# Patient Record
Sex: FEMALE | Race: ASIAN | NOT HISPANIC OR LATINO | Employment: UNEMPLOYED | URBAN - NONMETROPOLITAN AREA
[De-identification: names, ages, dates, MRNs, and addresses within clinical notes are randomized per-mention and may not be internally consistent; named-entity substitution may affect disease eponyms.]

---

## 2019-09-24 ENCOUNTER — OUTSIDE FACILITY SERVICE (OUTPATIENT)
Dept: CARDIOLOGY | Facility: CLINIC | Age: 49
End: 2019-09-24

## 2019-09-24 PROCEDURE — 93306 TTE W/DOPPLER COMPLETE: CPT | Performed by: INTERNAL MEDICINE

## 2019-10-05 ENCOUNTER — APPOINTMENT (OUTPATIENT)
Dept: GENERAL RADIOLOGY | Facility: HOSPITAL | Age: 49
End: 2019-10-05

## 2019-10-05 ENCOUNTER — APPOINTMENT (OUTPATIENT)
Dept: ULTRASOUND IMAGING | Facility: HOSPITAL | Age: 49
End: 2019-10-05

## 2019-10-05 ENCOUNTER — HOSPITAL ENCOUNTER (INPATIENT)
Facility: HOSPITAL | Age: 49
LOS: 3 days | Discharge: HOME | End: 2019-10-08
Attending: INTERNAL MEDICINE | Admitting: FAMILY MEDICINE

## 2019-10-05 DIAGNOSIS — R11.0 NAUSEA: Primary | ICD-10-CM

## 2019-10-05 DIAGNOSIS — E43 SEVERE MALNUTRITION (HCC): ICD-10-CM

## 2019-10-05 DIAGNOSIS — E86.0 DEHYDRATION: ICD-10-CM

## 2019-10-05 PROBLEM — N17.9 ACUTE RENAL FAILURE (ARF) (HCC): Status: ACTIVE | Noted: 2019-10-05

## 2019-10-05 LAB
ALBUMIN SERPL-MCNC: 3.7 G/DL (ref 3.5–5.2)
ALBUMIN SERPL-MCNC: 3.7 G/DL (ref 3.5–5.2)
ALBUMIN/GLOB SERPL: 0.9 G/DL
ALBUMIN/GLOB SERPL: 1.1 G/DL
ALP SERPL-CCNC: 109 U/L (ref 39–117)
ALP SERPL-CCNC: 99 U/L (ref 39–117)
ALT SERPL W P-5'-P-CCNC: 10 U/L (ref 1–33)
ALT SERPL W P-5'-P-CCNC: 11 U/L (ref 1–33)
ANION GAP SERPL CALCULATED.3IONS-SCNC: 19 MMOL/L (ref 5–15)
ANION GAP SERPL CALCULATED.3IONS-SCNC: 19 MMOL/L (ref 5–15)
AST SERPL-CCNC: 11 U/L (ref 1–32)
AST SERPL-CCNC: 12 U/L (ref 1–32)
BACTERIA UR QL AUTO: ABNORMAL /HPF
BASOPHILS # BLD AUTO: 0.01 10*3/MM3 (ref 0–0.2)
BASOPHILS # BLD AUTO: 0.03 10*3/MM3 (ref 0–0.2)
BASOPHILS NFR BLD AUTO: 0.1 % (ref 0–1.5)
BASOPHILS NFR BLD AUTO: 0.1 % (ref 0–1.5)
BILIRUB SERPL-MCNC: 0.2 MG/DL (ref 0.2–1.2)
BILIRUB SERPL-MCNC: <0.2 MG/DL (ref 0.2–1.2)
BILIRUB UR QL STRIP: NEGATIVE
BUN BLD-MCNC: 37 MG/DL (ref 6–20)
BUN BLD-MCNC: 38 MG/DL (ref 6–20)
BUN/CREAT SERPL: 8.9 (ref 7–25)
BUN/CREAT SERPL: 9.3 (ref 7–25)
CALCIUM SPEC-SCNC: 8.7 MG/DL (ref 8.6–10.5)
CALCIUM SPEC-SCNC: 8.8 MG/DL (ref 8.6–10.5)
CHLORIDE SERPL-SCNC: 103 MMOL/L (ref 98–107)
CHLORIDE SERPL-SCNC: 105 MMOL/L (ref 98–107)
CLARITY UR: CLEAR
CO2 SERPL-SCNC: 18 MMOL/L (ref 22–29)
CO2 SERPL-SCNC: 19 MMOL/L (ref 22–29)
COLOR UR: YELLOW
CREAT BLD-MCNC: 3.99 MG/DL (ref 0.57–1)
CREAT BLD-MCNC: 4.29 MG/DL (ref 0.57–1)
DEPRECATED RDW RBC AUTO: 51.7 FL (ref 37–54)
DEPRECATED RDW RBC AUTO: 52.2 FL (ref 37–54)
EOSINOPHIL # BLD AUTO: 0.02 10*3/MM3 (ref 0–0.4)
EOSINOPHIL # BLD AUTO: 0.06 10*3/MM3 (ref 0–0.4)
EOSINOPHIL NFR BLD AUTO: 0.1 % (ref 0.3–6.2)
EOSINOPHIL NFR BLD AUTO: 0.3 % (ref 0.3–6.2)
ERYTHROCYTE [DISTWIDTH] IN BLOOD BY AUTOMATED COUNT: 14.6 % (ref 12.3–15.4)
ERYTHROCYTE [DISTWIDTH] IN BLOOD BY AUTOMATED COUNT: 14.7 % (ref 12.3–15.4)
GFR SERPL CREATININE-BSD FRML MDRD: 11 ML/MIN/1.73
GFR SERPL CREATININE-BSD FRML MDRD: 12 ML/MIN/1.73
GFR SERPL CREATININE-BSD FRML MDRD: 13 ML/MIN/1.73
GFR SERPL CREATININE-BSD FRML MDRD: 15 ML/MIN/1.73
GLOBULIN UR ELPH-MCNC: 3.5 GM/DL
GLOBULIN UR ELPH-MCNC: 4 GM/DL
GLUCOSE BLD-MCNC: 67 MG/DL (ref 65–99)
GLUCOSE BLD-MCNC: 70 MG/DL (ref 65–99)
GLUCOSE BLDC GLUCOMTR-MCNC: 100 MG/DL (ref 70–130)
GLUCOSE BLDC GLUCOMTR-MCNC: 155 MG/DL (ref 70–130)
GLUCOSE BLDC GLUCOMTR-MCNC: 193 MG/DL (ref 70–130)
GLUCOSE BLDC GLUCOMTR-MCNC: 249 MG/DL (ref 70–130)
GLUCOSE UR STRIP-MCNC: NEGATIVE MG/DL
HCT VFR BLD AUTO: 36.3 % (ref 34–46.6)
HCT VFR BLD AUTO: 37.4 % (ref 34–46.6)
HGB BLD-MCNC: 12.2 G/DL (ref 12–15.9)
HGB BLD-MCNC: 12.4 G/DL (ref 12–15.9)
HGB UR QL STRIP.AUTO: ABNORMAL
HYALINE CASTS UR QL AUTO: ABNORMAL /LPF
IMM GRANULOCYTES # BLD AUTO: 0.1 10*3/MM3 (ref 0–0.05)
IMM GRANULOCYTES # BLD AUTO: 0.14 10*3/MM3 (ref 0–0.05)
IMM GRANULOCYTES NFR BLD AUTO: 0.6 % (ref 0–0.5)
IMM GRANULOCYTES NFR BLD AUTO: 0.7 % (ref 0–0.5)
KETONES UR QL STRIP: NEGATIVE
LEUKOCYTE ESTERASE UR QL STRIP.AUTO: NEGATIVE
LYMPHOCYTES # BLD AUTO: 2.13 10*3/MM3 (ref 0.7–3.1)
LYMPHOCYTES # BLD AUTO: 2.43 10*3/MM3 (ref 0.7–3.1)
LYMPHOCYTES NFR BLD AUTO: 11.8 % (ref 19.6–45.3)
LYMPHOCYTES NFR BLD AUTO: 12.6 % (ref 19.6–45.3)
MCH RBC QN AUTO: 32.6 PG (ref 26.6–33)
MCH RBC QN AUTO: 32.8 PG (ref 26.6–33)
MCHC RBC AUTO-ENTMCNC: 33.2 G/DL (ref 31.5–35.7)
MCHC RBC AUTO-ENTMCNC: 33.6 G/DL (ref 31.5–35.7)
MCV RBC AUTO: 97.6 FL (ref 79–97)
MCV RBC AUTO: 98.4 FL (ref 79–97)
MONOCYTES # BLD AUTO: 0.5 10*3/MM3 (ref 0.1–0.9)
MONOCYTES # BLD AUTO: 0.6 10*3/MM3 (ref 0.1–0.9)
MONOCYTES NFR BLD AUTO: 2.9 % (ref 5–12)
MONOCYTES NFR BLD AUTO: 2.9 % (ref 5–12)
NEUTROPHILS # BLD AUTO: 14.2 10*3/MM3 (ref 1.7–7)
NEUTROPHILS # BLD AUTO: 17.33 10*3/MM3 (ref 1.7–7)
NEUTROPHILS NFR BLD AUTO: 83.7 % (ref 42.7–76)
NEUTROPHILS NFR BLD AUTO: 84.2 % (ref 42.7–76)
NITRITE UR QL STRIP: NEGATIVE
NRBC BLD AUTO-RTO: 0 /100 WBC (ref 0–0.2)
NRBC BLD AUTO-RTO: 0 /100 WBC (ref 0–0.2)
PH UR STRIP.AUTO: <=5 [PH] (ref 5–8)
PLATELET # BLD AUTO: 382 10*3/MM3 (ref 140–450)
PLATELET # BLD AUTO: 396 10*3/MM3 (ref 140–450)
PMV BLD AUTO: 10 FL (ref 6–12)
PMV BLD AUTO: 9.8 FL (ref 6–12)
POTASSIUM BLD-SCNC: 3 MMOL/L (ref 3.5–5.2)
POTASSIUM BLD-SCNC: 3.3 MMOL/L (ref 3.5–5.2)
PROT SERPL-MCNC: 7.2 G/DL (ref 6–8.5)
PROT SERPL-MCNC: 7.7 G/DL (ref 6–8.5)
PROT UR QL STRIP: NEGATIVE
RBC # BLD AUTO: 3.72 10*6/MM3 (ref 3.77–5.28)
RBC # BLD AUTO: 3.8 10*6/MM3 (ref 3.77–5.28)
RBC # UR: ABNORMAL /HPF
REF LAB TEST METHOD: ABNORMAL
SODIUM BLD-SCNC: 141 MMOL/L (ref 136–145)
SODIUM BLD-SCNC: 142 MMOL/L (ref 136–145)
SP GR UR STRIP: 1.01 (ref 1–1.03)
SQUAMOUS #/AREA URNS HPF: ABNORMAL /HPF
UROBILINOGEN UR QL STRIP: ABNORMAL
WBC CASTS #/AREA URNS LPF: ABNORMAL /LPF
WBC NRBC COR # BLD: 16.96 10*3/MM3 (ref 3.4–10.8)
WBC NRBC COR # BLD: 20.59 10*3/MM3 (ref 3.4–10.8)
WBC UR QL AUTO: ABNORMAL /HPF

## 2019-10-05 PROCEDURE — 80053 COMPREHEN METABOLIC PANEL: CPT | Performed by: FAMILY MEDICINE

## 2019-10-05 PROCEDURE — 36415 COLL VENOUS BLD VENIPUNCTURE: CPT | Performed by: FAMILY MEDICINE

## 2019-10-05 PROCEDURE — 76775 US EXAM ABDO BACK WALL LIM: CPT

## 2019-10-05 PROCEDURE — 99284 EMERGENCY DEPT VISIT MOD MDM: CPT

## 2019-10-05 PROCEDURE — 80053 COMPREHEN METABOLIC PANEL: CPT | Performed by: INTERNAL MEDICINE

## 2019-10-05 PROCEDURE — 82962 GLUCOSE BLOOD TEST: CPT

## 2019-10-05 PROCEDURE — 71046 X-RAY EXAM CHEST 2 VIEWS: CPT

## 2019-10-05 PROCEDURE — 85025 COMPLETE CBC W/AUTO DIFF WBC: CPT | Performed by: INTERNAL MEDICINE

## 2019-10-05 PROCEDURE — 81001 URINALYSIS AUTO W/SCOPE: CPT | Performed by: INTERNAL MEDICINE

## 2019-10-05 PROCEDURE — 63710000001 INSULIN LISPRO (HUMAN) PER 5 UNITS: Performed by: FAMILY MEDICINE

## 2019-10-05 PROCEDURE — 63710000001 PREDNISONE PER 5 MG: Performed by: FAMILY MEDICINE

## 2019-10-05 PROCEDURE — 85025 COMPLETE CBC W/AUTO DIFF WBC: CPT | Performed by: FAMILY MEDICINE

## 2019-10-05 RX ORDER — TIZANIDINE 4 MG/1
8 TABLET ORAL NIGHTLY
Status: ON HOLD | COMMUNITY
End: 2022-04-30

## 2019-10-05 RX ORDER — ERGOCALCIFEROL (VITAMIN D2) 50 MCG
2000 CAPSULE ORAL DAILY
COMMUNITY

## 2019-10-05 RX ORDER — NIFEDIPINE 60 MG/1
60 TABLET, EXTENDED RELEASE ORAL DAILY
Status: ON HOLD | COMMUNITY
End: 2022-04-30

## 2019-10-05 RX ORDER — SODIUM CHLORIDE 9 MG/ML
75 INJECTION, SOLUTION INTRAVENOUS CONTINUOUS
Status: DISCONTINUED | OUTPATIENT
Start: 2019-10-05 | End: 2019-10-08 | Stop reason: HOSPADM

## 2019-10-05 RX ORDER — MOMETASONE FUROATE 1 MG/G
OINTMENT TOPICAL DAILY
Status: ON HOLD | COMMUNITY
End: 2022-04-30

## 2019-10-05 RX ORDER — GABAPENTIN 600 MG/1
600 TABLET ORAL 3 TIMES DAILY
COMMUNITY
End: 2022-04-30 | Stop reason: HOSPADM

## 2019-10-05 RX ORDER — NORTRIPTYLINE HYDROCHLORIDE 50 MG/1
25 CAPSULE ORAL NIGHTLY
Status: ON HOLD | COMMUNITY
End: 2022-04-30

## 2019-10-05 RX ORDER — DULOXETIN HYDROCHLORIDE 60 MG/1
60 CAPSULE, DELAYED RELEASE ORAL DAILY
COMMUNITY
End: 2022-02-08

## 2019-10-05 RX ORDER — COLCHICINE 0.6 MG/1
0.6 TABLET ORAL 3 TIMES DAILY PRN
Status: ON HOLD | COMMUNITY
End: 2022-04-30

## 2019-10-05 RX ORDER — GLIPIZIDE 5 MG/1
5 TABLET ORAL
Status: ON HOLD | COMMUNITY
End: 2022-04-30

## 2019-10-05 RX ORDER — CETIRIZINE HYDROCHLORIDE 10 MG/1
10 TABLET ORAL DAILY
COMMUNITY

## 2019-10-05 RX ORDER — POTASSIUM CHLORIDE 750 MG/1
40 CAPSULE, EXTENDED RELEASE ORAL ONCE
Status: COMPLETED | OUTPATIENT
Start: 2019-10-05 | End: 2019-10-05

## 2019-10-05 RX ORDER — NICOTINE POLACRILEX 4 MG
15 LOZENGE BUCCAL
Status: DISCONTINUED | OUTPATIENT
Start: 2019-10-05 | End: 2019-10-08 | Stop reason: HOSPADM

## 2019-10-05 RX ORDER — IPRATROPIUM BROMIDE AND ALBUTEROL SULFATE 2.5; .5 MG/3ML; MG/3ML
3 SOLUTION RESPIRATORY (INHALATION)
Status: DISCONTINUED | OUTPATIENT
Start: 2019-10-06 | End: 2019-10-06

## 2019-10-05 RX ORDER — ATORVASTATIN CALCIUM 10 MG/1
20 TABLET, FILM COATED ORAL DAILY
Status: DISCONTINUED | OUTPATIENT
Start: 2019-10-05 | End: 2019-10-05

## 2019-10-05 RX ORDER — BISACODYL 5 MG/1
5 TABLET, DELAYED RELEASE ORAL NIGHTLY
Status: DISCONTINUED | OUTPATIENT
Start: 2019-10-05 | End: 2019-10-08 | Stop reason: HOSPADM

## 2019-10-05 RX ORDER — NORTRIPTYLINE HYDROCHLORIDE 25 MG/1
50 CAPSULE ORAL NIGHTLY
Status: DISCONTINUED | OUTPATIENT
Start: 2019-10-05 | End: 2019-10-08 | Stop reason: HOSPADM

## 2019-10-05 RX ORDER — ATORVASTATIN CALCIUM 20 MG/1
40 TABLET, FILM COATED ORAL DAILY
Status: ON HOLD | COMMUNITY
End: 2022-04-30

## 2019-10-05 RX ORDER — ATORVASTATIN CALCIUM 10 MG/1
20 TABLET, FILM COATED ORAL NIGHTLY
Status: DISCONTINUED | OUTPATIENT
Start: 2019-10-05 | End: 2019-10-08 | Stop reason: HOSPADM

## 2019-10-05 RX ORDER — BISOPROLOL FUMARATE 5 MG/1
5 TABLET, FILM COATED ORAL DAILY
Status: DISCONTINUED | OUTPATIENT
Start: 2019-10-05 | End: 2019-10-05

## 2019-10-05 RX ORDER — FUROSEMIDE 40 MG/1
40 TABLET ORAL 2 TIMES DAILY PRN
COMMUNITY
End: 2019-10-08 | Stop reason: HOSPADM

## 2019-10-05 RX ORDER — SODIUM CHLORIDE 9 MG/ML
100 INJECTION, SOLUTION INTRAVENOUS CONTINUOUS
Status: DISCONTINUED | OUTPATIENT
Start: 2019-10-06 | End: 2019-10-06

## 2019-10-05 RX ORDER — POTASSIUM CHLORIDE 20 MEQ/1
20 TABLET, EXTENDED RELEASE ORAL DAILY
COMMUNITY
End: 2022-04-30 | Stop reason: HOSPADM

## 2019-10-05 RX ORDER — OMEPRAZOLE 40 MG/1
40 CAPSULE, DELAYED RELEASE ORAL DAILY
Status: ON HOLD | COMMUNITY
End: 2022-04-30

## 2019-10-05 RX ORDER — PANTOPRAZOLE SODIUM 40 MG/1
40 TABLET, DELAYED RELEASE ORAL
Status: DISCONTINUED | OUTPATIENT
Start: 2019-10-05 | End: 2019-10-08 | Stop reason: HOSPADM

## 2019-10-05 RX ORDER — ALENDRONATE SODIUM 70 MG/1
70 TABLET ORAL
Status: ON HOLD | COMMUNITY
End: 2022-04-30

## 2019-10-05 RX ORDER — ONDANSETRON 2 MG/ML
4 INJECTION INTRAMUSCULAR; INTRAVENOUS EVERY 6 HOURS PRN
Status: DISCONTINUED | OUTPATIENT
Start: 2019-10-05 | End: 2019-10-08 | Stop reason: HOSPADM

## 2019-10-05 RX ORDER — FOLIC ACID 1 MG/1
2 TABLET ORAL DAILY
Status: ON HOLD | COMMUNITY
End: 2022-04-30

## 2019-10-05 RX ORDER — GUAIFENESIN 600 MG/1
1200 TABLET, EXTENDED RELEASE ORAL EVERY 12 HOURS SCHEDULED
Status: DISCONTINUED | OUTPATIENT
Start: 2019-10-06 | End: 2019-10-08 | Stop reason: HOSPADM

## 2019-10-05 RX ORDER — ASPIRIN 81 MG/1
81 TABLET, CHEWABLE ORAL DAILY
Status: DISCONTINUED | OUTPATIENT
Start: 2019-10-05 | End: 2019-10-08 | Stop reason: HOSPADM

## 2019-10-05 RX ORDER — DULOXETIN HYDROCHLORIDE 30 MG/1
60 CAPSULE, DELAYED RELEASE ORAL DAILY
Status: DISCONTINUED | OUTPATIENT
Start: 2019-10-05 | End: 2019-10-08 | Stop reason: HOSPADM

## 2019-10-05 RX ORDER — PREDNISONE 1 MG/1
5 TABLET ORAL DAILY
Status: DISCONTINUED | OUTPATIENT
Start: 2019-10-05 | End: 2019-10-08 | Stop reason: HOSPADM

## 2019-10-05 RX ORDER — BISOPROLOL FUMARATE 5 MG/1
5 TABLET, FILM COATED ORAL DAILY
Status: ON HOLD | COMMUNITY
End: 2019-10-05 | Stop reason: ALTCHOICE

## 2019-10-05 RX ORDER — BISACODYL 5 MG/1
75 TABLET, DELAYED RELEASE ORAL NIGHTLY
Status: ON HOLD | COMMUNITY
End: 2022-04-30

## 2019-10-05 RX ORDER — CETIRIZINE HYDROCHLORIDE 10 MG/1
10 TABLET ORAL DAILY
Status: DISCONTINUED | OUTPATIENT
Start: 2019-10-05 | End: 2019-10-08 | Stop reason: HOSPADM

## 2019-10-05 RX ORDER — DIPHENHYDRAMINE HCL 25 MG
50 TABLET ORAL NIGHTLY
COMMUNITY

## 2019-10-05 RX ORDER — DEXTROSE MONOHYDRATE 25 G/50ML
25 INJECTION, SOLUTION INTRAVENOUS
Status: DISCONTINUED | OUTPATIENT
Start: 2019-10-05 | End: 2019-10-08 | Stop reason: HOSPADM

## 2019-10-05 RX ORDER — PREDNISONE 1 MG/1
5 TABLET ORAL DAILY
Status: ON HOLD | COMMUNITY
End: 2022-04-30

## 2019-10-05 RX ORDER — GABAPENTIN 300 MG/1
600 CAPSULE ORAL EVERY 8 HOURS SCHEDULED
Status: DISCONTINUED | OUTPATIENT
Start: 2019-10-05 | End: 2019-10-08 | Stop reason: HOSPADM

## 2019-10-05 RX ORDER — ASPIRIN 81 MG/1
81 TABLET ORAL DAILY
Status: ON HOLD | COMMUNITY
End: 2022-04-30

## 2019-10-05 RX ORDER — NIFEDIPINE 60 MG/1
60 TABLET, EXTENDED RELEASE ORAL DAILY
Status: DISCONTINUED | OUTPATIENT
Start: 2019-10-05 | End: 2019-10-06

## 2019-10-05 RX ADMIN — PREDNISONE 5 MG: 5 TABLET ORAL at 09:58

## 2019-10-05 RX ADMIN — SODIUM CHLORIDE 500 ML: 9 INJECTION, SOLUTION INTRAVENOUS at 15:19

## 2019-10-05 RX ADMIN — DULOXETINE HYDROCHLORIDE 60 MG: 30 CAPSULE, DELAYED RELEASE ORAL at 17:33

## 2019-10-05 RX ADMIN — SODIUM CHLORIDE 75 ML/HR: 9 INJECTION, SOLUTION INTRAVENOUS at 18:33

## 2019-10-05 RX ADMIN — BISOPROLOL FUMARATE 5 MG: 5 TABLET, FILM COATED ORAL at 09:58

## 2019-10-05 RX ADMIN — INSULIN LISPRO 4 UNITS: 100 INJECTION, SOLUTION INTRAVENOUS; SUBCUTANEOUS at 17:33

## 2019-10-05 RX ADMIN — INSULIN LISPRO 2 UNITS: 100 INJECTION, SOLUTION INTRAVENOUS; SUBCUTANEOUS at 22:46

## 2019-10-05 RX ADMIN — PANTOPRAZOLE SODIUM 40 MG: 40 TABLET, DELAYED RELEASE ORAL at 06:49

## 2019-10-05 RX ADMIN — CETIRIZINE HYDROCHLORIDE 10 MG: 10 TABLET, FILM COATED ORAL at 09:58

## 2019-10-05 RX ADMIN — NORTRIPTYLINE HYDROCHLORIDE 50 MG: 25 CAPSULE ORAL at 22:46

## 2019-10-05 RX ADMIN — ATORVASTATIN CALCIUM 20 MG: 10 TABLET, FILM COATED ORAL at 22:46

## 2019-10-05 RX ADMIN — SODIUM CHLORIDE 500 ML: 9 INJECTION, SOLUTION INTRAVENOUS at 18:33

## 2019-10-05 RX ADMIN — NIFEDIPINE 60 MG: 60 TABLET, FILM COATED, EXTENDED RELEASE ORAL at 09:58

## 2019-10-05 RX ADMIN — GABAPENTIN 600 MG: 300 CAPSULE ORAL at 06:49

## 2019-10-05 RX ADMIN — BISACODYL 5 MG: 5 TABLET ORAL at 22:46

## 2019-10-05 RX ADMIN — SODIUM CHLORIDE 75 ML/HR: 9 INJECTION, SOLUTION INTRAVENOUS at 05:14

## 2019-10-05 RX ADMIN — ASPIRIN 81 MG: 81 TABLET, CHEWABLE ORAL at 09:58

## 2019-10-05 RX ADMIN — POTASSIUM CHLORIDE 40 MEQ: 750 CAPSULE, EXTENDED RELEASE ORAL at 10:07

## 2019-10-05 RX ADMIN — INSULIN LISPRO 2 UNITS: 100 INJECTION, SOLUTION INTRAVENOUS; SUBCUTANEOUS at 12:07

## 2019-10-06 ENCOUNTER — APPOINTMENT (OUTPATIENT)
Dept: CARDIOLOGY | Facility: HOSPITAL | Age: 49
End: 2019-10-06

## 2019-10-06 PROBLEM — R42 DIZZY: Status: ACTIVE | Noted: 2019-10-06

## 2019-10-06 PROBLEM — D72.829 LEUKOCYTOSIS: Status: ACTIVE | Noted: 2019-10-06

## 2019-10-06 PROBLEM — E86.0 DEHYDRATION: Status: ACTIVE | Noted: 2019-10-06

## 2019-10-06 PROBLEM — R03.1 NONSPECIFIC LOW BLOOD PRESSURE READING: Status: ACTIVE | Noted: 2019-10-06

## 2019-10-06 PROBLEM — E11.40 DIABETES MELLITUS WITH NEUROPATHY: Status: ACTIVE | Noted: 2019-10-06

## 2019-10-06 PROBLEM — E43 SEVERE MALNUTRITION: Status: ACTIVE | Noted: 2019-10-06
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
## 2019-10-06 PROBLEM — E87.6 HYPOKALEMIA: Status: ACTIVE | Noted: 2019-10-06

## 2019-10-06 LAB
ALBUMIN SERPL-MCNC: 3.2 G/DL (ref 3.5–5.2)
ALBUMIN/GLOB SERPL: 1.1 G/DL
ALP SERPL-CCNC: 81 U/L (ref 39–117)
ALT SERPL W P-5'-P-CCNC: 5 U/L (ref 1–33)
ANION GAP SERPL CALCULATED.3IONS-SCNC: 13 MMOL/L (ref 5–15)
AST SERPL-CCNC: 9 U/L (ref 1–32)
BASOPHILS # BLD AUTO: 0.02 10*3/MM3 (ref 0–0.2)
BASOPHILS NFR BLD AUTO: 0.2 % (ref 0–1.5)
BH CV ECHO MEAS - AO MAX PG (FULL): 1.9 MMHG
BH CV ECHO MEAS - AO MAX PG: 5.1 MMHG
BH CV ECHO MEAS - AO MEAN PG (FULL): 0 MMHG
BH CV ECHO MEAS - AO MEAN PG: 2 MMHG
BH CV ECHO MEAS - AO ROOT AREA (BSA CORRECTED): 1.8
BH CV ECHO MEAS - AO ROOT AREA: 4.7 CM^2
BH CV ECHO MEAS - AO ROOT DIAM: 2.5 CM
BH CV ECHO MEAS - AO V2 MAX: 113 CM/SEC
BH CV ECHO MEAS - AO V2 MEAN: 63.6 CM/SEC
BH CV ECHO MEAS - AO V2 VTI: 23.6 CM
BH CV ECHO MEAS - AVA(I,A): 3.4 CM^2
BH CV ECHO MEAS - AVA(I,D): 3.4 CM^2
BH CV ECHO MEAS - AVA(V,A): 3 CM^2
BH CV ECHO MEAS - AVA(V,D): 3 CM^2
BH CV ECHO MEAS - BSA(HAYCOCK): 1.4 M^2
BH CV ECHO MEAS - BSA: 1.4 M^2
BH CV ECHO MEAS - BZI_BMI: 19.7 KILOGRAMS/M^2
BH CV ECHO MEAS - BZI_METRIC_HEIGHT: 152.4 CM
BH CV ECHO MEAS - BZI_METRIC_WEIGHT: 45.8 KG
BH CV ECHO MEAS - EDV(CUBED): 86.4 ML
BH CV ECHO MEAS - EDV(MOD-SP4): 45.1 ML
BH CV ECHO MEAS - EDV(TEICH): 88.6 ML
BH CV ECHO MEAS - EF(CUBED): 78.4 %
BH CV ECHO MEAS - EF(MOD-SP4): 69.4 %
BH CV ECHO MEAS - EF(TEICH): 70.9 %
BH CV ECHO MEAS - ESV(CUBED): 18.6 ML
BH CV ECHO MEAS - ESV(MOD-SP4): 13.8 ML
BH CV ECHO MEAS - ESV(TEICH): 25.8 ML
BH CV ECHO MEAS - FS: 40 %
BH CV ECHO MEAS - IVS/LVPW: 1.1
BH CV ECHO MEAS - IVSD: 0.81 CM
BH CV ECHO MEAS - LA DIMENSION: 3.4 CM
BH CV ECHO MEAS - LA/AO: 1.4
BH CV ECHO MEAS - LAT PEAK E' VEL: 14.9 CM/SEC
BH CV ECHO MEAS - LV DIASTOLIC VOL/BSA (35-75): 32.3 ML/M^2
BH CV ECHO MEAS - LV MASS(C)D: 104.6 GRAMS
BH CV ECHO MEAS - LV MASS(C)DI: 74.9 GRAMS/M^2
BH CV ECHO MEAS - LV MAX PG: 3.3 MMHG
BH CV ECHO MEAS - LV MEAN PG: 2 MMHG
BH CV ECHO MEAS - LV SYSTOLIC VOL/BSA (12-30): 9.9 ML/M^2
BH CV ECHO MEAS - LV V1 MAX: 90.2 CM/SEC
BH CV ECHO MEAS - LV V1 MEAN: 54.9 CM/SEC
BH CV ECHO MEAS - LV V1 VTI: 21 CM
BH CV ECHO MEAS - LVIDD: 4.4 CM
BH CV ECHO MEAS - LVIDS: 2.7 CM
BH CV ECHO MEAS - LVLD AP4: 6.8 CM
BH CV ECHO MEAS - LVLS AP4: 5.1 CM
BH CV ECHO MEAS - LVOT AREA (M): 3.8 CM^2
BH CV ECHO MEAS - LVOT AREA: 3.8 CM^2
BH CV ECHO MEAS - LVOT DIAM: 2.2 CM
BH CV ECHO MEAS - LVPWD: 0.73 CM
BH CV ECHO MEAS - MED PEAK E' VEL: 9.9 CM/SEC
BH CV ECHO MEAS - MV A MAX VEL: 79.5 CM/SEC
BH CV ECHO MEAS - MV DEC TIME: 0.19 SEC
BH CV ECHO MEAS - MV E MAX VEL: 94.6 CM/SEC
BH CV ECHO MEAS - MV E/A: 1.2
BH CV ECHO MEAS - RAP SYSTOLE: 5 MMHG
BH CV ECHO MEAS - RVDD: 3.4 CM
BH CV ECHO MEAS - RVSP: 22.3 MMHG
BH CV ECHO MEAS - SI(AO): 79.7 ML/M^2
BH CV ECHO MEAS - SI(CUBED): 48.5 ML/M^2
BH CV ECHO MEAS - SI(LVOT): 57.2 ML/M^2
BH CV ECHO MEAS - SI(MOD-SP4): 22.4 ML/M^2
BH CV ECHO MEAS - SI(TEICH): 45 ML/M^2
BH CV ECHO MEAS - SV(AO): 111.3 ML
BH CV ECHO MEAS - SV(CUBED): 67.7 ML
BH CV ECHO MEAS - SV(LVOT): 79.8 ML
BH CV ECHO MEAS - SV(MOD-SP4): 31.3 ML
BH CV ECHO MEAS - SV(TEICH): 62.8 ML
BH CV ECHO MEAS - TR MAX VEL: 208 CM/SEC
BH CV ECHO MEASUREMENTS AVERAGE E/E' RATIO: 7.63
BILIRUB SERPL-MCNC: <0.2 MG/DL (ref 0.2–1.2)
BUN BLD-MCNC: 23 MG/DL (ref 6–20)
BUN/CREAT SERPL: 16.7 (ref 7–25)
CALCIUM SPEC-SCNC: 7.6 MG/DL (ref 8.6–10.5)
CHLORIDE SERPL-SCNC: 109 MMOL/L (ref 98–107)
CO2 SERPL-SCNC: 20 MMOL/L (ref 22–29)
CREAT BLD-MCNC: 1.38 MG/DL (ref 0.57–1)
D-LACTATE SERPL-SCNC: 1.5 MMOL/L (ref 0.5–2)
DEPRECATED RDW RBC AUTO: 52.3 FL (ref 37–54)
EOSINOPHIL # BLD AUTO: 0.03 10*3/MM3 (ref 0–0.4)
EOSINOPHIL NFR BLD AUTO: 0.3 % (ref 0.3–6.2)
ERYTHROCYTE [DISTWIDTH] IN BLOOD BY AUTOMATED COUNT: 14.8 % (ref 12.3–15.4)
GFR SERPL CREATININE-BSD FRML MDRD: 41 ML/MIN/1.73
GFR SERPL CREATININE-BSD FRML MDRD: 49 ML/MIN/1.73
GLOBULIN UR ELPH-MCNC: 2.8 GM/DL
GLUCOSE BLD-MCNC: 146 MG/DL (ref 65–99)
GLUCOSE BLDC GLUCOMTR-MCNC: 150 MG/DL (ref 70–130)
GLUCOSE BLDC GLUCOMTR-MCNC: 171 MG/DL (ref 70–130)
GLUCOSE BLDC GLUCOMTR-MCNC: 183 MG/DL (ref 70–130)
GLUCOSE BLDC GLUCOMTR-MCNC: 193 MG/DL (ref 70–130)
HCT VFR BLD AUTO: 30.5 % (ref 34–46.6)
HGB BLD-MCNC: 10.2 G/DL (ref 12–15.9)
IMM GRANULOCYTES # BLD AUTO: 0.04 10*3/MM3 (ref 0–0.05)
IMM GRANULOCYTES NFR BLD AUTO: 0.4 % (ref 0–0.5)
LEFT ATRIUM VOLUME INDEX: 15.7 ML/M2
LEFT ATRIUM VOLUME: 22 CM3
LYMPHOCYTES # BLD AUTO: 2.31 10*3/MM3 (ref 0.7–3.1)
LYMPHOCYTES NFR BLD AUTO: 21 % (ref 19.6–45.3)
MAXIMAL PREDICTED HEART RATE: 172 BPM
MCH RBC QN AUTO: 32.8 PG (ref 26.6–33)
MCHC RBC AUTO-ENTMCNC: 33.4 G/DL (ref 31.5–35.7)
MCV RBC AUTO: 98.1 FL (ref 79–97)
MONOCYTES # BLD AUTO: 0.21 10*3/MM3 (ref 0.1–0.9)
MONOCYTES NFR BLD AUTO: 1.9 % (ref 5–12)
NEUTROPHILS # BLD AUTO: 8.41 10*3/MM3 (ref 1.7–7)
NEUTROPHILS NFR BLD AUTO: 76.2 % (ref 42.7–76)
NRBC BLD AUTO-RTO: 0 /100 WBC (ref 0–0.2)
PLATELET # BLD AUTO: 333 10*3/MM3 (ref 140–450)
PMV BLD AUTO: 10.1 FL (ref 6–12)
POTASSIUM BLD-SCNC: 3.3 MMOL/L (ref 3.5–5.2)
PROT SERPL-MCNC: 6 G/DL (ref 6–8.5)
RBC # BLD AUTO: 3.11 10*6/MM3 (ref 3.77–5.28)
SODIUM BLD-SCNC: 142 MMOL/L (ref 136–145)
STRESS TARGET HR: 146 BPM
WBC NRBC COR # BLD: 11.02 10*3/MM3 (ref 3.4–10.8)

## 2019-10-06 PROCEDURE — 85025 COMPLETE CBC W/AUTO DIFF WBC: CPT | Performed by: FAMILY MEDICINE

## 2019-10-06 PROCEDURE — 93306 TTE W/DOPPLER COMPLETE: CPT | Performed by: INTERNAL MEDICINE

## 2019-10-06 PROCEDURE — 63710000001 PREDNISONE PER 5 MG: Performed by: FAMILY MEDICINE

## 2019-10-06 PROCEDURE — 93306 TTE W/DOPPLER COMPLETE: CPT

## 2019-10-06 PROCEDURE — 83605 ASSAY OF LACTIC ACID: CPT | Performed by: INTERNAL MEDICINE

## 2019-10-06 PROCEDURE — 80053 COMPREHEN METABOLIC PANEL: CPT | Performed by: FAMILY MEDICINE

## 2019-10-06 PROCEDURE — 94799 UNLISTED PULMONARY SVC/PX: CPT

## 2019-10-06 PROCEDURE — 82962 GLUCOSE BLOOD TEST: CPT

## 2019-10-06 PROCEDURE — 25010000002 ONDANSETRON PER 1 MG: Performed by: FAMILY MEDICINE

## 2019-10-06 PROCEDURE — 63710000001 INSULIN LISPRO (HUMAN) PER 5 UNITS: Performed by: FAMILY MEDICINE

## 2019-10-06 PROCEDURE — 25010000002 MEROPENEM PER 100 MG: Performed by: INTERNAL MEDICINE

## 2019-10-06 PROCEDURE — 87040 BLOOD CULTURE FOR BACTERIA: CPT | Performed by: INTERNAL MEDICINE

## 2019-10-06 RX ORDER — FOLIC ACID 1 MG/1
2 TABLET ORAL 2 TIMES DAILY
Status: DISCONTINUED | OUTPATIENT
Start: 2019-10-06 | End: 2019-10-06

## 2019-10-06 RX ORDER — FOLIC ACID 1 MG/1
2 TABLET ORAL DAILY
Status: DISCONTINUED | OUTPATIENT
Start: 2019-10-06 | End: 2019-10-08 | Stop reason: HOSPADM

## 2019-10-06 RX ADMIN — MEROPENEM 500 MG: 500 INJECTION, POWDER, FOR SOLUTION INTRAVENOUS at 06:38

## 2019-10-06 RX ADMIN — GUAIFENESIN 1200 MG: 600 TABLET, EXTENDED RELEASE ORAL at 00:58

## 2019-10-06 RX ADMIN — INSULIN LISPRO 2 UNITS: 100 INJECTION, SOLUTION INTRAVENOUS; SUBCUTANEOUS at 11:47

## 2019-10-06 RX ADMIN — MEROPENEM 500 MG: 500 INJECTION, POWDER, FOR SOLUTION INTRAVENOUS at 00:50

## 2019-10-06 RX ADMIN — DULOXETINE HYDROCHLORIDE 60 MG: 30 CAPSULE, DELAYED RELEASE ORAL at 08:21

## 2019-10-06 RX ADMIN — FOLIC ACID 2 MG: 1 TABLET ORAL at 11:47

## 2019-10-06 RX ADMIN — GUAIFENESIN 1200 MG: 600 TABLET, EXTENDED RELEASE ORAL at 08:21

## 2019-10-06 RX ADMIN — BISACODYL 5 MG: 5 TABLET ORAL at 22:28

## 2019-10-06 RX ADMIN — INSULIN LISPRO 2 UNITS: 100 INJECTION, SOLUTION INTRAVENOUS; SUBCUTANEOUS at 22:31

## 2019-10-06 RX ADMIN — PREDNISONE 5 MG: 5 TABLET ORAL at 08:21

## 2019-10-06 RX ADMIN — NORTRIPTYLINE HYDROCHLORIDE 50 MG: 25 CAPSULE ORAL at 22:28

## 2019-10-06 RX ADMIN — INSULIN LISPRO 2 UNITS: 100 INJECTION, SOLUTION INTRAVENOUS; SUBCUTANEOUS at 08:28

## 2019-10-06 RX ADMIN — SODIUM CHLORIDE 75 ML/HR: 9 INJECTION, SOLUTION INTRAVENOUS at 15:07

## 2019-10-06 RX ADMIN — ATORVASTATIN CALCIUM 20 MG: 10 TABLET, FILM COATED ORAL at 20:54

## 2019-10-06 RX ADMIN — SODIUM CHLORIDE 100 ML/HR: 9 INJECTION, SOLUTION INTRAVENOUS at 00:20

## 2019-10-06 RX ADMIN — GABAPENTIN 600 MG: 300 CAPSULE ORAL at 06:38

## 2019-10-06 RX ADMIN — GABAPENTIN 600 MG: 300 CAPSULE ORAL at 20:54

## 2019-10-06 RX ADMIN — CETIRIZINE HYDROCHLORIDE 10 MG: 10 TABLET, FILM COATED ORAL at 08:29

## 2019-10-06 RX ADMIN — INSULIN LISPRO 2 UNITS: 100 INJECTION, SOLUTION INTRAVENOUS; SUBCUTANEOUS at 18:00

## 2019-10-06 RX ADMIN — PANTOPRAZOLE SODIUM 40 MG: 40 TABLET, DELAYED RELEASE ORAL at 06:38

## 2019-10-06 RX ADMIN — ASPIRIN 81 MG: 81 TABLET, CHEWABLE ORAL at 08:21

## 2019-10-06 RX ADMIN — MEROPENEM 1 G: 1 INJECTION, POWDER, FOR SOLUTION INTRAVENOUS at 19:49

## 2019-10-06 RX ADMIN — GUAIFENESIN 1200 MG: 600 TABLET, EXTENDED RELEASE ORAL at 20:54

## 2019-10-06 RX ADMIN — GABAPENTIN 600 MG: 300 CAPSULE ORAL at 15:07

## 2019-10-06 RX ADMIN — ONDANSETRON 4 MG: 2 INJECTION INTRAMUSCULAR; INTRAVENOUS at 23:48

## 2019-10-07 LAB
AMYLASE SERPL-CCNC: 135 U/L (ref 28–100)
ANION GAP SERPL CALCULATED.3IONS-SCNC: 12 MMOL/L (ref 5–15)
BASOPHILS # BLD AUTO: 0.01 10*3/MM3 (ref 0–0.2)
BASOPHILS NFR BLD AUTO: 0.1 % (ref 0–1.5)
BUN BLD-MCNC: 17 MG/DL (ref 6–20)
BUN/CREAT SERPL: 21.5 (ref 7–25)
CALCIUM SPEC-SCNC: 6.8 MG/DL (ref 8.6–10.5)
CHLORIDE SERPL-SCNC: 112 MMOL/L (ref 98–107)
CO2 SERPL-SCNC: 24 MMOL/L (ref 22–29)
CREAT BLD-MCNC: 0.79 MG/DL (ref 0.57–1)
DEPRECATED RDW RBC AUTO: 50.9 FL (ref 37–54)
EOSINOPHIL # BLD AUTO: 0.07 10*3/MM3 (ref 0–0.4)
EOSINOPHIL NFR BLD AUTO: 0.9 % (ref 0.3–6.2)
ERYTHROCYTE [DISTWIDTH] IN BLOOD BY AUTOMATED COUNT: 14.6 % (ref 12.3–15.4)
GFR SERPL CREATININE-BSD FRML MDRD: 78 ML/MIN/1.73
GFR SERPL CREATININE-BSD FRML MDRD: 94 ML/MIN/1.73
GLUCOSE BLD-MCNC: 125 MG/DL (ref 65–99)
GLUCOSE BLDC GLUCOMTR-MCNC: 120 MG/DL (ref 70–130)
GLUCOSE BLDC GLUCOMTR-MCNC: 143 MG/DL (ref 70–130)
GLUCOSE BLDC GLUCOMTR-MCNC: 218 MG/DL (ref 70–130)
GLUCOSE BLDC GLUCOMTR-MCNC: 88 MG/DL (ref 70–130)
HCT VFR BLD AUTO: 27.9 % (ref 34–46.6)
HGB BLD-MCNC: 9.4 G/DL (ref 12–15.9)
IMM GRANULOCYTES # BLD AUTO: 0.02 10*3/MM3 (ref 0–0.05)
IMM GRANULOCYTES NFR BLD AUTO: 0.3 % (ref 0–0.5)
LIPASE SERPL-CCNC: 264 U/L (ref 13–60)
LYMPHOCYTES # BLD AUTO: 2.81 10*3/MM3 (ref 0.7–3.1)
LYMPHOCYTES NFR BLD AUTO: 35.9 % (ref 19.6–45.3)
MAGNESIUM SERPL-MCNC: 1.1 MG/DL (ref 1.6–2.6)
MCH RBC QN AUTO: 32.5 PG (ref 26.6–33)
MCHC RBC AUTO-ENTMCNC: 33.7 G/DL (ref 31.5–35.7)
MCV RBC AUTO: 96.5 FL (ref 79–97)
MONOCYTES # BLD AUTO: 0.09 10*3/MM3 (ref 0.1–0.9)
MONOCYTES NFR BLD AUTO: 1.1 % (ref 5–12)
NEUTROPHILS # BLD AUTO: 4.83 10*3/MM3 (ref 1.7–7)
NEUTROPHILS NFR BLD AUTO: 61.7 % (ref 42.7–76)
NRBC BLD AUTO-RTO: 0 /100 WBC (ref 0–0.2)
PLATELET # BLD AUTO: 329 10*3/MM3 (ref 140–450)
PMV BLD AUTO: 10.2 FL (ref 6–12)
POTASSIUM BLD-SCNC: 2.9 MMOL/L (ref 3.5–5.2)
RBC # BLD AUTO: 2.89 10*6/MM3 (ref 3.77–5.28)
SODIUM BLD-SCNC: 148 MMOL/L (ref 136–145)
WBC NRBC COR # BLD: 7.83 10*3/MM3 (ref 3.4–10.8)

## 2019-10-07 PROCEDURE — 63710000001 INSULIN LISPRO (HUMAN) PER 5 UNITS: Performed by: FAMILY MEDICINE

## 2019-10-07 PROCEDURE — 25010000002 MEROPENEM PER 100 MG: Performed by: INTERNAL MEDICINE

## 2019-10-07 PROCEDURE — 85025 COMPLETE CBC W/AUTO DIFF WBC: CPT | Performed by: FAMILY MEDICINE

## 2019-10-07 PROCEDURE — 63710000001 PREDNISONE PER 5 MG: Performed by: FAMILY MEDICINE

## 2019-10-07 PROCEDURE — 82962 GLUCOSE BLOOD TEST: CPT

## 2019-10-07 PROCEDURE — 83690 ASSAY OF LIPASE: CPT | Performed by: FAMILY MEDICINE

## 2019-10-07 PROCEDURE — 82150 ASSAY OF AMYLASE: CPT | Performed by: FAMILY MEDICINE

## 2019-10-07 PROCEDURE — 83735 ASSAY OF MAGNESIUM: CPT | Performed by: FAMILY MEDICINE

## 2019-10-07 PROCEDURE — 25010000002 MAGNESIUM SULFATE 2 GM/50ML SOLUTION: Performed by: FAMILY MEDICINE

## 2019-10-07 PROCEDURE — 80048 BASIC METABOLIC PNL TOTAL CA: CPT | Performed by: INTERNAL MEDICINE

## 2019-10-07 RX ORDER — POTASSIUM CHLORIDE 1.5 G/1.77G
40 POWDER, FOR SOLUTION ORAL AS NEEDED
Status: DISCONTINUED | OUTPATIENT
Start: 2019-10-07 | End: 2019-10-08 | Stop reason: HOSPADM

## 2019-10-07 RX ORDER — MAGNESIUM SULFATE HEPTAHYDRATE 40 MG/ML
4 INJECTION, SOLUTION INTRAVENOUS AS NEEDED
Status: DISCONTINUED | OUTPATIENT
Start: 2019-10-07 | End: 2019-10-08 | Stop reason: HOSPADM

## 2019-10-07 RX ORDER — MAGNESIUM SULFATE HEPTAHYDRATE 40 MG/ML
2 INJECTION, SOLUTION INTRAVENOUS AS NEEDED
Status: DISCONTINUED | OUTPATIENT
Start: 2019-10-07 | End: 2019-10-08 | Stop reason: HOSPADM

## 2019-10-07 RX ORDER — POTASSIUM CHLORIDE 750 MG/1
40 CAPSULE, EXTENDED RELEASE ORAL AS NEEDED
Status: DISCONTINUED | OUTPATIENT
Start: 2019-10-07 | End: 2019-10-08 | Stop reason: HOSPADM

## 2019-10-07 RX ADMIN — FOLIC ACID 2 MG: 1 TABLET ORAL at 09:02

## 2019-10-07 RX ADMIN — MEROPENEM 1 G: 1 INJECTION, POWDER, FOR SOLUTION INTRAVENOUS at 14:51

## 2019-10-07 RX ADMIN — MEROPENEM 1 G: 1 INJECTION, POWDER, FOR SOLUTION INTRAVENOUS at 23:00

## 2019-10-07 RX ADMIN — GABAPENTIN 600 MG: 300 CAPSULE ORAL at 20:13

## 2019-10-07 RX ADMIN — ASPIRIN 81 MG: 81 TABLET, CHEWABLE ORAL at 09:02

## 2019-10-07 RX ADMIN — GABAPENTIN 600 MG: 300 CAPSULE ORAL at 05:26

## 2019-10-07 RX ADMIN — NORTRIPTYLINE HYDROCHLORIDE 50 MG: 25 CAPSULE ORAL at 20:12

## 2019-10-07 RX ADMIN — MAGNESIUM SULFATE HEPTAHYDRATE 2 G: 40 INJECTION, SOLUTION INTRAVENOUS at 20:01

## 2019-10-07 RX ADMIN — GUAIFENESIN 1200 MG: 600 TABLET, EXTENDED RELEASE ORAL at 20:13

## 2019-10-07 RX ADMIN — SODIUM CHLORIDE 75 ML/HR: 9 INJECTION, SOLUTION INTRAVENOUS at 20:06

## 2019-10-07 RX ADMIN — INSULIN LISPRO 4 UNITS: 100 INJECTION, SOLUTION INTRAVENOUS; SUBCUTANEOUS at 17:36

## 2019-10-07 RX ADMIN — GUAIFENESIN 1200 MG: 600 TABLET, EXTENDED RELEASE ORAL at 09:02

## 2019-10-07 RX ADMIN — SODIUM CHLORIDE 75 ML/HR: 9 INJECTION, SOLUTION INTRAVENOUS at 05:26

## 2019-10-07 RX ADMIN — MEROPENEM 1 G: 1 INJECTION, POWDER, FOR SOLUTION INTRAVENOUS at 05:26

## 2019-10-07 RX ADMIN — POTASSIUM CHLORIDE 40 MEQ: 750 CAPSULE, EXTENDED RELEASE ORAL at 23:18

## 2019-10-07 RX ADMIN — PREDNISONE 5 MG: 5 TABLET ORAL at 09:02

## 2019-10-07 RX ADMIN — PANTOPRAZOLE SODIUM 40 MG: 40 TABLET, DELAYED RELEASE ORAL at 05:26

## 2019-10-07 RX ADMIN — BISACODYL 5 MG: 5 TABLET ORAL at 20:13

## 2019-10-07 RX ADMIN — GABAPENTIN 600 MG: 300 CAPSULE ORAL at 14:51

## 2019-10-07 RX ADMIN — DULOXETINE HYDROCHLORIDE 60 MG: 30 CAPSULE, DELAYED RELEASE ORAL at 09:02

## 2019-10-07 RX ADMIN — POTASSIUM CHLORIDE 40 MEQ: 750 CAPSULE, EXTENDED RELEASE ORAL at 16:31

## 2019-10-07 RX ADMIN — CETIRIZINE HYDROCHLORIDE 10 MG: 10 TABLET, FILM COATED ORAL at 09:02

## 2019-10-07 RX ADMIN — ATORVASTATIN CALCIUM 20 MG: 10 TABLET, FILM COATED ORAL at 20:13

## 2019-10-08 ENCOUNTER — ANESTHESIA (OUTPATIENT)
Dept: GASTROENTEROLOGY | Facility: HOSPITAL | Age: 49
End: 2019-10-08

## 2019-10-08 ENCOUNTER — ANESTHESIA EVENT (OUTPATIENT)
Dept: GASTROENTEROLOGY | Facility: HOSPITAL | Age: 49
End: 2019-10-08

## 2019-10-08 VITALS
DIASTOLIC BLOOD PRESSURE: 86 MMHG | HEART RATE: 88 BPM | HEIGHT: 60 IN | WEIGHT: 106.2 LBS | TEMPERATURE: 98.1 F | SYSTOLIC BLOOD PRESSURE: 116 MMHG | BODY MASS INDEX: 20.85 KG/M2 | RESPIRATION RATE: 16 BRPM | OXYGEN SATURATION: 98 %

## 2019-10-08 PROBLEM — R11.0 NAUSEA: Status: ACTIVE | Noted: 2019-10-05

## 2019-10-08 LAB
DEPRECATED RDW RBC AUTO: 51.2 FL (ref 37–54)
ERYTHROCYTE [DISTWIDTH] IN BLOOD BY AUTOMATED COUNT: 14.8 % (ref 12.3–15.4)
GIANT PLATELETS: NORMAL
GLUCOSE BLDC GLUCOMTR-MCNC: 133 MG/DL (ref 70–130)
GLUCOSE BLDC GLUCOMTR-MCNC: 152 MG/DL (ref 70–130)
GLUCOSE BLDC GLUCOMTR-MCNC: 309 MG/DL (ref 70–130)
HCT VFR BLD AUTO: 28 % (ref 34–46.6)
HGB BLD-MCNC: 9.3 G/DL (ref 12–15.9)
LYMPHOCYTES # BLD MANUAL: 2.62 10*3/MM3 (ref 0.7–3.1)
LYMPHOCYTES NFR BLD MANUAL: 32.3 % (ref 19.6–45.3)
MAGNESIUM SERPL-MCNC: 3.3 MG/DL (ref 1.6–2.6)
MCH RBC QN AUTO: 32.4 PG (ref 26.6–33)
MCHC RBC AUTO-ENTMCNC: 33.2 G/DL (ref 31.5–35.7)
MCV RBC AUTO: 97.6 FL (ref 79–97)
NEUTROPHILS # BLD AUTO: 5.5 10*3/MM3 (ref 1.7–7)
NEUTROPHILS NFR BLD MANUAL: 67.7 % (ref 42.7–76)
PLATELET # BLD AUTO: 309 10*3/MM3 (ref 140–450)
PMV BLD AUTO: 9.8 FL (ref 6–12)
POTASSIUM BLD-SCNC: 4 MMOL/L (ref 3.5–5.2)
RBC # BLD AUTO: 2.87 10*6/MM3 (ref 3.77–5.28)
RBC MORPH BLD: NORMAL
WBC MORPH BLD: NORMAL
WBC NRBC COR # BLD: 8.12 10*3/MM3 (ref 3.4–10.8)

## 2019-10-08 PROCEDURE — 63710000001 INSULIN LISPRO (HUMAN) PER 5 UNITS: Performed by: FAMILY MEDICINE

## 2019-10-08 PROCEDURE — 25010000002 MAGNESIUM SULFATE 2 GM/50ML SOLUTION: Performed by: FAMILY MEDICINE

## 2019-10-08 PROCEDURE — 85007 BL SMEAR W/DIFF WBC COUNT: CPT | Performed by: FAMILY MEDICINE

## 2019-10-08 PROCEDURE — 94799 UNLISTED PULMONARY SVC/PX: CPT

## 2019-10-08 PROCEDURE — 43239 EGD BIOPSY SINGLE/MULTIPLE: CPT | Performed by: INTERNAL MEDICINE

## 2019-10-08 PROCEDURE — 99222 1ST HOSP IP/OBS MODERATE 55: CPT | Performed by: NURSE PRACTITIONER

## 2019-10-08 PROCEDURE — 84132 ASSAY OF SERUM POTASSIUM: CPT | Performed by: FAMILY MEDICINE

## 2019-10-08 PROCEDURE — 63710000001 PREDNISONE PER 5 MG: Performed by: FAMILY MEDICINE

## 2019-10-08 PROCEDURE — 25010000002 PROPOFOL 10 MG/ML EMULSION: Performed by: NURSE ANESTHETIST, CERTIFIED REGISTERED

## 2019-10-08 PROCEDURE — 94760 N-INVAS EAR/PLS OXIMETRY 1: CPT

## 2019-10-08 PROCEDURE — 83735 ASSAY OF MAGNESIUM: CPT | Performed by: FAMILY MEDICINE

## 2019-10-08 PROCEDURE — 0DB68ZX EXCISION OF STOMACH, VIA NATURAL OR ARTIFICIAL OPENING ENDOSCOPIC, DIAGNOSTIC: ICD-10-PCS | Performed by: INTERNAL MEDICINE

## 2019-10-08 PROCEDURE — 85025 COMPLETE CBC W/AUTO DIFF WBC: CPT | Performed by: FAMILY MEDICINE

## 2019-10-08 PROCEDURE — 82962 GLUCOSE BLOOD TEST: CPT

## 2019-10-08 PROCEDURE — 25010000002 MEROPENEM PER 100 MG: Performed by: INTERNAL MEDICINE

## 2019-10-08 PROCEDURE — 87081 CULTURE SCREEN ONLY: CPT | Performed by: INTERNAL MEDICINE

## 2019-10-08 RX ORDER — SODIUM CHLORIDE 0.9 % (FLUSH) 0.9 %
3-10 SYRINGE (ML) INJECTION AS NEEDED
Status: DISCONTINUED | OUTPATIENT
Start: 2019-10-08 | End: 2019-10-08 | Stop reason: HOSPADM

## 2019-10-08 RX ORDER — SODIUM CHLORIDE 0.9 % (FLUSH) 0.9 %
3 SYRINGE (ML) INJECTION EVERY 12 HOURS SCHEDULED
Status: DISCONTINUED | OUTPATIENT
Start: 2019-10-08 | End: 2019-10-08 | Stop reason: HOSPADM

## 2019-10-08 RX ORDER — PROPOFOL 10 MG/ML
VIAL (ML) INTRAVENOUS AS NEEDED
Status: DISCONTINUED | OUTPATIENT
Start: 2019-10-08 | End: 2019-10-08 | Stop reason: SURG

## 2019-10-08 RX ORDER — SODIUM CHLORIDE 9 MG/ML
100 INJECTION, SOLUTION INTRAVENOUS CONTINUOUS
Status: DISCONTINUED | OUTPATIENT
Start: 2019-10-08 | End: 2019-10-08 | Stop reason: HOSPADM

## 2019-10-08 RX ADMIN — ASPIRIN 81 MG: 81 TABLET, CHEWABLE ORAL at 08:54

## 2019-10-08 RX ADMIN — GABAPENTIN 600 MG: 300 CAPSULE ORAL at 06:30

## 2019-10-08 RX ADMIN — GUAIFENESIN 1200 MG: 600 TABLET, EXTENDED RELEASE ORAL at 08:54

## 2019-10-08 RX ADMIN — DULOXETINE HYDROCHLORIDE 60 MG: 30 CAPSULE, DELAYED RELEASE ORAL at 08:54

## 2019-10-08 RX ADMIN — LIDOCAINE HYDROCHLORIDE 100 MG: 20 INJECTION, SOLUTION INTRAVENOUS at 12:30

## 2019-10-08 RX ADMIN — PREDNISONE 5 MG: 5 TABLET ORAL at 08:54

## 2019-10-08 RX ADMIN — PROPOFOL 50 MG: 10 INJECTION, EMULSION INTRAVENOUS at 12:32

## 2019-10-08 RX ADMIN — CETIRIZINE HYDROCHLORIDE 10 MG: 10 TABLET, FILM COATED ORAL at 08:54

## 2019-10-08 RX ADMIN — MEROPENEM 1 G: 1 INJECTION, POWDER, FOR SOLUTION INTRAVENOUS at 06:30

## 2019-10-08 RX ADMIN — SODIUM CHLORIDE 75 ML/HR: 9 INJECTION, SOLUTION INTRAVENOUS at 08:55

## 2019-10-08 RX ADMIN — PROPOFOL 150 MG: 10 INJECTION, EMULSION INTRAVENOUS at 12:30

## 2019-10-08 RX ADMIN — GABAPENTIN 600 MG: 300 CAPSULE ORAL at 15:37

## 2019-10-08 RX ADMIN — MAGNESIUM SULFATE HEPTAHYDRATE 2 G: 40 INJECTION, SOLUTION INTRAVENOUS at 03:46

## 2019-10-08 RX ADMIN — MEROPENEM 1 G: 1 INJECTION, POWDER, FOR SOLUTION INTRAVENOUS at 15:37

## 2019-10-08 RX ADMIN — FOLIC ACID 2 MG: 1 TABLET ORAL at 15:37

## 2019-10-08 RX ADMIN — PANTOPRAZOLE SODIUM 40 MG: 40 TABLET, DELAYED RELEASE ORAL at 06:30

## 2019-10-08 RX ADMIN — INSULIN LISPRO 7 UNITS: 100 INJECTION, SOLUTION INTRAVENOUS; SUBCUTANEOUS at 17:20

## 2019-10-08 RX ADMIN — POTASSIUM CHLORIDE 40 MEQ: 750 CAPSULE, EXTENDED RELEASE ORAL at 03:31

## 2019-10-08 RX ADMIN — SODIUM CHLORIDE 100 ML/HR: 9 INJECTION, SOLUTION INTRAVENOUS at 11:11

## 2019-10-08 RX ADMIN — MAGNESIUM SULFATE HEPTAHYDRATE 2 G: 40 INJECTION, SOLUTION INTRAVENOUS at 02:17

## 2019-10-09 ENCOUNTER — READMISSION MANAGEMENT (OUTPATIENT)
Dept: CALL CENTER | Facility: HOSPITAL | Age: 49
End: 2019-10-09

## 2019-10-09 LAB — UREASE TISS QL: NEGATIVE

## 2019-10-10 ENCOUNTER — READMISSION MANAGEMENT (OUTPATIENT)
Dept: CALL CENTER | Facility: HOSPITAL | Age: 49
End: 2019-10-10

## 2019-10-11 ENCOUNTER — READMISSION MANAGEMENT (OUTPATIENT)
Dept: CALL CENTER | Facility: HOSPITAL | Age: 49
End: 2019-10-11

## 2019-10-11 LAB
BACTERIA SPEC AEROBE CULT: NORMAL
BACTERIA SPEC AEROBE CULT: NORMAL

## 2019-10-22 ENCOUNTER — READMISSION MANAGEMENT (OUTPATIENT)
Dept: CALL CENTER | Facility: HOSPITAL | Age: 49
End: 2019-10-22

## 2019-10-30 ENCOUNTER — READMISSION MANAGEMENT (OUTPATIENT)
Dept: CALL CENTER | Facility: HOSPITAL | Age: 49
End: 2019-10-30

## 2019-11-06 ENCOUNTER — READMISSION MANAGEMENT (OUTPATIENT)
Dept: CALL CENTER | Facility: HOSPITAL | Age: 49
End: 2019-11-06

## 2021-08-23 ENCOUNTER — HOSPITAL ENCOUNTER (OUTPATIENT)
Age: 51
Setting detail: OBSERVATION
Discharge: HOME | End: 2021-08-24
Attending: PEDIATRICS | Admitting: FAMILY MEDICINE
Payer: MEDICAID

## 2021-08-23 ENCOUNTER — APPOINTMENT (OUTPATIENT)
Dept: CT IMAGING | Age: 51
End: 2021-08-23
Payer: MEDICAID

## 2021-08-23 ENCOUNTER — APPOINTMENT (OUTPATIENT)
Dept: GENERAL RADIOLOGY | Age: 51
End: 2021-08-23
Payer: MEDICAID

## 2021-08-23 DIAGNOSIS — N28.9 ACUTE RENAL INSUFFICIENCY: Primary | ICD-10-CM

## 2021-08-23 DIAGNOSIS — R42 VERTIGO: ICD-10-CM

## 2021-08-23 DIAGNOSIS — R73.9 HYPERGLYCEMIA: ICD-10-CM

## 2021-08-23 PROBLEM — I10 HTN (HYPERTENSION): Status: ACTIVE | Noted: 2021-08-23

## 2021-08-23 PROBLEM — Z79.4 TYPE 2 DIABETES MELLITUS, WITH LONG-TERM CURRENT USE OF INSULIN (HCC): Status: ACTIVE | Noted: 2021-08-23

## 2021-08-23 PROBLEM — K21.9 GERD (GASTROESOPHAGEAL REFLUX DISEASE): Status: ACTIVE | Noted: 2021-08-23

## 2021-08-23 PROBLEM — M79.7 FIBROMYALGIA: Status: ACTIVE | Noted: 2021-08-23

## 2021-08-23 PROBLEM — E78.5 HLD (HYPERLIPIDEMIA): Status: ACTIVE | Noted: 2021-08-23

## 2021-08-23 PROBLEM — E11.9 TYPE 2 DIABETES MELLITUS, WITH LONG-TERM CURRENT USE OF INSULIN (HCC): Status: ACTIVE | Noted: 2021-08-23

## 2021-08-23 PROBLEM — G43.909 MIGRAINE: Status: ACTIVE | Noted: 2021-08-23

## 2021-08-23 LAB
ALBUMIN SERPL-MCNC: 4.7 G/DL (ref 3.5–5.2)
ALP BLD-CCNC: 152 U/L (ref 35–104)
ALT SERPL-CCNC: 16 U/L (ref 5–33)
ANION GAP SERPL CALCULATED.3IONS-SCNC: 13 MMOL/L (ref 7–19)
AST SERPL-CCNC: 17 U/L (ref 5–32)
BACTERIA: NEGATIVE /HPF
BASOPHILS ABSOLUTE: 0 K/UL (ref 0–0.2)
BASOPHILS RELATIVE PERCENT: 0.4 % (ref 0–1)
BILIRUB SERPL-MCNC: 0.3 MG/DL (ref 0.2–1.2)
BILIRUBIN URINE: NEGATIVE
BLOOD, URINE: NEGATIVE
BUN BLDV-MCNC: 30 MG/DL (ref 6–20)
CALCIUM SERPL-MCNC: 10 MG/DL (ref 8.6–10)
CHLORIDE BLD-SCNC: 107 MMOL/L (ref 98–111)
CLARITY: CLEAR
CO2: 18 MMOL/L (ref 22–29)
COLOR: YELLOW
CREAT SERPL-MCNC: 2.2 MG/DL (ref 0.5–0.9)
CRYSTALS, UA: ABNORMAL /HPF
EOSINOPHILS ABSOLUTE: 0.1 K/UL (ref 0–0.6)
EOSINOPHILS RELATIVE PERCENT: 1.2 % (ref 0–5)
EPITHELIAL CELLS, UA: 1 /HPF (ref 0–5)
GFR AFRICAN AMERICAN: 29
GFR NON-AFRICAN AMERICAN: 24
GLUCOSE BLD-MCNC: 134 MG/DL (ref 70–99)
GLUCOSE BLD-MCNC: 194 MG/DL (ref 74–109)
GLUCOSE URINE: =>1000 MG/DL
HBA1C MFR BLD: 8.1 % (ref 4–6)
HCT VFR BLD CALC: 40.8 % (ref 37–47)
HEMOGLOBIN: 12.8 G/DL (ref 12–16)
HYALINE CASTS: 9 /HPF (ref 0–8)
IMMATURE GRANULOCYTES #: 0.1 K/UL
KETONES, URINE: NEGATIVE MG/DL
LACTIC ACID: 2.5 MMOL/L (ref 0.5–1.9)
LEUKOCYTE ESTERASE, URINE: NEGATIVE
LYMPHOCYTES ABSOLUTE: 3.6 K/UL (ref 1.1–4.5)
LYMPHOCYTES RELATIVE PERCENT: 31.8 % (ref 20–40)
MCH RBC QN AUTO: 33.8 PG (ref 27–31)
MCHC RBC AUTO-ENTMCNC: 31.4 G/DL (ref 33–37)
MCV RBC AUTO: 107.7 FL (ref 81–99)
MONOCYTES ABSOLUTE: 0.7 K/UL (ref 0–0.9)
MONOCYTES RELATIVE PERCENT: 6.3 % (ref 0–10)
NEUTROPHILS ABSOLUTE: 6.7 K/UL (ref 1.5–7.5)
NEUTROPHILS RELATIVE PERCENT: 59.8 % (ref 50–65)
NITRITE, URINE: NEGATIVE
PDW BLD-RTO: 15.6 % (ref 11.5–14.5)
PERFORMED ON: ABNORMAL
PH UA: 5.5 (ref 5–8)
PLATELET # BLD: 466 K/UL (ref 130–400)
PMV BLD AUTO: 9.6 FL (ref 9.4–12.3)
POTASSIUM REFLEX MAGNESIUM: 4.5 MMOL/L (ref 3.5–5)
PROTEIN UA: 30 MG/DL
RBC # BLD: 3.79 M/UL (ref 4.2–5.4)
RBC UA: 1 /HPF (ref 0–4)
SARS-COV-2, NAAT: NOT DETECTED
SODIUM BLD-SCNC: 138 MMOL/L (ref 136–145)
SPECIFIC GRAVITY UA: 1.02 (ref 1–1.03)
TOTAL PROTEIN: 8.5 G/DL (ref 6.6–8.7)
TROPONIN: <0.01 NG/ML (ref 0–0.03)
UROBILINOGEN, URINE: 0.2 E.U./DL
WBC # BLD: 11.3 K/UL (ref 4.8–10.8)
WBC UA: 1 /HPF (ref 0–5)

## 2021-08-23 PROCEDURE — 2580000003 HC RX 258: Performed by: NURSE PRACTITIONER

## 2021-08-23 PROCEDURE — 87635 SARS-COV-2 COVID-19 AMP PRB: CPT

## 2021-08-23 PROCEDURE — 2580000003 HC RX 258: Performed by: PEDIATRICS

## 2021-08-23 PROCEDURE — 93005 ELECTROCARDIOGRAM TRACING: CPT | Performed by: EMERGENCY MEDICINE

## 2021-08-23 PROCEDURE — 70450 CT HEAD/BRAIN W/O DYE: CPT

## 2021-08-23 PROCEDURE — 84484 ASSAY OF TROPONIN QUANT: CPT

## 2021-08-23 PROCEDURE — G0378 HOSPITAL OBSERVATION PER HR: HCPCS

## 2021-08-23 PROCEDURE — 80053 COMPREHEN METABOLIC PANEL: CPT

## 2021-08-23 PROCEDURE — 6360000002 HC RX W HCPCS: Performed by: PEDIATRICS

## 2021-08-23 PROCEDURE — 83036 HEMOGLOBIN GLYCOSYLATED A1C: CPT

## 2021-08-23 PROCEDURE — 85025 COMPLETE CBC W/AUTO DIFF WBC: CPT

## 2021-08-23 PROCEDURE — P9047 ALBUMIN (HUMAN), 25%, 50ML: HCPCS | Performed by: PEDIATRICS

## 2021-08-23 PROCEDURE — 36415 COLL VENOUS BLD VENIPUNCTURE: CPT

## 2021-08-23 PROCEDURE — 6370000000 HC RX 637 (ALT 250 FOR IP): Performed by: NURSE PRACTITIONER

## 2021-08-23 PROCEDURE — 71045 X-RAY EXAM CHEST 1 VIEW: CPT

## 2021-08-23 PROCEDURE — 96365 THER/PROPH/DIAG IV INF INIT: CPT

## 2021-08-23 PROCEDURE — 81001 URINALYSIS AUTO W/SCOPE: CPT

## 2021-08-23 PROCEDURE — 82947 ASSAY GLUCOSE BLOOD QUANT: CPT

## 2021-08-23 PROCEDURE — 99283 EMERGENCY DEPT VISIT LOW MDM: CPT

## 2021-08-23 PROCEDURE — 83605 ASSAY OF LACTIC ACID: CPT

## 2021-08-23 RX ORDER — ACETAMINOPHEN 650 MG/1
650 SUPPOSITORY RECTAL EVERY 6 HOURS PRN
Status: DISCONTINUED | OUTPATIENT
Start: 2021-08-23 | End: 2021-08-24 | Stop reason: HOSPADM

## 2021-08-23 RX ORDER — INSULIN GLARGINE 100 [IU]/ML
10 INJECTION, SOLUTION SUBCUTANEOUS DAILY
COMMUNITY

## 2021-08-23 RX ORDER — GABAPENTIN 600 MG/1
600 TABLET ORAL 3 TIMES DAILY
COMMUNITY

## 2021-08-23 RX ORDER — LISINOPRIL 5 MG/1
5 TABLET ORAL DAILY
Status: ON HOLD | COMMUNITY
End: 2021-09-08 | Stop reason: HOSPADM

## 2021-08-23 RX ORDER — SODIUM CHLORIDE 0.9 % (FLUSH) 0.9 %
5-40 SYRINGE (ML) INJECTION PRN
Status: DISCONTINUED | OUTPATIENT
Start: 2021-08-23 | End: 2021-08-24 | Stop reason: HOSPADM

## 2021-08-23 RX ORDER — FOLIC ACID 1 MG/1
1 TABLET ORAL EVERY 12 HOURS
Status: DISCONTINUED | OUTPATIENT
Start: 2021-08-23 | End: 2021-08-23

## 2021-08-23 RX ORDER — ONDANSETRON 2 MG/ML
4 INJECTION INTRAMUSCULAR; INTRAVENOUS EVERY 6 HOURS PRN
Status: DISCONTINUED | OUTPATIENT
Start: 2021-08-23 | End: 2021-08-24 | Stop reason: HOSPADM

## 2021-08-23 RX ORDER — ATORVASTATIN CALCIUM 20 MG/1
20 TABLET, FILM COATED ORAL NIGHTLY
Status: DISCONTINUED | OUTPATIENT
Start: 2021-08-23 | End: 2021-08-24 | Stop reason: HOSPADM

## 2021-08-23 RX ORDER — FAMOTIDINE 20 MG/1
10 TABLET, FILM COATED ORAL DAILY
Status: DISCONTINUED | OUTPATIENT
Start: 2021-08-24 | End: 2021-08-24

## 2021-08-23 RX ORDER — ALBUMIN (HUMAN) 12.5 G/50ML
25 SOLUTION INTRAVENOUS ONCE
Status: COMPLETED | OUTPATIENT
Start: 2021-08-23 | End: 2021-08-23

## 2021-08-23 RX ORDER — NORTRIPTYLINE HYDROCHLORIDE 50 MG/1
50 CAPSULE ORAL NIGHTLY
Status: DISCONTINUED | OUTPATIENT
Start: 2021-08-23 | End: 2021-08-24 | Stop reason: HOSPADM

## 2021-08-23 RX ORDER — TIZANIDINE 4 MG/1
4 TABLET ORAL NIGHTLY
COMMUNITY

## 2021-08-23 RX ORDER — DEXTROSE MONOHYDRATE 25 G/50ML
12.5 INJECTION, SOLUTION INTRAVENOUS PRN
Status: DISCONTINUED | OUTPATIENT
Start: 2021-08-23 | End: 2021-08-24 | Stop reason: HOSPADM

## 2021-08-23 RX ORDER — CANAGLIFLOZIN 300 MG/1
300 TABLET, FILM COATED ORAL DAILY
COMMUNITY

## 2021-08-23 RX ORDER — CALCIUM CARBONATE 500(1250)
500 TABLET ORAL DAILY
Status: DISCONTINUED | OUTPATIENT
Start: 2021-08-24 | End: 2021-08-24 | Stop reason: HOSPADM

## 2021-08-23 RX ORDER — DEXTROSE MONOHYDRATE 50 MG/ML
100 INJECTION, SOLUTION INTRAVENOUS PRN
Status: DISCONTINUED | OUTPATIENT
Start: 2021-08-23 | End: 2021-08-24 | Stop reason: HOSPADM

## 2021-08-23 RX ORDER — POTASSIUM CHLORIDE 20 MEQ/1
20 TABLET, EXTENDED RELEASE ORAL DAILY
COMMUNITY

## 2021-08-23 RX ORDER — SODIUM CHLORIDE 0.9 % (FLUSH) 0.9 %
5-40 SYRINGE (ML) INJECTION EVERY 12 HOURS SCHEDULED
Status: DISCONTINUED | OUTPATIENT
Start: 2021-08-23 | End: 2021-08-24 | Stop reason: HOSPADM

## 2021-08-23 RX ORDER — NIFEDIPINE 30 MG/1
60 TABLET, FILM COATED, EXTENDED RELEASE ORAL DAILY
Status: ON HOLD | COMMUNITY
End: 2021-08-23

## 2021-08-23 RX ORDER — DOCUSATE SODIUM 100 MG/1
100 CAPSULE, LIQUID FILLED ORAL 2 TIMES DAILY
COMMUNITY

## 2021-08-23 RX ORDER — NICOTINE POLACRILEX 4 MG
15 LOZENGE BUCCAL PRN
Status: DISCONTINUED | OUTPATIENT
Start: 2021-08-23 | End: 2021-08-24 | Stop reason: HOSPADM

## 2021-08-23 RX ORDER — ASPIRIN 81 MG/1
81 TABLET, CHEWABLE ORAL DAILY
Status: DISCONTINUED | OUTPATIENT
Start: 2021-08-24 | End: 2021-08-24 | Stop reason: HOSPADM

## 2021-08-23 RX ORDER — ACETAMINOPHEN 325 MG/1
650 TABLET ORAL EVERY 6 HOURS PRN
Status: DISCONTINUED | OUTPATIENT
Start: 2021-08-23 | End: 2021-08-24 | Stop reason: HOSPADM

## 2021-08-23 RX ORDER — LINACLOTIDE 290 UG/1
290 CAPSULE, GELATIN COATED ORAL DAILY
COMMUNITY

## 2021-08-23 RX ORDER — TIZANIDINE 4 MG/1
4 TABLET ORAL NIGHTLY
Status: DISCONTINUED | OUTPATIENT
Start: 2021-08-23 | End: 2021-08-24 | Stop reason: HOSPADM

## 2021-08-23 RX ORDER — 0.9 % SODIUM CHLORIDE 0.9 %
1000 INTRAVENOUS SOLUTION INTRAVENOUS ONCE
Status: COMPLETED | OUTPATIENT
Start: 2021-08-23 | End: 2021-08-23

## 2021-08-23 RX ORDER — INSULIN GLARGINE 100 [IU]/ML
10 INJECTION, SOLUTION SUBCUTANEOUS DAILY
Status: DISCONTINUED | OUTPATIENT
Start: 2021-08-24 | End: 2021-08-24 | Stop reason: HOSPADM

## 2021-08-23 RX ORDER — PHENOL 1.4 %
1 AEROSOL, SPRAY (ML) MUCOUS MEMBRANE DAILY
COMMUNITY

## 2021-08-23 RX ORDER — FOLIC ACID 1 MG/1
2 TABLET ORAL DAILY
Status: DISCONTINUED | OUTPATIENT
Start: 2021-08-24 | End: 2021-08-24 | Stop reason: HOSPADM

## 2021-08-23 RX ORDER — SODIUM CHLORIDE 9 MG/ML
25 INJECTION, SOLUTION INTRAVENOUS PRN
Status: DISCONTINUED | OUTPATIENT
Start: 2021-08-23 | End: 2021-08-24 | Stop reason: HOSPADM

## 2021-08-23 RX ORDER — ATORVASTATIN CALCIUM 20 MG/1
20 TABLET, FILM COATED ORAL NIGHTLY
COMMUNITY

## 2021-08-23 RX ORDER — MAGNESIUM OXIDE 400 MG/1
500 TABLET ORAL EVERY 12 HOURS
COMMUNITY

## 2021-08-23 RX ORDER — ONDANSETRON 4 MG/1
4 TABLET, ORALLY DISINTEGRATING ORAL EVERY 8 HOURS PRN
Status: DISCONTINUED | OUTPATIENT
Start: 2021-08-23 | End: 2021-08-24 | Stop reason: HOSPADM

## 2021-08-23 RX ORDER — FAMOTIDINE 40 MG/1
40 TABLET, FILM COATED ORAL DAILY
COMMUNITY

## 2021-08-23 RX ORDER — ASPIRIN 81 MG/1
81 TABLET, CHEWABLE ORAL DAILY
COMMUNITY

## 2021-08-23 RX ORDER — DIPHENHYDRAMINE HCL 50 MG
50 CAPSULE ORAL NIGHTLY
COMMUNITY

## 2021-08-23 RX ORDER — NORTRIPTYLINE HYDROCHLORIDE 25 MG/1
50 CAPSULE ORAL NIGHTLY
COMMUNITY

## 2021-08-23 RX ORDER — FOLIC ACID 1 MG/1
2 TABLET ORAL DAILY
COMMUNITY

## 2021-08-23 RX ORDER — GABAPENTIN 600 MG/1
600 TABLET ORAL 3 TIMES DAILY
Status: DISCONTINUED | OUTPATIENT
Start: 2021-08-23 | End: 2021-08-24 | Stop reason: HOSPADM

## 2021-08-23 RX ORDER — FAMOTIDINE 20 MG/1
40 TABLET, FILM COATED ORAL DAILY
Status: DISCONTINUED | OUTPATIENT
Start: 2021-08-24 | End: 2021-08-23 | Stop reason: DRUGHIGH

## 2021-08-23 RX ORDER — CETIRIZINE HYDROCHLORIDE 10 MG/1
10 TABLET ORAL DAILY PRN
COMMUNITY

## 2021-08-23 RX ORDER — SODIUM CHLORIDE 9 MG/ML
INJECTION, SOLUTION INTRAVENOUS CONTINUOUS
Status: DISCONTINUED | OUTPATIENT
Start: 2021-08-23 | End: 2021-08-24

## 2021-08-23 RX ADMIN — SODIUM CHLORIDE, PRESERVATIVE FREE 10 ML: 5 INJECTION INTRAVENOUS at 22:48

## 2021-08-23 RX ADMIN — TIZANIDINE 4 MG: 4 TABLET ORAL at 22:48

## 2021-08-23 RX ADMIN — NORTRIPTYLINE HYDROCHLORIDE 50 MG: 50 CAPSULE ORAL at 22:48

## 2021-08-23 RX ADMIN — SODIUM CHLORIDE: 9 INJECTION, SOLUTION INTRAVENOUS at 22:51

## 2021-08-23 RX ADMIN — SODIUM CHLORIDE 1000 ML: 9 INJECTION, SOLUTION INTRAVENOUS at 20:55

## 2021-08-23 RX ADMIN — GABAPENTIN 600 MG: 600 TABLET, FILM COATED ORAL at 22:48

## 2021-08-23 RX ADMIN — ALBUMIN (HUMAN) 25 G: 0.25 INJECTION, SOLUTION INTRAVENOUS at 20:47

## 2021-08-23 RX ADMIN — ATORVASTATIN CALCIUM 20 MG: 20 TABLET, FILM COATED ORAL at 22:48

## 2021-08-23 ASSESSMENT — ENCOUNTER SYMPTOMS
SHORTNESS OF BREATH: 1
CONSTIPATION: 0
SINUS PAIN: 0
WHEEZING: 0
VOMITING: 1
DIARRHEA: 0
ABDOMINAL PAIN: 0
TROUBLE SWALLOWING: 0
NAUSEA: 1
SORE THROAT: 0
ABDOMINAL DISTENTION: 0
BLOOD IN STOOL: 0
COUGH: 0

## 2021-08-23 ASSESSMENT — PAIN SCALES - GENERAL: PAINLEVEL_OUTOF10: 4

## 2021-08-23 NOTE — ED PROVIDER NOTES
Pt seen briefly in triage during triage process. D/o dizziness.  with BP 78/50 in triage.   Laboratory orders initiated and patient was brought back to room for further treatment     Yanira Willis., MD  08/23/21 1019

## 2021-08-24 ENCOUNTER — APPOINTMENT (OUTPATIENT)
Dept: ULTRASOUND IMAGING | Age: 51
End: 2021-08-24
Payer: MEDICAID

## 2021-08-24 VITALS
WEIGHT: 107.13 LBS | BODY MASS INDEX: 21.03 KG/M2 | DIASTOLIC BLOOD PRESSURE: 71 MMHG | OXYGEN SATURATION: 94 % | HEART RATE: 69 BPM | RESPIRATION RATE: 16 BRPM | HEIGHT: 60 IN | SYSTOLIC BLOOD PRESSURE: 100 MMHG | TEMPERATURE: 98 F

## 2021-08-24 LAB
ALBUMIN SERPL-MCNC: 3.7 G/DL (ref 3.5–5.2)
ALBUMIN SERPL-MCNC: 4.1 G/DL (ref 3.5–5.2)
ALP BLD-CCNC: 105 U/L (ref 35–104)
ALP BLD-CCNC: 99 U/L (ref 35–104)
ALT SERPL-CCNC: 11 U/L (ref 5–33)
ALT SERPL-CCNC: 9 U/L (ref 5–33)
ANION GAP SERPL CALCULATED.3IONS-SCNC: 11 MMOL/L (ref 7–19)
ANION GAP SERPL CALCULATED.3IONS-SCNC: 14 MMOL/L (ref 7–19)
AST SERPL-CCNC: 11 U/L (ref 5–32)
AST SERPL-CCNC: 9 U/L (ref 5–32)
BASOPHILS ABSOLUTE: 0 K/UL (ref 0–0.2)
BASOPHILS RELATIVE PERCENT: 0.4 % (ref 0–1)
BILIRUB SERPL-MCNC: <0.2 MG/DL (ref 0.2–1.2)
BILIRUB SERPL-MCNC: <0.2 MG/DL (ref 0.2–1.2)
BUN BLDV-MCNC: 24 MG/DL (ref 6–20)
BUN BLDV-MCNC: 27 MG/DL (ref 6–20)
CALCIUM SERPL-MCNC: 8.8 MG/DL (ref 8.6–10)
CALCIUM SERPL-MCNC: 9.5 MG/DL (ref 8.6–10)
CHLORIDE BLD-SCNC: 107 MMOL/L (ref 98–111)
CHLORIDE BLD-SCNC: 110 MMOL/L (ref 98–111)
CO2: 18 MMOL/L (ref 22–29)
CO2: 22 MMOL/L (ref 22–29)
CREAT SERPL-MCNC: 1.5 MG/DL (ref 0.5–0.9)
CREAT SERPL-MCNC: 1.8 MG/DL (ref 0.5–0.9)
EKG P AXIS: NORMAL DEGREES
EKG P-R INTERVAL: NORMAL MS
EKG Q-T INTERVAL: 304 MS
EKG QRS DURATION: 80 MS
EKG QTC CALCULATION (BAZETT): 414 MS
EKG T AXIS: 67 DEGREES
EOSINOPHILS ABSOLUTE: 0.2 K/UL (ref 0–0.6)
EOSINOPHILS RELATIVE PERCENT: 2.7 % (ref 0–5)
GFR AFRICAN AMERICAN: 36
GFR AFRICAN AMERICAN: 44
GFR NON-AFRICAN AMERICAN: 30
GFR NON-AFRICAN AMERICAN: 37
GLUCOSE BLD-MCNC: 137 MG/DL (ref 70–99)
GLUCOSE BLD-MCNC: 145 MG/DL (ref 70–99)
GLUCOSE BLD-MCNC: 160 MG/DL (ref 70–99)
GLUCOSE BLD-MCNC: 160 MG/DL (ref 74–109)
GLUCOSE BLD-MCNC: 199 MG/DL (ref 74–109)
HCT VFR BLD CALC: 34.4 % (ref 37–47)
HEMOGLOBIN: 10.6 G/DL (ref 12–16)
IMMATURE GRANULOCYTES #: 0 K/UL
LACTIC ACID: 0.5 MMOL/L (ref 0.5–1.9)
LACTIC ACID: 0.8 MMOL/L (ref 0.5–1.9)
LACTIC ACID: 0.9 MMOL/L (ref 0.5–1.9)
LYMPHOCYTES ABSOLUTE: 3.8 K/UL (ref 1.1–4.5)
LYMPHOCYTES RELATIVE PERCENT: 45.2 % (ref 20–40)
MCH RBC QN AUTO: 32 PG (ref 27–31)
MCHC RBC AUTO-ENTMCNC: 30.8 G/DL (ref 33–37)
MCV RBC AUTO: 103.9 FL (ref 81–99)
MONOCYTES ABSOLUTE: 0.7 K/UL (ref 0–0.9)
MONOCYTES RELATIVE PERCENT: 8.2 % (ref 0–10)
NEUTROPHILS ABSOLUTE: 3.6 K/UL (ref 1.5–7.5)
NEUTROPHILS RELATIVE PERCENT: 43.3 % (ref 50–65)
PARATHYROID HORMONE INTACT: 30.7 PG/ML (ref 15–65)
PDW BLD-RTO: 15.3 % (ref 11.5–14.5)
PERFORMED ON: ABNORMAL
PLATELET # BLD: 350 K/UL (ref 130–400)
PMV BLD AUTO: 9.6 FL (ref 9.4–12.3)
POTASSIUM REFLEX MAGNESIUM: 3.7 MMOL/L (ref 3.5–5)
POTASSIUM REFLEX MAGNESIUM: 3.8 MMOL/L (ref 3.5–5)
RBC # BLD: 3.31 M/UL (ref 4.2–5.4)
SODIUM BLD-SCNC: 140 MMOL/L (ref 136–145)
SODIUM BLD-SCNC: 142 MMOL/L (ref 136–145)
TOTAL PROTEIN: 6.1 G/DL (ref 6.6–8.7)
TOTAL PROTEIN: 6.4 G/DL (ref 6.6–8.7)
VITAMIN D 25-HYDROXY: 16.8 NG/ML
WBC # BLD: 8.3 K/UL (ref 4.8–10.8)

## 2021-08-24 PROCEDURE — 36415 COLL VENOUS BLD VENIPUNCTURE: CPT

## 2021-08-24 PROCEDURE — G0378 HOSPITAL OBSERVATION PER HR: HCPCS

## 2021-08-24 PROCEDURE — 97166 OT EVAL MOD COMPLEX 45 MIN: CPT

## 2021-08-24 PROCEDURE — 83605 ASSAY OF LACTIC ACID: CPT

## 2021-08-24 PROCEDURE — 6370000000 HC RX 637 (ALT 250 FOR IP): Performed by: FAMILY MEDICINE

## 2021-08-24 PROCEDURE — 2500000003 HC RX 250 WO HCPCS: Performed by: INTERNAL MEDICINE

## 2021-08-24 PROCEDURE — 80053 COMPREHEN METABOLIC PANEL: CPT

## 2021-08-24 PROCEDURE — 76770 US EXAM ABDO BACK WALL COMP: CPT

## 2021-08-24 PROCEDURE — 96372 THER/PROPH/DIAG INJ SC/IM: CPT

## 2021-08-24 PROCEDURE — 82306 VITAMIN D 25 HYDROXY: CPT

## 2021-08-24 PROCEDURE — 6360000002 HC RX W HCPCS: Performed by: NURSE PRACTITIONER

## 2021-08-24 PROCEDURE — 2580000003 HC RX 258: Performed by: INTERNAL MEDICINE

## 2021-08-24 PROCEDURE — 83970 ASSAY OF PARATHORMONE: CPT

## 2021-08-24 PROCEDURE — 82947 ASSAY GLUCOSE BLOOD QUANT: CPT

## 2021-08-24 PROCEDURE — 96375 TX/PRO/DX INJ NEW DRUG ADDON: CPT

## 2021-08-24 PROCEDURE — 85025 COMPLETE CBC W/AUTO DIFF WBC: CPT

## 2021-08-24 PROCEDURE — 6370000000 HC RX 637 (ALT 250 FOR IP): Performed by: NURSE PRACTITIONER

## 2021-08-24 PROCEDURE — 93010 ELECTROCARDIOGRAM REPORT: CPT | Performed by: INTERNAL MEDICINE

## 2021-08-24 RX ORDER — FAMOTIDINE 20 MG/1
20 TABLET, FILM COATED ORAL DAILY
Status: DISCONTINUED | OUTPATIENT
Start: 2021-08-24 | End: 2021-08-24 | Stop reason: HOSPADM

## 2021-08-24 RX ORDER — CHOLECALCIFEROL (VITAMIN D3) 50 MCG
2000 TABLET ORAL DAILY
Qty: 60 TABLET | Refills: 0 | Status: SHIPPED | OUTPATIENT
Start: 2021-08-25

## 2021-08-24 RX ORDER — VITAMIN B COMPLEX
2000 TABLET ORAL DAILY
Status: DISCONTINUED | OUTPATIENT
Start: 2021-08-24 | End: 2021-08-24 | Stop reason: HOSPADM

## 2021-08-24 RX ADMIN — FOLIC ACID 2 MG: 1 TABLET ORAL at 08:50

## 2021-08-24 RX ADMIN — ENOXAPARIN SODIUM 30 MG: 30 INJECTION SUBCUTANEOUS at 08:49

## 2021-08-24 RX ADMIN — LINACLOTIDE 290 MCG: 145 CAPSULE, GELATIN COATED ORAL at 08:51

## 2021-08-24 RX ADMIN — INSULIN GLARGINE 10 UNITS: 100 INJECTION, SOLUTION SUBCUTANEOUS at 09:03

## 2021-08-24 RX ADMIN — SODIUM BICARBONATE: 84 INJECTION, SOLUTION INTRAVENOUS at 11:23

## 2021-08-24 RX ADMIN — FAMOTIDINE 20 MG: 20 TABLET, FILM COATED ORAL at 08:50

## 2021-08-24 RX ADMIN — GABAPENTIN 600 MG: 600 TABLET, FILM COATED ORAL at 08:50

## 2021-08-24 RX ADMIN — ASPIRIN 81 MG: 81 TABLET, CHEWABLE ORAL at 08:51

## 2021-08-24 RX ADMIN — METHOTREXATE 15 MG: 2.5 TABLET ORAL at 08:51

## 2021-08-24 RX ADMIN — CALCIUM 500 MG: 500 TABLET ORAL at 08:50

## 2021-08-24 RX ADMIN — Medication 2000 UNITS: at 14:47

## 2021-08-24 RX ADMIN — GABAPENTIN 600 MG: 600 TABLET, FILM COATED ORAL at 13:55

## 2021-08-24 ASSESSMENT — ENCOUNTER SYMPTOMS
COLOR CHANGE: 0
RHINORRHEA: 0
ABDOMINAL PAIN: 0
VOMITING: 0
SHORTNESS OF BREATH: 1
EYE DISCHARGE: 0
NAUSEA: 1
COUGH: 0
BACK PAIN: 0

## 2021-08-24 NOTE — CARE COORDINATION
Spoke with pt re: dc plans. Pt lives at home with spouse and son and plan same upon dc. Has dc orders and denies any needs at this time.   Electronically signed by Dorene Orellana RN on 8/24/2021 at 3:04 PM

## 2021-08-24 NOTE — PROGRESS NOTES
tolerance. Glucose 150 or less       Nutrition Monitoring and Evaluation:   Behavioral-Environmental Outcomes:  None Identified   Food/Nutrient Intake Outcomes:  Diet Advancement/Tolerance  Physical Signs/Symptoms Outcomes:  Biochemical Data, Weight, Skin, Nutrition Focused Physical Findings     Discharge Planning:     Too soon to determine     Electronically signed by Jonathan Crabtree, MS, RD, LD on 8/24/21 at 8:11 AM CDT    Contact: [phone]

## 2021-08-24 NOTE — ED PROVIDER NOTES
[facility] 3 ANG/VAS/MED  eMERGENCY dEPARTMENT eNCOUnter      Pt Name: Shelby Dakin  MRN: [id number]  [place] 1970  Date of evaluation: 8/23/2021  Provider: Lazarus Neat, MD    [address] [place] [address]       Chief Complaint   Patient presents with    Dizziness     presents with bc/o dizziness          HISTORY OF PRESENT ILLNESS   (Location/Symptom, Timing/Onset,Context/Setting, Quality, Duration, Modifying Factors, Severity)  Note limiting factors. Shelby Dakin is a 53 y.o. female who presents to the emergency department with chief complaint of dizziness. Patient has a history of chronic kidney disease with creatinine in the past of 3.2 which improved with rehydration to 0.9 per patient. Patient states that dizziness has been intermittent for the past \"4 to 5 years. \"  Patient believes that dizziness at this time is secondary to worsening of her renal function. When questioned about type of dizziness patient states \"I feel like I am moving. \"  Patient states that \"when I stare at something it is shaky like. \"  Patient states that dizziness is better when she sitting down or being still. Patient states when she stands \"my vision goes black for 1 to 2 minutes. \"  Patient denies chest pain, palpitations, decreased hearing, ringing in the ears, or weakness. Patient states that she must hold onto something to keep her balance. \"I walk like a drunk. \"  Patient states she is intermittently nauseated. HPI    NursingNotes were reviewed. REVIEW OF SYSTEMS    (2-9 systems for level 4, 10 or more for level 5)     Review of Systems   Constitutional: Negative for chills and fever. HENT: Negative for congestion, hearing loss and rhinorrhea. Eyes: Positive for visual disturbance. Negative for discharge. Respiratory: Positive for shortness of breath (With walking.). Negative for cough. Cardiovascular: Negative for chest pain, palpitations and leg swelling. Gastrointestinal: Positive for nausea.  Negative for abdominal pain and vomiting. Genitourinary: Negative for difficulty urinating and dysuria. Musculoskeletal: Negative for back pain and neck pain. Skin: Negative for color change and pallor. Neurological: Positive for dizziness and light-headedness. Negative for syncope. Psychiatric/Behavioral: Negative for agitation and confusion. All other systems reviewed and are negative. PAST MEDICALHISTORY     Past Medical History:   Diagnosis Date    Allergic rhinitis     Arthritis     Asthma     Chronic kidney disease     Constipation     Diabetes mellitus ([facility]     Diabetic neuropathy ([facility]     Fibromyalgia     Gastroparesis     GERD (gastroesophageal reflux disease)     GERD (gastroesophageal reflux disease)     Hyperlipidemia     Hypertension     Hypokalemia     Migraines     Neuromuscular disorder (HCC)     Osteoarthritis     Pancreatitis     Plantar wart of left foot     Raynaud disease     Rheumatoid arthritis ([facility] [address] [facility]     Tobacco abuse          SURGICAL HISTORY       Past Surgical History:   Procedure Laterality Date    APPENDECTOMY      CHOLECYSTECTOMY      HYSTERECTOMY           CURRENT MEDICATIONS     Current Discharge Medication List      CONTINUE these medications which have NOT CHANGED    Details   aspirin 81 MG chewable tablet Take 81 mg by mouth daily      atorvastatin (LIPITOR) 20 MG tablet Take 20 mg by mouth nightly      insulin glargine (LANTUS) 100 UNIT/ML injection vial Inject 10 Units into the skin daily      calcium carbonate 600 MG TABS tablet Take 1 tablet by mouth daily      diphenhydrAMINE (BENADRYL) 50 MG capsule Take 50 mg by mouth nightly      docusate sodium (COLACE) 100 MG capsule Take 100 mg by mouth 2 times daily      folic acid (FOLVITE) 1 MG tablet Take 2 mg by mouth daily       gabapentin (NEURONTIN) 600 MG tablet Take 600 mg by mouth 3 times daily.       canagliflozin (INVOKANA) 300 MG TABS tablet Take 300 mg by mouth daily      magnesium oxide (MAG-OX) 400 MG tablet Take 500 mg by mouth every 12 hours      metFORMIN (GLUCOPHAGE) 1000 MG tablet Take 1,000 mg by mouth 2 times daily (with meals)      methotrexate (RHEUMATREX) 2.5 MG chemo tablet Take 15 mg by mouth once a week      famotidine (PEPCID) 40 MG tablet Take 40 mg by mouth daily      tiZANidine (ZANAFLEX) 4 MG tablet Take 4 mg by mouth nightly      potassium chloride (KLOR-CON M) 20 MEQ extended release tablet Take 20 mEq by mouth daily      nortriptyline (PAMELOR) 25 MG capsule Take 50 mg by mouth nightly      lisinopril (PRINIVIL;ZESTRIL) 5 MG tablet Take 5 mg by mouth daily      linaclotide (LINZESS) 290 MCG CAPS capsule Take 290 mcg by mouth daily      cetirizine (ZYRTEC) 10 MG tablet Take 10 mg by mouth daily as needed for Allergies      diclofenac sodium (VOLTAREN) 1 % GEL Apply topically 2 times daily as needed for Pain      bisacodyl (DULCOLAX) 5 MG EC tablet Take 5 mg by mouth daily as needed for Constipation             ALLERGIES     Motrin [ibuprofen]    FAMILY HISTORY       Family History   Adopted: Yes          SOCIAL HISTORY       Social History     Socioeconomic History    Marital status:      Spouse name: None    Number of children: None    Years of education: None    Highest education level: None   Occupational History    None   Tobacco Use    Smoking status: Current Every Day Smoker    Smokeless tobacco: Never Used   Substance and Sexual Activity    Alcohol use: Never    Drug use: Never    Sexual activity: None   Other Topics Concern    None   Social History Narrative    None     Social Determinants of Health     Financial Resource Strain:     Difficulty of Paying Living Expenses:    Food Insecurity:     Worried About Running Out of Food in the Last Year:     Ran Out of Food in the Last Year:    Transportation Needs:     Lack of Transportation (Medical):      Lack of Transportation (Non-Medical):    Physical Activity:     Days of Exercise per Week:     Minutes of Exercise per Session:    Stress:     Feeling of Stress :    Social Connections:     Frequency of Communication with Friends and Family:     Frequency of Social Gatherings with Friends and Family:     Attends Nondenominational Services:     Active Member of Clubs or Organizations:     Attends Club or Organization Meetings:     Marital Status:    Intimate Partner Violence:     Fear of Current or Ex-Partner:     Emotionally Abused:     Physically Abused:     Sexually Abused:        SCREENINGS             PHYSICAL EXAM    (up to 7 for level 4, 8 or more for level 5)     ED Triage Vitals   BP Temp Temp Source Pulse Resp SpO2 Height Weight   08/23/21 1800 08/23/21 1800 08/23/21 2056 08/23/21 1800 08/23/21 1800 08/23/21 1800 08/23/21 1800 08/23/21 1800   (!) 88/58 97.8 °F (36.6 °C) Temporal 129 18 99 % 5' (1.524 m) 105 lb (47.6 kg)       Physical Exam  Vitals and nursing note reviewed. Constitutional:       General: She is not in acute distress. HENT:      Head: Normocephalic and atraumatic. Right Ear: External ear normal.      Left Ear: External ear normal.      Nose: Nose normal.      Mouth/Throat:      Mouth: Mucous membranes are moist.      Pharynx: Oropharynx is clear. No oropharyngeal exudate. Eyes:      General: No scleral icterus. Conjunctiva/sclera: Conjunctivae normal.      Pupils: Pupils are equal, round, and reactive to light. Cardiovascular:      Rate and Rhythm: Normal rate and regular rhythm. Pulses: Normal pulses. Heart sounds: Normal heart sounds. Pulmonary:      Effort: Pulmonary effort is normal.      Breath sounds: Normal breath sounds. Abdominal:      General: Bowel sounds are normal.      Palpations: Abdomen is soft. Tenderness: There is no abdominal tenderness. There is no guarding. Musculoskeletal:         General: No tenderness or deformity. Cervical back: Neck supple. No rigidity. Right lower leg: No edema. Left lower leg: No edema.    Skin: General: Skin is warm and dry. Capillary Refill: Capillary refill takes less than 2 seconds. Coloration: Skin is not jaundiced. Neurological:      General: No focal deficit present. Mental Status: She is alert and oriented to person, place, and time. Mental status is at baseline. Psychiatric:         Mood and Affect: Mood normal.         Behavior: Behavior normal.         DIAGNOSTIC RESULTS     EKG: All EKG's areinterpreted by the Emergency Department Physician who either signs or Co-signs this chart in the absence of a cardiologist.    EKG dated 8/23/2021 at 18 11 PM: Sinus tachycardia, rate 123. Possible left atrial enlargement. T wave flattening in 1, aVL, and V1 and V2. Artifact present. RADIOLOGY:  Non-plain film images such as CT, Ultrasound and MRI are read by the radiologist. Plain radiographic images are visualized and preliminarily interpreted bythe emergency physician with the below findings:      CT Head WO Contrast   Final Result   No acute intracranial findings. Signed by Dr Yamel Brown   Final Result   No acute findings.    Signed by Dr Zuleyka Magaña    (Results Pending)           LABS:  Labs Reviewed   CBC WITH AUTO DIFFERENTIAL - Abnormal; Notable for the following components:       Result Value    WBC 11.3 (*)     RBC 3.79 (*)     .7 (*)     MCH 33.8 (*)     MCHC 31.4 (*)     RDW 15.6 (*)     Platelets 830 (*)     All other components within normal limits   COMPREHENSIVE METABOLIC PANEL W/ REFLEX TO MG FOR LOW K - Abnormal; Notable for the following components:    CO2 18 (*)     Glucose 194 (*)     BUN 30 (*)     CREATININE 2.2 (*)     GFR Non- 24 (*)     GFR African American 29 (*)     Alkaline Phosphatase 152 (*)     All other components within normal limits   URINE RT REFLEX TO CULTURE - Abnormal; Notable for the following components:    Protein, UA 30 (*)     All other components within normal limits   LACTIC ACID, PLASMA - Abnormal; Notable for the following components:    Lactic Acid 2.5 (*)     All other components within normal limits    Narrative:     Marcie Ana tel. ,  Chemistry results called to and read back by Ksenia/RN/ER, 08/23/2021 20:40, by  Durga Wilde A1C - Abnormal; Notable for the following components:    Hemoglobin A1C 8.1 (*)     All other components within normal limits   MICROSCOPIC URINALYSIS - Abnormal; Notable for the following components:    Bacteria, UA NEGATIVE (*)     Crystals, UA NEG (*)     Hyaline Casts, UA 9 (*)     All other components within normal limits   POCT GLUCOSE - Abnormal; Notable for the following components:    POC Glucose 134 (*)     All other components within normal limits   COVID-19, RAPID   TROPONIN   COMPREHENSIVE METABOLIC PANEL W/ REFLEX TO MG FOR LOW K   LACTIC ACID, PLASMA   CBC WITH AUTO DIFFERENTIAL   OSMOLALITY, URINE   CREATININE, RANDOM URINE   SODIUM, URINE, RANDOM   EOSINOPHIL SMEAR URINE   URINALYSIS   LACTIC ACID, PLASMA   POCT GLUCOSE   POCT GLUCOSE       All other labs were within normal range or not returned as of this dictation. EMERGENCY DEPARTMENT COURSE and DIFFERENTIAL DIAGNOSIS/MDM:   Vitals:    Vitals:    08/23/21 2056 08/23/21 2126 08/23/21 2139 08/23/21 2317   BP: 107/62   96/64   Pulse: 102   94   Resp: 20 16  14   Temp: 97.6 °F (36.4 °C) 97.2 °F (36.2 °C)  96.1 °F (35.6 °C)   TempSrc: Temporal Temporal  Temporal   SpO2: 95% 98%  98%   Weight:   107 lb 2 oz (48.6 kg)    Height:   5' (1.524 m)        MDM  67-year-old female with history of CKD presents with dizziness and vertigo. Lab and radiology results reviewed. Discussed with Lavern Wing for nephrology, who recommends admission for ABHISHEK due to creatinine of 2.2. Discussed with Dr. Tish Mederos, hospitalist, who will admit patient for further evaluation and treatment.       CONSULTS:  IP CONSULT TO NEPHROLOGY  IP CONSULT TO SOCIAL WORK    PROCEDURES:  Unless otherwise noted below, none     Procedures    FINAL IMPRESSION      1. Acute renal insufficiency    2. Hyperglycemia    3.  Vertigo          DISPOSITION/PLAN   DISPOSITION Admitted 08/23/2021 08:16:47 PM           (Please note that portions of this note were completed with a voice recognition program.  Efforts were made to edit thedictations but occasionally words are mis-transcribed.)    Margaret Toney MD (electronically signed)  Attending Emergency Physician          Margaret Toney MD  08/24/21 7880

## 2021-08-24 NOTE — PROGRESS NOTES
4 Eyes Skin Assessment    Leila Tipton is being assessed upon: Admission    I agree that I, Tony Vincent RN, along with Yevgeniy Casas RN (either 2 RN's or 1 LPN and 1 RN) have performed a thorough Head to Toe Skin Assessment on the patient. ALL assessment sites listed below have been assessed. Areas assessed by both nurses:     [x]   Head, Face, and Ears   [x]   Shoulders, Back, and Chest  [x]   Arms, Elbows, and Hands   [x]   Coccyx, Sacrum, and Ischium  [x]   Legs, Feet, and Heels    Does the Patient have Skin Breakdown?  No    Ambrosio Prevention initiated: NA  Wound Care Orders initiated: NA    [facility] nurse consulted for Pressure Injury (Stage 3,4, Unstageable, DTI, NWPT, and Complex wounds) and New or Established Ostomies: NA        Primary Nurse eSignature: Tony Vincent RN on 8/24/2021 at 3:27 AM      Co-Signer eSignature: {Esignature:[id number]}

## 2021-08-24 NOTE — PROGRESS NOTES
Physical Therapy  Pt declines PT eval, stating she has been up to BR without difficulty and no dizziness. Has no concerns about returning home and caring for self once medically stable.   Electronically signed by Ana Barrett PT on 8/24/2021 at 9:53 AM

## 2021-08-24 NOTE — PROGRESS NOTES
Tomasz Nash arrived to room # 26  Presented with: ARF  Mental Status: Patient is oriented, alert, coherent, logical, thought processes intact and able to concentrate and follow conversation. Vitals:    08/23/21 2317   BP: 96/64   Pulse: 94   Resp: 14   Temp: 96.1 °F (35.6 °C)   SpO2: 98%     Patient safety contract and falls prevention contract reviewed with patient Yes. Oriented Patient to room. Call light within reach. Yes.   Needs, issues or concerns expressed at this time: no.      Electronically signed by Tony Vincent RN on 8/24/2021 at 3:26 AM

## 2021-08-24 NOTE — CONSULTS
Nephrology ([facility]) Consult Note      Patient:  Betzy Titus  YOB: 1970  Date of Service: 8/24/2021  MRN: [id number]   Acct: [de-identified]   Primary Care Physician: Hitesh Armstrong  Advance Directive: Full Code  Admit Date: 8/23/2021       Hospital Day: 0  Referring Provider: Valeria Espinoza MD    Patient independently seen and examined, Chart, Consults, Notes, Operative notes, Labs, Cardiology, and Radiology studies reviewed as available. Chief complaint: Abnormal labs. Subjective:  Betzy Titus is a 53 y.o. female  whom we were consulted for acute kidney injury. Patient has history of stage IIIa chronic kidney disease baseline and follows Dr. Rajwinder Quinn in the office. She has diabetic nephropathy. Presenting to the emergency room as she was complaining of dizziness, weakness, lack of energy and fatigue. Patient also reported some nausea and vomiting. Her serum creatinine was elevated from her baseline now admitted for the treatment of acute kidney injury caused by intravascular volume depletion. She is currently getting IV fluid and has good urine output. She denies any history of use of nonsteroidal agents. Patient also denies any diarrhea.     Allergies:  Pineapple, Hydrocodone-acetaminophen, Penicillins, Sitagliptin, Bee venom, Hydrocodone, and Motrin [ibuprofen]    Medicines:  Current Facility-Administered Medications   Medication Dose Route Frequency Provider Last Rate Last Admin    famotidine (PEPCID) tablet 20 mg  20 mg Oral Daily DESTINY Ferreira - CNP   20 mg at 08/24/21 0850    sodium chloride flush 0.9 % injection 5-40 mL  5-40 mL Intravenous 2 times per day DESTINY Ferreira CNP   10 mL at 08/23/21 2248    sodium chloride flush 0.9 % injection 5-40 mL  5-40 mL Intravenous PRN DESTINY Ferreira - CNP        0.9 % sodium chloride infusion  25 mL Intravenous PRN DESTINY Ferreira CNP        enoxaparin (LOVENOX) injection 30 mg  30 mg Subcutaneous Daily Giovanny Bigg, APRN - CNP   30 mg at 08/24/21 0849    ondansetron (ZOFRAN-ODT) disintegrating tablet 4 mg  4 mg Oral Q8H PRN Giovanny Bigg, APRN - CNP        Or    ondansetron (ZOFRAN) injection 4 mg  4 mg Intravenous Q6H PRN Giovanny Bigg, APRN - CNP        acetaminophen (TYLENOL) tablet 650 mg  650 mg Oral Q6H PRN Giovanny Bigg, APRN - CNP        Or    acetaminophen (TYLENOL) suppository 650 mg  650 mg Rectal Q6H PRN Giovanny Bigg, APRN - CNP        0.9 % sodium chloride infusion   Intravenous Continuous Giovanny Bigg, APRN - CNP 75 mL/hr at 08/23/21 2251 New Bag at 08/23/21 2251    aspirin chewable tablet 81 mg  81 mg Oral Daily Giovanny Bigg, APRN - CNP   81 mg at 08/24/21 0851    atorvastatin (LIPITOR) tablet 20 mg  20 mg Oral Nightly Giovanny Bigg, APRN - CNP   20 mg at 08/23/21 2248    calcium elemental (OSCAL) tablet 500 mg  500 mg Oral Daily Giovanny Bigg, APRN - CNP   500 mg at 08/24/21 0850    gabapentin (NEURONTIN) tablet 600 mg  600 mg Oral TID Giovanny Bigg, APRN - CNP   600 mg at 08/24/21 0850    insulin glargine (LANTUS) injection vial 10 Units  10 Units Subcutaneous Daily Giovanny Bigg, APRN - CNP   10 Units at 08/24/21 3040    linaclotide (LINZESS) capsule 290 mcg  290 mcg Oral Daily Giovanny Bigg, APRN - CNP   290 mcg at 08/24/21 0851    nortriptyline (PAMELOR) capsule 50 mg  50 mg Oral Nightly Giovanny Bigg, APRN - CNP   50 mg at 08/23/21 2248    tiZANidine (ZANAFLEX) tablet 4 mg  4 mg Oral Nightly Giovanny Bigg, APRN - CNP   4 mg at 08/23/21 2248    glucose (GLUTOSE) 40 % oral gel 15 g  15 g Oral PRN Giovanny Bigg, APRN - CNP        dextrose 50 % IV solution  12.5 g Intravenous PRN Giovanny Bigg, APRN - CNP        glucagon (rDNA) injection 1 mg  1 mg Intramuscular PRN DESTINY Marcial CNP        dextrose 5 % solution  100 mL/hr Intravenous PRN DESTINY Marcial CNP        insulin Running Out of Food in the Last Year:    [address] in the Last Year:    Transportation Needs:     Lack of Transportation (Medical):  Lack of Transportation (Non-Medical):    Physical Activity:     Days of Exercise per Week:     Minutes of Exercise per Session:    Stress:     Feeling of Stress :    Social Connections:     Frequency of Communication with Friends and Family:     Frequency of Social Gatherings with Friends and Family:     Attends Spiritism Services:     Active Member of Clubs or Organizations:     Attends Club or Organization Meetings:     Marital Status:    Intimate Partner Violence:     Fear of Current or Ex-Partner:     Emotionally Abused:     Physically Abused:     Sexually Abused:          Review of Systems:  History obtained from chart review and the patient  General ROS: No fever or chills  Respiratory ROS: No cough, shortness of breath, wheezing  Cardiovascular ROS: No chest pain or palpitations  Gastrointestinal ROS: Complaining of nausea and vomiting  Genito-Urinary ROS: No dysuria or hematuria  Musculoskeletal ROS: No joint pain or swelling   14 point ROS reviewed with the patient and negative except as noted above and in the HPI unless unable to obtain.     Objective:  Patient Vitals for the past 24 hrs:   BP Temp Temp src Pulse Resp SpO2 Height Weight   08/24/21 0724 -- -- -- -- -- -- 5' (1.524 m) --   08/24/21 0652 98/67 96.2 °F (35.7 °C) Temporal 93 16 97 % -- --   08/23/21 2317 96/64 96.1 °F (35.6 °C) Temporal 94 14 98 % -- --   08/23/21 2139 -- -- -- -- -- -- 5' (1.524 m) 107 lb 2 oz (48.6 kg)   08/23/21 2126 -- 97.2 °F (36.2 °C) Temporal -- 16 98 % -- --   08/23/21 2056 107/62 97.6 °F (36.4 °C) Temporal 102 20 95 % -- --   08/23/21 1928 98/64 -- -- 107 20 94 % -- --   08/23/21 1800 (!) 88/58 97.8 °F (36.6 °C) -- 129 18 99 % 5' (1.524 m) 105 lb (47.6 kg)       Intake/Output Summary (Last 24 hours) at 8/24/2021 0932  Last data filed at 8/24/2021 0411  Gross per 24 hour   Intake 1500 ml   Output 200 ml   Net 1300 ml     General: awake/alert   HEENT: Normocephalic atraumatic head  Neck: Supple with no JVD or carotid bruits. Chest:  clear to auscultation bilaterally  CVS: regular rate and rhythm  Abdominal: soft, nontender, normal bowel sounds  Extremities: no cyanosis or edema  Skin: warm and dry without rash      Labs:  BMP:   Recent Labs     08/23/21 1816 08/24/21  0402    142   K 4.5 3.8    110   CO2 18* 18*   BUN 30* 27*   CREATININE 2.2* 1.5*   CALCIUM 10.0 9.5     CBC:   Recent Labs     08/23/21 1820 08/24/21  0402   WBC 11.3* 8.3   HGB 12.8 10.6*   HCT 40.8 34.4*   .7* 103.9*   * 350     LIVER PROFILE:   Recent Labs     08/23/21 1816 08/24/21  0402   AST 17 9   ALT 16 9   BILITOT 0.3 <0.2   ALKPHOS 152* 105*     PT/INR: No results for input(s): PROTIME, INR in the last 72 hours. APTT: No results for input(s): APTT in the last 72 hours. BNP:  No results for input(s): BNP in the last 72 hours. Ionized Calcium:No results for input(s): IONCA in the last 72 hours. Magnesium:No results for input(s): MG in the last 72 hours. Phosphorus:No results for input(s): PHOS in the last 72 hours. HgbA1C:   Recent Labs     08/23/21 1816   LABA1C 8.1*     Hepatic:   Recent Labs     08/23/21  1816 08/24/21  0402   ALKPHOS 152* 105*   ALT 16 9   AST 17 9   PROT 8.5 6.4*   BILITOT 0.3 <0.2   LABALBU 4.7 4.1     Lactic Acid:   Recent Labs     08/24/21  0402   LACTA 0.5     Troponin: No results for input(s): CKTOTAL, CKMB, TROPONINT in the last 72 hours. ABGs: No results for input(s): PH, PCO2, PO2, HCO3, O2SAT in the last 72 hours. CRP:  No results for input(s): CRP in the last 72 hours. Sed Rate:  No results for input(s): SEDRATE in the last 72 hours. Cultures:   No results for input(s): CULTURE in the last 72 hours. No results for input(s): BCPippa in the last 72 hours. No results for input(s): CXSURG in the last 72 hours.     Radiology reports as per the Radiologist  Radiology: CT Head WO Contrast    Result Date: 8/23/2021  EXAM: CT HEAD WO CONTRAST - 8/23/2021 7:56 PM INDICATION: Dizziness COMPARISON: None available. DLP: 718 mGy cm. In order to have a CT radiation dose as low as reasonably achievable, Automated Exposure Control was utilized for adjustment of the mA and/or KV according to patient size. FINDINGS: No acute intracranial hemorrhage. No loss of gray-white differentiation. No midline shift or mass effect. Lateral ventricles are normal caliber. Basilar cisterns are patent. No acute orbital finding. Mastoid air cells are clear. Visualized paranasal sinuses are clear. No acute osseous finding. No acute intracranial findings. Signed by Dr John Bermudez RENAL COMPLETE    Result Date: 8/24/2021  History: Acute renal insufficiency Renal ultrasound: Sonographic imaging of the kidneys and bladder. Kidneys appear normal in echotexture and contour. The right kidney measures 10.1 x 4.0 x 4.7 cm. The left kidney measures 9.1 x 4.4 x 4.0 cm. No solid or cystic renal mass. No hydronephrosis. No obstructing intrarenal stones. No abnormal perinephric fluid collection. Images of the mildly distended bladder limited due to underdistention. 1. No sonographic pathology identified of the kidneys. Signed by Dr Lynsey Harley    XR CHEST PORTABLE    Result Date: 8/23/2021  EXAM: XR CHEST PORTABLE - 8/23/2021 6:12 PM INDICATION: Dizziness, lightheaded COMPARISON: None available. FINDINGS: Cardiomediastinal silhouette is within normal limits. No pleural effusion, pneumothorax, or focal consolidation. No acute osseous findings. No acute findings. Signed by Dr Amanuel Eller   1. Acute kidney injury/stage I.  2.  Intravascular volume depletion. 3.  Metabolic acidemia. 4.  Stage IIIa chronic kidney disease baseline. 5.  Type II diabetic nephropathy. Plan:  1. IV fluid change. 2.  Urinary electrolyte.   3.  Renal ultrasound reviewed. 4.  Continue to monitor renal recovery      Thank you for the consult, we appreciate the opportunity to provide care to your patients. Feel free to contact me if I can be of any further assistance.       Marcus Brooks MD  08/24/21  9:32 AM

## 2021-08-24 NOTE — PLAN OF CARE
Problem: Falls - Risk of:  Goal: Will remain free from falls  Description: Will remain free from falls  Outcome: Ongoing  Goal: Absence of physical injury  Description: Absence of physical injury  Outcome: Ongoing     Problem: Discharge Planning:  Goal: Discharged to appropriate level of care  Description: Discharged to appropriate level of care  Outcome: Ongoing     Problem: Serum Glucose Level - Abnormal:  Goal: Ability to maintain appropriate glucose levels will improve  Description: Ability to maintain appropriate glucose levels will improve  Outcome: Ongoing     Problem: Sensory Perception - Impaired:  Goal: Ability to maintain a stable neurologic state will improve  Description: Ability to maintain a stable neurologic state will improve  Outcome: Ongoing     Problem:  Activity:  Goal: Risk for activity intolerance will decrease  Description: Risk for activity intolerance will decrease  Outcome: Ongoing  Goal: Ability to tolerate increased activity will improve  Description: Ability to tolerate increased activity will improve  Outcome: Ongoing     Problem: Coping:  Goal: Ability to adjust to condition or change in health will improve  Description: Ability to adjust to condition or change in health will improve  Outcome: Ongoing  Goal: Ability to identify and develop effective coping behavior will improve  Description: Ability to identify and develop effective coping behavior will improve  Outcome: Ongoing     Problem: Fluid Volume:  Goal: Ability to maintain a balanced intake and output will improve  Description: Ability to maintain a balanced intake and output will improve  Outcome: Ongoing  Goal: Ability to achieve a balanced intake and output will improve  Description: Ability to achieve a balanced intake and output will improve  Outcome: Ongoing     Problem: Health Behavior:  Goal: Ability to identify and utilize available resources and services will improve  Description: Ability to identify and utilize available resources and services will improve  Outcome: Ongoing  Goal: Ability to manage health-related needs will improve  Description: Ability to manage health-related needs will improve  Outcome: Ongoing  Goal: Identification of resources available to assist in meeting health care needs will improve  Description: Identification of resources available to assist in meeting health care needs will improve  Outcome: Ongoing     Problem: Metabolic:  Goal: Ability to maintain appropriate glucose levels will improve  Description: Ability to maintain appropriate glucose levels will improve  Outcome: Ongoing     Problem: Nutritional:  Goal: Maintenance of adequate nutrition will improve  Description: Maintenance of adequate nutrition will improve  Outcome: Ongoing  Goal: Progress toward achieving an optimal weight will improve  Description: Progress toward achieving an optimal weight will improve  Outcome: Ongoing  Goal: Ability to identify appropriate dietary choices will improve  Description: Ability to identify appropriate dietary choices will improve  Outcome: Ongoing     Problem: Physical Regulation:  Goal: Complications related to the disease process, condition or treatment will be avoided or minimized  Description: Complications related to the disease process, condition or treatment will be avoided or minimized  Outcome: Ongoing  Goal: Diagnostic test results will improve  Description: Diagnostic test results will improve  Outcome: Ongoing  Goal: Ability to maintain clinical measurements within normal limits will improve  Description: Ability to maintain clinical measurements within normal limits will improve  Outcome: Ongoing  Goal: Will show no signs and symptoms of electrolyte imbalance  Description: Will show no signs and symptoms of electrolyte imbalance  Outcome: Ongoing     Problem: Skin Integrity:  Goal: Risk for impaired skin integrity will decrease  Description: Risk for impaired skin integrity will decrease  Outcome: Ongoing     Problem: Tissue Perfusion:  Goal: Adequacy of tissue perfusion will improve  Description: Adequacy of tissue perfusion will improve  Outcome: Ongoing     Problem: ABCDS Injury Assessment  Goal: Absence of physical injury  Outcome: Ongoing     Problem: Cardiac:  Goal: Complications related to the disease process, condition or treatment will be avoided or minimized  Description: Complications related to the disease process, condition or treatment will be avoided or minimized  Outcome: Ongoing  Goal: Hemodynamic stability will improve  Description: Hemodynamic stability will improve  Outcome: Ongoing  Goal: Cerebral tissue perfusion will improve  Description: Cerebral tissue perfusion will improve  Outcome: Ongoing     Problem: Infection:  Goal: Will remain free from infection  Description: Will remain free from infection  Outcome: Ongoing     Problem: Safety:  Goal: Free from accidental physical injury  Description: Free from accidental physical injury  Outcome: Ongoing  Goal: Free from intentional harm  Description: Free from intentional harm  Outcome: Ongoing     Problem: Daily Care:  Goal: Daily care needs are met  Description: Daily care needs are met  Outcome: Ongoing     Problem: Pain:  Goal: Patient's pain/discomfort is manageable  Description: Patient's pain/discomfort is manageable  Outcome: Ongoing     Problem: Skin Integrity:  Goal: Skin integrity will stabilize  Description: Skin integrity will stabilize  Outcome: Ongoing     Problem: Discharge Planning:  Goal: Patients continuum of care needs are met  Description: Patients continuum of care needs are met  Outcome: Ongoing     Problem: Tissue Perfusion - Renal, Altered:  Goal: Ability to achieve a balanced intake and output will improve  Description: Ability to achieve a balanced intake and output will improve  Outcome: Ongoing  Goal: Electrolytes within specified parameters  Description: Electrolytes within specified parameters  Outcome: Ongoing  Goal: Urine creatinine clearance will be within specified parameters  Description: Urine creatinine clearance will be within specified parameters  Outcome: Ongoing  Goal: Serum creatinine will be within specified parameters  Description: Serum creatinine will be within specified parameters  Outcome: Ongoing     Problem: Bleeding:  Goal: Will show no signs and symptoms of excessive bleeding  Description: Will show no signs and symptoms of excessive bleeding  Outcome: Ongoing

## 2021-08-24 NOTE — H&P
[address] [facility] - History & Physical      PCP: Saroj Paniagua    Date of Admission: 8/23/2021    Date of Service: 8/23/2021    Chief Complaint:  Dizziness     History Of Present Illness: The patient is a 53 y.o. female with past medical history of CKD, DM, Fibromyalgia, HLD, GERD, Raynaud's disease, migraine, osteoporosis, and rheumatoid arthritis who presented to [address] [place] [address] ED complaining of dizziness. Ms. Teodoro Sol is a poor historian. Reports having intermittent dizziness and weakness over the past 4-5 years. States weakness and dizziness got severe this week. Reports intermittent shortness of breath over the past 4 to 5 years. She endorses nausea and vomiting. Denies chest pain or diarrhea at this time. States she was recently seen at [facility] ER for similar symptoms and was reported to have slight dehydration and given IV fluids and discharged. Reports following up with her nephrology NP on Thursday and was referred to PCP for lab work. Work-up in ED revealed hypotension, tachycardia, CO2 18, BUN 30, creatinine 2.2, GFR 29 (previous creatinine of 1.4, GFR 42.5 on 7/16/2021 on Care Everywhere), lactic 2.5, alk phos 152, HgbA1c 8.1, WBC 11.3, chest x-ray unremarkable, CT head unremarkable.        Past Medical History:        Diagnosis Date    Allergic rhinitis     Arthritis     Asthma     Chronic kidney disease     Constipation     Diabetes mellitus ([address]     Diabetic neuropathy (HCC)     Fibromyalgia     Gastroparesis     GERD (gastroesophageal reflux disease)     GERD (gastroesophageal reflux disease)     Hyperlipidemia     Hypertension     Hypokalemia     Migraines     Neuromuscular disorder (HCC)     Osteoarthritis     Pancreatitis     Plantar wart of left foot     Raynaud disease     Rheumatoid arthritis ([address]     Tobacco abuse        Past Surgical History:        Procedure Laterality Date    APPENDECTOMY     [address] Medications:  Prior to Admission medications    Medication Sig Start Date End Date Taking? Authorizing Provider   aspirin 81 MG chewable tablet Take 81 mg by mouth daily   Yes Historical Provider, MD   atorvastatin (LIPITOR) 20 MG tablet Take 20 mg by mouth nightly   Yes Historical Provider, MD   insulin glargine (LANTUS) 100 UNIT/ML injection vial Inject 10 Units into the skin daily   Yes Historical Provider, MD   calcium carbonate 600 MG TABS tablet Take 1 tablet by mouth daily   Yes Historical Provider, MD   cetirizine (ZYRTEC) 10 MG tablet Take 10 mg by mouth daily as needed for Allergies   Yes Historical Provider, MD   diclofenac sodium (VOLTAREN) 1 % GEL Apply topically 2 times daily as needed for Pain   Yes Historical Provider, MD   diphenhydrAMINE (BENADRYL) 50 MG capsule Take 50 mg by mouth nightly   Yes Historical Provider, MD   docusate sodium (COLACE) 100 MG capsule Take 100 mg by mouth 2 times daily   Yes Historical Provider, MD   folic acid (FOLVITE) 1 MG tablet Take 1 mg by mouth every 12 hours   Yes Historical Provider, MD   gabapentin (NEURONTIN) 600 MG tablet Take 600 mg by mouth 3 times daily.    Yes Historical Provider, MD   canagliflozin (INVOKANA) 300 MG TABS tablet Take 300 mg by mouth daily   Yes Historical Provider, MD   magnesium oxide (MAG-OX) 400 MG tablet Take 500 mg by mouth every 12 hours   Yes Historical Provider, MD   NIFEdipine (ADALAT CC) 30 MG extended release tablet Take 60 mg by mouth daily   Yes Historical Provider, MD   metFORMIN (GLUCOPHAGE) 1000 MG tablet Take 1,000 mg by mouth 2 times daily (with meals)   Yes Historical Provider, MD   methotrexate (RHEUMATREX) 2.5 MG chemo tablet Take 15 mg by mouth once a week   Yes Historical Provider, MD   famotidine (PEPCID) 40 MG tablet Take 40 mg by mouth daily   Yes Historical Provider, MD   tiZANidine (ZANAFLEX) 4 MG tablet Take 4 mg by mouth nightly   Yes Historical Provider, MD   potassium chloride (KLOR-CON M) 20 MEQ extended release tablet Take 20 mEq by mouth daily   Yes Historical Provider, MD   nortriptyline (PAMELOR) 25 MG capsule Take 50 mg by mouth nightly   Yes Historical Provider, MD   lisinopril (PRINIVIL;ZESTRIL) 5 MG tablet Take 5 mg by mouth daily   Yes Historical Provider, MD   linaclotide (LINZESS) 290 MCG CAPS capsule Take 290 mcg by mouth daily   Yes Historical Provider, MD       Allergies:    Motrin [ibuprofen]    Social History:    The patient currently lives at home. Tobacco:   reports that she has been smoking. She has never used smokeless tobacco.  Alcohol:   reports no history of alcohol use. Illicit Drugs: denies    Family History:      Adopted: Yes         Review of Systems   Constitutional: Positive for fatigue. Negative for chills, diaphoresis and fever. HENT: Negative for congestion, ear pain, sinus pain, sore throat and trouble swallowing. Eyes: Negative for visual disturbance. Respiratory: Positive for shortness of breath. Negative for cough and wheezing. Cardiovascular: Negative for chest pain, palpitations and leg swelling. Gastrointestinal: Positive for nausea and vomiting. Negative for abdominal distention, abdominal pain, blood in stool, constipation and diarrhea. Endocrine: Negative for cold intolerance and heat intolerance. Genitourinary: Negative for difficulty urinating, flank pain, frequency and urgency. Musculoskeletal: Negative for arthralgias and myalgias. Neurological: Positive for dizziness, weakness and headaches. Negative for syncope, light-headedness and numbness. Hematological: Does not bruise/bleed easily. Psychiatric/Behavioral: Negative for agitation, confusion and dysphoric mood. Physical Examination:  /62   Pulse 102   Temp 97.6 °F (36.4 °C) (Temporal)   Resp 20   Ht 5' (1.524 m)   Wt 105 lb (47.6 kg)   SpO2 95%   BMI 20.51 kg/m²     Physical Exam  Constitutional:       General: She is not in acute distress.      Appearance: Normal appearance. She is not toxic-appearing or diaphoretic. HENT:      Head: Normocephalic and atraumatic. Right Ear: External ear normal.      Left Ear: External ear normal.      Nose: Nose normal. No congestion or rhinorrhea. Mouth/Throat:      Mouth: Mucous membranes are moist.      Pharynx: Oropharynx is clear. Eyes:      General: No scleral icterus. Extraocular Movements: Extraocular movements intact. Conjunctiva/sclera: Conjunctivae normal.   Cardiovascular:      Rate and Rhythm: Regular rhythm. Tachycardia present. Pulses: Normal pulses. Heart sounds: Normal heart sounds. No murmur heard. No friction rub. No gallop. Pulmonary:      Effort: Pulmonary effort is normal. No respiratory distress. Breath sounds: Normal breath sounds. No wheezing, rhonchi or rales. Abdominal:      General: Abdomen is flat. Bowel sounds are normal. There is no distension. Palpations: Abdomen is soft. Tenderness: There is no abdominal tenderness. Musculoskeletal:         General: No swelling. Normal range of motion. Cervical back: Normal range of motion and neck supple. Right lower leg: No edema. Left lower leg: No edema. Skin:     General: Skin is warm and dry. Coloration: Skin is not jaundiced. Findings: No erythema, lesion or rash. Neurological:      General: No focal deficit present. Mental Status: She is alert and oriented to person, place, and time. Mental status is at baseline. Cranial Nerves: No cranial nerve deficit. Sensory: No sensory deficit. Motor: No weakness. Psychiatric:         Mood and Affect: Mood normal.         Behavior: Behavior normal.         Thought Content:  Thought content normal.         Judgment: Judgment normal.          Diagnostic Data:  CBC:  Recent Labs     08/23/21  1820   WBC 11.3*   HGB 12.8   HCT 40.8   *     BMP:  Recent Labs     08/23/21  1816      K 4.5      CO2 18*   BUN 30* transcribed; although attempts have made to review the note for such errors, some may still exist.

## 2021-08-24 NOTE — DISCHARGE SUMMARY
[id number] [facility]    Discharge Summary      Taj Kerr  :  1970  MRN:  [id number]    Admit date:  2021  Discharge date:    2021    Discharging Physician:  Dr. Sosa  Directive: Full Code    Consults: nephrology    Primary Care Physician:  Therese Bermudez    Discharge Diagnoses:  Principal Problem:    Acute renal insufficiency  Active Problems:    HLD (hyperlipidemia)    Fibromyalgia    Migraine    GERD (gastroesophageal reflux disease)    Type 2 diabetes mellitus, with long-term current use of insulin ([address]  Resolved Problems:    * No resolved hospital problems. *      Portions of this note have been copied forward, however, changed to reflect the most current clinical status of this patient. Hospital Course: The patient is a 53 y.o. female with past medical history of CKD, DM, Fibromyalgia, HLD, GERD, Raynaud's disease, migraine, osteoporosis, and rheumatoid arthritis who presented to [address] [place] [address] ED complaining of dizziness. Ms. Gordillo Age is a poor historian. Reported having intermittent dizziness and weakness over the past 4-5 years. Stated weakness and dizziness got severe this week. Reported intermittent shortness of breath over the past 4 to 5 years. She endorsed nausea and vomiting. Denied chest pain or diarrhea at this time. Stated she was recently seen at [facility] ER for similar symptoms and was reported to have slight dehydration and given IV fluids and discharged. Reported following up with her nephrology NP on Thursday and was referred to PCP for lab work. Work-up in ED revealed hypotension, tachycardia, CO2 18, BUN 30, creatinine 2.2, GFR 29 (previous creatinine of 1.4, GFR 42.5 on 2021 on Care Everywhere), lactic 2.5, alk phos 152, HgbA1c 8.1, WBC 11.3, chest x-ray unremarkable, CT head unremarkable. Patient was admitted to hospital medicine with acute renal insufficiency with nephrology consultation. IVFs were initiated. Renal ultrasound unremarkable. Nephrology recommended IVFs with sodium bicarb. Renal function improved overnight. Patient has been advised to follow-up with PCP and nephrology closely. Patient is being discharged on vitamin D. Metformin decreased to 500 mg twice daily. Follow-up closely with PCP regarding diabetic regimen. Patient was offered home health on discharge, which she refused. Patient is currently in stable condition to be discharged home. Significant Diagnostic Studies:     CT Head WO Contrast  Result Date: 8/23/2021    No acute intracranial findings. Signed by Dr Safia Otto RENAL COMPLETE  Result Date: 8/24/2021    1. No sonographic pathology identified of the kidneys. Signed by Dr Kolby Cole      XR CHEST PORTABLE  Result Date: 8/23/2021    No acute findings. Signed by Dr Rivas Santos:   CBC:   Recent Labs     08/23/21  1820 08/24/21  0402   WBC 11.3* 8.3   HGB 12.8 10.6*   * 350     BMP:    Recent Labs     08/23/21  1816 08/24/21  0402 08/24/21  1548    142 140   K 4.5 3.8 3.7    110 107   CO2 18* 18* 22   BUN 30* 27* 24*   CREATININE 2.2* 1.5* 1.8*   GLUCOSE 194* 160* 199*       Physical Exam:   Vital Signs: /71   Pulse 69   Temp 98 °F (36.7 °C) (Temporal)   Resp 16   Ht 5' (1.524 m)   Wt 107 lb 2 oz (48.6 kg)   SpO2 94%   BMI 20.92 kg/m²   General appearance:. Alert and Cooperative   HEENT: Normocephalic. Chest: Lung sounds clear bilaterally without wheezes or rhonchi. Cardiac: RRR, S1, S2 normal. No murmurs, gallops, or rubs auscultated. Abdomen: soft, non-tender; non-distended normal bowel sounds no masses, no organomegaly. Extremities: No clubbing or cyanosis. No peripheral edema. Peripheral pulses palpable. Neurologic: Grossly intact.         Discharge Medications:          Medication List      START taking these medications    vitamin D 50 MCG (2000 UT) Tabs tablet  Commonly known as: CHOLECALCIFEROL  Take 1 tablet by mouth daily  Start taking on: August 25, 2021        CHANGE how you take these medications    metFORMIN 500 MG tablet  Commonly known as: GLUCOPHAGE  Take 1 tablet by mouth 2 times daily (with meals)  What changed:   · medication strength  · how much to take        CONTINUE taking these medications    aspirin 81 MG chewable tablet     atorvastatin 20 MG tablet  Commonly known as: LIPITOR     bisacodyl 5 MG EC tablet  Commonly known as: DULCOLAX     calcium carbonate 600 MG Tabs tablet     cetirizine 10 MG tablet  Commonly known as: ZYRTEC     diclofenac sodium 1 % Gel  Commonly known as: VOLTAREN     diphenhydrAMINE 50 MG capsule  Commonly known as: BENADRYL     docusate sodium 100 MG capsule  Commonly known as: COLACE     famotidine 40 MG tablet  Commonly known as: PEPCID     folic acid 1 MG tablet  Commonly known as: FOLVITE     gabapentin 600 MG tablet  Commonly known as: NEURONTIN     insulin glargine 100 UNIT/ML injection vial  Commonly known as: LANTUS     Invokana 300 MG Tabs tablet  Generic drug: canagliflozin     Linzess 290 MCG Caps capsule  Generic drug: linaclotide     lisinopril 5 MG tablet  Commonly known as: PRINIVIL;ZESTRIL     magnesium oxide 400 MG tablet  Commonly known as: MAG-OX     methotrexate 2.5 MG chemo tablet  Commonly known as: RHEUMATREX     nortriptyline 25 MG capsule  Commonly known as: PAMELOR     potassium chloride 20 MEQ extended release tablet  Commonly known as: KLOR-CON M     tiZANidine 4 MG tablet  Commonly known as: Lyndall Raw           Where to Get Your Medications      These medications were sent to [facility] [address] [place] [address] [phone]  [address] [address]    Phone: [phone]   · metFORMIN 500 MG tablet  · vitamin D 50 MCG (2000 UT) Tabs tablet            Discharge Instructions: Follow up with Brandon Pulido in 3-5 days. Follow-up with nephrology as recommended. Take medications as directed.   Resume activity as tolerated. Diet: ADULT DIET; Regular; 4 carb choices (60 gm/meal)     Disposition: Patient is medically stable and will be discharged home. Time spent on discharge 35 minutes spent in assessing patient, reviewing medications, discussion with nursing, confirming safe discharge plan and preparation of discharge summary. Signed:  Electronically signed by DESTINY Echeverria CNP on 8/24/21 at 5:14 PM CDT         EMR Dragon/Transcription disclaimer:   Much of this encounter note is an electronic transcription/translation of spoken language to printed text.  The electronic translation of spoken language may permit erroneous, or at times, nonsensical words or phrases to be inadvertently transcribed; although attempts have made to review the note for such errors, some may still exist.

## 2021-08-24 NOTE — PROGRESS NOTES
Pharmacy Renal Adjustment    Duane Leal is a 53 y.o. female. Pharmacy has renally adjusted medications per protocol. Recent Labs     08/23/21  1816 08/24/21  0402   BUN 30* 27*       Recent Labs     08/23/21  1816 08/24/21  0402   CREATININE 2.2* 1.5*       Estimated Creatinine Clearance: 33 mL/min (A) (based on SCr of 1.5 mg/dL (H)).     Height:   Ht Readings from Last 1 Encounters:   08/23/21 5' (1.524 m)     Weight:  Wt Readings from Last 1 Encounters:   08/23/21 107 lb 2 oz (48.6 kg)       Plan: Adjust the following medications based on renal function:           Famotidine 10 mg po daily to 20 mg po daily    Electronically signed by Drew Chanec, [facility] on 8/24/2021 at 5:25 AM

## 2021-08-24 NOTE — PLAN OF CARE
Problem: Falls - Risk of:  Goal: Will remain free from falls  Description: Will remain free from falls  8/24/2021 0947 by Loy Atwood RN  Outcome: Ongoing  8/24/2021 0329 by Leydi Bundy RN  Outcome: Ongoing  Goal: Absence of physical injury  Description: Absence of physical injury  8/24/2021 0947 by Loy Atwood RN  Outcome: Ongoing  8/24/2021 0329 by Leydi Bundy RN  Outcome: Ongoing

## 2021-08-24 NOTE — PROGRESS NOTES
Occupational Therapy   Occupational Therapy Initial Assessment  Date: 2021   Patient Name: Celestina Esteves  MRN: [id number]     : 1970    Date of Service: 2021    Assessment   Assessment: OT evaluation completed. Pt does not want therapy due to being independent. Prognosis: Good  Decision Making: Low Complexity  REQUIRES OT FOLLOW UP: No  Safety Devices  Safety Devices in place: Yes  Type of devices: Nurse notified;Call light within reach; Left in bed           Patient Diagnosis(es): The primary encounter diagnosis was Acute renal insufficiency. Diagnoses of Hyperglycemia and Vertigo were also pertinent to this visit. has a past medical history of Allergic rhinitis, Arthritis, Asthma, Chronic kidney disease, Constipation, Diabetes mellitus ([address] Diabetic neuropathy ([address] Fibromyalgia, Gastroparesis, GERD (gastroesophageal reflux disease), GERD (gastroesophageal reflux disease), Hyperlipidemia, Hypertension, Hypokalemia, Migraines, Neuromuscular disorder ([address] Osteoarthritis, Pancreatitis, Plantar wart of left foot, Raynaud disease, Rheumatoid arthritis ([address] and Tobacco abuse.   has a past surgical history that includes Appendectomy; Cholecystectomy; and Hysterectomy. Subjective   General  Chart Reviewed: Yes  Patient assessed for rehabilitation services?: Yes  Additional Pertinent Hx: RA, Osteopenia, Osteoporosis, Neuropathy, Fibromyalgia, GERD, DMII, HTN, Asthma  Family / Caregiver Present: No  Referring Practitioner: Elsy Edwards. Raisa Ledesma MD  Diagnosis: Acute Renal Insufficiency  Subjective  Subjective: \"I am not able because I have arthritis, osteopenia, osteoporosis, and Fibromyalgia. \"  General Comment  Comments: Pt is vry fatigued and limited with activity. OT educating patient on ways of energy conservation.   Patient Currently in Pain: No  Vital Signs  Patient Currently in Pain: No  Social/Functional History  Social/Functional History  Lives With: Spouse, Family  Type of Home: House  Home Layout: One level  Home Access: Stairs to enter with rails  Entrance Stairs - Number of Steps: 3  Entrance Stairs - Rails: Left  Bathroom Shower/Tub: Walk-in shower  Bathroom Toilet: Standard  Bathroom Accessibility: Accessible  Receives Help From: Family  ADL Assistance: Independent  Homemaking Assistance: Independent  Homemaking Responsibilities: Yes  Ambulation Assistance: Independent  Transfer Assistance: Independent  Active : Yes  Mode of Transportation: Car  Occupation: On disability     Objective        Orientation  Overall Orientation Status: Within Normal Limits  ADL  Feeding: Independent  Grooming: Independent  UE Bathing: Independent  LE Bathing: Independent  UE Dressing: Independent  LE Dressing: Independent  Toileting: Independent  Additional Comments: Pt has help with shopping. Pt is independent with all other ADLS. Bed mobility  Rolling to Left: Independent  Rolling to Right: Independent  Supine to Sit: Independent  Sit to Supine: Independent  Transfers  Sit to stand: Independent     LUE AROM (degrees)  LUE AROM : WNL  RUE AROM (degrees)  RUE AROM : WNL  LUE Strength  LUE Strength Comment: 3/5  RUE Strength  RUE Strength Comment: 3/5     Plan   Plan  Times per week: 0  Plan weeks: 0  Plan Comment: Pt to go home without assistance other than her family. Goals  Short term goals  Short term goal 1: No goals at this time. Patient Goals   Patient goals : Pt is independent with ADLS and does not want therapy.        Therapy Time   Individual Concurrent Group Co-treatment   Time In           Time Out           Minutes                   Carol Walsh OT Electronically signed by BALJIT Talbert MOT OTR/L on 8/24/2021 at 10:47 AM

## 2021-08-24 NOTE — PROGRESS NOTES
Pharmacy Renal Adjustment    Carson Nettles is a 53 y.o. female. Pharmacy has renally adjusted medications per protocol. Recent Labs     08/23/21 1816   BUN 30*       Recent Labs     08/23/21 1816   CREATININE 2.2*       Estimated Creatinine Clearance: 22 mL/min (A) (based on SCr of 2.2 mg/dL (H)).     Height:   Ht Readings from Last 1 Encounters:   08/23/21 5' (1.524 m)     Weight:  Wt Readings from Last 1 Encounters:   08/23/21 107 lb 2 oz (48.6 kg)       Plan: Adjust the following medications based on renal function:           Famotidine 40 mg po daily to 10 mg po daily    Electronically signed by Clinton Bailon, [address] [place] [address] on 8/23/2021 at 10:20 PM

## 2021-09-06 ENCOUNTER — HOSPITAL ENCOUNTER (OUTPATIENT)
Age: 51
Setting detail: OBSERVATION
Discharge: HOME | End: 2021-09-08
Attending: EMERGENCY MEDICINE | Admitting: INTERNAL MEDICINE
Payer: MEDICAID

## 2021-09-06 ENCOUNTER — APPOINTMENT (OUTPATIENT)
Dept: GENERAL RADIOLOGY | Age: 51
End: 2021-09-06
Payer: MEDICAID

## 2021-09-06 DIAGNOSIS — I95.9 HYPOTENSION, UNSPECIFIED HYPOTENSION TYPE: ICD-10-CM

## 2021-09-06 DIAGNOSIS — N17.9 ACUTE KIDNEY INJURY (NONTRAUMATIC) (HCC): Primary | ICD-10-CM

## 2021-09-06 PROBLEM — D72.829 LEUKOCYTOSIS: Status: ACTIVE | Noted: 2021-09-06

## 2021-09-06 PROBLEM — M06.9: Status: ACTIVE | Noted: 2018-03-01

## 2021-09-06 LAB
ALBUMIN SERPL-MCNC: 4.7 G/DL (ref 3.5–5.2)
ALP BLD-CCNC: 144 U/L (ref 35–104)
ALT SERPL-CCNC: 13 U/L (ref 5–33)
ANION GAP SERPL CALCULATED.3IONS-SCNC: 18 MMOL/L (ref 7–19)
AST SERPL-CCNC: 13 U/L (ref 5–32)
BACTERIA: ABNORMAL /HPF
BASOPHILS ABSOLUTE: 0.1 K/UL (ref 0–0.2)
BASOPHILS RELATIVE PERCENT: 0.3 % (ref 0–1)
BILIRUB SERPL-MCNC: <0.2 MG/DL (ref 0.2–1.2)
BILIRUBIN URINE: NEGATIVE
BLOOD, URINE: NEGATIVE
BUN BLDV-MCNC: 53 MG/DL (ref 6–20)
CALCIUM SERPL-MCNC: 10 MG/DL (ref 8.6–10)
CHLORIDE BLD-SCNC: 98 MMOL/L (ref 98–111)
CLARITY: ABNORMAL
CO2: 15 MMOL/L (ref 22–29)
COLOR: YELLOW
CREAT SERPL-MCNC: 3.1 MG/DL (ref 0.5–0.9)
EOSINOPHILS ABSOLUTE: 0.2 K/UL (ref 0–0.6)
EOSINOPHILS RELATIVE PERCENT: 0.8 % (ref 0–5)
EPITHELIAL CELLS, UA: ABNORMAL /HPF
GFR AFRICAN AMERICAN: 19
GFR NON-AFRICAN AMERICAN: 16
GLUCOSE BLD-MCNC: 215 MG/DL (ref 74–109)
GLUCOSE URINE: =>1000 MG/DL
HCT VFR BLD CALC: 43.5 % (ref 37–47)
HEMOGLOBIN: 13.9 G/DL (ref 12–16)
IMMATURE GRANULOCYTES #: 0.1 K/UL
KETONES, URINE: ABNORMAL MG/DL
LACTIC ACID, SEPSIS: 1.4 MG/DL (ref 0.5–1.9)
LEUKOCYTE ESTERASE, URINE: NEGATIVE
LYMPHOCYTES ABSOLUTE: 4.6 K/UL (ref 1.1–4.5)
LYMPHOCYTES RELATIVE PERCENT: 24.7 % (ref 20–40)
MCH RBC QN AUTO: 33.4 PG (ref 27–31)
MCHC RBC AUTO-ENTMCNC: 32 G/DL (ref 33–37)
MCV RBC AUTO: 104.6 FL (ref 81–99)
MONOCYTES ABSOLUTE: 0.7 K/UL (ref 0–0.9)
MONOCYTES RELATIVE PERCENT: 3.8 % (ref 0–10)
NEUTROPHILS ABSOLUTE: 12.9 K/UL (ref 1.5–7.5)
NEUTROPHILS RELATIVE PERCENT: 70 % (ref 50–65)
NITRITE, URINE: NEGATIVE
PDW BLD-RTO: 15.6 % (ref 11.5–14.5)
PH UA: 5 (ref 5–8)
PLATELET # BLD: 413 K/UL (ref 130–400)
PMV BLD AUTO: 10 FL (ref 9.4–12.3)
POTASSIUM REFLEX MAGNESIUM: 4.3 MMOL/L (ref 3.5–5)
PROTEIN UA: 30 MG/DL
RBC # BLD: 4.16 M/UL (ref 4.2–5.4)
RBC UA: ABNORMAL /HPF (ref 0–2)
SARS-COV-2, NAAT: NOT DETECTED
SODIUM BLD-SCNC: 131 MMOL/L (ref 136–145)
SPECIFIC GRAVITY UA: 1.02 (ref 1–1.03)
TOTAL PROTEIN: 8.6 G/DL (ref 6.6–8.7)
UROBILINOGEN, URINE: 1 E.U./DL
WBC # BLD: 18.4 K/UL (ref 4.8–10.8)
WBC UA: ABNORMAL /HPF (ref 0–5)

## 2021-09-06 PROCEDURE — 87040 BLOOD CULTURE FOR BACTERIA: CPT

## 2021-09-06 PROCEDURE — 83605 ASSAY OF LACTIC ACID: CPT

## 2021-09-06 PROCEDURE — 87635 SARS-COV-2 COVID-19 AMP PRB: CPT

## 2021-09-06 PROCEDURE — 84300 ASSAY OF URINE SODIUM: CPT

## 2021-09-06 PROCEDURE — 2580000003 HC RX 258: Performed by: PHYSICIAN ASSISTANT

## 2021-09-06 PROCEDURE — 51701 INSERT BLADDER CATHETER: CPT

## 2021-09-06 PROCEDURE — G0378 HOSPITAL OBSERVATION PER HR: HCPCS

## 2021-09-06 PROCEDURE — 93005 ELECTROCARDIOGRAM TRACING: CPT | Performed by: PHYSICIAN ASSISTANT

## 2021-09-06 PROCEDURE — 87086 URINE CULTURE/COLONY COUNT: CPT

## 2021-09-06 PROCEDURE — 80053 COMPREHEN METABOLIC PANEL: CPT

## 2021-09-06 PROCEDURE — 36415 COLL VENOUS BLD VENIPUNCTURE: CPT

## 2021-09-06 PROCEDURE — 82570 ASSAY OF URINE CREATININE: CPT

## 2021-09-06 PROCEDURE — 99283 EMERGENCY DEPT VISIT LOW MDM: CPT

## 2021-09-06 PROCEDURE — 96374 THER/PROPH/DIAG INJ IV PUSH: CPT

## 2021-09-06 PROCEDURE — 6360000002 HC RX W HCPCS: Performed by: PHYSICIAN ASSISTANT

## 2021-09-06 PROCEDURE — 71045 X-RAY EXAM CHEST 1 VIEW: CPT

## 2021-09-06 PROCEDURE — 85025 COMPLETE CBC W/AUTO DIFF WBC: CPT

## 2021-09-06 PROCEDURE — 81001 URINALYSIS AUTO W/SCOPE: CPT

## 2021-09-06 PROCEDURE — 96361 HYDRATE IV INFUSION ADD-ON: CPT

## 2021-09-06 PROCEDURE — 83935 ASSAY OF URINE OSMOLALITY: CPT

## 2021-09-06 RX ORDER — LEUCOVORIN CALCIUM 5 MG/1
5 TABLET ORAL DAILY
COMMUNITY

## 2021-09-06 RX ORDER — NICOTINE POLACRILEX 4 MG
15 LOZENGE BUCCAL PRN
Status: DISCONTINUED | OUTPATIENT
Start: 2021-09-06 | End: 2021-09-08 | Stop reason: HOSPADM

## 2021-09-06 RX ORDER — HEPARIN SODIUM 5000 [USP'U]/ML
5000 INJECTION, SOLUTION INTRAVENOUS; SUBCUTANEOUS EVERY 8 HOURS SCHEDULED
Status: DISCONTINUED | OUTPATIENT
Start: 2021-09-06 | End: 2021-09-08 | Stop reason: HOSPADM

## 2021-09-06 RX ORDER — SODIUM CHLORIDE 0.9 % (FLUSH) 0.9 %
5-40 SYRINGE (ML) INJECTION EVERY 12 HOURS SCHEDULED
Status: DISCONTINUED | OUTPATIENT
Start: 2021-09-06 | End: 2021-09-08 | Stop reason: HOSPADM

## 2021-09-06 RX ORDER — DEXTROSE MONOHYDRATE 50 MG/ML
100 INJECTION, SOLUTION INTRAVENOUS PRN
Status: DISCONTINUED | OUTPATIENT
Start: 2021-09-06 | End: 2021-09-08 | Stop reason: HOSPADM

## 2021-09-06 RX ORDER — OMEPRAZOLE 20 MG/1
40 CAPSULE, DELAYED RELEASE ORAL DAILY
COMMUNITY

## 2021-09-06 RX ORDER — ONDANSETRON 4 MG/1
4 TABLET, ORALLY DISINTEGRATING ORAL EVERY 8 HOURS PRN
Status: DISCONTINUED | OUTPATIENT
Start: 2021-09-06 | End: 2021-09-08 | Stop reason: HOSPADM

## 2021-09-06 RX ORDER — ACETAMINOPHEN 650 MG/1
650 SUPPOSITORY RECTAL EVERY 6 HOURS PRN
Status: DISCONTINUED | OUTPATIENT
Start: 2021-09-06 | End: 2021-09-08 | Stop reason: HOSPADM

## 2021-09-06 RX ORDER — SODIUM CHLORIDE 0.9 % (FLUSH) 0.9 %
5-40 SYRINGE (ML) INJECTION PRN
Status: DISCONTINUED | OUTPATIENT
Start: 2021-09-06 | End: 2021-09-08 | Stop reason: HOSPADM

## 2021-09-06 RX ORDER — ONDANSETRON 2 MG/ML
4 INJECTION INTRAMUSCULAR; INTRAVENOUS EVERY 6 HOURS PRN
Status: DISCONTINUED | OUTPATIENT
Start: 2021-09-06 | End: 2021-09-08 | Stop reason: HOSPADM

## 2021-09-06 RX ORDER — 0.9 % SODIUM CHLORIDE 0.9 %
1000 INTRAVENOUS SOLUTION INTRAVENOUS ONCE
Status: COMPLETED | OUTPATIENT
Start: 2021-09-06 | End: 2021-09-06

## 2021-09-06 RX ORDER — DEXTROSE MONOHYDRATE 25 G/50ML
12.5 INJECTION, SOLUTION INTRAVENOUS PRN
Status: DISCONTINUED | OUTPATIENT
Start: 2021-09-06 | End: 2021-09-08 | Stop reason: HOSPADM

## 2021-09-06 RX ORDER — ACETAMINOPHEN 325 MG/1
650 TABLET ORAL EVERY 6 HOURS PRN
Status: DISCONTINUED | OUTPATIENT
Start: 2021-09-06 | End: 2021-09-08 | Stop reason: HOSPADM

## 2021-09-06 RX ORDER — SODIUM CHLORIDE 9 MG/ML
INJECTION, SOLUTION INTRAVENOUS CONTINUOUS
Status: DISCONTINUED | OUTPATIENT
Start: 2021-09-06 | End: 2021-09-08

## 2021-09-06 RX ORDER — SODIUM CHLORIDE 9 MG/ML
25 INJECTION, SOLUTION INTRAVENOUS PRN
Status: DISCONTINUED | OUTPATIENT
Start: 2021-09-06 | End: 2021-09-08 | Stop reason: HOSPADM

## 2021-09-06 RX ADMIN — CEFTRIAXONE 1000 MG: 1 INJECTION, POWDER, FOR SOLUTION INTRAMUSCULAR; INTRAVENOUS at 22:21

## 2021-09-06 RX ADMIN — SODIUM CHLORIDE 1000 ML: 9 INJECTION, SOLUTION INTRAVENOUS at 19:47

## 2021-09-06 ASSESSMENT — ENCOUNTER SYMPTOMS
BLOOD IN STOOL: 0
ABDOMINAL PAIN: 0
DIARRHEA: 0
SHORTNESS OF BREATH: 0
TROUBLE SWALLOWING: 0
ABDOMINAL DISTENTION: 0
NAUSEA: 0
COUGH: 0
WHEEZING: 0
BACK PAIN: 1
SINUS PAIN: 0
SORE THROAT: 0
CONSTIPATION: 0
VOMITING: 0

## 2021-09-06 ASSESSMENT — PAIN SCALES - GENERAL: PAINLEVEL_OUTOF10: 4

## 2021-09-06 ASSESSMENT — PAIN DESCRIPTION - LOCATION: LOCATION: GENERALIZED

## 2021-09-06 ASSESSMENT — PAIN DESCRIPTION - DESCRIPTORS: DESCRIPTORS: ACHING

## 2021-09-06 ASSESSMENT — PAIN DESCRIPTION - PAIN TYPE: TYPE: ACUTE PAIN;CHRONIC PAIN

## 2021-09-07 ENCOUNTER — APPOINTMENT (OUTPATIENT)
Dept: ULTRASOUND IMAGING | Age: 51
End: 2021-09-07
Payer: MEDICAID

## 2021-09-07 LAB
ALBUMIN SERPL-MCNC: 4 G/DL (ref 3.5–5.2)
ALP BLD-CCNC: 127 U/L (ref 35–104)
ALT SERPL-CCNC: 10 U/L (ref 5–33)
ANION GAP SERPL CALCULATED.3IONS-SCNC: 11 MMOL/L (ref 7–19)
ANISOCYTOSIS: ABNORMAL
AST SERPL-CCNC: 11 U/L (ref 5–32)
ATYPICAL LYMPHOCYTE RELATIVE PERCENT: 9 % (ref 0–8)
BANDED NEUTROPHILS RELATIVE PERCENT: 3 % (ref 0–5)
BASOPHILS ABSOLUTE: 0 K/UL (ref 0–0.2)
BASOPHILS RELATIVE PERCENT: 0 % (ref 0–1)
BILIRUB SERPL-MCNC: <0.2 MG/DL (ref 0.2–1.2)
BUN BLDV-MCNC: 47 MG/DL (ref 6–20)
CALCIUM SERPL-MCNC: 8.9 MG/DL (ref 8.6–10)
CHLORIDE BLD-SCNC: 108 MMOL/L (ref 98–111)
CO2: 18 MMOL/L (ref 22–29)
CREAT SERPL-MCNC: 2.2 MG/DL (ref 0.5–0.9)
CREATININE URINE: 227 MG/DL (ref 4.2–622)
EKG P AXIS: 69 DEGREES
EKG P-R INTERVAL: 136 MS
EKG Q-T INTERVAL: 340 MS
EKG QRS DURATION: 92 MS
EKG QTC CALCULATION (BAZETT): 425 MS
EKG T AXIS: 66 DEGREES
EOSINOPHIL,URINE: NORMAL
EOSINOPHILS ABSOLUTE: 0.14 K/UL (ref 0–0.6)
EOSINOPHILS RELATIVE PERCENT: 1 % (ref 0–5)
GFR AFRICAN AMERICAN: 29
GFR NON-AFRICAN AMERICAN: 24
GLUCOSE BLD-MCNC: 152 MG/DL (ref 70–99)
GLUCOSE BLD-MCNC: 152 MG/DL (ref 70–99)
GLUCOSE BLD-MCNC: 170 MG/DL (ref 70–99)
GLUCOSE BLD-MCNC: 178 MG/DL (ref 70–99)
GLUCOSE BLD-MCNC: 182 MG/DL (ref 74–109)
GLUCOSE BLD-MCNC: 189 MG/DL (ref 70–99)
HCT VFR BLD CALC: 38.4 % (ref 37–47)
HEMOGLOBIN: 12.3 G/DL (ref 12–16)
HYPOCHROMIA: ABNORMAL
IMMATURE GRANULOCYTES #: 0.1 K/UL
LACTIC ACID, SEPSIS: 0.9 MG/DL (ref 0.5–1.9)
LYMPHOCYTES ABSOLUTE: 4.2 K/UL (ref 1.1–4.5)
LYMPHOCYTES RELATIVE PERCENT: 21 % (ref 20–40)
MACROCYTES: ABNORMAL
MAGNESIUM: 2.4 MG/DL (ref 1.6–2.6)
MCH RBC QN AUTO: 33.9 PG (ref 27–31)
MCHC RBC AUTO-ENTMCNC: 32 G/DL (ref 33–37)
MCV RBC AUTO: 105.8 FL (ref 81–99)
MONOCYTES ABSOLUTE: 0.8 K/UL (ref 0–0.9)
MONOCYTES RELATIVE PERCENT: 6 % (ref 0–10)
NEUTROPHILS ABSOLUTE: 8.8 K/UL (ref 1.5–7.5)
NEUTROPHILS RELATIVE PERCENT: 60 % (ref 50–65)
OSMOLALITY URINE: 432 MOSM/KG (ref 250–1200)
OVALOCYTES: ABNORMAL
PDW BLD-RTO: 15.5 % (ref 11.5–14.5)
PERFORMED ON: ABNORMAL
PHOSPHORUS: 3.6 MG/DL (ref 2.5–4.5)
PLATELET # BLD: 342 K/UL (ref 130–400)
PLATELET SLIDE REVIEW: ADEQUATE
PMV BLD AUTO: 9.8 FL (ref 9.4–12.3)
POLYCHROMASIA: ABNORMAL
POTASSIUM SERPL-SCNC: 4.6 MMOL/L (ref 3.5–5)
PROTEIN PROTEIN: 5 MG/DL (ref 15–45)
RBC # BLD: 3.63 M/UL (ref 4.2–5.4)
SODIUM BLD-SCNC: 137 MMOL/L (ref 136–145)
SODIUM URINE: 35 MMOL/L
TOTAL PROTEIN: 6.8 G/DL (ref 6.6–8.7)
VITAMIN D 25-HYDROXY: 22 NG/ML
WBC # BLD: 13.9 K/UL (ref 4.8–10.8)

## 2021-09-07 PROCEDURE — 2580000003 HC RX 258: Performed by: NURSE PRACTITIONER

## 2021-09-07 PROCEDURE — 85025 COMPLETE CBC W/AUTO DIFF WBC: CPT

## 2021-09-07 PROCEDURE — 6360000002 HC RX W HCPCS: Performed by: NURSE PRACTITIONER

## 2021-09-07 PROCEDURE — 2500000003 HC RX 250 WO HCPCS: Performed by: INTERNAL MEDICINE

## 2021-09-07 PROCEDURE — 83735 ASSAY OF MAGNESIUM: CPT

## 2021-09-07 PROCEDURE — 76770 US EXAM ABDO BACK WALL COMP: CPT

## 2021-09-07 PROCEDURE — 93010 ELECTROCARDIOGRAM REPORT: CPT | Performed by: INTERNAL MEDICINE

## 2021-09-07 PROCEDURE — 84100 ASSAY OF PHOSPHORUS: CPT

## 2021-09-07 PROCEDURE — 96376 TX/PRO/DX INJ SAME DRUG ADON: CPT

## 2021-09-07 PROCEDURE — 6370000000 HC RX 637 (ALT 250 FOR IP): Performed by: NURSE PRACTITIONER

## 2021-09-07 PROCEDURE — 96375 TX/PRO/DX INJ NEW DRUG ADDON: CPT

## 2021-09-07 PROCEDURE — 87205 SMEAR GRAM STAIN: CPT

## 2021-09-07 PROCEDURE — 82947 ASSAY GLUCOSE BLOOD QUANT: CPT

## 2021-09-07 PROCEDURE — G0378 HOSPITAL OBSERVATION PER HR: HCPCS

## 2021-09-07 PROCEDURE — 83605 ASSAY OF LACTIC ACID: CPT

## 2021-09-07 PROCEDURE — 80053 COMPREHEN METABOLIC PANEL: CPT

## 2021-09-07 PROCEDURE — 82306 VITAMIN D 25 HYDROXY: CPT

## 2021-09-07 PROCEDURE — 96372 THER/PROPH/DIAG INJ SC/IM: CPT

## 2021-09-07 PROCEDURE — 6370000000 HC RX 637 (ALT 250 FOR IP): Performed by: INTERNAL MEDICINE

## 2021-09-07 PROCEDURE — 36415 COLL VENOUS BLD VENIPUNCTURE: CPT

## 2021-09-07 PROCEDURE — 2580000003 HC RX 258: Performed by: INTERNAL MEDICINE

## 2021-09-07 PROCEDURE — 84156 ASSAY OF PROTEIN URINE: CPT

## 2021-09-07 RX ORDER — ERGOCALCIFEROL 1.25 MG/1
50000 CAPSULE ORAL WEEKLY
Status: DISCONTINUED | OUTPATIENT
Start: 2021-09-07 | End: 2021-09-08 | Stop reason: HOSPADM

## 2021-09-07 RX ORDER — GABAPENTIN 600 MG/1
600 TABLET ORAL 3 TIMES DAILY
Status: DISCONTINUED | OUTPATIENT
Start: 2021-09-07 | End: 2021-09-08 | Stop reason: HOSPADM

## 2021-09-07 RX ORDER — FOLIC ACID 1 MG/1
2 TABLET ORAL DAILY
Status: DISCONTINUED | OUTPATIENT
Start: 2021-09-07 | End: 2021-09-08 | Stop reason: HOSPADM

## 2021-09-07 RX ORDER — NORTRIPTYLINE HYDROCHLORIDE 25 MG/1
50 CAPSULE ORAL NIGHTLY
Status: DISCONTINUED | OUTPATIENT
Start: 2021-09-07 | End: 2021-09-08 | Stop reason: HOSPADM

## 2021-09-07 RX ORDER — CALCIUM CARBONATE 500(1250)
500 TABLET ORAL DAILY
Status: DISCONTINUED | OUTPATIENT
Start: 2021-09-07 | End: 2021-09-08 | Stop reason: HOSPADM

## 2021-09-07 RX ORDER — ATORVASTATIN CALCIUM 20 MG/1
20 TABLET, FILM COATED ORAL NIGHTLY
Status: DISCONTINUED | OUTPATIENT
Start: 2021-09-07 | End: 2021-09-08 | Stop reason: HOSPADM

## 2021-09-07 RX ORDER — TIZANIDINE 4 MG/1
4 TABLET ORAL NIGHTLY
Status: DISCONTINUED | OUTPATIENT
Start: 2021-09-07 | End: 2021-09-08 | Stop reason: HOSPADM

## 2021-09-07 RX ORDER — VITAMIN B COMPLEX
2000 TABLET ORAL DAILY
Status: DISCONTINUED | OUTPATIENT
Start: 2021-09-07 | End: 2021-09-07

## 2021-09-07 RX ORDER — LANOLIN ALCOHOL/MO/W.PET/CERES
400 CREAM (GRAM) TOPICAL EVERY 12 HOURS
Status: DISCONTINUED | OUTPATIENT
Start: 2021-09-07 | End: 2021-09-08 | Stop reason: HOSPADM

## 2021-09-07 RX ORDER — CETIRIZINE HYDROCHLORIDE 10 MG/1
10 TABLET ORAL DAILY PRN
Status: DISCONTINUED | OUTPATIENT
Start: 2021-09-07 | End: 2021-09-08 | Stop reason: HOSPADM

## 2021-09-07 RX ORDER — LISINOPRIL 5 MG/1
5 TABLET ORAL DAILY
Status: DISCONTINUED | OUTPATIENT
Start: 2021-09-07 | End: 2021-09-08 | Stop reason: HOSPADM

## 2021-09-07 RX ORDER — SODIUM BICARBONATE 650 MG/1
325 TABLET ORAL 2 TIMES DAILY
Status: DISCONTINUED | OUTPATIENT
Start: 2021-09-07 | End: 2021-09-08 | Stop reason: HOSPADM

## 2021-09-07 RX ADMIN — Medication 400 MG: at 12:29

## 2021-09-07 RX ADMIN — GABAPENTIN 600 MG: 600 TABLET, FILM COATED ORAL at 21:07

## 2021-09-07 RX ADMIN — Medication 400 MG: at 21:26

## 2021-09-07 RX ADMIN — HEPARIN SODIUM 5000 UNITS: 5000 INJECTION INTRAVENOUS; SUBCUTANEOUS at 01:16

## 2021-09-07 RX ADMIN — SODIUM CHLORIDE, PRESERVATIVE FREE 10 ML: 5 INJECTION INTRAVENOUS at 21:30

## 2021-09-07 RX ADMIN — NORTRIPTYLINE HYDROCHLORIDE 50 MG: 25 CAPSULE ORAL at 00:30

## 2021-09-07 RX ADMIN — SODIUM BICARBONATE 325 MG: 650 TABLET ORAL at 21:07

## 2021-09-07 RX ADMIN — TIZANIDINE 4 MG: 4 TABLET ORAL at 00:30

## 2021-09-07 RX ADMIN — NORTRIPTYLINE HYDROCHLORIDE 50 MG: 25 CAPSULE ORAL at 21:07

## 2021-09-07 RX ADMIN — METHOTREXATE 15 MG: 2.5 TABLET ORAL at 09:08

## 2021-09-07 RX ADMIN — HEPARIN SODIUM 5000 UNITS: 5000 INJECTION INTRAVENOUS; SUBCUTANEOUS at 15:31

## 2021-09-07 RX ADMIN — Medication 400 MG: at 00:30

## 2021-09-07 RX ADMIN — SODIUM BICARBONATE 325 MG: 650 TABLET ORAL at 14:57

## 2021-09-07 RX ADMIN — FOLIC ACID 2 MG: 1 TABLET ORAL at 09:08

## 2021-09-07 RX ADMIN — SODIUM BICARBONATE: 84 INJECTION, SOLUTION INTRAVENOUS at 12:29

## 2021-09-07 RX ADMIN — SODIUM CHLORIDE, PRESERVATIVE FREE 10 ML: 5 INJECTION INTRAVENOUS at 00:40

## 2021-09-07 RX ADMIN — SODIUM CHLORIDE, PRESERVATIVE FREE 1000 MG: 5 INJECTION INTRAVENOUS at 21:06

## 2021-09-07 RX ADMIN — GABAPENTIN 600 MG: 600 TABLET, FILM COATED ORAL at 09:08

## 2021-09-07 RX ADMIN — CALCIUM 500 MG: 500 TABLET ORAL at 09:08

## 2021-09-07 RX ADMIN — HEPARIN SODIUM 5000 UNITS: 5000 INJECTION INTRAVENOUS; SUBCUTANEOUS at 21:07

## 2021-09-07 RX ADMIN — GABAPENTIN 600 MG: 600 TABLET, FILM COATED ORAL at 12:29

## 2021-09-07 RX ADMIN — TIZANIDINE 4 MG: 4 TABLET ORAL at 21:07

## 2021-09-07 RX ADMIN — SODIUM BICARBONATE: 84 INJECTION, SOLUTION INTRAVENOUS at 21:06

## 2021-09-07 RX ADMIN — Medication 2000 UNITS: at 09:08

## 2021-09-07 RX ADMIN — ATORVASTATIN CALCIUM 20 MG: 20 TABLET, FILM COATED ORAL at 00:31

## 2021-09-07 RX ADMIN — SODIUM CHLORIDE: 9 INJECTION, SOLUTION INTRAVENOUS at 00:31

## 2021-09-07 RX ADMIN — HEPARIN SODIUM 5000 UNITS: 5000 INJECTION INTRAVENOUS; SUBCUTANEOUS at 09:08

## 2021-09-07 RX ADMIN — SODIUM CHLORIDE, PRESERVATIVE FREE 10 ML: 5 INJECTION INTRAVENOUS at 09:08

## 2021-09-07 RX ADMIN — INSULIN LISPRO 1 UNITS: 100 INJECTION, SOLUTION INTRAVENOUS; SUBCUTANEOUS at 21:40

## 2021-09-07 ASSESSMENT — ENCOUNTER SYMPTOMS
COLOR CHANGE: 0
EYE PAIN: 0
VOMITING: 1
NAUSEA: 1
ABDOMINAL PAIN: 0
ABDOMINAL DISTENTION: 0
BACK PAIN: 0
EYE DISCHARGE: 0
APNEA: 0
RHINORRHEA: 0
SHORTNESS OF BREATH: 0
COUGH: 0
PHOTOPHOBIA: 0
SORE THROAT: 0

## 2021-09-07 ASSESSMENT — PAIN - FUNCTIONAL ASSESSMENT: PAIN_FUNCTIONAL_ASSESSMENT: ACTIVITIES ARE NOT PREVENTED

## 2021-09-07 ASSESSMENT — PAIN DESCRIPTION - DESCRIPTORS: DESCRIPTORS: SORE;CONSTANT

## 2021-09-07 ASSESSMENT — PAIN DESCRIPTION - PROGRESSION: CLINICAL_PROGRESSION: NOT CHANGED

## 2021-09-07 ASSESSMENT — PAIN DESCRIPTION - LOCATION: LOCATION: GENERALIZED

## 2021-09-07 ASSESSMENT — PAIN SCALES - GENERAL: PAINLEVEL_OUTOF10: 2

## 2021-09-07 ASSESSMENT — PAIN DESCRIPTION - FREQUENCY: FREQUENCY: CONTINUOUS

## 2021-09-07 ASSESSMENT — PAIN DESCRIPTION - PAIN TYPE: TYPE: CHRONIC PAIN

## 2021-09-07 ASSESSMENT — PAIN DESCRIPTION - ONSET: ONSET: ON-GOING

## 2021-09-07 NOTE — PROGRESS NOTES
Leila Tipton arrived to room # 510. Presented with: ABHISHEK,Hypotension  Mental Status: Patient is oriented, alert, coherent, logical, thought processes intact and able to concentrate and follow conversation. Vitals:    09/07/21 0027   BP: 94/69   Pulse: 93   Resp: 18   Temp: 96.1 °F (35.6 °C)   SpO2: 93%     Patient safety contract and falls prevention contract reviewed with patient No.  Oriented Patient to room. Call light within reach. Yes.   Needs, issues or concerns expressed at this time: no.      Electronically signed by Elizabeth Trujillo RN on 9/7/2021 at 1:02 AM

## 2021-09-07 NOTE — ED PROVIDER NOTES
Attending Supervisory Note/Shared Visit   I have personally performed a face to face diagnostic evaluation on this patient. I have reviewed the mid-levels findings and agree. Briefly, patient's 78-year-old female presents due to generalized fatigue and lightheadedness over the past day. Has had some dysuria as well. Feels that she might be dehydrated. Vomited once. No abdominal pain. On exam she is resting comfortably no distress. Appears fatigued but she is nontoxic on exam.  Lungs are clear. No murmurs rubs gallops cardiac auscultation. Abdomen soft nontender. No neuro deficits. Initially hypotensive. Evidence of ABHISHEK on labs. Blood pressure improving with fluids. Possible UTI so Rocephin ordered. Case has been discussed with the hospitalist will admit. Patient resting comfortably and updated about the plan. FINAL IMPRESSION      1.  Acute kidney injury (nontraumatic) ([facility]          Estephania Asher MD  Attending Emergency Physician       Estephania Asher MD  09/07/21 6928

## 2021-09-07 NOTE — ED PROVIDER NOTES
Psychiatric/Behavioral: Negative for agitation, behavioral problems and confusion. Except as noted above the remainder of the review of systems was reviewed and negative. PAST MEDICAL HISTORY     Past Medical History:   Diagnosis Date    Allergic rhinitis     Arthritis     Asthma     Chronic kidney disease     Constipation     Diabetes mellitus ([facility]     Diabetic neuropathy (HCC)     Fibromyalgia     Gastroparesis     GERD (gastroesophageal reflux disease)     GERD (gastroesophageal reflux disease)     Hyperlipidemia     Hypertension     Hypokalemia     Migraines     Neuromuscular disorder (HCC)     Osteoarthritis     Pancreatitis     Plantar wart of left foot     Raynaud disease     [facility]     Tobacco abuse          SURGICAL HISTORY       Past Surgical History:   Procedure Laterality Date    APPENDECTOMY      CHOLECYSTECTOMY      HYSTERECTOMY           CURRENT MEDICATIONS       Current Discharge Medication List      CONTINUE these medications which have NOT CHANGED    Details   omeprazole (PRILOSEC) 20 MG delayed release capsule Take 40 mg by mouth daily      leucovorin calcium (WELLCOVORIN) 5 MG tablet Take 5 mg by mouth daily      Vitamin D (CHOLECALCIFEROL) 50 MCG (2000 UT) TABS tablet Take 1 tablet by mouth daily  Qty: 60 tablet, Refills: 0    Comments: Labeling may look different. 25 mcg=1000 Units. Please double check dosages.       metFORMIN (GLUCOPHAGE) 500 MG tablet Take 1 tablet by mouth 2 times daily (with meals)  Qty: 60 tablet, Refills: 0      aspirin 81 MG chewable tablet Take 81 mg by mouth daily      atorvastatin (LIPITOR) 20 MG tablet Take 20 mg by mouth nightly      insulin glargine (LANTUS) 100 UNIT/ML injection vial Inject 10 Units into the skin daily      calcium carbonate 600 MG TABS tablet Take 1 tablet by mouth daily      cetirizine (ZYRTEC) 10 MG tablet Take 10 mg by mouth daily as needed for Allergies      diclofenac sodium (VOLTAREN) 1 % GEL Apply topically 2 times daily as needed for Pain      diphenhydrAMINE (BENADRYL) 50 MG capsule Take 50 mg by mouth nightly      docusate sodium (COLACE) 100 MG capsule Take 100 mg by mouth 2 times daily      folic acid (FOLVITE) 1 MG tablet Take 2 mg by mouth daily       gabapentin (NEURONTIN) 600 MG tablet Take 600 mg by mouth 3 times daily.       canagliflozin (INVOKANA) 300 MG TABS tablet Take 300 mg by mouth daily      magnesium oxide (MAG-OX) 400 MG tablet Take 500 mg by mouth every 12 hours      methotrexate (RHEUMATREX) 2.5 MG chemo tablet Take 15 mg by mouth once a week Takes on Tuesdays      tiZANidine (ZANAFLEX) 4 MG tablet Take 4 mg by mouth nightly      potassium chloride (KLOR-CON M) 20 MEQ extended release tablet Take 20 mEq by mouth daily      nortriptyline (PAMELOR) 25 MG capsule Take 50 mg by mouth nightly      lisinopril (PRINIVIL;ZESTRIL) 5 MG tablet Take 5 mg by mouth daily      linaclotide (LINZESS) 290 MCG CAPS capsule Take 290 mcg by mouth daily      bisacodyl (DULCOLAX) 5 MG EC tablet Take 5 mg by mouth daily as needed for Constipation      famotidine (PEPCID) 40 MG tablet Take 40 mg by mouth daily             ALLERGIES     Pineapple, Hydrocodone-acetaminophen, Penicillins, Sitagliptin, Bee venom, Hydrocodone, and Motrin [ibuprofen]    FAMILY HISTORY       Family History   Adopted: Yes          SOCIAL HISTORY       Social History     Socioeconomic History    Marital status:      Spouse name: None    Number of children: None    Years of education: None    Highest education level: None   Occupational History    None   Tobacco Use    Smoking status: Current Every Day Smoker    Smokeless tobacco: Never Used   Substance and Sexual Activity    Alcohol use: Never    Drug use: Never    Sexual activity: None   Other Topics Concern    None   Social History Narrative    None     Social Determinants of Health     Financial Resource Strain:     Difficulty of Paying Living Expenses:    Food Insecurity:     Worried About [address] in the Last Year:     [address] in the Last Year:    Transportation Needs:     Lack of Transportation (Medical):  Lack of Transportation (Non-Medical):    Physical Activity:     Days of Exercise per Week:     Minutes of Exercise per Session:    Stress:     Feeling of Stress :    Social Connections:     Frequency of Communication with Friends and Family:     Frequency of Social Gatherings with Friends and Family:     Attends [employer] Services:     Active Member of Clubs or Organizations:     Attends Club or Organization Meetings:     Marital Status:    Intimate Partner Violence:     Fear of Current or Ex-Partner:     Emotionally Abused:     Physically Abused:     Sexually Abused:        SCREENINGS    Lynette Coma Scale  Eye Opening: Spontaneous  Best Verbal Response: Oriented  Best Motor Response: Obeys commands  Lynette Coma Scale Score: 15      PHYSICAL EXAM    (up to 7 forlevel 4, 8 or more for level 5)     ED Triage Vitals   BP Temp Temp Source Pulse Resp SpO2 Height Weight   -- 09/06/21 1900 09/06/21 1900 09/06/21 1902 09/06/21 1900 09/06/21 1902 09/06/21 1900 09/06/21 1900    98.1 °F (36.7 °C) Oral 121 17 99 % 5' (1.524 m) 106 lb (48.1 kg)       Physical Exam  Vitals and nursing note reviewed. Constitutional:       General: She is not in acute distress. Appearance: Normal appearance. She is well-developed. She is ill-appearing. She is not diaphoretic. HENT:      Head: Normocephalic and atraumatic. Right Ear: External ear normal.      Left Ear: External ear normal.      Mouth/Throat:      Pharynx: No oropharyngeal exudate. Eyes:      General:         Right eye: No discharge. Left eye: No discharge. Pupils: Pupils are equal, round, and reactive to light. Neck:      Thyroid: No thyromegaly. Cardiovascular:      Rate and Rhythm: Normal rate and regular rhythm.       Pulses: Normal pulses. Heart sounds: Normal heart sounds. No murmur heard. No friction rub. Pulmonary:      Effort: Pulmonary effort is normal. No respiratory distress. Breath sounds: Normal breath sounds. No stridor. No wheezing. Abdominal:      General: Bowel sounds are normal. There is no distension. Palpations: Abdomen is soft. Tenderness: There is no abdominal tenderness. Musculoskeletal:         General: Normal range of motion. Cervical back: Normal range of motion and neck supple. Skin:     General: Skin is warm and dry. Capillary Refill: Capillary refill takes less than 2 seconds. Findings: No rash. Neurological:      General: No focal deficit present. Mental Status: She is alert and oriented to person, place, and time. Mental status is at baseline. Cranial Nerves: No cranial nerve deficit. Sensory: No sensory deficit. Coordination: Coordination normal.   Psychiatric:         Mood and Affect: Mood normal.         Behavior: Behavior normal.         Thought Content: Thought content normal.         Judgment: Judgment normal.           DIAGNOSTIC RESULTS     RADIOLOGY:   Non-plain film images such as CT, Ultrasound and MRI are read by the radiologist. Plain radiographic images are visualized and preliminarilyinterpreted by Raj Gonzalez MD with the below findings:      Interpretation per the Radiologist below, if available at the time of this note:    US RENAL COMPLETE   Final Result   Normal renal ultrasound. Signed by Dr Jonathan Calabrese   Final Result   No active disease.    Signed by Dr Martin Valderrama:  Labs Reviewed   URINE RT REFLEX TO CULTURE - Abnormal; Notable for the following components:       Result Value    Clarity, UA CLOUDY (*)     Ketones, Urine TRACE (*)     Protein, UA 30 (*)     All other components within normal limits   CBC WITH AUTO DIFFERENTIAL - Abnormal; Notable for the following components:    WBC 18.4 (*)     RBC 4.16 (*)     .6 (*)     MCH 33.4 (*)     MCHC 32.0 (*)     RDW 15.6 (*)     Platelets 819 (*)     Neutrophils % 70.0 (*)     Neutrophils Absolute 12.9 (*)     Lymphocytes Absolute 4.6 (*)     All other components within normal limits   COMPREHENSIVE METABOLIC PANEL W/ REFLEX TO MG FOR LOW K - Abnormal; Notable for the following components:    Sodium 131 (*)     CO2 15 (*)     Glucose 215 (*)     BUN 53 (*)     CREATININE 3.1 (*)     GFR Non- 16 (*)     GFR African American 19 (*)     Alkaline Phosphatase 144 (*)     All other components within normal limits   MICROSCOPIC URINALYSIS - Abnormal; Notable for the following components:    RBC, UA 3-5 (*)     Bacteria, UA Rare (*)     All other components within normal limits   COMPREHENSIVE METABOLIC PANEL - Abnormal; Notable for the following components:    CO2 18 (*)     Glucose 182 (*)     BUN 47 (*)     CREATININE 2.2 (*)     GFR Non- 24 (*)     GFR African American 29 (*)     Alkaline Phosphatase 127 (*)     All other components within normal limits   CBC WITH AUTO DIFFERENTIAL - Abnormal; Notable for the following components:    WBC 13.9 (*)     RBC 3.63 (*)     .8 (*)     MCH 33.9 (*)     MCHC 32.0 (*)     RDW 15.5 (*)     Neutrophils Absolute 8.8 (*)     Atypical Lymphocytes Relative 9 (*)     Anisocytosis 1+ (*)     Macrocytes 1+ (*)     Polychromasia 1+ (*)     Hypochromia 1+ (*)     Ovalocytes Occasional (*)     All other components within normal limits   VITAMIN D 25 HYDROXY - Abnormal; Notable for the following components:    Vit D, 25-Hydroxy 22.0 (*)     All other components within normal limits   POCT GLUCOSE - Abnormal; Notable for the following components:    POC Glucose 170 (*)     All other components within normal limits   POCT GLUCOSE - Abnormal; Notable for the following components:    POC Glucose 152 (*)     All other components within normal limits   POCT GLUCOSE - Abnormal; Notable for the following components:    POC Glucose 152 (*)     All other components within normal limits   POCT GLUCOSE - Abnormal; Notable for the following components:    POC Glucose 178 (*)     All other components within normal limits   COVID-19, RAPID   CULTURE, BLOOD 1   CULTURE, BLOOD 2   CULTURE, URINE   LACTATE, SEPSIS    Narrative:     . LACTATE, SEPSIS   MAGNESIUM   OSMOLALITY, URINE   CREATININE, RANDOM URINE   SODIUM, URINE, RANDOM   PHOSPHORUS   EOSINOPHIL SMEAR URINE   PROTEIN, URINE, RANDOM   POCT GLUCOSE   POCT GLUCOSE   POCT GLUCOSE   POCT GLUCOSE       All other labs were within normal range or notreturned as of this dictation. RE-ASSESSMENT        EMERGENCY DEPARTMENT COURSE and DIFFERENTIAL DIAGNOSIS/MDM:   Vitals:    Vitals:    09/07/21 0326 09/07/21 0729 09/07/21 1037 09/07/21 1433   BP: (!) 87/49 (!) 86/59 91/60 115/76   Pulse: 95 92 92 89   Resp: 18 16 18 16   Temp: 97.5 °F (36.4 °C) 95.5 °F (35.3 °C) 96.3 °F (35.7 °C) 95.5 °F (35.3 °C)   TempSrc: Temporal Temporal Temporal Temporal   SpO2: 98% 99% 99%    Weight:       Height:           MDM  Patient's blood pressure trending up at this time acute kidney injury appreciated from creatinine levels. She has voided here no concern at this time for infection. Plan for nephrology consult admission to Dr. Angel Arreola with hospitalist group my attending has evaluated this patient separate from myself and is agreeable to plan of care. PROCEDURES:    Procedures      FINAL IMPRESSION      1. Acute kidney injury (nontraumatic) ([facility]    2. Hypotension, unspecified hypotension type          DISPOSITION/PLAN   DISPOSITION Admitted 09/06/2021 10:39:23 PM      PATIENT REFERRED TO:  No follow-up provider specified.     DISCHARGE MEDICATIONS:  Current Discharge Medication List          (Please note that portions of this note were completed with a voice recognition program.  Efforts were made to edit the dictations but occasionallywords are mis-transcribed.)    Ranjan Hooper [address], [place]  09/07/21 3089

## 2021-09-07 NOTE — PROGRESS NOTES
4 Eyes Skin Assessment    Leila Tipton is being assessed upon: Admission    I agree that Mihai Wood, RN, along with Kandi Mejia,RN (either 2 RN's or 1 LPN and 1 RN) have performed a thorough Head to Toe Skin Assessment on the patient. ALL assessment sites listed below have been assessed. Areas assessed by both nurses:     [x]   Head, Face, and Ears   [x]   Shoulders, Back, and Chest  [x]   Arms, Elbows, and Hands   [x]   Coccyx, Sacrum, and Ischium  [x]   Legs, Feet, and Heels    Does the Patient have Skin Breakdown?  No    Ambrosio Prevention initiated: No  Wound Care Orders initiated: NA    [facility] nurse consulted for Pressure Injury (Stage 3,4, Unstageable, DTI, NWPT, and Complex wounds) and New or Established Ostomies: NA        Primary Nurse eSignature: Janeth Paulson RN on 9/7/2021 at 1:03 AM      Co-Signer eSignature: Electronically signed by Benjamín Torres RN on 9/7/2021 at 1:05 AM

## 2021-09-07 NOTE — H&P
[facility] - History & Physical      PCP: Amaya Franco    Date of Admission: 9/6/2021    Date of Service: 9/6/2021    Chief Complaint:  Weakness, fatigue, dysuria    History Of Present Illness: The patient is a 53 y.o. female with a hx of DM type II, CKD, RA, GERD, and HLD who presented to [facility] ED complaining of weakness, fatigue, and dysuria. Further work-up revealed ABHISHEK with a BUN of 53 creatinine-3.1, Na+131, BG-215. She was also found to have leukocytosis with a WBC-18.4. UA showed trace ketones, large amount of glucose, and rare bacteria. She was also found to be hypotensive upon arrival. She is being admitted for observations under hospitalist services for ABHISHEK, hypotension, and leukocytosis. It should be of note that she was recently discharged on 8/24/2021 with similar complaints. She states that she did recently see her PCP and was to have an appointment with Nephrology on 9/8/21. She states that she has been feeling \"not good\" for 2-3 days. She reports 1 episode of vomiting last night and an episode of diarrhea this am. She states that she has not urinated since 2200 on 9/5/2021. She c/o burning currently. She has been drinking well, however, she has a poor appetite. She denies abdominal pain. She denies fever, or chills. She denies chest pain or cough.  She states that she feels like her vision is \"static\"    Past Medical History:        Diagnosis Date    Allergic rhinitis     Arthritis     Asthma     Chronic kidney disease     Constipation     Diabetes mellitus ([address]     Diabetic neuropathy ([address]     Fibromyalgia     Gastroparesis     GERD (gastroesophageal reflux disease)     GERD (gastroesophageal reflux disease)     Hyperlipidemia     Hypertension     Hypokalemia     Migraines     Neuromuscular disorder (HCC)     Osteoarthritis     Pancreatitis     Plantar wart of left foot     Raynaud disease     Rheumatoid arthritis ([address]     Tobacco abuse        Past Surgical History:        Procedure Laterality Date    APPENDECTOMY      CHOLECYSTECTOMY      HYSTERECTOMY         Home Medications:  Prior to Admission medications    Medication Sig Start Date End Date Taking? Authorizing Provider   omeprazole (PRILOSEC) 20 MG delayed release capsule Take 40 mg by mouth daily   Yes Historical Provider, MD   leucovorin calcium (WELLCOVORIN) 5 MG tablet Take 5 mg by mouth daily   Yes Historical Provider, MD   Vitamin D (CHOLECALCIFEROL) 50 MCG (2000 UT) TABS tablet Take 1 tablet by mouth daily 8/25/21  Yes DESTINY Marley CNP   metFORMIN (GLUCOPHAGE) 500 MG tablet Take 1 tablet by mouth 2 times daily (with meals) 8/24/21  Yes DESTINY Marley CNP   aspirin 81 MG chewable tablet Take 81 mg by mouth daily   Yes Historical Provider, MD   atorvastatin (LIPITOR) 20 MG tablet Take 20 mg by mouth nightly   Yes Historical Provider, MD   insulin glargine (LANTUS) 100 UNIT/ML injection vial Inject 10 Units into the skin daily   Yes Historical Provider, MD   calcium carbonate 600 MG TABS tablet Take 1 tablet by mouth daily   Yes Historical Provider, MD   cetirizine (ZYRTEC) 10 MG tablet Take 10 mg by mouth daily as needed for Allergies   Yes Historical Provider, MD   diclofenac sodium (VOLTAREN) 1 % GEL Apply topically 2 times daily as needed for Pain   Yes Historical Provider, MD   diphenhydrAMINE (BENADRYL) 50 MG capsule Take 50 mg by mouth nightly   Yes Historical Provider, MD   docusate sodium (COLACE) 100 MG capsule Take 100 mg by mouth 2 times daily   Yes Historical Provider, MD   folic acid (FOLVITE) 1 MG tablet Take 2 mg by mouth daily    Yes Historical Provider, MD   gabapentin (NEURONTIN) 600 MG tablet Take 600 mg by mouth 3 times daily.    Yes Historical Provider, MD   canagliflozin (INVOKANA) 300 MG TABS tablet Take 300 mg by mouth daily   Yes Historical Provider, MD   magnesium oxide (MAG-OX) 400 MG tablet Take 500 mg by mouth every 12 hours Yes Historical Provider, MD   methotrexate (RHEUMATREX) 2.5 MG chemo tablet Take 15 mg by mouth once a week Takes on Tuesdays   Yes Historical Provider, MD   tiZANidine (ZANAFLEX) 4 MG tablet Take 4 mg by mouth nightly   Yes Historical Provider, MD   potassium chloride (KLOR-CON M) 20 MEQ extended release tablet Take 20 mEq by mouth daily   Yes Historical Provider, MD   nortriptyline (PAMELOR) 25 MG capsule Take 50 mg by mouth nightly   Yes Historical Provider, MD   lisinopril (PRINIVIL;ZESTRIL) 5 MG tablet Take 5 mg by mouth daily   Yes Historical Provider, MD   linaclotide (LINZESS) 290 MCG CAPS capsule Take 290 mcg by mouth daily   Yes Historical Provider, MD   bisacodyl (DULCOLAX) 5 MG EC tablet Take 5 mg by mouth daily as needed for Constipation   Yes Historical Provider, MD   famotidine (PEPCID) 40 MG tablet Take 40 mg by mouth daily    Historical Provider, MD       Allergies:    Pineapple, Hydrocodone-acetaminophen, Penicillins, Sitagliptin, Bee venom, Hydrocodone, and Motrin [ibuprofen]    Social History:    The patient currently lives at home  Tobacco:   reports that she has been smoking. She has never used smokeless tobacco.  Alcohol:   reports no history of alcohol use. Illicit Drugs: Denies current use    Family History:      Adopted: Yes       Review of Systems:   Review of Systems   Constitutional: Positive for appetite change and fatigue. Negative for chills, diaphoresis and fever. HENT: Negative for congestion, ear pain, sinus pain, sore throat and trouble swallowing. Eyes: Negative for visual disturbance. Respiratory: Negative for cough, shortness of breath and wheezing. Cardiovascular: Negative for chest pain, palpitations and leg swelling. Gastrointestinal: Negative for abdominal distention, abdominal pain, blood in stool, constipation, diarrhea, nausea and vomiting. Endocrine: Negative for cold intolerance and heat intolerance. Genitourinary: Positive for dysuria.  Negative for difficulty urinating, flank pain, frequency and urgency. Musculoskeletal: Positive for arthralgias (chronic) and back pain (chronic). Negative for myalgias. Neurological: Negative for dizziness, syncope, weakness, light-headedness, numbness and headaches. Hematological: Does not bruise/bleed easily. Psychiatric/Behavioral: Negative for agitation, confusion and dysphoric mood. 14 point review of systems is negative except as specifically addressed above. Physical Examination:  BP 93/60   Pulse 99   Temp 98.1 °F (36.7 °C) (Oral)   Resp 15   Ht 5' (1.524 m)   Wt 106 lb (48.1 kg)   SpO2 94%   BMI 20.70 kg/m²   Physical Exam  Constitutional:       General: She is not in acute distress. Appearance: Normal appearance. She is not toxic-appearing or diaphoretic. HENT:      Head: Normocephalic and atraumatic. Right Ear: External ear normal.      Left Ear: External ear normal.      Nose: Nose normal. No congestion or rhinorrhea. Mouth/Throat:      Mouth: Mucous membranes are moist.      Pharynx: Oropharynx is clear. Eyes:      General: No scleral icterus. Extraocular Movements: Extraocular movements intact. Conjunctiva/sclera: Conjunctivae normal.   Cardiovascular:      Rate and Rhythm: Normal rate and regular rhythm. Pulses: Normal pulses. Heart sounds: Normal heart sounds. No murmur heard. No friction rub. No gallop. Pulmonary:      Effort: Pulmonary effort is normal. No respiratory distress. Breath sounds: Normal breath sounds. No wheezing, rhonchi or rales. Abdominal:      General: Abdomen is flat. Bowel sounds are normal. There is no distension. Palpations: Abdomen is soft. Tenderness: There is no abdominal tenderness. Musculoskeletal:         General: No swelling. Normal range of motion. Cervical back: Normal range of motion and neck supple. Right lower leg: No edema. Left lower leg: No edema.    Skin:     General: Skin is warm and dry. Coloration: Skin is not jaundiced. Findings: No erythema, lesion or rash. Neurological:      General: No focal deficit present. Mental Status: She is alert and oriented to person, place, and time. Mental status is at baseline. Cranial Nerves: No cranial nerve deficit. Sensory: No sensory deficit. Motor: No weakness. Psychiatric:         Mood and Affect: Mood normal.         Behavior: Behavior normal.         Thought Content: Thought content normal.         Judgment: Judgment normal.          Diagnostic Data:  CBC:  Recent Labs     09/06/21 1944   WBC 18.4*   HGB 13.9   HCT 43.5   *     BMP:  Recent Labs     09/06/21 1944   *   K 4.3   CL 98   CO2 15*   BUN 53*   CREATININE 3.1*   CALCIUM 10.0     Recent Labs     09/06/21 1944   AST 13   ALT 13   BILITOT <0.2   ALKPHOS 144*     Urinalysis:  Lab Results   Component Value Date    NITRU Negative 09/06/2021    WBCUA 6-9 09/06/2021    BACTERIA Rare 09/06/2021    RBCUA 3-5 09/06/2021    BLOODU Negative 09/06/2021    SPECGRAV 1.023 09/06/2021    GLUCOSEU =>1000 09/06/2021     XR CHEST PORTABLE    Result Date: 9/6/2021  EXAMINATION:  XR CHEST PORTABLE  9/6/2021 9:20 PM HISTORY: Hypotension. Fatigue. COMPARISON: 8/23/2021. FINDINGS:  The lungs are expanded and clear. The heart size is normal. No acute bony abnormality is seen. No active disease.  Signed by Dr Yousif Son      Assessment/Plan:  Principal Problem:    Acute kidney injury ([facility] [address]   -Admit for observation    -Fluid resuscitation   -Up with assist   -AM labs   -Vitals Qshift   -I&O's [address] [facility]    -UA for NA,  Osmolarity, and creatinine   -Hold Lisinopril, H2 blocker, PPI, laxatives, and stool softeners.    -Labs in the AM   -Daily weight   -Clear liquid diet   -Continue to monitor for any status changes    Active Problems:    Leukocytosis   -Noted   -Fluid resuscitation   -Rocephin 1 gram Q24      HLD (hyperlipidemia)   -Noted   -Continue home statin therapy      Fibromyalgia   -Noted   -Continue home medications-flexeril/Gabapentin      GERD (gastroesophageal reflux disease)   -Noted   -H2 blocker and PPI's being held at this time due to ABHISHEK   -will monitor    Type 2 diabetes mellitus, with long-term current use of insulin ([facility]   -Noted   -Home meds held   -Medium correction insulin protocol with hypoglycemia protocol      Rheumatoid arthritis of ankle ([facility]   -Noted   -continue Methotrexate and folic acid    DVT Prophylaxis: Heparin SQ       Signed:  Viktoria Rinne, APRN, 9/6/2021 10:48 PM

## 2021-09-07 NOTE — CONSULTS
Nephrology ([facility]) Consult Note      Patient:  Kristy Richardson  YOB: 1970  Date of Service: 9/7/2021  MRN: [id number]   Acct: [de-identified]   Primary Care Physician: Debra Carty  Advance Directive: Full Code  Admit Date: 9/6/2021       Hospital Day: 0  Referring Provider: Adra Eisenmenger, MD    Patient independently seen and examined, Chart, Consults, Notes, Operative notes, Labs, Cardiology, and Radiology studies reviewed as available. Subjective:  Kristy Richardson is a 53 y.o. female  whom we were consulted for acute kidney injury with history of chronic kidney disease stage III. She follows with DESTINY Carlson Dr. in the office in [facility]. She has had several recent admissions both locally and at [facility] for acute kidney injury/volume depletion. She states she takes adequate intake at home but cannot quantify for me. She recalls no NSAID usage. She denies any nausea, vomiting, chest pain, shortness of air, dysuria or hematuria. She recalls no other herbs vitamin or supplement. She states compliance with her prescribed medications.     Allergies:  Pineapple, Hydrocodone-acetaminophen, Penicillins, Sitagliptin, Bee venom, Hydrocodone, and Motrin [ibuprofen]    Medicines:  Current Facility-Administered Medications   Medication Dose Route Frequency Provider Last Rate Last Admin    [Held by provider] atorvastatin (LIPITOR) tablet 20 mg  20 mg Oral Nightly Maria E Bullitt, APRN   20 mg at 09/07/21 0031    calcium elemental (OSCAL) tablet 500 mg  500 mg Oral Daily Maria E Bullitt, APRN   500 mg at 09/07/21 0908    cetirizine (ZYRTEC) tablet 10 mg  10 mg Oral Daily PRN Maria E Bullitt, APRN        folic acid (FOLVITE) tablet 2 mg  2 mg Oral Daily Maria E Bullitt, APRN   2 mg at 09/07/21 0908    gabapentin (NEURONTIN) tablet 600 mg  600 mg Oral TID Maria E Bullitt, APRN   600 mg at 09/07/21 0908    [Held by provider] lisinopril vial 0-12 Units  0-12 Units SubCUTAneous TID WC Bary Draft, APRN        insulin lispro (HUMALOG) injection vial 0-6 Units  0-6 Units SubCUTAneous Nightly Bary Draft, APRN        glucose (GLUTOSE) 40 % oral gel 15 g  15 g Oral PRN Bary Draft, APRN        dextrose 50 % IV solution  12.5 g IntraVENous PRN Bary Draft, APRN        glucagon (rDNA) injection 1 mg  1 mg IntraMUSCular PRN Bary Draft, APRN        dextrose 5 % solution  100 mL/hr IntraVENous PRN Bary Draft, APRN           Past Medical History:  Past Medical History:   Diagnosis Date    Allergic rhinitis     Arthritis     Asthma     Chronic kidney disease     Constipation     [facility]     Diabetic neuropathy ([facility]     Fibromyalgia     Gastroparesis     GERD (gastroesophageal reflux disease)     GERD (gastroesophageal reflux disease)     Hyperlipidemia     Hypertension     Hypokalemia     Migraines     Neuromuscular disorder (HCC)     Osteoarthritis     Pancreatitis     Plantar wart of left foot     Raynaud disease     Rheumatoid arthritis ([facility]     Tobacco abuse        Past Surgical History:  Past Surgical History:   Procedure Laterality Date    APPENDECTOMY      CHOLECYSTECTOMY      HYSTERECTOMY         Family History  Family History   Adopted: Yes       Social History  Social History     Socioeconomic History    Marital status:      Spouse name: Not on file    Number of children: Not on file    Years of education: Not on file    Highest education level: Not on file   Occupational History    Not on file   Tobacco Use    Smoking status: Current Every Day Smoker    Smokeless tobacco: Never Used   Substance and Sexual Activity    Alcohol use: Never    Drug use: Never    Sexual activity: Not on file   Other Topics Concern    Not on file   Social History Narrative    Not on file     Social Determinants of Health     Financial Resource Strain:     Difficulty of 90/66 -- -- 100 11 96 % -- --   09/06/21 2101 91/60 -- -- 101 20 -- -- --   09/06/21 2045 89/62 -- -- 100 15 96 % -- --   09/06/21 2030 91/66 -- -- 102 25 99 % -- --   09/06/21 2000 (!) 78/60 -- -- 104 19 94 % -- --   09/06/21 1942 -- -- -- 121 -- -- -- --   09/06/21 1934 (!) 68/52 -- -- -- -- -- -- --   09/06/21 1902 -- -- -- 121 -- 99 % -- --   09/06/21 1900 -- 98.1 °F (36.7 °C) Oral -- 17 -- 5' (1.524 m) 106 lb (48.1 kg)       Intake/Output Summary (Last 24 hours) at 9/7/2021 1223  Last data filed at 9/7/2021 1056  Gross per 24 hour   Intake 1057 ml   Output 100 ml   Net 957 ml     General: awake/alert   Chest:  clear to auscultation bilaterally  CVS: regular rate and rhythm  Abdominal: soft, nontender, normal bowel sounds  Extremities: no cyanosis or edema  Skin: warm and dry without rash      Labs:  BMP:   Recent Labs     09/06/21 1944 09/07/21 0335   * 137   K 4.3 4.6   CL 98 108   CO2 15* 18*   PHOS  --  3.6   BUN 53* 47*   CREATININE 3.1* 2.2*   CALCIUM 10.0 8.9     CBC:   Recent Labs     09/06/21 1944 09/07/21 0335   WBC 18.4* 13.9*   HGB 13.9 12.3   HCT 43.5 38.4   .6* 105.8*   * 342     LIVER PROFILE:   Recent Labs     09/06/21 1944 09/07/21 0335   AST 13 11   ALT 13 10   BILITOT <0.2 <0.2   ALKPHOS 144* 127*     PT/INR: No results for input(s): PROTIME, INR in the last 72 hours. APTT: No results for input(s): APTT in the last 72 hours. BNP:  No results for input(s): BNP in the last 72 hours. Ionized Calcium:No results for input(s): IONCA in the last 72 hours. Magnesium:  Recent Labs     09/07/21  0335   MG 2.4     Phosphorus:  Recent Labs     09/07/21  0335   PHOS 3.6     HgbA1C: No results for input(s): LABA1C in the last 72 hours. Hepatic:   Recent Labs     09/06/21  1944 09/07/21  0335   ALKPHOS 144* 127*   ALT 13 10   AST 13 11   PROT 8.6 6.8   BILITOT <0.2 <0.2   LABALBU 4.7 4.0     Lactic Acid: No results for input(s): LACTA in the last 72 hours.   Troponin: No results for input(s): CKTOTAL, CKMB, TROPONINT in the last 72 hours. ABGs: No results for input(s): PH, PCO2, PO2, HCO3, O2SAT in the last 72 hours. CRP:  No results for input(s): CRP in the last 72 hours. Sed Rate:  No results for input(s): SEDRATE in the last 72 hours. Cultures:   No results for input(s): CULTURE in the last 72 hours. No results for input(s): BC, Pippa Destiny in the last 72 hours. No results for input(s): CXSURG in the last 72 hours. Radiology reports as per the Radiologist  Radiology: XR CHEST PORTABLE    Result Date: 9/6/2021  EXAMINATION:  XR CHEST PORTABLE  9/6/2021 9:20 PM HISTORY: Hypotension. Fatigue. COMPARISON: 8/23/2021. FINDINGS:  The lungs are expanded and clear. The heart size is normal. No acute bony abnormality is seen. No active disease. Signed by Dr Liliam Morin   Acute kidney injury/prerenal  Chronic kidney disease stage III  Diabetes type 2 with diabetic nephropathy  Recurrent volume depletion  Vitamin D deficiency  Metabolic acidosis  Hyponatremia    Plan:  Reviewed importance of fluid management and diabetic control with patient  Cherelle Lai may have some benefit of disease progression and diabetic nephropathy however patient may not be able to tolerate the effects of the medication if she is not able to maintain adequate oral intake. Work-up ordered ongoing  Replace vitamin D  Setting bicarbonate  Reassess in the morning      Thank you for the consult, we appreciate the opportunity to provide care to your patients. Feel free to contact me if I can be of any further assistance.       Carine Bowens MD  09/07/21  12:23 PM

## 2021-09-07 NOTE — PROGRESS NOTES
Progress Note  Date:2021       Room:10/510-01  Patient Name:Leila Tipton     Date of Birth:     Age:50 y.o. Subjective    Subjective   Patient seen and examined resting comfortably in bed. States since her last hospitalization she was doing well however approximately 2 days ago acute onset of difficulty with urination as well as some burning. Denies any recent over-the-counter supplementations or new medications. Denies any headache, change in vision chest pain, fevers. States she is just fatigued. Cumulative Hospital course: Patient admitted 57, 50-year-old female with a past medical history of type 2 diabetes, CKD stage III, rheumatoid arthritis, GERD, hyperlipidemia presented to the emergency room due to weakness, fatigue, dysuria. Work-up with evidence of ABHISHEK on CKD, hyponatremia elevation of blood sugar as well as WBC count. UA consistent with acute cystitis patient placed on antibiotic therapy admitted to medical unit for intravenous fluids. Improvement seen in renal function, recent renal ultrasound reviewed. Nephrology consulted. Blood culture/urine cultures in process. Review of Systems   ROS: 14 point review of systems is negative except as specifically addressed above. Objective         Vitals Last 24 Hours:  TEMPERATURE:  Temp  Av.7 °F (35.9 °C)  Min: 95.5 °F (35.3 °C)  Max: 98.1 °F (36.7 °C)  RESPIRATIONS RANGE: Resp  Av.4  Min: 11  Max: 25  PULSE OXIMETRY RANGE: SpO2  Av.9 %  Min: 93 %  Max: 99 %  PULSE RANGE: Pulse  Av.1  Min: 92  Max: 121  BLOOD PRESSURE RANGE: Systolic (22JOM), VQA:87 , Min:68 , JHX:80   ; Diastolic (01UZZ), QPW:30, Min:49, Max:70    I/O (24Hr): Intake/Output Summary (Last 24 hours) at 2021 1108  Last data filed at 2021 1056  Gross per 24 hour   Intake 1057 ml   Output 100 ml   Net 957 ml     Physical Exam  Vitals and nursing note reviewed. Constitutional:       General: She is not in acute distress. Comments: Patient resting in bed, fatigued   HENT:      Head: Normocephalic and atraumatic. Nose: Nose normal.   Eyes:      Conjunctiva/sclera: Conjunctivae normal.   Cardiovascular:      Rate and Rhythm: Normal rate and regular rhythm. Pulses: Normal pulses. Pulmonary:      Effort: No respiratory distress. Breath sounds: No wheezing. Abdominal:      Tenderness: There is no abdominal tenderness. There is no guarding or rebound. Musculoskeletal:      Cervical back: Normal range of motion. Right lower leg: No edema. Left lower leg: No edema. Skin:     General: Skin is warm. Coloration: Skin is not pale. Neurological:      Mental Status: She is alert and oriented to person, place, and time. Mental status is at baseline. Motor: Weakness present. Psychiatric:         Mood and Affect: Mood normal.         Labs/Imaging/Diagnostics    Labs:  CBC:  Recent Labs     09/06/21 1944 09/07/21 0335   WBC 18.4* 13.9*   RBC 4.16* 3.63*   HGB 13.9 12.3   HCT 43.5 38.4   .6* 105.8*   RDW 15.6* 15.5*   * 342     CHEMISTRIES:  Recent Labs     09/06/21 1944 09/07/21 0335   * 137   K 4.3 4.6   CL 98 108   CO2 15* 18*   BUN 53* 47*   CREATININE 3.1* 2.2*   GLUCOSE 215* 182*   PHOS  --  3.6   MG  --  2.4     PT/INR:No results for input(s): PROTIME, INR in the last 72 hours. APTT:No results for input(s): APTT in the last 72 hours. LIVER PROFILE:  Recent Labs     09/06/21 1944 09/07/21 0335   AST 13 11   ALT 13 10   BILITOT <0.2 <0.2   ALKPHOS 144* 127*       Imaging Last 24 Hours:  XR CHEST PORTABLE    Result Date: 9/6/2021  EXAMINATION:  XR CHEST PORTABLE  9/6/2021 9:20 PM HISTORY: Hypotension. Fatigue. COMPARISON: 8/23/2021. FINDINGS:  The lungs are expanded and clear. The heart size is normal. No acute bony abnormality is seen. No active disease.  Signed by Dr Carine Swann    Assessment//Plan           Hospital Problems         Last Modified POA    * (Principal) Acute kidney injury ([facility] [address] 9/6/2021 Yes    HLD (hyperlipidemia) 9/6/2021 Yes    Fibromyalgia 9/6/2021 Yes    GERD (gastroesophageal reflux disease) 9/6/2021 Yes    Type 2 diabetes mellitus, with long-term current use of insulin ([facility] [address] 9/6/2021 Yes    Rheumatoid arthritis of ankle ([facility] [address] 9/6/2021 Yes    Leukocytosis 9/6/2021 Yes              Assessment & Plan    ABHISHEK superimposed on CKD stage III 30-59 mL/min  -Continue intravenous fluids, renally dose medication, avoid nephrotoxic agent  -Renal ultrasound from 8/24 has been reviewed and unremarkable  -Neurology consulted appreciate recommendation  -Daily metabolic profile      Acute cystitis without hematuria  -Urine culture/blood cultures in process  -Continues on ceftriaxone  -Tylenol as needed for pain or temperature elevation greater than 100.4      Hyponatremia  -Currently resolved    Vitamin D deficiency  -Continue supplementation    Type 2 diabetes  -Uncontrolled, recent hemoglobin A1c 8.1  -Continue insulin modalities, Accu-Cheks q. ACH S, hypoglycemic protocol      Gastroesophageal reflux disease  -Chronic condition, continue PPI    Rheumatoid arthritis/fibromyalgia  -Chronic condition, continue with pain control modalities    Hyperlipidemia  -Chronic condition, currently holding statin therapy            EMR Dragon/Transcription disclaimer:   Much of this encounter note is an electronic transcription/translation of spoken language to printed text.  The electronic translation of spoken language may permit erroneous, or at times, nonsensical words or phrases to be inadvertently transcribed; although attempts have made to review the note for such errors, some may still exist.    Electronically signed by   Marcie Schroeder MD   Internal Medicine Hospitalist  On 9/7/2021  At 11:08 AM

## 2021-09-08 VITALS
RESPIRATION RATE: 20 BRPM | TEMPERATURE: 97.5 F | BODY MASS INDEX: 20.81 KG/M2 | HEART RATE: 97 BPM | SYSTOLIC BLOOD PRESSURE: 113 MMHG | WEIGHT: 106 LBS | HEIGHT: 60 IN | DIASTOLIC BLOOD PRESSURE: 72 MMHG | OXYGEN SATURATION: 97 %

## 2021-09-08 LAB
ALBUMIN SERPL-MCNC: 3 G/DL (ref 3.5–5.2)
ALP BLD-CCNC: 99 U/L (ref 35–104)
ALT SERPL-CCNC: 8 U/L (ref 5–33)
ANION GAP SERPL CALCULATED.3IONS-SCNC: 9 MMOL/L (ref 7–19)
AST SERPL-CCNC: 9 U/L (ref 5–32)
BASOPHILS ABSOLUTE: 0 K/UL (ref 0–0.2)
BASOPHILS RELATIVE PERCENT: 0.3 % (ref 0–1)
BILIRUB SERPL-MCNC: <0.2 MG/DL (ref 0.2–1.2)
BUN BLDV-MCNC: 27 MG/DL (ref 6–20)
CALCIUM SERPL-MCNC: 8.5 MG/DL (ref 8.6–10)
CHLORIDE BLD-SCNC: 106 MMOL/L (ref 98–111)
CO2: 28 MMOL/L (ref 22–29)
CREAT SERPL-MCNC: 1.1 MG/DL (ref 0.5–0.9)
EOSINOPHILS ABSOLUTE: 0.2 K/UL (ref 0–0.6)
EOSINOPHILS RELATIVE PERCENT: 2.1 % (ref 0–5)
GFR AFRICAN AMERICAN: >59
GFR NON-AFRICAN AMERICAN: 53
GLUCOSE BLD-MCNC: 145 MG/DL (ref 70–99)
GLUCOSE BLD-MCNC: 157 MG/DL (ref 70–99)
GLUCOSE BLD-MCNC: 213 MG/DL (ref 74–109)
HCT VFR BLD CALC: 31.6 % (ref 37–47)
HEMOGLOBIN: 10.1 G/DL (ref 12–16)
IMMATURE GRANULOCYTES #: 0 K/UL
LYMPHOCYTES ABSOLUTE: 3.9 K/UL (ref 1.1–4.5)
LYMPHOCYTES RELATIVE PERCENT: 44.8 % (ref 20–40)
MCH RBC QN AUTO: 33.2 PG (ref 27–31)
MCHC RBC AUTO-ENTMCNC: 32 G/DL (ref 33–37)
MCV RBC AUTO: 103.9 FL (ref 81–99)
MONOCYTES ABSOLUTE: 0.4 K/UL (ref 0–0.9)
MONOCYTES RELATIVE PERCENT: 4.9 % (ref 0–10)
NEUTROPHILS ABSOLUTE: 4.2 K/UL (ref 1.5–7.5)
NEUTROPHILS RELATIVE PERCENT: 47.7 % (ref 50–65)
PDW BLD-RTO: 15.1 % (ref 11.5–14.5)
PERFORMED ON: ABNORMAL
PERFORMED ON: ABNORMAL
PLATELET # BLD: 271 K/UL (ref 130–400)
PMV BLD AUTO: 10.7 FL (ref 9.4–12.3)
POTASSIUM SERPL-SCNC: 4.1 MMOL/L (ref 3.5–5)
RBC # BLD: 3.04 M/UL (ref 4.2–5.4)
SODIUM BLD-SCNC: 143 MMOL/L (ref 136–145)
TOTAL PROTEIN: 5.2 G/DL (ref 6.6–8.7)
URINE CULTURE, ROUTINE: NORMAL
WBC # BLD: 8.7 K/UL (ref 4.8–10.8)

## 2021-09-08 PROCEDURE — G0378 HOSPITAL OBSERVATION PER HR: HCPCS

## 2021-09-08 PROCEDURE — 96376 TX/PRO/DX INJ SAME DRUG ADON: CPT

## 2021-09-08 PROCEDURE — 2580000003 HC RX 258: Performed by: INTERNAL MEDICINE

## 2021-09-08 PROCEDURE — 85025 COMPLETE CBC W/AUTO DIFF WBC: CPT

## 2021-09-08 PROCEDURE — 6360000002 HC RX W HCPCS: Performed by: NURSE PRACTITIONER

## 2021-09-08 PROCEDURE — 6370000000 HC RX 637 (ALT 250 FOR IP): Performed by: INTERNAL MEDICINE

## 2021-09-08 PROCEDURE — 2500000003 HC RX 250 WO HCPCS: Performed by: INTERNAL MEDICINE

## 2021-09-08 PROCEDURE — 96372 THER/PROPH/DIAG INJ SC/IM: CPT

## 2021-09-08 PROCEDURE — 80053 COMPREHEN METABOLIC PANEL: CPT

## 2021-09-08 PROCEDURE — 36415 COLL VENOUS BLD VENIPUNCTURE: CPT

## 2021-09-08 PROCEDURE — 82947 ASSAY GLUCOSE BLOOD QUANT: CPT

## 2021-09-08 PROCEDURE — 6370000000 HC RX 637 (ALT 250 FOR IP): Performed by: NURSE PRACTITIONER

## 2021-09-08 RX ORDER — SODIUM BICARBONATE 325 MG/1
325 TABLET ORAL 2 TIMES DAILY
Qty: 60 TABLET | Refills: 0 | Status: SHIPPED | OUTPATIENT
Start: 2021-09-08

## 2021-09-08 RX ADMIN — HEPARIN SODIUM 5000 UNITS: 5000 INJECTION INTRAVENOUS; SUBCUTANEOUS at 06:18

## 2021-09-08 RX ADMIN — GABAPENTIN 600 MG: 600 TABLET, FILM COATED ORAL at 09:10

## 2021-09-08 RX ADMIN — CALCIUM 500 MG: 500 TABLET ORAL at 09:10

## 2021-09-08 RX ADMIN — FOLIC ACID 2 MG: 1 TABLET ORAL at 09:11

## 2021-09-08 RX ADMIN — ERGOCALCIFEROL 50000 UNITS: 1.25 CAPSULE ORAL at 09:10

## 2021-09-08 RX ADMIN — SODIUM BICARBONATE 325 MG: 650 TABLET ORAL at 09:10

## 2021-09-08 RX ADMIN — SODIUM BICARBONATE: 84 INJECTION, SOLUTION INTRAVENOUS at 03:58

## 2021-09-08 NOTE — DISCHARGE INSTR - DIET

## 2021-09-08 NOTE — DISCHARGE SUMMARY
Discharge Summary    Date:9/8/2021        Patient Name:Leila Tipton     Date of Birth:[birth year]     Age:50 y.o. Admit Date:9/6/2021   Admission Condition:fair   Discharged Condition:stable  Discharge Date: 09/08/21       Discharge Diagnoses   Principal Problem:    Acute kidney injury ([facility] [address]  Active Problems:    HLD (hyperlipidemia)    Fibromyalgia    GERD (gastroesophageal reflux disease)    Type 2 diabetes mellitus, with long-term current use of insulin (HCC)    Rheumatoid arthritis of ankle (HCC)    Leukocytosis  Resolved Problems:    * No resolved hospital problems. [facility] Stay   Narrative of Hospital Course:     45-year-old female with a past medical history of type 2 diabetes, CKD stage III, rheumatoid arthritis, GERD, hyperlipidemia presented to the emergency room due to weakness, fatigue, dysuria. Work-up with evidence of ABHISHEK on CKD, hyponatremia elevation of blood sugar as well as WBC count. UA consistent with asymptomatic bacteriuria, was treated with IV antibiotics in-house for 2 days, urine culture negative for bacteria. Antibiotics were discontinued. Patient was treated with IV fluids in-house renal function significantly improved patient was encouraged on increasing oral hydration, was seen in house by nephrology, recent renal ultrasound reviewed. Physical Examination:  General: Well-developed, no acute distress lying comfortably in bed. HEENT: Atraumatic normocephalic, range of motion normal   Cardiac: Normal S1-S2 no murmurs rub or gallop. Respiratory: clear To auscultation bilaterally, no rhonchi or rales, no wheezing  Abdomen: Soft, positive bowel sounds in all quadrants, no distention, nontender to palpation.   Extremities: no tenderness, no edema, moves all extremities  Psych: Affect normal and good eye contact, behavioral normal.         Consultants:   IP CONSULT TO NEPHROLOGY    Time Spent on Discharge:  40 minutes were spent in patient examination, evaluation, counseling as well as medication reconciliation, prescriptions for required medications, discharge plan and follow up. Surgeries/Procedures Performed:  NONE      Significant Diagnostic Studies:   Recent Labs:  CBC:   Lab Results   Component Value Date    WBC 8.7 09/08/2021    RBC 3.04 09/08/2021    HGB 10.1 09/08/2021    HCT 31.6 09/08/2021    .9 09/08/2021    MCH 33.2 09/08/2021    MCHC 32.0 09/08/2021    RDW 15.1 09/08/2021     09/08/2021     BMP:    Lab Results   Component Value Date    GLUCOSE 213 09/08/2021     09/08/2021    K 4.1 09/08/2021    K 4.3 09/06/2021     09/08/2021    CO2 28 09/08/2021    ANIONGAP 9 09/08/2021    BUN 27 09/08/2021    CREATININE 1.1 09/08/2021    CALCIUM 8.5 09/08/2021    LABGLOM 53 09/08/2021    GFRAA >59 09/08/2021       Radiology Last 7 Days:  US RENAL COMPLETE    Result Date: 9/7/2021  Normal renal ultrasound. Signed by Dr Niels Miguel    Result Date: 9/6/2021  No active disease.  Signed by Dr Slater Kehr      Discharge Plan   Disposition: Home    Provider Follow-Up:   Sadie [address]  [phone]    On 9/15/2021  post hospital visit @ 9 am   with  Ana Aguilar MD  [address] [facility],[place] [address]  [phone]    On 9/16/2021  post hospital visit @ 2pm     with Christofer Moura         Patient Instructions   Diet: diabetic diet and renal diet    Activity: activity as tolerated      Discharge Medications         Medication List      START taking these medications    sodium bicarbonate 325 MG tablet  Take 1 tablet by mouth 2 times daily        CONTINUE taking these medications    aspirin 81 MG chewable tablet     atorvastatin 20 MG tablet  Commonly known as: LIPITOR     bisacodyl 5 MG EC tablet  Commonly known as: DULCOLAX     calcium carbonate 600 MG Tabs tablet     cetirizine 10 MG tablet  Commonly known as: ZYRTEC     diclofenac sodium 1 % Gel  Commonly known as:  VOLTAREN     diphenhydrAMINE 50 MG capsule  Commonly known as: BENADRYL     docusate sodium 100 MG capsule  Commonly known as: COLACE     famotidine 40 MG tablet  Commonly known as: PEPCID     folic acid 1 MG tablet  Commonly known as: FOLVITE     gabapentin 600 MG tablet  Commonly known as: NEURONTIN     insulin glargine 100 UNIT/ML injection vial  Commonly known as: LANTUS     Invokana 300 MG Tabs tablet  Generic drug: canagliflozin     leucovorin calcium 5 MG tablet  Commonly known as: WELLCOVORIN     Linzess 290 MCG Caps capsule  Generic drug: linaclotide     magnesium oxide 400 MG tablet  Commonly known as: MAG-OX     metFORMIN 500 MG tablet  Commonly known as: GLUCOPHAGE  Take 1 tablet by mouth 2 times daily (with meals)     methotrexate 2.5 MG chemo tablet  Commonly known as: RHEUMATREX     nortriptyline 25 MG capsule  Commonly known as: PAMELOR     omeprazole 20 MG delayed release capsule  Commonly known as: PRILOSEC     potassium chloride 20 MEQ extended release tablet  Commonly known as: KLOR-CON M     tiZANidine 4 MG tablet  Commonly known as: ZANAFLEX     vitamin D 50 MCG (2000 UT) Tabs tablet  Commonly known as: CHOLECALCIFEROL  Take 1 tablet by mouth daily        STOP taking these medications    lisinopril 5 MG tablet  Commonly known as: PRINIVIL;ZESTRIL           Where to Get Your Medications      These medications were sent to [facility] [phone] [address] [address]    Phone: [phone]   · sodium bicarbonate 325 MG tablet         Electronically signed by Fabián Vega MD on 9/8/21 at 5:31 PM CDT

## 2021-09-08 NOTE — PROGRESS NOTES
Nephrology ([facility]) Consult Note      Patient:  Priya Milan  YOB: 1970  Date of Service: 9/8/2021  MRN: [id number]   Acct: [de-identified]   Primary Care Physician: Nikia Kincaid  Advance Directive: Full Code  Admit Date: 9/6/2021       Hospital Day: 0  Referring Provider: Cande Spencer MD    Patient independently seen and examined, Chart, Consults, Notes, Operative notes, Labs, Cardiology, and Radiology studies reviewed as available. Subjective:  Priya Milan is a 53 y.o. female  whom we were consulted for acute kidney injury with history of chronic kidney disease stage III. She follows with DESTINY Fitzpatrick Dr. in the office in [place]. She has had several recent admissions both locally and at [facility] for acute kidney injury/volume depletion. She states she takes adequate intake at home but cannot quantify for me. She recalls no NSAID usage. She denies any nausea, vomiting, chest pain, shortness of air, dysuria or hematuria. She recalls no other herbs vitamin or supplement. She states compliance with her prescribed medications. Today, no events. Feeling better. Denies current chest pain, shortness of breath, nausea vomiting. Again notes multiple recent admissions for volume depletion.     Allergies:  Pineapple, Hydrocodone-acetaminophen, Penicillins, Sitagliptin, Bee venom, Hydrocodone, and Motrin [ibuprofen]    Medicines:  Current Facility-Administered Medications   Medication Dose Route Frequency Provider Last Rate Last Admin    [Held by provider] atorvastatin (LIPITOR) tablet 20 mg  20 mg Oral Nightly Raven Reges, APRN   20 mg at 09/07/21 0031    calcium elemental (OSCAL) tablet 500 mg  500 mg Oral Daily Raven Reges, APRN   500 mg at 09/08/21 0910    cetirizine (ZYRTEC) tablet 10 mg  10 mg Oral Daily PRN Raven Regoumou, APRN        folic acid (FOLVITE) tablet 2 mg  2 mg Oral Daily Raven Reges, APRN 2 mg at 09/08/21 0911    gabapentin (NEURONTIN) tablet 600 mg  600 mg Oral TID Wing Hunger, APRN   600 mg at 09/08/21 0910    [Held by provider] lisinopril (PRINIVIL;ZESTRIL) tablet 5 mg  5 mg Oral Daily Wing Hunger, APRN        magnesium oxide (MAG-OX) tablet 400 mg  400 mg Oral Q12H Wing Hunger, APRN   400 mg at 09/07/21 2126    methotrexate (RHEUMATREX) chemo tablet 15 mg  15 mg Oral Weekly Wing Hunger, APRN   15 mg at 09/07/21 0908    nortriptyline (PAMELOR) capsule 50 mg  50 mg Oral Nightly Wing Hunger, APRN   50 mg at 09/07/21 2107    tiZANidine (ZANAFLEX) tablet 4 mg  4 mg Oral Nightly Wing Hunger, APRN   4 mg at 09/07/21 2107    vitamin D (ERGOCALCIFEROL) capsule 50,000 Units  50,000 Units Oral Weekly Delfina Bhatt MD   50,000 Units at 09/08/21 0910    sodium bicarbonate tablet 325 mg  325 mg Oral BID Delfina Bhatt MD   325 mg at 09/08/21 0910    sodium chloride flush 0.9 % injection 5-40 mL  5-40 mL IntraVENous 2 times per day Wing Hunger, APRN   10 mL at 09/07/21 2130    sodium chloride flush 0.9 % injection 5-40 mL  5-40 mL IntraVENous PRN Wing Hunger, APRN        0.9 % sodium chloride infusion  25 mL IntraVENous PRN Wing Hunger, APRN        ondansetron (ZOFRAN-ODT) disintegrating tablet 4 mg  4 mg Oral Q8H PRN Wing Hunger, APRN        Or    [facility]) injection 4 mg  4 mg IntraVENous Q6H PRN Wing Hunger, APRN        acetaminophen (TYLENOL) tablet 650 mg  650 mg Oral Q6H PRN Wing Hunger, APRN        Or    acetaminophen (TYLENOL) suppository 650 mg  650 mg Rectal Q6H PRN Wing Hunger, APRN        heparin (porcine) injection 5,000 Units  5,000 Units SubCUTAneous 3 times per day Wing Hunger, APRN   5,000 Units at 09/08/21 0618    cefTRIAXone (ROCEPHIN) 1,000 mg in sodium chloride (PF) 10 mL IV syringe  1,000 mg IntraVENous Q24H Wing Hunger, APRN   1,000 mg at 09/07/21 9083    insulin lispro (HUMALOG) injection vial 0-12 Units  0-12 Units SubCUTAneous TID WC Wolm Lasheller, APRN   2 Units at 09/08/21 0900    insulin lispro (HUMALOG) injection vial 0-6 Units  0-6 Units SubCUTAneous Nightly Wolalexander Speedisoner, APRN   1 Units at 09/07/21 2140    glucose (GLUTOSE) 40 % oral gel 15 g  15 g Oral PRN Sanjeev Loboer, APRN        dextrose 50 % IV solution  12.5 g IntraVENous PRN Sanjeev Loboer, APRN        glucagon (rDNA) injection 1 mg  1 mg IntraMUSCular PRN Sanjeev Speedisoner, APRN        dextrose 5 % solution  100 mL/hr IntraVENous PRN Sanjeev Cotto, APRN           Past Medical History:  Past Medical History:   Diagnosis Date    Allergic rhinitis     Arthritis     Asthma     Chronic kidney disease     Constipation     [facility]     Diabetic neuropathy [facility]     Fibromyalgia     Gastroparesis     GERD (gastroesophageal reflux disease)     GERD (gastroesophageal reflux disease)     Hyperlipidemia     Hypertension     Hypokalemia     Migraines     Neuromuscular disorder (HCC)     Osteoarthritis     Pancreatitis     Plantar wart of left foot     Raynaud disease     Rheumatoid arthritis ([facility]     Tobacco abuse        Past Surgical History:  Past Surgical History:   Procedure Laterality Date    APPENDECTOMY      CHOLECYSTECTOMY      HYSTERECTOMY         Family History  Family History   Adopted: Yes       Social History  Social History     Socioeconomic History    Marital status:      Spouse name: Not on file    Number of children: Not on file    Years of education: Not on file    Highest education level: Not on file   Occupational History    Not on file   Tobacco Use    Smoking status: Current Every Day Smoker    Smokeless tobacco: Never Used   Substance and Sexual Activity    Alcohol use: Never    Drug use: Never    Sexual activity: Not on file   Other Topics Concern    Not on file   Social History Narrative    Not on file Social Determinants of Health     Financial Resource Strain:     Difficulty of Paying Living Expenses:    Food Insecurity:     Worried About Running Out of Food in the Last Year:     [address] in the Last Year:    Transportation Needs:     Lack of Transportation (Medical):  Lack of Transportation (Non-Medical):    Physical Activity:     Days of Exercise per Week:     Minutes of Exercise per Session:    Stress:     Feeling of Stress :    Social Connections:     Frequency of Communication with Friends and Family:     Frequency of Social Gatherings with Friends and Family:     Attends [employer] Services:     Active Member of Clubs or Organizations:     Attends Club or Organization Meetings:     Marital Status:    Intimate Partner Violence:     Fear of Current or Ex-Partner:     Emotionally Abused:     Physically Abused:     Sexually Abused:          Review of Systems:  History obtained from chart review and the patient  General ROS: No fever or chills  Respiratory ROS: No cough, shortness of breath, wheezing  Cardiovascular ROS: No chest pain or palpitations  Gastrointestinal ROS: No abdominal pain or melena  Genito-Urinary ROS: No dysuria or hematuria  Musculoskeletal ROS: No joint pain or swelling   14 point ROS reviewed with the patient and negative except as noted above and in the HPI unless unable to obtain.     Objective:  Patient Vitals for the past 24 hrs:   BP Temp Temp src Pulse Resp SpO2   09/08/21 1019 113/72 97.5 °F (36.4 °C) Temporal 97 20 97 %   09/08/21 0401 97/60 97.1 °F (36.2 °C) Temporal 91 16 95 %   09/07/21 2345 112/71 98.1 °F (36.7 °C) Temporal 96 16 98 %   09/07/21 1941 121/87 97.1 °F (36.2 °C) Temporal 104 16 100 %   09/07/21 1433 115/76 95.5 °F (35.3 °C) Temporal 89 16 --       Intake/Output Summary (Last 24 hours) at 9/8/2021 1236  Last data filed at 9/8/2021 0900  Gross per 24 hour   Intake 3658 ml   Output 1000 ml   Net 2658 ml     General: awake/alert   Chest: clear to auscultation bilaterally  CVS: regular rate and rhythm  Abdominal: soft, nontender, normal bowel sounds  Extremities: no cyanosis or edema  Skin: warm and dry without rash      Labs:  BMP:   Recent Labs     09/06/21 1944 09/07/21 0335 09/08/21 0341   * 137 143   K 4.3 4.6 4.1   CL 98 108 106   CO2 15* 18* 28   PHOS  --  3.6  --    BUN 53* 47* 27*   CREATININE 3.1* 2.2* 1.1*   CALCIUM 10.0 8.9 8.5*     CBC:   Recent Labs     09/06/21 1944 09/07/21 0335 09/08/21 0341   WBC 18.4* 13.9* 8.7   HGB 13.9 12.3 10.1*   HCT 43.5 38.4 31.6*   .6* 105.8* 103.9*   * 342 271     LIVER PROFILE:   Recent Labs     09/06/21 1944 09/07/21 0335 09/08/21 0341   AST 13 11 9   ALT 13 10 8   BILITOT <0.2 <0.2 <0.2   ALKPHOS 144* 127* 99     PT/INR: No results for input(s): PROTIME, INR in the last 72 hours. APTT: No results for input(s): APTT in the last 72 hours. BNP:  No results for input(s): BNP in the last 72 hours. Ionized Calcium:No results for input(s): IONCA in the last 72 hours. Magnesium:  Recent Labs     09/07/21  0335   MG 2.4     Phosphorus:  Recent Labs     09/07/21  0335   PHOS 3.6     HgbA1C: No results for input(s): LABA1C in the last 72 hours. Hepatic:   Recent Labs     09/06/21 1944 09/07/21 0335 09/08/21 0341   ALKPHOS 144* 127* 99   ALT 13 10 8   AST 13 11 9   PROT 8.6 6.8 5.2*   BILITOT <0.2 <0.2 <0.2   LABALBU 4.7 4.0 3.0*     Lactic Acid: No results for input(s): LACTA in the last 72 hours. Troponin: No results for input(s): CKTOTAL, CKMB, TROPONINT in the last 72 hours. ABGs: No results for input(s): PH, PCO2, PO2, HCO3, O2SAT in the last 72 hours. CRP:  No results for input(s): CRP in the last 72 hours. Sed Rate:  No results for input(s): SEDRATE in the last 72 hours. Cultures:   No results for input(s): CULTURE in the last 72 hours. Recent Labs     09/06/21 2020 09/06/21 2040   BC No Growth to date. Any change in status will be called.   --    Sha Dhillon --  No Growth to date. Any change in status will be called. No results for input(s): CXSURG in the last 72 hours. Radiology reports as per the Radiologist  Radiology: XR CHEST PORTABLE    Result Date: 9/6/2021  EXAMINATION:  XR CHEST PORTABLE  9/6/2021 9:20 PM HISTORY: Hypotension. Fatigue. COMPARISON: 8/23/2021. FINDINGS:  The lungs are expanded and clear. The heart size is normal. No acute bony abnormality is seen. No active disease. Signed by Dr Valeria Poon   Acute kidney injury/prerenal  Chronic kidney disease stage III  Diabetes type 2 with diabetic nephropathy  Recurrent volume depletion  Vitamin D deficiency  Metabolic acidosis  Hyponatremia    Plan:  Reviewed importance of fluid management and diabetic control with patient  Phoebe Draft may have some benefit of disease progression and diabetic nephropathy however patient may not be able to tolerate the effects of the medication if she is not able to maintain adequate oral intake  Replace vitamin D  Continue bicarbonate  Follow-up appointment 1 week either at the main office or [facility] with DESTINY Naqvi      Thank you for the consult, we appreciate the opportunity to provide care to your patients. Feel free to contact me if I can be of any further assistance.       Stu Bey MD  09/08/21  12:36 PM

## 2021-09-11 LAB
BLOOD CULTURE, ROUTINE: NORMAL
CULTURE, BLOOD 2: NORMAL

## 2022-02-08 ENCOUNTER — OFFICE VISIT (OUTPATIENT)
Dept: ENDOCRINOLOGY | Facility: CLINIC | Age: 52
End: 2022-02-08

## 2022-02-08 VITALS
DIASTOLIC BLOOD PRESSURE: 68 MMHG | HEART RATE: 127 BPM | BODY MASS INDEX: 16 KG/M2 | SYSTOLIC BLOOD PRESSURE: 118 MMHG | HEIGHT: 60 IN | WEIGHT: 81.5 LBS | OXYGEN SATURATION: 100 %

## 2022-02-08 DIAGNOSIS — Z79.4 TYPE 2 DIABETES MELLITUS WITH HYPOGLYCEMIA WITHOUT COMA, WITH LONG-TERM CURRENT USE OF INSULIN: Primary | ICD-10-CM

## 2022-02-08 DIAGNOSIS — E11.649 TYPE 2 DIABETES MELLITUS WITH HYPOGLYCEMIA WITHOUT COMA, WITH LONG-TERM CURRENT USE OF INSULIN: Primary | ICD-10-CM

## 2022-02-08 DIAGNOSIS — F17.200 SMOKER: ICD-10-CM

## 2022-02-08 PROCEDURE — 99204 OFFICE O/P NEW MOD 45 MIN: CPT | Performed by: NURSE PRACTITIONER

## 2022-02-08 RX ORDER — PROCHLORPERAZINE 25 MG/1
1 SUPPOSITORY RECTAL ONCE
Qty: 1 EACH | Refills: 1 | Status: SHIPPED | OUTPATIENT
Start: 2022-02-08 | End: 2022-02-08

## 2022-02-08 RX ORDER — INSULIN GLARGINE 100 [IU]/ML
10 INJECTION, SOLUTION SUBCUTANEOUS DAILY
Status: ON HOLD | COMMUNITY
End: 2022-04-30

## 2022-02-08 RX ORDER — CLINDAMYCIN HYDROCHLORIDE 150 MG/1
150 CAPSULE ORAL 3 TIMES DAILY
Status: ON HOLD | COMMUNITY
End: 2022-04-30

## 2022-02-08 RX ORDER — PEN NEEDLE, DIABETIC 30 GX3/16"
1 NEEDLE, DISPOSABLE MISCELLANEOUS 4 TIMES DAILY
Qty: 120 EACH | Refills: 11 | Status: ON HOLD | OUTPATIENT
Start: 2022-02-08 | End: 2022-04-30

## 2022-02-08 RX ORDER — PROCHLORPERAZINE 25 MG/1
1 SUPPOSITORY RECTAL
Qty: 1 EACH | Refills: 3 | Status: ON HOLD | OUTPATIENT
Start: 2022-02-08 | End: 2022-06-11

## 2022-02-08 RX ORDER — INSULIN DEGLUDEC INJECTION 100 U/ML
INJECTION, SOLUTION SUBCUTANEOUS
Qty: 1 PEN | Refills: 11 | Status: SHIPPED | OUTPATIENT
Start: 2022-02-08 | End: 2023-02-13 | Stop reason: SDUPTHER

## 2022-02-08 RX ORDER — INSULIN ASPART 100 [IU]/ML
INJECTION, SOLUTION INTRAVENOUS; SUBCUTANEOUS
Qty: 2 PEN | Refills: 11 | Status: SHIPPED | OUTPATIENT
Start: 2022-02-08 | End: 2023-01-17 | Stop reason: SDUPTHER

## 2022-02-08 RX ORDER — PROCHLORPERAZINE 25 MG/1
1 SUPPOSITORY RECTAL AS NEEDED
Qty: 9 EACH | Refills: 3 | Status: ON HOLD | OUTPATIENT
Start: 2022-02-08 | End: 2022-06-11

## 2022-02-08 RX ORDER — DOCUSATE SODIUM 100 MG/1
100 CAPSULE, LIQUID FILLED ORAL 2 TIMES DAILY
COMMUNITY

## 2022-04-12 ENCOUNTER — HOSPITAL ENCOUNTER (EMERGENCY)
Age: 52
Discharge: HOME | End: 2022-04-13
Attending: EMERGENCY MEDICINE
Payer: MEDICAID

## 2022-04-12 DIAGNOSIS — N18.9 CHRONIC KIDNEY DISEASE, UNSPECIFIED CKD STAGE: Primary | ICD-10-CM

## 2022-04-12 DIAGNOSIS — E87.6 HYPOKALEMIA: ICD-10-CM

## 2022-04-12 LAB
ALBUMIN SERPL-MCNC: 4.2 G/DL (ref 3.5–5.2)
ALP BLD-CCNC: 120 U/L (ref 35–104)
ALT SERPL-CCNC: 11 U/L (ref 5–33)
ANION GAP SERPL CALCULATED.3IONS-SCNC: 16 MMOL/L (ref 7–19)
AST SERPL-CCNC: 19 U/L (ref 5–32)
BASOPHILS ABSOLUTE: 0 K/UL (ref 0–0.2)
BASOPHILS RELATIVE PERCENT: 0.3 % (ref 0–1)
BILIRUB SERPL-MCNC: <0.2 MG/DL (ref 0.2–1.2)
BUN BLDV-MCNC: 34 MG/DL (ref 6–20)
CALCIUM SERPL-MCNC: 8.7 MG/DL (ref 8.6–10)
CHLORIDE BLD-SCNC: 105 MMOL/L (ref 98–111)
CO2: 15 MMOL/L (ref 22–29)
CREAT SERPL-MCNC: 2 MG/DL (ref 0.5–0.9)
EOSINOPHILS ABSOLUTE: 0.5 K/UL (ref 0–0.6)
EOSINOPHILS RELATIVE PERCENT: 4.7 % (ref 0–5)
GFR AFRICAN AMERICAN: 32
GFR NON-AFRICAN AMERICAN: 26
GLUCOSE BLD-MCNC: 134 MG/DL (ref 74–109)
HCT VFR BLD CALC: 36.5 % (ref 37–47)
HEMOGLOBIN: 11.2 G/DL (ref 12–16)
IMMATURE GRANULOCYTES #: 0 K/UL
LACTIC ACID, SEPSIS: 0.7 MG/DL (ref 0.5–1.9)
LYMPHOCYTES ABSOLUTE: 3.6 K/UL (ref 1.1–4.5)
LYMPHOCYTES RELATIVE PERCENT: 35.8 % (ref 20–40)
MCH RBC QN AUTO: 30.2 PG (ref 27–31)
MCHC RBC AUTO-ENTMCNC: 30.7 G/DL (ref 33–37)
MCV RBC AUTO: 98.4 FL (ref 81–99)
MONOCYTES ABSOLUTE: 0.5 K/UL (ref 0–0.9)
MONOCYTES RELATIVE PERCENT: 4.4 % (ref 0–10)
NEUTROPHILS ABSOLUTE: 5.6 K/UL (ref 1.5–7.5)
NEUTROPHILS RELATIVE PERCENT: 54.7 % (ref 50–65)
PDW BLD-RTO: 15.7 % (ref 11.5–14.5)
PLATELET # BLD: 398 K/UL (ref 130–400)
PMV BLD AUTO: 10.4 FL (ref 9.4–12.3)
POTASSIUM REFLEX MAGNESIUM: 3.6 MMOL/L (ref 3.5–5)
RBC # BLD: 3.71 M/UL (ref 4.2–5.4)
REASON FOR REJECTION: NORMAL
REJECTED TEST: NORMAL
SODIUM BLD-SCNC: 136 MMOL/L (ref 136–145)
TOTAL PROTEIN: 6.9 G/DL (ref 6.6–8.7)
TROPONIN: <0.01 NG/ML (ref 0–0.03)
WBC # BLD: 10.2 K/UL (ref 4.8–10.8)

## 2022-04-12 PROCEDURE — 99285 EMERGENCY DEPT VISIT HI MDM: CPT

## 2022-04-12 PROCEDURE — 85025 COMPLETE CBC W/AUTO DIFF WBC: CPT

## 2022-04-12 PROCEDURE — 36415 COLL VENOUS BLD VENIPUNCTURE: CPT

## 2022-04-12 PROCEDURE — 84484 ASSAY OF TROPONIN QUANT: CPT

## 2022-04-12 PROCEDURE — 83605 ASSAY OF LACTIC ACID: CPT

## 2022-04-12 PROCEDURE — 80053 COMPREHEN METABOLIC PANEL: CPT

## 2022-04-12 PROCEDURE — 2580000003 HC RX 258: Performed by: EMERGENCY MEDICINE

## 2022-04-12 RX ORDER — 0.9 % SODIUM CHLORIDE 0.9 %
30 INTRAVENOUS SOLUTION INTRAVENOUS ONCE
Status: COMPLETED | OUTPATIENT
Start: 2022-04-12 | End: 2022-04-13

## 2022-04-12 RX ADMIN — SODIUM CHLORIDE 1000 ML: 9 INJECTION, SOLUTION INTRAVENOUS at 21:53

## 2022-04-12 ASSESSMENT — ENCOUNTER SYMPTOMS
NAUSEA: 0
RHINORRHEA: 0
SORE THROAT: 0
SINUS PRESSURE: 0
ABDOMINAL PAIN: 0
SHORTNESS OF BREATH: 0
VOMITING: 0
DIARRHEA: 0

## 2022-04-13 VITALS
WEIGHT: 78 LBS | OXYGEN SATURATION: 100 % | TEMPERATURE: 97.5 F | HEART RATE: 80 BPM | RESPIRATION RATE: 16 BRPM | DIASTOLIC BLOOD PRESSURE: 73 MMHG | SYSTOLIC BLOOD PRESSURE: 119 MMHG | BODY MASS INDEX: 15.23 KG/M2

## 2022-04-13 LAB
ANION GAP SERPL CALCULATED.3IONS-SCNC: 14 MMOL/L (ref 7–19)
BACTERIA: ABNORMAL /HPF
BILIRUBIN URINE: NEGATIVE
BLOOD, URINE: NEGATIVE
BUN BLDV-MCNC: 29 MG/DL (ref 6–20)
CALCIUM SERPL-MCNC: 7.9 MG/DL (ref 8.6–10)
CHLORIDE BLD-SCNC: 112 MMOL/L (ref 98–111)
CLARITY: ABNORMAL
CO2: 14 MMOL/L (ref 22–29)
COLOR: YELLOW
CREAT SERPL-MCNC: 1.7 MG/DL (ref 0.5–0.9)
CRYSTALS, UA: ABNORMAL /HPF
EPITHELIAL CELLS, UA: ABNORMAL /HPF
GFR AFRICAN AMERICAN: 38
GFR NON-AFRICAN AMERICAN: 32
GLUCOSE BLD-MCNC: 130 MG/DL (ref 74–109)
GLUCOSE URINE: NEGATIVE MG/DL
HYALINE CASTS: ABNORMAL /LPF (ref 0–5)
KETONES, URINE: NEGATIVE MG/DL
LACTIC ACID, SEPSIS: 0.6 MG/DL (ref 0.5–1.9)
LEUKOCYTE ESTERASE, URINE: ABNORMAL
MAGNESIUM: 1.5 MG/DL (ref 1.6–2.6)
NITRITE, URINE: NEGATIVE
PH UA: 5.5 (ref 5–8)
POTASSIUM REFLEX MAGNESIUM: 3 MMOL/L (ref 3.5–5)
PROTEIN UA: 30 MG/DL
RBC UA: ABNORMAL /HPF (ref 0–2)
SODIUM BLD-SCNC: 140 MMOL/L (ref 136–145)
SPECIFIC GRAVITY UA: 1.02 (ref 1–1.03)
UROBILINOGEN, URINE: 0.2 E.U./DL

## 2022-04-13 PROCEDURE — 83605 ASSAY OF LACTIC ACID: CPT

## 2022-04-13 PROCEDURE — 80048 BASIC METABOLIC PNL TOTAL CA: CPT

## 2022-04-13 PROCEDURE — 81001 URINALYSIS AUTO W/SCOPE: CPT

## 2022-04-13 PROCEDURE — 36415 COLL VENOUS BLD VENIPUNCTURE: CPT

## 2022-04-13 PROCEDURE — 83735 ASSAY OF MAGNESIUM: CPT

## 2022-04-13 NOTE — ED PROVIDER NOTES
[facility] [place] [facility] EM[place]GENCY [place]  eMERGENCY dEPARTMENT eNCOUnter      Pt Name: Elsie Barcenas  MRN: [id number]  [place] 1970  Date of evaluation: 4/12/2022  Provider: Vikas Harrington MD    CHIEF COMPLAINT       Chief Complaint   Patient presents with    Abnormal Lab     pt states that she had blood drawn today and was called by her Dr who stated that her kidneys are not functioning- pt hasn't urinated since this AM         HISTORY OF PRESENT ILLNESS   (Location/Symptom, Timing/Onset,Context/Setting, Quality, Duration, Modifying Factors, Severity)  Note limiting factors. Elsie Barcenas is a 49 y.o. female who presents to the emergency department generalized weakness. HPI     Patient is a 60-year-old [facility] female with history of DKA; type 2 diabetes; hypokalemia; CKD; anemia; chronic constipation; rheumatoid arthritis; recently admitted an outside hospital for DKA and other electrolyte abnormalities who presents with lightheadedness and near syncope and feeling nauseated although no vomiting over the last few days. She says sometimes she feels like she is going to pass out. She went to see her primary care doctor who ordered some lab work and told that she needs to get the hospital because her kidney function is not what it should be normally. She has no abdominal pain. She has no fever. She has no chest pain. She has no dysuria. NursingNotes were reviewed. REVIEW OF SYSTEMS    (2-9 systems for level 4, 10 or more for level 5)     Review of Systems   Constitutional: Negative for chills, diaphoresis, fatigue and fever. HENT: Negative for rhinorrhea, sinus pressure and sore throat. Eyes: Negative for visual disturbance. Respiratory: Negative for shortness of breath. Cardiovascular: Negative for chest pain. Gastrointestinal: Negative for abdominal pain, diarrhea, nausea and vomiting. Genitourinary: Negative for difficulty urinating and dysuria. Musculoskeletal: Negative for arthralgias and myalgias. Skin: Negative for rash. Neurological: Positive for weakness. Psychiatric/Behavioral: Negative for confusion. All other systems reviewed and are negative. PAST MEDICALHISTORY     Past Medical History:   Diagnosis Date    Allergic rhinitis     Arthritis     Asthma     Chronic kidney disease     Constipation     [facility]     Diabetic neuropathy (HCC)     Fibromyalgia     Gastroparesis     GERD (gastroesophageal reflux disease)     GERD (gastroesophageal reflux disease)     Hyperlipidemia     Hypertension     Hypokalemia     Migraines     Neuromuscular disorder (HCC)     Osteoarthritis     Pancreatitis     Plantar wart of left foot     Raynaud disease     Rheumatoid arthritis ([facility] [facility]     Tobacco abuse          SURGICAL HISTORY       Past Surgical History:   Procedure Laterality Date    APPENDECTOMY      CHOLECYSTECTOMY      HYSTERECTOMY           CURRENT MEDICATIONS     Discharge Medication List as of 4/13/2022  3:00 AM      CONTINUE these medications which have NOT CHANGED    Details   sodium bicarbonate 325 MG tablet Take 1 tablet by mouth 2 times daily, Disp-60 tablet, R-0Normal      omeprazole (PRILOSEC) 20 MG delayed release capsule Take 40 mg by mouth dailyHistorical Med      leucovorin calcium (WELLCOVORIN) 5 MG tablet Take 5 mg by mouth dailyHistorical Med      Vitamin D (CHOLECALCIFEROL) 50 MCG (2000 UT) TABS tablet Take 1 tablet by mouth daily, Disp-60 tablet, R-0Labeling may look different. 25 mcg=1000 Units. Please double check dosages. Normal      metFORMIN (GLUCOPHAGE) 500 MG tablet Take 1 tablet by mouth 2 times daily (with meals), Disp-60 tablet, R-0Normal      aspirin 81 MG chewable tablet Take 81 mg by mouth dailyHistorical Med      atorvastatin (LIPITOR) 20 MG tablet Take 20 mg by mouth nightlyHistorical Med      insulin glargine (LANTUS) 100 UNIT/ML injection vial Inject 10 Units into the skin dailyHistorical Med      calcium carbonate 600 MG TABS tablet Take 1 tablet by mouth dailyHistorical Med      cetirizine (ZYRTEC) 10 MG tablet Take 10 mg by mouth daily as needed for AllergiesHistorical Med      diclofenac sodium (VOLTAREN) 1 % GEL Apply topically 2 times daily as needed for Pain, Topical, 2 TIMES DAILY PRN, Historical Med      diphenhydrAMINE (BENADRYL) 50 MG capsule Take 50 mg by mouth nightlyHistorical Med      docusate sodium (COLACE) 100 MG capsule Take 100 mg by mouth 2 times dailyHistorical Med      folic acid (FOLVITE) 1 MG tablet Take 2 mg by mouth daily Historical Med      gabapentin (NEURONTIN) 600 MG tablet Take 600 mg by mouth 3 times daily. Historical Med      canagliflozin (INVOKANA) 300 MG TABS tablet Take 300 mg by mouth dailyHistorical Med      magnesium oxide (MAG-OX) 400 MG tablet Take 500 mg by mouth every 12 hoursHistorical Med      methotrexate (RHEUMATREX) 2.5 MG chemo tablet Take 15 mg by mouth once a week Takes on TuesdaysHistorical Med      famotidine (PEPCID) 40 MG tablet Take 40 mg by mouth dailyHistorical Med      tiZANidine (ZANAFLEX) 4 MG tablet Take 4 mg by mouth nightlyHistorical Med      potassium chloride (KLOR-CON M) 20 MEQ extended release tablet Take 20 mEq by mouth dailyHistorical Med      nortriptyline (PAMELOR) 25 MG capsule Take 50 mg by mouth nightlyHistorical Med      linaclotide (LINZESS) 290 MCG CAPS capsule Take 290 mcg by mouth dailyHistorical Med      bisacodyl (DULCOLAX) 5 MG EC tablet Take 5 mg by mouth daily as needed for ConstipationHistorical Med             ALLERGIES     Pineapple, Hydrocodone-acetaminophen, Penicillins, Sitagliptin, Bee venom, Hydrocodone, and Motrin [ibuprofen]    FAMILY HISTORY       Family History   Adopted: Yes          SOCIAL HISTORY       Social History     Socioeconomic History    Marital status:      Spouse name: None    Number of children: None    Years of education: None    Highest education level: None   Occupational History    None   Tobacco Use    Smoking status: Current Every Day Smoker    Smokeless tobacco: Never Used   Substance and Sexual Activity    Alcohol use: Never    Drug use: Never    Sexual activity: None   Other Topics Concern    None   Social History Narrative    None     Social Determinants of Health     Financial Resource Strain:     Difficulty of Paying Living Expenses: Not on file   Food Insecurity:     Worried About Running Out of Food in the Last Year: Not on file    Sabrina of Food in the Last Year: Not on file   Transportation Needs:     Lack of Transportation (Medical): Not on file    Lack of Transportation (Non-Medical):  Not on file   Physical Activity:     Days of Exercise per Week: Not on file    Minutes of Exercise per Session: Not on file   Stress:     Feeling of Stress : Not on file   Social Connections:     Frequency of Communication with Friends and Family: Not on file    Frequency of Social Gatherings with Friends and Family: Not on file    Attends Congregational Services: Not on file    Active Member of [address] [place] [address] or Organizations: Not on file    Attends Club or Organization Meetings: Not on file    Marital Status: Not on file   Intimate Partner Violence:     Fear of Current or Ex-Partner: Not on file    Emotionally Abused: Not on file    Physically Abused: Not on file    Sexually Abused: Not on file   Housing Stability:     Unable to Pay for Housing in the Last Year: Not on file    Number of [place] in the Last Year: Not on file    Unstable Housing in the Last Year: Not on file       SCREENINGS    [place] Coma Scale  Eye Opening: Spontaneous  Best Verbal Response: Oriented  Best Motor Response: Obeys commands  [place] Coma Scale Score: 15        PHYSICAL EXAM    (up to 7 for level 4, 8 or more for level 5)     ED Triage Vitals [04/12/22 2001]   BP Temp Temp src Pulse Resp SpO2 Height Weight   (!) 134/94 97.5 °F (36.4 °C) -- 107 17 96 % -- 78 lb (35.4 kg)       Physical Exam  Vitals and nursing note reviewed. Constitutional:       Appearance: She is well-developed. HENT:      Head: Normocephalic and atraumatic. Eyes:      General:         Right eye: No discharge. Left eye: No discharge. Conjunctiva/sclera: Conjunctivae normal.      Pupils: Pupils are equal, round, and reactive to light. Cardiovascular:      Rate and Rhythm: Normal rate and regular rhythm. Heart sounds: Normal heart sounds. Pulmonary:      Effort: Pulmonary effort is normal. No respiratory distress. Breath sounds: Normal breath sounds. No wheezing or rales. Abdominal:      General: Bowel sounds are normal.      Palpations: Abdomen is soft. There is no mass. Tenderness: There is no abdominal tenderness. There is no guarding or rebound. Musculoskeletal:         General: No tenderness. Normal range of motion. Cervical back: Normal range of motion and neck supple. Skin:     General: Skin is warm and dry. Capillary Refill: Capillary refill takes less than 2 seconds. Neurological:      Mental Status: She is alert and oriented to person, place, and time. Psychiatric:         Behavior: Behavior normal.         Thought Content:  Thought content normal.         Judgment: Judgment normal.         DIAGNOSTIC RESULTS     EKG: All EKG's areinterpreted by the Emergency Department Physician who either signs or Co-signs this chart in the absence of a cardiologist.    Sinus rhythm with a rate of 96 prolonged QT interval    RADIOLOGY:  Non-plain film images such as CT, Ultrasound and MRI are read by the radiologist. Plain radiographic images are visualized and preliminarily interpreted bythe emergency physician with the below findings:    No orders to display       LABS:  Labs Reviewed   CBC WITH AUTO DIFFERENTIAL - Abnormal; Notable for the following components:       Result Value    RBC 3.71 (*)     Hemoglobin 11.2 (*)     Hematocrit 36.5 (*)     MCHC 30.7 (*)     RDW 15.7 (*)     All other components within normal limits   COMPREHENSIVE METABOLIC PANEL W/ REFLEX TO MG FOR LOW K - Abnormal; Notable for the following components:    CO2 15 (*)     Glucose 134 (*)     BUN 34 (*)     CREATININE 2.0 (*)     GFR Non- 26 (*)     GFR African American 32 (*)     Alkaline Phosphatase 120 (*)     All other components within normal limits   URINALYSIS WITH REFLEX TO CULTURE - Abnormal; Notable for the following components:    Clarity, UA CLOUDY (*)     Protein, UA 30 (*)     Leukocyte Esterase, Urine TRACE (*)     All other components within normal limits   MICROSCOPIC URINALYSIS - Abnormal; Notable for the following components:    RBC, UA 3-5 (*)     Bacteria, UA 3+ (*)     Crystals, UA POS (*)     All other components within normal limits   BASIC METABOLIC PANEL W/ REFLEX TO MG FOR LOW K - Abnormal; Notable for the following components:    Potassium reflex Magnesium 3.0 (*)     Chloride 112 (*)     CO2 14 (*)     Glucose 130 (*)     BUN 29 (*)     CREATININE 1.7 (*)     GFR Non- 32 (*)     GFR  38 (*)     Calcium 7.9 (*)     All other components within normal limits   MAGNESIUM - Abnormal; Notable for the following components:    Magnesium 1.5 (*)     All other components within normal limits   SPECIMEN REJECTION   LACTATE, SEPSIS   LACTATE, SEPSIS   TROPONIN       All other labs were within normal range or not returned as of this dictation. EMERGENCY DEPARTMENT COURSE and DIFFERENTIAL DIAGNOSIS/MDM:   Vitals:    Vitals:    04/12/22 2230 04/13/22 0130 04/13/22 0245 04/13/22 0315   BP: 113/69 93/66 95/66 119/73   Pulse: 94 88 86 80   Resp: 16 14 12 16   Temp:       SpO2: 97% 97% 98% 100%   Weight:           MDM     Patient's symptoms and labs improved somewhat in the ED; she is on potassium supplementation; I offered to supplement her potassium and admit her for further evaluation but she refused and wants to be discharge.   I emphasized that she will require close follow up and that she should increase her potassium supplementation and have labs re-drawn in 3-4 days. Patient voiced understanding. Reassessment    CONSULTS:  None    PROCEDURES:  Unless otherwise noted below, none     Procedures    FINAL IMPRESSION      1. Chronic kidney disease, unspecified CKD stage    2.  Hypokalemia          DISPOSITION/PLAN   DISPOSITION        PATIENT REFERRED TO:  Ju Billshannon [address]  [phone]    In 1 day        DISCHARGE MEDICATIONS:  Discharge Medication List as of 4/13/2022  3:00 AM             (Please note that portions of this note were completed with a voice recognition program.  Efforts were made to edit thedictations but occasionally words are mis-transcribed.)    Paulo Betts MD (electronically signed)  Attending Emergency Physician          Paulo Betts MD  04/13/22 8793

## 2022-04-26 ENCOUNTER — TELEMEDICINE (OUTPATIENT)
Dept: ENDOCRINOLOGY | Facility: CLINIC | Age: 52
End: 2022-04-26

## 2022-04-26 DIAGNOSIS — E55.9 VITAMIN D DEFICIENCY: ICD-10-CM

## 2022-04-26 DIAGNOSIS — F17.200 SMOKER: ICD-10-CM

## 2022-04-26 DIAGNOSIS — Z79.4 TYPE 2 DIABETES MELLITUS WITH HYPOGLYCEMIA WITHOUT COMA, WITH LONG-TERM CURRENT USE OF INSULIN: Primary | ICD-10-CM

## 2022-04-26 DIAGNOSIS — E11.649 TYPE 2 DIABETES MELLITUS WITH HYPOGLYCEMIA WITHOUT COMA, WITH LONG-TERM CURRENT USE OF INSULIN: Primary | ICD-10-CM

## 2022-04-26 DIAGNOSIS — E11.42 DIABETIC POLYNEUROPATHY ASSOCIATED WITH TYPE 2 DIABETES MELLITUS: ICD-10-CM

## 2022-04-26 PROCEDURE — 99214 OFFICE O/P EST MOD 30 MIN: CPT | Performed by: NURSE PRACTITIONER

## 2022-04-29 ENCOUNTER — HOSPITAL ENCOUNTER (INPATIENT)
Facility: HOSPITAL | Age: 52
LOS: 1 days | Discharge: HOME | End: 2022-04-30
Attending: INTERNAL MEDICINE | Admitting: FAMILY MEDICINE

## 2022-04-29 DIAGNOSIS — R13.10 DYSPHAGIA, UNSPECIFIED TYPE: Primary | ICD-10-CM

## 2022-04-29 DIAGNOSIS — E11.42 DIABETIC POLYNEUROPATHY ASSOCIATED WITH TYPE 2 DIABETES MELLITUS: ICD-10-CM

## 2022-04-29 PROBLEM — E87.20 METABOLIC ACIDOSIS: Status: ACTIVE | Noted: 2022-04-29

## 2022-04-29 PROBLEM — N18.9 ACUTE ON CHRONIC RENAL FAILURE (HCC): Status: ACTIVE | Noted: 2022-04-29

## 2022-04-29 PROBLEM — N17.9 ACUTE ON CHRONIC RENAL FAILURE: Status: ACTIVE | Noted: 2022-04-29

## 2022-04-29 PROBLEM — A41.9 SEPSIS: Status: ACTIVE | Noted: 2022-04-29

## 2022-04-29 LAB
ALBUMIN SERPL-MCNC: 3.7 G/DL (ref 3.5–5.2)
ALBUMIN/GLOB SERPL: 1.1 G/DL
ALP SERPL-CCNC: 138 U/L (ref 39–117)
ALT SERPL W P-5'-P-CCNC: 10 U/L (ref 1–33)
ANION GAP SERPL CALCULATED.3IONS-SCNC: 17 MMOL/L (ref 5–15)
AST SERPL-CCNC: 21 U/L (ref 1–32)
BASOPHILS # BLD AUTO: 0.04 10*3/MM3 (ref 0–0.2)
BASOPHILS NFR BLD AUTO: 0.3 % (ref 0–1.5)
BILIRUB SERPL-MCNC: 0.2 MG/DL (ref 0–1.2)
BUN SERPL-MCNC: 39 MG/DL (ref 6–20)
BUN/CREAT SERPL: 11.3 (ref 7–25)
CALCIUM SPEC-SCNC: 8.2 MG/DL (ref 8.6–10.5)
CHLORIDE SERPL-SCNC: 113 MMOL/L (ref 98–107)
CO2 SERPL-SCNC: 18 MMOL/L (ref 22–29)
CREAT SERPL-MCNC: 3.44 MG/DL (ref 0.57–1)
DEPRECATED RDW RBC AUTO: 53.3 FL (ref 37–54)
EGFRCR SERPLBLD CKD-EPI 2021: 15.6 ML/MIN/1.73
EOSINOPHIL # BLD AUTO: 0.51 10*3/MM3 (ref 0–0.4)
EOSINOPHIL NFR BLD AUTO: 3.9 % (ref 0.3–6.2)
ERYTHROCYTE [DISTWIDTH] IN BLOOD BY AUTOMATED COUNT: 15.6 % (ref 12.3–15.4)
GLOBULIN UR ELPH-MCNC: 3.4 GM/DL
GLUCOSE SERPL-MCNC: 91 MG/DL (ref 65–99)
HBA1C MFR BLD: 5.9 % (ref 4.8–5.6)
HCT VFR BLD AUTO: 31.5 % (ref 34–46.6)
HGB BLD-MCNC: 10.4 G/DL (ref 12–15.9)
IMM GRANULOCYTES # BLD AUTO: 0.03 10*3/MM3 (ref 0–0.05)
IMM GRANULOCYTES NFR BLD AUTO: 0.2 % (ref 0–0.5)
INR PPP: 1.09 (ref 0.91–1.09)
LYMPHOCYTES # BLD AUTO: 3.11 10*3/MM3 (ref 0.7–3.1)
LYMPHOCYTES NFR BLD AUTO: 23.5 % (ref 19.6–45.3)
MAGNESIUM SERPL-MCNC: 2.1 MG/DL (ref 1.6–2.6)
MCH RBC QN AUTO: 30.9 PG (ref 26.6–33)
MCHC RBC AUTO-ENTMCNC: 33 G/DL (ref 31.5–35.7)
MCV RBC AUTO: 93.5 FL (ref 79–97)
MONOCYTES # BLD AUTO: 0.56 10*3/MM3 (ref 0.1–0.9)
MONOCYTES NFR BLD AUTO: 4.2 % (ref 5–12)
NEUTROPHILS NFR BLD AUTO: 67.9 % (ref 42.7–76)
NEUTROPHILS NFR BLD AUTO: 8.96 10*3/MM3 (ref 1.7–7)
NRBC BLD AUTO-RTO: 0 /100 WBC (ref 0–0.2)
PHOSPHATE SERPL-MCNC: 4.7 MG/DL (ref 2.5–4.5)
PLATELET # BLD AUTO: 371 10*3/MM3 (ref 140–450)
PMV BLD AUTO: 10.2 FL (ref 6–12)
POTASSIUM SERPL-SCNC: 3.1 MMOL/L (ref 3.5–5.2)
PROT SERPL-MCNC: 7.1 G/DL (ref 6–8.5)
PROTHROMBIN TIME: 13.7 SECONDS (ref 11.9–14.6)
RBC # BLD AUTO: 3.37 10*6/MM3 (ref 3.77–5.28)
SODIUM SERPL-SCNC: 148 MMOL/L (ref 136–145)
TSH SERPL DL<=0.05 MIU/L-ACNC: 0.49 UIU/ML (ref 0.27–4.2)
WBC NRBC COR # BLD: 13.21 10*3/MM3 (ref 3.4–10.8)

## 2022-04-29 PROCEDURE — 83036 HEMOGLOBIN GLYCOSYLATED A1C: CPT | Performed by: FAMILY MEDICINE

## 2022-04-29 PROCEDURE — 85025 COMPLETE CBC W/AUTO DIFF WBC: CPT | Performed by: FAMILY MEDICINE

## 2022-04-29 PROCEDURE — 84100 ASSAY OF PHOSPHORUS: CPT | Performed by: FAMILY MEDICINE

## 2022-04-29 PROCEDURE — 84550 ASSAY OF BLOOD/URIC ACID: CPT | Performed by: FAMILY MEDICINE

## 2022-04-29 PROCEDURE — 83735 ASSAY OF MAGNESIUM: CPT | Performed by: FAMILY MEDICINE

## 2022-04-29 PROCEDURE — 84443 ASSAY THYROID STIM HORMONE: CPT | Performed by: FAMILY MEDICINE

## 2022-04-29 PROCEDURE — 85610 PROTHROMBIN TIME: CPT | Performed by: FAMILY MEDICINE

## 2022-04-29 PROCEDURE — 82533 TOTAL CORTISOL: CPT | Performed by: FAMILY MEDICINE

## 2022-04-29 PROCEDURE — 80053 COMPREHEN METABOLIC PANEL: CPT | Performed by: FAMILY MEDICINE

## 2022-04-29 RX ORDER — INSULIN LISPRO 100 [IU]/ML
2-7 INJECTION, SOLUTION INTRAVENOUS; SUBCUTANEOUS
Status: DISCONTINUED | OUTPATIENT
Start: 2022-04-30 | End: 2022-04-30 | Stop reason: HOSPADM

## 2022-04-29 RX ORDER — SODIUM CHLORIDE 9 MG/ML
75 INJECTION, SOLUTION INTRAVENOUS CONTINUOUS
Status: DISCONTINUED | OUTPATIENT
Start: 2022-04-29 | End: 2022-04-30

## 2022-04-29 RX ORDER — ACETAMINOPHEN 325 MG/1
650 TABLET ORAL EVERY 4 HOURS PRN
Status: DISCONTINUED | OUTPATIENT
Start: 2022-04-29 | End: 2022-04-30 | Stop reason: HOSPADM

## 2022-04-29 RX ORDER — DEXTROSE MONOHYDRATE 25 G/50ML
25 INJECTION, SOLUTION INTRAVENOUS
Status: DISCONTINUED | OUTPATIENT
Start: 2022-04-29 | End: 2022-04-30 | Stop reason: HOSPADM

## 2022-04-29 RX ORDER — NICOTINE POLACRILEX 4 MG
15 LOZENGE BUCCAL
Status: DISCONTINUED | OUTPATIENT
Start: 2022-04-29 | End: 2022-04-30 | Stop reason: HOSPADM

## 2022-04-29 RX ORDER — SODIUM CHLORIDE 0.9 % (FLUSH) 0.9 %
10 SYRINGE (ML) INJECTION EVERY 12 HOURS SCHEDULED
Status: DISCONTINUED | OUTPATIENT
Start: 2022-04-29 | End: 2022-04-30 | Stop reason: HOSPADM

## 2022-04-29 RX ORDER — ACETAMINOPHEN 650 MG/1
650 SUPPOSITORY RECTAL EVERY 4 HOURS PRN
Status: DISCONTINUED | OUTPATIENT
Start: 2022-04-29 | End: 2022-04-30 | Stop reason: HOSPADM

## 2022-04-29 RX ORDER — POTASSIUM CHLORIDE 750 MG/1
40 CAPSULE, EXTENDED RELEASE ORAL
Status: COMPLETED | OUTPATIENT
Start: 2022-04-30 | End: 2022-04-30

## 2022-04-29 RX ORDER — SODIUM CHLORIDE 0.9 % (FLUSH) 0.9 %
10 SYRINGE (ML) INJECTION AS NEEDED
Status: DISCONTINUED | OUTPATIENT
Start: 2022-04-29 | End: 2022-04-30 | Stop reason: HOSPADM

## 2022-04-30 ENCOUNTER — READMISSION MANAGEMENT (OUTPATIENT)
Dept: CALL CENTER | Facility: HOSPITAL | Age: 52
End: 2022-04-30

## 2022-04-30 VITALS
TEMPERATURE: 97.8 F | HEIGHT: 60 IN | DIASTOLIC BLOOD PRESSURE: 89 MMHG | OXYGEN SATURATION: 100 % | SYSTOLIC BLOOD PRESSURE: 117 MMHG | WEIGHT: 76.94 LBS | HEART RATE: 90 BPM | BODY MASS INDEX: 15.11 KG/M2 | RESPIRATION RATE: 18 BRPM

## 2022-04-30 PROBLEM — N18.32 STAGE 3B CHRONIC KIDNEY DISEASE: Status: ACTIVE | Noted: 2022-04-30

## 2022-04-30 PROBLEM — E87.6 HYPOKALEMIA: Status: ACTIVE | Noted: 2022-04-30

## 2022-04-30 PROBLEM — N17.9 ACUTE RENAL FAILURE (ARF): Status: RESOLVED | Noted: 2019-10-05 | Resolved: 2022-04-30

## 2022-04-30 PROBLEM — N39.0 ACUTE UTI (URINARY TRACT INFECTION): Status: ACTIVE | Noted: 2022-04-30

## 2022-04-30 LAB
ALBUMIN SERPL-MCNC: 2.9 G/DL (ref 3.5–5.2)
ALBUMIN/GLOB SERPL: 1.1 G/DL
ALP SERPL-CCNC: 111 U/L (ref 39–117)
ALT SERPL W P-5'-P-CCNC: 7 U/L (ref 1–33)
ANION GAP SERPL CALCULATED.3IONS-SCNC: 12 MMOL/L (ref 5–15)
AST SERPL-CCNC: 12 U/L (ref 1–32)
BACTERIA UR QL AUTO: ABNORMAL /HPF
BASOPHILS # BLD AUTO: 0.03 10*3/MM3 (ref 0–0.2)
BASOPHILS NFR BLD AUTO: 0.3 % (ref 0–1.5)
BILIRUB SERPL-MCNC: <0.2 MG/DL (ref 0–1.2)
BILIRUB UR QL STRIP: NEGATIVE
BUN SERPL-MCNC: 33 MG/DL (ref 6–20)
BUN/CREAT SERPL: 12.5 (ref 7–25)
CALCIUM SPEC-SCNC: 7.3 MG/DL (ref 8.6–10.5)
CHLORIDE SERPL-SCNC: 115 MMOL/L (ref 98–107)
CLARITY UR: ABNORMAL
CO2 SERPL-SCNC: 15 MMOL/L (ref 22–29)
COLOR UR: YELLOW
CORTIS SERPL-MCNC: 17.4 MCG/DL
CREAT SERPL-MCNC: 2.63 MG/DL (ref 0.57–1)
CREAT UR-MCNC: 158.1 MG/DL
D-LACTATE SERPL-SCNC: 1 MMOL/L (ref 0.5–2)
DEPRECATED RDW RBC AUTO: 54.5 FL (ref 37–54)
EGFRCR SERPLBLD CKD-EPI 2021: 21.6 ML/MIN/1.73
EOSINOPHIL # BLD AUTO: 0.48 10*3/MM3 (ref 0–0.4)
EOSINOPHIL NFR BLD AUTO: 5.2 % (ref 0.3–6.2)
ERYTHROCYTE [DISTWIDTH] IN BLOOD BY AUTOMATED COUNT: 16 % (ref 12.3–15.4)
GLOBULIN UR ELPH-MCNC: 2.6 GM/DL
GLUCOSE BLDC GLUCOMTR-MCNC: 243 MG/DL (ref 70–130)
GLUCOSE BLDC GLUCOMTR-MCNC: 77 MG/DL (ref 70–130)
GLUCOSE SERPL-MCNC: 69 MG/DL (ref 65–99)
GLUCOSE UR STRIP-MCNC: NEGATIVE MG/DL
GRAN CASTS URNS QL MICRO: ABNORMAL /LPF
HCT VFR BLD AUTO: 29.2 % (ref 34–46.6)
HGB BLD-MCNC: 9.6 G/DL (ref 12–15.9)
HGB UR QL STRIP.AUTO: ABNORMAL
HYALINE CASTS UR QL AUTO: ABNORMAL /LPF
IMM GRANULOCYTES # BLD AUTO: 0.03 10*3/MM3 (ref 0–0.05)
IMM GRANULOCYTES NFR BLD AUTO: 0.3 % (ref 0–0.5)
INR PPP: 1.16 (ref 0.91–1.09)
KETONES UR QL STRIP: NEGATIVE
LEUKOCYTE ESTERASE UR QL STRIP.AUTO: ABNORMAL
LYMPHOCYTES # BLD AUTO: 2.88 10*3/MM3 (ref 0.7–3.1)
LYMPHOCYTES NFR BLD AUTO: 31.2 % (ref 19.6–45.3)
MAGNESIUM SERPL-MCNC: 1.7 MG/DL (ref 1.6–2.6)
MCH RBC QN AUTO: 31.1 PG (ref 26.6–33)
MCHC RBC AUTO-ENTMCNC: 32.9 G/DL (ref 31.5–35.7)
MCV RBC AUTO: 94.5 FL (ref 79–97)
MONOCYTES # BLD AUTO: 0.57 10*3/MM3 (ref 0.1–0.9)
MONOCYTES NFR BLD AUTO: 6.2 % (ref 5–12)
NEUTROPHILS NFR BLD AUTO: 5.24 10*3/MM3 (ref 1.7–7)
NEUTROPHILS NFR BLD AUTO: 56.8 % (ref 42.7–76)
NITRITE UR QL STRIP: NEGATIVE
NRBC BLD AUTO-RTO: 0 /100 WBC (ref 0–0.2)
PH UR STRIP.AUTO: 6 [PH] (ref 5–8)
PHOSPHATE SERPL-MCNC: 3.2 MG/DL (ref 2.5–4.5)
PLATELET # BLD AUTO: 321 10*3/MM3 (ref 140–450)
PMV BLD AUTO: 10 FL (ref 6–12)
POTASSIUM SERPL-SCNC: 4.1 MMOL/L (ref 3.5–5.2)
PROT SERPL-MCNC: 5.5 G/DL (ref 6–8.5)
PROT UR QL STRIP: ABNORMAL
PROTHROMBIN TIME: 14.4 SECONDS (ref 11.9–14.6)
RBC # BLD AUTO: 3.09 10*6/MM3 (ref 3.77–5.28)
RBC # UR STRIP: ABNORMAL /HPF
REF LAB TEST METHOD: ABNORMAL
SODIUM SERPL-SCNC: 142 MMOL/L (ref 136–145)
SODIUM UR-SCNC: <20 MMOL/L
SP GR UR STRIP: 1.02 (ref 1–1.03)
SQUAMOUS #/AREA URNS HPF: ABNORMAL /HPF
URATE SERPL-MCNC: 9 MG/DL (ref 2.4–5.7)
UROBILINOGEN UR QL STRIP: ABNORMAL
WBC # UR STRIP: ABNORMAL /HPF
WBC NRBC COR # BLD: 9.23 10*3/MM3 (ref 3.4–10.8)

## 2022-04-30 PROCEDURE — 63710000001 INSULIN LISPRO (HUMAN) PER 5 UNITS: Performed by: FAMILY MEDICINE

## 2022-04-30 PROCEDURE — 84100 ASSAY OF PHOSPHORUS: CPT | Performed by: FAMILY MEDICINE

## 2022-04-30 PROCEDURE — 85610 PROTHROMBIN TIME: CPT | Performed by: FAMILY MEDICINE

## 2022-04-30 PROCEDURE — 84300 ASSAY OF URINE SODIUM: CPT | Performed by: FAMILY MEDICINE

## 2022-04-30 PROCEDURE — 81001 URINALYSIS AUTO W/SCOPE: CPT | Performed by: FAMILY MEDICINE

## 2022-04-30 PROCEDURE — 92610 EVALUATE SWALLOWING FUNCTION: CPT | Performed by: SPEECH-LANGUAGE PATHOLOGIST

## 2022-04-30 PROCEDURE — 82570 ASSAY OF URINE CREATININE: CPT | Performed by: FAMILY MEDICINE

## 2022-04-30 PROCEDURE — 82962 GLUCOSE BLOOD TEST: CPT

## 2022-04-30 PROCEDURE — 25010000002 CEFTRIAXONE PER 250 MG: Performed by: FAMILY MEDICINE

## 2022-04-30 PROCEDURE — 80053 COMPREHEN METABOLIC PANEL: CPT | Performed by: FAMILY MEDICINE

## 2022-04-30 PROCEDURE — 83605 ASSAY OF LACTIC ACID: CPT | Performed by: FAMILY MEDICINE

## 2022-04-30 PROCEDURE — 87086 URINE CULTURE/COLONY COUNT: CPT | Performed by: FAMILY MEDICINE

## 2022-04-30 PROCEDURE — 85025 COMPLETE CBC W/AUTO DIFF WBC: CPT | Performed by: FAMILY MEDICINE

## 2022-04-30 PROCEDURE — 83735 ASSAY OF MAGNESIUM: CPT | Performed by: FAMILY MEDICINE

## 2022-04-30 RX ORDER — FAMOTIDINE 20 MG/1
40 TABLET, FILM COATED ORAL NIGHTLY
Status: DISCONTINUED | OUTPATIENT
Start: 2022-04-30 | End: 2022-04-30 | Stop reason: HOSPADM

## 2022-04-30 RX ORDER — NORTRIPTYLINE HYDROCHLORIDE 25 MG/1
25 CAPSULE ORAL NIGHTLY
Status: DISCONTINUED | OUTPATIENT
Start: 2022-04-30 | End: 2022-04-30 | Stop reason: HOSPADM

## 2022-04-30 RX ORDER — PHENOL 1.4 %
600 AEROSOL, SPRAY (ML) MUCOUS MEMBRANE DAILY
COMMUNITY

## 2022-04-30 RX ORDER — CEFDINIR 300 MG/1
300 CAPSULE ORAL
Status: DISCONTINUED | OUTPATIENT
Start: 2022-04-30 | End: 2022-04-30 | Stop reason: HOSPADM

## 2022-04-30 RX ORDER — FAMOTIDINE 40 MG/1
40 TABLET, FILM COATED ORAL
Status: ON HOLD | COMMUNITY
End: 2022-06-11

## 2022-04-30 RX ORDER — DULOXETIN HYDROCHLORIDE 30 MG/1
30 CAPSULE, DELAYED RELEASE ORAL DAILY
COMMUNITY

## 2022-04-30 RX ORDER — MULTIPLE VITAMINS W/ MINERALS TAB 9MG-400MCG
1 TAB ORAL DAILY
Status: ON HOLD | COMMUNITY
End: 2022-06-11

## 2022-04-30 RX ORDER — SODIUM BICARBONATE 650 MG/1
325 TABLET ORAL 2 TIMES DAILY
Status: DISCONTINUED | OUTPATIENT
Start: 2022-04-30 | End: 2022-04-30 | Stop reason: HOSPADM

## 2022-04-30 RX ORDER — NORTRIPTYLINE HYDROCHLORIDE 50 MG/1
50 CAPSULE ORAL
COMMUNITY

## 2022-04-30 RX ORDER — LANOLIN ALCOHOL/MO/W.PET/CERES
500 CREAM (GRAM) TOPICAL 2 TIMES DAILY
COMMUNITY
End: 2022-04-30 | Stop reason: HOSPADM

## 2022-04-30 RX ORDER — GABAPENTIN 100 MG/1
100 CAPSULE ORAL EVERY 8 HOURS SCHEDULED
Status: DISCONTINUED | OUTPATIENT
Start: 2022-04-30 | End: 2022-04-30 | Stop reason: HOSPADM

## 2022-04-30 RX ORDER — LEFLUNOMIDE 20 MG/1
20 TABLET ORAL DAILY
COMMUNITY

## 2022-04-30 RX ORDER — ROPINIROLE 1 MG/1
1 TABLET, FILM COATED ORAL NIGHTLY
COMMUNITY

## 2022-04-30 RX ORDER — GABAPENTIN 100 MG/1
100 CAPSULE ORAL EVERY 8 HOURS SCHEDULED
Qty: 30 CAPSULE | Refills: 0 | Status: ON HOLD | OUTPATIENT
Start: 2022-04-30 | End: 2022-06-11

## 2022-04-30 RX ORDER — SODIUM CHLORIDE 9 MG/ML
125 INJECTION, SOLUTION INTRAVENOUS CONTINUOUS
Status: DISCONTINUED | OUTPATIENT
Start: 2022-04-30 | End: 2022-04-30 | Stop reason: HOSPADM

## 2022-04-30 RX ORDER — CEFDINIR 300 MG/1
300 CAPSULE ORAL
Qty: 5 CAPSULE | Refills: 0 | Status: SHIPPED | OUTPATIENT
Start: 2022-05-01 | End: 2022-05-06

## 2022-04-30 RX ORDER — SODIUM BICARBONATE 650 MG/1
1300 TABLET ORAL 2 TIMES DAILY
COMMUNITY
End: 2022-10-31

## 2022-04-30 RX ORDER — ONDANSETRON 4 MG/1
4 TABLET, FILM COATED ORAL EVERY 8 HOURS PRN
COMMUNITY

## 2022-04-30 RX ADMIN — CEFTRIAXONE SODIUM 1 G: 10 INJECTION, POWDER, FOR SOLUTION INTRAVENOUS at 00:20

## 2022-04-30 RX ADMIN — CEFDINIR 300 MG: 300 CAPSULE ORAL at 12:12

## 2022-04-30 RX ADMIN — SODIUM BICARBONATE 325 MG: 650 TABLET ORAL at 12:12

## 2022-04-30 RX ADMIN — POTASSIUM CHLORIDE 40 MEQ: 10 CAPSULE, COATED, EXTENDED RELEASE ORAL at 00:20

## 2022-04-30 RX ADMIN — INSULIN LISPRO 3 UNITS: 100 INJECTION, SOLUTION INTRAVENOUS; SUBCUTANEOUS at 12:12

## 2022-04-30 RX ADMIN — SODIUM BICARBONATE 75 MEQ: 84 INJECTION, SOLUTION INTRAVENOUS at 09:57

## 2022-04-30 RX ADMIN — POTASSIUM CHLORIDE 40 MEQ: 10 CAPSULE, COATED, EXTENDED RELEASE ORAL at 03:14

## 2022-04-30 RX ADMIN — Medication 1 TABLET: at 12:12

## 2022-04-30 RX ADMIN — SODIUM CHLORIDE 125 ML/HR: 9 INJECTION, SOLUTION INTRAVENOUS at 11:35

## 2022-04-30 RX ADMIN — GABAPENTIN 100 MG: 100 CAPSULE ORAL at 14:00

## 2022-04-30 RX ADMIN — SODIUM CHLORIDE 75 ML/HR: 9 INJECTION, SOLUTION INTRAVENOUS at 01:10

## 2022-04-30 RX ADMIN — SODIUM CHLORIDE 1000 ML: 9 INJECTION, SOLUTION INTRAVENOUS at 00:21

## 2022-05-01 LAB — BACTERIA SPEC AEROBE CULT: NO GROWTH

## 2022-05-04 ENCOUNTER — READMISSION MANAGEMENT (OUTPATIENT)
Dept: CALL CENTER | Facility: HOSPITAL | Age: 52
End: 2022-05-04

## 2022-05-06 ENCOUNTER — READMISSION MANAGEMENT (OUTPATIENT)
Dept: CALL CENTER | Facility: HOSPITAL | Age: 52
End: 2022-05-06

## 2022-05-19 ENCOUNTER — READMISSION MANAGEMENT (OUTPATIENT)
Dept: CALL CENTER | Facility: HOSPITAL | Age: 52
End: 2022-05-19

## 2022-05-25 ENCOUNTER — TELEMEDICINE (OUTPATIENT)
Dept: ENDOCRINOLOGY | Facility: CLINIC | Age: 52
End: 2022-05-25

## 2022-05-25 DIAGNOSIS — E11.65 TYPE 2 DIABETES MELLITUS WITH HYPERGLYCEMIA, WITH LONG-TERM CURRENT USE OF INSULIN: Primary | ICD-10-CM

## 2022-05-25 DIAGNOSIS — Z79.4 TYPE 2 DIABETES MELLITUS WITH HYPERGLYCEMIA, WITH LONG-TERM CURRENT USE OF INSULIN: Primary | ICD-10-CM

## 2022-05-25 DIAGNOSIS — E55.9 VITAMIN D DEFICIENCY: ICD-10-CM

## 2022-05-25 DIAGNOSIS — F17.200 SMOKER: ICD-10-CM

## 2022-05-25 PROCEDURE — 99214 OFFICE O/P EST MOD 30 MIN: CPT | Performed by: NURSE PRACTITIONER

## 2022-06-06 ENCOUNTER — TELEPHONE (OUTPATIENT)
Dept: ENDOCRINOLOGY | Facility: CLINIC | Age: 52
End: 2022-06-06

## 2022-06-11 ENCOUNTER — HOSPITAL ENCOUNTER (OUTPATIENT)
Facility: HOSPITAL | Age: 52
Discharge: HOME | End: 2022-06-17
Attending: FAMILY MEDICINE | Admitting: INTERNAL MEDICINE

## 2022-06-11 DIAGNOSIS — E43 SEVERE MALNUTRITION: Primary | ICD-10-CM

## 2022-06-11 PROBLEM — N18.9 ACUTE ON CHRONIC RENAL FAILURE: Status: ACTIVE | Noted: 2022-06-11

## 2022-06-11 PROBLEM — N17.9 ACUTE ON CHRONIC RENAL FAILURE: Status: ACTIVE | Noted: 2022-06-11

## 2022-06-11 PROBLEM — N17.9 AKI (ACUTE KIDNEY INJURY): Status: ACTIVE | Noted: 2022-06-11

## 2022-06-11 LAB
25(OH)D3 SERPL-MCNC: 52.4 NG/ML (ref 30–100)
ANION GAP SERPL CALCULATED.3IONS-SCNC: 14 MMOL/L (ref 5–15)
BACTERIA UR QL AUTO: ABNORMAL /HPF
BASOPHILS # BLD AUTO: 0.04 10*3/MM3 (ref 0–0.2)
BASOPHILS NFR BLD AUTO: 0.4 % (ref 0–1.5)
BILIRUB UR QL STRIP: NEGATIVE
BUN SERPL-MCNC: 59 MG/DL (ref 6–20)
BUN/CREAT SERPL: 20.8 (ref 7–25)
CALCIUM SPEC-SCNC: 8.2 MG/DL (ref 8.6–10.5)
CHLORIDE SERPL-SCNC: 107 MMOL/L (ref 98–107)
CLARITY UR: CLEAR
CO2 SERPL-SCNC: 12 MMOL/L (ref 22–29)
COLOR UR: YELLOW
CREAT SERPL-MCNC: 2.83 MG/DL (ref 0.57–1)
CREAT UR-MCNC: 80.8 MG/DL
DEPRECATED RDW RBC AUTO: 51.4 FL (ref 37–54)
EGFRCR SERPLBLD CKD-EPI 2021: 19.7 ML/MIN/1.73
EOSINOPHIL # BLD AUTO: 0.37 10*3/MM3 (ref 0–0.4)
EOSINOPHIL NFR BLD AUTO: 3.9 % (ref 0.3–6.2)
EOSINOPHIL SPEC QL MICRO: 0 % EOS/100 CELLS (ref 0–0)
ERYTHROCYTE [DISTWIDTH] IN BLOOD BY AUTOMATED COUNT: 14.6 % (ref 12.3–15.4)
GLUCOSE SERPL-MCNC: 154 MG/DL (ref 65–99)
GLUCOSE UR STRIP-MCNC: NEGATIVE MG/DL
HBA1C MFR BLD: 5.9 % (ref 4.8–5.6)
HCT VFR BLD AUTO: 34.1 % (ref 34–46.6)
HGB BLD-MCNC: 10.9 G/DL (ref 12–15.9)
HGB UR QL STRIP.AUTO: NEGATIVE
HYALINE CASTS UR QL AUTO: ABNORMAL /LPF
IMM GRANULOCYTES # BLD AUTO: 0.03 10*3/MM3 (ref 0–0.05)
IMM GRANULOCYTES NFR BLD AUTO: 0.3 % (ref 0–0.5)
KETONES UR QL STRIP: NEGATIVE
LEUKOCYTE ESTERASE UR QL STRIP.AUTO: NEGATIVE
LYMPHOCYTES # BLD AUTO: 2.25 10*3/MM3 (ref 0.7–3.1)
LYMPHOCYTES NFR BLD AUTO: 23.8 % (ref 19.6–45.3)
MAGNESIUM SERPL-MCNC: 1.6 MG/DL (ref 1.6–2.6)
MCH RBC QN AUTO: 30.7 PG (ref 26.6–33)
MCHC RBC AUTO-ENTMCNC: 32 G/DL (ref 31.5–35.7)
MCV RBC AUTO: 96.1 FL (ref 79–97)
MONOCYTES # BLD AUTO: 0.65 10*3/MM3 (ref 0.1–0.9)
MONOCYTES NFR BLD AUTO: 6.9 % (ref 5–12)
NEUTROPHILS NFR BLD AUTO: 6.12 10*3/MM3 (ref 1.7–7)
NEUTROPHILS NFR BLD AUTO: 64.7 % (ref 42.7–76)
NITRITE UR QL STRIP: NEGATIVE
NRBC BLD AUTO-RTO: 0 /100 WBC (ref 0–0.2)
OSMOLALITY UR: 434 MOSM/KG (ref 50–1400)
PH UR STRIP.AUTO: 6 [PH] (ref 5–8)
PHOSPHATE SERPL-MCNC: 4.2 MG/DL (ref 2.5–4.5)
PLATELET # BLD AUTO: 294 10*3/MM3 (ref 140–450)
PMV BLD AUTO: 11 FL (ref 6–12)
POTASSIUM SERPL-SCNC: 3.3 MMOL/L (ref 3.5–5.2)
PROT ?TM UR-MCNC: 57.2 MG/DL
PROT UR QL STRIP: ABNORMAL
RBC # BLD AUTO: 3.55 10*6/MM3 (ref 3.77–5.28)
RBC # UR STRIP: ABNORMAL /HPF
REF LAB TEST METHOD: ABNORMAL
SODIUM SERPL-SCNC: 133 MMOL/L (ref 136–145)
SODIUM UR-SCNC: <20 MMOL/L
SP GR UR STRIP: 1.01 (ref 1–1.03)
SQUAMOUS #/AREA URNS HPF: ABNORMAL /HPF
UROBILINOGEN UR QL STRIP: ABNORMAL
WBC # UR STRIP: ABNORMAL /HPF
WBC NRBC COR # BLD: 9.46 10*3/MM3 (ref 3.4–10.8)

## 2022-06-11 PROCEDURE — 83935 ASSAY OF URINE OSMOLALITY: CPT | Performed by: CLINICAL NURSE SPECIALIST

## 2022-06-11 PROCEDURE — 25010000002 ONDANSETRON PER 1 MG: Performed by: FAMILY MEDICINE

## 2022-06-11 PROCEDURE — 84156 ASSAY OF PROTEIN URINE: CPT | Performed by: CLINICAL NURSE SPECIALIST

## 2022-06-11 PROCEDURE — 80048 BASIC METABOLIC PNL TOTAL CA: CPT | Performed by: FAMILY MEDICINE

## 2022-06-11 PROCEDURE — G0378 HOSPITAL OBSERVATION PER HR: HCPCS

## 2022-06-11 PROCEDURE — 87205 SMEAR GRAM STAIN: CPT | Performed by: CLINICAL NURSE SPECIALIST

## 2022-06-11 PROCEDURE — 82570 ASSAY OF URINE CREATININE: CPT | Performed by: CLINICAL NURSE SPECIALIST

## 2022-06-11 PROCEDURE — 83036 HEMOGLOBIN GLYCOSYLATED A1C: CPT | Performed by: FAMILY MEDICINE

## 2022-06-11 PROCEDURE — 85025 COMPLETE CBC W/AUTO DIFF WBC: CPT | Performed by: FAMILY MEDICINE

## 2022-06-11 PROCEDURE — 84100 ASSAY OF PHOSPHORUS: CPT | Performed by: CLINICAL NURSE SPECIALIST

## 2022-06-11 PROCEDURE — 83735 ASSAY OF MAGNESIUM: CPT | Performed by: CLINICAL NURSE SPECIALIST

## 2022-06-11 PROCEDURE — 84300 ASSAY OF URINE SODIUM: CPT | Performed by: CLINICAL NURSE SPECIALIST

## 2022-06-11 PROCEDURE — 82306 VITAMIN D 25 HYDROXY: CPT | Performed by: CLINICAL NURSE SPECIALIST

## 2022-06-11 PROCEDURE — 81001 URINALYSIS AUTO W/SCOPE: CPT | Performed by: INTERNAL MEDICINE

## 2022-06-11 PROCEDURE — 96375 TX/PRO/DX INJ NEW DRUG ADDON: CPT

## 2022-06-11 RX ORDER — DOCUSATE SODIUM 100 MG/1
100 CAPSULE, LIQUID FILLED ORAL 2 TIMES DAILY
Status: DISCONTINUED | OUTPATIENT
Start: 2022-06-11 | End: 2022-06-17 | Stop reason: HOSPADM

## 2022-06-11 RX ORDER — ASPIRIN 81 MG/1
81 TABLET, CHEWABLE ORAL DAILY
Status: ON HOLD | COMMUNITY
End: 2022-06-17

## 2022-06-11 RX ORDER — BISACODYL 5 MG/1
5 TABLET, DELAYED RELEASE ORAL DAILY PRN
COMMUNITY

## 2022-06-11 RX ORDER — SODIUM BICARBONATE 650 MG/1
325 TABLET ORAL 4 TIMES DAILY
Status: DISCONTINUED | OUTPATIENT
Start: 2022-06-11 | End: 2022-06-15

## 2022-06-11 RX ORDER — DULOXETIN HYDROCHLORIDE 30 MG/1
30 CAPSULE, DELAYED RELEASE ORAL DAILY
Status: DISCONTINUED | OUTPATIENT
Start: 2022-06-11 | End: 2022-06-17 | Stop reason: HOSPADM

## 2022-06-11 RX ORDER — ACETAMINOPHEN 325 MG/1
650 TABLET ORAL EVERY 4 HOURS PRN
Status: DISCONTINUED | OUTPATIENT
Start: 2022-06-11 | End: 2022-06-17 | Stop reason: HOSPADM

## 2022-06-11 RX ORDER — ATORVASTATIN CALCIUM 40 MG/1
40 TABLET, FILM COATED ORAL DAILY
COMMUNITY

## 2022-06-11 RX ORDER — NIFEDIPINE 60 MG/1
60 TABLET, EXTENDED RELEASE ORAL DAILY
COMMUNITY

## 2022-06-11 RX ORDER — POTASSIUM CHLORIDE 1500 MG/1
20 TABLET, FILM COATED, EXTENDED RELEASE ORAL DAILY
COMMUNITY

## 2022-06-11 RX ORDER — SODIUM CHLORIDE 450 MG/100ML
100 INJECTION, SOLUTION INTRAVENOUS CONTINUOUS
Status: DISCONTINUED | OUTPATIENT
Start: 2022-06-11 | End: 2022-06-11

## 2022-06-11 RX ORDER — FAMOTIDINE 20 MG/1
40 TABLET, FILM COATED ORAL
Status: DISCONTINUED | OUTPATIENT
Start: 2022-06-11 | End: 2022-06-11

## 2022-06-11 RX ORDER — NICOTINE POLACRILEX 4 MG
15 LOZENGE BUCCAL
Status: DISCONTINUED | OUTPATIENT
Start: 2022-06-11 | End: 2022-06-17 | Stop reason: HOSPADM

## 2022-06-11 RX ORDER — GABAPENTIN 400 MG/1
400 CAPSULE ORAL 3 TIMES DAILY
COMMUNITY

## 2022-06-11 RX ORDER — PANTOPRAZOLE SODIUM 40 MG/1
40 TABLET, DELAYED RELEASE ORAL
Status: DISCONTINUED | OUTPATIENT
Start: 2022-06-11 | End: 2022-06-17 | Stop reason: HOSPADM

## 2022-06-11 RX ORDER — SODIUM CHLORIDE 9 MG/ML
100 INJECTION, SOLUTION INTRAVENOUS CONTINUOUS
Status: DISCONTINUED | OUTPATIENT
Start: 2022-06-11 | End: 2022-06-15

## 2022-06-11 RX ORDER — SODIUM CHLORIDE 0.9 % (FLUSH) 0.9 %
10 SYRINGE (ML) INJECTION AS NEEDED
Status: DISCONTINUED | OUTPATIENT
Start: 2022-06-11 | End: 2022-06-17 | Stop reason: HOSPADM

## 2022-06-11 RX ORDER — ROPINIROLE 1 MG/1
1 TABLET, FILM COATED ORAL NIGHTLY
Status: DISCONTINUED | OUTPATIENT
Start: 2022-06-11 | End: 2022-06-17 | Stop reason: HOSPADM

## 2022-06-11 RX ORDER — CALCIUM CARBONATE 500(1250)
500 TABLET ORAL DAILY
Status: DISCONTINUED | OUTPATIENT
Start: 2022-06-11 | End: 2022-06-17 | Stop reason: HOSPADM

## 2022-06-11 RX ORDER — SODIUM BICARBONATE 650 MG/1
325 TABLET ORAL 2 TIMES DAILY
Status: DISCONTINUED | OUTPATIENT
Start: 2022-06-11 | End: 2022-06-11

## 2022-06-11 RX ORDER — OMEPRAZOLE 40 MG/1
40 CAPSULE, DELAYED RELEASE ORAL DAILY
COMMUNITY

## 2022-06-11 RX ORDER — DEXTROSE MONOHYDRATE 25 G/50ML
25 INJECTION, SOLUTION INTRAVENOUS
Status: DISCONTINUED | OUTPATIENT
Start: 2022-06-11 | End: 2022-06-17 | Stop reason: HOSPADM

## 2022-06-11 RX ORDER — ONDANSETRON 2 MG/ML
4 INJECTION INTRAMUSCULAR; INTRAVENOUS EVERY 6 HOURS PRN
Status: DISCONTINUED | OUTPATIENT
Start: 2022-06-11 | End: 2022-06-17 | Stop reason: HOSPADM

## 2022-06-11 RX ORDER — MAGNESIUM OXIDE 400 MG/1
400 TABLET ORAL DAILY
Status: ON HOLD | COMMUNITY
End: 2022-07-15

## 2022-06-11 RX ORDER — INSULIN LISPRO 100 [IU]/ML
0-9 INJECTION, SOLUTION INTRAVENOUS; SUBCUTANEOUS
Status: DISCONTINUED | OUTPATIENT
Start: 2022-06-11 | End: 2022-06-12

## 2022-06-11 RX ORDER — SODIUM CHLORIDE 0.9 % (FLUSH) 0.9 %
10 SYRINGE (ML) INJECTION EVERY 12 HOURS SCHEDULED
Status: DISCONTINUED | OUTPATIENT
Start: 2022-06-11 | End: 2022-06-17 | Stop reason: HOSPADM

## 2022-06-11 RX ORDER — POTASSIUM CHLORIDE 750 MG/1
10 CAPSULE, EXTENDED RELEASE ORAL DAILY
Status: DISCONTINUED | OUTPATIENT
Start: 2022-06-11 | End: 2022-06-17 | Stop reason: HOSPADM

## 2022-06-11 RX ORDER — GABAPENTIN 100 MG/1
100 CAPSULE ORAL EVERY 8 HOURS SCHEDULED
Status: DISCONTINUED | OUTPATIENT
Start: 2022-06-11 | End: 2022-06-17 | Stop reason: HOSPADM

## 2022-06-11 RX ADMIN — SODIUM BICARBONATE 325 MG: 650 TABLET ORAL at 09:39

## 2022-06-11 RX ADMIN — Medication 500 MG: at 17:36

## 2022-06-11 RX ADMIN — SODIUM BICARBONATE 325 MG: 650 TABLET ORAL at 17:37

## 2022-06-11 RX ADMIN — DOCUSATE SODIUM 100 MG: 100 CAPSULE, LIQUID FILLED ORAL at 21:35

## 2022-06-11 RX ADMIN — DULOXETINE HYDROCHLORIDE 30 MG: 30 CAPSULE, DELAYED RELEASE ORAL at 09:40

## 2022-06-11 RX ADMIN — GABAPENTIN 100 MG: 100 CAPSULE ORAL at 21:34

## 2022-06-11 RX ADMIN — SODIUM BICARBONATE 75 MEQ: 84 INJECTION, SOLUTION INTRAVENOUS at 13:23

## 2022-06-11 RX ADMIN — ROPINIROLE HYDROCHLORIDE 1 MG: 1 TABLET, FILM COATED ORAL at 21:35

## 2022-06-11 RX ADMIN — SODIUM BICARBONATE 325 MG: 650 TABLET ORAL at 21:35

## 2022-06-11 RX ADMIN — FAMOTIDINE 40 MG: 20 TABLET, FILM COATED ORAL at 17:36

## 2022-06-11 RX ADMIN — DOCUSATE SODIUM 100 MG: 100 CAPSULE, LIQUID FILLED ORAL at 09:40

## 2022-06-11 RX ADMIN — PANTOPRAZOLE SODIUM 40 MG: 40 TABLET, DELAYED RELEASE ORAL at 05:56

## 2022-06-11 RX ADMIN — GABAPENTIN 100 MG: 100 CAPSULE ORAL at 13:23

## 2022-06-11 RX ADMIN — ONDANSETRON 4 MG: 2 INJECTION INTRAMUSCULAR; INTRAVENOUS at 13:23

## 2022-06-11 RX ADMIN — Medication 10 ML: at 21:36

## 2022-06-11 RX ADMIN — GABAPENTIN 100 MG: 100 CAPSULE ORAL at 05:56

## 2022-06-11 RX ADMIN — SODIUM CHLORIDE 100 ML/HR: 9 INJECTION, SOLUTION INTRAVENOUS at 01:26

## 2022-06-11 RX ADMIN — POTASSIUM CHLORIDE 10 MEQ: 10 CAPSULE, COATED, EXTENDED RELEASE ORAL at 17:36

## 2022-06-12 ENCOUNTER — APPOINTMENT (OUTPATIENT)
Dept: ULTRASOUND IMAGING | Facility: HOSPITAL | Age: 52
End: 2022-06-12

## 2022-06-12 LAB
ALBUMIN SERPL-MCNC: 3 G/DL (ref 3.5–5.2)
ALBUMIN/GLOB SERPL: 1.3 G/DL
ALP SERPL-CCNC: 89 U/L (ref 39–117)
ALT SERPL W P-5'-P-CCNC: 6 U/L (ref 1–33)
ANION GAP SERPL CALCULATED.3IONS-SCNC: 10 MMOL/L (ref 5–15)
ANION GAP SERPL CALCULATED.3IONS-SCNC: 8 MMOL/L (ref 5–15)
AST SERPL-CCNC: 12 U/L (ref 1–32)
BASOPHILS # BLD AUTO: 0.06 10*3/MM3 (ref 0–0.2)
BASOPHILS NFR BLD AUTO: 0.8 % (ref 0–1.5)
BILIRUB SERPL-MCNC: 0.2 MG/DL (ref 0–1.2)
BUN SERPL-MCNC: 36 MG/DL (ref 6–20)
BUN SERPL-MCNC: 41 MG/DL (ref 6–20)
BUN/CREAT SERPL: 13.1 (ref 7–25)
BUN/CREAT SERPL: 16.7 (ref 7–25)
CALCIUM SPEC-SCNC: 7.8 MG/DL (ref 8.6–10.5)
CALCIUM SPEC-SCNC: 7.9 MG/DL (ref 8.6–10.5)
CHLORIDE SERPL-SCNC: 105 MMOL/L (ref 98–107)
CHLORIDE SERPL-SCNC: 110 MMOL/L (ref 98–107)
CO2 SERPL-SCNC: 24 MMOL/L (ref 22–29)
CO2 SERPL-SCNC: 25 MMOL/L (ref 22–29)
CREAT SERPL-MCNC: 2.46 MG/DL (ref 0.57–1)
CREAT SERPL-MCNC: 2.74 MG/DL (ref 0.57–1)
DEPRECATED RDW RBC AUTO: 51.2 FL (ref 37–54)
EGFRCR SERPLBLD CKD-EPI 2021: 20.5 ML/MIN/1.73
EGFRCR SERPLBLD CKD-EPI 2021: 23.4 ML/MIN/1.73
EOSINOPHIL # BLD AUTO: 0.39 10*3/MM3 (ref 0–0.4)
EOSINOPHIL NFR BLD AUTO: 5 % (ref 0.3–6.2)
ERYTHROCYTE [DISTWIDTH] IN BLOOD BY AUTOMATED COUNT: 14.6 % (ref 12.3–15.4)
GLOBULIN UR ELPH-MCNC: 2.3 GM/DL
GLUCOSE SERPL-MCNC: 118 MG/DL (ref 65–99)
GLUCOSE SERPL-MCNC: 82 MG/DL (ref 65–99)
HCT VFR BLD AUTO: 29.3 % (ref 34–46.6)
HGB BLD-MCNC: 9.4 G/DL (ref 12–15.9)
IMM GRANULOCYTES # BLD AUTO: 0.03 10*3/MM3 (ref 0–0.05)
IMM GRANULOCYTES NFR BLD AUTO: 0.4 % (ref 0–0.5)
LYMPHOCYTES # BLD AUTO: 2.83 10*3/MM3 (ref 0.7–3.1)
LYMPHOCYTES NFR BLD AUTO: 36.5 % (ref 19.6–45.3)
MCH RBC QN AUTO: 30.8 PG (ref 26.6–33)
MCHC RBC AUTO-ENTMCNC: 32.1 G/DL (ref 31.5–35.7)
MCV RBC AUTO: 96.1 FL (ref 79–97)
MONOCYTES # BLD AUTO: 0.57 10*3/MM3 (ref 0.1–0.9)
MONOCYTES NFR BLD AUTO: 7.4 % (ref 5–12)
NEUTROPHILS NFR BLD AUTO: 3.87 10*3/MM3 (ref 1.7–7)
NEUTROPHILS NFR BLD AUTO: 49.9 % (ref 42.7–76)
NRBC BLD AUTO-RTO: 0 /100 WBC (ref 0–0.2)
PLATELET # BLD AUTO: 262 10*3/MM3 (ref 140–450)
PMV BLD AUTO: 10.5 FL (ref 6–12)
POTASSIUM SERPL-SCNC: 2.7 MMOL/L (ref 3.5–5.2)
POTASSIUM SERPL-SCNC: 2.8 MMOL/L (ref 3.5–5.2)
PROT SERPL-MCNC: 5.3 G/DL (ref 6–8.5)
RBC # BLD AUTO: 3.05 10*6/MM3 (ref 3.77–5.28)
SODIUM SERPL-SCNC: 140 MMOL/L (ref 136–145)
SODIUM SERPL-SCNC: 142 MMOL/L (ref 136–145)
URATE SERPL-MCNC: 6.6 MG/DL (ref 2.4–5.7)
WBC NRBC COR # BLD: 7.75 10*3/MM3 (ref 3.4–10.8)

## 2022-06-12 PROCEDURE — 84550 ASSAY OF BLOOD/URIC ACID: CPT | Performed by: INTERNAL MEDICINE

## 2022-06-12 PROCEDURE — 80053 COMPREHEN METABOLIC PANEL: CPT | Performed by: FAMILY MEDICINE

## 2022-06-12 PROCEDURE — 76775 US EXAM ABDO BACK WALL LIM: CPT

## 2022-06-12 PROCEDURE — G0378 HOSPITAL OBSERVATION PER HR: HCPCS

## 2022-06-12 PROCEDURE — 85025 COMPLETE CBC W/AUTO DIFF WBC: CPT | Performed by: FAMILY MEDICINE

## 2022-06-12 PROCEDURE — 82397 CHEMILUMINESCENT ASSAY: CPT | Performed by: INTERNAL MEDICINE

## 2022-06-12 PROCEDURE — 25010000002 MAGNESIUM SULFATE 2 GM/50ML SOLUTION

## 2022-06-12 PROCEDURE — 96365 THER/PROPH/DIAG IV INF INIT: CPT

## 2022-06-12 PROCEDURE — 96366 THER/PROPH/DIAG IV INF ADDON: CPT

## 2022-06-12 RX ORDER — MAGNESIUM SULFATE HEPTAHYDRATE 40 MG/ML
2 INJECTION, SOLUTION INTRAVENOUS ONCE
Status: COMPLETED | OUTPATIENT
Start: 2022-06-12 | End: 2022-06-12

## 2022-06-12 RX ORDER — POTASSIUM CHLORIDE 750 MG/1
30 CAPSULE, EXTENDED RELEASE ORAL
Status: COMPLETED | OUTPATIENT
Start: 2022-06-12 | End: 2022-06-12

## 2022-06-12 RX ORDER — POTASSIUM CHLORIDE 750 MG/1
40 CAPSULE, EXTENDED RELEASE ORAL ONCE
Status: COMPLETED | OUTPATIENT
Start: 2022-06-12 | End: 2022-06-12

## 2022-06-12 RX ADMIN — GABAPENTIN 100 MG: 100 CAPSULE ORAL at 06:01

## 2022-06-12 RX ADMIN — MAGNESIUM SULFATE HEPTAHYDRATE 2 G: 40 INJECTION, SOLUTION INTRAVENOUS at 16:28

## 2022-06-12 RX ADMIN — SODIUM BICARBONATE 75 MEQ: 84 INJECTION, SOLUTION INTRAVENOUS at 11:12

## 2022-06-12 RX ADMIN — POTASSIUM CHLORIDE 10 MEQ: 10 CAPSULE, COATED, EXTENDED RELEASE ORAL at 10:35

## 2022-06-12 RX ADMIN — SODIUM BICARBONATE 325 MG: 650 TABLET ORAL at 18:34

## 2022-06-12 RX ADMIN — SODIUM BICARBONATE 325 MG: 650 TABLET ORAL at 10:35

## 2022-06-12 RX ADMIN — DULOXETINE HYDROCHLORIDE 30 MG: 30 CAPSULE, DELAYED RELEASE ORAL at 10:34

## 2022-06-12 RX ADMIN — POTASSIUM CHLORIDE 40 MEQ: 10 CAPSULE, COATED, EXTENDED RELEASE ORAL at 10:34

## 2022-06-12 RX ADMIN — DOCUSATE SODIUM 100 MG: 100 CAPSULE, LIQUID FILLED ORAL at 21:27

## 2022-06-12 RX ADMIN — SODIUM BICARBONATE 75 MEQ: 84 INJECTION, SOLUTION INTRAVENOUS at 01:15

## 2022-06-12 RX ADMIN — GABAPENTIN 100 MG: 100 CAPSULE ORAL at 13:19

## 2022-06-12 RX ADMIN — POTASSIUM CHLORIDE 30 MEQ: 10 CAPSULE, COATED, EXTENDED RELEASE ORAL at 16:27

## 2022-06-12 RX ADMIN — ROPINIROLE HYDROCHLORIDE 1 MG: 1 TABLET, FILM COATED ORAL at 21:27

## 2022-06-12 RX ADMIN — PANTOPRAZOLE SODIUM 40 MG: 40 TABLET, DELAYED RELEASE ORAL at 06:01

## 2022-06-12 RX ADMIN — SODIUM BICARBONATE 325 MG: 650 TABLET ORAL at 13:19

## 2022-06-12 RX ADMIN — SODIUM BICARBONATE 325 MG: 650 TABLET ORAL at 21:27

## 2022-06-12 RX ADMIN — DOCUSATE SODIUM 100 MG: 100 CAPSULE, LIQUID FILLED ORAL at 10:34

## 2022-06-12 RX ADMIN — GABAPENTIN 100 MG: 100 CAPSULE ORAL at 21:27

## 2022-06-12 RX ADMIN — Medication 500 MG: at 10:34

## 2022-06-12 RX ADMIN — POTASSIUM CHLORIDE 30 MEQ: 10 CAPSULE, COATED, EXTENDED RELEASE ORAL at 18:34

## 2022-06-13 ENCOUNTER — APPOINTMENT (OUTPATIENT)
Dept: GENERAL RADIOLOGY | Facility: HOSPITAL | Age: 52
End: 2022-06-13

## 2022-06-13 LAB
ANION GAP SERPL CALCULATED.3IONS-SCNC: 8 MMOL/L (ref 5–15)
BASOPHILS # BLD AUTO: 0.03 10*3/MM3 (ref 0–0.2)
BASOPHILS NFR BLD AUTO: 0.5 % (ref 0–1.5)
BUN SERPL-MCNC: 28 MG/DL (ref 6–20)
BUN/CREAT SERPL: 14.3 (ref 7–25)
CALCIUM SPEC-SCNC: 7.4 MG/DL (ref 8.6–10.5)
CHLORIDE SERPL-SCNC: 110 MMOL/L (ref 98–107)
CO2 SERPL-SCNC: 26 MMOL/L (ref 22–29)
CREAT SERPL-MCNC: 1.96 MG/DL (ref 0.57–1)
DEPRECATED RDW RBC AUTO: 49.7 FL (ref 37–54)
EGFRCR SERPLBLD CKD-EPI 2021: 30.7 ML/MIN/1.73
EOSINOPHIL # BLD AUTO: 0.3 10*3/MM3 (ref 0–0.4)
EOSINOPHIL NFR BLD AUTO: 4.6 % (ref 0.3–6.2)
ERYTHROCYTE [DISTWIDTH] IN BLOOD BY AUTOMATED COUNT: 14.6 % (ref 12.3–15.4)
GLUCOSE BLDC GLUCOMTR-MCNC: 118 MG/DL (ref 70–130)
GLUCOSE BLDC GLUCOMTR-MCNC: 94 MG/DL (ref 70–130)
GLUCOSE SERPL-MCNC: 107 MG/DL (ref 65–99)
HCT VFR BLD AUTO: 26.8 % (ref 34–46.6)
HGB BLD-MCNC: 8.8 G/DL (ref 12–15.9)
IMM GRANULOCYTES # BLD AUTO: 0.01 10*3/MM3 (ref 0–0.05)
IMM GRANULOCYTES NFR BLD AUTO: 0.2 % (ref 0–0.5)
LYMPHOCYTES # BLD AUTO: 2.57 10*3/MM3 (ref 0.7–3.1)
LYMPHOCYTES NFR BLD AUTO: 39.5 % (ref 19.6–45.3)
MAGNESIUM SERPL-MCNC: 1.5 MG/DL (ref 1.6–2.6)
MCH RBC QN AUTO: 30.8 PG (ref 26.6–33)
MCHC RBC AUTO-ENTMCNC: 32.8 G/DL (ref 31.5–35.7)
MCV RBC AUTO: 93.7 FL (ref 79–97)
MONOCYTES # BLD AUTO: 0.55 10*3/MM3 (ref 0.1–0.9)
MONOCYTES NFR BLD AUTO: 8.4 % (ref 5–12)
NEUTROPHILS NFR BLD AUTO: 3.05 10*3/MM3 (ref 1.7–7)
NEUTROPHILS NFR BLD AUTO: 46.8 % (ref 42.7–76)
NRBC BLD AUTO-RTO: 0 /100 WBC (ref 0–0.2)
PHOSPHATE SERPL-MCNC: 1.1 MG/DL (ref 2.5–4.5)
PHOSPHATE SERPL-MCNC: 3.2 MG/DL (ref 2.5–4.5)
PLATELET # BLD AUTO: 268 10*3/MM3 (ref 140–450)
PMV BLD AUTO: 10.6 FL (ref 6–12)
POTASSIUM SERPL-SCNC: 3.7 MMOL/L (ref 3.5–5.2)
RBC # BLD AUTO: 2.86 10*6/MM3 (ref 3.77–5.28)
SODIUM SERPL-SCNC: 144 MMOL/L (ref 136–145)
WBC NRBC COR # BLD: 6.51 10*3/MM3 (ref 3.4–10.8)

## 2022-06-13 PROCEDURE — 99204 OFFICE O/P NEW MOD 45 MIN: CPT | Performed by: SPECIALIST

## 2022-06-13 PROCEDURE — 85025 COMPLETE CBC W/AUTO DIFF WBC: CPT | Performed by: FAMILY MEDICINE

## 2022-06-13 PROCEDURE — 99214 OFFICE O/P EST MOD 30 MIN: CPT | Performed by: NURSE PRACTITIONER

## 2022-06-13 PROCEDURE — 84100 ASSAY OF PHOSPHORUS: CPT | Performed by: FAMILY MEDICINE

## 2022-06-13 PROCEDURE — 74220 X-RAY XM ESOPHAGUS 1CNTRST: CPT

## 2022-06-13 PROCEDURE — G0378 HOSPITAL OBSERVATION PER HR: HCPCS

## 2022-06-13 PROCEDURE — 82962 GLUCOSE BLOOD TEST: CPT

## 2022-06-13 PROCEDURE — 83735 ASSAY OF MAGNESIUM: CPT

## 2022-06-13 PROCEDURE — 84100 ASSAY OF PHOSPHORUS: CPT

## 2022-06-13 PROCEDURE — 25010000002 MAGNESIUM SULFATE IN D5W 1G/100ML (PREMIX) 1-5 GM/100ML-% SOLUTION

## 2022-06-13 PROCEDURE — 80048 BASIC METABOLIC PNL TOTAL CA: CPT | Performed by: FAMILY MEDICINE

## 2022-06-13 PROCEDURE — 96375 TX/PRO/DX INJ NEW DRUG ADDON: CPT

## 2022-06-13 RX ORDER — MAGNESIUM SULFATE 1 G/100ML
1 INJECTION INTRAVENOUS ONCE
Status: COMPLETED | OUTPATIENT
Start: 2022-06-13 | End: 2022-06-13

## 2022-06-13 RX ADMIN — POTASSIUM PHOSPHATE, MONOBASIC AND POTASSIUM PHOSPHATE, DIBASIC 21 MMOL: 224; 236 INJECTION, SOLUTION, CONCENTRATE INTRAVENOUS at 08:27

## 2022-06-13 RX ADMIN — Medication 10 ML: at 08:28

## 2022-06-13 RX ADMIN — ROPINIROLE HYDROCHLORIDE 1 MG: 1 TABLET, FILM COATED ORAL at 21:34

## 2022-06-13 RX ADMIN — SODIUM BICARBONATE 325 MG: 650 TABLET ORAL at 21:34

## 2022-06-13 RX ADMIN — DOCUSATE SODIUM 100 MG: 100 CAPSULE, LIQUID FILLED ORAL at 21:34

## 2022-06-13 RX ADMIN — GABAPENTIN 100 MG: 100 CAPSULE ORAL at 06:05

## 2022-06-13 RX ADMIN — GABAPENTIN 100 MG: 100 CAPSULE ORAL at 21:34

## 2022-06-13 RX ADMIN — DEXTROSE MONOHYDRATE 25 G: 500 INJECTION PARENTERAL at 08:28

## 2022-06-13 RX ADMIN — SODIUM BICARBONATE 75 MEQ: 84 INJECTION, SOLUTION INTRAVENOUS at 01:52

## 2022-06-13 RX ADMIN — PANTOPRAZOLE SODIUM 40 MG: 40 TABLET, DELAYED RELEASE ORAL at 06:05

## 2022-06-13 RX ADMIN — MAGNESIUM SULFATE IN DEXTROSE 1 G: 10 INJECTION, SOLUTION INTRAVENOUS at 13:52

## 2022-06-13 RX ADMIN — Medication 10 ML: at 21:34

## 2022-06-13 RX ADMIN — SODIUM BICARBONATE 75 MEQ: 84 INJECTION, SOLUTION INTRAVENOUS at 23:42

## 2022-06-14 LAB
ANION GAP SERPL CALCULATED.3IONS-SCNC: 8 MMOL/L (ref 5–15)
BUN SERPL-MCNC: 24 MG/DL (ref 6–20)
BUN/CREAT SERPL: 12.6 (ref 7–25)
CALCIUM SPEC-SCNC: 7.3 MG/DL (ref 8.6–10.5)
CHLORIDE SERPL-SCNC: 101 MMOL/L (ref 98–107)
CO2 SERPL-SCNC: 32 MMOL/L (ref 22–29)
CREAT SERPL-MCNC: 1.91 MG/DL (ref 0.57–1)
DEPRECATED RDW RBC AUTO: 51.2 FL (ref 37–54)
EGFRCR SERPLBLD CKD-EPI 2021: 31.6 ML/MIN/1.73
ERYTHROCYTE [DISTWIDTH] IN BLOOD BY AUTOMATED COUNT: 14.8 % (ref 12.3–15.4)
GLUCOSE BLDC GLUCOMTR-MCNC: 400 MG/DL (ref 70–130)
GLUCOSE BLDC GLUCOMTR-MCNC: 431 MG/DL (ref 70–130)
GLUCOSE SERPL-MCNC: 139 MG/DL (ref 65–99)
HCT VFR BLD AUTO: 31 % (ref 34–46.6)
HGB BLD-MCNC: 9.8 G/DL (ref 12–15.9)
INR PPP: 1.05 (ref 0.91–1.09)
MAGNESIUM SERPL-MCNC: 1.4 MG/DL (ref 1.6–2.6)
MCH RBC QN AUTO: 30.3 PG (ref 26.6–33)
MCHC RBC AUTO-ENTMCNC: 31.6 G/DL (ref 31.5–35.7)
MCV RBC AUTO: 96 FL (ref 79–97)
PHOSPHATE SERPL-MCNC: 2.7 MG/DL (ref 2.5–4.5)
PLATELET # BLD AUTO: 281 10*3/MM3 (ref 140–450)
PMV BLD AUTO: 10.7 FL (ref 6–12)
POTASSIUM SERPL-SCNC: 4 MMOL/L (ref 3.5–5.2)
PROTHROMBIN TIME: 13.3 SECONDS (ref 11.9–14.6)
RBC # BLD AUTO: 3.23 10*6/MM3 (ref 3.77–5.28)
SODIUM SERPL-SCNC: 141 MMOL/L (ref 136–145)
WBC NRBC COR # BLD: 7.42 10*3/MM3 (ref 3.4–10.8)

## 2022-06-14 PROCEDURE — 82962 GLUCOSE BLOOD TEST: CPT

## 2022-06-14 PROCEDURE — 85610 PROTHROMBIN TIME: CPT | Performed by: NURSE PRACTITIONER

## 2022-06-14 PROCEDURE — G0378 HOSPITAL OBSERVATION PER HR: HCPCS

## 2022-06-14 PROCEDURE — 96361 HYDRATE IV INFUSION ADD-ON: CPT

## 2022-06-14 PROCEDURE — 80048 BASIC METABOLIC PNL TOTAL CA: CPT

## 2022-06-14 PROCEDURE — 83735 ASSAY OF MAGNESIUM: CPT

## 2022-06-14 PROCEDURE — 96366 THER/PROPH/DIAG IV INF ADDON: CPT

## 2022-06-14 PROCEDURE — 25010000002 MAGNESIUM SULFATE 2 GM/50ML SOLUTION

## 2022-06-14 PROCEDURE — 84100 ASSAY OF PHOSPHORUS: CPT

## 2022-06-14 PROCEDURE — 85027 COMPLETE CBC AUTOMATED: CPT

## 2022-06-14 PROCEDURE — 99214 OFFICE O/P EST MOD 30 MIN: CPT | Performed by: SPECIALIST

## 2022-06-14 RX ORDER — MAGNESIUM SULFATE HEPTAHYDRATE 40 MG/ML
2 INJECTION, SOLUTION INTRAVENOUS ONCE
Status: COMPLETED | OUTPATIENT
Start: 2022-06-14 | End: 2022-06-14

## 2022-06-14 RX ADMIN — SODIUM CHLORIDE 100 ML/HR: 9 INJECTION, SOLUTION INTRAVENOUS at 17:54

## 2022-06-14 RX ADMIN — MAGNESIUM SULFATE HEPTAHYDRATE 2 G: 2 INJECTION, SOLUTION INTRAVENOUS at 12:11

## 2022-06-14 RX ADMIN — SODIUM BICARBONATE 75 MEQ: 84 INJECTION, SOLUTION INTRAVENOUS at 13:54

## 2022-06-14 RX ADMIN — DOCUSATE SODIUM 100 MG: 100 CAPSULE, LIQUID FILLED ORAL at 21:24

## 2022-06-14 RX ADMIN — SODIUM BICARBONATE 325 MG: 650 TABLET ORAL at 21:24

## 2022-06-14 RX ADMIN — GABAPENTIN 100 MG: 100 CAPSULE ORAL at 21:24

## 2022-06-14 RX ADMIN — ROPINIROLE HYDROCHLORIDE 1 MG: 1 TABLET, FILM COATED ORAL at 21:24

## 2022-06-15 ENCOUNTER — ANESTHESIA (OUTPATIENT)
Dept: GASTROENTEROLOGY | Facility: HOSPITAL | Age: 52
End: 2022-06-15

## 2022-06-15 ENCOUNTER — ANESTHESIA EVENT (OUTPATIENT)
Dept: GASTROENTEROLOGY | Facility: HOSPITAL | Age: 52
End: 2022-06-15

## 2022-06-15 LAB
ANION GAP SERPL CALCULATED.3IONS-SCNC: 6 MMOL/L (ref 5–15)
BUN SERPL-MCNC: 17 MG/DL (ref 6–20)
BUN/CREAT SERPL: 14.7 (ref 7–25)
CALCIUM SPEC-SCNC: 7.4 MG/DL (ref 8.6–10.5)
CHLORIDE SERPL-SCNC: 103 MMOL/L (ref 98–107)
CO2 SERPL-SCNC: 33 MMOL/L (ref 22–29)
CREAT SERPL-MCNC: 1.16 MG/DL (ref 0.57–1)
DEPRECATED RDW RBC AUTO: 51.1 FL (ref 37–54)
EGFRCR SERPLBLD CKD-EPI 2021: 57.6 ML/MIN/1.73
ERYTHROCYTE [DISTWIDTH] IN BLOOD BY AUTOMATED COUNT: 14.4 % (ref 12.3–15.4)
GLUCOSE BLDC GLUCOMTR-MCNC: 115 MG/DL (ref 70–130)
GLUCOSE BLDC GLUCOMTR-MCNC: 91 MG/DL (ref 70–130)
GLUCOSE SERPL-MCNC: 149 MG/DL (ref 65–99)
HCT VFR BLD AUTO: 29.8 % (ref 34–46.6)
HGB BLD-MCNC: 9.5 G/DL (ref 12–15.9)
MAGNESIUM SERPL-MCNC: 1.7 MG/DL (ref 1.6–2.6)
MCH RBC QN AUTO: 30.9 PG (ref 26.6–33)
MCHC RBC AUTO-ENTMCNC: 31.9 G/DL (ref 31.5–35.7)
MCV RBC AUTO: 97.1 FL (ref 79–97)
PLATELET # BLD AUTO: 257 10*3/MM3 (ref 140–450)
PMV BLD AUTO: 10.9 FL (ref 6–12)
POTASSIUM SERPL-SCNC: 3.4 MMOL/L (ref 3.5–5.2)
RBC # BLD AUTO: 3.07 10*6/MM3 (ref 3.77–5.28)
SARS-COV-2 RNA PNL SPEC NAA+PROBE: NOT DETECTED
SODIUM SERPL-SCNC: 142 MMOL/L (ref 136–145)
WBC NRBC COR # BLD: 6.96 10*3/MM3 (ref 3.4–10.8)

## 2022-06-15 PROCEDURE — 82962 GLUCOSE BLOOD TEST: CPT

## 2022-06-15 PROCEDURE — G0378 HOSPITAL OBSERVATION PER HR: HCPCS

## 2022-06-15 PROCEDURE — 96361 HYDRATE IV INFUSION ADD-ON: CPT

## 2022-06-15 PROCEDURE — C1769 GUIDE WIRE: HCPCS | Performed by: INTERNAL MEDICINE

## 2022-06-15 PROCEDURE — 43246 EGD PLACE GASTROSTOMY TUBE: CPT | Performed by: INTERNAL MEDICINE

## 2022-06-15 PROCEDURE — 25010000002 CEFAZOLIN PER 500 MG: Performed by: INTERNAL MEDICINE

## 2022-06-15 PROCEDURE — 83735 ASSAY OF MAGNESIUM: CPT

## 2022-06-15 PROCEDURE — 99213 OFFICE O/P EST LOW 20 MIN: CPT | Performed by: NURSE PRACTITIONER

## 2022-06-15 PROCEDURE — 25010000002 CEFAZOLIN PER 500 MG: Performed by: NURSE PRACTITIONER

## 2022-06-15 PROCEDURE — 25010000002 PROPOFOL 10 MG/ML EMULSION: Performed by: NURSE ANESTHETIST, CERTIFIED REGISTERED

## 2022-06-15 PROCEDURE — 25010000002 SODIUM CHLORIDE 0.9 % WITH KCL 20 MEQ 20-0.9 MEQ/L-% SOLUTION: Performed by: INTERNAL MEDICINE

## 2022-06-15 PROCEDURE — 25010000002 POTASSIUM CHLORIDE PER 2 MEQ

## 2022-06-15 PROCEDURE — 80048 BASIC METABOLIC PNL TOTAL CA: CPT

## 2022-06-15 PROCEDURE — 85027 COMPLETE CBC AUTOMATED: CPT

## 2022-06-15 PROCEDURE — 87635 SARS-COV-2 COVID-19 AMP PRB: CPT | Performed by: NURSE PRACTITIONER

## 2022-06-15 PROCEDURE — 25010000002 MORPHINE SULFATE (PF) 2 MG/ML SOLUTION: Performed by: INTERNAL MEDICINE

## 2022-06-15 RX ORDER — SODIUM CHLORIDE AND POTASSIUM CHLORIDE 150; 900 MG/100ML; MG/100ML
75 INJECTION, SOLUTION INTRAVENOUS CONTINUOUS
Status: DISCONTINUED | OUTPATIENT
Start: 2022-06-15 | End: 2022-06-17 | Stop reason: HOSPADM

## 2022-06-15 RX ORDER — CETIRIZINE HYDROCHLORIDE 10 MG/1
5 TABLET ORAL DAILY
Status: DISCONTINUED | OUTPATIENT
Start: 2022-06-15 | End: 2022-06-17 | Stop reason: HOSPADM

## 2022-06-15 RX ORDER — BISACODYL 5 MG/1
5 TABLET, DELAYED RELEASE ORAL DAILY PRN
Status: DISCONTINUED | OUTPATIENT
Start: 2022-06-15 | End: 2022-06-17 | Stop reason: HOSPADM

## 2022-06-15 RX ORDER — LIDOCAINE HYDROCHLORIDE 10 MG/ML
0.5 INJECTION, SOLUTION EPIDURAL; INFILTRATION; INTRACAUDAL; PERINEURAL ONCE AS NEEDED
Status: DISCONTINUED | OUTPATIENT
Start: 2022-06-15 | End: 2022-06-15 | Stop reason: HOSPADM

## 2022-06-15 RX ORDER — LIDOCAINE HYDROCHLORIDE 20 MG/ML
INJECTION, SOLUTION EPIDURAL; INFILTRATION; INTRACAUDAL; PERINEURAL AS NEEDED
Status: DISCONTINUED | OUTPATIENT
Start: 2022-06-15 | End: 2022-06-15 | Stop reason: SURG

## 2022-06-15 RX ORDER — PROPOFOL 10 MG/ML
VIAL (ML) INTRAVENOUS AS NEEDED
Status: DISCONTINUED | OUTPATIENT
Start: 2022-06-15 | End: 2022-06-15 | Stop reason: SURG

## 2022-06-15 RX ORDER — MORPHINE SULFATE 2 MG/ML
1 INJECTION, SOLUTION INTRAMUSCULAR; INTRAVENOUS ONCE
Status: COMPLETED | OUTPATIENT
Start: 2022-06-15 | End: 2022-06-15

## 2022-06-15 RX ORDER — ATORVASTATIN CALCIUM 40 MG/1
40 TABLET, FILM COATED ORAL DAILY
Status: DISCONTINUED | OUTPATIENT
Start: 2022-06-15 | End: 2022-06-17 | Stop reason: HOSPADM

## 2022-06-15 RX ORDER — POTASSIUM CHLORIDE 14.9 MG/ML
20 INJECTION INTRAVENOUS
Status: COMPLETED | OUTPATIENT
Start: 2022-06-15 | End: 2022-06-15

## 2022-06-15 RX ORDER — CEFAZOLIN SODIUM 1 G/50ML
1 INJECTION, SOLUTION INTRAVENOUS ONCE
Status: DISCONTINUED | OUTPATIENT
Start: 2022-06-15 | End: 2022-06-15

## 2022-06-15 RX ORDER — SODIUM CHLORIDE 9 MG/ML
500 INJECTION, SOLUTION INTRAVENOUS CONTINUOUS PRN
Status: DISCONTINUED | OUTPATIENT
Start: 2022-06-15 | End: 2022-06-17 | Stop reason: HOSPADM

## 2022-06-15 RX ORDER — SODIUM CHLORIDE 0.9 % (FLUSH) 0.9 %
10 SYRINGE (ML) INJECTION AS NEEDED
Status: DISCONTINUED | OUTPATIENT
Start: 2022-06-15 | End: 2022-06-15 | Stop reason: HOSPADM

## 2022-06-15 RX ADMIN — PROPOFOL 40 MG: 10 INJECTION, EMULSION INTRAVENOUS at 13:38

## 2022-06-15 RX ADMIN — MORPHINE SULFATE 1 MG: 2 INJECTION, SOLUTION INTRAMUSCULAR; INTRAVENOUS at 16:15

## 2022-06-15 RX ADMIN — PROPOFOL 80 MG: 10 INJECTION, EMULSION INTRAVENOUS at 13:10

## 2022-06-15 RX ADMIN — PROPOFOL 30 MG: 10 INJECTION, EMULSION INTRAVENOUS at 13:53

## 2022-06-15 RX ADMIN — POTASSIUM CHLORIDE AND SODIUM CHLORIDE 75 ML/HR: 900; 150 INJECTION, SOLUTION INTRAVENOUS at 19:59

## 2022-06-15 RX ADMIN — POTASSIUM CHLORIDE 20 MEQ: 14.9 INJECTION, SOLUTION INTRAVENOUS at 08:12

## 2022-06-15 RX ADMIN — PROPOFOL 60 MG: 10 INJECTION, EMULSION INTRAVENOUS at 13:15

## 2022-06-15 RX ADMIN — SODIUM CHLORIDE 500 ML: 0.9 INJECTION, SOLUTION INTRAVENOUS at 12:28

## 2022-06-15 RX ADMIN — PROPOFOL 30 MG: 10 INJECTION, EMULSION INTRAVENOUS at 13:32

## 2022-06-15 RX ADMIN — PROPOFOL 40 MG: 10 INJECTION, EMULSION INTRAVENOUS at 13:22

## 2022-06-15 RX ADMIN — SODIUM CHLORIDE 100 ML/HR: 9 INJECTION, SOLUTION INTRAVENOUS at 04:06

## 2022-06-15 RX ADMIN — PROPOFOL 40 MG: 10 INJECTION, EMULSION INTRAVENOUS at 13:49

## 2022-06-15 RX ADMIN — PROPOFOL 60 MG: 10 INJECTION, EMULSION INTRAVENOUS at 13:19

## 2022-06-15 RX ADMIN — PROPOFOL 30 MG: 10 INJECTION, EMULSION INTRAVENOUS at 13:25

## 2022-06-15 RX ADMIN — LIDOCAINE HYDROCHLORIDE 100 MG: 20 INJECTION, SOLUTION EPIDURAL; INFILTRATION; INTRACAUDAL; PERINEURAL at 13:10

## 2022-06-15 RX ADMIN — POTASSIUM CHLORIDE 20 MEQ: 14.9 INJECTION, SOLUTION INTRAVENOUS at 11:04

## 2022-06-15 RX ADMIN — PROPOFOL 20 MG: 10 INJECTION, EMULSION INTRAVENOUS at 13:43

## 2022-06-15 RX ADMIN — PROPOFOL 20 MG: 10 INJECTION, EMULSION INTRAVENOUS at 13:28

## 2022-06-15 RX ADMIN — SODIUM CHLORIDE 1 G: 900 INJECTION INTRAVENOUS at 19:59

## 2022-06-15 RX ADMIN — SODIUM CHLORIDE: 0.9 INJECTION, SOLUTION INTRAVENOUS at 13:34

## 2022-06-16 PROBLEM — E83.42 HYPOMAGNESEMIA: Status: ACTIVE | Noted: 2022-06-16

## 2022-06-16 LAB
ANION GAP SERPL CALCULATED.3IONS-SCNC: 9 MMOL/L (ref 5–15)
BUN SERPL-MCNC: 12 MG/DL (ref 6–20)
BUN/CREAT SERPL: 10.4 (ref 7–25)
CALCIUM SPEC-SCNC: 8 MG/DL (ref 8.6–10.5)
CHLORIDE SERPL-SCNC: 106 MMOL/L (ref 98–107)
CO2 SERPL-SCNC: 27 MMOL/L (ref 22–29)
CREAT SERPL-MCNC: 1.15 MG/DL (ref 0.57–1)
EGFRCR SERPLBLD CKD-EPI 2021: 58.2 ML/MIN/1.73
GLUCOSE BLDC GLUCOMTR-MCNC: 91 MG/DL (ref 70–130)
GLUCOSE SERPL-MCNC: 184 MG/DL (ref 65–99)
POTASSIUM SERPL-SCNC: 4.8 MMOL/L (ref 3.5–5.2)
SODIUM SERPL-SCNC: 142 MMOL/L (ref 136–145)

## 2022-06-16 PROCEDURE — 25010000002 CEFAZOLIN PER 500 MG: Performed by: INTERNAL MEDICINE

## 2022-06-16 PROCEDURE — G0378 HOSPITAL OBSERVATION PER HR: HCPCS

## 2022-06-16 PROCEDURE — 82962 GLUCOSE BLOOD TEST: CPT

## 2022-06-16 PROCEDURE — 80048 BASIC METABOLIC PNL TOTAL CA: CPT | Performed by: NURSE PRACTITIONER

## 2022-06-16 PROCEDURE — 25010000002 SODIUM CHLORIDE 0.9 % WITH KCL 20 MEQ 20-0.9 MEQ/L-% SOLUTION: Performed by: INTERNAL MEDICINE

## 2022-06-16 PROCEDURE — 25010000002 POTASSIUM CHLORIDE PER 2 MEQ: Performed by: NURSE PRACTITIONER

## 2022-06-16 RX ORDER — POTASSIUM CHLORIDE 14.9 MG/ML
20 INJECTION INTRAVENOUS
Status: COMPLETED | OUTPATIENT
Start: 2022-06-16 | End: 2022-06-16

## 2022-06-16 RX ORDER — POTASSIUM CHLORIDE 750 MG/1
40 CAPSULE, EXTENDED RELEASE ORAL ONCE
Status: DISCONTINUED | OUTPATIENT
Start: 2022-06-16 | End: 2022-06-16

## 2022-06-16 RX ADMIN — ROPINIROLE HYDROCHLORIDE 1 MG: 1 TABLET, FILM COATED ORAL at 20:04

## 2022-06-16 RX ADMIN — POTASSIUM CHLORIDE 20 MEQ: 14.9 INJECTION, SOLUTION INTRAVENOUS at 11:14

## 2022-06-16 RX ADMIN — GABAPENTIN 100 MG: 100 CAPSULE ORAL at 14:03

## 2022-06-16 RX ADMIN — POTASSIUM CHLORIDE 20 MEQ: 14.9 INJECTION, SOLUTION INTRAVENOUS at 14:04

## 2022-06-16 RX ADMIN — POTASSIUM CHLORIDE AND SODIUM CHLORIDE 75 ML/HR: 900; 150 INJECTION, SOLUTION INTRAVENOUS at 11:15

## 2022-06-16 RX ADMIN — GABAPENTIN 100 MG: 100 CAPSULE ORAL at 20:04

## 2022-06-16 RX ADMIN — DOCUSATE SODIUM 100 MG: 100 CAPSULE, LIQUID FILLED ORAL at 20:03

## 2022-06-16 RX ADMIN — ACETAMINOPHEN 650 MG: 325 TABLET ORAL at 15:44

## 2022-06-16 RX ADMIN — SODIUM CHLORIDE 1 G: 900 INJECTION INTRAVENOUS at 05:30

## 2022-06-17 ENCOUNTER — READMISSION MANAGEMENT (OUTPATIENT)
Dept: CALL CENTER | Facility: HOSPITAL | Age: 52
End: 2022-06-17

## 2022-06-17 VITALS
OXYGEN SATURATION: 96 % | BODY MASS INDEX: 14.24 KG/M2 | TEMPERATURE: 98.7 F | HEART RATE: 94 BPM | HEIGHT: 60 IN | RESPIRATION RATE: 16 BRPM | DIASTOLIC BLOOD PRESSURE: 64 MMHG | WEIGHT: 72.5 LBS | SYSTOLIC BLOOD PRESSURE: 125 MMHG

## 2022-06-17 LAB
ANION GAP SERPL CALCULATED.3IONS-SCNC: 9 MMOL/L (ref 5–15)
BUN SERPL-MCNC: 18 MG/DL (ref 6–20)
BUN/CREAT SERPL: 16.8 (ref 7–25)
CALCIUM SPEC-SCNC: 7.9 MG/DL (ref 8.6–10.5)
CHLORIDE SERPL-SCNC: 107 MMOL/L (ref 98–107)
CO2 SERPL-SCNC: 25 MMOL/L (ref 22–29)
CREAT SERPL-MCNC: 1.07 MG/DL (ref 0.57–1)
EGFRCR SERPLBLD CKD-EPI 2021: 63.4 ML/MIN/1.73
GLUCOSE SERPL-MCNC: 106 MG/DL (ref 65–99)
MAGNESIUM SERPL-MCNC: 1.4 MG/DL (ref 1.6–2.6)
POTASSIUM SERPL-SCNC: 4.5 MMOL/L (ref 3.5–5.2)
SODIUM SERPL-SCNC: 141 MMOL/L (ref 136–145)

## 2022-06-17 PROCEDURE — 25010000002 SODIUM CHLORIDE 0.9 % WITH KCL 20 MEQ 20-0.9 MEQ/L-% SOLUTION: Performed by: INTERNAL MEDICINE

## 2022-06-17 PROCEDURE — 83735 ASSAY OF MAGNESIUM: CPT | Performed by: INTERNAL MEDICINE

## 2022-06-17 PROCEDURE — 99213 OFFICE O/P EST LOW 20 MIN: CPT | Performed by: SPECIALIST

## 2022-06-17 PROCEDURE — 25010000002 MAGNESIUM SULFATE 2 GM/50ML SOLUTION: Performed by: NURSE PRACTITIONER

## 2022-06-17 PROCEDURE — G0378 HOSPITAL OBSERVATION PER HR: HCPCS

## 2022-06-17 PROCEDURE — 80048 BASIC METABOLIC PNL TOTAL CA: CPT | Performed by: INTERNAL MEDICINE

## 2022-06-17 RX ORDER — MAGNESIUM SULFATE HEPTAHYDRATE 40 MG/ML
2 INJECTION, SOLUTION INTRAVENOUS ONCE
Status: COMPLETED | OUTPATIENT
Start: 2022-06-17 | End: 2022-06-17

## 2022-06-17 RX ADMIN — Medication 500 MG: at 09:04

## 2022-06-17 RX ADMIN — MAGNESIUM GLUCONATE 500 MG ORAL TABLET 400 MG: 500 TABLET ORAL at 09:04

## 2022-06-17 RX ADMIN — POTASSIUM CHLORIDE 10 MEQ: 10 CAPSULE, COATED, EXTENDED RELEASE ORAL at 09:04

## 2022-06-17 RX ADMIN — ATORVASTATIN CALCIUM 40 MG: 40 TABLET, FILM COATED ORAL at 09:04

## 2022-06-17 RX ADMIN — DOCUSATE SODIUM 100 MG: 100 CAPSULE, LIQUID FILLED ORAL at 09:05

## 2022-06-17 RX ADMIN — ACETAMINOPHEN 650 MG: 325 TABLET ORAL at 09:04

## 2022-06-17 RX ADMIN — GABAPENTIN 100 MG: 100 CAPSULE ORAL at 05:21

## 2022-06-17 RX ADMIN — POTASSIUM CHLORIDE AND SODIUM CHLORIDE 75 ML/HR: 900; 150 INJECTION, SOLUTION INTRAVENOUS at 02:03

## 2022-06-17 RX ADMIN — CETIRIZINE HYDROCHLORIDE 5 MG: 10 TABLET ORAL at 09:04

## 2022-06-17 RX ADMIN — DULOXETINE HYDROCHLORIDE 30 MG: 30 CAPSULE, DELAYED RELEASE ORAL at 09:05

## 2022-06-17 RX ADMIN — PANTOPRAZOLE SODIUM 40 MG: 40 TABLET, DELAYED RELEASE ORAL at 05:21

## 2022-06-17 RX ADMIN — MAGNESIUM SULFATE HEPTAHYDRATE 2 G: 2 INJECTION, SOLUTION INTRAVENOUS at 05:45

## 2022-06-17 RX ADMIN — ACETAMINOPHEN 650 MG: 325 TABLET ORAL at 02:03

## 2022-06-18 LAB — PTH RELATED PROT SERPL-SCNC: <2 PMOL/L

## 2022-06-20 ENCOUNTER — READMISSION MANAGEMENT (OUTPATIENT)
Dept: CALL CENTER | Facility: HOSPITAL | Age: 52
End: 2022-06-20

## 2022-06-22 ENCOUNTER — READMISSION MANAGEMENT (OUTPATIENT)
Dept: CALL CENTER | Facility: HOSPITAL | Age: 52
End: 2022-06-22

## 2022-07-14 ENCOUNTER — HOSPITAL ENCOUNTER (INPATIENT)
Facility: HOSPITAL | Age: 52
LOS: 2 days | Discharge: HOME | End: 2022-07-16
Attending: FAMILY MEDICINE | Admitting: INTERNAL MEDICINE

## 2022-07-14 ENCOUNTER — APPOINTMENT (OUTPATIENT)
Dept: CT IMAGING | Facility: HOSPITAL | Age: 52
End: 2022-07-14

## 2022-07-14 DIAGNOSIS — N17.9 AKI (ACUTE KIDNEY INJURY): ICD-10-CM

## 2022-07-14 DIAGNOSIS — K94.23 GASTROSTOMY TUBE DYSFUNCTION: ICD-10-CM

## 2022-07-14 DIAGNOSIS — E87.20 METABOLIC ACIDOSIS: Primary | ICD-10-CM

## 2022-07-14 PROBLEM — R10.9 ABDOMINAL PAIN: Status: ACTIVE | Noted: 2022-07-14

## 2022-07-14 PROBLEM — N18.31 ACUTE RENAL FAILURE SUPERIMPOSED ON STAGE 3A CHRONIC KIDNEY DISEASE: Status: ACTIVE | Noted: 2022-06-11

## 2022-07-14 LAB
ACETONE BLD QL: NEGATIVE
ALBUMIN SERPL-MCNC: 4.1 G/DL (ref 3.5–5.2)
ALBUMIN/GLOB SERPL: 1.1 G/DL
ALP SERPL-CCNC: 143 U/L (ref 39–117)
ALT SERPL W P-5'-P-CCNC: 8 U/L (ref 1–33)
ANION GAP SERPL CALCULATED.3IONS-SCNC: 14 MMOL/L (ref 5–15)
ARTERIAL PATENCY WRIST A: ABNORMAL
AST SERPL-CCNC: 17 U/L (ref 1–32)
ATMOSPHERIC PRESS: 752 MMHG
BACTERIA UR QL AUTO: ABNORMAL /HPF
BASE EXCESS BLDA CALC-SCNC: -10.5 MMOL/L (ref 0–2)
BASOPHILS # BLD AUTO: 0.04 10*3/MM3 (ref 0–0.2)
BASOPHILS NFR BLD AUTO: 0.4 % (ref 0–1.5)
BDY SITE: ABNORMAL
BILIRUB SERPL-MCNC: <0.2 MG/DL (ref 0–1.2)
BILIRUB UR QL STRIP: NEGATIVE
BODY TEMPERATURE: 37 C
BUN SERPL-MCNC: 32 MG/DL (ref 6–20)
BUN/CREAT SERPL: 14.5 (ref 7–25)
CALCIUM SPEC-SCNC: 9.2 MG/DL (ref 8.6–10.5)
CHLORIDE SERPL-SCNC: 110 MMOL/L (ref 98–107)
CLARITY UR: ABNORMAL
CO2 SERPL-SCNC: 16 MMOL/L (ref 22–29)
COLOR UR: YELLOW
CREAT SERPL-MCNC: 2.21 MG/DL (ref 0.57–1)
D-LACTATE SERPL-SCNC: 0.7 MMOL/L (ref 0.5–2)
DEPRECATED RDW RBC AUTO: 58.3 FL (ref 37–54)
EGFRCR SERPLBLD CKD-EPI 2021: 26.6 ML/MIN/1.73
EOSINOPHIL # BLD AUTO: 0.69 10*3/MM3 (ref 0–0.4)
EOSINOPHIL NFR BLD AUTO: 6.8 % (ref 0.3–6.2)
ERYTHROCYTE [DISTWIDTH] IN BLOOD BY AUTOMATED COUNT: 15.9 % (ref 12.3–15.4)
GLOBULIN UR ELPH-MCNC: 3.6 GM/DL
GLUCOSE BLDC GLUCOMTR-MCNC: 154 MG/DL (ref 70–130)
GLUCOSE SERPL-MCNC: 128 MG/DL (ref 65–99)
GLUCOSE UR STRIP-MCNC: NEGATIVE MG/DL
GRAN CASTS URNS QL MICRO: ABNORMAL /LPF
HCO3 BLDA-SCNC: 16.1 MMOL/L (ref 20–26)
HCT VFR BLD AUTO: 37.4 % (ref 34–46.6)
HGB BLD-MCNC: 11.3 G/DL (ref 12–15.9)
HGB UR QL STRIP.AUTO: NEGATIVE
HOLD SPECIMEN: NORMAL
HOLD SPECIMEN: NORMAL
HYALINE CASTS UR QL AUTO: ABNORMAL /LPF
IMM GRANULOCYTES # BLD AUTO: 0.03 10*3/MM3 (ref 0–0.05)
IMM GRANULOCYTES NFR BLD AUTO: 0.3 % (ref 0–0.5)
KETONES UR QL STRIP: NEGATIVE
LEUKOCYTE ESTERASE UR QL STRIP.AUTO: NEGATIVE
LIPASE SERPL-CCNC: 191 U/L (ref 13–60)
LYMPHOCYTES # BLD AUTO: 3.3 10*3/MM3 (ref 0.7–3.1)
LYMPHOCYTES NFR BLD AUTO: 32.4 % (ref 19.6–45.3)
Lab: ABNORMAL
MCH RBC QN AUTO: 30.2 PG (ref 26.6–33)
MCHC RBC AUTO-ENTMCNC: 30.2 G/DL (ref 31.5–35.7)
MCV RBC AUTO: 100 FL (ref 79–97)
MODALITY: ABNORMAL
MONOCYTES # BLD AUTO: 0.53 10*3/MM3 (ref 0.1–0.9)
MONOCYTES NFR BLD AUTO: 5.2 % (ref 5–12)
NEUTROPHILS NFR BLD AUTO: 5.61 10*3/MM3 (ref 1.7–7)
NEUTROPHILS NFR BLD AUTO: 54.9 % (ref 42.7–76)
NITRITE UR QL STRIP: NEGATIVE
NRBC BLD AUTO-RTO: 0 /100 WBC (ref 0–0.2)
PCO2 BLDA: 37.6 MM HG (ref 35–45)
PCO2 TEMP ADJ BLD: 37.6 MM HG (ref 35–45)
PH BLDA: 7.24 PH UNITS (ref 7.35–7.45)
PH UR STRIP.AUTO: 6 [PH] (ref 5–8)
PH, TEMP CORRECTED: 7.24 PH UNITS (ref 7.35–7.45)
PLATELET # BLD AUTO: 369 10*3/MM3 (ref 140–450)
PMV BLD AUTO: 9.9 FL (ref 6–12)
PO2 BLDA: 91.7 MM HG (ref 83–108)
PO2 TEMP ADJ BLD: 91.7 MM HG (ref 83–108)
POTASSIUM SERPL-SCNC: 3.8 MMOL/L (ref 3.5–5.2)
PROT SERPL-MCNC: 7.7 G/DL (ref 6–8.5)
PROT UR QL STRIP: ABNORMAL
RBC # BLD AUTO: 3.74 10*6/MM3 (ref 3.77–5.28)
RBC # UR STRIP: ABNORMAL /HPF
REF LAB TEST METHOD: ABNORMAL
SAO2 % BLDCOA: 94.7 % (ref 94–99)
SARS-COV-2 RNA PNL SPEC NAA+PROBE: NOT DETECTED
SODIUM SERPL-SCNC: 140 MMOL/L (ref 136–145)
SP GR UR STRIP: 1.03 (ref 1–1.03)
SQUAMOUS #/AREA URNS HPF: ABNORMAL /HPF
TROPONIN T SERPL-MCNC: <0.01 NG/ML (ref 0–0.03)
UROBILINOGEN UR QL STRIP: ABNORMAL
VENTILATOR MODE: ABNORMAL
WBC # UR STRIP: ABNORMAL /HPF
WBC NRBC COR # BLD: 10.2 10*3/MM3 (ref 3.4–10.8)
WHOLE BLOOD HOLD COAG: NORMAL
WHOLE BLOOD HOLD SPECIMEN: NORMAL

## 2022-07-14 PROCEDURE — 84484 ASSAY OF TROPONIN QUANT: CPT | Performed by: NURSE PRACTITIONER

## 2022-07-14 PROCEDURE — 99285 EMERGENCY DEPT VISIT HI MDM: CPT

## 2022-07-14 PROCEDURE — 83690 ASSAY OF LIPASE: CPT | Performed by: NURSE PRACTITIONER

## 2022-07-14 PROCEDURE — 87040 BLOOD CULTURE FOR BACTERIA: CPT

## 2022-07-14 PROCEDURE — 74176 CT ABD & PELVIS W/O CONTRAST: CPT

## 2022-07-14 PROCEDURE — 83605 ASSAY OF LACTIC ACID: CPT

## 2022-07-14 PROCEDURE — 82962 GLUCOSE BLOOD TEST: CPT

## 2022-07-14 PROCEDURE — 80053 COMPREHEN METABOLIC PANEL: CPT

## 2022-07-14 PROCEDURE — 82009 KETONE BODYS QUAL: CPT | Performed by: NURSE PRACTITIONER

## 2022-07-14 PROCEDURE — 82803 BLOOD GASES ANY COMBINATION: CPT

## 2022-07-14 PROCEDURE — 93010 ELECTROCARDIOGRAM REPORT: CPT | Performed by: INTERNAL MEDICINE

## 2022-07-14 PROCEDURE — 87635 SARS-COV-2 COVID-19 AMP PRB: CPT | Performed by: NURSE PRACTITIONER

## 2022-07-14 PROCEDURE — 85025 COMPLETE CBC W/AUTO DIFF WBC: CPT

## 2022-07-14 PROCEDURE — 81001 URINALYSIS AUTO W/SCOPE: CPT | Performed by: NURSE PRACTITIONER

## 2022-07-14 PROCEDURE — 36600 WITHDRAWAL OF ARTERIAL BLOOD: CPT

## 2022-07-14 PROCEDURE — 63710000001 INSULIN LISPRO (HUMAN) PER 5 UNITS: Performed by: NURSE PRACTITIONER

## 2022-07-14 PROCEDURE — 93005 ELECTROCARDIOGRAM TRACING: CPT | Performed by: NURSE PRACTITIONER

## 2022-07-14 RX ORDER — ACETAMINOPHEN 650 MG/1
650 SUPPOSITORY RECTAL EVERY 4 HOURS PRN
Status: DISCONTINUED | OUTPATIENT
Start: 2022-07-14 | End: 2022-07-16 | Stop reason: HOSPADM

## 2022-07-14 RX ORDER — INSULIN LISPRO 100 [IU]/ML
2-7 INJECTION, SOLUTION INTRAVENOUS; SUBCUTANEOUS EVERY 6 HOURS SCHEDULED
Status: DISCONTINUED | OUTPATIENT
Start: 2022-07-14 | End: 2022-07-16 | Stop reason: HOSPADM

## 2022-07-14 RX ORDER — DEXTROSE MONOHYDRATE 25 G/50ML
25 INJECTION, SOLUTION INTRAVENOUS
Status: DISCONTINUED | OUTPATIENT
Start: 2022-07-14 | End: 2022-07-16 | Stop reason: HOSPADM

## 2022-07-14 RX ORDER — SODIUM CHLORIDE 0.9 % (FLUSH) 0.9 %
10 SYRINGE (ML) INJECTION AS NEEDED
Status: DISCONTINUED | OUTPATIENT
Start: 2022-07-14 | End: 2022-07-16 | Stop reason: HOSPADM

## 2022-07-14 RX ORDER — NALOXONE HCL 0.4 MG/ML
0.4 VIAL (ML) INJECTION
Status: DISCONTINUED | OUTPATIENT
Start: 2022-07-14 | End: 2022-07-16 | Stop reason: HOSPADM

## 2022-07-14 RX ORDER — ACETAMINOPHEN 325 MG/1
650 TABLET ORAL EVERY 4 HOURS PRN
Status: DISCONTINUED | OUTPATIENT
Start: 2022-07-14 | End: 2022-07-16 | Stop reason: HOSPADM

## 2022-07-14 RX ORDER — SODIUM CHLORIDE 0.9 % (FLUSH) 0.9 %
10 SYRINGE (ML) INJECTION EVERY 12 HOURS SCHEDULED
Status: DISCONTINUED | OUTPATIENT
Start: 2022-07-14 | End: 2022-07-16 | Stop reason: HOSPADM

## 2022-07-14 RX ORDER — ONDANSETRON 2 MG/ML
4 INJECTION INTRAMUSCULAR; INTRAVENOUS EVERY 6 HOURS PRN
Status: DISCONTINUED | OUTPATIENT
Start: 2022-07-14 | End: 2022-07-16 | Stop reason: HOSPADM

## 2022-07-14 RX ORDER — FAMOTIDINE 10 MG/ML
20 INJECTION, SOLUTION INTRAVENOUS EVERY 12 HOURS SCHEDULED
Status: DISCONTINUED | OUTPATIENT
Start: 2022-07-14 | End: 2022-07-15

## 2022-07-14 RX ORDER — ONDANSETRON 4 MG/1
4 TABLET, FILM COATED ORAL EVERY 6 HOURS PRN
Status: DISCONTINUED | OUTPATIENT
Start: 2022-07-14 | End: 2022-07-16 | Stop reason: HOSPADM

## 2022-07-14 RX ORDER — NICOTINE POLACRILEX 4 MG
15 LOZENGE BUCCAL
Status: DISCONTINUED | OUTPATIENT
Start: 2022-07-14 | End: 2022-07-16 | Stop reason: HOSPADM

## 2022-07-14 RX ORDER — SODIUM CHLORIDE 9 MG/ML
100 INJECTION, SOLUTION INTRAVENOUS CONTINUOUS
Status: DISCONTINUED | OUTPATIENT
Start: 2022-07-14 | End: 2022-07-15

## 2022-07-14 RX ORDER — ACETAMINOPHEN 160 MG/5ML
650 SOLUTION ORAL EVERY 4 HOURS PRN
Status: DISCONTINUED | OUTPATIENT
Start: 2022-07-14 | End: 2022-07-16 | Stop reason: HOSPADM

## 2022-07-14 RX ADMIN — Medication 10 ML: at 21:37

## 2022-07-14 RX ADMIN — SODIUM CHLORIDE 100 ML/HR: 9 INJECTION, SOLUTION INTRAVENOUS at 21:34

## 2022-07-14 RX ADMIN — SODIUM BICARBONATE 50 MEQ: 84 INJECTION INTRAVENOUS at 21:53

## 2022-07-14 RX ADMIN — INSULIN LISPRO 2 UNITS: 100 INJECTION, SOLUTION INTRAVENOUS; SUBCUTANEOUS at 21:40

## 2022-07-14 RX ADMIN — SODIUM BICARBONATE 50 MEQ: 84 INJECTION INTRAVENOUS at 21:36

## 2022-07-14 RX ADMIN — FAMOTIDINE 20 MG: 10 INJECTION, SOLUTION INTRAVENOUS at 22:39

## 2022-07-14 RX ADMIN — SODIUM CHLORIDE, POTASSIUM CHLORIDE, SODIUM LACTATE AND CALCIUM CHLORIDE 500 ML: 600; 310; 30; 20 INJECTION, SOLUTION INTRAVENOUS at 16:06

## 2022-07-15 PROBLEM — N18.32 ACUTE RENAL FAILURE SUPERIMPOSED ON STAGE 3B CHRONIC KIDNEY DISEASE (HCC): Status: ACTIVE | Noted: 2022-06-11

## 2022-07-15 PROBLEM — K94.23 PEG TUBE MALFUNCTION: Status: ACTIVE | Noted: 2022-07-15

## 2022-07-15 LAB
ANION GAP SERPL CALCULATED.3IONS-SCNC: 10 MMOL/L (ref 5–15)
BUN SERPL-MCNC: 27 MG/DL (ref 6–20)
BUN/CREAT SERPL: 16.1 (ref 7–25)
CALCIUM SPEC-SCNC: 8.6 MG/DL (ref 8.6–10.5)
CHLORIDE SERPL-SCNC: 117 MMOL/L (ref 98–107)
CO2 SERPL-SCNC: 19 MMOL/L (ref 22–29)
CREAT SERPL-MCNC: 1.68 MG/DL (ref 0.57–1)
DEPRECATED RDW RBC AUTO: 55.8 FL (ref 37–54)
EGFRCR SERPLBLD CKD-EPI 2021: 36.9 ML/MIN/1.73
ERYTHROCYTE [DISTWIDTH] IN BLOOD BY AUTOMATED COUNT: 15.9 % (ref 12.3–15.4)
GLUCOSE BLDC GLUCOMTR-MCNC: 102 MG/DL (ref 70–130)
GLUCOSE BLDC GLUCOMTR-MCNC: 127 MG/DL (ref 70–130)
GLUCOSE BLDC GLUCOMTR-MCNC: 191 MG/DL (ref 70–130)
GLUCOSE BLDC GLUCOMTR-MCNC: 72 MG/DL (ref 70–130)
GLUCOSE BLDC GLUCOMTR-MCNC: 80 MG/DL (ref 70–130)
GLUCOSE SERPL-MCNC: 69 MG/DL (ref 65–99)
HCT VFR BLD AUTO: 28.5 % (ref 34–46.6)
HGB BLD-MCNC: 8.9 G/DL (ref 12–15.9)
MCH RBC QN AUTO: 30.5 PG (ref 26.6–33)
MCHC RBC AUTO-ENTMCNC: 31.2 G/DL (ref 31.5–35.7)
MCV RBC AUTO: 97.6 FL (ref 79–97)
PLATELET # BLD AUTO: 322 10*3/MM3 (ref 140–450)
PMV BLD AUTO: 10 FL (ref 6–12)
POTASSIUM SERPL-SCNC: 3.1 MMOL/L (ref 3.5–5.2)
QT INTERVAL: 360 MS
QTC INTERVAL: 457 MS
RBC # BLD AUTO: 2.92 10*6/MM3 (ref 3.77–5.28)
SODIUM SERPL-SCNC: 146 MMOL/L (ref 136–145)
WBC NRBC COR # BLD: 9.04 10*3/MM3 (ref 3.4–10.8)

## 2022-07-15 PROCEDURE — 0 HYDROMORPHONE 1 MG/ML SOLUTION: Performed by: INTERNAL MEDICINE

## 2022-07-15 PROCEDURE — 99253 IP/OBS CNSLTJ NEW/EST LOW 45: CPT | Performed by: NURSE PRACTITIONER

## 2022-07-15 PROCEDURE — 99223 1ST HOSP IP/OBS HIGH 75: CPT | Performed by: INTERNAL MEDICINE

## 2022-07-15 PROCEDURE — 36415 COLL VENOUS BLD VENIPUNCTURE: CPT | Performed by: NURSE PRACTITIONER

## 2022-07-15 PROCEDURE — 82962 GLUCOSE BLOOD TEST: CPT

## 2022-07-15 PROCEDURE — 0 POTASSIUM CHLORIDE PER 2 MEQ: Performed by: INTERNAL MEDICINE

## 2022-07-15 PROCEDURE — 80048 BASIC METABOLIC PNL TOTAL CA: CPT | Performed by: NURSE PRACTITIONER

## 2022-07-15 PROCEDURE — 85027 COMPLETE CBC AUTOMATED: CPT | Performed by: NURSE PRACTITIONER

## 2022-07-15 PROCEDURE — 0DW6XUZ REVISION OF FEEDING DEVICE IN STOMACH, EXTERNAL APPROACH: ICD-10-PCS | Performed by: INTERNAL MEDICINE

## 2022-07-15 PROCEDURE — 25010000002 ONDANSETRON PER 1 MG: Performed by: INTERNAL MEDICINE

## 2022-07-15 RX ORDER — SODIUM CHLORIDE AND POTASSIUM CHLORIDE 150; 900 MG/100ML; MG/100ML
100 INJECTION, SOLUTION INTRAVENOUS CONTINUOUS
Status: DISCONTINUED | OUTPATIENT
Start: 2022-07-15 | End: 2022-07-15

## 2022-07-15 RX ORDER — THIAMINE HCL 100 MG
500 TABLET ORAL DAILY
COMMUNITY

## 2022-07-15 RX ORDER — DIPHENHYDRAMINE HCL 25 MG
50 CAPSULE ORAL NIGHTLY PRN
Status: DISCONTINUED | OUTPATIENT
Start: 2022-07-15 | End: 2022-07-16 | Stop reason: HOSPADM

## 2022-07-15 RX ORDER — PANTOPRAZOLE SODIUM 40 MG/1
40 TABLET, DELAYED RELEASE ORAL DAILY
Status: DISCONTINUED | OUTPATIENT
Start: 2022-07-15 | End: 2022-07-15 | Stop reason: ALTCHOICE

## 2022-07-15 RX ORDER — NIFEDIPINE 60 MG/1
60 TABLET, EXTENDED RELEASE ORAL DAILY
Status: DISCONTINUED | OUTPATIENT
Start: 2022-07-15 | End: 2022-07-16 | Stop reason: HOSPADM

## 2022-07-15 RX ORDER — POTASSIUM CHLORIDE 20MEQ/15ML
40 LIQUID (ML) ORAL ONCE
Status: COMPLETED | OUTPATIENT
Start: 2022-07-15 | End: 2022-07-15

## 2022-07-15 RX ORDER — SODIUM BICARBONATE 650 MG/1
650 TABLET ORAL 2 TIMES DAILY
Status: DISCONTINUED | OUTPATIENT
Start: 2022-07-15 | End: 2022-07-16

## 2022-07-15 RX ORDER — DOCUSATE SODIUM 100 MG/1
100 CAPSULE, LIQUID FILLED ORAL 2 TIMES DAILY
Status: DISCONTINUED | OUTPATIENT
Start: 2022-07-15 | End: 2022-07-16 | Stop reason: HOSPADM

## 2022-07-15 RX ORDER — NORTRIPTYLINE HYDROCHLORIDE 25 MG/1
50 CAPSULE ORAL NIGHTLY
Status: DISCONTINUED | OUTPATIENT
Start: 2022-07-15 | End: 2022-07-16 | Stop reason: HOSPADM

## 2022-07-15 RX ORDER — ASPIRIN 81 MG/1
81 TABLET ORAL DAILY
COMMUNITY

## 2022-07-15 RX ORDER — SODIUM CHLORIDE AND POTASSIUM CHLORIDE 150; 450 MG/100ML; MG/100ML
100 INJECTION, SOLUTION INTRAVENOUS CONTINUOUS
Status: DISCONTINUED | OUTPATIENT
Start: 2022-07-15 | End: 2022-07-16 | Stop reason: HOSPADM

## 2022-07-15 RX ORDER — DULOXETIN HYDROCHLORIDE 30 MG/1
30 CAPSULE, DELAYED RELEASE ORAL DAILY
Status: DISCONTINUED | OUTPATIENT
Start: 2022-07-15 | End: 2022-07-16 | Stop reason: HOSPADM

## 2022-07-15 RX ORDER — LEFLUNOMIDE 20 MG/1
20 TABLET ORAL DAILY
Status: DISCONTINUED | OUTPATIENT
Start: 2022-07-15 | End: 2022-07-16 | Stop reason: HOSPADM

## 2022-07-15 RX ORDER — FAMOTIDINE 10 MG/ML
20 INJECTION, SOLUTION INTRAVENOUS DAILY
Status: DISCONTINUED | OUTPATIENT
Start: 2022-07-16 | End: 2022-07-16 | Stop reason: HOSPADM

## 2022-07-15 RX ORDER — TIZANIDINE 4 MG/1
4 TABLET ORAL 2 TIMES DAILY PRN
COMMUNITY

## 2022-07-15 RX ORDER — GABAPENTIN 400 MG/1
400 CAPSULE ORAL 3 TIMES DAILY
Status: DISCONTINUED | OUTPATIENT
Start: 2022-07-15 | End: 2022-07-16 | Stop reason: HOSPADM

## 2022-07-15 RX ORDER — ROPINIROLE 1 MG/1
1 TABLET, FILM COATED ORAL NIGHTLY
Status: DISCONTINUED | OUTPATIENT
Start: 2022-07-15 | End: 2022-07-16 | Stop reason: HOSPADM

## 2022-07-15 RX ADMIN — ONDANSETRON 4 MG: 2 INJECTION INTRAMUSCULAR; INTRAVENOUS at 18:02

## 2022-07-15 RX ADMIN — SODIUM CHLORIDE 100 ML/HR: 9 INJECTION, SOLUTION INTRAVENOUS at 08:56

## 2022-07-15 RX ADMIN — HYDROMORPHONE HYDROCHLORIDE 0.5 MG: 1 INJECTION, SOLUTION INTRAMUSCULAR; INTRAVENOUS; SUBCUTANEOUS at 21:48

## 2022-07-15 RX ADMIN — LEFLUNOMIDE 20 MG: 20 TABLET ORAL at 08:56

## 2022-07-15 RX ADMIN — POTASSIUM CHLORIDE AND SODIUM CHLORIDE 100 ML/HR: 450; 150 INJECTION, SOLUTION INTRAVENOUS at 10:49

## 2022-07-15 RX ADMIN — GABAPENTIN 400 MG: 400 CAPSULE ORAL at 15:41

## 2022-07-15 RX ADMIN — POTASSIUM CHLORIDE AND SODIUM CHLORIDE 100 ML/HR: 450; 150 INJECTION, SOLUTION INTRAVENOUS at 21:41

## 2022-07-15 RX ADMIN — ROPINIROLE HYDROCHLORIDE 1 MG: 1 TABLET, FILM COATED ORAL at 21:39

## 2022-07-15 RX ADMIN — DULOXETINE HYDROCHLORIDE 30 MG: 30 CAPSULE, DELAYED RELEASE ORAL at 08:56

## 2022-07-15 RX ADMIN — NORTRIPTYLINE HYDROCHLORIDE 50 MG: 25 CAPSULE ORAL at 21:39

## 2022-07-15 RX ADMIN — NIFEDIPINE 60 MG: 60 TABLET, FILM COATED, EXTENDED RELEASE ORAL at 08:56

## 2022-07-15 RX ADMIN — DOCUSATE SODIUM 100 MG: 100 CAPSULE, LIQUID FILLED ORAL at 08:56

## 2022-07-15 RX ADMIN — GABAPENTIN 400 MG: 400 CAPSULE ORAL at 08:56

## 2022-07-15 RX ADMIN — PANTOPRAZOLE SODIUM 40 MG: 40 TABLET, DELAYED RELEASE ORAL at 08:56

## 2022-07-15 RX ADMIN — Medication 10 ML: at 21:48

## 2022-07-15 RX ADMIN — SODIUM BICARBONATE 650 MG: 650 TABLET ORAL at 21:39

## 2022-07-15 RX ADMIN — Medication 40 MEQ: at 10:50

## 2022-07-15 RX ADMIN — GABAPENTIN 400 MG: 400 CAPSULE ORAL at 21:39

## 2022-07-15 RX ADMIN — SODIUM BICARBONATE 650 MG: 650 TABLET ORAL at 10:49

## 2022-07-15 RX ADMIN — FAMOTIDINE 20 MG: 10 INJECTION, SOLUTION INTRAVENOUS at 08:56

## 2022-07-15 RX ADMIN — Medication 10 ML: at 08:56

## 2022-07-15 RX ADMIN — HYDROMORPHONE HYDROCHLORIDE 0.5 MG: 1 INJECTION, SOLUTION INTRAMUSCULAR; INTRAVENOUS; SUBCUTANEOUS at 17:55

## 2022-07-15 RX ADMIN — DOCUSATE SODIUM 100 MG: 100 CAPSULE, LIQUID FILLED ORAL at 21:39

## 2022-07-16 VITALS
HEART RATE: 93 BPM | RESPIRATION RATE: 18 BRPM | BODY MASS INDEX: 17.18 KG/M2 | HEIGHT: 60 IN | DIASTOLIC BLOOD PRESSURE: 75 MMHG | TEMPERATURE: 98.5 F | SYSTOLIC BLOOD PRESSURE: 113 MMHG | OXYGEN SATURATION: 96 % | WEIGHT: 87.5 LBS

## 2022-07-16 LAB
ANION GAP SERPL CALCULATED.3IONS-SCNC: 9 MMOL/L (ref 5–15)
BUN SERPL-MCNC: 24 MG/DL (ref 6–20)
BUN/CREAT SERPL: 16.1 (ref 7–25)
CALCIUM SPEC-SCNC: 8.1 MG/DL (ref 8.6–10.5)
CHLORIDE SERPL-SCNC: 110 MMOL/L (ref 98–107)
CO2 SERPL-SCNC: 20 MMOL/L (ref 22–29)
CREAT SERPL-MCNC: 1.49 MG/DL (ref 0.57–1)
DEPRECATED RDW RBC AUTO: 55.8 FL (ref 37–54)
EGFRCR SERPLBLD CKD-EPI 2021: 42.6 ML/MIN/1.73
ERYTHROCYTE [DISTWIDTH] IN BLOOD BY AUTOMATED COUNT: 15.9 % (ref 12.3–15.4)
GLUCOSE BLDC GLUCOMTR-MCNC: 130 MG/DL (ref 70–130)
GLUCOSE BLDC GLUCOMTR-MCNC: 68 MG/DL (ref 70–130)
GLUCOSE SERPL-MCNC: 91 MG/DL (ref 65–99)
HCT VFR BLD AUTO: 30 % (ref 34–46.6)
HGB BLD-MCNC: 9.5 G/DL (ref 12–15.9)
MAGNESIUM SERPL-MCNC: 1.3 MG/DL (ref 1.6–2.6)
MCH RBC QN AUTO: 30.8 PG (ref 26.6–33)
MCHC RBC AUTO-ENTMCNC: 31.7 G/DL (ref 31.5–35.7)
MCV RBC AUTO: 97.4 FL (ref 79–97)
PHOSPHATE SERPL-MCNC: 2.9 MG/DL (ref 2.5–4.5)
PLATELET # BLD AUTO: 305 10*3/MM3 (ref 140–450)
PMV BLD AUTO: 10.5 FL (ref 6–12)
POTASSIUM SERPL-SCNC: 4.1 MMOL/L (ref 3.5–5.2)
RBC # BLD AUTO: 3.08 10*6/MM3 (ref 3.77–5.28)
SODIUM SERPL-SCNC: 139 MMOL/L (ref 136–145)
WBC NRBC COR # BLD: 10.42 10*3/MM3 (ref 3.4–10.8)

## 2022-07-16 PROCEDURE — 83735 ASSAY OF MAGNESIUM: CPT | Performed by: INTERNAL MEDICINE

## 2022-07-16 PROCEDURE — 25010000002 ONDANSETRON PER 1 MG: Performed by: INTERNAL MEDICINE

## 2022-07-16 PROCEDURE — 80048 BASIC METABOLIC PNL TOTAL CA: CPT | Performed by: INTERNAL MEDICINE

## 2022-07-16 PROCEDURE — 25010000002 MAGNESIUM SULFATE 2 GM/50ML SOLUTION: Performed by: INTERNAL MEDICINE

## 2022-07-16 PROCEDURE — 84100 ASSAY OF PHOSPHORUS: CPT | Performed by: INTERNAL MEDICINE

## 2022-07-16 PROCEDURE — 82962 GLUCOSE BLOOD TEST: CPT

## 2022-07-16 PROCEDURE — 85027 COMPLETE CBC AUTOMATED: CPT | Performed by: INTERNAL MEDICINE

## 2022-07-16 PROCEDURE — 0 HYDROMORPHONE 1 MG/ML SOLUTION: Performed by: INTERNAL MEDICINE

## 2022-07-16 RX ORDER — SODIUM BICARBONATE 650 MG/1
650 TABLET ORAL 2 TIMES DAILY
Start: 2022-07-16

## 2022-07-16 RX ORDER — SODIUM BICARBONATE 650 MG/1
650 TABLET ORAL 2 TIMES DAILY
Status: DISCONTINUED | OUTPATIENT
Start: 2022-07-16 | End: 2022-07-16 | Stop reason: HOSPADM

## 2022-07-16 RX ORDER — MAGNESIUM SULFATE HEPTAHYDRATE 40 MG/ML
2 INJECTION, SOLUTION INTRAVENOUS ONCE
Status: COMPLETED | OUTPATIENT
Start: 2022-07-16 | End: 2022-07-16

## 2022-07-16 RX ADMIN — MAGNESIUM SULFATE HEPTAHYDRATE 2 G: 2 INJECTION, SOLUTION INTRAVENOUS at 13:55

## 2022-07-16 RX ADMIN — NIFEDIPINE 60 MG: 60 TABLET, FILM COATED, EXTENDED RELEASE ORAL at 08:32

## 2022-07-16 RX ADMIN — LEFLUNOMIDE 20 MG: 20 TABLET ORAL at 08:32

## 2022-07-16 RX ADMIN — GABAPENTIN 400 MG: 400 CAPSULE ORAL at 08:35

## 2022-07-16 RX ADMIN — ONDANSETRON 4 MG: 2 INJECTION INTRAMUSCULAR; INTRAVENOUS at 03:41

## 2022-07-16 RX ADMIN — DOCUSATE SODIUM 100 MG: 100 CAPSULE, LIQUID FILLED ORAL at 08:31

## 2022-07-16 RX ADMIN — Medication 10 ML: at 08:36

## 2022-07-16 RX ADMIN — FAMOTIDINE 20 MG: 10 INJECTION, SOLUTION INTRAVENOUS at 08:35

## 2022-07-16 RX ADMIN — HYDROMORPHONE HYDROCHLORIDE 0.5 MG: 1 INJECTION, SOLUTION INTRAMUSCULAR; INTRAVENOUS; SUBCUTANEOUS at 03:41

## 2022-07-16 RX ADMIN — SODIUM BICARBONATE 650 MG: 650 TABLET ORAL at 08:32

## 2022-07-16 RX ADMIN — DULOXETINE HYDROCHLORIDE 30 MG: 30 CAPSULE, DELAYED RELEASE ORAL at 08:32

## 2022-07-17 ENCOUNTER — READMISSION MANAGEMENT (OUTPATIENT)
Dept: CALL CENTER | Facility: HOSPITAL | Age: 52
End: 2022-07-17

## 2022-07-19 LAB
BACTERIA SPEC AEROBE CULT: NORMAL
BACTERIA SPEC AEROBE CULT: NORMAL

## 2022-09-26 ENCOUNTER — TELEMEDICINE (OUTPATIENT)
Dept: ENDOCRINOLOGY | Facility: CLINIC | Age: 52
End: 2022-09-26

## 2022-09-26 ENCOUNTER — TELEPHONE (OUTPATIENT)
Dept: ENDOCRINOLOGY | Facility: CLINIC | Age: 52
End: 2022-09-26

## 2022-09-26 DIAGNOSIS — E11.649 TYPE 2 DIABETES MELLITUS WITH HYPOGLYCEMIA WITHOUT COMA, WITH LONG-TERM CURRENT USE OF INSULIN: Primary | ICD-10-CM

## 2022-09-26 DIAGNOSIS — E11.42 DIABETIC POLYNEUROPATHY ASSOCIATED WITH TYPE 2 DIABETES MELLITUS: ICD-10-CM

## 2022-09-26 DIAGNOSIS — M81.0 OSTEOPOROSIS, UNSPECIFIED OSTEOPOROSIS TYPE, UNSPECIFIED PATHOLOGICAL FRACTURE PRESENCE: ICD-10-CM

## 2022-09-26 DIAGNOSIS — N18.4 CKD (CHRONIC KIDNEY DISEASE) STAGE 4, GFR 15-29 ML/MIN: ICD-10-CM

## 2022-09-26 DIAGNOSIS — Z79.4 TYPE 2 DIABETES MELLITUS WITH HYPOGLYCEMIA WITHOUT COMA, WITH LONG-TERM CURRENT USE OF INSULIN: Primary | ICD-10-CM

## 2022-09-26 PROCEDURE — 99214 OFFICE O/P EST MOD 30 MIN: CPT | Performed by: NURSE PRACTITIONER

## 2022-09-26 RX ORDER — PROCHLORPERAZINE 25 MG/1
1 SUPPOSITORY RECTAL AS NEEDED
Qty: 9 EACH | Refills: 3 | Status: SHIPPED | OUTPATIENT
Start: 2022-09-26 | End: 2023-01-17 | Stop reason: SDUPTHER

## 2022-09-26 RX ORDER — PROCHLORPERAZINE 25 MG/1
1 SUPPOSITORY RECTAL
Qty: 1 EACH | Refills: 3 | Status: SHIPPED | OUTPATIENT
Start: 2022-09-26 | End: 2023-01-17 | Stop reason: SDUPTHER

## 2022-10-31 ENCOUNTER — OFFICE VISIT (OUTPATIENT)
Dept: GASTROENTEROLOGY | Facility: CLINIC | Age: 52
End: 2022-10-31

## 2022-10-31 VITALS
HEIGHT: 60 IN | HEART RATE: 105 BPM | DIASTOLIC BLOOD PRESSURE: 72 MMHG | TEMPERATURE: 97.2 F | WEIGHT: 107 LBS | BODY MASS INDEX: 21.01 KG/M2 | SYSTOLIC BLOOD PRESSURE: 122 MMHG | OXYGEN SATURATION: 98 %

## 2022-10-31 DIAGNOSIS — R11.0 NAUSEA: Primary | ICD-10-CM

## 2022-10-31 PROCEDURE — 99214 OFFICE O/P EST MOD 30 MIN: CPT | Performed by: INTERNAL MEDICINE

## 2022-11-03 ENCOUNTER — ANESTHESIA EVENT (OUTPATIENT)
Dept: GASTROENTEROLOGY | Facility: HOSPITAL | Age: 52
End: 2022-11-03

## 2022-11-03 ENCOUNTER — HOSPITAL ENCOUNTER (OUTPATIENT)
Facility: HOSPITAL | Age: 52
Setting detail: HOSPITAL OUTPATIENT SURGERY
Discharge: HOME | End: 2022-11-03
Attending: INTERNAL MEDICINE | Admitting: INTERNAL MEDICINE

## 2022-11-03 ENCOUNTER — ANESTHESIA (OUTPATIENT)
Dept: GASTROENTEROLOGY | Facility: HOSPITAL | Age: 52
End: 2022-11-03

## 2022-11-03 VITALS
WEIGHT: 105 LBS | HEART RATE: 83 BPM | BODY MASS INDEX: 20.62 KG/M2 | OXYGEN SATURATION: 96 % | TEMPERATURE: 96.6 F | RESPIRATION RATE: 17 BRPM | SYSTOLIC BLOOD PRESSURE: 110 MMHG | DIASTOLIC BLOOD PRESSURE: 79 MMHG | HEIGHT: 60 IN

## 2022-11-03 DIAGNOSIS — R11.0 NAUSEA: ICD-10-CM

## 2022-11-03 LAB — GLUCOSE BLDC GLUCOMTR-MCNC: 120 MG/DL (ref 70–130)

## 2022-11-03 PROCEDURE — 25010000002 PROPOFOL 10 MG/ML EMULSION: Performed by: NURSE ANESTHETIST, CERTIFIED REGISTERED

## 2022-11-03 PROCEDURE — 43247 EGD REMOVE FOREIGN BODY: CPT | Performed by: INTERNAL MEDICINE

## 2022-11-03 PROCEDURE — 82962 GLUCOSE BLOOD TEST: CPT

## 2022-11-03 RX ORDER — PROPOFOL 10 MG/ML
VIAL (ML) INTRAVENOUS AS NEEDED
Status: DISCONTINUED | OUTPATIENT
Start: 2022-11-03 | End: 2022-11-03 | Stop reason: SURG

## 2022-11-03 RX ORDER — SODIUM CHLORIDE 9 MG/ML
500 INJECTION, SOLUTION INTRAVENOUS CONTINUOUS PRN
Status: DISCONTINUED | OUTPATIENT
Start: 2022-11-03 | End: 2022-11-15 | Stop reason: HOSPADM

## 2022-11-03 RX ORDER — LIDOCAINE HYDROCHLORIDE 20 MG/ML
INJECTION, SOLUTION EPIDURAL; INFILTRATION; INTRACAUDAL; PERINEURAL AS NEEDED
Status: DISCONTINUED | OUTPATIENT
Start: 2022-11-03 | End: 2022-11-03 | Stop reason: SURG

## 2022-11-03 RX ORDER — SODIUM CHLORIDE 0.9 % (FLUSH) 0.9 %
10 SYRINGE (ML) INJECTION AS NEEDED
Status: DISCONTINUED | OUTPATIENT
Start: 2022-11-03 | End: 2022-11-03 | Stop reason: HOSPADM

## 2022-11-03 RX ADMIN — PROPOFOL 50 MG: 10 INJECTION, EMULSION INTRAVENOUS at 11:37

## 2022-11-03 RX ADMIN — PROPOFOL 50 MG: 10 INJECTION, EMULSION INTRAVENOUS at 11:39

## 2022-11-03 RX ADMIN — LIDOCAINE HYDROCHLORIDE 100 MG: 20 INJECTION, SOLUTION EPIDURAL; INFILTRATION; INTRACAUDAL; PERINEURAL at 11:37

## 2022-11-03 RX ADMIN — SODIUM CHLORIDE 500 ML: 0.9 INJECTION, SOLUTION INTRAVENOUS at 11:33

## 2023-01-17 ENCOUNTER — TELEMEDICINE (OUTPATIENT)
Dept: ENDOCRINOLOGY | Facility: CLINIC | Age: 53
End: 2023-01-17
Payer: COMMERCIAL

## 2023-01-17 DIAGNOSIS — E11.65 TYPE 2 DIABETES MELLITUS WITH HYPERGLYCEMIA, WITH LONG-TERM CURRENT USE OF INSULIN: Primary | ICD-10-CM

## 2023-01-17 DIAGNOSIS — N18.4 STAGE 4 CHRONIC KIDNEY DISEASE: ICD-10-CM

## 2023-01-17 DIAGNOSIS — Z79.4 TYPE 2 DIABETES MELLITUS WITH HYPERGLYCEMIA, WITH LONG-TERM CURRENT USE OF INSULIN: Primary | ICD-10-CM

## 2023-01-17 DIAGNOSIS — F17.200 SMOKER: ICD-10-CM

## 2023-01-17 PROCEDURE — 99214 OFFICE O/P EST MOD 30 MIN: CPT | Performed by: NURSE PRACTITIONER

## 2023-01-17 RX ORDER — PROCHLORPERAZINE 25 MG/1
1 SUPPOSITORY RECTAL AS NEEDED
Qty: 9 EACH | Refills: 3 | Status: SHIPPED | OUTPATIENT
Start: 2023-01-17 | End: 2023-02-15 | Stop reason: SDUPTHER

## 2023-01-17 RX ORDER — PROCHLORPERAZINE 25 MG/1
1 SUPPOSITORY RECTAL
Qty: 1 EACH | Refills: 3 | Status: SHIPPED | OUTPATIENT
Start: 2023-01-17 | End: 2023-02-13 | Stop reason: SDUPTHER

## 2023-01-17 RX ORDER — INSULIN ASPART 100 [IU]/ML
INJECTION, SOLUTION INTRAVENOUS; SUBCUTANEOUS
Qty: 6 ML | Refills: 11 | Status: SHIPPED | OUTPATIENT
Start: 2023-01-17 | End: 2023-02-13 | Stop reason: SDUPTHER

## 2023-01-26 ENCOUNTER — DOCUMENTATION (OUTPATIENT)
Dept: ENDOCRINOLOGY | Facility: CLINIC | Age: 53
End: 2023-01-26
Payer: COMMERCIAL

## 2023-01-27 ENCOUNTER — DOCUMENTATION (OUTPATIENT)
Dept: ENDOCRINOLOGY | Facility: CLINIC | Age: 53
End: 2023-01-27
Payer: COMMERCIAL

## 2023-02-13 ENCOUNTER — TELEPHONE (OUTPATIENT)
Dept: ENDOCRINOLOGY | Facility: CLINIC | Age: 53
End: 2023-02-13
Payer: COMMERCIAL

## 2023-02-13 DIAGNOSIS — E11.65 TYPE 2 DIABETES MELLITUS WITH HYPERGLYCEMIA, WITH LONG-TERM CURRENT USE OF INSULIN: Primary | ICD-10-CM

## 2023-02-13 DIAGNOSIS — Z79.4 TYPE 2 DIABETES MELLITUS WITH HYPERGLYCEMIA, WITH LONG-TERM CURRENT USE OF INSULIN: Primary | ICD-10-CM

## 2023-02-13 RX ORDER — PROCHLORPERAZINE 25 MG/1
1 SUPPOSITORY RECTAL
Qty: 1 EACH | Refills: 3 | Status: SHIPPED | OUTPATIENT
Start: 2023-02-13 | End: 2023-02-15 | Stop reason: SDUPTHER

## 2023-02-13 RX ORDER — INSULIN ASPART 100 [IU]/ML
INJECTION, SOLUTION INTRAVENOUS; SUBCUTANEOUS
Qty: 6 ML | Refills: 11 | Status: SHIPPED | OUTPATIENT
Start: 2023-02-13

## 2023-02-13 RX ORDER — INSULIN DEGLUDEC INJECTION 100 U/ML
INJECTION, SOLUTION SUBCUTANEOUS
Qty: 15 ML | Refills: 3 | Status: SHIPPED | OUTPATIENT
Start: 2023-02-13

## 2023-02-15 ENCOUNTER — TELEPHONE (OUTPATIENT)
Dept: ENDOCRINOLOGY | Facility: CLINIC | Age: 53
End: 2023-02-15
Payer: COMMERCIAL

## 2023-02-15 DIAGNOSIS — Z79.4 TYPE 2 DIABETES MELLITUS WITH HYPERGLYCEMIA, WITH LONG-TERM CURRENT USE OF INSULIN: ICD-10-CM

## 2023-02-15 DIAGNOSIS — E11.65 TYPE 2 DIABETES MELLITUS WITH HYPERGLYCEMIA, WITH LONG-TERM CURRENT USE OF INSULIN: ICD-10-CM

## 2023-02-15 RX ORDER — PROCHLORPERAZINE 25 MG/1
1 SUPPOSITORY RECTAL AS NEEDED
Qty: 9 EACH | Refills: 3 | Status: SHIPPED | OUTPATIENT
Start: 2023-02-15

## 2023-02-15 RX ORDER — PROCHLORPERAZINE 25 MG/1
1 SUPPOSITORY RECTAL DAILY
Qty: 1 EACH | Refills: 0 | Status: SHIPPED | OUTPATIENT
Start: 2023-02-15

## 2023-02-15 RX ORDER — PROCHLORPERAZINE 25 MG/1
1 SUPPOSITORY RECTAL
Qty: 1 EACH | Refills: 3 | Status: SHIPPED | OUTPATIENT
Start: 2023-02-15

## 2023-02-16 ENCOUNTER — DOCUMENTATION (OUTPATIENT)
Dept: ENDOCRINOLOGY | Facility: CLINIC | Age: 53
End: 2023-02-16
Payer: COMMERCIAL

## 2023-02-20 ENCOUNTER — DOCUMENTATION (OUTPATIENT)
Dept: ENDOCRINOLOGY | Facility: CLINIC | Age: 53
End: 2023-02-20
Payer: COMMERCIAL

## 2023-05-01 ENCOUNTER — TELEMEDICINE (OUTPATIENT)
Dept: ENDOCRINOLOGY | Facility: CLINIC | Age: 53
End: 2023-05-01
Payer: COMMERCIAL

## 2023-05-01 DIAGNOSIS — M85.80 OSTEOPENIA, UNSPECIFIED LOCATION: ICD-10-CM

## 2023-05-01 DIAGNOSIS — E11.65 TYPE 2 DIABETES MELLITUS WITH HYPERGLYCEMIA, WITH LONG-TERM CURRENT USE OF INSULIN: ICD-10-CM

## 2023-05-01 DIAGNOSIS — Z79.4 TYPE 2 DIABETES MELLITUS WITH HYPERGLYCEMIA, WITH LONG-TERM CURRENT USE OF INSULIN: ICD-10-CM

## 2023-05-01 DIAGNOSIS — E55.9 VITAMIN D DEFICIENCY: ICD-10-CM

## 2023-05-01 DIAGNOSIS — E11.42 DIABETIC POLYNEUROPATHY ASSOCIATED WITH TYPE 2 DIABETES MELLITUS: Primary | ICD-10-CM

## 2023-05-01 DIAGNOSIS — N18.4 CKD (CHRONIC KIDNEY DISEASE) STAGE 4, GFR 15-29 ML/MIN: ICD-10-CM

## 2023-05-01 RX ORDER — INSULIN PMP CART,AUT,G6/7,CNTR
1 EACH SUBCUTANEOUS
Qty: 1 KIT | Refills: 0 | Status: SHIPPED | OUTPATIENT
Start: 2023-05-01

## 2023-05-01 RX ORDER — INSULIN PMP CART,AUT,G6/7,CNTR
1 EACH SUBCUTANEOUS
Qty: 10 EACH | Refills: 11 | Status: SHIPPED | OUTPATIENT
Start: 2023-05-01

## 2023-05-24 RX ORDER — PEN NEEDLE, DIABETIC 32GX 5/32"
1 NEEDLE, DISPOSABLE MISCELLANEOUS 4 TIMES DAILY
Qty: 100 EACH | Refills: 6 | Status: SHIPPED | OUTPATIENT
Start: 2023-05-24

## 2023-06-01 ENCOUNTER — TELEPHONE (OUTPATIENT)
Dept: ENDOCRINOLOGY | Facility: CLINIC | Age: 53
End: 2023-06-01
Payer: COMMERCIAL

## 2023-06-01 RX ORDER — INSULIN ASPART 100 [IU]/ML
INJECTION, SOLUTION INTRAVENOUS; SUBCUTANEOUS
Qty: 20 ML | Refills: 5 | Status: SHIPPED | OUTPATIENT
Start: 2023-06-01

## 2023-07-26 ENCOUNTER — TELEPHONE (OUTPATIENT)
Dept: ENDOCRINOLOGY | Facility: CLINIC | Age: 53
End: 2023-07-26
Payer: COMMERCIAL

## 2023-07-26 DIAGNOSIS — E11.42 DIABETIC POLYNEUROPATHY ASSOCIATED WITH TYPE 2 DIABETES MELLITUS: ICD-10-CM

## 2023-07-26 DIAGNOSIS — E11.65 TYPE 2 DIABETES MELLITUS WITH HYPERGLYCEMIA, WITH LONG-TERM CURRENT USE OF INSULIN: Primary | ICD-10-CM

## 2023-07-26 DIAGNOSIS — Z79.4 TYPE 2 DIABETES MELLITUS WITH HYPERGLYCEMIA, WITH LONG-TERM CURRENT USE OF INSULIN: ICD-10-CM

## 2023-07-26 DIAGNOSIS — Z79.4 TYPE 2 DIABETES MELLITUS WITH HYPERGLYCEMIA, WITH LONG-TERM CURRENT USE OF INSULIN: Primary | ICD-10-CM

## 2023-07-26 DIAGNOSIS — N18.4 CKD (CHRONIC KIDNEY DISEASE) STAGE 4, GFR 15-29 ML/MIN: ICD-10-CM

## 2023-07-26 DIAGNOSIS — E11.65 TYPE 2 DIABETES MELLITUS WITH HYPERGLYCEMIA, WITH LONG-TERM CURRENT USE OF INSULIN: ICD-10-CM

## 2023-07-26 RX ORDER — INSULIN PMP CART,AUT,G6/7,CNTR
1 EACH SUBCUTANEOUS
Qty: 10 EACH | Refills: 11 | Status: SHIPPED | OUTPATIENT
Start: 2023-07-26 | End: 2023-07-26 | Stop reason: SDUPTHER

## 2023-07-26 RX ORDER — INSULIN PMP CART,AUT,G6/7,CNTR
1 EACH SUBCUTANEOUS
Qty: 10 EACH | Refills: 11 | Status: SHIPPED | OUTPATIENT
Start: 2023-07-26

## 2023-08-10 ENCOUNTER — TELEMEDICINE (OUTPATIENT)
Dept: ENDOCRINOLOGY | Facility: CLINIC | Age: 53
End: 2023-08-10
Payer: COMMERCIAL

## 2023-08-10 DIAGNOSIS — Z79.4 TYPE 2 DIABETES MELLITUS WITH HYPERGLYCEMIA, WITH LONG-TERM CURRENT USE OF INSULIN: Primary | ICD-10-CM

## 2023-08-10 DIAGNOSIS — E55.9 VITAMIN D DEFICIENCY: ICD-10-CM

## 2023-08-10 DIAGNOSIS — E11.65 TYPE 2 DIABETES MELLITUS WITH HYPERGLYCEMIA, WITH LONG-TERM CURRENT USE OF INSULIN: Primary | ICD-10-CM

## 2023-08-10 DIAGNOSIS — N18.31 STAGE 3A CHRONIC KIDNEY DISEASE: ICD-10-CM

## 2023-08-10 DIAGNOSIS — E11.43 DIABETIC AUTONOMIC NEUROPATHY ASSOCIATED WITH TYPE 2 DIABETES MELLITUS: ICD-10-CM

## 2023-10-18 ENCOUNTER — HOSPITAL ENCOUNTER (INPATIENT)
Facility: HOSPITAL | Age: 53
LOS: 3 days | Discharge: HOME | End: 2023-10-21
Attending: FAMILY MEDICINE | Admitting: INTERNAL MEDICINE
Payer: COMMERCIAL

## 2023-10-18 PROBLEM — N39.0 SEPSIS SECONDARY TO UTI: Status: ACTIVE | Noted: 2023-10-18

## 2023-10-18 PROBLEM — A41.9 SEPSIS SECONDARY TO UTI: Status: ACTIVE | Noted: 2023-10-18

## 2023-10-18 LAB
ADV 40+41 DNA STL QL NAA+NON-PROBE: NOT DETECTED
ALBUMIN SERPL-MCNC: 3.6 G/DL (ref 3.5–5.2)
ALBUMIN/GLOB SERPL: 1.1 G/DL
ALP SERPL-CCNC: 141 U/L (ref 39–117)
ALT SERPL W P-5'-P-CCNC: 24 U/L (ref 1–33)
ANION GAP SERPL CALCULATED.3IONS-SCNC: 14 MMOL/L (ref 5–15)
ANION GAP SERPL CALCULATED.3IONS-SCNC: 16 MMOL/L (ref 5–15)
ARTERIAL PATENCY WRIST A: NORMAL
AST SERPL-CCNC: 38 U/L (ref 1–32)
ASTRO TYP 1-8 RNA STL QL NAA+NON-PROBE: NOT DETECTED
ATMOSPHERIC PRESS: NORMAL MM[HG]
B PARAPERT DNA SPEC QL NAA+PROBE: NOT DETECTED
B PERT DNA SPEC QL NAA+PROBE: NOT DETECTED
BACTERIA UR QL AUTO: ABNORMAL /HPF
BASE EXCESS BLDA CALC-SCNC: NORMAL MMOL/L
BDY SITE: NORMAL
BILIRUB SERPL-MCNC: 0.2 MG/DL (ref 0–1.2)
BILIRUB UR QL STRIP: NEGATIVE
BODY TEMPERATURE: NORMAL
BUN SERPL-MCNC: 53 MG/DL (ref 6–20)
BUN SERPL-MCNC: 65 MG/DL (ref 6–20)
BUN/CREAT SERPL: 18.1 (ref 7–25)
BUN/CREAT SERPL: 18.7 (ref 7–25)
BURR CELLS BLD QL SMEAR: ABNORMAL
C CAYETANENSIS DNA STL QL NAA+NON-PROBE: NOT DETECTED
C COLI+JEJ+UPSA DNA STL QL NAA+NON-PROBE: NOT DETECTED
C PNEUM DNA NPH QL NAA+NON-PROBE: NOT DETECTED
CALCIUM SPEC-SCNC: 7.5 MG/DL (ref 8.6–10.5)
CALCIUM SPEC-SCNC: 7.9 MG/DL (ref 8.6–10.5)
CHLORIDE SERPL-SCNC: 103 MMOL/L (ref 98–107)
CHLORIDE SERPL-SCNC: 105 MMOL/L (ref 98–107)
CLARITY UR: ABNORMAL
CO2 SERPL-SCNC: 18 MMOL/L (ref 22–29)
CO2 SERPL-SCNC: 20 MMOL/L (ref 22–29)
COLOR UR: ABNORMAL
CREAT SERPL-MCNC: 2.83 MG/DL (ref 0.57–1)
CREAT SERPL-MCNC: 3.59 MG/DL (ref 0.57–1)
CREAT UR-MCNC: 156.5 MG/DL
CRYPTOSP DNA STL QL NAA+NON-PROBE: NOT DETECTED
DACRYOCYTES BLD QL SMEAR: ABNORMAL
DEPRECATED RDW RBC AUTO: 47.5 FL (ref 37–54)
E HISTOLYT DNA STL QL NAA+NON-PROBE: NOT DETECTED
EAEC PAA PLAS AGGR+AATA ST NAA+NON-PRB: NOT DETECTED
EC STX1+STX2 GENES STL QL NAA+NON-PROBE: NOT DETECTED
EGFRCR SERPLBLD CKD-EPI 2021: 14.7 ML/MIN/1.73
EGFRCR SERPLBLD CKD-EPI 2021: 19.5 ML/MIN/1.73
EPAP: NORMAL
EPEC EAE GENE STL QL NAA+NON-PROBE: NOT DETECTED
ERYTHROCYTE [DISTWIDTH] IN BLOOD BY AUTOMATED COUNT: 14.5 % (ref 12.3–15.4)
ETEC LTA+ST1A+ST1B TOX ST NAA+NON-PROBE: NOT DETECTED
FLUAV SUBTYP SPEC NAA+PROBE: NOT DETECTED
FLUBV RNA ISLT QL NAA+PROBE: NOT DETECTED
G LAMBLIA DNA STL QL NAA+NON-PROBE: NOT DETECTED
GLOBULIN UR ELPH-MCNC: 3.3 GM/DL
GLUCOSE BLDC GLUCOMTR-MCNC: 107 MG/DL (ref 70–130)
GLUCOSE BLDC GLUCOMTR-MCNC: 112 MG/DL (ref 70–130)
GLUCOSE BLDC GLUCOMTR-MCNC: 86 MG/DL (ref 70–130)
GLUCOSE BLDC GLUCOMTR-MCNC: 86 MG/DL (ref 70–130)
GLUCOSE BLDC GLUCOMTR-MCNC: 87 MG/DL (ref 70–130)
GLUCOSE BLDC GLUCOMTR-MCNC: 94 MG/DL (ref 70–130)
GLUCOSE SERPL-MCNC: 108 MG/DL (ref 65–99)
GLUCOSE SERPL-MCNC: 86 MG/DL (ref 65–99)
GLUCOSE UR STRIP-MCNC: ABNORMAL MG/DL
HADV DNA SPEC NAA+PROBE: NOT DETECTED
HCO3 BLDA-SCNC: NORMAL MMOL/L
HCOV 229E RNA SPEC QL NAA+PROBE: NOT DETECTED
HCOV HKU1 RNA SPEC QL NAA+PROBE: NOT DETECTED
HCOV NL63 RNA SPEC QL NAA+PROBE: NOT DETECTED
HCOV OC43 RNA SPEC QL NAA+PROBE: NOT DETECTED
HCT VFR BLD AUTO: 42 % (ref 34–46.6)
HGB BLD-MCNC: 13.3 G/DL (ref 12–15.9)
HGB UR QL STRIP.AUTO: ABNORMAL
HMPV RNA NPH QL NAA+NON-PROBE: NOT DETECTED
HPIV1 RNA ISLT QL NAA+PROBE: NOT DETECTED
HPIV2 RNA SPEC QL NAA+PROBE: NOT DETECTED
HPIV3 RNA NPH QL NAA+PROBE: NOT DETECTED
HPIV4 P GENE NPH QL NAA+PROBE: NOT DETECTED
HYALINE CASTS UR QL AUTO: ABNORMAL /LPF
INHALED O2 CONCENTRATION: NORMAL %
IPAP: NORMAL
KETONES UR QL STRIP: ABNORMAL
LEUKOCYTE ESTERASE UR QL STRIP.AUTO: ABNORMAL
LYMPHOCYTES # BLD MANUAL: 3.32 10*3/MM3 (ref 0.7–3.1)
LYMPHOCYTES NFR BLD MANUAL: 2 % (ref 5–12)
Lab: NORMAL
M PNEUMO IGG SER IA-ACNC: NOT DETECTED
MAGNESIUM SERPL-MCNC: 1.6 MG/DL (ref 1.6–2.6)
MCH RBC QN AUTO: 28.6 PG (ref 26.6–33)
MCHC RBC AUTO-ENTMCNC: 31.7 G/DL (ref 31.5–35.7)
MCV RBC AUTO: 90.3 FL (ref 79–97)
MODALITY: NORMAL
MONOCYTES # BLD: 0.33 10*3/MM3 (ref 0.1–0.9)
NEUTROPHILS # BLD AUTO: 12.94 10*3/MM3 (ref 1.7–7)
NEUTROPHILS NFR BLD MANUAL: 78 % (ref 42.7–76)
NITRITE UR QL STRIP: NEGATIVE
NOROVIRUS GI+II RNA STL QL NAA+NON-PROBE: NOT DETECTED
NOTIFIED BY: NORMAL
NRBC BLD AUTO-RTO: 0 /100 WBC (ref 0–0.2)
OSMOLALITY UR: 622 MOSM/KG (ref 50–1400)
P SHIGELLOIDES DNA STL QL NAA+NON-PROBE: NOT DETECTED
PCO2 BLDA: NORMAL MM[HG]
PCO2 TEMP ADJ BLD: NORMAL MM[HG]
PH BLDA: NORMAL [PH]
PH UR STRIP.AUTO: 5.5 [PH] (ref 5–8)
PH, TEMP CORRECTED: NORMAL
PHOSPHATE SERPL-MCNC: 4.1 MG/DL (ref 2.5–4.5)
PLAT MORPH BLD: NORMAL
PLATELET # BLD AUTO: 297 10*3/MM3 (ref 140–450)
PMV BLD AUTO: 10.3 FL (ref 6–12)
PO2 BLDA: NORMAL MM[HG]
PO2 TEMP ADJ BLD: NORMAL MM[HG]
POIKILOCYTOSIS BLD QL SMEAR: ABNORMAL
POTASSIUM SERPL-SCNC: 3 MMOL/L (ref 3.5–5.2)
POTASSIUM SERPL-SCNC: 3 MMOL/L (ref 3.5–5.2)
PROT SERPL-MCNC: 6.9 G/DL (ref 6–8.5)
PROT UR QL STRIP: ABNORMAL
RBC # BLD AUTO: 4.65 10*6/MM3 (ref 3.77–5.28)
RBC # UR STRIP: ABNORMAL /HPF
REF LAB TEST METHOD: ABNORMAL
RHINOVIRUS RNA SPEC NAA+PROBE: NOT DETECTED
RSV RNA NPH QL NAA+NON-PROBE: NOT DETECTED
RVA RNA STL QL NAA+NON-PROBE: NOT DETECTED
S ENT+BONG DNA STL QL NAA+NON-PROBE: NOT DETECTED
SAO2 % BLDCOA: NORMAL %
SAPO I+II+IV+V RNA STL QL NAA+NON-PROBE: NOT DETECTED
SARS-COV-2 RNA NPH QL NAA+NON-PROBE: NOT DETECTED
SET MECH RESP RATE: NORMAL
SHIGELLA SP+EIEC IPAH ST NAA+NON-PROBE: NOT DETECTED
SODIUM SERPL-SCNC: 137 MMOL/L (ref 136–145)
SODIUM SERPL-SCNC: 139 MMOL/L (ref 136–145)
SP GR UR STRIP: 1.02 (ref 1–1.03)
SQUAMOUS #/AREA URNS HPF: ABNORMAL /HPF
UROBILINOGEN UR QL STRIP: ABNORMAL
V CHOL+PARA+VUL DNA STL QL NAA+NON-PROBE: NOT DETECTED
V CHOLERAE DNA STL QL NAA+NON-PROBE: NOT DETECTED
VARIANT LYMPHS NFR BLD MANUAL: 18 % (ref 19.6–45.3)
VARIANT LYMPHS NFR BLD MANUAL: 2 % (ref 0–5)
VENTILATOR MODE: NORMAL
WBC # UR STRIP: ABNORMAL /HPF
WBC MORPH BLD: NORMAL
WBC NRBC COR # BLD: 16.59 10*3/MM3 (ref 3.4–10.8)
Y ENTEROCOL DNA STL QL NAA+NON-PROBE: NOT DETECTED

## 2023-10-18 PROCEDURE — 82948 REAGENT STRIP/BLOOD GLUCOSE: CPT

## 2023-10-18 PROCEDURE — 0202U NFCT DS 22 TRGT SARS-COV-2: CPT | Performed by: INTERNAL MEDICINE

## 2023-10-18 PROCEDURE — 25010000002 ENOXAPARIN PER 10 MG: Performed by: FAMILY MEDICINE

## 2023-10-18 PROCEDURE — 80053 COMPREHEN METABOLIC PANEL: CPT | Performed by: FAMILY MEDICINE

## 2023-10-18 PROCEDURE — 25810000003 SODIUM CHLORIDE 0.9 % SOLUTION: Performed by: FAMILY MEDICINE

## 2023-10-18 PROCEDURE — 25010000002 CEFTRIAXONE PER 250 MG: Performed by: INTERNAL MEDICINE

## 2023-10-18 PROCEDURE — 83735 ASSAY OF MAGNESIUM: CPT | Performed by: FAMILY MEDICINE

## 2023-10-18 PROCEDURE — 84100 ASSAY OF PHOSPHORUS: CPT | Performed by: FAMILY MEDICINE

## 2023-10-18 PROCEDURE — 87086 URINE CULTURE/COLONY COUNT: CPT | Performed by: INTERNAL MEDICINE

## 2023-10-18 PROCEDURE — 82570 ASSAY OF URINE CREATININE: CPT | Performed by: FAMILY MEDICINE

## 2023-10-18 PROCEDURE — 85025 COMPLETE CBC W/AUTO DIFF WBC: CPT | Performed by: FAMILY MEDICINE

## 2023-10-18 PROCEDURE — 85007 BL SMEAR W/DIFF WBC COUNT: CPT | Performed by: FAMILY MEDICINE

## 2023-10-18 PROCEDURE — 87507 IADNA-DNA/RNA PROBE TQ 12-25: CPT | Performed by: FAMILY MEDICINE

## 2023-10-18 PROCEDURE — 25010000002 POTASSIUM CHLORIDE 10 MEQ/100ML SOLUTION: Performed by: FAMILY MEDICINE

## 2023-10-18 PROCEDURE — 83935 ASSAY OF URINE OSMOLALITY: CPT | Performed by: FAMILY MEDICINE

## 2023-10-18 PROCEDURE — 81001 URINALYSIS AUTO W/SCOPE: CPT | Performed by: FAMILY MEDICINE

## 2023-10-18 RX ORDER — DULOXETIN HYDROCHLORIDE 60 MG/1
60 CAPSULE, DELAYED RELEASE ORAL DAILY
COMMUNITY

## 2023-10-18 RX ORDER — PREGABALIN 150 MG/1
150 CAPSULE ORAL 2 TIMES DAILY
COMMUNITY

## 2023-10-18 RX ORDER — ACETAMINOPHEN 650 MG/1
650 SUPPOSITORY RECTAL EVERY 4 HOURS PRN
Status: DISCONTINUED | OUTPATIENT
Start: 2023-10-18 | End: 2023-10-21 | Stop reason: HOSPADM

## 2023-10-18 RX ORDER — CHLORHEXIDINE GLUCONATE 500 MG/1
1 CLOTH TOPICAL EVERY 24 HOURS
Status: DISCONTINUED | OUTPATIENT
Start: 2023-10-19 | End: 2023-10-19

## 2023-10-18 RX ORDER — ATORVASTATIN CALCIUM 40 MG/1
40 TABLET, FILM COATED ORAL DAILY
Status: DISCONTINUED | OUTPATIENT
Start: 2023-10-18 | End: 2023-10-21 | Stop reason: HOSPADM

## 2023-10-18 RX ORDER — POTASSIUM CHLORIDE 750 MG/1
40 CAPSULE, EXTENDED RELEASE ORAL EVERY 4 HOURS
Status: DISCONTINUED | OUTPATIENT
Start: 2023-10-18 | End: 2023-10-18

## 2023-10-18 RX ORDER — FAMOTIDINE 40 MG/1
40 TABLET, FILM COATED ORAL 2 TIMES DAILY
COMMUNITY
End: 2023-10-21 | Stop reason: HOSPADM

## 2023-10-18 RX ORDER — ROPINIROLE 1 MG/1
2 TABLET, FILM COATED ORAL NIGHTLY
Status: DISCONTINUED | OUTPATIENT
Start: 2023-10-18 | End: 2023-10-21 | Stop reason: HOSPADM

## 2023-10-18 RX ORDER — METOPROLOL SUCCINATE 25 MG/1
12.5 TABLET, EXTENDED RELEASE ORAL DAILY
COMMUNITY
End: 2023-10-21 | Stop reason: HOSPADM

## 2023-10-18 RX ORDER — FUROSEMIDE 20 MG/1
20 TABLET ORAL DAILY
COMMUNITY
End: 2023-10-21 | Stop reason: HOSPADM

## 2023-10-18 RX ORDER — POLYETHYLENE GLYCOL 3350 17 G/17G
17 POWDER, FOR SOLUTION ORAL DAILY PRN
Status: DISCONTINUED | OUTPATIENT
Start: 2023-10-18 | End: 2023-10-21 | Stop reason: HOSPADM

## 2023-10-18 RX ORDER — DIPHENHYDRAMINE HCL 50 MG
50 CAPSULE ORAL NIGHTLY PRN
Status: DISCONTINUED | OUTPATIENT
Start: 2023-10-18 | End: 2023-10-21 | Stop reason: HOSPADM

## 2023-10-18 RX ORDER — INSULIN LISPRO 100 [IU]/ML
2-7 INJECTION, SOLUTION INTRAVENOUS; SUBCUTANEOUS
Status: DISCONTINUED | OUTPATIENT
Start: 2023-10-18 | End: 2023-10-21 | Stop reason: HOSPADM

## 2023-10-18 RX ORDER — BUMETANIDE 1 MG/1
1 TABLET ORAL DAILY
Status: ON HOLD | COMMUNITY
End: 2023-10-21 | Stop reason: SDUPTHER

## 2023-10-18 RX ORDER — ROPINIROLE 1 MG/1
1 TABLET, FILM COATED ORAL NIGHTLY
Status: DISCONTINUED | OUTPATIENT
Start: 2023-10-18 | End: 2023-10-18

## 2023-10-18 RX ORDER — BUPROPION HYDROCHLORIDE 150 MG/1
150 TABLET, EXTENDED RELEASE ORAL 2 TIMES DAILY
Status: ON HOLD | COMMUNITY
End: 2023-10-18 | Stop reason: DRUGHIGH

## 2023-10-18 RX ORDER — ROPINIROLE 1 MG/1
2 TABLET, FILM COATED ORAL NIGHTLY
COMMUNITY

## 2023-10-18 RX ORDER — POTASSIUM CHLORIDE 7.45 MG/ML
10 INJECTION INTRAVENOUS
Status: DISCONTINUED | OUTPATIENT
Start: 2023-10-18 | End: 2023-10-18

## 2023-10-18 RX ORDER — SODIUM BICARBONATE 650 MG/1
1300 TABLET ORAL 2 TIMES DAILY
Status: ON HOLD | COMMUNITY
End: 2023-10-18

## 2023-10-18 RX ORDER — BISACODYL 5 MG/1
5 TABLET, DELAYED RELEASE ORAL DAILY PRN
Status: DISCONTINUED | OUTPATIENT
Start: 2023-10-18 | End: 2023-10-21 | Stop reason: HOSPADM

## 2023-10-18 RX ORDER — PREGABALIN 100 MG/1
150 CAPSULE ORAL 2 TIMES DAILY
Status: ON HOLD | COMMUNITY
End: 2023-10-18

## 2023-10-18 RX ORDER — ONDANSETRON 8 MG/1
8 TABLET, ORALLY DISINTEGRATING ORAL 4 TIMES DAILY PRN
Status: ON HOLD | COMMUNITY
End: 2023-10-18

## 2023-10-18 RX ORDER — SODIUM CHLORIDE 0.9 % (FLUSH) 0.9 %
10 SYRINGE (ML) INJECTION AS NEEDED
Status: DISCONTINUED | OUTPATIENT
Start: 2023-10-18 | End: 2023-10-21 | Stop reason: HOSPADM

## 2023-10-18 RX ORDER — GUAIFENESIN 600 MG/1
1200 TABLET, EXTENDED RELEASE ORAL 2 TIMES DAILY
Status: ON HOLD | COMMUNITY
End: 2023-10-18

## 2023-10-18 RX ORDER — AMOXICILLIN 250 MG
2 CAPSULE ORAL 2 TIMES DAILY
Status: DISCONTINUED | OUTPATIENT
Start: 2023-10-18 | End: 2023-10-21 | Stop reason: HOSPADM

## 2023-10-18 RX ORDER — SODIUM CHLORIDE 9 MG/ML
40 INJECTION, SOLUTION INTRAVENOUS AS NEEDED
Status: DISCONTINUED | OUTPATIENT
Start: 2023-10-18 | End: 2023-10-21 | Stop reason: HOSPADM

## 2023-10-18 RX ORDER — PANTOPRAZOLE SODIUM 40 MG/1
40 TABLET, DELAYED RELEASE ORAL
Status: DISCONTINUED | OUTPATIENT
Start: 2023-10-18 | End: 2023-10-21 | Stop reason: HOSPADM

## 2023-10-18 RX ORDER — SODIUM CHLORIDE 9 MG/ML
75 INJECTION, SOLUTION INTRAVENOUS CONTINUOUS
Status: DISCONTINUED | OUTPATIENT
Start: 2023-10-18 | End: 2023-10-20

## 2023-10-18 RX ORDER — INSULIN ASPART 100 [IU]/ML
60 INJECTION, SOLUTION INTRAVENOUS; SUBCUTANEOUS
COMMUNITY
End: 2023-10-21 | Stop reason: HOSPADM

## 2023-10-18 RX ORDER — ASPIRIN 81 MG/1
81 TABLET ORAL DAILY
Status: DISCONTINUED | OUTPATIENT
Start: 2023-10-18 | End: 2023-10-21 | Stop reason: HOSPADM

## 2023-10-18 RX ORDER — CHLORHEXIDINE GLUCONATE 500 MG/1
1 CLOTH TOPICAL ONCE
Status: COMPLETED | OUTPATIENT
Start: 2023-10-18 | End: 2023-10-18

## 2023-10-18 RX ORDER — FUROSEMIDE 20 MG/1
20 TABLET ORAL 2 TIMES DAILY
Status: ON HOLD | COMMUNITY
End: 2023-10-18 | Stop reason: DRUGHIGH

## 2023-10-18 RX ORDER — NICOTINE POLACRILEX 4 MG
15 LOZENGE BUCCAL
Status: DISCONTINUED | OUTPATIENT
Start: 2023-10-18 | End: 2023-10-21 | Stop reason: HOSPADM

## 2023-10-18 RX ORDER — ACETAMINOPHEN 325 MG/1
650 TABLET ORAL EVERY 4 HOURS PRN
Status: DISCONTINUED | OUTPATIENT
Start: 2023-10-18 | End: 2023-10-21 | Stop reason: HOSPADM

## 2023-10-18 RX ORDER — TIZANIDINE 4 MG/1
4 TABLET ORAL 2 TIMES DAILY PRN
Status: DISCONTINUED | OUTPATIENT
Start: 2023-10-18 | End: 2023-10-21 | Stop reason: HOSPADM

## 2023-10-18 RX ORDER — BUPROPION HYDROCHLORIDE 150 MG/1
150 TABLET ORAL DAILY
COMMUNITY
End: 2023-10-21 | Stop reason: HOSPADM

## 2023-10-18 RX ORDER — ATORVASTATIN CALCIUM 80 MG/1
80 TABLET, FILM COATED ORAL DAILY
COMMUNITY

## 2023-10-18 RX ORDER — BISACODYL 10 MG
10 SUPPOSITORY, RECTAL RECTAL DAILY PRN
Status: DISCONTINUED | OUTPATIENT
Start: 2023-10-18 | End: 2023-10-21 | Stop reason: HOSPADM

## 2023-10-18 RX ORDER — DULOXETIN HYDROCHLORIDE 30 MG/1
30 CAPSULE, DELAYED RELEASE ORAL DAILY
Status: DISCONTINUED | OUTPATIENT
Start: 2023-10-18 | End: 2023-10-21 | Stop reason: HOSPADM

## 2023-10-18 RX ORDER — NITROGLYCERIN 0.4 MG/1
0.4 TABLET SUBLINGUAL
Status: DISCONTINUED | OUTPATIENT
Start: 2023-10-18 | End: 2023-10-21 | Stop reason: HOSPADM

## 2023-10-18 RX ORDER — SODIUM BICARBONATE 650 MG/1
650 TABLET ORAL 2 TIMES DAILY
Status: DISCONTINUED | OUTPATIENT
Start: 2023-10-18 | End: 2023-10-21 | Stop reason: HOSPADM

## 2023-10-18 RX ORDER — ONDANSETRON 2 MG/ML
4 INJECTION INTRAMUSCULAR; INTRAVENOUS EVERY 6 HOURS PRN
Status: DISCONTINUED | OUTPATIENT
Start: 2023-10-18 | End: 2023-10-21 | Stop reason: HOSPADM

## 2023-10-18 RX ORDER — DAPAGLIFLOZIN 10 MG/1
10 TABLET, FILM COATED ORAL DAILY
COMMUNITY

## 2023-10-18 RX ORDER — INSULIN DEGLUDEC INJECTION 100 U/ML
42 INJECTION, SOLUTION SUBCUTANEOUS DAILY
COMMUNITY
End: 2023-10-21 | Stop reason: HOSPADM

## 2023-10-18 RX ORDER — DEXTROSE MONOHYDRATE 25 G/50ML
25 INJECTION, SOLUTION INTRAVENOUS
Status: DISCONTINUED | OUTPATIENT
Start: 2023-10-18 | End: 2023-10-21 | Stop reason: HOSPADM

## 2023-10-18 RX ORDER — HYDROXYCHLOROQUINE SULFATE 200 MG/1
400 TABLET, FILM COATED ORAL DAILY
COMMUNITY

## 2023-10-18 RX ORDER — POTASSIUM CHLORIDE 750 MG/1
10 TABLET, EXTENDED RELEASE ORAL 2 TIMES DAILY
COMMUNITY

## 2023-10-18 RX ORDER — ENOXAPARIN SODIUM 100 MG/ML
30 INJECTION SUBCUTANEOUS DAILY
Status: DISCONTINUED | OUTPATIENT
Start: 2023-10-18 | End: 2023-10-20

## 2023-10-18 RX ORDER — HYDROXYCHLOROQUINE SULFATE 200 MG/1
200 TABLET, FILM COATED ORAL DAILY
Status: ON HOLD | COMMUNITY
End: 2023-10-18 | Stop reason: DRUGHIGH

## 2023-10-18 RX ORDER — NIFEDIPINE 30 MG/1
30 TABLET, EXTENDED RELEASE ORAL DAILY
COMMUNITY
End: 2023-10-21 | Stop reason: HOSPADM

## 2023-10-18 RX ORDER — CALCIUM CARBONATE 500 MG/1
2 TABLET, CHEWABLE ORAL 2 TIMES DAILY PRN
Status: DISCONTINUED | OUTPATIENT
Start: 2023-10-18 | End: 2023-10-21 | Stop reason: HOSPADM

## 2023-10-18 RX ORDER — NORTRIPTYLINE HYDROCHLORIDE 25 MG/1
25 CAPSULE ORAL NIGHTLY
COMMUNITY

## 2023-10-18 RX ORDER — SODIUM CHLORIDE 0.9 % (FLUSH) 0.9 %
10 SYRINGE (ML) INJECTION EVERY 12 HOURS SCHEDULED
Status: DISCONTINUED | OUTPATIENT
Start: 2023-10-18 | End: 2023-10-21 | Stop reason: HOSPADM

## 2023-10-18 RX ORDER — CETIRIZINE HYDROCHLORIDE 10 MG/1
10 TABLET ORAL DAILY
Status: DISCONTINUED | OUTPATIENT
Start: 2023-10-18 | End: 2023-10-21 | Stop reason: HOSPADM

## 2023-10-18 RX ADMIN — CHLORHEXIDINE GLUCONATE 1 APPLICATION: 500 CLOTH TOPICAL at 02:13

## 2023-10-18 RX ADMIN — Medication 10 ML: at 21:43

## 2023-10-18 RX ADMIN — ASPIRIN 81 MG: 81 TABLET, COATED ORAL at 08:39

## 2023-10-18 RX ADMIN — ANTACID TABLETS 2 TABLET: 500 TABLET, CHEWABLE ORAL at 12:31

## 2023-10-18 RX ADMIN — SODIUM BICARBONATE 650 MG: 650 TABLET ORAL at 21:40

## 2023-10-18 RX ADMIN — ROPINIROLE HYDROCHLORIDE 2 MG: 1 TABLET, FILM COATED ORAL at 21:40

## 2023-10-18 RX ADMIN — Medication 1 APPLICATION: at 08:45

## 2023-10-18 RX ADMIN — CETIRIZINE HYDROCHLORIDE 10 MG: 10 TABLET ORAL at 08:39

## 2023-10-18 RX ADMIN — Medication 10 ML: at 02:11

## 2023-10-18 RX ADMIN — ENOXAPARIN SODIUM 30 MG: 100 INJECTION SUBCUTANEOUS at 02:11

## 2023-10-18 RX ADMIN — Medication 1 APPLICATION: at 21:40

## 2023-10-18 RX ADMIN — Medication 1 APPLICATION: at 02:11

## 2023-10-18 RX ADMIN — Medication 10 ML: at 08:46

## 2023-10-18 RX ADMIN — SODIUM CHLORIDE 125 ML/HR: 9 INJECTION, SOLUTION INTRAVENOUS at 16:10

## 2023-10-18 RX ADMIN — DULOXETINE HYDROCHLORIDE 30 MG: 30 CAPSULE, DELAYED RELEASE ORAL at 08:39

## 2023-10-18 RX ADMIN — ROPINIROLE HYDROCHLORIDE 1 MG: 1 TABLET, FILM COATED ORAL at 03:27

## 2023-10-18 RX ADMIN — SODIUM BICARBONATE 650 MG: 650 TABLET ORAL at 08:39

## 2023-10-18 RX ADMIN — CEFTRIAXONE SODIUM 2000 MG: 2 INJECTION, POWDER, FOR SOLUTION INTRAMUSCULAR; INTRAVENOUS at 11:56

## 2023-10-18 RX ADMIN — PANTOPRAZOLE SODIUM 40 MG: 40 TABLET, DELAYED RELEASE ORAL at 06:12

## 2023-10-18 RX ADMIN — ATORVASTATIN CALCIUM 40 MG: 40 TABLET ORAL at 08:39

## 2023-10-18 RX ADMIN — POTASSIUM CHLORIDE 40 MEQ: 750 CAPSULE, EXTENDED RELEASE ORAL at 13:42

## 2023-10-19 PROBLEM — E83.39 HYPOPHOSPHATEMIA: Status: ACTIVE | Noted: 2023-10-19

## 2023-10-19 LAB
ALBUMIN SERPL-MCNC: 2.7 G/DL (ref 3.5–5.2)
ALBUMIN/GLOB SERPL: 1.2 G/DL
ALP SERPL-CCNC: 98 U/L (ref 39–117)
ALT SERPL W P-5'-P-CCNC: 17 U/L (ref 1–33)
ANION GAP SERPL CALCULATED.3IONS-SCNC: 11 MMOL/L (ref 5–15)
AST SERPL-CCNC: 27 U/L (ref 1–32)
BACTERIA SPEC AEROBE CULT: NO GROWTH
BASOPHILS # BLD AUTO: 0.02 10*3/MM3 (ref 0–0.2)
BASOPHILS NFR BLD AUTO: 0.2 % (ref 0–1.5)
BILIRUB SERPL-MCNC: <0.2 MG/DL (ref 0–1.2)
BUN SERPL-MCNC: 34 MG/DL (ref 6–20)
BUN/CREAT SERPL: 16.9 (ref 7–25)
CALCIUM SPEC-SCNC: 7.1 MG/DL (ref 8.6–10.5)
CHLORIDE SERPL-SCNC: 113 MMOL/L (ref 98–107)
CO2 SERPL-SCNC: 20 MMOL/L (ref 22–29)
CREAT SERPL-MCNC: 2.01 MG/DL (ref 0.57–1)
DEPRECATED RDW RBC AUTO: 49.9 FL (ref 37–54)
EGFRCR SERPLBLD CKD-EPI 2021: 29.4 ML/MIN/1.73
EOSINOPHIL # BLD AUTO: 0 10*3/MM3 (ref 0–0.4)
EOSINOPHIL NFR BLD AUTO: 0 % (ref 0.3–6.2)
ERYTHROCYTE [DISTWIDTH] IN BLOOD BY AUTOMATED COUNT: 14.6 % (ref 12.3–15.4)
GLOBULIN UR ELPH-MCNC: 2.3 GM/DL
GLUCOSE BLDC GLUCOMTR-MCNC: 102 MG/DL (ref 70–130)
GLUCOSE BLDC GLUCOMTR-MCNC: 118 MG/DL (ref 70–130)
GLUCOSE BLDC GLUCOMTR-MCNC: 95 MG/DL (ref 70–130)
GLUCOSE BLDC GLUCOMTR-MCNC: 98 MG/DL (ref 70–130)
GLUCOSE SERPL-MCNC: 116 MG/DL (ref 65–99)
HCT VFR BLD AUTO: 32.5 % (ref 34–46.6)
HGB BLD-MCNC: 10.2 G/DL (ref 12–15.9)
IMM GRANULOCYTES # BLD AUTO: 0.07 10*3/MM3 (ref 0–0.05)
IMM GRANULOCYTES NFR BLD AUTO: 0.7 % (ref 0–0.5)
LYMPHOCYTES # BLD AUTO: 2.2 10*3/MM3 (ref 0.7–3.1)
LYMPHOCYTES NFR BLD AUTO: 22.7 % (ref 19.6–45.3)
MAGNESIUM SERPL-MCNC: 1.3 MG/DL (ref 1.6–2.6)
MAGNESIUM SERPL-MCNC: 1.7 MG/DL (ref 1.6–2.6)
MCH RBC QN AUTO: 29.1 PG (ref 26.6–33)
MCHC RBC AUTO-ENTMCNC: 31.4 G/DL (ref 31.5–35.7)
MCV RBC AUTO: 92.6 FL (ref 79–97)
MONOCYTES # BLD AUTO: 0.52 10*3/MM3 (ref 0.1–0.9)
MONOCYTES NFR BLD AUTO: 5.4 % (ref 5–12)
NEUTROPHILS NFR BLD AUTO: 6.9 10*3/MM3 (ref 1.7–7)
NEUTROPHILS NFR BLD AUTO: 71 % (ref 42.7–76)
NRBC BLD AUTO-RTO: 0 /100 WBC (ref 0–0.2)
PHOSPHATE SERPL-MCNC: 2 MG/DL (ref 2.5–4.5)
PLATELET # BLD AUTO: 218 10*3/MM3 (ref 140–450)
PMV BLD AUTO: 10.8 FL (ref 6–12)
POTASSIUM SERPL-SCNC: 2.9 MMOL/L (ref 3.5–5.2)
POTASSIUM SERPL-SCNC: 4.1 MMOL/L (ref 3.5–5.2)
PROT SERPL-MCNC: 5 G/DL (ref 6–8.5)
RBC # BLD AUTO: 3.51 10*6/MM3 (ref 3.77–5.28)
SODIUM SERPL-SCNC: 144 MMOL/L (ref 136–145)
WBC NRBC COR # BLD: 9.71 10*3/MM3 (ref 3.4–10.8)

## 2023-10-19 PROCEDURE — 85025 COMPLETE CBC W/AUTO DIFF WBC: CPT | Performed by: INTERNAL MEDICINE

## 2023-10-19 PROCEDURE — 84100 ASSAY OF PHOSPHORUS: CPT | Performed by: INTERNAL MEDICINE

## 2023-10-19 PROCEDURE — 80053 COMPREHEN METABOLIC PANEL: CPT | Performed by: INTERNAL MEDICINE

## 2023-10-19 PROCEDURE — 82948 REAGENT STRIP/BLOOD GLUCOSE: CPT

## 2023-10-19 PROCEDURE — 83735 ASSAY OF MAGNESIUM: CPT | Performed by: INTERNAL MEDICINE

## 2023-10-19 PROCEDURE — 25010000002 ENOXAPARIN PER 10 MG: Performed by: FAMILY MEDICINE

## 2023-10-19 PROCEDURE — 25010000002 CEFTRIAXONE PER 250 MG: Performed by: INTERNAL MEDICINE

## 2023-10-19 PROCEDURE — 25810000003 SODIUM CHLORIDE 0.9 % SOLUTION: Performed by: FAMILY MEDICINE

## 2023-10-19 PROCEDURE — 84132 ASSAY OF SERUM POTASSIUM: CPT | Performed by: INTERNAL MEDICINE

## 2023-10-19 PROCEDURE — 25810000003 SODIUM CHLORIDE 0.9 % SOLUTION: Performed by: INTERNAL MEDICINE

## 2023-10-19 PROCEDURE — 25010000002 MAGNESIUM SULFATE 2 GM/50ML SOLUTION: Performed by: INTERNAL MEDICINE

## 2023-10-19 RX ORDER — MAGNESIUM SULFATE HEPTAHYDRATE 40 MG/ML
2 INJECTION, SOLUTION INTRAVENOUS ONCE
Status: COMPLETED | OUTPATIENT
Start: 2023-10-19 | End: 2023-10-19

## 2023-10-19 RX ORDER — POTASSIUM CHLORIDE 750 MG/1
20 CAPSULE, EXTENDED RELEASE ORAL ONCE
Status: COMPLETED | OUTPATIENT
Start: 2023-10-19 | End: 2023-10-19

## 2023-10-19 RX ORDER — POTASSIUM CHLORIDE 750 MG/1
40 CAPSULE, EXTENDED RELEASE ORAL ONCE
Status: COMPLETED | OUTPATIENT
Start: 2023-10-19 | End: 2023-10-19

## 2023-10-19 RX ORDER — PREGABALIN 75 MG/1
150 CAPSULE ORAL DAILY
Status: DISCONTINUED | OUTPATIENT
Start: 2023-10-19 | End: 2023-10-21 | Stop reason: HOSPADM

## 2023-10-19 RX ADMIN — SODIUM CHLORIDE 75 ML/HR: 9 INJECTION, SOLUTION INTRAVENOUS at 22:24

## 2023-10-19 RX ADMIN — CEFTRIAXONE SODIUM 2000 MG: 2 INJECTION, POWDER, FOR SOLUTION INTRAMUSCULAR; INTRAVENOUS at 12:04

## 2023-10-19 RX ADMIN — ATORVASTATIN CALCIUM 40 MG: 40 TABLET ORAL at 08:41

## 2023-10-19 RX ADMIN — PANCRELIPASE 24000 UNITS OF LIPASE: 60000; 12000; 38000 CAPSULE, DELAYED RELEASE PELLETS ORAL at 20:48

## 2023-10-19 RX ADMIN — CHLORHEXIDINE GLUCONATE 1 APPLICATION: 500 CLOTH TOPICAL at 04:00

## 2023-10-19 RX ADMIN — MAGNESIUM SULFATE HEPTAHYDRATE 2 G: 2 INJECTION, SOLUTION INTRAVENOUS at 09:55

## 2023-10-19 RX ADMIN — PANTOPRAZOLE SODIUM 40 MG: 40 TABLET, DELAYED RELEASE ORAL at 06:27

## 2023-10-19 RX ADMIN — Medication 10 ML: at 08:44

## 2023-10-19 RX ADMIN — PREGABALIN 150 MG: 75 CAPSULE ORAL at 13:13

## 2023-10-19 RX ADMIN — PANCRELIPASE 24000 UNITS OF LIPASE: 60000; 12000; 38000 CAPSULE, DELAYED RELEASE PELLETS ORAL at 13:13

## 2023-10-19 RX ADMIN — POTASSIUM CHLORIDE 20 MEQ: 10 CAPSULE, COATED, EXTENDED RELEASE ORAL at 12:04

## 2023-10-19 RX ADMIN — POTASSIUM CHLORIDE 40 MEQ: 10 CAPSULE, COATED, EXTENDED RELEASE ORAL at 09:54

## 2023-10-19 RX ADMIN — DULOXETINE HYDROCHLORIDE 30 MG: 30 CAPSULE, DELAYED RELEASE ORAL at 08:41

## 2023-10-19 RX ADMIN — PANCRELIPASE 24000 UNITS OF LIPASE: 60000; 12000; 38000 CAPSULE, DELAYED RELEASE PELLETS ORAL at 17:50

## 2023-10-19 RX ADMIN — ASPIRIN 81 MG: 81 TABLET, COATED ORAL at 08:41

## 2023-10-19 RX ADMIN — ENOXAPARIN SODIUM 30 MG: 100 INJECTION SUBCUTANEOUS at 06:26

## 2023-10-19 RX ADMIN — Medication 1 APPLICATION: at 08:41

## 2023-10-19 RX ADMIN — CETIRIZINE HYDROCHLORIDE 10 MG: 10 TABLET ORAL at 08:41

## 2023-10-19 RX ADMIN — SODIUM BICARBONATE 650 MG: 650 TABLET ORAL at 20:48

## 2023-10-19 RX ADMIN — SODIUM BICARBONATE 650 MG: 650 TABLET ORAL at 08:41

## 2023-10-19 RX ADMIN — DIBASIC SODIUM PHOSPHATE, MONOBASIC POTASSIUM PHOSPHATE AND MONOBASIC SODIUM PHOSPHATE 2 TABLET: 852; 155; 130 TABLET ORAL at 09:55

## 2023-10-19 RX ADMIN — ROPINIROLE HYDROCHLORIDE 2 MG: 1 TABLET, FILM COATED ORAL at 20:48

## 2023-10-19 RX ADMIN — SODIUM CHLORIDE 125 ML/HR: 9 INJECTION, SOLUTION INTRAVENOUS at 08:44

## 2023-10-20 LAB
ALBUMIN SERPL-MCNC: 2.7 G/DL (ref 3.5–5.2)
ALBUMIN/GLOB SERPL: 1.1 G/DL
ALP SERPL-CCNC: 93 U/L (ref 39–117)
ALT SERPL W P-5'-P-CCNC: 15 U/L (ref 1–33)
ANION GAP SERPL CALCULATED.3IONS-SCNC: 8 MMOL/L (ref 5–15)
AST SERPL-CCNC: 24 U/L (ref 1–32)
BASOPHILS # BLD AUTO: 0.04 10*3/MM3 (ref 0–0.2)
BASOPHILS NFR BLD AUTO: 0.4 % (ref 0–1.5)
BILIRUB SERPL-MCNC: <0.2 MG/DL (ref 0–1.2)
BUN SERPL-MCNC: 16 MG/DL (ref 6–20)
BUN/CREAT SERPL: 12.2 (ref 7–25)
CALCIUM SPEC-SCNC: 6.8 MG/DL (ref 8.6–10.5)
CHLORIDE SERPL-SCNC: 112 MMOL/L (ref 98–107)
CO2 SERPL-SCNC: 23 MMOL/L (ref 22–29)
CREAT SERPL-MCNC: 1.31 MG/DL (ref 0.57–1)
DEPRECATED RDW RBC AUTO: 49.1 FL (ref 37–54)
EGFRCR SERPLBLD CKD-EPI 2021: 49.1 ML/MIN/1.73
EOSINOPHIL # BLD AUTO: 0.01 10*3/MM3 (ref 0–0.4)
EOSINOPHIL NFR BLD AUTO: 0.1 % (ref 0.3–6.2)
ERYTHROCYTE [DISTWIDTH] IN BLOOD BY AUTOMATED COUNT: 14.5 % (ref 12.3–15.4)
GLOBULIN UR ELPH-MCNC: 2.4 GM/DL
GLUCOSE BLDC GLUCOMTR-MCNC: 106 MG/DL (ref 70–130)
GLUCOSE BLDC GLUCOMTR-MCNC: 119 MG/DL (ref 70–130)
GLUCOSE BLDC GLUCOMTR-MCNC: 119 MG/DL (ref 70–130)
GLUCOSE BLDC GLUCOMTR-MCNC: 66 MG/DL (ref 70–130)
GLUCOSE BLDC GLUCOMTR-MCNC: 89 MG/DL (ref 70–130)
GLUCOSE SERPL-MCNC: 98 MG/DL (ref 65–99)
HCT VFR BLD AUTO: 34.3 % (ref 34–46.6)
HGB BLD-MCNC: 10.8 G/DL (ref 12–15.9)
IMM GRANULOCYTES # BLD AUTO: 0.06 10*3/MM3 (ref 0–0.05)
IMM GRANULOCYTES NFR BLD AUTO: 0.6 % (ref 0–0.5)
LYMPHOCYTES # BLD AUTO: 3.06 10*3/MM3 (ref 0.7–3.1)
LYMPHOCYTES NFR BLD AUTO: 29.3 % (ref 19.6–45.3)
MAGNESIUM SERPL-MCNC: 1.2 MG/DL (ref 1.6–2.6)
MCH RBC QN AUTO: 29 PG (ref 26.6–33)
MCHC RBC AUTO-ENTMCNC: 31.5 G/DL (ref 31.5–35.7)
MCV RBC AUTO: 92 FL (ref 79–97)
MONOCYTES # BLD AUTO: 0.56 10*3/MM3 (ref 0.1–0.9)
MONOCYTES NFR BLD AUTO: 5.4 % (ref 5–12)
NEUTROPHILS NFR BLD AUTO: 6.7 10*3/MM3 (ref 1.7–7)
NEUTROPHILS NFR BLD AUTO: 64.2 % (ref 42.7–76)
NRBC BLD AUTO-RTO: 0 /100 WBC (ref 0–0.2)
PHOSPHATE SERPL-MCNC: 1.5 MG/DL (ref 2.5–4.5)
PHOSPHATE SERPL-MCNC: 2.5 MG/DL (ref 2.5–4.5)
PLATELET # BLD AUTO: 234 10*3/MM3 (ref 140–450)
PMV BLD AUTO: 10.7 FL (ref 6–12)
POTASSIUM SERPL-SCNC: 3.6 MMOL/L (ref 3.5–5.2)
PROT SERPL-MCNC: 5.1 G/DL (ref 6–8.5)
RBC # BLD AUTO: 3.73 10*6/MM3 (ref 3.77–5.28)
SODIUM SERPL-SCNC: 143 MMOL/L (ref 136–145)
WBC NRBC COR # BLD: 10.43 10*3/MM3 (ref 3.4–10.8)

## 2023-10-20 PROCEDURE — 25010000002 MAGNESIUM SULFATE 2 GM/50ML SOLUTION: Performed by: FAMILY MEDICINE

## 2023-10-20 PROCEDURE — 85025 COMPLETE CBC W/AUTO DIFF WBC: CPT | Performed by: INTERNAL MEDICINE

## 2023-10-20 PROCEDURE — 82948 REAGENT STRIP/BLOOD GLUCOSE: CPT

## 2023-10-20 PROCEDURE — 25010000002 ENOXAPARIN PER 10 MG: Performed by: FAMILY MEDICINE

## 2023-10-20 PROCEDURE — 84100 ASSAY OF PHOSPHORUS: CPT | Performed by: FAMILY MEDICINE

## 2023-10-20 PROCEDURE — 25010000002 CEFTRIAXONE PER 250 MG: Performed by: INTERNAL MEDICINE

## 2023-10-20 PROCEDURE — 80053 COMPREHEN METABOLIC PANEL: CPT | Performed by: INTERNAL MEDICINE

## 2023-10-20 PROCEDURE — 84100 ASSAY OF PHOSPHORUS: CPT | Performed by: INTERNAL MEDICINE

## 2023-10-20 PROCEDURE — 83735 ASSAY OF MAGNESIUM: CPT | Performed by: INTERNAL MEDICINE

## 2023-10-20 RX ORDER — ENOXAPARIN SODIUM 100 MG/ML
40 INJECTION SUBCUTANEOUS DAILY
Status: DISCONTINUED | OUTPATIENT
Start: 2023-10-21 | End: 2023-10-21 | Stop reason: HOSPADM

## 2023-10-20 RX ORDER — MAGNESIUM SULFATE HEPTAHYDRATE 40 MG/ML
2 INJECTION, SOLUTION INTRAVENOUS
Status: COMPLETED | OUTPATIENT
Start: 2023-10-20 | End: 2023-10-20

## 2023-10-20 RX ADMIN — PANCRELIPASE 24000 UNITS OF LIPASE: 60000; 12000; 38000 CAPSULE, DELAYED RELEASE PELLETS ORAL at 06:35

## 2023-10-20 RX ADMIN — CEFTRIAXONE SODIUM 2000 MG: 2 INJECTION, POWDER, FOR SOLUTION INTRAMUSCULAR; INTRAVENOUS at 10:52

## 2023-10-20 RX ADMIN — PANCRELIPASE 24000 UNITS OF LIPASE: 60000; 12000; 38000 CAPSULE, DELAYED RELEASE PELLETS ORAL at 16:57

## 2023-10-20 RX ADMIN — MAGNESIUM SULFATE HEPTAHYDRATE 2 G: 2 INJECTION, SOLUTION INTRAVENOUS at 10:53

## 2023-10-20 RX ADMIN — PANTOPRAZOLE SODIUM 40 MG: 40 TABLET, DELAYED RELEASE ORAL at 06:35

## 2023-10-20 RX ADMIN — ENOXAPARIN SODIUM 30 MG: 100 INJECTION SUBCUTANEOUS at 06:35

## 2023-10-20 RX ADMIN — ATORVASTATIN CALCIUM 40 MG: 40 TABLET ORAL at 08:57

## 2023-10-20 RX ADMIN — Medication 10 ML: at 08:57

## 2023-10-20 RX ADMIN — MAGNESIUM SULFATE HEPTAHYDRATE 2 G: 2 INJECTION, SOLUTION INTRAVENOUS at 12:14

## 2023-10-20 RX ADMIN — DOCUSATE SODIUM 50 MG AND SENNOSIDES 8.6 MG 2 TABLET: 8.6; 5 TABLET, FILM COATED ORAL at 20:50

## 2023-10-20 RX ADMIN — PANCRELIPASE 24000 UNITS OF LIPASE: 60000; 12000; 38000 CAPSULE, DELAYED RELEASE PELLETS ORAL at 20:50

## 2023-10-20 RX ADMIN — PANCRELIPASE 24000 UNITS OF LIPASE: 60000; 12000; 38000 CAPSULE, DELAYED RELEASE PELLETS ORAL at 10:52

## 2023-10-20 RX ADMIN — ROPINIROLE HYDROCHLORIDE 2 MG: 1 TABLET, FILM COATED ORAL at 20:50

## 2023-10-20 RX ADMIN — CETIRIZINE HYDROCHLORIDE 10 MG: 10 TABLET ORAL at 08:57

## 2023-10-20 RX ADMIN — SODIUM BICARBONATE 650 MG: 650 TABLET ORAL at 20:50

## 2023-10-20 RX ADMIN — ASPIRIN 81 MG: 81 TABLET, COATED ORAL at 08:57

## 2023-10-20 RX ADMIN — Medication 10 ML: at 20:49

## 2023-10-20 RX ADMIN — POTASSIUM & SODIUM PHOSPHATES POWDER PACK 280-160-250 MG 2 PACKET: 280-160-250 PACK at 09:34

## 2023-10-20 RX ADMIN — SODIUM BICARBONATE 650 MG: 650 TABLET ORAL at 08:57

## 2023-10-20 RX ADMIN — PREGABALIN 150 MG: 75 CAPSULE ORAL at 08:57

## 2023-10-20 RX ADMIN — DULOXETINE HYDROCHLORIDE 30 MG: 30 CAPSULE, DELAYED RELEASE ORAL at 08:57

## 2023-10-20 RX ADMIN — MAGNESIUM SULFATE HEPTAHYDRATE 2 G: 2 INJECTION, SOLUTION INTRAVENOUS at 08:57

## 2023-10-21 VITALS
RESPIRATION RATE: 16 BRPM | HEART RATE: 82 BPM | TEMPERATURE: 98.5 F | OXYGEN SATURATION: 96 % | WEIGHT: 115.52 LBS | SYSTOLIC BLOOD PRESSURE: 123 MMHG | BODY MASS INDEX: 22.68 KG/M2 | HEIGHT: 60 IN | DIASTOLIC BLOOD PRESSURE: 83 MMHG

## 2023-10-21 LAB
ALBUMIN SERPL-MCNC: 2.7 G/DL (ref 3.5–5.2)
ALBUMIN/GLOB SERPL: 1 G/DL
ALP SERPL-CCNC: 95 U/L (ref 39–117)
ALT SERPL W P-5'-P-CCNC: 16 U/L (ref 1–33)
ANION GAP SERPL CALCULATED.3IONS-SCNC: 8 MMOL/L (ref 5–15)
AST SERPL-CCNC: 26 U/L (ref 1–32)
BASOPHILS # BLD AUTO: 0.03 10*3/MM3 (ref 0–0.2)
BASOPHILS NFR BLD AUTO: 0.2 % (ref 0–1.5)
BILIRUB SERPL-MCNC: <0.2 MG/DL (ref 0–1.2)
BUN SERPL-MCNC: 12 MG/DL (ref 6–20)
BUN/CREAT SERPL: 10.7 (ref 7–25)
CALCIUM SPEC-SCNC: 7.2 MG/DL (ref 8.6–10.5)
CHLORIDE SERPL-SCNC: 100 MMOL/L (ref 98–107)
CO2 SERPL-SCNC: 30 MMOL/L (ref 22–29)
CREAT SERPL-MCNC: 1.12 MG/DL (ref 0.57–1)
DEPRECATED RDW RBC AUTO: 47.4 FL (ref 37–54)
EGFRCR SERPLBLD CKD-EPI 2021: 59.3 ML/MIN/1.73
EOSINOPHIL # BLD AUTO: 0 10*3/MM3 (ref 0–0.4)
EOSINOPHIL NFR BLD AUTO: 0 % (ref 0.3–6.2)
ERYTHROCYTE [DISTWIDTH] IN BLOOD BY AUTOMATED COUNT: 14.1 % (ref 12.3–15.4)
GLOBULIN UR ELPH-MCNC: 2.7 GM/DL
GLUCOSE BLDC GLUCOMTR-MCNC: 102 MG/DL (ref 70–130)
GLUCOSE BLDC GLUCOMTR-MCNC: 104 MG/DL (ref 70–130)
GLUCOSE SERPL-MCNC: 166 MG/DL (ref 65–99)
HCT VFR BLD AUTO: 37.3 % (ref 34–46.6)
HGB BLD-MCNC: 11.8 G/DL (ref 12–15.9)
IMM GRANULOCYTES # BLD AUTO: 0.05 10*3/MM3 (ref 0–0.05)
IMM GRANULOCYTES NFR BLD AUTO: 0.4 % (ref 0–0.5)
LYMPHOCYTES # BLD AUTO: 2.57 10*3/MM3 (ref 0.7–3.1)
LYMPHOCYTES NFR BLD AUTO: 20 % (ref 19.6–45.3)
MAGNESIUM SERPL-MCNC: 1.6 MG/DL (ref 1.6–2.6)
MCH RBC QN AUTO: 29 PG (ref 26.6–33)
MCHC RBC AUTO-ENTMCNC: 31.6 G/DL (ref 31.5–35.7)
MCV RBC AUTO: 91.6 FL (ref 79–97)
MONOCYTES # BLD AUTO: 0.47 10*3/MM3 (ref 0.1–0.9)
MONOCYTES NFR BLD AUTO: 3.7 % (ref 5–12)
NEUTROPHILS NFR BLD AUTO: 75.7 % (ref 42.7–76)
NEUTROPHILS NFR BLD AUTO: 9.73 10*3/MM3 (ref 1.7–7)
NRBC BLD AUTO-RTO: 0 /100 WBC (ref 0–0.2)
PHOSPHATE SERPL-MCNC: 2 MG/DL (ref 2.5–4.5)
PLATELET # BLD AUTO: 220 10*3/MM3 (ref 140–450)
PMV BLD AUTO: 11.3 FL (ref 6–12)
POTASSIUM SERPL-SCNC: 3.8 MMOL/L (ref 3.5–5.2)
PROT SERPL-MCNC: 5.4 G/DL (ref 6–8.5)
RBC # BLD AUTO: 4.07 10*6/MM3 (ref 3.77–5.28)
SODIUM SERPL-SCNC: 138 MMOL/L (ref 136–145)
WBC NRBC COR # BLD: 12.85 10*3/MM3 (ref 3.4–10.8)

## 2023-10-21 PROCEDURE — 82948 REAGENT STRIP/BLOOD GLUCOSE: CPT

## 2023-10-21 PROCEDURE — 80053 COMPREHEN METABOLIC PANEL: CPT | Performed by: INTERNAL MEDICINE

## 2023-10-21 PROCEDURE — 84100 ASSAY OF PHOSPHORUS: CPT | Performed by: INTERNAL MEDICINE

## 2023-10-21 PROCEDURE — 85025 COMPLETE CBC W/AUTO DIFF WBC: CPT | Performed by: INTERNAL MEDICINE

## 2023-10-21 PROCEDURE — 25010000002 CEFTRIAXONE PER 250 MG: Performed by: INTERNAL MEDICINE

## 2023-10-21 PROCEDURE — 83735 ASSAY OF MAGNESIUM: CPT | Performed by: INTERNAL MEDICINE

## 2023-10-21 PROCEDURE — 25010000002 ENOXAPARIN PER 10 MG: Performed by: INTERNAL MEDICINE

## 2023-10-21 RX ORDER — SODIUM BICARBONATE 650 MG/1
650 TABLET ORAL 2 TIMES DAILY
Start: 2023-10-21

## 2023-10-21 RX ORDER — BUMETANIDE 1 MG/1
1 TABLET ORAL DAILY PRN
Start: 2023-10-21

## 2023-10-21 RX ORDER — CEFDINIR 300 MG/1
300 CAPSULE ORAL 2 TIMES DAILY
Qty: 4 CAPSULE | Refills: 0 | Status: SHIPPED | OUTPATIENT
Start: 2023-10-22 | End: 2023-10-24

## 2023-10-21 RX ADMIN — CEFTRIAXONE SODIUM 2000 MG: 2 INJECTION, POWDER, FOR SOLUTION INTRAMUSCULAR; INTRAVENOUS at 12:55

## 2023-10-21 RX ADMIN — PANCRELIPASE 24000 UNITS OF LIPASE: 60000; 12000; 38000 CAPSULE, DELAYED RELEASE PELLETS ORAL at 12:55

## 2023-10-21 RX ADMIN — DIBASIC SODIUM PHOSPHATE, MONOBASIC POTASSIUM PHOSPHATE AND MONOBASIC SODIUM PHOSPHATE 2 TABLET: 852; 155; 130 TABLET ORAL at 13:02

## 2023-10-21 RX ADMIN — DOCUSATE SODIUM 50 MG AND SENNOSIDES 8.6 MG 2 TABLET: 8.6; 5 TABLET, FILM COATED ORAL at 09:07

## 2023-10-21 RX ADMIN — ATORVASTATIN CALCIUM 40 MG: 40 TABLET ORAL at 09:07

## 2023-10-21 RX ADMIN — PANTOPRAZOLE SODIUM 40 MG: 40 TABLET, DELAYED RELEASE ORAL at 06:12

## 2023-10-21 RX ADMIN — PANCRELIPASE 24000 UNITS OF LIPASE: 60000; 12000; 38000 CAPSULE, DELAYED RELEASE PELLETS ORAL at 06:12

## 2023-10-21 RX ADMIN — SODIUM BICARBONATE 650 MG: 650 TABLET ORAL at 09:08

## 2023-10-21 RX ADMIN — Medication 10 ML: at 09:07

## 2023-10-21 RX ADMIN — ENOXAPARIN SODIUM 40 MG: 100 INJECTION SUBCUTANEOUS at 06:12

## 2023-10-21 RX ADMIN — PANCRELIPASE 24000 UNITS OF LIPASE: 60000; 12000; 38000 CAPSULE, DELAYED RELEASE PELLETS ORAL at 09:09

## 2023-10-21 RX ADMIN — DULOXETINE HYDROCHLORIDE 30 MG: 30 CAPSULE, DELAYED RELEASE ORAL at 09:07

## 2023-10-21 RX ADMIN — MAGNESIUM GLUCONATE 500 MG ORAL TABLET 800 MG: 500 TABLET ORAL at 12:55

## 2023-10-21 RX ADMIN — ASPIRIN 81 MG: 81 TABLET, COATED ORAL at 09:07

## 2023-10-21 RX ADMIN — CETIRIZINE HYDROCHLORIDE 10 MG: 10 TABLET ORAL at 09:07

## 2023-10-21 RX ADMIN — PREGABALIN 150 MG: 75 CAPSULE ORAL at 09:07

## 2023-10-22 ENCOUNTER — READMISSION MANAGEMENT (OUTPATIENT)
Dept: CALL CENTER | Facility: HOSPITAL | Age: 53
End: 2023-10-22
Payer: COMMERCIAL

## 2023-10-24 ENCOUNTER — READMISSION MANAGEMENT (OUTPATIENT)
Dept: CALL CENTER | Facility: HOSPITAL | Age: 53
End: 2023-10-24
Payer: COMMERCIAL

## 2023-10-26 ENCOUNTER — READMISSION MANAGEMENT (OUTPATIENT)
Dept: CALL CENTER | Facility: HOSPITAL | Age: 53
End: 2023-10-26
Payer: COMMERCIAL

## 2023-10-27 ENCOUNTER — READMISSION MANAGEMENT (OUTPATIENT)
Dept: CALL CENTER | Facility: HOSPITAL | Age: 53
End: 2023-10-27
Payer: COMMERCIAL

## 2023-10-27 ENCOUNTER — HOSPITAL ENCOUNTER (INPATIENT)
Facility: HOSPITAL | Age: 53
LOS: 2 days | Discharge: HOME | DRG: 684 | End: 2023-10-29
Attending: EMERGENCY MEDICINE | Admitting: INTERNAL MEDICINE
Payer: COMMERCIAL

## 2023-10-27 DIAGNOSIS — N17.9 ACUTE RENAL FAILURE SUPERIMPOSED ON CHRONIC KIDNEY DISEASE, UNSPECIFIED ACUTE RENAL FAILURE TYPE, UNSPECIFIED CKD STAGE: Primary | ICD-10-CM

## 2023-10-27 DIAGNOSIS — N18.9 ACUTE RENAL FAILURE SUPERIMPOSED ON CHRONIC KIDNEY DISEASE, UNSPECIFIED ACUTE RENAL FAILURE TYPE, UNSPECIFIED CKD STAGE: Primary | ICD-10-CM

## 2023-10-27 DIAGNOSIS — R11.0 NAUSEA: ICD-10-CM

## 2023-10-27 DIAGNOSIS — N19 UREMIA: ICD-10-CM

## 2023-10-27 LAB
ALBUMIN SERPL-MCNC: 4.1 G/DL (ref 3.5–5.2)
ALBUMIN/GLOB SERPL: 1.1 G/DL
ALP SERPL-CCNC: 118 U/L (ref 39–117)
ALT SERPL W P-5'-P-CCNC: 19 U/L (ref 1–33)
ANION GAP SERPL CALCULATED.3IONS-SCNC: 14 MMOL/L (ref 5–15)
AST SERPL-CCNC: 26 U/L (ref 1–32)
BACTERIA UR QL AUTO: NORMAL /HPF
BASOPHILS # BLD AUTO: 0.04 10*3/MM3 (ref 0–0.2)
BASOPHILS NFR BLD AUTO: 0.4 % (ref 0–1.5)
BILIRUB SERPL-MCNC: 0.3 MG/DL (ref 0–1.2)
BILIRUB UR QL STRIP: NEGATIVE
BUN SERPL-MCNC: 50 MG/DL (ref 6–20)
BUN/CREAT SERPL: 13.1 (ref 7–25)
CALCIUM SPEC-SCNC: 9.3 MG/DL (ref 8.6–10.5)
CHLORIDE SERPL-SCNC: 95 MMOL/L (ref 98–107)
CK SERPL-CCNC: 71 U/L (ref 20–180)
CLARITY UR: CLEAR
CO2 SERPL-SCNC: 29 MMOL/L (ref 22–29)
COLOR UR: YELLOW
CREAT SERPL-MCNC: 3.82 MG/DL (ref 0.57–1)
DEPRECATED RDW RBC AUTO: 49.6 FL (ref 37–54)
EGFRCR SERPLBLD CKD-EPI 2021: 13.6 ML/MIN/1.73
EOSINOPHIL # BLD AUTO: 0.2 10*3/MM3 (ref 0–0.4)
EOSINOPHIL NFR BLD AUTO: 1.8 % (ref 0.3–6.2)
ERYTHROCYTE [DISTWIDTH] IN BLOOD BY AUTOMATED COUNT: 14.6 % (ref 12.3–15.4)
GLOBULIN UR ELPH-MCNC: 3.7 GM/DL
GLUCOSE BLDC GLUCOMTR-MCNC: 101 MG/DL (ref 70–130)
GLUCOSE BLDC GLUCOMTR-MCNC: 120 MG/DL (ref 70–130)
GLUCOSE SERPL-MCNC: 112 MG/DL (ref 65–99)
GLUCOSE UR STRIP-MCNC: ABNORMAL MG/DL
HCT VFR BLD AUTO: 44.3 % (ref 34–46.6)
HGB BLD-MCNC: 13.8 G/DL (ref 12–15.9)
HGB UR QL STRIP.AUTO: NEGATIVE
HYALINE CASTS UR QL AUTO: NORMAL /LPF
IMM GRANULOCYTES # BLD AUTO: 0.05 10*3/MM3 (ref 0–0.05)
IMM GRANULOCYTES NFR BLD AUTO: 0.4 % (ref 0–0.5)
INR PPP: 0.96 (ref 0.91–1.09)
KETONES UR QL STRIP: ABNORMAL
LEUKOCYTE ESTERASE UR QL STRIP.AUTO: NEGATIVE
LYMPHOCYTES # BLD AUTO: 3.15 10*3/MM3 (ref 0.7–3.1)
LYMPHOCYTES NFR BLD AUTO: 28.1 % (ref 19.6–45.3)
MAGNESIUM SERPL-MCNC: 1.7 MG/DL (ref 1.6–2.6)
MCH RBC QN AUTO: 28.9 PG (ref 26.6–33)
MCHC RBC AUTO-ENTMCNC: 31.2 G/DL (ref 31.5–35.7)
MCV RBC AUTO: 92.7 FL (ref 79–97)
MONOCYTES # BLD AUTO: 0.61 10*3/MM3 (ref 0.1–0.9)
MONOCYTES NFR BLD AUTO: 5.4 % (ref 5–12)
NEUTROPHILS NFR BLD AUTO: 63.9 % (ref 42.7–76)
NEUTROPHILS NFR BLD AUTO: 7.17 10*3/MM3 (ref 1.7–7)
NITRITE UR QL STRIP: NEGATIVE
NRBC BLD AUTO-RTO: 0 /100 WBC (ref 0–0.2)
PH UR STRIP.AUTO: 5.5 [PH] (ref 5–8)
PHOSPHATE SERPL-MCNC: 4.8 MG/DL (ref 2.5–4.5)
PLATELET # BLD AUTO: 359 10*3/MM3 (ref 140–450)
PMV BLD AUTO: 10.1 FL (ref 6–12)
POTASSIUM SERPL-SCNC: 3.7 MMOL/L (ref 3.5–5.2)
PROT SERPL-MCNC: 7.8 G/DL (ref 6–8.5)
PROT UR QL STRIP: ABNORMAL
PROTHROMBIN TIME: 12.9 SECONDS (ref 11.8–14.8)
RBC # BLD AUTO: 4.78 10*6/MM3 (ref 3.77–5.28)
RBC # UR STRIP: NORMAL /HPF
REF LAB TEST METHOD: NORMAL
SODIUM SERPL-SCNC: 138 MMOL/L (ref 136–145)
SODIUM UR-SCNC: <20 MMOL/L
SP GR UR STRIP: 1.02 (ref 1–1.03)
SQUAMOUS #/AREA URNS HPF: NORMAL /HPF
UROBILINOGEN UR QL STRIP: ABNORMAL
WBC # UR STRIP: NORMAL /HPF
WBC NRBC COR # BLD: 11.22 10*3/MM3 (ref 3.4–10.8)

## 2023-10-27 PROCEDURE — 99285 EMERGENCY DEPT VISIT HI MDM: CPT

## 2023-10-27 PROCEDURE — 83735 ASSAY OF MAGNESIUM: CPT | Performed by: EMERGENCY MEDICINE

## 2023-10-27 PROCEDURE — 84100 ASSAY OF PHOSPHORUS: CPT | Performed by: EMERGENCY MEDICINE

## 2023-10-27 PROCEDURE — 80053 COMPREHEN METABOLIC PANEL: CPT | Performed by: EMERGENCY MEDICINE

## 2023-10-27 PROCEDURE — 25810000003 SODIUM CHLORIDE 0.9 % SOLUTION: Performed by: INTERNAL MEDICINE

## 2023-10-27 PROCEDURE — 93010 ELECTROCARDIOGRAM REPORT: CPT | Performed by: INTERNAL MEDICINE

## 2023-10-27 PROCEDURE — 82948 REAGENT STRIP/BLOOD GLUCOSE: CPT

## 2023-10-27 PROCEDURE — 81001 URINALYSIS AUTO W/SCOPE: CPT | Performed by: EMERGENCY MEDICINE

## 2023-10-27 PROCEDURE — 87205 SMEAR GRAM STAIN: CPT | Performed by: INTERNAL MEDICINE

## 2023-10-27 PROCEDURE — 85610 PROTHROMBIN TIME: CPT | Performed by: EMERGENCY MEDICINE

## 2023-10-27 PROCEDURE — 85025 COMPLETE CBC W/AUTO DIFF WBC: CPT | Performed by: EMERGENCY MEDICINE

## 2023-10-27 PROCEDURE — 82570 ASSAY OF URINE CREATININE: CPT | Performed by: INTERNAL MEDICINE

## 2023-10-27 PROCEDURE — 84300 ASSAY OF URINE SODIUM: CPT | Performed by: INTERNAL MEDICINE

## 2023-10-27 PROCEDURE — P9612 CATHETERIZE FOR URINE SPEC: HCPCS

## 2023-10-27 PROCEDURE — 82550 ASSAY OF CK (CPK): CPT | Performed by: EMERGENCY MEDICINE

## 2023-10-27 PROCEDURE — 25810000003 LACTATED RINGERS SOLUTION: Performed by: EMERGENCY MEDICINE

## 2023-10-27 PROCEDURE — 93005 ELECTROCARDIOGRAM TRACING: CPT | Performed by: EMERGENCY MEDICINE

## 2023-10-27 PROCEDURE — 25010000002 ONDANSETRON PER 1 MG: Performed by: EMERGENCY MEDICINE

## 2023-10-27 PROCEDURE — 96361 HYDRATE IV INFUSION ADD-ON: CPT

## 2023-10-27 RX ORDER — ALBUTEROL SULFATE 90 UG/1
2 AEROSOL, METERED RESPIRATORY (INHALATION) 3 TIMES DAILY PRN
COMMUNITY

## 2023-10-27 RX ORDER — CETIRIZINE HYDROCHLORIDE 10 MG/1
10 TABLET ORAL DAILY
Status: DISCONTINUED | OUTPATIENT
Start: 2023-10-28 | End: 2023-10-29 | Stop reason: HOSPADM

## 2023-10-27 RX ORDER — ACETAMINOPHEN 325 MG/1
650 TABLET ORAL EVERY 4 HOURS PRN
Status: DISCONTINUED | OUTPATIENT
Start: 2023-10-27 | End: 2023-10-29 | Stop reason: HOSPADM

## 2023-10-27 RX ORDER — SODIUM CHLORIDE 9 MG/ML
40 INJECTION, SOLUTION INTRAVENOUS AS NEEDED
Status: DISCONTINUED | OUTPATIENT
Start: 2023-10-27 | End: 2023-10-29 | Stop reason: HOSPADM

## 2023-10-27 RX ORDER — BISACODYL 5 MG/1
5 TABLET, DELAYED RELEASE ORAL DAILY PRN
Status: DISCONTINUED | OUTPATIENT
Start: 2023-10-27 | End: 2023-10-29 | Stop reason: HOSPADM

## 2023-10-27 RX ORDER — SODIUM BICARBONATE 650 MG/1
650 TABLET ORAL 2 TIMES DAILY
Status: DISCONTINUED | OUTPATIENT
Start: 2023-10-27 | End: 2023-10-28

## 2023-10-27 RX ORDER — SODIUM CHLORIDE 9 MG/ML
125 INJECTION, SOLUTION INTRAVENOUS CONTINUOUS
Status: DISCONTINUED | OUTPATIENT
Start: 2023-10-27 | End: 2023-10-29 | Stop reason: HOSPADM

## 2023-10-27 RX ORDER — DEXTROSE MONOHYDRATE 25 G/50ML
10-50 INJECTION, SOLUTION INTRAVENOUS
Status: DISCONTINUED | OUTPATIENT
Start: 2023-10-27 | End: 2023-10-29 | Stop reason: HOSPADM

## 2023-10-27 RX ORDER — POLYETHYLENE GLYCOL 3350 17 G/17G
17 POWDER, FOR SOLUTION ORAL DAILY PRN
Status: DISCONTINUED | OUTPATIENT
Start: 2023-10-27 | End: 2023-10-29 | Stop reason: HOSPADM

## 2023-10-27 RX ORDER — ONDANSETRON 8 MG/1
8 TABLET, ORALLY DISINTEGRATING ORAL 4 TIMES DAILY
COMMUNITY

## 2023-10-27 RX ORDER — ROPINIROLE 1 MG/1
2 TABLET, FILM COATED ORAL NIGHTLY
Status: DISCONTINUED | OUTPATIENT
Start: 2023-10-27 | End: 2023-10-29 | Stop reason: HOSPADM

## 2023-10-27 RX ORDER — SODIUM CHLORIDE 0.9 % (FLUSH) 0.9 %
10 SYRINGE (ML) INJECTION AS NEEDED
Status: DISCONTINUED | OUTPATIENT
Start: 2023-10-27 | End: 2023-10-29 | Stop reason: HOSPADM

## 2023-10-27 RX ORDER — PANTOPRAZOLE SODIUM 40 MG/1
40 TABLET, DELAYED RELEASE ORAL
Status: DISCONTINUED | OUTPATIENT
Start: 2023-10-28 | End: 2023-10-29 | Stop reason: HOSPADM

## 2023-10-27 RX ORDER — INSULIN LISPRO 100 [IU]/ML
1-200 INJECTION, SOLUTION INTRAVENOUS; SUBCUTANEOUS AS NEEDED
Status: DISCONTINUED | OUTPATIENT
Start: 2023-10-27 | End: 2023-10-29 | Stop reason: HOSPADM

## 2023-10-27 RX ORDER — NICOTINE POLACRILEX 4 MG
15 LOZENGE BUCCAL
Status: DISCONTINUED | OUTPATIENT
Start: 2023-10-27 | End: 2023-10-29 | Stop reason: HOSPADM

## 2023-10-27 RX ORDER — ATORVASTATIN CALCIUM 40 MG/1
80 TABLET, FILM COATED ORAL NIGHTLY
Status: DISCONTINUED | OUTPATIENT
Start: 2023-10-27 | End: 2023-10-29 | Stop reason: HOSPADM

## 2023-10-27 RX ORDER — AMOXICILLIN 250 MG
2 CAPSULE ORAL 2 TIMES DAILY
Status: DISCONTINUED | OUTPATIENT
Start: 2023-10-27 | End: 2023-10-29 | Stop reason: HOSPADM

## 2023-10-27 RX ORDER — SODIUM CHLORIDE 0.9 % (FLUSH) 0.9 %
10 SYRINGE (ML) INJECTION EVERY 12 HOURS SCHEDULED
Status: DISCONTINUED | OUTPATIENT
Start: 2023-10-27 | End: 2023-10-29 | Stop reason: HOSPADM

## 2023-10-27 RX ORDER — INSULIN LISPRO 100 [IU]/ML
1-200 INJECTION, SOLUTION INTRAVENOUS; SUBCUTANEOUS
Status: DISCONTINUED | OUTPATIENT
Start: 2023-10-27 | End: 2023-10-29 | Stop reason: HOSPADM

## 2023-10-27 RX ORDER — ONDANSETRON 2 MG/ML
4 INJECTION INTRAMUSCULAR; INTRAVENOUS ONCE
Status: COMPLETED | OUTPATIENT
Start: 2023-10-27 | End: 2023-10-27

## 2023-10-27 RX ORDER — INSULIN ASPART 100 [IU]/ML
60 INJECTION, SOLUTION INTRAVENOUS; SUBCUTANEOUS DAILY
COMMUNITY

## 2023-10-27 RX ORDER — PROMETHAZINE HYDROCHLORIDE 12.5 MG/1
12.5 SUPPOSITORY RECTAL 3 TIMES DAILY PRN
COMMUNITY

## 2023-10-27 RX ORDER — BISACODYL 10 MG
10 SUPPOSITORY, RECTAL RECTAL DAILY PRN
Status: DISCONTINUED | OUTPATIENT
Start: 2023-10-27 | End: 2023-10-29 | Stop reason: HOSPADM

## 2023-10-27 RX ORDER — ONDANSETRON 2 MG/ML
4 INJECTION INTRAMUSCULAR; INTRAVENOUS EVERY 6 HOURS PRN
Status: DISCONTINUED | OUTPATIENT
Start: 2023-10-27 | End: 2023-10-29 | Stop reason: HOSPADM

## 2023-10-27 RX ORDER — PREGABALIN 100 MG/1
100 CAPSULE ORAL 2 TIMES DAILY
Status: DISCONTINUED | OUTPATIENT
Start: 2023-10-27 | End: 2023-10-29 | Stop reason: HOSPADM

## 2023-10-27 RX ORDER — DULOXETIN HYDROCHLORIDE 30 MG/1
60 CAPSULE, DELAYED RELEASE ORAL DAILY
Status: DISCONTINUED | OUTPATIENT
Start: 2023-10-28 | End: 2023-10-29 | Stop reason: HOSPADM

## 2023-10-27 RX ORDER — ROPINIROLE 1 MG/1
1 TABLET, FILM COATED ORAL ONCE
Status: COMPLETED | OUTPATIENT
Start: 2023-10-27 | End: 2023-10-27

## 2023-10-27 RX ORDER — GINGER ROOT/GINGER ROOT EXT 262.5 MG
1 CAPSULE ORAL DAILY
Status: DISCONTINUED | OUTPATIENT
Start: 2023-10-28 | End: 2023-10-29 | Stop reason: HOSPADM

## 2023-10-27 RX ADMIN — ONDANSETRON 4 MG: 2 INJECTION INTRAMUSCULAR; INTRAVENOUS at 12:06

## 2023-10-27 RX ADMIN — PREGABALIN 100 MG: 100 CAPSULE ORAL at 20:40

## 2023-10-27 RX ADMIN — SODIUM BICARBONATE 650 MG: 650 TABLET ORAL at 20:40

## 2023-10-27 RX ADMIN — ATORVASTATIN CALCIUM 80 MG: 40 TABLET ORAL at 20:39

## 2023-10-27 RX ADMIN — SODIUM CHLORIDE 125 ML/HR: 900 INJECTION INTRAVENOUS at 14:28

## 2023-10-27 RX ADMIN — PANCRELIPASE 24000 UNITS OF LIPASE: 60000; 12000; 38000 CAPSULE, DELAYED RELEASE PELLETS ORAL at 17:59

## 2023-10-27 RX ADMIN — SODIUM CHLORIDE, POTASSIUM CHLORIDE, SODIUM LACTATE AND CALCIUM CHLORIDE 1000 ML: 600; 310; 30; 20 INJECTION, SOLUTION INTRAVENOUS at 12:06

## 2023-10-27 RX ADMIN — ROPINIROLE HYDROCHLORIDE 1 MG: 1 TABLET, FILM COATED ORAL at 13:29

## 2023-10-27 RX ADMIN — PANCRELIPASE 24000 UNITS OF LIPASE: 60000; 12000; 38000 CAPSULE, DELAYED RELEASE PELLETS ORAL at 20:40

## 2023-10-27 RX ADMIN — DOCUSATE SODIUM 50 MG AND SENNOSIDES 8.6 MG 2 TABLET: 8.6; 5 TABLET, FILM COATED ORAL at 20:40

## 2023-10-27 RX ADMIN — ROPINIROLE HYDROCHLORIDE 2 MG: 1 TABLET, FILM COATED ORAL at 20:40

## 2023-10-27 RX ADMIN — SODIUM CHLORIDE 125 ML/HR: 900 INJECTION INTRAVENOUS at 20:39

## 2023-10-28 PROBLEM — R11.2 NAUSEA AND VOMITING: Status: ACTIVE | Noted: 2022-10-31

## 2023-10-28 PROBLEM — R19.7 DIARRHEA: Status: ACTIVE | Noted: 2023-10-28

## 2023-10-28 LAB
ADV 40+41 DNA STL QL NAA+NON-PROBE: NOT DETECTED
ANION GAP SERPL CALCULATED.3IONS-SCNC: 8 MMOL/L (ref 5–15)
ASTRO TYP 1-8 RNA STL QL NAA+NON-PROBE: NOT DETECTED
BASOPHILS # BLD AUTO: 0.03 10*3/MM3 (ref 0–0.2)
BASOPHILS NFR BLD AUTO: 0.4 % (ref 0–1.5)
BUN SERPL-MCNC: 34 MG/DL (ref 6–20)
BUN/CREAT SERPL: 15 (ref 7–25)
C CAYETANENSIS DNA STL QL NAA+NON-PROBE: NOT DETECTED
C COLI+JEJ+UPSA DNA STL QL NAA+NON-PROBE: NOT DETECTED
C DIFF TOX GENS STL QL NAA+PROBE: NEGATIVE
CALCIUM SPEC-SCNC: 7.5 MG/DL (ref 8.6–10.5)
CHLORIDE SERPL-SCNC: 106 MMOL/L (ref 98–107)
CO2 SERPL-SCNC: 27 MMOL/L (ref 22–29)
CREAT SERPL-MCNC: 2.27 MG/DL (ref 0.57–1)
CREAT UR-MCNC: 224.4 MG/DL
CRYPTOSP DNA STL QL NAA+NON-PROBE: NOT DETECTED
DEPRECATED RDW RBC AUTO: 50.7 FL (ref 37–54)
E HISTOLYT DNA STL QL NAA+NON-PROBE: NOT DETECTED
EAEC PAA PLAS AGGR+AATA ST NAA+NON-PRB: NOT DETECTED
EC STX1+STX2 GENES STL QL NAA+NON-PROBE: NOT DETECTED
EGFRCR SERPLBLD CKD-EPI 2021: 25.4 ML/MIN/1.73
EOSINOPHIL # BLD AUTO: 0.18 10*3/MM3 (ref 0–0.4)
EOSINOPHIL NFR BLD AUTO: 2.4 % (ref 0.3–6.2)
EOSINOPHIL SPEC QL MICRO: 0 % EOS/100 CELLS (ref 0–0)
EPEC EAE GENE STL QL NAA+NON-PROBE: NOT DETECTED
ERYTHROCYTE [DISTWIDTH] IN BLOOD BY AUTOMATED COUNT: 14.6 % (ref 12.3–15.4)
ETEC LTA+ST1A+ST1B TOX ST NAA+NON-PROBE: NOT DETECTED
G LAMBLIA DNA STL QL NAA+NON-PROBE: NOT DETECTED
GLUCOSE SERPL-MCNC: 98 MG/DL (ref 65–99)
HCT VFR BLD AUTO: 33.5 % (ref 34–46.6)
HGB BLD-MCNC: 10.2 G/DL (ref 12–15.9)
IMM GRANULOCYTES # BLD AUTO: 0.02 10*3/MM3 (ref 0–0.05)
IMM GRANULOCYTES NFR BLD AUTO: 0.3 % (ref 0–0.5)
LYMPHOCYTES # BLD AUTO: 2.99 10*3/MM3 (ref 0.7–3.1)
LYMPHOCYTES NFR BLD AUTO: 40.6 % (ref 19.6–45.3)
MAGNESIUM SERPL-MCNC: 1.5 MG/DL (ref 1.6–2.6)
MCH RBC QN AUTO: 28.9 PG (ref 26.6–33)
MCHC RBC AUTO-ENTMCNC: 30.4 G/DL (ref 31.5–35.7)
MCV RBC AUTO: 94.9 FL (ref 79–97)
MONOCYTES # BLD AUTO: 0.43 10*3/MM3 (ref 0.1–0.9)
MONOCYTES NFR BLD AUTO: 5.8 % (ref 5–12)
NEUTROPHILS NFR BLD AUTO: 3.71 10*3/MM3 (ref 1.7–7)
NEUTROPHILS NFR BLD AUTO: 50.5 % (ref 42.7–76)
NOROVIRUS GI+II RNA STL QL NAA+NON-PROBE: NOT DETECTED
NRBC BLD AUTO-RTO: 0 /100 WBC (ref 0–0.2)
P SHIGELLOIDES DNA STL QL NAA+NON-PROBE: NOT DETECTED
PLATELET # BLD AUTO: 255 10*3/MM3 (ref 140–450)
PMV BLD AUTO: 10.7 FL (ref 6–12)
POTASSIUM SERPL-SCNC: 3.1 MMOL/L (ref 3.5–5.2)
RBC # BLD AUTO: 3.53 10*6/MM3 (ref 3.77–5.28)
RVA RNA STL QL NAA+NON-PROBE: NOT DETECTED
S ENT+BONG DNA STL QL NAA+NON-PROBE: NOT DETECTED
SAPO I+II+IV+V RNA STL QL NAA+NON-PROBE: NOT DETECTED
SHIGELLA SP+EIEC IPAH ST NAA+NON-PROBE: NOT DETECTED
SODIUM SERPL-SCNC: 141 MMOL/L (ref 136–145)
V CHOL+PARA+VUL DNA STL QL NAA+NON-PROBE: NOT DETECTED
V CHOLERAE DNA STL QL NAA+NON-PROBE: NOT DETECTED
WBC NRBC COR # BLD: 7.36 10*3/MM3 (ref 3.4–10.8)
Y ENTEROCOL DNA STL QL NAA+NON-PROBE: NOT DETECTED

## 2023-10-28 PROCEDURE — 83735 ASSAY OF MAGNESIUM: CPT | Performed by: INTERNAL MEDICINE

## 2023-10-28 PROCEDURE — 85025 COMPLETE CBC W/AUTO DIFF WBC: CPT | Performed by: INTERNAL MEDICINE

## 2023-10-28 PROCEDURE — 80048 BASIC METABOLIC PNL TOTAL CA: CPT | Performed by: INTERNAL MEDICINE

## 2023-10-28 PROCEDURE — 25010000002 MAGNESIUM SULFATE 2 GM/50ML SOLUTION

## 2023-10-28 PROCEDURE — 96361 HYDRATE IV INFUSION ADD-ON: CPT

## 2023-10-28 PROCEDURE — 87507 IADNA-DNA/RNA PROBE TQ 12-25: CPT

## 2023-10-28 PROCEDURE — 87493 C DIFF AMPLIFIED PROBE: CPT

## 2023-10-28 PROCEDURE — 25810000003 SODIUM CHLORIDE 0.9 % SOLUTION: Performed by: INTERNAL MEDICINE

## 2023-10-28 RX ORDER — POTASSIUM CHLORIDE 750 MG/1
40 CAPSULE, EXTENDED RELEASE ORAL 2 TIMES DAILY WITH MEALS
Status: COMPLETED | OUTPATIENT
Start: 2023-10-28 | End: 2023-10-28

## 2023-10-28 RX ORDER — CALCIUM CARBONATE 500 MG/1
3 TABLET, CHEWABLE ORAL 3 TIMES DAILY PRN
Status: DISCONTINUED | OUTPATIENT
Start: 2023-10-28 | End: 2023-10-29 | Stop reason: HOSPADM

## 2023-10-28 RX ORDER — MAGNESIUM SULFATE HEPTAHYDRATE 40 MG/ML
2 INJECTION, SOLUTION INTRAVENOUS ONCE
Status: COMPLETED | OUTPATIENT
Start: 2023-10-28 | End: 2023-10-28

## 2023-10-28 RX ORDER — NORTRIPTYLINE HYDROCHLORIDE 25 MG/1
25 CAPSULE ORAL NIGHTLY
Status: DISCONTINUED | OUTPATIENT
Start: 2023-10-28 | End: 2023-10-29 | Stop reason: HOSPADM

## 2023-10-28 RX ADMIN — ATORVASTATIN CALCIUM 80 MG: 40 TABLET ORAL at 20:41

## 2023-10-28 RX ADMIN — Medication 1 TABLET: at 08:46

## 2023-10-28 RX ADMIN — NORTRIPTYLINE HYDROCHLORIDE 25 MG: 25 CAPSULE ORAL at 22:20

## 2023-10-28 RX ADMIN — PANTOPRAZOLE SODIUM 40 MG: 40 TABLET, DELAYED RELEASE ORAL at 08:47

## 2023-10-28 RX ADMIN — DOCUSATE SODIUM 50 MG AND SENNOSIDES 8.6 MG 2 TABLET: 8.6; 5 TABLET, FILM COATED ORAL at 20:41

## 2023-10-28 RX ADMIN — SODIUM CHLORIDE 125 ML/HR: 900 INJECTION INTRAVENOUS at 18:57

## 2023-10-28 RX ADMIN — POTASSIUM CHLORIDE 40 MEQ: 750 CAPSULE, EXTENDED RELEASE ORAL at 08:47

## 2023-10-28 RX ADMIN — SODIUM BICARBONATE 650 MG: 650 TABLET ORAL at 08:47

## 2023-10-28 RX ADMIN — DULOXETINE HYDROCHLORIDE 60 MG: 30 CAPSULE, DELAYED RELEASE ORAL at 08:46

## 2023-10-28 RX ADMIN — POTASSIUM CHLORIDE 40 MEQ: 750 CAPSULE, EXTENDED RELEASE ORAL at 17:25

## 2023-10-28 RX ADMIN — PANCRELIPASE 24000 UNITS OF LIPASE: 60000; 12000; 38000 CAPSULE, DELAYED RELEASE PELLETS ORAL at 08:46

## 2023-10-28 RX ADMIN — MAGNESIUM SULFATE HEPTAHYDRATE 2 G: 2 INJECTION, SOLUTION INTRAVENOUS at 08:47

## 2023-10-28 RX ADMIN — PREGABALIN 100 MG: 100 CAPSULE ORAL at 20:41

## 2023-10-28 RX ADMIN — PANCRELIPASE 24000 UNITS OF LIPASE: 60000; 12000; 38000 CAPSULE, DELAYED RELEASE PELLETS ORAL at 20:41

## 2023-10-28 RX ADMIN — ROPINIROLE HYDROCHLORIDE 2 MG: 1 TABLET, FILM COATED ORAL at 20:40

## 2023-10-28 RX ADMIN — PANCRELIPASE 24000 UNITS OF LIPASE: 60000; 12000; 38000 CAPSULE, DELAYED RELEASE PELLETS ORAL at 17:25

## 2023-10-28 RX ADMIN — SODIUM CHLORIDE 125 ML/HR: 900 INJECTION INTRAVENOUS at 06:35

## 2023-10-28 RX ADMIN — ANTACID TABLETS 3 TABLET: 500 TABLET, CHEWABLE ORAL at 22:22

## 2023-10-28 RX ADMIN — CETIRIZINE HYDROCHLORIDE 10 MG: 10 TABLET ORAL at 08:46

## 2023-10-28 RX ADMIN — PANCRELIPASE 24000 UNITS OF LIPASE: 60000; 12000; 38000 CAPSULE, DELAYED RELEASE PELLETS ORAL at 12:33

## 2023-10-28 RX ADMIN — PREGABALIN 100 MG: 100 CAPSULE ORAL at 08:47

## 2023-10-28 RX ADMIN — DOCUSATE SODIUM 50 MG AND SENNOSIDES 8.6 MG 2 TABLET: 8.6; 5 TABLET, FILM COATED ORAL at 08:47

## 2023-10-29 ENCOUNTER — READMISSION MANAGEMENT (OUTPATIENT)
Dept: CALL CENTER | Facility: HOSPITAL | Age: 53
End: 2023-10-29
Payer: COMMERCIAL

## 2023-10-29 VITALS
WEIGHT: 102.07 LBS | RESPIRATION RATE: 16 BRPM | HEIGHT: 60 IN | OXYGEN SATURATION: 96 % | HEART RATE: 82 BPM | DIASTOLIC BLOOD PRESSURE: 63 MMHG | TEMPERATURE: 98.1 F | SYSTOLIC BLOOD PRESSURE: 104 MMHG | BODY MASS INDEX: 20.04 KG/M2

## 2023-10-29 LAB
ANION GAP SERPL CALCULATED.3IONS-SCNC: 5 MMOL/L (ref 5–15)
BASOPHILS # BLD AUTO: 0.03 10*3/MM3 (ref 0–0.2)
BASOPHILS NFR BLD AUTO: 0.3 % (ref 0–1.5)
BUN SERPL-MCNC: 27 MG/DL (ref 6–20)
BUN/CREAT SERPL: 17.3 (ref 7–25)
CALCIUM SPEC-SCNC: 7.8 MG/DL (ref 8.6–10.5)
CHLORIDE SERPL-SCNC: 112 MMOL/L (ref 98–107)
CO2 SERPL-SCNC: 25 MMOL/L (ref 22–29)
CREAT SERPL-MCNC: 1.56 MG/DL (ref 0.57–1)
DEPRECATED RDW RBC AUTO: 50.9 FL (ref 37–54)
EGFRCR SERPLBLD CKD-EPI 2021: 39.8 ML/MIN/1.73
EOSINOPHIL # BLD AUTO: 0.23 10*3/MM3 (ref 0–0.4)
EOSINOPHIL NFR BLD AUTO: 2.5 % (ref 0.3–6.2)
ERYTHROCYTE [DISTWIDTH] IN BLOOD BY AUTOMATED COUNT: 14.5 % (ref 12.3–15.4)
GLUCOSE SERPL-MCNC: 103 MG/DL (ref 65–99)
HCT VFR BLD AUTO: 35.3 % (ref 34–46.6)
HGB BLD-MCNC: 10.5 G/DL (ref 12–15.9)
IMM GRANULOCYTES # BLD AUTO: 0.02 10*3/MM3 (ref 0–0.05)
IMM GRANULOCYTES NFR BLD AUTO: 0.2 % (ref 0–0.5)
LYMPHOCYTES # BLD AUTO: 3.33 10*3/MM3 (ref 0.7–3.1)
LYMPHOCYTES NFR BLD AUTO: 36.8 % (ref 19.6–45.3)
MAGNESIUM SERPL-MCNC: 1.9 MG/DL (ref 1.6–2.6)
MCH RBC QN AUTO: 28.7 PG (ref 26.6–33)
MCHC RBC AUTO-ENTMCNC: 29.7 G/DL (ref 31.5–35.7)
MCV RBC AUTO: 96.4 FL (ref 79–97)
MONOCYTES # BLD AUTO: 0.5 10*3/MM3 (ref 0.1–0.9)
MONOCYTES NFR BLD AUTO: 5.5 % (ref 5–12)
NEUTROPHILS NFR BLD AUTO: 4.95 10*3/MM3 (ref 1.7–7)
NEUTROPHILS NFR BLD AUTO: 54.7 % (ref 42.7–76)
NRBC BLD AUTO-RTO: 0 /100 WBC (ref 0–0.2)
PLATELET # BLD AUTO: 284 10*3/MM3 (ref 140–450)
PMV BLD AUTO: 10.4 FL (ref 6–12)
POTASSIUM SERPL-SCNC: 4.9 MMOL/L (ref 3.5–5.2)
RBC # BLD AUTO: 3.66 10*6/MM3 (ref 3.77–5.28)
SODIUM SERPL-SCNC: 142 MMOL/L (ref 136–145)
WBC NRBC COR # BLD: 9.06 10*3/MM3 (ref 3.4–10.8)

## 2023-10-29 PROCEDURE — 85025 COMPLETE CBC W/AUTO DIFF WBC: CPT | Performed by: INTERNAL MEDICINE

## 2023-10-29 PROCEDURE — 80048 BASIC METABOLIC PNL TOTAL CA: CPT | Performed by: INTERNAL MEDICINE

## 2023-10-29 PROCEDURE — 83735 ASSAY OF MAGNESIUM: CPT | Performed by: INTERNAL MEDICINE

## 2023-10-29 RX ORDER — SACCHAROMYCES BOULARDII 250 MG
250 CAPSULE ORAL 2 TIMES DAILY
Qty: 60 CAPSULE | Refills: 0 | Status: SHIPPED | OUTPATIENT
Start: 2023-10-29 | End: 2023-11-28

## 2023-10-29 RX ADMIN — DULOXETINE HYDROCHLORIDE 60 MG: 30 CAPSULE, DELAYED RELEASE ORAL at 08:45

## 2023-10-29 RX ADMIN — PANTOPRAZOLE SODIUM 40 MG: 40 TABLET, DELAYED RELEASE ORAL at 06:29

## 2023-10-29 RX ADMIN — CETIRIZINE HYDROCHLORIDE 10 MG: 10 TABLET ORAL at 08:45

## 2023-10-29 RX ADMIN — Medication 1 TABLET: at 08:45

## 2023-10-29 RX ADMIN — DOCUSATE SODIUM 50 MG AND SENNOSIDES 8.6 MG 2 TABLET: 8.6; 5 TABLET, FILM COATED ORAL at 08:47

## 2023-10-29 RX ADMIN — PREGABALIN 100 MG: 100 CAPSULE ORAL at 08:47

## 2023-10-29 RX ADMIN — PANCRELIPASE 24000 UNITS OF LIPASE: 60000; 12000; 38000 CAPSULE, DELAYED RELEASE PELLETS ORAL at 08:45

## 2023-11-01 ENCOUNTER — READMISSION MANAGEMENT (OUTPATIENT)
Dept: CALL CENTER | Facility: HOSPITAL | Age: 53
End: 2023-11-01
Payer: COMMERCIAL

## 2023-11-03 LAB
QT INTERVAL: 416 MS
QTC INTERVAL: 525 MS

## 2023-11-13 ENCOUNTER — HOSPITAL ENCOUNTER (INPATIENT)
Age: 53
LOS: 4 days | Discharge: HOME | End: 2023-11-17
Attending: EMERGENCY MEDICINE
Payer: MEDICAID

## 2023-11-13 ENCOUNTER — APPOINTMENT (OUTPATIENT)
Dept: CT IMAGING | Age: 53
End: 2023-11-13
Payer: MEDICAID

## 2023-11-13 DIAGNOSIS — N18.9 ACUTE ON CHRONIC RENAL INSUFFICIENCY: ICD-10-CM

## 2023-11-13 DIAGNOSIS — N28.9 ACUTE ON CHRONIC RENAL INSUFFICIENCY: ICD-10-CM

## 2023-11-13 DIAGNOSIS — R94.31 PROLONGED Q-T INTERVAL ON ECG: ICD-10-CM

## 2023-11-13 DIAGNOSIS — E83.51 HYPOCALCEMIA: ICD-10-CM

## 2023-11-13 DIAGNOSIS — R11.2 NAUSEA AND VOMITING, UNSPECIFIED VOMITING TYPE: Primary | ICD-10-CM

## 2023-11-13 DIAGNOSIS — E87.0 HYPERNATREMIA: ICD-10-CM

## 2023-11-13 PROBLEM — N17.9 ACUTE KIDNEY INJURY SUPERIMPOSED ON CHRONIC KIDNEY DISEASE (HCC): Status: ACTIVE | Noted: 2023-11-13

## 2023-11-13 LAB
ALBUMIN SERPL-MCNC: 3.3 G/DL (ref 3.5–5.2)
ALP SERPL-CCNC: 116 U/L (ref 35–104)
ALT SERPL-CCNC: 15 U/L (ref 5–33)
AMORPH SED URNS QL MICRO: ABNORMAL /HPF
ANION GAP SERPL CALCULATED.3IONS-SCNC: 62 MMOL/L (ref 7–19)
AST SERPL-CCNC: 24 U/L (ref 5–32)
BACTERIA #/AREA URNS HPF: ABNORMAL /HPF
BASOPHILS # BLD: 0.1 K/UL (ref 0–0.2)
BASOPHILS NFR BLD: 0.5 % (ref 0–1)
BILIRUB SERPL-MCNC: <0.2 MG/DL (ref 0.2–1.2)
BILIRUB UR QL STRIP: NEGATIVE
BUN SERPL-MCNC: 36 MG/DL (ref 6–20)
CALCIUM SERPL-MCNC: 7.6 MG/DL (ref 8.6–10)
CASTS #/AREA URNS LPF: ABNORMAL /LPF
CHLORIDE SERPL-SCNC: 82 MMOL/L (ref 98–111)
CLARITY UR: ABNORMAL
CO2 SERPL-SCNC: 10 MMOL/L (ref 22–29)
COLOR UR: ABNORMAL
CREAT SERPL-MCNC: 3 MG/DL (ref 0.5–0.9)
CRYSTALS URNS MICRO: ABNORMAL /HPF
EOSINOPHIL # BLD: 0 K/UL (ref 0–0.6)
EOSINOPHIL NFR BLD: 0 % (ref 0–5)
ERYTHROCYTE [DISTWIDTH] IN BLOOD BY AUTOMATED COUNT: 15.2 % (ref 11.5–14.5)
GLUCOSE SERPL-MCNC: 85 MG/DL (ref 74–109)
GLUCOSE UR STRIP.AUTO-MCNC: 500 MG/DL
HCT VFR BLD AUTO: 43.6 % (ref 37–47)
HGB BLD-MCNC: 13.1 G/DL (ref 12–16)
HGB UR STRIP.AUTO-MCNC: ABNORMAL MG/L
HYALINE CASTS #/AREA URNS LPF: ABNORMAL /LPF (ref 0–5)
IMM GRANULOCYTES # BLD: 0.1 K/UL
KETONES UR STRIP.AUTO-MCNC: ABNORMAL MG/DL
LEUKOCYTE ESTERASE UR QL STRIP.AUTO: ABNORMAL
LIPASE SERPL-CCNC: 39 U/L (ref 13–60)
LYMPHOCYTES # BLD: 4.4 K/UL (ref 1.1–4.5)
LYMPHOCYTES NFR BLD: 35.7 % (ref 20–40)
MAGNESIUM SERPL-MCNC: 2.3 MG/DL (ref 1.6–2.6)
MCH RBC QN AUTO: 29.7 PG (ref 27–31)
MCHC RBC AUTO-ENTMCNC: 30 G/DL (ref 33–37)
MCV RBC AUTO: 98.9 FL (ref 81–99)
MONOCYTES # BLD: 0.8 K/UL (ref 0–0.9)
MONOCYTES NFR BLD: 6.7 % (ref 0–10)
MUCOUS THREADS URNS QL MICRO: ABNORMAL /LPF
NEUTROPHILS # BLD: 7 K/UL (ref 1.5–7.5)
NEUTS SEG NFR BLD: 56.7 % (ref 50–65)
NITRITE UR QL STRIP.AUTO: NEGATIVE
PH UR STRIP.AUTO: 5.5 [PH] (ref 5–8)
PLATELET # BLD AUTO: 251 K/UL (ref 130–400)
PMV BLD AUTO: 10.4 FL (ref 9.4–12.3)
POTASSIUM SERPL-SCNC: 3.3 MMOL/L (ref 3.5–5)
PROT SERPL-MCNC: 6.2 G/DL (ref 6.6–8.7)
PROT UR STRIP.AUTO-MCNC: 100 MG/DL
RBC # BLD AUTO: 4.41 M/UL (ref 4.2–5.4)
RBC #/AREA URNS HPF: ABNORMAL /HPF (ref 0–2)
SODIUM SERPL-SCNC: 154 MMOL/L (ref 136–145)
SP GR UR STRIP.AUTO: 1.03 (ref 1–1.03)
SQUAMOUS #/AREA URNS HPF: ABNORMAL /HPF
UROBILINOGEN UR STRIP.AUTO-MCNC: 1 E.U./DL
WBC # BLD AUTO: 12.3 K/UL (ref 4.8–10.8)
WBC #/AREA URNS HPF: ABNORMAL /HPF (ref 0–5)

## 2023-11-13 PROCEDURE — 87205 SMEAR GRAM STAIN: CPT

## 2023-11-13 PROCEDURE — 93005 ELECTROCARDIOGRAM TRACING: CPT | Performed by: EMERGENCY MEDICINE

## 2023-11-13 PROCEDURE — 96375 TX/PRO/DX INJ NEW DRUG ADDON: CPT

## 2023-11-13 PROCEDURE — 96374 THER/PROPH/DIAG INJ IV PUSH: CPT

## 2023-11-13 PROCEDURE — 99285 EMERGENCY DEPT VISIT HI MDM: CPT

## 2023-11-13 PROCEDURE — 6360000002 HC RX W HCPCS: Performed by: EMERGENCY MEDICINE

## 2023-11-13 PROCEDURE — 2580000003 HC RX 258: Performed by: EMERGENCY MEDICINE

## 2023-11-13 PROCEDURE — 83735 ASSAY OF MAGNESIUM: CPT

## 2023-11-13 PROCEDURE — 36415 COLL VENOUS BLD VENIPUNCTURE: CPT

## 2023-11-13 PROCEDURE — 74176 CT ABD & PELVIS W/O CONTRAST: CPT

## 2023-11-13 PROCEDURE — 6360000002 HC RX W HCPCS

## 2023-11-13 PROCEDURE — 81001 URINALYSIS AUTO W/SCOPE: CPT

## 2023-11-13 PROCEDURE — 80053 COMPREHEN METABOLIC PANEL: CPT

## 2023-11-13 PROCEDURE — 85025 COMPLETE CBC W/AUTO DIFF WBC: CPT

## 2023-11-13 PROCEDURE — 83690 ASSAY OF LIPASE: CPT

## 2023-11-13 PROCEDURE — 1210000000 HC MED SURG R&B

## 2023-11-13 PROCEDURE — 83935 ASSAY OF URINE OSMOLALITY: CPT

## 2023-11-13 PROCEDURE — 82570 ASSAY OF URINE CREATININE: CPT

## 2023-11-13 PROCEDURE — 84300 ASSAY OF URINE SODIUM: CPT

## 2023-11-13 RX ORDER — UREA 40 G/100G
LOTION TOPICAL
COMMUNITY

## 2023-11-13 RX ORDER — HYDROXYCHLOROQUINE SULFATE 200 MG/1
400 TABLET, FILM COATED ORAL DAILY
COMMUNITY

## 2023-11-13 RX ORDER — INSULIN PMP CART,AUT,G6/7,CNTR
EACH SUBCUTANEOUS
COMMUNITY

## 2023-11-13 RX ORDER — CALCIUM GLUCONATE 20 MG/ML
2000 INJECTION, SOLUTION INTRAVENOUS ONCE
Status: COMPLETED | OUTPATIENT
Start: 2023-11-13 | End: 2023-11-14

## 2023-11-13 RX ORDER — METOCLOPRAMIDE HYDROCHLORIDE 5 MG/ML
10 INJECTION INTRAMUSCULAR; INTRAVENOUS ONCE
Status: COMPLETED | OUTPATIENT
Start: 2023-11-13 | End: 2023-11-13

## 2023-11-13 RX ORDER — NICOTINE POLACRILEX 4 MG
15 LOZENGE BUCCAL SEE ADMIN INSTRUCTIONS
COMMUNITY

## 2023-11-13 RX ORDER — SODIUM CHLORIDE, SODIUM LACTATE, POTASSIUM CHLORIDE, AND CALCIUM CHLORIDE .6; .31; .03; .02 G/100ML; G/100ML; G/100ML; G/100ML
1000 INJECTION, SOLUTION INTRAVENOUS ONCE
Status: COMPLETED | OUTPATIENT
Start: 2023-11-13 | End: 2023-11-13

## 2023-11-13 RX ORDER — PREGABALIN 150 MG/1
150 CAPSULE ORAL 2 TIMES DAILY
COMMUNITY

## 2023-11-13 RX ORDER — ONDANSETRON 8 MG/1
8 TABLET, ORALLY DISINTEGRATING ORAL EVERY 8 HOURS PRN
COMMUNITY

## 2023-11-13 RX ORDER — LANOLIN ALCOHOL/MO/W.PET/CERES
CREAM (GRAM) TOPICAL 2 TIMES DAILY
COMMUNITY

## 2023-11-13 RX ORDER — BUMETANIDE 1 MG/1
1 TABLET ORAL PRN
COMMUNITY

## 2023-11-13 RX ORDER — METOCLOPRAMIDE HYDROCHLORIDE 5 MG/ML
INJECTION INTRAMUSCULAR; INTRAVENOUS
Status: COMPLETED
Start: 2023-11-13 | End: 2023-11-13

## 2023-11-13 RX ORDER — ALBUTEROL SULFATE 90 UG/1
2 AEROSOL, METERED RESPIRATORY (INHALATION) EVERY 6 HOURS PRN
COMMUNITY

## 2023-11-13 RX ORDER — DULOXETIN HYDROCHLORIDE 60 MG/1
60 CAPSULE, DELAYED RELEASE ORAL DAILY
COMMUNITY

## 2023-11-13 RX ORDER — TRIAMCINOLONE ACETONIDE 1 MG/G
CREAM TOPICAL 2 TIMES DAILY
Status: ON HOLD | COMMUNITY
End: 2023-11-17 | Stop reason: HOSPADM

## 2023-11-13 RX ORDER — ROPINIROLE 1 MG/1
1 TABLET, FILM COATED ORAL 3 TIMES DAILY
COMMUNITY

## 2023-11-13 RX ADMIN — SODIUM CHLORIDE, POTASSIUM CHLORIDE, SODIUM LACTATE AND CALCIUM CHLORIDE 1000 ML: 600; 310; 30; 20 INJECTION, SOLUTION INTRAVENOUS at 20:29

## 2023-11-13 RX ADMIN — METOCLOPRAMIDE HYDROCHLORIDE 10 MG: 5 INJECTION INTRAMUSCULAR; INTRAVENOUS at 21:31

## 2023-11-13 RX ADMIN — CALCIUM GLUCONATE 2000 MG: 20 INJECTION, SOLUTION INTRAVENOUS at 23:15

## 2023-11-13 RX ADMIN — METOCLOPRAMIDE 10 MG: 5 INJECTION, SOLUTION INTRAMUSCULAR; INTRAVENOUS at 21:31

## 2023-11-13 ASSESSMENT — ENCOUNTER SYMPTOMS
EYES NEGATIVE: 1
RESPIRATORY NEGATIVE: 1
NAUSEA: 1
VOMITING: 1

## 2023-11-14 ENCOUNTER — APPOINTMENT (OUTPATIENT)
Dept: ULTRASOUND IMAGING | Age: 53
End: 2023-11-14
Payer: MEDICAID

## 2023-11-14 PROBLEM — K31.84 GASTROPARESIS: Status: ACTIVE | Noted: 2023-11-14

## 2023-11-14 PROBLEM — N18.32 STAGE 3B CHRONIC KIDNEY DISEASE (CKD) (HCC): Status: ACTIVE | Noted: 2023-11-13

## 2023-11-14 PROBLEM — E87.6 HYPOKALEMIA: Status: ACTIVE | Noted: 2023-11-13

## 2023-11-14 PROBLEM — E11.40 DIABETIC NEUROPATHY (HCC): Status: ACTIVE | Noted: 2023-11-14

## 2023-11-14 LAB
ADV 40+41 DNA STL QL NAA+NON-PROBE: NOT DETECTED
ANION GAP SERPL CALCULATED.3IONS-SCNC: 21 MMOL/L (ref 7–19)
BUN SERPL-MCNC: 44 MG/DL (ref 6–20)
C CAYETANENSIS DNA STL QL NAA+NON-PROBE: NOT DETECTED
C COLI+JEJ+UPSA DNA STL QL NAA+NON-PROBE: NOT DETECTED
C DIF TOX TCDA+TCDB STL QL NAA+NON-PROBE: NOT DETECTED
CALCIUM SERPL-MCNC: 10 MG/DL (ref 8.6–10)
CHLORIDE SERPL-SCNC: 101 MMOL/L (ref 98–111)
CO2 SERPL-SCNC: 17 MMOL/L (ref 22–29)
CREAT SERPL-MCNC: 3.3 MG/DL (ref 0.5–0.9)
CREAT UR-MCNC: 385.9 MG/DL (ref 28–217)
CRYPTOSP DNA STL QL NAA+NON-PROBE: NOT DETECTED
E HISTOLYT DNA STL QL NAA+NON-PROBE: NOT DETECTED
EAEC PAA PLAS AGGR+AATA ST NAA+NON-PRB: NOT DETECTED
EC STX1+STX2 GENES STL QL NAA+NON-PROBE: NOT DETECTED
EKG P AXIS: 57 DEGREES
EKG P-R INTERVAL: 148 MS
EKG Q-T INTERVAL: 416 MS
EKG QRS DURATION: 110 MS
EKG QTC CALCULATION (BAZETT): 461 MS
EKG T AXIS: 38 DEGREES
EOSINOPHIL URNS QL MICRO: NORMAL
EPEC EAE GENE STL QL NAA+NON-PROBE: NOT DETECTED
ETEC LTA+ST1A+ST1B TOX ST NAA+NON-PROBE: NOT DETECTED
G LAMBLIA DNA STL QL NAA+NON-PROBE: NOT DETECTED
GI PATH DNA+RNA PNL STL NAA+NON-PROBE: NOT DETECTED
GLUCOSE BLD-MCNC: 131 MG/DL (ref 70–99)
GLUCOSE BLD-MCNC: 167 MG/DL (ref 70–99)
GLUCOSE BLD-MCNC: 77 MG/DL (ref 70–99)
GLUCOSE BLD-MCNC: 89 MG/DL (ref 70–99)
GLUCOSE SERPL-MCNC: 173 MG/DL (ref 74–109)
MAGNESIUM SERPL-MCNC: 1.8 MG/DL (ref 1.6–2.6)
NOROVIRUS GI+II RNA STL QL NAA+NON-PROBE: NOT DETECTED
OSMOLALITY URINE: 534 MOSM/KG (ref 250–1200)
P SHIGELLOIDES DNA STL QL NAA+NON-PROBE: NOT DETECTED
PERFORMED ON: ABNORMAL
PERFORMED ON: ABNORMAL
PERFORMED ON: NORMAL
PERFORMED ON: NORMAL
POTASSIUM SERPL-SCNC: 2.6 MMOL/L (ref 3.5–5)
POTASSIUM SERPL-SCNC: 2.9 MMOL/L (ref 3.5–5)
RVA RNA STL QL NAA+NON-PROBE: NOT DETECTED
S ENT+BONG DNA STL QL NAA+NON-PROBE: NOT DETECTED
SAPO I+II+IV+V RNA STL QL NAA+NON-PROBE: NOT DETECTED
SHIGELLA SP+EIEC IPAH ST NAA+NON-PROBE: NOT DETECTED
SODIUM SERPL-SCNC: 139 MMOL/L (ref 136–145)
SODIUM UR-SCNC: 25 MMOL/L
V CHOL+PARA+VUL DNA STL QL NAA+NON-PROBE: NOT DETECTED
V CHOLERAE DNA STL QL NAA+NON-PROBE: NOT DETECTED
Y ENTEROCOL DNA STL QL NAA+NON-PROBE: NOT DETECTED

## 2023-11-14 PROCEDURE — 76770 US EXAM ABDO BACK WALL COMP: CPT

## 2023-11-14 PROCEDURE — 6360000002 HC RX W HCPCS: Performed by: EMERGENCY MEDICINE

## 2023-11-14 PROCEDURE — 6360000002 HC RX W HCPCS: Performed by: INTERNAL MEDICINE

## 2023-11-14 PROCEDURE — 6360000002 HC RX W HCPCS: Performed by: HOSPITALIST

## 2023-11-14 PROCEDURE — 84132 ASSAY OF SERUM POTASSIUM: CPT

## 2023-11-14 PROCEDURE — 80048 BASIC METABOLIC PNL TOTAL CA: CPT

## 2023-11-14 PROCEDURE — 2580000003 HC RX 258: Performed by: HOSPITALIST

## 2023-11-14 PROCEDURE — 2500000003 HC RX 250 WO HCPCS: Performed by: INTERNAL MEDICINE

## 2023-11-14 PROCEDURE — 83735 ASSAY OF MAGNESIUM: CPT

## 2023-11-14 PROCEDURE — 6370000000 HC RX 637 (ALT 250 FOR IP): Performed by: INTERNAL MEDICINE

## 2023-11-14 PROCEDURE — 82962 GLUCOSE BLOOD TEST: CPT

## 2023-11-14 PROCEDURE — 2500000003 HC RX 250 WO HCPCS: Performed by: HOSPITALIST

## 2023-11-14 PROCEDURE — 87507 IADNA-DNA/RNA PROBE TQ 12-25: CPT

## 2023-11-14 PROCEDURE — 2580000003 HC RX 258: Performed by: INTERNAL MEDICINE

## 2023-11-14 PROCEDURE — 2580000003 HC RX 258: Performed by: EMERGENCY MEDICINE

## 2023-11-14 PROCEDURE — 6370000000 HC RX 637 (ALT 250 FOR IP): Performed by: HOSPITALIST

## 2023-11-14 PROCEDURE — 1210000000 HC MED SURG R&B

## 2023-11-14 PROCEDURE — 94760 N-INVAS EAR/PLS OXIMETRY 1: CPT

## 2023-11-14 PROCEDURE — 36415 COLL VENOUS BLD VENIPUNCTURE: CPT

## 2023-11-14 PROCEDURE — 93010 ELECTROCARDIOGRAM REPORT: CPT | Performed by: INTERNAL MEDICINE

## 2023-11-14 RX ORDER — ACETAMINOPHEN 650 MG/1
650 SUPPOSITORY RECTAL EVERY 4 HOURS PRN
Status: DISCONTINUED | OUTPATIENT
Start: 2023-11-14 | End: 2023-11-17 | Stop reason: HOSPADM

## 2023-11-14 RX ORDER — METOCLOPRAMIDE HYDROCHLORIDE 5 MG/ML
10 INJECTION INTRAMUSCULAR; INTRAVENOUS
Status: DISCONTINUED | OUTPATIENT
Start: 2023-11-14 | End: 2023-11-14

## 2023-11-14 RX ORDER — NICOTINE 21 MG/24HR
1 PATCH, TRANSDERMAL 24 HOURS TRANSDERMAL DAILY
Status: DISCONTINUED | OUTPATIENT
Start: 2023-11-14 | End: 2023-11-17

## 2023-11-14 RX ORDER — HYDROXYCHLOROQUINE SULFATE 200 MG/1
200 TABLET, FILM COATED ORAL DAILY
Status: DISCONTINUED | OUTPATIENT
Start: 2023-11-14 | End: 2023-11-17 | Stop reason: HOSPADM

## 2023-11-14 RX ORDER — NORTRIPTYLINE HYDROCHLORIDE 50 MG/1
50 CAPSULE ORAL NIGHTLY
Status: DISCONTINUED | OUTPATIENT
Start: 2023-11-14 | End: 2023-11-17 | Stop reason: HOSPADM

## 2023-11-14 RX ORDER — SODIUM BICARBONATE 325 MG/1
325 TABLET ORAL 2 TIMES DAILY
Status: DISCONTINUED | OUTPATIENT
Start: 2023-11-14 | End: 2023-11-16

## 2023-11-14 RX ORDER — ATORVASTATIN CALCIUM 80 MG/1
80 TABLET, FILM COATED ORAL DAILY
COMMUNITY

## 2023-11-14 RX ORDER — LANOLIN ALCOHOL/MO/W.PET/CERES
CREAM (GRAM) TOPICAL 2 TIMES DAILY
Status: DISCONTINUED | OUTPATIENT
Start: 2023-11-14 | End: 2023-11-17 | Stop reason: HOSPADM

## 2023-11-14 RX ORDER — POTASSIUM CHLORIDE 20 MEQ/1
40 TABLET, EXTENDED RELEASE ORAL ONCE
Status: COMPLETED | OUTPATIENT
Start: 2023-11-14 | End: 2023-11-14

## 2023-11-14 RX ORDER — POTASSIUM CHLORIDE 20 MEQ/1
20 TABLET, EXTENDED RELEASE ORAL
Status: DISCONTINUED | OUTPATIENT
Start: 2023-11-14 | End: 2023-11-14

## 2023-11-14 RX ORDER — SODIUM CHLORIDE, SODIUM LACTATE, POTASSIUM CHLORIDE, CALCIUM CHLORIDE 600; 310; 30; 20 MG/100ML; MG/100ML; MG/100ML; MG/100ML
INJECTION, SOLUTION INTRAVENOUS CONTINUOUS
Status: DISCONTINUED | OUTPATIENT
Start: 2023-11-14 | End: 2023-11-14 | Stop reason: ALTCHOICE

## 2023-11-14 RX ORDER — SODIUM CHLORIDE 9 MG/ML
INJECTION, SOLUTION INTRAVENOUS PRN
Status: DISCONTINUED | OUTPATIENT
Start: 2023-11-14 | End: 2023-11-17 | Stop reason: HOSPADM

## 2023-11-14 RX ORDER — DEXTROSE MONOHYDRATE 100 MG/ML
INJECTION, SOLUTION INTRAVENOUS CONTINUOUS PRN
Status: DISCONTINUED | OUTPATIENT
Start: 2023-11-14 | End: 2023-11-17 | Stop reason: HOSPADM

## 2023-11-14 RX ORDER — POTASSIUM CHLORIDE 20 MEQ/1
20 TABLET, EXTENDED RELEASE ORAL
Status: COMPLETED | OUTPATIENT
Start: 2023-11-14 | End: 2023-11-15

## 2023-11-14 RX ORDER — CALCIUM CARBONATE 500(1250)
500 TABLET ORAL DAILY
Status: DISCONTINUED | OUTPATIENT
Start: 2023-11-14 | End: 2023-11-17 | Stop reason: HOSPADM

## 2023-11-14 RX ORDER — ALBUTEROL SULFATE 2.5 MG/3ML
2.5 SOLUTION RESPIRATORY (INHALATION) EVERY 4 HOURS PRN
Status: DISCONTINUED | OUTPATIENT
Start: 2023-11-14 | End: 2023-11-17 | Stop reason: HOSPADM

## 2023-11-14 RX ORDER — CALCIUM CARBONATE 500 MG/1
500 TABLET, CHEWABLE ORAL 3 TIMES DAILY PRN
Status: DISCONTINUED | OUTPATIENT
Start: 2023-11-14 | End: 2023-11-17 | Stop reason: HOSPADM

## 2023-11-14 RX ORDER — SODIUM CHLORIDE 0.9 % (FLUSH) 0.9 %
5-40 SYRINGE (ML) INJECTION PRN
Status: DISCONTINUED | OUTPATIENT
Start: 2023-11-14 | End: 2023-11-17 | Stop reason: HOSPADM

## 2023-11-14 RX ORDER — SODIUM CHLORIDE 0.9 % (FLUSH) 0.9 %
5-40 SYRINGE (ML) INJECTION EVERY 12 HOURS SCHEDULED
Status: DISCONTINUED | OUTPATIENT
Start: 2023-11-14 | End: 2023-11-17 | Stop reason: HOSPADM

## 2023-11-14 RX ORDER — PANTOPRAZOLE SODIUM 20 MG/1
20 TABLET, DELAYED RELEASE ORAL
Status: DISCONTINUED | OUTPATIENT
Start: 2023-11-14 | End: 2023-11-17 | Stop reason: HOSPADM

## 2023-11-14 RX ORDER — POLYETHYLENE GLYCOL 3350 17 G/17G
17 POWDER, FOR SOLUTION ORAL DAILY PRN
Status: DISCONTINUED | OUTPATIENT
Start: 2023-11-14 | End: 2023-11-17 | Stop reason: HOSPADM

## 2023-11-14 RX ORDER — ROPINIROLE 1 MG/1
1 TABLET, FILM COATED ORAL 3 TIMES DAILY
Status: DISCONTINUED | OUTPATIENT
Start: 2023-11-14 | End: 2023-11-17 | Stop reason: HOSPADM

## 2023-11-14 RX ORDER — ATORVASTATIN CALCIUM 80 MG/1
80 TABLET, FILM COATED ORAL NIGHTLY
Status: DISCONTINUED | OUTPATIENT
Start: 2023-11-14 | End: 2023-11-17 | Stop reason: HOSPADM

## 2023-11-14 RX ORDER — ACETAMINOPHEN 325 MG/1
650 TABLET ORAL EVERY 4 HOURS PRN
Status: DISCONTINUED | OUTPATIENT
Start: 2023-11-14 | End: 2023-11-17 | Stop reason: HOSPADM

## 2023-11-14 RX ORDER — PREGABALIN 75 MG/1
75 CAPSULE ORAL NIGHTLY
Status: DISCONTINUED | OUTPATIENT
Start: 2023-11-14 | End: 2023-11-17 | Stop reason: HOSPADM

## 2023-11-14 RX ORDER — HEPARIN SODIUM 5000 [USP'U]/ML
5000 INJECTION, SOLUTION INTRAVENOUS; SUBCUTANEOUS 2 TIMES DAILY
Status: DISCONTINUED | OUTPATIENT
Start: 2023-11-14 | End: 2023-11-17 | Stop reason: HOSPADM

## 2023-11-14 RX ORDER — CETIRIZINE HYDROCHLORIDE 10 MG/1
10 TABLET ORAL DAILY PRN
Status: DISCONTINUED | OUTPATIENT
Start: 2023-11-14 | End: 2023-11-17 | Stop reason: HOSPADM

## 2023-11-14 RX ORDER — UREA 40 G/100G
1 LOTION TOPICAL DAILY
Status: DISCONTINUED | OUTPATIENT
Start: 2023-11-14 | End: 2023-11-17 | Stop reason: HOSPADM

## 2023-11-14 RX ORDER — MECOBALAMIN 5000 MCG
5 TABLET,DISINTEGRATING ORAL NIGHTLY PRN
Status: DISCONTINUED | OUTPATIENT
Start: 2023-11-14 | End: 2023-11-17 | Stop reason: HOSPADM

## 2023-11-14 RX ORDER — LANOLIN ALCOHOL/MO/W.PET/CERES
400 CREAM (GRAM) TOPICAL 2 TIMES DAILY
Status: DISCONTINUED | OUTPATIENT
Start: 2023-11-14 | End: 2023-11-17 | Stop reason: HOSPADM

## 2023-11-14 RX ORDER — ONDANSETRON 4 MG/1
4 TABLET, ORALLY DISINTEGRATING ORAL EVERY 8 HOURS PRN
Status: DISCONTINUED | OUTPATIENT
Start: 2023-11-14 | End: 2023-11-14

## 2023-11-14 RX ORDER — METOCLOPRAMIDE HYDROCHLORIDE 5 MG/ML
5 INJECTION INTRAMUSCULAR; INTRAVENOUS EVERY 6 HOURS PRN
Status: DISCONTINUED | OUTPATIENT
Start: 2023-11-14 | End: 2023-11-16

## 2023-11-14 RX ORDER — ROPINIROLE 1 MG/1
1 TABLET, FILM COATED ORAL 3 TIMES DAILY
Status: DISCONTINUED | OUTPATIENT
Start: 2023-11-14 | End: 2023-11-14

## 2023-11-14 RX ORDER — METOCLOPRAMIDE HYDROCHLORIDE 5 MG/ML
10 INJECTION INTRAMUSCULAR; INTRAVENOUS EVERY 6 HOURS PRN
Status: DISCONTINUED | OUTPATIENT
Start: 2023-11-14 | End: 2023-11-14 | Stop reason: DRUGHIGH

## 2023-11-14 RX ORDER — DULOXETIN HYDROCHLORIDE 30 MG/1
60 CAPSULE, DELAYED RELEASE ORAL DAILY
Status: DISCONTINUED | OUTPATIENT
Start: 2023-11-14 | End: 2023-11-17 | Stop reason: HOSPADM

## 2023-11-14 RX ORDER — ONDANSETRON 2 MG/ML
4 INJECTION INTRAMUSCULAR; INTRAVENOUS EVERY 6 HOURS PRN
Status: DISCONTINUED | OUTPATIENT
Start: 2023-11-14 | End: 2023-11-14

## 2023-11-14 RX ORDER — PREGABALIN 75 MG/1
150 CAPSULE ORAL 2 TIMES DAILY
Status: DISCONTINUED | OUTPATIENT
Start: 2023-11-14 | End: 2023-11-14 | Stop reason: DRUGHIGH

## 2023-11-14 RX ADMIN — PANCRELIPASE LIPASE, PANCRELIPASE PROTEASE, PANCRELIPASE AMYLASE 20000 UNITS: 20000; 63000; 84000 CAPSULE, DELAYED RELEASE ORAL at 09:53

## 2023-11-14 RX ADMIN — POTASSIUM CHLORIDE 20 MEQ: 1500 TABLET, EXTENDED RELEASE ORAL at 09:53

## 2023-11-14 RX ADMIN — Medication: at 13:03

## 2023-11-14 RX ADMIN — ROPINIROLE HYDROCHLORIDE 1 MG: 1 TABLET, FILM COATED ORAL at 14:58

## 2023-11-14 RX ADMIN — PREGABALIN 150 MG: 75 CAPSULE ORAL at 01:27

## 2023-11-14 RX ADMIN — ROPINIROLE HYDROCHLORIDE 1 MG: 1 TABLET, FILM COATED ORAL at 01:27

## 2023-11-14 RX ADMIN — POTASSIUM CHLORIDE 20 MEQ: 1500 TABLET, EXTENDED RELEASE ORAL at 18:08

## 2023-11-14 RX ADMIN — HEPARIN SODIUM 5000 UNITS: 5000 INJECTION INTRAVENOUS; SUBCUTANEOUS at 01:26

## 2023-11-14 RX ADMIN — ATORVASTATIN CALCIUM 80 MG: 80 TABLET, FILM COATED ORAL at 21:49

## 2023-11-14 RX ADMIN — METOCLOPRAMIDE 10 MG: 5 INJECTION, SOLUTION INTRAMUSCULAR; INTRAVENOUS at 09:56

## 2023-11-14 RX ADMIN — PANCRELIPASE LIPASE, PANCRELIPASE PROTEASE, PANCRELIPASE AMYLASE 20000 UNITS: 20000; 63000; 84000 CAPSULE, DELAYED RELEASE ORAL at 13:01

## 2023-11-14 RX ADMIN — SODIUM BICARBONATE 325 MG: 325 TABLET ORAL at 09:53

## 2023-11-14 RX ADMIN — Medication 400 MG: at 09:53

## 2023-11-14 RX ADMIN — ROPINIROLE HYDROCHLORIDE 1 MG: 1 TABLET, FILM COATED ORAL at 09:53

## 2023-11-14 RX ADMIN — ATORVASTATIN CALCIUM 80 MG: 80 TABLET, FILM COATED ORAL at 01:26

## 2023-11-14 RX ADMIN — Medication: at 01:27

## 2023-11-14 RX ADMIN — Medication 400 MG: at 01:27

## 2023-11-14 RX ADMIN — PANCRELIPASE LIPASE, PANCRELIPASE PROTEASE, PANCRELIPASE AMYLASE 5000 UNITS: 5000; 17000; 24000 CAPSULE, DELAYED RELEASE ORAL at 09:53

## 2023-11-14 RX ADMIN — EMPAGLIFLOZIN 10 MG: 10 TABLET, FILM COATED ORAL at 09:53

## 2023-11-14 RX ADMIN — NORTRIPTYLINE HYDROCHLORIDE 50 MG: 50 CAPSULE ORAL at 01:26

## 2023-11-14 RX ADMIN — PANCRELIPASE LIPASE, PANCRELIPASE PROTEASE, PANCRELIPASE AMYLASE 5000 UNITS: 5000; 17000; 24000 CAPSULE, DELAYED RELEASE ORAL at 18:08

## 2023-11-14 RX ADMIN — PANCRELIPASE LIPASE, PANCRELIPASE PROTEASE, PANCRELIPASE AMYLASE 20000 UNITS: 20000; 63000; 84000 CAPSULE, DELAYED RELEASE ORAL at 18:08

## 2023-11-14 RX ADMIN — ROPINIROLE HYDROCHLORIDE 1 MG: 1 TABLET, FILM COATED ORAL at 21:49

## 2023-11-14 RX ADMIN — HEPARIN SODIUM 5000 UNITS: 5000 INJECTION INTRAVENOUS; SUBCUTANEOUS at 09:54

## 2023-11-14 RX ADMIN — CALCIUM 500 MG: 500 TABLET ORAL at 09:53

## 2023-11-14 RX ADMIN — PANTOPRAZOLE SODIUM 20 MG: 20 TABLET, DELAYED RELEASE ORAL at 06:13

## 2023-11-14 RX ADMIN — SODIUM CHLORIDE, PRESERVATIVE FREE 10 ML: 5 INJECTION INTRAVENOUS at 21:49

## 2023-11-14 RX ADMIN — Medication 400 MG: at 21:48

## 2023-11-14 RX ADMIN — PREGABALIN 75 MG: 75 CAPSULE ORAL at 21:48

## 2023-11-14 RX ADMIN — Medication: at 23:22

## 2023-11-14 RX ADMIN — DULOXETINE HYDROCHLORIDE 60 MG: 30 CAPSULE, DELAYED RELEASE ORAL at 09:52

## 2023-11-14 RX ADMIN — POTASSIUM CHLORIDE 40 MEQ: 1500 TABLET, EXTENDED RELEASE ORAL at 06:12

## 2023-11-14 RX ADMIN — NORTRIPTYLINE HYDROCHLORIDE 50 MG: 50 CAPSULE ORAL at 21:49

## 2023-11-14 RX ADMIN — HEPARIN SODIUM 5000 UNITS: 5000 INJECTION INTRAVENOUS; SUBCUTANEOUS at 21:49

## 2023-11-14 RX ADMIN — PANCRELIPASE LIPASE, PANCRELIPASE PROTEASE, PANCRELIPASE AMYLASE 5000 UNITS: 5000; 17000; 24000 CAPSULE, DELAYED RELEASE ORAL at 13:01

## 2023-11-14 RX ADMIN — SODIUM CHLORIDE, POTASSIUM CHLORIDE, SODIUM LACTATE AND CALCIUM CHLORIDE: 600; 310; 30; 20 INJECTION, SOLUTION INTRAVENOUS at 01:56

## 2023-11-14 RX ADMIN — SODIUM BICARBONATE: 84 INJECTION, SOLUTION INTRAVENOUS at 13:01

## 2023-11-14 RX ADMIN — Medication 5 MG: at 23:22

## 2023-11-14 RX ADMIN — CEFTRIAXONE 1000 MG: 1 INJECTION, POWDER, FOR SOLUTION INTRAMUSCULAR; INTRAVENOUS at 01:26

## 2023-11-14 RX ADMIN — SODIUM BICARBONATE 325 MG: 325 TABLET ORAL at 01:27

## 2023-11-14 RX ADMIN — SODIUM BICARBONATE: 84 INJECTION, SOLUTION INTRAVENOUS at 06:17

## 2023-11-14 RX ADMIN — SODIUM BICARBONATE 325 MG: 325 TABLET ORAL at 21:49

## 2023-11-14 ASSESSMENT — ENCOUNTER SYMPTOMS
VOMITING: 1
DIARRHEA: 1
COUGH: 0
SHORTNESS OF BREATH: 1
ABDOMINAL PAIN: 1
NAUSEA: 1
CONSTIPATION: 1

## 2023-11-14 NOTE — PROGRESS NOTES
Patient blood glucose 77. Does not qualify for hypoglycemia protocol. Patient encouraged to drink orange juice. Verbalized understanding.

## 2023-11-14 NOTE — PROGRESS NOTES
Pharmacy Renal Adjustment    Diana Garrett is a 47 y.o. female. Pharmacy has renally adjusted medications per protocol. Recent Labs     11/13/23 2149 11/14/23 0415   BUN 36* 44*       Recent Labs     11/13/23 2149 11/14/23 0415   CREATININE 3.0* 3.3*       Estimated Creatinine Clearance: 14 mL/min (A) (based on SCr of 3.3 mg/dL (H)). Height:   Ht Readings from Last 1 Encounters:   11/14/23 1.524 m (5')     Weight:  Wt Readings from Last 1 Encounters:   11/14/23 46.4 kg (102 lb 3.2 oz)       CKD stage: Stage 3b chronic kidney disease          Baseline SCr: Unspecified    Plan: Adjust the following medications based on renal function:           Metoclopramide 10 mg IV every 6 hours prn adjusted to Metoclopramide 5 mg IV every 6 hours prn.     Electronically signed by Arnav Payton [facility] on 11/14/2023 at 12:02 PM

## 2023-11-14 NOTE — PLAN OF CARE
Problem: Discharge Planning  Goal: Discharge to home or other facility with appropriate resources  11/14/2023 1505 by Kodi Marcelo RN  Outcome: Progressing  11/14/2023 0234 by Laurent Freitas RN  Outcome: Progressing     Problem: Safety - Adult  Goal: Free from fall injury  11/14/2023 1505 by Kodi Marcelo RN  Outcome: Progressing  11/14/2023 0234 by Laurent Freitas RN  Outcome: Progressing     Problem: ABCDS Injury Assessment  Goal: Absence of physical injury  11/14/2023 1505 by Kodi Marcelo RN  Outcome: Progressing  11/14/2023 0234 by Laurent Freitas RN  Outcome: Progressing     Problem: Chronic Conditions and Co-morbidities  Goal: Patient's chronic conditions and co-morbidity symptoms are monitored and maintained or improved  Outcome: Progressing     Problem: Nutrition Deficit:  Goal: Optimize nutritional status  11/14/2023 1505 by Kodi Marcelo RN  Outcome: Progressing  11/14/2023 1043 by Dm Dong, MS, RD, LD  Outcome: Progressing

## 2023-11-14 NOTE — ED PROVIDER NOTES
[facility] 3 ANG/VAS/MED  EMERGENCY DEPARTMENT ENCOUNTER      Pt Name: Geovanny Bullard  MRN: [id number]  [address] [facility] 1970  Date of evaluation: 11/13/2023  Provider: Wes Trejo MD    [address] [facility]       Chief Complaint   Patient presents with    Emesis    Fatigue     Fatigue for 4 weeks, recent [facility] admission for kidney issues per patient, feeling worse         HISTORY OF PRESENT ILLNESS   (Location/Symptom, Timing/Onset,Context/Setting, Quality, Duration, Modifying Factors, Severity)  Note limiting factors. Geovanny Bullard is a 47 y.o. female who presents to the emergency department for evaluation after having nausea and vomiting persistently over the last several weeks. Has been fatigued. Denies any significant abdominal pain. Has not had any constipation or diarrhea. Says she has had significantly decreased oral intake because of the symptoms. Says she has been hospitalized twice at [facility] over the last several months for similar issues which resulted in worsened renal function. Patient also says that she had a feeding tube in the past for similar issues. Has a history of CKD, type 2 diabetes. Has continuous glucose monitor    HPI    NursingNotes were reviewed. REVIEW OF SYSTEMS    (2-9 systems for level 4, 10 or more for level 5)     Review of Systems   Constitutional:  Positive for fatigue. HENT: Negative. Eyes: Negative. Respiratory: Negative. Cardiovascular: Negative. Gastrointestinal:  Positive for nausea and vomiting. Genitourinary: Negative. Musculoskeletal: Negative. Skin: Negative. Neurological:  Positive for weakness. Hematological: Negative. Psychiatric/Behavioral: Negative. A complete review of systems was performed and is negative except as noted above in the HPI.        PAST MEDICAL HISTORY     Past Medical History:   Diagnosis Date    Allergic rhinitis     Arthritis     Asthma     Chronic kidney disease     Constipation     Diabetes mellitus ([address])

## 2023-11-14 NOTE — H&P
[facility] History and Physical    Patient Information:  Patient: Nancy Mata  MRN: [id number]   Acct: [de-identified]  YOB: 1970  Admit Date: 11/13/2023    following day by [employer])   Primary Care Physician: Danial Cosby  Advance Directive: Full Code  Health Care Proxy: her , Mr. Lisa Richardson, [phone]        SUBJECTIVE:    Chief Complaint   Patient presents with    Emesis    Fatigue     Fatigue for 4 weeks, recent [facility] admission for kidney issues per patient, feeling worse     EP Sign Out:  ABHISHEK on CKD 3B  Hyopkalemia    HPI:  Mrs. Nancy Mata is a pleasant 47year old lady who has been feeling ill for the last 4-5 weeks. She states that it began as nausea with eating, the entire time it has been associated with vomiting where she can not keep anything down. She states that she has been in the hospital, she states that she spent almost 4 days at [facility] after being transferred there by Andrea Siu, and that she was released and then within a week she went back and they kept her two days. She left them \"because they were not doing anything other than IV Fluid, and I had UTI the first time almost to septic\". She states that she has nausea with he smell of food, an that when she forced food or drink down it comes back up about thirty minutes later. She also wants us to know she has had a lot of heart burn lately. She state that there is nothing more she can tell us about this. She is a smoker and has abused tobacco for the last 31 years. Review of Systems:   Review of Systems   Constitutional:  Positive for fatigue. Negative for chills, diaphoresis and fever (has hot flashes which have not changed from baseline, otherwise none). HENT:  Negative for sneezing. Respiratory:  Positive for shortness of breath. Negative for cough. Cardiovascular:  Negative for chest pain.    Gastrointestinal:  Positive for abdominal pain (crampiong), constipation,

## 2023-11-14 NOTE — PROGRESS NOTES
Pharmacy Renal Adjustment    Woody Pena is a 47 y.o. female. Pharmacy has renally adjusted medications per protocol. Recent Labs     11/13/23 2149 11/14/23 0415   BUN 36* 44*       Recent Labs     11/13/23 2149 11/14/23 0415   CREATININE 3.0* 3.3*       Estimated Creatinine Clearance: 14 mL/min (A) (based on SCr of 3.3 mg/dL (H)). Height:   Ht Readings from Last 1 Encounters:   11/14/23 1.524 m (5')     Weight:  Wt Readings from Last 1 Encounters:   11/14/23 46.4 kg (102 lb 3.2 oz)       Plan: Adjust the following medications based on renal function:           Pregablin to 75mg po daily.     Electronically signed by Dennise Meng [facility] on 11/14/2023 at 11:42 AM

## 2023-11-14 NOTE — PLAN OF CARE
Nutrition Problem #1: Unintended weight loss  Intervention: Food and/or Nutrient Delivery: Continue Current Diet  Nutritional

## 2023-11-14 NOTE — PROGRESS NOTES
Comprehensive Nutrition Assessment    Type and Reason for Visit:  Initial, Positive Nutrition Screen    Nutrition Recommendations/Plan:   Follow for po intake starting ONS if needed. Monitor weight     Malnutrition Assessment:  Malnutrition Status: At risk for malnutrition (Comment) (11/14/23 1040)    Context:  Acute Illness     Findings of the 6 clinical characteristics of malnutrition:  Energy Intake:  Mild decrease in energy intake (Comment)  Weight Loss:  Greater than 7.5% over 3 months     Body Fat Loss:  No significant body fat loss     Muscle Mass Loss:  No significant muscle mass loss    Fluid Accumulation:  No significant fluid accumulation Extremities   Strength:  Not Performed    Nutrition Assessment:    Following patient for +NS- decreased weight and po intake. Pt has lost approx 16# or 13.6% of UBW since 8/28/2023. Aware po intake has been decreased for the past month d/t n/v/pain and some diarrhea. PO intake at this time has improved with intake this a.m. at 50-75% for breakfast.  No c/o nausea at this time. Nutrition Related Findings:    nausea, vomiting, pain PTA. K+ 2.9 Wound Type: None       Current Nutrition Intake & Therapies:    Average Meal Intake: 51-75%  Average Supplements Intake: None Ordered  ADULT DIET; Regular; 4 carb choices (60 gm/meal); Low Fat/Low Chol/High Fiber/2 gm Na; Low Sodium (2 gm); Low Potassium (Less than 3000 mg/day); Low Phosphorus (Less than 1000 mg)    Anthropometric Measures:  Height: 152.4 cm (5')  Ideal Body Weight (IBW): 100 lbs (45 kg)    Admission Body Weight: 45.4 kg (100 lb) (stated)  Current Body Weight: 46.4 kg (102 lb 4.7 oz), 102.3 % IBW. Weight Source: Bed Scale  Current BMI (kg/m2): 20  Usual Body Weight: 53.7 kg (118 lb 6.2 oz) (8/28/2023)  % Weight Change (Calculated): -13.6  Weight Adjustment For: No Adjustment  BMI Categories: Normal Weight (BMI 18.5-24. 9)    Estimated Daily Nutrient Needs:  Energy Requirements Based On: Kcal/kg  Weight

## 2023-11-15 ENCOUNTER — APPOINTMENT (OUTPATIENT)
Dept: NUCLEAR MEDICINE | Age: 53
End: 2023-11-15
Payer: MEDICAID

## 2023-11-15 LAB
ANION GAP SERPL CALCULATED.3IONS-SCNC: 12 MMOL/L (ref 7–19)
ANION GAP SERPL CALCULATED.3IONS-SCNC: 8 MMOL/L (ref 7–19)
BASOPHILS # BLD: 0 K/UL (ref 0–0.2)
BASOPHILS NFR BLD: 0.3 % (ref 0–1)
BUN SERPL-MCNC: 34 MG/DL (ref 6–20)
BUN SERPL-MCNC: 44 MG/DL (ref 6–20)
CALCIUM SERPL-MCNC: 8.4 MG/DL (ref 8.6–10)
CALCIUM SERPL-MCNC: 8.4 MG/DL (ref 8.6–10)
CHLORIDE SERPL-SCNC: 106 MMOL/L (ref 98–111)
CHLORIDE SERPL-SCNC: 108 MMOL/L (ref 98–111)
CO2 SERPL-SCNC: 27 MMOL/L (ref 22–29)
CO2 SERPL-SCNC: 34 MMOL/L (ref 22–29)
CREAT SERPL-MCNC: 1.8 MG/DL (ref 0.5–0.9)
CREAT SERPL-MCNC: 2.4 MG/DL (ref 0.5–0.9)
EOSINOPHIL # BLD: 0 K/UL (ref 0–0.6)
EOSINOPHIL NFR BLD: 0.1 % (ref 0–5)
ERYTHROCYTE [DISTWIDTH] IN BLOOD BY AUTOMATED COUNT: 14.8 % (ref 11.5–14.5)
GLUCOSE BLD-MCNC: 106 MG/DL (ref 70–99)
GLUCOSE BLD-MCNC: 107 MG/DL (ref 70–99)
GLUCOSE BLD-MCNC: 110 MG/DL (ref 70–99)
GLUCOSE SERPL-MCNC: 162 MG/DL (ref 74–109)
GLUCOSE SERPL-MCNC: 84 MG/DL (ref 74–109)
HCT VFR BLD AUTO: 38.7 % (ref 37–47)
HGB BLD-MCNC: 12 G/DL (ref 12–16)
IMM GRANULOCYTES # BLD: 0 K/UL
LYMPHOCYTES # BLD: 3.5 K/UL (ref 1.1–4.5)
LYMPHOCYTES NFR BLD: 34.2 % (ref 20–40)
MCH RBC QN AUTO: 29 PG (ref 27–31)
MCHC RBC AUTO-ENTMCNC: 31 G/DL (ref 33–37)
MCV RBC AUTO: 93.5 FL (ref 81–99)
MONOCYTES # BLD: 0.5 K/UL (ref 0–0.9)
MONOCYTES NFR BLD: 5.2 % (ref 0–10)
NEUTROPHILS # BLD: 6.1 K/UL (ref 1.5–7.5)
NEUTS SEG NFR BLD: 59.9 % (ref 50–65)
PERFORMED ON: ABNORMAL
PLATELET # BLD AUTO: 247 K/UL (ref 130–400)
PMV BLD AUTO: 11.1 FL (ref 9.4–12.3)
POTASSIUM SERPL-SCNC: 3.6 MMOL/L (ref 3.5–5)
POTASSIUM SERPL-SCNC: 4 MMOL/L (ref 3.5–5)
PTH-INTACT SERPL-MCNC: 153 PG/ML (ref 15–65)
RBC # BLD AUTO: 4.14 M/UL (ref 4.2–5.4)
SODIUM SERPL-SCNC: 147 MMOL/L (ref 136–145)
SODIUM SERPL-SCNC: 148 MMOL/L (ref 136–145)
URATE SERPL-MCNC: 7.2 MG/DL (ref 2.4–5.7)
WBC # BLD AUTO: 10.2 K/UL (ref 4.8–10.8)

## 2023-11-15 PROCEDURE — 6370000000 HC RX 637 (ALT 250 FOR IP): Performed by: STUDENT IN AN ORGANIZED HEALTH CARE EDUCATION/TRAINING PROGRAM

## 2023-11-15 PROCEDURE — 2580000003 HC RX 258: Performed by: HOSPITALIST

## 2023-11-15 PROCEDURE — 6370000000 HC RX 637 (ALT 250 FOR IP): Performed by: INTERNAL MEDICINE

## 2023-11-15 PROCEDURE — 82962 GLUCOSE BLOOD TEST: CPT

## 2023-11-15 PROCEDURE — 2500000003 HC RX 250 WO HCPCS: Performed by: INTERNAL MEDICINE

## 2023-11-15 PROCEDURE — 3430000000 HC RX DIAGNOSTIC RADIOPHARMACEUTICAL: Performed by: INTERNAL MEDICINE

## 2023-11-15 PROCEDURE — 78264 GASTRIC EMPTYING IMG STUDY: CPT

## 2023-11-15 PROCEDURE — 6360000002 HC RX W HCPCS: Performed by: HOSPITALIST

## 2023-11-15 PROCEDURE — A9541 TC99M SULFUR COLLOID: HCPCS | Performed by: INTERNAL MEDICINE

## 2023-11-15 PROCEDURE — 85025 COMPLETE CBC W/AUTO DIFF WBC: CPT

## 2023-11-15 PROCEDURE — 84550 ASSAY OF BLOOD/URIC ACID: CPT

## 2023-11-15 PROCEDURE — 6370000000 HC RX 637 (ALT 250 FOR IP): Performed by: HOSPITALIST

## 2023-11-15 PROCEDURE — 94760 N-INVAS EAR/PLS OXIMETRY 1: CPT

## 2023-11-15 PROCEDURE — 83970 ASSAY OF PARATHORMONE: CPT

## 2023-11-15 PROCEDURE — 80048 BASIC METABOLIC PNL TOTAL CA: CPT

## 2023-11-15 PROCEDURE — 36415 COLL VENOUS BLD VENIPUNCTURE: CPT

## 2023-11-15 PROCEDURE — 6360000002 HC RX W HCPCS: Performed by: INTERNAL MEDICINE

## 2023-11-15 PROCEDURE — 2580000003 HC RX 258: Performed by: INTERNAL MEDICINE

## 2023-11-15 PROCEDURE — 1210000000 HC MED SURG R&B

## 2023-11-15 RX ORDER — CHOLESTYRAMINE LIGHT 4 G/5.7G
4 POWDER, FOR SUSPENSION ORAL 2 TIMES DAILY
Status: DISCONTINUED | OUTPATIENT
Start: 2023-11-15 | End: 2023-11-17 | Stop reason: HOSPADM

## 2023-11-15 RX ORDER — TECHNETIUM TC 99M SULFUR COLLOID 2 MG
2 KIT MISCELLANEOUS
Status: COMPLETED | OUTPATIENT
Start: 2023-11-15 | End: 2023-11-15

## 2023-11-15 RX ORDER — SODIUM CHLORIDE 450 MG/100ML
INJECTION, SOLUTION INTRAVENOUS CONTINUOUS
Status: DISCONTINUED | OUTPATIENT
Start: 2023-11-15 | End: 2023-11-16

## 2023-11-15 RX ORDER — CALCITRIOL 0.25 UG/1
0.25 CAPSULE, LIQUID FILLED ORAL DAILY
Status: DISCONTINUED | OUTPATIENT
Start: 2023-11-15 | End: 2023-11-17 | Stop reason: HOSPADM

## 2023-11-15 RX ADMIN — HEPARIN SODIUM 5000 UNITS: 5000 INJECTION INTRAVENOUS; SUBCUTANEOUS at 21:24

## 2023-11-15 RX ADMIN — DULOXETINE HYDROCHLORIDE 60 MG: 30 CAPSULE, DELAYED RELEASE ORAL at 09:05

## 2023-11-15 RX ADMIN — POTASSIUM CHLORIDE 20 MEQ: 1500 TABLET, EXTENDED RELEASE ORAL at 09:05

## 2023-11-15 RX ADMIN — ROPINIROLE HYDROCHLORIDE 1 MG: 1 TABLET, FILM COATED ORAL at 09:06

## 2023-11-15 RX ADMIN — CALCITRIOL CAPSULES 0.25 MCG 0.25 MCG: 0.25 CAPSULE ORAL at 14:27

## 2023-11-15 RX ADMIN — PANCRELIPASE LIPASE, PANCRELIPASE PROTEASE, PANCRELIPASE AMYLASE 5000 UNITS: 5000; 17000; 24000 CAPSULE, DELAYED RELEASE ORAL at 12:24

## 2023-11-15 RX ADMIN — NORTRIPTYLINE HYDROCHLORIDE 50 MG: 50 CAPSULE ORAL at 21:24

## 2023-11-15 RX ADMIN — SODIUM BICARBONATE 325 MG: 325 TABLET ORAL at 21:24

## 2023-11-15 RX ADMIN — SODIUM CHLORIDE, PRESERVATIVE FREE 10 ML: 5 INJECTION INTRAVENOUS at 21:28

## 2023-11-15 RX ADMIN — CALCIUM 500 MG: 500 TABLET ORAL at 09:06

## 2023-11-15 RX ADMIN — PREGABALIN 75 MG: 75 CAPSULE ORAL at 21:24

## 2023-11-15 RX ADMIN — SODIUM CHLORIDE: 4.5 INJECTION, SOLUTION INTRAVENOUS at 14:30

## 2023-11-15 RX ADMIN — Medication: at 21:27

## 2023-11-15 RX ADMIN — PANCRELIPASE LIPASE, PANCRELIPASE PROTEASE, PANCRELIPASE AMYLASE 20000 UNITS: 20000; 63000; 84000 CAPSULE, DELAYED RELEASE ORAL at 17:37

## 2023-11-15 RX ADMIN — ATORVASTATIN CALCIUM 80 MG: 80 TABLET, FILM COATED ORAL at 21:24

## 2023-11-15 RX ADMIN — PANCRELIPASE LIPASE, PANCRELIPASE PROTEASE, PANCRELIPASE AMYLASE 5000 UNITS: 5000; 17000; 24000 CAPSULE, DELAYED RELEASE ORAL at 17:37

## 2023-11-15 RX ADMIN — Medication 400 MG: at 09:05

## 2023-11-15 RX ADMIN — ROPINIROLE HYDROCHLORIDE 1 MG: 1 TABLET, FILM COATED ORAL at 21:24

## 2023-11-15 RX ADMIN — POTASSIUM CHLORIDE 20 MEQ: 1500 TABLET, EXTENDED RELEASE ORAL at 12:24

## 2023-11-15 RX ADMIN — METOCLOPRAMIDE 5 MG: 5 INJECTION, SOLUTION INTRAMUSCULAR; INTRAVENOUS at 17:37

## 2023-11-15 RX ADMIN — SODIUM BICARBONATE 325 MG: 325 TABLET ORAL at 09:05

## 2023-11-15 RX ADMIN — Medication 400 MG: at 21:24

## 2023-11-15 RX ADMIN — SODIUM BICARBONATE: 84 INJECTION, SOLUTION INTRAVENOUS at 03:43

## 2023-11-15 RX ADMIN — HEPARIN SODIUM 5000 UNITS: 5000 INJECTION INTRAVENOUS; SUBCUTANEOUS at 09:06

## 2023-11-15 RX ADMIN — Medication 2 MILLICURIE: at 12:20

## 2023-11-15 RX ADMIN — ROPINIROLE HYDROCHLORIDE 1 MG: 1 TABLET, FILM COATED ORAL at 14:27

## 2023-11-15 RX ADMIN — CHOLESTYRAMINE 4 G: 4 POWDER, FOR SUSPENSION ORAL at 21:26

## 2023-11-15 RX ADMIN — PANCRELIPASE LIPASE, PANCRELIPASE PROTEASE, PANCRELIPASE AMYLASE 20000 UNITS: 20000; 63000; 84000 CAPSULE, DELAYED RELEASE ORAL at 12:24

## 2023-11-15 SDOH — ECONOMIC STABILITY: INCOME INSECURITY: HOW HARD IS IT FOR YOU TO PAY FOR THE VERY BASICS LIKE FOOD, HOUSING, MEDICAL CARE, AND HEATING?: HARD

## 2023-11-15 SDOH — ECONOMIC STABILITY: FOOD INSECURITY: WITHIN THE PAST 12 MONTHS, YOU WORRIED THAT YOUR FOOD WOULD RUN OUT BEFORE YOU GOT MONEY TO BUY MORE.: OFTEN TRUE

## 2023-11-15 SDOH — ECONOMIC STABILITY: INCOME INSECURITY: IN THE PAST 12 MONTHS, HAS THE ELECTRIC, GAS, OIL, OR WATER COMPANY THREATENED TO SHUT OFF SERVICE IN YOUR HOME?: NO

## 2023-11-15 ASSESSMENT — PATIENT HEALTH QUESTIONNAIRE - PHQ9
SUM OF ALL RESPONSES TO PHQ QUESTIONS 1-9: 0
SUM OF ALL RESPONSES TO PHQ9 QUESTIONS 1 & 2: 0
SUM OF ALL RESPONSES TO PHQ QUESTIONS 1-9: 0
SUM OF ALL RESPONSES TO PHQ QUESTIONS 1-9: 0
2. FEELING DOWN, DEPRESSED OR HOPELESS: 0
SUM OF ALL RESPONSES TO PHQ QUESTIONS 1-9: 0
1. LITTLE INTEREST OR PLEASURE IN DOING THINGS: 0

## 2023-11-15 NOTE — PLAN OF CARE
Problem: Discharge Planning  Goal: Discharge to home or other facility with appropriate resources  Outcome: Progressing     Problem: Safety - Adult  Goal: Free from fall injury  Outcome: Progressing     Problem: ABCDS Injury Assessment  Goal: Absence of physical injury  Outcome: Progressing     Problem: Chronic Conditions and Co-morbidities  Goal: Patient's chronic conditions and co-morbidity symptoms are monitored and maintained or improved  Outcome: Progressing     Problem: Nutrition Deficit:  Goal: Optimize nutritional status  Outcome: Progressing

## 2023-11-15 NOTE — PROGRESS NOTES
bicarbonate 150 mEq in sodium chloride 0.45 % 1,000 mL infusion, , IntraVENous, Continuous, Ludy Valenzuela, Corinne William MD, Last Rate: 100 mL/hr at 11/15/23 0343, New Bag at 11/15/23 0343    pregabalin (LYRICA) capsule 75 mg, 75 mg, Oral, Nightly, Tip Avila MD, 75 mg at 11/14/23 2148    metoclopramide (REGLAN) injection 5 mg, 5 mg, IntraVENous, Q6H PRN, Ana Murphy MD  Outpatient Medications Marked as Taking for the 11/13/23 encounter Psychiatric Encounter)   Medication Sig Dispense Refill    atorvastatin (LIPITOR) 80 MG tablet Take 1 tablet by mouth daily      bumetanide (BUMEX) 1 MG tablet Take 1 tablet by mouth as needed      lipase-protease-amylase (CREON) 16933-66471 units delayed release capsule Take by mouth 3 times daily (with meals)      DULoxetine (CYMBALTA) 60 MG extended release capsule Take 1 capsule by mouth daily      dapagliflozin (FARXIGA) 10 MG tablet Take 1 tablet by mouth every morning      glucose (GLUTOSE) 40 % GEL Take 37.5 mLs by mouth See Admin Instructions      hydrocortisone 2.5 % cream Apply topically 2 times daily Apply topically 2 times daily. hydroxychloroquine (PLAQUENIL) 200 MG tablet Take 2 tablets by mouth daily      Hydrocerin (EUCERIN) CREA cream Apply topically 2 times daily      Insulin Disposable Pump (OMNIPOD 5 G6 INTRO, GEN 5,) KIT by Does not apply route      ondansetron (ZOFRAN-ODT) 8 MG TBDP disintegrating tablet Place 1 tablet under the tongue every 8 hours as needed for Nausea or Vomiting      pregabalin (LYRICA) 150 MG capsule Take 1 capsule by mouth 2 times daily. Max Daily Amount: 300 mg      rOPINIRole (REQUIP) 1 MG tablet Take 1 tablet by mouth 3 times daily      triamcinolone (KENALOG) 0.1 % cream Apply topically 2 times daily Apply topically 2 times daily.       Urea 40 % LOTN Apply topically      albuterol sulfate HFA (VENTOLIN HFA) 108 (90 Base) MCG/ACT inhaler Inhale 2 puffs into the lungs every 6 hours as needed for Wheezing         Allergies   Bee venom, 11/15/23  0247   WBC 12.3* 10.2   HGB 13.1 12.0   HCT 43.6 38.7   MCV 98.9 93.5    247       Recent Labs     11/13/23  2149 11/14/23  0415 11/14/23  0739 11/15/23  0247   * 139  --  147*   K 3.3* 2.6* 2.9* 3.6   CL 82* 101  --  108   CO2 10* 17*  --  27   GLUCOSE 85 173*  --  162*   MG 2.3 1.8  --   --    BUN 36* 44*  --  44*   CREATININE 3.0* 3.3*  --  2.4*   LABGLOM 18* 16*  --  24*         Assessment:  Acute kidney injury-prerenal azotemia versus ATN  Chronic kidney disease stage IIIb  Type 2 diabetes with renal disease  Hypernatremia  Hypokalemia  Metabolic acidosis  Nausea and vomiting      Plan:  Continue IV fluids. Switch to 1/2 NS. Supplement potassium. Continue symptomatic treatment for relief of nausea. Urine studies and renal ultrasound reviewed. Avoid hypotension and nephrotoxins. Follow up labs.

## 2023-11-16 LAB
ANION GAP SERPL CALCULATED.3IONS-SCNC: 9 MMOL/L (ref 7–19)
BASOPHILS # BLD: 0 K/UL (ref 0–0.2)
BASOPHILS NFR BLD: 0.3 % (ref 0–1)
BUN SERPL-MCNC: 29 MG/DL (ref 6–20)
CALCIUM SERPL-MCNC: 7.9 MG/DL (ref 8.6–10)
CHLORIDE SERPL-SCNC: 105 MMOL/L (ref 98–111)
CO2 SERPL-SCNC: 28 MMOL/L (ref 22–29)
CREAT SERPL-MCNC: 1.6 MG/DL (ref 0.5–0.9)
EOSINOPHIL # BLD: 0.1 K/UL (ref 0–0.6)
EOSINOPHIL NFR BLD: 1.1 % (ref 0–5)
ERYTHROCYTE [DISTWIDTH] IN BLOOD BY AUTOMATED COUNT: 14.9 % (ref 11.5–14.5)
GLUCOSE BLD-MCNC: 110 MG/DL (ref 70–99)
GLUCOSE BLD-MCNC: 112 MG/DL (ref 70–99)
GLUCOSE BLD-MCNC: 142 MG/DL (ref 70–99)
GLUCOSE BLD-MCNC: 79 MG/DL (ref 70–99)
GLUCOSE SERPL-MCNC: 104 MG/DL (ref 74–109)
HCT VFR BLD AUTO: 36.9 % (ref 37–47)
HGB BLD-MCNC: 11.5 G/DL (ref 12–16)
IMM GRANULOCYTES # BLD: 0 K/UL
LYMPHOCYTES # BLD: 3.8 K/UL (ref 1.1–4.5)
LYMPHOCYTES NFR BLD: 42.7 % (ref 20–40)
MCH RBC QN AUTO: 29.3 PG (ref 27–31)
MCHC RBC AUTO-ENTMCNC: 31.2 G/DL (ref 33–37)
MCV RBC AUTO: 94.1 FL (ref 81–99)
MONOCYTES # BLD: 0.5 K/UL (ref 0–0.9)
MONOCYTES NFR BLD: 6.1 % (ref 0–10)
NEUTROPHILS # BLD: 4.4 K/UL (ref 1.5–7.5)
NEUTS SEG NFR BLD: 49.6 % (ref 50–65)
PERFORMED ON: ABNORMAL
PERFORMED ON: NORMAL
PLATELET # BLD AUTO: 210 K/UL (ref 130–400)
PMV BLD AUTO: 11.2 FL (ref 9.4–12.3)
POTASSIUM SERPL-SCNC: 4.5 MMOL/L (ref 3.5–5)
RBC # BLD AUTO: 3.92 M/UL (ref 4.2–5.4)
SODIUM SERPL-SCNC: 142 MMOL/L (ref 136–145)
WBC # BLD AUTO: 8.8 K/UL (ref 4.8–10.8)

## 2023-11-16 PROCEDURE — 6370000000 HC RX 637 (ALT 250 FOR IP): Performed by: STUDENT IN AN ORGANIZED HEALTH CARE EDUCATION/TRAINING PROGRAM

## 2023-11-16 PROCEDURE — 94760 N-INVAS EAR/PLS OXIMETRY 1: CPT

## 2023-11-16 PROCEDURE — 82962 GLUCOSE BLOOD TEST: CPT

## 2023-11-16 PROCEDURE — 85025 COMPLETE CBC W/AUTO DIFF WBC: CPT

## 2023-11-16 PROCEDURE — 1210000000 HC MED SURG R&B

## 2023-11-16 PROCEDURE — 6370000000 HC RX 637 (ALT 250 FOR IP): Performed by: INTERNAL MEDICINE

## 2023-11-16 PROCEDURE — 80048 BASIC METABOLIC PNL TOTAL CA: CPT

## 2023-11-16 PROCEDURE — 6360000002 HC RX W HCPCS: Performed by: HOSPITALIST

## 2023-11-16 PROCEDURE — 2580000003 HC RX 258: Performed by: HOSPITALIST

## 2023-11-16 PROCEDURE — 36415 COLL VENOUS BLD VENIPUNCTURE: CPT

## 2023-11-16 PROCEDURE — 6370000000 HC RX 637 (ALT 250 FOR IP): Performed by: HOSPITALIST

## 2023-11-16 PROCEDURE — 2580000003 HC RX 258: Performed by: INTERNAL MEDICINE

## 2023-11-16 RX ORDER — METOCLOPRAMIDE 5 MG/1
5 TABLET ORAL EVERY 6 HOURS PRN
Status: DISCONTINUED | OUTPATIENT
Start: 2023-11-16 | End: 2023-11-17 | Stop reason: HOSPADM

## 2023-11-16 RX ADMIN — PANCRELIPASE LIPASE, PANCRELIPASE PROTEASE, PANCRELIPASE AMYLASE 5000 UNITS: 5000; 17000; 24000 CAPSULE, DELAYED RELEASE ORAL at 12:15

## 2023-11-16 RX ADMIN — PREGABALIN 75 MG: 75 CAPSULE ORAL at 21:11

## 2023-11-16 RX ADMIN — SODIUM CHLORIDE: 4.5 INJECTION, SOLUTION INTRAVENOUS at 00:39

## 2023-11-16 RX ADMIN — DULOXETINE HYDROCHLORIDE 60 MG: 30 CAPSULE, DELAYED RELEASE ORAL at 08:08

## 2023-11-16 RX ADMIN — CALCITRIOL CAPSULES 0.25 MCG 0.25 MCG: 0.25 CAPSULE ORAL at 08:08

## 2023-11-16 RX ADMIN — Medication: at 08:14

## 2023-11-16 RX ADMIN — CHOLESTYRAMINE 4 G: 4 POWDER, FOR SUSPENSION ORAL at 08:07

## 2023-11-16 RX ADMIN — HEPARIN SODIUM 5000 UNITS: 5000 INJECTION INTRAVENOUS; SUBCUTANEOUS at 08:08

## 2023-11-16 RX ADMIN — PANCRELIPASE LIPASE, PANCRELIPASE PROTEASE, PANCRELIPASE AMYLASE 20000 UNITS: 20000; 63000; 84000 CAPSULE, DELAYED RELEASE ORAL at 16:45

## 2023-11-16 RX ADMIN — Medication 400 MG: at 21:11

## 2023-11-16 RX ADMIN — CHOLESTYRAMINE 4 G: 4 POWDER, FOR SUSPENSION ORAL at 21:10

## 2023-11-16 RX ADMIN — PANTOPRAZOLE SODIUM 20 MG: 20 TABLET, DELAYED RELEASE ORAL at 06:24

## 2023-11-16 RX ADMIN — EMPAGLIFLOZIN 10 MG: 10 TABLET, FILM COATED ORAL at 08:08

## 2023-11-16 RX ADMIN — Medication 400 MG: at 08:08

## 2023-11-16 RX ADMIN — HEPARIN SODIUM 5000 UNITS: 5000 INJECTION INTRAVENOUS; SUBCUTANEOUS at 21:12

## 2023-11-16 RX ADMIN — PANCRELIPASE LIPASE, PANCRELIPASE PROTEASE, PANCRELIPASE AMYLASE 20000 UNITS: 20000; 63000; 84000 CAPSULE, DELAYED RELEASE ORAL at 12:15

## 2023-11-16 RX ADMIN — PANCRELIPASE LIPASE, PANCRELIPASE PROTEASE, PANCRELIPASE AMYLASE 5000 UNITS: 5000; 17000; 24000 CAPSULE, DELAYED RELEASE ORAL at 16:45

## 2023-11-16 RX ADMIN — Medication 5 MG: at 21:11

## 2023-11-16 RX ADMIN — ATORVASTATIN CALCIUM 80 MG: 80 TABLET, FILM COATED ORAL at 21:10

## 2023-11-16 RX ADMIN — SODIUM CHLORIDE, PRESERVATIVE FREE 10 ML: 5 INJECTION INTRAVENOUS at 21:11

## 2023-11-16 RX ADMIN — HYDROXYCHLOROQUINE SULFATE 200 MG: 200 TABLET ORAL at 08:08

## 2023-11-16 RX ADMIN — ROPINIROLE HYDROCHLORIDE 1 MG: 1 TABLET, FILM COATED ORAL at 14:16

## 2023-11-16 RX ADMIN — ROPINIROLE HYDROCHLORIDE 1 MG: 1 TABLET, FILM COATED ORAL at 21:11

## 2023-11-16 RX ADMIN — NORTRIPTYLINE HYDROCHLORIDE 50 MG: 50 CAPSULE ORAL at 21:10

## 2023-11-16 RX ADMIN — ROPINIROLE HYDROCHLORIDE 1 MG: 1 TABLET, FILM COATED ORAL at 08:08

## 2023-11-16 RX ADMIN — CALCIUM 500 MG: 500 TABLET ORAL at 08:08

## 2023-11-16 RX ADMIN — PANCRELIPASE LIPASE, PANCRELIPASE PROTEASE, PANCRELIPASE AMYLASE 5000 UNITS: 5000; 17000; 24000 CAPSULE, DELAYED RELEASE ORAL at 08:08

## 2023-11-16 RX ADMIN — PANCRELIPASE LIPASE, PANCRELIPASE PROTEASE, PANCRELIPASE AMYLASE 20000 UNITS: 20000; 63000; 84000 CAPSULE, DELAYED RELEASE ORAL at 08:08

## 2023-11-16 RX ADMIN — Medication: at 21:14

## 2023-11-16 RX ADMIN — SODIUM BICARBONATE 325 MG: 325 TABLET ORAL at 08:08

## 2023-11-16 NOTE — PROGRESS NOTES
Pharmacy Intravenous to Oral Protocol    Medication changed per [facility] IV to PO protocol: metoclopramide    Patient meets the following inclusion criteria and none of the exclusion criteria:    Inclusion criteria:  - IV therapy > 24 hours (antibiotics only)  - Tolerating diet more advanced than clear liquids  - Tolerating PO medications  - No vasopressor blood pressure support (ie no signs of shock)  - Patient hasn't had a seizure for 72 hrs (antiepileptic medications only)    Exclusion criteria:  - Infections requiring IV therapy (ie meningitis, endocarditis, osteomyelitis, pancreatitis)   - Nausea and/or vomiting or severe diarrhea within past 24 hours   - Has gastrectomy, ileus, gastric outlet or bowel obstruction, or malabsorption syndromes   - Has significant painful oral ulceration   - TPN with an NPO order   - Active GI bleed   - Unable to swallow   - NPO   - Febrile in the last 24 hours (antibiotics only)   - Clinical deteriorating or unstable (antibiotics only)   - Pediatric patients and patients who are not euthyroid (not on oral levothyroxine/not stabilized on oral levothyroxine)- Levothyroxine only     LALITO SALDAÑA, PHARM D, 11/16/2023, 11:12 AM

## 2023-11-16 NOTE — PLAN OF CARE
Problem: Discharge Planning  Goal: Discharge to home or other facility with appropriate resources  11/16/2023 1022 by Nicole Munroe RN  Outcome: Progressing  11/16/2023 0139 by Johann Fonseca RN  Outcome: Progressing  Flowsheets (Taken 11/15/2023 2130)  Discharge to home or other facility with appropriate resources: Identify barriers to discharge with patient and caregiver     Problem: Safety - Adult  Goal: Free from fall injury  11/16/2023 1022 by Nicole Munroe RN  Outcome: Progressing  11/16/2023 0139 by Johann Fonseca RN  Outcome: Progressing     Problem: ABCDS Injury Assessment  Goal: Absence of physical injury  11/16/2023 1022 by Nicole Munroe RN  Outcome: Progressing  11/16/2023 0139 by Johann Fonseca RN  Outcome: Progressing     Problem: Chronic Conditions and Co-morbidities  Goal: Patient's chronic conditions and co-morbidity symptoms are monitored and maintained or improved  11/16/2023 1022 by Nicole Munroe RN  Outcome: Progressing  11/16/2023 0139 by Johann Fonseca RN  Outcome: Progressing  Flowsheets (Taken 11/15/2023 2130)  Care Plan - Patient's Chronic Conditions and Co-Morbidity Symptoms are Monitored and Maintained or Improved: Monitor and assess patient's chronic conditions and comorbid symptoms for stability, deterioration, or improvement     Problem: Nutrition Deficit:  Goal: Optimize nutritional status  11/16/2023 1022 by Nicole Munroe RN  Outcome: Progressing  11/16/2023 0139 by Johann Fosneca RN  Outcome: Progressing

## 2023-11-16 NOTE — PLAN OF CARE
Problem: Discharge Planning  Goal: Discharge to home or other facility with appropriate resources  11/16/2023 0139 by Yo Ferguson RN  Outcome: Progressing  Flowsheets (Taken 11/15/2023 2130)  Discharge to home or other facility with appropriate resources: Identify barriers to discharge with patient and caregiver  11/15/2023 1541 by Guanakito Blackwell RN  Outcome: Progressing     Problem: Safety - Adult  Goal: Free from fall injury  11/16/2023 0139 by Yo Ferguson RN  Outcome: Progressing  11/15/2023 1541 by Guanakito Blackwell RN  Outcome: Progressing     Problem: ABCDS Injury Assessment  Goal: Absence of physical injury  11/16/2023 0139 by Yo Ferguson RN  Outcome: Progressing  11/15/2023 1541 by Guanakito Blackwell RN  Outcome: Progressing     Problem: Chronic Conditions and Co-morbidities  Goal: Patient's chronic conditions and co-morbidity symptoms are monitored and maintained or improved  11/16/2023 0139 by Yo Ferguson RN  Outcome: Progressing  Flowsheets (Taken 11/15/2023 2130)  Care Plan - Patient's Chronic Conditions and Co-Morbidity Symptoms are Monitored and Maintained or Improved: Monitor and assess patient's chronic conditions and comorbid symptoms for stability, deterioration, or improvement  11/15/2023 1541 by Guanakito Blackwell RN  Outcome: Progressing     Problem: Nutrition Deficit:  Goal: Optimize nutritional status  11/16/2023 0139 by Yo Ferguson RN  Outcome: Progressing  11/15/2023 1541 by Guanakito Blackwell RN  Outcome: Progressing

## 2023-11-16 NOTE — PROGRESS NOTES
topically 2 times daily. Urea 40 % LOTN Apply topically      albuterol sulfate HFA (VENTOLIN HFA) 108 (90 Base) MCG/ACT inhaler Inhale 2 puffs into the lungs every 6 hours as needed for Wheezing         Allergies   Bee venom, Pineapple, Sitagliptin, Motrin [ibuprofen], and Hydrocodone    Family History       Family History   Adopted: Yes   Problem Relation Age of Onset    Asthma Daughter     Asthma Son      Family history negative for kidney disease.      Social History      Social History     Socioeconomic History    Marital status:      Spouse name: Mr. Yobany Pastor    Number of children: 2    Years of education: None    Highest education level: None   Occupational History    Occupation:    Tobacco Use    Smoking status: Every Day     Packs/day: 0.50     Years: 31.00     Additional pack years: 0.00     Total pack years: 15.50     Types: Cigarettes     Start date: 13    Smokeless tobacco: Never    Tobacco comments:     Started at age 25   Vaping Use    Vaping Use: Never used   Substance and Sexual Activity    Alcohol use: Never    Drug use: Never     Comment: tried MJ twice in college and it made her sick both times    Sexual activity: Defer     Comment: has a son and a daughter   Social History Narrative    CODE STATUS: Full Code    HEALTH CARE PROXY: her , Mr. Yobany Pastor, [phone]    AMBULATES: independently    DOMICILED: has 3 steps to enter home, has no stairs inside, lives with her  and their daughter and their grand babies (the daughter's twins), has 2 cats inside     Social Determinants of Health     Financial Resource Strain: High Risk (11/15/2023)    Overall Financial Resource Strain (CARDIA)     Difficulty of Paying Living Expenses: Hard   Food Insecurity: Food Insecurity Present (11/15/2023)    Hunger Vital Sign     Worried About Running Out of Food in the Last Year: Often true   Transportation Needs: No Transportation Needs (11/15/2023)    Transportation and vomiting      Plan:  May hold IV fluids. Supplement potassium. Continue symptomatic treatment for nausea. Urine studies and renal ultrasound reviewed. Avoid hypotension and nephrotoxins. Follow up labs. If discharged, follow-up with the renal clinic within 2 weeks.

## 2023-11-16 NOTE — PROGRESS NOTES
Pulses: Normal pulses. Heart sounds: Normal heart sounds. Pulmonary:      Effort: Pulmonary effort is normal. No respiratory distress. Breath sounds: Normal breath sounds. Abdominal:      General: Bowel sounds are normal. There is no distension. Palpations: Abdomen is soft. Tenderness: There is no abdominal tenderness.              Lab Review   Recent Results (from the past 24 hour(s))   Basic Metabolic Panel w/ Reflex to MG    Collection Time: 11/15/23  1:46 PM   Result Value Ref Range    Sodium 148 (H) 136 - 145 mmol/L    Potassium reflex Magnesium 4.0 3.5 - 5.0 mmol/L    Chloride 106 98 - 111 mmol/L    CO2 34 (H) 22 - 29 mmol/L    Anion Gap 8 7 - 19 mmol/L    Glucose 84 74 - 109 mg/dL    BUN 34 (H) 6 - 20 mg/dL    Creatinine 1.8 (H) 0.5 - 0.9 mg/dL    Est, Glom Filt Rate 33 (A) >60    Calcium 8.4 (L) 8.6 - 10.0 mg/dL   POCT Glucose    Collection Time: 11/15/23  4:13 PM   Result Value Ref Range    POC Glucose 107 (H) 70 - 99 mg/dl    Performed on AccuChek    POCT Glucose    Collection Time: 11/15/23  8:03 PM   Result Value Ref Range    POC Glucose 106 (H) 70 - 99 mg/dl    Performed on [employer]k    CBC with Auto Differential    Collection Time: 11/16/23  3:18 AM   Result Value Ref Range    WBC 8.8 4.8 - 10.8 K/uL    RBC 3.92 (L) 4.20 - 5.40 M/uL    Hemoglobin 11.5 (L) 12.0 - 16.0 g/dL    Hematocrit 36.9 (L) 37.0 - 47.0 %    MCV 94.1 81.0 - 99.0 fL    MCH 29.3 27.0 - 31.0 pg    MCHC 31.2 (L) 33.0 - 37.0 g/dL    RDW 14.9 (H) 11.5 - 14.5 %    Platelets 274 520 - 877 K/uL    MPV 11.2 9.4 - 12.3 fL    Neutrophils % 49.6 (L) 50.0 - 65.0 %    Lymphocytes % 42.7 (H) 20.0 - 40.0 %    Monocytes % 6.1 0.0 - 10.0 %    Eosinophils % 1.1 0.0 - 5.0 %    Basophils % 0.3 0.0 - 1.0 %    Neutrophils Absolute 4.4 1.5 - 7.5 K/uL    Immature Granulocytes # 0.0 K/uL    Lymphocytes Absolute 3.8 1.1 - 4.5 K/uL    Monocytes Absolute 0.50 0.00 - 0.90 K/uL    Eosinophils Absolute 0.10 0.00 - 0.60 K/uL    Basophils (LYRICA) capsule 75 mg, 75 mg, Oral, Nightly, Amairani Oneal MD, 75 mg at 11/15/23 2124    metoclopramide (REGLAN) injection 5 mg, 5 mg, IntraVENous, Q6H PRN, Anita Bermudez MD, 5 mg at 11/15/23 1737      Imaging:  US RENAL COMPLETE    Result Date: 11/14/2023  Reference measurements above. Unremarkable exam.   ______________________________________ Electronically signed by: Ariane Alexander M.D. Date:     11/14/2023 Time:    09:31     CT ABDOMEN PELVIS WO CONTRAST Additional Contrast? Oral    Result Date: 11/13/2023   Fluid consisting stool throughout the colon suggest diarrhea. No bowel obstruction. All CT scans are performed using dose optimization techniques as appropriate to the performed exam and include at least one of the following: Automated exposure control, adjustment of the mA and/or kV according to size, and the use of iterative reconstruction technique. ______________________________________ Electronically signed by: Valentine Baptiste M.D. Date:     11/13/2023 Time:    23:26          Assessment/Plan  Principal Problem:    Acute kidney injury superimposed on chronic kidney disease (HCC)  Active Problems:    Hypokalemia    Stage 3b chronic kidney disease (CKD) (HCC)    Diabetic neuropathy (HCC)    Gastroparesis  Resolved Problems:    * No resolved hospital problems.  *       ABHISHEK on CKD  -- Cr 3.3 > 2.6  -- Baseline unclear  -- Presumably prerenal from poor intake, n/v  -- Renal US unremarkable  -- Nephrology following  -- Continue IVF    Recurrent nausea, vomiting  -- CT abdomen without acute findings  -- Gastric emptying study negative  -- Continue PRN Reglan  -- Outpatient GI follow up    Chronic, recurrent diarrhea  -- May be 2/2 bile malabsorption  -- Continue cholestyramine    Dyslipidemia continue on statin as home meds    Diabetes mellitus with neuropathy - continue SSI and insulin, on empagliflozin    Fibromyalgia on nortriptyline    Diabetic neuropathy on pregabalin    GERD    Rheumatoid arthritis of

## 2023-11-17 VITALS
OXYGEN SATURATION: 96 % | BODY MASS INDEX: 20.07 KG/M2 | RESPIRATION RATE: 16 BRPM | TEMPERATURE: 98.2 F | HEART RATE: 95 BPM | DIASTOLIC BLOOD PRESSURE: 76 MMHG | SYSTOLIC BLOOD PRESSURE: 121 MMHG | WEIGHT: 102.2 LBS | HEIGHT: 60 IN

## 2023-11-17 LAB
ANION GAP SERPL CALCULATED.3IONS-SCNC: 10 MMOL/L (ref 7–19)
BASOPHILS # BLD: 0 K/UL (ref 0–0.2)
BASOPHILS NFR BLD: 0.2 % (ref 0–1)
BUN SERPL-MCNC: 27 MG/DL (ref 6–20)
CALCIUM SERPL-MCNC: 8.9 MG/DL (ref 8.6–10)
CHLORIDE SERPL-SCNC: 104 MMOL/L (ref 98–111)
CO2 SERPL-SCNC: 25 MMOL/L (ref 22–29)
CREAT SERPL-MCNC: 1.8 MG/DL (ref 0.5–0.9)
EOSINOPHIL # BLD: 0 K/UL (ref 0–0.6)
EOSINOPHIL NFR BLD: 0 % (ref 0–5)
ERYTHROCYTE [DISTWIDTH] IN BLOOD BY AUTOMATED COUNT: 14.6 % (ref 11.5–14.5)
GLUCOSE BLD-MCNC: 149 MG/DL (ref 70–99)
GLUCOSE SERPL-MCNC: 144 MG/DL (ref 74–109)
HCT VFR BLD AUTO: 37 % (ref 37–47)
HGB BLD-MCNC: 11.8 G/DL (ref 12–16)
IMM GRANULOCYTES # BLD: 0 K/UL
LYMPHOCYTES # BLD: 3.7 K/UL (ref 1.1–4.5)
LYMPHOCYTES NFR BLD: 37.1 % (ref 20–40)
MCH RBC QN AUTO: 29.7 PG (ref 27–31)
MCHC RBC AUTO-ENTMCNC: 31.9 G/DL (ref 33–37)
MCV RBC AUTO: 93.2 FL (ref 81–99)
MONOCYTES # BLD: 0.6 K/UL (ref 0–0.9)
MONOCYTES NFR BLD: 6.2 % (ref 0–10)
NEUTROPHILS # BLD: 5.6 K/UL (ref 1.5–7.5)
NEUTS SEG NFR BLD: 56.2 % (ref 50–65)
PERFORMED ON: ABNORMAL
PLATELET # BLD AUTO: 208 K/UL (ref 130–400)
PMV BLD AUTO: 11.5 FL (ref 9.4–12.3)
POTASSIUM SERPL-SCNC: 4.9 MMOL/L (ref 3.5–5)
RBC # BLD AUTO: 3.97 M/UL (ref 4.2–5.4)
SODIUM SERPL-SCNC: 139 MMOL/L (ref 136–145)
WBC # BLD AUTO: 9.9 K/UL (ref 4.8–10.8)

## 2023-11-17 PROCEDURE — 80048 BASIC METABOLIC PNL TOTAL CA: CPT

## 2023-11-17 PROCEDURE — 82962 GLUCOSE BLOOD TEST: CPT

## 2023-11-17 PROCEDURE — 6370000000 HC RX 637 (ALT 250 FOR IP): Performed by: STUDENT IN AN ORGANIZED HEALTH CARE EDUCATION/TRAINING PROGRAM

## 2023-11-17 PROCEDURE — 2580000003 HC RX 258: Performed by: HOSPITALIST

## 2023-11-17 PROCEDURE — 94760 N-INVAS EAR/PLS OXIMETRY 1: CPT

## 2023-11-17 PROCEDURE — 6370000000 HC RX 637 (ALT 250 FOR IP): Performed by: INTERNAL MEDICINE

## 2023-11-17 PROCEDURE — 36415 COLL VENOUS BLD VENIPUNCTURE: CPT

## 2023-11-17 PROCEDURE — 85025 COMPLETE CBC W/AUTO DIFF WBC: CPT

## 2023-11-17 PROCEDURE — 6370000000 HC RX 637 (ALT 250 FOR IP): Performed by: HOSPITALIST

## 2023-11-17 RX ORDER — CHOLESTYRAMINE LIGHT 4 G/5.7G
4 POWDER, FOR SUSPENSION ORAL 2 TIMES DAILY
Qty: 60 PACKET | Refills: 3 | Status: SHIPPED | OUTPATIENT
Start: 2023-11-17

## 2023-11-17 RX ORDER — METOCLOPRAMIDE 5 MG/1
5 TABLET ORAL EVERY 6 HOURS PRN
Qty: 120 TABLET | Refills: 1 | Status: SHIPPED | OUTPATIENT
Start: 2023-11-17

## 2023-11-17 RX ORDER — CALCITRIOL 0.25 UG/1
0.25 CAPSULE, LIQUID FILLED ORAL DAILY
Qty: 30 CAPSULE | Refills: 3 | Status: SHIPPED | OUTPATIENT
Start: 2023-11-18

## 2023-11-17 RX ADMIN — CALCITRIOL CAPSULES 0.25 MCG 0.25 MCG: 0.25 CAPSULE ORAL at 08:16

## 2023-11-17 RX ADMIN — DULOXETINE HYDROCHLORIDE 60 MG: 30 CAPSULE, DELAYED RELEASE ORAL at 08:16

## 2023-11-17 RX ADMIN — SODIUM CHLORIDE, PRESERVATIVE FREE 10 ML: 5 INJECTION INTRAVENOUS at 08:19

## 2023-11-17 RX ADMIN — EMPAGLIFLOZIN 10 MG: 10 TABLET, FILM COATED ORAL at 08:16

## 2023-11-17 RX ADMIN — CHOLESTYRAMINE 4 G: 4 POWDER, FOR SUSPENSION ORAL at 08:14

## 2023-11-17 RX ADMIN — ROPINIROLE HYDROCHLORIDE 1 MG: 1 TABLET, FILM COATED ORAL at 08:16

## 2023-11-17 RX ADMIN — CALCIUM 500 MG: 500 TABLET ORAL at 08:16

## 2023-11-17 RX ADMIN — Medication 400 MG: at 08:16

## 2023-11-17 RX ADMIN — HYDROXYCHLOROQUINE SULFATE 200 MG: 200 TABLET ORAL at 08:16

## 2023-11-17 RX ADMIN — PANCRELIPASE LIPASE, PANCRELIPASE PROTEASE, PANCRELIPASE AMYLASE 20000 UNITS: 20000; 63000; 84000 CAPSULE, DELAYED RELEASE ORAL at 08:16

## 2023-11-17 RX ADMIN — PANTOPRAZOLE SODIUM 20 MG: 20 TABLET, DELAYED RELEASE ORAL at 06:48

## 2023-11-17 RX ADMIN — Medication: at 08:18

## 2023-11-17 RX ADMIN — PANCRELIPASE LIPASE, PANCRELIPASE PROTEASE, PANCRELIPASE AMYLASE 5000 UNITS: 5000; 17000; 24000 CAPSULE, DELAYED RELEASE ORAL at 08:16

## 2023-11-17 NOTE — DISCHARGE SUMMARY
hours. Impressions of imaging performed in 48 hours before discharge:    NM GASTRIC EMPTYING    Result Date: 11/15/2023  EXAM:  GASTRIC EMPTYING STUDY  HISTORY: Suspected gastroparesis. COMPARISON: None of this type. PROCEDURE: The patient was administered 2.2 mCi of 99m technetium SC mixed with oatmeal   as a solid phase meal.  Imaging of the stomach was performed an approximately 9-minute intervals for 90 minutes in anterior and posterior projections simultaneously. Subsequently time activity curves were calculated using anterior, posterior and geometric mean data. The gastric emptying half-time was determined. FINDINGS: The examination demonstrates a normal appearance of activity within the stomach. Sequential images demonstrate transit of activity from the stomach into the small bowel. The examination demonstrates approximately 57% emptying at 1 hour and 71% emptying at 90 minutes. The gastric emptying half-time is 58.99 minutes (linear fit) and 47.62 minutes (raw data). 1.The gastric emptying half-time is between 47.62 minutes and 58.99 minutes (normal). 2.Images of the activity in the stomach and small bowel are within normal limits. ______________________________________ Electronically signed by: Margie Emmanuel M.D. Date:     11/15/2023 Time:    12:46     US RENAL COMPLETE    Result Date: 11/14/2023  EXAM: ULTRASOUND RENAL COMPLETE 11/14/2023  HISTORY: Acute kidney injury  COMPARISON: 09/07/2021, 11/13/2023  FINDINGS: The right kidney measures 8.7 x 3.3 x 3.0 cm. Cortex 1.4 cm. No hydronephrosis. The left kidney measures 8.4 x 4.5 x 3.7 cm. Cortex 1.3 cm. No hydronephrosis. Normal color Doppler flow within both kidneys. Unremarkable urinary bladder. Reference measurements above. Unremarkable exam.   ______________________________________ Electronically signed by: Tyree Roberts M.D.  Date:     11/14/2023 Time:    09:31     CT ABDOMEN PELVIS WO CONTRAST Additional Contrast? from medical standpoint to be discharged. Total time spent during patient evaluation and assessment, discussion with the nurse/family, addressing discharge medications/scripts and coordination of care for safe discharge was 35 minutes.       Signed Electronically:    Artie Mello MD  9:48 AM 11/17/2023

## 2023-11-17 NOTE — PLAN OF CARE
Problem: Discharge Planning  Goal: Discharge to home or other facility with appropriate resources  11/17/2023 1038 by Min Brasher RN  Outcome: Completed  Flowsheets (Taken 11/17/2023 0010 by Maria Eugenia Cruz RN)  Discharge to home or other facility with appropriate resources:   Identify barriers to discharge with patient and caregiver   Arrange for needed discharge resources and transportation as appropriate   Identify discharge learning needs (meds, wound care, etc)   Refer to discharge planning if patient needs post-hospital services based on physician order or complex needs related to functional status, cognitive ability or social support system   Arrange for interpreters to assist at discharge as needed  11/17/2023 0002 by Maria Eugenia Cruz RN  Outcome: Progressing     Problem: Safety - Adult  Goal: Free from fall injury  11/17/2023 1038 by Min Brasher RN  Outcome: Completed  11/17/2023 0002 by Maria Eugenia Cruz RN  Outcome: Progressing     Problem: ABCDS Injury Assessment  Goal: Absence of physical injury  11/17/2023 1038 by Min Brasher RN  Outcome: Completed  11/17/2023 0002 by Maria Eugenia Cruz RN  Outcome: Progressing     Problem: Chronic Conditions and Co-morbidities  Goal: Patient's chronic conditions and co-morbidity symptoms are monitored and maintained or improved  11/17/2023 1038 by Min Brasher RN  Outcome: Completed  Flowsheets (Taken 11/17/2023 0010 by Maria Eugenia Cruz RN)  Care Plan - Patient's Chronic Conditions and Co-Morbidity Symptoms are Monitored and Maintained or Improved:   Monitor and assess patient's chronic conditions and comorbid symptoms for stability, deterioration, or improvement   Collaborate with multidisciplinary team to address chronic and comorbid conditions and prevent exacerbation or deterioration   Update acute care plan with appropriate goals if chronic or comorbid symptoms are exacerbated and prevent overall improvement and discharge  11/17/2023 0002 by Johnathan Negron (Taken 11/17/2023 0010 by Maria Eugenia Cruz RN)  Remains free of injury related to seizure activity:   Maintain airway, patient safety  and administer oxygen as ordered   Monitor patient for seizure activity, document and report duration and description of seizure to Licensed Independent Practitioner   If seizure occurs, turn patient to side and suction secretions as needed   Reorient patient post seizure   Seizure pads on all 4 side rails   Instruct patient/family to notify RN of any seizure activity   Instruct patient/family to call for assistance with activity based on assessment  11/17/2023 0002 by Maria Eugenia Cruz RN  Outcome: Progressing  Goal: Achieves maximal functionality and self care  11/17/2023 1038 by Min Brasher RN  Outcome: Completed  Flowsheets (Taken 11/17/2023 0010 by Maria Eugenia Cruz RN)  Achieves maximal functionality and self care:   Monitor swallowing and airway patency with patient fatigue and changes in neurological status   Encourage and assist patient to increase activity and self care with guidance from physical therapy/occupational therapy   Encourage visually impaired, hearing impaired and aphasic patients to use assistive/communication devices  11/17/2023 0002 by Maria Eugenia Cruz RN  Outcome: Progressing     Problem: Respiratory - Adult  Goal: Achieves optimal ventilation and oxygenation  11/17/2023 1038 by Min Brasher RN  Outcome: Completed  Flowsheets (Taken 11/17/2023 0010 by Maria Eugenia Cruz RN)  Achieves optimal ventilation and oxygenation:   Assess for changes in mentation and behavior   Assess for changes in respiratory status   Position to facilitate oxygenation and minimize respiratory effort   Initiate smoking cessation protocol as indicated   Assess and instruct to report shortness of breath or any respiratory difficulty   Respiratory therapy support as indicated   Assess the need for suctioning and aspirate as needed   Encourage broncho-pulmonary hygiene including cough, deep

## 2023-11-17 NOTE — PROGRESS NOTES
Physician Progress Note      Yolanda Robison  CSN #:                  [id number]  :                       1970  ADMIT DATE:       2023 7:21 PM  [address] [facility] DATE:        2023 12:37 PM  RESPONDING  PROVIDER #:        Steve Palmer MD          QUERY TEXT:    Patient admitted with ABHISHEK. Documentation reflects gastroparesis in IM progress   notes last dated . If possible, please document in the progress notes   and discharge summary if gastroparesis was: The medical record reflects the following:  Risk Factors: DM 2  Clinical Indicators: Nausea and vomiting. Nuclear Med Gastric Emptying Study   , 1. The gastric emptying half-time is between 47.62 minutes and 58.99   minutes (normal). 2.Images of the activity in the stomach and small bowel are   within normal limits. Treatment: Nuclear Med Gastric Emptying Study. Options provided:  -- Gastroparesis confirmed after study  -- Gastroparesis ruled out after study  -- Other - I will add my own diagnosis  -- Disagree - Not applicable / Not valid  -- Disagree - Clinically unable to determine / Unknown  -- Refer to Clinical Documentation Reviewer    PROVIDER RESPONSE TEXT:    Gastroparesis ruled out after study.     Query created by: Cristela Severs on 2023 1:10 PM      Electronically signed by:  Steve Palmer MD 2023 5:35 PM

## 2023-11-17 NOTE — PLAN OF CARE
Problem: Discharge Planning  Goal: Discharge to home or other facility with appropriate resources  11/17/2023 0002 by Robyn Reynoso RN  Outcome: Progressing  11/16/2023 1022 by Aba Sebastian RN  Outcome: Progressing     Problem: Safety - Adult  Goal: Free from fall injury  11/17/2023 0002 by Robyn Reynoso RN  Outcome: Progressing  11/16/2023 1022 by Aba Sebastian RN  Outcome: Progressing     Problem: ABCDS Injury Assessment  Goal: Absence of physical injury  11/17/2023 0002 by Robyn Reynoso RN  Outcome: Progressing  11/16/2023 1022 by Aba Sebastian RN  Outcome: Progressing     Problem: Chronic Conditions and Co-morbidities  Goal: Patient's chronic conditions and co-morbidity symptoms are monitored and maintained or improved  11/17/2023 0002 by Robyn Reynoso RN  Outcome: Progressing  11/16/2023 1022 by Aba Sebastian RN  Outcome: Progressing     Problem: Nutrition Deficit:  Goal: Optimize nutritional status  11/17/2023 0002 by Robyn Reynoso RN  Outcome: Progressing  11/16/2023 1022 by Aba Sebastian RN  Outcome: Progressing     Problem: Neurosensory - Adult  Goal: Achieves stable or improved neurological status  Outcome: Progressing  Goal: Absence of seizures  Outcome: Progressing  Goal: Remains free of injury related to seizures activity  Outcome: Progressing  Goal: Achieves maximal functionality and self care  Outcome: Progressing     Problem: Respiratory - Adult  Goal: Achieves optimal ventilation and oxygenation  Outcome: Progressing     Problem: Cardiovascular - Adult  Goal: Maintains optimal cardiac output and hemodynamic stability  Outcome: Progressing  Goal: Absence of cardiac dysrhythmias or at baseline  Outcome: Progressing     Problem: Skin/Tissue Integrity - Adult  Goal: Skin integrity remains intact  Outcome: Progressing  Goal: Incisions, wounds, or drain sites healing without S/S of infection  Outcome: Progressing  Goal: Oral mucous membranes remain intact  Outcome: Progressing Problem: Musculoskeletal - Adult  Goal: Return mobility to safest level of function  Outcome: Progressing  Goal: Maintain proper alignment of affected body part  Outcome: Progressing  Goal: Return ADL status to a safe level of function  Outcome: Progressing     Problem: Gastrointestinal - Adult  Goal: Minimal or absence of nausea and vomiting  Outcome: Progressing  Goal: Maintains or returns to baseline bowel function  Outcome: Progressing  Goal: Maintains adequate nutritional intake  Outcome: Progressing  Goal: Establish and maintain optimal ostomy function  Outcome: Progressing     Problem: Genitourinary - Adult  Goal: Absence of urinary retention  Outcome: Progressing  Goal: Urinary catheter remains patent  Outcome: Progressing     Problem: Infection - Adult  Goal: Absence of infection at discharge  Outcome: Progressing  Goal: Absence of infection during hospitalization  Outcome: Progressing  Goal: Absence of fever/infection during anticipated neutropenic period  Outcome: Progressing     Problem: Metabolic/Fluid and Electrolytes - Adult  Goal: Electrolytes maintained within normal limits  Outcome: Progressing  Goal: Hemodynamic stability and optimal renal function maintained  Outcome: Progressing  Goal: Glucose maintained within prescribed range  Outcome: Progressing     Problem: Hematologic - Adult  Goal: Maintains hematologic stability  Outcome: Progressing

## 2023-11-17 NOTE — PROGRESS NOTES
Comprehensive Nutrition Assessment    Type and Reason for Visit:  Reassess    Nutrition Recommendations/Plan:   Continue current POC     Malnutrition Assessment:  Malnutrition Status: At risk for malnutrition (Comment) (11/17/23 0842)    Context:  Acute Illness     Findings of the 6 clinical characteristics of malnutrition:  Energy Intake:  Mild decrease in energy intake (Comment)  Weight Loss:  Greater than 7.5% over 3 months     Body Fat Loss:  No significant body fat loss     Muscle Mass Loss:  No significant muscle mass loss    Fluid Accumulation:  No significant fluid accumulation Extremities   Strength:  Not Performed    Nutrition Assessment:    Pt's diet modified slightly for more available food choices for meals. PO intake ranging %. No diarrhea noted at this time. Hoping to go home  New wt  not available. Nutrition Related Findings:    K+ 4.9 Wound Type: None       Current Nutrition Intake & Therapies:    Average Meal Intake: 51-75%, %  Average Supplements Intake: None Ordered  ADULT DIET; Regular; 4 carb choices (60 gm/meal); No Added Salt (3-4 gm); Low Potassium (Less than 3000 mg/day)    Anthropometric Measures:  Height: 152.4 cm (5')  Ideal Body Weight (IBW): 100 lbs (45 kg)    Admission Body Weight: 45.4 kg (100 lb) (stated)  Current Body Weight: 46.4 kg (102 lb 4.7 oz), 102.3 % IBW. Weight Source: Bed Scale  Current BMI (kg/m2): 20  Usual Body Weight: 53.7 kg (118 lb 6.2 oz) (8/28/2023)  % Weight Change (Calculated): -13.6  Weight Adjustment For: No Adjustment  BMI Categories: Normal Weight (BMI 18.5-24. 9)    Estimated Daily Nutrient Needs:  Energy Requirements Based On: Kcal/kg  Weight Used for Energy Requirements: Current  Energy (kcal/day): 4658-2860 kcals (25-30 kcals/kg)  Weight Used for Protein Requirements: Current  Protein (g/day): 46-93g  Method Used for Fluid Requirements: 1 ml/kcal  Fluid (ml/day): 6069-9375 ml    Nutrition Diagnosis:   Unintended weight loss related

## 2023-11-17 NOTE — PLAN OF CARE
Problem: Nutrition Deficit:  Goal: Optimize nutritional status  11/17/2023 0902 by Olga Gray MS, RD, LD  Outcome: Progressing  Flowsheets (Taken 11/17/2023 1894)  Nutrient intake appropriate for improving, restoring, or maintaining nutritional needs:   Assess nutritional status and recommend course of action   Monitor oral intake, labs, and treatment plans   Recommend appropriate diets, oral nutritional supplements, and vitamin/mineral supplements  11/17/2023 0002 by Raheel Auguste RN  Outcome: Progressing

## 2023-11-19 ENCOUNTER — HOSPITAL ENCOUNTER (OUTPATIENT)
Facility: HOSPITAL | Age: 53
Setting detail: OBSERVATION
Discharge: HOME | End: 2023-11-22
Attending: INTERNAL MEDICINE | Admitting: FAMILY MEDICINE
Payer: COMMERCIAL

## 2023-11-19 DIAGNOSIS — N17.9 ACUTE RENAL FAILURE, UNSPECIFIED ACUTE RENAL FAILURE TYPE: Primary | ICD-10-CM

## 2023-11-19 LAB
ALBUMIN SERPL-MCNC: 4.3 G/DL (ref 3.5–5.2)
ALBUMIN/GLOB SERPL: 1.2 G/DL
ALP SERPL-CCNC: 133 U/L (ref 39–117)
ALT SERPL W P-5'-P-CCNC: 22 U/L (ref 1–33)
ANION GAP SERPL CALCULATED.3IONS-SCNC: 19 MMOL/L (ref 5–15)
AST SERPL-CCNC: 27 U/L (ref 1–32)
BACTERIA UR QL AUTO: ABNORMAL /HPF
BASOPHILS # BLD AUTO: 0.04 10*3/MM3 (ref 0–0.2)
BASOPHILS NFR BLD AUTO: 0.3 % (ref 0–1.5)
BILIRUB SERPL-MCNC: 0.2 MG/DL (ref 0–1.2)
BILIRUB UR QL STRIP: ABNORMAL
BUN SERPL-MCNC: 49 MG/DL (ref 6–20)
BUN/CREAT SERPL: 13.6 (ref 7–25)
CALCIUM SPEC-SCNC: 9.8 MG/DL (ref 8.6–10.5)
CHLORIDE SERPL-SCNC: 101 MMOL/L (ref 98–107)
CLARITY UR: ABNORMAL
CO2 SERPL-SCNC: 18 MMOL/L (ref 22–29)
COD CRY URNS QL: ABNORMAL /HPF
COLOR UR: ABNORMAL
CREAT SERPL-MCNC: 3.61 MG/DL (ref 0.57–1)
DEPRECATED RDW RBC AUTO: 51.8 FL (ref 37–54)
EGFRCR SERPLBLD CKD-EPI 2021: 14.6 ML/MIN/1.73
EOSINOPHIL # BLD AUTO: 0 10*3/MM3 (ref 0–0.4)
EOSINOPHIL NFR BLD AUTO: 0 % (ref 0.3–6.2)
ERYTHROCYTE [DISTWIDTH] IN BLOOD BY AUTOMATED COUNT: 15.4 % (ref 12.3–15.4)
GLOBULIN UR ELPH-MCNC: 3.7 GM/DL
GLUCOSE SERPL-MCNC: 79 MG/DL (ref 65–99)
GLUCOSE UR STRIP-MCNC: ABNORMAL MG/DL
HCT VFR BLD AUTO: 46.6 % (ref 34–46.6)
HGB BLD-MCNC: 14.6 G/DL (ref 12–15.9)
HGB UR QL STRIP.AUTO: NEGATIVE
HYALINE CASTS UR QL AUTO: ABNORMAL /LPF
IMM GRANULOCYTES # BLD AUTO: 0.05 10*3/MM3 (ref 0–0.05)
IMM GRANULOCYTES NFR BLD AUTO: 0.4 % (ref 0–0.5)
KETONES UR QL STRIP: ABNORMAL
LEUKOCYTE ESTERASE UR QL STRIP.AUTO: NEGATIVE
LIPASE SERPL-CCNC: 51 U/L (ref 13–60)
LYMPHOCYTES # BLD AUTO: 3.55 10*3/MM3 (ref 0.7–3.1)
LYMPHOCYTES NFR BLD AUTO: 26.9 % (ref 19.6–45.3)
MCH RBC QN AUTO: 28.9 PG (ref 26.6–33)
MCHC RBC AUTO-ENTMCNC: 31.3 G/DL (ref 31.5–35.7)
MCV RBC AUTO: 92.1 FL (ref 79–97)
MONOCYTES # BLD AUTO: 0.66 10*3/MM3 (ref 0.1–0.9)
MONOCYTES NFR BLD AUTO: 5 % (ref 5–12)
NEUTROPHILS NFR BLD AUTO: 67.4 % (ref 42.7–76)
NEUTROPHILS NFR BLD AUTO: 8.88 10*3/MM3 (ref 1.7–7)
NITRITE UR QL STRIP: NEGATIVE
NRBC BLD AUTO-RTO: 0 /100 WBC (ref 0–0.2)
PH UR STRIP.AUTO: <=5 [PH] (ref 5–8)
PLATELET # BLD AUTO: 232 10*3/MM3 (ref 140–450)
PMV BLD AUTO: 11 FL (ref 6–12)
POTASSIUM SERPL-SCNC: 3.8 MMOL/L (ref 3.5–5.2)
PROT SERPL-MCNC: 8 G/DL (ref 6–8.5)
PROT UR QL STRIP: ABNORMAL
RBC # BLD AUTO: 5.06 10*6/MM3 (ref 3.77–5.28)
RBC # UR STRIP: ABNORMAL /HPF
REF LAB TEST METHOD: ABNORMAL
SODIUM SERPL-SCNC: 138 MMOL/L (ref 136–145)
SP GR UR STRIP: 1.03 (ref 1–1.03)
SQUAMOUS #/AREA URNS HPF: ABNORMAL /HPF
UROBILINOGEN UR QL STRIP: ABNORMAL
WBC # UR STRIP: ABNORMAL /HPF
WBC NRBC COR # BLD AUTO: 13.18 10*3/MM3 (ref 3.4–10.8)

## 2023-11-19 PROCEDURE — P9612 CATHETERIZE FOR URINE SPEC: HCPCS

## 2023-11-19 PROCEDURE — G0378 HOSPITAL OBSERVATION PER HR: HCPCS

## 2023-11-19 PROCEDURE — 81001 URINALYSIS AUTO W/SCOPE: CPT | Performed by: INTERNAL MEDICINE

## 2023-11-19 PROCEDURE — 25810000003 SODIUM CHLORIDE 0.9 % SOLUTION: Performed by: HOSPITALIST

## 2023-11-19 PROCEDURE — 83690 ASSAY OF LIPASE: CPT | Performed by: INTERNAL MEDICINE

## 2023-11-19 PROCEDURE — 36415 COLL VENOUS BLD VENIPUNCTURE: CPT

## 2023-11-19 PROCEDURE — 99284 EMERGENCY DEPT VISIT MOD MDM: CPT

## 2023-11-19 PROCEDURE — 51798 US URINE CAPACITY MEASURE: CPT

## 2023-11-19 PROCEDURE — 25810000003 LACTATED RINGERS SOLUTION: Performed by: INTERNAL MEDICINE

## 2023-11-19 PROCEDURE — 85025 COMPLETE CBC W/AUTO DIFF WBC: CPT | Performed by: INTERNAL MEDICINE

## 2023-11-19 PROCEDURE — 80053 COMPREHEN METABOLIC PANEL: CPT | Performed by: INTERNAL MEDICINE

## 2023-11-19 RX ORDER — NICOTINE POLACRILEX 4 MG
15 LOZENGE BUCCAL
Status: DISCONTINUED | OUTPATIENT
Start: 2023-11-19 | End: 2023-11-22 | Stop reason: HOSPADM

## 2023-11-19 RX ORDER — ONDANSETRON 2 MG/ML
4 INJECTION INTRAMUSCULAR; INTRAVENOUS EVERY 6 HOURS PRN
Status: DISCONTINUED | OUTPATIENT
Start: 2023-11-19 | End: 2023-11-22 | Stop reason: HOSPADM

## 2023-11-19 RX ORDER — SODIUM CHLORIDE 0.9 % (FLUSH) 0.9 %
10 SYRINGE (ML) INJECTION EVERY 12 HOURS SCHEDULED
Status: DISCONTINUED | OUTPATIENT
Start: 2023-11-19 | End: 2023-11-22 | Stop reason: HOSPADM

## 2023-11-19 RX ORDER — SODIUM CHLORIDE 9 MG/ML
50 INJECTION, SOLUTION INTRAVENOUS CONTINUOUS
Status: DISCONTINUED | OUTPATIENT
Start: 2023-11-19 | End: 2023-11-22 | Stop reason: HOSPADM

## 2023-11-19 RX ORDER — PREGABALIN 75 MG/1
150 CAPSULE ORAL ONCE
Status: COMPLETED | OUTPATIENT
Start: 2023-11-20 | End: 2023-11-19

## 2023-11-19 RX ORDER — IBUPROFEN 600 MG/1
1 TABLET ORAL
Status: DISCONTINUED | OUTPATIENT
Start: 2023-11-19 | End: 2023-11-22 | Stop reason: HOSPADM

## 2023-11-19 RX ORDER — NITROGLYCERIN 0.4 MG/1
0.4 TABLET SUBLINGUAL
Status: DISCONTINUED | OUTPATIENT
Start: 2023-11-19 | End: 2023-11-22 | Stop reason: HOSPADM

## 2023-11-19 RX ORDER — HEPARIN SODIUM 5000 [USP'U]/ML
5000 INJECTION, SOLUTION INTRAVENOUS; SUBCUTANEOUS EVERY 8 HOURS SCHEDULED
Status: DISCONTINUED | OUTPATIENT
Start: 2023-11-20 | End: 2023-11-22 | Stop reason: HOSPADM

## 2023-11-19 RX ORDER — BISACODYL 10 MG
10 SUPPOSITORY, RECTAL RECTAL DAILY PRN
Status: DISCONTINUED | OUTPATIENT
Start: 2023-11-19 | End: 2023-11-22 | Stop reason: HOSPADM

## 2023-11-19 RX ORDER — ACETAMINOPHEN 325 MG/1
650 TABLET ORAL EVERY 4 HOURS PRN
Status: DISCONTINUED | OUTPATIENT
Start: 2023-11-19 | End: 2023-11-22 | Stop reason: HOSPADM

## 2023-11-19 RX ORDER — SODIUM CHLORIDE 0.9 % (FLUSH) 0.9 %
10 SYRINGE (ML) INJECTION AS NEEDED
Status: DISCONTINUED | OUTPATIENT
Start: 2023-11-19 | End: 2023-11-22 | Stop reason: HOSPADM

## 2023-11-19 RX ORDER — INSULIN LISPRO 100 [IU]/ML
2-7 INJECTION, SOLUTION INTRAVENOUS; SUBCUTANEOUS
Status: DISCONTINUED | OUTPATIENT
Start: 2023-11-20 | End: 2023-11-22 | Stop reason: HOSPADM

## 2023-11-19 RX ORDER — POLYETHYLENE GLYCOL 3350 17 G/17G
17 POWDER, FOR SOLUTION ORAL DAILY PRN
Status: DISCONTINUED | OUTPATIENT
Start: 2023-11-19 | End: 2023-11-22 | Stop reason: HOSPADM

## 2023-11-19 RX ORDER — AMOXICILLIN 250 MG
2 CAPSULE ORAL 2 TIMES DAILY
Status: DISCONTINUED | OUTPATIENT
Start: 2023-11-20 | End: 2023-11-22 | Stop reason: HOSPADM

## 2023-11-19 RX ORDER — ROPINIROLE 1 MG/1
1 TABLET, FILM COATED ORAL ONCE
Status: DISCONTINUED | OUTPATIENT
Start: 2023-11-20 | End: 2023-11-19

## 2023-11-19 RX ORDER — ROPINIROLE 1 MG/1
2 TABLET, FILM COATED ORAL ONCE
Status: COMPLETED | OUTPATIENT
Start: 2023-11-20 | End: 2023-11-19

## 2023-11-19 RX ORDER — DEXTROSE MONOHYDRATE 25 G/50ML
25 INJECTION, SOLUTION INTRAVENOUS
Status: DISCONTINUED | OUTPATIENT
Start: 2023-11-19 | End: 2023-11-22 | Stop reason: HOSPADM

## 2023-11-19 RX ORDER — BISACODYL 5 MG/1
5 TABLET, DELAYED RELEASE ORAL DAILY PRN
Status: DISCONTINUED | OUTPATIENT
Start: 2023-11-19 | End: 2023-11-22 | Stop reason: HOSPADM

## 2023-11-19 RX ORDER — SODIUM CHLORIDE 9 MG/ML
40 INJECTION, SOLUTION INTRAVENOUS AS NEEDED
Status: DISCONTINUED | OUTPATIENT
Start: 2023-11-19 | End: 2023-11-22 | Stop reason: HOSPADM

## 2023-11-19 RX ORDER — NORTRIPTYLINE HYDROCHLORIDE 25 MG/1
25 CAPSULE ORAL ONCE
Status: COMPLETED | OUTPATIENT
Start: 2023-11-20 | End: 2023-11-19

## 2023-11-19 RX ADMIN — SODIUM CHLORIDE 100 ML/HR: 9 INJECTION, SOLUTION INTRAVENOUS at 22:54

## 2023-11-19 RX ADMIN — NORTRIPTYLINE HYDROCHLORIDE 25 MG: 25 CAPSULE ORAL at 23:48

## 2023-11-19 RX ADMIN — SODIUM CHLORIDE, POTASSIUM CHLORIDE, SODIUM LACTATE AND CALCIUM CHLORIDE 1000 ML: 600; 310; 30; 20 INJECTION, SOLUTION INTRAVENOUS at 21:22

## 2023-11-19 RX ADMIN — PREGABALIN 150 MG: 75 CAPSULE ORAL at 23:49

## 2023-11-19 RX ADMIN — ROPINIROLE HYDROCHLORIDE 2 MG: 1 TABLET, FILM COATED ORAL at 23:49

## 2023-11-20 ENCOUNTER — APPOINTMENT (OUTPATIENT)
Dept: ULTRASOUND IMAGING | Facility: HOSPITAL | Age: 53
End: 2023-11-20
Payer: COMMERCIAL

## 2023-11-20 LAB
AMPHET+METHAMPHET UR QL: NEGATIVE
AMPHETAMINES UR QL: NEGATIVE
ANION GAP SERPL CALCULATED.3IONS-SCNC: 14 MMOL/L (ref 5–15)
BARBITURATES UR QL SCN: NEGATIVE
BASOPHILS # BLD AUTO: 0.05 10*3/MM3 (ref 0–0.2)
BASOPHILS NFR BLD AUTO: 0.5 % (ref 0–1.5)
BENZODIAZ UR QL SCN: POSITIVE
BUN SERPL-MCNC: 45 MG/DL (ref 6–20)
BUN/CREAT SERPL: 16 (ref 7–25)
BUPRENORPHINE SERPL-MCNC: NEGATIVE NG/ML
CALCIUM SPEC-SCNC: 8.3 MG/DL (ref 8.6–10.5)
CANNABINOIDS SERPL QL: NEGATIVE
CHLORIDE SERPL-SCNC: 104 MMOL/L (ref 98–107)
CO2 SERPL-SCNC: 19 MMOL/L (ref 22–29)
COCAINE UR QL: NEGATIVE
CREAT SERPL-MCNC: 2.82 MG/DL (ref 0.57–1)
DEPRECATED RDW RBC AUTO: 56.8 FL (ref 37–54)
EGFRCR SERPLBLD CKD-EPI 2021: 19.6 ML/MIN/1.73
EOSINOPHIL # BLD AUTO: 0.04 10*3/MM3 (ref 0–0.4)
EOSINOPHIL NFR BLD AUTO: 0.4 % (ref 0.3–6.2)
ERYTHROCYTE [DISTWIDTH] IN BLOOD BY AUTOMATED COUNT: 15.6 % (ref 12.3–15.4)
FENTANYL UR-MCNC: NEGATIVE NG/ML
GLUCOSE BLDC GLUCOMTR-MCNC: 113 MG/DL (ref 70–130)
GLUCOSE BLDC GLUCOMTR-MCNC: 69 MG/DL (ref 70–130)
GLUCOSE SERPL-MCNC: 100 MG/DL (ref 65–99)
HCT VFR BLD AUTO: 46.1 % (ref 34–46.6)
HGB BLD-MCNC: 13.5 G/DL (ref 12–15.9)
IMM GRANULOCYTES # BLD AUTO: 0.05 10*3/MM3 (ref 0–0.05)
IMM GRANULOCYTES NFR BLD AUTO: 0.5 % (ref 0–0.5)
LYMPHOCYTES # BLD AUTO: 3.9 10*3/MM3 (ref 0.7–3.1)
LYMPHOCYTES NFR BLD AUTO: 38.8 % (ref 19.6–45.3)
MAGNESIUM SERPL-MCNC: 2 MG/DL (ref 1.6–2.6)
MCH RBC QN AUTO: 28.7 PG (ref 26.6–33)
MCHC RBC AUTO-ENTMCNC: 29.3 G/DL (ref 31.5–35.7)
MCV RBC AUTO: 98.1 FL (ref 79–97)
METHADONE UR QL SCN: NEGATIVE
MONOCYTES # BLD AUTO: 0.55 10*3/MM3 (ref 0.1–0.9)
MONOCYTES NFR BLD AUTO: 5.5 % (ref 5–12)
NEUTROPHILS NFR BLD AUTO: 5.46 10*3/MM3 (ref 1.7–7)
NEUTROPHILS NFR BLD AUTO: 54.3 % (ref 42.7–76)
NRBC BLD AUTO-RTO: 0 /100 WBC (ref 0–0.2)
OPIATES UR QL: NEGATIVE
OXYCODONE UR QL SCN: NEGATIVE
PCP UR QL SCN: NEGATIVE
PLATELET # BLD AUTO: 191 10*3/MM3 (ref 140–450)
PMV BLD AUTO: 11 FL (ref 6–12)
POTASSIUM SERPL-SCNC: 3.3 MMOL/L (ref 3.5–5.2)
PROT ?TM UR-MCNC: 37.1 MG/DL
RBC # BLD AUTO: 4.7 10*6/MM3 (ref 3.77–5.28)
SODIUM SERPL-SCNC: 137 MMOL/L (ref 136–145)
SODIUM UR-SCNC: 68 MMOL/L
TRICYCLICS UR QL SCN: POSITIVE
WBC NRBC COR # BLD AUTO: 10.05 10*3/MM3 (ref 3.4–10.8)

## 2023-11-20 PROCEDURE — 82948 REAGENT STRIP/BLOOD GLUCOSE: CPT

## 2023-11-20 PROCEDURE — 82570 ASSAY OF URINE CREATININE: CPT | Performed by: NURSE PRACTITIONER

## 2023-11-20 PROCEDURE — 25010000002 HEPARIN (PORCINE) PER 1000 UNITS: Performed by: HOSPITALIST

## 2023-11-20 PROCEDURE — 25810000003 SODIUM CHLORIDE 0.9 % SOLUTION: Performed by: HOSPITALIST

## 2023-11-20 PROCEDURE — 96361 HYDRATE IV INFUSION ADD-ON: CPT

## 2023-11-20 PROCEDURE — 80048 BASIC METABOLIC PNL TOTAL CA: CPT | Performed by: HOSPITALIST

## 2023-11-20 PROCEDURE — 85025 COMPLETE CBC W/AUTO DIFF WBC: CPT | Performed by: HOSPITALIST

## 2023-11-20 PROCEDURE — 63710000001 INSULIN LISPRO (HUMAN) PER 5 UNITS: Performed by: HOSPITALIST

## 2023-11-20 PROCEDURE — 96360 HYDRATION IV INFUSION INIT: CPT

## 2023-11-20 PROCEDURE — G0378 HOSPITAL OBSERVATION PER HR: HCPCS

## 2023-11-20 PROCEDURE — 84300 ASSAY OF URINE SODIUM: CPT | Performed by: NURSE PRACTITIONER

## 2023-11-20 PROCEDURE — 84156 ASSAY OF PROTEIN URINE: CPT | Performed by: NURSE PRACTITIONER

## 2023-11-20 PROCEDURE — 83735 ASSAY OF MAGNESIUM: CPT | Performed by: NURSE PRACTITIONER

## 2023-11-20 PROCEDURE — 80307 DRUG TEST PRSMV CHEM ANLYZR: CPT | Performed by: NURSE PRACTITIONER

## 2023-11-20 PROCEDURE — 96372 THER/PROPH/DIAG INJ SC/IM: CPT

## 2023-11-20 PROCEDURE — 76775 US EXAM ABDO BACK WALL LIM: CPT

## 2023-11-20 RX ORDER — GINGER ROOT/GINGER ROOT EXT 262.5 MG
1 CAPSULE ORAL DAILY
Status: DISCONTINUED | OUTPATIENT
Start: 2023-11-21 | End: 2023-11-22 | Stop reason: HOSPADM

## 2023-11-20 RX ORDER — DOCUSATE SODIUM 100 MG/1
100 CAPSULE, LIQUID FILLED ORAL 2 TIMES DAILY
COMMUNITY

## 2023-11-20 RX ORDER — ATORVASTATIN CALCIUM 40 MG/1
80 TABLET, FILM COATED ORAL DAILY
Status: DISCONTINUED | OUTPATIENT
Start: 2023-11-20 | End: 2023-11-20

## 2023-11-20 RX ORDER — METOPROLOL SUCCINATE 25 MG/1
25 TABLET, EXTENDED RELEASE ORAL DAILY
COMMUNITY

## 2023-11-20 RX ORDER — WATER / MINERAL OIL / WHITE PETROLATUM 16 OZ
1 CREAM TOPICAL 2 TIMES DAILY PRN
COMMUNITY

## 2023-11-20 RX ORDER — PREGABALIN 75 MG/1
150 CAPSULE ORAL 2 TIMES DAILY
Status: DISCONTINUED | OUTPATIENT
Start: 2023-11-20 | End: 2023-11-22 | Stop reason: HOSPADM

## 2023-11-20 RX ORDER — CHOLESTYRAMINE LIGHT 4 G/5.7G
4 POWDER, FOR SUSPENSION ORAL 2 TIMES DAILY
COMMUNITY

## 2023-11-20 RX ORDER — SODIUM BICARBONATE 650 MG/1
650 TABLET ORAL 2 TIMES DAILY
Status: DISCONTINUED | OUTPATIENT
Start: 2023-11-20 | End: 2023-11-22 | Stop reason: HOSPADM

## 2023-11-20 RX ORDER — POTASSIUM CHLORIDE 750 MG/1
20 CAPSULE, EXTENDED RELEASE ORAL ONCE
Status: COMPLETED | OUTPATIENT
Start: 2023-11-20 | End: 2023-11-20

## 2023-11-20 RX ORDER — CALCITRIOL 0.25 UG/1
0.25 CAPSULE, LIQUID FILLED ORAL DAILY
COMMUNITY

## 2023-11-20 RX ORDER — NORTRIPTYLINE HYDROCHLORIDE 25 MG/1
25 CAPSULE ORAL NIGHTLY
Status: DISCONTINUED | OUTPATIENT
Start: 2023-11-20 | End: 2023-11-22 | Stop reason: HOSPADM

## 2023-11-20 RX ORDER — ROPINIROLE 1 MG/1
2 TABLET, FILM COATED ORAL NIGHTLY
Status: DISCONTINUED | OUTPATIENT
Start: 2023-11-20 | End: 2023-11-22 | Stop reason: SDUPTHER

## 2023-11-20 RX ORDER — PANTOPRAZOLE SODIUM 40 MG/1
40 TABLET, DELAYED RELEASE ORAL
Status: DISCONTINUED | OUTPATIENT
Start: 2023-11-21 | End: 2023-11-22 | Stop reason: HOSPADM

## 2023-11-20 RX ORDER — ATORVASTATIN CALCIUM 40 MG/1
80 TABLET, FILM COATED ORAL NIGHTLY
Status: DISCONTINUED | OUTPATIENT
Start: 2023-11-20 | End: 2023-11-22 | Stop reason: HOSPADM

## 2023-11-20 RX ORDER — ASPIRIN 81 MG/1
81 TABLET ORAL DAILY
Status: DISCONTINUED | OUTPATIENT
Start: 2023-11-20 | End: 2023-11-22 | Stop reason: HOSPADM

## 2023-11-20 RX ORDER — NIFEDIPINE 30 MG/1
30 TABLET, EXTENDED RELEASE ORAL DAILY
COMMUNITY
End: 2023-11-30 | Stop reason: HOSPADM

## 2023-11-20 RX ADMIN — POTASSIUM CHLORIDE 20 MEQ: 750 CAPSULE, EXTENDED RELEASE ORAL at 17:21

## 2023-11-20 RX ADMIN — Medication 10 ML: at 22:28

## 2023-11-20 RX ADMIN — ATORVASTATIN CALCIUM 80 MG: 40 TABLET ORAL at 22:17

## 2023-11-20 RX ADMIN — NORTRIPTYLINE HYDROCHLORIDE 25 MG: 25 CAPSULE ORAL at 22:17

## 2023-11-20 RX ADMIN — DOCUSATE SODIUM 50 MG AND SENNOSIDES 8.6 MG 2 TABLET: 8.6; 5 TABLET, FILM COATED ORAL at 22:17

## 2023-11-20 RX ADMIN — ACETAMINOPHEN 650 MG: 325 TABLET, FILM COATED ORAL at 09:47

## 2023-11-20 RX ADMIN — ASPIRIN 81 MG: 81 TABLET, COATED ORAL at 17:21

## 2023-11-20 RX ADMIN — HEPARIN SODIUM 5000 UNITS: 5000 INJECTION INTRAVENOUS; SUBCUTANEOUS at 05:15

## 2023-11-20 RX ADMIN — HEPARIN SODIUM 5000 UNITS: 5000 INJECTION INTRAVENOUS; SUBCUTANEOUS at 17:22

## 2023-11-20 RX ADMIN — SODIUM CHLORIDE 100 ML/HR: 9 INJECTION, SOLUTION INTRAVENOUS at 17:25

## 2023-11-20 RX ADMIN — Medication 10 ML: at 08:28

## 2023-11-20 RX ADMIN — HEPARIN SODIUM 5000 UNITS: 5000 INJECTION INTRAVENOUS; SUBCUTANEOUS at 22:27

## 2023-11-20 RX ADMIN — SODIUM BICARBONATE 650 MG: 650 TABLET ORAL at 22:17

## 2023-11-20 RX ADMIN — PREGABALIN 150 MG: 75 CAPSULE ORAL at 22:17

## 2023-11-20 RX ADMIN — INSULIN LISPRO 2 UNITS: 100 INJECTION, SOLUTION INTRAVENOUS; SUBCUTANEOUS at 17:49

## 2023-11-20 RX ADMIN — ROPINIROLE HYDROCHLORIDE 2 MG: 1 TABLET, FILM COATED ORAL at 22:17

## 2023-11-21 LAB
ALBUMIN SERPL-MCNC: 3.3 G/DL (ref 3.5–5.2)
ALBUMIN/GLOB SERPL: 1.2 G/DL
ALP SERPL-CCNC: 97 U/L (ref 39–117)
ALT SERPL W P-5'-P-CCNC: 14 U/L (ref 1–33)
ANION GAP SERPL CALCULATED.3IONS-SCNC: 9 MMOL/L (ref 5–15)
AST SERPL-CCNC: 19 U/L (ref 1–32)
BASOPHILS # BLD AUTO: 0.03 10*3/MM3 (ref 0–0.2)
BASOPHILS NFR BLD AUTO: 0.3 % (ref 0–1.5)
BILIRUB SERPL-MCNC: 0.2 MG/DL (ref 0–1.2)
BUN SERPL-MCNC: 41 MG/DL (ref 6–20)
BUN/CREAT SERPL: 18.6 (ref 7–25)
CALCIUM SPEC-SCNC: 8.1 MG/DL (ref 8.6–10.5)
CHLORIDE SERPL-SCNC: 111 MMOL/L (ref 98–107)
CO2 SERPL-SCNC: 22 MMOL/L (ref 22–29)
CREAT SERPL-MCNC: 2.21 MG/DL (ref 0.57–1)
CREAT UR-MCNC: 180.4 MG/DL
DEPRECATED RDW RBC AUTO: 53 FL (ref 37–54)
EGFRCR SERPLBLD CKD-EPI 2021: 26.2 ML/MIN/1.73
EOSINOPHIL # BLD AUTO: 0 10*3/MM3 (ref 0–0.4)
EOSINOPHIL NFR BLD AUTO: 0 % (ref 0.3–6.2)
ERYTHROCYTE [DISTWIDTH] IN BLOOD BY AUTOMATED COUNT: 15.2 % (ref 12.3–15.4)
GLOBULIN UR ELPH-MCNC: 2.7 GM/DL
GLUCOSE BLDC GLUCOMTR-MCNC: 104 MG/DL (ref 70–130)
GLUCOSE BLDC GLUCOMTR-MCNC: 110 MG/DL (ref 70–130)
GLUCOSE BLDC GLUCOMTR-MCNC: 154 MG/DL (ref 70–130)
GLUCOSE BLDC GLUCOMTR-MCNC: 96 MG/DL (ref 70–130)
GLUCOSE SERPL-MCNC: 92 MG/DL (ref 65–99)
HCT VFR BLD AUTO: 41 % (ref 34–46.6)
HGB BLD-MCNC: 12.5 G/DL (ref 12–15.9)
IMM GRANULOCYTES # BLD AUTO: 0.04 10*3/MM3 (ref 0–0.05)
IMM GRANULOCYTES NFR BLD AUTO: 0.4 % (ref 0–0.5)
LYMPHOCYTES # BLD AUTO: 2.82 10*3/MM3 (ref 0.7–3.1)
LYMPHOCYTES NFR BLD AUTO: 31.4 % (ref 19.6–45.3)
MCH RBC QN AUTO: 28.6 PG (ref 26.6–33)
MCHC RBC AUTO-ENTMCNC: 30.5 G/DL (ref 31.5–35.7)
MCV RBC AUTO: 93.8 FL (ref 79–97)
MONOCYTES # BLD AUTO: 0.36 10*3/MM3 (ref 0.1–0.9)
MONOCYTES NFR BLD AUTO: 4 % (ref 5–12)
NEUTROPHILS NFR BLD AUTO: 5.72 10*3/MM3 (ref 1.7–7)
NEUTROPHILS NFR BLD AUTO: 63.9 % (ref 42.7–76)
NRBC BLD AUTO-RTO: 0 /100 WBC (ref 0–0.2)
PLATELET # BLD AUTO: 213 10*3/MM3 (ref 140–450)
PMV BLD AUTO: 10.7 FL (ref 6–12)
POTASSIUM SERPL-SCNC: 4.5 MMOL/L (ref 3.5–5.2)
PROT SERPL-MCNC: 6 G/DL (ref 6–8.5)
RBC # BLD AUTO: 4.37 10*6/MM3 (ref 3.77–5.28)
SODIUM SERPL-SCNC: 142 MMOL/L (ref 136–145)
WBC NRBC COR # BLD AUTO: 8.97 10*3/MM3 (ref 3.4–10.8)

## 2023-11-21 PROCEDURE — G0378 HOSPITAL OBSERVATION PER HR: HCPCS

## 2023-11-21 PROCEDURE — 85025 COMPLETE CBC W/AUTO DIFF WBC: CPT | Performed by: INTERNAL MEDICINE

## 2023-11-21 PROCEDURE — 63710000001 INSULIN LISPRO (HUMAN) PER 5 UNITS: Performed by: HOSPITALIST

## 2023-11-21 PROCEDURE — 80053 COMPREHEN METABOLIC PANEL: CPT | Performed by: NURSE PRACTITIONER

## 2023-11-21 PROCEDURE — 25010000002 HEPARIN (PORCINE) PER 1000 UNITS: Performed by: HOSPITALIST

## 2023-11-21 PROCEDURE — 96361 HYDRATE IV INFUSION ADD-ON: CPT

## 2023-11-21 PROCEDURE — 82948 REAGENT STRIP/BLOOD GLUCOSE: CPT

## 2023-11-21 PROCEDURE — 96372 THER/PROPH/DIAG INJ SC/IM: CPT

## 2023-11-21 PROCEDURE — 25810000003 SODIUM CHLORIDE 0.9 % SOLUTION: Performed by: HOSPITALIST

## 2023-11-21 RX ORDER — ALUMINA, MAGNESIA, AND SIMETHICONE 2400; 2400; 240 MG/30ML; MG/30ML; MG/30ML
15 SUSPENSION ORAL EVERY 6 HOURS PRN
Status: DISCONTINUED | OUTPATIENT
Start: 2023-11-21 | End: 2023-11-22 | Stop reason: HOSPADM

## 2023-11-21 RX ADMIN — HEPARIN SODIUM 5000 UNITS: 5000 INJECTION INTRAVENOUS; SUBCUTANEOUS at 20:27

## 2023-11-21 RX ADMIN — PREGABALIN 150 MG: 75 CAPSULE ORAL at 20:18

## 2023-11-21 RX ADMIN — PANCRELIPASE 24000 UNITS OF LIPASE: 60000; 12000; 38000 CAPSULE, DELAYED RELEASE PELLETS ORAL at 17:59

## 2023-11-21 RX ADMIN — PANTOPRAZOLE SODIUM 40 MG: 40 TABLET, DELAYED RELEASE ORAL at 06:25

## 2023-11-21 RX ADMIN — SODIUM CHLORIDE 100 ML/HR: 9 INJECTION, SOLUTION INTRAVENOUS at 02:39

## 2023-11-21 RX ADMIN — ATORVASTATIN CALCIUM 80 MG: 40 TABLET ORAL at 20:18

## 2023-11-21 RX ADMIN — PREGABALIN 150 MG: 75 CAPSULE ORAL at 08:52

## 2023-11-21 RX ADMIN — ASPIRIN 81 MG: 81 TABLET, COATED ORAL at 08:52

## 2023-11-21 RX ADMIN — NORTRIPTYLINE HYDROCHLORIDE 25 MG: 25 CAPSULE ORAL at 20:18

## 2023-11-21 RX ADMIN — Medication 1 TABLET: at 08:52

## 2023-11-21 RX ADMIN — DOCUSATE SODIUM 50 MG AND SENNOSIDES 8.6 MG 2 TABLET: 8.6; 5 TABLET, FILM COATED ORAL at 08:52

## 2023-11-21 RX ADMIN — DOCUSATE SODIUM 50 MG AND SENNOSIDES 8.6 MG 2 TABLET: 8.6; 5 TABLET, FILM COATED ORAL at 20:18

## 2023-11-21 RX ADMIN — SODIUM BICARBONATE 650 MG: 650 TABLET ORAL at 20:19

## 2023-11-21 RX ADMIN — HEPARIN SODIUM 5000 UNITS: 5000 INJECTION INTRAVENOUS; SUBCUTANEOUS at 06:25

## 2023-11-21 RX ADMIN — ALUMINUM HYDROXIDE, MAGNESIUM HYDROXIDE, AND DIMETHICONE 15 ML: 400; 400; 40 SUSPENSION ORAL at 12:47

## 2023-11-21 RX ADMIN — INSULIN LISPRO 2 UNITS: 100 INJECTION, SOLUTION INTRAVENOUS; SUBCUTANEOUS at 20:27

## 2023-11-21 RX ADMIN — PANCRELIPASE 24000 UNITS OF LIPASE: 60000; 12000; 38000 CAPSULE, DELAYED RELEASE PELLETS ORAL at 12:01

## 2023-11-21 RX ADMIN — ROPINIROLE HYDROCHLORIDE 2 MG: 1 TABLET, FILM COATED ORAL at 20:18

## 2023-11-21 RX ADMIN — PANCRELIPASE 24000 UNITS OF LIPASE: 60000; 12000; 38000 CAPSULE, DELAYED RELEASE PELLETS ORAL at 08:52

## 2023-11-21 RX ADMIN — SODIUM BICARBONATE 650 MG: 650 TABLET ORAL at 08:52

## 2023-11-22 ENCOUNTER — READMISSION MANAGEMENT (OUTPATIENT)
Dept: CALL CENTER | Facility: HOSPITAL | Age: 53
End: 2023-11-22
Payer: COMMERCIAL

## 2023-11-22 VITALS
OXYGEN SATURATION: 100 % | WEIGHT: 103.2 LBS | DIASTOLIC BLOOD PRESSURE: 83 MMHG | RESPIRATION RATE: 18 BRPM | HEIGHT: 60 IN | BODY MASS INDEX: 20.26 KG/M2 | HEART RATE: 86 BPM | TEMPERATURE: 97.5 F | SYSTOLIC BLOOD PRESSURE: 141 MMHG

## 2023-11-22 LAB
ALBUMIN SERPL-MCNC: 3 G/DL (ref 3.5–5.2)
ALBUMIN/GLOB SERPL: 1.3 G/DL
ALP SERPL-CCNC: 86 U/L (ref 39–117)
ALT SERPL W P-5'-P-CCNC: 12 U/L (ref 1–33)
ANION GAP SERPL CALCULATED.3IONS-SCNC: 5 MMOL/L (ref 5–15)
AST SERPL-CCNC: 16 U/L (ref 1–32)
BASOPHILS # BLD AUTO: 0.03 10*3/MM3 (ref 0–0.2)
BASOPHILS NFR BLD AUTO: 0.4 % (ref 0–1.5)
BILIRUB SERPL-MCNC: <0.2 MG/DL (ref 0–1.2)
BUN SERPL-MCNC: 29 MG/DL (ref 6–20)
BUN/CREAT SERPL: 16.4 (ref 7–25)
CALCIUM SPEC-SCNC: 7.7 MG/DL (ref 8.6–10.5)
CHLORIDE SERPL-SCNC: 111 MMOL/L (ref 98–107)
CO2 SERPL-SCNC: 22 MMOL/L (ref 22–29)
CREAT SERPL-MCNC: 1.77 MG/DL (ref 0.57–1)
DEPRECATED RDW RBC AUTO: 54 FL (ref 37–54)
EGFRCR SERPLBLD CKD-EPI 2021: 34.2 ML/MIN/1.73
EOSINOPHIL # BLD AUTO: 0.06 10*3/MM3 (ref 0–0.4)
EOSINOPHIL NFR BLD AUTO: 0.8 % (ref 0.3–6.2)
ERYTHROCYTE [DISTWIDTH] IN BLOOD BY AUTOMATED COUNT: 15.3 % (ref 12.3–15.4)
GLOBULIN UR ELPH-MCNC: 2.4 GM/DL
GLUCOSE BLDC GLUCOMTR-MCNC: 105 MG/DL (ref 70–130)
GLUCOSE BLDC GLUCOMTR-MCNC: 97 MG/DL (ref 70–130)
GLUCOSE SERPL-MCNC: 94 MG/DL (ref 65–99)
HCT VFR BLD AUTO: 36.4 % (ref 34–46.6)
HGB BLD-MCNC: 10.9 G/DL (ref 12–15.9)
IMM GRANULOCYTES # BLD AUTO: 0.01 10*3/MM3 (ref 0–0.05)
IMM GRANULOCYTES NFR BLD AUTO: 0.1 % (ref 0–0.5)
LYMPHOCYTES # BLD AUTO: 3.65 10*3/MM3 (ref 0.7–3.1)
LYMPHOCYTES NFR BLD AUTO: 48 % (ref 19.6–45.3)
MCH RBC QN AUTO: 28.6 PG (ref 26.6–33)
MCHC RBC AUTO-ENTMCNC: 29.9 G/DL (ref 31.5–35.7)
MCV RBC AUTO: 95.5 FL (ref 79–97)
MONOCYTES # BLD AUTO: 0.34 10*3/MM3 (ref 0.1–0.9)
MONOCYTES NFR BLD AUTO: 4.5 % (ref 5–12)
NEUTROPHILS NFR BLD AUTO: 3.52 10*3/MM3 (ref 1.7–7)
NEUTROPHILS NFR BLD AUTO: 46.2 % (ref 42.7–76)
NRBC BLD AUTO-RTO: 0 /100 WBC (ref 0–0.2)
PLATELET # BLD AUTO: 198 10*3/MM3 (ref 140–450)
PMV BLD AUTO: 11 FL (ref 6–12)
POTASSIUM SERPL-SCNC: 4.7 MMOL/L (ref 3.5–5.2)
PROT SERPL-MCNC: 5.4 G/DL (ref 6–8.5)
RBC # BLD AUTO: 3.81 10*6/MM3 (ref 3.77–5.28)
SODIUM SERPL-SCNC: 138 MMOL/L (ref 136–145)
WBC NRBC COR # BLD AUTO: 7.61 10*3/MM3 (ref 3.4–10.8)

## 2023-11-22 PROCEDURE — 80053 COMPREHEN METABOLIC PANEL: CPT | Performed by: INTERNAL MEDICINE

## 2023-11-22 PROCEDURE — 96372 THER/PROPH/DIAG INJ SC/IM: CPT

## 2023-11-22 PROCEDURE — G0378 HOSPITAL OBSERVATION PER HR: HCPCS

## 2023-11-22 PROCEDURE — 96361 HYDRATE IV INFUSION ADD-ON: CPT

## 2023-11-22 PROCEDURE — 25810000003 SODIUM CHLORIDE 0.9 % SOLUTION: Performed by: HOSPITALIST

## 2023-11-22 PROCEDURE — 82948 REAGENT STRIP/BLOOD GLUCOSE: CPT

## 2023-11-22 PROCEDURE — 25010000002 HEPARIN (PORCINE) PER 1000 UNITS: Performed by: HOSPITALIST

## 2023-11-22 PROCEDURE — 85025 COMPLETE CBC W/AUTO DIFF WBC: CPT | Performed by: INTERNAL MEDICINE

## 2023-11-22 RX ORDER — ROPINIROLE 1 MG/1
2 TABLET, FILM COATED ORAL 3 TIMES DAILY
Status: DISCONTINUED | OUTPATIENT
Start: 2023-11-22 | End: 2023-11-22 | Stop reason: HOSPADM

## 2023-11-22 RX ADMIN — PANTOPRAZOLE SODIUM 40 MG: 40 TABLET, DELAYED RELEASE ORAL at 05:37

## 2023-11-22 RX ADMIN — HEPARIN SODIUM 5000 UNITS: 5000 INJECTION INTRAVENOUS; SUBCUTANEOUS at 05:37

## 2023-11-22 RX ADMIN — Medication 1 TABLET: at 08:41

## 2023-11-22 RX ADMIN — DOCUSATE SODIUM 50 MG AND SENNOSIDES 8.6 MG 2 TABLET: 8.6; 5 TABLET, FILM COATED ORAL at 08:41

## 2023-11-22 RX ADMIN — PANCRELIPASE 24000 UNITS OF LIPASE: 60000; 12000; 38000 CAPSULE, DELAYED RELEASE PELLETS ORAL at 08:41

## 2023-11-22 RX ADMIN — SODIUM CHLORIDE 100 ML/HR: 9 INJECTION, SOLUTION INTRAVENOUS at 02:19

## 2023-11-22 RX ADMIN — SODIUM BICARBONATE 650 MG: 650 TABLET ORAL at 08:41

## 2023-11-22 RX ADMIN — PANCRELIPASE 24000 UNITS OF LIPASE: 60000; 12000; 38000 CAPSULE, DELAYED RELEASE PELLETS ORAL at 11:54

## 2023-11-22 RX ADMIN — ASPIRIN 81 MG: 81 TABLET, COATED ORAL at 08:41

## 2023-11-22 RX ADMIN — PREGABALIN 150 MG: 75 CAPSULE ORAL at 08:41

## 2023-11-28 ENCOUNTER — READMISSION MANAGEMENT (OUTPATIENT)
Dept: CALL CENTER | Facility: HOSPITAL | Age: 53
End: 2023-11-28
Payer: COMMERCIAL

## 2023-11-28 ENCOUNTER — HOSPITAL ENCOUNTER (INPATIENT)
Facility: HOSPITAL | Age: 53
LOS: 2 days | Discharge: HOME | End: 2023-11-30
Attending: EMERGENCY MEDICINE | Admitting: INTERNAL MEDICINE
Payer: COMMERCIAL

## 2023-11-28 DIAGNOSIS — N17.9 ACUTE RENAL FAILURE, UNSPECIFIED ACUTE RENAL FAILURE TYPE: Primary | ICD-10-CM

## 2023-11-28 PROBLEM — M79.7 FIBROMYALGIA: Chronic | Status: ACTIVE | Noted: 2023-11-28

## 2023-11-28 PROBLEM — M06.9 RHEUMATOID ARTHRITIS: Chronic | Status: ACTIVE | Noted: 2023-11-28

## 2023-11-28 LAB
ALBUMIN SERPL-MCNC: 4.4 G/DL (ref 3.5–5.2)
ALBUMIN/GLOB SERPL: 1.3 G/DL
ALP SERPL-CCNC: 123 U/L (ref 39–117)
ALT SERPL W P-5'-P-CCNC: 17 U/L (ref 1–33)
ANION GAP SERPL CALCULATED.3IONS-SCNC: 20 MMOL/L (ref 5–15)
AST SERPL-CCNC: 21 U/L (ref 1–32)
BACTERIA UR QL AUTO: ABNORMAL /HPF
BASOPHILS # BLD AUTO: 0.05 10*3/MM3 (ref 0–0.2)
BASOPHILS NFR BLD AUTO: 0.5 % (ref 0–1.5)
BILIRUB SERPL-MCNC: 0.4 MG/DL (ref 0–1.2)
BILIRUB UR QL STRIP: NEGATIVE
BUN SERPL-MCNC: 92 MG/DL (ref 6–20)
BUN/CREAT SERPL: 20.1 (ref 7–25)
CALCIUM SPEC-SCNC: 8.8 MG/DL (ref 8.6–10.5)
CHLORIDE SERPL-SCNC: 102 MMOL/L (ref 98–107)
CK SERPL-CCNC: 82 U/L (ref 20–180)
CLARITY UR: CLEAR
CO2 SERPL-SCNC: 16 MMOL/L (ref 22–29)
COLOR UR: YELLOW
CREAT SERPL-MCNC: 4.57 MG/DL (ref 0.57–1)
DEPRECATED RDW RBC AUTO: 51.9 FL (ref 37–54)
EGFRCR SERPLBLD CKD-EPI 2021: 11 ML/MIN/1.73
EOSINOPHIL # BLD AUTO: 0.06 10*3/MM3 (ref 0–0.4)
EOSINOPHIL NFR BLD AUTO: 0.6 % (ref 0.3–6.2)
ERYTHROCYTE [DISTWIDTH] IN BLOOD BY AUTOMATED COUNT: 15.7 % (ref 12.3–15.4)
GLOBULIN UR ELPH-MCNC: 3.3 GM/DL
GLUCOSE BLDC GLUCOMTR-MCNC: 113 MG/DL (ref 70–130)
GLUCOSE SERPL-MCNC: 76 MG/DL (ref 65–99)
GLUCOSE UR STRIP-MCNC: ABNORMAL MG/DL
HCT VFR BLD AUTO: 43.4 % (ref 34–46.6)
HGB BLD-MCNC: 13.9 G/DL (ref 12–15.9)
HGB UR QL STRIP.AUTO: NEGATIVE
HYALINE CASTS UR QL AUTO: ABNORMAL /LPF
IMM GRANULOCYTES # BLD AUTO: 0.05 10*3/MM3 (ref 0–0.05)
IMM GRANULOCYTES NFR BLD AUTO: 0.5 % (ref 0–0.5)
KETONES UR QL STRIP: ABNORMAL
LEUKOCYTE ESTERASE UR QL STRIP.AUTO: ABNORMAL
LYMPHOCYTES # BLD AUTO: 3.99 10*3/MM3 (ref 0.7–3.1)
LYMPHOCYTES NFR BLD AUTO: 36.9 % (ref 19.6–45.3)
MCH RBC QN AUTO: 28.7 PG (ref 26.6–33)
MCHC RBC AUTO-ENTMCNC: 32 G/DL (ref 31.5–35.7)
MCV RBC AUTO: 89.7 FL (ref 79–97)
MONOCYTES # BLD AUTO: 0.64 10*3/MM3 (ref 0.1–0.9)
MONOCYTES NFR BLD AUTO: 5.9 % (ref 5–12)
NEUTROPHILS NFR BLD AUTO: 55.6 % (ref 42.7–76)
NEUTROPHILS NFR BLD AUTO: 6.02 10*3/MM3 (ref 1.7–7)
NITRITE UR QL STRIP: NEGATIVE
NRBC BLD AUTO-RTO: 0 /100 WBC (ref 0–0.2)
PH UR STRIP.AUTO: 5.5 [PH] (ref 5–8)
PLATELET # BLD AUTO: 366 10*3/MM3 (ref 140–450)
PMV BLD AUTO: 10.5 FL (ref 6–12)
POTASSIUM SERPL-SCNC: 3.3 MMOL/L (ref 3.5–5.2)
PROT SERPL-MCNC: 7.7 G/DL (ref 6–8.5)
PROT UR QL STRIP: ABNORMAL
RBC # BLD AUTO: 4.84 10*6/MM3 (ref 3.77–5.28)
RBC # UR STRIP: ABNORMAL /HPF
REF LAB TEST METHOD: ABNORMAL
SODIUM SERPL-SCNC: 138 MMOL/L (ref 136–145)
SP GR UR STRIP: 1.02 (ref 1–1.03)
SQUAMOUS #/AREA URNS HPF: ABNORMAL /HPF
UROBILINOGEN UR QL STRIP: ABNORMAL
WBC # UR STRIP: ABNORMAL /HPF
WBC NRBC COR # BLD AUTO: 10.81 10*3/MM3 (ref 3.4–10.8)

## 2023-11-28 PROCEDURE — 81001 URINALYSIS AUTO W/SCOPE: CPT | Performed by: EMERGENCY MEDICINE

## 2023-11-28 PROCEDURE — 0 DEXTROSE 5 % SOLUTION 1,000 ML FLEX CONT: Performed by: INTERNAL MEDICINE

## 2023-11-28 PROCEDURE — 80053 COMPREHEN METABOLIC PANEL: CPT | Performed by: EMERGENCY MEDICINE

## 2023-11-28 PROCEDURE — 85025 COMPLETE CBC W/AUTO DIFF WBC: CPT | Performed by: EMERGENCY MEDICINE

## 2023-11-28 PROCEDURE — 82948 REAGENT STRIP/BLOOD GLUCOSE: CPT

## 2023-11-28 PROCEDURE — 99285 EMERGENCY DEPT VISIT HI MDM: CPT

## 2023-11-28 PROCEDURE — G0378 HOSPITAL OBSERVATION PER HR: HCPCS

## 2023-11-28 PROCEDURE — 25810000003 LACTATED RINGERS SOLUTION: Performed by: EMERGENCY MEDICINE

## 2023-11-28 PROCEDURE — 82550 ASSAY OF CK (CPK): CPT | Performed by: EMERGENCY MEDICINE

## 2023-11-28 PROCEDURE — 93010 ELECTROCARDIOGRAM REPORT: CPT | Performed by: INTERNAL MEDICINE

## 2023-11-28 PROCEDURE — 93005 ELECTROCARDIOGRAM TRACING: CPT | Performed by: EMERGENCY MEDICINE

## 2023-11-28 RX ORDER — OXYCODONE HYDROCHLORIDE AND ACETAMINOPHEN 5; 325 MG/1; MG/1
1 TABLET ORAL EVERY 4 HOURS PRN
Status: DISCONTINUED | OUTPATIENT
Start: 2023-11-28 | End: 2023-11-30 | Stop reason: HOSPADM

## 2023-11-28 RX ORDER — IBUPROFEN 600 MG/1
1 TABLET ORAL
Status: DISCONTINUED | OUTPATIENT
Start: 2023-11-28 | End: 2023-11-30 | Stop reason: HOSPADM

## 2023-11-28 RX ORDER — BISACODYL 5 MG/1
5 TABLET, DELAYED RELEASE ORAL DAILY PRN
Status: DISCONTINUED | OUTPATIENT
Start: 2023-11-28 | End: 2023-11-30 | Stop reason: HOSPADM

## 2023-11-28 RX ORDER — DEXTROSE MONOHYDRATE 25 G/50ML
25 INJECTION, SOLUTION INTRAVENOUS
Status: DISCONTINUED | OUTPATIENT
Start: 2023-11-28 | End: 2023-11-30 | Stop reason: HOSPADM

## 2023-11-28 RX ORDER — BISACODYL 10 MG
10 SUPPOSITORY, RECTAL RECTAL DAILY PRN
Status: DISCONTINUED | OUTPATIENT
Start: 2023-11-28 | End: 2023-11-30 | Stop reason: HOSPADM

## 2023-11-28 RX ORDER — SODIUM CHLORIDE 9 MG/ML
40 INJECTION, SOLUTION INTRAVENOUS AS NEEDED
Status: DISCONTINUED | OUTPATIENT
Start: 2023-11-28 | End: 2023-11-30 | Stop reason: HOSPADM

## 2023-11-28 RX ORDER — SODIUM CHLORIDE 0.9 % (FLUSH) 0.9 %
10 SYRINGE (ML) INJECTION EVERY 12 HOURS SCHEDULED
Status: DISCONTINUED | OUTPATIENT
Start: 2023-11-28 | End: 2023-11-30 | Stop reason: HOSPADM

## 2023-11-28 RX ORDER — NICOTINE POLACRILEX 4 MG
15 LOZENGE BUCCAL
Status: DISCONTINUED | OUTPATIENT
Start: 2023-11-28 | End: 2023-11-30 | Stop reason: HOSPADM

## 2023-11-28 RX ORDER — SODIUM CHLORIDE 0.9 % (FLUSH) 0.9 %
10 SYRINGE (ML) INJECTION AS NEEDED
Status: DISCONTINUED | OUTPATIENT
Start: 2023-11-28 | End: 2023-11-30 | Stop reason: HOSPADM

## 2023-11-28 RX ORDER — ACETAMINOPHEN 325 MG/1
650 TABLET ORAL EVERY 4 HOURS PRN
Status: DISCONTINUED | OUTPATIENT
Start: 2023-11-28 | End: 2023-11-30 | Stop reason: HOSPADM

## 2023-11-28 RX ORDER — INSULIN LISPRO 100 [IU]/ML
2-7 INJECTION, SOLUTION INTRAVENOUS; SUBCUTANEOUS
Status: DISCONTINUED | OUTPATIENT
Start: 2023-11-28 | End: 2023-11-30 | Stop reason: HOSPADM

## 2023-11-28 RX ORDER — AMOXICILLIN 250 MG
2 CAPSULE ORAL 2 TIMES DAILY
Status: DISCONTINUED | OUTPATIENT
Start: 2023-11-28 | End: 2023-11-30 | Stop reason: HOSPADM

## 2023-11-28 RX ORDER — CHOLECALCIFEROL (VITAMIN D3) 125 MCG
5 CAPSULE ORAL NIGHTLY PRN
Status: DISCONTINUED | OUTPATIENT
Start: 2023-11-28 | End: 2023-11-30 | Stop reason: HOSPADM

## 2023-11-28 RX ORDER — POLYETHYLENE GLYCOL 3350 17 G/17G
17 POWDER, FOR SOLUTION ORAL DAILY PRN
Status: DISCONTINUED | OUTPATIENT
Start: 2023-11-28 | End: 2023-11-30 | Stop reason: HOSPADM

## 2023-11-28 RX ORDER — CALCIUM CARBONATE 500 MG/1
1 TABLET, CHEWABLE ORAL 2 TIMES DAILY PRN
Status: DISCONTINUED | OUTPATIENT
Start: 2023-11-28 | End: 2023-11-30 | Stop reason: HOSPADM

## 2023-11-28 RX ORDER — ONDANSETRON 4 MG/1
4 TABLET, FILM COATED ORAL EVERY 6 HOURS PRN
Status: DISCONTINUED | OUTPATIENT
Start: 2023-11-28 | End: 2023-11-30 | Stop reason: HOSPADM

## 2023-11-28 RX ADMIN — SODIUM BICARBONATE 75 MEQ: 84 INJECTION, SOLUTION INTRAVENOUS at 22:02

## 2023-11-28 RX ADMIN — Medication 10 ML: at 23:55

## 2023-11-28 RX ADMIN — SODIUM CHLORIDE, POTASSIUM CHLORIDE, SODIUM LACTATE AND CALCIUM CHLORIDE 1000 ML: 600; 310; 30; 20 INJECTION, SOLUTION INTRAVENOUS at 17:37

## 2023-11-28 RX ADMIN — ACETAMINOPHEN 650 MG: 325 TABLET, FILM COATED ORAL at 23:55

## 2023-11-28 RX ADMIN — DOCUSATE SODIUM 50 MG AND SENNOSIDES 8.6 MG 2 TABLET: 8.6; 5 TABLET, FILM COATED ORAL at 23:55

## 2023-11-29 ENCOUNTER — APPOINTMENT (OUTPATIENT)
Dept: GENERAL RADIOLOGY | Facility: HOSPITAL | Age: 53
End: 2023-11-29
Payer: COMMERCIAL

## 2023-11-29 PROBLEM — N17.9 ACUTE KIDNEY FAILURE, UNSPECIFIED: Status: ACTIVE | Noted: 2023-11-29

## 2023-11-29 LAB
ANION GAP SERPL CALCULATED.3IONS-SCNC: 14 MMOL/L (ref 5–15)
BASOPHILS # BLD AUTO: 0.03 10*3/MM3 (ref 0–0.2)
BASOPHILS NFR BLD AUTO: 0.3 % (ref 0–1.5)
BUN SERPL-MCNC: 82 MG/DL (ref 6–20)
BUN/CREAT SERPL: 21.8 (ref 7–25)
CALCIUM SPEC-SCNC: 8.3 MG/DL (ref 8.6–10.5)
CHLORIDE SERPL-SCNC: 103 MMOL/L (ref 98–107)
CHOLEST SERPL-MCNC: 173 MG/DL (ref 0–200)
CK SERPL-CCNC: 79 U/L (ref 20–180)
CO2 SERPL-SCNC: 23 MMOL/L (ref 22–29)
CREAT SERPL-MCNC: 3.77 MG/DL (ref 0.57–1)
DEPRECATED RDW RBC AUTO: 51.1 FL (ref 37–54)
EGFRCR SERPLBLD CKD-EPI 2021: 13.8 ML/MIN/1.73
EOSINOPHIL # BLD AUTO: 0.11 10*3/MM3 (ref 0–0.4)
EOSINOPHIL NFR BLD AUTO: 1.3 % (ref 0.3–6.2)
ERYTHROCYTE [DISTWIDTH] IN BLOOD BY AUTOMATED COUNT: 15.8 % (ref 12.3–15.4)
FERRITIN SERPL-MCNC: 123.9 NG/ML (ref 13–150)
GLUCOSE BLDC GLUCOMTR-MCNC: 116 MG/DL (ref 70–130)
GLUCOSE BLDC GLUCOMTR-MCNC: 122 MG/DL (ref 70–130)
GLUCOSE BLDC GLUCOMTR-MCNC: 127 MG/DL (ref 70–130)
GLUCOSE BLDC GLUCOMTR-MCNC: 159 MG/DL (ref 70–130)
GLUCOSE SERPL-MCNC: 200 MG/DL (ref 65–99)
HBA1C MFR BLD: 7 % (ref 4.8–5.6)
HCT VFR BLD AUTO: 37.8 % (ref 34–46.6)
HDLC SERPL-MCNC: 56 MG/DL (ref 40–60)
HGB BLD-MCNC: 12 G/DL (ref 12–15.9)
IMM GRANULOCYTES # BLD AUTO: 0.03 10*3/MM3 (ref 0–0.05)
IMM GRANULOCYTES NFR BLD AUTO: 0.3 % (ref 0–0.5)
IRON 24H UR-MRATE: 69 MCG/DL (ref 37–145)
IRON SATN MFR SERPL: 19 % (ref 20–50)
LDLC SERPL CALC-MCNC: 77 MG/DL (ref 0–100)
LDLC/HDLC SERPL: 1.21 {RATIO}
LIPASE SERPL-CCNC: 47 U/L (ref 13–60)
LYMPHOCYTES # BLD AUTO: 3.87 10*3/MM3 (ref 0.7–3.1)
LYMPHOCYTES NFR BLD AUTO: 44.6 % (ref 19.6–45.3)
MAGNESIUM SERPL-MCNC: 1.7 MG/DL (ref 1.6–2.6)
MCH RBC QN AUTO: 28.4 PG (ref 26.6–33)
MCHC RBC AUTO-ENTMCNC: 31.7 G/DL (ref 31.5–35.7)
MCV RBC AUTO: 89.6 FL (ref 79–97)
MONOCYTES # BLD AUTO: 0.65 10*3/MM3 (ref 0.1–0.9)
MONOCYTES NFR BLD AUTO: 7.5 % (ref 5–12)
NEUTROPHILS NFR BLD AUTO: 3.99 10*3/MM3 (ref 1.7–7)
NEUTROPHILS NFR BLD AUTO: 46 % (ref 42.7–76)
NRBC BLD AUTO-RTO: 0 /100 WBC (ref 0–0.2)
PHOSPHATE SERPL-MCNC: 4.5 MG/DL (ref 2.5–4.5)
PLATELET # BLD AUTO: 321 10*3/MM3 (ref 140–450)
PMV BLD AUTO: 11.1 FL (ref 6–12)
POTASSIUM SERPL-SCNC: 2.9 MMOL/L (ref 3.5–5.2)
PROT ?TM UR-MCNC: 10 MG/DL
QT INTERVAL: 376 MS
QTC INTERVAL: 454 MS
RBC # BLD AUTO: 4.22 10*6/MM3 (ref 3.77–5.28)
SODIUM SERPL-SCNC: 140 MMOL/L (ref 136–145)
SODIUM UR-SCNC: 60 MMOL/L
TIBC SERPL-MCNC: 371 MCG/DL (ref 298–536)
TRANSFERRIN SERPL-MCNC: 249 MG/DL (ref 200–360)
TRIGL SERPL-MCNC: 246 MG/DL (ref 0–150)
TSH SERPL DL<=0.05 MIU/L-ACNC: 0.34 UIU/ML (ref 0.27–4.2)
VLDLC SERPL-MCNC: 40 MG/DL (ref 5–40)
WBC NRBC COR # BLD AUTO: 8.68 10*3/MM3 (ref 3.4–10.8)

## 2023-11-29 PROCEDURE — 82570 ASSAY OF URINE CREATININE: CPT | Performed by: NURSE PRACTITIONER

## 2023-11-29 PROCEDURE — 84466 ASSAY OF TRANSFERRIN: CPT | Performed by: FAMILY MEDICINE

## 2023-11-29 PROCEDURE — 80048 BASIC METABOLIC PNL TOTAL CA: CPT | Performed by: FAMILY MEDICINE

## 2023-11-29 PROCEDURE — 83540 ASSAY OF IRON: CPT | Performed by: FAMILY MEDICINE

## 2023-11-29 PROCEDURE — 82728 ASSAY OF FERRITIN: CPT | Performed by: FAMILY MEDICINE

## 2023-11-29 PROCEDURE — 82550 ASSAY OF CK (CPK): CPT | Performed by: FAMILY MEDICINE

## 2023-11-29 PROCEDURE — 83690 ASSAY OF LIPASE: CPT | Performed by: NURSE PRACTITIONER

## 2023-11-29 PROCEDURE — 85025 COMPLETE CBC W/AUTO DIFF WBC: CPT | Performed by: FAMILY MEDICINE

## 2023-11-29 PROCEDURE — 83036 HEMOGLOBIN GLYCOSYLATED A1C: CPT | Performed by: FAMILY MEDICINE

## 2023-11-29 PROCEDURE — 80061 LIPID PANEL: CPT | Performed by: FAMILY MEDICINE

## 2023-11-29 PROCEDURE — 82948 REAGENT STRIP/BLOOD GLUCOSE: CPT

## 2023-11-29 PROCEDURE — 36415 COLL VENOUS BLD VENIPUNCTURE: CPT | Performed by: FAMILY MEDICINE

## 2023-11-29 PROCEDURE — 25810000003 SODIUM CHLORIDE 0.9 % SOLUTION: Performed by: NURSE PRACTITIONER

## 2023-11-29 PROCEDURE — 84100 ASSAY OF PHOSPHORUS: CPT | Performed by: FAMILY MEDICINE

## 2023-11-29 PROCEDURE — 84443 ASSAY THYROID STIM HORMONE: CPT | Performed by: FAMILY MEDICINE

## 2023-11-29 PROCEDURE — 83735 ASSAY OF MAGNESIUM: CPT | Performed by: FAMILY MEDICINE

## 2023-11-29 PROCEDURE — 71045 X-RAY EXAM CHEST 1 VIEW: CPT

## 2023-11-29 PROCEDURE — 25810000003 SODIUM CHLORIDE 0.9 % SOLUTION: Performed by: FAMILY MEDICINE

## 2023-11-29 PROCEDURE — 84300 ASSAY OF URINE SODIUM: CPT | Performed by: NURSE PRACTITIONER

## 2023-11-29 PROCEDURE — 84156 ASSAY OF PROTEIN URINE: CPT | Performed by: NURSE PRACTITIONER

## 2023-11-29 RX ORDER — NIFEDIPINE 30 MG/1
30 TABLET, EXTENDED RELEASE ORAL DAILY
Status: DISCONTINUED | OUTPATIENT
Start: 2023-11-29 | End: 2023-11-29

## 2023-11-29 RX ORDER — METOPROLOL SUCCINATE 25 MG/1
25 TABLET, EXTENDED RELEASE ORAL DAILY
Status: DISCONTINUED | OUTPATIENT
Start: 2023-11-29 | End: 2023-11-29

## 2023-11-29 RX ORDER — ROPINIROLE 1 MG/1
1 TABLET, FILM COATED ORAL 3 TIMES DAILY
Status: DISCONTINUED | OUTPATIENT
Start: 2023-11-29 | End: 2023-11-30 | Stop reason: HOSPADM

## 2023-11-29 RX ORDER — HYDROXYCHLOROQUINE SULFATE 200 MG/1
400 TABLET, FILM COATED ORAL DAILY
Status: DISCONTINUED | OUTPATIENT
Start: 2023-11-29 | End: 2023-11-30 | Stop reason: HOSPADM

## 2023-11-29 RX ORDER — POTASSIUM CHLORIDE 1.5 G/1.58G
40 POWDER, FOR SOLUTION ORAL ONCE
Status: COMPLETED | OUTPATIENT
Start: 2023-11-29 | End: 2023-11-29

## 2023-11-29 RX ORDER — POTASSIUM CHLORIDE 750 MG/1
40 CAPSULE, EXTENDED RELEASE ORAL ONCE
Status: COMPLETED | OUTPATIENT
Start: 2023-11-29 | End: 2023-11-29

## 2023-11-29 RX ORDER — SODIUM CHLORIDE 9 MG/ML
100 INJECTION, SOLUTION INTRAVENOUS CONTINUOUS
Status: DISCONTINUED | OUTPATIENT
Start: 2023-11-29 | End: 2023-11-30 | Stop reason: HOSPADM

## 2023-11-29 RX ORDER — INSULIN ASPART 100 [IU]/ML
60 INJECTION, SOLUTION INTRAVENOUS; SUBCUTANEOUS DAILY
COMMUNITY

## 2023-11-29 RX ORDER — NORTRIPTYLINE HYDROCHLORIDE 25 MG/1
25 CAPSULE ORAL NIGHTLY
Status: DISCONTINUED | OUTPATIENT
Start: 2023-11-29 | End: 2023-11-30 | Stop reason: HOSPADM

## 2023-11-29 RX ORDER — CALCITRIOL 0.25 UG/1
0.25 CAPSULE, LIQUID FILLED ORAL DAILY
Status: DISCONTINUED | OUTPATIENT
Start: 2023-11-29 | End: 2023-11-30 | Stop reason: HOSPADM

## 2023-11-29 RX ORDER — ATORVASTATIN CALCIUM 40 MG/1
80 TABLET, FILM COATED ORAL NIGHTLY
Status: DISCONTINUED | OUTPATIENT
Start: 2023-11-29 | End: 2023-11-30 | Stop reason: HOSPADM

## 2023-11-29 RX ORDER — PANTOPRAZOLE SODIUM 40 MG/1
40 TABLET, DELAYED RELEASE ORAL
Status: DISCONTINUED | OUTPATIENT
Start: 2023-11-30 | End: 2023-11-30 | Stop reason: HOSPADM

## 2023-11-29 RX ORDER — METOCLOPRAMIDE 5 MG/1
5 TABLET ORAL EVERY 6 HOURS PRN
COMMUNITY

## 2023-11-29 RX ORDER — ALBUTEROL SULFATE 2.5 MG/3ML
2.5 SOLUTION RESPIRATORY (INHALATION) EVERY 6 HOURS PRN
Status: DISCONTINUED | OUTPATIENT
Start: 2023-11-29 | End: 2023-11-30 | Stop reason: HOSPADM

## 2023-11-29 RX ORDER — SODIUM BICARBONATE 650 MG/1
650 TABLET ORAL 2 TIMES DAILY
Status: DISCONTINUED | OUTPATIENT
Start: 2023-11-29 | End: 2023-11-30 | Stop reason: HOSPADM

## 2023-11-29 RX ORDER — CETIRIZINE HYDROCHLORIDE 10 MG/1
10 TABLET ORAL DAILY
Status: DISCONTINUED | OUTPATIENT
Start: 2023-11-29 | End: 2023-11-30 | Stop reason: HOSPADM

## 2023-11-29 RX ORDER — SODIUM CHLORIDE 9 MG/ML
100 INJECTION, SOLUTION INTRAVENOUS CONTINUOUS
Status: DISPENSED | OUTPATIENT
Start: 2023-11-29 | End: 2023-11-29

## 2023-11-29 RX ADMIN — NORTRIPTYLINE HYDROCHLORIDE 25 MG: 25 CAPSULE ORAL at 20:41

## 2023-11-29 RX ADMIN — Medication 10 ML: at 10:10

## 2023-11-29 RX ADMIN — POTASSIUM CHLORIDE 40 MEQ: 750 CAPSULE, EXTENDED RELEASE ORAL at 12:52

## 2023-11-29 RX ADMIN — ANTACID TABLETS 1 TABLET: 500 TABLET, CHEWABLE ORAL at 10:17

## 2023-11-29 RX ADMIN — DOCUSATE SODIUM 50 MG AND SENNOSIDES 8.6 MG 2 TABLET: 8.6; 5 TABLET, FILM COATED ORAL at 10:16

## 2023-11-29 RX ADMIN — POTASSIUM CHLORIDE 40 MEQ: 1.5 POWDER, FOR SOLUTION ORAL at 03:39

## 2023-11-29 RX ADMIN — SODIUM BICARBONATE 650 MG: 650 TABLET ORAL at 12:52

## 2023-11-29 RX ADMIN — CETIRIZINE HYDROCHLORIDE 10 MG: 10 TABLET ORAL at 12:52

## 2023-11-29 RX ADMIN — HYDROXYCHLOROQUINE SULFATE 400 MG: 200 TABLET ORAL at 12:52

## 2023-11-29 RX ADMIN — ATORVASTATIN CALCIUM 80 MG: 40 TABLET ORAL at 20:48

## 2023-11-29 RX ADMIN — ROPINIROLE HYDROCHLORIDE 1 MG: 1 TABLET, FILM COATED ORAL at 20:41

## 2023-11-29 RX ADMIN — CALCITRIOL 0.25 MCG: 0.25 CAPSULE ORAL at 12:52

## 2023-11-29 RX ADMIN — SODIUM CHLORIDE 100 ML/HR: 9 INJECTION, SOLUTION INTRAVENOUS at 16:48

## 2023-11-29 RX ADMIN — SODIUM BICARBONATE 650 MG: 650 TABLET ORAL at 20:41

## 2023-11-29 RX ADMIN — DOCUSATE SODIUM 50 MG AND SENNOSIDES 8.6 MG 2 TABLET: 8.6; 5 TABLET, FILM COATED ORAL at 20:48

## 2023-11-29 RX ADMIN — SODIUM CHLORIDE 100 ML/HR: 9 INJECTION, SOLUTION INTRAVENOUS at 03:39

## 2023-11-29 RX ADMIN — ROPINIROLE HYDROCHLORIDE 1 MG: 1 TABLET, FILM COATED ORAL at 12:51

## 2023-11-30 ENCOUNTER — READMISSION MANAGEMENT (OUTPATIENT)
Dept: CALL CENTER | Facility: HOSPITAL | Age: 53
End: 2023-11-30
Payer: COMMERCIAL

## 2023-11-30 VITALS
DIASTOLIC BLOOD PRESSURE: 76 MMHG | RESPIRATION RATE: 18 BRPM | BODY MASS INDEX: 19.82 KG/M2 | HEIGHT: 60 IN | WEIGHT: 100.97 LBS | SYSTOLIC BLOOD PRESSURE: 121 MMHG | OXYGEN SATURATION: 97 % | TEMPERATURE: 97.8 F | HEART RATE: 89 BPM

## 2023-11-30 LAB
25(OH)D3 SERPL-MCNC: 17.7 NG/ML (ref 30–100)
ALBUMIN SERPL-MCNC: 3.2 G/DL (ref 3.5–5.2)
ALBUMIN/GLOB SERPL: 1.4 G/DL
ALP SERPL-CCNC: 86 U/L (ref 39–117)
ALT SERPL W P-5'-P-CCNC: 12 U/L (ref 1–33)
ANION GAP SERPL CALCULATED.3IONS-SCNC: 8 MMOL/L (ref 5–15)
AST SERPL-CCNC: 18 U/L (ref 1–32)
BILIRUB SERPL-MCNC: 0.4 MG/DL (ref 0–1.2)
BUN SERPL-MCNC: 40 MG/DL (ref 6–20)
BUN/CREAT SERPL: 22.9 (ref 7–25)
CALCIUM SPEC-SCNC: 8.4 MG/DL (ref 8.6–10.5)
CHLORIDE SERPL-SCNC: 113 MMOL/L (ref 98–107)
CO2 SERPL-SCNC: 24 MMOL/L (ref 22–29)
CREAT SERPL-MCNC: 1.75 MG/DL (ref 0.57–1)
CREAT UR-MCNC: 73.4 MG/DL
EGFRCR SERPLBLD CKD-EPI 2021: 34.7 ML/MIN/1.73
GLOBULIN UR ELPH-MCNC: 2.3 GM/DL
GLUCOSE BLDC GLUCOMTR-MCNC: 112 MG/DL (ref 70–130)
GLUCOSE BLDC GLUCOMTR-MCNC: 119 MG/DL (ref 70–130)
GLUCOSE SERPL-MCNC: 106 MG/DL (ref 65–99)
MAGNESIUM SERPL-MCNC: 1.5 MG/DL (ref 1.6–2.6)
PHOSPHATE SERPL-MCNC: 1.5 MG/DL (ref 2.5–4.5)
POTASSIUM SERPL-SCNC: 3.9 MMOL/L (ref 3.5–5.2)
PROT SERPL-MCNC: 5.5 G/DL (ref 6–8.5)
PTH-INTACT SERPL-MCNC: 147.3 PG/ML (ref 15–65)
SODIUM SERPL-SCNC: 145 MMOL/L (ref 136–145)

## 2023-11-30 PROCEDURE — 83970 ASSAY OF PARATHORMONE: CPT | Performed by: INTERNAL MEDICINE

## 2023-11-30 PROCEDURE — 25810000003 SODIUM CHLORIDE 0.9 % SOLUTION 250 ML FLEX CONT: Performed by: NURSE PRACTITIONER

## 2023-11-30 PROCEDURE — 84100 ASSAY OF PHOSPHORUS: CPT | Performed by: INTERNAL MEDICINE

## 2023-11-30 PROCEDURE — 82306 VITAMIN D 25 HYDROXY: CPT | Performed by: INTERNAL MEDICINE

## 2023-11-30 PROCEDURE — 82948 REAGENT STRIP/BLOOD GLUCOSE: CPT

## 2023-11-30 PROCEDURE — 83735 ASSAY OF MAGNESIUM: CPT | Performed by: NURSE PRACTITIONER

## 2023-11-30 PROCEDURE — 80053 COMPREHEN METABOLIC PANEL: CPT | Performed by: NURSE PRACTITIONER

## 2023-11-30 PROCEDURE — 63710000001 ONDANSETRON PER 8 MG: Performed by: FAMILY MEDICINE

## 2023-11-30 PROCEDURE — 25010000002 MAGNESIUM SULFATE 2 GM/50ML SOLUTION: Performed by: NURSE PRACTITIONER

## 2023-11-30 PROCEDURE — 25810000003 SODIUM CHLORIDE 0.9 % SOLUTION: Performed by: NURSE PRACTITIONER

## 2023-11-30 RX ORDER — MAGNESIUM SULFATE HEPTAHYDRATE 40 MG/ML
2 INJECTION, SOLUTION INTRAVENOUS ONCE
Status: COMPLETED | OUTPATIENT
Start: 2023-11-30 | End: 2023-11-30

## 2023-11-30 RX ADMIN — HYDROXYCHLOROQUINE SULFATE 400 MG: 200 TABLET ORAL at 08:26

## 2023-11-30 RX ADMIN — Medication 10 ML: at 08:27

## 2023-11-30 RX ADMIN — SODIUM CHLORIDE 100 ML/HR: 9 INJECTION, SOLUTION INTRAVENOUS at 05:37

## 2023-11-30 RX ADMIN — MAGNESIUM SULFATE HEPTAHYDRATE 2 G: 2 INJECTION, SOLUTION INTRAVENOUS at 08:56

## 2023-11-30 RX ADMIN — PANTOPRAZOLE SODIUM 40 MG: 40 TABLET, DELAYED RELEASE ORAL at 05:36

## 2023-11-30 RX ADMIN — DOCUSATE SODIUM 50 MG AND SENNOSIDES 8.6 MG 2 TABLET: 8.6; 5 TABLET, FILM COATED ORAL at 08:26

## 2023-11-30 RX ADMIN — SODIUM BICARBONATE 650 MG: 650 TABLET ORAL at 08:26

## 2023-11-30 RX ADMIN — ROPINIROLE HYDROCHLORIDE 1 MG: 1 TABLET, FILM COATED ORAL at 15:05

## 2023-11-30 RX ADMIN — SODIUM PHOSPHATE, MONOBASIC, MONOHYDRATE AND SODIUM PHOSPHATE, DIBASIC, ANHYDROUS 15 MMOL: 142; 276 INJECTION, SOLUTION INTRAVENOUS at 13:00

## 2023-11-30 RX ADMIN — ONDANSETRON 4 MG: 4 TABLET, FILM COATED ORAL at 09:47

## 2023-11-30 RX ADMIN — SODIUM PHOSPHATE, MONOBASIC, MONOHYDRATE AND SODIUM PHOSPHATE, DIBASIC, ANHYDROUS 15 MMOL: 142; 276 INJECTION, SOLUTION INTRAVENOUS at 08:56

## 2023-11-30 RX ADMIN — CALCITRIOL 0.25 MCG: 0.25 CAPSULE ORAL at 08:26

## 2023-11-30 RX ADMIN — ROPINIROLE HYDROCHLORIDE 1 MG: 1 TABLET, FILM COATED ORAL at 08:26

## 2023-11-30 RX ADMIN — CETIRIZINE HYDROCHLORIDE 10 MG: 10 TABLET ORAL at 08:26

## 2023-12-05 ENCOUNTER — READMISSION MANAGEMENT (OUTPATIENT)
Dept: CALL CENTER | Facility: HOSPITAL | Age: 53
End: 2023-12-05
Payer: COMMERCIAL

## 2023-12-06 ENCOUNTER — HOSPITAL ENCOUNTER (INPATIENT)
Facility: HOSPITAL | Age: 53
LOS: 2 days | Discharge: HOME | DRG: 683 | End: 2023-12-09
Attending: INTERNAL MEDICINE | Admitting: INTERNAL MEDICINE
Payer: COMMERCIAL

## 2023-12-06 DIAGNOSIS — Z74.09 IMPAIRED MOBILITY: ICD-10-CM

## 2023-12-06 DIAGNOSIS — N18.9 ACUTE KIDNEY INJURY SUPERIMPOSED ON CHRONIC KIDNEY DISEASE: Primary | ICD-10-CM

## 2023-12-06 DIAGNOSIS — N17.9 ACUTE RENAL FAILURE, UNSPECIFIED ACUTE RENAL FAILURE TYPE: ICD-10-CM

## 2023-12-06 DIAGNOSIS — N17.9 ACUTE KIDNEY INJURY SUPERIMPOSED ON CHRONIC KIDNEY DISEASE: Primary | ICD-10-CM

## 2023-12-06 LAB
ALBUMIN SERPL-MCNC: 4.5 G/DL (ref 3.5–5.2)
ALBUMIN/GLOB SERPL: 1.2 G/DL
ALP SERPL-CCNC: 124 U/L (ref 39–117)
ALT SERPL W P-5'-P-CCNC: 25 U/L (ref 1–33)
ANION GAP SERPL CALCULATED.3IONS-SCNC: 19 MMOL/L (ref 5–15)
AST SERPL-CCNC: 29 U/L (ref 1–32)
BACTERIA UR QL AUTO: ABNORMAL /HPF
BASOPHILS # BLD AUTO: 0.03 10*3/MM3 (ref 0–0.2)
BASOPHILS NFR BLD AUTO: 0.3 % (ref 0–1.5)
BILIRUB SERPL-MCNC: 0.3 MG/DL (ref 0–1.2)
BILIRUB UR QL STRIP: ABNORMAL
BUN SERPL-MCNC: 52 MG/DL (ref 6–20)
BUN/CREAT SERPL: 12.4 (ref 7–25)
CALCIUM SPEC-SCNC: 9.2 MG/DL (ref 8.6–10.5)
CHLORIDE SERPL-SCNC: 102 MMOL/L (ref 98–107)
CLARITY UR: CLEAR
CO2 SERPL-SCNC: 17 MMOL/L (ref 22–29)
COLOR UR: ABNORMAL
CREAT SERPL-MCNC: 4.18 MG/DL (ref 0.57–1)
D-LACTATE SERPL-SCNC: 1.3 MMOL/L (ref 0.5–2)
DEPRECATED RDW RBC AUTO: 52.5 FL (ref 37–54)
EGFRCR SERPLBLD CKD-EPI 2021: 12.2 ML/MIN/1.73
EOSINOPHIL # BLD AUTO: 0.14 10*3/MM3 (ref 0–0.4)
EOSINOPHIL NFR BLD AUTO: 1.3 % (ref 0.3–6.2)
ERYTHROCYTE [DISTWIDTH] IN BLOOD BY AUTOMATED COUNT: 15.9 % (ref 12.3–15.4)
GLOBULIN UR ELPH-MCNC: 3.7 GM/DL
GLUCOSE BLDC GLUCOMTR-MCNC: 141 MG/DL (ref 70–130)
GLUCOSE SERPL-MCNC: 104 MG/DL (ref 65–99)
GLUCOSE UR STRIP-MCNC: ABNORMAL MG/DL
HCT VFR BLD AUTO: 48.7 % (ref 34–46.6)
HGB BLD-MCNC: 15.1 G/DL (ref 12–15.9)
HGB UR QL STRIP.AUTO: NEGATIVE
HYALINE CASTS UR QL AUTO: ABNORMAL /LPF
IMM GRANULOCYTES # BLD AUTO: 0.04 10*3/MM3 (ref 0–0.05)
IMM GRANULOCYTES NFR BLD AUTO: 0.4 % (ref 0–0.5)
KETONES UR QL STRIP: ABNORMAL
LEUKOCYTE ESTERASE UR QL STRIP.AUTO: ABNORMAL
LYMPHOCYTES # BLD AUTO: 3.75 10*3/MM3 (ref 0.7–3.1)
LYMPHOCYTES NFR BLD AUTO: 34.6 % (ref 19.6–45.3)
MAGNESIUM SERPL-MCNC: 1.8 MG/DL (ref 1.6–2.6)
MCH RBC QN AUTO: 28.3 PG (ref 26.6–33)
MCHC RBC AUTO-ENTMCNC: 31 G/DL (ref 31.5–35.7)
MCV RBC AUTO: 91.4 FL (ref 79–97)
MONOCYTES # BLD AUTO: 0.56 10*3/MM3 (ref 0.1–0.9)
MONOCYTES NFR BLD AUTO: 5.2 % (ref 5–12)
NEUTROPHILS NFR BLD AUTO: 58.2 % (ref 42.7–76)
NEUTROPHILS NFR BLD AUTO: 6.31 10*3/MM3 (ref 1.7–7)
NITRITE UR QL STRIP: NEGATIVE
NRBC BLD AUTO-RTO: 0 /100 WBC (ref 0–0.2)
PH UR STRIP.AUTO: <=5 [PH] (ref 5–8)
PHOSPHATE SERPL-MCNC: 7.2 MG/DL (ref 2.5–4.5)
PLATELET # BLD AUTO: 367 10*3/MM3 (ref 140–450)
PMV BLD AUTO: 11.2 FL (ref 6–12)
POTASSIUM SERPL-SCNC: 3.2 MMOL/L (ref 3.5–5.2)
PROT SERPL-MCNC: 8.2 G/DL (ref 6–8.5)
PROT UR QL STRIP: ABNORMAL
RBC # BLD AUTO: 5.33 10*6/MM3 (ref 3.77–5.28)
RBC # UR STRIP: ABNORMAL /HPF
REF LAB TEST METHOD: ABNORMAL
SODIUM SERPL-SCNC: 138 MMOL/L (ref 136–145)
SP GR UR STRIP: 1.02 (ref 1–1.03)
SQUAMOUS #/AREA URNS HPF: ABNORMAL /HPF
UROBILINOGEN UR QL STRIP: ABNORMAL
WBC # UR STRIP: ABNORMAL /HPF
WBC NRBC COR # BLD AUTO: 10.83 10*3/MM3 (ref 3.4–10.8)

## 2023-12-06 PROCEDURE — 99285 EMERGENCY DEPT VISIT HI MDM: CPT

## 2023-12-06 PROCEDURE — 83735 ASSAY OF MAGNESIUM: CPT | Performed by: NURSE PRACTITIONER

## 2023-12-06 PROCEDURE — G0378 HOSPITAL OBSERVATION PER HR: HCPCS

## 2023-12-06 PROCEDURE — 25010000002 SODIUM CHLORIDE 0.9 % WITH KCL 20 MEQ 20-0.9 MEQ/L-% SOLUTION: Performed by: NURSE PRACTITIONER

## 2023-12-06 PROCEDURE — 25810000003 SODIUM CHLORIDE 0.9 % SOLUTION

## 2023-12-06 PROCEDURE — 25010000002 ONDANSETRON PER 1 MG

## 2023-12-06 PROCEDURE — 82948 REAGENT STRIP/BLOOD GLUCOSE: CPT

## 2023-12-06 PROCEDURE — 83605 ASSAY OF LACTIC ACID: CPT

## 2023-12-06 PROCEDURE — 25010000002 SODIUM CHLORIDE 0.9 % WITH KCL 20 MEQ 20-0.9 MEQ/L-% SOLUTION: Performed by: INTERNAL MEDICINE

## 2023-12-06 PROCEDURE — 85025 COMPLETE CBC W/AUTO DIFF WBC: CPT

## 2023-12-06 PROCEDURE — P9612 CATHETERIZE FOR URINE SPEC: HCPCS

## 2023-12-06 PROCEDURE — 81001 URINALYSIS AUTO W/SCOPE: CPT

## 2023-12-06 PROCEDURE — 84100 ASSAY OF PHOSPHORUS: CPT | Performed by: NURSE PRACTITIONER

## 2023-12-06 PROCEDURE — 25010000002 ONDANSETRON PER 1 MG: Performed by: NURSE PRACTITIONER

## 2023-12-06 PROCEDURE — 80053 COMPREHEN METABOLIC PANEL: CPT

## 2023-12-06 PROCEDURE — 25010000002 CEFTRIAXONE PER 250 MG

## 2023-12-06 RX ORDER — SODIUM CHLORIDE 9 MG/ML
40 INJECTION, SOLUTION INTRAVENOUS AS NEEDED
Status: DISCONTINUED | OUTPATIENT
Start: 2023-12-06 | End: 2023-12-09 | Stop reason: HOSPADM

## 2023-12-06 RX ORDER — INSULIN LISPRO 100 [IU]/ML
2-7 INJECTION, SOLUTION INTRAVENOUS; SUBCUTANEOUS
Status: DISCONTINUED | OUTPATIENT
Start: 2023-12-06 | End: 2023-12-09 | Stop reason: HOSPADM

## 2023-12-06 RX ORDER — POLYETHYLENE GLYCOL 3350 17 G/17G
17 POWDER, FOR SOLUTION ORAL DAILY PRN
Status: DISCONTINUED | OUTPATIENT
Start: 2023-12-06 | End: 2023-12-09 | Stop reason: HOSPADM

## 2023-12-06 RX ORDER — TRAMADOL HYDROCHLORIDE 50 MG/1
50 TABLET ORAL
COMMUNITY

## 2023-12-06 RX ORDER — BISACODYL 10 MG
10 SUPPOSITORY, RECTAL RECTAL DAILY PRN
Status: DISCONTINUED | OUTPATIENT
Start: 2023-12-06 | End: 2023-12-09 | Stop reason: HOSPADM

## 2023-12-06 RX ORDER — ACETAMINOPHEN 160 MG/5ML
650 SOLUTION ORAL EVERY 4 HOURS PRN
Status: DISCONTINUED | OUTPATIENT
Start: 2023-12-06 | End: 2023-12-09 | Stop reason: HOSPADM

## 2023-12-06 RX ORDER — PREGABALIN 75 MG/1
150 CAPSULE ORAL 2 TIMES DAILY
Status: DISCONTINUED | OUTPATIENT
Start: 2023-12-06 | End: 2023-12-09 | Stop reason: HOSPADM

## 2023-12-06 RX ORDER — IBUPROFEN 600 MG/1
1 TABLET ORAL
Status: DISCONTINUED | OUTPATIENT
Start: 2023-12-06 | End: 2023-12-09 | Stop reason: HOSPADM

## 2023-12-06 RX ORDER — ONDANSETRON 2 MG/ML
4 INJECTION INTRAMUSCULAR; INTRAVENOUS EVERY 6 HOURS PRN
Status: DISCONTINUED | OUTPATIENT
Start: 2023-12-06 | End: 2023-12-09 | Stop reason: HOSPADM

## 2023-12-06 RX ORDER — PROMETHAZINE HYDROCHLORIDE 25 MG/1
25 TABLET ORAL EVERY 6 HOURS PRN
COMMUNITY
Start: 2023-11-27

## 2023-12-06 RX ORDER — NORTRIPTYLINE HYDROCHLORIDE 25 MG/1
50 CAPSULE ORAL NIGHTLY
Status: DISCONTINUED | OUTPATIENT
Start: 2023-12-06 | End: 2023-12-09 | Stop reason: HOSPADM

## 2023-12-06 RX ORDER — TIZANIDINE HYDROCHLORIDE 4 MG/1
4 CAPSULE, GELATIN COATED ORAL 3 TIMES DAILY
COMMUNITY
End: 2023-12-09 | Stop reason: HOSPADM

## 2023-12-06 RX ORDER — METOCLOPRAMIDE 5 MG/1
5 TABLET ORAL EVERY 6 HOURS PRN
Status: DISCONTINUED | OUTPATIENT
Start: 2023-12-06 | End: 2023-12-09 | Stop reason: HOSPADM

## 2023-12-06 RX ORDER — MAGNESIUM SULFATE 1 G/100ML
1 INJECTION INTRAVENOUS ONCE
Status: COMPLETED | OUTPATIENT
Start: 2023-12-06 | End: 2023-12-07

## 2023-12-06 RX ORDER — TRIAMCINOLONE ACETONIDE 1 MG/G
CREAM TOPICAL
Status: ON HOLD | COMMUNITY
End: 2023-12-07

## 2023-12-06 RX ORDER — PROMETHAZINE HYDROCHLORIDE 25 MG/1
25 TABLET ORAL EVERY 6 HOURS PRN
Status: DISCONTINUED | OUTPATIENT
Start: 2023-12-06 | End: 2023-12-09 | Stop reason: HOSPADM

## 2023-12-06 RX ORDER — DEXTROSE MONOHYDRATE 25 G/50ML
25 INJECTION, SOLUTION INTRAVENOUS
Status: DISCONTINUED | OUTPATIENT
Start: 2023-12-06 | End: 2023-12-09 | Stop reason: HOSPADM

## 2023-12-06 RX ORDER — ONDANSETRON 4 MG/1
4 TABLET, FILM COATED ORAL EVERY 6 HOURS PRN
Status: DISCONTINUED | OUTPATIENT
Start: 2023-12-06 | End: 2023-12-09 | Stop reason: HOSPADM

## 2023-12-06 RX ORDER — SODIUM BICARBONATE 650 MG/1
650 TABLET ORAL 2 TIMES DAILY
Status: DISCONTINUED | OUTPATIENT
Start: 2023-12-06 | End: 2023-12-09 | Stop reason: HOSPADM

## 2023-12-06 RX ORDER — ONDANSETRON 2 MG/ML
4 INJECTION INTRAMUSCULAR; INTRAVENOUS ONCE
Status: COMPLETED | OUTPATIENT
Start: 2023-12-06 | End: 2023-12-06

## 2023-12-06 RX ORDER — SODIUM CHLORIDE AND POTASSIUM CHLORIDE 150; 900 MG/100ML; MG/100ML
100 INJECTION, SOLUTION INTRAVENOUS CONTINUOUS
Status: DISCONTINUED | OUTPATIENT
Start: 2023-12-06 | End: 2023-12-09 | Stop reason: HOSPADM

## 2023-12-06 RX ORDER — HYDROXYCHLOROQUINE SULFATE 200 MG/1
400 TABLET, FILM COATED ORAL DAILY
Status: DISCONTINUED | OUTPATIENT
Start: 2023-12-07 | End: 2023-12-09 | Stop reason: HOSPADM

## 2023-12-06 RX ORDER — PANTOPRAZOLE SODIUM 40 MG/1
40 TABLET, DELAYED RELEASE ORAL
Status: DISCONTINUED | OUTPATIENT
Start: 2023-12-07 | End: 2023-12-09 | Stop reason: HOSPADM

## 2023-12-06 RX ORDER — ALUMINA, MAGNESIA, AND SIMETHICONE 2400; 2400; 240 MG/30ML; MG/30ML; MG/30ML
15 SUSPENSION ORAL EVERY 6 HOURS PRN
Status: DISCONTINUED | OUTPATIENT
Start: 2023-12-06 | End: 2023-12-09 | Stop reason: HOSPADM

## 2023-12-06 RX ORDER — NICOTINE POLACRILEX 4 MG
15 LOZENGE BUCCAL
Status: DISCONTINUED | OUTPATIENT
Start: 2023-12-06 | End: 2023-12-09 | Stop reason: HOSPADM

## 2023-12-06 RX ORDER — DULOXETIN HYDROCHLORIDE 30 MG/1
60 CAPSULE, DELAYED RELEASE ORAL DAILY
Status: DISCONTINUED | OUTPATIENT
Start: 2023-12-07 | End: 2023-12-09 | Stop reason: HOSPADM

## 2023-12-06 RX ORDER — ACETAMINOPHEN 650 MG/1
650 SUPPOSITORY RECTAL EVERY 4 HOURS PRN
Status: DISCONTINUED | OUTPATIENT
Start: 2023-12-06 | End: 2023-12-09 | Stop reason: HOSPADM

## 2023-12-06 RX ORDER — SODIUM CHLORIDE 9 MG/ML
125 INJECTION, SOLUTION INTRAVENOUS CONTINUOUS
Status: DISCONTINUED | OUTPATIENT
Start: 2023-12-06 | End: 2023-12-06

## 2023-12-06 RX ORDER — SODIUM CHLORIDE 0.9 % (FLUSH) 0.9 %
10 SYRINGE (ML) INJECTION EVERY 12 HOURS SCHEDULED
Status: DISCONTINUED | OUTPATIENT
Start: 2023-12-06 | End: 2023-12-09 | Stop reason: HOSPADM

## 2023-12-06 RX ORDER — SODIUM CHLORIDE 0.9 % (FLUSH) 0.9 %
10 SYRINGE (ML) INJECTION AS NEEDED
Status: DISCONTINUED | OUTPATIENT
Start: 2023-12-06 | End: 2023-12-09 | Stop reason: HOSPADM

## 2023-12-06 RX ORDER — AMOXICILLIN 250 MG
1 CAPSULE ORAL NIGHTLY PRN
Status: DISCONTINUED | OUTPATIENT
Start: 2023-12-06 | End: 2023-12-09 | Stop reason: HOSPADM

## 2023-12-06 RX ORDER — ACETAMINOPHEN 325 MG/1
650 TABLET ORAL EVERY 4 HOURS PRN
Status: DISCONTINUED | OUTPATIENT
Start: 2023-12-06 | End: 2023-12-09 | Stop reason: HOSPADM

## 2023-12-06 RX ORDER — ROPINIROLE 1 MG/1
1 TABLET, FILM COATED ORAL 3 TIMES DAILY
Status: DISCONTINUED | OUTPATIENT
Start: 2023-12-06 | End: 2023-12-09 | Stop reason: HOSPADM

## 2023-12-06 RX ORDER — UREA 40 G/100G
1 LOTION TOPICAL DAILY
COMMUNITY

## 2023-12-06 RX ORDER — BISACODYL 5 MG/1
5 TABLET, DELAYED RELEASE ORAL DAILY PRN
Status: DISCONTINUED | OUTPATIENT
Start: 2023-12-06 | End: 2023-12-09 | Stop reason: HOSPADM

## 2023-12-06 RX ADMIN — HYOSCYAMINE SULFATE 125 MCG: 0.12 TABLET SUBLINGUAL at 23:32

## 2023-12-06 RX ADMIN — NORTRIPTYLINE HYDROCHLORIDE 50 MG: 25 CAPSULE ORAL at 23:09

## 2023-12-06 RX ADMIN — SODIUM CHLORIDE 1000 ML: 9 INJECTION, SOLUTION INTRAVENOUS at 20:23

## 2023-12-06 RX ADMIN — POTASSIUM CHLORIDE AND SODIUM CHLORIDE 125 ML/HR: 900; 150 INJECTION, SOLUTION INTRAVENOUS at 23:08

## 2023-12-06 RX ADMIN — ONDANSETRON 4 MG: 2 INJECTION INTRAMUSCULAR; INTRAVENOUS at 23:19

## 2023-12-06 RX ADMIN — ONDANSETRON 4 MG: 2 INJECTION INTRAMUSCULAR; INTRAVENOUS at 20:18

## 2023-12-06 RX ADMIN — ALUMINUM HYDROXIDE, MAGNESIUM HYDROXIDE, AND DIMETHICONE 15 ML: 400; 400; 40 SUSPENSION ORAL at 23:19

## 2023-12-06 RX ADMIN — SODIUM BICARBONATE 650 MG: 650 TABLET ORAL at 23:09

## 2023-12-06 RX ADMIN — ROPINIROLE HYDROCHLORIDE 1 MG: 1 TABLET, FILM COATED ORAL at 23:09

## 2023-12-06 RX ADMIN — CEFTRIAXONE 1000 MG: 1 INJECTION, POWDER, FOR SOLUTION INTRAMUSCULAR; INTRAVENOUS at 23:07

## 2023-12-06 RX ADMIN — PREGABALIN 150 MG: 75 CAPSULE ORAL at 23:09

## 2023-12-07 ENCOUNTER — READMISSION MANAGEMENT (OUTPATIENT)
Dept: CALL CENTER | Facility: HOSPITAL | Age: 53
End: 2023-12-07
Payer: COMMERCIAL

## 2023-12-07 PROBLEM — N17.9 ACUTE KIDNEY FAILURE: Status: ACTIVE | Noted: 2023-12-07

## 2023-12-07 LAB
ADV 40+41 DNA STL QL NAA+NON-PROBE: NOT DETECTED
ANION GAP SERPL CALCULATED.3IONS-SCNC: 15 MMOL/L (ref 5–15)
ANION GAP SERPL CALCULATED.3IONS-SCNC: 20 MMOL/L (ref 5–15)
ASTRO TYP 1-8 RNA STL QL NAA+NON-PROBE: NOT DETECTED
BUN SERPL-MCNC: 48 MG/DL (ref 6–20)
BUN SERPL-MCNC: 57 MG/DL (ref 6–20)
BUN/CREAT SERPL: 13.1 (ref 7–25)
BUN/CREAT SERPL: 14.5 (ref 7–25)
C CAYETANENSIS DNA STL QL NAA+NON-PROBE: NOT DETECTED
C COLI+JEJ+UPSA DNA STL QL NAA+NON-PROBE: NOT DETECTED
C DIFF TOX GENS STL QL NAA+PROBE: NEGATIVE
CALCIUM SPEC-SCNC: 7 MG/DL (ref 8.6–10.5)
CALCIUM SPEC-SCNC: 8.1 MG/DL (ref 8.6–10.5)
CHLORIDE SERPL-SCNC: 102 MMOL/L (ref 98–107)
CHLORIDE SERPL-SCNC: 111 MMOL/L (ref 98–107)
CO2 SERPL-SCNC: 14 MMOL/L (ref 22–29)
CO2 SERPL-SCNC: 18 MMOL/L (ref 22–29)
CREAT SERPL-MCNC: 3.31 MG/DL (ref 0.57–1)
CREAT SERPL-MCNC: 4.35 MG/DL (ref 0.57–1)
CRYPTOSP DNA STL QL NAA+NON-PROBE: NOT DETECTED
DEPRECATED RDW RBC AUTO: 53.1 FL (ref 37–54)
E HISTOLYT DNA STL QL NAA+NON-PROBE: NOT DETECTED
EAEC PAA PLAS AGGR+AATA ST NAA+NON-PRB: NOT DETECTED
EC STX1+STX2 GENES STL QL NAA+NON-PROBE: NOT DETECTED
EGFRCR SERPLBLD CKD-EPI 2021: 11.6 ML/MIN/1.73
EGFRCR SERPLBLD CKD-EPI 2021: 16.2 ML/MIN/1.73
EPEC EAE GENE STL QL NAA+NON-PROBE: NOT DETECTED
ERYTHROCYTE [DISTWIDTH] IN BLOOD BY AUTOMATED COUNT: 15.8 % (ref 12.3–15.4)
ETEC LTA+ST1A+ST1B TOX ST NAA+NON-PROBE: NOT DETECTED
G LAMBLIA DNA STL QL NAA+NON-PROBE: NOT DETECTED
GLUCOSE BLDC GLUCOMTR-MCNC: 146 MG/DL (ref 70–130)
GLUCOSE BLDC GLUCOMTR-MCNC: 146 MG/DL (ref 70–130)
GLUCOSE BLDC GLUCOMTR-MCNC: 149 MG/DL (ref 70–130)
GLUCOSE BLDC GLUCOMTR-MCNC: 74 MG/DL (ref 70–130)
GLUCOSE SERPL-MCNC: 104 MG/DL (ref 65–99)
GLUCOSE SERPL-MCNC: 156 MG/DL (ref 65–99)
HCT VFR BLD AUTO: 44.4 % (ref 34–46.6)
HGB BLD-MCNC: 13.5 G/DL (ref 12–15.9)
MCH RBC QN AUTO: 28.1 PG (ref 26.6–33)
MCHC RBC AUTO-ENTMCNC: 30.4 G/DL (ref 31.5–35.7)
MCV RBC AUTO: 92.3 FL (ref 79–97)
NOROVIRUS GI+II RNA STL QL NAA+NON-PROBE: NOT DETECTED
P SHIGELLOIDES DNA STL QL NAA+NON-PROBE: NOT DETECTED
PLATELET # BLD AUTO: 338 10*3/MM3 (ref 140–450)
PMV BLD AUTO: 11.4 FL (ref 6–12)
POTASSIUM SERPL-SCNC: 3.2 MMOL/L (ref 3.5–5.2)
POTASSIUM SERPL-SCNC: 3.4 MMOL/L (ref 3.5–5.2)
RBC # BLD AUTO: 4.81 10*6/MM3 (ref 3.77–5.28)
RVA RNA STL QL NAA+NON-PROBE: NOT DETECTED
S ENT+BONG DNA STL QL NAA+NON-PROBE: NOT DETECTED
SAPO I+II+IV+V RNA STL QL NAA+NON-PROBE: NOT DETECTED
SHIGELLA SP+EIEC IPAH ST NAA+NON-PROBE: NOT DETECTED
SODIUM SERPL-SCNC: 140 MMOL/L (ref 136–145)
SODIUM SERPL-SCNC: 140 MMOL/L (ref 136–145)
V CHOL+PARA+VUL DNA STL QL NAA+NON-PROBE: NOT DETECTED
V CHOLERAE DNA STL QL NAA+NON-PROBE: NOT DETECTED
WBC NRBC COR # BLD AUTO: 11.65 10*3/MM3 (ref 3.4–10.8)
Y ENTEROCOL DNA STL QL NAA+NON-PROBE: NOT DETECTED

## 2023-12-07 PROCEDURE — 80048 BASIC METABOLIC PNL TOTAL CA: CPT | Performed by: NURSE PRACTITIONER

## 2023-12-07 PROCEDURE — 87507 IADNA-DNA/RNA PROBE TQ 12-25: CPT | Performed by: NURSE PRACTITIONER

## 2023-12-07 PROCEDURE — 25010000002 MAGNESIUM SULFATE IN D5W 1G/100ML (PREMIX) 1-5 GM/100ML-% SOLUTION: Performed by: NURSE PRACTITIONER

## 2023-12-07 PROCEDURE — 82948 REAGENT STRIP/BLOOD GLUCOSE: CPT

## 2023-12-07 PROCEDURE — 87493 C DIFF AMPLIFIED PROBE: CPT | Performed by: NURSE PRACTITIONER

## 2023-12-07 PROCEDURE — 25810000003 SODIUM CHLORIDE 0.9 % SOLUTION: Performed by: FAMILY MEDICINE

## 2023-12-07 PROCEDURE — 25010000002 SODIUM CHLORIDE 0.9 % WITH KCL 20 MEQ 20-0.9 MEQ/L-% SOLUTION: Performed by: NURSE PRACTITIONER

## 2023-12-07 PROCEDURE — 85027 COMPLETE CBC AUTOMATED: CPT | Performed by: NURSE PRACTITIONER

## 2023-12-07 PROCEDURE — 80048 BASIC METABOLIC PNL TOTAL CA: CPT | Performed by: FAMILY MEDICINE

## 2023-12-07 RX ORDER — NIFEDIPINE 60 MG/1
60 TABLET, EXTENDED RELEASE ORAL DAILY
COMMUNITY
End: 2023-12-09 | Stop reason: HOSPADM

## 2023-12-07 RX ORDER — POTASSIUM CHLORIDE 1.5 G/1.58G
20 POWDER, FOR SOLUTION ORAL ONCE
Status: COMPLETED | OUTPATIENT
Start: 2023-12-07 | End: 2023-12-07

## 2023-12-07 RX ORDER — FAMOTIDINE 40 MG/1
40 TABLET, FILM COATED ORAL 2 TIMES DAILY PRN
COMMUNITY

## 2023-12-07 RX ADMIN — PANCRELIPASE 24000 UNITS OF LIPASE: 60000; 12000; 38000 CAPSULE, DELAYED RELEASE PELLETS ORAL at 22:38

## 2023-12-07 RX ADMIN — ROPINIROLE HYDROCHLORIDE 1 MG: 1 TABLET, FILM COATED ORAL at 18:28

## 2023-12-07 RX ADMIN — PANCRELIPASE 24000 UNITS OF LIPASE: 60000; 12000; 38000 CAPSULE, DELAYED RELEASE PELLETS ORAL at 18:28

## 2023-12-07 RX ADMIN — SODIUM CHLORIDE 1000 ML: 9 INJECTION, SOLUTION INTRAVENOUS at 15:20

## 2023-12-07 RX ADMIN — SODIUM BICARBONATE 650 MG: 650 TABLET ORAL at 22:38

## 2023-12-07 RX ADMIN — POTASSIUM CHLORIDE AND SODIUM CHLORIDE 125 ML/HR: 900; 150 INJECTION, SOLUTION INTRAVENOUS at 15:18

## 2023-12-07 RX ADMIN — PANCRELIPASE 24000 UNITS OF LIPASE: 60000; 12000; 38000 CAPSULE, DELAYED RELEASE PELLETS ORAL at 08:37

## 2023-12-07 RX ADMIN — Medication 10 ML: at 22:38

## 2023-12-07 RX ADMIN — ROPINIROLE HYDROCHLORIDE 1 MG: 1 TABLET, FILM COATED ORAL at 08:37

## 2023-12-07 RX ADMIN — PANTOPRAZOLE SODIUM 40 MG: 40 TABLET, DELAYED RELEASE ORAL at 07:00

## 2023-12-07 RX ADMIN — PREGABALIN 150 MG: 75 CAPSULE ORAL at 08:41

## 2023-12-07 RX ADMIN — ACETAMINOPHEN 650 MG: 325 TABLET, FILM COATED ORAL at 18:49

## 2023-12-07 RX ADMIN — ROPINIROLE HYDROCHLORIDE 1 MG: 1 TABLET, FILM COATED ORAL at 22:38

## 2023-12-07 RX ADMIN — NORTRIPTYLINE HYDROCHLORIDE 50 MG: 25 CAPSULE ORAL at 22:38

## 2023-12-07 RX ADMIN — DULOXETINE HYDROCHLORIDE 60 MG: 30 CAPSULE, DELAYED RELEASE ORAL at 08:37

## 2023-12-07 RX ADMIN — PREGABALIN 150 MG: 75 CAPSULE ORAL at 22:38

## 2023-12-07 RX ADMIN — PANCRELIPASE 24000 UNITS OF LIPASE: 60000; 12000; 38000 CAPSULE, DELAYED RELEASE PELLETS ORAL at 12:30

## 2023-12-07 RX ADMIN — HYDROXYCHLOROQUINE SULFATE 400 MG: 200 TABLET ORAL at 08:37

## 2023-12-07 RX ADMIN — POTASSIUM CHLORIDE 20 MEQ: 1.5 POWDER, FOR SOLUTION ORAL at 12:09

## 2023-12-07 RX ADMIN — SODIUM BICARBONATE 650 MG: 650 TABLET ORAL at 08:37

## 2023-12-07 RX ADMIN — ACETAMINOPHEN 650 MG: 325 TABLET, FILM COATED ORAL at 08:41

## 2023-12-07 RX ADMIN — SODIUM CHLORIDE 1000 ML: 9 INJECTION, SOLUTION INTRAVENOUS at 12:55

## 2023-12-07 RX ADMIN — MAGNESIUM SULFATE IN DEXTROSE 1 G: 10 INJECTION, SOLUTION INTRAVENOUS at 00:07

## 2023-12-08 LAB
ANION GAP SERPL CALCULATED.3IONS-SCNC: 8 MMOL/L (ref 5–15)
BUN SERPL-MCNC: 36 MG/DL (ref 6–20)
BUN/CREAT SERPL: 16 (ref 7–25)
CALCIUM SPEC-SCNC: 7 MG/DL (ref 8.6–10.5)
CHLORIDE SERPL-SCNC: 119 MMOL/L (ref 98–107)
CO2 SERPL-SCNC: 17 MMOL/L (ref 22–29)
CREAT SERPL-MCNC: 2.25 MG/DL (ref 0.57–1)
EGFRCR SERPLBLD CKD-EPI 2021: 25.7 ML/MIN/1.73
GLUCOSE BLDC GLUCOMTR-MCNC: 104 MG/DL (ref 70–130)
GLUCOSE BLDC GLUCOMTR-MCNC: 186 MG/DL (ref 70–130)
GLUCOSE BLDC GLUCOMTR-MCNC: 52 MG/DL (ref 70–130)
GLUCOSE BLDC GLUCOMTR-MCNC: 61 MG/DL (ref 70–130)
GLUCOSE BLDC GLUCOMTR-MCNC: 62 MG/DL (ref 70–130)
GLUCOSE BLDC GLUCOMTR-MCNC: 67 MG/DL (ref 70–130)
GLUCOSE BLDC GLUCOMTR-MCNC: 69 MG/DL (ref 70–130)
GLUCOSE BLDC GLUCOMTR-MCNC: 84 MG/DL (ref 70–130)
GLUCOSE SERPL-MCNC: 56 MG/DL (ref 65–99)
MAGNESIUM SERPL-MCNC: 1.7 MG/DL (ref 1.6–2.6)
PHOSPHATE SERPL-MCNC: 2.6 MG/DL (ref 2.5–4.5)
POTASSIUM SERPL-SCNC: 3.2 MMOL/L (ref 3.5–5.2)
SODIUM SERPL-SCNC: 144 MMOL/L (ref 136–145)

## 2023-12-08 PROCEDURE — 82948 REAGENT STRIP/BLOOD GLUCOSE: CPT

## 2023-12-08 PROCEDURE — C1751 CATH, INF, PER/CENT/MIDLINE: HCPCS

## 2023-12-08 PROCEDURE — 80048 BASIC METABOLIC PNL TOTAL CA: CPT | Performed by: FAMILY MEDICINE

## 2023-12-08 PROCEDURE — 84100 ASSAY OF PHOSPHORUS: CPT | Performed by: FAMILY MEDICINE

## 2023-12-08 PROCEDURE — 25010000002 SODIUM CHLORIDE 0.9 % WITH KCL 20 MEQ 20-0.9 MEQ/L-% SOLUTION: Performed by: FAMILY MEDICINE

## 2023-12-08 PROCEDURE — 05HC33Z INSERTION OF INFUSION DEVICE INTO LEFT BASILIC VEIN, PERCUTANEOUS APPROACH: ICD-10-PCS | Performed by: INTERNAL MEDICINE

## 2023-12-08 PROCEDURE — 97161 PT EVAL LOW COMPLEX 20 MIN: CPT | Performed by: PHYSICAL THERAPIST

## 2023-12-08 PROCEDURE — 83735 ASSAY OF MAGNESIUM: CPT | Performed by: FAMILY MEDICINE

## 2023-12-08 RX ORDER — SODIUM CHLORIDE 0.9 % (FLUSH) 0.9 %
10 SYRINGE (ML) INJECTION AS NEEDED
Status: DISCONTINUED | OUTPATIENT
Start: 2023-12-08 | End: 2023-12-09 | Stop reason: HOSPADM

## 2023-12-08 RX ORDER — SODIUM CHLORIDE 0.9 % (FLUSH) 0.9 %
10 SYRINGE (ML) INJECTION EVERY 12 HOURS SCHEDULED
Status: DISCONTINUED | OUTPATIENT
Start: 2023-12-08 | End: 2023-12-09 | Stop reason: HOSPADM

## 2023-12-08 RX ORDER — SODIUM CHLORIDE 9 MG/ML
40 INJECTION, SOLUTION INTRAVENOUS AS NEEDED
Status: DISCONTINUED | OUTPATIENT
Start: 2023-12-08 | End: 2023-12-09 | Stop reason: HOSPADM

## 2023-12-08 RX ORDER — LIDOCAINE HYDROCHLORIDE 10 MG/ML
1 INJECTION, SOLUTION EPIDURAL; INFILTRATION; INTRACAUDAL; PERINEURAL ONCE
Status: COMPLETED | OUTPATIENT
Start: 2023-12-08 | End: 2023-12-08

## 2023-12-08 RX ORDER — POTASSIUM CHLORIDE 750 MG/1
20 CAPSULE, EXTENDED RELEASE ORAL 2 TIMES DAILY
Status: DISCONTINUED | OUTPATIENT
Start: 2023-12-08 | End: 2023-12-09 | Stop reason: HOSPADM

## 2023-12-08 RX ORDER — SODIUM CHLORIDE 0.9 % (FLUSH) 0.9 %
20 SYRINGE (ML) INJECTION AS NEEDED
Status: DISCONTINUED | OUTPATIENT
Start: 2023-12-08 | End: 2023-12-09 | Stop reason: HOSPADM

## 2023-12-08 RX ORDER — POTASSIUM CHLORIDE 1.5 G/1.58G
20 POWDER, FOR SOLUTION ORAL 2 TIMES DAILY
Status: DISCONTINUED | OUTPATIENT
Start: 2023-12-08 | End: 2023-12-08

## 2023-12-08 RX ADMIN — PREGABALIN 150 MG: 75 CAPSULE ORAL at 22:09

## 2023-12-08 RX ADMIN — NORTRIPTYLINE HYDROCHLORIDE 50 MG: 25 CAPSULE ORAL at 22:09

## 2023-12-08 RX ADMIN — SODIUM BICARBONATE 650 MG: 650 TABLET ORAL at 22:08

## 2023-12-08 RX ADMIN — PREGABALIN 150 MG: 75 CAPSULE ORAL at 08:12

## 2023-12-08 RX ADMIN — Medication 10 ML: at 21:57

## 2023-12-08 RX ADMIN — POTASSIUM CHLORIDE 20 MEQ: 750 CAPSULE, EXTENDED RELEASE ORAL at 22:09

## 2023-12-08 RX ADMIN — ROPINIROLE HYDROCHLORIDE 1 MG: 1 TABLET, FILM COATED ORAL at 08:13

## 2023-12-08 RX ADMIN — HYDROXYCHLOROQUINE SULFATE 400 MG: 200 TABLET ORAL at 08:13

## 2023-12-08 RX ADMIN — PANCRELIPASE 24000 UNITS OF LIPASE: 60000; 12000; 38000 CAPSULE, DELAYED RELEASE PELLETS ORAL at 11:30

## 2023-12-08 RX ADMIN — LIDOCAINE HYDROCHLORIDE ANHYDROUS 1 ML: 10 INJECTION, SOLUTION INFILTRATION at 14:18

## 2023-12-08 RX ADMIN — POTASSIUM CHLORIDE 20 MEQ: 750 CAPSULE, EXTENDED RELEASE ORAL at 11:30

## 2023-12-08 RX ADMIN — DULOXETINE HYDROCHLORIDE 60 MG: 30 CAPSULE, DELAYED RELEASE ORAL at 08:13

## 2023-12-08 RX ADMIN — SODIUM BICARBONATE 650 MG: 650 TABLET ORAL at 08:13

## 2023-12-08 RX ADMIN — PANCRELIPASE 24000 UNITS OF LIPASE: 60000; 12000; 38000 CAPSULE, DELAYED RELEASE PELLETS ORAL at 08:13

## 2023-12-08 RX ADMIN — ROPINIROLE HYDROCHLORIDE 1 MG: 1 TABLET, FILM COATED ORAL at 17:55

## 2023-12-08 RX ADMIN — PANTOPRAZOLE SODIUM 40 MG: 40 TABLET, DELAYED RELEASE ORAL at 05:57

## 2023-12-08 RX ADMIN — POTASSIUM CHLORIDE AND SODIUM CHLORIDE 150 ML/HR: 900; 150 INJECTION, SOLUTION INTRAVENOUS at 13:17

## 2023-12-08 RX ADMIN — POTASSIUM CHLORIDE AND SODIUM CHLORIDE 150 ML/HR: 900; 150 INJECTION, SOLUTION INTRAVENOUS at 05:59

## 2023-12-08 RX ADMIN — PANCRELIPASE 24000 UNITS OF LIPASE: 60000; 12000; 38000 CAPSULE, DELAYED RELEASE PELLETS ORAL at 22:08

## 2023-12-08 RX ADMIN — ROPINIROLE HYDROCHLORIDE 1 MG: 1 TABLET, FILM COATED ORAL at 22:09

## 2023-12-08 RX ADMIN — POTASSIUM CHLORIDE AND SODIUM CHLORIDE 150 ML/HR: 900; 150 INJECTION, SOLUTION INTRAVENOUS at 00:07

## 2023-12-08 RX ADMIN — DEXTROSE MONOHYDRATE 25 G: 25 INJECTION, SOLUTION INTRAVENOUS at 09:05

## 2023-12-08 RX ADMIN — PANCRELIPASE 24000 UNITS OF LIPASE: 60000; 12000; 38000 CAPSULE, DELAYED RELEASE PELLETS ORAL at 17:56

## 2023-12-09 ENCOUNTER — READMISSION MANAGEMENT (OUTPATIENT)
Dept: CALL CENTER | Facility: HOSPITAL | Age: 53
End: 2023-12-09
Payer: COMMERCIAL

## 2023-12-09 VITALS
TEMPERATURE: 98.6 F | WEIGHT: 98.4 LBS | HEIGHT: 60 IN | BODY MASS INDEX: 19.32 KG/M2 | OXYGEN SATURATION: 100 % | SYSTOLIC BLOOD PRESSURE: 131 MMHG | HEART RATE: 94 BPM | RESPIRATION RATE: 16 BRPM | DIASTOLIC BLOOD PRESSURE: 82 MMHG

## 2023-12-09 LAB
ANION GAP SERPL CALCULATED.3IONS-SCNC: 7 MMOL/L (ref 5–15)
BUN SERPL-MCNC: 25 MG/DL (ref 6–20)
BUN/CREAT SERPL: 15.2 (ref 7–25)
CALCIUM SPEC-SCNC: 7.7 MG/DL (ref 8.6–10.5)
CHLORIDE SERPL-SCNC: 120 MMOL/L (ref 98–107)
CO2 SERPL-SCNC: 17 MMOL/L (ref 22–29)
CREAT SERPL-MCNC: 1.65 MG/DL (ref 0.57–1)
EGFRCR SERPLBLD CKD-EPI 2021: 37.2 ML/MIN/1.73
GLUCOSE BLDC GLUCOMTR-MCNC: 245 MG/DL (ref 70–130)
GLUCOSE BLDC GLUCOMTR-MCNC: 53 MG/DL (ref 70–130)
GLUCOSE BLDC GLUCOMTR-MCNC: 62 MG/DL (ref 70–130)
GLUCOSE SERPL-MCNC: 50 MG/DL (ref 65–99)
POTASSIUM SERPL-SCNC: 4.2 MMOL/L (ref 3.5–5.2)
SODIUM SERPL-SCNC: 144 MMOL/L (ref 136–145)

## 2023-12-09 PROCEDURE — 82948 REAGENT STRIP/BLOOD GLUCOSE: CPT

## 2023-12-09 PROCEDURE — 25010000002 SODIUM CHLORIDE 0.9 % WITH KCL 20 MEQ 20-0.9 MEQ/L-% SOLUTION: Performed by: INTERNAL MEDICINE

## 2023-12-09 PROCEDURE — 80048 BASIC METABOLIC PNL TOTAL CA: CPT | Performed by: INTERNAL MEDICINE

## 2023-12-09 RX ADMIN — HYDROXYCHLOROQUINE SULFATE 400 MG: 200 TABLET ORAL at 08:17

## 2023-12-09 RX ADMIN — PANCRELIPASE 24000 UNITS OF LIPASE: 60000; 12000; 38000 CAPSULE, DELAYED RELEASE PELLETS ORAL at 08:17

## 2023-12-09 RX ADMIN — DEXTROSE MONOHYDRATE 25 G: 25 INJECTION, SOLUTION INTRAVENOUS at 04:10

## 2023-12-09 RX ADMIN — POTASSIUM CHLORIDE 20 MEQ: 750 CAPSULE, EXTENDED RELEASE ORAL at 08:17

## 2023-12-09 RX ADMIN — SODIUM BICARBONATE 650 MG: 650 TABLET ORAL at 08:17

## 2023-12-09 RX ADMIN — PANTOPRAZOLE SODIUM 40 MG: 40 TABLET, DELAYED RELEASE ORAL at 08:19

## 2023-12-09 RX ADMIN — DULOXETINE HYDROCHLORIDE 60 MG: 30 CAPSULE, DELAYED RELEASE ORAL at 08:17

## 2023-12-09 RX ADMIN — PREGABALIN 150 MG: 75 CAPSULE ORAL at 08:19

## 2023-12-09 RX ADMIN — PANCRELIPASE 24000 UNITS OF LIPASE: 60000; 12000; 38000 CAPSULE, DELAYED RELEASE PELLETS ORAL at 12:58

## 2023-12-09 RX ADMIN — POTASSIUM CHLORIDE AND SODIUM CHLORIDE 100 ML/HR: 900; 150 INJECTION, SOLUTION INTRAVENOUS at 04:01

## 2023-12-09 RX ADMIN — ROPINIROLE HYDROCHLORIDE 1 MG: 1 TABLET, FILM COATED ORAL at 08:17

## 2023-12-09 RX ADMIN — Medication 10 ML: at 08:19

## 2023-12-13 ENCOUNTER — READMISSION MANAGEMENT (OUTPATIENT)
Dept: CALL CENTER | Facility: HOSPITAL | Age: 53
End: 2023-12-13
Payer: COMMERCIAL

## 2023-12-30 ENCOUNTER — APPOINTMENT (OUTPATIENT)
Dept: CT IMAGING | Facility: HOSPITAL | Age: 53
DRG: 682 | End: 2023-12-30
Payer: COMMERCIAL

## 2023-12-30 ENCOUNTER — APPOINTMENT (OUTPATIENT)
Dept: GENERAL RADIOLOGY | Facility: HOSPITAL | Age: 53
DRG: 682 | End: 2023-12-30
Payer: COMMERCIAL

## 2023-12-30 ENCOUNTER — HOSPITAL ENCOUNTER (INPATIENT)
Facility: HOSPITAL | Age: 53
LOS: 2 days | Discharge: HOME HEALTH | DRG: 682 | End: 2024-01-01
Attending: FAMILY MEDICINE | Admitting: FAMILY MEDICINE
Payer: COMMERCIAL

## 2023-12-30 DIAGNOSIS — N17.9 ACUTE RENAL FAILURE, UNSPECIFIED ACUTE RENAL FAILURE TYPE: Primary | ICD-10-CM

## 2023-12-30 DIAGNOSIS — E87.6 HYPOKALEMIA: ICD-10-CM

## 2023-12-30 DIAGNOSIS — K52.9 GASTROENTERITIS: ICD-10-CM

## 2023-12-30 LAB
ALBUMIN SERPL-MCNC: 3.8 G/DL (ref 3.5–5.2)
ALBUMIN/GLOB SERPL: 1.2 G/DL
ALP SERPL-CCNC: 141 U/L (ref 39–117)
ALT SERPL W P-5'-P-CCNC: 24 U/L (ref 1–33)
ANION GAP SERPL CALCULATED.3IONS-SCNC: 17 MMOL/L (ref 5–15)
APTT PPP: 27.2 SECONDS (ref 24.5–36)
AST SERPL-CCNC: 28 U/L (ref 1–32)
BASOPHILS # BLD AUTO: 0.06 10*3/MM3 (ref 0–0.2)
BASOPHILS NFR BLD AUTO: 0.3 % (ref 0–1.5)
BILIRUB SERPL-MCNC: <0.2 MG/DL (ref 0–1.2)
BUN SERPL-MCNC: 48 MG/DL (ref 6–20)
BUN/CREAT SERPL: 14.8 (ref 7–25)
CALCIUM SPEC-SCNC: 9 MG/DL (ref 8.6–10.5)
CHLORIDE SERPL-SCNC: 104 MMOL/L (ref 98–107)
CO2 SERPL-SCNC: 15 MMOL/L (ref 22–29)
CREAT SERPL-MCNC: 3.25 MG/DL (ref 0.57–1)
D-LACTATE SERPL-SCNC: 1.3 MMOL/L (ref 0.5–2)
DEPRECATED RDW RBC AUTO: 51.8 FL (ref 37–54)
EGFRCR SERPLBLD CKD-EPI 2021: 16.5 ML/MIN/1.73
EOSINOPHIL # BLD AUTO: 0.07 10*3/MM3 (ref 0–0.4)
EOSINOPHIL NFR BLD AUTO: 0.3 % (ref 0.3–6.2)
ERYTHROCYTE [DISTWIDTH] IN BLOOD BY AUTOMATED COUNT: 16.1 % (ref 12.3–15.4)
GEN 5 2HR TROPONIN T REFLEX: 16 NG/L
GLOBULIN UR ELPH-MCNC: 3.3 GM/DL
GLUCOSE BLDC GLUCOMTR-MCNC: 128 MG/DL (ref 70–130)
GLUCOSE SERPL-MCNC: 184 MG/DL (ref 65–99)
HCT VFR BLD AUTO: 38.5 % (ref 34–46.6)
HGB BLD-MCNC: 12.3 G/DL (ref 12–15.9)
IMM GRANULOCYTES # BLD AUTO: 0.18 10*3/MM3 (ref 0–0.05)
IMM GRANULOCYTES NFR BLD AUTO: 0.9 % (ref 0–0.5)
INR PPP: 1.12 (ref 0.91–1.09)
LIPASE SERPL-CCNC: 36 U/L (ref 13–60)
LYMPHOCYTES # BLD AUTO: 3 10*3/MM3 (ref 0.7–3.1)
LYMPHOCYTES NFR BLD AUTO: 14.7 % (ref 19.6–45.3)
MAGNESIUM SERPL-MCNC: 1.8 MG/DL (ref 1.6–2.6)
MCH RBC QN AUTO: 28.1 PG (ref 26.6–33)
MCHC RBC AUTO-ENTMCNC: 31.9 G/DL (ref 31.5–35.7)
MCV RBC AUTO: 88.1 FL (ref 79–97)
MONOCYTES # BLD AUTO: 1 10*3/MM3 (ref 0.1–0.9)
MONOCYTES NFR BLD AUTO: 4.9 % (ref 5–12)
NEUTROPHILS NFR BLD AUTO: 16.04 10*3/MM3 (ref 1.7–7)
NEUTROPHILS NFR BLD AUTO: 78.9 % (ref 42.7–76)
NRBC BLD AUTO-RTO: 0 /100 WBC (ref 0–0.2)
PLATELET # BLD AUTO: 254 10*3/MM3 (ref 140–450)
PMV BLD AUTO: 10.7 FL (ref 6–12)
POTASSIUM SERPL-SCNC: 2.9 MMOL/L (ref 3.5–5.2)
PROT SERPL-MCNC: 7.1 G/DL (ref 6–8.5)
PROTHROMBIN TIME: 14.6 SECONDS (ref 11.8–14.8)
RBC # BLD AUTO: 4.37 10*6/MM3 (ref 3.77–5.28)
SODIUM SERPL-SCNC: 136 MMOL/L (ref 136–145)
TROPONIN T DELTA: -4 NG/L
TROPONIN T SERPL HS-MCNC: 20 NG/L
WBC NRBC COR # BLD AUTO: 20.35 10*3/MM3 (ref 3.4–10.8)

## 2023-12-30 PROCEDURE — 82948 REAGENT STRIP/BLOOD GLUCOSE: CPT

## 2023-12-30 PROCEDURE — 93010 ELECTROCARDIOGRAM REPORT: CPT | Performed by: INTERNAL MEDICINE

## 2023-12-30 PROCEDURE — 80053 COMPREHEN METABOLIC PANEL: CPT | Performed by: FAMILY MEDICINE

## 2023-12-30 PROCEDURE — 25010000002 METRONIDAZOLE 500 MG/100ML SOLUTION: Performed by: INTERNAL MEDICINE

## 2023-12-30 PROCEDURE — 96361 HYDRATE IV INFUSION ADD-ON: CPT

## 2023-12-30 PROCEDURE — 25010000002 POTASSIUM CHLORIDE 10 MEQ/100ML SOLUTION: Performed by: INTERNAL MEDICINE

## 2023-12-30 PROCEDURE — 96375 TX/PRO/DX INJ NEW DRUG ADDON: CPT

## 2023-12-30 PROCEDURE — 70450 CT HEAD/BRAIN W/O DYE: CPT

## 2023-12-30 PROCEDURE — 25010000002 ENOXAPARIN PER 10 MG: Performed by: INTERNAL MEDICINE

## 2023-12-30 PROCEDURE — 83735 ASSAY OF MAGNESIUM: CPT | Performed by: FAMILY MEDICINE

## 2023-12-30 PROCEDURE — 96372 THER/PROPH/DIAG INJ SC/IM: CPT

## 2023-12-30 PROCEDURE — 99285 EMERGENCY DEPT VISIT HI MDM: CPT

## 2023-12-30 PROCEDURE — 25810000003 LACTATED RINGERS SOLUTION: Performed by: FAMILY MEDICINE

## 2023-12-30 PROCEDURE — 96365 THER/PROPH/DIAG IV INF INIT: CPT

## 2023-12-30 PROCEDURE — 87040 BLOOD CULTURE FOR BACTERIA: CPT | Performed by: INTERNAL MEDICINE

## 2023-12-30 PROCEDURE — 93005 ELECTROCARDIOGRAM TRACING: CPT | Performed by: FAMILY MEDICINE

## 2023-12-30 PROCEDURE — 83605 ASSAY OF LACTIC ACID: CPT | Performed by: FAMILY MEDICINE

## 2023-12-30 PROCEDURE — 73630 X-RAY EXAM OF FOOT: CPT

## 2023-12-30 PROCEDURE — 84484 ASSAY OF TROPONIN QUANT: CPT | Performed by: FAMILY MEDICINE

## 2023-12-30 PROCEDURE — 25010000002 SODIUM CHLORIDE 0.9 % WITH KCL 20 MEQ 20-0.9 MEQ/L-% SOLUTION: Performed by: FAMILY MEDICINE

## 2023-12-30 PROCEDURE — 96366 THER/PROPH/DIAG IV INF ADDON: CPT

## 2023-12-30 PROCEDURE — 85730 THROMBOPLASTIN TIME PARTIAL: CPT | Performed by: FAMILY MEDICINE

## 2023-12-30 PROCEDURE — 25010000002 ONDANSETRON PER 1 MG: Performed by: FAMILY MEDICINE

## 2023-12-30 PROCEDURE — 85610 PROTHROMBIN TIME: CPT | Performed by: FAMILY MEDICINE

## 2023-12-30 PROCEDURE — 85025 COMPLETE CBC W/AUTO DIFF WBC: CPT | Performed by: FAMILY MEDICINE

## 2023-12-30 PROCEDURE — 83690 ASSAY OF LIPASE: CPT | Performed by: FAMILY MEDICINE

## 2023-12-30 PROCEDURE — 36415 COLL VENOUS BLD VENIPUNCTURE: CPT

## 2023-12-30 RX ORDER — CALCITRIOL 0.25 UG/1
0.25 CAPSULE, LIQUID FILLED ORAL DAILY
Status: DISCONTINUED | OUTPATIENT
Start: 2023-12-30 | End: 2024-01-01 | Stop reason: HOSPADM

## 2023-12-30 RX ORDER — METOPROLOL SUCCINATE 25 MG/1
25 TABLET, EXTENDED RELEASE ORAL DAILY
Status: DISCONTINUED | OUTPATIENT
Start: 2023-12-30 | End: 2023-12-31

## 2023-12-30 RX ORDER — METRONIDAZOLE 500 MG/100ML
500 INJECTION, SOLUTION INTRAVENOUS EVERY 8 HOURS
Status: DISCONTINUED | OUTPATIENT
Start: 2023-12-30 | End: 2023-12-31

## 2023-12-30 RX ORDER — CHOLESTYRAMINE LIGHT 4 G/5.7G
4 POWDER, FOR SUSPENSION ORAL 2 TIMES DAILY
Status: DISCONTINUED | OUTPATIENT
Start: 2023-12-30 | End: 2024-01-01 | Stop reason: HOSPADM

## 2023-12-30 RX ORDER — POTASSIUM CHLORIDE 7.45 MG/ML
10 INJECTION INTRAVENOUS
Qty: 600 ML | Refills: 0 | Status: COMPLETED | OUTPATIENT
Start: 2023-12-30 | End: 2023-12-31

## 2023-12-30 RX ORDER — NICOTINE POLACRILEX 4 MG
15 LOZENGE BUCCAL
Status: DISCONTINUED | OUTPATIENT
Start: 2023-12-30 | End: 2024-01-01 | Stop reason: HOSPADM

## 2023-12-30 RX ORDER — ROPINIROLE 1 MG/1
1 TABLET, FILM COATED ORAL EVERY 8 HOURS SCHEDULED
Status: DISCONTINUED | OUTPATIENT
Start: 2023-12-30 | End: 2024-01-01 | Stop reason: HOSPADM

## 2023-12-30 RX ORDER — SODIUM CHLORIDE AND POTASSIUM CHLORIDE 150; 900 MG/100ML; MG/100ML
100 INJECTION, SOLUTION INTRAVENOUS CONTINUOUS
Status: DISCONTINUED | OUTPATIENT
Start: 2023-12-30 | End: 2024-01-01 | Stop reason: HOSPADM

## 2023-12-30 RX ORDER — SODIUM BICARBONATE 650 MG/1
650 TABLET ORAL 2 TIMES DAILY
Status: DISCONTINUED | OUTPATIENT
Start: 2023-12-30 | End: 2024-01-01 | Stop reason: HOSPADM

## 2023-12-30 RX ORDER — ONDANSETRON 2 MG/ML
4 INJECTION INTRAMUSCULAR; INTRAVENOUS ONCE
Status: COMPLETED | OUTPATIENT
Start: 2023-12-30 | End: 2023-12-30

## 2023-12-30 RX ORDER — SODIUM CHLORIDE 0.9 % (FLUSH) 0.9 %
10 SYRINGE (ML) INJECTION AS NEEDED
Status: DISCONTINUED | OUTPATIENT
Start: 2023-12-30 | End: 2024-01-01 | Stop reason: HOSPADM

## 2023-12-30 RX ORDER — SODIUM CHLORIDE 9 MG/ML
40 INJECTION, SOLUTION INTRAVENOUS AS NEEDED
Status: DISCONTINUED | OUTPATIENT
Start: 2023-12-30 | End: 2024-01-01 | Stop reason: HOSPADM

## 2023-12-30 RX ORDER — ENOXAPARIN SODIUM 100 MG/ML
30 INJECTION SUBCUTANEOUS
Status: DISCONTINUED | OUTPATIENT
Start: 2023-12-30 | End: 2024-01-01 | Stop reason: HOSPADM

## 2023-12-30 RX ORDER — ATORVASTATIN CALCIUM 40 MG/1
80 TABLET, FILM COATED ORAL DAILY
Status: DISCONTINUED | OUTPATIENT
Start: 2023-12-30 | End: 2023-12-31

## 2023-12-30 RX ORDER — INSULIN LISPRO 100 [IU]/ML
2-7 INJECTION, SOLUTION INTRAVENOUS; SUBCUTANEOUS
Status: DISCONTINUED | OUTPATIENT
Start: 2023-12-30 | End: 2024-01-01 | Stop reason: HOSPADM

## 2023-12-30 RX ORDER — FAMOTIDINE 10 MG/ML
20 INJECTION, SOLUTION INTRAVENOUS ONCE
Status: COMPLETED | OUTPATIENT
Start: 2023-12-30 | End: 2023-12-30

## 2023-12-30 RX ORDER — PREGABALIN 75 MG/1
150 CAPSULE ORAL 2 TIMES DAILY
Status: DISCONTINUED | OUTPATIENT
Start: 2023-12-30 | End: 2024-01-01 | Stop reason: HOSPADM

## 2023-12-30 RX ORDER — ONDANSETRON 2 MG/ML
4 INJECTION INTRAMUSCULAR; INTRAVENOUS EVERY 6 HOURS PRN
Status: DISCONTINUED | OUTPATIENT
Start: 2023-12-30 | End: 2024-01-01 | Stop reason: HOSPADM

## 2023-12-30 RX ORDER — TRAMADOL HYDROCHLORIDE 50 MG/1
50 TABLET ORAL EVERY 8 HOURS PRN
Status: DISCONTINUED | OUTPATIENT
Start: 2023-12-30 | End: 2024-01-01 | Stop reason: HOSPADM

## 2023-12-30 RX ORDER — MELATONIN
1000 DAILY
Status: DISCONTINUED | OUTPATIENT
Start: 2023-12-31 | End: 2024-01-01 | Stop reason: HOSPADM

## 2023-12-30 RX ORDER — HYDROXYCHLOROQUINE SULFATE 200 MG/1
400 TABLET, FILM COATED ORAL DAILY
Status: DISCONTINUED | OUTPATIENT
Start: 2023-12-30 | End: 2024-01-01 | Stop reason: HOSPADM

## 2023-12-30 RX ORDER — ALBUTEROL SULFATE 2.5 MG/3ML
2.5 SOLUTION RESPIRATORY (INHALATION) 4 TIMES DAILY PRN
Status: DISCONTINUED | OUTPATIENT
Start: 2023-12-30 | End: 2024-01-01 | Stop reason: HOSPADM

## 2023-12-30 RX ORDER — CETIRIZINE HYDROCHLORIDE 10 MG/1
10 TABLET ORAL DAILY
Status: DISCONTINUED | OUTPATIENT
Start: 2023-12-31 | End: 2024-01-01 | Stop reason: HOSPADM

## 2023-12-30 RX ORDER — IBUPROFEN 600 MG/1
1 TABLET ORAL
Status: DISCONTINUED | OUTPATIENT
Start: 2023-12-30 | End: 2024-01-01 | Stop reason: HOSPADM

## 2023-12-30 RX ORDER — DEXTROSE MONOHYDRATE 25 G/50ML
25 INJECTION, SOLUTION INTRAVENOUS
Status: DISCONTINUED | OUTPATIENT
Start: 2023-12-30 | End: 2024-01-01 | Stop reason: HOSPADM

## 2023-12-30 RX ORDER — SODIUM CHLORIDE 0.9 % (FLUSH) 0.9 %
10 SYRINGE (ML) INJECTION EVERY 12 HOURS SCHEDULED
Status: DISCONTINUED | OUTPATIENT
Start: 2023-12-30 | End: 2024-01-01 | Stop reason: HOSPADM

## 2023-12-30 RX ORDER — NORTRIPTYLINE HYDROCHLORIDE 25 MG/1
50 CAPSULE ORAL NIGHTLY
Status: DISCONTINUED | OUTPATIENT
Start: 2023-12-30 | End: 2024-01-01 | Stop reason: HOSPADM

## 2023-12-30 RX ORDER — DULOXETIN HYDROCHLORIDE 30 MG/1
60 CAPSULE, DELAYED RELEASE ORAL DAILY
Status: DISCONTINUED | OUTPATIENT
Start: 2023-12-30 | End: 2024-01-01 | Stop reason: HOSPADM

## 2023-12-30 RX ADMIN — CALCITRIOL 0.25 MCG: 0.25 CAPSULE ORAL at 21:42

## 2023-12-30 RX ADMIN — HYDROXYCHLOROQUINE SULFATE 400 MG: 200 TABLET ORAL at 21:42

## 2023-12-30 RX ADMIN — ROPINIROLE HYDROCHLORIDE 1 MG: 1 TABLET, FILM COATED ORAL at 21:43

## 2023-12-30 RX ADMIN — POTASSIUM CHLORIDE AND SODIUM CHLORIDE 125 ML/HR: 900; 150 INJECTION, SOLUTION INTRAVENOUS at 14:00

## 2023-12-30 RX ADMIN — DULOXETINE HYDROCHLORIDE 60 MG: 30 CAPSULE, DELAYED RELEASE ORAL at 21:42

## 2023-12-30 RX ADMIN — POTASSIUM CHLORIDE 10 MEQ: 10 INJECTION, SOLUTION INTRAVENOUS at 23:16

## 2023-12-30 RX ADMIN — POTASSIUM CHLORIDE 10 MEQ: 10 INJECTION, SOLUTION INTRAVENOUS at 21:44

## 2023-12-30 RX ADMIN — PANCRELIPASE 12000 UNITS OF LIPASE: 60000; 12000; 38000 CAPSULE, DELAYED RELEASE PELLETS ORAL at 21:43

## 2023-12-30 RX ADMIN — NORTRIPTYLINE HYDROCHLORIDE 50 MG: 25 CAPSULE ORAL at 22:12

## 2023-12-30 RX ADMIN — METOPROLOL SUCCINATE 25 MG: 25 TABLET, EXTENDED RELEASE ORAL at 21:43

## 2023-12-30 RX ADMIN — METRONIDAZOLE 500 MG: 500 INJECTION, SOLUTION INTRAVENOUS at 22:12

## 2023-12-30 RX ADMIN — ONDANSETRON 4 MG: 2 INJECTION INTRAMUSCULAR; INTRAVENOUS at 12:26

## 2023-12-30 RX ADMIN — Medication 10 ML: at 22:13

## 2023-12-30 RX ADMIN — SODIUM CHLORIDE, POTASSIUM CHLORIDE, SODIUM LACTATE AND CALCIUM CHLORIDE 1500 ML: 600; 310; 30; 20 INJECTION, SOLUTION INTRAVENOUS at 12:27

## 2023-12-30 RX ADMIN — ATORVASTATIN CALCIUM 80 MG: 40 TABLET, FILM COATED ORAL at 21:42

## 2023-12-30 RX ADMIN — FAMOTIDINE 20 MG: 10 INJECTION INTRAVENOUS at 12:26

## 2023-12-30 RX ADMIN — POTASSIUM CHLORIDE AND SODIUM CHLORIDE 125 ML/HR: 900; 150 INJECTION, SOLUTION INTRAVENOUS at 21:45

## 2023-12-30 RX ADMIN — SODIUM BICARBONATE 650 MG: 650 TABLET ORAL at 21:43

## 2023-12-30 RX ADMIN — ENOXAPARIN SODIUM 30 MG: 30 INJECTION SUBCUTANEOUS at 22:12

## 2023-12-30 RX ADMIN — CHOLESTYRAMINE 4 G: 4 POWDER, FOR SUSPENSION ORAL at 21:43

## 2023-12-30 RX ADMIN — PREGABALIN 150 MG: 75 CAPSULE ORAL at 22:12

## 2023-12-31 PROBLEM — N17.9 ACUTE RENAL FAILURE: Status: RESOLVED | Noted: 2023-12-30 | Resolved: 2023-12-31

## 2023-12-31 PROBLEM — N17.9 AKI (ACUTE KIDNEY INJURY): Status: RESOLVED | Noted: 2023-12-30 | Resolved: 2023-12-31

## 2023-12-31 LAB
ANION GAP SERPL CALCULATED.3IONS-SCNC: 8 MMOL/L (ref 5–15)
BASOPHILS # BLD AUTO: 0.06 10*3/MM3 (ref 0–0.2)
BASOPHILS NFR BLD AUTO: 0.3 % (ref 0–1.5)
BUN SERPL-MCNC: 33 MG/DL (ref 6–20)
BUN/CREAT SERPL: 16.3 (ref 7–25)
C DIFF TOX GENS STL QL NAA+PROBE: NEGATIVE
CALCIUM SPEC-SCNC: 7.3 MG/DL (ref 8.6–10.5)
CHLORIDE SERPL-SCNC: 115 MMOL/L (ref 98–107)
CO2 SERPL-SCNC: 17 MMOL/L (ref 22–29)
CREAT SERPL-MCNC: 2.02 MG/DL (ref 0.57–1)
DEPRECATED RDW RBC AUTO: 54.5 FL (ref 37–54)
EGFRCR SERPLBLD CKD-EPI 2021: 29.2 ML/MIN/1.73
EOSINOPHIL # BLD AUTO: 0.16 10*3/MM3 (ref 0–0.4)
EOSINOPHIL NFR BLD AUTO: 0.9 % (ref 0.3–6.2)
ERYTHROCYTE [DISTWIDTH] IN BLOOD BY AUTOMATED COUNT: 16.8 % (ref 12.3–15.4)
GLUCOSE BLDC GLUCOMTR-MCNC: 109 MG/DL (ref 70–130)
GLUCOSE BLDC GLUCOMTR-MCNC: 132 MG/DL (ref 70–130)
GLUCOSE BLDC GLUCOMTR-MCNC: 138 MG/DL (ref 70–130)
GLUCOSE BLDC GLUCOMTR-MCNC: 96 MG/DL (ref 70–130)
GLUCOSE SERPL-MCNC: 167 MG/DL (ref 65–99)
HCT VFR BLD AUTO: 34.2 % (ref 34–46.6)
HGB BLD-MCNC: 10.8 G/DL (ref 12–15.9)
IMM GRANULOCYTES # BLD AUTO: 0.15 10*3/MM3 (ref 0–0.05)
IMM GRANULOCYTES NFR BLD AUTO: 0.9 % (ref 0–0.5)
LYMPHOCYTES # BLD AUTO: 4.19 10*3/MM3 (ref 0.7–3.1)
LYMPHOCYTES NFR BLD AUTO: 23.9 % (ref 19.6–45.3)
MCH RBC QN AUTO: 28.6 PG (ref 26.6–33)
MCHC RBC AUTO-ENTMCNC: 31.6 G/DL (ref 31.5–35.7)
MCV RBC AUTO: 90.5 FL (ref 79–97)
MONOCYTES # BLD AUTO: 0.85 10*3/MM3 (ref 0.1–0.9)
MONOCYTES NFR BLD AUTO: 4.8 % (ref 5–12)
NEUTROPHILS NFR BLD AUTO: 12.12 10*3/MM3 (ref 1.7–7)
NEUTROPHILS NFR BLD AUTO: 69.2 % (ref 42.7–76)
NRBC BLD AUTO-RTO: 0 /100 WBC (ref 0–0.2)
PLATELET # BLD AUTO: 204 10*3/MM3 (ref 140–450)
PMV BLD AUTO: 11 FL (ref 6–12)
POTASSIUM SERPL-SCNC: 3.5 MMOL/L (ref 3.5–5.2)
RBC # BLD AUTO: 3.78 10*6/MM3 (ref 3.77–5.28)
SODIUM SERPL-SCNC: 140 MMOL/L (ref 136–145)
WBC NRBC COR # BLD AUTO: 17.53 10*3/MM3 (ref 3.4–10.8)

## 2023-12-31 PROCEDURE — 87045 FECES CULTURE AEROBIC BACT: CPT | Performed by: INTERNAL MEDICINE

## 2023-12-31 PROCEDURE — 96367 TX/PROPH/DG ADDL SEQ IV INF: CPT

## 2023-12-31 PROCEDURE — 85025 COMPLETE CBC W/AUTO DIFF WBC: CPT | Performed by: INTERNAL MEDICINE

## 2023-12-31 PROCEDURE — 96361 HYDRATE IV INFUSION ADD-ON: CPT

## 2023-12-31 PROCEDURE — 25010000002 SODIUM CHLORIDE 0.9 % WITH KCL 20 MEQ 20-0.9 MEQ/L-% SOLUTION: Performed by: FAMILY MEDICINE

## 2023-12-31 PROCEDURE — 25010000002 ENOXAPARIN PER 10 MG: Performed by: INTERNAL MEDICINE

## 2023-12-31 PROCEDURE — 96366 THER/PROPH/DIAG IV INF ADDON: CPT

## 2023-12-31 PROCEDURE — 25010000002 CALCIUM GLUCONATE PER 10 ML: Performed by: FAMILY MEDICINE

## 2023-12-31 PROCEDURE — 96372 THER/PROPH/DIAG INJ SC/IM: CPT

## 2023-12-31 PROCEDURE — 82948 REAGENT STRIP/BLOOD GLUCOSE: CPT

## 2023-12-31 PROCEDURE — 87046 STOOL CULTR AEROBIC BACT EA: CPT | Performed by: INTERNAL MEDICINE

## 2023-12-31 PROCEDURE — 25010000002 METRONIDAZOLE 500 MG/100ML SOLUTION: Performed by: INTERNAL MEDICINE

## 2023-12-31 PROCEDURE — 25010000002 CALCIUM GLUCONATE-NACL 1-0.675 GM/50ML-% SOLUTION: Performed by: FAMILY MEDICINE

## 2023-12-31 PROCEDURE — 87493 C DIFF AMPLIFIED PROBE: CPT | Performed by: INTERNAL MEDICINE

## 2023-12-31 PROCEDURE — 87427 SHIGA-LIKE TOXIN AG IA: CPT | Performed by: INTERNAL MEDICINE

## 2023-12-31 PROCEDURE — 25010000002 POTASSIUM CHLORIDE 10 MEQ/100ML SOLUTION: Performed by: INTERNAL MEDICINE

## 2023-12-31 PROCEDURE — 80048 BASIC METABOLIC PNL TOTAL CA: CPT | Performed by: INTERNAL MEDICINE

## 2023-12-31 PROCEDURE — 25010000002 ONDANSETRON PER 1 MG: Performed by: INTERNAL MEDICINE

## 2023-12-31 RX ORDER — METOPROLOL SUCCINATE 25 MG/1
12.5 TABLET, EXTENDED RELEASE ORAL DAILY
Status: DISCONTINUED | OUTPATIENT
Start: 2024-01-01 | End: 2024-01-01 | Stop reason: HOSPADM

## 2023-12-31 RX ORDER — BUMETANIDE 1 MG/1
1 TABLET ORAL DAILY
COMMUNITY
End: 2024-01-01 | Stop reason: HOSPADM

## 2023-12-31 RX ORDER — OMEPRAZOLE 40 MG/1
40 CAPSULE, DELAYED RELEASE ORAL DAILY
COMMUNITY
End: 2024-01-01 | Stop reason: HOSPADM

## 2023-12-31 RX ORDER — ATORVASTATIN CALCIUM 40 MG/1
80 TABLET, FILM COATED ORAL NIGHTLY
Status: DISCONTINUED | OUTPATIENT
Start: 2023-12-31 | End: 2024-01-01 | Stop reason: HOSPADM

## 2023-12-31 RX ORDER — NIFEDIPINE 60 MG/1
60 TABLET, EXTENDED RELEASE ORAL DAILY
COMMUNITY
End: 2024-01-01 | Stop reason: HOSPADM

## 2023-12-31 RX ORDER — CALCIUM GLUCONATE 20 MG/ML
1000 INJECTION, SOLUTION INTRAVENOUS
Status: COMPLETED | OUTPATIENT
Start: 2023-12-31 | End: 2023-12-31

## 2023-12-31 RX ADMIN — CHOLESTYRAMINE 4 G: 4 POWDER, FOR SUSPENSION ORAL at 09:00

## 2023-12-31 RX ADMIN — DULOXETINE HYDROCHLORIDE 60 MG: 30 CAPSULE, DELAYED RELEASE ORAL at 09:00

## 2023-12-31 RX ADMIN — POTASSIUM CHLORIDE 10 MEQ: 10 INJECTION, SOLUTION INTRAVENOUS at 01:17

## 2023-12-31 RX ADMIN — ONDANSETRON 4 MG: 2 INJECTION INTRAMUSCULAR; INTRAVENOUS at 06:45

## 2023-12-31 RX ADMIN — SODIUM BICARBONATE 650 MG: 650 TABLET ORAL at 21:05

## 2023-12-31 RX ADMIN — NORTRIPTYLINE HYDROCHLORIDE 50 MG: 25 CAPSULE ORAL at 21:05

## 2023-12-31 RX ADMIN — PREGABALIN 150 MG: 75 CAPSULE ORAL at 09:00

## 2023-12-31 RX ADMIN — ATORVASTATIN CALCIUM 80 MG: 40 TABLET ORAL at 21:05

## 2023-12-31 RX ADMIN — ROPINIROLE HYDROCHLORIDE 1 MG: 1 TABLET, FILM COATED ORAL at 21:05

## 2023-12-31 RX ADMIN — CALCIUM GLUCONATE 2000 MG: 98 INJECTION, SOLUTION INTRAVENOUS at 17:29

## 2023-12-31 RX ADMIN — SODIUM BICARBONATE 650 MG: 650 TABLET ORAL at 09:00

## 2023-12-31 RX ADMIN — POTASSIUM CHLORIDE 10 MEQ: 10 INJECTION, SOLUTION INTRAVENOUS at 00:21

## 2023-12-31 RX ADMIN — METOPROLOL SUCCINATE 25 MG: 25 TABLET, EXTENDED RELEASE ORAL at 09:00

## 2023-12-31 RX ADMIN — CETIRIZINE HYDROCHLORIDE 10 MG: 10 TABLET ORAL at 09:00

## 2023-12-31 RX ADMIN — Medication 10 ML: at 21:05

## 2023-12-31 RX ADMIN — METRONIDAZOLE 500 MG: 500 INJECTION, SOLUTION INTRAVENOUS at 01:17

## 2023-12-31 RX ADMIN — Medication 10 ML: at 09:00

## 2023-12-31 RX ADMIN — Medication 1000 UNITS: at 09:00

## 2023-12-31 RX ADMIN — POTASSIUM CHLORIDE AND SODIUM CHLORIDE 125 ML/HR: 900; 150 INJECTION, SOLUTION INTRAVENOUS at 23:55

## 2023-12-31 RX ADMIN — CALCIUM GLUCONATE 1000 MG: 20 INJECTION, SOLUTION INTRAVENOUS at 11:52

## 2023-12-31 RX ADMIN — PANCRELIPASE 12000 UNITS OF LIPASE: 60000; 12000; 38000 CAPSULE, DELAYED RELEASE PELLETS ORAL at 17:29

## 2023-12-31 RX ADMIN — CALCITRIOL 0.25 MCG: 0.25 CAPSULE ORAL at 09:00

## 2023-12-31 RX ADMIN — CALCIUM GLUCONATE 2000 MG: 98 INJECTION, SOLUTION INTRAVENOUS at 13:00

## 2023-12-31 RX ADMIN — POTASSIUM CHLORIDE AND SODIUM CHLORIDE 125 ML/HR: 900; 150 INJECTION, SOLUTION INTRAVENOUS at 11:00

## 2023-12-31 RX ADMIN — POTASSIUM CHLORIDE 10 MEQ: 10 INJECTION, SOLUTION INTRAVENOUS at 02:19

## 2023-12-31 RX ADMIN — METRONIDAZOLE 500 MG: 500 INJECTION, SOLUTION INTRAVENOUS at 11:06

## 2023-12-31 RX ADMIN — ROPINIROLE HYDROCHLORIDE 1 MG: 1 TABLET, FILM COATED ORAL at 06:36

## 2023-12-31 RX ADMIN — PANCRELIPASE 12000 UNITS OF LIPASE: 60000; 12000; 38000 CAPSULE, DELAYED RELEASE PELLETS ORAL at 21:05

## 2023-12-31 RX ADMIN — HYDROXYCHLOROQUINE SULFATE 400 MG: 200 TABLET ORAL at 09:00

## 2023-12-31 RX ADMIN — PREGABALIN 150 MG: 75 CAPSULE ORAL at 21:05

## 2023-12-31 RX ADMIN — CALCIUM GLUCONATE 2000 MG: 98 INJECTION, SOLUTION INTRAVENOUS at 15:52

## 2023-12-31 RX ADMIN — POTASSIUM CHLORIDE 10 MEQ: 10 INJECTION, SOLUTION INTRAVENOUS at 03:17

## 2023-12-31 RX ADMIN — PANCRELIPASE 12000 UNITS OF LIPASE: 60000; 12000; 38000 CAPSULE, DELAYED RELEASE PELLETS ORAL at 06:36

## 2023-12-31 RX ADMIN — ROPINIROLE HYDROCHLORIDE 1 MG: 1 TABLET, FILM COATED ORAL at 13:06

## 2023-12-31 RX ADMIN — ENOXAPARIN SODIUM 30 MG: 30 INJECTION SUBCUTANEOUS at 17:30

## 2023-12-31 RX ADMIN — PANCRELIPASE 12000 UNITS OF LIPASE: 60000; 12000; 38000 CAPSULE, DELAYED RELEASE PELLETS ORAL at 13:06

## 2024-01-01 ENCOUNTER — READMISSION MANAGEMENT (OUTPATIENT)
Dept: CALL CENTER | Facility: HOSPITAL | Age: 54
End: 2024-01-01
Payer: COMMERCIAL

## 2024-01-01 VITALS
OXYGEN SATURATION: 98 % | HEIGHT: 60 IN | WEIGHT: 92 LBS | HEART RATE: 93 BPM | TEMPERATURE: 97.9 F | DIASTOLIC BLOOD PRESSURE: 73 MMHG | SYSTOLIC BLOOD PRESSURE: 114 MMHG | BODY MASS INDEX: 18.06 KG/M2 | RESPIRATION RATE: 16 BRPM

## 2024-01-01 PROBLEM — N17.9 ACUTE KIDNEY INJURY: Status: RESOLVED | Noted: 2023-12-07 | Resolved: 2024-01-01

## 2024-01-01 LAB
ALBUMIN SERPL-MCNC: 2.6 G/DL (ref 3.5–5.2)
ANION GAP SERPL CALCULATED.3IONS-SCNC: 10 MMOL/L (ref 5–15)
BASOPHILS # BLD AUTO: 0.03 10*3/MM3 (ref 0–0.2)
BASOPHILS NFR BLD AUTO: 0.2 % (ref 0–1.5)
BUN SERPL-MCNC: 24 MG/DL (ref 6–20)
BUN/CREAT SERPL: 14.6 (ref 7–25)
CALCIUM SPEC-SCNC: 8.5 MG/DL (ref 8.6–10.5)
CHLORIDE SERPL-SCNC: 114 MMOL/L (ref 98–107)
CK SERPL-CCNC: 70 U/L (ref 20–180)
CO2 SERPL-SCNC: 18 MMOL/L (ref 22–29)
CREAT SERPL-MCNC: 1.64 MG/DL (ref 0.57–1)
DEPRECATED RDW RBC AUTO: 55.4 FL (ref 37–54)
EGFRCR SERPLBLD CKD-EPI 2021: 37.5 ML/MIN/1.73
EOSINOPHIL # BLD AUTO: 0 10*3/MM3 (ref 0–0.4)
EOSINOPHIL NFR BLD AUTO: 0 % (ref 0.3–6.2)
ERYTHROCYTE [DISTWIDTH] IN BLOOD BY AUTOMATED COUNT: 16.9 % (ref 12.3–15.4)
GLUCOSE BLDC GLUCOMTR-MCNC: 120 MG/DL (ref 70–130)
GLUCOSE BLDC GLUCOMTR-MCNC: 136 MG/DL (ref 70–130)
GLUCOSE SERPL-MCNC: 99 MG/DL (ref 65–99)
HCT VFR BLD AUTO: 29.1 % (ref 34–46.6)
HGB BLD-MCNC: 9.5 G/DL (ref 12–15.9)
IMM GRANULOCYTES # BLD AUTO: 0.08 10*3/MM3 (ref 0–0.05)
IMM GRANULOCYTES NFR BLD AUTO: 0.7 % (ref 0–0.5)
LYMPHOCYTES # BLD AUTO: 2.9 10*3/MM3 (ref 0.7–3.1)
LYMPHOCYTES NFR BLD AUTO: 23.8 % (ref 19.6–45.3)
MAGNESIUM SERPL-MCNC: 1.2 MG/DL (ref 1.6–2.6)
MCH RBC QN AUTO: 29.3 PG (ref 26.6–33)
MCHC RBC AUTO-ENTMCNC: 32.6 G/DL (ref 31.5–35.7)
MCV RBC AUTO: 89.8 FL (ref 79–97)
MONOCYTES # BLD AUTO: 0.72 10*3/MM3 (ref 0.1–0.9)
MONOCYTES NFR BLD AUTO: 5.9 % (ref 5–12)
NEUTROPHILS NFR BLD AUTO: 69.4 % (ref 42.7–76)
NEUTROPHILS NFR BLD AUTO: 8.46 10*3/MM3 (ref 1.7–7)
NRBC BLD AUTO-RTO: 0 /100 WBC (ref 0–0.2)
PHOSPHATE SERPL-MCNC: 2.1 MG/DL (ref 2.5–4.5)
PLATELET # BLD AUTO: 198 10*3/MM3 (ref 140–450)
PMV BLD AUTO: 11.3 FL (ref 6–12)
POTASSIUM SERPL-SCNC: 3.6 MMOL/L (ref 3.5–5.2)
QT INTERVAL: 462 MS
QTC INTERVAL: 512 MS
RBC # BLD AUTO: 3.24 10*6/MM3 (ref 3.77–5.28)
SODIUM SERPL-SCNC: 142 MMOL/L (ref 136–145)
WBC NRBC COR # BLD AUTO: 12.19 10*3/MM3 (ref 3.4–10.8)

## 2024-01-01 PROCEDURE — 82550 ASSAY OF CK (CPK): CPT | Performed by: INTERNAL MEDICINE

## 2024-01-01 PROCEDURE — 25010000002 MAGNESIUM SULFATE 2 GM/50ML SOLUTION: Performed by: FAMILY MEDICINE

## 2024-01-01 PROCEDURE — 85025 COMPLETE CBC W/AUTO DIFF WBC: CPT | Performed by: FAMILY MEDICINE

## 2024-01-01 PROCEDURE — 80069 RENAL FUNCTION PANEL: CPT | Performed by: FAMILY MEDICINE

## 2024-01-01 PROCEDURE — 25010000002 SODIUM CHLORIDE 0.9 % WITH KCL 20 MEQ 20-0.9 MEQ/L-% SOLUTION: Performed by: FAMILY MEDICINE

## 2024-01-01 PROCEDURE — 83735 ASSAY OF MAGNESIUM: CPT | Performed by: FAMILY MEDICINE

## 2024-01-01 PROCEDURE — 96361 HYDRATE IV INFUSION ADD-ON: CPT

## 2024-01-01 PROCEDURE — 82948 REAGENT STRIP/BLOOD GLUCOSE: CPT

## 2024-01-01 RX ORDER — MAGNESIUM SULFATE HEPTAHYDRATE 40 MG/ML
2 INJECTION, SOLUTION INTRAVENOUS ONCE
Status: COMPLETED | OUTPATIENT
Start: 2024-01-01 | End: 2024-01-01

## 2024-01-01 RX ADMIN — Medication 1000 UNITS: at 08:33

## 2024-01-01 RX ADMIN — ROPINIROLE HYDROCHLORIDE 1 MG: 1 TABLET, FILM COATED ORAL at 06:42

## 2024-01-01 RX ADMIN — PANCRELIPASE 12000 UNITS OF LIPASE: 60000; 12000; 38000 CAPSULE, DELAYED RELEASE PELLETS ORAL at 06:41

## 2024-01-01 RX ADMIN — MAGNESIUM SULFATE HEPTAHYDRATE 2 G: 2 INJECTION, SOLUTION INTRAVENOUS at 10:14

## 2024-01-01 RX ADMIN — METOPROLOL SUCCINATE 12.5 MG: 25 TABLET, EXTENDED RELEASE ORAL at 08:32

## 2024-01-01 RX ADMIN — DULOXETINE HYDROCHLORIDE 60 MG: 30 CAPSULE, DELAYED RELEASE ORAL at 08:33

## 2024-01-01 RX ADMIN — PREGABALIN 150 MG: 75 CAPSULE ORAL at 08:32

## 2024-01-01 RX ADMIN — POTASSIUM CHLORIDE AND SODIUM CHLORIDE 100 ML/HR: 900; 150 INJECTION, SOLUTION INTRAVENOUS at 09:27

## 2024-01-01 RX ADMIN — CALCITRIOL 0.25 MCG: 0.25 CAPSULE ORAL at 08:33

## 2024-01-01 RX ADMIN — CETIRIZINE HYDROCHLORIDE 10 MG: 10 TABLET ORAL at 08:33

## 2024-01-01 RX ADMIN — HYDROXYCHLOROQUINE SULFATE 400 MG: 200 TABLET ORAL at 08:33

## 2024-01-01 RX ADMIN — SODIUM BICARBONATE 650 MG: 650 TABLET ORAL at 08:32

## 2024-01-04 LAB
BACTERIA SPEC AEROBE CULT: NORMAL
BACTERIA SPEC AEROBE CULT: NORMAL

## 2024-01-06 LAB
BACTERIA SPEC CULT: NORMAL
BACTERIA SPEC CULT: NORMAL
CAMPYLOBACTER STL CULT: NORMAL
E COLI SXT STL QL IA: NEGATIVE
SALM + SHIG STL CULT: NORMAL

## 2024-01-11 ENCOUNTER — READMISSION MANAGEMENT (OUTPATIENT)
Dept: CALL CENTER | Facility: HOSPITAL | Age: 54
End: 2024-01-11
Payer: COMMERCIAL

## 2024-01-22 ENCOUNTER — APPOINTMENT (OUTPATIENT)
Dept: GENERAL RADIOLOGY | Facility: HOSPITAL | Age: 54
End: 2024-01-22
Payer: COMMERCIAL

## 2024-01-22 ENCOUNTER — HOSPITAL ENCOUNTER (INPATIENT)
Facility: HOSPITAL | Age: 54
LOS: 9 days | Discharge: HOME | End: 2024-01-31
Attending: INTERNAL MEDICINE | Admitting: FAMILY MEDICINE
Payer: COMMERCIAL

## 2024-01-22 ENCOUNTER — APPOINTMENT (OUTPATIENT)
Dept: CT IMAGING | Facility: HOSPITAL | Age: 54
End: 2024-01-22
Payer: COMMERCIAL

## 2024-01-22 DIAGNOSIS — E86.0 DEHYDRATION: ICD-10-CM

## 2024-01-22 DIAGNOSIS — R55 SYNCOPE, UNSPECIFIED SYNCOPE TYPE: ICD-10-CM

## 2024-01-22 DIAGNOSIS — N18.9 ACUTE RENAL FAILURE SUPERIMPOSED ON CHRONIC KIDNEY DISEASE, UNSPECIFIED ACUTE RENAL FAILURE TYPE, UNSPECIFIED CKD STAGE: Primary | ICD-10-CM

## 2024-01-22 DIAGNOSIS — R11.2 NAUSEA AND VOMITING, UNSPECIFIED VOMITING TYPE: ICD-10-CM

## 2024-01-22 DIAGNOSIS — R11.2 INTRACTABLE NAUSEA AND VOMITING: ICD-10-CM

## 2024-01-22 DIAGNOSIS — I95.9 HYPOTENSION, UNSPECIFIED HYPOTENSION TYPE: ICD-10-CM

## 2024-01-22 DIAGNOSIS — N17.9 ACUTE RENAL FAILURE SUPERIMPOSED ON CHRONIC KIDNEY DISEASE, UNSPECIFIED ACUTE RENAL FAILURE TYPE, UNSPECIFIED CKD STAGE: Primary | ICD-10-CM

## 2024-01-22 DIAGNOSIS — R19.7 DIARRHEA, UNSPECIFIED TYPE: ICD-10-CM

## 2024-01-22 LAB
ALBUMIN SERPL-MCNC: 4.1 G/DL (ref 3.5–5.2)
ALBUMIN/GLOB SERPL: 1.1 G/DL
ALP SERPL-CCNC: 116 U/L (ref 39–117)
ALT SERPL W P-5'-P-CCNC: 29 U/L (ref 1–33)
ANION GAP SERPL CALCULATED.3IONS-SCNC: 19 MMOL/L (ref 5–15)
AST SERPL-CCNC: 29 U/L (ref 1–32)
BACTERIA BLD CULT: ABNORMAL
BACTERIA UR QL AUTO: ABNORMAL /HPF
BASOPHILS # BLD AUTO: 0.03 10*3/MM3 (ref 0–0.2)
BASOPHILS NFR BLD AUTO: 0.2 % (ref 0–1.5)
BILIRUB SERPL-MCNC: 0.2 MG/DL (ref 0–1.2)
BILIRUB UR QL STRIP: NEGATIVE
BOTTLE TYPE: ABNORMAL
BUN SERPL-MCNC: 76 MG/DL (ref 6–20)
BUN/CREAT SERPL: 21.2 (ref 7–25)
CALCIUM SPEC-SCNC: 12.7 MG/DL (ref 8.6–10.5)
CHLORIDE SERPL-SCNC: 97 MMOL/L (ref 98–107)
CLARITY UR: ABNORMAL
CO2 SERPL-SCNC: 17 MMOL/L (ref 22–29)
COD CRY URNS QL: ABNORMAL /HPF
COLOR UR: ABNORMAL
CREAT SERPL-MCNC: 3.58 MG/DL (ref 0.57–1)
CRP SERPL-MCNC: 4.1 MG/DL (ref 0–0.5)
D-LACTATE SERPL-SCNC: 0.8 MMOL/L (ref 0.5–2)
DEPRECATED RDW RBC AUTO: 56.5 FL (ref 37–54)
EGFRCR SERPLBLD CKD-EPI 2021: 14.7 ML/MIN/1.73
EOSINOPHIL # BLD AUTO: 0.04 10*3/MM3 (ref 0–0.4)
EOSINOPHIL NFR BLD AUTO: 0.2 % (ref 0.3–6.2)
ERYTHROCYTE [DISTWIDTH] IN BLOOD BY AUTOMATED COUNT: 16.6 % (ref 12.3–15.4)
FLUAV RNA RESP QL NAA+PROBE: NOT DETECTED
FLUBV RNA RESP QL NAA+PROBE: NOT DETECTED
GLOBULIN UR ELPH-MCNC: 3.7 GM/DL
GLUCOSE BLDC GLUCOMTR-MCNC: 135 MG/DL (ref 70–130)
GLUCOSE BLDC GLUCOMTR-MCNC: 176 MG/DL (ref 70–130)
GLUCOSE SERPL-MCNC: 160 MG/DL (ref 65–99)
GLUCOSE UR STRIP-MCNC: ABNORMAL MG/DL
GRAN CASTS URNS QL MICRO: ABNORMAL /LPF
HCT VFR BLD AUTO: 42.1 % (ref 34–46.6)
HGB BLD-MCNC: 13.5 G/DL (ref 12–15.9)
HGB UR QL STRIP.AUTO: NEGATIVE
HYALINE CASTS UR QL AUTO: ABNORMAL /LPF
IMM GRANULOCYTES # BLD AUTO: 0.11 10*3/MM3 (ref 0–0.05)
IMM GRANULOCYTES NFR BLD AUTO: 0.6 % (ref 0–0.5)
KETONES UR QL STRIP: NEGATIVE
LEUKOCYTE ESTERASE UR QL STRIP.AUTO: NEGATIVE
LIPASE SERPL-CCNC: 68 U/L (ref 13–60)
LYMPHOCYTES # BLD AUTO: 2.55 10*3/MM3 (ref 0.7–3.1)
LYMPHOCYTES NFR BLD AUTO: 14.4 % (ref 19.6–45.3)
MAGNESIUM SERPL-MCNC: 2.1 MG/DL (ref 1.6–2.6)
MCH RBC QN AUTO: 29.6 PG (ref 26.6–33)
MCHC RBC AUTO-ENTMCNC: 32.1 G/DL (ref 31.5–35.7)
MCV RBC AUTO: 92.3 FL (ref 79–97)
MONOCYTES # BLD AUTO: 1.28 10*3/MM3 (ref 0.1–0.9)
MONOCYTES NFR BLD AUTO: 7.2 % (ref 5–12)
NEUTROPHILS NFR BLD AUTO: 13.73 10*3/MM3 (ref 1.7–7)
NEUTROPHILS NFR BLD AUTO: 77.4 % (ref 42.7–76)
NITRITE UR QL STRIP: NEGATIVE
NRBC BLD AUTO-RTO: 0 /100 WBC (ref 0–0.2)
PH UR STRIP.AUTO: 5.5 [PH] (ref 5–8)
PLATELET # BLD AUTO: 206 10*3/MM3 (ref 140–450)
PMV BLD AUTO: 11.8 FL (ref 6–12)
POTASSIUM SERPL-SCNC: 3.2 MMOL/L (ref 3.5–5.2)
PROCALCITONIN SERPL-MCNC: 6.57 NG/ML (ref 0–0.25)
PROT SERPL-MCNC: 7.8 G/DL (ref 6–8.5)
PROT UR QL STRIP: ABNORMAL
RBC # BLD AUTO: 4.56 10*6/MM3 (ref 3.77–5.28)
RBC # UR STRIP: ABNORMAL /HPF
REF LAB TEST METHOD: ABNORMAL
SARS-COV-2 RNA RESP QL NAA+PROBE: NOT DETECTED
SODIUM SERPL-SCNC: 133 MMOL/L (ref 136–145)
SP GR UR STRIP: 1.02 (ref 1–1.03)
SQUAMOUS #/AREA URNS HPF: ABNORMAL /HPF
STARCH GRANULES URNS QL MICRO: ABNORMAL /HPF
T3FREE SERPL-MCNC: 2.37 PG/ML (ref 2–4.4)
T4 FREE SERPL-MCNC: 1.14 NG/DL (ref 0.93–1.7)
TROPONIN T SERPL HS-MCNC: 18 NG/L
TSH SERPL DL<=0.05 MIU/L-ACNC: 0.95 UIU/ML (ref 0.27–4.2)
UROBILINOGEN UR QL STRIP: ABNORMAL
WBC # UR STRIP: ABNORMAL /HPF
WBC NRBC COR # BLD AUTO: 17.74 10*3/MM3 (ref 3.4–10.8)

## 2024-01-22 PROCEDURE — 84439 ASSAY OF FREE THYROXINE: CPT | Performed by: NURSE PRACTITIONER

## 2024-01-22 PROCEDURE — 25810000003 SODIUM CHLORIDE 0.9 % SOLUTION: Performed by: NURSE PRACTITIONER

## 2024-01-22 PROCEDURE — 74176 CT ABD & PELVIS W/O CONTRAST: CPT

## 2024-01-22 PROCEDURE — 84443 ASSAY THYROID STIM HORMONE: CPT | Performed by: NURSE PRACTITIONER

## 2024-01-22 PROCEDURE — 71101 X-RAY EXAM UNILAT RIBS/CHEST: CPT

## 2024-01-22 PROCEDURE — 93005 ELECTROCARDIOGRAM TRACING: CPT | Performed by: NURSE PRACTITIONER

## 2024-01-22 PROCEDURE — 87636 SARSCOV2 & INF A&B AMP PRB: CPT | Performed by: NURSE PRACTITIONER

## 2024-01-22 PROCEDURE — 25010000002 ERTAPENEM PER 500 MG: Performed by: INTERNAL MEDICINE

## 2024-01-22 PROCEDURE — 82948 REAGENT STRIP/BLOOD GLUCOSE: CPT

## 2024-01-22 PROCEDURE — 87077 CULTURE AEROBIC IDENTIFY: CPT | Performed by: NURSE PRACTITIONER

## 2024-01-22 PROCEDURE — 85025 COMPLETE CBC W/AUTO DIFF WBC: CPT | Performed by: NURSE PRACTITIONER

## 2024-01-22 PROCEDURE — 25010000002 POTASSIUM CHLORIDE 10 MEQ/100ML SOLUTION: Performed by: INTERNAL MEDICINE

## 2024-01-22 PROCEDURE — 25810000003 LACTATED RINGERS PER 1000 ML: Performed by: INTERNAL MEDICINE

## 2024-01-22 PROCEDURE — 99285 EMERGENCY DEPT VISIT HI MDM: CPT

## 2024-01-22 PROCEDURE — 25810000003 SODIUM CHLORIDE 0.9 % SOLUTION: Performed by: INTERNAL MEDICINE

## 2024-01-22 PROCEDURE — 86140 C-REACTIVE PROTEIN: CPT | Performed by: NURSE PRACTITIONER

## 2024-01-22 PROCEDURE — 83605 ASSAY OF LACTIC ACID: CPT | Performed by: NURSE PRACTITIONER

## 2024-01-22 PROCEDURE — 70450 CT HEAD/BRAIN W/O DYE: CPT

## 2024-01-22 PROCEDURE — 81001 URINALYSIS AUTO W/SCOPE: CPT | Performed by: NURSE PRACTITIONER

## 2024-01-22 PROCEDURE — 87154 CUL TYP ID BLD PTHGN 6+ TRGT: CPT | Performed by: NURSE PRACTITIONER

## 2024-01-22 PROCEDURE — 87040 BLOOD CULTURE FOR BACTERIA: CPT | Performed by: NURSE PRACTITIONER

## 2024-01-22 PROCEDURE — 84145 PROCALCITONIN (PCT): CPT | Performed by: NURSE PRACTITIONER

## 2024-01-22 PROCEDURE — 63710000001 INSULIN LISPRO (HUMAN) PER 5 UNITS: Performed by: INTERNAL MEDICINE

## 2024-01-22 PROCEDURE — 83690 ASSAY OF LIPASE: CPT | Performed by: NURSE PRACTITIONER

## 2024-01-22 PROCEDURE — 84484 ASSAY OF TROPONIN QUANT: CPT | Performed by: NURSE PRACTITIONER

## 2024-01-22 PROCEDURE — 87150 DNA/RNA AMPLIFIED PROBE: CPT | Performed by: NURSE PRACTITIONER

## 2024-01-22 PROCEDURE — 84481 FREE ASSAY (FT-3): CPT | Performed by: NURSE PRACTITIONER

## 2024-01-22 PROCEDURE — 93010 ELECTROCARDIOGRAM REPORT: CPT | Performed by: INTERNAL MEDICINE

## 2024-01-22 PROCEDURE — 87186 SC STD MICRODIL/AGAR DIL: CPT | Performed by: NURSE PRACTITIONER

## 2024-01-22 PROCEDURE — 80053 COMPREHEN METABOLIC PANEL: CPT | Performed by: NURSE PRACTITIONER

## 2024-01-22 PROCEDURE — 83735 ASSAY OF MAGNESIUM: CPT | Performed by: NURSE PRACTITIONER

## 2024-01-22 RX ORDER — ATORVASTATIN CALCIUM 40 MG/1
80 TABLET, FILM COATED ORAL NIGHTLY
Status: DISCONTINUED | OUTPATIENT
Start: 2024-01-22 | End: 2024-01-31 | Stop reason: HOSPADM

## 2024-01-22 RX ORDER — SODIUM CHLORIDE 0.9 % (FLUSH) 0.9 %
10 SYRINGE (ML) INJECTION EVERY 12 HOURS SCHEDULED
Status: DISCONTINUED | OUTPATIENT
Start: 2024-01-22 | End: 2024-01-31 | Stop reason: HOSPADM

## 2024-01-22 RX ORDER — PANTOPRAZOLE SODIUM 40 MG/10ML
40 INJECTION, POWDER, LYOPHILIZED, FOR SOLUTION INTRAVENOUS EVERY 12 HOURS SCHEDULED
Status: DISCONTINUED | OUTPATIENT
Start: 2024-01-22 | End: 2024-01-23

## 2024-01-22 RX ORDER — NICOTINE POLACRILEX 4 MG
15 LOZENGE BUCCAL
Status: DISCONTINUED | OUTPATIENT
Start: 2024-01-22 | End: 2024-01-31 | Stop reason: HOSPADM

## 2024-01-22 RX ORDER — FAMOTIDINE 40 MG/1
40 TABLET, FILM COATED ORAL 2 TIMES DAILY
Status: ON HOLD | COMMUNITY
End: 2024-01-22

## 2024-01-22 RX ORDER — ROPINIROLE 1 MG/1
1 TABLET, FILM COATED ORAL EVERY 8 HOURS SCHEDULED
Status: DISCONTINUED | OUTPATIENT
Start: 2024-01-22 | End: 2024-01-31 | Stop reason: HOSPADM

## 2024-01-22 RX ORDER — CALCITRIOL 0.25 UG/1
0.25 CAPSULE, LIQUID FILLED ORAL DAILY
Status: DISCONTINUED | OUTPATIENT
Start: 2024-01-22 | End: 2024-01-31 | Stop reason: HOSPADM

## 2024-01-22 RX ORDER — CALCIUM CARBONATE/VITAMIN D3 600 MG-10
1 TABLET ORAL DAILY
Status: ON HOLD | COMMUNITY
End: 2024-01-22

## 2024-01-22 RX ORDER — ONDANSETRON 2 MG/ML
4 INJECTION INTRAMUSCULAR; INTRAVENOUS EVERY 6 HOURS PRN
Status: DISCONTINUED | OUTPATIENT
Start: 2024-01-22 | End: 2024-01-31 | Stop reason: HOSPADM

## 2024-01-22 RX ORDER — SODIUM BICARBONATE 650 MG/1
650 TABLET ORAL 2 TIMES DAILY
Status: DISCONTINUED | OUTPATIENT
Start: 2024-01-22 | End: 2024-01-31 | Stop reason: HOSPADM

## 2024-01-22 RX ORDER — SODIUM CHLORIDE 9 MG/ML
40 INJECTION, SOLUTION INTRAVENOUS AS NEEDED
Status: DISCONTINUED | OUTPATIENT
Start: 2024-01-22 | End: 2024-01-31 | Stop reason: HOSPADM

## 2024-01-22 RX ORDER — DEXTROSE MONOHYDRATE 25 G/50ML
25 INJECTION, SOLUTION INTRAVENOUS
Status: DISCONTINUED | OUTPATIENT
Start: 2024-01-22 | End: 2024-01-31 | Stop reason: HOSPADM

## 2024-01-22 RX ORDER — FERROUS SULFATE 325(65) MG
325 TABLET ORAL
Status: ON HOLD | COMMUNITY
End: 2024-01-22

## 2024-01-22 RX ORDER — POTASSIUM CHLORIDE 7.45 MG/ML
10 INJECTION INTRAVENOUS
Status: DISPENSED | OUTPATIENT
Start: 2024-01-22 | End: 2024-01-22

## 2024-01-22 RX ORDER — ALBUTEROL SULFATE 90 UG/1
2 AEROSOL, METERED RESPIRATORY (INHALATION) 3 TIMES DAILY PRN
Status: DISCONTINUED | OUTPATIENT
Start: 2024-01-22 | End: 2024-01-22 | Stop reason: SDUPTHER

## 2024-01-22 RX ORDER — SODIUM CHLORIDE 0.9 % (FLUSH) 0.9 %
10 SYRINGE (ML) INJECTION AS NEEDED
Status: DISCONTINUED | OUTPATIENT
Start: 2024-01-22 | End: 2024-01-31 | Stop reason: HOSPADM

## 2024-01-22 RX ORDER — METOCLOPRAMIDE 5 MG/1
5 TABLET ORAL 3 TIMES DAILY
Status: ON HOLD | COMMUNITY
End: 2024-01-22

## 2024-01-22 RX ORDER — FERROUS SULFATE 325(65) MG
325 TABLET ORAL
COMMUNITY

## 2024-01-22 RX ORDER — ALUMINA, MAGNESIA, AND SIMETHICONE 2400; 2400; 240 MG/30ML; MG/30ML; MG/30ML
15 SUSPENSION ORAL EVERY 6 HOURS PRN
Status: DISCONTINUED | OUTPATIENT
Start: 2024-01-22 | End: 2024-01-31 | Stop reason: HOSPADM

## 2024-01-22 RX ORDER — ALBUTEROL SULFATE 2.5 MG/3ML
2.5 SOLUTION RESPIRATORY (INHALATION) 3 TIMES DAILY PRN
Status: DISCONTINUED | OUTPATIENT
Start: 2024-01-22 | End: 2024-01-31 | Stop reason: HOSPADM

## 2024-01-22 RX ORDER — INSULIN LISPRO 100 [IU]/ML
2-9 INJECTION, SOLUTION INTRAVENOUS; SUBCUTANEOUS
Status: DISCONTINUED | OUTPATIENT
Start: 2024-01-22 | End: 2024-01-31 | Stop reason: HOSPADM

## 2024-01-22 RX ORDER — POTASSIUM CHLORIDE 7.45 MG/ML
10 INJECTION INTRAVENOUS
Status: COMPLETED | OUTPATIENT
Start: 2024-01-22 | End: 2024-01-22

## 2024-01-22 RX ORDER — HYDROXYCHLOROQUINE SULFATE 200 MG/1
200 TABLET, FILM COATED ORAL DAILY
Status: DISCONTINUED | OUTPATIENT
Start: 2024-01-22 | End: 2024-01-31 | Stop reason: HOSPADM

## 2024-01-22 RX ORDER — ACETAMINOPHEN 325 MG/1
650 TABLET ORAL EVERY 6 HOURS PRN
Status: DISCONTINUED | OUTPATIENT
Start: 2024-01-22 | End: 2024-01-31 | Stop reason: HOSPADM

## 2024-01-22 RX ORDER — SODIUM CHLORIDE, SODIUM LACTATE, POTASSIUM CHLORIDE, CALCIUM CHLORIDE 600; 310; 30; 20 MG/100ML; MG/100ML; MG/100ML; MG/100ML
150 INJECTION, SOLUTION INTRAVENOUS CONTINUOUS
Status: DISCONTINUED | OUTPATIENT
Start: 2024-01-22 | End: 2024-01-24

## 2024-01-22 RX ORDER — POTASSIUM CHLORIDE 750 MG/1
10 TABLET, FILM COATED, EXTENDED RELEASE ORAL 2 TIMES DAILY
Status: ON HOLD | COMMUNITY
End: 2024-01-22

## 2024-01-22 RX ORDER — PROCHLORPERAZINE EDISYLATE 5 MG/ML
5 INJECTION INTRAMUSCULAR; INTRAVENOUS EVERY 6 HOURS PRN
Status: DISCONTINUED | OUTPATIENT
Start: 2024-01-22 | End: 2024-01-26

## 2024-01-22 RX ADMIN — SODIUM CHLORIDE 1000 ML: 9 INJECTION, SOLUTION INTRAVENOUS at 09:57

## 2024-01-22 RX ADMIN — INSULIN LISPRO 2 UNITS: 100 INJECTION, SOLUTION INTRAVENOUS; SUBCUTANEOUS at 20:42

## 2024-01-22 RX ADMIN — SODIUM BICARBONATE 650 MG: 650 TABLET ORAL at 20:41

## 2024-01-22 RX ADMIN — PANCRELIPASE 12000 UNITS OF LIPASE: 60000; 12000; 38000 CAPSULE, DELAYED RELEASE PELLETS ORAL at 18:11

## 2024-01-22 RX ADMIN — ERTAPENEM SODIUM 500 MG: 1 INJECTION, POWDER, LYOPHILIZED, FOR SOLUTION INTRAMUSCULAR; INTRAVENOUS at 20:00

## 2024-01-22 RX ADMIN — SODIUM CHLORIDE 1000 ML: 9 INJECTION, SOLUTION INTRAVENOUS at 17:58

## 2024-01-22 RX ADMIN — SODIUM BICARBONATE: 84 INJECTION, SOLUTION INTRAVENOUS at 20:00

## 2024-01-22 RX ADMIN — CALCITRIOL 0.25 MCG: 0.25 CAPSULE ORAL at 18:06

## 2024-01-22 RX ADMIN — HYDROXYCHLOROQUINE SULFATE 200 MG: 200 TABLET ORAL at 18:06

## 2024-01-22 RX ADMIN — POTASSIUM CHLORIDE 10 MEQ: 7.46 INJECTION, SOLUTION INTRAVENOUS at 20:41

## 2024-01-22 RX ADMIN — SODIUM CHLORIDE, POTASSIUM CHLORIDE, SODIUM LACTATE AND CALCIUM CHLORIDE 150 ML/HR: 600; 310; 30; 20 INJECTION, SOLUTION INTRAVENOUS at 19:39

## 2024-01-22 RX ADMIN — ACETAMINOPHEN 650 MG: 325 TABLET, FILM COATED ORAL at 22:10

## 2024-01-22 RX ADMIN — ATORVASTATIN CALCIUM 80 MG: 40 TABLET ORAL at 20:41

## 2024-01-22 RX ADMIN — PANTOPRAZOLE SODIUM 40 MG: 40 INJECTION, POWDER, FOR SOLUTION INTRAVENOUS at 20:41

## 2024-01-22 RX ADMIN — ROPINIROLE HYDROCHLORIDE 1 MG: 1 TABLET, FILM COATED ORAL at 22:10

## 2024-01-22 RX ADMIN — POTASSIUM CHLORIDE 10 MEQ: 7.46 INJECTION, SOLUTION INTRAVENOUS at 22:13

## 2024-01-22 RX ADMIN — PANCRELIPASE 12000 UNITS OF LIPASE: 60000; 12000; 38000 CAPSULE, DELAYED RELEASE PELLETS ORAL at 20:41

## 2024-01-23 PROBLEM — A49.8 INFECTION DUE TO STENOTROPHOMONAS MALTOPHILIA: Status: ACTIVE | Noted: 2024-01-23

## 2024-01-23 PROBLEM — R65.20 SEVERE SEPSIS: Status: ACTIVE | Noted: 2022-04-29

## 2024-01-23 PROBLEM — R78.81 BACTEREMIA: Status: ACTIVE | Noted: 2024-01-23

## 2024-01-23 LAB
ALBUMIN SERPL-MCNC: 2.6 G/DL (ref 3.5–5.2)
ALBUMIN/GLOB SERPL: 1.3 G/DL
ALP SERPL-CCNC: 79 U/L (ref 39–117)
ALT SERPL W P-5'-P-CCNC: 14 U/L (ref 1–33)
ANION GAP SERPL CALCULATED.3IONS-SCNC: 10 MMOL/L (ref 5–15)
AST SERPL-CCNC: 17 U/L (ref 1–32)
BASOPHILS # BLD AUTO: 0.03 10*3/MM3 (ref 0–0.2)
BASOPHILS NFR BLD AUTO: 0.4 % (ref 0–1.5)
BILIRUB SERPL-MCNC: 0.2 MG/DL (ref 0–1.2)
BUN SERPL-MCNC: 36 MG/DL (ref 6–20)
BUN/CREAT SERPL: 22.9 (ref 7–25)
CALCIUM SPEC-SCNC: 7.3 MG/DL (ref 8.6–10.5)
CHLORIDE SERPL-SCNC: 107 MMOL/L (ref 98–107)
CO2 SERPL-SCNC: 22 MMOL/L (ref 22–29)
CREAT SERPL-MCNC: 1.57 MG/DL (ref 0.57–1)
CRP SERPL-MCNC: 5.09 MG/DL (ref 0–0.5)
DEPRECATED RDW RBC AUTO: 55.6 FL (ref 37–54)
EGFRCR SERPLBLD CKD-EPI 2021: 39.5 ML/MIN/1.73
EOSINOPHIL # BLD AUTO: 0.04 10*3/MM3 (ref 0–0.4)
EOSINOPHIL NFR BLD AUTO: 0.5 % (ref 0.3–6.2)
ERYTHROCYTE [DISTWIDTH] IN BLOOD BY AUTOMATED COUNT: 16.3 % (ref 12.3–15.4)
GLOBULIN UR ELPH-MCNC: 2 GM/DL
GLUCOSE BLDC GLUCOMTR-MCNC: 112 MG/DL (ref 70–130)
GLUCOSE BLDC GLUCOMTR-MCNC: 70 MG/DL (ref 70–130)
GLUCOSE BLDC GLUCOMTR-MCNC: 91 MG/DL (ref 70–130)
GLUCOSE SERPL-MCNC: 96 MG/DL (ref 65–99)
HCT VFR BLD AUTO: 29.3 % (ref 34–46.6)
HGB BLD-MCNC: 9.4 G/DL (ref 12–15.9)
LYMPHOCYTES # BLD AUTO: 0.96 10*3/MM3 (ref 0.7–3.1)
LYMPHOCYTES NFR BLD AUTO: 12.8 % (ref 19.6–45.3)
MAGNESIUM SERPL-MCNC: 1.2 MG/DL (ref 1.6–2.6)
MCH RBC QN AUTO: 29.7 PG (ref 26.6–33)
MCHC RBC AUTO-ENTMCNC: 32.1 G/DL (ref 31.5–35.7)
MCV RBC AUTO: 92.7 FL (ref 79–97)
MONOCYTES # BLD AUTO: 0.79 10*3/MM3 (ref 0.1–0.9)
MONOCYTES NFR BLD AUTO: 10.6 % (ref 5–12)
NEUTROPHILS NFR BLD AUTO: 5.61 10*3/MM3 (ref 1.7–7)
NEUTROPHILS NFR BLD AUTO: 75 % (ref 42.7–76)
PLATELET # BLD AUTO: 128 10*3/MM3 (ref 140–450)
PMV BLD AUTO: 11.5 FL (ref 6–12)
POTASSIUM SERPL-SCNC: 2.8 MMOL/L (ref 3.5–5.2)
PROT SERPL-MCNC: 4.6 G/DL (ref 6–8.5)
PTH-INTACT SERPL-MCNC: 13.7 PG/ML (ref 15–65)
RBC # BLD AUTO: 3.16 10*6/MM3 (ref 3.77–5.28)
SODIUM SERPL-SCNC: 139 MMOL/L (ref 136–145)
SODIUM UR-SCNC: 74 MMOL/L
WBC NRBC COR # BLD AUTO: 7.48 10*3/MM3 (ref 3.4–10.8)

## 2024-01-23 PROCEDURE — 82948 REAGENT STRIP/BLOOD GLUCOSE: CPT

## 2024-01-23 PROCEDURE — 87070 CULTURE OTHR SPECIMN AEROBIC: CPT | Performed by: INTERNAL MEDICINE

## 2024-01-23 PROCEDURE — 05HY33Z INSERTION OF INFUSION DEVICE INTO UPPER VEIN, PERCUTANEOUS APPROACH: ICD-10-PCS | Performed by: INTERNAL MEDICINE

## 2024-01-23 PROCEDURE — 85025 COMPLETE CBC W/AUTO DIFF WBC: CPT | Performed by: INTERNAL MEDICINE

## 2024-01-23 PROCEDURE — 86140 C-REACTIVE PROTEIN: CPT | Performed by: INTERNAL MEDICINE

## 2024-01-23 PROCEDURE — 25010000002 ERTAPENEM PER 500 MG: Performed by: INTERNAL MEDICINE

## 2024-01-23 PROCEDURE — 25010000002 POTASSIUM CHLORIDE 10 MEQ/100ML SOLUTION: Performed by: INTERNAL MEDICINE

## 2024-01-23 PROCEDURE — 99254 IP/OBS CNSLTJ NEW/EST MOD 60: CPT | Performed by: INTERNAL MEDICINE

## 2024-01-23 PROCEDURE — 25010000002 LEVOFLOXACIN PER 250 MG: Performed by: FAMILY MEDICINE

## 2024-01-23 PROCEDURE — 82043 UR ALBUMIN QUANTITATIVE: CPT | Performed by: INTERNAL MEDICINE

## 2024-01-23 PROCEDURE — 84300 ASSAY OF URINE SODIUM: CPT | Performed by: INTERNAL MEDICINE

## 2024-01-23 PROCEDURE — 83970 ASSAY OF PARATHORMONE: CPT | Performed by: INTERNAL MEDICINE

## 2024-01-23 PROCEDURE — 82570 ASSAY OF URINE CREATININE: CPT | Performed by: INTERNAL MEDICINE

## 2024-01-23 PROCEDURE — 87040 BLOOD CULTURE FOR BACTERIA: CPT | Performed by: FAMILY MEDICINE

## 2024-01-23 PROCEDURE — 80053 COMPREHEN METABOLIC PANEL: CPT | Performed by: INTERNAL MEDICINE

## 2024-01-23 PROCEDURE — 83735 ASSAY OF MAGNESIUM: CPT | Performed by: NURSE PRACTITIONER

## 2024-01-23 PROCEDURE — 87077 CULTURE AEROBIC IDENTIFY: CPT | Performed by: FAMILY MEDICINE

## 2024-01-23 PROCEDURE — 82397 CHEMILUMINESCENT ASSAY: CPT | Performed by: INTERNAL MEDICINE

## 2024-01-23 PROCEDURE — 25810000003 LACTATED RINGERS PER 1000 ML: Performed by: INTERNAL MEDICINE

## 2024-01-23 PROCEDURE — 25810000003 SODIUM CHLORIDE 0.9 % SOLUTION: Performed by: FAMILY MEDICINE

## 2024-01-23 RX ORDER — MIDODRINE HYDROCHLORIDE 2.5 MG/1
5 TABLET ORAL
Status: DISCONTINUED | OUTPATIENT
Start: 2024-01-23 | End: 2024-01-31 | Stop reason: HOSPADM

## 2024-01-23 RX ORDER — PANTOPRAZOLE SODIUM 40 MG/10ML
40 INJECTION, POWDER, LYOPHILIZED, FOR SOLUTION INTRAVENOUS
Status: DISCONTINUED | OUTPATIENT
Start: 2024-01-24 | End: 2024-01-31 | Stop reason: HOSPADM

## 2024-01-23 RX ORDER — LEVOFLOXACIN 5 MG/ML
750 INJECTION, SOLUTION INTRAVENOUS EVERY OTHER DAY
Status: COMPLETED | OUTPATIENT
Start: 2024-01-24 | End: 2024-01-26

## 2024-01-23 RX ORDER — POTASSIUM CHLORIDE 7.45 MG/ML
10 INJECTION INTRAVENOUS
Qty: 400 ML | Refills: 0 | Status: COMPLETED | OUTPATIENT
Start: 2024-01-23 | End: 2024-01-23

## 2024-01-23 RX ORDER — NOREPINEPHRINE BITARTRATE 0.03 MG/ML
.02-.3 INJECTION, SOLUTION INTRAVENOUS
Status: DISCONTINUED | OUTPATIENT
Start: 2024-01-23 | End: 2024-01-23

## 2024-01-23 RX ORDER — NOREPINEPHRINE BITARTRATE 0.03 MG/ML
.02-.3 INJECTION, SOLUTION INTRAVENOUS
Status: DISCONTINUED | OUTPATIENT
Start: 2024-01-23 | End: 2024-01-24 | Stop reason: HOSPADM

## 2024-01-23 RX ORDER — LEVOFLOXACIN 5 MG/ML
500 INJECTION, SOLUTION INTRAVENOUS
Status: DISCONTINUED | OUTPATIENT
Start: 2024-01-23 | End: 2024-01-23

## 2024-01-23 RX ADMIN — NOREPINEPHRINE BITARTRATE 0.02 MCG/KG/MIN: 0.03 INJECTION, SOLUTION INTRAVENOUS at 05:08

## 2024-01-23 RX ADMIN — POTASSIUM CHLORIDE 10 MEQ: 10 INJECTION, SOLUTION INTRAVENOUS at 14:30

## 2024-01-23 RX ADMIN — PANCRELIPASE 12000 UNITS OF LIPASE: 60000; 12000; 38000 CAPSULE, DELAYED RELEASE PELLETS ORAL at 08:42

## 2024-01-23 RX ADMIN — SODIUM CHLORIDE, POTASSIUM CHLORIDE, SODIUM LACTATE AND CALCIUM CHLORIDE 150 ML/HR: 600; 310; 30; 20 INJECTION, SOLUTION INTRAVENOUS at 15:17

## 2024-01-23 RX ADMIN — PANCRELIPASE 12000 UNITS OF LIPASE: 60000; 12000; 38000 CAPSULE, DELAYED RELEASE PELLETS ORAL at 20:23

## 2024-01-23 RX ADMIN — Medication 10 ML: at 20:23

## 2024-01-23 RX ADMIN — POTASSIUM CHLORIDE 10 MEQ: 10 INJECTION, SOLUTION INTRAVENOUS at 15:37

## 2024-01-23 RX ADMIN — PANCRELIPASE 12000 UNITS OF LIPASE: 60000; 12000; 38000 CAPSULE, DELAYED RELEASE PELLETS ORAL at 11:46

## 2024-01-23 RX ADMIN — PANCRELIPASE 12000 UNITS OF LIPASE: 60000; 12000; 38000 CAPSULE, DELAYED RELEASE PELLETS ORAL at 17:22

## 2024-01-23 RX ADMIN — POTASSIUM CHLORIDE 10 MEQ: 10 INJECTION, SOLUTION INTRAVENOUS at 13:15

## 2024-01-23 RX ADMIN — HYDROXYCHLOROQUINE SULFATE 200 MG: 200 TABLET ORAL at 08:42

## 2024-01-23 RX ADMIN — ROPINIROLE HYDROCHLORIDE 1 MG: 1 TABLET, FILM COATED ORAL at 20:23

## 2024-01-23 RX ADMIN — CALCITRIOL 0.25 MCG: 0.25 CAPSULE ORAL at 08:42

## 2024-01-23 RX ADMIN — SODIUM BICARBONATE 650 MG: 650 TABLET ORAL at 08:42

## 2024-01-23 RX ADMIN — SODIUM CHLORIDE 1000 ML: 9 INJECTION, SOLUTION INTRAVENOUS at 00:14

## 2024-01-23 RX ADMIN — MIDODRINE HYDROCHLORIDE 5 MG: 2.5 TABLET ORAL at 17:22

## 2024-01-23 RX ADMIN — ATORVASTATIN CALCIUM 80 MG: 40 TABLET ORAL at 20:23

## 2024-01-23 RX ADMIN — Medication 10 ML: at 08:42

## 2024-01-23 RX ADMIN — ROPINIROLE HYDROCHLORIDE 1 MG: 1 TABLET, FILM COATED ORAL at 06:11

## 2024-01-23 RX ADMIN — LEVOFLOXACIN 500 MG: 5 INJECTION, SOLUTION INTRAVENOUS at 05:09

## 2024-01-23 RX ADMIN — SODIUM BICARBONATE 650 MG: 650 TABLET ORAL at 20:23

## 2024-01-23 RX ADMIN — SODIUM CHLORIDE, POTASSIUM CHLORIDE, SODIUM LACTATE AND CALCIUM CHLORIDE 150 ML/HR: 600; 310; 30; 20 INJECTION, SOLUTION INTRAVENOUS at 06:18

## 2024-01-23 RX ADMIN — ERTAPENEM 1000 MG: 1 INJECTION INTRAMUSCULAR; INTRAVENOUS at 18:31

## 2024-01-23 RX ADMIN — POTASSIUM CHLORIDE 10 MEQ: 10 INJECTION, SOLUTION INTRAVENOUS at 11:46

## 2024-01-23 RX ADMIN — SODIUM CHLORIDE, POTASSIUM CHLORIDE, SODIUM LACTATE AND CALCIUM CHLORIDE 150 ML/HR: 600; 310; 30; 20 INJECTION, SOLUTION INTRAVENOUS at 22:16

## 2024-01-23 RX ADMIN — ROPINIROLE HYDROCHLORIDE 1 MG: 1 TABLET, FILM COATED ORAL at 13:52

## 2024-01-24 ENCOUNTER — APPOINTMENT (OUTPATIENT)
Dept: NUCLEAR MEDICINE | Facility: HOSPITAL | Age: 54
End: 2024-01-24
Payer: COMMERCIAL

## 2024-01-24 LAB
ADV 40+41 DNA STL QL NAA+NON-PROBE: NOT DETECTED
ALBUMIN SERPL-MCNC: 2.3 G/DL (ref 3.5–5.2)
ALBUMIN UR-MCNC: <1.2 MG/DL
ALBUMIN/GLOB SERPL: 1.2 G/DL
ALP SERPL-CCNC: 71 U/L (ref 39–117)
ALT SERPL W P-5'-P-CCNC: 11 U/L (ref 1–33)
ANION GAP SERPL CALCULATED.3IONS-SCNC: 8 MMOL/L (ref 5–15)
AST SERPL-CCNC: 16 U/L (ref 1–32)
ASTRO TYP 1-8 RNA STL QL NAA+NON-PROBE: NOT DETECTED
BASOPHILS # BLD AUTO: 0.02 10*3/MM3 (ref 0–0.2)
BASOPHILS NFR BLD AUTO: 0.3 % (ref 0–1.5)
BILIRUB SERPL-MCNC: 0.2 MG/DL (ref 0–1.2)
BUN SERPL-MCNC: 19 MG/DL (ref 6–20)
BUN/CREAT SERPL: 17 (ref 7–25)
C CAYETANENSIS DNA STL QL NAA+NON-PROBE: NOT DETECTED
C COLI+JEJ+UPSA DNA STL QL NAA+NON-PROBE: NOT DETECTED
CALCIUM SPEC-SCNC: 7.2 MG/DL (ref 8.6–10.5)
CHLORIDE SERPL-SCNC: 107 MMOL/L (ref 98–107)
CO2 SERPL-SCNC: 22 MMOL/L (ref 22–29)
CREAT SERPL-MCNC: 1.12 MG/DL (ref 0.57–1)
CREAT UR-MCNC: 45.9 MG/DL
CREAT UR-MCNC: 46.5 MG/DL
CRYPTOSP DNA STL QL NAA+NON-PROBE: NOT DETECTED
DEPRECATED RDW RBC AUTO: 54.5 FL (ref 37–54)
E HISTOLYT DNA STL QL NAA+NON-PROBE: NOT DETECTED
EAEC PAA PLAS AGGR+AATA ST NAA+NON-PRB: NOT DETECTED
EC STX1+STX2 GENES STL QL NAA+NON-PROBE: NOT DETECTED
EGFRCR SERPLBLD CKD-EPI 2021: 59.3 ML/MIN/1.73
EOSINOPHIL # BLD AUTO: 0.11 10*3/MM3 (ref 0–0.4)
EOSINOPHIL NFR BLD AUTO: 1.5 % (ref 0.3–6.2)
EPEC EAE GENE STL QL NAA+NON-PROBE: NOT DETECTED
ERYTHROCYTE [DISTWIDTH] IN BLOOD BY AUTOMATED COUNT: 16.1 % (ref 12.3–15.4)
ETEC LTA+ST1A+ST1B TOX ST NAA+NON-PROBE: NOT DETECTED
G LAMBLIA DNA STL QL NAA+NON-PROBE: NOT DETECTED
GLOBULIN UR ELPH-MCNC: 2 GM/DL
GLUCOSE BLDC GLUCOMTR-MCNC: 69 MG/DL (ref 70–130)
GLUCOSE BLDC GLUCOMTR-MCNC: 78 MG/DL (ref 70–130)
GLUCOSE BLDC GLUCOMTR-MCNC: 84 MG/DL (ref 70–130)
GLUCOSE SERPL-MCNC: 90 MG/DL (ref 65–99)
HCT VFR BLD AUTO: 26.7 % (ref 34–46.6)
HGB BLD-MCNC: 8.6 G/DL (ref 12–15.9)
IRON 24H UR-MRATE: 30 MCG/DL (ref 37–145)
IRON SATN MFR SERPL: 15 % (ref 20–50)
LYMPHOCYTES # BLD AUTO: 2 10*3/MM3 (ref 0.7–3.1)
LYMPHOCYTES NFR BLD AUTO: 27.5 % (ref 19.6–45.3)
MCH RBC QN AUTO: 29.6 PG (ref 26.6–33)
MCHC RBC AUTO-ENTMCNC: 32.2 G/DL (ref 31.5–35.7)
MCV RBC AUTO: 91.8 FL (ref 79–97)
MICROALBUMIN/CREAT UR: NORMAL MG/G{CREAT}
MONOCYTES # BLD AUTO: 0.91 10*3/MM3 (ref 0.1–0.9)
MONOCYTES NFR BLD AUTO: 12.5 % (ref 5–12)
NEUTROPHILS NFR BLD AUTO: 4.18 10*3/MM3 (ref 1.7–7)
NEUTROPHILS NFR BLD AUTO: 57.5 % (ref 42.7–76)
NOROVIRUS GI+II RNA STL QL NAA+NON-PROBE: NOT DETECTED
P SHIGELLOIDES DNA STL QL NAA+NON-PROBE: NOT DETECTED
PLATELET # BLD AUTO: 133 10*3/MM3 (ref 140–450)
PMV BLD AUTO: 11.8 FL (ref 6–12)
POTASSIUM SERPL-SCNC: 2.9 MMOL/L (ref 3.5–5.2)
PROT SERPL-MCNC: 4.3 G/DL (ref 6–8.5)
RBC # BLD AUTO: 2.91 10*6/MM3 (ref 3.77–5.28)
RVA RNA STL QL NAA+NON-PROBE: NOT DETECTED
S ENT+BONG DNA STL QL NAA+NON-PROBE: NOT DETECTED
SAPO I+II+IV+V RNA STL QL NAA+NON-PROBE: NOT DETECTED
SHIGELLA SP+EIEC IPAH ST NAA+NON-PROBE: NOT DETECTED
SODIUM SERPL-SCNC: 137 MMOL/L (ref 136–145)
TIBC SERPL-MCNC: 200 MCG/DL (ref 298–536)
TRANSFERRIN SERPL-MCNC: 134 MG/DL (ref 200–360)
V CHOL+PARA+VUL DNA STL QL NAA+NON-PROBE: NOT DETECTED
V CHOLERAE DNA STL QL NAA+NON-PROBE: NOT DETECTED
WBC NRBC COR # BLD AUTO: 7.27 10*3/MM3 (ref 3.4–10.8)
Y ENTEROCOL DNA STL QL NAA+NON-PROBE: NOT DETECTED

## 2024-01-24 PROCEDURE — 84466 ASSAY OF TRANSFERRIN: CPT | Performed by: INTERNAL MEDICINE

## 2024-01-24 PROCEDURE — 87507 IADNA-DNA/RNA PROBE TQ 12-25: CPT | Performed by: INTERNAL MEDICINE

## 2024-01-24 PROCEDURE — 87040 BLOOD CULTURE FOR BACTERIA: CPT | Performed by: INTERNAL MEDICINE

## 2024-01-24 PROCEDURE — 80053 COMPREHEN METABOLIC PANEL: CPT | Performed by: INTERNAL MEDICINE

## 2024-01-24 PROCEDURE — 99232 SBSQ HOSP IP/OBS MODERATE 35: CPT | Performed by: INTERNAL MEDICINE

## 2024-01-24 PROCEDURE — 82948 REAGENT STRIP/BLOOD GLUCOSE: CPT

## 2024-01-24 PROCEDURE — 83540 ASSAY OF IRON: CPT | Performed by: INTERNAL MEDICINE

## 2024-01-24 PROCEDURE — 25010000002 MAGNESIUM SULFATE 2 GM/50ML SOLUTION: Performed by: NURSE PRACTITIONER

## 2024-01-24 PROCEDURE — 85025 COMPLETE CBC W/AUTO DIFF WBC: CPT | Performed by: INTERNAL MEDICINE

## 2024-01-24 PROCEDURE — 25810000003 LACTATED RINGERS PER 1000 ML: Performed by: INTERNAL MEDICINE

## 2024-01-24 PROCEDURE — 25010000002 LEVOFLOXACIN PER 250 MG: Performed by: INTERNAL MEDICINE

## 2024-01-24 RX ORDER — MAGNESIUM SULFATE HEPTAHYDRATE 40 MG/ML
2 INJECTION, SOLUTION INTRAVENOUS ONCE
Status: COMPLETED | OUTPATIENT
Start: 2024-01-24 | End: 2024-01-24

## 2024-01-24 RX ORDER — POTASSIUM CHLORIDE 750 MG/1
40 CAPSULE, EXTENDED RELEASE ORAL 2 TIMES DAILY WITH MEALS
Status: COMPLETED | OUTPATIENT
Start: 2024-01-24 | End: 2024-01-24

## 2024-01-24 RX ORDER — NORTRIPTYLINE HYDROCHLORIDE 25 MG/1
50 CAPSULE ORAL NIGHTLY
Status: DISCONTINUED | OUTPATIENT
Start: 2024-01-24 | End: 2024-01-31 | Stop reason: HOSPADM

## 2024-01-24 RX ADMIN — MIDODRINE HYDROCHLORIDE 5 MG: 2.5 TABLET ORAL at 17:32

## 2024-01-24 RX ADMIN — SODIUM BICARBONATE 650 MG: 650 TABLET ORAL at 08:27

## 2024-01-24 RX ADMIN — ATORVASTATIN CALCIUM 80 MG: 40 TABLET ORAL at 21:02

## 2024-01-24 RX ADMIN — ROPINIROLE HYDROCHLORIDE 1 MG: 1 TABLET, FILM COATED ORAL at 15:04

## 2024-01-24 RX ADMIN — SODIUM CHLORIDE, POTASSIUM CHLORIDE, SODIUM LACTATE AND CALCIUM CHLORIDE 150 ML/HR: 600; 310; 30; 20 INJECTION, SOLUTION INTRAVENOUS at 04:25

## 2024-01-24 RX ADMIN — POTASSIUM CHLORIDE 40 MEQ: 10 CAPSULE, COATED, EXTENDED RELEASE ORAL at 12:35

## 2024-01-24 RX ADMIN — PANCRELIPASE 12000 UNITS OF LIPASE: 60000; 12000; 38000 CAPSULE, DELAYED RELEASE PELLETS ORAL at 21:02

## 2024-01-24 RX ADMIN — HYDROXYCHLOROQUINE SULFATE 200 MG: 200 TABLET ORAL at 08:26

## 2024-01-24 RX ADMIN — Medication 10 ML: at 08:27

## 2024-01-24 RX ADMIN — CALCITRIOL 0.25 MCG: 0.25 CAPSULE ORAL at 08:27

## 2024-01-24 RX ADMIN — MIDODRINE HYDROCHLORIDE 5 MG: 2.5 TABLET ORAL at 08:28

## 2024-01-24 RX ADMIN — SODIUM BICARBONATE 650 MG: 650 TABLET ORAL at 21:03

## 2024-01-24 RX ADMIN — PANCRELIPASE 12000 UNITS OF LIPASE: 60000; 12000; 38000 CAPSULE, DELAYED RELEASE PELLETS ORAL at 08:26

## 2024-01-24 RX ADMIN — LEVOFLOXACIN 750 MG: 750 INJECTION, SOLUTION INTRAVENOUS at 04:25

## 2024-01-24 RX ADMIN — PANCRELIPASE 12000 UNITS OF LIPASE: 60000; 12000; 38000 CAPSULE, DELAYED RELEASE PELLETS ORAL at 12:35

## 2024-01-24 RX ADMIN — POTASSIUM CHLORIDE 40 MEQ: 10 CAPSULE, COATED, EXTENDED RELEASE ORAL at 17:32

## 2024-01-24 RX ADMIN — PANCRELIPASE 12000 UNITS OF LIPASE: 60000; 12000; 38000 CAPSULE, DELAYED RELEASE PELLETS ORAL at 17:33

## 2024-01-24 RX ADMIN — PANTOPRAZOLE SODIUM 40 MG: 40 INJECTION, POWDER, FOR SOLUTION INTRAVENOUS at 06:05

## 2024-01-24 RX ADMIN — Medication 10 ML: at 21:03

## 2024-01-24 RX ADMIN — MAGNESIUM SULFATE HEPTAHYDRATE 2 G: 2 INJECTION, SOLUTION INTRAVENOUS at 12:35

## 2024-01-24 RX ADMIN — NORTRIPTYLINE HYDROCHLORIDE 50 MG: 25 CAPSULE ORAL at 21:02

## 2024-01-24 RX ADMIN — ROPINIROLE HYDROCHLORIDE 1 MG: 1 TABLET, FILM COATED ORAL at 21:03

## 2024-01-24 RX ADMIN — ROPINIROLE HYDROCHLORIDE 1 MG: 1 TABLET, FILM COATED ORAL at 06:05

## 2024-01-25 LAB
ALBUMIN SERPL-MCNC: 2.3 G/DL (ref 3.5–5.2)
ALBUMIN/GLOB SERPL: 1 G/DL
ALP SERPL-CCNC: 72 U/L (ref 39–117)
ALT SERPL W P-5'-P-CCNC: 12 U/L (ref 1–33)
ANION GAP SERPL CALCULATED.3IONS-SCNC: 10 MMOL/L (ref 5–15)
AST SERPL-CCNC: 21 U/L (ref 1–32)
BACTERIA SPEC AEROBE CULT: ABNORMAL
BASOPHILS # BLD AUTO: 0.02 10*3/MM3 (ref 0–0.2)
BASOPHILS NFR BLD AUTO: 0.3 % (ref 0–1.5)
BILIRUB SERPL-MCNC: 0.2 MG/DL (ref 0–1.2)
BUN SERPL-MCNC: 10 MG/DL (ref 6–20)
BUN/CREAT SERPL: 9.2 (ref 7–25)
CALCIUM SPEC-SCNC: 7.6 MG/DL (ref 8.6–10.5)
CATHETER CULTURE: NORMAL
CHLORIDE SERPL-SCNC: 106 MMOL/L (ref 98–107)
CO2 SERPL-SCNC: 21 MMOL/L (ref 22–29)
CREAT SERPL-MCNC: 1.09 MG/DL (ref 0.57–1)
DEPRECATED RDW RBC AUTO: 55.4 FL (ref 37–54)
EGFRCR SERPLBLD CKD-EPI 2021: 61.3 ML/MIN/1.73
EOSINOPHIL # BLD AUTO: 0.11 10*3/MM3 (ref 0–0.4)
EOSINOPHIL NFR BLD AUTO: 1.7 % (ref 0.3–6.2)
ERYTHROCYTE [DISTWIDTH] IN BLOOD BY AUTOMATED COUNT: 15.9 % (ref 12.3–15.4)
GLOBULIN UR ELPH-MCNC: 2.3 GM/DL
GLUCOSE BLDC GLUCOMTR-MCNC: 105 MG/DL (ref 70–130)
GLUCOSE BLDC GLUCOMTR-MCNC: 121 MG/DL (ref 70–130)
GLUCOSE BLDC GLUCOMTR-MCNC: 124 MG/DL (ref 70–130)
GLUCOSE BLDC GLUCOMTR-MCNC: 64 MG/DL (ref 70–130)
GLUCOSE BLDC GLUCOMTR-MCNC: 86 MG/DL (ref 70–130)
GLUCOSE SERPL-MCNC: 56 MG/DL (ref 65–99)
GRAM STN SPEC: ABNORMAL
HCT VFR BLD AUTO: 28.6 % (ref 34–46.6)
HGB BLD-MCNC: 9.2 G/DL (ref 12–15.9)
IMM GRANULOCYTES # BLD AUTO: 0.05 10*3/MM3 (ref 0–0.05)
IMM GRANULOCYTES NFR BLD AUTO: 0.8 % (ref 0–0.5)
ISOLATED FROM: ABNORMAL
LYMPHOCYTES # BLD AUTO: 2.78 10*3/MM3 (ref 0.7–3.1)
LYMPHOCYTES NFR BLD AUTO: 44.2 % (ref 19.6–45.3)
MAGNESIUM SERPL-MCNC: 1.9 MG/DL (ref 1.6–2.6)
MCH RBC QN AUTO: 30.1 PG (ref 26.6–33)
MCHC RBC AUTO-ENTMCNC: 32.2 G/DL (ref 31.5–35.7)
MCV RBC AUTO: 93.5 FL (ref 79–97)
MONOCYTES # BLD AUTO: 0.57 10*3/MM3 (ref 0.1–0.9)
MONOCYTES NFR BLD AUTO: 9.1 % (ref 5–12)
NEUTROPHILS NFR BLD AUTO: 2.76 10*3/MM3 (ref 1.7–7)
NEUTROPHILS NFR BLD AUTO: 43.9 % (ref 42.7–76)
NRBC BLD AUTO-RTO: 0 /100 WBC (ref 0–0.2)
PLATELET # BLD AUTO: 144 10*3/MM3 (ref 140–450)
PMV BLD AUTO: 12 FL (ref 6–12)
POTASSIUM SERPL-SCNC: 4.3 MMOL/L (ref 3.5–5.2)
PROT SERPL-MCNC: 4.6 G/DL (ref 6–8.5)
QT INTERVAL: 396 MS
QTC INTERVAL: 510 MS
RBC # BLD AUTO: 3.06 10*6/MM3 (ref 3.77–5.28)
SODIUM SERPL-SCNC: 137 MMOL/L (ref 136–145)
WBC NRBC COR # BLD AUTO: 6.29 10*3/MM3 (ref 3.4–10.8)

## 2024-01-25 PROCEDURE — 99232 SBSQ HOSP IP/OBS MODERATE 35: CPT | Performed by: NURSE PRACTITIONER

## 2024-01-25 PROCEDURE — 83735 ASSAY OF MAGNESIUM: CPT | Performed by: INTERNAL MEDICINE

## 2024-01-25 PROCEDURE — 25010000002 NA FERRIC GLUC CPLX PER 12.5 MG: Performed by: INTERNAL MEDICINE

## 2024-01-25 PROCEDURE — 82948 REAGENT STRIP/BLOOD GLUCOSE: CPT

## 2024-01-25 PROCEDURE — 99232 SBSQ HOSP IP/OBS MODERATE 35: CPT | Performed by: INTERNAL MEDICINE

## 2024-01-25 PROCEDURE — 85025 COMPLETE CBC W/AUTO DIFF WBC: CPT | Performed by: INTERNAL MEDICINE

## 2024-01-25 PROCEDURE — 25810000003 SODIUM CHLORIDE 0.9 % SOLUTION 250 ML FLEX CONT: Performed by: INTERNAL MEDICINE

## 2024-01-25 PROCEDURE — 80053 COMPREHEN METABOLIC PANEL: CPT | Performed by: INTERNAL MEDICINE

## 2024-01-25 RX ADMIN — MIDODRINE HYDROCHLORIDE 5 MG: 2.5 TABLET ORAL at 09:59

## 2024-01-25 RX ADMIN — Medication 10 ML: at 09:59

## 2024-01-25 RX ADMIN — MIDODRINE HYDROCHLORIDE 5 MG: 2.5 TABLET ORAL at 12:18

## 2024-01-25 RX ADMIN — MIDODRINE HYDROCHLORIDE 5 MG: 2.5 TABLET ORAL at 17:42

## 2024-01-25 RX ADMIN — PANTOPRAZOLE SODIUM 40 MG: 40 INJECTION, POWDER, FOR SOLUTION INTRAVENOUS at 06:13

## 2024-01-25 RX ADMIN — ROPINIROLE HYDROCHLORIDE 1 MG: 1 TABLET, FILM COATED ORAL at 21:07

## 2024-01-25 RX ADMIN — ROPINIROLE HYDROCHLORIDE 1 MG: 1 TABLET, FILM COATED ORAL at 13:31

## 2024-01-25 RX ADMIN — SODIUM BICARBONATE 650 MG: 650 TABLET ORAL at 09:59

## 2024-01-25 RX ADMIN — PANCRELIPASE 12000 UNITS OF LIPASE: 60000; 12000; 38000 CAPSULE, DELAYED RELEASE PELLETS ORAL at 10:00

## 2024-01-25 RX ADMIN — SODIUM BICARBONATE 650 MG: 650 TABLET ORAL at 21:08

## 2024-01-25 RX ADMIN — CALCITRIOL 0.25 MCG: 0.25 CAPSULE ORAL at 09:59

## 2024-01-25 RX ADMIN — ATORVASTATIN CALCIUM 80 MG: 40 TABLET ORAL at 21:07

## 2024-01-25 RX ADMIN — Medication 10 ML: at 21:08

## 2024-01-25 RX ADMIN — PANCRELIPASE 12000 UNITS OF LIPASE: 60000; 12000; 38000 CAPSULE, DELAYED RELEASE PELLETS ORAL at 21:09

## 2024-01-25 RX ADMIN — PANCRELIPASE 12000 UNITS OF LIPASE: 60000; 12000; 38000 CAPSULE, DELAYED RELEASE PELLETS ORAL at 12:18

## 2024-01-25 RX ADMIN — SODIUM CHLORIDE 250 MG: 9 INJECTION, SOLUTION INTRAVENOUS at 13:30

## 2024-01-25 RX ADMIN — PANCRELIPASE 12000 UNITS OF LIPASE: 60000; 12000; 38000 CAPSULE, DELAYED RELEASE PELLETS ORAL at 17:42

## 2024-01-25 RX ADMIN — NORTRIPTYLINE HYDROCHLORIDE 50 MG: 25 CAPSULE ORAL at 21:09

## 2024-01-25 RX ADMIN — ROPINIROLE HYDROCHLORIDE 1 MG: 1 TABLET, FILM COATED ORAL at 06:13

## 2024-01-25 RX ADMIN — HYDROXYCHLOROQUINE SULFATE 200 MG: 200 TABLET ORAL at 09:59

## 2024-01-26 LAB
ALBUMIN SERPL-MCNC: 2.8 G/DL (ref 3.5–5.2)
ALBUMIN/GLOB SERPL: 1.1 G/DL
ALP SERPL-CCNC: 77 U/L (ref 39–117)
ALT SERPL W P-5'-P-CCNC: 11 U/L (ref 1–33)
ANION GAP SERPL CALCULATED.3IONS-SCNC: 8 MMOL/L (ref 5–15)
AST SERPL-CCNC: 20 U/L (ref 1–32)
BILIRUB SERPL-MCNC: 0.2 MG/DL (ref 0–1.2)
BUN SERPL-MCNC: 7 MG/DL (ref 6–20)
BUN/CREAT SERPL: 6.9 (ref 7–25)
CALCIUM SPEC-SCNC: 7.7 MG/DL (ref 8.6–10.5)
CHLORIDE SERPL-SCNC: 104 MMOL/L (ref 98–107)
CO2 SERPL-SCNC: 29 MMOL/L (ref 22–29)
CREAT SERPL-MCNC: 1.02 MG/DL (ref 0.57–1)
D-LACTATE SERPL-SCNC: 1.8 MMOL/L (ref 0.5–2)
EGFRCR SERPLBLD CKD-EPI 2021: 66.3 ML/MIN/1.73
GLOBULIN UR ELPH-MCNC: 2.5 GM/DL
GLUCOSE BLDC GLUCOMTR-MCNC: 106 MG/DL (ref 70–130)
GLUCOSE BLDC GLUCOMTR-MCNC: 232 MG/DL (ref 70–130)
GLUCOSE BLDC GLUCOMTR-MCNC: 52 MG/DL (ref 70–130)
GLUCOSE BLDC GLUCOMTR-MCNC: 90 MG/DL (ref 70–130)
GLUCOSE BLDC GLUCOMTR-MCNC: 97 MG/DL (ref 70–130)
GLUCOSE SERPL-MCNC: 97 MG/DL (ref 65–99)
MAGNESIUM SERPL-MCNC: 1.7 MG/DL (ref 1.6–2.6)
POTASSIUM SERPL-SCNC: 3.9 MMOL/L (ref 3.5–5.2)
PROT SERPL-MCNC: 5.3 G/DL (ref 6–8.5)
SODIUM SERPL-SCNC: 141 MMOL/L (ref 136–145)

## 2024-01-26 PROCEDURE — 80053 COMPREHEN METABOLIC PANEL: CPT | Performed by: INTERNAL MEDICINE

## 2024-01-26 PROCEDURE — 87040 BLOOD CULTURE FOR BACTERIA: CPT | Performed by: INTERNAL MEDICINE

## 2024-01-26 PROCEDURE — 82948 REAGENT STRIP/BLOOD GLUCOSE: CPT

## 2024-01-26 PROCEDURE — 25010000002 ONDANSETRON PER 1 MG: Performed by: INTERNAL MEDICINE

## 2024-01-26 PROCEDURE — 83735 ASSAY OF MAGNESIUM: CPT | Performed by: INTERNAL MEDICINE

## 2024-01-26 PROCEDURE — 25810000003 SODIUM CHLORIDE 0.9 % SOLUTION 250 ML FLEX CONT: Performed by: INTERNAL MEDICINE

## 2024-01-26 PROCEDURE — 25010000002 NA FERRIC GLUC CPLX PER 12.5 MG: Performed by: INTERNAL MEDICINE

## 2024-01-26 PROCEDURE — 25010000002 LEVOFLOXACIN PER 250 MG: Performed by: INTERNAL MEDICINE

## 2024-01-26 PROCEDURE — 99232 SBSQ HOSP IP/OBS MODERATE 35: CPT | Performed by: NURSE PRACTITIONER

## 2024-01-26 PROCEDURE — 99232 SBSQ HOSP IP/OBS MODERATE 35: CPT | Performed by: INTERNAL MEDICINE

## 2024-01-26 PROCEDURE — 63710000001 INSULIN LISPRO (HUMAN) PER 5 UNITS: Performed by: INTERNAL MEDICINE

## 2024-01-26 PROCEDURE — 83605 ASSAY OF LACTIC ACID: CPT | Performed by: INTERNAL MEDICINE

## 2024-01-26 RX ORDER — LEVOFLOXACIN 500 MG/1
500 TABLET, FILM COATED ORAL EVERY 24 HOURS
Qty: 5 TABLET | Refills: 0 | Status: DISCONTINUED | OUTPATIENT
Start: 2024-01-27 | End: 2024-01-28

## 2024-01-26 RX ORDER — METOCLOPRAMIDE HYDROCHLORIDE 5 MG/5ML
10 SOLUTION ORAL
Status: DISCONTINUED | OUTPATIENT
Start: 2024-01-26 | End: 2024-01-31 | Stop reason: HOSPADM

## 2024-01-26 RX ORDER — SULFAMETHOXAZOLE AND TRIMETHOPRIM 800; 160 MG/1; MG/1
1 TABLET ORAL EVERY 8 HOURS SCHEDULED
Status: COMPLETED | OUTPATIENT
Start: 2024-01-26 | End: 2024-01-29

## 2024-01-26 RX ORDER — LEVOFLOXACIN 5 MG/ML
750 INJECTION, SOLUTION INTRAVENOUS EVERY 24 HOURS
Qty: 750 ML | Refills: 0 | Status: DISCONTINUED | OUTPATIENT
Start: 2024-01-27 | End: 2024-01-26

## 2024-01-26 RX ADMIN — SODIUM BICARBONATE 650 MG: 650 TABLET ORAL at 21:13

## 2024-01-26 RX ADMIN — PANCRELIPASE 12000 UNITS OF LIPASE: 60000; 12000; 38000 CAPSULE, DELAYED RELEASE PELLETS ORAL at 12:35

## 2024-01-26 RX ADMIN — ROPINIROLE HYDROCHLORIDE 1 MG: 1 TABLET, FILM COATED ORAL at 06:38

## 2024-01-26 RX ADMIN — METOCLOPRAMIDE HYDROCHLORIDE 10 MG: 5 SOLUTION ORAL at 12:35

## 2024-01-26 RX ADMIN — MIDODRINE HYDROCHLORIDE 5 MG: 2.5 TABLET ORAL at 12:35

## 2024-01-26 RX ADMIN — PANCRELIPASE 12000 UNITS OF LIPASE: 60000; 12000; 38000 CAPSULE, DELAYED RELEASE PELLETS ORAL at 21:13

## 2024-01-26 RX ADMIN — SODIUM BICARBONATE 650 MG: 650 TABLET ORAL at 08:26

## 2024-01-26 RX ADMIN — CALCITRIOL 0.25 MCG: 0.25 CAPSULE ORAL at 08:26

## 2024-01-26 RX ADMIN — Medication 10 ML: at 21:30

## 2024-01-26 RX ADMIN — PANCRELIPASE 12000 UNITS OF LIPASE: 60000; 12000; 38000 CAPSULE, DELAYED RELEASE PELLETS ORAL at 08:26

## 2024-01-26 RX ADMIN — METOCLOPRAMIDE HYDROCHLORIDE 10 MG: 5 SOLUTION ORAL at 17:46

## 2024-01-26 RX ADMIN — Medication 10 ML: at 08:27

## 2024-01-26 RX ADMIN — MAGNESIUM GLUCONATE 500 MG ORAL TABLET 400 MG: 500 TABLET ORAL at 09:38

## 2024-01-26 RX ADMIN — NORTRIPTYLINE HYDROCHLORIDE 50 MG: 25 CAPSULE ORAL at 21:13

## 2024-01-26 RX ADMIN — MIDODRINE HYDROCHLORIDE 5 MG: 2.5 TABLET ORAL at 17:45

## 2024-01-26 RX ADMIN — SODIUM CHLORIDE 250 MG: 9 INJECTION, SOLUTION INTRAVENOUS at 08:27

## 2024-01-26 RX ADMIN — INSULIN LISPRO 6 UNITS: 100 INJECTION, SOLUTION INTRAVENOUS; SUBCUTANEOUS at 21:30

## 2024-01-26 RX ADMIN — PANTOPRAZOLE SODIUM 40 MG: 40 INJECTION, POWDER, FOR SOLUTION INTRAVENOUS at 06:37

## 2024-01-26 RX ADMIN — SULFAMETHOXAZOLE AND TRIMETHOPRIM 1 TABLET: 800; 160 TABLET ORAL at 14:24

## 2024-01-26 RX ADMIN — ROPINIROLE HYDROCHLORIDE 1 MG: 1 TABLET, FILM COATED ORAL at 21:13

## 2024-01-26 RX ADMIN — MIDODRINE HYDROCHLORIDE 5 MG: 2.5 TABLET ORAL at 08:26

## 2024-01-26 RX ADMIN — SULFAMETHOXAZOLE AND TRIMETHOPRIM 1 TABLET: 800; 160 TABLET ORAL at 21:13

## 2024-01-26 RX ADMIN — ATORVASTATIN CALCIUM 80 MG: 40 TABLET ORAL at 21:13

## 2024-01-26 RX ADMIN — ROPINIROLE HYDROCHLORIDE 1 MG: 1 TABLET, FILM COATED ORAL at 14:24

## 2024-01-26 RX ADMIN — LEVOFLOXACIN 750 MG: 750 INJECTION, SOLUTION INTRAVENOUS at 06:34

## 2024-01-26 RX ADMIN — PANCRELIPASE 12000 UNITS OF LIPASE: 60000; 12000; 38000 CAPSULE, DELAYED RELEASE PELLETS ORAL at 17:46

## 2024-01-26 RX ADMIN — ONDANSETRON 4 MG: 2 INJECTION INTRAMUSCULAR; INTRAVENOUS at 09:38

## 2024-01-26 RX ADMIN — HYDROXYCHLOROQUINE SULFATE 200 MG: 200 TABLET ORAL at 08:26

## 2024-01-27 LAB
ALBUMIN SERPL-MCNC: 3.5 G/DL (ref 3.5–5.2)
ALBUMIN/GLOB SERPL: 1.2 G/DL
ALP SERPL-CCNC: 86 U/L (ref 39–117)
ALT SERPL W P-5'-P-CCNC: 18 U/L (ref 1–33)
ANION GAP SERPL CALCULATED.3IONS-SCNC: 11 MMOL/L (ref 5–15)
AST SERPL-CCNC: 28 U/L (ref 1–32)
BASOPHILS # BLD AUTO: 0.04 10*3/MM3 (ref 0–0.2)
BASOPHILS NFR BLD AUTO: 0.3 % (ref 0–1.5)
BILIRUB SERPL-MCNC: 0.2 MG/DL (ref 0–1.2)
BUN SERPL-MCNC: 11 MG/DL (ref 6–20)
BUN/CREAT SERPL: 6.9 (ref 7–25)
CALCIUM SPEC-SCNC: 8.2 MG/DL (ref 8.6–10.5)
CHLORIDE SERPL-SCNC: 106 MMOL/L (ref 98–107)
CO2 SERPL-SCNC: 29 MMOL/L (ref 22–29)
CREAT SERPL-MCNC: 1.59 MG/DL (ref 0.57–1)
DEPRECATED RDW RBC AUTO: 55.6 FL (ref 37–54)
EGFRCR SERPLBLD CKD-EPI 2021: 38.9 ML/MIN/1.73
EOSINOPHIL # BLD AUTO: 0.17 10*3/MM3 (ref 0–0.4)
EOSINOPHIL NFR BLD AUTO: 1.4 % (ref 0.3–6.2)
ERYTHROCYTE [DISTWIDTH] IN BLOOD BY AUTOMATED COUNT: 16 % (ref 12.3–15.4)
GLOBULIN UR ELPH-MCNC: 3 GM/DL
GLUCOSE BLDC GLUCOMTR-MCNC: 130 MG/DL (ref 70–130)
GLUCOSE BLDC GLUCOMTR-MCNC: 141 MG/DL (ref 70–130)
GLUCOSE SERPL-MCNC: 104 MG/DL (ref 65–99)
HCT VFR BLD AUTO: 34 % (ref 34–46.6)
HGB BLD-MCNC: 10.7 G/DL (ref 12–15.9)
IMM GRANULOCYTES # BLD AUTO: 0.18 10*3/MM3 (ref 0–0.05)
IMM GRANULOCYTES NFR BLD AUTO: 1.4 % (ref 0–0.5)
LYMPHOCYTES # BLD AUTO: 3.86 10*3/MM3 (ref 0.7–3.1)
LYMPHOCYTES NFR BLD AUTO: 30.7 % (ref 19.6–45.3)
MCH RBC QN AUTO: 29.9 PG (ref 26.6–33)
MCHC RBC AUTO-ENTMCNC: 31.5 G/DL (ref 31.5–35.7)
MCV RBC AUTO: 95 FL (ref 79–97)
MONOCYTES # BLD AUTO: 0.73 10*3/MM3 (ref 0.1–0.9)
MONOCYTES NFR BLD AUTO: 5.8 % (ref 5–12)
NEUTROPHILS NFR BLD AUTO: 60.4 % (ref 42.7–76)
NEUTROPHILS NFR BLD AUTO: 7.61 10*3/MM3 (ref 1.7–7)
NRBC BLD AUTO-RTO: 0 /100 WBC (ref 0–0.2)
PLATELET # BLD AUTO: 264 10*3/MM3 (ref 140–450)
PMV BLD AUTO: 11.4 FL (ref 6–12)
POTASSIUM SERPL-SCNC: 3.9 MMOL/L (ref 3.5–5.2)
PROT SERPL-MCNC: 6.5 G/DL (ref 6–8.5)
PTH RELATED PROT SERPL-SCNC: <2 PMOL/L
RBC # BLD AUTO: 3.58 10*6/MM3 (ref 3.77–5.28)
SODIUM SERPL-SCNC: 146 MMOL/L (ref 136–145)
WBC NRBC COR # BLD AUTO: 12.59 10*3/MM3 (ref 3.4–10.8)

## 2024-01-27 PROCEDURE — 25010000002 NA FERRIC GLUC CPLX PER 12.5 MG: Performed by: INTERNAL MEDICINE

## 2024-01-27 PROCEDURE — 85025 COMPLETE CBC W/AUTO DIFF WBC: CPT | Performed by: INTERNAL MEDICINE

## 2024-01-27 PROCEDURE — 99232 SBSQ HOSP IP/OBS MODERATE 35: CPT | Performed by: INTERNAL MEDICINE

## 2024-01-27 PROCEDURE — 99232 SBSQ HOSP IP/OBS MODERATE 35: CPT | Performed by: NURSE PRACTITIONER

## 2024-01-27 PROCEDURE — 80053 COMPREHEN METABOLIC PANEL: CPT | Performed by: INTERNAL MEDICINE

## 2024-01-27 PROCEDURE — 82948 REAGENT STRIP/BLOOD GLUCOSE: CPT

## 2024-01-27 PROCEDURE — 25810000003 SODIUM CHLORIDE 0.9 % SOLUTION 250 ML FLEX CONT: Performed by: INTERNAL MEDICINE

## 2024-01-27 RX ORDER — AMOXICILLIN 250 MG
2 CAPSULE ORAL DAILY
Status: DISCONTINUED | OUTPATIENT
Start: 2024-01-28 | End: 2024-01-31 | Stop reason: HOSPADM

## 2024-01-27 RX ORDER — SODIUM CHLORIDE 9 MG/ML
75 INJECTION, SOLUTION INTRAVENOUS CONTINUOUS
Status: DISCONTINUED | OUTPATIENT
Start: 2024-01-27 | End: 2024-01-27

## 2024-01-27 RX ORDER — SODIUM CHLORIDE 450 MG/100ML
75 INJECTION, SOLUTION INTRAVENOUS CONTINUOUS
Status: DISCONTINUED | OUTPATIENT
Start: 2024-01-27 | End: 2024-01-30

## 2024-01-27 RX ADMIN — PANCRELIPASE 12000 UNITS OF LIPASE: 60000; 12000; 38000 CAPSULE, DELAYED RELEASE PELLETS ORAL at 16:40

## 2024-01-27 RX ADMIN — Medication 10 ML: at 08:43

## 2024-01-27 RX ADMIN — MIDODRINE HYDROCHLORIDE 5 MG: 2.5 TABLET ORAL at 08:42

## 2024-01-27 RX ADMIN — METOCLOPRAMIDE HYDROCHLORIDE 10 MG: 5 SOLUTION ORAL at 08:41

## 2024-01-27 RX ADMIN — SODIUM CHLORIDE 250 MG: 9 INJECTION, SOLUTION INTRAVENOUS at 08:43

## 2024-01-27 RX ADMIN — ATORVASTATIN CALCIUM 80 MG: 40 TABLET ORAL at 21:43

## 2024-01-27 RX ADMIN — SODIUM BICARBONATE 650 MG: 650 TABLET ORAL at 08:42

## 2024-01-27 RX ADMIN — METOCLOPRAMIDE HYDROCHLORIDE 10 MG: 5 SOLUTION ORAL at 11:54

## 2024-01-27 RX ADMIN — PANCRELIPASE 12000 UNITS OF LIPASE: 60000; 12000; 38000 CAPSULE, DELAYED RELEASE PELLETS ORAL at 21:42

## 2024-01-27 RX ADMIN — MAGNESIUM GLUCONATE 500 MG ORAL TABLET 400 MG: 500 TABLET ORAL at 08:42

## 2024-01-27 RX ADMIN — SODIUM BICARBONATE 650 MG: 650 TABLET ORAL at 21:42

## 2024-01-27 RX ADMIN — NORTRIPTYLINE HYDROCHLORIDE 50 MG: 25 CAPSULE ORAL at 21:42

## 2024-01-27 RX ADMIN — Medication 10 ML: at 21:44

## 2024-01-27 RX ADMIN — SULFAMETHOXAZOLE AND TRIMETHOPRIM 1 TABLET: 800; 160 TABLET ORAL at 06:52

## 2024-01-27 RX ADMIN — ROPINIROLE HYDROCHLORIDE 1 MG: 1 TABLET, FILM COATED ORAL at 06:52

## 2024-01-27 RX ADMIN — PANCRELIPASE 12000 UNITS OF LIPASE: 60000; 12000; 38000 CAPSULE, DELAYED RELEASE PELLETS ORAL at 11:54

## 2024-01-27 RX ADMIN — ROPINIROLE HYDROCHLORIDE 1 MG: 1 TABLET, FILM COATED ORAL at 13:37

## 2024-01-27 RX ADMIN — SULFAMETHOXAZOLE AND TRIMETHOPRIM 1 TABLET: 800; 160 TABLET ORAL at 13:37

## 2024-01-27 RX ADMIN — METOCLOPRAMIDE HYDROCHLORIDE 10 MG: 5 SOLUTION ORAL at 16:40

## 2024-01-27 RX ADMIN — CALCITRIOL 0.25 MCG: 0.25 CAPSULE ORAL at 08:42

## 2024-01-27 RX ADMIN — ROPINIROLE HYDROCHLORIDE 1 MG: 1 TABLET, FILM COATED ORAL at 21:44

## 2024-01-27 RX ADMIN — MIDODRINE HYDROCHLORIDE 5 MG: 2.5 TABLET ORAL at 11:54

## 2024-01-27 RX ADMIN — LEVOFLOXACIN 500 MG: 500 TABLET, FILM COATED ORAL at 08:42

## 2024-01-27 RX ADMIN — HYDROXYCHLOROQUINE SULFATE 200 MG: 200 TABLET ORAL at 08:42

## 2024-01-27 RX ADMIN — SULFAMETHOXAZOLE AND TRIMETHOPRIM 1 TABLET: 800; 160 TABLET ORAL at 21:43

## 2024-01-27 RX ADMIN — MIDODRINE HYDROCHLORIDE 5 MG: 2.5 TABLET ORAL at 16:40

## 2024-01-27 RX ADMIN — PANCRELIPASE 12000 UNITS OF LIPASE: 60000; 12000; 38000 CAPSULE, DELAYED RELEASE PELLETS ORAL at 08:42

## 2024-01-27 RX ADMIN — PANTOPRAZOLE SODIUM 40 MG: 40 INJECTION, POWDER, FOR SOLUTION INTRAVENOUS at 06:52

## 2024-01-27 RX ADMIN — SODIUM CHLORIDE 75 ML/HR: 450 INJECTION, SOLUTION INTRAVENOUS at 13:37

## 2024-01-28 LAB
ALBUMIN SERPL-MCNC: 3.3 G/DL (ref 3.5–5.2)
ALBUMIN/GLOB SERPL: 1.2 G/DL
ALP SERPL-CCNC: 87 U/L (ref 39–117)
ALT SERPL W P-5'-P-CCNC: 17 U/L (ref 1–33)
ANION GAP SERPL CALCULATED.3IONS-SCNC: 12 MMOL/L (ref 5–15)
AST SERPL-CCNC: 29 U/L (ref 1–32)
BASOPHILS # BLD AUTO: 0.04 10*3/MM3 (ref 0–0.2)
BASOPHILS NFR BLD AUTO: 0.3 % (ref 0–1.5)
BILIRUB SERPL-MCNC: 0.2 MG/DL (ref 0–1.2)
BUN SERPL-MCNC: 9 MG/DL (ref 6–20)
BUN/CREAT SERPL: 6.1 (ref 7–25)
CALCIUM SPEC-SCNC: 8.6 MG/DL (ref 8.6–10.5)
CHLORIDE SERPL-SCNC: 105 MMOL/L (ref 98–107)
CO2 SERPL-SCNC: 24 MMOL/L (ref 22–29)
CREAT SERPL-MCNC: 1.48 MG/DL (ref 0.57–1)
DEPRECATED RDW RBC AUTO: 56.3 FL (ref 37–54)
EGFRCR SERPLBLD CKD-EPI 2021: 42.4 ML/MIN/1.73
EOSINOPHIL # BLD AUTO: 0.12 10*3/MM3 (ref 0–0.4)
EOSINOPHIL NFR BLD AUTO: 0.9 % (ref 0.3–6.2)
ERYTHROCYTE [DISTWIDTH] IN BLOOD BY AUTOMATED COUNT: 16.1 % (ref 12.3–15.4)
GLOBULIN UR ELPH-MCNC: 2.8 GM/DL
GLUCOSE BLDC GLUCOMTR-MCNC: 149 MG/DL (ref 70–130)
GLUCOSE BLDC GLUCOMTR-MCNC: 76 MG/DL (ref 70–130)
GLUCOSE BLDC GLUCOMTR-MCNC: 80 MG/DL (ref 70–130)
GLUCOSE BLDC GLUCOMTR-MCNC: 97 MG/DL (ref 70–130)
GLUCOSE SERPL-MCNC: 90 MG/DL (ref 65–99)
HCT VFR BLD AUTO: 31.1 % (ref 34–46.6)
HGB BLD-MCNC: 9.7 G/DL (ref 12–15.9)
IMM GRANULOCYTES # BLD AUTO: 0.24 10*3/MM3 (ref 0–0.05)
IMM GRANULOCYTES NFR BLD AUTO: 1.9 % (ref 0–0.5)
LYMPHOCYTES # BLD AUTO: 2.16 10*3/MM3 (ref 0.7–3.1)
LYMPHOCYTES NFR BLD AUTO: 16.9 % (ref 19.6–45.3)
MAGNESIUM SERPL-MCNC: 1.6 MG/DL (ref 1.6–2.6)
MCH RBC QN AUTO: 29.7 PG (ref 26.6–33)
MCHC RBC AUTO-ENTMCNC: 31.2 G/DL (ref 31.5–35.7)
MCV RBC AUTO: 95.1 FL (ref 79–97)
MONOCYTES # BLD AUTO: 0.63 10*3/MM3 (ref 0.1–0.9)
MONOCYTES NFR BLD AUTO: 4.9 % (ref 5–12)
NEUTROPHILS NFR BLD AUTO: 75.1 % (ref 42.7–76)
NEUTROPHILS NFR BLD AUTO: 9.57 10*3/MM3 (ref 1.7–7)
NRBC BLD AUTO-RTO: 0 /100 WBC (ref 0–0.2)
PLATELET # BLD AUTO: 260 10*3/MM3 (ref 140–450)
PMV BLD AUTO: 10 FL (ref 6–12)
POTASSIUM SERPL-SCNC: 4.3 MMOL/L (ref 3.5–5.2)
PROT SERPL-MCNC: 6.1 G/DL (ref 6–8.5)
RBC # BLD AUTO: 3.27 10*6/MM3 (ref 3.77–5.28)
SODIUM SERPL-SCNC: 141 MMOL/L (ref 136–145)
WBC NRBC COR # BLD AUTO: 12.76 10*3/MM3 (ref 3.4–10.8)

## 2024-01-28 PROCEDURE — 82948 REAGENT STRIP/BLOOD GLUCOSE: CPT

## 2024-01-28 PROCEDURE — 85025 COMPLETE CBC W/AUTO DIFF WBC: CPT | Performed by: INTERNAL MEDICINE

## 2024-01-28 PROCEDURE — 99232 SBSQ HOSP IP/OBS MODERATE 35: CPT | Performed by: NURSE PRACTITIONER

## 2024-01-28 PROCEDURE — 83735 ASSAY OF MAGNESIUM: CPT | Performed by: INTERNAL MEDICINE

## 2024-01-28 PROCEDURE — 80053 COMPREHEN METABOLIC PANEL: CPT | Performed by: INTERNAL MEDICINE

## 2024-01-28 PROCEDURE — 99232 SBSQ HOSP IP/OBS MODERATE 35: CPT | Performed by: INTERNAL MEDICINE

## 2024-01-28 PROCEDURE — 25810000003 SODIUM CHLORIDE 0.9 % SOLUTION 250 ML FLEX CONT: Performed by: INTERNAL MEDICINE

## 2024-01-28 PROCEDURE — 25010000002 NA FERRIC GLUC CPLX PER 12.5 MG: Performed by: INTERNAL MEDICINE

## 2024-01-28 PROCEDURE — 02PYX3Z REMOVAL OF INFUSION DEVICE FROM GREAT VESSEL, EXTERNAL APPROACH: ICD-10-PCS | Performed by: INTERNAL MEDICINE

## 2024-01-28 RX ORDER — LEVOFLOXACIN 750 MG/1
750 TABLET, FILM COATED ORAL
Status: DISCONTINUED | OUTPATIENT
Start: 2024-01-30 | End: 2024-01-31 | Stop reason: HOSPADM

## 2024-01-28 RX ADMIN — METOCLOPRAMIDE HYDROCHLORIDE 10 MG: 5 SOLUTION ORAL at 17:10

## 2024-01-28 RX ADMIN — CALCITRIOL 0.25 MCG: 0.25 CAPSULE ORAL at 08:22

## 2024-01-28 RX ADMIN — PANCRELIPASE 12000 UNITS OF LIPASE: 60000; 12000; 38000 CAPSULE, DELAYED RELEASE PELLETS ORAL at 08:22

## 2024-01-28 RX ADMIN — Medication 10 ML: at 21:13

## 2024-01-28 RX ADMIN — PANCRELIPASE 12000 UNITS OF LIPASE: 60000; 12000; 38000 CAPSULE, DELAYED RELEASE PELLETS ORAL at 11:57

## 2024-01-28 RX ADMIN — SULFAMETHOXAZOLE AND TRIMETHOPRIM 1 TABLET: 800; 160 TABLET ORAL at 13:18

## 2024-01-28 RX ADMIN — METOCLOPRAMIDE HYDROCHLORIDE 10 MG: 5 SOLUTION ORAL at 08:22

## 2024-01-28 RX ADMIN — METOCLOPRAMIDE HYDROCHLORIDE 10 MG: 5 SOLUTION ORAL at 11:57

## 2024-01-28 RX ADMIN — ATORVASTATIN CALCIUM 80 MG: 40 TABLET ORAL at 21:11

## 2024-01-28 RX ADMIN — DOCUSATE SODIUM 50 MG AND SENNOSIDES 8.6 MG 2 TABLET: 8.6; 5 TABLET, FILM COATED ORAL at 08:22

## 2024-01-28 RX ADMIN — ROPINIROLE HYDROCHLORIDE 1 MG: 1 TABLET, FILM COATED ORAL at 21:12

## 2024-01-28 RX ADMIN — SODIUM BICARBONATE 650 MG: 650 TABLET ORAL at 08:22

## 2024-01-28 RX ADMIN — PANCRELIPASE 12000 UNITS OF LIPASE: 60000; 12000; 38000 CAPSULE, DELAYED RELEASE PELLETS ORAL at 17:10

## 2024-01-28 RX ADMIN — SULFAMETHOXAZOLE AND TRIMETHOPRIM 1 TABLET: 800; 160 TABLET ORAL at 21:12

## 2024-01-28 RX ADMIN — SODIUM CHLORIDE 75 ML/HR: 450 INJECTION, SOLUTION INTRAVENOUS at 03:40

## 2024-01-28 RX ADMIN — Medication 10 ML: at 08:24

## 2024-01-28 RX ADMIN — SULFAMETHOXAZOLE AND TRIMETHOPRIM 1 TABLET: 800; 160 TABLET ORAL at 05:43

## 2024-01-28 RX ADMIN — ROPINIROLE HYDROCHLORIDE 1 MG: 1 TABLET, FILM COATED ORAL at 13:18

## 2024-01-28 RX ADMIN — ROPINIROLE HYDROCHLORIDE 1 MG: 1 TABLET, FILM COATED ORAL at 05:43

## 2024-01-28 RX ADMIN — HYDROXYCHLOROQUINE SULFATE 200 MG: 200 TABLET ORAL at 08:22

## 2024-01-28 RX ADMIN — PANTOPRAZOLE SODIUM 40 MG: 40 INJECTION, POWDER, FOR SOLUTION INTRAVENOUS at 05:43

## 2024-01-28 RX ADMIN — SODIUM BICARBONATE 650 MG: 650 TABLET ORAL at 21:12

## 2024-01-28 RX ADMIN — MAGNESIUM GLUCONATE 500 MG ORAL TABLET 400 MG: 500 TABLET ORAL at 08:22

## 2024-01-28 RX ADMIN — NORTRIPTYLINE HYDROCHLORIDE 50 MG: 25 CAPSULE ORAL at 21:13

## 2024-01-28 RX ADMIN — PANCRELIPASE 12000 UNITS OF LIPASE: 60000; 12000; 38000 CAPSULE, DELAYED RELEASE PELLETS ORAL at 21:12

## 2024-01-28 RX ADMIN — LEVOFLOXACIN 500 MG: 500 TABLET, FILM COATED ORAL at 08:22

## 2024-01-28 RX ADMIN — SODIUM CHLORIDE 75 ML/HR: 450 INJECTION, SOLUTION INTRAVENOUS at 17:50

## 2024-01-28 RX ADMIN — SODIUM CHLORIDE 250 MG: 9 INJECTION, SOLUTION INTRAVENOUS at 08:22

## 2024-01-28 RX ADMIN — MIDODRINE HYDROCHLORIDE 5 MG: 2.5 TABLET ORAL at 08:22

## 2024-01-29 LAB
ALBUMIN SERPL-MCNC: 3.4 G/DL (ref 3.5–5.2)
ALBUMIN/GLOB SERPL: 1.2 G/DL
ALP SERPL-CCNC: 92 U/L (ref 39–117)
ALT SERPL W P-5'-P-CCNC: 19 U/L (ref 1–33)
ANION GAP SERPL CALCULATED.3IONS-SCNC: 13 MMOL/L (ref 5–15)
AST SERPL-CCNC: 33 U/L (ref 1–32)
BACTERIA SPEC AEROBE CULT: NORMAL
BASOPHILS # BLD AUTO: 0.05 10*3/MM3 (ref 0–0.2)
BASOPHILS NFR BLD AUTO: 0.4 % (ref 0–1.5)
BILIRUB SERPL-MCNC: 0.2 MG/DL (ref 0–1.2)
BUN SERPL-MCNC: 12 MG/DL (ref 6–20)
BUN/CREAT SERPL: 7.3 (ref 7–25)
CALCIUM SPEC-SCNC: 9.1 MG/DL (ref 8.6–10.5)
CHLORIDE SERPL-SCNC: 104 MMOL/L (ref 98–107)
CO2 SERPL-SCNC: 23 MMOL/L (ref 22–29)
CREAT SERPL-MCNC: 1.64 MG/DL (ref 0.57–1)
DEPRECATED RDW RBC AUTO: 57 FL (ref 37–54)
EGFRCR SERPLBLD CKD-EPI 2021: 37.5 ML/MIN/1.73
EOSINOPHIL # BLD AUTO: 0.13 10*3/MM3 (ref 0–0.4)
EOSINOPHIL NFR BLD AUTO: 1 % (ref 0.3–6.2)
ERYTHROCYTE [DISTWIDTH] IN BLOOD BY AUTOMATED COUNT: 16.3 % (ref 12.3–15.4)
GLOBULIN UR ELPH-MCNC: 2.8 GM/DL
GLUCOSE BLDC GLUCOMTR-MCNC: 107 MG/DL (ref 70–130)
GLUCOSE BLDC GLUCOMTR-MCNC: 113 MG/DL (ref 70–130)
GLUCOSE SERPL-MCNC: 77 MG/DL (ref 65–99)
HCT VFR BLD AUTO: 31.2 % (ref 34–46.6)
HGB BLD-MCNC: 9.7 G/DL (ref 12–15.9)
IMM GRANULOCYTES # BLD AUTO: 0.2 10*3/MM3 (ref 0–0.05)
IMM GRANULOCYTES NFR BLD AUTO: 1.6 % (ref 0–0.5)
LYMPHOCYTES # BLD AUTO: 2.99 10*3/MM3 (ref 0.7–3.1)
LYMPHOCYTES NFR BLD AUTO: 23.5 % (ref 19.6–45.3)
MAGNESIUM SERPL-MCNC: 1.7 MG/DL (ref 1.6–2.6)
MCH RBC QN AUTO: 29.6 PG (ref 26.6–33)
MCHC RBC AUTO-ENTMCNC: 31.1 G/DL (ref 31.5–35.7)
MCV RBC AUTO: 95.1 FL (ref 79–97)
MONOCYTES # BLD AUTO: 0.78 10*3/MM3 (ref 0.1–0.9)
MONOCYTES NFR BLD AUTO: 6.1 % (ref 5–12)
NEUTROPHILS NFR BLD AUTO: 67.4 % (ref 42.7–76)
NEUTROPHILS NFR BLD AUTO: 8.6 10*3/MM3 (ref 1.7–7)
NRBC BLD AUTO-RTO: 0 /100 WBC (ref 0–0.2)
PHOSPHATE SERPL-MCNC: 2.6 MG/DL (ref 2.5–4.5)
PLATELET # BLD AUTO: 317 10*3/MM3 (ref 140–450)
PMV BLD AUTO: 9.7 FL (ref 6–12)
POTASSIUM SERPL-SCNC: 4.4 MMOL/L (ref 3.5–5.2)
PROT SERPL-MCNC: 6.2 G/DL (ref 6–8.5)
RBC # BLD AUTO: 3.28 10*6/MM3 (ref 3.77–5.28)
SODIUM SERPL-SCNC: 140 MMOL/L (ref 136–145)
WBC NRBC COR # BLD AUTO: 12.75 10*3/MM3 (ref 3.4–10.8)

## 2024-01-29 PROCEDURE — 84100 ASSAY OF PHOSPHORUS: CPT | Performed by: INTERNAL MEDICINE

## 2024-01-29 PROCEDURE — 85025 COMPLETE CBC W/AUTO DIFF WBC: CPT | Performed by: INTERNAL MEDICINE

## 2024-01-29 PROCEDURE — 82948 REAGENT STRIP/BLOOD GLUCOSE: CPT

## 2024-01-29 PROCEDURE — 83735 ASSAY OF MAGNESIUM: CPT | Performed by: INTERNAL MEDICINE

## 2024-01-29 PROCEDURE — 80053 COMPREHEN METABOLIC PANEL: CPT | Performed by: INTERNAL MEDICINE

## 2024-01-29 PROCEDURE — 99232 SBSQ HOSP IP/OBS MODERATE 35: CPT | Performed by: INTERNAL MEDICINE

## 2024-01-29 RX ADMIN — HYDROXYCHLOROQUINE SULFATE 200 MG: 200 TABLET ORAL at 08:15

## 2024-01-29 RX ADMIN — ROPINIROLE HYDROCHLORIDE 1 MG: 1 TABLET, FILM COATED ORAL at 05:48

## 2024-01-29 RX ADMIN — SULFAMETHOXAZOLE AND TRIMETHOPRIM 1 TABLET: 800; 160 TABLET ORAL at 05:48

## 2024-01-29 RX ADMIN — PANTOPRAZOLE SODIUM 40 MG: 40 INJECTION, POWDER, FOR SOLUTION INTRAVENOUS at 05:49

## 2024-01-29 RX ADMIN — METOCLOPRAMIDE HYDROCHLORIDE 10 MG: 5 SOLUTION ORAL at 08:15

## 2024-01-29 RX ADMIN — SULFAMETHOXAZOLE AND TRIMETHOPRIM 1 TABLET: 800; 160 TABLET ORAL at 21:30

## 2024-01-29 RX ADMIN — ROPINIROLE HYDROCHLORIDE 1 MG: 1 TABLET, FILM COATED ORAL at 21:30

## 2024-01-29 RX ADMIN — PANCRELIPASE 12000 UNITS OF LIPASE: 60000; 12000; 38000 CAPSULE, DELAYED RELEASE PELLETS ORAL at 20:28

## 2024-01-29 RX ADMIN — NORTRIPTYLINE HYDROCHLORIDE 50 MG: 25 CAPSULE ORAL at 20:28

## 2024-01-29 RX ADMIN — PANCRELIPASE 12000 UNITS OF LIPASE: 60000; 12000; 38000 CAPSULE, DELAYED RELEASE PELLETS ORAL at 17:11

## 2024-01-29 RX ADMIN — MIDODRINE HYDROCHLORIDE 5 MG: 2.5 TABLET ORAL at 17:11

## 2024-01-29 RX ADMIN — METOCLOPRAMIDE HYDROCHLORIDE 10 MG: 5 SOLUTION ORAL at 17:11

## 2024-01-29 RX ADMIN — SODIUM CHLORIDE 75 ML/HR: 450 INJECTION, SOLUTION INTRAVENOUS at 08:18

## 2024-01-29 RX ADMIN — PANCRELIPASE 12000 UNITS OF LIPASE: 60000; 12000; 38000 CAPSULE, DELAYED RELEASE PELLETS ORAL at 08:15

## 2024-01-29 RX ADMIN — METOCLOPRAMIDE HYDROCHLORIDE 10 MG: 5 SOLUTION ORAL at 11:48

## 2024-01-29 RX ADMIN — SULFAMETHOXAZOLE AND TRIMETHOPRIM 1 TABLET: 800; 160 TABLET ORAL at 14:11

## 2024-01-29 RX ADMIN — SODIUM BICARBONATE 650 MG: 650 TABLET ORAL at 08:15

## 2024-01-29 RX ADMIN — SODIUM BICARBONATE 650 MG: 650 TABLET ORAL at 20:28

## 2024-01-29 RX ADMIN — ROPINIROLE HYDROCHLORIDE 1 MG: 1 TABLET, FILM COATED ORAL at 14:11

## 2024-01-29 RX ADMIN — PANCRELIPASE 12000 UNITS OF LIPASE: 60000; 12000; 38000 CAPSULE, DELAYED RELEASE PELLETS ORAL at 11:48

## 2024-01-29 RX ADMIN — SODIUM CHLORIDE 75 ML/HR: 450 INJECTION, SOLUTION INTRAVENOUS at 22:13

## 2024-01-29 RX ADMIN — ATORVASTATIN CALCIUM 80 MG: 40 TABLET ORAL at 20:27

## 2024-01-29 RX ADMIN — DOCUSATE SODIUM 50 MG AND SENNOSIDES 8.6 MG 2 TABLET: 8.6; 5 TABLET, FILM COATED ORAL at 08:15

## 2024-01-29 RX ADMIN — Medication 10 ML: at 20:28

## 2024-01-29 RX ADMIN — MAGNESIUM GLUCONATE 500 MG ORAL TABLET 400 MG: 500 TABLET ORAL at 08:15

## 2024-01-29 RX ADMIN — CALCITRIOL 0.25 MCG: 0.25 CAPSULE ORAL at 08:15

## 2024-01-29 RX ADMIN — MIDODRINE HYDROCHLORIDE 5 MG: 2.5 TABLET ORAL at 08:15

## 2024-01-30 LAB
ALBUMIN SERPL-MCNC: 3.5 G/DL (ref 3.5–5.2)
ALBUMIN/GLOB SERPL: 1.3 G/DL
ALP SERPL-CCNC: 90 U/L (ref 39–117)
ALT SERPL W P-5'-P-CCNC: 17 U/L (ref 1–33)
ANION GAP SERPL CALCULATED.3IONS-SCNC: 11 MMOL/L (ref 5–15)
AST SERPL-CCNC: 28 U/L (ref 1–32)
BASOPHILS # BLD AUTO: 0.04 10*3/MM3 (ref 0–0.2)
BASOPHILS NFR BLD AUTO: 0.3 % (ref 0–1.5)
BILIRUB SERPL-MCNC: <0.2 MG/DL (ref 0–1.2)
BUN SERPL-MCNC: 18 MG/DL (ref 6–20)
BUN/CREAT SERPL: 8.6 (ref 7–25)
CALCIUM SPEC-SCNC: 9.2 MG/DL (ref 8.6–10.5)
CHLORIDE SERPL-SCNC: 105 MMOL/L (ref 98–107)
CHOLEST SERPL-MCNC: 127 MG/DL (ref 0–200)
CO2 SERPL-SCNC: 23 MMOL/L (ref 22–29)
CREAT SERPL-MCNC: 2.09 MG/DL (ref 0.57–1)
DEPRECATED RDW RBC AUTO: 57.3 FL (ref 37–54)
EGFRCR SERPLBLD CKD-EPI 2021: 28 ML/MIN/1.73
EOSINOPHIL # BLD AUTO: 0.19 10*3/MM3 (ref 0–0.4)
EOSINOPHIL NFR BLD AUTO: 1.5 % (ref 0.3–6.2)
ERYTHROCYTE [DISTWIDTH] IN BLOOD BY AUTOMATED COUNT: 16.3 % (ref 12.3–15.4)
GLOBULIN UR ELPH-MCNC: 2.7 GM/DL
GLUCOSE BLDC GLUCOMTR-MCNC: 52 MG/DL (ref 70–130)
GLUCOSE BLDC GLUCOMTR-MCNC: 56 MG/DL (ref 70–130)
GLUCOSE BLDC GLUCOMTR-MCNC: 70 MG/DL (ref 70–130)
GLUCOSE BLDC GLUCOMTR-MCNC: 73 MG/DL (ref 70–130)
GLUCOSE BLDC GLUCOMTR-MCNC: 75 MG/DL (ref 70–130)
GLUCOSE SERPL-MCNC: 97 MG/DL (ref 65–99)
HBA1C MFR BLD: 6.3 % (ref 4.8–5.6)
HCT VFR BLD AUTO: 31.1 % (ref 34–46.6)
HDLC SERPL-MCNC: 76 MG/DL (ref 40–60)
HGB BLD-MCNC: 9.5 G/DL (ref 12–15.9)
IMM GRANULOCYTES # BLD AUTO: 0.19 10*3/MM3 (ref 0–0.05)
IMM GRANULOCYTES NFR BLD AUTO: 1.5 % (ref 0–0.5)
LDLC SERPL CALC-MCNC: 38 MG/DL (ref 0–100)
LDLC/HDLC SERPL: 0.51 {RATIO}
LYMPHOCYTES # BLD AUTO: 3.29 10*3/MM3 (ref 0.7–3.1)
LYMPHOCYTES NFR BLD AUTO: 25.4 % (ref 19.6–45.3)
MCH RBC QN AUTO: 29.4 PG (ref 26.6–33)
MCHC RBC AUTO-ENTMCNC: 30.5 G/DL (ref 31.5–35.7)
MCV RBC AUTO: 96.3 FL (ref 79–97)
MONOCYTES # BLD AUTO: 0.72 10*3/MM3 (ref 0.1–0.9)
MONOCYTES NFR BLD AUTO: 5.6 % (ref 5–12)
NEUTROPHILS NFR BLD AUTO: 65.7 % (ref 42.7–76)
NEUTROPHILS NFR BLD AUTO: 8.5 10*3/MM3 (ref 1.7–7)
NRBC BLD AUTO-RTO: 0 /100 WBC (ref 0–0.2)
PLATELET # BLD AUTO: 355 10*3/MM3 (ref 140–450)
PMV BLD AUTO: 9.7 FL (ref 6–12)
POTASSIUM SERPL-SCNC: 4.7 MMOL/L (ref 3.5–5.2)
PROT SERPL-MCNC: 6.2 G/DL (ref 6–8.5)
RBC # BLD AUTO: 3.23 10*6/MM3 (ref 3.77–5.28)
SODIUM SERPL-SCNC: 139 MMOL/L (ref 136–145)
TRIGL SERPL-MCNC: 63 MG/DL (ref 0–150)
VLDLC SERPL-MCNC: 13 MG/DL (ref 5–40)
WBC NRBC COR # BLD AUTO: 12.93 10*3/MM3 (ref 3.4–10.8)

## 2024-01-30 PROCEDURE — 80053 COMPREHEN METABOLIC PANEL: CPT | Performed by: FAMILY MEDICINE

## 2024-01-30 PROCEDURE — 82948 REAGENT STRIP/BLOOD GLUCOSE: CPT

## 2024-01-30 PROCEDURE — 85025 COMPLETE CBC W/AUTO DIFF WBC: CPT | Performed by: FAMILY MEDICINE

## 2024-01-30 PROCEDURE — 83036 HEMOGLOBIN GLYCOSYLATED A1C: CPT | Performed by: FAMILY MEDICINE

## 2024-01-30 PROCEDURE — 80061 LIPID PANEL: CPT | Performed by: FAMILY MEDICINE

## 2024-01-30 PROCEDURE — 99232 SBSQ HOSP IP/OBS MODERATE 35: CPT | Performed by: INTERNAL MEDICINE

## 2024-01-30 RX ORDER — DEXTROSE AND SODIUM CHLORIDE 5; .45 G/100ML; G/100ML
100 INJECTION, SOLUTION INTRAVENOUS CONTINUOUS
Status: DISCONTINUED | OUTPATIENT
Start: 2024-01-30 | End: 2024-01-31 | Stop reason: HOSPADM

## 2024-01-30 RX ADMIN — METOCLOPRAMIDE HYDROCHLORIDE 10 MG: 5 SOLUTION ORAL at 12:43

## 2024-01-30 RX ADMIN — SODIUM BICARBONATE 650 MG: 650 TABLET ORAL at 09:47

## 2024-01-30 RX ADMIN — LEVOFLOXACIN 750 MG: 750 TABLET, FILM COATED ORAL at 05:06

## 2024-01-30 RX ADMIN — DEXTROSE AND SODIUM CHLORIDE 100 ML/HR: 5; 450 INJECTION, SOLUTION INTRAVENOUS at 20:48

## 2024-01-30 RX ADMIN — NORTRIPTYLINE HYDROCHLORIDE 50 MG: 25 CAPSULE ORAL at 20:49

## 2024-01-30 RX ADMIN — PANCRELIPASE 12000 UNITS OF LIPASE: 60000; 12000; 38000 CAPSULE, DELAYED RELEASE PELLETS ORAL at 09:48

## 2024-01-30 RX ADMIN — PANCRELIPASE 12000 UNITS OF LIPASE: 60000; 12000; 38000 CAPSULE, DELAYED RELEASE PELLETS ORAL at 17:35

## 2024-01-30 RX ADMIN — SODIUM CHLORIDE 75 ML/HR: 450 INJECTION, SOLUTION INTRAVENOUS at 14:33

## 2024-01-30 RX ADMIN — MAGNESIUM GLUCONATE 500 MG ORAL TABLET 400 MG: 500 TABLET ORAL at 09:47

## 2024-01-30 RX ADMIN — Medication 10 ML: at 20:49

## 2024-01-30 RX ADMIN — PANTOPRAZOLE SODIUM 40 MG: 40 INJECTION, POWDER, FOR SOLUTION INTRAVENOUS at 05:06

## 2024-01-30 RX ADMIN — MIDODRINE HYDROCHLORIDE 5 MG: 2.5 TABLET ORAL at 09:47

## 2024-01-30 RX ADMIN — SODIUM BICARBONATE 650 MG: 650 TABLET ORAL at 20:49

## 2024-01-30 RX ADMIN — CALCITRIOL 0.25 MCG: 0.25 CAPSULE ORAL at 09:47

## 2024-01-30 RX ADMIN — PANCRELIPASE 12000 UNITS OF LIPASE: 60000; 12000; 38000 CAPSULE, DELAYED RELEASE PELLETS ORAL at 20:49

## 2024-01-30 RX ADMIN — ROPINIROLE HYDROCHLORIDE 1 MG: 1 TABLET, FILM COATED ORAL at 17:35

## 2024-01-30 RX ADMIN — METOCLOPRAMIDE HYDROCHLORIDE 10 MG: 5 SOLUTION ORAL at 09:46

## 2024-01-30 RX ADMIN — HYDROXYCHLOROQUINE SULFATE 200 MG: 200 TABLET ORAL at 09:46

## 2024-01-30 RX ADMIN — ROPINIROLE HYDROCHLORIDE 1 MG: 1 TABLET, FILM COATED ORAL at 20:49

## 2024-01-30 RX ADMIN — ATORVASTATIN CALCIUM 80 MG: 40 TABLET ORAL at 20:49

## 2024-01-30 RX ADMIN — PANCRELIPASE 12000 UNITS OF LIPASE: 60000; 12000; 38000 CAPSULE, DELAYED RELEASE PELLETS ORAL at 12:43

## 2024-01-30 RX ADMIN — ROPINIROLE HYDROCHLORIDE 1 MG: 1 TABLET, FILM COATED ORAL at 05:06

## 2024-01-30 RX ADMIN — METOCLOPRAMIDE HYDROCHLORIDE 10 MG: 5 SOLUTION ORAL at 17:36

## 2024-01-31 ENCOUNTER — READMISSION MANAGEMENT (OUTPATIENT)
Dept: CALL CENTER | Facility: HOSPITAL | Age: 54
End: 2024-01-31
Payer: COMMERCIAL

## 2024-01-31 VITALS
BODY MASS INDEX: 20.66 KG/M2 | WEIGHT: 105.25 LBS | HEIGHT: 60 IN | RESPIRATION RATE: 18 BRPM | OXYGEN SATURATION: 100 % | SYSTOLIC BLOOD PRESSURE: 138 MMHG | HEART RATE: 102 BPM | TEMPERATURE: 97.8 F | DIASTOLIC BLOOD PRESSURE: 92 MMHG

## 2024-01-31 LAB
ANION GAP SERPL CALCULATED.3IONS-SCNC: 10 MMOL/L (ref 5–15)
BACTERIA SPEC AEROBE CULT: NORMAL
BACTERIA SPEC AEROBE CULT: NORMAL
BUN SERPL-MCNC: 17 MG/DL (ref 6–20)
BUN/CREAT SERPL: 9.8 (ref 7–25)
CALCIUM SPEC-SCNC: 8.7 MG/DL (ref 8.6–10.5)
CHLORIDE SERPL-SCNC: 106 MMOL/L (ref 98–107)
CO2 SERPL-SCNC: 23 MMOL/L (ref 22–29)
CREAT SERPL-MCNC: 1.74 MG/DL (ref 0.57–1)
DEPRECATED RDW RBC AUTO: 56.2 FL (ref 37–54)
EGFRCR SERPLBLD CKD-EPI 2021: 34.9 ML/MIN/1.73
ERYTHROCYTE [DISTWIDTH] IN BLOOD BY AUTOMATED COUNT: 16 % (ref 12.3–15.4)
GLUCOSE BLDC GLUCOMTR-MCNC: 120 MG/DL (ref 70–130)
GLUCOSE SERPL-MCNC: 97 MG/DL (ref 65–99)
HCT VFR BLD AUTO: 30.7 % (ref 34–46.6)
HGB BLD-MCNC: 9.6 G/DL (ref 12–15.9)
MCH RBC QN AUTO: 29.8 PG (ref 26.6–33)
MCHC RBC AUTO-ENTMCNC: 31.3 G/DL (ref 31.5–35.7)
MCV RBC AUTO: 95.3 FL (ref 79–97)
PLATELET # BLD AUTO: 328 10*3/MM3 (ref 140–450)
PMV BLD AUTO: 9.7 FL (ref 6–12)
POTASSIUM SERPL-SCNC: 4.9 MMOL/L (ref 3.5–5.2)
RBC # BLD AUTO: 3.22 10*6/MM3 (ref 3.77–5.28)
SODIUM SERPL-SCNC: 139 MMOL/L (ref 136–145)
WBC NRBC COR # BLD AUTO: 10.3 10*3/MM3 (ref 3.4–10.8)

## 2024-01-31 PROCEDURE — 85027 COMPLETE CBC AUTOMATED: CPT | Performed by: FAMILY MEDICINE

## 2024-01-31 PROCEDURE — 82948 REAGENT STRIP/BLOOD GLUCOSE: CPT

## 2024-01-31 PROCEDURE — 80048 BASIC METABOLIC PNL TOTAL CA: CPT | Performed by: FAMILY MEDICINE

## 2024-01-31 RX ORDER — LEVOFLOXACIN 750 MG/1
750 TABLET, FILM COATED ORAL
Qty: 1 TABLET | Refills: 0 | Status: SHIPPED | OUTPATIENT
Start: 2024-02-01 | End: 2024-02-02

## 2024-01-31 RX ORDER — PANTOPRAZOLE SODIUM 40 MG/1
40 TABLET, DELAYED RELEASE ORAL DAILY
Qty: 30 TABLET | Refills: 0 | Status: SHIPPED | OUTPATIENT
Start: 2024-01-31

## 2024-01-31 RX ORDER — ROPINIROLE 1 MG/1
1 TABLET, FILM COATED ORAL NIGHTLY
Start: 2024-01-31

## 2024-01-31 RX ORDER — HYDROXYCHLOROQUINE SULFATE 200 MG/1
200 TABLET, FILM COATED ORAL DAILY
Start: 2024-01-31 | End: 2024-03-01

## 2024-01-31 RX ADMIN — CALCITRIOL 0.25 MCG: 0.25 CAPSULE ORAL at 08:17

## 2024-01-31 RX ADMIN — PANTOPRAZOLE SODIUM 40 MG: 40 INJECTION, POWDER, FOR SOLUTION INTRAVENOUS at 05:20

## 2024-01-31 RX ADMIN — ROPINIROLE HYDROCHLORIDE 1 MG: 1 TABLET, FILM COATED ORAL at 05:20

## 2024-01-31 RX ADMIN — SODIUM BICARBONATE 650 MG: 650 TABLET ORAL at 08:16

## 2024-01-31 RX ADMIN — DEXTROSE AND SODIUM CHLORIDE 100 ML/HR: 5; 450 INJECTION, SOLUTION INTRAVENOUS at 06:55

## 2024-01-31 RX ADMIN — METOCLOPRAMIDE HYDROCHLORIDE 10 MG: 5 SOLUTION ORAL at 08:16

## 2024-01-31 RX ADMIN — MAGNESIUM GLUCONATE 500 MG ORAL TABLET 400 MG: 500 TABLET ORAL at 08:16

## 2024-01-31 RX ADMIN — PANCRELIPASE 12000 UNITS OF LIPASE: 60000; 12000; 38000 CAPSULE, DELAYED RELEASE PELLETS ORAL at 08:16

## 2024-01-31 RX ADMIN — HYDROXYCHLOROQUINE SULFATE 200 MG: 200 TABLET ORAL at 08:17

## 2024-02-01 LAB
GLUCOSE BLDC GLUCOMTR-MCNC: 127 MG/DL (ref 70–130)
GLUCOSE BLDC GLUCOMTR-MCNC: 130 MG/DL (ref 70–130)
GLUCOSE BLDC GLUCOMTR-MCNC: 131 MG/DL (ref 70–130)
GLUCOSE BLDC GLUCOMTR-MCNC: 137 MG/DL (ref 70–130)
GLUCOSE BLDC GLUCOMTR-MCNC: 55 MG/DL (ref 70–130)
GLUCOSE BLDC GLUCOMTR-MCNC: 80 MG/DL (ref 70–130)

## 2024-02-06 ENCOUNTER — HOSPITAL ENCOUNTER (INPATIENT)
Facility: HOSPITAL | Age: 54
LOS: 3 days | Discharge: HOME | DRG: 682 | End: 2024-02-09
Attending: HOSPITALIST | Admitting: INTERNAL MEDICINE
Payer: COMMERCIAL

## 2024-02-06 ENCOUNTER — APPOINTMENT (OUTPATIENT)
Dept: CT IMAGING | Facility: HOSPITAL | Age: 54
DRG: 682 | End: 2024-02-06
Payer: COMMERCIAL

## 2024-02-06 ENCOUNTER — READMISSION MANAGEMENT (OUTPATIENT)
Dept: CALL CENTER | Facility: HOSPITAL | Age: 54
End: 2024-02-06
Payer: COMMERCIAL

## 2024-02-06 DIAGNOSIS — N17.9 ACUTE KIDNEY INJURY: Primary | ICD-10-CM

## 2024-02-06 DIAGNOSIS — N39.0 URINARY TRACT INFECTION WITHOUT HEMATURIA, SITE UNSPECIFIED: ICD-10-CM

## 2024-02-06 PROBLEM — Z86.19: Status: ACTIVE | Noted: 2024-01-23

## 2024-02-06 PROBLEM — E11.9 TYPE 2 DIABETES MELLITUS, WITHOUT LONG-TERM CURRENT USE OF INSULIN: Status: ACTIVE | Noted: 2019-10-06

## 2024-02-06 LAB
ALBUMIN SERPL-MCNC: 4 G/DL (ref 3.5–5.2)
ALBUMIN/GLOB SERPL: 1.1 G/DL
ALP SERPL-CCNC: 112 U/L (ref 39–117)
ALT SERPL W P-5'-P-CCNC: 15 U/L (ref 1–33)
ANION GAP SERPL CALCULATED.3IONS-SCNC: 22 MMOL/L (ref 5–15)
AST SERPL-CCNC: 19 U/L (ref 1–32)
BACTERIA UR QL AUTO: ABNORMAL /HPF
BASOPHILS # BLD AUTO: 0.09 10*3/MM3 (ref 0–0.2)
BASOPHILS NFR BLD AUTO: 0.5 % (ref 0–1.5)
BILIRUB SERPL-MCNC: 0.2 MG/DL (ref 0–1.2)
BILIRUB UR QL STRIP: ABNORMAL
BUN SERPL-MCNC: 80 MG/DL (ref 6–20)
BUN/CREAT SERPL: 13.6 (ref 7–25)
CALCIUM SPEC-SCNC: 9.6 MG/DL (ref 8.6–10.5)
CHLORIDE SERPL-SCNC: 97 MMOL/L (ref 98–107)
CLARITY UR: CLEAR
CO2 SERPL-SCNC: 15 MMOL/L (ref 22–29)
COD CRY URNS QL: ABNORMAL /HPF
COLOR UR: ABNORMAL
CREAT SERPL-MCNC: 5.88 MG/DL (ref 0.57–1)
DEPRECATED RDW RBC AUTO: 54.4 FL (ref 37–54)
EGFRCR SERPLBLD CKD-EPI 2021: 8.1 ML/MIN/1.73
EOSINOPHIL # BLD AUTO: 0.05 10*3/MM3 (ref 0–0.4)
EOSINOPHIL NFR BLD AUTO: 0.3 % (ref 0.3–6.2)
ERYTHROCYTE [DISTWIDTH] IN BLOOD BY AUTOMATED COUNT: 15.7 % (ref 12.3–15.4)
GLOBULIN UR ELPH-MCNC: 3.8 GM/DL
GLUCOSE SERPL-MCNC: 94 MG/DL (ref 65–99)
GLUCOSE UR STRIP-MCNC: NEGATIVE MG/DL
HCT VFR BLD AUTO: 39.4 % (ref 34–46.6)
HGB BLD-MCNC: 12.4 G/DL (ref 12–15.9)
HGB UR QL STRIP.AUTO: NEGATIVE
HYALINE CASTS UR QL AUTO: ABNORMAL /LPF
IMM GRANULOCYTES # BLD AUTO: 0.24 10*3/MM3 (ref 0–0.05)
IMM GRANULOCYTES NFR BLD AUTO: 1.4 % (ref 0–0.5)
KETONES UR QL STRIP: ABNORMAL
LEUKOCYTE ESTERASE UR QL STRIP.AUTO: ABNORMAL
LYMPHOCYTES # BLD AUTO: 4.06 10*3/MM3 (ref 0.7–3.1)
LYMPHOCYTES NFR BLD AUTO: 24.3 % (ref 19.6–45.3)
MAGNESIUM SERPL-MCNC: 1.9 MG/DL (ref 1.6–2.6)
MCH RBC QN AUTO: 29.8 PG (ref 26.6–33)
MCHC RBC AUTO-ENTMCNC: 31.5 G/DL (ref 31.5–35.7)
MCV RBC AUTO: 94.7 FL (ref 79–97)
MONOCYTES # BLD AUTO: 1.31 10*3/MM3 (ref 0.1–0.9)
MONOCYTES NFR BLD AUTO: 7.8 % (ref 5–12)
NEUTROPHILS NFR BLD AUTO: 10.99 10*3/MM3 (ref 1.7–7)
NEUTROPHILS NFR BLD AUTO: 65.7 % (ref 42.7–76)
NITRITE UR QL STRIP: NEGATIVE
NRBC BLD AUTO-RTO: 0 /100 WBC (ref 0–0.2)
PH UR STRIP.AUTO: <=5 [PH] (ref 5–8)
PLATELET # BLD AUTO: 465 10*3/MM3 (ref 140–450)
PMV BLD AUTO: 9.6 FL (ref 6–12)
POTASSIUM SERPL-SCNC: 3.4 MMOL/L (ref 3.5–5.2)
PROT SERPL-MCNC: 7.8 G/DL (ref 6–8.5)
PROT UR QL STRIP: ABNORMAL
RBC # BLD AUTO: 4.16 10*6/MM3 (ref 3.77–5.28)
RBC # UR STRIP: ABNORMAL /HPF
REF LAB TEST METHOD: ABNORMAL
SODIUM SERPL-SCNC: 134 MMOL/L (ref 136–145)
SP GR UR STRIP: 1.02 (ref 1–1.03)
SQUAMOUS #/AREA URNS HPF: ABNORMAL /HPF
STARCH GRANULES URNS QL MICRO: ABNORMAL /HPF
UROBILINOGEN UR QL STRIP: ABNORMAL
WBC # UR STRIP: ABNORMAL /HPF
WBC NRBC COR # BLD AUTO: 16.74 10*3/MM3 (ref 3.4–10.8)

## 2024-02-06 PROCEDURE — 81001 URINALYSIS AUTO W/SCOPE: CPT

## 2024-02-06 PROCEDURE — 99285 EMERGENCY DEPT VISIT HI MDM: CPT

## 2024-02-06 PROCEDURE — 84100 ASSAY OF PHOSPHORUS: CPT | Performed by: NURSE PRACTITIONER

## 2024-02-06 PROCEDURE — 83735 ASSAY OF MAGNESIUM: CPT | Performed by: NURSE PRACTITIONER

## 2024-02-06 PROCEDURE — 85025 COMPLETE CBC W/AUTO DIFF WBC: CPT

## 2024-02-06 PROCEDURE — 74176 CT ABD & PELVIS W/O CONTRAST: CPT

## 2024-02-06 PROCEDURE — 80053 COMPREHEN METABOLIC PANEL: CPT

## 2024-02-06 PROCEDURE — 87086 URINE CULTURE/COLONY COUNT: CPT

## 2024-02-06 PROCEDURE — 25810000003 SODIUM CHLORIDE 0.9 % SOLUTION

## 2024-02-06 RX ORDER — SODIUM CHLORIDE 9 MG/ML
75 INJECTION, SOLUTION INTRAVENOUS CONTINUOUS
Status: DISCONTINUED | OUTPATIENT
Start: 2024-02-06 | End: 2024-02-09

## 2024-02-06 RX ORDER — ONDANSETRON 4 MG/1
4 TABLET, ORALLY DISINTEGRATING ORAL EVERY 6 HOURS PRN
Status: DISCONTINUED | OUTPATIENT
Start: 2024-02-06 | End: 2024-02-09 | Stop reason: HOSPADM

## 2024-02-06 RX ORDER — BUMETANIDE 1 MG/1
1 TABLET ORAL DAILY
COMMUNITY
End: 2024-02-19 | Stop reason: HOSPADM

## 2024-02-06 RX ORDER — SODIUM CHLORIDE 0.9 % (FLUSH) 0.9 %
10 SYRINGE (ML) INJECTION AS NEEDED
Status: DISCONTINUED | OUTPATIENT
Start: 2024-02-06 | End: 2024-02-09 | Stop reason: HOSPADM

## 2024-02-06 RX ORDER — INSULIN LISPRO 100 [IU]/ML
2-7 INJECTION, SOLUTION INTRAVENOUS; SUBCUTANEOUS
Status: DISCONTINUED | OUTPATIENT
Start: 2024-02-07 | End: 2024-02-09 | Stop reason: HOSPADM

## 2024-02-06 RX ORDER — SODIUM CHLORIDE 9 MG/ML
40 INJECTION, SOLUTION INTRAVENOUS AS NEEDED
Status: DISCONTINUED | OUTPATIENT
Start: 2024-02-06 | End: 2024-02-09 | Stop reason: HOSPADM

## 2024-02-06 RX ORDER — NORTRIPTYLINE HYDROCHLORIDE 25 MG/1
50 CAPSULE ORAL NIGHTLY
Status: DISCONTINUED | OUTPATIENT
Start: 2024-02-06 | End: 2024-02-09 | Stop reason: HOSPADM

## 2024-02-06 RX ORDER — IBUPROFEN 600 MG/1
1 TABLET ORAL
Status: DISCONTINUED | OUTPATIENT
Start: 2024-02-06 | End: 2024-02-09 | Stop reason: HOSPADM

## 2024-02-06 RX ORDER — SODIUM BICARBONATE 650 MG/1
650 TABLET ORAL 2 TIMES DAILY
Status: DISCONTINUED | OUTPATIENT
Start: 2024-02-06 | End: 2024-02-09 | Stop reason: HOSPADM

## 2024-02-06 RX ORDER — ACETAMINOPHEN 325 MG/1
650 TABLET ORAL EVERY 4 HOURS PRN
Status: DISCONTINUED | OUTPATIENT
Start: 2024-02-06 | End: 2024-02-09 | Stop reason: HOSPADM

## 2024-02-06 RX ORDER — DEXTROSE MONOHYDRATE 25 G/50ML
25 INJECTION, SOLUTION INTRAVENOUS
Status: DISCONTINUED | OUTPATIENT
Start: 2024-02-06 | End: 2024-02-09 | Stop reason: HOSPADM

## 2024-02-06 RX ORDER — NICOTINE POLACRILEX 4 MG
15 LOZENGE BUCCAL
Status: DISCONTINUED | OUTPATIENT
Start: 2024-02-06 | End: 2024-02-09 | Stop reason: HOSPADM

## 2024-02-06 RX ORDER — SODIUM CHLORIDE 0.9 % (FLUSH) 0.9 %
10 SYRINGE (ML) INJECTION EVERY 12 HOURS SCHEDULED
Status: DISCONTINUED | OUTPATIENT
Start: 2024-02-06 | End: 2024-02-09 | Stop reason: HOSPADM

## 2024-02-06 RX ORDER — CALCITRIOL 0.25 UG/1
0.25 CAPSULE, LIQUID FILLED ORAL DAILY
Status: DISCONTINUED | OUTPATIENT
Start: 2024-02-07 | End: 2024-02-09 | Stop reason: HOSPADM

## 2024-02-06 RX ORDER — POLYETHYLENE GLYCOL 3350 17 G/17G
17 POWDER, FOR SOLUTION ORAL DAILY PRN
Status: DISCONTINUED | OUTPATIENT
Start: 2024-02-06 | End: 2024-02-09 | Stop reason: HOSPADM

## 2024-02-06 RX ORDER — ACETAMINOPHEN 160 MG/5ML
650 SOLUTION ORAL EVERY 4 HOURS PRN
Status: DISCONTINUED | OUTPATIENT
Start: 2024-02-06 | End: 2024-02-09 | Stop reason: HOSPADM

## 2024-02-06 RX ORDER — ROPINIROLE 1 MG/1
1 TABLET, FILM COATED ORAL NIGHTLY
Status: DISCONTINUED | OUTPATIENT
Start: 2024-02-06 | End: 2024-02-09 | Stop reason: HOSPADM

## 2024-02-06 RX ORDER — PANTOPRAZOLE SODIUM 40 MG/1
40 TABLET, DELAYED RELEASE ORAL DAILY
Status: DISCONTINUED | OUTPATIENT
Start: 2024-02-07 | End: 2024-02-09 | Stop reason: HOSPADM

## 2024-02-06 RX ORDER — ACETAMINOPHEN 650 MG/1
650 SUPPOSITORY RECTAL EVERY 4 HOURS PRN
Status: DISCONTINUED | OUTPATIENT
Start: 2024-02-06 | End: 2024-02-09 | Stop reason: HOSPADM

## 2024-02-06 RX ORDER — ONDANSETRON 2 MG/ML
4 INJECTION INTRAMUSCULAR; INTRAVENOUS EVERY 6 HOURS PRN
Status: DISCONTINUED | OUTPATIENT
Start: 2024-02-06 | End: 2024-02-09 | Stop reason: HOSPADM

## 2024-02-06 RX ORDER — HYDROXYCHLOROQUINE SULFATE 200 MG/1
200 TABLET, FILM COATED ORAL DAILY
Status: DISCONTINUED | OUTPATIENT
Start: 2024-02-07 | End: 2024-02-09 | Stop reason: HOSPADM

## 2024-02-06 RX ORDER — AMOXICILLIN 250 MG
2 CAPSULE ORAL 2 TIMES DAILY PRN
Status: DISCONTINUED | OUTPATIENT
Start: 2024-02-06 | End: 2024-02-09 | Stop reason: HOSPADM

## 2024-02-06 RX ORDER — BISACODYL 5 MG/1
5 TABLET, DELAYED RELEASE ORAL DAILY PRN
Status: DISCONTINUED | OUTPATIENT
Start: 2024-02-06 | End: 2024-02-09 | Stop reason: HOSPADM

## 2024-02-06 RX ORDER — BISACODYL 10 MG
10 SUPPOSITORY, RECTAL RECTAL DAILY PRN
Status: DISCONTINUED | OUTPATIENT
Start: 2024-02-06 | End: 2024-02-09 | Stop reason: HOSPADM

## 2024-02-06 RX ADMIN — NORTRIPTYLINE HYDROCHLORIDE 50 MG: 25 CAPSULE ORAL at 23:50

## 2024-02-06 RX ADMIN — SODIUM BICARBONATE 650 MG: 650 TABLET ORAL at 23:50

## 2024-02-06 RX ADMIN — SODIUM CHLORIDE 1000 ML: 9 INJECTION, SOLUTION INTRAVENOUS at 22:26

## 2024-02-06 RX ADMIN — ROPINIROLE HYDROCHLORIDE 1 MG: 1 TABLET, FILM COATED ORAL at 23:50

## 2024-02-07 ENCOUNTER — READMISSION MANAGEMENT (OUTPATIENT)
Dept: CALL CENTER | Facility: HOSPITAL | Age: 54
End: 2024-02-07
Payer: COMMERCIAL

## 2024-02-07 LAB
ANION GAP SERPL CALCULATED.3IONS-SCNC: 12 MMOL/L (ref 5–15)
ANION GAP SERPL CALCULATED.3IONS-SCNC: 17 MMOL/L (ref 5–15)
BASOPHILS # BLD AUTO: 0.06 10*3/MM3 (ref 0–0.2)
BASOPHILS NFR BLD AUTO: 0.4 % (ref 0–1.5)
BUN SERPL-MCNC: 64 MG/DL (ref 6–20)
BUN SERPL-MCNC: 76 MG/DL (ref 6–20)
BUN/CREAT SERPL: 15.5 (ref 7–25)
BUN/CREAT SERPL: 19.2 (ref 7–25)
CALCIUM SPEC-SCNC: 7.8 MG/DL (ref 8.6–10.5)
CALCIUM SPEC-SCNC: 8.1 MG/DL (ref 8.6–10.5)
CHLORIDE SERPL-SCNC: 103 MMOL/L (ref 98–107)
CHLORIDE SERPL-SCNC: 105 MMOL/L (ref 98–107)
CO2 SERPL-SCNC: 16 MMOL/L (ref 22–29)
CO2 SERPL-SCNC: 17 MMOL/L (ref 22–29)
CREAT SERPL-MCNC: 3.33 MG/DL (ref 0.57–1)
CREAT SERPL-MCNC: 4.89 MG/DL (ref 0.57–1)
DEPRECATED RDW RBC AUTO: 55.2 FL (ref 37–54)
EGFRCR SERPLBLD CKD-EPI 2021: 10.1 ML/MIN/1.73
EGFRCR SERPLBLD CKD-EPI 2021: 16 ML/MIN/1.73
EOSINOPHIL # BLD AUTO: 0.06 10*3/MM3 (ref 0–0.4)
EOSINOPHIL NFR BLD AUTO: 0.4 % (ref 0.3–6.2)
ERYTHROCYTE [DISTWIDTH] IN BLOOD BY AUTOMATED COUNT: 15.7 % (ref 12.3–15.4)
GLUCOSE BLDC GLUCOMTR-MCNC: 101 MG/DL (ref 70–130)
GLUCOSE BLDC GLUCOMTR-MCNC: 182 MG/DL (ref 70–130)
GLUCOSE BLDC GLUCOMTR-MCNC: 81 MG/DL (ref 70–130)
GLUCOSE BLDC GLUCOMTR-MCNC: 85 MG/DL (ref 70–130)
GLUCOSE BLDC GLUCOMTR-MCNC: 91 MG/DL (ref 70–130)
GLUCOSE SERPL-MCNC: 121 MG/DL (ref 65–99)
GLUCOSE SERPL-MCNC: 87 MG/DL (ref 65–99)
HCT VFR BLD AUTO: 34.8 % (ref 34–46.6)
HGB BLD-MCNC: 11 G/DL (ref 12–15.9)
IMM GRANULOCYTES # BLD AUTO: 0.17 10*3/MM3 (ref 0–0.05)
IMM GRANULOCYTES NFR BLD AUTO: 1.3 % (ref 0–0.5)
LYMPHOCYTES # BLD AUTO: 3.66 10*3/MM3 (ref 0.7–3.1)
LYMPHOCYTES NFR BLD AUTO: 27.3 % (ref 19.6–45.3)
MCH RBC QN AUTO: 30.3 PG (ref 26.6–33)
MCHC RBC AUTO-ENTMCNC: 31.6 G/DL (ref 31.5–35.7)
MCV RBC AUTO: 95.9 FL (ref 79–97)
MONOCYTES # BLD AUTO: 1.06 10*3/MM3 (ref 0.1–0.9)
MONOCYTES NFR BLD AUTO: 7.9 % (ref 5–12)
NEUTROPHILS NFR BLD AUTO: 62.7 % (ref 42.7–76)
NEUTROPHILS NFR BLD AUTO: 8.42 10*3/MM3 (ref 1.7–7)
NRBC BLD AUTO-RTO: 0 /100 WBC (ref 0–0.2)
PHOSPHATE SERPL-MCNC: 7 MG/DL (ref 2.5–4.5)
PLATELET # BLD AUTO: 391 10*3/MM3 (ref 140–450)
PMV BLD AUTO: 9.8 FL (ref 6–12)
POTASSIUM SERPL-SCNC: 3 MMOL/L (ref 3.5–5.2)
POTASSIUM SERPL-SCNC: 3.3 MMOL/L (ref 3.5–5.2)
RBC # BLD AUTO: 3.63 10*6/MM3 (ref 3.77–5.28)
SODIUM SERPL-SCNC: 134 MMOL/L (ref 136–145)
SODIUM SERPL-SCNC: 136 MMOL/L (ref 136–145)
WBC NRBC COR # BLD AUTO: 13.43 10*3/MM3 (ref 3.4–10.8)

## 2024-02-07 PROCEDURE — 80048 BASIC METABOLIC PNL TOTAL CA: CPT | Performed by: INTERNAL MEDICINE

## 2024-02-07 PROCEDURE — 85025 COMPLETE CBC W/AUTO DIFF WBC: CPT | Performed by: NURSE PRACTITIONER

## 2024-02-07 PROCEDURE — 87040 BLOOD CULTURE FOR BACTERIA: CPT | Performed by: NURSE PRACTITIONER

## 2024-02-07 PROCEDURE — 25810000003 SODIUM CHLORIDE 0.9 % SOLUTION: Performed by: NURSE PRACTITIONER

## 2024-02-07 PROCEDURE — 36415 COLL VENOUS BLD VENIPUNCTURE: CPT | Performed by: NURSE PRACTITIONER

## 2024-02-07 PROCEDURE — 25010000002 CEFTRIAXONE PER 250 MG

## 2024-02-07 PROCEDURE — 80048 BASIC METABOLIC PNL TOTAL CA: CPT | Performed by: NURSE PRACTITIONER

## 2024-02-07 PROCEDURE — 82948 REAGENT STRIP/BLOOD GLUCOSE: CPT

## 2024-02-07 PROCEDURE — 97165 OT EVAL LOW COMPLEX 30 MIN: CPT

## 2024-02-07 PROCEDURE — 97161 PT EVAL LOW COMPLEX 20 MIN: CPT

## 2024-02-07 RX ORDER — METOPROLOL SUCCINATE 25 MG/1
25 TABLET, EXTENDED RELEASE ORAL DAILY
COMMUNITY
End: 2024-02-09 | Stop reason: HOSPADM

## 2024-02-07 RX ORDER — FAMOTIDINE 40 MG/1
40 TABLET, FILM COATED ORAL 2 TIMES DAILY
COMMUNITY

## 2024-02-07 RX ORDER — POTASSIUM CHLORIDE 750 MG/1
10 CAPSULE, EXTENDED RELEASE ORAL 2 TIMES DAILY
COMMUNITY

## 2024-02-07 RX ORDER — POTASSIUM CHLORIDE 750 MG/1
40 CAPSULE, EXTENDED RELEASE ORAL ONCE
Status: COMPLETED | OUTPATIENT
Start: 2024-02-07 | End: 2024-02-07

## 2024-02-07 RX ORDER — PROMETHAZINE HYDROCHLORIDE 25 MG/1
25 TABLET ORAL EVERY 8 HOURS PRN
COMMUNITY
End: 2024-02-19 | Stop reason: HOSPADM

## 2024-02-07 RX ORDER — PREGABALIN 150 MG/1
150 CAPSULE ORAL 2 TIMES DAILY
COMMUNITY
End: 2024-02-09 | Stop reason: HOSPADM

## 2024-02-07 RX ORDER — ASPIRIN 81 MG/1
81 TABLET ORAL DAILY
COMMUNITY
End: 2024-02-09 | Stop reason: HOSPADM

## 2024-02-07 RX ORDER — DULOXETIN HYDROCHLORIDE 60 MG/1
60 CAPSULE, DELAYED RELEASE ORAL DAILY
COMMUNITY
End: 2024-02-09 | Stop reason: HOSPADM

## 2024-02-07 RX ORDER — ASPIRIN 81 MG
1 TABLET, DELAYED RELEASE (ENTERIC COATED) ORAL DAILY
COMMUNITY
End: 2024-02-09 | Stop reason: HOSPADM

## 2024-02-07 RX ORDER — ROPINIROLE 1 MG/1
1 TABLET, FILM COATED ORAL 3 TIMES DAILY
COMMUNITY

## 2024-02-07 RX ORDER — ONDANSETRON 4 MG/1
4 TABLET, FILM COATED ORAL EVERY 8 HOURS PRN
COMMUNITY

## 2024-02-07 RX ORDER — NIFEDIPINE 60 MG/1
60 TABLET, EXTENDED RELEASE ORAL DAILY
COMMUNITY
End: 2024-02-09 | Stop reason: HOSPADM

## 2024-02-07 RX ORDER — CETIRIZINE HYDROCHLORIDE 10 MG/1
10 TABLET ORAL DAILY
COMMUNITY
End: 2024-02-09 | Stop reason: HOSPADM

## 2024-02-07 RX ORDER — MAGNESIUM L-LACTATE 84 MG
84 TABLET, EXTENDED RELEASE ORAL 2 TIMES DAILY
COMMUNITY
End: 2024-02-09 | Stop reason: HOSPADM

## 2024-02-07 RX ORDER — METOCLOPRAMIDE 5 MG/1
5 TABLET ORAL 3 TIMES DAILY
COMMUNITY
End: 2024-02-09 | Stop reason: HOSPADM

## 2024-02-07 RX ADMIN — SODIUM CHLORIDE 125 ML/HR: 9 INJECTION, SOLUTION INTRAVENOUS at 04:22

## 2024-02-07 RX ADMIN — PANTOPRAZOLE SODIUM 40 MG: 40 TABLET, DELAYED RELEASE ORAL at 09:25

## 2024-02-07 RX ADMIN — PANCRELIPASE 24000 UNITS OF LIPASE: 60000; 12000; 38000 CAPSULE, DELAYED RELEASE PELLETS ORAL at 21:49

## 2024-02-07 RX ADMIN — SODIUM CHLORIDE 125 ML/HR: 9 INJECTION, SOLUTION INTRAVENOUS at 00:36

## 2024-02-07 RX ADMIN — NORTRIPTYLINE HYDROCHLORIDE 50 MG: 25 CAPSULE ORAL at 21:49

## 2024-02-07 RX ADMIN — ROPINIROLE HYDROCHLORIDE 1 MG: 1 TABLET, FILM COATED ORAL at 21:49

## 2024-02-07 RX ADMIN — PANCRELIPASE 24000 UNITS OF LIPASE: 60000; 12000; 38000 CAPSULE, DELAYED RELEASE PELLETS ORAL at 09:26

## 2024-02-07 RX ADMIN — CALCITRIOL 0.25 MCG: 0.25 CAPSULE ORAL at 09:26

## 2024-02-07 RX ADMIN — Medication 10 ML: at 09:27

## 2024-02-07 RX ADMIN — PANCRELIPASE 24000 UNITS OF LIPASE: 60000; 12000; 38000 CAPSULE, DELAYED RELEASE PELLETS ORAL at 11:56

## 2024-02-07 RX ADMIN — SODIUM BICARBONATE 650 MG: 650 TABLET ORAL at 21:49

## 2024-02-07 RX ADMIN — Medication 10 ML: at 00:36

## 2024-02-07 RX ADMIN — HYDROXYCHLOROQUINE SULFATE 200 MG: 200 TABLET ORAL at 09:26

## 2024-02-07 RX ADMIN — CEFTRIAXONE 1000 MG: 1 INJECTION, POWDER, FOR SOLUTION INTRAMUSCULAR; INTRAVENOUS at 00:35

## 2024-02-07 RX ADMIN — POTASSIUM CHLORIDE 40 MEQ: 10 CAPSULE, COATED, EXTENDED RELEASE ORAL at 11:56

## 2024-02-07 RX ADMIN — SODIUM BICARBONATE 650 MG: 650 TABLET ORAL at 09:26

## 2024-02-07 RX ADMIN — SODIUM CHLORIDE 125 ML/HR: 9 INJECTION, SOLUTION INTRAVENOUS at 18:04

## 2024-02-08 LAB
ALBUMIN SERPL-MCNC: 2.7 G/DL (ref 3.5–5.2)
ALBUMIN/GLOB SERPL: 1.1 G/DL
ALP SERPL-CCNC: 72 U/L (ref 39–117)
ALT SERPL W P-5'-P-CCNC: 8 U/L (ref 1–33)
ANION GAP SERPL CALCULATED.3IONS-SCNC: 11 MMOL/L (ref 5–15)
ANION GAP SERPL CALCULATED.3IONS-SCNC: 11 MMOL/L (ref 5–15)
AST SERPL-CCNC: 12 U/L (ref 1–32)
BACTERIA SPEC AEROBE CULT: NO GROWTH
BASOPHILS # BLD AUTO: 0.05 10*3/MM3 (ref 0–0.2)
BASOPHILS NFR BLD AUTO: 0.5 % (ref 0–1.5)
BILIRUB SERPL-MCNC: <0.2 MG/DL (ref 0–1.2)
BUN SERPL-MCNC: 45 MG/DL (ref 6–20)
BUN SERPL-MCNC: 45 MG/DL (ref 6–20)
BUN/CREAT SERPL: 19.6 (ref 7–25)
BUN/CREAT SERPL: 19.6 (ref 7–25)
CALCIUM SPEC-SCNC: 7.3 MG/DL (ref 8.6–10.5)
CALCIUM SPEC-SCNC: 7.3 MG/DL (ref 8.6–10.5)
CHLORIDE SERPL-SCNC: 114 MMOL/L (ref 98–107)
CHLORIDE SERPL-SCNC: 114 MMOL/L (ref 98–107)
CO2 SERPL-SCNC: 17 MMOL/L (ref 22–29)
CO2 SERPL-SCNC: 17 MMOL/L (ref 22–29)
CREAT SERPL-MCNC: 2.3 MG/DL (ref 0.57–1)
CREAT SERPL-MCNC: 2.3 MG/DL (ref 0.57–1)
DEPRECATED RDW RBC AUTO: 55.8 FL (ref 37–54)
EGFRCR SERPLBLD CKD-EPI 2021: 25 ML/MIN/1.73
EGFRCR SERPLBLD CKD-EPI 2021: 25 ML/MIN/1.73
EOSINOPHIL # BLD AUTO: 0.1 10*3/MM3 (ref 0–0.4)
EOSINOPHIL NFR BLD AUTO: 1.1 % (ref 0.3–6.2)
ERYTHROCYTE [DISTWIDTH] IN BLOOD BY AUTOMATED COUNT: 15.8 % (ref 12.3–15.4)
GLOBULIN UR ELPH-MCNC: 2.4 GM/DL
GLUCOSE BLDC GLUCOMTR-MCNC: 103 MG/DL (ref 70–130)
GLUCOSE BLDC GLUCOMTR-MCNC: 108 MG/DL (ref 70–130)
GLUCOSE BLDC GLUCOMTR-MCNC: 150 MG/DL (ref 70–130)
GLUCOSE BLDC GLUCOMTR-MCNC: 80 MG/DL (ref 70–130)
GLUCOSE SERPL-MCNC: 136 MG/DL (ref 65–99)
GLUCOSE SERPL-MCNC: 136 MG/DL (ref 65–99)
HCT VFR BLD AUTO: 30 % (ref 34–46.6)
HGB BLD-MCNC: 9.3 G/DL (ref 12–15.9)
IMM GRANULOCYTES # BLD AUTO: 0.06 10*3/MM3 (ref 0–0.05)
IMM GRANULOCYTES NFR BLD AUTO: 0.7 % (ref 0–0.5)
LYMPHOCYTES # BLD AUTO: 2.29 10*3/MM3 (ref 0.7–3.1)
LYMPHOCYTES NFR BLD AUTO: 25 % (ref 19.6–45.3)
MAGNESIUM SERPL-MCNC: 1.1 MG/DL (ref 1.6–2.6)
MCH RBC QN AUTO: 29.8 PG (ref 26.6–33)
MCHC RBC AUTO-ENTMCNC: 31 G/DL (ref 31.5–35.7)
MCV RBC AUTO: 96.2 FL (ref 79–97)
MONOCYTES # BLD AUTO: 0.7 10*3/MM3 (ref 0.1–0.9)
MONOCYTES NFR BLD AUTO: 7.7 % (ref 5–12)
NEUTROPHILS NFR BLD AUTO: 5.95 10*3/MM3 (ref 1.7–7)
NEUTROPHILS NFR BLD AUTO: 65 % (ref 42.7–76)
NRBC BLD AUTO-RTO: 0 /100 WBC (ref 0–0.2)
PLATELET # BLD AUTO: 302 10*3/MM3 (ref 140–450)
PMV BLD AUTO: 10.7 FL (ref 6–12)
POTASSIUM SERPL-SCNC: 3.6 MMOL/L (ref 3.5–5.2)
POTASSIUM SERPL-SCNC: 3.6 MMOL/L (ref 3.5–5.2)
PROT SERPL-MCNC: 5.1 G/DL (ref 6–8.5)
RBC # BLD AUTO: 3.12 10*6/MM3 (ref 3.77–5.28)
SODIUM SERPL-SCNC: 142 MMOL/L (ref 136–145)
SODIUM SERPL-SCNC: 142 MMOL/L (ref 136–145)
WBC NRBC COR # BLD AUTO: 9.15 10*3/MM3 (ref 3.4–10.8)

## 2024-02-08 PROCEDURE — 85025 COMPLETE CBC W/AUTO DIFF WBC: CPT | Performed by: INTERNAL MEDICINE

## 2024-02-08 PROCEDURE — 83735 ASSAY OF MAGNESIUM: CPT | Performed by: INTERNAL MEDICINE

## 2024-02-08 PROCEDURE — 36415 COLL VENOUS BLD VENIPUNCTURE: CPT | Performed by: NURSE PRACTITIONER

## 2024-02-08 PROCEDURE — 80053 COMPREHEN METABOLIC PANEL: CPT | Performed by: NURSE PRACTITIONER

## 2024-02-08 PROCEDURE — 25810000003 SODIUM CHLORIDE 0.9 % SOLUTION: Performed by: NURSE PRACTITIONER

## 2024-02-08 PROCEDURE — 63710000001 INSULIN LISPRO (HUMAN) PER 5 UNITS: Performed by: NURSE PRACTITIONER

## 2024-02-08 PROCEDURE — 25010000002 MAGNESIUM SULFATE 4 GM/100ML SOLUTION: Performed by: INTERNAL MEDICINE

## 2024-02-08 PROCEDURE — 82948 REAGENT STRIP/BLOOD GLUCOSE: CPT

## 2024-02-08 RX ORDER — MAGNESIUM SULFATE HEPTAHYDRATE 40 MG/ML
4 INJECTION, SOLUTION INTRAVENOUS ONCE
Status: COMPLETED | OUTPATIENT
Start: 2024-02-08 | End: 2024-02-08

## 2024-02-08 RX ADMIN — ROPINIROLE HYDROCHLORIDE 1 MG: 1 TABLET, FILM COATED ORAL at 21:35

## 2024-02-08 RX ADMIN — PANCRELIPASE 24000 UNITS OF LIPASE: 60000; 12000; 38000 CAPSULE, DELAYED RELEASE PELLETS ORAL at 08:48

## 2024-02-08 RX ADMIN — SODIUM CHLORIDE 125 ML/HR: 9 INJECTION, SOLUTION INTRAVENOUS at 02:00

## 2024-02-08 RX ADMIN — SODIUM CHLORIDE 125 ML/HR: 9 INJECTION, SOLUTION INTRAVENOUS at 09:08

## 2024-02-08 RX ADMIN — Medication 10 ML: at 21:36

## 2024-02-08 RX ADMIN — SODIUM BICARBONATE 650 MG: 650 TABLET ORAL at 08:48

## 2024-02-08 RX ADMIN — CALCITRIOL 0.25 MCG: 0.25 CAPSULE ORAL at 08:48

## 2024-02-08 RX ADMIN — PANCRELIPASE 24000 UNITS OF LIPASE: 60000; 12000; 38000 CAPSULE, DELAYED RELEASE PELLETS ORAL at 21:36

## 2024-02-08 RX ADMIN — SODIUM BICARBONATE 650 MG: 650 TABLET ORAL at 21:35

## 2024-02-08 RX ADMIN — MAGNESIUM SULFATE HEPTAHYDRATE 4 G: 40 INJECTION, SOLUTION INTRAVENOUS at 21:35

## 2024-02-08 RX ADMIN — PANTOPRAZOLE SODIUM 40 MG: 40 TABLET, DELAYED RELEASE ORAL at 08:48

## 2024-02-08 RX ADMIN — NORTRIPTYLINE HYDROCHLORIDE 50 MG: 25 CAPSULE ORAL at 21:35

## 2024-02-08 RX ADMIN — HYDROXYCHLOROQUINE SULFATE 200 MG: 200 TABLET ORAL at 08:48

## 2024-02-08 RX ADMIN — INSULIN LISPRO 2 UNITS: 100 INJECTION, SOLUTION INTRAVENOUS; SUBCUTANEOUS at 09:08

## 2024-02-08 RX ADMIN — PANCRELIPASE 24000 UNITS OF LIPASE: 60000; 12000; 38000 CAPSULE, DELAYED RELEASE PELLETS ORAL at 11:52

## 2024-02-09 ENCOUNTER — READMISSION MANAGEMENT (OUTPATIENT)
Dept: CALL CENTER | Facility: HOSPITAL | Age: 54
End: 2024-02-09
Payer: COMMERCIAL

## 2024-02-09 VITALS
DIASTOLIC BLOOD PRESSURE: 68 MMHG | BODY MASS INDEX: 17.22 KG/M2 | OXYGEN SATURATION: 100 % | RESPIRATION RATE: 16 BRPM | TEMPERATURE: 97.6 F | HEIGHT: 60 IN | SYSTOLIC BLOOD PRESSURE: 110 MMHG | WEIGHT: 87.7 LBS | HEART RATE: 93 BPM

## 2024-02-09 LAB
ANION GAP SERPL CALCULATED.3IONS-SCNC: 10 MMOL/L (ref 5–15)
BUN SERPL-MCNC: 28 MG/DL (ref 6–20)
BUN/CREAT SERPL: 16 (ref 7–25)
CALCIUM SPEC-SCNC: 8.3 MG/DL (ref 8.6–10.5)
CHLORIDE SERPL-SCNC: 111 MMOL/L (ref 98–107)
CO2 SERPL-SCNC: 21 MMOL/L (ref 22–29)
CREAT SERPL-MCNC: 1.75 MG/DL (ref 0.57–1)
EGFRCR SERPLBLD CKD-EPI 2021: 34.7 ML/MIN/1.73
GLUCOSE BLDC GLUCOMTR-MCNC: 114 MG/DL (ref 70–130)
GLUCOSE SERPL-MCNC: 119 MG/DL (ref 65–99)
MAGNESIUM SERPL-MCNC: 3.4 MG/DL (ref 1.6–2.6)
POTASSIUM SERPL-SCNC: 3.3 MMOL/L (ref 3.5–5.2)
SODIUM SERPL-SCNC: 142 MMOL/L (ref 136–145)

## 2024-02-09 PROCEDURE — 83735 ASSAY OF MAGNESIUM: CPT | Performed by: INTERNAL MEDICINE

## 2024-02-09 PROCEDURE — 80048 BASIC METABOLIC PNL TOTAL CA: CPT | Performed by: INTERNAL MEDICINE

## 2024-02-09 PROCEDURE — 25810000003 SODIUM CHLORIDE 0.9 % SOLUTION: Performed by: INTERNAL MEDICINE

## 2024-02-09 PROCEDURE — 82948 REAGENT STRIP/BLOOD GLUCOSE: CPT

## 2024-02-09 RX ORDER — POTASSIUM CHLORIDE 750 MG/1
40 CAPSULE, EXTENDED RELEASE ORAL ONCE
Status: COMPLETED | OUTPATIENT
Start: 2024-02-09 | End: 2024-02-09

## 2024-02-09 RX ADMIN — SODIUM CHLORIDE 75 ML/HR: 9 INJECTION, SOLUTION INTRAVENOUS at 04:43

## 2024-02-09 RX ADMIN — POTASSIUM CHLORIDE 40 MEQ: 10 CAPSULE, COATED, EXTENDED RELEASE ORAL at 09:24

## 2024-02-09 RX ADMIN — PANCRELIPASE 24000 UNITS OF LIPASE: 60000; 12000; 38000 CAPSULE, DELAYED RELEASE PELLETS ORAL at 08:18

## 2024-02-09 RX ADMIN — CALCITRIOL 0.25 MCG: 0.25 CAPSULE ORAL at 08:19

## 2024-02-09 RX ADMIN — SODIUM BICARBONATE 650 MG: 650 TABLET ORAL at 08:19

## 2024-02-09 RX ADMIN — HYDROXYCHLOROQUINE SULFATE 200 MG: 200 TABLET ORAL at 08:20

## 2024-02-09 RX ADMIN — PANTOPRAZOLE SODIUM 40 MG: 40 TABLET, DELAYED RELEASE ORAL at 08:18

## 2024-02-12 LAB — BACTERIA SPEC AEROBE CULT: NORMAL

## 2024-02-13 ENCOUNTER — READMISSION MANAGEMENT (OUTPATIENT)
Dept: CALL CENTER | Facility: HOSPITAL | Age: 54
End: 2024-02-13
Payer: COMMERCIAL

## 2024-02-17 ENCOUNTER — READMISSION MANAGEMENT (OUTPATIENT)
Dept: CALL CENTER | Facility: HOSPITAL | Age: 54
End: 2024-02-17
Payer: COMMERCIAL

## 2024-02-17 ENCOUNTER — APPOINTMENT (OUTPATIENT)
Dept: ULTRASOUND IMAGING | Facility: HOSPITAL | Age: 54
DRG: 683 | End: 2024-02-17
Payer: COMMERCIAL

## 2024-02-17 ENCOUNTER — HOSPITAL ENCOUNTER (INPATIENT)
Facility: HOSPITAL | Age: 54
LOS: 2 days | Discharge: HOME | DRG: 683 | End: 2024-02-19
Attending: STUDENT IN AN ORGANIZED HEALTH CARE EDUCATION/TRAINING PROGRAM | Admitting: HOSPITALIST
Payer: COMMERCIAL

## 2024-02-17 PROBLEM — N17.9 AKI (ACUTE KIDNEY INJURY): Status: ACTIVE | Noted: 2024-02-17

## 2024-02-17 LAB
ALBUMIN SERPL-MCNC: 3.4 G/DL (ref 3.5–5.2)
ALBUMIN/GLOB SERPL: 1 G/DL
ALP SERPL-CCNC: 98 U/L (ref 39–117)
ALT SERPL W P-5'-P-CCNC: 12 U/L (ref 1–33)
AMPHET+METHAMPHET UR QL: NEGATIVE
AMPHETAMINES UR QL: NEGATIVE
ANION GAP SERPL CALCULATED.3IONS-SCNC: 19 MMOL/L (ref 5–15)
AST SERPL-CCNC: 15 U/L (ref 1–32)
BACTERIA UR QL AUTO: ABNORMAL /HPF
BARBITURATES UR QL SCN: NEGATIVE
BASOPHILS # BLD AUTO: 0.04 10*3/MM3 (ref 0–0.2)
BASOPHILS NFR BLD AUTO: 0.3 % (ref 0–1.5)
BENZODIAZ UR QL SCN: NEGATIVE
BILIRUB SERPL-MCNC: 0.2 MG/DL (ref 0–1.2)
BILIRUB UR QL STRIP: NEGATIVE
BUN SERPL-MCNC: 78 MG/DL (ref 6–20)
BUN/CREAT SERPL: 15.9 (ref 7–25)
BUPRENORPHINE SERPL-MCNC: NEGATIVE NG/ML
CALCIUM SPEC-SCNC: 8.7 MG/DL (ref 8.6–10.5)
CANNABINOIDS SERPL QL: NEGATIVE
CHLORIDE SERPL-SCNC: 103 MMOL/L (ref 98–107)
CLARITY UR: CLEAR
CO2 SERPL-SCNC: 11 MMOL/L (ref 22–29)
COCAINE UR QL: NEGATIVE
COLOR UR: YELLOW
CREAT SERPL-MCNC: 4.92 MG/DL (ref 0.57–1)
CREAT UR-MCNC: 88.7 MG/DL
DEPRECATED RDW RBC AUTO: 55.6 FL (ref 37–54)
EGFRCR SERPLBLD CKD-EPI 2021: 10 ML/MIN/1.73
EOSINOPHIL # BLD AUTO: 0.19 10*3/MM3 (ref 0–0.4)
EOSINOPHIL NFR BLD AUTO: 1.4 % (ref 0.3–6.2)
ERYTHROCYTE [DISTWIDTH] IN BLOOD BY AUTOMATED COUNT: 15.9 % (ref 12.3–15.4)
FENTANYL UR-MCNC: NEGATIVE NG/ML
GLOBULIN UR ELPH-MCNC: 3.3 GM/DL
GLUCOSE BLDC GLUCOMTR-MCNC: 104 MG/DL (ref 70–130)
GLUCOSE BLDC GLUCOMTR-MCNC: 148 MG/DL (ref 70–130)
GLUCOSE BLDC GLUCOMTR-MCNC: 186 MG/DL (ref 70–130)
GLUCOSE SERPL-MCNC: 119 MG/DL (ref 65–99)
GLUCOSE UR STRIP-MCNC: ABNORMAL MG/DL
HCT VFR BLD AUTO: 34 % (ref 34–46.6)
HGB BLD-MCNC: 10.8 G/DL (ref 12–15.9)
HGB UR QL STRIP.AUTO: NEGATIVE
HYALINE CASTS UR QL AUTO: ABNORMAL /LPF
IMM GRANULOCYTES # BLD AUTO: 0.16 10*3/MM3 (ref 0–0.05)
IMM GRANULOCYTES NFR BLD AUTO: 1.2 % (ref 0–0.5)
KETONES UR QL STRIP: NEGATIVE
LEUKOCYTE ESTERASE UR QL STRIP.AUTO: NEGATIVE
LYMPHOCYTES # BLD AUTO: 3.94 10*3/MM3 (ref 0.7–3.1)
LYMPHOCYTES NFR BLD AUTO: 29.3 % (ref 19.6–45.3)
MAGNESIUM SERPL-MCNC: 1.6 MG/DL (ref 1.6–2.6)
MCH RBC QN AUTO: 30.5 PG (ref 26.6–33)
MCHC RBC AUTO-ENTMCNC: 31.8 G/DL (ref 31.5–35.7)
MCV RBC AUTO: 96 FL (ref 79–97)
METHADONE UR QL SCN: NEGATIVE
MONOCYTES # BLD AUTO: 1.09 10*3/MM3 (ref 0.1–0.9)
MONOCYTES NFR BLD AUTO: 8.1 % (ref 5–12)
NEUTROPHILS NFR BLD AUTO: 59.7 % (ref 42.7–76)
NEUTROPHILS NFR BLD AUTO: 8.05 10*3/MM3 (ref 1.7–7)
NITRITE UR QL STRIP: NEGATIVE
NRBC BLD AUTO-RTO: 0 /100 WBC (ref 0–0.2)
NT-PROBNP SERPL-MCNC: 235.1 PG/ML (ref 0–900)
OPIATES UR QL: NEGATIVE
OXYCODONE UR QL SCN: NEGATIVE
PCP UR QL SCN: NEGATIVE
PH UR STRIP.AUTO: <=5 [PH] (ref 5–8)
PHOSPHATE SERPL-MCNC: 5.6 MG/DL (ref 2.5–4.5)
PLATELET # BLD AUTO: 292 10*3/MM3 (ref 140–450)
PMV BLD AUTO: 10.8 FL (ref 6–12)
POTASSIUM SERPL-SCNC: 3.1 MMOL/L (ref 3.5–5.2)
PROCALCITONIN SERPL-MCNC: 0.24 NG/ML (ref 0–0.25)
PROT SERPL-MCNC: 6.7 G/DL (ref 6–8.5)
PROT UR QL STRIP: ABNORMAL
RBC # BLD AUTO: 3.54 10*6/MM3 (ref 3.77–5.28)
RBC # UR STRIP: ABNORMAL /HPF
REF LAB TEST METHOD: ABNORMAL
SODIUM SERPL-SCNC: 133 MMOL/L (ref 136–145)
SODIUM UR-SCNC: 36 MMOL/L
SP GR UR STRIP: 1.02 (ref 1–1.03)
SQUAMOUS #/AREA URNS HPF: ABNORMAL /HPF
TRICYCLICS UR QL SCN: POSITIVE
TSH SERPL DL<=0.05 MIU/L-ACNC: 1.07 UIU/ML (ref 0.27–4.2)
URATE SERPL-MCNC: 11.1 MG/DL (ref 2.4–5.7)
UROBILINOGEN UR QL STRIP: ABNORMAL
WBC # UR STRIP: ABNORMAL /HPF
WBC NRBC COR # BLD AUTO: 13.47 10*3/MM3 (ref 3.4–10.8)

## 2024-02-17 PROCEDURE — 25010000002 HEPARIN (PORCINE) PER 1000 UNITS: Performed by: STUDENT IN AN ORGANIZED HEALTH CARE EDUCATION/TRAINING PROGRAM

## 2024-02-17 PROCEDURE — 25810000003 LACTATED RINGERS SOLUTION: Performed by: STUDENT IN AN ORGANIZED HEALTH CARE EDUCATION/TRAINING PROGRAM

## 2024-02-17 PROCEDURE — 76775 US EXAM ABDO BACK WALL LIM: CPT

## 2024-02-17 PROCEDURE — 83880 ASSAY OF NATRIURETIC PEPTIDE: CPT | Performed by: STUDENT IN AN ORGANIZED HEALTH CARE EDUCATION/TRAINING PROGRAM

## 2024-02-17 PROCEDURE — 25810000003 LACTATED RINGERS PER 1000 ML: Performed by: STUDENT IN AN ORGANIZED HEALTH CARE EDUCATION/TRAINING PROGRAM

## 2024-02-17 PROCEDURE — 25010000002 MAGNESIUM SULFATE 2 GM/50ML SOLUTION: Performed by: FAMILY MEDICINE

## 2024-02-17 PROCEDURE — 82948 REAGENT STRIP/BLOOD GLUCOSE: CPT

## 2024-02-17 PROCEDURE — 84100 ASSAY OF PHOSPHORUS: CPT | Performed by: STUDENT IN AN ORGANIZED HEALTH CARE EDUCATION/TRAINING PROGRAM

## 2024-02-17 PROCEDURE — 82570 ASSAY OF URINE CREATININE: CPT | Performed by: INTERNAL MEDICINE

## 2024-02-17 PROCEDURE — 84550 ASSAY OF BLOOD/URIC ACID: CPT | Performed by: INTERNAL MEDICINE

## 2024-02-17 PROCEDURE — 63710000001 INSULIN LISPRO (HUMAN) PER 5 UNITS: Performed by: FAMILY MEDICINE

## 2024-02-17 PROCEDURE — 80053 COMPREHEN METABOLIC PANEL: CPT | Performed by: STUDENT IN AN ORGANIZED HEALTH CARE EDUCATION/TRAINING PROGRAM

## 2024-02-17 PROCEDURE — 83735 ASSAY OF MAGNESIUM: CPT | Performed by: STUDENT IN AN ORGANIZED HEALTH CARE EDUCATION/TRAINING PROGRAM

## 2024-02-17 PROCEDURE — 81001 URINALYSIS AUTO W/SCOPE: CPT | Performed by: FAMILY MEDICINE

## 2024-02-17 PROCEDURE — 80307 DRUG TEST PRSMV CHEM ANLYZR: CPT | Performed by: FAMILY MEDICINE

## 2024-02-17 PROCEDURE — 85025 COMPLETE CBC W/AUTO DIFF WBC: CPT | Performed by: STUDENT IN AN ORGANIZED HEALTH CARE EDUCATION/TRAINING PROGRAM

## 2024-02-17 PROCEDURE — 84300 ASSAY OF URINE SODIUM: CPT | Performed by: INTERNAL MEDICINE

## 2024-02-17 PROCEDURE — 84145 PROCALCITONIN (PCT): CPT | Performed by: STUDENT IN AN ORGANIZED HEALTH CARE EDUCATION/TRAINING PROGRAM

## 2024-02-17 PROCEDURE — 84443 ASSAY THYROID STIM HORMONE: CPT | Performed by: STUDENT IN AN ORGANIZED HEALTH CARE EDUCATION/TRAINING PROGRAM

## 2024-02-17 RX ORDER — POTASSIUM CHLORIDE 20 MEQ/1
40 TABLET, EXTENDED RELEASE ORAL DAILY
Status: DISCONTINUED | OUTPATIENT
Start: 2024-02-17 | End: 2024-02-19 | Stop reason: HOSPADM

## 2024-02-17 RX ORDER — SODIUM CHLORIDE 0.9 % (FLUSH) 0.9 %
10 SYRINGE (ML) INJECTION AS NEEDED
Status: DISCONTINUED | OUTPATIENT
Start: 2024-02-17 | End: 2024-02-19 | Stop reason: HOSPADM

## 2024-02-17 RX ORDER — MAGNESIUM SULFATE HEPTAHYDRATE 40 MG/ML
2 INJECTION, SOLUTION INTRAVENOUS ONCE
Status: COMPLETED | OUTPATIENT
Start: 2024-02-17 | End: 2024-02-17

## 2024-02-17 RX ORDER — HEPARIN SODIUM 5000 [USP'U]/ML
5000 INJECTION, SOLUTION INTRAVENOUS; SUBCUTANEOUS EVERY 8 HOURS SCHEDULED
Status: DISCONTINUED | OUTPATIENT
Start: 2024-02-17 | End: 2024-02-19 | Stop reason: HOSPADM

## 2024-02-17 RX ORDER — OMEPRAZOLE 40 MG/1
40 CAPSULE, DELAYED RELEASE ORAL EVERY OTHER DAY
COMMUNITY
End: 2024-02-19 | Stop reason: HOSPADM

## 2024-02-17 RX ORDER — FERROUS SULFATE 325(65) MG
325 TABLET ORAL
Status: DISCONTINUED | OUTPATIENT
Start: 2024-02-17 | End: 2024-02-18

## 2024-02-17 RX ORDER — ATORVASTATIN CALCIUM 40 MG/1
40 TABLET, FILM COATED ORAL NIGHTLY
Status: DISCONTINUED | OUTPATIENT
Start: 2024-02-17 | End: 2024-02-19 | Stop reason: HOSPADM

## 2024-02-17 RX ORDER — POLYETHYLENE GLYCOL 3350 17 G/17G
17 POWDER, FOR SOLUTION ORAL DAILY PRN
Status: DISCONTINUED | OUTPATIENT
Start: 2024-02-17 | End: 2024-02-19 | Stop reason: HOSPADM

## 2024-02-17 RX ORDER — OXYCODONE HYDROCHLORIDE AND ACETAMINOPHEN 5; 325 MG/1; MG/1
1 TABLET ORAL EVERY 4 HOURS PRN
Status: DISCONTINUED | OUTPATIENT
Start: 2024-02-17 | End: 2024-02-19 | Stop reason: HOSPADM

## 2024-02-17 RX ORDER — ACETAMINOPHEN 325 MG/1
650 TABLET ORAL EVERY 4 HOURS PRN
Status: DISCONTINUED | OUTPATIENT
Start: 2024-02-17 | End: 2024-02-19 | Stop reason: HOSPADM

## 2024-02-17 RX ORDER — SODIUM CHLORIDE 0.9 % (FLUSH) 0.9 %
10 SYRINGE (ML) INJECTION EVERY 12 HOURS SCHEDULED
Status: DISCONTINUED | OUTPATIENT
Start: 2024-02-17 | End: 2024-02-19 | Stop reason: HOSPADM

## 2024-02-17 RX ORDER — HYDROXYCHLOROQUINE SULFATE 200 MG/1
200 TABLET, FILM COATED ORAL DAILY
Status: DISCONTINUED | OUTPATIENT
Start: 2024-02-17 | End: 2024-02-19 | Stop reason: HOSPADM

## 2024-02-17 RX ORDER — ROPINIROLE 1 MG/1
1 TABLET, FILM COATED ORAL NIGHTLY
Status: DISCONTINUED | OUTPATIENT
Start: 2024-02-17 | End: 2024-02-18

## 2024-02-17 RX ORDER — ONDANSETRON 4 MG/1
4 TABLET, ORALLY DISINTEGRATING ORAL EVERY 8 HOURS PRN
Status: DISCONTINUED | OUTPATIENT
Start: 2024-02-17 | End: 2024-02-19 | Stop reason: HOSPADM

## 2024-02-17 RX ORDER — NICOTINE POLACRILEX 4 MG
15 LOZENGE BUCCAL
Status: DISCONTINUED | OUTPATIENT
Start: 2024-02-17 | End: 2024-02-19 | Stop reason: HOSPADM

## 2024-02-17 RX ORDER — CHOLESTYRAMINE LIGHT 4 G/5.7G
4 POWDER, FOR SUSPENSION ORAL 2 TIMES DAILY
COMMUNITY
End: 2024-02-19 | Stop reason: HOSPADM

## 2024-02-17 RX ORDER — PANTOPRAZOLE SODIUM 40 MG/1
40 TABLET, DELAYED RELEASE ORAL DAILY
Status: DISCONTINUED | OUTPATIENT
Start: 2024-02-17 | End: 2024-02-19 | Stop reason: HOSPADM

## 2024-02-17 RX ORDER — ALBUTEROL SULFATE 2.5 MG/3ML
2.5 SOLUTION RESPIRATORY (INHALATION) EVERY 6 HOURS PRN
Status: DISCONTINUED | OUTPATIENT
Start: 2024-02-17 | End: 2024-02-19 | Stop reason: HOSPADM

## 2024-02-17 RX ORDER — ACETAMINOPHEN 160 MG/5ML
650 SOLUTION ORAL EVERY 4 HOURS PRN
Status: DISCONTINUED | OUTPATIENT
Start: 2024-02-17 | End: 2024-02-19 | Stop reason: HOSPADM

## 2024-02-17 RX ORDER — PREGABALIN 75 MG/1
75 CAPSULE ORAL 2 TIMES DAILY
COMMUNITY
End: 2024-02-19 | Stop reason: HOSPADM

## 2024-02-17 RX ORDER — HYDROXYCHLOROQUINE SULFATE 200 MG/1
400 TABLET, FILM COATED ORAL DAILY
COMMUNITY

## 2024-02-17 RX ORDER — FAMOTIDINE 20 MG/1
40 TABLET, FILM COATED ORAL 2 TIMES DAILY PRN
Status: DISCONTINUED | OUTPATIENT
Start: 2024-02-17 | End: 2024-02-19 | Stop reason: HOSPADM

## 2024-02-17 RX ORDER — INSULIN LISPRO 100 [IU]/ML
2-7 INJECTION, SOLUTION INTRAVENOUS; SUBCUTANEOUS
Status: DISCONTINUED | OUTPATIENT
Start: 2024-02-17 | End: 2024-02-19 | Stop reason: HOSPADM

## 2024-02-17 RX ORDER — BISACODYL 10 MG
10 SUPPOSITORY, RECTAL RECTAL DAILY PRN
Status: DISCONTINUED | OUTPATIENT
Start: 2024-02-17 | End: 2024-02-19 | Stop reason: HOSPADM

## 2024-02-17 RX ORDER — CALCITRIOL 0.25 UG/1
0.25 CAPSULE, LIQUID FILLED ORAL DAILY
Status: DISCONTINUED | OUTPATIENT
Start: 2024-02-17 | End: 2024-02-19 | Stop reason: HOSPADM

## 2024-02-17 RX ORDER — NORTRIPTYLINE HYDROCHLORIDE 25 MG/1
50 CAPSULE ORAL NIGHTLY
Status: DISCONTINUED | OUTPATIENT
Start: 2024-02-17 | End: 2024-02-19 | Stop reason: HOSPADM

## 2024-02-17 RX ORDER — NORTRIPTYLINE HYDROCHLORIDE 25 MG/1
25 CAPSULE ORAL NIGHTLY
Status: ON HOLD | COMMUNITY
End: 2024-02-27 | Stop reason: DRUGHIGH

## 2024-02-17 RX ORDER — DEXTROSE MONOHYDRATE 25 G/50ML
25 INJECTION, SOLUTION INTRAVENOUS
Status: DISCONTINUED | OUTPATIENT
Start: 2024-02-17 | End: 2024-02-19 | Stop reason: HOSPADM

## 2024-02-17 RX ORDER — IBUPROFEN 600 MG/1
1 TABLET ORAL
Status: DISCONTINUED | OUTPATIENT
Start: 2024-02-17 | End: 2024-02-19 | Stop reason: HOSPADM

## 2024-02-17 RX ORDER — SODIUM BICARBONATE 650 MG/1
650 TABLET ORAL 2 TIMES DAILY
Status: DISCONTINUED | OUTPATIENT
Start: 2024-02-17 | End: 2024-02-19 | Stop reason: HOSPADM

## 2024-02-17 RX ORDER — BISACODYL 5 MG/1
5 TABLET, DELAYED RELEASE ORAL DAILY PRN
Status: DISCONTINUED | OUTPATIENT
Start: 2024-02-17 | End: 2024-02-19 | Stop reason: HOSPADM

## 2024-02-17 RX ORDER — ONDANSETRON 4 MG/1
4 TABLET, ORALLY DISINTEGRATING ORAL EVERY 6 HOURS PRN
Status: DISCONTINUED | OUTPATIENT
Start: 2024-02-17 | End: 2024-02-17

## 2024-02-17 RX ORDER — POTASSIUM CHLORIDE 750 MG/1
40 CAPSULE, EXTENDED RELEASE ORAL ONCE
Status: COMPLETED | OUTPATIENT
Start: 2024-02-17 | End: 2024-02-17

## 2024-02-17 RX ORDER — SODIUM CHLORIDE 9 MG/ML
40 INJECTION, SOLUTION INTRAVENOUS AS NEEDED
Status: DISCONTINUED | OUTPATIENT
Start: 2024-02-17 | End: 2024-02-19 | Stop reason: HOSPADM

## 2024-02-17 RX ORDER — OXYCODONE AND ACETAMINOPHEN 10; 325 MG/1; MG/1
1 TABLET ORAL EVERY 4 HOURS PRN
Status: DISCONTINUED | OUTPATIENT
Start: 2024-02-17 | End: 2024-02-19 | Stop reason: HOSPADM

## 2024-02-17 RX ORDER — TRIAMCINOLONE ACETONIDE 1 MG/G
1 CREAM TOPICAL 2 TIMES DAILY
COMMUNITY
End: 2024-02-19 | Stop reason: HOSPADM

## 2024-02-17 RX ORDER — SODIUM CHLORIDE, SODIUM LACTATE, POTASSIUM CHLORIDE, CALCIUM CHLORIDE 600; 310; 30; 20 MG/100ML; MG/100ML; MG/100ML; MG/100ML
100 INJECTION, SOLUTION INTRAVENOUS CONTINUOUS
Status: DISCONTINUED | OUTPATIENT
Start: 2024-02-17 | End: 2024-02-17

## 2024-02-17 RX ORDER — ACETAMINOPHEN 650 MG/1
650 SUPPOSITORY RECTAL EVERY 4 HOURS PRN
Status: DISCONTINUED | OUTPATIENT
Start: 2024-02-17 | End: 2024-02-19 | Stop reason: HOSPADM

## 2024-02-17 RX ORDER — NITROGLYCERIN 0.4 MG/1
0.4 TABLET SUBLINGUAL
Status: DISCONTINUED | OUTPATIENT
Start: 2024-02-17 | End: 2024-02-19 | Stop reason: HOSPADM

## 2024-02-17 RX ORDER — AMOXICILLIN 250 MG
2 CAPSULE ORAL 2 TIMES DAILY PRN
Status: DISCONTINUED | OUTPATIENT
Start: 2024-02-17 | End: 2024-02-19 | Stop reason: HOSPADM

## 2024-02-17 RX ADMIN — FERROUS SULFATE TAB 325 MG (65 MG ELEMENTAL FE) 325 MG: 325 (65 FE) TAB at 08:45

## 2024-02-17 RX ADMIN — PANCRELIPASE 24000 UNITS OF LIPASE: 60000; 12000; 38000 CAPSULE, DELAYED RELEASE PELLETS ORAL at 08:46

## 2024-02-17 RX ADMIN — HEPARIN SODIUM 5000 UNITS: 5000 INJECTION INTRAVENOUS; SUBCUTANEOUS at 05:10

## 2024-02-17 RX ADMIN — SODIUM BICARBONATE 650 MG: 650 TABLET ORAL at 20:42

## 2024-02-17 RX ADMIN — SODIUM CHLORIDE, POTASSIUM CHLORIDE, SODIUM LACTATE AND CALCIUM CHLORIDE 1000 ML: 600; 310; 30; 20 INJECTION, SOLUTION INTRAVENOUS at 03:56

## 2024-02-17 RX ADMIN — Medication 10 ML: at 21:23

## 2024-02-17 RX ADMIN — ATORVASTATIN CALCIUM 40 MG: 40 TABLET ORAL at 20:42

## 2024-02-17 RX ADMIN — HEPARIN SODIUM 5000 UNITS: 5000 INJECTION INTRAVENOUS; SUBCUTANEOUS at 21:22

## 2024-02-17 RX ADMIN — HYDROXYCHLOROQUINE SULFATE 200 MG: 200 TABLET ORAL at 08:45

## 2024-02-17 RX ADMIN — NORTRIPTYLINE HYDROCHLORIDE 50 MG: 25 CAPSULE ORAL at 20:42

## 2024-02-17 RX ADMIN — PANCRELIPASE 24000 UNITS OF LIPASE: 60000; 12000; 38000 CAPSULE, DELAYED RELEASE PELLETS ORAL at 12:34

## 2024-02-17 RX ADMIN — PANTOPRAZOLE SODIUM 40 MG: 40 TABLET, DELAYED RELEASE ORAL at 08:49

## 2024-02-17 RX ADMIN — SODIUM BICARBONATE 650 MG: 650 TABLET ORAL at 08:46

## 2024-02-17 RX ADMIN — CALCITRIOL 0.25 MCG: 0.25 CAPSULE ORAL at 08:46

## 2024-02-17 RX ADMIN — Medication 10 ML: at 01:01

## 2024-02-17 RX ADMIN — SODIUM CHLORIDE, POTASSIUM CHLORIDE, SODIUM LACTATE AND CALCIUM CHLORIDE 100 ML/HR: 600; 310; 30; 20 INJECTION, SOLUTION INTRAVENOUS at 01:02

## 2024-02-17 RX ADMIN — SODIUM BICARBONATE: 84 INJECTION, SOLUTION INTRAVENOUS at 15:11

## 2024-02-17 RX ADMIN — POTASSIUM CHLORIDE 40 MEQ: 10 CAPSULE, COATED, EXTENDED RELEASE ORAL at 12:34

## 2024-02-17 RX ADMIN — PANCRELIPASE 24000 UNITS OF LIPASE: 60000; 12000; 38000 CAPSULE, DELAYED RELEASE PELLETS ORAL at 20:42

## 2024-02-17 RX ADMIN — SODIUM CHLORIDE, POTASSIUM CHLORIDE, SODIUM LACTATE AND CALCIUM CHLORIDE 100 ML/HR: 600; 310; 30; 20 INJECTION, SOLUTION INTRAVENOUS at 12:29

## 2024-02-17 RX ADMIN — MAGNESIUM SULFATE HEPTAHYDRATE 2 G: 2 INJECTION, SOLUTION INTRAVENOUS at 12:34

## 2024-02-17 RX ADMIN — HEPARIN SODIUM 5000 UNITS: 5000 INJECTION INTRAVENOUS; SUBCUTANEOUS at 15:11

## 2024-02-17 RX ADMIN — PANCRELIPASE 24000 UNITS OF LIPASE: 60000; 12000; 38000 CAPSULE, DELAYED RELEASE PELLETS ORAL at 18:08

## 2024-02-17 RX ADMIN — ROPINIROLE HYDROCHLORIDE 1 MG: 1 TABLET, FILM COATED ORAL at 20:42

## 2024-02-17 RX ADMIN — INSULIN LISPRO 2 UNITS: 100 INJECTION, SOLUTION INTRAVENOUS; SUBCUTANEOUS at 16:55

## 2024-02-18 LAB
ALBUMIN SERPL-MCNC: 2.9 G/DL (ref 3.5–5.2)
ALBUMIN/GLOB SERPL: 1.1 G/DL
ALP SERPL-CCNC: 80 U/L (ref 39–117)
ALT SERPL W P-5'-P-CCNC: 10 U/L (ref 1–33)
ANION GAP SERPL CALCULATED.3IONS-SCNC: 9 MMOL/L (ref 5–15)
AST SERPL-CCNC: 13 U/L (ref 1–32)
BASOPHILS # BLD AUTO: 0.02 10*3/MM3 (ref 0–0.2)
BASOPHILS NFR BLD AUTO: 0.3 % (ref 0–1.5)
BILIRUB SERPL-MCNC: <0.2 MG/DL (ref 0–1.2)
BUN SERPL-MCNC: 52 MG/DL (ref 6–20)
BUN/CREAT SERPL: 22.4 (ref 7–25)
CALCIUM SPEC-SCNC: 7.7 MG/DL (ref 8.6–10.5)
CHLORIDE SERPL-SCNC: 110 MMOL/L (ref 98–107)
CHOLEST SERPL-MCNC: 119 MG/DL (ref 0–200)
CO2 SERPL-SCNC: 23 MMOL/L (ref 22–29)
CREAT SERPL-MCNC: 2.32 MG/DL (ref 0.57–1)
DEPRECATED RDW RBC AUTO: 52.8 FL (ref 37–54)
EGFRCR SERPLBLD CKD-EPI 2021: 24.7 ML/MIN/1.73
EOSINOPHIL # BLD AUTO: 0.14 10*3/MM3 (ref 0–0.4)
EOSINOPHIL NFR BLD AUTO: 1.8 % (ref 0.3–6.2)
ERYTHROCYTE [DISTWIDTH] IN BLOOD BY AUTOMATED COUNT: 15.7 % (ref 12.3–15.4)
FERRITIN SERPL-MCNC: 1166 NG/ML (ref 13–150)
GLOBULIN UR ELPH-MCNC: 2.6 GM/DL
GLUCOSE BLDC GLUCOMTR-MCNC: 122 MG/DL (ref 70–130)
GLUCOSE BLDC GLUCOMTR-MCNC: 124 MG/DL (ref 70–130)
GLUCOSE BLDC GLUCOMTR-MCNC: 137 MG/DL (ref 70–130)
GLUCOSE BLDC GLUCOMTR-MCNC: 165 MG/DL (ref 70–130)
GLUCOSE BLDC GLUCOMTR-MCNC: 193 MG/DL (ref 70–130)
GLUCOSE SERPL-MCNC: 120 MG/DL (ref 65–99)
HBA1C MFR BLD: 5.8 % (ref 4.8–5.6)
HCT VFR BLD AUTO: 26.6 % (ref 34–46.6)
HDLC SERPL-MCNC: 43 MG/DL (ref 40–60)
HGB BLD-MCNC: 8.9 G/DL (ref 12–15.9)
IMM GRANULOCYTES # BLD AUTO: 0.05 10*3/MM3 (ref 0–0.05)
IMM GRANULOCYTES NFR BLD AUTO: 0.6 % (ref 0–0.5)
IRON 24H UR-MRATE: 112 MCG/DL (ref 37–145)
IRON SATN MFR SERPL: 47 % (ref 20–50)
LDLC SERPL CALC-MCNC: 43 MG/DL (ref 0–100)
LDLC/HDLC SERPL: 0.81 {RATIO}
LYMPHOCYTES # BLD AUTO: 3.02 10*3/MM3 (ref 0.7–3.1)
LYMPHOCYTES NFR BLD AUTO: 39.1 % (ref 19.6–45.3)
MAGNESIUM SERPL-MCNC: 1.6 MG/DL (ref 1.6–2.6)
MCH RBC QN AUTO: 30.9 PG (ref 26.6–33)
MCHC RBC AUTO-ENTMCNC: 33.5 G/DL (ref 31.5–35.7)
MCV RBC AUTO: 92.4 FL (ref 79–97)
MONOCYTES # BLD AUTO: 0.7 10*3/MM3 (ref 0.1–0.9)
MONOCYTES NFR BLD AUTO: 9.1 % (ref 5–12)
NEUTROPHILS NFR BLD AUTO: 3.8 10*3/MM3 (ref 1.7–7)
NEUTROPHILS NFR BLD AUTO: 49.1 % (ref 42.7–76)
NRBC BLD AUTO-RTO: 0 /100 WBC (ref 0–0.2)
PLATELET # BLD AUTO: 228 10*3/MM3 (ref 140–450)
PMV BLD AUTO: 11.1 FL (ref 6–12)
POTASSIUM SERPL-SCNC: 3.5 MMOL/L (ref 3.5–5.2)
PROT SERPL-MCNC: 5.5 G/DL (ref 6–8.5)
RBC # BLD AUTO: 2.88 10*6/MM3 (ref 3.77–5.28)
SODIUM SERPL-SCNC: 142 MMOL/L (ref 136–145)
TIBC SERPL-MCNC: 237 MCG/DL (ref 298–536)
TRANSFERRIN SERPL-MCNC: 159 MG/DL (ref 200–360)
TRIGL SERPL-MCNC: 206 MG/DL (ref 0–150)
TSH SERPL DL<=0.05 MIU/L-ACNC: 0.4 UIU/ML (ref 0.27–4.2)
VLDLC SERPL-MCNC: 33 MG/DL (ref 5–40)
WBC NRBC COR # BLD AUTO: 7.73 10*3/MM3 (ref 3.4–10.8)

## 2024-02-18 PROCEDURE — 84443 ASSAY THYROID STIM HORMONE: CPT | Performed by: FAMILY MEDICINE

## 2024-02-18 PROCEDURE — 80061 LIPID PANEL: CPT | Performed by: FAMILY MEDICINE

## 2024-02-18 PROCEDURE — 80053 COMPREHEN METABOLIC PANEL: CPT | Performed by: FAMILY MEDICINE

## 2024-02-18 PROCEDURE — 83036 HEMOGLOBIN GLYCOSYLATED A1C: CPT | Performed by: FAMILY MEDICINE

## 2024-02-18 PROCEDURE — 25010000002 NA FERRIC GLUC CPLX PER 12.5 MG: Performed by: INTERNAL MEDICINE

## 2024-02-18 PROCEDURE — 83540 ASSAY OF IRON: CPT | Performed by: INTERNAL MEDICINE

## 2024-02-18 PROCEDURE — 83735 ASSAY OF MAGNESIUM: CPT | Performed by: FAMILY MEDICINE

## 2024-02-18 PROCEDURE — 25010000002 HEPARIN (PORCINE) PER 1000 UNITS: Performed by: STUDENT IN AN ORGANIZED HEALTH CARE EDUCATION/TRAINING PROGRAM

## 2024-02-18 PROCEDURE — 85025 COMPLETE CBC W/AUTO DIFF WBC: CPT | Performed by: FAMILY MEDICINE

## 2024-02-18 PROCEDURE — 82948 REAGENT STRIP/BLOOD GLUCOSE: CPT

## 2024-02-18 PROCEDURE — 84466 ASSAY OF TRANSFERRIN: CPT | Performed by: INTERNAL MEDICINE

## 2024-02-18 PROCEDURE — 25010000002 MAGNESIUM SULFATE 2 GM/50ML SOLUTION: Performed by: FAMILY MEDICINE

## 2024-02-18 PROCEDURE — 25810000003 SODIUM CHLORIDE 0.9 % SOLUTION 250 ML FLEX CONT: Performed by: INTERNAL MEDICINE

## 2024-02-18 PROCEDURE — 82728 ASSAY OF FERRITIN: CPT | Performed by: INTERNAL MEDICINE

## 2024-02-18 RX ORDER — ROPINIROLE 1 MG/1
1 TABLET, FILM COATED ORAL 2 TIMES DAILY
Status: DISCONTINUED | OUTPATIENT
Start: 2024-02-18 | End: 2024-02-19 | Stop reason: HOSPADM

## 2024-02-18 RX ORDER — MAGNESIUM SULFATE HEPTAHYDRATE 40 MG/ML
2 INJECTION, SOLUTION INTRAVENOUS ONCE
Status: COMPLETED | OUTPATIENT
Start: 2024-02-18 | End: 2024-02-18

## 2024-02-18 RX ORDER — ALUMINA, MAGNESIA, AND SIMETHICONE 2400; 2400; 240 MG/30ML; MG/30ML; MG/30ML
15 SUSPENSION ORAL 3 TIMES DAILY
Status: DISCONTINUED | OUTPATIENT
Start: 2024-02-18 | End: 2024-02-19 | Stop reason: HOSPADM

## 2024-02-18 RX ADMIN — HYDROXYCHLOROQUINE SULFATE 200 MG: 200 TABLET ORAL at 08:06

## 2024-02-18 RX ADMIN — FERROUS SULFATE TAB 325 MG (65 MG ELEMENTAL FE) 325 MG: 325 (65 FE) TAB at 08:06

## 2024-02-18 RX ADMIN — Medication 10 ML: at 08:07

## 2024-02-18 RX ADMIN — HEPARIN SODIUM 5000 UNITS: 5000 INJECTION INTRAVENOUS; SUBCUTANEOUS at 22:03

## 2024-02-18 RX ADMIN — Medication 10 ML: at 22:03

## 2024-02-18 RX ADMIN — PANCRELIPASE 24000 UNITS OF LIPASE: 60000; 12000; 38000 CAPSULE, DELAYED RELEASE PELLETS ORAL at 08:06

## 2024-02-18 RX ADMIN — PANCRELIPASE 24000 UNITS OF LIPASE: 60000; 12000; 38000 CAPSULE, DELAYED RELEASE PELLETS ORAL at 12:39

## 2024-02-18 RX ADMIN — SODIUM BICARBONATE 650 MG: 650 TABLET ORAL at 22:03

## 2024-02-18 RX ADMIN — HEPARIN SODIUM 5000 UNITS: 5000 INJECTION INTRAVENOUS; SUBCUTANEOUS at 06:26

## 2024-02-18 RX ADMIN — HEPARIN SODIUM 5000 UNITS: 5000 INJECTION INTRAVENOUS; SUBCUTANEOUS at 16:26

## 2024-02-18 RX ADMIN — SODIUM CHLORIDE 250 MG: 9 INJECTION, SOLUTION INTRAVENOUS at 18:17

## 2024-02-18 RX ADMIN — PANTOPRAZOLE SODIUM 40 MG: 40 TABLET, DELAYED RELEASE ORAL at 08:12

## 2024-02-18 RX ADMIN — PANCRELIPASE 24000 UNITS OF LIPASE: 60000; 12000; 38000 CAPSULE, DELAYED RELEASE PELLETS ORAL at 22:03

## 2024-02-18 RX ADMIN — SODIUM BICARBONATE: 84 INJECTION, SOLUTION INTRAVENOUS at 01:50

## 2024-02-18 RX ADMIN — SODIUM BICARBONATE 650 MG: 650 TABLET ORAL at 08:06

## 2024-02-18 RX ADMIN — MAGNESIUM SULFATE HEPTAHYDRATE 2 G: 2 INJECTION, SOLUTION INTRAVENOUS at 16:35

## 2024-02-18 RX ADMIN — NORTRIPTYLINE HYDROCHLORIDE 50 MG: 25 CAPSULE ORAL at 22:03

## 2024-02-18 RX ADMIN — ATORVASTATIN CALCIUM 40 MG: 40 TABLET ORAL at 22:03

## 2024-02-18 RX ADMIN — PANCRELIPASE 24000 UNITS OF LIPASE: 60000; 12000; 38000 CAPSULE, DELAYED RELEASE PELLETS ORAL at 18:24

## 2024-02-18 RX ADMIN — ALUMINUM HYDROXIDE, MAGNESIUM HYDROXIDE, AND DIMETHICONE 15 ML: 400; 400; 40 SUSPENSION ORAL at 22:03

## 2024-02-18 RX ADMIN — POTASSIUM CHLORIDE 40 MEQ: 1500 TABLET, EXTENDED RELEASE ORAL at 08:06

## 2024-02-18 RX ADMIN — ALUMINUM HYDROXIDE, MAGNESIUM HYDROXIDE, AND DIMETHICONE 15 ML: 400; 400; 40 SUSPENSION ORAL at 16:26

## 2024-02-18 RX ADMIN — CALCITRIOL 0.25 MCG: 0.25 CAPSULE ORAL at 08:06

## 2024-02-18 RX ADMIN — ROPINIROLE HYDROCHLORIDE 1 MG: 1 TABLET, FILM COATED ORAL at 22:03

## 2024-02-19 ENCOUNTER — READMISSION MANAGEMENT (OUTPATIENT)
Dept: CALL CENTER | Facility: HOSPITAL | Age: 54
End: 2024-02-19
Payer: COMMERCIAL

## 2024-02-19 VITALS
DIASTOLIC BLOOD PRESSURE: 88 MMHG | BODY MASS INDEX: 14.83 KG/M2 | RESPIRATION RATE: 16 BRPM | HEIGHT: 65 IN | WEIGHT: 89 LBS | OXYGEN SATURATION: 100 % | TEMPERATURE: 98 F | SYSTOLIC BLOOD PRESSURE: 128 MMHG | HEART RATE: 81 BPM

## 2024-02-19 LAB
ALBUMIN SERPL-MCNC: 2.9 G/DL (ref 3.5–5.2)
ALBUMIN/GLOB SERPL: 1.2 G/DL
ALP SERPL-CCNC: 72 U/L (ref 39–117)
ALT SERPL W P-5'-P-CCNC: 11 U/L (ref 1–33)
ANION GAP SERPL CALCULATED.3IONS-SCNC: 8 MMOL/L (ref 5–15)
AST SERPL-CCNC: 19 U/L (ref 1–32)
BASOPHILS # BLD AUTO: 0.03 10*3/MM3 (ref 0–0.2)
BASOPHILS NFR BLD AUTO: 0.3 % (ref 0–1.5)
BILIRUB SERPL-MCNC: <0.2 MG/DL (ref 0–1.2)
BUN SERPL-MCNC: 32 MG/DL (ref 6–20)
BUN/CREAT SERPL: 21.1 (ref 7–25)
CALCIUM SPEC-SCNC: 7.4 MG/DL (ref 8.6–10.5)
CHLORIDE SERPL-SCNC: 109 MMOL/L (ref 98–107)
CO2 SERPL-SCNC: 25 MMOL/L (ref 22–29)
CREAT SERPL-MCNC: 1.52 MG/DL (ref 0.57–1)
DEPRECATED RDW RBC AUTO: 56.7 FL (ref 37–54)
EGFRCR SERPLBLD CKD-EPI 2021: 41.1 ML/MIN/1.73
EOSINOPHIL # BLD AUTO: 0.26 10*3/MM3 (ref 0–0.4)
EOSINOPHIL NFR BLD AUTO: 2.9 % (ref 0.3–6.2)
ERYTHROCYTE [DISTWIDTH] IN BLOOD BY AUTOMATED COUNT: 15.9 % (ref 12.3–15.4)
GLOBULIN UR ELPH-MCNC: 2.4 GM/DL
GLUCOSE BLDC GLUCOMTR-MCNC: 150 MG/DL (ref 70–130)
GLUCOSE SERPL-MCNC: 121 MG/DL (ref 65–99)
HCT VFR BLD AUTO: 28.2 % (ref 34–46.6)
HGB BLD-MCNC: 8.8 G/DL (ref 12–15.9)
IMM GRANULOCYTES # BLD AUTO: 0.05 10*3/MM3 (ref 0–0.05)
IMM GRANULOCYTES NFR BLD AUTO: 0.6 % (ref 0–0.5)
LYMPHOCYTES # BLD AUTO: 3.22 10*3/MM3 (ref 0.7–3.1)
LYMPHOCYTES NFR BLD AUTO: 35.9 % (ref 19.6–45.3)
MAGNESIUM SERPL-MCNC: 1.6 MG/DL (ref 1.6–2.6)
MCH RBC QN AUTO: 30.4 PG (ref 26.6–33)
MCHC RBC AUTO-ENTMCNC: 31.2 G/DL (ref 31.5–35.7)
MCV RBC AUTO: 97.6 FL (ref 79–97)
MONOCYTES # BLD AUTO: 0.67 10*3/MM3 (ref 0.1–0.9)
MONOCYTES NFR BLD AUTO: 7.5 % (ref 5–12)
NEUTROPHILS NFR BLD AUTO: 4.75 10*3/MM3 (ref 1.7–7)
NEUTROPHILS NFR BLD AUTO: 52.8 % (ref 42.7–76)
NRBC BLD AUTO-RTO: 0 /100 WBC (ref 0–0.2)
PLATELET # BLD AUTO: 191 10*3/MM3 (ref 140–450)
PMV BLD AUTO: 12.1 FL (ref 6–12)
POTASSIUM SERPL-SCNC: 4.6 MMOL/L (ref 3.5–5.2)
PROT SERPL-MCNC: 5.3 G/DL (ref 6–8.5)
RBC # BLD AUTO: 2.89 10*6/MM3 (ref 3.77–5.28)
SODIUM SERPL-SCNC: 142 MMOL/L (ref 136–145)
WBC NRBC COR # BLD AUTO: 8.98 10*3/MM3 (ref 3.4–10.8)

## 2024-02-19 PROCEDURE — 83735 ASSAY OF MAGNESIUM: CPT | Performed by: FAMILY MEDICINE

## 2024-02-19 PROCEDURE — 82948 REAGENT STRIP/BLOOD GLUCOSE: CPT

## 2024-02-19 PROCEDURE — 85025 COMPLETE CBC W/AUTO DIFF WBC: CPT | Performed by: INTERNAL MEDICINE

## 2024-02-19 PROCEDURE — 80053 COMPREHEN METABOLIC PANEL: CPT | Performed by: INTERNAL MEDICINE

## 2024-02-19 PROCEDURE — 99222 1ST HOSP IP/OBS MODERATE 55: CPT | Performed by: GENERAL PRACTICE

## 2024-02-19 RX ADMIN — PANCRELIPASE 24000 UNITS OF LIPASE: 60000; 12000; 38000 CAPSULE, DELAYED RELEASE PELLETS ORAL at 10:16

## 2024-02-19 RX ADMIN — POTASSIUM CHLORIDE 40 MEQ: 1500 TABLET, EXTENDED RELEASE ORAL at 10:16

## 2024-02-19 RX ADMIN — ROPINIROLE HYDROCHLORIDE 1 MG: 1 TABLET, FILM COATED ORAL at 10:17

## 2024-02-19 RX ADMIN — HYDROXYCHLOROQUINE SULFATE 200 MG: 200 TABLET ORAL at 10:16

## 2024-02-19 RX ADMIN — CALCITRIOL 0.25 MCG: 0.25 CAPSULE ORAL at 10:17

## 2024-02-19 RX ADMIN — SODIUM BICARBONATE 650 MG: 650 TABLET ORAL at 10:17

## 2024-02-19 RX ADMIN — ALUMINUM HYDROXIDE, MAGNESIUM HYDROXIDE, AND DIMETHICONE 15 ML: 400; 400; 40 SUSPENSION ORAL at 10:16

## 2024-02-19 RX ADMIN — PANTOPRAZOLE SODIUM 40 MG: 40 TABLET, DELAYED RELEASE ORAL at 10:20

## 2024-02-19 RX ADMIN — SODIUM BICARBONATE: 84 INJECTION, SOLUTION INTRAVENOUS at 01:29

## 2024-02-23 ENCOUNTER — READMISSION MANAGEMENT (OUTPATIENT)
Dept: CALL CENTER | Facility: HOSPITAL | Age: 54
End: 2024-02-23
Payer: COMMERCIAL

## 2024-02-26 ENCOUNTER — HOSPITAL ENCOUNTER (INPATIENT)
Facility: HOSPITAL | Age: 54
LOS: 2 days | Discharge: HOME HEALTH | End: 2024-02-28
Attending: INTERNAL MEDICINE | Admitting: FAMILY MEDICINE
Payer: COMMERCIAL

## 2024-02-26 ENCOUNTER — APPOINTMENT (OUTPATIENT)
Dept: GENERAL RADIOLOGY | Facility: HOSPITAL | Age: 54
End: 2024-02-26
Payer: COMMERCIAL

## 2024-02-26 ENCOUNTER — READMISSION MANAGEMENT (OUTPATIENT)
Dept: CALL CENTER | Facility: HOSPITAL | Age: 54
End: 2024-02-26
Payer: COMMERCIAL

## 2024-02-26 DIAGNOSIS — N18.9 ACUTE RENAL FAILURE SUPERIMPOSED ON CHRONIC KIDNEY DISEASE, UNSPECIFIED ACUTE RENAL FAILURE TYPE, UNSPECIFIED CKD STAGE: Primary | ICD-10-CM

## 2024-02-26 DIAGNOSIS — R11.2 NAUSEA AND VOMITING, UNSPECIFIED VOMITING TYPE: ICD-10-CM

## 2024-02-26 DIAGNOSIS — N17.9 ACUTE RENAL FAILURE SUPERIMPOSED ON CHRONIC KIDNEY DISEASE, UNSPECIFIED ACUTE RENAL FAILURE TYPE, UNSPECIFIED CKD STAGE: Primary | ICD-10-CM

## 2024-02-26 DIAGNOSIS — N18.32 ACUTE RENAL FAILURE WITH ACUTE TUBULAR NECROSIS SUPERIMPOSED ON STAGE 3B CHRONIC KIDNEY DISEASE: ICD-10-CM

## 2024-02-26 DIAGNOSIS — E43 SEVERE MALNUTRITION: ICD-10-CM

## 2024-02-26 DIAGNOSIS — N17.0 ACUTE RENAL FAILURE WITH ACUTE TUBULAR NECROSIS SUPERIMPOSED ON STAGE 3B CHRONIC KIDNEY DISEASE: ICD-10-CM

## 2024-02-26 DIAGNOSIS — E87.6 HYPOKALEMIA: ICD-10-CM

## 2024-02-26 LAB
ALBUMIN SERPL-MCNC: 4.4 G/DL (ref 3.5–5.2)
ALBUMIN/GLOB SERPL: 1.1 G/DL
ALP SERPL-CCNC: 124 U/L (ref 39–117)
ALT SERPL W P-5'-P-CCNC: 19 U/L (ref 1–33)
ANION GAP SERPL CALCULATED.3IONS-SCNC: 25 MMOL/L (ref 5–15)
AST SERPL-CCNC: 23 U/L (ref 1–32)
BACTERIA UR QL AUTO: ABNORMAL /HPF
BASOPHILS # BLD AUTO: 0.06 10*3/MM3 (ref 0–0.2)
BASOPHILS NFR BLD AUTO: 0.3 % (ref 0–1.5)
BILIRUB SERPL-MCNC: 0.2 MG/DL (ref 0–1.2)
BILIRUB UR QL STRIP: NEGATIVE
BUN SERPL-MCNC: 107 MG/DL (ref 6–20)
BUN/CREAT SERPL: 17.9 (ref 7–25)
CALCIUM SPEC-SCNC: 9.2 MG/DL (ref 8.6–10.5)
CHLORIDE SERPL-SCNC: 95 MMOL/L (ref 98–107)
CLARITY UR: CLEAR
CO2 SERPL-SCNC: 10 MMOL/L (ref 22–29)
COLOR UR: YELLOW
CREAT SERPL-MCNC: 5.99 MG/DL (ref 0.57–1)
DEPRECATED RDW RBC AUTO: 54.2 FL (ref 37–54)
EGFRCR SERPLBLD CKD-EPI 2021: 7.9 ML/MIN/1.73
EOSINOPHIL # BLD AUTO: 0.17 10*3/MM3 (ref 0–0.4)
EOSINOPHIL NFR BLD AUTO: 1 % (ref 0.3–6.2)
ERYTHROCYTE [DISTWIDTH] IN BLOOD BY AUTOMATED COUNT: 15.6 % (ref 12.3–15.4)
GEN 5 2HR TROPONIN T REFLEX: 15 NG/L
GLOBULIN UR ELPH-MCNC: 4 GM/DL
GLUCOSE BLDC GLUCOMTR-MCNC: 166 MG/DL (ref 70–130)
GLUCOSE SERPL-MCNC: 178 MG/DL (ref 65–99)
GLUCOSE UR STRIP-MCNC: ABNORMAL MG/DL
HCT VFR BLD AUTO: 40.6 % (ref 34–46.6)
HGB BLD-MCNC: 13 G/DL (ref 12–15.9)
HGB UR QL STRIP.AUTO: NEGATIVE
HOLD SPECIMEN: NORMAL
HOLD SPECIMEN: NORMAL
HYALINE CASTS UR QL AUTO: ABNORMAL /LPF
IMM GRANULOCYTES # BLD AUTO: 0.13 10*3/MM3 (ref 0–0.05)
IMM GRANULOCYTES NFR BLD AUTO: 0.8 % (ref 0–0.5)
KETONES UR QL STRIP: NEGATIVE
LEUKOCYTE ESTERASE UR QL STRIP.AUTO: ABNORMAL
LYMPHOCYTES # BLD AUTO: 2.3 10*3/MM3 (ref 0.7–3.1)
LYMPHOCYTES NFR BLD AUTO: 13.3 % (ref 19.6–45.3)
MAGNESIUM SERPL-MCNC: 1.7 MG/DL (ref 1.6–2.6)
MCH RBC QN AUTO: 30.4 PG (ref 26.6–33)
MCHC RBC AUTO-ENTMCNC: 32 G/DL (ref 31.5–35.7)
MCV RBC AUTO: 94.9 FL (ref 79–97)
MONOCYTES # BLD AUTO: 1.08 10*3/MM3 (ref 0.1–0.9)
MONOCYTES NFR BLD AUTO: 6.2 % (ref 5–12)
NEUTROPHILS NFR BLD AUTO: 13.59 10*3/MM3 (ref 1.7–7)
NEUTROPHILS NFR BLD AUTO: 78.4 % (ref 42.7–76)
NITRITE UR QL STRIP: NEGATIVE
NRBC BLD AUTO-RTO: 0 /100 WBC (ref 0–0.2)
NT-PROBNP SERPL-MCNC: 332.4 PG/ML (ref 0–900)
PH UR STRIP.AUTO: <=5 [PH] (ref 5–8)
PLATELET # BLD AUTO: 325 10*3/MM3 (ref 140–450)
PMV BLD AUTO: 11.2 FL (ref 6–12)
POTASSIUM SERPL-SCNC: 3.8 MMOL/L (ref 3.5–5.2)
PROT SERPL-MCNC: 8.4 G/DL (ref 6–8.5)
PROT UR QL STRIP: ABNORMAL
RBC # BLD AUTO: 4.28 10*6/MM3 (ref 3.77–5.28)
RBC # UR STRIP: ABNORMAL /HPF
REF LAB TEST METHOD: ABNORMAL
SODIUM SERPL-SCNC: 130 MMOL/L (ref 136–145)
SP GR UR STRIP: 1.01 (ref 1–1.03)
SQUAMOUS #/AREA URNS HPF: ABNORMAL /HPF
TROPONIN T DELTA: -4 NG/L
TROPONIN T SERPL HS-MCNC: 19 NG/L
UROBILINOGEN UR QL STRIP: ABNORMAL
WBC # UR STRIP: ABNORMAL /HPF
WBC NRBC COR # BLD AUTO: 17.33 10*3/MM3 (ref 3.4–10.8)
WHOLE BLOOD HOLD COAG: NORMAL
WHOLE BLOOD HOLD SPECIMEN: NORMAL

## 2024-02-26 PROCEDURE — 25810000003 LACTATED RINGERS SOLUTION: Performed by: PHYSICIAN ASSISTANT

## 2024-02-26 PROCEDURE — 87086 URINE CULTURE/COLONY COUNT: CPT | Performed by: INTERNAL MEDICINE

## 2024-02-26 PROCEDURE — 80053 COMPREHEN METABOLIC PANEL: CPT | Performed by: PHYSICIAN ASSISTANT

## 2024-02-26 PROCEDURE — 71045 X-RAY EXAM CHEST 1 VIEW: CPT

## 2024-02-26 PROCEDURE — 82948 REAGENT STRIP/BLOOD GLUCOSE: CPT

## 2024-02-26 PROCEDURE — 85025 COMPLETE CBC W/AUTO DIFF WBC: CPT | Performed by: PHYSICIAN ASSISTANT

## 2024-02-26 PROCEDURE — 25810000003 LACTATED RINGERS PER 1000 ML: Performed by: PHYSICIAN ASSISTANT

## 2024-02-26 PROCEDURE — 25810000003 SODIUM CHLORIDE 0.9 % SOLUTION: Performed by: INTERNAL MEDICINE

## 2024-02-26 PROCEDURE — 83735 ASSAY OF MAGNESIUM: CPT | Performed by: PHYSICIAN ASSISTANT

## 2024-02-26 PROCEDURE — 63710000001 INSULIN LISPRO (HUMAN) PER 5 UNITS: Performed by: INTERNAL MEDICINE

## 2024-02-26 PROCEDURE — 84484 ASSAY OF TROPONIN QUANT: CPT | Performed by: PHYSICIAN ASSISTANT

## 2024-02-26 PROCEDURE — 81001 URINALYSIS AUTO W/SCOPE: CPT | Performed by: INTERNAL MEDICINE

## 2024-02-26 PROCEDURE — 83880 ASSAY OF NATRIURETIC PEPTIDE: CPT | Performed by: PHYSICIAN ASSISTANT

## 2024-02-26 PROCEDURE — 93010 ELECTROCARDIOGRAM REPORT: CPT | Performed by: INTERNAL MEDICINE

## 2024-02-26 PROCEDURE — 93005 ELECTROCARDIOGRAM TRACING: CPT | Performed by: PHYSICIAN ASSISTANT

## 2024-02-26 PROCEDURE — 36415 COLL VENOUS BLD VENIPUNCTURE: CPT

## 2024-02-26 PROCEDURE — 99285 EMERGENCY DEPT VISIT HI MDM: CPT

## 2024-02-26 RX ORDER — INSULIN LISPRO 100 [IU]/ML
2-7 INJECTION, SOLUTION INTRAVENOUS; SUBCUTANEOUS
Status: DISCONTINUED | OUTPATIENT
Start: 2024-02-26 | End: 2024-02-28 | Stop reason: HOSPADM

## 2024-02-26 RX ORDER — BISACODYL 10 MG
10 SUPPOSITORY, RECTAL RECTAL DAILY PRN
Status: DISCONTINUED | OUTPATIENT
Start: 2024-02-26 | End: 2024-02-28 | Stop reason: HOSPADM

## 2024-02-26 RX ORDER — SODIUM BICARBONATE 650 MG/1
650 TABLET ORAL 2 TIMES DAILY
Status: DISCONTINUED | OUTPATIENT
Start: 2024-02-26 | End: 2024-02-27

## 2024-02-26 RX ORDER — AMOXICILLIN 250 MG
2 CAPSULE ORAL 2 TIMES DAILY PRN
Status: DISCONTINUED | OUTPATIENT
Start: 2024-02-26 | End: 2024-02-28 | Stop reason: HOSPADM

## 2024-02-26 RX ORDER — SODIUM CHLORIDE 0.9 % (FLUSH) 0.9 %
10 SYRINGE (ML) INJECTION AS NEEDED
Status: DISCONTINUED | OUTPATIENT
Start: 2024-02-26 | End: 2024-02-28 | Stop reason: HOSPADM

## 2024-02-26 RX ORDER — NICOTINE POLACRILEX 4 MG
15 LOZENGE BUCCAL
Status: DISCONTINUED | OUTPATIENT
Start: 2024-02-26 | End: 2024-02-28 | Stop reason: HOSPADM

## 2024-02-26 RX ORDER — POTASSIUM CHLORIDE 750 MG/1
10 CAPSULE, EXTENDED RELEASE ORAL 2 TIMES DAILY WITH MEALS
Status: DISCONTINUED | OUTPATIENT
Start: 2024-02-26 | End: 2024-02-28

## 2024-02-26 RX ORDER — SODIUM CHLORIDE, SODIUM LACTATE, POTASSIUM CHLORIDE, CALCIUM CHLORIDE 600; 310; 30; 20 MG/100ML; MG/100ML; MG/100ML; MG/100ML
125 INJECTION, SOLUTION INTRAVENOUS CONTINUOUS
Status: DISCONTINUED | OUTPATIENT
Start: 2024-02-26 | End: 2024-02-27

## 2024-02-26 RX ORDER — CALCITRIOL 0.25 UG/1
0.25 CAPSULE, LIQUID FILLED ORAL DAILY
Status: DISCONTINUED | OUTPATIENT
Start: 2024-02-27 | End: 2024-02-28 | Stop reason: HOSPADM

## 2024-02-26 RX ORDER — SODIUM CHLORIDE 0.9 % (FLUSH) 0.9 %
10 SYRINGE (ML) INJECTION EVERY 12 HOURS SCHEDULED
Status: DISCONTINUED | OUTPATIENT
Start: 2024-02-26 | End: 2024-02-28 | Stop reason: HOSPADM

## 2024-02-26 RX ORDER — IBUPROFEN 600 MG/1
1 TABLET ORAL
Status: DISCONTINUED | OUTPATIENT
Start: 2024-02-26 | End: 2024-02-28 | Stop reason: HOSPADM

## 2024-02-26 RX ORDER — POLYETHYLENE GLYCOL 3350 17 G/17G
17 POWDER, FOR SOLUTION ORAL DAILY PRN
Status: DISCONTINUED | OUTPATIENT
Start: 2024-02-26 | End: 2024-02-28 | Stop reason: HOSPADM

## 2024-02-26 RX ORDER — SODIUM CHLORIDE 9 MG/ML
100 INJECTION, SOLUTION INTRAVENOUS CONTINUOUS
Status: DISCONTINUED | OUTPATIENT
Start: 2024-02-26 | End: 2024-02-28

## 2024-02-26 RX ORDER — ATORVASTATIN CALCIUM 40 MG/1
80 TABLET, FILM COATED ORAL NIGHTLY
Status: DISCONTINUED | OUTPATIENT
Start: 2024-02-26 | End: 2024-02-28 | Stop reason: HOSPADM

## 2024-02-26 RX ORDER — DEXTROSE MONOHYDRATE 25 G/50ML
25 INJECTION, SOLUTION INTRAVENOUS
Status: DISCONTINUED | OUTPATIENT
Start: 2024-02-26 | End: 2024-02-28 | Stop reason: HOSPADM

## 2024-02-26 RX ORDER — IPRATROPIUM BROMIDE AND ALBUTEROL SULFATE 2.5; .5 MG/3ML; MG/3ML
3 SOLUTION RESPIRATORY (INHALATION) EVERY 6 HOURS PRN
Status: DISCONTINUED | OUTPATIENT
Start: 2024-02-26 | End: 2024-02-28 | Stop reason: HOSPADM

## 2024-02-26 RX ORDER — NORTRIPTYLINE HYDROCHLORIDE 25 MG/1
25 CAPSULE ORAL NIGHTLY
Status: DISCONTINUED | OUTPATIENT
Start: 2024-02-26 | End: 2024-02-28 | Stop reason: HOSPADM

## 2024-02-26 RX ORDER — ROPINIROLE 1 MG/1
1 TABLET, FILM COATED ORAL 3 TIMES DAILY
Status: DISCONTINUED | OUTPATIENT
Start: 2024-02-26 | End: 2024-02-28 | Stop reason: HOSPADM

## 2024-02-26 RX ORDER — SODIUM CHLORIDE 9 MG/ML
40 INJECTION, SOLUTION INTRAVENOUS AS NEEDED
Status: DISCONTINUED | OUTPATIENT
Start: 2024-02-26 | End: 2024-02-28 | Stop reason: HOSPADM

## 2024-02-26 RX ORDER — ONDANSETRON 2 MG/ML
4 INJECTION INTRAMUSCULAR; INTRAVENOUS EVERY 6 HOURS PRN
Status: DISCONTINUED | OUTPATIENT
Start: 2024-02-26 | End: 2024-02-28 | Stop reason: HOSPADM

## 2024-02-26 RX ORDER — BISACODYL 5 MG/1
5 TABLET, DELAYED RELEASE ORAL DAILY PRN
Status: DISCONTINUED | OUTPATIENT
Start: 2024-02-26 | End: 2024-02-28 | Stop reason: HOSPADM

## 2024-02-26 RX ORDER — HYDROXYCHLOROQUINE SULFATE 200 MG/1
200 TABLET, FILM COATED ORAL DAILY
Status: DISCONTINUED | OUTPATIENT
Start: 2024-02-27 | End: 2024-02-28 | Stop reason: HOSPADM

## 2024-02-26 RX ORDER — PANTOPRAZOLE SODIUM 40 MG/1
40 TABLET, DELAYED RELEASE ORAL DAILY
Status: DISCONTINUED | OUTPATIENT
Start: 2024-02-27 | End: 2024-02-28 | Stop reason: HOSPADM

## 2024-02-26 RX ADMIN — NORTRIPTYLINE HYDROCHLORIDE 25 MG: 25 CAPSULE ORAL at 22:44

## 2024-02-26 RX ADMIN — SODIUM CHLORIDE, POTASSIUM CHLORIDE, SODIUM LACTATE AND CALCIUM CHLORIDE 125 ML/HR: 600; 310; 30; 20 INJECTION, SOLUTION INTRAVENOUS at 18:15

## 2024-02-26 RX ADMIN — ATORVASTATIN CALCIUM 80 MG: 40 TABLET, FILM COATED ORAL at 22:45

## 2024-02-26 RX ADMIN — SODIUM BICARBONATE 650 MG: 650 TABLET ORAL at 22:45

## 2024-02-26 RX ADMIN — ROPINIROLE HYDROCHLORIDE 1 MG: 1 TABLET, FILM COATED ORAL at 22:44

## 2024-02-26 RX ADMIN — POTASSIUM CHLORIDE 10 MEQ: 750 CAPSULE, EXTENDED RELEASE ORAL at 22:45

## 2024-02-26 RX ADMIN — SODIUM CHLORIDE 125 ML/HR: 9 INJECTION, SOLUTION INTRAVENOUS at 22:44

## 2024-02-26 RX ADMIN — SODIUM CHLORIDE, POTASSIUM CHLORIDE, SODIUM LACTATE AND CALCIUM CHLORIDE 125 ML/HR: 600; 310; 30; 20 INJECTION, SOLUTION INTRAVENOUS at 20:40

## 2024-02-26 RX ADMIN — INSULIN LISPRO 2 UNITS: 100 INJECTION, SOLUTION INTRAVENOUS; SUBCUTANEOUS at 22:44

## 2024-02-26 RX ADMIN — SODIUM CHLORIDE, POTASSIUM CHLORIDE, SODIUM LACTATE AND CALCIUM CHLORIDE 1000 ML: 600; 310; 30; 20 INJECTION, SOLUTION INTRAVENOUS at 18:14

## 2024-02-27 LAB
ALBUMIN SERPL-MCNC: 3 G/DL (ref 3.5–5.2)
ALBUMIN/GLOB SERPL: 1.2 G/DL
ALP SERPL-CCNC: 89 U/L (ref 39–117)
ALT SERPL W P-5'-P-CCNC: 11 U/L (ref 1–33)
ANION GAP SERPL CALCULATED.3IONS-SCNC: 14 MMOL/L (ref 5–15)
ANION GAP SERPL CALCULATED.3IONS-SCNC: 15 MMOL/L (ref 5–15)
AST SERPL-CCNC: 11 U/L (ref 1–32)
BASOPHILS # BLD AUTO: 0.03 10*3/MM3 (ref 0–0.2)
BASOPHILS NFR BLD AUTO: 0.3 % (ref 0–1.5)
BILIRUB SERPL-MCNC: <0.2 MG/DL (ref 0–1.2)
BUN SERPL-MCNC: 67 MG/DL (ref 6–20)
BUN SERPL-MCNC: 86 MG/DL (ref 6–20)
BUN/CREAT SERPL: 26.6 (ref 7–25)
BUN/CREAT SERPL: 28 (ref 7–25)
CALCIUM SPEC-SCNC: 7.2 MG/DL (ref 8.6–10.5)
CALCIUM SPEC-SCNC: 7.3 MG/DL (ref 8.6–10.5)
CHLORIDE SERPL-SCNC: 111 MMOL/L (ref 98–107)
CHLORIDE SERPL-SCNC: 112 MMOL/L (ref 98–107)
CO2 SERPL-SCNC: 11 MMOL/L (ref 22–29)
CO2 SERPL-SCNC: 14 MMOL/L (ref 22–29)
CREAT SERPL-MCNC: 2.39 MG/DL (ref 0.57–1)
CREAT SERPL-MCNC: 3.23 MG/DL (ref 0.57–1)
DEPRECATED RDW RBC AUTO: 53.4 FL (ref 37–54)
EGFRCR SERPLBLD CKD-EPI 2021: 16.6 ML/MIN/1.73
EGFRCR SERPLBLD CKD-EPI 2021: 23.9 ML/MIN/1.73
EOSINOPHIL # BLD AUTO: 0.22 10*3/MM3 (ref 0–0.4)
EOSINOPHIL NFR BLD AUTO: 2.2 % (ref 0.3–6.2)
ERYTHROCYTE [DISTWIDTH] IN BLOOD BY AUTOMATED COUNT: 15.6 % (ref 12.3–15.4)
GLOBULIN UR ELPH-MCNC: 2.5 GM/DL
GLUCOSE BLDC GLUCOMTR-MCNC: 135 MG/DL (ref 70–130)
GLUCOSE BLDC GLUCOMTR-MCNC: 136 MG/DL (ref 70–130)
GLUCOSE BLDC GLUCOMTR-MCNC: 165 MG/DL (ref 70–130)
GLUCOSE BLDC GLUCOMTR-MCNC: 256 MG/DL (ref 70–130)
GLUCOSE SERPL-MCNC: 134 MG/DL (ref 65–99)
GLUCOSE SERPL-MCNC: 138 MG/DL (ref 65–99)
HCT VFR BLD AUTO: 27.5 % (ref 34–46.6)
HGB BLD-MCNC: 8.9 G/DL (ref 12–15.9)
IMM GRANULOCYTES # BLD AUTO: 0.07 10*3/MM3 (ref 0–0.05)
IMM GRANULOCYTES NFR BLD AUTO: 0.7 % (ref 0–0.5)
LYMPHOCYTES # BLD AUTO: 2.27 10*3/MM3 (ref 0.7–3.1)
LYMPHOCYTES NFR BLD AUTO: 22.8 % (ref 19.6–45.3)
MAGNESIUM SERPL-MCNC: 1.3 MG/DL (ref 1.6–2.6)
MCH RBC QN AUTO: 30.6 PG (ref 26.6–33)
MCHC RBC AUTO-ENTMCNC: 32.4 G/DL (ref 31.5–35.7)
MCV RBC AUTO: 94.5 FL (ref 79–97)
MONOCYTES # BLD AUTO: 0.95 10*3/MM3 (ref 0.1–0.9)
MONOCYTES NFR BLD AUTO: 9.5 % (ref 5–12)
NEUTROPHILS NFR BLD AUTO: 6.41 10*3/MM3 (ref 1.7–7)
NEUTROPHILS NFR BLD AUTO: 64.5 % (ref 42.7–76)
NRBC BLD AUTO-RTO: 0 /100 WBC (ref 0–0.2)
PLATELET # BLD AUTO: 249 10*3/MM3 (ref 140–450)
PMV BLD AUTO: 10.9 FL (ref 6–12)
POTASSIUM SERPL-SCNC: 2.8 MMOL/L (ref 3.5–5.2)
POTASSIUM SERPL-SCNC: 3.1 MMOL/L (ref 3.5–5.2)
PROT SERPL-MCNC: 5.5 G/DL (ref 6–8.5)
RBC # BLD AUTO: 2.91 10*6/MM3 (ref 3.77–5.28)
SODIUM SERPL-SCNC: 136 MMOL/L (ref 136–145)
SODIUM SERPL-SCNC: 141 MMOL/L (ref 136–145)
WBC NRBC COR # BLD AUTO: 9.95 10*3/MM3 (ref 3.4–10.8)

## 2024-02-27 PROCEDURE — 25010000002 MAGNESIUM SULFATE 2 GM/50ML SOLUTION: Performed by: FAMILY MEDICINE

## 2024-02-27 PROCEDURE — 36415 COLL VENOUS BLD VENIPUNCTURE: CPT | Performed by: INTERNAL MEDICINE

## 2024-02-27 PROCEDURE — 25010000002 POTASSIUM CHLORIDE 10 MEQ/100ML SOLUTION: Performed by: FAMILY MEDICINE

## 2024-02-27 PROCEDURE — 82948 REAGENT STRIP/BLOOD GLUCOSE: CPT

## 2024-02-27 PROCEDURE — 80053 COMPREHEN METABOLIC PANEL: CPT | Performed by: INTERNAL MEDICINE

## 2024-02-27 PROCEDURE — 83735 ASSAY OF MAGNESIUM: CPT | Performed by: NURSE PRACTITIONER

## 2024-02-27 PROCEDURE — C1751 CATH, INF, PER/CENT/MIDLINE: HCPCS

## 2024-02-27 PROCEDURE — 85025 COMPLETE CBC W/AUTO DIFF WBC: CPT | Performed by: INTERNAL MEDICINE

## 2024-02-27 PROCEDURE — 63710000001 INSULIN LISPRO (HUMAN) PER 5 UNITS: Performed by: INTERNAL MEDICINE

## 2024-02-27 PROCEDURE — 02HV33Z INSERTION OF INFUSION DEVICE INTO SUPERIOR VENA CAVA, PERCUTANEOUS APPROACH: ICD-10-PCS | Performed by: FAMILY MEDICINE

## 2024-02-27 PROCEDURE — 25810000003 SODIUM CHLORIDE 0.9 % SOLUTION: Performed by: INTERNAL MEDICINE

## 2024-02-27 RX ORDER — SODIUM CHLORIDE 0.9 % (FLUSH) 0.9 %
10 SYRINGE (ML) INJECTION AS NEEDED
Status: DISCONTINUED | OUTPATIENT
Start: 2024-02-27 | End: 2024-02-28 | Stop reason: HOSPADM

## 2024-02-27 RX ORDER — SODIUM CHLORIDE 0.9 % (FLUSH) 0.9 %
20 SYRINGE (ML) INJECTION AS NEEDED
Status: DISCONTINUED | OUTPATIENT
Start: 2024-02-27 | End: 2024-02-28 | Stop reason: HOSPADM

## 2024-02-27 RX ORDER — SODIUM CHLORIDE 0.9 % (FLUSH) 0.9 %
10 SYRINGE (ML) INJECTION EVERY 12 HOURS SCHEDULED
Status: DISCONTINUED | OUTPATIENT
Start: 2024-02-27 | End: 2024-02-28 | Stop reason: HOSPADM

## 2024-02-27 RX ORDER — SODIUM BICARBONATE 650 MG/1
1300 TABLET ORAL 3 TIMES DAILY
Status: DISCONTINUED | OUTPATIENT
Start: 2024-02-27 | End: 2024-02-28 | Stop reason: HOSPADM

## 2024-02-27 RX ORDER — SODIUM CHLORIDE 9 MG/ML
40 INJECTION, SOLUTION INTRAVENOUS AS NEEDED
Status: DISCONTINUED | OUTPATIENT
Start: 2024-02-27 | End: 2024-02-28 | Stop reason: HOSPADM

## 2024-02-27 RX ORDER — LIDOCAINE HYDROCHLORIDE 10 MG/ML
1 INJECTION, SOLUTION EPIDURAL; INFILTRATION; INTRACAUDAL; PERINEURAL ONCE
Status: COMPLETED | OUTPATIENT
Start: 2024-02-27 | End: 2024-02-27

## 2024-02-27 RX ORDER — MAGNESIUM SULFATE HEPTAHYDRATE 40 MG/ML
2 INJECTION, SOLUTION INTRAVENOUS ONCE
Status: COMPLETED | OUTPATIENT
Start: 2024-02-27 | End: 2024-02-27

## 2024-02-27 RX ORDER — NORTRIPTYLINE HYDROCHLORIDE 50 MG/1
50 CAPSULE ORAL NIGHTLY
COMMUNITY

## 2024-02-27 RX ORDER — POTASSIUM CHLORIDE 7.45 MG/ML
10 INJECTION INTRAVENOUS
Status: COMPLETED | OUTPATIENT
Start: 2024-02-27 | End: 2024-02-27

## 2024-02-27 RX ADMIN — SODIUM BICARBONATE 650 MG: 650 TABLET ORAL at 08:59

## 2024-02-27 RX ADMIN — ROPINIROLE HYDROCHLORIDE 1 MG: 1 TABLET, FILM COATED ORAL at 08:59

## 2024-02-27 RX ADMIN — POTASSIUM CHLORIDE 10 MEQ: 750 CAPSULE, EXTENDED RELEASE ORAL at 18:03

## 2024-02-27 RX ADMIN — INSULIN LISPRO 4 UNITS: 100 INJECTION, SOLUTION INTRAVENOUS; SUBCUTANEOUS at 12:41

## 2024-02-27 RX ADMIN — POTASSIUM CHLORIDE 10 MEQ: 750 CAPSULE, EXTENDED RELEASE ORAL at 08:01

## 2024-02-27 RX ADMIN — PANTOPRAZOLE SODIUM 40 MG: 40 TABLET, DELAYED RELEASE ORAL at 09:05

## 2024-02-27 RX ADMIN — INSULIN LISPRO 2 UNITS: 100 INJECTION, SOLUTION INTRAVENOUS; SUBCUTANEOUS at 08:01

## 2024-02-27 RX ADMIN — HYDROXYCHLOROQUINE SULFATE 200 MG: 200 TABLET ORAL at 09:32

## 2024-02-27 RX ADMIN — LIDOCAINE HYDROCHLORIDE ANHYDROUS 1 ML: 10 INJECTION, SOLUTION INFILTRATION at 16:33

## 2024-02-27 RX ADMIN — POTASSIUM CHLORIDE 10 MEQ: 7.46 INJECTION, SOLUTION INTRAVENOUS at 12:35

## 2024-02-27 RX ADMIN — SODIUM CHLORIDE 125 ML/HR: 9 INJECTION, SOLUTION INTRAVENOUS at 21:30

## 2024-02-27 RX ADMIN — ROPINIROLE HYDROCHLORIDE 1 MG: 1 TABLET, FILM COATED ORAL at 15:53

## 2024-02-27 RX ADMIN — ATORVASTATIN CALCIUM 80 MG: 40 TABLET, FILM COATED ORAL at 21:39

## 2024-02-27 RX ADMIN — Medication 10 ML: at 21:42

## 2024-02-27 RX ADMIN — SODIUM CHLORIDE 125 ML/HR: 9 INJECTION, SOLUTION INTRAVENOUS at 06:11

## 2024-02-27 RX ADMIN — NORTRIPTYLINE HYDROCHLORIDE 25 MG: 25 CAPSULE ORAL at 21:39

## 2024-02-27 RX ADMIN — CALCITRIOL 0.25 MCG: 0.25 CAPSULE ORAL at 08:59

## 2024-02-27 RX ADMIN — SODIUM BICARBONATE 1300 MG: 650 TABLET ORAL at 21:39

## 2024-02-27 RX ADMIN — SODIUM CHLORIDE 125 ML/HR: 9 INJECTION, SOLUTION INTRAVENOUS at 14:33

## 2024-02-27 RX ADMIN — SODIUM BICARBONATE 1300 MG: 650 TABLET ORAL at 15:53

## 2024-02-27 RX ADMIN — ROPINIROLE HYDROCHLORIDE 1 MG: 1 TABLET, FILM COATED ORAL at 21:39

## 2024-02-27 RX ADMIN — MAGNESIUM SULFATE HEPTAHYDRATE 2 G: 2 INJECTION, SOLUTION INTRAVENOUS at 15:47

## 2024-02-27 RX ADMIN — POTASSIUM CHLORIDE 10 MEQ: 7.46 INJECTION, SOLUTION INTRAVENOUS at 09:32

## 2024-02-28 ENCOUNTER — READMISSION MANAGEMENT (OUTPATIENT)
Dept: CALL CENTER | Facility: HOSPITAL | Age: 54
End: 2024-02-28
Payer: COMMERCIAL

## 2024-02-28 VITALS
OXYGEN SATURATION: 96 % | HEIGHT: 60 IN | WEIGHT: 91 LBS | TEMPERATURE: 96.2 F | RESPIRATION RATE: 16 BRPM | SYSTOLIC BLOOD PRESSURE: 112 MMHG | BODY MASS INDEX: 17.87 KG/M2 | DIASTOLIC BLOOD PRESSURE: 73 MMHG | HEART RATE: 89 BPM

## 2024-02-28 PROBLEM — N17.9 ACUTE ON CHRONIC RENAL FAILURE: Status: RESOLVED | Noted: 2024-01-22 | Resolved: 2024-02-28

## 2024-02-28 PROBLEM — N18.9 ACUTE ON CHRONIC RENAL FAILURE: Status: RESOLVED | Noted: 2024-01-22 | Resolved: 2024-02-28

## 2024-02-28 LAB
ANION GAP SERPL CALCULATED.3IONS-SCNC: 10 MMOL/L (ref 5–15)
ANION GAP SERPL CALCULATED.3IONS-SCNC: 9 MMOL/L (ref 5–15)
BACTERIA SPEC AEROBE CULT: NO GROWTH
BUN SERPL-MCNC: 32 MG/DL (ref 6–20)
BUN SERPL-MCNC: 45 MG/DL (ref 6–20)
BUN/CREAT SERPL: 23.5 (ref 7–25)
BUN/CREAT SERPL: 29 (ref 7–25)
CALCIUM SPEC-SCNC: 6.9 MG/DL (ref 8.6–10.5)
CALCIUM SPEC-SCNC: 7.1 MG/DL (ref 8.6–10.5)
CHLORIDE SERPL-SCNC: 115 MMOL/L (ref 98–107)
CHLORIDE SERPL-SCNC: 117 MMOL/L (ref 98–107)
CO2 SERPL-SCNC: 17 MMOL/L (ref 22–29)
CO2 SERPL-SCNC: 18 MMOL/L (ref 22–29)
CREAT SERPL-MCNC: 1.36 MG/DL (ref 0.57–1)
CREAT SERPL-MCNC: 1.55 MG/DL (ref 0.57–1)
EGFRCR SERPLBLD CKD-EPI 2021: 40.1 ML/MIN/1.73
EGFRCR SERPLBLD CKD-EPI 2021: 47 ML/MIN/1.73
GLUCOSE BLDC GLUCOMTR-MCNC: 137 MG/DL (ref 70–130)
GLUCOSE SERPL-MCNC: 119 MG/DL (ref 65–99)
GLUCOSE SERPL-MCNC: 204 MG/DL (ref 65–99)
MAGNESIUM SERPL-MCNC: 1.6 MG/DL (ref 1.6–2.6)
MAGNESIUM SERPL-MCNC: 2.3 MG/DL (ref 1.6–2.6)
POTASSIUM SERPL-SCNC: 3.1 MMOL/L (ref 3.5–5.2)
POTASSIUM SERPL-SCNC: 4 MMOL/L (ref 3.5–5.2)
QT INTERVAL: 296 MS
QTC INTERVAL: 389 MS
SODIUM SERPL-SCNC: 143 MMOL/L (ref 136–145)
SODIUM SERPL-SCNC: 143 MMOL/L (ref 136–145)

## 2024-02-28 PROCEDURE — 83735 ASSAY OF MAGNESIUM: CPT | Performed by: FAMILY MEDICINE

## 2024-02-28 PROCEDURE — 82948 REAGENT STRIP/BLOOD GLUCOSE: CPT

## 2024-02-28 PROCEDURE — 80048 BASIC METABOLIC PNL TOTAL CA: CPT | Performed by: FAMILY MEDICINE

## 2024-02-28 PROCEDURE — 25810000003 SODIUM CHLORIDE 0.9 % SOLUTION: Performed by: INTERNAL MEDICINE

## 2024-02-28 PROCEDURE — 25010000002 POTASSIUM CHLORIDE 10 MEQ/100ML SOLUTION: Performed by: FAMILY MEDICINE

## 2024-02-28 PROCEDURE — 25010000002 MAGNESIUM SULFATE 2 GM/50ML SOLUTION: Performed by: FAMILY MEDICINE

## 2024-02-28 RX ORDER — MAGNESIUM SULFATE HEPTAHYDRATE 40 MG/ML
2 INJECTION, SOLUTION INTRAVENOUS ONCE
Status: COMPLETED | OUTPATIENT
Start: 2024-02-28 | End: 2024-02-28

## 2024-02-28 RX ORDER — POTASSIUM CHLORIDE 7.45 MG/ML
10 INJECTION INTRAVENOUS
Status: COMPLETED | OUTPATIENT
Start: 2024-02-28 | End: 2024-02-28

## 2024-02-28 RX ORDER — SODIUM CHLORIDE AND POTASSIUM CHLORIDE 150; 900 MG/100ML; MG/100ML
100 INJECTION, SOLUTION INTRAVENOUS CONTINUOUS
Status: DISCONTINUED | OUTPATIENT
Start: 2024-02-28 | End: 2024-02-28 | Stop reason: HOSPADM

## 2024-02-28 RX ADMIN — HYDROXYCHLOROQUINE SULFATE 200 MG: 200 TABLET ORAL at 08:31

## 2024-02-28 RX ADMIN — SODIUM CHLORIDE 125 ML/HR: 9 INJECTION, SOLUTION INTRAVENOUS at 06:06

## 2024-02-28 RX ADMIN — SODIUM BICARBONATE 1300 MG: 650 TABLET ORAL at 08:30

## 2024-02-28 RX ADMIN — Medication 10 ML: at 08:32

## 2024-02-28 RX ADMIN — ROPINIROLE HYDROCHLORIDE 1 MG: 1 TABLET, FILM COATED ORAL at 08:30

## 2024-02-28 RX ADMIN — PANTOPRAZOLE SODIUM 40 MG: 40 TABLET, DELAYED RELEASE ORAL at 08:30

## 2024-02-28 RX ADMIN — POTASSIUM CHLORIDE 10 MEQ: 7.46 INJECTION, SOLUTION INTRAVENOUS at 09:39

## 2024-02-28 RX ADMIN — POTASSIUM CHLORIDE 10 MEQ: 7.46 INJECTION, SOLUTION INTRAVENOUS at 08:36

## 2024-02-28 RX ADMIN — ROPINIROLE HYDROCHLORIDE 1 MG: 1 TABLET, FILM COATED ORAL at 15:11

## 2024-02-28 RX ADMIN — MAGNESIUM SULFATE HEPTAHYDRATE 2 G: 2 INJECTION, SOLUTION INTRAVENOUS at 08:33

## 2024-02-28 RX ADMIN — CALCITRIOL 0.25 MCG: 0.25 CAPSULE ORAL at 08:30

## 2024-02-28 RX ADMIN — SODIUM BICARBONATE 1300 MG: 650 TABLET ORAL at 15:11

## 2024-02-28 RX ADMIN — POTASSIUM CHLORIDE 10 MEQ: 7.46 INJECTION, SOLUTION INTRAVENOUS at 10:41

## 2024-03-04 ENCOUNTER — APPOINTMENT (OUTPATIENT)
Dept: GENERAL RADIOLOGY | Facility: HOSPITAL | Age: 54
End: 2024-03-04
Payer: COMMERCIAL

## 2024-03-04 ENCOUNTER — HOSPITAL ENCOUNTER (INPATIENT)
Facility: HOSPITAL | Age: 54
LOS: 2 days | Discharge: HOME | End: 2024-03-07
Attending: HOSPITALIST | Admitting: INTERNAL MEDICINE
Payer: COMMERCIAL

## 2024-03-04 ENCOUNTER — OFFICE VISIT (OUTPATIENT)
Age: 54
End: 2024-03-04
Payer: COMMERCIAL

## 2024-03-04 VITALS
BODY MASS INDEX: 18.06 KG/M2 | OXYGEN SATURATION: 100 % | DIASTOLIC BLOOD PRESSURE: 52 MMHG | WEIGHT: 92 LBS | SYSTOLIC BLOOD PRESSURE: 83 MMHG | TEMPERATURE: 97.1 F | HEART RATE: 92 BPM | HEIGHT: 60 IN

## 2024-03-04 DIAGNOSIS — A09 DIARRHEA OF INFECTIOUS ORIGIN: Primary | ICD-10-CM

## 2024-03-04 DIAGNOSIS — N28.9 RENAL INSUFFICIENCY: ICD-10-CM

## 2024-03-04 DIAGNOSIS — N17.9 AKI (ACUTE KIDNEY INJURY): Primary | ICD-10-CM

## 2024-03-04 DIAGNOSIS — A49.8 INFECTION DUE TO STENOTROPHOMONAS MALTOPHILIA: ICD-10-CM

## 2024-03-04 DIAGNOSIS — I95.9 HYPOTENSION, UNSPECIFIED HYPOTENSION TYPE: ICD-10-CM

## 2024-03-04 LAB
ALBUMIN SERPL-MCNC: 4.2 G/DL (ref 3.5–5.2)
ALBUMIN/GLOB SERPL: 1.1 G/DL
ALP SERPL-CCNC: 99 U/L (ref 39–117)
ALT SERPL W P-5'-P-CCNC: 16 U/L (ref 1–33)
ANION GAP SERPL CALCULATED.3IONS-SCNC: 21 MMOL/L (ref 5–15)
AST SERPL-CCNC: 15 U/L (ref 1–32)
BACTERIA UR QL AUTO: ABNORMAL /HPF
BASOPHILS # BLD AUTO: 0.06 10*3/MM3 (ref 0–0.2)
BASOPHILS NFR BLD AUTO: 0.3 % (ref 0–1.5)
BILIRUB SERPL-MCNC: <0.2 MG/DL (ref 0–1.2)
BILIRUB UR QL STRIP: ABNORMAL
BUN SERPL-MCNC: 30 MG/DL (ref 6–20)
BUN/CREAT SERPL: 9 (ref 7–25)
CALCIUM SPEC-SCNC: 9.4 MG/DL (ref 8.6–10.5)
CHLORIDE SERPL-SCNC: 101 MMOL/L (ref 98–107)
CLARITY UR: ABNORMAL
CO2 SERPL-SCNC: 15 MMOL/L (ref 22–29)
COLOR UR: ABNORMAL
CREAT SERPL-MCNC: 3.33 MG/DL (ref 0.57–1)
D-LACTATE SERPL-SCNC: 1.1 MMOL/L (ref 0.5–2)
DEPRECATED RDW RBC AUTO: 58.4 FL (ref 37–54)
EGFRCR SERPLBLD CKD-EPI 2021: 16 ML/MIN/1.73
EOSINOPHIL # BLD AUTO: 0.15 10*3/MM3 (ref 0–0.4)
EOSINOPHIL NFR BLD AUTO: 0.8 % (ref 0.3–6.2)
ERYTHROCYTE [DISTWIDTH] IN BLOOD BY AUTOMATED COUNT: 16.2 % (ref 12.3–15.4)
FLUAV RNA RESP QL NAA+PROBE: NOT DETECTED
FLUBV RNA RESP QL NAA+PROBE: NOT DETECTED
GLOBULIN UR ELPH-MCNC: 4 GM/DL
GLUCOSE BLDC GLUCOMTR-MCNC: 230 MG/DL (ref 70–130)
GLUCOSE SERPL-MCNC: 202 MG/DL (ref 65–99)
GLUCOSE UR STRIP-MCNC: ABNORMAL MG/DL
HCT VFR BLD AUTO: 34.4 % (ref 34–46.6)
HGB BLD-MCNC: 10.7 G/DL (ref 12–15.9)
HGB UR QL STRIP.AUTO: NEGATIVE
HOLD SPECIMEN: NORMAL
HOLD SPECIMEN: NORMAL
HYALINE CASTS UR QL AUTO: ABNORMAL /LPF
IMM GRANULOCYTES # BLD AUTO: 0.23 10*3/MM3 (ref 0–0.05)
IMM GRANULOCYTES NFR BLD AUTO: 1.2 % (ref 0–0.5)
KETONES UR QL STRIP: ABNORMAL
LEUKOCYTE ESTERASE UR QL STRIP.AUTO: ABNORMAL
LIPASE SERPL-CCNC: 83 U/L (ref 13–60)
LYMPHOCYTES # BLD AUTO: 2.63 10*3/MM3 (ref 0.7–3.1)
LYMPHOCYTES NFR BLD AUTO: 13.4 % (ref 19.6–45.3)
MCH RBC QN AUTO: 30.7 PG (ref 26.6–33)
MCHC RBC AUTO-ENTMCNC: 31.1 G/DL (ref 31.5–35.7)
MCV RBC AUTO: 98.6 FL (ref 79–97)
MONOCYTES # BLD AUTO: 0.64 10*3/MM3 (ref 0.1–0.9)
MONOCYTES NFR BLD AUTO: 3.3 % (ref 5–12)
NEUTROPHILS NFR BLD AUTO: 15.88 10*3/MM3 (ref 1.7–7)
NEUTROPHILS NFR BLD AUTO: 81 % (ref 42.7–76)
NITRITE UR QL STRIP: NEGATIVE
NRBC BLD AUTO-RTO: 0 /100 WBC (ref 0–0.2)
PH UR STRIP.AUTO: <=5 [PH] (ref 5–8)
PLATELET # BLD AUTO: 378 10*3/MM3 (ref 140–450)
PMV BLD AUTO: 10.7 FL (ref 6–12)
POTASSIUM SERPL-SCNC: 3.3 MMOL/L (ref 3.5–5.2)
PROT SERPL-MCNC: 8.2 G/DL (ref 6–8.5)
PROT UR QL STRIP: ABNORMAL
RBC # BLD AUTO: 3.49 10*6/MM3 (ref 3.77–5.28)
RBC # UR STRIP: ABNORMAL /HPF
REF LAB TEST METHOD: ABNORMAL
RSV RNA RESP QL NAA+PROBE: NOT DETECTED
SARS-COV-2 RNA RESP QL NAA+PROBE: NOT DETECTED
SODIUM SERPL-SCNC: 137 MMOL/L (ref 136–145)
SP GR UR STRIP: 1.03 (ref 1–1.03)
SQUAMOUS #/AREA URNS HPF: ABNORMAL /HPF
UROBILINOGEN UR QL STRIP: ABNORMAL
WBC # UR STRIP: ABNORMAL /HPF
WBC NRBC COR # BLD AUTO: 19.59 10*3/MM3 (ref 3.4–10.8)
WHOLE BLOOD HOLD COAG: NORMAL
WHOLE BLOOD HOLD SPECIMEN: NORMAL

## 2024-03-04 PROCEDURE — 36415 COLL VENOUS BLD VENIPUNCTURE: CPT

## 2024-03-04 PROCEDURE — 99285 EMERGENCY DEPT VISIT HI MDM: CPT

## 2024-03-04 PROCEDURE — 1160F RVW MEDS BY RX/DR IN RCRD: CPT | Performed by: INTERNAL MEDICINE

## 2024-03-04 PROCEDURE — 25810000003 SODIUM CHLORIDE 0.9 % SOLUTION: Performed by: EMERGENCY MEDICINE

## 2024-03-04 PROCEDURE — 85025 COMPLETE CBC W/AUTO DIFF WBC: CPT

## 2024-03-04 PROCEDURE — 87637 SARSCOV2&INF A&B&RSV AMP PRB: CPT | Performed by: EMERGENCY MEDICINE

## 2024-03-04 PROCEDURE — 71045 X-RAY EXAM CHEST 1 VIEW: CPT

## 2024-03-04 PROCEDURE — 81001 URINALYSIS AUTO W/SCOPE: CPT | Performed by: EMERGENCY MEDICINE

## 2024-03-04 PROCEDURE — 84443 ASSAY THYROID STIM HORMONE: CPT | Performed by: PHYSICIAN ASSISTANT

## 2024-03-04 PROCEDURE — 99214 OFFICE O/P EST MOD 30 MIN: CPT | Performed by: INTERNAL MEDICINE

## 2024-03-04 PROCEDURE — 84439 ASSAY OF FREE THYROXINE: CPT | Performed by: PHYSICIAN ASSISTANT

## 2024-03-04 PROCEDURE — 83735 ASSAY OF MAGNESIUM: CPT | Performed by: PHYSICIAN ASSISTANT

## 2024-03-04 PROCEDURE — 83605 ASSAY OF LACTIC ACID: CPT | Performed by: EMERGENCY MEDICINE

## 2024-03-04 PROCEDURE — 1159F MED LIST DOCD IN RCRD: CPT | Performed by: INTERNAL MEDICINE

## 2024-03-04 PROCEDURE — 83690 ASSAY OF LIPASE: CPT

## 2024-03-04 PROCEDURE — 82948 REAGENT STRIP/BLOOD GLUCOSE: CPT

## 2024-03-04 PROCEDURE — 84145 PROCALCITONIN (PCT): CPT | Performed by: PHYSICIAN ASSISTANT

## 2024-03-04 PROCEDURE — 87040 BLOOD CULTURE FOR BACTERIA: CPT | Performed by: EMERGENCY MEDICINE

## 2024-03-04 PROCEDURE — 80053 COMPREHEN METABOLIC PANEL: CPT

## 2024-03-04 PROCEDURE — P9612 CATHETERIZE FOR URINE SPEC: HCPCS

## 2024-03-04 RX ORDER — ASPIRIN 81 MG/1
1 TABLET ORAL DAILY
Status: ON HOLD | COMMUNITY
Start: 2024-02-11 | End: 2024-03-05

## 2024-03-04 RX ORDER — PROCHLORPERAZINE 25 MG/1
SUPPOSITORY RECTAL
Status: ON HOLD | COMMUNITY
Start: 2023-12-28 | End: 2024-03-05

## 2024-03-04 RX ORDER — SODIUM CHLORIDE 0.9 % (FLUSH) 0.9 %
10 SYRINGE (ML) INJECTION AS NEEDED
Status: DISCONTINUED | OUTPATIENT
Start: 2024-03-04 | End: 2024-03-07 | Stop reason: HOSPADM

## 2024-03-04 RX ORDER — OMEPRAZOLE 40 MG/1
1 CAPSULE, DELAYED RELEASE ORAL DAILY
Status: ON HOLD | COMMUNITY
End: 2024-03-05

## 2024-03-04 RX ORDER — SODIUM CHLORIDE 9 MG/ML
125 INJECTION, SOLUTION INTRAVENOUS CONTINUOUS
Status: DISCONTINUED | OUTPATIENT
Start: 2024-03-04 | End: 2024-03-05

## 2024-03-04 RX ORDER — ONDANSETRON 2 MG/ML
4 INJECTION INTRAMUSCULAR; INTRAVENOUS
Status: ON HOLD | COMMUNITY
Start: 2023-12-13 | End: 2024-03-05

## 2024-03-04 RX ORDER — INSULIN PMP CART,AUT,G6/7,CNTR
EACH SUBCUTANEOUS
Status: ON HOLD | COMMUNITY
Start: 2024-02-28 | End: 2024-03-05

## 2024-03-04 RX ORDER — CALCIUM CARBONATE/VITAMIN D3 500-10/5ML
LIQUID (ML) ORAL
Status: ON HOLD | COMMUNITY
Start: 2024-01-31 | End: 2024-03-05

## 2024-03-04 RX ORDER — BUMETANIDE 1 MG/1
1 TABLET ORAL DAILY
Status: ON HOLD | COMMUNITY
End: 2024-03-05

## 2024-03-04 RX ORDER — DULOXETIN HYDROCHLORIDE 60 MG/1
1 CAPSULE, DELAYED RELEASE ORAL DAILY
Status: ON HOLD | COMMUNITY
End: 2024-03-05

## 2024-03-04 RX ORDER — DAPAGLIFLOZIN 10 MG/1
1 TABLET, FILM COATED ORAL DAILY
Status: ON HOLD | COMMUNITY
Start: 2024-02-11 | End: 2024-03-05

## 2024-03-04 RX ADMIN — Medication 10 ML: at 22:04

## 2024-03-04 RX ADMIN — SODIUM CHLORIDE 1000 ML: 9 INJECTION, SOLUTION INTRAVENOUS at 22:04

## 2024-03-04 RX ADMIN — SODIUM CHLORIDE 1000 ML: 9 INJECTION, SOLUTION INTRAVENOUS at 23:27

## 2024-03-05 ENCOUNTER — READMISSION MANAGEMENT (OUTPATIENT)
Dept: CALL CENTER | Facility: HOSPITAL | Age: 54
End: 2024-03-05
Payer: COMMERCIAL

## 2024-03-05 PROBLEM — E87.6 HYPOKALEMIA: Status: RESOLVED | Noted: 2022-04-30 | Resolved: 2024-03-05

## 2024-03-05 PROBLEM — R10.9 ABDOMINAL PAIN: Status: RESOLVED | Noted: 2022-07-14 | Resolved: 2024-03-05

## 2024-03-05 PROBLEM — N39.0 SEPSIS SECONDARY TO UTI: Status: RESOLVED | Noted: 2023-10-18 | Resolved: 2024-03-05

## 2024-03-05 PROBLEM — R65.20 SEVERE SEPSIS: Status: RESOLVED | Noted: 2022-04-29 | Resolved: 2024-03-05

## 2024-03-05 PROBLEM — A41.9 SEPSIS SECONDARY TO UTI: Status: RESOLVED | Noted: 2023-10-18 | Resolved: 2024-03-05

## 2024-03-05 PROBLEM — R78.81 BACTEREMIA: Status: RESOLVED | Noted: 2024-01-23 | Resolved: 2024-03-05

## 2024-03-05 PROBLEM — E83.39 HYPOPHOSPHATEMIA: Status: RESOLVED | Noted: 2023-10-19 | Resolved: 2024-03-05

## 2024-03-05 PROBLEM — N18.31 STAGE 3A CHRONIC KIDNEY DISEASE: Status: RESOLVED | Noted: 2023-08-10 | Resolved: 2024-03-05

## 2024-03-05 PROBLEM — N39.0 ACUTE UTI (URINARY TRACT INFECTION): Status: RESOLVED | Noted: 2022-04-30 | Resolved: 2024-03-05

## 2024-03-05 PROBLEM — R55 SYNCOPE: Status: RESOLVED | Noted: 2024-01-22 | Resolved: 2024-03-05

## 2024-03-05 PROBLEM — E86.0 DEHYDRATION: Status: RESOLVED | Noted: 2019-10-06 | Resolved: 2024-03-05

## 2024-03-05 PROBLEM — R11.2 NAUSEA & VOMITING: Status: RESOLVED | Noted: 2022-10-31 | Resolved: 2024-03-05

## 2024-03-05 PROBLEM — E87.20 METABOLIC ACIDOSIS: Status: RESOLVED | Noted: 2022-04-29 | Resolved: 2024-03-05

## 2024-03-05 PROBLEM — A41.9 SEVERE SEPSIS: Status: RESOLVED | Noted: 2022-04-29 | Resolved: 2024-03-05

## 2024-03-05 PROBLEM — E83.42 HYPOMAGNESEMIA: Status: RESOLVED | Noted: 2022-06-16 | Resolved: 2024-03-05

## 2024-03-05 PROBLEM — E11.43 DIABETIC AUTONOMIC NEUROPATHY ASSOCIATED WITH TYPE 2 DIABETES MELLITUS: Status: RESOLVED | Noted: 2023-08-10 | Resolved: 2024-03-05

## 2024-03-05 LAB
ADV 40+41 DNA STL QL NAA+NON-PROBE: NOT DETECTED
ALBUMIN SERPL-MCNC: 3.4 G/DL (ref 3.5–5.2)
ALBUMIN/GLOB SERPL: 1 G/DL
ALP SERPL-CCNC: 93 U/L (ref 39–117)
ALT SERPL W P-5'-P-CCNC: 13 U/L (ref 1–33)
ANION GAP SERPL CALCULATED.3IONS-SCNC: 16 MMOL/L (ref 5–15)
AST SERPL-CCNC: 32 U/L (ref 1–32)
ASTRO TYP 1-8 RNA STL QL NAA+NON-PROBE: NOT DETECTED
BASOPHILS # BLD AUTO: 0.07 10*3/MM3 (ref 0–0.2)
BASOPHILS NFR BLD AUTO: 0.4 % (ref 0–1.5)
BILIRUB SERPL-MCNC: <0.2 MG/DL (ref 0–1.2)
BUN SERPL-MCNC: 24 MG/DL (ref 6–20)
BUN/CREAT SERPL: 10 (ref 7–25)
C CAYETANENSIS DNA STL QL NAA+NON-PROBE: NOT DETECTED
C COLI+JEJ+UPSA DNA STL QL NAA+NON-PROBE: NOT DETECTED
C DIFF TOX GENS STL QL NAA+PROBE: NEGATIVE
CALCIUM SPEC-SCNC: 8 MG/DL (ref 8.6–10.5)
CHLORIDE SERPL-SCNC: 110 MMOL/L (ref 98–107)
CO2 SERPL-SCNC: 14 MMOL/L (ref 22–29)
CREAT SERPL-MCNC: 2.41 MG/DL (ref 0.57–1)
CRP SERPL-MCNC: 2.02 MG/DL (ref 0–0.5)
CRYPTOSP DNA STL QL NAA+NON-PROBE: NOT DETECTED
D-LACTATE SERPL-SCNC: 0.8 MMOL/L (ref 0.5–2)
DEPRECATED RDW RBC AUTO: 57.9 FL (ref 37–54)
E HISTOLYT DNA STL QL NAA+NON-PROBE: NOT DETECTED
EAEC PAA PLAS AGGR+AATA ST NAA+NON-PRB: NOT DETECTED
EC STX1+STX2 GENES STL QL NAA+NON-PROBE: NOT DETECTED
EGFRCR SERPLBLD CKD-EPI 2021: 23.6 ML/MIN/1.73
EOSINOPHIL # BLD AUTO: 0.34 10*3/MM3 (ref 0–0.4)
EOSINOPHIL NFR BLD AUTO: 2 % (ref 0.3–6.2)
EPEC EAE GENE STL QL NAA+NON-PROBE: NOT DETECTED
ERYTHROCYTE [DISTWIDTH] IN BLOOD BY AUTOMATED COUNT: 16.2 % (ref 12.3–15.4)
ETEC LTA+ST1A+ST1B TOX ST NAA+NON-PROBE: NOT DETECTED
G LAMBLIA DNA STL QL NAA+NON-PROBE: NOT DETECTED
GLOBULIN UR ELPH-MCNC: 3.4 GM/DL
GLUCOSE BLDC GLUCOMTR-MCNC: 148 MG/DL (ref 70–130)
GLUCOSE BLDC GLUCOMTR-MCNC: 149 MG/DL (ref 70–130)
GLUCOSE BLDC GLUCOMTR-MCNC: 183 MG/DL (ref 70–130)
GLUCOSE SERPL-MCNC: 187 MG/DL (ref 65–99)
HBA1C MFR BLD: 6.4 % (ref 4.8–5.6)
HCT VFR BLD AUTO: 32.3 % (ref 34–46.6)
HGB BLD-MCNC: 10.3 G/DL (ref 12–15.9)
HOLD SPECIMEN: NORMAL
IMM GRANULOCYTES # BLD AUTO: 0.19 10*3/MM3 (ref 0–0.05)
IMM GRANULOCYTES NFR BLD AUTO: 1.1 % (ref 0–0.5)
IRON 24H UR-MRATE: 47 MCG/DL (ref 37–145)
IRON SATN MFR SERPL: 20 % (ref 20–50)
LYMPHOCYTES # BLD AUTO: 3.96 10*3/MM3 (ref 0.7–3.1)
LYMPHOCYTES NFR BLD AUTO: 23.5 % (ref 19.6–45.3)
MAGNESIUM SERPL-MCNC: 1.4 MG/DL (ref 1.6–2.6)
MAGNESIUM SERPL-MCNC: 1.7 MG/DL (ref 1.6–2.6)
MCH RBC QN AUTO: 31.1 PG (ref 26.6–33)
MCHC RBC AUTO-ENTMCNC: 31.9 G/DL (ref 31.5–35.7)
MCV RBC AUTO: 97.6 FL (ref 79–97)
MONOCYTES # BLD AUTO: 0.98 10*3/MM3 (ref 0.1–0.9)
MONOCYTES NFR BLD AUTO: 5.8 % (ref 5–12)
NEUTROPHILS NFR BLD AUTO: 11.3 10*3/MM3 (ref 1.7–7)
NEUTROPHILS NFR BLD AUTO: 67.2 % (ref 42.7–76)
NOROVIRUS GI+II RNA STL QL NAA+NON-PROBE: NOT DETECTED
NRBC BLD AUTO-RTO: 0 /100 WBC (ref 0–0.2)
P SHIGELLOIDES DNA STL QL NAA+NON-PROBE: NOT DETECTED
PHOSPHATE SERPL-MCNC: 4 MG/DL (ref 2.5–4.5)
PLATELET # BLD AUTO: 343 10*3/MM3 (ref 140–450)
PMV BLD AUTO: 10.4 FL (ref 6–12)
POTASSIUM SERPL-SCNC: 3 MMOL/L (ref 3.5–5.2)
PROCALCITONIN SERPL-MCNC: 0.22 NG/ML (ref 0–0.25)
PROT SERPL-MCNC: 6.8 G/DL (ref 6–8.5)
RBC # BLD AUTO: 3.31 10*6/MM3 (ref 3.77–5.28)
RVA RNA STL QL NAA+NON-PROBE: NOT DETECTED
S ENT+BONG DNA STL QL NAA+NON-PROBE: NOT DETECTED
SAPO I+II+IV+V RNA STL QL NAA+NON-PROBE: NOT DETECTED
SHIGELLA SP+EIEC IPAH ST NAA+NON-PROBE: NOT DETECTED
SODIUM SERPL-SCNC: 140 MMOL/L (ref 136–145)
SODIUM UR-SCNC: 106 MMOL/L
T4 FREE SERPL-MCNC: 1.26 NG/DL (ref 0.93–1.7)
TIBC SERPL-MCNC: 240 MCG/DL (ref 298–536)
TRANSFERRIN SERPL-MCNC: 161 MG/DL (ref 200–360)
TSH SERPL DL<=0.05 MIU/L-ACNC: 0.95 UIU/ML (ref 0.27–4.2)
URATE SERPL-MCNC: 6 MG/DL (ref 2.4–5.7)
V CHOL+PARA+VUL DNA STL QL NAA+NON-PROBE: NOT DETECTED
V CHOLERAE DNA STL QL NAA+NON-PROBE: NOT DETECTED
WBC NRBC COR # BLD AUTO: 16.84 10*3/MM3 (ref 3.4–10.8)
Y ENTEROCOL DNA STL QL NAA+NON-PROBE: NOT DETECTED

## 2024-03-05 PROCEDURE — 83735 ASSAY OF MAGNESIUM: CPT | Performed by: HOSPITALIST

## 2024-03-05 PROCEDURE — 25010000002 ENOXAPARIN PER 10 MG: Performed by: HOSPITALIST

## 2024-03-05 PROCEDURE — 82570 ASSAY OF URINE CREATININE: CPT | Performed by: INTERNAL MEDICINE

## 2024-03-05 PROCEDURE — 85025 COMPLETE CBC W/AUTO DIFF WBC: CPT | Performed by: HOSPITALIST

## 2024-03-05 PROCEDURE — 25010000002 LEVOFLOXACIN PER 250 MG: Performed by: INTERNAL MEDICINE

## 2024-03-05 PROCEDURE — 25810000003 SODIUM CHLORIDE 0.9 % SOLUTION: Performed by: PHYSICIAN ASSISTANT

## 2024-03-05 PROCEDURE — 84550 ASSAY OF BLOOD/URIC ACID: CPT | Performed by: INTERNAL MEDICINE

## 2024-03-05 PROCEDURE — 87040 BLOOD CULTURE FOR BACTERIA: CPT | Performed by: INTERNAL MEDICINE

## 2024-03-05 PROCEDURE — 25810000003 SODIUM CHLORIDE 0.9 % SOLUTION: Performed by: INTERNAL MEDICINE

## 2024-03-05 PROCEDURE — 83036 HEMOGLOBIN GLYCOSYLATED A1C: CPT | Performed by: HOSPITALIST

## 2024-03-05 PROCEDURE — 25010000002 MAGNESIUM SULFATE 2 GM/50ML SOLUTION: Performed by: NURSE PRACTITIONER

## 2024-03-05 PROCEDURE — 87493 C DIFF AMPLIFIED PROBE: CPT | Performed by: PHYSICIAN ASSISTANT

## 2024-03-05 PROCEDURE — 99254 IP/OBS CNSLTJ NEW/EST MOD 60: CPT | Performed by: INTERNAL MEDICINE

## 2024-03-05 PROCEDURE — 84300 ASSAY OF URINE SODIUM: CPT | Performed by: INTERNAL MEDICINE

## 2024-03-05 PROCEDURE — 25010000002 VANCOMYCIN 10 G RECONSTITUTED SOLUTION: Performed by: INTERNAL MEDICINE

## 2024-03-05 PROCEDURE — 25010000002 POTASSIUM CHLORIDE PER 2 MEQ: Performed by: HOSPITALIST

## 2024-03-05 PROCEDURE — 83605 ASSAY OF LACTIC ACID: CPT | Performed by: HOSPITALIST

## 2024-03-05 PROCEDURE — 82948 REAGENT STRIP/BLOOD GLUCOSE: CPT

## 2024-03-05 PROCEDURE — 84466 ASSAY OF TRANSFERRIN: CPT | Performed by: INTERNAL MEDICINE

## 2024-03-05 PROCEDURE — 80053 COMPREHEN METABOLIC PANEL: CPT | Performed by: HOSPITALIST

## 2024-03-05 PROCEDURE — 86140 C-REACTIVE PROTEIN: CPT | Performed by: HOSPITALIST

## 2024-03-05 PROCEDURE — 0 DEXTROSE 5 % SOLUTION 1,000 ML FLEX CONT: Performed by: NURSE PRACTITIONER

## 2024-03-05 PROCEDURE — 84100 ASSAY OF PHOSPHORUS: CPT | Performed by: HOSPITALIST

## 2024-03-05 PROCEDURE — 83540 ASSAY OF IRON: CPT | Performed by: INTERNAL MEDICINE

## 2024-03-05 PROCEDURE — 87507 IADNA-DNA/RNA PROBE TQ 12-25: CPT | Performed by: INTERNAL MEDICINE

## 2024-03-05 RX ORDER — POLYETHYLENE GLYCOL 3350 17 G/17G
17 POWDER, FOR SOLUTION ORAL DAILY PRN
Status: DISCONTINUED | OUTPATIENT
Start: 2024-03-05 | End: 2024-03-07 | Stop reason: HOSPADM

## 2024-03-05 RX ORDER — FERROUS SULFATE 325(65) MG
325 TABLET ORAL
Status: ON HOLD | COMMUNITY
End: 2024-03-05

## 2024-03-05 RX ORDER — PANTOPRAZOLE SODIUM 40 MG/10ML
40 INJECTION, POWDER, LYOPHILIZED, FOR SOLUTION INTRAVENOUS
Status: DISCONTINUED | OUTPATIENT
Start: 2024-03-05 | End: 2024-03-07 | Stop reason: HOSPADM

## 2024-03-05 RX ORDER — CHLORHEXIDINE GLUCONATE 500 MG/1
1 CLOTH TOPICAL EVERY 24 HOURS
Status: DISCONTINUED | OUTPATIENT
Start: 2024-03-06 | End: 2024-03-06 | Stop reason: HOSPADM

## 2024-03-05 RX ORDER — CHOLESTYRAMINE LIGHT 4 G/5.7G
4 POWDER, FOR SUSPENSION ORAL 2 TIMES DAILY
Status: ON HOLD | COMMUNITY
End: 2024-03-05

## 2024-03-05 RX ORDER — CHLORHEXIDINE GLUCONATE 500 MG/1
1 CLOTH TOPICAL ONCE
Status: COMPLETED | OUTPATIENT
Start: 2024-03-05 | End: 2024-03-05

## 2024-03-05 RX ORDER — ONDANSETRON 4 MG/1
4 TABLET, ORALLY DISINTEGRATING ORAL EVERY 6 HOURS PRN
Status: DISCONTINUED | OUTPATIENT
Start: 2024-03-05 | End: 2024-03-07 | Stop reason: HOSPADM

## 2024-03-05 RX ORDER — POTASSIUM CHLORIDE 29.8 MG/ML
20 INJECTION INTRAVENOUS
Status: COMPLETED | OUTPATIENT
Start: 2024-03-05 | End: 2024-03-05

## 2024-03-05 RX ORDER — ONDANSETRON 2 MG/ML
4 INJECTION INTRAMUSCULAR; INTRAVENOUS EVERY 6 HOURS PRN
Status: DISCONTINUED | OUTPATIENT
Start: 2024-03-05 | End: 2024-03-07 | Stop reason: HOSPADM

## 2024-03-05 RX ORDER — INSULIN LISPRO 100 [IU]/ML
2-7 INJECTION, SOLUTION INTRAVENOUS; SUBCUTANEOUS
Status: DISCONTINUED | OUTPATIENT
Start: 2024-03-05 | End: 2024-03-07 | Stop reason: HOSPADM

## 2024-03-05 RX ORDER — ROPINIROLE 1 MG/1
1 TABLET, FILM COATED ORAL 3 TIMES DAILY
Status: DISCONTINUED | OUTPATIENT
Start: 2024-03-05 | End: 2024-03-07 | Stop reason: HOSPADM

## 2024-03-05 RX ORDER — METOCLOPRAMIDE 5 MG/1
10 TABLET ORAL 3 TIMES DAILY
Status: ON HOLD | COMMUNITY
End: 2024-03-05

## 2024-03-05 RX ORDER — INSULIN PMP CART,AUT,G6/7,CNTR
EACH SUBCUTANEOUS
COMMUNITY

## 2024-03-05 RX ORDER — NIFEDIPINE 60 MG/1
60 TABLET, EXTENDED RELEASE ORAL DAILY
Status: ON HOLD | COMMUNITY
End: 2024-03-05

## 2024-03-05 RX ORDER — LEVOFLOXACIN 5 MG/ML
250 INJECTION, SOLUTION INTRAVENOUS EVERY 24 HOURS
Status: DISCONTINUED | OUTPATIENT
Start: 2024-03-06 | End: 2024-03-06

## 2024-03-05 RX ORDER — DEXTROSE MONOHYDRATE 25 G/50ML
25 INJECTION, SOLUTION INTRAVENOUS
Status: DISCONTINUED | OUTPATIENT
Start: 2024-03-05 | End: 2024-03-07 | Stop reason: HOSPADM

## 2024-03-05 RX ORDER — MAGNESIUM SULFATE HEPTAHYDRATE 40 MG/ML
2 INJECTION, SOLUTION INTRAVENOUS ONCE
Status: COMPLETED | OUTPATIENT
Start: 2024-03-05 | End: 2024-03-05

## 2024-03-05 RX ORDER — NICOTINE POLACRILEX 4 MG
15 LOZENGE BUCCAL
Status: DISCONTINUED | OUTPATIENT
Start: 2024-03-05 | End: 2024-03-07 | Stop reason: HOSPADM

## 2024-03-05 RX ORDER — ATORVASTATIN CALCIUM 40 MG/1
80 TABLET, FILM COATED ORAL NIGHTLY
Status: DISCONTINUED | OUTPATIENT
Start: 2024-03-05 | End: 2024-03-07 | Stop reason: HOSPADM

## 2024-03-05 RX ORDER — SODIUM CHLORIDE 9 MG/ML
40 INJECTION, SOLUTION INTRAVENOUS AS NEEDED
Status: DISCONTINUED | OUTPATIENT
Start: 2024-03-05 | End: 2024-03-07 | Stop reason: HOSPADM

## 2024-03-05 RX ORDER — AMOXICILLIN 250 MG
2 CAPSULE ORAL 2 TIMES DAILY
Status: DISCONTINUED | OUTPATIENT
Start: 2024-03-05 | End: 2024-03-07 | Stop reason: HOSPADM

## 2024-03-05 RX ORDER — LEVOFLOXACIN 5 MG/ML
500 INJECTION, SOLUTION INTRAVENOUS EVERY 24 HOURS
Status: DISCONTINUED | OUTPATIENT
Start: 2024-03-05 | End: 2024-03-05

## 2024-03-05 RX ORDER — OMEPRAZOLE 40 MG/1
40 CAPSULE, DELAYED RELEASE ORAL EVERY OTHER DAY
Status: ON HOLD | COMMUNITY
End: 2024-03-05

## 2024-03-05 RX ORDER — SODIUM CHLORIDE 0.9 % (FLUSH) 0.9 %
10 SYRINGE (ML) INJECTION EVERY 12 HOURS SCHEDULED
Status: DISCONTINUED | OUTPATIENT
Start: 2024-03-05 | End: 2024-03-07 | Stop reason: HOSPADM

## 2024-03-05 RX ORDER — NOREPINEPHRINE BITARTRATE 0.03 MG/ML
.02-.3 INJECTION, SOLUTION INTRAVENOUS
Status: DISCONTINUED | OUTPATIENT
Start: 2024-03-05 | End: 2024-03-06 | Stop reason: HOSPADM

## 2024-03-05 RX ORDER — SODIUM BICARBONATE 650 MG/1
650 TABLET ORAL 2 TIMES DAILY
Status: DISCONTINUED | OUTPATIENT
Start: 2024-03-05 | End: 2024-03-05

## 2024-03-05 RX ORDER — SODIUM CHLORIDE 0.9 % (FLUSH) 0.9 %
10 SYRINGE (ML) INJECTION AS NEEDED
Status: DISCONTINUED | OUTPATIENT
Start: 2024-03-05 | End: 2024-03-07 | Stop reason: HOSPADM

## 2024-03-05 RX ORDER — METOPROLOL SUCCINATE 25 MG/1
25 TABLET, EXTENDED RELEASE ORAL DAILY
Status: ON HOLD | COMMUNITY
End: 2024-03-05

## 2024-03-05 RX ORDER — SODIUM CHLORIDE 450 MG/100ML
100 INJECTION, SOLUTION INTRAVENOUS CONTINUOUS
Status: DISCONTINUED | OUTPATIENT
Start: 2024-03-05 | End: 2024-03-05

## 2024-03-05 RX ORDER — NITROGLYCERIN 0.4 MG/1
0.4 TABLET SUBLINGUAL
Status: DISCONTINUED | OUTPATIENT
Start: 2024-03-05 | End: 2024-03-07 | Stop reason: HOSPADM

## 2024-03-05 RX ORDER — ENOXAPARIN SODIUM 100 MG/ML
30 INJECTION SUBCUTANEOUS DAILY
Status: DISCONTINUED | OUTPATIENT
Start: 2024-03-05 | End: 2024-03-07 | Stop reason: HOSPADM

## 2024-03-05 RX ORDER — INSULIN ASPART 100 [IU]/ML
20 INJECTION, SOLUTION INTRAVENOUS; SUBCUTANEOUS
Status: ON HOLD | COMMUNITY
End: 2024-03-05

## 2024-03-05 RX ADMIN — ROPINIROLE HYDROCHLORIDE 1 MG: 1 TABLET, FILM COATED ORAL at 16:43

## 2024-03-05 RX ADMIN — POTASSIUM CHLORIDE 20 MEQ: 29.8 INJECTION, SOLUTION INTRAVENOUS at 06:50

## 2024-03-05 RX ADMIN — ROPINIROLE HYDROCHLORIDE 1 MG: 1 TABLET, FILM COATED ORAL at 11:10

## 2024-03-05 RX ADMIN — SODIUM BICARBONATE: 84 INJECTION, SOLUTION INTRAVENOUS at 17:55

## 2024-03-05 RX ADMIN — SODIUM BICARBONATE: 84 INJECTION, SOLUTION INTRAVENOUS at 09:08

## 2024-03-05 RX ADMIN — Medication 1 APPLICATION: at 06:51

## 2024-03-05 RX ADMIN — ATORVASTATIN CALCIUM 80 MG: 40 TABLET ORAL at 20:26

## 2024-03-05 RX ADMIN — LEVOFLOXACIN 500 MG: 500 INJECTION, SOLUTION INTRAVENOUS at 11:10

## 2024-03-05 RX ADMIN — NOREPINEPHRINE BITARTRATE 0.02 MCG/KG/MIN: 0.03 INJECTION, SOLUTION INTRAVENOUS at 02:01

## 2024-03-05 RX ADMIN — PANCRELIPASE 24000 UNITS OF LIPASE: 60000; 12000; 38000 CAPSULE, DELAYED RELEASE PELLETS ORAL at 20:26

## 2024-03-05 RX ADMIN — ENOXAPARIN SODIUM 30 MG: 100 INJECTION SUBCUTANEOUS at 09:27

## 2024-03-05 RX ADMIN — Medication 1 APPLICATION: at 20:26

## 2024-03-05 RX ADMIN — PANCRELIPASE 24000 UNITS OF LIPASE: 60000; 12000; 38000 CAPSULE, DELAYED RELEASE PELLETS ORAL at 16:43

## 2024-03-05 RX ADMIN — SODIUM CHLORIDE 125 ML/HR: 9 INJECTION, SOLUTION INTRAVENOUS at 00:24

## 2024-03-05 RX ADMIN — ROPINIROLE HYDROCHLORIDE 1 MG: 1 TABLET, FILM COATED ORAL at 20:26

## 2024-03-05 RX ADMIN — Medication 10 ML: at 20:26

## 2024-03-05 RX ADMIN — VANCOMYCIN HYDROCHLORIDE 1250 MG: 10 INJECTION, POWDER, LYOPHILIZED, FOR SOLUTION INTRAVENOUS at 09:08

## 2024-03-05 RX ADMIN — Medication 10 ML: at 09:26

## 2024-03-05 RX ADMIN — NOREPINEPHRINE BITARTRATE 0.04 MCG/KG/MIN: 0.03 INJECTION, SOLUTION INTRAVENOUS at 17:54

## 2024-03-05 RX ADMIN — PANTOPRAZOLE SODIUM 40 MG: 40 INJECTION, POWDER, FOR SOLUTION INTRAVENOUS at 06:50

## 2024-03-05 RX ADMIN — CHLORHEXIDINE GLUCONATE 1 APPLICATION: 500 CLOTH TOPICAL at 06:51

## 2024-03-05 RX ADMIN — POTASSIUM CHLORIDE 20 MEQ: 29.8 INJECTION, SOLUTION INTRAVENOUS at 04:45

## 2024-03-05 RX ADMIN — MAGNESIUM SULFATE HEPTAHYDRATE 2 G: 2 INJECTION, SOLUTION INTRAVENOUS at 09:08

## 2024-03-05 RX ADMIN — NOREPINEPHRINE BITARTRATE 0.04 MCG/KG/MIN: 0.03 INJECTION, SOLUTION INTRAVENOUS at 17:03

## 2024-03-05 RX ADMIN — SODIUM BICARBONATE: 84 INJECTION, SOLUTION INTRAVENOUS at 09:15

## 2024-03-05 RX ADMIN — PANCRELIPASE 24000 UNITS OF LIPASE: 60000; 12000; 38000 CAPSULE, DELAYED RELEASE PELLETS ORAL at 11:09

## 2024-03-06 PROBLEM — R65.10 SIRS (SYSTEMIC INFLAMMATORY RESPONSE SYNDROME): Status: ACTIVE | Noted: 2024-03-06

## 2024-03-06 LAB
ALBUMIN SERPL-MCNC: 2.8 G/DL (ref 3.5–5.2)
ALBUMIN/GLOB SERPL: 1.1 G/DL
ALP SERPL-CCNC: 71 U/L (ref 39–117)
ALT SERPL W P-5'-P-CCNC: 9 U/L (ref 1–33)
ANION GAP SERPL CALCULATED.3IONS-SCNC: 9 MMOL/L (ref 5–15)
AST SERPL-CCNC: 10 U/L (ref 1–32)
BASOPHILS # BLD AUTO: 0.02 10*3/MM3 (ref 0–0.2)
BASOPHILS NFR BLD AUTO: 0.2 % (ref 0–1.5)
BILIRUB SERPL-MCNC: <0.2 MG/DL (ref 0–1.2)
BUN SERPL-MCNC: 16 MG/DL (ref 6–20)
BUN/CREAT SERPL: 12.8 (ref 7–25)
CALCIUM SPEC-SCNC: 7.3 MG/DL (ref 8.6–10.5)
CHLORIDE SERPL-SCNC: 97 MMOL/L (ref 98–107)
CO2 SERPL-SCNC: 33 MMOL/L (ref 22–29)
CREAT SERPL-MCNC: 1.25 MG/DL (ref 0.57–1)
CREAT UR-MCNC: 25.1 MG/DL
DEPRECATED RDW RBC AUTO: 53.1 FL (ref 37–54)
EGFRCR SERPLBLD CKD-EPI 2021: 52 ML/MIN/1.73
EOSINOPHIL # BLD AUTO: 0.17 10*3/MM3 (ref 0–0.4)
EOSINOPHIL NFR BLD AUTO: 2.1 % (ref 0.3–6.2)
ERYTHROCYTE [DISTWIDTH] IN BLOOD BY AUTOMATED COUNT: 15.3 % (ref 12.3–15.4)
GLOBULIN UR ELPH-MCNC: 2.5 GM/DL
GLUCOSE BLDC GLUCOMTR-MCNC: 122 MG/DL (ref 70–130)
GLUCOSE BLDC GLUCOMTR-MCNC: 128 MG/DL (ref 70–130)
GLUCOSE BLDC GLUCOMTR-MCNC: 158 MG/DL (ref 70–130)
GLUCOSE BLDC GLUCOMTR-MCNC: 98 MG/DL (ref 70–130)
GLUCOSE SERPL-MCNC: 160 MG/DL (ref 65–99)
HCT VFR BLD AUTO: 26.1 % (ref 34–46.6)
HGB BLD-MCNC: 8.4 G/DL (ref 12–15.9)
IMM GRANULOCYTES # BLD AUTO: 0.07 10*3/MM3 (ref 0–0.05)
IMM GRANULOCYTES NFR BLD AUTO: 0.9 % (ref 0–0.5)
LYMPHOCYTES # BLD AUTO: 2.53 10*3/MM3 (ref 0.7–3.1)
LYMPHOCYTES NFR BLD AUTO: 31.6 % (ref 19.6–45.3)
MAGNESIUM SERPL-MCNC: 1.5 MG/DL (ref 1.6–2.6)
MAGNESIUM SERPL-MCNC: 2.1 MG/DL (ref 1.6–2.6)
MCH RBC QN AUTO: 30.3 PG (ref 26.6–33)
MCHC RBC AUTO-ENTMCNC: 32.2 G/DL (ref 31.5–35.7)
MCV RBC AUTO: 94.2 FL (ref 79–97)
MONOCYTES # BLD AUTO: 0.43 10*3/MM3 (ref 0.1–0.9)
MONOCYTES NFR BLD AUTO: 5.4 % (ref 5–12)
NEUTROPHILS NFR BLD AUTO: 4.79 10*3/MM3 (ref 1.7–7)
NEUTROPHILS NFR BLD AUTO: 59.8 % (ref 42.7–76)
NRBC BLD AUTO-RTO: 0 /100 WBC (ref 0–0.2)
PLATELET # BLD AUTO: 276 10*3/MM3 (ref 140–450)
PMV BLD AUTO: 10 FL (ref 6–12)
POTASSIUM SERPL-SCNC: 2.7 MMOL/L (ref 3.5–5.2)
POTASSIUM SERPL-SCNC: 4.1 MMOL/L (ref 3.5–5.2)
PROT SERPL-MCNC: 5.3 G/DL (ref 6–8.5)
RBC # BLD AUTO: 2.77 10*6/MM3 (ref 3.77–5.28)
SODIUM SERPL-SCNC: 139 MMOL/L (ref 136–145)
WBC NRBC COR # BLD AUTO: 8.01 10*3/MM3 (ref 3.4–10.8)

## 2024-03-06 PROCEDURE — 84132 ASSAY OF SERUM POTASSIUM: CPT | Performed by: INTERNAL MEDICINE

## 2024-03-06 PROCEDURE — 99232 SBSQ HOSP IP/OBS MODERATE 35: CPT | Performed by: INTERNAL MEDICINE

## 2024-03-06 PROCEDURE — 80053 COMPREHEN METABOLIC PANEL: CPT | Performed by: NURSE PRACTITIONER

## 2024-03-06 PROCEDURE — 25810000003 SODIUM CHLORIDE 0.9 % SOLUTION: Performed by: NURSE PRACTITIONER

## 2024-03-06 PROCEDURE — 82948 REAGENT STRIP/BLOOD GLUCOSE: CPT

## 2024-03-06 PROCEDURE — 25010000002 MAGNESIUM SULFATE 2 GM/50ML SOLUTION: Performed by: INTERNAL MEDICINE

## 2024-03-06 PROCEDURE — 63710000001 INSULIN LISPRO (HUMAN) PER 5 UNITS: Performed by: INTERNAL MEDICINE

## 2024-03-06 PROCEDURE — 85025 COMPLETE CBC W/AUTO DIFF WBC: CPT | Performed by: NURSE PRACTITIONER

## 2024-03-06 PROCEDURE — 25810000003 SODIUM CHLORIDE 0.9 % SOLUTION: Performed by: INTERNAL MEDICINE

## 2024-03-06 PROCEDURE — 83735 ASSAY OF MAGNESIUM: CPT | Performed by: INTERNAL MEDICINE

## 2024-03-06 PROCEDURE — 0 DEXTROSE 5 % SOLUTION 1,000 ML FLEX CONT: Performed by: NURSE PRACTITIONER

## 2024-03-06 PROCEDURE — 25010000002 LEVOFLOXACIN PER 250 MG: Performed by: INTERNAL MEDICINE

## 2024-03-06 PROCEDURE — 25010000002 ENOXAPARIN PER 10 MG: Performed by: HOSPITALIST

## 2024-03-06 PROCEDURE — 25010000002 VANCOMYCIN 10 G RECONSTITUTED SOLUTION: Performed by: INTERNAL MEDICINE

## 2024-03-06 RX ORDER — MAGNESIUM SULFATE HEPTAHYDRATE 40 MG/ML
2 INJECTION, SOLUTION INTRAVENOUS ONCE
Status: DISCONTINUED | OUTPATIENT
Start: 2024-03-06 | End: 2024-03-06

## 2024-03-06 RX ORDER — SODIUM CHLORIDE 9 MG/ML
75 INJECTION, SOLUTION INTRAVENOUS CONTINUOUS
Status: DISCONTINUED | OUTPATIENT
Start: 2024-03-06 | End: 2024-03-07 | Stop reason: HOSPADM

## 2024-03-06 RX ORDER — MAGNESIUM SULFATE HEPTAHYDRATE 40 MG/ML
2 INJECTION, SOLUTION INTRAVENOUS ONCE
Status: COMPLETED | OUTPATIENT
Start: 2024-03-06 | End: 2024-03-06

## 2024-03-06 RX ORDER — POTASSIUM CHLORIDE 750 MG/1
40 CAPSULE, EXTENDED RELEASE ORAL EVERY 4 HOURS
Status: COMPLETED | OUTPATIENT
Start: 2024-03-06 | End: 2024-03-06

## 2024-03-06 RX ADMIN — POTASSIUM CHLORIDE 40 MEQ: 10 CAPSULE, COATED, EXTENDED RELEASE ORAL at 13:26

## 2024-03-06 RX ADMIN — SODIUM BICARBONATE: 84 INJECTION, SOLUTION INTRAVENOUS at 08:08

## 2024-03-06 RX ADMIN — POTASSIUM CHLORIDE 40 MEQ: 10 CAPSULE, COATED, EXTENDED RELEASE ORAL at 18:21

## 2024-03-06 RX ADMIN — VANCOMYCIN HYDROCHLORIDE 750 MG: 10 INJECTION, POWDER, LYOPHILIZED, FOR SOLUTION INTRAVENOUS at 16:46

## 2024-03-06 RX ADMIN — SODIUM CHLORIDE 75 ML/HR: 9 INJECTION, SOLUTION INTRAVENOUS at 10:02

## 2024-03-06 RX ADMIN — ATORVASTATIN CALCIUM 80 MG: 40 TABLET ORAL at 23:29

## 2024-03-06 RX ADMIN — PANCRELIPASE 24000 UNITS OF LIPASE: 60000; 12000; 38000 CAPSULE, DELAYED RELEASE PELLETS ORAL at 23:29

## 2024-03-06 RX ADMIN — ROPINIROLE HYDROCHLORIDE 1 MG: 1 TABLET, FILM COATED ORAL at 08:08

## 2024-03-06 RX ADMIN — PANCRELIPASE 24000 UNITS OF LIPASE: 60000; 12000; 38000 CAPSULE, DELAYED RELEASE PELLETS ORAL at 18:21

## 2024-03-06 RX ADMIN — PANCRELIPASE 24000 UNITS OF LIPASE: 60000; 12000; 38000 CAPSULE, DELAYED RELEASE PELLETS ORAL at 07:44

## 2024-03-06 RX ADMIN — ROPINIROLE HYDROCHLORIDE 1 MG: 1 TABLET, FILM COATED ORAL at 22:19

## 2024-03-06 RX ADMIN — PANCRELIPASE 24000 UNITS OF LIPASE: 60000; 12000; 38000 CAPSULE, DELAYED RELEASE PELLETS ORAL at 11:39

## 2024-03-06 RX ADMIN — MAGNESIUM SULFATE HEPTAHYDRATE 2 G: 2 INJECTION, SOLUTION INTRAVENOUS at 10:02

## 2024-03-06 RX ADMIN — Medication 10 ML: at 22:20

## 2024-03-06 RX ADMIN — SODIUM BICARBONATE: 84 INJECTION, SOLUTION INTRAVENOUS at 00:11

## 2024-03-06 RX ADMIN — INSULIN LISPRO 2 UNITS: 100 INJECTION, SOLUTION INTRAVENOUS; SUBCUTANEOUS at 07:44

## 2024-03-06 RX ADMIN — CHLORHEXIDINE GLUCONATE 1 APPLICATION: 500 CLOTH TOPICAL at 04:14

## 2024-03-06 RX ADMIN — LEVOFLOXACIN 250 MG: 250 INJECTION, SOLUTION INTRAVENOUS at 09:05

## 2024-03-06 RX ADMIN — SODIUM CHLORIDE 75 ML/HR: 9 INJECTION, SOLUTION INTRAVENOUS at 23:29

## 2024-03-06 RX ADMIN — ROPINIROLE HYDROCHLORIDE 1 MG: 1 TABLET, FILM COATED ORAL at 16:46

## 2024-03-06 RX ADMIN — POTASSIUM CHLORIDE 40 MEQ: 10 CAPSULE, COATED, EXTENDED RELEASE ORAL at 09:05

## 2024-03-06 RX ADMIN — PANTOPRAZOLE SODIUM 40 MG: 40 INJECTION, POWDER, FOR SOLUTION INTRAVENOUS at 06:17

## 2024-03-06 RX ADMIN — ENOXAPARIN SODIUM 30 MG: 100 INJECTION SUBCUTANEOUS at 08:08

## 2024-03-06 RX ADMIN — Medication 1 APPLICATION: at 08:08

## 2024-03-06 RX ADMIN — Medication 10 ML: at 08:08

## 2024-03-07 ENCOUNTER — READMISSION MANAGEMENT (OUTPATIENT)
Dept: CALL CENTER | Facility: HOSPITAL | Age: 54
End: 2024-03-07
Payer: COMMERCIAL

## 2024-03-07 VITALS
BODY MASS INDEX: 19.36 KG/M2 | HEIGHT: 60 IN | WEIGHT: 98.6 LBS | OXYGEN SATURATION: 100 % | SYSTOLIC BLOOD PRESSURE: 106 MMHG | DIASTOLIC BLOOD PRESSURE: 51 MMHG | HEART RATE: 86 BPM | TEMPERATURE: 97.9 F | RESPIRATION RATE: 16 BRPM

## 2024-03-07 LAB
ALBUMIN SERPL-MCNC: 2.7 G/DL (ref 3.5–5.2)
ALBUMIN/GLOB SERPL: 1.1 G/DL
ALP SERPL-CCNC: 73 U/L (ref 39–117)
ALT SERPL W P-5'-P-CCNC: 6 U/L (ref 1–33)
ANION GAP SERPL CALCULATED.3IONS-SCNC: 4 MMOL/L (ref 5–15)
AST SERPL-CCNC: 11 U/L (ref 1–32)
BASOPHILS # BLD AUTO: 0.03 10*3/MM3 (ref 0–0.2)
BASOPHILS NFR BLD AUTO: 0.4 % (ref 0–1.5)
BILIRUB SERPL-MCNC: <0.2 MG/DL (ref 0–1.2)
BUN SERPL-MCNC: 10 MG/DL (ref 6–20)
BUN/CREAT SERPL: 8.5 (ref 7–25)
CALCIUM SPEC-SCNC: 7.1 MG/DL (ref 8.6–10.5)
CHLORIDE SERPL-SCNC: 103 MMOL/L (ref 98–107)
CO2 SERPL-SCNC: 30 MMOL/L (ref 22–29)
CREAT SERPL-MCNC: 1.18 MG/DL (ref 0.57–1)
DEPRECATED RDW RBC AUTO: 55.3 FL (ref 37–54)
EGFRCR SERPLBLD CKD-EPI 2021: 55.7 ML/MIN/1.73
EOSINOPHIL # BLD AUTO: 0.18 10*3/MM3 (ref 0–0.4)
EOSINOPHIL NFR BLD AUTO: 2.6 % (ref 0.3–6.2)
ERYTHROCYTE [DISTWIDTH] IN BLOOD BY AUTOMATED COUNT: 15.5 % (ref 12.3–15.4)
GLOBULIN UR ELPH-MCNC: 2.4 GM/DL
GLUCOSE BLDC GLUCOMTR-MCNC: 129 MG/DL (ref 70–130)
GLUCOSE SERPL-MCNC: 104 MG/DL (ref 65–99)
HCT VFR BLD AUTO: 28.9 % (ref 34–46.6)
HGB BLD-MCNC: 9.1 G/DL (ref 12–15.9)
IMM GRANULOCYTES # BLD AUTO: 0.03 10*3/MM3 (ref 0–0.05)
IMM GRANULOCYTES NFR BLD AUTO: 0.4 % (ref 0–0.5)
LYMPHOCYTES # BLD AUTO: 2.62 10*3/MM3 (ref 0.7–3.1)
LYMPHOCYTES NFR BLD AUTO: 37.3 % (ref 19.6–45.3)
MAGNESIUM SERPL-MCNC: 1.9 MG/DL (ref 1.6–2.6)
MCH RBC QN AUTO: 30.7 PG (ref 26.6–33)
MCHC RBC AUTO-ENTMCNC: 31.5 G/DL (ref 31.5–35.7)
MCV RBC AUTO: 97.6 FL (ref 79–97)
MONOCYTES # BLD AUTO: 0.49 10*3/MM3 (ref 0.1–0.9)
MONOCYTES NFR BLD AUTO: 7 % (ref 5–12)
NEUTROPHILS NFR BLD AUTO: 3.67 10*3/MM3 (ref 1.7–7)
NEUTROPHILS NFR BLD AUTO: 52.3 % (ref 42.7–76)
NRBC BLD AUTO-RTO: 0 /100 WBC (ref 0–0.2)
PLATELET # BLD AUTO: 305 10*3/MM3 (ref 140–450)
PMV BLD AUTO: 9.8 FL (ref 6–12)
POTASSIUM SERPL-SCNC: 4.6 MMOL/L (ref 3.5–5.2)
PROT SERPL-MCNC: 5.1 G/DL (ref 6–8.5)
RBC # BLD AUTO: 2.96 10*6/MM3 (ref 3.77–5.28)
SODIUM SERPL-SCNC: 137 MMOL/L (ref 136–145)
WBC NRBC COR # BLD AUTO: 7.02 10*3/MM3 (ref 3.4–10.8)

## 2024-03-07 PROCEDURE — 85025 COMPLETE CBC W/AUTO DIFF WBC: CPT | Performed by: NURSE PRACTITIONER

## 2024-03-07 PROCEDURE — 25010000002 ENOXAPARIN PER 10 MG: Performed by: HOSPITALIST

## 2024-03-07 PROCEDURE — 80053 COMPREHEN METABOLIC PANEL: CPT | Performed by: NURSE PRACTITIONER

## 2024-03-07 PROCEDURE — 83735 ASSAY OF MAGNESIUM: CPT | Performed by: INTERNAL MEDICINE

## 2024-03-07 PROCEDURE — 82948 REAGENT STRIP/BLOOD GLUCOSE: CPT

## 2024-03-07 RX ADMIN — PANTOPRAZOLE SODIUM 40 MG: 40 INJECTION, POWDER, FOR SOLUTION INTRAVENOUS at 06:16

## 2024-03-07 RX ADMIN — ROPINIROLE HYDROCHLORIDE 1 MG: 1 TABLET, FILM COATED ORAL at 08:19

## 2024-03-07 RX ADMIN — DOCUSATE SODIUM 50 MG AND SENNOSIDES 8.6 MG 2 TABLET: 8.6; 5 TABLET, FILM COATED ORAL at 08:18

## 2024-03-07 RX ADMIN — ENOXAPARIN SODIUM 30 MG: 100 INJECTION SUBCUTANEOUS at 08:18

## 2024-03-07 RX ADMIN — PANCRELIPASE 24000 UNITS OF LIPASE: 60000; 12000; 38000 CAPSULE, DELAYED RELEASE PELLETS ORAL at 08:19

## 2024-03-08 ENCOUNTER — OFFICE VISIT (OUTPATIENT)
Age: 54
End: 2024-03-08
Payer: COMMERCIAL

## 2024-03-08 VITALS
DIASTOLIC BLOOD PRESSURE: 82 MMHG | BODY MASS INDEX: 19.01 KG/M2 | SYSTOLIC BLOOD PRESSURE: 134 MMHG | TEMPERATURE: 96.7 F | HEART RATE: 112 BPM | HEIGHT: 60 IN | OXYGEN SATURATION: 99 % | WEIGHT: 96.8 LBS

## 2024-03-08 DIAGNOSIS — A49.8 INFECTION DUE TO STENOTROPHOMONAS MALTOPHILIA: Primary | ICD-10-CM

## 2024-03-08 DIAGNOSIS — A09 DIARRHEA OF INFECTIOUS ORIGIN: ICD-10-CM

## 2024-03-08 PROCEDURE — 99213 OFFICE O/P EST LOW 20 MIN: CPT | Performed by: INTERNAL MEDICINE

## 2024-03-08 PROCEDURE — 1160F RVW MEDS BY RX/DR IN RCRD: CPT | Performed by: INTERNAL MEDICINE

## 2024-03-08 PROCEDURE — 1159F MED LIST DOCD IN RCRD: CPT | Performed by: INTERNAL MEDICINE

## 2024-03-09 LAB
BACTERIA SPEC AEROBE CULT: NORMAL
BACTERIA SPEC AEROBE CULT: NORMAL

## 2024-03-10 LAB — BACTERIA SPEC AEROBE CULT: NORMAL

## 2024-03-13 ENCOUNTER — READMISSION MANAGEMENT (OUTPATIENT)
Dept: CALL CENTER | Facility: HOSPITAL | Age: 54
End: 2024-03-13
Payer: COMMERCIAL

## 2024-03-22 ENCOUNTER — READMISSION MANAGEMENT (OUTPATIENT)
Dept: CALL CENTER | Facility: HOSPITAL | Age: 54
End: 2024-03-22
Payer: COMMERCIAL

## 2024-04-23 ENCOUNTER — HOSPITAL ENCOUNTER (INPATIENT)
Facility: HOSPITAL | Age: 54
LOS: 3 days | Discharge: HOME | DRG: 682 | End: 2024-04-26
Attending: INTERNAL MEDICINE | Admitting: INTERNAL MEDICINE
Payer: COMMERCIAL

## 2024-04-23 DIAGNOSIS — R30.0 DYSURIA: ICD-10-CM

## 2024-04-23 DIAGNOSIS — N17.9 AKI (ACUTE KIDNEY INJURY): Primary | ICD-10-CM

## 2024-04-23 LAB
ALBUMIN SERPL-MCNC: 4.5 G/DL (ref 3.5–5.2)
ALBUMIN/GLOB SERPL: 1.3 G/DL
ALP SERPL-CCNC: 137 U/L (ref 39–117)
ALT SERPL W P-5'-P-CCNC: 27 U/L (ref 1–33)
ANION GAP SERPL CALCULATED.3IONS-SCNC: 16 MMOL/L (ref 5–15)
AST SERPL-CCNC: 24 U/L (ref 1–32)
BACTERIA UR QL AUTO: ABNORMAL /HPF
BASOPHILS # BLD AUTO: 0.09 10*3/MM3 (ref 0–0.2)
BASOPHILS NFR BLD AUTO: 0.5 % (ref 0–1.5)
BILIRUB SERPL-MCNC: 0.2 MG/DL (ref 0–1.2)
BILIRUB UR QL STRIP: NEGATIVE
BUN SERPL-MCNC: 47 MG/DL (ref 6–20)
BUN/CREAT SERPL: 17.5 (ref 7–25)
CALCIUM SPEC-SCNC: 10.1 MG/DL (ref 8.6–10.5)
CHLORIDE SERPL-SCNC: 101 MMOL/L (ref 98–107)
CLARITY UR: CLEAR
CO2 SERPL-SCNC: 18 MMOL/L (ref 22–29)
COLOR UR: ABNORMAL
CREAT SERPL-MCNC: 2.68 MG/DL (ref 0.57–1)
DEPRECATED RDW RBC AUTO: 52 FL (ref 37–54)
EGFRCR SERPLBLD CKD-EPI 2021: 20.8 ML/MIN/1.73
EOSINOPHIL # BLD AUTO: 0.17 10*3/MM3 (ref 0–0.4)
EOSINOPHIL NFR BLD AUTO: 0.9 % (ref 0.3–6.2)
ERYTHROCYTE [DISTWIDTH] IN BLOOD BY AUTOMATED COUNT: 14.7 % (ref 12.3–15.4)
GLOBULIN UR ELPH-MCNC: 3.6 GM/DL
GLUCOSE BLDC GLUCOMTR-MCNC: 270 MG/DL (ref 70–130)
GLUCOSE SERPL-MCNC: 220 MG/DL (ref 65–99)
GLUCOSE UR STRIP-MCNC: ABNORMAL MG/DL
HCT VFR BLD AUTO: 45.7 % (ref 34–46.6)
HGB BLD-MCNC: 14.9 G/DL (ref 12–15.9)
HGB UR QL STRIP.AUTO: NEGATIVE
HYALINE CASTS UR QL AUTO: ABNORMAL /LPF
IMM GRANULOCYTES # BLD AUTO: 0.25 10*3/MM3 (ref 0–0.05)
IMM GRANULOCYTES NFR BLD AUTO: 1.4 % (ref 0–0.5)
KETONES UR QL STRIP: ABNORMAL
LEUKOCYTE ESTERASE UR QL STRIP.AUTO: NEGATIVE
LYMPHOCYTES # BLD AUTO: 3.64 10*3/MM3 (ref 0.7–3.1)
LYMPHOCYTES NFR BLD AUTO: 20.2 % (ref 19.6–45.3)
MAGNESIUM SERPL-MCNC: 1.8 MG/DL (ref 1.6–2.6)
MCH RBC QN AUTO: 31.3 PG (ref 26.6–33)
MCHC RBC AUTO-ENTMCNC: 32.6 G/DL (ref 31.5–35.7)
MCV RBC AUTO: 96 FL (ref 79–97)
MONOCYTES # BLD AUTO: 0.98 10*3/MM3 (ref 0.1–0.9)
MONOCYTES NFR BLD AUTO: 5.4 % (ref 5–12)
NEUTROPHILS NFR BLD AUTO: 12.93 10*3/MM3 (ref 1.7–7)
NEUTROPHILS NFR BLD AUTO: 71.6 % (ref 42.7–76)
NITRITE UR QL STRIP: NEGATIVE
NRBC BLD AUTO-RTO: 0 /100 WBC (ref 0–0.2)
PH UR STRIP.AUTO: 5.5 [PH] (ref 5–8)
PHOSPHATE SERPL-MCNC: 7.4 MG/DL (ref 2.5–4.5)
PLATELET # BLD AUTO: 297 10*3/MM3 (ref 140–450)
PMV BLD AUTO: 10.9 FL (ref 6–12)
POTASSIUM SERPL-SCNC: 3.4 MMOL/L (ref 3.5–5.2)
PROT SERPL-MCNC: 8.1 G/DL (ref 6–8.5)
PROT UR QL STRIP: ABNORMAL
RBC # BLD AUTO: 4.76 10*6/MM3 (ref 3.77–5.28)
RBC # UR STRIP: ABNORMAL /HPF
REF LAB TEST METHOD: ABNORMAL
SODIUM SERPL-SCNC: 135 MMOL/L (ref 136–145)
SP GR UR STRIP: 1.03 (ref 1–1.03)
SQUAMOUS #/AREA URNS HPF: ABNORMAL /HPF
UROBILINOGEN UR QL STRIP: ABNORMAL
WBC # UR STRIP: ABNORMAL /HPF
WBC NRBC COR # BLD AUTO: 18.06 10*3/MM3 (ref 3.4–10.8)

## 2024-04-23 PROCEDURE — 25810000003 SODIUM CHLORIDE 0.9 % SOLUTION

## 2024-04-23 PROCEDURE — 82948 REAGENT STRIP/BLOOD GLUCOSE: CPT

## 2024-04-23 PROCEDURE — 81001 URINALYSIS AUTO W/SCOPE: CPT

## 2024-04-23 PROCEDURE — 80053 COMPREHEN METABOLIC PANEL: CPT

## 2024-04-23 PROCEDURE — 25010000002 SODIUM CHLORIDE 0.9 % WITH KCL 20 MEQ 20-0.9 MEQ/L-% SOLUTION: Performed by: NURSE PRACTITIONER

## 2024-04-23 PROCEDURE — G0378 HOSPITAL OBSERVATION PER HR: HCPCS

## 2024-04-23 PROCEDURE — 63710000001 INSULIN LISPRO (HUMAN) PER 5 UNITS: Performed by: NURSE PRACTITIONER

## 2024-04-23 PROCEDURE — 84100 ASSAY OF PHOSPHORUS: CPT | Performed by: NURSE PRACTITIONER

## 2024-04-23 PROCEDURE — 85025 COMPLETE CBC W/AUTO DIFF WBC: CPT

## 2024-04-23 PROCEDURE — 83735 ASSAY OF MAGNESIUM: CPT | Performed by: NURSE PRACTITIONER

## 2024-04-23 PROCEDURE — 99285 EMERGENCY DEPT VISIT HI MDM: CPT

## 2024-04-23 PROCEDURE — 96365 THER/PROPH/DIAG IV INF INIT: CPT

## 2024-04-23 PROCEDURE — 25010000002 CEFTRIAXONE PER 250 MG: Performed by: NURSE PRACTITIONER

## 2024-04-23 RX ORDER — ALBUTEROL SULFATE 90 UG/1
2 AEROSOL, METERED RESPIRATORY (INHALATION) 3 TIMES DAILY PRN
Status: DISCONTINUED | OUTPATIENT
Start: 2024-04-23 | End: 2024-04-23 | Stop reason: CLARIF

## 2024-04-23 RX ORDER — SACCHAROMYCES BOULARDII 250 MG
250 CAPSULE ORAL 2 TIMES DAILY
Status: DISCONTINUED | OUTPATIENT
Start: 2024-04-23 | End: 2024-04-26 | Stop reason: HOSPADM

## 2024-04-23 RX ORDER — ROPINIROLE 1 MG/1
1 TABLET, FILM COATED ORAL 3 TIMES DAILY
Status: DISCONTINUED | OUTPATIENT
Start: 2024-04-23 | End: 2024-04-26 | Stop reason: HOSPADM

## 2024-04-23 RX ORDER — INSULIN LISPRO 100 [IU]/ML
2-7 INJECTION, SOLUTION INTRAVENOUS; SUBCUTANEOUS
Status: DISCONTINUED | OUTPATIENT
Start: 2024-04-23 | End: 2024-04-26 | Stop reason: HOSPADM

## 2024-04-23 RX ORDER — ACETAMINOPHEN 160 MG/5ML
650 SOLUTION ORAL EVERY 4 HOURS PRN
Status: DISCONTINUED | OUTPATIENT
Start: 2024-04-23 | End: 2024-04-26 | Stop reason: HOSPADM

## 2024-04-23 RX ORDER — SODIUM CHLORIDE AND POTASSIUM CHLORIDE 150; 900 MG/100ML; MG/100ML
100 INJECTION, SOLUTION INTRAVENOUS CONTINUOUS
Status: DISCONTINUED | OUTPATIENT
Start: 2024-04-23 | End: 2024-04-24

## 2024-04-23 RX ORDER — DEXTROSE MONOHYDRATE 25 G/50ML
25 INJECTION, SOLUTION INTRAVENOUS
Status: DISCONTINUED | OUTPATIENT
Start: 2024-04-23 | End: 2024-04-26 | Stop reason: HOSPADM

## 2024-04-23 RX ORDER — ATORVASTATIN CALCIUM 40 MG/1
80 TABLET, FILM COATED ORAL NIGHTLY
Status: DISCONTINUED | OUTPATIENT
Start: 2024-04-23 | End: 2024-04-26 | Stop reason: HOSPADM

## 2024-04-23 RX ORDER — ACETAMINOPHEN 325 MG/1
650 TABLET ORAL EVERY 4 HOURS PRN
Status: DISCONTINUED | OUTPATIENT
Start: 2024-04-23 | End: 2024-04-26 | Stop reason: HOSPADM

## 2024-04-23 RX ORDER — POTASSIUM CHLORIDE 750 MG/1
10 CAPSULE, EXTENDED RELEASE ORAL 2 TIMES DAILY WITH MEALS
Status: DISCONTINUED | OUTPATIENT
Start: 2024-04-23 | End: 2024-04-26 | Stop reason: HOSPADM

## 2024-04-23 RX ORDER — ONDANSETRON 4 MG/1
4 TABLET, ORALLY DISINTEGRATING ORAL EVERY 6 HOURS PRN
Status: DISCONTINUED | OUTPATIENT
Start: 2024-04-23 | End: 2024-04-26 | Stop reason: HOSPADM

## 2024-04-23 RX ORDER — SODIUM CHLORIDE 9 MG/ML
40 INJECTION, SOLUTION INTRAVENOUS AS NEEDED
Status: DISCONTINUED | OUTPATIENT
Start: 2024-04-23 | End: 2024-04-26 | Stop reason: HOSPADM

## 2024-04-23 RX ORDER — ACETAMINOPHEN 650 MG/1
650 SUPPOSITORY RECTAL EVERY 4 HOURS PRN
Status: DISCONTINUED | OUTPATIENT
Start: 2024-04-23 | End: 2024-04-26 | Stop reason: HOSPADM

## 2024-04-23 RX ORDER — ALBUTEROL SULFATE 2.5 MG/3ML
2.5 SOLUTION RESPIRATORY (INHALATION) 3 TIMES DAILY PRN
Status: DISCONTINUED | OUTPATIENT
Start: 2024-04-23 | End: 2024-04-26 | Stop reason: HOSPADM

## 2024-04-23 RX ORDER — HYDROXYCHLOROQUINE SULFATE 200 MG/1
400 TABLET, FILM COATED ORAL DAILY
Status: DISCONTINUED | OUTPATIENT
Start: 2024-04-24 | End: 2024-04-26 | Stop reason: HOSPADM

## 2024-04-23 RX ORDER — NORTRIPTYLINE HYDROCHLORIDE 25 MG/1
50 CAPSULE ORAL NIGHTLY
Status: DISCONTINUED | OUTPATIENT
Start: 2024-04-23 | End: 2024-04-26 | Stop reason: HOSPADM

## 2024-04-23 RX ORDER — SODIUM CHLORIDE 0.9 % (FLUSH) 0.9 %
10 SYRINGE (ML) INJECTION AS NEEDED
Status: DISCONTINUED | OUTPATIENT
Start: 2024-04-23 | End: 2024-04-26 | Stop reason: HOSPADM

## 2024-04-23 RX ORDER — POTASSIUM CHLORIDE 750 MG/1
40 CAPSULE, EXTENDED RELEASE ORAL ONCE
Status: COMPLETED | OUTPATIENT
Start: 2024-04-23 | End: 2024-04-23

## 2024-04-23 RX ORDER — ONDANSETRON 2 MG/ML
4 INJECTION INTRAMUSCULAR; INTRAVENOUS EVERY 6 HOURS PRN
Status: DISCONTINUED | OUTPATIENT
Start: 2024-04-23 | End: 2024-04-26 | Stop reason: HOSPADM

## 2024-04-23 RX ORDER — FAMOTIDINE 20 MG/1
40 TABLET, FILM COATED ORAL 2 TIMES DAILY PRN
Status: DISCONTINUED | OUTPATIENT
Start: 2024-04-23 | End: 2024-04-26 | Stop reason: HOSPADM

## 2024-04-23 RX ORDER — SODIUM CHLORIDE 0.9 % (FLUSH) 0.9 %
10 SYRINGE (ML) INJECTION EVERY 12 HOURS SCHEDULED
Status: DISCONTINUED | OUTPATIENT
Start: 2024-04-23 | End: 2024-04-26 | Stop reason: HOSPADM

## 2024-04-23 RX ORDER — NICOTINE POLACRILEX 4 MG
15 LOZENGE BUCCAL
Status: DISCONTINUED | OUTPATIENT
Start: 2024-04-23 | End: 2024-04-26 | Stop reason: HOSPADM

## 2024-04-23 RX ADMIN — ATORVASTATIN CALCIUM 80 MG: 40 TABLET, FILM COATED ORAL at 23:37

## 2024-04-23 RX ADMIN — POTASSIUM CHLORIDE 10 MEQ: 750 CAPSULE, EXTENDED RELEASE ORAL at 23:36

## 2024-04-23 RX ADMIN — INSULIN LISPRO 4 UNITS: 100 INJECTION, SOLUTION INTRAVENOUS; SUBCUTANEOUS at 23:37

## 2024-04-23 RX ADMIN — PANCRELIPASE 24000 UNITS OF LIPASE: 60000; 12000; 38000 CAPSULE, DELAYED RELEASE PELLETS ORAL at 23:36

## 2024-04-23 RX ADMIN — SODIUM CHLORIDE 1000 ML: 9 INJECTION, SOLUTION INTRAVENOUS at 21:28

## 2024-04-23 RX ADMIN — NORTRIPTYLINE HYDROCHLORIDE 50 MG: 25 CAPSULE ORAL at 23:36

## 2024-04-23 RX ADMIN — ROPINIROLE 1 MG: 1 TABLET, FILM COATED ORAL at 23:37

## 2024-04-23 RX ADMIN — Medication 250 MG: at 23:36

## 2024-04-23 RX ADMIN — POTASSIUM CHLORIDE 40 MEQ: 750 CAPSULE, EXTENDED RELEASE ORAL at 21:36

## 2024-04-23 RX ADMIN — Medication 10 ML: at 23:37

## 2024-04-23 RX ADMIN — POTASSIUM CHLORIDE AND SODIUM CHLORIDE 100 ML/HR: 900; 150 INJECTION, SOLUTION INTRAVENOUS at 23:33

## 2024-04-23 RX ADMIN — SODIUM CHLORIDE 1000 MG: 900 INJECTION INTRAVENOUS at 21:34

## 2024-04-24 LAB
ANION GAP SERPL CALCULATED.3IONS-SCNC: 12 MMOL/L (ref 5–15)
BASOPHILS # BLD AUTO: 0.06 10*3/MM3 (ref 0–0.2)
BASOPHILS NFR BLD AUTO: 0.4 % (ref 0–1.5)
BUN SERPL-MCNC: 42 MG/DL (ref 6–20)
BUN/CREAT SERPL: 20.2 (ref 7–25)
CALCIUM SPEC-SCNC: 8.2 MG/DL (ref 8.6–10.5)
CHLORIDE SERPL-SCNC: 112 MMOL/L (ref 98–107)
CO2 SERPL-SCNC: 12 MMOL/L (ref 22–29)
CREAT SERPL-MCNC: 2.08 MG/DL (ref 0.57–1)
DEPRECATED RDW RBC AUTO: 52.7 FL (ref 37–54)
EGFRCR SERPLBLD CKD-EPI 2021: 28.2 ML/MIN/1.73
EOSINOPHIL # BLD AUTO: 0.2 10*3/MM3 (ref 0–0.4)
EOSINOPHIL NFR BLD AUTO: 1.4 % (ref 0.3–6.2)
ERYTHROCYTE [DISTWIDTH] IN BLOOD BY AUTOMATED COUNT: 14.7 % (ref 12.3–15.4)
GLUCOSE BLDC GLUCOMTR-MCNC: 160 MG/DL (ref 70–130)
GLUCOSE BLDC GLUCOMTR-MCNC: 174 MG/DL (ref 70–130)
GLUCOSE BLDC GLUCOMTR-MCNC: 215 MG/DL (ref 70–130)
GLUCOSE BLDC GLUCOMTR-MCNC: 75 MG/DL (ref 70–130)
GLUCOSE SERPL-MCNC: 176 MG/DL (ref 65–99)
HCT VFR BLD AUTO: 38.4 % (ref 34–46.6)
HGB BLD-MCNC: 12.1 G/DL (ref 12–15.9)
IMM GRANULOCYTES # BLD AUTO: 0.11 10*3/MM3 (ref 0–0.05)
IMM GRANULOCYTES NFR BLD AUTO: 0.8 % (ref 0–0.5)
LYMPHOCYTES # BLD AUTO: 3.42 10*3/MM3 (ref 0.7–3.1)
LYMPHOCYTES NFR BLD AUTO: 24.3 % (ref 19.6–45.3)
MCH RBC QN AUTO: 30.6 PG (ref 26.6–33)
MCHC RBC AUTO-ENTMCNC: 31.5 G/DL (ref 31.5–35.7)
MCV RBC AUTO: 97.2 FL (ref 79–97)
MONOCYTES # BLD AUTO: 0.89 10*3/MM3 (ref 0.1–0.9)
MONOCYTES NFR BLD AUTO: 6.3 % (ref 5–12)
NEUTROPHILS NFR BLD AUTO: 66.8 % (ref 42.7–76)
NEUTROPHILS NFR BLD AUTO: 9.38 10*3/MM3 (ref 1.7–7)
NRBC BLD AUTO-RTO: 0 /100 WBC (ref 0–0.2)
PLATELET # BLD AUTO: 254 10*3/MM3 (ref 140–450)
PMV BLD AUTO: 11.2 FL (ref 6–12)
POTASSIUM SERPL-SCNC: 3.8 MMOL/L (ref 3.5–5.2)
RBC # BLD AUTO: 3.95 10*6/MM3 (ref 3.77–5.28)
SODIUM SERPL-SCNC: 136 MMOL/L (ref 136–145)
WBC NRBC COR # BLD AUTO: 14.06 10*3/MM3 (ref 3.4–10.8)

## 2024-04-24 PROCEDURE — G0378 HOSPITAL OBSERVATION PER HR: HCPCS

## 2024-04-24 PROCEDURE — 25010000002 SODIUM CHLORIDE 0.9 % WITH KCL 20 MEQ 20-0.9 MEQ/L-% SOLUTION: Performed by: NURSE PRACTITIONER

## 2024-04-24 PROCEDURE — 82948 REAGENT STRIP/BLOOD GLUCOSE: CPT

## 2024-04-24 PROCEDURE — 80048 BASIC METABOLIC PNL TOTAL CA: CPT | Performed by: NURSE PRACTITIONER

## 2024-04-24 PROCEDURE — 25010000002 CEFTRIAXONE PER 250 MG: Performed by: NURSE PRACTITIONER

## 2024-04-24 PROCEDURE — 36415 COLL VENOUS BLD VENIPUNCTURE: CPT | Performed by: NURSE PRACTITIONER

## 2024-04-24 PROCEDURE — 63710000001 INSULIN LISPRO (HUMAN) PER 5 UNITS: Performed by: NURSE PRACTITIONER

## 2024-04-24 PROCEDURE — 85025 COMPLETE CBC W/AUTO DIFF WBC: CPT | Performed by: NURSE PRACTITIONER

## 2024-04-24 RX ORDER — BUMETANIDE 1 MG/1
1 TABLET ORAL DAILY
COMMUNITY
End: 2024-04-26 | Stop reason: HOSPADM

## 2024-04-24 RX ORDER — FERROUS SULFATE 325(65) MG
325 TABLET ORAL
COMMUNITY
End: 2024-04-26

## 2024-04-24 RX ORDER — SODIUM BICARBONATE 650 MG/1
650 TABLET ORAL 2 TIMES DAILY
COMMUNITY

## 2024-04-24 RX ORDER — METOPROLOL SUCCINATE 25 MG/1
25 TABLET, EXTENDED RELEASE ORAL DAILY
COMMUNITY
End: 2024-04-26

## 2024-04-24 RX ORDER — DAPAGLIFLOZIN 10 MG/1
10 TABLET, FILM COATED ORAL DAILY
COMMUNITY

## 2024-04-24 RX ORDER — ASPIRIN 81 MG/1
81 TABLET, CHEWABLE ORAL DAILY
COMMUNITY
End: 2024-04-26

## 2024-04-24 RX ORDER — CITRIC ACID/SODIUM CITRATE 334-500MG
15 SOLUTION, ORAL ORAL
Status: DISCONTINUED | OUTPATIENT
Start: 2024-04-24 | End: 2024-04-26 | Stop reason: HOSPADM

## 2024-04-24 RX ORDER — PROMETHAZINE HYDROCHLORIDE 25 MG/1
25 TABLET ORAL EVERY 6 HOURS PRN
Status: ON HOLD | COMMUNITY
End: 2024-04-26

## 2024-04-24 RX ORDER — TRIAMCINOLONE ACETONIDE 1 MG/G
1 CREAM TOPICAL 2 TIMES DAILY PRN
COMMUNITY
End: 2024-04-26

## 2024-04-24 RX ORDER — OMEPRAZOLE 40 MG/1
40 CAPSULE, DELAYED RELEASE ORAL DAILY
COMMUNITY

## 2024-04-24 RX ORDER — NIFEDIPINE 30 MG/1
30 TABLET, EXTENDED RELEASE ORAL DAILY
COMMUNITY
End: 2024-04-26

## 2024-04-24 RX ORDER — METOCLOPRAMIDE 10 MG/1
10 TABLET ORAL
COMMUNITY

## 2024-04-24 RX ORDER — CETIRIZINE HYDROCHLORIDE 10 MG/1
10 TABLET ORAL DAILY
COMMUNITY
End: 2024-04-26

## 2024-04-24 RX ORDER — ACETAMINOPHEN 160 MG
2000 TABLET,DISINTEGRATING ORAL DAILY
COMMUNITY

## 2024-04-24 RX ORDER — PREGABALIN 75 MG/1
75 CAPSULE ORAL 2 TIMES DAILY
COMMUNITY

## 2024-04-24 RX ORDER — CHOLESTYRAMINE LIGHT 4 G/5.7G
4 POWDER, FOR SUSPENSION ORAL 2 TIMES DAILY
COMMUNITY

## 2024-04-24 RX ORDER — CALCITRIOL 0.25 UG/1
0.25 CAPSULE, LIQUID FILLED ORAL DAILY
COMMUNITY
End: 2024-04-26

## 2024-04-24 RX ORDER — ONDANSETRON 8 MG/1
8 TABLET, ORALLY DISINTEGRATING ORAL EVERY 6 HOURS PRN
Status: ON HOLD | COMMUNITY
End: 2024-04-26

## 2024-04-24 RX ADMIN — INSULIN LISPRO 3 UNITS: 100 INJECTION, SOLUTION INTRAVENOUS; SUBCUTANEOUS at 17:53

## 2024-04-24 RX ADMIN — PANCRELIPASE 24000 UNITS OF LIPASE: 60000; 12000; 38000 CAPSULE, DELAYED RELEASE PELLETS ORAL at 12:00

## 2024-04-24 RX ADMIN — ROPINIROLE 1 MG: 1 TABLET, FILM COATED ORAL at 20:55

## 2024-04-24 RX ADMIN — Medication 10 ML: at 09:00

## 2024-04-24 RX ADMIN — Medication 250 MG: at 09:00

## 2024-04-24 RX ADMIN — PANCRELIPASE 24000 UNITS OF LIPASE: 60000; 12000; 38000 CAPSULE, DELAYED RELEASE PELLETS ORAL at 17:53

## 2024-04-24 RX ADMIN — FAMOTIDINE 40 MG: 20 TABLET, FILM COATED ORAL at 17:58

## 2024-04-24 RX ADMIN — SODIUM CHLORIDE 1000 MG: 900 INJECTION INTRAVENOUS at 20:56

## 2024-04-24 RX ADMIN — Medication 10 ML: at 20:56

## 2024-04-24 RX ADMIN — SODIUM CITRATE AND CITRIC ACID MONOHYDRATE 15 ML: 500; 334 SOLUTION ORAL at 12:35

## 2024-04-24 RX ADMIN — PANCRELIPASE 24000 UNITS OF LIPASE: 60000; 12000; 38000 CAPSULE, DELAYED RELEASE PELLETS ORAL at 20:55

## 2024-04-24 RX ADMIN — POTASSIUM CHLORIDE 10 MEQ: 750 CAPSULE, EXTENDED RELEASE ORAL at 17:54

## 2024-04-24 RX ADMIN — POTASSIUM CHLORIDE AND SODIUM CHLORIDE 100 ML/HR: 900; 150 INJECTION, SOLUTION INTRAVENOUS at 13:14

## 2024-04-24 RX ADMIN — ATORVASTATIN CALCIUM 80 MG: 40 TABLET, FILM COATED ORAL at 20:55

## 2024-04-24 RX ADMIN — PANCRELIPASE 24000 UNITS OF LIPASE: 60000; 12000; 38000 CAPSULE, DELAYED RELEASE PELLETS ORAL at 09:00

## 2024-04-24 RX ADMIN — ROPINIROLE 1 MG: 1 TABLET, FILM COATED ORAL at 09:00

## 2024-04-24 RX ADMIN — INSULIN LISPRO 2 UNITS: 100 INJECTION, SOLUTION INTRAVENOUS; SUBCUTANEOUS at 09:00

## 2024-04-24 RX ADMIN — SODIUM CITRATE AND CITRIC ACID MONOHYDRATE 15 ML: 500; 334 SOLUTION ORAL at 17:53

## 2024-04-24 RX ADMIN — POTASSIUM CHLORIDE 10 MEQ: 750 CAPSULE, EXTENDED RELEASE ORAL at 09:00

## 2024-04-24 RX ADMIN — NORTRIPTYLINE HYDROCHLORIDE 50 MG: 25 CAPSULE ORAL at 20:56

## 2024-04-24 RX ADMIN — Medication 250 MG: at 20:55

## 2024-04-24 RX ADMIN — ROPINIROLE 1 MG: 1 TABLET, FILM COATED ORAL at 17:53

## 2024-04-24 RX ADMIN — HYDROXYCHLOROQUINE SULFATE 400 MG: 200 TABLET ORAL at 09:00

## 2024-04-25 LAB
25(OH)D3 SERPL-MCNC: 8.5 NG/ML (ref 30–100)
ANION GAP SERPL CALCULATED.3IONS-SCNC: 8 MMOL/L (ref 5–15)
BASOPHILS # BLD AUTO: 0.04 10*3/MM3 (ref 0–0.2)
BASOPHILS NFR BLD AUTO: 0.4 % (ref 0–1.5)
BUN SERPL-MCNC: 23 MG/DL (ref 6–20)
BUN/CREAT SERPL: 14.6 (ref 7–25)
CALCIUM SPEC-SCNC: 7.3 MG/DL (ref 8.6–10.5)
CHLORIDE SERPL-SCNC: 115 MMOL/L (ref 98–107)
CO2 SERPL-SCNC: 15 MMOL/L (ref 22–29)
CREAT SERPL-MCNC: 1.57 MG/DL (ref 0.57–1)
CREAT UR-MCNC: 90.5 MG/DL
DEPRECATED RDW RBC AUTO: 54.7 FL (ref 37–54)
EGFRCR SERPLBLD CKD-EPI 2021: 39.5 ML/MIN/1.73
EOSINOPHIL # BLD AUTO: 0.13 10*3/MM3 (ref 0–0.4)
EOSINOPHIL NFR BLD AUTO: 1.3 % (ref 0.3–6.2)
ERYTHROCYTE [DISTWIDTH] IN BLOOD BY AUTOMATED COUNT: 15.1 % (ref 12.3–15.4)
GLUCOSE BLDC GLUCOMTR-MCNC: 148 MG/DL (ref 70–130)
GLUCOSE BLDC GLUCOMTR-MCNC: 186 MG/DL (ref 70–130)
GLUCOSE BLDC GLUCOMTR-MCNC: 210 MG/DL (ref 70–130)
GLUCOSE BLDC GLUCOMTR-MCNC: 219 MG/DL (ref 70–130)
GLUCOSE SERPL-MCNC: 169 MG/DL (ref 65–99)
HCT VFR BLD AUTO: 33.9 % (ref 34–46.6)
HGB BLD-MCNC: 10.5 G/DL (ref 12–15.9)
IMM GRANULOCYTES # BLD AUTO: 0.05 10*3/MM3 (ref 0–0.05)
IMM GRANULOCYTES NFR BLD AUTO: 0.5 % (ref 0–0.5)
LYMPHOCYTES # BLD AUTO: 3.34 10*3/MM3 (ref 0.7–3.1)
LYMPHOCYTES NFR BLD AUTO: 34.3 % (ref 19.6–45.3)
MAGNESIUM SERPL-MCNC: 1.4 MG/DL (ref 1.6–2.6)
MCH RBC QN AUTO: 30.3 PG (ref 26.6–33)
MCHC RBC AUTO-ENTMCNC: 31 G/DL (ref 31.5–35.7)
MCV RBC AUTO: 98 FL (ref 79–97)
MONOCYTES # BLD AUTO: 0.62 10*3/MM3 (ref 0.1–0.9)
MONOCYTES NFR BLD AUTO: 6.4 % (ref 5–12)
NEUTROPHILS NFR BLD AUTO: 5.56 10*3/MM3 (ref 1.7–7)
NEUTROPHILS NFR BLD AUTO: 57.1 % (ref 42.7–76)
NRBC BLD AUTO-RTO: 0 /100 WBC (ref 0–0.2)
PHOSPHATE SERPL-MCNC: 1.8 MG/DL (ref 2.5–4.5)
PLATELET # BLD AUTO: 218 10*3/MM3 (ref 140–450)
PMV BLD AUTO: 11 FL (ref 6–12)
POTASSIUM SERPL-SCNC: 3.9 MMOL/L (ref 3.5–5.2)
PROT ?TM UR-MCNC: 25.7 MG/DL
PTH-INTACT SERPL-MCNC: 62.6 PG/ML (ref 15–65)
RBC # BLD AUTO: 3.46 10*6/MM3 (ref 3.77–5.28)
SODIUM SERPL-SCNC: 138 MMOL/L (ref 136–145)
SODIUM UR-SCNC: 58 MMOL/L
URATE SERPL-MCNC: 3.8 MG/DL (ref 2.4–5.7)
WBC NRBC COR # BLD AUTO: 9.74 10*3/MM3 (ref 3.4–10.8)

## 2024-04-25 PROCEDURE — 25010000002 POTASSIUM CHLORIDE PER 2 MEQ OF POTASSIUM: Performed by: INTERNAL MEDICINE

## 2024-04-25 PROCEDURE — 82948 REAGENT STRIP/BLOOD GLUCOSE: CPT

## 2024-04-25 PROCEDURE — 25810000003 SODIUM CHLORIDE 0.9 % SOLUTION 250 ML FLEX CONT: Performed by: FAMILY MEDICINE

## 2024-04-25 PROCEDURE — 84156 ASSAY OF PROTEIN URINE: CPT | Performed by: NURSE PRACTITIONER

## 2024-04-25 PROCEDURE — 84550 ASSAY OF BLOOD/URIC ACID: CPT | Performed by: INTERNAL MEDICINE

## 2024-04-25 PROCEDURE — 85025 COMPLETE CBC W/AUTO DIFF WBC: CPT | Performed by: FAMILY MEDICINE

## 2024-04-25 PROCEDURE — C1751 CATH, INF, PER/CENT/MIDLINE: HCPCS

## 2024-04-25 PROCEDURE — 83735 ASSAY OF MAGNESIUM: CPT | Performed by: INTERNAL MEDICINE

## 2024-04-25 PROCEDURE — 63710000001 INSULIN LISPRO (HUMAN) PER 5 UNITS: Performed by: NURSE PRACTITIONER

## 2024-04-25 PROCEDURE — 84100 ASSAY OF PHOSPHORUS: CPT | Performed by: INTERNAL MEDICINE

## 2024-04-25 PROCEDURE — 84300 ASSAY OF URINE SODIUM: CPT | Performed by: NURSE PRACTITIONER

## 2024-04-25 PROCEDURE — 83970 ASSAY OF PARATHORMONE: CPT | Performed by: INTERNAL MEDICINE

## 2024-04-25 PROCEDURE — 82570 ASSAY OF URINE CREATININE: CPT | Performed by: NURSE PRACTITIONER

## 2024-04-25 PROCEDURE — 80048 BASIC METABOLIC PNL TOTAL CA: CPT | Performed by: INTERNAL MEDICINE

## 2024-04-25 PROCEDURE — 25810000003 SODIUM CHLORIDE 0.9 % SOLUTION: Performed by: INTERNAL MEDICINE

## 2024-04-25 PROCEDURE — 82306 VITAMIN D 25 HYDROXY: CPT | Performed by: INTERNAL MEDICINE

## 2024-04-25 PROCEDURE — 02HV33Z INSERTION OF INFUSION DEVICE INTO SUPERIOR VENA CAVA, PERCUTANEOUS APPROACH: ICD-10-PCS | Performed by: NURSE PRACTITIONER

## 2024-04-25 PROCEDURE — G0378 HOSPITAL OBSERVATION PER HR: HCPCS

## 2024-04-25 RX ORDER — SODIUM CHLORIDE 9 MG/ML
40 INJECTION, SOLUTION INTRAVENOUS CONTINUOUS
Status: DISCONTINUED | OUTPATIENT
Start: 2024-04-25 | End: 2024-04-26 | Stop reason: HOSPADM

## 2024-04-25 RX ORDER — LIDOCAINE HYDROCHLORIDE 10 MG/ML
1 INJECTION, SOLUTION INFILTRATION; PERINEURAL ONCE
Status: COMPLETED | OUTPATIENT
Start: 2024-04-25 | End: 2024-04-25

## 2024-04-25 RX ORDER — SODIUM BICARBONATE 650 MG/1
650 TABLET ORAL 2 TIMES DAILY
Status: DISCONTINUED | OUTPATIENT
Start: 2024-04-25 | End: 2024-04-26 | Stop reason: HOSPADM

## 2024-04-25 RX ORDER — SODIUM CHLORIDE 0.9 % (FLUSH) 0.9 %
10 SYRINGE (ML) INJECTION AS NEEDED
Status: DISCONTINUED | OUTPATIENT
Start: 2024-04-25 | End: 2024-04-26 | Stop reason: HOSPADM

## 2024-04-25 RX ORDER — SODIUM CHLORIDE 0.9 % (FLUSH) 0.9 %
20 SYRINGE (ML) INJECTION AS NEEDED
Status: DISCONTINUED | OUTPATIENT
Start: 2024-04-25 | End: 2024-04-26 | Stop reason: HOSPADM

## 2024-04-25 RX ORDER — MELATONIN
2000 DAILY
Status: DISCONTINUED | OUTPATIENT
Start: 2024-04-25 | End: 2024-04-26 | Stop reason: HOSPADM

## 2024-04-25 RX ORDER — SODIUM CHLORIDE 9 MG/ML
40 INJECTION, SOLUTION INTRAVENOUS AS NEEDED
Status: DISCONTINUED | OUTPATIENT
Start: 2024-04-25 | End: 2024-04-26 | Stop reason: HOSPADM

## 2024-04-25 RX ORDER — SODIUM CHLORIDE 0.9 % (FLUSH) 0.9 %
10 SYRINGE (ML) INJECTION EVERY 12 HOURS SCHEDULED
Status: DISCONTINUED | OUTPATIENT
Start: 2024-04-25 | End: 2024-04-26 | Stop reason: HOSPADM

## 2024-04-25 RX ADMIN — Medication 250 MG: at 10:34

## 2024-04-25 RX ADMIN — Medication 10 ML: at 10:35

## 2024-04-25 RX ADMIN — POTASSIUM CHLORIDE 10 MEQ: 750 CAPSULE, EXTENDED RELEASE ORAL at 17:33

## 2024-04-25 RX ADMIN — SODIUM CITRATE AND CITRIC ACID MONOHYDRATE 15 ML: 500; 334 SOLUTION ORAL at 17:33

## 2024-04-25 RX ADMIN — SODIUM BICARBONATE 650 MG: 650 TABLET ORAL at 20:38

## 2024-04-25 RX ADMIN — POTASSIUM & SODIUM PHOSPHATES POWDER PACK 280-160-250 MG 2 PACKET: 280-160-250 PACK at 10:41

## 2024-04-25 RX ADMIN — ROPINIROLE 1 MG: 1 TABLET, FILM COATED ORAL at 20:38

## 2024-04-25 RX ADMIN — POTASSIUM CHLORIDE 10 MEQ: 750 CAPSULE, EXTENDED RELEASE ORAL at 10:34

## 2024-04-25 RX ADMIN — POTASSIUM PHOSPHATE, MONOBASIC AND POTASSIUM PHOSPHATE, DIBASIC 15 MMOL: 224; 236 INJECTION, SOLUTION, CONCENTRATE INTRAVENOUS at 10:42

## 2024-04-25 RX ADMIN — Medication 250 MG: at 20:38

## 2024-04-25 RX ADMIN — ATORVASTATIN CALCIUM 80 MG: 40 TABLET, FILM COATED ORAL at 20:38

## 2024-04-25 RX ADMIN — ROPINIROLE 1 MG: 1 TABLET, FILM COATED ORAL at 10:34

## 2024-04-25 RX ADMIN — INSULIN LISPRO 2 UNITS: 100 INJECTION, SOLUTION INTRAVENOUS; SUBCUTANEOUS at 20:46

## 2024-04-25 RX ADMIN — INSULIN LISPRO 3 UNITS: 100 INJECTION, SOLUTION INTRAVENOUS; SUBCUTANEOUS at 12:47

## 2024-04-25 RX ADMIN — SODIUM CHLORIDE 40 ML/HR: 9 INJECTION, SOLUTION INTRAVENOUS at 20:40

## 2024-04-25 RX ADMIN — ROPINIROLE 1 MG: 1 TABLET, FILM COATED ORAL at 17:33

## 2024-04-25 RX ADMIN — SODIUM CITRATE AND CITRIC ACID MONOHYDRATE 15 ML: 500; 334 SOLUTION ORAL at 10:34

## 2024-04-25 RX ADMIN — NORTRIPTYLINE HYDROCHLORIDE 50 MG: 25 CAPSULE ORAL at 20:38

## 2024-04-25 RX ADMIN — PANCRELIPASE 24000 UNITS OF LIPASE: 60000; 12000; 38000 CAPSULE, DELAYED RELEASE PELLETS ORAL at 17:33

## 2024-04-25 RX ADMIN — PANCRELIPASE 24000 UNITS OF LIPASE: 60000; 12000; 38000 CAPSULE, DELAYED RELEASE PELLETS ORAL at 20:40

## 2024-04-25 RX ADMIN — SODIUM BICARBONATE: 84 INJECTION, SOLUTION INTRAVENOUS at 00:12

## 2024-04-25 RX ADMIN — HYDROXYCHLOROQUINE SULFATE 400 MG: 200 TABLET ORAL at 10:34

## 2024-04-25 RX ADMIN — PANCRELIPASE 24000 UNITS OF LIPASE: 60000; 12000; 38000 CAPSULE, DELAYED RELEASE PELLETS ORAL at 10:34

## 2024-04-25 RX ADMIN — SODIUM CITRATE AND CITRIC ACID MONOHYDRATE 15 ML: 500; 334 SOLUTION ORAL at 12:47

## 2024-04-25 RX ADMIN — LIDOCAINE HYDROCHLORIDE ANHYDROUS 1 ML: 10 INJECTION, SOLUTION INFILTRATION at 10:22

## 2024-04-25 RX ADMIN — PANCRELIPASE 24000 UNITS OF LIPASE: 60000; 12000; 38000 CAPSULE, DELAYED RELEASE PELLETS ORAL at 05:32

## 2024-04-25 RX ADMIN — Medication 2000 UNITS: at 14:36

## 2024-04-26 ENCOUNTER — READMISSION MANAGEMENT (OUTPATIENT)
Dept: CALL CENTER | Facility: HOSPITAL | Age: 54
End: 2024-04-26
Payer: COMMERCIAL

## 2024-04-26 VITALS
SYSTOLIC BLOOD PRESSURE: 104 MMHG | HEART RATE: 91 BPM | RESPIRATION RATE: 16 BRPM | DIASTOLIC BLOOD PRESSURE: 67 MMHG | WEIGHT: 91 LBS | BODY MASS INDEX: 17.87 KG/M2 | OXYGEN SATURATION: 97 % | TEMPERATURE: 98.3 F | HEIGHT: 60 IN

## 2024-04-26 PROBLEM — M54.59 POSTURAL LOW BACK PAIN: Status: ACTIVE | Noted: 2024-04-26

## 2024-04-26 LAB
ANION GAP SERPL CALCULATED.3IONS-SCNC: 7 MMOL/L (ref 5–15)
BUN SERPL-MCNC: 14 MG/DL (ref 6–20)
BUN/CREAT SERPL: 11.7 (ref 7–25)
CALCIUM SPEC-SCNC: 7.4 MG/DL (ref 8.6–10.5)
CHLORIDE SERPL-SCNC: 112 MMOL/L (ref 98–107)
CO2 SERPL-SCNC: 21 MMOL/L (ref 22–29)
CREAT SERPL-MCNC: 1.2 MG/DL (ref 0.57–1)
EGFRCR SERPLBLD CKD-EPI 2021: 54.6 ML/MIN/1.73
GLUCOSE BLDC GLUCOMTR-MCNC: 149 MG/DL (ref 70–130)
GLUCOSE BLDC GLUCOMTR-MCNC: 152 MG/DL (ref 70–130)
GLUCOSE SERPL-MCNC: 134 MG/DL (ref 65–99)
PHOSPHATE SERPL-MCNC: 2.4 MG/DL (ref 2.5–4.5)
POTASSIUM SERPL-SCNC: 4.2 MMOL/L (ref 3.5–5.2)
SODIUM SERPL-SCNC: 140 MMOL/L (ref 136–145)

## 2024-04-26 PROCEDURE — 25010000002 MAGNESIUM SULFATE 2 GM/50ML SOLUTION: Performed by: NURSE PRACTITIONER

## 2024-04-26 PROCEDURE — 82948 REAGENT STRIP/BLOOD GLUCOSE: CPT

## 2024-04-26 PROCEDURE — 84100 ASSAY OF PHOSPHORUS: CPT | Performed by: FAMILY MEDICINE

## 2024-04-26 PROCEDURE — 80048 BASIC METABOLIC PNL TOTAL CA: CPT | Performed by: INTERNAL MEDICINE

## 2024-04-26 RX ORDER — ONDANSETRON 8 MG/1
8 TABLET, ORALLY DISINTEGRATING ORAL EVERY 6 HOURS PRN
Qty: 21 TABLET | Refills: 0 | Status: SHIPPED | OUTPATIENT
Start: 2024-04-26

## 2024-04-26 RX ORDER — PROMETHAZINE HYDROCHLORIDE 25 MG/1
25 TABLET ORAL EVERY 6 HOURS PRN
Qty: 21 TABLET | Refills: 0 | Status: SHIPPED | OUTPATIENT
Start: 2024-04-26

## 2024-04-26 RX ORDER — MAGNESIUM SULFATE HEPTAHYDRATE 40 MG/ML
2 INJECTION, SOLUTION INTRAVENOUS ONCE
Status: COMPLETED | OUTPATIENT
Start: 2024-04-26 | End: 2024-04-26

## 2024-04-26 RX ADMIN — SODIUM CITRATE AND CITRIC ACID MONOHYDRATE 15 ML: 500; 334 SOLUTION ORAL at 08:17

## 2024-04-26 RX ADMIN — Medication 2000 UNITS: at 08:17

## 2024-04-26 RX ADMIN — ROPINIROLE 1 MG: 1 TABLET, FILM COATED ORAL at 08:17

## 2024-04-26 RX ADMIN — PANCRELIPASE 24000 UNITS OF LIPASE: 60000; 12000; 38000 CAPSULE, DELAYED RELEASE PELLETS ORAL at 06:22

## 2024-04-26 RX ADMIN — HYDROXYCHLOROQUINE SULFATE 400 MG: 200 TABLET ORAL at 08:17

## 2024-04-26 RX ADMIN — Medication 10 ML: at 08:20

## 2024-04-26 RX ADMIN — Medication 10 ML: at 08:17

## 2024-04-26 RX ADMIN — SODIUM BICARBONATE 650 MG: 650 TABLET ORAL at 08:17

## 2024-04-26 RX ADMIN — PANCRELIPASE 24000 UNITS OF LIPASE: 60000; 12000; 38000 CAPSULE, DELAYED RELEASE PELLETS ORAL at 12:17

## 2024-04-26 RX ADMIN — SODIUM CITRATE AND CITRIC ACID MONOHYDRATE 15 ML: 500; 334 SOLUTION ORAL at 12:17

## 2024-04-26 RX ADMIN — Medication 250 MG: at 08:17

## 2024-04-26 RX ADMIN — POTASSIUM CHLORIDE 10 MEQ: 750 CAPSULE, EXTENDED RELEASE ORAL at 08:20

## 2024-04-26 RX ADMIN — MAGNESIUM SULFATE HEPTAHYDRATE 2 G: 2 INJECTION, SOLUTION INTRAVENOUS at 10:07

## 2024-04-30 ENCOUNTER — READMISSION MANAGEMENT (OUTPATIENT)
Dept: CALL CENTER | Facility: HOSPITAL | Age: 54
End: 2024-04-30
Payer: COMMERCIAL

## 2024-05-07 ENCOUNTER — READMISSION MANAGEMENT (OUTPATIENT)
Dept: CALL CENTER | Facility: HOSPITAL | Age: 54
End: 2024-05-07
Payer: COMMERCIAL

## 2024-05-14 ENCOUNTER — READMISSION MANAGEMENT (OUTPATIENT)
Dept: CALL CENTER | Facility: HOSPITAL | Age: 54
End: 2024-05-14
Payer: COMMERCIAL

## 2024-07-09 ENCOUNTER — HOSPITAL ENCOUNTER (INPATIENT)
Facility: HOSPITAL | Age: 54
LOS: 1 days | Discharge: HOME | End: 2024-07-12
Attending: FAMILY MEDICINE | Admitting: FAMILY MEDICINE
Payer: COMMERCIAL

## 2024-07-09 DIAGNOSIS — E83.42 HYPOMAGNESEMIA: ICD-10-CM

## 2024-07-09 DIAGNOSIS — E87.6 HYPOKALEMIA: ICD-10-CM

## 2024-07-09 DIAGNOSIS — N17.9 AKI (ACUTE KIDNEY INJURY): Primary | ICD-10-CM

## 2024-07-09 PROBLEM — K31.84 GASTROPARESIS: Status: ACTIVE | Noted: 2024-07-09

## 2024-07-09 PROBLEM — R11.0 INTRACTABLE NAUSEA: Status: ACTIVE | Noted: 2024-07-09

## 2024-07-09 LAB
ALBUMIN SERPL-MCNC: 3.7 G/DL (ref 3.5–5.2)
ALBUMIN/GLOB SERPL: 1.2 G/DL
ALP SERPL-CCNC: 113 U/L (ref 39–117)
ALT SERPL W P-5'-P-CCNC: 13 U/L (ref 1–33)
ANION GAP SERPL CALCULATED.3IONS-SCNC: 15 MMOL/L (ref 5–15)
AST SERPL-CCNC: 17 U/L (ref 1–32)
BASOPHILS # BLD AUTO: 0.02 10*3/MM3 (ref 0–0.2)
BASOPHILS NFR BLD AUTO: 0.2 % (ref 0–1.5)
BILIRUB SERPL-MCNC: 0.2 MG/DL (ref 0–1.2)
BUN SERPL-MCNC: 30 MG/DL (ref 6–20)
BUN/CREAT SERPL: 11.7 (ref 7–25)
CALCIUM SPEC-SCNC: 8.6 MG/DL (ref 8.6–10.5)
CHLORIDE SERPL-SCNC: 107 MMOL/L (ref 98–107)
CHOLEST SERPL-MCNC: 202 MG/DL (ref 0–200)
CO2 SERPL-SCNC: 15 MMOL/L (ref 22–29)
CREAT SERPL-MCNC: 2.57 MG/DL (ref 0.57–1)
DEPRECATED RDW RBC AUTO: 47.4 FL (ref 37–54)
EGFRCR SERPLBLD CKD-EPI 2021: 21.9 ML/MIN/1.73
EOSINOPHIL # BLD AUTO: 0.4 10*3/MM3 (ref 0–0.4)
EOSINOPHIL NFR BLD AUTO: 3.5 % (ref 0.3–6.2)
ERYTHROCYTE [DISTWIDTH] IN BLOOD BY AUTOMATED COUNT: 14.1 % (ref 12.3–15.4)
GLOBULIN UR ELPH-MCNC: 3 GM/DL
GLUCOSE BLDC GLUCOMTR-MCNC: 149 MG/DL (ref 70–130)
GLUCOSE SERPL-MCNC: 222 MG/DL (ref 65–99)
HBA1C MFR BLD: 8.1 % (ref 4.8–5.6)
HCT VFR BLD AUTO: 38.4 % (ref 34–46.6)
HDLC SERPL-MCNC: 61 MG/DL (ref 40–60)
HGB BLD-MCNC: 12.8 G/DL (ref 12–15.9)
IMM GRANULOCYTES # BLD AUTO: 0.05 10*3/MM3 (ref 0–0.05)
IMM GRANULOCYTES NFR BLD AUTO: 0.4 % (ref 0–0.5)
LDLC SERPL CALC-MCNC: 107 MG/DL (ref 0–100)
LDLC/HDLC SERPL: 1.66 {RATIO}
LYMPHOCYTES # BLD AUTO: 2.72 10*3/MM3 (ref 0.7–3.1)
LYMPHOCYTES NFR BLD AUTO: 24 % (ref 19.6–45.3)
MAGNESIUM SERPL-MCNC: 1.4 MG/DL (ref 1.6–2.6)
MCH RBC QN AUTO: 30.6 PG (ref 26.6–33)
MCHC RBC AUTO-ENTMCNC: 33.3 G/DL (ref 31.5–35.7)
MCV RBC AUTO: 91.9 FL (ref 79–97)
MONOCYTES # BLD AUTO: 0.55 10*3/MM3 (ref 0.1–0.9)
MONOCYTES NFR BLD AUTO: 4.8 % (ref 5–12)
NEUTROPHILS NFR BLD AUTO: 67.1 % (ref 42.7–76)
NEUTROPHILS NFR BLD AUTO: 7.61 10*3/MM3 (ref 1.7–7)
NRBC BLD AUTO-RTO: 0 /100 WBC (ref 0–0.2)
PHOSPHATE SERPL-MCNC: 3.1 MG/DL (ref 2.5–4.5)
PLATELET # BLD AUTO: 251 10*3/MM3 (ref 140–450)
PMV BLD AUTO: 9.9 FL (ref 6–12)
POTASSIUM SERPL-SCNC: 2.4 MMOL/L (ref 3.5–5.2)
PROT SERPL-MCNC: 6.7 G/DL (ref 6–8.5)
RBC # BLD AUTO: 4.18 10*6/MM3 (ref 3.77–5.28)
SODIUM SERPL-SCNC: 137 MMOL/L (ref 136–145)
TRIGL SERPL-MCNC: 198 MG/DL (ref 0–150)
VLDLC SERPL-MCNC: 34 MG/DL (ref 5–40)
WBC NRBC COR # BLD AUTO: 11.35 10*3/MM3 (ref 3.4–10.8)

## 2024-07-09 PROCEDURE — 84100 ASSAY OF PHOSPHORUS: CPT | Performed by: NURSE PRACTITIONER

## 2024-07-09 PROCEDURE — 25010000002 SODIUM CHLORIDE 0.9 % WITH KCL 40 MEQ/L 40-0.9 MEQ/L-% SOLUTION: Performed by: NURSE PRACTITIONER

## 2024-07-09 PROCEDURE — G0378 HOSPITAL OBSERVATION PER HR: HCPCS

## 2024-07-09 PROCEDURE — 85025 COMPLETE CBC W/AUTO DIFF WBC: CPT | Performed by: PHYSICIAN ASSISTANT

## 2024-07-09 PROCEDURE — 80061 LIPID PANEL: CPT | Performed by: NURSE PRACTITIONER

## 2024-07-09 PROCEDURE — 83735 ASSAY OF MAGNESIUM: CPT | Performed by: PHYSICIAN ASSISTANT

## 2024-07-09 PROCEDURE — 93005 ELECTROCARDIOGRAM TRACING: CPT | Performed by: PHYSICIAN ASSISTANT

## 2024-07-09 PROCEDURE — 25810000003 SODIUM CHLORIDE 0.9 % SOLUTION: Performed by: PHYSICIAN ASSISTANT

## 2024-07-09 PROCEDURE — 25010000002 POTASSIUM CHLORIDE 10 MEQ/100ML SOLUTION: Performed by: PHYSICIAN ASSISTANT

## 2024-07-09 PROCEDURE — 25010000002 MAGNESIUM SULFATE 2 GM/50ML SOLUTION: Performed by: PHYSICIAN ASSISTANT

## 2024-07-09 PROCEDURE — 83036 HEMOGLOBIN GLYCOSYLATED A1C: CPT | Performed by: NURSE PRACTITIONER

## 2024-07-09 PROCEDURE — 80053 COMPREHEN METABOLIC PANEL: CPT | Performed by: PHYSICIAN ASSISTANT

## 2024-07-09 PROCEDURE — 82948 REAGENT STRIP/BLOOD GLUCOSE: CPT

## 2024-07-09 PROCEDURE — 93010 ELECTROCARDIOGRAM REPORT: CPT | Performed by: INTERNAL MEDICINE

## 2024-07-09 PROCEDURE — 99285 EMERGENCY DEPT VISIT HI MDM: CPT

## 2024-07-09 RX ORDER — SODIUM CHLORIDE 9 MG/ML
40 INJECTION, SOLUTION INTRAVENOUS AS NEEDED
Status: DISCONTINUED | OUTPATIENT
Start: 2024-07-09 | End: 2024-07-12 | Stop reason: HOSPADM

## 2024-07-09 RX ORDER — BISACODYL 10 MG
10 SUPPOSITORY, RECTAL RECTAL DAILY PRN
Status: DISCONTINUED | OUTPATIENT
Start: 2024-07-09 | End: 2024-07-12 | Stop reason: HOSPADM

## 2024-07-09 RX ORDER — ACETAMINOPHEN 160 MG/5ML
650 SOLUTION ORAL EVERY 4 HOURS PRN
Status: DISCONTINUED | OUTPATIENT
Start: 2024-07-09 | End: 2024-07-12 | Stop reason: HOSPADM

## 2024-07-09 RX ORDER — SODIUM CHLORIDE 9 MG/ML
125 INJECTION, SOLUTION INTRAVENOUS CONTINUOUS
Status: DISCONTINUED | OUTPATIENT
Start: 2024-07-09 | End: 2024-07-09

## 2024-07-09 RX ORDER — MAGNESIUM SULFATE HEPTAHYDRATE 40 MG/ML
2 INJECTION, SOLUTION INTRAVENOUS ONCE
Status: COMPLETED | OUTPATIENT
Start: 2024-07-09 | End: 2024-07-09

## 2024-07-09 RX ORDER — ROPINIROLE 1 MG/1
1 TABLET, FILM COATED ORAL 3 TIMES DAILY
Status: DISCONTINUED | OUTPATIENT
Start: 2024-07-09 | End: 2024-07-12 | Stop reason: HOSPADM

## 2024-07-09 RX ORDER — POTASSIUM CHLORIDE 7.45 MG/ML
10 INJECTION INTRAVENOUS ONCE
Qty: 100 ML | Refills: 0 | Status: DISCONTINUED | OUTPATIENT
Start: 2024-07-09 | End: 2024-07-09

## 2024-07-09 RX ORDER — PREGABALIN 75 MG/1
75 CAPSULE ORAL 2 TIMES DAILY
Status: DISCONTINUED | OUTPATIENT
Start: 2024-07-09 | End: 2024-07-12 | Stop reason: HOSPADM

## 2024-07-09 RX ORDER — ACETAMINOPHEN 325 MG/1
650 TABLET ORAL EVERY 4 HOURS PRN
Status: DISCONTINUED | OUTPATIENT
Start: 2024-07-09 | End: 2024-07-12 | Stop reason: HOSPADM

## 2024-07-09 RX ORDER — POTASSIUM CHLORIDE 7.45 MG/ML
10 INJECTION INTRAVENOUS ONCE
Qty: 100 ML | Refills: 0 | Status: DISCONTINUED | OUTPATIENT
Start: 2024-07-10 | End: 2024-07-09

## 2024-07-09 RX ORDER — BETHANECHOL CHLORIDE 25 MG/1
25 TABLET ORAL 4 TIMES DAILY
COMMUNITY

## 2024-07-09 RX ORDER — POTASSIUM CHLORIDE 7.45 MG/ML
10 INJECTION INTRAVENOUS ONCE
Qty: 100 ML | Refills: 0 | Status: COMPLETED | OUTPATIENT
Start: 2024-07-09 | End: 2024-07-09

## 2024-07-09 RX ORDER — DEXTROSE MONOHYDRATE 25 G/50ML
25 INJECTION, SOLUTION INTRAVENOUS
Status: DISCONTINUED | OUTPATIENT
Start: 2024-07-09 | End: 2024-07-12 | Stop reason: HOSPADM

## 2024-07-09 RX ORDER — BETHANECHOL CHLORIDE 25 MG/1
25 TABLET ORAL 4 TIMES DAILY
Status: DISCONTINUED | OUTPATIENT
Start: 2024-07-09 | End: 2024-07-12 | Stop reason: HOSPADM

## 2024-07-09 RX ORDER — SODIUM CHLORIDE AND POTASSIUM CHLORIDE 300; 900 MG/100ML; MG/100ML
75 INJECTION, SOLUTION INTRAVENOUS CONTINUOUS
Status: DISCONTINUED | OUTPATIENT
Start: 2024-07-09 | End: 2024-07-12 | Stop reason: HOSPADM

## 2024-07-09 RX ORDER — INSULIN LISPRO 100 [IU]/ML
2-7 INJECTION, SOLUTION INTRAVENOUS; SUBCUTANEOUS
Status: DISCONTINUED | OUTPATIENT
Start: 2024-07-09 | End: 2024-07-12 | Stop reason: HOSPADM

## 2024-07-09 RX ORDER — NITROGLYCERIN 0.4 MG/1
0.4 TABLET SUBLINGUAL
Status: DISCONTINUED | OUTPATIENT
Start: 2024-07-09 | End: 2024-07-12 | Stop reason: HOSPADM

## 2024-07-09 RX ORDER — SODIUM BICARBONATE 650 MG/1
650 TABLET ORAL 2 TIMES DAILY
Status: DISCONTINUED | OUTPATIENT
Start: 2024-07-09 | End: 2024-07-12 | Stop reason: HOSPADM

## 2024-07-09 RX ORDER — NORTRIPTYLINE HYDROCHLORIDE 25 MG/1
50 CAPSULE ORAL NIGHTLY
Status: DISCONTINUED | OUTPATIENT
Start: 2024-07-09 | End: 2024-07-12 | Stop reason: HOSPADM

## 2024-07-09 RX ORDER — PROMETHAZINE HYDROCHLORIDE 25 MG/1
25 TABLET ORAL EVERY 6 HOURS PRN
Status: DISCONTINUED | OUTPATIENT
Start: 2024-07-09 | End: 2024-07-12 | Stop reason: HOSPADM

## 2024-07-09 RX ORDER — NICOTINE POLACRILEX 4 MG
15 LOZENGE BUCCAL
Status: DISCONTINUED | OUTPATIENT
Start: 2024-07-09 | End: 2024-07-12 | Stop reason: HOSPADM

## 2024-07-09 RX ORDER — SODIUM CHLORIDE 0.9 % (FLUSH) 0.9 %
10 SYRINGE (ML) INJECTION EVERY 12 HOURS SCHEDULED
Status: DISCONTINUED | OUTPATIENT
Start: 2024-07-09 | End: 2024-07-09

## 2024-07-09 RX ORDER — SODIUM CHLORIDE 0.9 % (FLUSH) 0.9 %
10 SYRINGE (ML) INJECTION AS NEEDED
Status: DISCONTINUED | OUTPATIENT
Start: 2024-07-09 | End: 2024-07-12 | Stop reason: HOSPADM

## 2024-07-09 RX ORDER — PANTOPRAZOLE SODIUM 40 MG/1
40 TABLET, DELAYED RELEASE ORAL
Status: DISCONTINUED | OUTPATIENT
Start: 2024-07-10 | End: 2024-07-12 | Stop reason: HOSPADM

## 2024-07-09 RX ORDER — HYDROXYCHLOROQUINE SULFATE 200 MG/1
400 TABLET, FILM COATED ORAL DAILY
Status: DISCONTINUED | OUTPATIENT
Start: 2024-07-10 | End: 2024-07-12 | Stop reason: HOSPADM

## 2024-07-09 RX ORDER — ACETAMINOPHEN 650 MG/1
650 SUPPOSITORY RECTAL EVERY 4 HOURS PRN
Status: DISCONTINUED | OUTPATIENT
Start: 2024-07-09 | End: 2024-07-12 | Stop reason: HOSPADM

## 2024-07-09 RX ORDER — AMOXICILLIN 250 MG
2 CAPSULE ORAL 2 TIMES DAILY PRN
Status: DISCONTINUED | OUTPATIENT
Start: 2024-07-09 | End: 2024-07-12 | Stop reason: HOSPADM

## 2024-07-09 RX ORDER — POLYETHYLENE GLYCOL 3350 17 G/17G
17 POWDER, FOR SOLUTION ORAL DAILY PRN
Status: DISCONTINUED | OUTPATIENT
Start: 2024-07-09 | End: 2024-07-12 | Stop reason: HOSPADM

## 2024-07-09 RX ADMIN — SODIUM CHLORIDE 125 ML/HR: 9 INJECTION, SOLUTION INTRAVENOUS at 20:16

## 2024-07-09 RX ADMIN — POTASSIUM CHLORIDE 10 MEQ: 7.46 INJECTION, SOLUTION INTRAVENOUS at 22:12

## 2024-07-09 RX ADMIN — MAGNESIUM SULFATE HEPTAHYDRATE 2 G: 2 INJECTION, SOLUTION INTRAVENOUS at 19:42

## 2024-07-09 RX ADMIN — POTASSIUM CHLORIDE AND SODIUM CHLORIDE 125 ML/HR: 900; 300 INJECTION, SOLUTION INTRAVENOUS at 21:50

## 2024-07-09 RX ADMIN — POTASSIUM CHLORIDE 10 MEQ: 7.46 INJECTION, SOLUTION INTRAVENOUS at 19:42

## 2024-07-09 RX ADMIN — SODIUM CHLORIDE 1000 ML: 9 INJECTION, SOLUTION INTRAVENOUS at 18:17

## 2024-07-09 RX ADMIN — POTASSIUM CHLORIDE 10 MEQ: 7.46 INJECTION, SOLUTION INTRAVENOUS at 20:55

## 2024-07-10 LAB
ANION GAP SERPL CALCULATED.3IONS-SCNC: 10 MMOL/L (ref 5–15)
ANION GAP SERPL CALCULATED.3IONS-SCNC: 11 MMOL/L (ref 5–15)
ANION GAP SERPL CALCULATED.3IONS-SCNC: 8 MMOL/L (ref 5–15)
ANION GAP SERPL CALCULATED.3IONS-SCNC: 9 MMOL/L (ref 5–15)
BUN SERPL-MCNC: 18 MG/DL (ref 6–20)
BUN SERPL-MCNC: 19 MG/DL (ref 6–20)
BUN SERPL-MCNC: 23 MG/DL (ref 6–20)
BUN SERPL-MCNC: 25 MG/DL (ref 6–20)
BUN/CREAT SERPL: 10.5 (ref 7–25)
BUN/CREAT SERPL: 10.6 (ref 7–25)
BUN/CREAT SERPL: 11.1 (ref 7–25)
BUN/CREAT SERPL: 11.4 (ref 7–25)
CALCIUM SPEC-SCNC: 7.6 MG/DL (ref 8.6–10.5)
CALCIUM SPEC-SCNC: 7.7 MG/DL (ref 8.6–10.5)
CHLORIDE SERPL-SCNC: 115 MMOL/L (ref 98–107)
CHLORIDE SERPL-SCNC: 116 MMOL/L (ref 98–107)
CHLORIDE SERPL-SCNC: 118 MMOL/L (ref 98–107)
CHLORIDE SERPL-SCNC: 118 MMOL/L (ref 98–107)
CO2 SERPL-SCNC: 14 MMOL/L (ref 22–29)
CO2 SERPL-SCNC: 15 MMOL/L (ref 22–29)
CREAT SERPL-MCNC: 1.71 MG/DL (ref 0.57–1)
CREAT SERPL-MCNC: 1.8 MG/DL (ref 0.57–1)
CREAT SERPL-MCNC: 2.01 MG/DL (ref 0.57–1)
CREAT SERPL-MCNC: 2.26 MG/DL (ref 0.57–1)
EGFRCR SERPLBLD CKD-EPI 2021: 25.5 ML/MIN/1.73
EGFRCR SERPLBLD CKD-EPI 2021: 29.4 ML/MIN/1.73
EGFRCR SERPLBLD CKD-EPI 2021: 33.6 ML/MIN/1.73
EGFRCR SERPLBLD CKD-EPI 2021: 35.7 ML/MIN/1.73
GLUCOSE BLDC GLUCOMTR-MCNC: 145 MG/DL (ref 70–130)
GLUCOSE BLDC GLUCOMTR-MCNC: 149 MG/DL (ref 70–130)
GLUCOSE BLDC GLUCOMTR-MCNC: 176 MG/DL (ref 70–130)
GLUCOSE BLDC GLUCOMTR-MCNC: 186 MG/DL (ref 70–130)
GLUCOSE SERPL-MCNC: 143 MG/DL (ref 65–99)
GLUCOSE SERPL-MCNC: 170 MG/DL (ref 65–99)
GLUCOSE SERPL-MCNC: 171 MG/DL (ref 65–99)
GLUCOSE SERPL-MCNC: 210 MG/DL (ref 65–99)
MAGNESIUM SERPL-MCNC: 1.8 MG/DL (ref 1.6–2.6)
MAGNESIUM SERPL-MCNC: 1.9 MG/DL (ref 1.6–2.6)
MAGNESIUM SERPL-MCNC: 2.2 MG/DL (ref 1.6–2.6)
MAGNESIUM SERPL-MCNC: 2.3 MG/DL (ref 1.6–2.6)
POTASSIUM SERPL-SCNC: 2.9 MMOL/L (ref 3.5–5.2)
POTASSIUM SERPL-SCNC: 3.2 MMOL/L (ref 3.5–5.2)
POTASSIUM SERPL-SCNC: 3.5 MMOL/L (ref 3.5–5.2)
POTASSIUM SERPL-SCNC: 3.7 MMOL/L (ref 3.5–5.2)
SODIUM SERPL-SCNC: 140 MMOL/L (ref 136–145)
SODIUM SERPL-SCNC: 142 MMOL/L (ref 136–145)

## 2024-07-10 PROCEDURE — 80048 BASIC METABOLIC PNL TOTAL CA: CPT | Performed by: NURSE PRACTITIONER

## 2024-07-10 PROCEDURE — 25010000002 POTASSIUM CHLORIDE 10 MEQ/100ML SOLUTION: Performed by: FAMILY MEDICINE

## 2024-07-10 PROCEDURE — G0378 HOSPITAL OBSERVATION PER HR: HCPCS

## 2024-07-10 PROCEDURE — 83735 ASSAY OF MAGNESIUM: CPT | Performed by: NURSE PRACTITIONER

## 2024-07-10 PROCEDURE — 36415 COLL VENOUS BLD VENIPUNCTURE: CPT | Performed by: NURSE PRACTITIONER

## 2024-07-10 PROCEDURE — 25010000002 SODIUM CHLORIDE 0.9 % WITH KCL 40 MEQ/L 40-0.9 MEQ/L-% SOLUTION: Performed by: NURSE PRACTITIONER

## 2024-07-10 PROCEDURE — 63710000001 INSULIN LISPRO (HUMAN) PER 5 UNITS: Performed by: NURSE PRACTITIONER

## 2024-07-10 PROCEDURE — 82948 REAGENT STRIP/BLOOD GLUCOSE: CPT

## 2024-07-10 RX ORDER — DULOXETIN HYDROCHLORIDE 30 MG/1
30 CAPSULE, DELAYED RELEASE ORAL DAILY
COMMUNITY

## 2024-07-10 RX ORDER — ASPIRIN 81 MG/1
81 TABLET ORAL DAILY
COMMUNITY

## 2024-07-10 RX ORDER — DENOSUMAB 60 MG/ML
60 INJECTION SUBCUTANEOUS ONCE
COMMUNITY

## 2024-07-10 RX ORDER — ONDANSETRON 4 MG/1
4 TABLET, ORALLY DISINTEGRATING ORAL EVERY 8 HOURS PRN
COMMUNITY

## 2024-07-10 RX ORDER — B-COMPLEX WITH VITAMIN C
1 TABLET ORAL DAILY
COMMUNITY

## 2024-07-10 RX ORDER — POTASSIUM CHLORIDE 20 MEQ/1
20 TABLET, EXTENDED RELEASE ORAL 2 TIMES DAILY
Status: ON HOLD | COMMUNITY
End: 2024-07-10

## 2024-07-10 RX ORDER — BUMETANIDE 1 MG/1
1 TABLET ORAL DAILY
COMMUNITY
End: 2024-07-12 | Stop reason: HOSPADM

## 2024-07-10 RX ORDER — INSULIN PMP CART,AUT,G6/7,CNTR
90 EACH SUBCUTANEOUS
COMMUNITY

## 2024-07-10 RX ORDER — INSULIN ASPART 100 [IU]/ML
90 INJECTION, SOLUTION INTRAVENOUS; SUBCUTANEOUS DAILY
COMMUNITY

## 2024-07-10 RX ORDER — NORTRIPTYLINE HYDROCHLORIDE 25 MG/1
25 CAPSULE ORAL NIGHTLY
COMMUNITY

## 2024-07-10 RX ORDER — POTASSIUM CHLORIDE 7.45 MG/ML
10 INJECTION INTRAVENOUS ONCE
Status: COMPLETED | OUTPATIENT
Start: 2024-07-10 | End: 2024-07-10

## 2024-07-10 RX ORDER — CALC/D3/MAG/ZN/COPP/MANG/BORON 600 MG-200
1 TABLET ORAL DAILY
Status: ON HOLD | COMMUNITY
End: 2024-07-10

## 2024-07-10 RX ORDER — CETIRIZINE HYDROCHLORIDE 10 MG/1
10 TABLET ORAL DAILY
COMMUNITY

## 2024-07-10 RX ORDER — NIFEDIPINE 30 MG/1
30 TABLET, EXTENDED RELEASE ORAL DAILY
COMMUNITY

## 2024-07-10 RX ORDER — MULTIVIT AND MINERALS-FERROUS GLUCONATE 9 MG IRON/15 ML ORAL LIQUID 9 MG/15 ML
15 LIQUID (ML) ORAL DAILY
Status: DISCONTINUED | OUTPATIENT
Start: 2024-07-10 | End: 2024-07-12 | Stop reason: HOSPADM

## 2024-07-10 RX ADMIN — ROPINIROLE 1 MG: 1 TABLET, FILM COATED ORAL at 08:09

## 2024-07-10 RX ADMIN — SODIUM BICARBONATE 650 MG: 650 TABLET ORAL at 08:09

## 2024-07-10 RX ADMIN — PANTOPRAZOLE SODIUM 40 MG: 40 TABLET, DELAYED RELEASE ORAL at 06:38

## 2024-07-10 RX ADMIN — PREGABALIN 75 MG: 75 CAPSULE ORAL at 00:59

## 2024-07-10 RX ADMIN — POTASSIUM CHLORIDE AND SODIUM CHLORIDE 125 ML/HR: 900; 300 INJECTION, SOLUTION INTRAVENOUS at 20:33

## 2024-07-10 RX ADMIN — ASCORBIC ACID, THIAMINE, RIBOFLAVIN, NIACINAMIDE, PYRIDOXINE, FOLIC ACID, COBALAMIN, BIOTIN, PANTOTHENIC ACID 15 ML: 100; 1.5; 1.7; 20; 10; 1; 6; 300; 1 TABLET, COATED ORAL at 17:32

## 2024-07-10 RX ADMIN — ROPINIROLE 1 MG: 1 TABLET, FILM COATED ORAL at 00:59

## 2024-07-10 RX ADMIN — PANCRELIPASE 24000 UNITS OF LIPASE: 60000; 12000; 38000 CAPSULE, DELAYED RELEASE PELLETS ORAL at 07:36

## 2024-07-10 RX ADMIN — PANCRELIPASE 24000 UNITS OF LIPASE: 60000; 12000; 38000 CAPSULE, DELAYED RELEASE PELLETS ORAL at 11:16

## 2024-07-10 RX ADMIN — ROPINIROLE 1 MG: 1 TABLET, FILM COATED ORAL at 17:30

## 2024-07-10 RX ADMIN — NORTRIPTYLINE HYDROCHLORIDE 50 MG: 25 CAPSULE ORAL at 20:34

## 2024-07-10 RX ADMIN — PANCRELIPASE 24000 UNITS OF LIPASE: 60000; 12000; 38000 CAPSULE, DELAYED RELEASE PELLETS ORAL at 17:30

## 2024-07-10 RX ADMIN — BETHANECHOL CHLORIDE 25 MG: 25 TABLET ORAL at 20:34

## 2024-07-10 RX ADMIN — HYDROXYCHLOROQUINE SULFATE 400 MG: 200 TABLET ORAL at 08:09

## 2024-07-10 RX ADMIN — BETHANECHOL CHLORIDE 25 MG: 25 TABLET ORAL at 11:16

## 2024-07-10 RX ADMIN — PANCRELIPASE 24000 UNITS OF LIPASE: 60000; 12000; 38000 CAPSULE, DELAYED RELEASE PELLETS ORAL at 20:34

## 2024-07-10 RX ADMIN — BETHANECHOL CHLORIDE 25 MG: 25 TABLET ORAL at 07:36

## 2024-07-10 RX ADMIN — POTASSIUM CHLORIDE AND SODIUM CHLORIDE 125 ML/HR: 900; 300 INJECTION, SOLUTION INTRAVENOUS at 04:08

## 2024-07-10 RX ADMIN — SODIUM BICARBONATE 650 MG: 650 TABLET ORAL at 00:59

## 2024-07-10 RX ADMIN — INSULIN LISPRO 2 UNITS: 100 INJECTION, SOLUTION INTRAVENOUS; SUBCUTANEOUS at 11:16

## 2024-07-10 RX ADMIN — ACETAMINOPHEN 650 MG: 325 TABLET, FILM COATED ORAL at 20:34

## 2024-07-10 RX ADMIN — PANCRELIPASE 24000 UNITS OF LIPASE: 60000; 12000; 38000 CAPSULE, DELAYED RELEASE PELLETS ORAL at 00:59

## 2024-07-10 RX ADMIN — NORTRIPTYLINE HYDROCHLORIDE 50 MG: 25 CAPSULE ORAL at 00:59

## 2024-07-10 RX ADMIN — BETHANECHOL CHLORIDE 25 MG: 25 TABLET ORAL at 17:31

## 2024-07-10 RX ADMIN — PREGABALIN 75 MG: 75 CAPSULE ORAL at 08:09

## 2024-07-10 RX ADMIN — POTASSIUM CHLORIDE AND SODIUM CHLORIDE 125 ML/HR: 900; 300 INJECTION, SOLUTION INTRAVENOUS at 12:35

## 2024-07-10 RX ADMIN — PREGABALIN 75 MG: 75 CAPSULE ORAL at 20:34

## 2024-07-10 RX ADMIN — SODIUM BICARBONATE 650 MG: 650 TABLET ORAL at 20:34

## 2024-07-10 RX ADMIN — BETHANECHOL CHLORIDE 25 MG: 25 TABLET ORAL at 00:59

## 2024-07-10 RX ADMIN — POTASSIUM CHLORIDE 10 MEQ: 7.46 INJECTION, SOLUTION INTRAVENOUS at 06:39

## 2024-07-10 RX ADMIN — ROPINIROLE 1 MG: 1 TABLET, FILM COATED ORAL at 20:34

## 2024-07-10 RX ADMIN — INSULIN LISPRO 2 UNITS: 100 INJECTION, SOLUTION INTRAVENOUS; SUBCUTANEOUS at 17:30

## 2024-07-11 LAB
ANION GAP SERPL CALCULATED.3IONS-SCNC: 6 MMOL/L (ref 5–15)
ANION GAP SERPL CALCULATED.3IONS-SCNC: 6 MMOL/L (ref 5–15)
ANION GAP SERPL CALCULATED.3IONS-SCNC: 8 MMOL/L (ref 5–15)
BASOPHILS # BLD AUTO: 0.02 10*3/MM3 (ref 0–0.2)
BASOPHILS NFR BLD AUTO: 0.3 % (ref 0–1.5)
BUN SERPL-MCNC: 17 MG/DL (ref 6–20)
BUN SERPL-MCNC: 20 MG/DL (ref 6–20)
BUN SERPL-MCNC: 21 MG/DL (ref 6–20)
BUN/CREAT SERPL: 12.1 (ref 7–25)
BUN/CREAT SERPL: 13.4 (ref 7–25)
BUN/CREAT SERPL: 9.6 (ref 7–25)
CALCIUM SPEC-SCNC: 7.1 MG/DL (ref 8.6–10.5)
CALCIUM SPEC-SCNC: 7.3 MG/DL (ref 8.6–10.5)
CALCIUM SPEC-SCNC: 7.3 MG/DL (ref 8.6–10.5)
CHLORIDE SERPL-SCNC: 117 MMOL/L (ref 98–107)
CHLORIDE SERPL-SCNC: 119 MMOL/L (ref 98–107)
CHLORIDE SERPL-SCNC: 121 MMOL/L (ref 98–107)
CO2 SERPL-SCNC: 15 MMOL/L (ref 22–29)
CO2 SERPL-SCNC: 15 MMOL/L (ref 22–29)
CO2 SERPL-SCNC: 17 MMOL/L (ref 22–29)
CREAT SERPL-MCNC: 1.49 MG/DL (ref 0.57–1)
CREAT SERPL-MCNC: 1.74 MG/DL (ref 0.57–1)
CREAT SERPL-MCNC: 1.78 MG/DL (ref 0.57–1)
DEPRECATED RDW RBC AUTO: 51 FL (ref 37–54)
EGFRCR SERPLBLD CKD-EPI 2021: 34 ML/MIN/1.73
EGFRCR SERPLBLD CKD-EPI 2021: 34.9 ML/MIN/1.73
EGFRCR SERPLBLD CKD-EPI 2021: 42.1 ML/MIN/1.73
EOSINOPHIL # BLD AUTO: 0.31 10*3/MM3 (ref 0–0.4)
EOSINOPHIL NFR BLD AUTO: 3.9 % (ref 0.3–6.2)
ERYTHROCYTE [DISTWIDTH] IN BLOOD BY AUTOMATED COUNT: 14.7 % (ref 12.3–15.4)
GLUCOSE BLDC GLUCOMTR-MCNC: 158 MG/DL (ref 70–130)
GLUCOSE BLDC GLUCOMTR-MCNC: 162 MG/DL (ref 70–130)
GLUCOSE BLDC GLUCOMTR-MCNC: 229 MG/DL (ref 70–130)
GLUCOSE BLDC GLUCOMTR-MCNC: 319 MG/DL (ref 70–130)
GLUCOSE SERPL-MCNC: 154 MG/DL (ref 65–99)
GLUCOSE SERPL-MCNC: 154 MG/DL (ref 65–99)
GLUCOSE SERPL-MCNC: 246 MG/DL (ref 65–99)
HCT VFR BLD AUTO: 31.2 % (ref 34–46.6)
HGB BLD-MCNC: 10 G/DL (ref 12–15.9)
IMM GRANULOCYTES # BLD AUTO: 0.02 10*3/MM3 (ref 0–0.05)
IMM GRANULOCYTES NFR BLD AUTO: 0.3 % (ref 0–0.5)
LYMPHOCYTES # BLD AUTO: 2.61 10*3/MM3 (ref 0.7–3.1)
LYMPHOCYTES NFR BLD AUTO: 32.9 % (ref 19.6–45.3)
MAGNESIUM SERPL-MCNC: 1.5 MG/DL (ref 1.6–2.6)
MCH RBC QN AUTO: 30.5 PG (ref 26.6–33)
MCHC RBC AUTO-ENTMCNC: 32.1 G/DL (ref 31.5–35.7)
MCV RBC AUTO: 95.1 FL (ref 79–97)
MONOCYTES # BLD AUTO: 0.4 10*3/MM3 (ref 0.1–0.9)
MONOCYTES NFR BLD AUTO: 5 % (ref 5–12)
NEUTROPHILS NFR BLD AUTO: 4.58 10*3/MM3 (ref 1.7–7)
NEUTROPHILS NFR BLD AUTO: 57.6 % (ref 42.7–76)
NRBC BLD AUTO-RTO: 0 /100 WBC (ref 0–0.2)
PHOSPHATE SERPL-MCNC: 1.4 MG/DL (ref 2.5–4.5)
PLATELET # BLD AUTO: 231 10*3/MM3 (ref 140–450)
PMV BLD AUTO: 10.3 FL (ref 6–12)
POTASSIUM SERPL-SCNC: 4 MMOL/L (ref 3.5–5.2)
POTASSIUM SERPL-SCNC: 4.1 MMOL/L (ref 3.5–5.2)
POTASSIUM SERPL-SCNC: 4.4 MMOL/L (ref 3.5–5.2)
PREALB SERPL-MCNC: 16.2 MG/DL (ref 20–40)
RBC # BLD AUTO: 3.28 10*6/MM3 (ref 3.77–5.28)
SODIUM SERPL-SCNC: 140 MMOL/L (ref 136–145)
SODIUM SERPL-SCNC: 142 MMOL/L (ref 136–145)
SODIUM SERPL-SCNC: 142 MMOL/L (ref 136–145)
WBC NRBC COR # BLD AUTO: 7.94 10*3/MM3 (ref 3.4–10.8)

## 2024-07-11 PROCEDURE — 80048 BASIC METABOLIC PNL TOTAL CA: CPT | Performed by: NURSE PRACTITIONER

## 2024-07-11 PROCEDURE — 82948 REAGENT STRIP/BLOOD GLUCOSE: CPT

## 2024-07-11 PROCEDURE — 25010000002 MAGNESIUM SULFATE IN D5W 1G/100ML (PREMIX) 1-5 GM/100ML-% SOLUTION: Performed by: INTERNAL MEDICINE

## 2024-07-11 PROCEDURE — 83735 ASSAY OF MAGNESIUM: CPT | Performed by: INTERNAL MEDICINE

## 2024-07-11 PROCEDURE — 85025 COMPLETE CBC W/AUTO DIFF WBC: CPT | Performed by: INTERNAL MEDICINE

## 2024-07-11 PROCEDURE — 84100 ASSAY OF PHOSPHORUS: CPT | Performed by: INTERNAL MEDICINE

## 2024-07-11 PROCEDURE — 84134 ASSAY OF PREALBUMIN: CPT | Performed by: INTERNAL MEDICINE

## 2024-07-11 PROCEDURE — 25010000002 SODIUM CHLORIDE 0.9 % WITH KCL 40 MEQ/L 40-0.9 MEQ/L-% SOLUTION: Performed by: NURSE PRACTITIONER

## 2024-07-11 PROCEDURE — 63710000001 INSULIN LISPRO (HUMAN) PER 5 UNITS: Performed by: NURSE PRACTITIONER

## 2024-07-11 RX ORDER — MAGNESIUM SULFATE 1 G/100ML
1 INJECTION INTRAVENOUS
Status: COMPLETED | OUTPATIENT
Start: 2024-07-11 | End: 2024-07-11

## 2024-07-11 RX ADMIN — PANCRELIPASE 24000 UNITS OF LIPASE: 60000; 12000; 38000 CAPSULE, DELAYED RELEASE PELLETS ORAL at 20:11

## 2024-07-11 RX ADMIN — SODIUM BICARBONATE 650 MG: 650 TABLET ORAL at 20:12

## 2024-07-11 RX ADMIN — BETHANECHOL CHLORIDE 25 MG: 25 TABLET ORAL at 11:27

## 2024-07-11 RX ADMIN — SENNOSIDES AND DOCUSATE SODIUM 2 TABLET: 50; 8.6 TABLET ORAL at 15:44

## 2024-07-11 RX ADMIN — INSULIN LISPRO 3 UNITS: 100 INJECTION, SOLUTION INTRAVENOUS; SUBCUTANEOUS at 11:27

## 2024-07-11 RX ADMIN — SODIUM BICARBONATE 650 MG: 650 TABLET ORAL at 08:26

## 2024-07-11 RX ADMIN — MAGNESIUM SULFATE IN DEXTROSE 1 G: 10 INJECTION, SOLUTION INTRAVENOUS at 07:32

## 2024-07-11 RX ADMIN — INSULIN LISPRO 5 UNITS: 100 INJECTION, SOLUTION INTRAVENOUS; SUBCUTANEOUS at 20:19

## 2024-07-11 RX ADMIN — BETHANECHOL CHLORIDE 25 MG: 25 TABLET ORAL at 08:26

## 2024-07-11 RX ADMIN — BETHANECHOL CHLORIDE 25 MG: 25 TABLET ORAL at 17:30

## 2024-07-11 RX ADMIN — BETHANECHOL CHLORIDE 25 MG: 25 TABLET ORAL at 20:12

## 2024-07-11 RX ADMIN — PANTOPRAZOLE SODIUM 40 MG: 40 TABLET, DELAYED RELEASE ORAL at 06:19

## 2024-07-11 RX ADMIN — ASCORBIC ACID, THIAMINE, RIBOFLAVIN, NIACINAMIDE, PYRIDOXINE, FOLIC ACID, COBALAMIN, BIOTIN, PANTOTHENIC ACID 15 ML: 100; 1.5; 1.7; 20; 10; 1; 6; 300; 1 TABLET, COATED ORAL at 08:26

## 2024-07-11 RX ADMIN — NORTRIPTYLINE HYDROCHLORIDE 50 MG: 25 CAPSULE ORAL at 20:11

## 2024-07-11 RX ADMIN — POTASSIUM CHLORIDE AND SODIUM CHLORIDE 125 ML/HR: 900; 300 INJECTION, SOLUTION INTRAVENOUS at 04:00

## 2024-07-11 RX ADMIN — ROPINIROLE 1 MG: 1 TABLET, FILM COATED ORAL at 16:44

## 2024-07-11 RX ADMIN — ROPINIROLE 1 MG: 1 TABLET, FILM COATED ORAL at 20:12

## 2024-07-11 RX ADMIN — PANCRELIPASE 24000 UNITS OF LIPASE: 60000; 12000; 38000 CAPSULE, DELAYED RELEASE PELLETS ORAL at 08:27

## 2024-07-11 RX ADMIN — ROPINIROLE 1 MG: 1 TABLET, FILM COATED ORAL at 08:27

## 2024-07-11 RX ADMIN — PREGABALIN 75 MG: 75 CAPSULE ORAL at 08:26

## 2024-07-11 RX ADMIN — MAGNESIUM SULFATE IN DEXTROSE 1 G: 10 INJECTION, SOLUTION INTRAVENOUS at 06:19

## 2024-07-11 RX ADMIN — SODIUM PHOSPHATE, MONOBASIC, MONOHYDRATE AND SODIUM PHOSPHATE, DIBASIC, ANHYDROUS 15 MMOL: 142; 276 INJECTION, SOLUTION INTRAVENOUS at 06:59

## 2024-07-11 RX ADMIN — PANCRELIPASE 24000 UNITS OF LIPASE: 60000; 12000; 38000 CAPSULE, DELAYED RELEASE PELLETS ORAL at 11:27

## 2024-07-11 RX ADMIN — PREGABALIN 75 MG: 75 CAPSULE ORAL at 20:13

## 2024-07-11 RX ADMIN — POTASSIUM CHLORIDE AND SODIUM CHLORIDE 125 ML/HR: 900; 300 INJECTION, SOLUTION INTRAVENOUS at 12:49

## 2024-07-11 RX ADMIN — MAGNESIUM SULFATE IN DEXTROSE 1 G: 10 INJECTION, SOLUTION INTRAVENOUS at 08:29

## 2024-07-11 RX ADMIN — INSULIN LISPRO 4 UNITS: 100 INJECTION, SOLUTION INTRAVENOUS; SUBCUTANEOUS at 17:30

## 2024-07-11 RX ADMIN — SODIUM PHOSPHATE, MONOBASIC, MONOHYDRATE AND SODIUM PHOSPHATE, DIBASIC, ANHYDROUS 15 MMOL: 142; 276 INJECTION, SOLUTION INTRAVENOUS at 08:27

## 2024-07-11 RX ADMIN — HYDROXYCHLOROQUINE SULFATE 400 MG: 200 TABLET ORAL at 08:26

## 2024-07-11 RX ADMIN — PANCRELIPASE 24000 UNITS OF LIPASE: 60000; 12000; 38000 CAPSULE, DELAYED RELEASE PELLETS ORAL at 16:44

## 2024-07-12 ENCOUNTER — READMISSION MANAGEMENT (OUTPATIENT)
Dept: CALL CENTER | Facility: HOSPITAL | Age: 54
End: 2024-07-12
Payer: COMMERCIAL

## 2024-07-12 VITALS
SYSTOLIC BLOOD PRESSURE: 132 MMHG | OXYGEN SATURATION: 100 % | WEIGHT: 83 LBS | HEIGHT: 60 IN | HEART RATE: 101 BPM | BODY MASS INDEX: 16.3 KG/M2 | RESPIRATION RATE: 16 BRPM | TEMPERATURE: 98.5 F | DIASTOLIC BLOOD PRESSURE: 86 MMHG

## 2024-07-12 PROBLEM — E83.42 HYPOMAGNESEMIA: Status: RESOLVED | Noted: 2022-06-16 | Resolved: 2024-07-12

## 2024-07-12 PROBLEM — E87.6 HYPOKALEMIA: Status: RESOLVED | Noted: 2022-04-30 | Resolved: 2024-07-12

## 2024-07-12 LAB
ANION GAP SERPL CALCULATED.3IONS-SCNC: 7 MMOL/L (ref 5–15)
BUN SERPL-MCNC: 29 MG/DL (ref 6–20)
BUN/CREAT SERPL: 20.1 (ref 7–25)
CALCIUM SPEC-SCNC: 7.2 MG/DL (ref 8.6–10.5)
CHLORIDE SERPL-SCNC: 119 MMOL/L (ref 98–107)
CO2 SERPL-SCNC: 16 MMOL/L (ref 22–29)
CREAT SERPL-MCNC: 1.44 MG/DL (ref 0.57–1)
EGFRCR SERPLBLD CKD-EPI 2021: 43.9 ML/MIN/1.73
GLUCOSE BLDC GLUCOMTR-MCNC: 129 MG/DL (ref 70–130)
GLUCOSE BLDC GLUCOMTR-MCNC: 180 MG/DL (ref 70–130)
GLUCOSE SERPL-MCNC: 167 MG/DL (ref 65–99)
MAGNESIUM SERPL-MCNC: 1.8 MG/DL (ref 1.6–2.6)
PHOSPHATE SERPL-MCNC: 2.8 MG/DL (ref 2.5–4.5)
POTASSIUM SERPL-SCNC: 3.8 MMOL/L (ref 3.5–5.2)
SODIUM SERPL-SCNC: 142 MMOL/L (ref 136–145)

## 2024-07-12 PROCEDURE — 63710000001 INSULIN LISPRO (HUMAN) PER 5 UNITS: Performed by: NURSE PRACTITIONER

## 2024-07-12 PROCEDURE — 84100 ASSAY OF PHOSPHORUS: CPT | Performed by: INTERNAL MEDICINE

## 2024-07-12 PROCEDURE — 80048 BASIC METABOLIC PNL TOTAL CA: CPT | Performed by: INTERNAL MEDICINE

## 2024-07-12 PROCEDURE — 25010000002 SODIUM CHLORIDE 0.9 % WITH KCL 40 MEQ/L 40-0.9 MEQ/L-% SOLUTION: Performed by: INTERNAL MEDICINE

## 2024-07-12 PROCEDURE — 82948 REAGENT STRIP/BLOOD GLUCOSE: CPT

## 2024-07-12 RX ORDER — SODIUM CHLORIDE 9 MG/ML
40 INJECTION, SOLUTION INTRAVENOUS AS NEEDED
Status: DISCONTINUED | OUTPATIENT
Start: 2024-07-12 | End: 2024-07-12 | Stop reason: HOSPADM

## 2024-07-12 RX ORDER — SODIUM CHLORIDE 0.9 % (FLUSH) 0.9 %
10 SYRINGE (ML) INJECTION AS NEEDED
Status: DISCONTINUED | OUTPATIENT
Start: 2024-07-12 | End: 2024-07-12 | Stop reason: HOSPADM

## 2024-07-12 RX ORDER — SODIUM CHLORIDE 0.9 % (FLUSH) 0.9 %
10 SYRINGE (ML) INJECTION EVERY 12 HOURS SCHEDULED
Status: DISCONTINUED | OUTPATIENT
Start: 2024-07-12 | End: 2024-07-12 | Stop reason: HOSPADM

## 2024-07-12 RX ORDER — SODIUM CHLORIDE 0.9 % (FLUSH) 0.9 %
20 SYRINGE (ML) INJECTION AS NEEDED
Status: DISCONTINUED | OUTPATIENT
Start: 2024-07-12 | End: 2024-07-12 | Stop reason: HOSPADM

## 2024-07-12 RX ADMIN — POTASSIUM CHLORIDE AND SODIUM CHLORIDE 75 ML/HR: 900; 300 INJECTION, SOLUTION INTRAVENOUS at 02:55

## 2024-07-12 RX ADMIN — SODIUM BICARBONATE 650 MG: 650 TABLET ORAL at 08:42

## 2024-07-12 RX ADMIN — HYDROXYCHLOROQUINE SULFATE 400 MG: 200 TABLET ORAL at 08:42

## 2024-07-12 RX ADMIN — PANCRELIPASE 24000 UNITS OF LIPASE: 60000; 12000; 38000 CAPSULE, DELAYED RELEASE PELLETS ORAL at 12:23

## 2024-07-12 RX ADMIN — BETHANECHOL CHLORIDE 25 MG: 25 TABLET ORAL at 12:23

## 2024-07-12 RX ADMIN — BETHANECHOL CHLORIDE 25 MG: 25 TABLET ORAL at 08:42

## 2024-07-12 RX ADMIN — PREGABALIN 75 MG: 75 CAPSULE ORAL at 08:43

## 2024-07-12 RX ADMIN — PANCRELIPASE 24000 UNITS OF LIPASE: 60000; 12000; 38000 CAPSULE, DELAYED RELEASE PELLETS ORAL at 08:42

## 2024-07-12 RX ADMIN — ASCORBIC ACID, THIAMINE, RIBOFLAVIN, NIACINAMIDE, PYRIDOXINE, FOLIC ACID, COBALAMIN, BIOTIN, PANTOTHENIC ACID 15 ML: 100; 1.5; 1.7; 20; 10; 1; 6; 300; 1 TABLET, COATED ORAL at 08:43

## 2024-07-12 RX ADMIN — ROPINIROLE 1 MG: 1 TABLET, FILM COATED ORAL at 08:42

## 2024-07-12 RX ADMIN — INSULIN LISPRO 2 UNITS: 100 INJECTION, SOLUTION INTRAVENOUS; SUBCUTANEOUS at 12:26

## 2024-07-12 RX ADMIN — PANTOPRAZOLE SODIUM 40 MG: 40 TABLET, DELAYED RELEASE ORAL at 06:12

## 2024-07-13 LAB
QT INTERVAL: 384 MS
QTC INTERVAL: 472 MS

## 2024-07-24 ENCOUNTER — READMISSION MANAGEMENT (OUTPATIENT)
Dept: CALL CENTER | Facility: HOSPITAL | Age: 54
End: 2024-07-24
Payer: COMMERCIAL

## 2024-08-27 ENCOUNTER — APPOINTMENT (OUTPATIENT)
Dept: GENERAL RADIOLOGY | Facility: HOSPITAL | Age: 54
DRG: 638 | End: 2024-08-27
Payer: COMMERCIAL

## 2024-08-27 ENCOUNTER — HOSPITAL ENCOUNTER (INPATIENT)
Facility: HOSPITAL | Age: 54
LOS: 3 days | Discharge: HOME | DRG: 638 | End: 2024-08-30
Attending: INTERNAL MEDICINE | Admitting: INTERNAL MEDICINE
Payer: COMMERCIAL

## 2024-08-27 DIAGNOSIS — E87.6 HYPOKALEMIA: ICD-10-CM

## 2024-08-27 DIAGNOSIS — E11.10 DIABETIC KETOACIDOSIS WITHOUT COMA ASSOCIATED WITH TYPE 2 DIABETES MELLITUS: ICD-10-CM

## 2024-08-27 DIAGNOSIS — N17.9 ACUTE RENAL FAILURE, UNSPECIFIED ACUTE RENAL FAILURE TYPE: Primary | ICD-10-CM

## 2024-08-27 LAB
ACETONE BLD QL: NEGATIVE
ALBUMIN SERPL-MCNC: 3.6 G/DL (ref 3.5–5.2)
ALBUMIN SERPL-MCNC: 4.2 G/DL (ref 3.5–5.2)
ALBUMIN/GLOB SERPL: 1.3 G/DL
ALBUMIN/GLOB SERPL: 1.3 G/DL
ALP SERPL-CCNC: 123 U/L (ref 39–117)
ALP SERPL-CCNC: 144 U/L (ref 39–117)
ALT SERPL W P-5'-P-CCNC: 11 U/L (ref 1–33)
ALT SERPL W P-5'-P-CCNC: 13 U/L (ref 1–33)
ANION GAP SERPL CALCULATED.3IONS-SCNC: 17 MMOL/L (ref 5–15)
ANION GAP SERPL CALCULATED.3IONS-SCNC: 21 MMOL/L (ref 5–15)
AST SERPL-CCNC: 16 U/L (ref 1–32)
AST SERPL-CCNC: 17 U/L (ref 1–32)
ATMOSPHERIC PRESS: 754 MMHG
BASE EXCESS BLDV CALC-SCNC: -12.6 MMOL/L (ref 0–2)
BASOPHILS # BLD AUTO: 0.04 10*3/MM3 (ref 0–0.2)
BASOPHILS # BLD AUTO: 0.05 10*3/MM3 (ref 0–0.2)
BASOPHILS NFR BLD AUTO: 0.3 % (ref 0–1.5)
BASOPHILS NFR BLD AUTO: 0.3 % (ref 0–1.5)
BDY SITE: ABNORMAL
BILIRUB SERPL-MCNC: 0.2 MG/DL (ref 0–1.2)
BILIRUB SERPL-MCNC: 0.2 MG/DL (ref 0–1.2)
BODY TEMPERATURE: 37
BUN SERPL-MCNC: 39 MG/DL (ref 6–20)
BUN SERPL-MCNC: 43 MG/DL (ref 6–20)
BUN/CREAT SERPL: 11.7 (ref 7–25)
BUN/CREAT SERPL: 12.3 (ref 7–25)
CALCIUM SPEC-SCNC: 8 MG/DL (ref 8.6–10.5)
CALCIUM SPEC-SCNC: 8.8 MG/DL (ref 8.6–10.5)
CHLORIDE SERPL-SCNC: 101 MMOL/L (ref 98–107)
CHLORIDE SERPL-SCNC: 105 MMOL/L (ref 98–107)
CO2 SERPL-SCNC: 14 MMOL/L (ref 22–29)
CO2 SERPL-SCNC: 15 MMOL/L (ref 22–29)
CREAT SERPL-MCNC: 3.16 MG/DL (ref 0.57–1)
CREAT SERPL-MCNC: 3.69 MG/DL (ref 0.57–1)
D-LACTATE SERPL-SCNC: 1.3 MMOL/L (ref 0.5–2)
D-LACTATE SERPL-SCNC: 2.2 MMOL/L (ref 0.5–2)
DEPRECATED RDW RBC AUTO: 51.4 FL (ref 37–54)
DEPRECATED RDW RBC AUTO: 51.9 FL (ref 37–54)
EGFRCR SERPLBLD CKD-EPI 2021: 14.2 ML/MIN/1.73
EGFRCR SERPLBLD CKD-EPI 2021: 17.1 ML/MIN/1.73
EOSINOPHIL # BLD AUTO: 0.17 10*3/MM3 (ref 0–0.4)
EOSINOPHIL # BLD AUTO: 0.23 10*3/MM3 (ref 0–0.4)
EOSINOPHIL NFR BLD AUTO: 1.1 % (ref 0.3–6.2)
EOSINOPHIL NFR BLD AUTO: 1.8 % (ref 0.3–6.2)
ERYTHROCYTE [DISTWIDTH] IN BLOOD BY AUTOMATED COUNT: 14.8 % (ref 12.3–15.4)
ERYTHROCYTE [DISTWIDTH] IN BLOOD BY AUTOMATED COUNT: 15 % (ref 12.3–15.4)
GEN 5 2HR TROPONIN T REFLEX: 16 NG/L
GLOBULIN UR ELPH-MCNC: 2.8 GM/DL
GLOBULIN UR ELPH-MCNC: 3.2 GM/DL
GLUCOSE BLDC GLUCOMTR-MCNC: 102 MG/DL (ref 70–130)
GLUCOSE BLDC GLUCOMTR-MCNC: 106 MG/DL (ref 70–130)
GLUCOSE BLDC GLUCOMTR-MCNC: 143 MG/DL (ref 70–130)
GLUCOSE BLDC GLUCOMTR-MCNC: 254 MG/DL (ref 70–130)
GLUCOSE SERPL-MCNC: 207 MG/DL (ref 65–99)
GLUCOSE SERPL-MCNC: 311 MG/DL (ref 65–99)
HBA1C MFR BLD: 8.5 % (ref 4.8–5.6)
HCO3 BLDV-SCNC: 14.7 MMOL/L (ref 22–28)
HCT VFR BLD AUTO: 36.6 % (ref 34–46.6)
HCT VFR BLD AUTO: 39 % (ref 34–46.6)
HGB BLD-MCNC: 12 G/DL (ref 12–15.9)
HGB BLD-MCNC: 12.9 G/DL (ref 12–15.9)
IMM GRANULOCYTES # BLD AUTO: 0.05 10*3/MM3 (ref 0–0.05)
IMM GRANULOCYTES # BLD AUTO: 0.08 10*3/MM3 (ref 0–0.05)
IMM GRANULOCYTES NFR BLD AUTO: 0.4 % (ref 0–0.5)
IMM GRANULOCYTES NFR BLD AUTO: 0.5 % (ref 0–0.5)
LIPASE SERPL-CCNC: 69 U/L (ref 13–60)
LYMPHOCYTES # BLD AUTO: 2.9 10*3/MM3 (ref 0.7–3.1)
LYMPHOCYTES # BLD AUTO: 3.06 10*3/MM3 (ref 0.7–3.1)
LYMPHOCYTES NFR BLD AUTO: 19 % (ref 19.6–45.3)
LYMPHOCYTES NFR BLD AUTO: 22.8 % (ref 19.6–45.3)
Lab: ABNORMAL
Lab: ABNORMAL
MAGNESIUM SERPL-MCNC: 1.6 MG/DL (ref 1.6–2.6)
MAGNESIUM SERPL-MCNC: 1.7 MG/DL (ref 1.6–2.6)
MCH RBC QN AUTO: 31.2 PG (ref 26.6–33)
MCH RBC QN AUTO: 31.2 PG (ref 26.6–33)
MCHC RBC AUTO-ENTMCNC: 32.8 G/DL (ref 31.5–35.7)
MCHC RBC AUTO-ENTMCNC: 33.1 G/DL (ref 31.5–35.7)
MCV RBC AUTO: 94.4 FL (ref 79–97)
MCV RBC AUTO: 95.1 FL (ref 79–97)
MODALITY: ABNORMAL
MONOCYTES # BLD AUTO: 0.51 10*3/MM3 (ref 0.1–0.9)
MONOCYTES # BLD AUTO: 0.81 10*3/MM3 (ref 0.1–0.9)
MONOCYTES NFR BLD AUTO: 4 % (ref 5–12)
MONOCYTES NFR BLD AUTO: 5 % (ref 5–12)
NEUTROPHILS NFR BLD AUTO: 11.91 10*3/MM3 (ref 1.7–7)
NEUTROPHILS NFR BLD AUTO: 70.7 % (ref 42.7–76)
NEUTROPHILS NFR BLD AUTO: 74.1 % (ref 42.7–76)
NEUTROPHILS NFR BLD AUTO: 8.97 10*3/MM3 (ref 1.7–7)
NOTIFIED BY: ABNORMAL
NOTIFIED WHO: ABNORMAL
NRBC BLD AUTO-RTO: 0 /100 WBC (ref 0–0.2)
NRBC BLD AUTO-RTO: 0 /100 WBC (ref 0–0.2)
OSMOLALITY SERPL: 300 MOSM/KG (ref 275–295)
PCO2 BLDV: 37.7 MM HG (ref 41–51)
PH BLDV: 7.2 PH UNITS (ref 7.32–7.42)
PHOSPHATE SERPL-MCNC: 4.8 MG/DL (ref 2.5–4.5)
PHOSPHATE SERPL-MCNC: 5.7 MG/DL (ref 2.5–4.5)
PLATELET # BLD AUTO: 263 10*3/MM3 (ref 140–450)
PLATELET # BLD AUTO: 345 10*3/MM3 (ref 140–450)
PMV BLD AUTO: 10 FL (ref 6–12)
PMV BLD AUTO: 10.1 FL (ref 6–12)
PO2 BLDV: 36.7 MM HG (ref 27–53)
POTASSIUM SERPL-SCNC: 2.7 MMOL/L (ref 3.5–5.2)
POTASSIUM SERPL-SCNC: 2.8 MMOL/L (ref 3.5–5.2)
PROT SERPL-MCNC: 6.4 G/DL (ref 6–8.5)
PROT SERPL-MCNC: 7.4 G/DL (ref 6–8.5)
RBC # BLD AUTO: 3.85 10*6/MM3 (ref 3.77–5.28)
RBC # BLD AUTO: 4.13 10*6/MM3 (ref 3.77–5.28)
SAO2 % BLDCOV: 68 % (ref 45–75)
SODIUM SERPL-SCNC: 136 MMOL/L (ref 136–145)
SODIUM SERPL-SCNC: 137 MMOL/L (ref 136–145)
TROPONIN T DELTA: -1 NG/L
TROPONIN T SERPL HS-MCNC: 17 NG/L
VENTILATOR MODE: ABNORMAL
WBC NRBC COR # BLD AUTO: 12.7 10*3/MM3 (ref 3.4–10.8)
WBC NRBC COR # BLD AUTO: 16.08 10*3/MM3 (ref 3.4–10.8)

## 2024-08-27 PROCEDURE — 83605 ASSAY OF LACTIC ACID: CPT

## 2024-08-27 PROCEDURE — 83735 ASSAY OF MAGNESIUM: CPT

## 2024-08-27 PROCEDURE — 84484 ASSAY OF TROPONIN QUANT: CPT

## 2024-08-27 PROCEDURE — 82948 REAGENT STRIP/BLOOD GLUCOSE: CPT

## 2024-08-27 PROCEDURE — 93010 ELECTROCARDIOGRAM REPORT: CPT | Performed by: INTERNAL MEDICINE

## 2024-08-27 PROCEDURE — 83036 HEMOGLOBIN GLYCOSYLATED A1C: CPT

## 2024-08-27 PROCEDURE — 25010000002 ONDANSETRON PER 1 MG

## 2024-08-27 PROCEDURE — 82009 KETONE BODYS QUAL: CPT

## 2024-08-27 PROCEDURE — 71045 X-RAY EXAM CHEST 1 VIEW: CPT

## 2024-08-27 PROCEDURE — 99285 EMERGENCY DEPT VISIT HI MDM: CPT

## 2024-08-27 PROCEDURE — 0 INSULIN REGULAR HUMAN PER 5 UNITS

## 2024-08-27 PROCEDURE — 85025 COMPLETE CBC W/AUTO DIFF WBC: CPT

## 2024-08-27 PROCEDURE — 83930 ASSAY OF BLOOD OSMOLALITY: CPT

## 2024-08-27 PROCEDURE — 93005 ELECTROCARDIOGRAM TRACING: CPT

## 2024-08-27 PROCEDURE — 84100 ASSAY OF PHOSPHORUS: CPT

## 2024-08-27 PROCEDURE — 36415 COLL VENOUS BLD VENIPUNCTURE: CPT

## 2024-08-27 PROCEDURE — 83690 ASSAY OF LIPASE: CPT

## 2024-08-27 PROCEDURE — 25810000003 SODIUM CHLORIDE 0.9 % SOLUTION

## 2024-08-27 PROCEDURE — 25010000002 POTASSIUM CHLORIDE 10 MEQ/100ML SOLUTION

## 2024-08-27 PROCEDURE — 80053 COMPREHEN METABOLIC PANEL: CPT

## 2024-08-27 PROCEDURE — 82803 BLOOD GASES ANY COMBINATION: CPT

## 2024-08-27 RX ORDER — ONDANSETRON 2 MG/ML
4 INJECTION INTRAMUSCULAR; INTRAVENOUS ONCE
Status: COMPLETED | OUTPATIENT
Start: 2024-08-27 | End: 2024-08-27

## 2024-08-27 RX ORDER — DEXTROSE MONOHYDRATE, SODIUM CHLORIDE, AND POTASSIUM CHLORIDE 50; 2.98; 9 G/1000ML; G/1000ML; G/1000ML
150 INJECTION, SOLUTION INTRAVENOUS CONTINUOUS PRN
Status: DISCONTINUED | OUTPATIENT
Start: 2024-08-27 | End: 2024-08-28

## 2024-08-27 RX ORDER — DEXTROSE MONOHYDRATE AND SODIUM CHLORIDE 5; .9 G/100ML; G/100ML
150 INJECTION, SOLUTION INTRAVENOUS CONTINUOUS PRN
Status: DISCONTINUED | OUTPATIENT
Start: 2024-08-27 | End: 2024-08-28

## 2024-08-27 RX ORDER — SODIUM CHLORIDE 9 MG/ML
40 INJECTION, SOLUTION INTRAVENOUS AS NEEDED
Status: DISCONTINUED | OUTPATIENT
Start: 2024-08-27 | End: 2024-08-30 | Stop reason: HOSPADM

## 2024-08-27 RX ORDER — NITROGLYCERIN 0.4 MG/1
0.4 TABLET SUBLINGUAL
Status: DISCONTINUED | OUTPATIENT
Start: 2024-08-27 | End: 2024-08-30 | Stop reason: HOSPADM

## 2024-08-27 RX ORDER — DEXTROSE MONOHYDRATE, SODIUM CHLORIDE, AND POTASSIUM CHLORIDE 50; 2.98; 4.5 G/1000ML; G/1000ML; G/1000ML
150 INJECTION, SOLUTION INTRAVENOUS CONTINUOUS PRN
Status: DISCONTINUED | OUTPATIENT
Start: 2024-08-27 | End: 2024-08-28

## 2024-08-27 RX ORDER — DEXTROSE MONOHYDRATE 25 G/50ML
10-50 INJECTION, SOLUTION INTRAVENOUS
Status: DISCONTINUED | OUTPATIENT
Start: 2024-08-27 | End: 2024-08-28

## 2024-08-27 RX ORDER — SODIUM CHLORIDE 0.9 % (FLUSH) 0.9 %
10 SYRINGE (ML) INJECTION EVERY 12 HOURS SCHEDULED
Status: DISCONTINUED | OUTPATIENT
Start: 2024-08-27 | End: 2024-08-30 | Stop reason: HOSPADM

## 2024-08-27 RX ORDER — AMOXICILLIN 250 MG
2 CAPSULE ORAL 2 TIMES DAILY
Status: DISCONTINUED | OUTPATIENT
Start: 2024-08-28 | End: 2024-08-30 | Stop reason: HOSPADM

## 2024-08-27 RX ORDER — ENOXAPARIN SODIUM 100 MG/ML
30 INJECTION SUBCUTANEOUS DAILY
Status: DISCONTINUED | OUTPATIENT
Start: 2024-08-28 | End: 2024-08-30 | Stop reason: HOSPADM

## 2024-08-27 RX ORDER — BISACODYL 10 MG
10 SUPPOSITORY, RECTAL RECTAL DAILY PRN
Status: DISCONTINUED | OUTPATIENT
Start: 2024-08-27 | End: 2024-08-30 | Stop reason: HOSPADM

## 2024-08-27 RX ORDER — SODIUM CHLORIDE AND POTASSIUM CHLORIDE 150; 900 MG/100ML; MG/100ML
250 INJECTION, SOLUTION INTRAVENOUS CONTINUOUS PRN
Status: DISCONTINUED | OUTPATIENT
Start: 2024-08-27 | End: 2024-08-28

## 2024-08-27 RX ORDER — SODIUM CHLORIDE AND POTASSIUM CHLORIDE 300; 900 MG/100ML; MG/100ML
250 INJECTION, SOLUTION INTRAVENOUS CONTINUOUS PRN
Status: DISCONTINUED | OUTPATIENT
Start: 2024-08-27 | End: 2024-08-28

## 2024-08-27 RX ORDER — SODIUM CHLORIDE 9 MG/ML
250 INJECTION, SOLUTION INTRAVENOUS CONTINUOUS PRN
Status: DISCONTINUED | OUTPATIENT
Start: 2024-08-27 | End: 2024-08-28

## 2024-08-27 RX ORDER — POTASSIUM CHLORIDE 7.45 MG/ML
10 INJECTION INTRAVENOUS ONCE
Status: COMPLETED | OUTPATIENT
Start: 2024-08-27 | End: 2024-08-28

## 2024-08-27 RX ORDER — SODIUM CHLORIDE 0.9 % (FLUSH) 0.9 %
10 SYRINGE (ML) INJECTION EVERY 12 HOURS SCHEDULED
Status: DISCONTINUED | OUTPATIENT
Start: 2024-08-27 | End: 2024-08-28

## 2024-08-27 RX ORDER — SODIUM CHLORIDE AND POTASSIUM CHLORIDE 150; 450 MG/100ML; MG/100ML
250 INJECTION, SOLUTION INTRAVENOUS CONTINUOUS PRN
Status: DISCONTINUED | OUTPATIENT
Start: 2024-08-27 | End: 2024-08-28

## 2024-08-27 RX ORDER — IBUPROFEN 600 MG/1
1 TABLET ORAL
Status: DISCONTINUED | OUTPATIENT
Start: 2024-08-27 | End: 2024-08-28

## 2024-08-27 RX ORDER — CHLORHEXIDINE GLUCONATE 500 MG/1
1 CLOTH TOPICAL EVERY 24 HOURS
Status: DISCONTINUED | OUTPATIENT
Start: 2024-08-28 | End: 2024-08-30 | Stop reason: HOSPADM

## 2024-08-27 RX ORDER — DEXTROSE MONOHYDRATE AND SODIUM CHLORIDE 5; .45 G/100ML; G/100ML
150 INJECTION, SOLUTION INTRAVENOUS CONTINUOUS PRN
Status: DISCONTINUED | OUTPATIENT
Start: 2024-08-27 | End: 2024-08-28

## 2024-08-27 RX ORDER — POLYETHYLENE GLYCOL 3350 17 G/17G
17 POWDER, FOR SOLUTION ORAL DAILY PRN
Status: DISCONTINUED | OUTPATIENT
Start: 2024-08-27 | End: 2024-08-30 | Stop reason: HOSPADM

## 2024-08-27 RX ORDER — DEXTROSE MONOHYDRATE, SODIUM CHLORIDE, AND POTASSIUM CHLORIDE 50; 1.49; 4.5 G/1000ML; G/1000ML; G/1000ML
150 INJECTION, SOLUTION INTRAVENOUS CONTINUOUS PRN
Status: DISCONTINUED | OUTPATIENT
Start: 2024-08-27 | End: 2024-08-28

## 2024-08-27 RX ORDER — SODIUM CHLORIDE 0.9 % (FLUSH) 0.9 %
10 SYRINGE (ML) INJECTION AS NEEDED
Status: DISCONTINUED | OUTPATIENT
Start: 2024-08-27 | End: 2024-08-28

## 2024-08-27 RX ORDER — CHLORHEXIDINE GLUCONATE 500 MG/1
1 CLOTH TOPICAL ONCE
Status: COMPLETED | OUTPATIENT
Start: 2024-08-27 | End: 2024-08-27

## 2024-08-27 RX ORDER — NICOTINE POLACRILEX 4 MG
15 LOZENGE BUCCAL
Status: DISCONTINUED | OUTPATIENT
Start: 2024-08-27 | End: 2024-08-28

## 2024-08-27 RX ORDER — SODIUM CHLORIDE 0.9 % (FLUSH) 0.9 %
10 SYRINGE (ML) INJECTION AS NEEDED
Status: DISCONTINUED | OUTPATIENT
Start: 2024-08-27 | End: 2024-08-30 | Stop reason: HOSPADM

## 2024-08-27 RX ORDER — POTASSIUM CHLORIDE 750 MG/1
40 CAPSULE, EXTENDED RELEASE ORAL ONCE
Status: COMPLETED | OUTPATIENT
Start: 2024-08-27 | End: 2024-08-27

## 2024-08-27 RX ORDER — SODIUM CHLORIDE 450 MG/100ML
250 INJECTION, SOLUTION INTRAVENOUS CONTINUOUS PRN
Status: DISCONTINUED | OUTPATIENT
Start: 2024-08-27 | End: 2024-08-28

## 2024-08-27 RX ORDER — SODIUM CHLORIDE 9 MG/ML
40 INJECTION, SOLUTION INTRAVENOUS AS NEEDED
Status: DISCONTINUED | OUTPATIENT
Start: 2024-08-27 | End: 2024-08-28

## 2024-08-27 RX ORDER — DEXTROSE MONOHYDRATE, SODIUM CHLORIDE, AND POTASSIUM CHLORIDE 50; 1.49; 9 G/1000ML; G/1000ML; G/1000ML
150 INJECTION, SOLUTION INTRAVENOUS CONTINUOUS PRN
Status: DISCONTINUED | OUTPATIENT
Start: 2024-08-27 | End: 2024-08-28

## 2024-08-27 RX ADMIN — Medication 10 ML: at 22:55

## 2024-08-27 RX ADMIN — POTASSIUM CHLORIDE 40 MEQ: 750 CAPSULE, EXTENDED RELEASE ORAL at 19:52

## 2024-08-27 RX ADMIN — CHLORHEXIDINE GLUCONATE 1 APPLICATION: 500 CLOTH TOPICAL at 22:55

## 2024-08-27 RX ADMIN — POTASSIUM CHLORIDE 10 MEQ: 7.46 INJECTION, SOLUTION INTRAVENOUS at 23:28

## 2024-08-27 RX ADMIN — Medication 1 APPLICATION: at 23:28

## 2024-08-27 RX ADMIN — SODIUM CHLORIDE 500 ML: 9 INJECTION, SOLUTION INTRAVENOUS at 20:00

## 2024-08-27 RX ADMIN — SODIUM CHLORIDE 1000 ML: 9 INJECTION, SOLUTION INTRAVENOUS at 19:11

## 2024-08-27 RX ADMIN — ONDANSETRON 4 MG: 2 INJECTION INTRAMUSCULAR; INTRAVENOUS at 19:11

## 2024-08-27 RX ADMIN — SODIUM CHLORIDE 1000 ML/HR: 9 INJECTION, SOLUTION INTRAVENOUS at 20:51

## 2024-08-27 RX ADMIN — POTASSIUM CHLORIDE, DEXTROSE MONOHYDRATE AND SODIUM CHLORIDE 150 ML/HR: 300; 5; 450 INJECTION, SOLUTION INTRAVENOUS at 22:55

## 2024-08-27 RX ADMIN — SODIUM CHLORIDE 1.9 UNITS/HR: 9 INJECTION, SOLUTION INTRAVENOUS at 21:03

## 2024-08-28 LAB
ANION GAP SERPL CALCULATED.3IONS-SCNC: 11 MMOL/L (ref 5–15)
ANION GAP SERPL CALCULATED.3IONS-SCNC: 11 MMOL/L (ref 5–15)
ANION GAP SERPL CALCULATED.3IONS-SCNC: 12 MMOL/L (ref 5–15)
ANION GAP SERPL CALCULATED.3IONS-SCNC: 6 MMOL/L (ref 5–15)
ANION GAP SERPL CALCULATED.3IONS-SCNC: 7 MMOL/L (ref 5–15)
ANION GAP SERPL CALCULATED.3IONS-SCNC: 9 MMOL/L (ref 5–15)
ATMOSPHERIC PRESS: 754 MMHG
BACTERIA UR QL AUTO: NORMAL /HPF
BASE EXCESS BLDV CALC-SCNC: -15.6 MMOL/L (ref 0–2)
BASOPHILS # BLD AUTO: 0.03 10*3/MM3 (ref 0–0.2)
BASOPHILS NFR BLD AUTO: 0.3 % (ref 0–1.5)
BDY SITE: ABNORMAL
BILIRUB UR QL STRIP: NEGATIVE
BODY TEMPERATURE: 37
BUN SERPL-MCNC: 26 MG/DL (ref 6–20)
BUN SERPL-MCNC: 26 MG/DL (ref 6–20)
BUN SERPL-MCNC: 28 MG/DL (ref 6–20)
BUN SERPL-MCNC: 29 MG/DL (ref 6–20)
BUN SERPL-MCNC: 33 MG/DL (ref 6–20)
BUN SERPL-MCNC: 35 MG/DL (ref 6–20)
BUN/CREAT SERPL: 12.1 (ref 7–25)
BUN/CREAT SERPL: 12.3 (ref 7–25)
BUN/CREAT SERPL: 12.4 (ref 7–25)
BUN/CREAT SERPL: 13.1 (ref 7–25)
BUN/CREAT SERPL: 13.3 (ref 7–25)
BUN/CREAT SERPL: 9.6 (ref 7–25)
CALCIUM SPEC-SCNC: 10 MG/DL (ref 8.6–10.5)
CALCIUM SPEC-SCNC: 6.8 MG/DL (ref 8.6–10.5)
CALCIUM SPEC-SCNC: 6.9 MG/DL (ref 8.6–10.5)
CALCIUM SPEC-SCNC: 8.7 MG/DL (ref 8.6–10.5)
CALCIUM SPEC-SCNC: 8.9 MG/DL (ref 8.6–10.5)
CALCIUM SPEC-SCNC: 9.8 MG/DL (ref 8.6–10.5)
CHLORIDE SERPL-SCNC: 112 MMOL/L (ref 98–107)
CHLORIDE SERPL-SCNC: 115 MMOL/L (ref 98–107)
CHLORIDE SERPL-SCNC: 115 MMOL/L (ref 98–107)
CHLORIDE SERPL-SCNC: 116 MMOL/L (ref 98–107)
CHLORIDE SERPL-SCNC: 117 MMOL/L (ref 98–107)
CHLORIDE SERPL-SCNC: 118 MMOL/L (ref 98–107)
CLARITY UR: CLEAR
CO2 SERPL-SCNC: 10 MMOL/L (ref 22–29)
CO2 SERPL-SCNC: 11 MMOL/L (ref 22–29)
CO2 SERPL-SCNC: 11 MMOL/L (ref 22–29)
CO2 SERPL-SCNC: 12 MMOL/L (ref 22–29)
CO2 SERPL-SCNC: 13 MMOL/L (ref 22–29)
CO2 SERPL-SCNC: 18 MMOL/L (ref 22–29)
COLOR UR: YELLOW
CREAT SERPL-MCNC: 2.11 MG/DL (ref 0.57–1)
CREAT SERPL-MCNC: 2.14 MG/DL (ref 0.57–1)
CREAT SERPL-MCNC: 2.34 MG/DL (ref 0.57–1)
CREAT SERPL-MCNC: 2.52 MG/DL (ref 0.57–1)
CREAT SERPL-MCNC: 2.64 MG/DL (ref 0.57–1)
CREAT SERPL-MCNC: 2.93 MG/DL (ref 0.57–1)
D-LACTATE SERPL-SCNC: 1.4 MMOL/L (ref 0.5–2)
DEPRECATED RDW RBC AUTO: 54 FL (ref 37–54)
EGFRCR SERPLBLD CKD-EPI 2021: 18.7 ML/MIN/1.73
EGFRCR SERPLBLD CKD-EPI 2021: 21.2 ML/MIN/1.73
EGFRCR SERPLBLD CKD-EPI 2021: 22.4 ML/MIN/1.73
EGFRCR SERPLBLD CKD-EPI 2021: 24.5 ML/MIN/1.73
EGFRCR SERPLBLD CKD-EPI 2021: 27.3 ML/MIN/1.73
EGFRCR SERPLBLD CKD-EPI 2021: 27.7 ML/MIN/1.73
EOSINOPHIL # BLD AUTO: 0.35 10*3/MM3 (ref 0–0.4)
EOSINOPHIL NFR BLD AUTO: 3.1 % (ref 0.3–6.2)
ERYTHROCYTE [DISTWIDTH] IN BLOOD BY AUTOMATED COUNT: 15.1 % (ref 12.3–15.4)
GLUCOSE BLDC GLUCOMTR-MCNC: 103 MG/DL (ref 70–130)
GLUCOSE BLDC GLUCOMTR-MCNC: 103 MG/DL (ref 70–130)
GLUCOSE BLDC GLUCOMTR-MCNC: 120 MG/DL (ref 70–130)
GLUCOSE BLDC GLUCOMTR-MCNC: 122 MG/DL (ref 70–130)
GLUCOSE BLDC GLUCOMTR-MCNC: 131 MG/DL (ref 70–130)
GLUCOSE BLDC GLUCOMTR-MCNC: 141 MG/DL (ref 70–130)
GLUCOSE BLDC GLUCOMTR-MCNC: 149 MG/DL (ref 70–130)
GLUCOSE BLDC GLUCOMTR-MCNC: 153 MG/DL (ref 70–130)
GLUCOSE BLDC GLUCOMTR-MCNC: 153 MG/DL (ref 70–130)
GLUCOSE BLDC GLUCOMTR-MCNC: 155 MG/DL (ref 70–130)
GLUCOSE BLDC GLUCOMTR-MCNC: 155 MG/DL (ref 70–130)
GLUCOSE BLDC GLUCOMTR-MCNC: 159 MG/DL (ref 70–130)
GLUCOSE BLDC GLUCOMTR-MCNC: 186 MG/DL (ref 70–130)
GLUCOSE BLDC GLUCOMTR-MCNC: 186 MG/DL (ref 70–130)
GLUCOSE BLDC GLUCOMTR-MCNC: 207 MG/DL (ref 70–130)
GLUCOSE SERPL-MCNC: 163 MG/DL (ref 65–99)
GLUCOSE SERPL-MCNC: 166 MG/DL (ref 65–99)
GLUCOSE SERPL-MCNC: 175 MG/DL (ref 65–99)
GLUCOSE SERPL-MCNC: 178 MG/DL (ref 65–99)
GLUCOSE SERPL-MCNC: 233 MG/DL (ref 65–99)
GLUCOSE SERPL-MCNC: 95 MG/DL (ref 65–99)
GLUCOSE UR STRIP-MCNC: NEGATIVE MG/DL
HCO3 BLDV-SCNC: 12.9 MMOL/L (ref 22–28)
HCT VFR BLD AUTO: 31.5 % (ref 34–46.6)
HGB BLD-MCNC: 9.9 G/DL (ref 12–15.9)
HGB UR QL STRIP.AUTO: NEGATIVE
HYALINE CASTS UR QL AUTO: NORMAL /LPF
IMM GRANULOCYTES # BLD AUTO: 0.06 10*3/MM3 (ref 0–0.05)
IMM GRANULOCYTES NFR BLD AUTO: 0.5 % (ref 0–0.5)
KETONES UR QL STRIP: NEGATIVE
LEUKOCYTE ESTERASE UR QL STRIP.AUTO: NEGATIVE
LYMPHOCYTES # BLD AUTO: 3.6 10*3/MM3 (ref 0.7–3.1)
LYMPHOCYTES NFR BLD AUTO: 32 % (ref 19.6–45.3)
Lab: ABNORMAL
Lab: ABNORMAL
MAGNESIUM SERPL-MCNC: 1.4 MG/DL (ref 1.6–2.6)
MAGNESIUM SERPL-MCNC: 2.5 MG/DL (ref 1.6–2.6)
MAGNESIUM SERPL-MCNC: 2.7 MG/DL (ref 1.6–2.6)
MAGNESIUM SERPL-MCNC: 2.8 MG/DL (ref 1.6–2.6)
MCH RBC QN AUTO: 30.7 PG (ref 26.6–33)
MCHC RBC AUTO-ENTMCNC: 31.4 G/DL (ref 31.5–35.7)
MCV RBC AUTO: 97.5 FL (ref 79–97)
MODALITY: ABNORMAL
MONOCYTES # BLD AUTO: 0.61 10*3/MM3 (ref 0.1–0.9)
MONOCYTES NFR BLD AUTO: 5.4 % (ref 5–12)
NEUTROPHILS NFR BLD AUTO: 58.7 % (ref 42.7–76)
NEUTROPHILS NFR BLD AUTO: 6.59 10*3/MM3 (ref 1.7–7)
NITRITE UR QL STRIP: NEGATIVE
NOTIFIED BY: ABNORMAL
NOTIFIED WHO: ABNORMAL
NRBC BLD AUTO-RTO: 0 /100 WBC (ref 0–0.2)
PCO2 BLDV: 40 MM HG (ref 41–51)
PH BLDV: 7.12 PH UNITS (ref 7.32–7.42)
PH UR STRIP.AUTO: 6 [PH] (ref 5–8)
PHOSPHATE SERPL-MCNC: 2.6 MG/DL (ref 2.5–4.5)
PHOSPHATE SERPL-MCNC: 2.6 MG/DL (ref 2.5–4.5)
PHOSPHATE SERPL-MCNC: 2.7 MG/DL (ref 2.5–4.5)
PHOSPHATE SERPL-MCNC: 2.9 MG/DL (ref 2.5–4.5)
PLATELET # BLD AUTO: 249 10*3/MM3 (ref 140–450)
PMV BLD AUTO: 10.1 FL (ref 6–12)
PO2 BLDV: 30 MM HG (ref 27–53)
POTASSIUM SERPL-SCNC: 3.4 MMOL/L (ref 3.5–5.2)
POTASSIUM SERPL-SCNC: 3.6 MMOL/L (ref 3.5–5.2)
POTASSIUM SERPL-SCNC: 3.7 MMOL/L (ref 3.5–5.2)
POTASSIUM SERPL-SCNC: 3.7 MMOL/L (ref 3.5–5.2)
POTASSIUM SERPL-SCNC: 3.8 MMOL/L (ref 3.5–5.2)
POTASSIUM SERPL-SCNC: 4 MMOL/L (ref 3.5–5.2)
PROT ?TM UR-MCNC: 4 MG/DL
PROT UR QL STRIP: ABNORMAL
QT INTERVAL: 384 MS
QTC INTERVAL: 490 MS
RBC # BLD AUTO: 3.23 10*6/MM3 (ref 3.77–5.28)
RBC # UR STRIP: NORMAL /HPF
REF LAB TEST METHOD: NORMAL
SAO2 % BLDCOV: 52.2 % (ref 45–75)
SODIUM SERPL-SCNC: 135 MMOL/L (ref 136–145)
SODIUM SERPL-SCNC: 135 MMOL/L (ref 136–145)
SODIUM SERPL-SCNC: 137 MMOL/L (ref 136–145)
SODIUM SERPL-SCNC: 139 MMOL/L (ref 136–145)
SODIUM UR-SCNC: 93 MMOL/L
SP GR UR STRIP: 1.01 (ref 1–1.03)
SQUAMOUS #/AREA URNS HPF: NORMAL /HPF
UROBILINOGEN UR QL STRIP: ABNORMAL
VENTILATOR MODE: ABNORMAL
WBC # UR STRIP: NORMAL /HPF
WBC NRBC COR # BLD AUTO: 11.24 10*3/MM3 (ref 3.4–10.8)

## 2024-08-28 PROCEDURE — 80048 BASIC METABOLIC PNL TOTAL CA: CPT | Performed by: INTERNAL MEDICINE

## 2024-08-28 PROCEDURE — 25010000002 CALCIUM GLUCONATE-NACL 1-0.675 GM/50ML-% SOLUTION

## 2024-08-28 PROCEDURE — 80048 BASIC METABOLIC PNL TOTAL CA: CPT

## 2024-08-28 PROCEDURE — 81001 URINALYSIS AUTO W/SCOPE: CPT

## 2024-08-28 PROCEDURE — 82570 ASSAY OF URINE CREATININE: CPT | Performed by: INTERNAL MEDICINE

## 2024-08-28 PROCEDURE — 84100 ASSAY OF PHOSPHORUS: CPT

## 2024-08-28 PROCEDURE — 85025 COMPLETE CBC W/AUTO DIFF WBC: CPT

## 2024-08-28 PROCEDURE — 83735 ASSAY OF MAGNESIUM: CPT

## 2024-08-28 PROCEDURE — 25010000002 ENOXAPARIN PER 10 MG

## 2024-08-28 PROCEDURE — 25010000002 CALCIUM GLUCONATE PER 10 ML

## 2024-08-28 PROCEDURE — 82803 BLOOD GASES ANY COMBINATION: CPT

## 2024-08-28 PROCEDURE — 84300 ASSAY OF URINE SODIUM: CPT | Performed by: INTERNAL MEDICINE

## 2024-08-28 PROCEDURE — 82948 REAGENT STRIP/BLOOD GLUCOSE: CPT

## 2024-08-28 PROCEDURE — 83605 ASSAY OF LACTIC ACID: CPT | Performed by: INTERNAL MEDICINE

## 2024-08-28 PROCEDURE — 25010000002 MAGNESIUM SULFATE IN D5W 1G/100ML (PREMIX) 1-5 GM/100ML-% SOLUTION

## 2024-08-28 PROCEDURE — 63710000001 INSULIN GLARGINE PER 5 UNITS: Performed by: INTERNAL MEDICINE

## 2024-08-28 PROCEDURE — 84156 ASSAY OF PROTEIN URINE: CPT | Performed by: INTERNAL MEDICINE

## 2024-08-28 PROCEDURE — 84540 ASSAY OF URINE/UREA-N: CPT | Performed by: INTERNAL MEDICINE

## 2024-08-28 RX ORDER — CALCIUM GLUCONATE 20 MG/ML
1000 INJECTION, SOLUTION INTRAVENOUS
Status: COMPLETED | OUTPATIENT
Start: 2024-08-28 | End: 2024-08-28

## 2024-08-28 RX ORDER — ROPINIROLE 1 MG/1
1 TABLET, FILM COATED ORAL 3 TIMES DAILY
Status: DISCONTINUED | OUTPATIENT
Start: 2024-08-28 | End: 2024-08-30 | Stop reason: HOSPADM

## 2024-08-28 RX ORDER — CALCIUM GLUCONATE 20 MG/ML
1000 INJECTION, SOLUTION INTRAVENOUS
Status: CANCELLED | OUTPATIENT
Start: 2024-08-28 | End: 2024-08-28

## 2024-08-28 RX ORDER — INSULIN LISPRO 100 [IU]/ML
2-7 INJECTION, SOLUTION INTRAVENOUS; SUBCUTANEOUS
Status: DISCONTINUED | OUTPATIENT
Start: 2024-08-28 | End: 2024-08-30 | Stop reason: HOSPADM

## 2024-08-28 RX ORDER — DEXTROSE MONOHYDRATE 25 G/50ML
25 INJECTION, SOLUTION INTRAVENOUS
Status: DISCONTINUED | OUTPATIENT
Start: 2024-08-28 | End: 2024-08-30 | Stop reason: HOSPADM

## 2024-08-28 RX ORDER — NICOTINE POLACRILEX 4 MG
15 LOZENGE BUCCAL
Status: DISCONTINUED | OUTPATIENT
Start: 2024-08-28 | End: 2024-08-30 | Stop reason: HOSPADM

## 2024-08-28 RX ORDER — SODIUM BICARBONATE 650 MG/1
650 TABLET ORAL 3 TIMES DAILY
Status: DISCONTINUED | OUTPATIENT
Start: 2024-08-28 | End: 2024-08-30 | Stop reason: HOSPADM

## 2024-08-28 RX ORDER — IBUPROFEN 600 MG/1
1 TABLET ORAL
Status: DISCONTINUED | OUTPATIENT
Start: 2024-08-28 | End: 2024-08-30 | Stop reason: HOSPADM

## 2024-08-28 RX ORDER — MAGNESIUM SULFATE 1 G/100ML
1 INJECTION INTRAVENOUS
Status: COMPLETED | OUTPATIENT
Start: 2024-08-28 | End: 2024-08-28

## 2024-08-28 RX ADMIN — CALCIUM GLUCONATE 2000 MG: 98 INJECTION, SOLUTION INTRAVENOUS at 10:06

## 2024-08-28 RX ADMIN — CALCIUM GLUCONATE 2000 MG: 98 INJECTION, SOLUTION INTRAVENOUS at 05:13

## 2024-08-28 RX ADMIN — SODIUM BICARBONATE 650 MG: 650 TABLET ORAL at 12:18

## 2024-08-28 RX ADMIN — POTASSIUM CHLORIDE, DEXTROSE MONOHYDRATE AND SODIUM CHLORIDE 150 ML/HR: 150; 5; 450 INJECTION, SOLUTION INTRAVENOUS at 10:04

## 2024-08-28 RX ADMIN — CALCIUM GLUCONATE 1000 MG: 20 INJECTION, SOLUTION INTRAVENOUS at 04:01

## 2024-08-28 RX ADMIN — CHLORHEXIDINE GLUCONATE 1 APPLICATION: 500 CLOTH TOPICAL at 03:35

## 2024-08-28 RX ADMIN — POTASSIUM CHLORIDE, DEXTROSE MONOHYDRATE AND SODIUM CHLORIDE 150 ML/HR: 150; 5; 450 INJECTION, SOLUTION INTRAVENOUS at 03:35

## 2024-08-28 RX ADMIN — ENOXAPARIN SODIUM 30 MG: 30 INJECTION SUBCUTANEOUS at 08:09

## 2024-08-28 RX ADMIN — DOCUSATE SODIUM 50 MG AND SENNOSIDES 8.6 MG 2 TABLET: 8.6; 5 TABLET, FILM COATED ORAL at 20:20

## 2024-08-28 RX ADMIN — SODIUM BICARBONATE 650 MG: 650 TABLET ORAL at 15:41

## 2024-08-28 RX ADMIN — Medication 1 APPLICATION: at 20:20

## 2024-08-28 RX ADMIN — CALCIUM GLUCONATE 2000 MG: 98 INJECTION, SOLUTION INTRAVENOUS at 08:01

## 2024-08-28 RX ADMIN — Medication 1 APPLICATION: at 08:02

## 2024-08-28 RX ADMIN — MAGNESIUM SULFATE IN DEXTROSE 1 G: 10 INJECTION, SOLUTION INTRAVENOUS at 08:01

## 2024-08-28 RX ADMIN — ROPINIROLE HYDROCHLORIDE 1 MG: 1 TABLET, FILM COATED ORAL at 21:09

## 2024-08-28 RX ADMIN — INSULIN GLARGINE 6 UNITS: 100 INJECTION, SOLUTION SUBCUTANEOUS at 15:50

## 2024-08-28 RX ADMIN — SODIUM BICARBONATE 650 MG: 650 TABLET ORAL at 20:20

## 2024-08-28 RX ADMIN — MAGNESIUM SULFATE IN DEXTROSE 1 G: 10 INJECTION, SOLUTION INTRAVENOUS at 06:11

## 2024-08-28 RX ADMIN — MAGNESIUM SULFATE IN DEXTROSE 1 G: 10 INJECTION, SOLUTION INTRAVENOUS at 07:04

## 2024-08-29 LAB
25(OH)D3 SERPL-MCNC: 9.1 NG/ML (ref 30–100)
ANION GAP SERPL CALCULATED.3IONS-SCNC: 10 MMOL/L (ref 5–15)
ANION GAP SERPL CALCULATED.3IONS-SCNC: 11 MMOL/L (ref 5–15)
ANION GAP SERPL CALCULATED.3IONS-SCNC: 7 MMOL/L (ref 5–15)
ATMOSPHERIC PRESS: 755 MMHG
BASE EXCESS BLDV CALC-SCNC: -9 MMOL/L (ref 0–2)
BASOPHILS # BLD AUTO: 0.02 10*3/MM3 (ref 0–0.2)
BASOPHILS NFR BLD AUTO: 0.2 % (ref 0–1.5)
BDY SITE: ABNORMAL
BODY TEMPERATURE: 37
BUN SERPL-MCNC: 29 MG/DL (ref 6–20)
BUN SERPL-MCNC: 30 MG/DL (ref 6–20)
BUN SERPL-MCNC: 33 MG/DL (ref 6–20)
BUN/CREAT SERPL: 13 (ref 7–25)
BUN/CREAT SERPL: 13.4 (ref 7–25)
BUN/CREAT SERPL: 13.5 (ref 7–25)
CALCIUM SPEC-SCNC: 8 MG/DL (ref 8.6–10.5)
CALCIUM SPEC-SCNC: 8.1 MG/DL (ref 8.6–10.5)
CALCIUM SPEC-SCNC: 8.4 MG/DL (ref 8.6–10.5)
CHLORIDE SERPL-SCNC: 109 MMOL/L (ref 98–107)
CHLORIDE SERPL-SCNC: 111 MMOL/L (ref 98–107)
CHLORIDE SERPL-SCNC: 113 MMOL/L (ref 98–107)
CO2 SERPL-SCNC: 16 MMOL/L (ref 22–29)
CO2 SERPL-SCNC: 17 MMOL/L (ref 22–29)
CO2 SERPL-SCNC: 19 MMOL/L (ref 22–29)
CREAT SERPL-MCNC: 2.17 MG/DL (ref 0.57–1)
CREAT SERPL-MCNC: 2.23 MG/DL (ref 0.57–1)
CREAT SERPL-MCNC: 2.53 MG/DL (ref 0.57–1)
CREAT UR-MCNC: 24.6 MG/DL
DEPRECATED RDW RBC AUTO: 52.2 FL (ref 37–54)
EGFRCR SERPLBLD CKD-EPI 2021: 22.3 ML/MIN/1.73
EGFRCR SERPLBLD CKD-EPI 2021: 25.9 ML/MIN/1.73
EGFRCR SERPLBLD CKD-EPI 2021: 26.8 ML/MIN/1.73
EOSINOPHIL # BLD AUTO: 0.38 10*3/MM3 (ref 0–0.4)
EOSINOPHIL NFR BLD AUTO: 3.3 % (ref 0.3–6.2)
ERYTHROCYTE [DISTWIDTH] IN BLOOD BY AUTOMATED COUNT: 15.2 % (ref 12.3–15.4)
GLUCOSE BLDC GLUCOMTR-MCNC: 119 MG/DL (ref 70–130)
GLUCOSE BLDC GLUCOMTR-MCNC: 135 MG/DL (ref 70–130)
GLUCOSE BLDC GLUCOMTR-MCNC: 146 MG/DL (ref 70–130)
GLUCOSE BLDC GLUCOMTR-MCNC: 162 MG/DL (ref 70–130)
GLUCOSE SERPL-MCNC: 136 MG/DL (ref 65–99)
GLUCOSE SERPL-MCNC: 142 MG/DL (ref 65–99)
GLUCOSE SERPL-MCNC: 146 MG/DL (ref 65–99)
HCO3 BLDV-SCNC: 17.1 MMOL/L (ref 22–28)
HCT VFR BLD AUTO: 29.5 % (ref 34–46.6)
HGB BLD-MCNC: 9.7 G/DL (ref 12–15.9)
IMM GRANULOCYTES # BLD AUTO: 0.04 10*3/MM3 (ref 0–0.05)
IMM GRANULOCYTES NFR BLD AUTO: 0.3 % (ref 0–0.5)
LYMPHOCYTES # BLD AUTO: 2.96 10*3/MM3 (ref 0.7–3.1)
LYMPHOCYTES NFR BLD AUTO: 25.5 % (ref 19.6–45.3)
Lab: ABNORMAL
MAGNESIUM SERPL-MCNC: 1.8 MG/DL (ref 1.6–2.6)
MCH RBC QN AUTO: 31.1 PG (ref 26.6–33)
MCHC RBC AUTO-ENTMCNC: 32.9 G/DL (ref 31.5–35.7)
MCV RBC AUTO: 94.6 FL (ref 79–97)
MODALITY: ABNORMAL
MONOCYTES # BLD AUTO: 0.49 10*3/MM3 (ref 0.1–0.9)
MONOCYTES NFR BLD AUTO: 4.2 % (ref 5–12)
NEUTROPHILS NFR BLD AUTO: 66.5 % (ref 42.7–76)
NEUTROPHILS NFR BLD AUTO: 7.74 10*3/MM3 (ref 1.7–7)
NRBC BLD AUTO-RTO: 0 /100 WBC (ref 0–0.2)
PCO2 BLDV: 37.3 MM HG (ref 41–51)
PH BLDV: 7.27 PH UNITS (ref 7.32–7.42)
PHOSPHATE SERPL-MCNC: 2.7 MG/DL (ref 2.5–4.5)
PLATELET # BLD AUTO: 250 10*3/MM3 (ref 140–450)
PMV BLD AUTO: 10 FL (ref 6–12)
PO2 BLDV: 36.4 MM HG (ref 27–53)
POTASSIUM SERPL-SCNC: 3.3 MMOL/L (ref 3.5–5.2)
POTASSIUM SERPL-SCNC: 3.3 MMOL/L (ref 3.5–5.2)
POTASSIUM SERPL-SCNC: 4 MMOL/L (ref 3.5–5.2)
PTH-INTACT SERPL-MCNC: 39.6 PG/ML (ref 15–65)
RBC # BLD AUTO: 3.12 10*6/MM3 (ref 3.77–5.28)
SAO2 % BLDCOV: 67.2 % (ref 45–75)
SODIUM SERPL-SCNC: 136 MMOL/L (ref 136–145)
SODIUM SERPL-SCNC: 138 MMOL/L (ref 136–145)
SODIUM SERPL-SCNC: 139 MMOL/L (ref 136–145)
UUN 24H UR-MCNC: 140 MG/DL
VENTILATOR MODE: ABNORMAL
WBC NRBC COR # BLD AUTO: 11.63 10*3/MM3 (ref 3.4–10.8)

## 2024-08-29 PROCEDURE — 63710000001 INSULIN LISPRO (HUMAN) PER 5 UNITS: Performed by: INTERNAL MEDICINE

## 2024-08-29 PROCEDURE — 84100 ASSAY OF PHOSPHORUS: CPT

## 2024-08-29 PROCEDURE — 85025 COMPLETE CBC W/AUTO DIFF WBC: CPT

## 2024-08-29 PROCEDURE — 80048 BASIC METABOLIC PNL TOTAL CA: CPT | Performed by: INTERNAL MEDICINE

## 2024-08-29 PROCEDURE — 25010000002 ENOXAPARIN PER 10 MG

## 2024-08-29 PROCEDURE — 82948 REAGENT STRIP/BLOOD GLUCOSE: CPT

## 2024-08-29 PROCEDURE — 82306 VITAMIN D 25 HYDROXY: CPT | Performed by: INTERNAL MEDICINE

## 2024-08-29 PROCEDURE — 83735 ASSAY OF MAGNESIUM: CPT

## 2024-08-29 PROCEDURE — 83970 ASSAY OF PARATHORMONE: CPT | Performed by: INTERNAL MEDICINE

## 2024-08-29 PROCEDURE — 82803 BLOOD GASES ANY COMBINATION: CPT

## 2024-08-29 PROCEDURE — 25810000003 SODIUM CHLORIDE 0.9 % SOLUTION: Performed by: NURSE PRACTITIONER

## 2024-08-29 RX ORDER — POTASSIUM CHLORIDE 750 MG/1
40 CAPSULE, EXTENDED RELEASE ORAL EVERY 4 HOURS
Status: COMPLETED | OUTPATIENT
Start: 2024-08-29 | End: 2024-08-29

## 2024-08-29 RX ORDER — PANTOPRAZOLE SODIUM 40 MG/1
40 TABLET, DELAYED RELEASE ORAL
Status: DISCONTINUED | OUTPATIENT
Start: 2024-08-30 | End: 2024-08-30 | Stop reason: HOSPADM

## 2024-08-29 RX ORDER — DULOXETIN HYDROCHLORIDE 30 MG/1
30 CAPSULE, DELAYED RELEASE ORAL DAILY
Status: DISCONTINUED | OUTPATIENT
Start: 2024-08-29 | End: 2024-08-30 | Stop reason: HOSPADM

## 2024-08-29 RX ORDER — BETHANECHOL CHLORIDE 25 MG/1
25 TABLET ORAL 4 TIMES DAILY
Status: DISCONTINUED | OUTPATIENT
Start: 2024-08-29 | End: 2024-08-30 | Stop reason: HOSPADM

## 2024-08-29 RX ORDER — METOPROLOL SUCCINATE 25 MG/1
25 TABLET, EXTENDED RELEASE ORAL DAILY
COMMUNITY

## 2024-08-29 RX ORDER — ASPIRIN 81 MG/1
81 TABLET ORAL DAILY
Status: DISCONTINUED | OUTPATIENT
Start: 2024-08-29 | End: 2024-08-30 | Stop reason: HOSPADM

## 2024-08-29 RX ORDER — CHOLECALCIFEROL (VITAMIN D3) 25 MCG
2000 TABLET ORAL DAILY
Status: DISCONTINUED | OUTPATIENT
Start: 2024-08-29 | End: 2024-08-30 | Stop reason: HOSPADM

## 2024-08-29 RX ORDER — METOPROLOL SUCCINATE 25 MG/1
25 TABLET, EXTENDED RELEASE ORAL DAILY
Status: DISCONTINUED | OUTPATIENT
Start: 2024-08-29 | End: 2024-08-30 | Stop reason: HOSPADM

## 2024-08-29 RX ORDER — PREGABALIN 75 MG/1
75 CAPSULE ORAL DAILY
Status: DISCONTINUED | OUTPATIENT
Start: 2024-08-29 | End: 2024-08-30 | Stop reason: HOSPADM

## 2024-08-29 RX ORDER — MISOPROSTOL 200 UG/1
200 TABLET ORAL 4 TIMES DAILY
COMMUNITY

## 2024-08-29 RX ORDER — MISOPROSTOL 200 UG/1
200 TABLET ORAL 4 TIMES DAILY
Status: DISCONTINUED | OUTPATIENT
Start: 2024-08-29 | End: 2024-08-30 | Stop reason: HOSPADM

## 2024-08-29 RX ORDER — HYDROXYCHLOROQUINE SULFATE 200 MG/1
200 TABLET, FILM COATED ORAL DAILY
Status: DISCONTINUED | OUTPATIENT
Start: 2024-08-29 | End: 2024-08-30 | Stop reason: HOSPADM

## 2024-08-29 RX ORDER — SODIUM CHLORIDE 9 MG/ML
75 INJECTION, SOLUTION INTRAVENOUS CONTINUOUS
Status: DISCONTINUED | OUTPATIENT
Start: 2024-08-29 | End: 2024-08-30 | Stop reason: HOSPADM

## 2024-08-29 RX ORDER — SODIUM CHLORIDE, SODIUM LACTATE, POTASSIUM CHLORIDE, CALCIUM CHLORIDE 600; 310; 30; 20 MG/100ML; MG/100ML; MG/100ML; MG/100ML
100 INJECTION, SOLUTION INTRAVENOUS CONTINUOUS
Status: DISCONTINUED | OUTPATIENT
Start: 2024-08-29 | End: 2024-08-29

## 2024-08-29 RX ORDER — ATORVASTATIN CALCIUM 40 MG/1
80 TABLET, FILM COATED ORAL NIGHTLY
Status: DISCONTINUED | OUTPATIENT
Start: 2024-08-29 | End: 2024-08-30 | Stop reason: HOSPADM

## 2024-08-29 RX ORDER — NORTRIPTYLINE HCL 25 MG
25 CAPSULE ORAL NIGHTLY
Status: DISCONTINUED | OUTPATIENT
Start: 2024-08-29 | End: 2024-08-30 | Stop reason: HOSPADM

## 2024-08-29 RX ADMIN — ROPINIROLE HYDROCHLORIDE 1 MG: 1 TABLET, FILM COATED ORAL at 08:04

## 2024-08-29 RX ADMIN — ENOXAPARIN SODIUM 30 MG: 30 INJECTION SUBCUTANEOUS at 08:04

## 2024-08-29 RX ADMIN — Medication 10 ML: at 21:36

## 2024-08-29 RX ADMIN — Medication 10 ML: at 10:32

## 2024-08-29 RX ADMIN — MISOPROSTOL 200 MCG: 200 TABLET ORAL at 17:13

## 2024-08-29 RX ADMIN — NORTRIPTYLINE HYDROCHLORIDE 25 MG: 25 CAPSULE ORAL at 21:35

## 2024-08-29 RX ADMIN — ROPINIROLE HYDROCHLORIDE 1 MG: 1 TABLET, FILM COATED ORAL at 21:35

## 2024-08-29 RX ADMIN — POTASSIUM CHLORIDE 40 MEQ: 750 CAPSULE, EXTENDED RELEASE ORAL at 12:06

## 2024-08-29 RX ADMIN — PREGABALIN 75 MG: 75 CAPSULE ORAL at 12:07

## 2024-08-29 RX ADMIN — BETHANECHOL CHLORIDE 25 MG: 25 TABLET ORAL at 12:06

## 2024-08-29 RX ADMIN — Medication 2000 UNITS: at 12:06

## 2024-08-29 RX ADMIN — BETHANECHOL CHLORIDE 25 MG: 25 TABLET ORAL at 21:35

## 2024-08-29 RX ADMIN — CHLORHEXIDINE GLUCONATE 1 APPLICATION: 500 CLOTH TOPICAL at 03:39

## 2024-08-29 RX ADMIN — ROPINIROLE HYDROCHLORIDE 1 MG: 1 TABLET, FILM COATED ORAL at 16:46

## 2024-08-29 RX ADMIN — HYDROXYCHLOROQUINE SULFATE 200 MG: 200 TABLET ORAL at 12:07

## 2024-08-29 RX ADMIN — SODIUM CHLORIDE 75 ML/HR: 9 INJECTION, SOLUTION INTRAVENOUS at 10:07

## 2024-08-29 RX ADMIN — INSULIN LISPRO 2 UNITS: 100 INJECTION, SOLUTION INTRAVENOUS; SUBCUTANEOUS at 12:06

## 2024-08-29 RX ADMIN — SODIUM CHLORIDE 75 ML/HR: 9 INJECTION, SOLUTION INTRAVENOUS at 21:48

## 2024-08-29 RX ADMIN — ATORVASTATIN CALCIUM 80 MG: 40 TABLET ORAL at 21:35

## 2024-08-29 RX ADMIN — SODIUM BICARBONATE 650 MG: 650 TABLET ORAL at 21:35

## 2024-08-29 RX ADMIN — MISOPROSTOL 200 MCG: 200 TABLET ORAL at 12:07

## 2024-08-29 RX ADMIN — SODIUM BICARBONATE 650 MG: 650 TABLET ORAL at 08:04

## 2024-08-29 RX ADMIN — SODIUM BICARBONATE 650 MG: 650 TABLET ORAL at 16:46

## 2024-08-29 RX ADMIN — Medication 1 APPLICATION: at 08:04

## 2024-08-29 RX ADMIN — MISOPROSTOL 200 MCG: 200 TABLET ORAL at 21:35

## 2024-08-29 RX ADMIN — DULOXETINE HYDROCHLORIDE 30 MG: 30 CAPSULE, DELAYED RELEASE ORAL at 12:06

## 2024-08-29 RX ADMIN — POTASSIUM CHLORIDE 40 MEQ: 750 CAPSULE, EXTENDED RELEASE ORAL at 08:04

## 2024-08-29 RX ADMIN — BETHANECHOL CHLORIDE 25 MG: 25 TABLET ORAL at 17:13

## 2024-08-29 RX ADMIN — Medication 1 APPLICATION: at 21:35

## 2024-08-29 RX ADMIN — DOCUSATE SODIUM 50 MG AND SENNOSIDES 8.6 MG 2 TABLET: 8.6; 5 TABLET, FILM COATED ORAL at 21:37

## 2024-08-29 RX ADMIN — ASPIRIN 81 MG: 81 TABLET, COATED ORAL at 12:06

## 2024-08-30 VITALS
HEIGHT: 60 IN | TEMPERATURE: 98.4 F | SYSTOLIC BLOOD PRESSURE: 107 MMHG | WEIGHT: 91.27 LBS | HEART RATE: 98 BPM | OXYGEN SATURATION: 96 % | RESPIRATION RATE: 16 BRPM | DIASTOLIC BLOOD PRESSURE: 73 MMHG | BODY MASS INDEX: 17.92 KG/M2

## 2024-08-30 LAB
25(OH)D3 SERPL-MCNC: 6.8 NG/ML (ref 30–100)
ALBUMIN SERPL-MCNC: 2.5 G/DL (ref 3.5–5.2)
ALBUMIN/GLOB SERPL: 1.3 G/DL
ALP SERPL-CCNC: 82 U/L (ref 39–117)
ALT SERPL W P-5'-P-CCNC: 10 U/L (ref 1–33)
ANION GAP SERPL CALCULATED.3IONS-SCNC: 8 MMOL/L (ref 5–15)
ANION GAP SERPL CALCULATED.3IONS-SCNC: 9 MMOL/L (ref 5–15)
ANION GAP SERPL CALCULATED.3IONS-SCNC: 9 MMOL/L (ref 5–15)
AST SERPL-CCNC: 11 U/L (ref 1–32)
BASOPHILS # BLD AUTO: 0.02 10*3/MM3 (ref 0–0.2)
BASOPHILS NFR BLD AUTO: 0.2 % (ref 0–1.5)
BILIRUB SERPL-MCNC: <0.2 MG/DL (ref 0–1.2)
BUN SERPL-MCNC: 26 MG/DL (ref 6–20)
BUN SERPL-MCNC: 27 MG/DL (ref 6–20)
BUN SERPL-MCNC: 28 MG/DL (ref 6–20)
BUN/CREAT SERPL: 13.3 (ref 7–25)
BUN/CREAT SERPL: 13.6 (ref 7–25)
BUN/CREAT SERPL: 14.1 (ref 7–25)
CALCIUM SPEC-SCNC: 7.9 MG/DL (ref 8.6–10.5)
CALCIUM SPEC-SCNC: 7.9 MG/DL (ref 8.6–10.5)
CALCIUM SPEC-SCNC: 8.2 MG/DL (ref 8.6–10.5)
CHLORIDE SERPL-SCNC: 112 MMOL/L (ref 98–107)
CHLORIDE SERPL-SCNC: 113 MMOL/L (ref 98–107)
CHLORIDE SERPL-SCNC: 114 MMOL/L (ref 98–107)
CHOLEST SERPL-MCNC: 180 MG/DL (ref 0–200)
CO2 SERPL-SCNC: 18 MMOL/L (ref 22–29)
CO2 SERPL-SCNC: 18 MMOL/L (ref 22–29)
CO2 SERPL-SCNC: 19 MMOL/L (ref 22–29)
CREAT SERPL-MCNC: 1.84 MG/DL (ref 0.57–1)
CREAT SERPL-MCNC: 2.03 MG/DL (ref 0.57–1)
CREAT SERPL-MCNC: 2.06 MG/DL (ref 0.57–1)
DEPRECATED RDW RBC AUTO: 52.5 FL (ref 37–54)
EGFRCR SERPLBLD CKD-EPI 2021: 28.5 ML/MIN/1.73
EGFRCR SERPLBLD CKD-EPI 2021: 29 ML/MIN/1.73
EGFRCR SERPLBLD CKD-EPI 2021: 32.7 ML/MIN/1.73
EOSINOPHIL # BLD AUTO: 0.4 10*3/MM3 (ref 0–0.4)
EOSINOPHIL NFR BLD AUTO: 4.2 % (ref 0.3–6.2)
ERYTHROCYTE [DISTWIDTH] IN BLOOD BY AUTOMATED COUNT: 15.1 % (ref 12.3–15.4)
GLOBULIN UR ELPH-MCNC: 2 GM/DL
GLUCOSE BLDC GLUCOMTR-MCNC: 158 MG/DL (ref 70–130)
GLUCOSE BLDC GLUCOMTR-MCNC: 228 MG/DL (ref 70–130)
GLUCOSE SERPL-MCNC: 147 MG/DL (ref 65–99)
GLUCOSE SERPL-MCNC: 169 MG/DL (ref 65–99)
GLUCOSE SERPL-MCNC: 176 MG/DL (ref 65–99)
HBA1C MFR BLD: 8.6 % (ref 4.8–5.6)
HCT VFR BLD AUTO: 27.8 % (ref 34–46.6)
HDLC SERPL-MCNC: 40 MG/DL (ref 40–60)
HGB BLD-MCNC: 8.9 G/DL (ref 12–15.9)
IMM GRANULOCYTES # BLD AUTO: 0.04 10*3/MM3 (ref 0–0.05)
IMM GRANULOCYTES NFR BLD AUTO: 0.4 % (ref 0–0.5)
LDLC SERPL CALC-MCNC: 82 MG/DL (ref 0–100)
LDLC/HDLC SERPL: 1.71 {RATIO}
LYMPHOCYTES # BLD AUTO: 2.89 10*3/MM3 (ref 0.7–3.1)
LYMPHOCYTES NFR BLD AUTO: 30.3 % (ref 19.6–45.3)
MCH RBC QN AUTO: 30.6 PG (ref 26.6–33)
MCHC RBC AUTO-ENTMCNC: 32 G/DL (ref 31.5–35.7)
MCV RBC AUTO: 95.5 FL (ref 79–97)
MONOCYTES # BLD AUTO: 0.51 10*3/MM3 (ref 0.1–0.9)
MONOCYTES NFR BLD AUTO: 5.3 % (ref 5–12)
NEUTROPHILS NFR BLD AUTO: 5.69 10*3/MM3 (ref 1.7–7)
NEUTROPHILS NFR BLD AUTO: 59.6 % (ref 42.7–76)
NRBC BLD AUTO-RTO: 0 /100 WBC (ref 0–0.2)
PHOSPHATE SERPL-MCNC: 2.2 MG/DL (ref 2.5–4.5)
PLATELET # BLD AUTO: 256 10*3/MM3 (ref 140–450)
PMV BLD AUTO: 10.1 FL (ref 6–12)
POTASSIUM SERPL-SCNC: 3.5 MMOL/L (ref 3.5–5.2)
POTASSIUM SERPL-SCNC: 3.9 MMOL/L (ref 3.5–5.2)
POTASSIUM SERPL-SCNC: 4 MMOL/L (ref 3.5–5.2)
PROT SERPL-MCNC: 4.5 G/DL (ref 6–8.5)
RBC # BLD AUTO: 2.91 10*6/MM3 (ref 3.77–5.28)
SODIUM SERPL-SCNC: 139 MMOL/L (ref 136–145)
SODIUM SERPL-SCNC: 140 MMOL/L (ref 136–145)
SODIUM SERPL-SCNC: 141 MMOL/L (ref 136–145)
TRIGL SERPL-MCNC: 359 MG/DL (ref 0–150)
TSH SERPL DL<=0.05 MIU/L-ACNC: 0.17 UIU/ML (ref 0.27–4.2)
VIT B12 BLD-MCNC: 465 PG/ML (ref 211–946)
VLDLC SERPL-MCNC: 58 MG/DL (ref 5–40)
WBC NRBC COR # BLD AUTO: 9.55 10*3/MM3 (ref 3.4–10.8)

## 2024-08-30 PROCEDURE — 82607 VITAMIN B-12: CPT | Performed by: NURSE PRACTITIONER

## 2024-08-30 PROCEDURE — 82948 REAGENT STRIP/BLOOD GLUCOSE: CPT

## 2024-08-30 PROCEDURE — 84100 ASSAY OF PHOSPHORUS: CPT

## 2024-08-30 PROCEDURE — 80053 COMPREHEN METABOLIC PANEL: CPT | Performed by: NURSE PRACTITIONER

## 2024-08-30 PROCEDURE — 63710000001 INSULIN LISPRO (HUMAN) PER 5 UNITS: Performed by: INTERNAL MEDICINE

## 2024-08-30 PROCEDURE — 83036 HEMOGLOBIN GLYCOSYLATED A1C: CPT | Performed by: NURSE PRACTITIONER

## 2024-08-30 PROCEDURE — 80061 LIPID PANEL: CPT | Performed by: NURSE PRACTITIONER

## 2024-08-30 PROCEDURE — 84443 ASSAY THYROID STIM HORMONE: CPT | Performed by: NURSE PRACTITIONER

## 2024-08-30 PROCEDURE — 85025 COMPLETE CBC W/AUTO DIFF WBC: CPT

## 2024-08-30 PROCEDURE — 82306 VITAMIN D 25 HYDROXY: CPT | Performed by: NURSE PRACTITIONER

## 2024-08-30 RX ADMIN — ROPINIROLE HYDROCHLORIDE 1 MG: 1 TABLET, FILM COATED ORAL at 08:34

## 2024-08-30 RX ADMIN — HYDROXYCHLOROQUINE SULFATE 200 MG: 200 TABLET ORAL at 08:34

## 2024-08-30 RX ADMIN — Medication 2000 UNITS: at 08:34

## 2024-08-30 RX ADMIN — DULOXETINE HYDROCHLORIDE 30 MG: 30 CAPSULE, DELAYED RELEASE ORAL at 08:34

## 2024-08-30 RX ADMIN — Medication 10 ML: at 09:02

## 2024-08-30 RX ADMIN — INSULIN LISPRO 3 UNITS: 100 INJECTION, SOLUTION INTRAVENOUS; SUBCUTANEOUS at 12:44

## 2024-08-30 RX ADMIN — SODIUM BICARBONATE 650 MG: 650 TABLET ORAL at 08:34

## 2024-08-30 RX ADMIN — MISOPROSTOL 200 MCG: 200 TABLET ORAL at 08:34

## 2024-08-30 RX ADMIN — Medication 1 APPLICATION: at 08:34

## 2024-08-30 RX ADMIN — PANTOPRAZOLE SODIUM 40 MG: 40 TABLET, DELAYED RELEASE ORAL at 06:00

## 2024-08-30 RX ADMIN — BETHANECHOL CHLORIDE 25 MG: 25 TABLET ORAL at 08:34

## 2024-08-30 RX ADMIN — MISOPROSTOL 200 MCG: 200 TABLET ORAL at 12:44

## 2024-08-30 RX ADMIN — BETHANECHOL CHLORIDE 25 MG: 25 TABLET ORAL at 12:44

## 2024-08-30 RX ADMIN — ASPIRIN 81 MG: 81 TABLET, COATED ORAL at 08:34

## 2024-08-30 RX ADMIN — PREGABALIN 75 MG: 75 CAPSULE ORAL at 08:34

## 2024-08-31 ENCOUNTER — READMISSION MANAGEMENT (OUTPATIENT)
Dept: CALL CENTER | Facility: HOSPITAL | Age: 54
End: 2024-08-31
Payer: COMMERCIAL

## 2024-09-04 ENCOUNTER — READMISSION MANAGEMENT (OUTPATIENT)
Dept: CALL CENTER | Facility: HOSPITAL | Age: 54
End: 2024-09-04
Payer: COMMERCIAL

## 2024-09-10 ENCOUNTER — READMISSION MANAGEMENT (OUTPATIENT)
Dept: CALL CENTER | Facility: HOSPITAL | Age: 54
End: 2024-09-10
Payer: COMMERCIAL

## 2024-09-10 ENCOUNTER — HOSPITAL ENCOUNTER (INPATIENT)
Facility: HOSPITAL | Age: 54
LOS: 2 days | Discharge: HOME | DRG: 682 | End: 2024-09-13
Attending: FAMILY MEDICINE | Admitting: FAMILY MEDICINE
Payer: COMMERCIAL

## 2024-09-10 DIAGNOSIS — N17.9 ACUTE RENAL FAILURE, UNSPECIFIED ACUTE RENAL FAILURE TYPE: Primary | ICD-10-CM

## 2024-09-10 DIAGNOSIS — E87.6 HYPOKALEMIA: ICD-10-CM

## 2024-09-10 LAB
ACETONE BLD QL: NEGATIVE
ANION GAP SERPL CALCULATED.3IONS-SCNC: 20 MMOL/L (ref 5–15)
ATMOSPHERIC PRESS: 754 MMHG
BASE EXCESS BLDV CALC-SCNC: -13.6 MMOL/L (ref 0–2)
BASOPHILS # BLD MANUAL: 0 10*3/MM3 (ref 0–0.2)
BASOPHILS NFR BLD MANUAL: 0 % (ref 0–1.5)
BDY SITE: ABNORMAL
BODY TEMPERATURE: 37
BUN SERPL-MCNC: 70 MG/DL (ref 6–20)
BUN/CREAT SERPL: 16.9 (ref 7–25)
BURR CELLS BLD QL SMEAR: ABNORMAL
CALCIUM SPEC-SCNC: 8.3 MG/DL (ref 8.6–10.5)
CHLORIDE SERPL-SCNC: 99 MMOL/L (ref 98–107)
CLUMPED PLATELETS: PRESENT
CO2 SERPL-SCNC: 13 MMOL/L (ref 22–29)
CREAT SERPL-MCNC: 4.15 MG/DL (ref 0.57–1)
DEPRECATED RDW RBC AUTO: 50.9 FL (ref 37–54)
EGFRCR SERPLBLD CKD-EPI 2021: 12.3 ML/MIN/1.73
EOSINOPHIL # BLD MANUAL: 0 10*3/MM3 (ref 0–0.4)
EOSINOPHIL NFR BLD MANUAL: 0 % (ref 0.3–6.2)
ERYTHROCYTE [DISTWIDTH] IN BLOOD BY AUTOMATED COUNT: 14.7 % (ref 12.3–15.4)
GLUCOSE BLDC GLUCOMTR-MCNC: 221 MG/DL (ref 70–130)
GLUCOSE SERPL-MCNC: 360 MG/DL (ref 65–99)
HCO3 BLDV-SCNC: 14.9 MMOL/L (ref 22–28)
HCT VFR BLD AUTO: 37.8 % (ref 34–46.6)
HGB BLD-MCNC: 12.6 G/DL (ref 12–15.9)
LYMPHOCYTES # BLD MANUAL: 2.19 10*3/MM3 (ref 0.7–3.1)
LYMPHOCYTES NFR BLD MANUAL: 4.1 % (ref 5–12)
Lab: ABNORMAL
Lab: ABNORMAL
MAGNESIUM SERPL-MCNC: 1.6 MG/DL (ref 1.6–2.6)
MCH RBC QN AUTO: 31.1 PG (ref 26.6–33)
MCHC RBC AUTO-ENTMCNC: 33.3 G/DL (ref 31.5–35.7)
MCV RBC AUTO: 93.3 FL (ref 79–97)
MODALITY: ABNORMAL
MONOCYTES # BLD: 0.54 10*3/MM3 (ref 0.1–0.9)
MYELOCYTES NFR BLD MANUAL: 1 % (ref 0–0)
NEUTROPHILS # BLD AUTO: 10.41 10*3/MM3 (ref 1.7–7)
NEUTROPHILS NFR BLD MANUAL: 77.3 % (ref 42.7–76)
NEUTS BAND NFR BLD MANUAL: 1 % (ref 0–5)
NOTIFIED BY: ABNORMAL
NOTIFIED WHO: ABNORMAL
PCO2 BLDV: 43.4 MM HG (ref 41–51)
PH BLDV: 7.14 PH UNITS (ref 7.32–7.42)
PHOSPHATE SERPL-MCNC: 8.4 MG/DL (ref 2.5–4.5)
PLATELET # BLD AUTO: 329 10*3/MM3 (ref 140–450)
PMV BLD AUTO: 10.2 FL (ref 6–12)
PO2 BLDV: 41.9 MM HG (ref 27–53)
POIKILOCYTOSIS BLD QL SMEAR: ABNORMAL
POTASSIUM SERPL-SCNC: 3 MMOL/L (ref 3.5–5.2)
RBC # BLD AUTO: 4.05 10*6/MM3 (ref 3.77–5.28)
SAO2 % BLDCOV: 69 % (ref 45–75)
SODIUM SERPL-SCNC: 132 MMOL/L (ref 136–145)
VARIANT LYMPHS NFR BLD MANUAL: 16.5 % (ref 19.6–45.3)
VENTILATOR MODE: ABNORMAL
WBC MORPH BLD: NORMAL
WBC NRBC COR # BLD AUTO: 13.29 10*3/MM3 (ref 3.4–10.8)

## 2024-09-10 PROCEDURE — 82009 KETONE BODYS QUAL: CPT | Performed by: INTERNAL MEDICINE

## 2024-09-10 PROCEDURE — 84100 ASSAY OF PHOSPHORUS: CPT | Performed by: FAMILY MEDICINE

## 2024-09-10 PROCEDURE — G0378 HOSPITAL OBSERVATION PER HR: HCPCS

## 2024-09-10 PROCEDURE — 63710000001 INSULIN REGULAR HUMAN PER 5 UNITS: Performed by: INTERNAL MEDICINE

## 2024-09-10 PROCEDURE — 82803 BLOOD GASES ANY COMBINATION: CPT

## 2024-09-10 PROCEDURE — 99285 EMERGENCY DEPT VISIT HI MDM: CPT

## 2024-09-10 PROCEDURE — 25010000002 POTASSIUM CHLORIDE PER 2 MEQ: Performed by: FAMILY MEDICINE

## 2024-09-10 PROCEDURE — 0 DEXTROSE 5 % SOLUTION 1,000 ML FLEX CONT: Performed by: INTERNAL MEDICINE

## 2024-09-10 PROCEDURE — 85025 COMPLETE CBC W/AUTO DIFF WBC: CPT | Performed by: FAMILY MEDICINE

## 2024-09-10 PROCEDURE — 80048 BASIC METABOLIC PNL TOTAL CA: CPT | Performed by: FAMILY MEDICINE

## 2024-09-10 PROCEDURE — 85007 BL SMEAR W/DIFF WBC COUNT: CPT | Performed by: FAMILY MEDICINE

## 2024-09-10 PROCEDURE — 82948 REAGENT STRIP/BLOOD GLUCOSE: CPT

## 2024-09-10 PROCEDURE — 83735 ASSAY OF MAGNESIUM: CPT | Performed by: FAMILY MEDICINE

## 2024-09-10 PROCEDURE — 36415 COLL VENOUS BLD VENIPUNCTURE: CPT

## 2024-09-10 RX ORDER — ROPINIROLE 1 MG/1
1 TABLET, FILM COATED ORAL 3 TIMES DAILY
Status: DISCONTINUED | OUTPATIENT
Start: 2024-09-10 | End: 2024-09-13 | Stop reason: HOSPADM

## 2024-09-10 RX ORDER — SODIUM CHLORIDE 0.9 % (FLUSH) 0.9 %
10 SYRINGE (ML) INJECTION EVERY 12 HOURS SCHEDULED
Status: DISCONTINUED | OUTPATIENT
Start: 2024-09-10 | End: 2024-09-13 | Stop reason: HOSPADM

## 2024-09-10 RX ORDER — POTASSIUM CHLORIDE 750 MG/1
20 CAPSULE, EXTENDED RELEASE ORAL 2 TIMES DAILY WITH MEALS
Status: DISCONTINUED | OUTPATIENT
Start: 2024-09-10 | End: 2024-09-13 | Stop reason: HOSPADM

## 2024-09-10 RX ORDER — IBUPROFEN 600 MG/1
1 TABLET ORAL
Status: DISCONTINUED | OUTPATIENT
Start: 2024-09-10 | End: 2024-09-11 | Stop reason: SDUPTHER

## 2024-09-10 RX ORDER — POTASSIUM CHLORIDE 29.8 MG/ML
20 INJECTION INTRAVENOUS ONCE
Status: COMPLETED | OUTPATIENT
Start: 2024-09-10 | End: 2024-09-10

## 2024-09-10 RX ORDER — BETHANECHOL CHLORIDE 25 MG/1
25 TABLET ORAL 4 TIMES DAILY
Status: DISCONTINUED | OUTPATIENT
Start: 2024-09-10 | End: 2024-09-13 | Stop reason: HOSPADM

## 2024-09-10 RX ORDER — SODIUM CHLORIDE 9 MG/ML
40 INJECTION, SOLUTION INTRAVENOUS AS NEEDED
Status: DISCONTINUED | OUTPATIENT
Start: 2024-09-10 | End: 2024-09-13 | Stop reason: HOSPADM

## 2024-09-10 RX ORDER — ONDANSETRON 2 MG/ML
4 INJECTION INTRAMUSCULAR; INTRAVENOUS EVERY 6 HOURS PRN
Status: DISCONTINUED | OUTPATIENT
Start: 2024-09-10 | End: 2024-09-13 | Stop reason: HOSPADM

## 2024-09-10 RX ORDER — ATORVASTATIN CALCIUM 40 MG/1
80 TABLET, FILM COATED ORAL NIGHTLY
Status: DISCONTINUED | OUTPATIENT
Start: 2024-09-10 | End: 2024-09-13 | Stop reason: HOSPADM

## 2024-09-10 RX ORDER — DEXTROSE MONOHYDRATE 25 G/50ML
25 INJECTION, SOLUTION INTRAVENOUS
Status: DISCONTINUED | OUTPATIENT
Start: 2024-09-10 | End: 2024-09-13 | Stop reason: HOSPADM

## 2024-09-10 RX ORDER — DOCUSATE SODIUM 100 MG/1
100 CAPSULE, LIQUID FILLED ORAL 2 TIMES DAILY PRN
Status: DISCONTINUED | OUTPATIENT
Start: 2024-09-10 | End: 2024-09-13 | Stop reason: HOSPADM

## 2024-09-10 RX ORDER — SODIUM CHLORIDE 0.9 % (FLUSH) 0.9 %
10 SYRINGE (ML) INJECTION AS NEEDED
Status: DISCONTINUED | OUTPATIENT
Start: 2024-09-10 | End: 2024-09-13 | Stop reason: HOSPADM

## 2024-09-10 RX ORDER — ACETAMINOPHEN 325 MG/1
650 TABLET ORAL EVERY 4 HOURS PRN
Status: DISCONTINUED | OUTPATIENT
Start: 2024-09-10 | End: 2024-09-13 | Stop reason: HOSPADM

## 2024-09-10 RX ORDER — NICOTINE POLACRILEX 4 MG
15 LOZENGE BUCCAL
Status: DISCONTINUED | OUTPATIENT
Start: 2024-09-10 | End: 2024-09-13 | Stop reason: HOSPADM

## 2024-09-10 RX ORDER — ASPIRIN 81 MG/1
81 TABLET ORAL DAILY
Status: DISCONTINUED | OUTPATIENT
Start: 2024-09-11 | End: 2024-09-13 | Stop reason: HOSPADM

## 2024-09-10 RX ORDER — PANTOPRAZOLE SODIUM 40 MG/1
40 TABLET, DELAYED RELEASE ORAL
Status: DISCONTINUED | OUTPATIENT
Start: 2024-09-11 | End: 2024-09-13 | Stop reason: HOSPADM

## 2024-09-10 RX ADMIN — POTASSIUM CHLORIDE 20 MEQ: 29.8 INJECTION, SOLUTION INTRAVENOUS at 21:37

## 2024-09-10 RX ADMIN — Medication 10 ML: at 21:40

## 2024-09-10 RX ADMIN — ATORVASTATIN CALCIUM 80 MG: 40 TABLET ORAL at 21:59

## 2024-09-10 RX ADMIN — POTASSIUM CHLORIDE 20 MEQ: 750 CAPSULE, EXTENDED RELEASE ORAL at 21:59

## 2024-09-10 RX ADMIN — INSULIN HUMAN 3 UNITS: 100 INJECTION, SOLUTION PARENTERAL at 22:07

## 2024-09-10 RX ADMIN — BETHANECHOL CHLORIDE 25 MG: 25 TABLET ORAL at 21:59

## 2024-09-10 RX ADMIN — ROPINIROLE HYDROCHLORIDE 1 MG: 1 TABLET, FILM COATED ORAL at 23:37

## 2024-09-10 RX ADMIN — SODIUM BICARBONATE 125 MEQ: 84 INJECTION INTRAVENOUS at 23:37

## 2024-09-11 PROBLEM — N17.9 ACUTE KIDNEY FAILURE, UNSPECIFIED: Status: ACTIVE | Noted: 2024-09-11

## 2024-09-11 PROBLEM — K31.84 GASTROPARESIS: Status: ACTIVE | Noted: 2024-09-11

## 2024-09-11 PROBLEM — E87.20 METABOLIC ACIDOSIS: Status: ACTIVE | Noted: 2024-09-11

## 2024-09-11 PROBLEM — R63.4 LOSS OF WEIGHT: Status: ACTIVE | Noted: 2024-09-11

## 2024-09-11 PROBLEM — D64.9 ANEMIA: Status: ACTIVE | Noted: 2024-09-11

## 2024-09-11 LAB
ALBUMIN SERPL-MCNC: 3.2 G/DL (ref 3.5–5.2)
ALBUMIN/GLOB SERPL: 1.3 G/DL
ALP SERPL-CCNC: 100 U/L (ref 39–117)
ALT SERPL W P-5'-P-CCNC: 10 U/L (ref 1–33)
ANION GAP SERPL CALCULATED.3IONS-SCNC: 14 MMOL/L (ref 5–15)
ANION GAP SERPL CALCULATED.3IONS-SCNC: 16 MMOL/L (ref 5–15)
AST SERPL-CCNC: 12 U/L (ref 1–32)
BASOPHILS # BLD AUTO: 0.04 10*3/MM3 (ref 0–0.2)
BASOPHILS NFR BLD AUTO: 0.4 % (ref 0–1.5)
BILIRUB SERPL-MCNC: <0.2 MG/DL (ref 0–1.2)
BUN SERPL-MCNC: 55 MG/DL (ref 6–20)
BUN SERPL-MCNC: 70 MG/DL (ref 6–20)
BUN/CREAT SERPL: 20 (ref 7–25)
BUN/CREAT SERPL: 20.3 (ref 7–25)
CALCIUM SPEC-SCNC: 7.6 MG/DL (ref 8.6–10.5)
CALCIUM SPEC-SCNC: 7.8 MG/DL (ref 8.6–10.5)
CHLORIDE SERPL-SCNC: 103 MMOL/L (ref 98–107)
CHLORIDE SERPL-SCNC: 104 MMOL/L (ref 98–107)
CO2 SERPL-SCNC: 17 MMOL/L (ref 22–29)
CO2 SERPL-SCNC: 21 MMOL/L (ref 22–29)
CREAT SERPL-MCNC: 2.71 MG/DL (ref 0.57–1)
CREAT SERPL-MCNC: 3.5 MG/DL (ref 0.57–1)
DEPRECATED RDW RBC AUTO: 50.2 FL (ref 37–54)
EGFRCR SERPLBLD CKD-EPI 2021: 15 ML/MIN/1.73
EGFRCR SERPLBLD CKD-EPI 2021: 20.4 ML/MIN/1.73
EOSINOPHIL # BLD AUTO: 0.27 10*3/MM3 (ref 0–0.4)
EOSINOPHIL NFR BLD AUTO: 2.4 % (ref 0.3–6.2)
ERYTHROCYTE [DISTWIDTH] IN BLOOD BY AUTOMATED COUNT: 14.7 % (ref 12.3–15.4)
GLOBULIN UR ELPH-MCNC: 2.5 GM/DL
GLUCOSE BLDC GLUCOMTR-MCNC: 148 MG/DL (ref 70–130)
GLUCOSE BLDC GLUCOMTR-MCNC: 195 MG/DL (ref 70–130)
GLUCOSE BLDC GLUCOMTR-MCNC: 196 MG/DL (ref 70–130)
GLUCOSE BLDC GLUCOMTR-MCNC: 256 MG/DL (ref 70–130)
GLUCOSE SERPL-MCNC: 127 MG/DL (ref 65–99)
GLUCOSE SERPL-MCNC: 189 MG/DL (ref 65–99)
HCT VFR BLD AUTO: 30.4 % (ref 34–46.6)
HGB BLD-MCNC: 10 G/DL (ref 12–15.9)
IMM GRANULOCYTES # BLD AUTO: 0.05 10*3/MM3 (ref 0–0.05)
IMM GRANULOCYTES NFR BLD AUTO: 0.4 % (ref 0–0.5)
LYMPHOCYTES # BLD AUTO: 3.35 10*3/MM3 (ref 0.7–3.1)
LYMPHOCYTES NFR BLD AUTO: 30.1 % (ref 19.6–45.3)
MCH RBC QN AUTO: 30.7 PG (ref 26.6–33)
MCHC RBC AUTO-ENTMCNC: 32.9 G/DL (ref 31.5–35.7)
MCV RBC AUTO: 93.3 FL (ref 79–97)
MONOCYTES # BLD AUTO: 0.76 10*3/MM3 (ref 0.1–0.9)
MONOCYTES NFR BLD AUTO: 6.8 % (ref 5–12)
NEUTROPHILS NFR BLD AUTO: 59.9 % (ref 42.7–76)
NEUTROPHILS NFR BLD AUTO: 6.65 10*3/MM3 (ref 1.7–7)
NRBC BLD AUTO-RTO: 0 /100 WBC (ref 0–0.2)
PHOSPHATE SERPL-MCNC: 5 MG/DL (ref 2.5–4.5)
PLATELET # BLD AUTO: 312 10*3/MM3 (ref 140–450)
PMV BLD AUTO: 9.9 FL (ref 6–12)
POTASSIUM SERPL-SCNC: 2.6 MMOL/L (ref 3.5–5.2)
POTASSIUM SERPL-SCNC: 2.8 MMOL/L (ref 3.5–5.2)
POTASSIUM SERPL-SCNC: 3.5 MMOL/L (ref 3.5–5.2)
PROT SERPL-MCNC: 5.7 G/DL (ref 6–8.5)
RBC # BLD AUTO: 3.26 10*6/MM3 (ref 3.77–5.28)
SODIUM SERPL-SCNC: 135 MMOL/L (ref 136–145)
SODIUM SERPL-SCNC: 140 MMOL/L (ref 136–145)
WBC NRBC COR # BLD AUTO: 11.12 10*3/MM3 (ref 3.4–10.8)

## 2024-09-11 PROCEDURE — 82948 REAGENT STRIP/BLOOD GLUCOSE: CPT

## 2024-09-11 PROCEDURE — 25010000002 METOCLOPRAMIDE PER 10 MG: Performed by: INTERNAL MEDICINE

## 2024-09-11 PROCEDURE — 80053 COMPREHEN METABOLIC PANEL: CPT | Performed by: INTERNAL MEDICINE

## 2024-09-11 PROCEDURE — 84132 ASSAY OF SERUM POTASSIUM: CPT | Performed by: INTERNAL MEDICINE

## 2024-09-11 PROCEDURE — 85025 COMPLETE CBC W/AUTO DIFF WBC: CPT | Performed by: INTERNAL MEDICINE

## 2024-09-11 PROCEDURE — 63710000001 INSULIN REGULAR HUMAN PER 5 UNITS: Performed by: INTERNAL MEDICINE

## 2024-09-11 PROCEDURE — 84100 ASSAY OF PHOSPHORUS: CPT | Performed by: INTERNAL MEDICINE

## 2024-09-11 PROCEDURE — 63710000001 INSULIN LISPRO (HUMAN) PER 5 UNITS: Performed by: INTERNAL MEDICINE

## 2024-09-11 PROCEDURE — 0 DEXTROSE 5 % SOLUTION 1,000 ML FLEX CONT: Performed by: INTERNAL MEDICINE

## 2024-09-11 PROCEDURE — 25010000002 POTASSIUM CHLORIDE 10 MEQ/100ML SOLUTION: Performed by: INTERNAL MEDICINE

## 2024-09-11 RX ORDER — METOCLOPRAMIDE HYDROCHLORIDE 5 MG/ML
5 INJECTION INTRAMUSCULAR; INTRAVENOUS EVERY 6 HOURS
Status: COMPLETED | OUTPATIENT
Start: 2024-09-11 | End: 2024-09-12

## 2024-09-11 RX ORDER — IBUPROFEN 600 MG/1
1 TABLET ORAL
Status: DISCONTINUED | OUTPATIENT
Start: 2024-09-11 | End: 2024-09-13 | Stop reason: HOSPADM

## 2024-09-11 RX ORDER — INSULIN LISPRO 100 [IU]/ML
2-9 INJECTION, SOLUTION INTRAVENOUS; SUBCUTANEOUS
Status: DISCONTINUED | OUTPATIENT
Start: 2024-09-11 | End: 2024-09-13 | Stop reason: HOSPADM

## 2024-09-11 RX ORDER — POTASSIUM CHLORIDE 7.45 MG/ML
10 INJECTION INTRAVENOUS
Status: COMPLETED | OUTPATIENT
Start: 2024-09-11 | End: 2024-09-11

## 2024-09-11 RX ADMIN — ATORVASTATIN CALCIUM 80 MG: 40 TABLET ORAL at 21:57

## 2024-09-11 RX ADMIN — BETHANECHOL CHLORIDE 25 MG: 25 TABLET ORAL at 21:56

## 2024-09-11 RX ADMIN — METOCLOPRAMIDE HYDROCHLORIDE 5 MG: 5 INJECTION INTRAMUSCULAR; INTRAVENOUS at 21:56

## 2024-09-11 RX ADMIN — PANTOPRAZOLE SODIUM 40 MG: 40 TABLET, DELAYED RELEASE ORAL at 05:18

## 2024-09-11 RX ADMIN — POTASSIUM CHLORIDE 10 MEQ: 7.46 INJECTION, SOLUTION INTRAVENOUS at 16:12

## 2024-09-11 RX ADMIN — POTASSIUM CHLORIDE 10 MEQ: 7.46 INJECTION, SOLUTION INTRAVENOUS at 17:17

## 2024-09-11 RX ADMIN — EMPAGLIFLOZIN 10 MG: 10 TABLET, FILM COATED ORAL at 09:19

## 2024-09-11 RX ADMIN — INSULIN LISPRO 6 UNITS: 100 INJECTION, SOLUTION INTRAVENOUS; SUBCUTANEOUS at 22:08

## 2024-09-11 RX ADMIN — BETHANECHOL CHLORIDE 25 MG: 25 TABLET ORAL at 09:19

## 2024-09-11 RX ADMIN — SODIUM BICARBONATE 125 MEQ: 84 INJECTION INTRAVENOUS at 23:00

## 2024-09-11 RX ADMIN — INSULIN HUMAN 2 UNITS: 100 INJECTION, SOLUTION PARENTERAL at 05:21

## 2024-09-11 RX ADMIN — BETHANECHOL CHLORIDE 25 MG: 25 TABLET ORAL at 12:41

## 2024-09-11 RX ADMIN — BETHANECHOL CHLORIDE 25 MG: 25 TABLET ORAL at 17:18

## 2024-09-11 RX ADMIN — Medication 10 ML: at 21:59

## 2024-09-11 RX ADMIN — POTASSIUM CHLORIDE 20 MEQ: 750 CAPSULE, EXTENDED RELEASE ORAL at 17:17

## 2024-09-11 RX ADMIN — ROPINIROLE HYDROCHLORIDE 1 MG: 1 TABLET, FILM COATED ORAL at 09:19

## 2024-09-11 RX ADMIN — ASPIRIN 81 MG: 81 TABLET, COATED ORAL at 09:18

## 2024-09-11 RX ADMIN — Medication 10 ML: at 09:20

## 2024-09-11 RX ADMIN — ROPINIROLE HYDROCHLORIDE 1 MG: 1 TABLET, FILM COATED ORAL at 21:57

## 2024-09-11 RX ADMIN — INSULIN HUMAN 2 UNITS: 100 INJECTION, SOLUTION PARENTERAL at 12:42

## 2024-09-11 RX ADMIN — METOCLOPRAMIDE HYDROCHLORIDE 5 MG: 5 INJECTION INTRAMUSCULAR; INTRAVENOUS at 15:03

## 2024-09-11 RX ADMIN — SODIUM BICARBONATE 125 MEQ: 84 INJECTION INTRAVENOUS at 09:18

## 2024-09-11 RX ADMIN — POTASSIUM CHLORIDE 10 MEQ: 7.46 INJECTION, SOLUTION INTRAVENOUS at 13:55

## 2024-09-11 RX ADMIN — POTASSIUM CHLORIDE 10 MEQ: 7.46 INJECTION, SOLUTION INTRAVENOUS at 15:03

## 2024-09-11 RX ADMIN — ROPINIROLE HYDROCHLORIDE 1 MG: 1 TABLET, FILM COATED ORAL at 16:12

## 2024-09-11 RX ADMIN — POTASSIUM CHLORIDE 20 MEQ: 750 CAPSULE, EXTENDED RELEASE ORAL at 09:18

## 2024-09-12 LAB
ANION GAP SERPL CALCULATED.3IONS-SCNC: 10 MMOL/L (ref 5–15)
ANION GAP SERPL CALCULATED.3IONS-SCNC: 9 MMOL/L (ref 5–15)
BUN SERPL-MCNC: 47 MG/DL (ref 6–20)
BUN SERPL-MCNC: 48 MG/DL (ref 6–20)
BUN/CREAT SERPL: 19.8 (ref 7–25)
BUN/CREAT SERPL: 21.7 (ref 7–25)
CALCIUM SPEC-SCNC: 6.2 MG/DL (ref 8.6–10.5)
CALCIUM SPEC-SCNC: 6.3 MG/DL (ref 8.6–10.5)
CHLORIDE SERPL-SCNC: 104 MMOL/L (ref 98–107)
CHLORIDE SERPL-SCNC: 106 MMOL/L (ref 98–107)
CO2 SERPL-SCNC: 24 MMOL/L (ref 22–29)
CO2 SERPL-SCNC: 26 MMOL/L (ref 22–29)
CREAT SERPL-MCNC: 2.17 MG/DL (ref 0.57–1)
CREAT SERPL-MCNC: 2.42 MG/DL (ref 0.57–1)
DEPRECATED RDW RBC AUTO: 50.1 FL (ref 37–54)
EGFRCR SERPLBLD CKD-EPI 2021: 23.4 ML/MIN/1.73
EGFRCR SERPLBLD CKD-EPI 2021: 26.6 ML/MIN/1.73
ERYTHROCYTE [DISTWIDTH] IN BLOOD BY AUTOMATED COUNT: 14.6 % (ref 12.3–15.4)
GLUCOSE BLDC GLUCOMTR-MCNC: 102 MG/DL (ref 70–130)
GLUCOSE BLDC GLUCOMTR-MCNC: 147 MG/DL (ref 70–130)
GLUCOSE BLDC GLUCOMTR-MCNC: 181 MG/DL (ref 70–130)
GLUCOSE BLDC GLUCOMTR-MCNC: 224 MG/DL (ref 70–130)
GLUCOSE SERPL-MCNC: 196 MG/DL (ref 65–99)
GLUCOSE SERPL-MCNC: 212 MG/DL (ref 65–99)
HCT VFR BLD AUTO: 28.2 % (ref 34–46.6)
HGB BLD-MCNC: 9.3 G/DL (ref 12–15.9)
MAGNESIUM SERPL-MCNC: 1.2 MG/DL (ref 1.6–2.6)
MCH RBC QN AUTO: 30.9 PG (ref 26.6–33)
MCHC RBC AUTO-ENTMCNC: 33 G/DL (ref 31.5–35.7)
MCV RBC AUTO: 93.7 FL (ref 79–97)
PHOSPHATE SERPL-MCNC: 1.9 MG/DL (ref 2.5–4.5)
PLATELET # BLD AUTO: 315 10*3/MM3 (ref 140–450)
PMV BLD AUTO: 10.2 FL (ref 6–12)
POTASSIUM SERPL-SCNC: 3.1 MMOL/L (ref 3.5–5.2)
POTASSIUM SERPL-SCNC: 3.2 MMOL/L (ref 3.5–5.2)
RBC # BLD AUTO: 3.01 10*6/MM3 (ref 3.77–5.28)
SODIUM SERPL-SCNC: 139 MMOL/L (ref 136–145)
SODIUM SERPL-SCNC: 140 MMOL/L (ref 136–145)
WBC NRBC COR # BLD AUTO: 9.32 10*3/MM3 (ref 3.4–10.8)

## 2024-09-12 PROCEDURE — 83735 ASSAY OF MAGNESIUM: CPT | Performed by: INTERNAL MEDICINE

## 2024-09-12 PROCEDURE — 63710000001 INSULIN LISPRO (HUMAN) PER 5 UNITS: Performed by: INTERNAL MEDICINE

## 2024-09-12 PROCEDURE — 80048 BASIC METABOLIC PNL TOTAL CA: CPT | Performed by: INTERNAL MEDICINE

## 2024-09-12 PROCEDURE — 25010000002 MAGNESIUM SULFATE 2 GM/50ML SOLUTION: Performed by: NURSE PRACTITIONER

## 2024-09-12 PROCEDURE — 82948 REAGENT STRIP/BLOOD GLUCOSE: CPT

## 2024-09-12 PROCEDURE — 25010000002 METOCLOPRAMIDE PER 10 MG: Performed by: INTERNAL MEDICINE

## 2024-09-12 PROCEDURE — 85027 COMPLETE CBC AUTOMATED: CPT | Performed by: INTERNAL MEDICINE

## 2024-09-12 PROCEDURE — 0 DEXTROSE 5 % SOLUTION 1,000 ML FLEX CONT: Performed by: INTERNAL MEDICINE

## 2024-09-12 PROCEDURE — 84100 ASSAY OF PHOSPHORUS: CPT | Performed by: INTERNAL MEDICINE

## 2024-09-12 RX ORDER — CEPHALEXIN 500 MG/1
500 CAPSULE ORAL 2 TIMES DAILY
COMMUNITY
Start: 2024-09-04 | End: 2024-09-13 | Stop reason: HOSPADM

## 2024-09-12 RX ORDER — NORTRIPTYLINE HCL 25 MG
25 CAPSULE ORAL NIGHTLY
Status: DISCONTINUED | OUTPATIENT
Start: 2024-09-12 | End: 2024-09-13 | Stop reason: HOSPADM

## 2024-09-12 RX ORDER — ROPINIROLE 1 MG/1
1 TABLET, FILM COATED ORAL NIGHTLY PRN
Status: DISCONTINUED | OUTPATIENT
Start: 2024-09-12 | End: 2024-09-13 | Stop reason: HOSPADM

## 2024-09-12 RX ORDER — PREGABALIN 75 MG/1
75 CAPSULE ORAL DAILY
Status: DISCONTINUED | OUTPATIENT
Start: 2024-09-12 | End: 2024-09-13 | Stop reason: HOSPADM

## 2024-09-12 RX ORDER — MAGNESIUM SULFATE HEPTAHYDRATE 40 MG/ML
2 INJECTION, SOLUTION INTRAVENOUS ONCE
Status: COMPLETED | OUTPATIENT
Start: 2024-09-12 | End: 2024-09-12

## 2024-09-12 RX ADMIN — PANCRELIPASE 24000 UNITS OF LIPASE: 60000; 12000; 38000 CAPSULE, DELAYED RELEASE PELLETS ORAL at 11:23

## 2024-09-12 RX ADMIN — ROPINIROLE HYDROCHLORIDE 1 MG: 1 TABLET, FILM COATED ORAL at 09:19

## 2024-09-12 RX ADMIN — PANCRELIPASE 24000 UNITS OF LIPASE: 60000; 12000; 38000 CAPSULE, DELAYED RELEASE PELLETS ORAL at 17:51

## 2024-09-12 RX ADMIN — INSULIN LISPRO 2 UNITS: 100 INJECTION, SOLUTION INTRAVENOUS; SUBCUTANEOUS at 20:44

## 2024-09-12 RX ADMIN — PREGABALIN 75 MG: 75 CAPSULE ORAL at 11:23

## 2024-09-12 RX ADMIN — POTASSIUM CHLORIDE 20 MEQ: 750 CAPSULE, EXTENDED RELEASE ORAL at 17:51

## 2024-09-12 RX ADMIN — EMPAGLIFLOZIN 10 MG: 10 TABLET, FILM COATED ORAL at 09:20

## 2024-09-12 RX ADMIN — NORTRIPTYLINE HYDROCHLORIDE 25 MG: 25 CAPSULE ORAL at 20:37

## 2024-09-12 RX ADMIN — ASPIRIN 81 MG: 81 TABLET, COATED ORAL at 09:19

## 2024-09-12 RX ADMIN — MAGNESIUM SULFATE HEPTAHYDRATE 2 G: 2 INJECTION, SOLUTION INTRAVENOUS at 11:24

## 2024-09-12 RX ADMIN — METOCLOPRAMIDE HYDROCHLORIDE 5 MG: 5 INJECTION INTRAMUSCULAR; INTRAVENOUS at 03:33

## 2024-09-12 RX ADMIN — ATORVASTATIN CALCIUM 80 MG: 40 TABLET ORAL at 20:37

## 2024-09-12 RX ADMIN — METOCLOPRAMIDE HYDROCHLORIDE 5 MG: 5 INJECTION INTRAMUSCULAR; INTRAVENOUS at 09:20

## 2024-09-12 RX ADMIN — ROPINIROLE HYDROCHLORIDE 1 MG: 1 TABLET, FILM COATED ORAL at 20:37

## 2024-09-12 RX ADMIN — BETHANECHOL CHLORIDE 25 MG: 25 TABLET ORAL at 11:25

## 2024-09-12 RX ADMIN — PANCRELIPASE 24000 UNITS OF LIPASE: 60000; 12000; 38000 CAPSULE, DELAYED RELEASE PELLETS ORAL at 20:37

## 2024-09-12 RX ADMIN — INSULIN LISPRO 4 UNITS: 100 INJECTION, SOLUTION INTRAVENOUS; SUBCUTANEOUS at 09:18

## 2024-09-12 RX ADMIN — BETHANECHOL CHLORIDE 25 MG: 25 TABLET ORAL at 20:37

## 2024-09-12 RX ADMIN — BETHANECHOL CHLORIDE 25 MG: 25 TABLET ORAL at 09:19

## 2024-09-12 RX ADMIN — Medication 10 ML: at 20:38

## 2024-09-12 RX ADMIN — SODIUM BICARBONATE 125 MEQ: 84 INJECTION INTRAVENOUS at 11:24

## 2024-09-12 RX ADMIN — PANTOPRAZOLE SODIUM 40 MG: 40 TABLET, DELAYED RELEASE ORAL at 03:33

## 2024-09-12 RX ADMIN — POTASSIUM CHLORIDE 20 MEQ: 750 CAPSULE, EXTENDED RELEASE ORAL at 09:19

## 2024-09-12 RX ADMIN — ROPINIROLE HYDROCHLORIDE 1 MG: 1 TABLET, FILM COATED ORAL at 17:50

## 2024-09-12 RX ADMIN — BETHANECHOL CHLORIDE 25 MG: 25 TABLET ORAL at 17:51

## 2024-09-13 ENCOUNTER — READMISSION MANAGEMENT (OUTPATIENT)
Dept: CALL CENTER | Facility: HOSPITAL | Age: 54
End: 2024-09-13
Payer: COMMERCIAL

## 2024-09-13 VITALS
DIASTOLIC BLOOD PRESSURE: 64 MMHG | SYSTOLIC BLOOD PRESSURE: 111 MMHG | HEIGHT: 60 IN | TEMPERATURE: 98.5 F | BODY MASS INDEX: 17.96 KG/M2 | HEART RATE: 103 BPM | OXYGEN SATURATION: 100 % | RESPIRATION RATE: 16 BRPM | WEIGHT: 91.49 LBS

## 2024-09-13 LAB
ALBUMIN SERPL-MCNC: 2.7 G/DL (ref 3.5–5.2)
ALBUMIN/GLOB SERPL: 1.2 G/DL
ALP SERPL-CCNC: 79 U/L (ref 39–117)
ALT SERPL W P-5'-P-CCNC: 11 U/L (ref 1–33)
ANION GAP SERPL CALCULATED.3IONS-SCNC: 9 MMOL/L (ref 5–15)
AST SERPL-CCNC: 19 U/L (ref 1–32)
BASOPHILS # BLD AUTO: 0.03 10*3/MM3 (ref 0–0.2)
BASOPHILS NFR BLD AUTO: 0.3 % (ref 0–1.5)
BILIRUB SERPL-MCNC: <0.2 MG/DL (ref 0–1.2)
BUN SERPL-MCNC: 41 MG/DL (ref 6–20)
BUN/CREAT SERPL: 18.1 (ref 7–25)
CALCIUM SPEC-SCNC: 6.3 MG/DL (ref 8.6–10.5)
CHLORIDE SERPL-SCNC: 104 MMOL/L (ref 98–107)
CO2 SERPL-SCNC: 31 MMOL/L (ref 22–29)
CREAT SERPL-MCNC: 2.27 MG/DL (ref 0.57–1)
DEPRECATED RDW RBC AUTO: 50.8 FL (ref 37–54)
EGFRCR SERPLBLD CKD-EPI 2021: 25.2 ML/MIN/1.73
EOSINOPHIL # BLD AUTO: 0.28 10*3/MM3 (ref 0–0.4)
EOSINOPHIL NFR BLD AUTO: 2.9 % (ref 0.3–6.2)
ERYTHROCYTE [DISTWIDTH] IN BLOOD BY AUTOMATED COUNT: 14.7 % (ref 12.3–15.4)
GLOBULIN UR ELPH-MCNC: 2.2 GM/DL
GLUCOSE BLDC GLUCOMTR-MCNC: 235 MG/DL (ref 70–130)
GLUCOSE BLDC GLUCOMTR-MCNC: 264 MG/DL (ref 70–130)
GLUCOSE SERPL-MCNC: 216 MG/DL (ref 65–99)
HCT VFR BLD AUTO: 27.6 % (ref 34–46.6)
HGB BLD-MCNC: 8.8 G/DL (ref 12–15.9)
IMM GRANULOCYTES # BLD AUTO: 0.03 10*3/MM3 (ref 0–0.05)
IMM GRANULOCYTES NFR BLD AUTO: 0.3 % (ref 0–0.5)
LYMPHOCYTES # BLD AUTO: 3.4 10*3/MM3 (ref 0.7–3.1)
LYMPHOCYTES NFR BLD AUTO: 35.4 % (ref 19.6–45.3)
MAGNESIUM SERPL-MCNC: 1.4 MG/DL (ref 1.6–2.6)
MCH RBC QN AUTO: 30.4 PG (ref 26.6–33)
MCHC RBC AUTO-ENTMCNC: 31.9 G/DL (ref 31.5–35.7)
MCV RBC AUTO: 95.5 FL (ref 79–97)
MONOCYTES # BLD AUTO: 0.62 10*3/MM3 (ref 0.1–0.9)
MONOCYTES NFR BLD AUTO: 6.5 % (ref 5–12)
NEUTROPHILS NFR BLD AUTO: 5.25 10*3/MM3 (ref 1.7–7)
NEUTROPHILS NFR BLD AUTO: 54.6 % (ref 42.7–76)
NRBC BLD AUTO-RTO: 0 /100 WBC (ref 0–0.2)
PHOSPHATE SERPL-MCNC: 1.1 MG/DL (ref 2.5–4.5)
PHOSPHATE SERPL-MCNC: 5.3 MG/DL (ref 2.5–4.5)
PLATELET # BLD AUTO: 299 10*3/MM3 (ref 140–450)
PMV BLD AUTO: 9.6 FL (ref 6–12)
POTASSIUM SERPL-SCNC: 4.1 MMOL/L (ref 3.5–5.2)
PROT SERPL-MCNC: 4.9 G/DL (ref 6–8.5)
RBC # BLD AUTO: 2.89 10*6/MM3 (ref 3.77–5.28)
SODIUM SERPL-SCNC: 144 MMOL/L (ref 136–145)
WBC NRBC COR # BLD AUTO: 9.61 10*3/MM3 (ref 3.4–10.8)

## 2024-09-13 PROCEDURE — 25010000002 SODIUM CHLORIDE 0.9 % WITH KCL 20 MEQ 20-0.9 MEQ/L-% SOLUTION: Performed by: INTERNAL MEDICINE

## 2024-09-13 PROCEDURE — 63710000001 INSULIN LISPRO (HUMAN) PER 5 UNITS: Performed by: INTERNAL MEDICINE

## 2024-09-13 PROCEDURE — 84100 ASSAY OF PHOSPHORUS: CPT | Performed by: INTERNAL MEDICINE

## 2024-09-13 PROCEDURE — 80053 COMPREHEN METABOLIC PANEL: CPT | Performed by: INTERNAL MEDICINE

## 2024-09-13 PROCEDURE — 25010000002 MAGNESIUM SULFATE IN D5W 1G/100ML (PREMIX) 1-5 GM/100ML-% SOLUTION: Performed by: INTERNAL MEDICINE

## 2024-09-13 PROCEDURE — 83735 ASSAY OF MAGNESIUM: CPT | Performed by: INTERNAL MEDICINE

## 2024-09-13 PROCEDURE — 0 DEXTROSE 5 % SOLUTION 1,000 ML FLEX CONT: Performed by: INTERNAL MEDICINE

## 2024-09-13 PROCEDURE — 82948 REAGENT STRIP/BLOOD GLUCOSE: CPT

## 2024-09-13 PROCEDURE — 85025 COMPLETE CBC W/AUTO DIFF WBC: CPT | Performed by: INTERNAL MEDICINE

## 2024-09-13 RX ORDER — SODIUM CHLORIDE AND POTASSIUM CHLORIDE 150; 900 MG/100ML; MG/100ML
100 INJECTION, SOLUTION INTRAVENOUS CONTINUOUS
Status: DISCONTINUED | OUTPATIENT
Start: 2024-09-13 | End: 2024-09-13 | Stop reason: HOSPADM

## 2024-09-13 RX ORDER — MAGNESIUM SULFATE 1 G/100ML
1 INJECTION INTRAVENOUS
Status: COMPLETED | OUTPATIENT
Start: 2024-09-13 | End: 2024-09-13

## 2024-09-13 RX ADMIN — EMPAGLIFLOZIN 10 MG: 10 TABLET, FILM COATED ORAL at 08:48

## 2024-09-13 RX ADMIN — MAGNESIUM SULFATE IN DEXTROSE 1 G: 10 INJECTION, SOLUTION INTRAVENOUS at 03:30

## 2024-09-13 RX ADMIN — BETHANECHOL CHLORIDE 25 MG: 25 TABLET ORAL at 08:47

## 2024-09-13 RX ADMIN — SODIUM PHOSPHATE, MONOBASIC, MONOHYDRATE AND SODIUM PHOSPHATE, DIBASIC, ANHYDROUS 15 MMOL: 142; 276 INJECTION, SOLUTION INTRAVENOUS at 06:04

## 2024-09-13 RX ADMIN — PANCRELIPASE 24000 UNITS OF LIPASE: 60000; 12000; 38000 CAPSULE, DELAYED RELEASE PELLETS ORAL at 12:38

## 2024-09-13 RX ADMIN — MAGNESIUM SULFATE IN DEXTROSE 1 G: 10 INJECTION, SOLUTION INTRAVENOUS at 04:54

## 2024-09-13 RX ADMIN — MAGNESIUM SULFATE IN DEXTROSE 1 G: 10 INJECTION, SOLUTION INTRAVENOUS at 06:04

## 2024-09-13 RX ADMIN — PREGABALIN 75 MG: 75 CAPSULE ORAL at 08:47

## 2024-09-13 RX ADMIN — PANTOPRAZOLE SODIUM 40 MG: 40 TABLET, DELAYED RELEASE ORAL at 06:31

## 2024-09-13 RX ADMIN — SODIUM PHOSPHATE, MONOBASIC, MONOHYDRATE AND SODIUM PHOSPHATE, DIBASIC, ANHYDROUS 15 MMOL: 142; 276 INJECTION, SOLUTION INTRAVENOUS at 08:07

## 2024-09-13 RX ADMIN — POTASSIUM CHLORIDE AND SODIUM CHLORIDE 100 ML/HR: 900; 150 INJECTION, SOLUTION INTRAVENOUS at 12:49

## 2024-09-13 RX ADMIN — SODIUM PHOSPHATE, MONOBASIC, MONOHYDRATE AND SODIUM PHOSPHATE, DIBASIC, ANHYDROUS 15 MMOL: 142; 276 INJECTION, SOLUTION INTRAVENOUS at 04:24

## 2024-09-13 RX ADMIN — ROPINIROLE HYDROCHLORIDE 1 MG: 1 TABLET, FILM COATED ORAL at 08:49

## 2024-09-13 RX ADMIN — SODIUM BICARBONATE 125 MEQ: 84 INJECTION INTRAVENOUS at 03:06

## 2024-09-13 RX ADMIN — INSULIN LISPRO 6 UNITS: 100 INJECTION, SOLUTION INTRAVENOUS; SUBCUTANEOUS at 08:48

## 2024-09-13 RX ADMIN — BETHANECHOL CHLORIDE 25 MG: 25 TABLET ORAL at 12:49

## 2024-09-13 RX ADMIN — PANCRELIPASE 24000 UNITS OF LIPASE: 60000; 12000; 38000 CAPSULE, DELAYED RELEASE PELLETS ORAL at 08:48

## 2024-09-13 RX ADMIN — POTASSIUM CHLORIDE 20 MEQ: 750 CAPSULE, EXTENDED RELEASE ORAL at 08:48

## 2024-09-13 RX ADMIN — ASPIRIN 81 MG: 81 TABLET, COATED ORAL at 08:47

## 2024-09-13 RX ADMIN — INSULIN LISPRO 4 UNITS: 100 INJECTION, SOLUTION INTRAVENOUS; SUBCUTANEOUS at 12:38

## 2024-09-17 ENCOUNTER — READMISSION MANAGEMENT (OUTPATIENT)
Dept: CALL CENTER | Facility: HOSPITAL | Age: 54
End: 2024-09-17
Payer: COMMERCIAL

## 2024-09-27 ENCOUNTER — READMISSION MANAGEMENT (OUTPATIENT)
Dept: CALL CENTER | Facility: HOSPITAL | Age: 54
End: 2024-09-27
Payer: COMMERCIAL

## 2024-10-01 ENCOUNTER — READMISSION MANAGEMENT (OUTPATIENT)
Dept: CALL CENTER | Facility: HOSPITAL | Age: 54
End: 2024-10-01
Payer: COMMERCIAL

## 2024-10-10 ENCOUNTER — READMISSION MANAGEMENT (OUTPATIENT)
Dept: CALL CENTER | Facility: HOSPITAL | Age: 54
End: 2024-10-10
Payer: COMMERCIAL

## 2024-10-24 ENCOUNTER — HOSPITAL ENCOUNTER (INPATIENT)
Facility: HOSPITAL | Age: 54
LOS: 2 days | Discharge: HOME | End: 2024-10-26
Attending: FAMILY MEDICINE | Admitting: FAMILY MEDICINE
Payer: COMMERCIAL

## 2024-10-24 DIAGNOSIS — N17.9 ACUTE RENAL FAILURE, UNSPECIFIED ACUTE RENAL FAILURE TYPE: Primary | ICD-10-CM

## 2024-10-24 DIAGNOSIS — E87.6 HYPOKALEMIA: ICD-10-CM

## 2024-10-24 PROBLEM — E87.1 HYPONATREMIA: Status: ACTIVE | Noted: 2024-10-24

## 2024-10-24 PROBLEM — Z86.39 HX OF INSULIN DEPENDENT DIABETES MELLITUS: Status: ACTIVE | Noted: 2024-10-24

## 2024-10-24 LAB
ALBUMIN SERPL-MCNC: 4.3 G/DL (ref 3.5–5.2)
ALP SERPL-CCNC: 196 U/L (ref 39–117)
ALT SERPL W P-5'-P-CCNC: 12 U/L (ref 1–33)
ANION GAP SERPL CALCULATED.3IONS-SCNC: 21 MMOL/L (ref 5–15)
AST SERPL-CCNC: 16 U/L (ref 1–32)
BASOPHILS # BLD AUTO: 0.05 10*3/MM3 (ref 0–0.2)
BASOPHILS NFR BLD AUTO: 0.3 % (ref 0–1.5)
BILIRUB CONJ SERPL-MCNC: <0.2 MG/DL (ref 0–0.3)
BILIRUB INDIRECT SERPL-MCNC: ABNORMAL MG/DL
BILIRUB SERPL-MCNC: <0.2 MG/DL (ref 0–1.2)
BUN SERPL-MCNC: 88 MG/DL (ref 6–20)
BUN/CREAT SERPL: 24.4 (ref 7–25)
CALCIUM SPEC-SCNC: 8.8 MG/DL (ref 8.6–10.5)
CHLORIDE SERPL-SCNC: 97 MMOL/L (ref 98–107)
CO2 SERPL-SCNC: 14 MMOL/L (ref 22–29)
CREAT SERPL-MCNC: 3.61 MG/DL (ref 0.57–1)
DEPRECATED RDW RBC AUTO: 45.7 FL (ref 37–54)
EGFRCR SERPLBLD CKD-EPI 2021: 14.5 ML/MIN/1.73
EOSINOPHIL # BLD AUTO: 0.13 10*3/MM3 (ref 0–0.4)
EOSINOPHIL NFR BLD AUTO: 0.8 % (ref 0.3–6.2)
ERYTHROCYTE [DISTWIDTH] IN BLOOD BY AUTOMATED COUNT: 13.1 % (ref 12.3–15.4)
GLUCOSE BLDC GLUCOMTR-MCNC: 358 MG/DL (ref 70–130)
GLUCOSE SERPL-MCNC: 260 MG/DL (ref 65–99)
HCT VFR BLD AUTO: 36 % (ref 34–46.6)
HGB BLD-MCNC: 11.8 G/DL (ref 12–15.9)
IMM GRANULOCYTES # BLD AUTO: 0.08 10*3/MM3 (ref 0–0.05)
IMM GRANULOCYTES NFR BLD AUTO: 0.5 % (ref 0–0.5)
LYMPHOCYTES # BLD AUTO: 1.61 10*3/MM3 (ref 0.7–3.1)
LYMPHOCYTES NFR BLD AUTO: 9.8 % (ref 19.6–45.3)
MAGNESIUM SERPL-MCNC: 2 MG/DL (ref 1.6–2.6)
MCH RBC QN AUTO: 31.2 PG (ref 26.6–33)
MCHC RBC AUTO-ENTMCNC: 32.8 G/DL (ref 31.5–35.7)
MCV RBC AUTO: 95.2 FL (ref 79–97)
MONOCYTES # BLD AUTO: 1 10*3/MM3 (ref 0.1–0.9)
MONOCYTES NFR BLD AUTO: 6.1 % (ref 5–12)
NEUTROPHILS NFR BLD AUTO: 13.56 10*3/MM3 (ref 1.7–7)
NEUTROPHILS NFR BLD AUTO: 82.5 % (ref 42.7–76)
NRBC BLD AUTO-RTO: 0 /100 WBC (ref 0–0.2)
PHOSPHATE SERPL-MCNC: 7.2 MG/DL (ref 2.5–4.5)
PLATELET # BLD AUTO: 487 10*3/MM3 (ref 140–450)
PMV BLD AUTO: 10.1 FL (ref 6–12)
POTASSIUM SERPL-SCNC: 2.6 MMOL/L (ref 3.5–5.2)
PROT SERPL-MCNC: 8 G/DL (ref 6–8.5)
RBC # BLD AUTO: 3.78 10*6/MM3 (ref 3.77–5.28)
SODIUM SERPL-SCNC: 132 MMOL/L (ref 136–145)
WBC NRBC COR # BLD AUTO: 16.43 10*3/MM3 (ref 3.4–10.8)

## 2024-10-24 PROCEDURE — 82948 REAGENT STRIP/BLOOD GLUCOSE: CPT

## 2024-10-24 PROCEDURE — 80048 BASIC METABOLIC PNL TOTAL CA: CPT | Performed by: FAMILY MEDICINE

## 2024-10-24 PROCEDURE — 25810000003 SODIUM CHLORIDE 0.9 % SOLUTION: Performed by: FAMILY MEDICINE

## 2024-10-24 PROCEDURE — 99285 EMERGENCY DEPT VISIT HI MDM: CPT

## 2024-10-24 PROCEDURE — 83735 ASSAY OF MAGNESIUM: CPT | Performed by: FAMILY MEDICINE

## 2024-10-24 PROCEDURE — 63710000001 INSULIN LISPRO (HUMAN) PER 5 UNITS: Performed by: INTERNAL MEDICINE

## 2024-10-24 PROCEDURE — 25010000002 HEPARIN (PORCINE) PER 1000 UNITS: Performed by: INTERNAL MEDICINE

## 2024-10-24 PROCEDURE — 80076 HEPATIC FUNCTION PANEL: CPT | Performed by: INTERNAL MEDICINE

## 2024-10-24 PROCEDURE — 85025 COMPLETE CBC W/AUTO DIFF WBC: CPT | Performed by: FAMILY MEDICINE

## 2024-10-24 PROCEDURE — 36415 COLL VENOUS BLD VENIPUNCTURE: CPT

## 2024-10-24 PROCEDURE — 84100 ASSAY OF PHOSPHORUS: CPT | Performed by: INTERNAL MEDICINE

## 2024-10-24 PROCEDURE — 96372 THER/PROPH/DIAG INJ SC/IM: CPT

## 2024-10-24 RX ORDER — ATORVASTATIN CALCIUM 40 MG/1
80 TABLET, FILM COATED ORAL NIGHTLY
Status: DISCONTINUED | OUTPATIENT
Start: 2024-10-25 | End: 2024-10-26 | Stop reason: HOSPADM

## 2024-10-24 RX ORDER — HEPARIN SODIUM 5000 [USP'U]/ML
5000 INJECTION, SOLUTION INTRAVENOUS; SUBCUTANEOUS EVERY 12 HOURS SCHEDULED
Status: DISCONTINUED | OUTPATIENT
Start: 2024-10-24 | End: 2024-10-26 | Stop reason: HOSPADM

## 2024-10-24 RX ORDER — ONDANSETRON 2 MG/ML
4 INJECTION INTRAMUSCULAR; INTRAVENOUS EVERY 6 HOURS PRN
Status: DISCONTINUED | OUTPATIENT
Start: 2024-10-24 | End: 2024-10-26 | Stop reason: HOSPADM

## 2024-10-24 RX ORDER — ACETAMINOPHEN 160 MG/5ML
650 SOLUTION ORAL EVERY 4 HOURS PRN
Status: DISCONTINUED | OUTPATIENT
Start: 2024-10-24 | End: 2024-10-26 | Stop reason: HOSPADM

## 2024-10-24 RX ORDER — POTASSIUM CHLORIDE 750 MG/1
40 CAPSULE, EXTENDED RELEASE ORAL ONCE
Status: COMPLETED | OUTPATIENT
Start: 2024-10-24 | End: 2024-10-24

## 2024-10-24 RX ORDER — ROPINIROLE 1 MG/1
1 TABLET, FILM COATED ORAL 3 TIMES DAILY
Status: DISCONTINUED | OUTPATIENT
Start: 2024-10-24 | End: 2024-10-26 | Stop reason: HOSPADM

## 2024-10-24 RX ORDER — NICOTINE POLACRILEX 4 MG
15 LOZENGE BUCCAL
Status: DISCONTINUED | OUTPATIENT
Start: 2024-10-24 | End: 2024-10-26 | Stop reason: HOSPADM

## 2024-10-24 RX ORDER — INSULIN LISPRO 100 [IU]/ML
2-7 INJECTION, SOLUTION INTRAVENOUS; SUBCUTANEOUS
Status: DISCONTINUED | OUTPATIENT
Start: 2024-10-24 | End: 2024-10-26 | Stop reason: HOSPADM

## 2024-10-24 RX ORDER — SODIUM CHLORIDE 0.9 % (FLUSH) 0.9 %
10 SYRINGE (ML) INJECTION AS NEEDED
Status: DISCONTINUED | OUTPATIENT
Start: 2024-10-24 | End: 2024-10-26 | Stop reason: HOSPADM

## 2024-10-24 RX ORDER — SODIUM CHLORIDE 0.9 % (FLUSH) 0.9 %
10 SYRINGE (ML) INJECTION EVERY 12 HOURS SCHEDULED
Status: DISCONTINUED | OUTPATIENT
Start: 2024-10-24 | End: 2024-10-26 | Stop reason: HOSPADM

## 2024-10-24 RX ORDER — DEXTROSE MONOHYDRATE 25 G/50ML
25 INJECTION, SOLUTION INTRAVENOUS
Status: DISCONTINUED | OUTPATIENT
Start: 2024-10-24 | End: 2024-10-26 | Stop reason: HOSPADM

## 2024-10-24 RX ORDER — NORTRIPTYLINE HCL 25 MG
25 CAPSULE ORAL NIGHTLY
Status: DISCONTINUED | OUTPATIENT
Start: 2024-10-24 | End: 2024-10-26 | Stop reason: HOSPADM

## 2024-10-24 RX ORDER — SODIUM CHLORIDE 9 MG/ML
40 INJECTION, SOLUTION INTRAVENOUS AS NEEDED
Status: DISCONTINUED | OUTPATIENT
Start: 2024-10-24 | End: 2024-10-26 | Stop reason: HOSPADM

## 2024-10-24 RX ORDER — ACETAMINOPHEN 650 MG/1
650 SUPPOSITORY RECTAL EVERY 4 HOURS PRN
Status: DISCONTINUED | OUTPATIENT
Start: 2024-10-24 | End: 2024-10-26 | Stop reason: HOSPADM

## 2024-10-24 RX ORDER — ACETAMINOPHEN 325 MG/1
650 TABLET ORAL EVERY 4 HOURS PRN
Status: DISCONTINUED | OUTPATIENT
Start: 2024-10-24 | End: 2024-10-26 | Stop reason: HOSPADM

## 2024-10-24 RX ADMIN — ROPINIROLE 1 MG: 1 TABLET, FILM COATED ORAL at 21:47

## 2024-10-24 RX ADMIN — INSULIN LISPRO 6 UNITS: 100 INJECTION, SOLUTION INTRAVENOUS; SUBCUTANEOUS at 21:56

## 2024-10-24 RX ADMIN — NORTRIPTYLINE HYDROCHLORIDE 25 MG: 25 CAPSULE ORAL at 21:47

## 2024-10-24 RX ADMIN — SODIUM CHLORIDE 1000 ML: 9 INJECTION, SOLUTION INTRAVENOUS at 20:52

## 2024-10-24 RX ADMIN — POTASSIUM CHLORIDE 40 MEQ: 750 CAPSULE, EXTENDED RELEASE ORAL at 20:49

## 2024-10-24 RX ADMIN — Medication 10 ML: at 21:49

## 2024-10-24 RX ADMIN — HEPARIN SODIUM 5000 UNITS: 5000 INJECTION, SOLUTION INTRAVENOUS; SUBCUTANEOUS at 21:47

## 2024-10-25 ENCOUNTER — APPOINTMENT (OUTPATIENT)
Dept: ULTRASOUND IMAGING | Facility: HOSPITAL | Age: 54
End: 2024-10-25
Payer: COMMERCIAL

## 2024-10-25 LAB
ANION GAP SERPL CALCULATED.3IONS-SCNC: 11 MMOL/L (ref 5–15)
ANION GAP SERPL CALCULATED.3IONS-SCNC: 13 MMOL/L (ref 5–15)
ANION GAP SERPL CALCULATED.3IONS-SCNC: 15 MMOL/L (ref 5–15)
ANION GAP SERPL CALCULATED.3IONS-SCNC: 19 MMOL/L (ref 5–15)
BASOPHILS # BLD AUTO: 0.07 10*3/MM3 (ref 0–0.2)
BASOPHILS NFR BLD AUTO: 0.5 % (ref 0–1.5)
BILIRUB UR QL STRIP: NEGATIVE
BUN SERPL-MCNC: 68 MG/DL (ref 6–20)
BUN SERPL-MCNC: 79 MG/DL (ref 6–20)
BUN SERPL-MCNC: 88 MG/DL (ref 6–20)
BUN SERPL-MCNC: 91 MG/DL (ref 6–20)
BUN/CREAT SERPL: 25.8 (ref 7–25)
BUN/CREAT SERPL: 28.9 (ref 7–25)
BUN/CREAT SERPL: 28.9 (ref 7–25)
BUN/CREAT SERPL: 29.4 (ref 7–25)
CALCIUM SPEC-SCNC: 7.7 MG/DL (ref 8.6–10.5)
CALCIUM SPEC-SCNC: 7.7 MG/DL (ref 8.6–10.5)
CALCIUM SPEC-SCNC: 8.3 MG/DL (ref 8.6–10.5)
CALCIUM SPEC-SCNC: 8.5 MG/DL (ref 8.6–10.5)
CHLORIDE SERPL-SCNC: 104 MMOL/L (ref 98–107)
CHLORIDE SERPL-SCNC: 108 MMOL/L (ref 98–107)
CHLORIDE SERPL-SCNC: 108 MMOL/L (ref 98–107)
CHLORIDE SERPL-SCNC: 99 MMOL/L (ref 98–107)
CLARITY UR: CLEAR
CO2 SERPL-SCNC: 15 MMOL/L (ref 22–29)
CO2 SERPL-SCNC: 15 MMOL/L (ref 22–29)
CO2 SERPL-SCNC: 17 MMOL/L (ref 22–29)
CO2 SERPL-SCNC: 22 MMOL/L (ref 22–29)
COLOR UR: YELLOW
CREAT SERPL-MCNC: 2.35 MG/DL (ref 0.57–1)
CREAT SERPL-MCNC: 2.73 MG/DL (ref 0.57–1)
CREAT SERPL-MCNC: 3.09 MG/DL (ref 0.57–1)
CREAT SERPL-MCNC: 3.41 MG/DL (ref 0.57–1)
DEPRECATED RDW RBC AUTO: 45.7 FL (ref 37–54)
EGFRCR SERPLBLD CKD-EPI 2021: 15.5 ML/MIN/1.73
EGFRCR SERPLBLD CKD-EPI 2021: 17.4 ML/MIN/1.73
EGFRCR SERPLBLD CKD-EPI 2021: 20.2 ML/MIN/1.73
EGFRCR SERPLBLD CKD-EPI 2021: 24.2 ML/MIN/1.73
EOSINOPHIL # BLD AUTO: 0.35 10*3/MM3 (ref 0–0.4)
EOSINOPHIL NFR BLD AUTO: 2.6 % (ref 0.3–6.2)
ERYTHROCYTE [DISTWIDTH] IN BLOOD BY AUTOMATED COUNT: 13 % (ref 12.3–15.4)
GLUCOSE BLDC GLUCOMTR-MCNC: 150 MG/DL (ref 70–130)
GLUCOSE BLDC GLUCOMTR-MCNC: 157 MG/DL (ref 70–130)
GLUCOSE BLDC GLUCOMTR-MCNC: 202 MG/DL (ref 70–130)
GLUCOSE BLDC GLUCOMTR-MCNC: 252 MG/DL (ref 70–130)
GLUCOSE SERPL-MCNC: 162 MG/DL (ref 65–99)
GLUCOSE SERPL-MCNC: 184 MG/DL (ref 65–99)
GLUCOSE SERPL-MCNC: 234 MG/DL (ref 65–99)
GLUCOSE SERPL-MCNC: 74 MG/DL (ref 65–99)
GLUCOSE UR STRIP-MCNC: NEGATIVE MG/DL
HCT VFR BLD AUTO: 35.1 % (ref 34–46.6)
HGB BLD-MCNC: 11.3 G/DL (ref 12–15.9)
HGB UR QL STRIP.AUTO: NEGATIVE
IMM GRANULOCYTES # BLD AUTO: 0.05 10*3/MM3 (ref 0–0.05)
IMM GRANULOCYTES NFR BLD AUTO: 0.4 % (ref 0–0.5)
IRON 24H UR-MRATE: 78 MCG/DL (ref 37–145)
IRON SATN MFR SERPL: 24 % (ref 20–50)
KETONES UR QL STRIP: NEGATIVE
LEUKOCYTE ESTERASE UR QL STRIP.AUTO: NEGATIVE
LYMPHOCYTES # BLD AUTO: 4.03 10*3/MM3 (ref 0.7–3.1)
LYMPHOCYTES NFR BLD AUTO: 30 % (ref 19.6–45.3)
MCH RBC QN AUTO: 30.7 PG (ref 26.6–33)
MCHC RBC AUTO-ENTMCNC: 32.2 G/DL (ref 31.5–35.7)
MCV RBC AUTO: 95.4 FL (ref 79–97)
MONOCYTES # BLD AUTO: 1.02 10*3/MM3 (ref 0.1–0.9)
MONOCYTES NFR BLD AUTO: 7.6 % (ref 5–12)
NEUTROPHILS NFR BLD AUTO: 58.9 % (ref 42.7–76)
NEUTROPHILS NFR BLD AUTO: 7.92 10*3/MM3 (ref 1.7–7)
NITRITE UR QL STRIP: NEGATIVE
NRBC BLD AUTO-RTO: 0 /100 WBC (ref 0–0.2)
PH UR STRIP.AUTO: 6 [PH] (ref 5–8)
PHOSPHATE SERPL-MCNC: 3.9 MG/DL (ref 2.5–4.5)
PLATELET # BLD AUTO: 446 10*3/MM3 (ref 140–450)
PMV BLD AUTO: 10.3 FL (ref 6–12)
POTASSIUM SERPL-SCNC: 2.4 MMOL/L (ref 3.5–5.2)
POTASSIUM SERPL-SCNC: 2.8 MMOL/L (ref 3.5–5.2)
POTASSIUM SERPL-SCNC: 3 MMOL/L (ref 3.5–5.2)
POTASSIUM SERPL-SCNC: 3.4 MMOL/L (ref 3.5–5.2)
PROT ?TM UR-MCNC: 14.8 MG/DL
PROT UR QL STRIP: ABNORMAL
RBC # BLD AUTO: 3.68 10*6/MM3 (ref 3.77–5.28)
SODIUM SERPL-SCNC: 133 MMOL/L (ref 136–145)
SODIUM SERPL-SCNC: 134 MMOL/L (ref 136–145)
SODIUM SERPL-SCNC: 138 MMOL/L (ref 136–145)
SODIUM SERPL-SCNC: 141 MMOL/L (ref 136–145)
SODIUM UR-SCNC: <20 MMOL/L
SP GR UR STRIP: 1.02 (ref 1–1.03)
TIBC SERPL-MCNC: 329 MCG/DL (ref 298–536)
TRANSFERRIN SERPL-MCNC: 221 MG/DL (ref 200–360)
URATE SERPL-MCNC: 8.3 MG/DL (ref 2.4–5.7)
UROBILINOGEN UR QL STRIP: ABNORMAL
WBC NRBC COR # BLD AUTO: 13.44 10*3/MM3 (ref 3.4–10.8)

## 2024-10-25 PROCEDURE — 63710000001 INSULIN LISPRO (HUMAN) PER 5 UNITS: Performed by: INTERNAL MEDICINE

## 2024-10-25 PROCEDURE — 25810000003 LACTATED RINGERS SOLUTION: Performed by: INTERNAL MEDICINE

## 2024-10-25 PROCEDURE — 81003 URINALYSIS AUTO W/O SCOPE: CPT | Performed by: NURSE PRACTITIONER

## 2024-10-25 PROCEDURE — 82043 UR ALBUMIN QUANTITATIVE: CPT | Performed by: INTERNAL MEDICINE

## 2024-10-25 PROCEDURE — 96372 THER/PROPH/DIAG INJ SC/IM: CPT

## 2024-10-25 PROCEDURE — 82570 ASSAY OF URINE CREATININE: CPT | Performed by: INTERNAL MEDICINE

## 2024-10-25 PROCEDURE — 36415 COLL VENOUS BLD VENIPUNCTURE: CPT | Performed by: INTERNAL MEDICINE

## 2024-10-25 PROCEDURE — 80048 BASIC METABOLIC PNL TOTAL CA: CPT | Performed by: INTERNAL MEDICINE

## 2024-10-25 PROCEDURE — 0 DEXTROSE 5 % SOLUTION 1,000 ML FLEX CONT: Performed by: INTERNAL MEDICINE

## 2024-10-25 PROCEDURE — 84100 ASSAY OF PHOSPHORUS: CPT | Performed by: FAMILY MEDICINE

## 2024-10-25 PROCEDURE — 25010000002 HEPARIN (PORCINE) PER 1000 UNITS: Performed by: INTERNAL MEDICINE

## 2024-10-25 PROCEDURE — 84550 ASSAY OF BLOOD/URIC ACID: CPT | Performed by: INTERNAL MEDICINE

## 2024-10-25 PROCEDURE — 83540 ASSAY OF IRON: CPT | Performed by: INTERNAL MEDICINE

## 2024-10-25 PROCEDURE — 25010000003 DEXTROSE 5 % SOLUTION 1,000 ML FLEX CONT: Performed by: INTERNAL MEDICINE

## 2024-10-25 PROCEDURE — 84466 ASSAY OF TRANSFERRIN: CPT | Performed by: INTERNAL MEDICINE

## 2024-10-25 PROCEDURE — 84156 ASSAY OF PROTEIN URINE: CPT | Performed by: INTERNAL MEDICINE

## 2024-10-25 PROCEDURE — 96366 THER/PROPH/DIAG IV INF ADDON: CPT

## 2024-10-25 PROCEDURE — 84300 ASSAY OF URINE SODIUM: CPT | Performed by: INTERNAL MEDICINE

## 2024-10-25 PROCEDURE — 80048 BASIC METABOLIC PNL TOTAL CA: CPT | Performed by: FAMILY MEDICINE

## 2024-10-25 PROCEDURE — 85025 COMPLETE CBC W/AUTO DIFF WBC: CPT | Performed by: INTERNAL MEDICINE

## 2024-10-25 PROCEDURE — 96365 THER/PROPH/DIAG IV INF INIT: CPT

## 2024-10-25 PROCEDURE — 25010000002 POTASSIUM CHLORIDE 10 MEQ/100ML SOLUTION: Performed by: INTERNAL MEDICINE

## 2024-10-25 PROCEDURE — 82948 REAGENT STRIP/BLOOD GLUCOSE: CPT

## 2024-10-25 RX ORDER — SUCRALFATE 1 G/1
1 TABLET ORAL 4 TIMES DAILY
COMMUNITY

## 2024-10-25 RX ORDER — POTASSIUM CHLORIDE 7.45 MG/ML
10 INJECTION INTRAVENOUS
Status: COMPLETED | OUTPATIENT
Start: 2024-10-25 | End: 2024-10-25

## 2024-10-25 RX ORDER — SODIUM BICARBONATE 650 MG/1
650 TABLET ORAL 2 TIMES DAILY
COMMUNITY

## 2024-10-25 RX ORDER — BUMETANIDE 1 MG/1
1 TABLET ORAL DAILY
COMMUNITY
End: 2024-10-26 | Stop reason: HOSPADM

## 2024-10-25 RX ORDER — POTASSIUM CHLORIDE 7.45 MG/ML
10 INJECTION INTRAVENOUS ONCE
Status: DISCONTINUED | OUTPATIENT
Start: 2024-10-25 | End: 2024-10-25

## 2024-10-25 RX ORDER — POTASSIUM CHLORIDE 750 MG/1
40 CAPSULE, EXTENDED RELEASE ORAL ONCE
Status: DISCONTINUED | OUTPATIENT
Start: 2024-10-25 | End: 2024-10-25

## 2024-10-25 RX ORDER — SODIUM CHLORIDE 9 MG/ML
1000 INJECTION, SOLUTION INTRAVENOUS DAILY
COMMUNITY

## 2024-10-25 RX ORDER — POTASSIUM CHLORIDE 750 MG/1
20 CAPSULE, EXTENDED RELEASE ORAL ONCE
Status: COMPLETED | OUTPATIENT
Start: 2024-10-25 | End: 2024-10-25

## 2024-10-25 RX ORDER — POTASSIUM CHLORIDE 1500 MG/1
20 TABLET, EXTENDED RELEASE ORAL DAILY
Status: ON HOLD | COMMUNITY
End: 2024-10-25

## 2024-10-25 RX ADMIN — SODIUM BICARBONATE 150 MEQ: 84 INJECTION, SOLUTION INTRAVENOUS at 17:37

## 2024-10-25 RX ADMIN — POTASSIUM CHLORIDE 10 MEQ: 10 INJECTION, SOLUTION INTRAVENOUS at 02:04

## 2024-10-25 RX ADMIN — Medication 10 ML: at 08:44

## 2024-10-25 RX ADMIN — POTASSIUM CHLORIDE 10 MEQ: 10 INJECTION, SOLUTION INTRAVENOUS at 01:06

## 2024-10-25 RX ADMIN — HEPARIN SODIUM 5000 UNITS: 5000 INJECTION, SOLUTION INTRAVENOUS; SUBCUTANEOUS at 08:44

## 2024-10-25 RX ADMIN — INSULIN LISPRO 3 UNITS: 100 INJECTION, SOLUTION INTRAVENOUS; SUBCUTANEOUS at 08:48

## 2024-10-25 RX ADMIN — HEPARIN SODIUM 5000 UNITS: 5000 INJECTION, SOLUTION INTRAVENOUS; SUBCUTANEOUS at 19:58

## 2024-10-25 RX ADMIN — ATORVASTATIN CALCIUM 80 MG: 40 TABLET, FILM COATED ORAL at 19:58

## 2024-10-25 RX ADMIN — NORTRIPTYLINE HYDROCHLORIDE 25 MG: 25 CAPSULE ORAL at 19:58

## 2024-10-25 RX ADMIN — SODIUM BICARBONATE 150 MEQ: 84 INJECTION, SOLUTION INTRAVENOUS at 00:22

## 2024-10-25 RX ADMIN — ROPINIROLE 1 MG: 1 TABLET, FILM COATED ORAL at 19:58

## 2024-10-25 RX ADMIN — POTASSIUM CHLORIDE 20 MEQ: 750 CAPSULE, EXTENDED RELEASE ORAL at 01:06

## 2024-10-25 RX ADMIN — INSULIN LISPRO 4 UNITS: 100 INJECTION, SOLUTION INTRAVENOUS; SUBCUTANEOUS at 17:38

## 2024-10-25 RX ADMIN — ROPINIROLE 1 MG: 1 TABLET, FILM COATED ORAL at 08:44

## 2024-10-25 RX ADMIN — Medication 10 ML: at 20:27

## 2024-10-25 RX ADMIN — INSULIN LISPRO 2 UNITS: 100 INJECTION, SOLUTION INTRAVENOUS; SUBCUTANEOUS at 12:09

## 2024-10-25 RX ADMIN — INSULIN LISPRO 2 UNITS: 100 INJECTION, SOLUTION INTRAVENOUS; SUBCUTANEOUS at 20:29

## 2024-10-25 RX ADMIN — POTASSIUM CHLORIDE 10 MEQ: 10 INJECTION, SOLUTION INTRAVENOUS at 02:46

## 2024-10-25 RX ADMIN — ROPINIROLE 1 MG: 1 TABLET, FILM COATED ORAL at 17:38

## 2024-10-25 RX ADMIN — SODIUM CHLORIDE, POTASSIUM CHLORIDE, SODIUM LACTATE AND CALCIUM CHLORIDE 500 ML: 600; 310; 30; 20 INJECTION, SOLUTION INTRAVENOUS at 20:23

## 2024-10-26 ENCOUNTER — READMISSION MANAGEMENT (OUTPATIENT)
Dept: CALL CENTER | Facility: HOSPITAL | Age: 54
End: 2024-10-26
Payer: COMMERCIAL

## 2024-10-26 VITALS
DIASTOLIC BLOOD PRESSURE: 75 MMHG | BODY MASS INDEX: 17.37 KG/M2 | HEIGHT: 61 IN | SYSTOLIC BLOOD PRESSURE: 110 MMHG | HEART RATE: 95 BPM | RESPIRATION RATE: 18 BRPM | OXYGEN SATURATION: 100 % | TEMPERATURE: 97.7 F | WEIGHT: 92 LBS

## 2024-10-26 LAB
ALBUMIN SERPL-MCNC: 3.9 G/DL (ref 3.5–5.2)
ALBUMIN UR-MCNC: <1.2 MG/DL
ALBUMIN/GLOB SERPL: 1.3 G/DL
ALP SERPL-CCNC: 152 U/L (ref 39–117)
ALT SERPL W P-5'-P-CCNC: 11 U/L (ref 1–33)
ANION GAP SERPL CALCULATED.3IONS-SCNC: 18 MMOL/L (ref 5–15)
AST SERPL-CCNC: 16 U/L (ref 1–32)
BASOPHILS # BLD AUTO: 0.05 10*3/MM3 (ref 0–0.2)
BASOPHILS NFR BLD AUTO: 0.4 % (ref 0–1.5)
BILIRUB SERPL-MCNC: <0.2 MG/DL (ref 0–1.2)
BUN SERPL-MCNC: 64 MG/DL (ref 6–20)
BUN/CREAT SERPL: 33.7 (ref 7–25)
CALCIUM SPEC-SCNC: 8.6 MG/DL (ref 8.6–10.5)
CHLORIDE SERPL-SCNC: 97 MMOL/L (ref 98–107)
CO2 SERPL-SCNC: 22 MMOL/L (ref 22–29)
CREAT SERPL-MCNC: 1.9 MG/DL (ref 0.57–1)
CREAT UR-MCNC: 82.7 MG/DL
CREAT UR-MCNC: 83.9 MG/DL
DEPRECATED RDW RBC AUTO: 45.6 FL (ref 37–54)
EGFRCR SERPLBLD CKD-EPI 2021: 31.2 ML/MIN/1.73
EOSINOPHIL # BLD AUTO: 0.21 10*3/MM3 (ref 0–0.4)
EOSINOPHIL NFR BLD AUTO: 1.8 % (ref 0.3–6.2)
ERYTHROCYTE [DISTWIDTH] IN BLOOD BY AUTOMATED COUNT: 13 % (ref 12.3–15.4)
GLOBULIN UR ELPH-MCNC: 2.9 GM/DL
GLUCOSE BLDC GLUCOMTR-MCNC: 156 MG/DL (ref 70–130)
GLUCOSE BLDC GLUCOMTR-MCNC: 231 MG/DL (ref 70–130)
GLUCOSE SERPL-MCNC: 189 MG/DL (ref 65–99)
HCT VFR BLD AUTO: 33.2 % (ref 34–46.6)
HGB BLD-MCNC: 10.6 G/DL (ref 12–15.9)
IMM GRANULOCYTES # BLD AUTO: 0.04 10*3/MM3 (ref 0–0.05)
IMM GRANULOCYTES NFR BLD AUTO: 0.3 % (ref 0–0.5)
LYMPHOCYTES # BLD AUTO: 2.77 10*3/MM3 (ref 0.7–3.1)
LYMPHOCYTES NFR BLD AUTO: 24.1 % (ref 19.6–45.3)
MCH RBC QN AUTO: 30.7 PG (ref 26.6–33)
MCHC RBC AUTO-ENTMCNC: 31.9 G/DL (ref 31.5–35.7)
MCV RBC AUTO: 96.2 FL (ref 79–97)
MICROALBUMIN/CREAT UR: NORMAL MG/G{CREAT}
MONOCYTES # BLD AUTO: 0.64 10*3/MM3 (ref 0.1–0.9)
MONOCYTES NFR BLD AUTO: 5.6 % (ref 5–12)
NEUTROPHILS NFR BLD AUTO: 67.8 % (ref 42.7–76)
NEUTROPHILS NFR BLD AUTO: 7.76 10*3/MM3 (ref 1.7–7)
NRBC BLD AUTO-RTO: 0 /100 WBC (ref 0–0.2)
PLATELET # BLD AUTO: 433 10*3/MM3 (ref 140–450)
PMV BLD AUTO: 10.4 FL (ref 6–12)
POTASSIUM SERPL-SCNC: 2.8 MMOL/L (ref 3.5–5.2)
PROT SERPL-MCNC: 6.8 G/DL (ref 6–8.5)
RBC # BLD AUTO: 3.45 10*6/MM3 (ref 3.77–5.28)
SODIUM SERPL-SCNC: 137 MMOL/L (ref 136–145)
WBC NRBC COR # BLD AUTO: 11.47 10*3/MM3 (ref 3.4–10.8)

## 2024-10-26 PROCEDURE — 85025 COMPLETE CBC W/AUTO DIFF WBC: CPT | Performed by: INTERNAL MEDICINE

## 2024-10-26 PROCEDURE — 25010000002 HEPARIN (PORCINE) PER 1000 UNITS: Performed by: INTERNAL MEDICINE

## 2024-10-26 PROCEDURE — 82948 REAGENT STRIP/BLOOD GLUCOSE: CPT

## 2024-10-26 PROCEDURE — 96372 THER/PROPH/DIAG INJ SC/IM: CPT

## 2024-10-26 PROCEDURE — 63710000001 INSULIN LISPRO (HUMAN) PER 5 UNITS: Performed by: INTERNAL MEDICINE

## 2024-10-26 PROCEDURE — 80053 COMPREHEN METABOLIC PANEL: CPT | Performed by: FAMILY MEDICINE

## 2024-10-26 RX ORDER — POTASSIUM CHLORIDE 750 MG/1
40 CAPSULE, EXTENDED RELEASE ORAL ONCE
Status: DISCONTINUED | OUTPATIENT
Start: 2024-10-26 | End: 2024-10-26 | Stop reason: HOSPADM

## 2024-10-26 RX ORDER — POTASSIUM CHLORIDE 1500 MG/1
40 TABLET, EXTENDED RELEASE ORAL ONCE
Status: COMPLETED | OUTPATIENT
Start: 2024-10-26 | End: 2024-10-26

## 2024-10-26 RX ORDER — SODIUM CHLORIDE AND POTASSIUM CHLORIDE 150; 900 MG/100ML; MG/100ML
100 INJECTION, SOLUTION INTRAVENOUS CONTINUOUS
Status: DISCONTINUED | OUTPATIENT
Start: 2024-10-26 | End: 2024-10-26 | Stop reason: HOSPADM

## 2024-10-26 RX ADMIN — POTASSIUM CHLORIDE 40 MEQ: 1500 TABLET, EXTENDED RELEASE ORAL at 14:41

## 2024-10-26 RX ADMIN — INSULIN LISPRO 2 UNITS: 100 INJECTION, SOLUTION INTRAVENOUS; SUBCUTANEOUS at 12:09

## 2024-10-26 RX ADMIN — HEPARIN SODIUM 5000 UNITS: 5000 INJECTION, SOLUTION INTRAVENOUS; SUBCUTANEOUS at 08:50

## 2024-10-26 RX ADMIN — INSULIN LISPRO 3 UNITS: 100 INJECTION, SOLUTION INTRAVENOUS; SUBCUTANEOUS at 07:52

## 2024-10-26 RX ADMIN — Medication 10 ML: at 08:50

## 2024-10-26 RX ADMIN — ROPINIROLE 1 MG: 1 TABLET, FILM COATED ORAL at 08:50

## 2024-10-29 ENCOUNTER — READMISSION MANAGEMENT (OUTPATIENT)
Dept: CALL CENTER | Facility: HOSPITAL | Age: 54
End: 2024-10-29
Payer: COMMERCIAL

## 2024-10-29 NOTE — OUTREACH NOTE
Medical Week 1 Survey      Flowsheet Row Responses   [facility] patient discharged from? [facility]   Does the patient have one of the following disease processes/diagnoses(primary or secondary)? Other   Week 1 attempt successful? Yes   Call start time 1433   Call end time 1437   Discharge diagnosis Acute renal failure superimposed on stage 3b chronic kidney disease  Principal problem   Person spoke with today (if not patient) and relationship ppatient   Comments regarding appointments Pt states she needs ot see kidney specialist.  She plans to work on making this appt soon.   Does the patient have a primary care provider?  Yes   Does the patient have an appointment with their PCP within 7 days of discharge? Yes   Comments regarding PCP Pt will see provider tomorrow   Has the patient kept scheduled appointments due by today? Yes   Psychosocial issues? No   Did the patient receive a copy of their discharge instructions? Yes   Nursing interventions Reviewed instructions with patient   What is the patient's perception of their health status since discharge? Improving   Week 1 call completed? Yes   Would this patient benefit from a Referral to Amb Social Work? No   Is the patient interested in additional calls from an ambulatory ? No   Wrap up additional comments Pt denies needs.  She is eating and resting well.  No concerns.   Call end time 1437            PILI VILLEDA - Registered Nurse

## 2024-11-07 ENCOUNTER — HOSPITAL ENCOUNTER (INPATIENT)
Facility: HOSPITAL | Age: 54
LOS: 5 days | Discharge: HOME | DRG: 683 | End: 2024-11-13
Attending: FAMILY MEDICINE | Admitting: INTERNAL MEDICINE
Payer: COMMERCIAL

## 2024-11-07 DIAGNOSIS — N18.9 ACUTE KIDNEY INJURY SUPERIMPOSED ON CHRONIC KIDNEY DISEASE: ICD-10-CM

## 2024-11-07 DIAGNOSIS — R53.83 OTHER FATIGUE: ICD-10-CM

## 2024-11-07 DIAGNOSIS — E87.6 HYPOKALEMIA: ICD-10-CM

## 2024-11-07 DIAGNOSIS — K31.84 GASTROPARESIS: Primary | ICD-10-CM

## 2024-11-07 DIAGNOSIS — N17.9 ACUTE KIDNEY INJURY SUPERIMPOSED ON CHRONIC KIDNEY DISEASE: ICD-10-CM

## 2024-11-07 LAB
ANION GAP SERPL CALCULATED.3IONS-SCNC: 16 MMOL/L (ref 5–15)
BUN SERPL-MCNC: 61 MG/DL (ref 6–20)
BUN/CREAT SERPL: 15.1 (ref 7–25)
CALCIUM SPEC-SCNC: 8.3 MG/DL (ref 8.6–10.5)
CHLORIDE SERPL-SCNC: 104 MMOL/L (ref 98–107)
CO2 SERPL-SCNC: 14 MMOL/L (ref 22–29)
CREAT SERPL-MCNC: 4.05 MG/DL (ref 0.57–1)
EGFRCR SERPLBLD CKD-EPI 2021: 12.6 ML/MIN/1.73
GLUCOSE BLDC GLUCOMTR-MCNC: 162 MG/DL (ref 70–130)
GLUCOSE SERPL-MCNC: 248 MG/DL (ref 65–99)
MAGNESIUM SERPL-MCNC: 1.7 MG/DL (ref 1.6–2.6)
POTASSIUM SERPL-SCNC: 2.8 MMOL/L (ref 3.5–5.2)
SODIUM SERPL-SCNC: 134 MMOL/L (ref 136–145)

## 2024-11-07 PROCEDURE — 80048 BASIC METABOLIC PNL TOTAL CA: CPT | Performed by: FAMILY MEDICINE

## 2024-11-07 PROCEDURE — 85007 BL SMEAR W/DIFF WBC COUNT: CPT | Performed by: FAMILY MEDICINE

## 2024-11-07 PROCEDURE — 99285 EMERGENCY DEPT VISIT HI MDM: CPT

## 2024-11-07 PROCEDURE — 0DJ08ZZ INSPECTION OF UPPER INTESTINAL TRACT, VIA NATURAL OR ARTIFICIAL OPENING ENDOSCOPIC: ICD-10-PCS | Performed by: INTERNAL MEDICINE

## 2024-11-07 PROCEDURE — 83735 ASSAY OF MAGNESIUM: CPT | Performed by: FAMILY MEDICINE

## 2024-11-07 PROCEDURE — 85025 COMPLETE CBC W/AUTO DIFF WBC: CPT | Performed by: FAMILY MEDICINE

## 2024-11-07 PROCEDURE — G0378 HOSPITAL OBSERVATION PER HR: HCPCS

## 2024-11-07 PROCEDURE — 82948 REAGENT STRIP/BLOOD GLUCOSE: CPT

## 2024-11-07 PROCEDURE — 0DHA8UZ INSERTION OF FEEDING DEVICE INTO JEJUNUM, VIA NATURAL OR ARTIFICIAL OPENING ENDOSCOPIC: ICD-10-PCS | Performed by: INTERNAL MEDICINE

## 2024-11-07 RX ORDER — DEXTROSE MONOHYDRATE 25 G/50ML
10-50 INJECTION, SOLUTION INTRAVENOUS
Status: DISCONTINUED | OUTPATIENT
Start: 2024-11-07 | End: 2024-11-13 | Stop reason: HOSPADM

## 2024-11-07 RX ORDER — SODIUM CHLORIDE 0.9 % (FLUSH) 0.9 %
10 SYRINGE (ML) INJECTION EVERY 12 HOURS SCHEDULED
Status: DISCONTINUED | OUTPATIENT
Start: 2024-11-07 | End: 2024-11-13 | Stop reason: HOSPADM

## 2024-11-07 RX ORDER — METOPROLOL SUCCINATE 25 MG/1
25 TABLET, EXTENDED RELEASE ORAL DAILY
Status: DISCONTINUED | OUTPATIENT
Start: 2024-11-08 | End: 2024-11-10

## 2024-11-07 RX ORDER — NICOTINE POLACRILEX 4 MG
15 LOZENGE BUCCAL
Status: DISCONTINUED | OUTPATIENT
Start: 2024-11-07 | End: 2024-11-13 | Stop reason: HOSPADM

## 2024-11-07 RX ORDER — SODIUM CHLORIDE 0.9 % (FLUSH) 0.9 %
10 SYRINGE (ML) INJECTION AS NEEDED
Status: DISCONTINUED | OUTPATIENT
Start: 2024-11-07 | End: 2024-11-07 | Stop reason: SDUPTHER

## 2024-11-07 RX ORDER — ACETAMINOPHEN 650 MG/1
650 SUPPOSITORY RECTAL EVERY 4 HOURS PRN
Status: DISCONTINUED | OUTPATIENT
Start: 2024-11-07 | End: 2024-11-13 | Stop reason: HOSPADM

## 2024-11-07 RX ORDER — PANTOPRAZOLE SODIUM 40 MG/1
40 TABLET, DELAYED RELEASE ORAL
Status: DISCONTINUED | OUTPATIENT
Start: 2024-11-08 | End: 2024-11-13 | Stop reason: HOSPADM

## 2024-11-07 RX ORDER — SODIUM BICARBONATE 650 MG/1
650 TABLET ORAL 2 TIMES DAILY
Status: DISCONTINUED | OUTPATIENT
Start: 2024-11-07 | End: 2024-11-08

## 2024-11-07 RX ORDER — POTASSIUM CHLORIDE 750 MG/1
40 CAPSULE, EXTENDED RELEASE ORAL ONCE
Status: COMPLETED | OUTPATIENT
Start: 2024-11-07 | End: 2024-11-07

## 2024-11-07 RX ORDER — HYDROXYCHLOROQUINE SULFATE 200 MG/1
200 TABLET, FILM COATED ORAL DAILY
Status: DISCONTINUED | OUTPATIENT
Start: 2024-11-08 | End: 2024-11-08

## 2024-11-07 RX ORDER — IBUPROFEN 600 MG/1
1 TABLET ORAL
Status: DISCONTINUED | OUTPATIENT
Start: 2024-11-07 | End: 2024-11-13 | Stop reason: HOSPADM

## 2024-11-07 RX ORDER — AMOXICILLIN 250 MG
2 CAPSULE ORAL 2 TIMES DAILY PRN
Status: DISCONTINUED | OUTPATIENT
Start: 2024-11-07 | End: 2024-11-13 | Stop reason: HOSPADM

## 2024-11-07 RX ORDER — BISACODYL 10 MG
10 SUPPOSITORY, RECTAL RECTAL DAILY PRN
Status: DISCONTINUED | OUTPATIENT
Start: 2024-11-07 | End: 2024-11-13 | Stop reason: HOSPADM

## 2024-11-07 RX ORDER — SODIUM CHLORIDE 9 MG/ML
100 INJECTION, SOLUTION INTRAVENOUS CONTINUOUS
Status: DISCONTINUED | OUTPATIENT
Start: 2024-11-07 | End: 2024-11-08

## 2024-11-07 RX ORDER — SUCRALFATE 1 G/1
1 TABLET ORAL
Status: DISCONTINUED | OUTPATIENT
Start: 2024-11-08 | End: 2024-11-13 | Stop reason: HOSPADM

## 2024-11-07 RX ORDER — BISACODYL 5 MG/1
5 TABLET, DELAYED RELEASE ORAL DAILY PRN
Status: DISCONTINUED | OUTPATIENT
Start: 2024-11-07 | End: 2024-11-07

## 2024-11-07 RX ORDER — LUBIPROSTONE 8 UG/1
8 CAPSULE ORAL 2 TIMES DAILY
Status: DISCONTINUED | OUTPATIENT
Start: 2024-11-08 | End: 2024-11-13 | Stop reason: HOSPADM

## 2024-11-07 RX ORDER — INSULIN LISPRO 100 [IU]/ML
1-200 INJECTION, SOLUTION INTRAVENOUS; SUBCUTANEOUS
Status: DISCONTINUED | OUTPATIENT
Start: 2024-11-08 | End: 2024-11-13 | Stop reason: HOSPADM

## 2024-11-07 RX ORDER — POLYETHYLENE GLYCOL 3350 17 G/17G
17 POWDER, FOR SOLUTION ORAL DAILY PRN
Status: DISCONTINUED | OUTPATIENT
Start: 2024-11-07 | End: 2024-11-13 | Stop reason: HOSPADM

## 2024-11-07 RX ORDER — ROPINIROLE 1 MG/1
1 TABLET, FILM COATED ORAL 3 TIMES DAILY
Status: DISCONTINUED | OUTPATIENT
Start: 2024-11-07 | End: 2024-11-13 | Stop reason: HOSPADM

## 2024-11-07 RX ORDER — NITROGLYCERIN 0.4 MG/1
0.4 TABLET SUBLINGUAL
Status: DISCONTINUED | OUTPATIENT
Start: 2024-11-07 | End: 2024-11-13 | Stop reason: HOSPADM

## 2024-11-07 RX ORDER — ACETAMINOPHEN 160 MG/5ML
650 SOLUTION ORAL EVERY 4 HOURS PRN
Status: DISCONTINUED | OUTPATIENT
Start: 2024-11-07 | End: 2024-11-13 | Stop reason: HOSPADM

## 2024-11-07 RX ORDER — ACETAMINOPHEN 160 MG
2000 TABLET,DISINTEGRATING ORAL DAILY
Status: DISCONTINUED | OUTPATIENT
Start: 2024-11-08 | End: 2024-11-08 | Stop reason: SDUPTHER

## 2024-11-07 RX ORDER — ACETAMINOPHEN 325 MG/1
650 TABLET ORAL EVERY 4 HOURS PRN
Status: DISCONTINUED | OUTPATIENT
Start: 2024-11-07 | End: 2024-11-13 | Stop reason: HOSPADM

## 2024-11-07 RX ORDER — NORTRIPTYLINE HYDROCHLORIDE 25 MG/1
25 CAPSULE ORAL NIGHTLY
Status: DISCONTINUED | OUTPATIENT
Start: 2024-11-08 | End: 2024-11-13 | Stop reason: HOSPADM

## 2024-11-07 RX ORDER — CETIRIZINE HYDROCHLORIDE 10 MG/1
5 TABLET ORAL DAILY PRN
Status: DISCONTINUED | OUTPATIENT
Start: 2024-11-07 | End: 2024-11-13 | Stop reason: HOSPADM

## 2024-11-07 RX ORDER — ONDANSETRON 2 MG/ML
4 INJECTION INTRAMUSCULAR; INTRAVENOUS EVERY 6 HOURS PRN
Status: DISCONTINUED | OUTPATIENT
Start: 2024-11-07 | End: 2024-11-13 | Stop reason: HOSPADM

## 2024-11-07 RX ORDER — SODIUM CHLORIDE 0.9 % (FLUSH) 0.9 %
10 SYRINGE (ML) INJECTION AS NEEDED
Status: DISCONTINUED | OUTPATIENT
Start: 2024-11-07 | End: 2024-11-08

## 2024-11-07 RX ORDER — INSULIN LISPRO 100 [IU]/ML
1-200 INJECTION, SOLUTION INTRAVENOUS; SUBCUTANEOUS AS NEEDED
Status: DISCONTINUED | OUTPATIENT
Start: 2024-11-07 | End: 2024-11-13 | Stop reason: HOSPADM

## 2024-11-07 RX ORDER — SODIUM CHLORIDE 9 MG/ML
40 INJECTION, SOLUTION INTRAVENOUS AS NEEDED
Status: DISCONTINUED | OUTPATIENT
Start: 2024-11-07 | End: 2024-11-08

## 2024-11-07 RX ADMIN — POTASSIUM CHLORIDE 40 MEQ: 750 CAPSULE, EXTENDED RELEASE ORAL at 21:40

## 2024-11-08 LAB
ANION GAP SERPL CALCULATED.3IONS-SCNC: 16 MMOL/L (ref 5–15)
ANISOCYTOSIS BLD QL: NORMAL
BACTERIA UR QL AUTO: ABNORMAL /HPF
BASOPHILS # BLD AUTO: 0.05 10*3/MM3 (ref 0–0.2)
BASOPHILS # BLD AUTO: 0.06 10*3/MM3 (ref 0–0.2)
BASOPHILS NFR BLD AUTO: 0.3 % (ref 0–1.5)
BASOPHILS NFR BLD AUTO: 0.4 % (ref 0–1.5)
BILIRUB UR QL STRIP: NEGATIVE
BUN SERPL-MCNC: 64 MG/DL (ref 6–20)
BUN/CREAT SERPL: 17.5 (ref 7–25)
CALCIUM SPEC-SCNC: 7.9 MG/DL (ref 8.6–10.5)
CHLORIDE SERPL-SCNC: 111 MMOL/L (ref 98–107)
CLARITY UR: CLEAR
CLUMPED PLATELETS: PRESENT
CO2 SERPL-SCNC: 8 MMOL/L (ref 22–29)
COLOR UR: YELLOW
CREAT SERPL-MCNC: 3.66 MG/DL (ref 0.57–1)
DEPRECATED RDW RBC AUTO: 48.8 FL (ref 37–54)
DEPRECATED RDW RBC AUTO: 52.2 FL (ref 37–54)
EGFRCR SERPLBLD CKD-EPI 2021: 14.2 ML/MIN/1.73
EOSINOPHIL # BLD AUTO: 0.22 10*3/MM3 (ref 0–0.4)
EOSINOPHIL # BLD AUTO: 0.3 10*3/MM3 (ref 0–0.4)
EOSINOPHIL NFR BLD AUTO: 1.5 % (ref 0.3–6.2)
EOSINOPHIL NFR BLD AUTO: 2.2 % (ref 0.3–6.2)
ERYTHROCYTE [DISTWIDTH] IN BLOOD BY AUTOMATED COUNT: 13.9 % (ref 12.3–15.4)
ERYTHROCYTE [DISTWIDTH] IN BLOOD BY AUTOMATED COUNT: 14 % (ref 12.3–15.4)
GLUCOSE BLDC GLUCOMTR-MCNC: 215 MG/DL (ref 70–130)
GLUCOSE BLDC GLUCOMTR-MCNC: 226 MG/DL (ref 70–130)
GLUCOSE BLDC GLUCOMTR-MCNC: 250 MG/DL (ref 70–130)
GLUCOSE SERPL-MCNC: 163 MG/DL (ref 65–99)
GLUCOSE UR STRIP-MCNC: NEGATIVE MG/DL
HCT VFR BLD AUTO: 36.6 % (ref 34–46.6)
HCT VFR BLD AUTO: 37.2 % (ref 34–46.6)
HGB BLD-MCNC: 11 G/DL (ref 12–15.9)
HGB BLD-MCNC: 12.3 G/DL (ref 12–15.9)
HGB UR QL STRIP.AUTO: NEGATIVE
HYALINE CASTS UR QL AUTO: ABNORMAL /LPF
IMM GRANULOCYTES # BLD AUTO: 0.07 10*3/MM3 (ref 0–0.05)
IMM GRANULOCYTES # BLD AUTO: 0.08 10*3/MM3 (ref 0–0.05)
IMM GRANULOCYTES NFR BLD AUTO: 0.5 % (ref 0–0.5)
IMM GRANULOCYTES NFR BLD AUTO: 0.6 % (ref 0–0.5)
KETONES UR QL STRIP: NEGATIVE
LEUKOCYTE ESTERASE UR QL STRIP.AUTO: NEGATIVE
LYMPHOCYTES # BLD AUTO: 2.41 10*3/MM3 (ref 0.7–3.1)
LYMPHOCYTES # BLD AUTO: 2.62 10*3/MM3 (ref 0.7–3.1)
LYMPHOCYTES NFR BLD AUTO: 15.9 % (ref 19.6–45.3)
LYMPHOCYTES NFR BLD AUTO: 18.9 % (ref 19.6–45.3)
MCH RBC QN AUTO: 30.9 PG (ref 26.6–33)
MCH RBC QN AUTO: 31.5 PG (ref 26.6–33)
MCHC RBC AUTO-ENTMCNC: 30.1 G/DL (ref 31.5–35.7)
MCHC RBC AUTO-ENTMCNC: 33.1 G/DL (ref 31.5–35.7)
MCV RBC AUTO: 102.8 FL (ref 79–97)
MCV RBC AUTO: 95.4 FL (ref 79–97)
MONOCYTES # BLD AUTO: 0.66 10*3/MM3 (ref 0.1–0.9)
MONOCYTES # BLD AUTO: 0.81 10*3/MM3 (ref 0.1–0.9)
MONOCYTES NFR BLD AUTO: 4.4 % (ref 5–12)
MONOCYTES NFR BLD AUTO: 5.8 % (ref 5–12)
NEUTROPHILS NFR BLD AUTO: 11.73 10*3/MM3 (ref 1.7–7)
NEUTROPHILS NFR BLD AUTO: 72.1 % (ref 42.7–76)
NEUTROPHILS NFR BLD AUTO: 77.4 % (ref 42.7–76)
NEUTROPHILS NFR BLD AUTO: 9.98 10*3/MM3 (ref 1.7–7)
NITRITE UR QL STRIP: NEGATIVE
NRBC BLD AUTO-RTO: 0 /100 WBC (ref 0–0.2)
NRBC BLD AUTO-RTO: 0 /100 WBC (ref 0–0.2)
PH UR STRIP.AUTO: 5.5 [PH] (ref 5–8)
PLATELET # BLD AUTO: 230 10*3/MM3 (ref 140–450)
PLATELET # BLD AUTO: 270 10*3/MM3 (ref 140–450)
PMV BLD AUTO: 10.1 FL (ref 6–12)
PMV BLD AUTO: 10.7 FL (ref 6–12)
POIKILOCYTOSIS BLD QL SMEAR: NORMAL
POLYCHROMASIA BLD QL SMEAR: NORMAL
POTASSIUM SERPL-SCNC: 3 MMOL/L (ref 3.5–5.2)
PROT UR QL STRIP: ABNORMAL
RBC # BLD AUTO: 3.56 10*6/MM3 (ref 3.77–5.28)
RBC # BLD AUTO: 3.9 10*6/MM3 (ref 3.77–5.28)
RBC # UR STRIP: ABNORMAL /HPF
REF LAB TEST METHOD: ABNORMAL
SODIUM SERPL-SCNC: 135 MMOL/L (ref 136–145)
SP GR UR STRIP: 1.01 (ref 1–1.03)
SQUAMOUS #/AREA URNS HPF: ABNORMAL /HPF
UROBILINOGEN UR QL STRIP: ABNORMAL
WBC # UR STRIP: ABNORMAL /HPF
WBC NRBC COR # BLD AUTO: 13.85 10*3/MM3 (ref 3.4–10.8)
WBC NRBC COR # BLD AUTO: 15.14 10*3/MM3 (ref 3.4–10.8)

## 2024-11-08 PROCEDURE — 81001 URINALYSIS AUTO W/SCOPE: CPT | Performed by: INTERNAL MEDICINE

## 2024-11-08 PROCEDURE — 82948 REAGENT STRIP/BLOOD GLUCOSE: CPT

## 2024-11-08 PROCEDURE — 63710000001 INSULIN GLARGINE PER 5 UNITS: Performed by: INTERNAL MEDICINE

## 2024-11-08 PROCEDURE — 85025 COMPLETE CBC W/AUTO DIFF WBC: CPT | Performed by: INTERNAL MEDICINE

## 2024-11-08 PROCEDURE — 99221 1ST HOSP IP/OBS SF/LOW 40: CPT | Performed by: STUDENT IN AN ORGANIZED HEALTH CARE EDUCATION/TRAINING PROGRAM

## 2024-11-08 PROCEDURE — 63710000001 INSULIN LISPRO (HUMAN) PER 5 UNITS: Performed by: INTERNAL MEDICINE

## 2024-11-08 PROCEDURE — 0 DEXTROSE 5 % SOLUTION 1,000 ML FLEX CONT: Performed by: INTERNAL MEDICINE

## 2024-11-08 PROCEDURE — 25810000003 SODIUM CHLORIDE 0.9 % SOLUTION: Performed by: INTERNAL MEDICINE

## 2024-11-08 PROCEDURE — 80048 BASIC METABOLIC PNL TOTAL CA: CPT | Performed by: INTERNAL MEDICINE

## 2024-11-08 RX ORDER — POTASSIUM CHLORIDE 750 MG/1
40 CAPSULE, EXTENDED RELEASE ORAL DAILY
Status: DISCONTINUED | OUTPATIENT
Start: 2024-11-08 | End: 2024-11-13 | Stop reason: HOSPADM

## 2024-11-08 RX ORDER — SODIUM CHLORIDE 9 MG/ML
100 INJECTION, SOLUTION INTRAVENOUS CONTINUOUS
Status: DISCONTINUED | OUTPATIENT
Start: 2024-11-08 | End: 2024-11-08

## 2024-11-08 RX ORDER — MIDODRINE HYDROCHLORIDE 2.5 MG/1
10 TABLET ORAL ONCE
Status: COMPLETED | OUTPATIENT
Start: 2024-11-09 | End: 2024-11-08

## 2024-11-08 RX ORDER — CHOLECALCIFEROL (VITAMIN D3) 25 MCG
2000 TABLET ORAL DAILY
Status: DISCONTINUED | OUTPATIENT
Start: 2024-11-08 | End: 2024-11-13 | Stop reason: HOSPADM

## 2024-11-08 RX ADMIN — SUCRALFATE 1 G: 1 TABLET ORAL at 11:57

## 2024-11-08 RX ADMIN — SUCRALFATE 1 G: 1 TABLET ORAL at 08:38

## 2024-11-08 RX ADMIN — SODIUM BICARBONATE 650 MG: 650 TABLET ORAL at 00:00

## 2024-11-08 RX ADMIN — Medication 2000 UNITS: at 08:37

## 2024-11-08 RX ADMIN — SODIUM CHLORIDE 1000 ML: 9 INJECTION, SOLUTION INTRAVENOUS at 05:46

## 2024-11-08 RX ADMIN — POTASSIUM CHLORIDE 40 MEQ: 750 CAPSULE, EXTENDED RELEASE ORAL at 05:34

## 2024-11-08 RX ADMIN — SODIUM BICARBONATE 50 MEQ: 84 INJECTION INTRAVENOUS at 05:33

## 2024-11-08 RX ADMIN — ROPINIROLE 1 MG: 1 TABLET, FILM COATED ORAL at 17:24

## 2024-11-08 RX ADMIN — SODIUM BICARBONATE 150 MEQ: 84 INJECTION, SOLUTION INTRAVENOUS at 05:47

## 2024-11-08 RX ADMIN — Medication 10 ML: at 00:01

## 2024-11-08 RX ADMIN — EMPAGLIFLOZIN 10 MG: 10 TABLET, FILM COATED ORAL at 08:37

## 2024-11-08 RX ADMIN — INSULIN LISPRO 2 UNITS: 100 INJECTION, SOLUTION INTRAVENOUS; SUBCUTANEOUS at 12:01

## 2024-11-08 RX ADMIN — ROPINIROLE 1 MG: 1 TABLET, FILM COATED ORAL at 08:38

## 2024-11-08 RX ADMIN — SODIUM BICARBONATE 150 MEQ: 84 INJECTION, SOLUTION INTRAVENOUS at 17:25

## 2024-11-08 RX ADMIN — SUCRALFATE 1 G: 1 TABLET ORAL at 17:24

## 2024-11-08 RX ADMIN — PANTOPRAZOLE SODIUM 40 MG: 40 TABLET, DELAYED RELEASE ORAL at 05:35

## 2024-11-08 RX ADMIN — LUBIPROSTONE 8 MCG: 8 CAPSULE, GELATIN COATED ORAL at 08:37

## 2024-11-08 RX ADMIN — INSULIN LISPRO 2 UNITS: 100 INJECTION, SOLUTION INTRAVENOUS; SUBCUTANEOUS at 17:32

## 2024-11-08 RX ADMIN — METOPROLOL SUCCINATE 25 MG: 25 TABLET, EXTENDED RELEASE ORAL at 08:37

## 2024-11-08 RX ADMIN — LUBIPROSTONE 8 MCG: 8 CAPSULE, GELATIN COATED ORAL at 21:38

## 2024-11-08 RX ADMIN — SUCRALFATE 1 G: 1 TABLET ORAL at 21:38

## 2024-11-08 RX ADMIN — SODIUM CHLORIDE 1000 ML: 9 INJECTION, SOLUTION INTRAVENOUS at 23:27

## 2024-11-08 RX ADMIN — ROPINIROLE 1 MG: 1 TABLET, FILM COATED ORAL at 21:38

## 2024-11-08 RX ADMIN — NORTRIPTYLINE HYDROCHLORIDE 25 MG: 25 CAPSULE ORAL at 21:38

## 2024-11-08 RX ADMIN — SODIUM CHLORIDE 100 ML/HR: 9 INJECTION, SOLUTION INTRAVENOUS at 00:00

## 2024-11-08 RX ADMIN — INSULIN LISPRO 1 UNITS: 100 INJECTION, SOLUTION INTRAVENOUS; SUBCUTANEOUS at 21:39

## 2024-11-08 RX ADMIN — INSULIN LISPRO 1 UNITS: 100 INJECTION, SOLUTION INTRAVENOUS; SUBCUTANEOUS at 08:50

## 2024-11-08 RX ADMIN — MIDODRINE HYDROCHLORIDE 10 MG: 2.5 TABLET ORAL at 23:28

## 2024-11-08 RX ADMIN — INSULIN GLARGINE 6 UNITS: 100 INJECTION, SOLUTION SUBCUTANEOUS at 21:38

## 2024-11-08 RX ADMIN — ROPINIROLE 1 MG: 1 TABLET, FILM COATED ORAL at 00:00

## 2024-11-08 NOTE — CASE MANAGEMENT/SOCIAL WORK
Discharge Planning Assessment  [facility]     Patient Name: Tasha Perez  MRN: [id number]  Today's Date: 11/8/2024    Admit Date: 11/7/2024        Discharge Needs Assessment       Row Name 11/08/24 0846       Living Environment    People in Home child(sandy), adult;spouse    Name(s) of People in Home Flavio    Current Living Arrangements home    Potentially Unsafe Housing Conditions none    In the past 12 months has the electric, gas, oil, or water company threatened to shut off services in your home? No    Primary Care Provided by spouse/significant other;child(sandy)    Provides Primary Care For no one    Family Caregiver if Needed child(sandy), adult;spouse    Family Caregiver Names Vincent and Shandra    Able to Return to Prior Arrangements yes       Resource/Environmental Concerns    Resource/Environmental Concerns none    Transportation Concerns none       Transportation Needs    In the past 12 months, has lack of transportation kept you from medical appointments or from getting medications? no    In the past 12 months, has lack of transportation kept you from meetings, work, or from getting things needed for daily living? No       Food Insecurity    Within the past 12 months, you worried that your food would run out before you got the money to buy more. Never true    Within the past 12 months, the food you bought just didn't last and you didn't have money to get more. Never true       Transition Planning    Patient/Family Anticipates Transition to home with family    Transportation Anticipated family or friend will provide       Discharge Needs Assessment    Readmission Within the Last 30 Days no previous admission in last 30 days    Equipment Currently Used at Home none    Concerns to be Addressed no discharge needs identified    Do you want help finding or keeping work or a job? I do not need or want help    Do you want help with school or training? For example, starting or completing job  training or getting a high school diploma, GED or equivalent No    Equipment Needed After Discharge none    Discharge Coordination/Progress spoke to patient who lives with spouse and daughter Flavio; family are her caregivers; has RX coverage and PCP; will follow for DC needs                   Discharge Plan    No documentation.                 Continued Care and Services - Admitted Since 11/7/2024    No active coordination exists for this encounter.          Demographic Summary    No documentation.                  Functional Status    No documentation.                  Psychosocial    No documentation.                  Abuse/Neglect    No documentation.                  Legal    No documentation.                  Substance Abuse    No documentation.                  Patient Forms    No documentation.                     Marielle Chávez RN

## 2024-11-08 NOTE — PROGRESS NOTES
[facility]  INPATIENT PROGRESS NOTE    Patient Name: Tasha Perez  Date of Admission: 11/7/2024  Today's Date: 11/08/24  Length of Stay: 0  Primary Care Physician: KIRILL Murguia MD    Subjective   Chief Complaint: Fatigue, worsening renal function  HPI     The patient is feeling somewhat better today with hydration.  White blood cell count is down to 13,900.  Hemoglobin 11.0.  BMP shows potassium low at 3.0, chloride 111, creatinine improved to 3.66 from presentation of 4.05.  Calcium 7.9 and glucose 163.  Will defer electrolyte supplementation to nephrology.  Nephrology has seen and evaluated the patient and will continue bicarbonate containing fluids.  About a month ago, the patient had a J-tube placed and it subsequently became dislodged and was removed.  J-tube has not been replaced since that time.  She has been referred to [facility] for further treatment and evaluation of gastroparesis by a specialist there.  Her next appointment is later this month.  Acute kidney injury he has absolutely related to volume depletion and nutritional status.  She understands the origin of her difficulties.  I will ask general surgery to evaluate for possible repeat J-tube placement.    Review of Systems   All pertinent negatives and positives are as above. All other systems have been reviewed and are negative unless otherwise stated.     Objective    Temp:  [97.5 °F (36.4 °C)-98.1 °F (36.7 °C)] 98.1 °F (36.7 °C)  Heart Rate:  [] 82  Resp:  [16-18] 18  BP: ()/(55-83) 83/55  Physical Exam    Constitutional:       General: She is not in acute distress.     Appearance: She is ill-appearing. She is not diaphoretic.      Comments: Underweight and chronically ill-appearing woman.   HENT:      Head: Normocephalic and atraumatic.      Right Ear: External ear normal.      Left Ear: External ear normal.      Nose: No congestion or rhinorrhea.      Mouth:  "Mucous membranes are moist.   Eyes:      General: No scleral icterus.     Extraocular Movements: Extraocular movements intact.      Conjunctiva/sclera: Conjunctivae normal.   Cardiovascular:      Rate and Rhythm: Normal rate and regular rhythm.      Heart sounds: Normal heart sounds. No murmur heard.  Pulmonary:      Effort: Pulmonary effort is normal. No respiratory distress.      Breath sounds: Normal breath sounds. No wheezing, rhonchi or rales.   Abdominal:      General: Abdomen is flat. There is no distension.      Palpations: Abdomen is soft.      Tenderness: There is no abdominal tenderness. There is no guarding.   Musculoskeletal:         General: No swelling, tenderness or deformity.      Right lower leg: No edema.      Left lower leg: No edema.   Skin:     General: Skin is warm and dry.   Neurological:      General: No focal deficit present.      Mental Status: She is alert and oriented to person, place, and time.      Motor: No weakness.   Psychiatric:         Mood and Affect: Mood normal.         Behavior: Behavior normal.         Thought Content: Thought content normal.     Results Review:  I have reviewed the labs, radiology results, and diagnostic studies.    Laboratory Data:   Results from last 7 days   Lab Units 11/08/24  0334 11/07/24  1942   WBC 10*3/mm3 13.85* 15.14*   HEMOGLOBIN g/dL 11.0* 12.3   HEMATOCRIT % 36.6 37.2   PLATELETS 10*3/mm3 230 270        Results from last 7 days   Lab Units 11/08/24  0334 11/07/24 2029   SODIUM mmol/L 135* 134*   POTASSIUM mmol/L 3.0* 2.8*   CHLORIDE mmol/L 111* 104   CO2 mmol/L 8.0* 14.0*   BUN mg/dL 64* 61*   CREATININE mg/dL 3.66* 4.05*   CALCIUM mg/dL 7.9* 8.3*   GLUCOSE mg/dL 163* 248*       Culture Data:   No results found for: \"BLOODCX\", \"URINECX\", \"WOUNDCX\", \"MRSACX\", \"RESPCX\", \"STOOLCX\"    Radiology Data:   Imaging Results (Last 24 Hours)       ** No results found for the last 24 hours. **            I have reviewed the patient's current medications. "     Assessment/Plan   Assessment  Active Hospital Problems    Diagnosis     **Acute renal failure superimposed on stage 3b chronic kidney disease     Metabolic acidosis     Intractable nausea     Fibromyalgia     Hypokalemia     Type 2 diabetes mellitus, without long-term current use of insulin        Treatment Plan  General Surgery consultation for evaluation for possible J-tube placement    Medical Decision Making  Number and Complexity of problems: 3 acute, moderate severity, moderate complexity medical problems.  Differential Diagnosis: None     Conditions and Status        Condition is unchanged.     [facility] Data  External documents reviewed: Care Everywhere  Cardiac tracing (EKG, telemetry) interpretation: See HPI  Radiology interpretation: See HPI  Labs reviewed: The HPI  Any tests that were considered but not ordered: None     Decision rules/scores evaluated (example [id number], [place], etc): Not applicable     Discussed with: Patient and her  Vincent Perez     Care Planning  Shared decision making: Patient and her  Vincentgaurav Perez  Code status and discussions: CODE STATUS is full code     Disposition  Social Determinants of Health that impact treatment or disposition: None  I expect the patient to be discharged to home in 2-3 days.     Electronically signed by Adrián Glover DO, 11/08/24, 12:44 CST.

## 2024-11-08 NOTE — CONSULTS
[facility] General Surgery Consult History and Physical  Tasha Perez  MRN: [id number]  Age: 54 y.o. female   YOB: 1970  Admit Date: 11/7/2024   Admitting Physician: Adrián Glover DO      Reason for Consult   - consideration for feeding jejunostomy    SUBJECTIVE  History of Presenting Illness   Patient is a 54 y.o. female pertinent history of fibromyalgia, presumed gastroparesis due to diabetes, ongoing malnutrition which has led her to have prior placement of a percutaneous endoscopic gastrostomy and recent placement of a feeding jejunostomy, who presented to  ED yesterday with complaints of 3 days of severe fatigue and elevated creatinine.  Her labs were drawn by the nephrology in the outpatient setting, and her creatinine was up around 4 from baseline around 2, as well as hypocarbia and hypokalemia.  She has had some nausea and anorexia, but denies vomiting and diarrhea over the period time.    Regarding her gastroparesis, anorexia, and severe malnutrition-patient has had prior PEG tube placed which failed due to gastroparesis, and subsequent jejunostomy placed sometime around a month ago at [facility]-this was dislodged and subsequently removed.  Surgeon had a reportedly planned to replace in clinic, but patient refused replacement.  She reports that she had a number of issues both with the PEG, and with the jejunostomy, in terms of leaking around the tube, skin irritation, local infection around the tube and would like to avoid replacement of the tube if at all possible.  She reports that she has been eating better and better, tolerating liquids, has not been vomiting, and feels that her weight is generally okay.  She also has a consultation on the 20th this month in [place] by gastroparesis expert, for potential gastric stimulator placement.  Past Medical History     Past Medical History:  Past Medical History:   Diagnosis Date    Arthritis     Contusion     Degenerative disc  "disease, cervical     Diabetes mellitus     Elevated cholesterol     Fibromyalgia     Neuropathy     Osteoporosis     Pancreatitis     Raynaud disease     Renal insufficiency     Smoker 02/08/2022    Vitamin D deficiency 04/26/2022       PCP: KIRILL Murguia MD    Past Surgical History:  Past Surgical History:   Procedure Laterality Date    APPENDECTOMY      CHOLECYSTECTOMY      COLONOSCOPY      ENDOSCOPY N/A 10/08/2019    Procedure: ESOPHAGOGASTRODUODENOSCOPY WITH ANESTHESIA;  Surgeon: Aleks Vaughn DO;  Location: [facility] ENDOSCOPY;  Service: Gastroenterology    ENDOSCOPY WITH GASTROSTOMY TUBE INSERTION N/A 6/15/2022    Procedure: ESOPHAGOGASTRODUODENOSCOPY WITH GASTROSTOMY TUBE INSERTION;  Surgeon: Jersey Lee MD;  Location: [facility] ENDOSCOPY;  Service: Gastroenterology;  Laterality: N/A;  pre peg placement  post peg placement  Dr. Murguia    ENTEROSCOPY SMALL BOWEL N/A 11/3/2022    Procedure: Esophagogastroduodenoscopy with Peg Removal;  Surgeon: Aleks Vaughn DO;  Location: [facility] ENDOSCOPY;  Service: Gastroenterology;  Laterality: N/A;  pre; peg removal  post; peg removal   KIRILL Murguia MD        HYSTERECTOMY         Family History:  Family History   Adopted: Yes   Problem Relation Age of Onset    Colon polyps Neg Hx     Colon cancer Neg Hx        Social History:  Social History     Tobacco Use    Smoking status: Every Day     Current packs/day: 0.25     Average packs/day: 0.3 packs/day for 31.0 years (7.8 ttl pk-yrs)     Types: Cigarettes    Smokeless tobacco: Not on file   Substance Use Topics    Alcohol use: No       Allergies:  Allergies   Allergen Reactions    Bee Venom Anaphylaxis    Hydrocodone Anaphylaxis    Ibuprofen Other (See Comments)     \"SHUTS MY KIDNEYS DOWN\" PER PATIENT     Pineapple Anaphylaxis    Pineapple Extract Anaphylaxis    Wasp Venom Anaphylaxis    Wasp Venom Protein Anaphylaxis    Hydrocodone-Acetaminophen Dizziness, Nausea And Vomiting, Nausea Only and Unknown - Low " Severity    Penicillins Nausea And Vomiting    Sitagliptin Other (See Comments)     Other reaction(s): Abdominal Pain      Metformin Other (See Comments)     Pt states it messes with her kidneys    Chocolate Rash     Dark chocolate only, told to RD 7/10/24       Medications:  Medications Prior to Admission   Medication Sig Dispense Refill Last Dose/Taking    rOPINIRole (REQUIP) 1 MG tablet Take 1 tablet by mouth 3 (Three) Times a Day.   11/7/2024    aspirin 81 MG EC tablet Take 1 tablet by mouth Daily.       atorvastatin (LIPITOR) 80 MG tablet Take 1 tablet by mouth Every Night.       Calcium Carbonate-Vitamin D 600-5 MG-MCG tablet Take 1 tablet by mouth Daily.       cetirizine (zyrTEC) 10 MG tablet Take 1 tablet by mouth Daily As Needed for Allergies.       Cholecalciferol (Vitamin D3) 50 MCG (2000 UT) capsule Take 1 capsule by mouth Daily.       dapagliflozin Propanediol (Farxiga) 10 MG tablet Take 10 mg by mouth Daily.       DULoxetine (CYMBALTA) 60 MG capsule Take 1 capsule by mouth Daily.       hydroxychloroquine (PLAQUENIL) 200 MG tablet Take 2 tablets by mouth Daily.       Insulin Aspart (novoLOG) 100 UNIT/ML injection Inject 90 Units under the skin into the appropriate area as directed Daily. Up to 90 units daily via omnipod       linaclotide (LINZESS) 290 MCG capsule capsule Take 1 capsule by mouth Every Morning Before Breakfast.       metoprolol succinate XL (TOPROL-XL) 25 MG 24 hr tablet Take 1 tablet by mouth Daily.       nortriptyline (PAMELOR) 25 MG capsule Take 1 capsule by mouth Every Night.       omeprazole (priLOSEC) 40 MG capsule Take 1 capsule by mouth Every Other Day.       ondansetron ODT (ZOFRAN-ODT) 4 MG disintegrating tablet Place 1 tablet on the tongue Every 8 (Eight) Hours As Needed for Nausea or Vomiting.       sodium bicarbonate 650 MG tablet Take 1 tablet by mouth 2 (Two) Times a Day.       sodium chloride 0.9 % solution Infuse 1,000 mL into a venous catheter Daily.       sucralfate  "(CARAFATE) 1 g tablet Take 1 tablet by mouth 4 (Four) Times a Day.            Review of Systems   She otherwise denies lightheadedness, dizziness, fainting, passing out, headache, chest pain, palpitations, shortness of breath, coughing, wheezing, heart burn, reflux, skin lesions, sensitivity to heat/cold, changes in sensation, changes in vision/hearing/smell/taste, myalgias, arthralgias, and has no other acute complaints or concerning symptoms to report at this time excepting those described in my HPI.      Physical Examination     Vitals:    24 0455 24 0725 24 1158 24 1653   BP: (!) 83/59 95/63 (!) 83/55 102/97   BP Location: Left arm Right arm Left arm    Patient Position: Lying Lying Sitting    Pulse: 84 94 82 91   Resp: 16 16 18    Temp: 97.8 °F (36.6 °C) 97.8 °F (36.6 °C) 98.1 °F (36.7 °C) 98 °F (36.7 °C)   TempSrc: Oral Oral Oral Oral   SpO2: 98% 98% 100%    Weight:       Height:         Temp (24hrs), Av.8 °F (36.6 °C), Min:97.5 °F (36.4 °C), Max:98.1 °F (36.7 °C)    Systolic (36hrs), Av , Min:83 , Max:102    Diastolic (36hrs), Av, Min:55, Max:97    Estimated body mass index is 16.34 kg/m² as calculated from the following:    Height as of this encounter: 154.9 cm (61\").    Weight as of this encounter: 39.2 kg (86 lb 8 oz).  PREVIOUS WEIGHTS:   Wt Readings from Last 5 Encounters:   24 39.2 kg (86 lb 8 oz)   10/24/24 41.7 kg (92 lb)   09/10/24 41.5 kg (91 lb 7.9 oz)   24 41.4 kg (91 lb 4.3 oz)   07/10/24 37.6 kg (83 lb)       Physical Examination:  Gen: Very thin middle-aged female, sitting up in bed, in no acute distress  HEENT: NCAT, EOMI, anicteric sclerae  CV: RRR, normotensive  Resp: nonlabored on room air, sats appropriate  Abd: soft, nontender, nondistended without palpable mass; there are well-healed PEG and jejunostomy sites on her left side of her abdomen.  MSK: moves all four, no c/c/e  Neuro: no focal deficits, cranial nerves grossly intact  Psy:  " appropriate, cooperative      Data Review   Labs:  CBC  Results from last 7 days   Lab Units 11/08/24  0334 11/07/24 1942   WBC 10*3/mm3 13.85* 15.14*   HEMOGLOBIN g/dL 11.0* 12.3   HEMATOCRIT % 36.6 37.2     BMP  Results from last 7 days   Lab Units 11/08/24 0334 11/07/24 2029   SODIUM mmol/L 135* 134*   CHLORIDE mmol/L 111* 104   CO2 mmol/L 8.0* 14.0*   BUN mg/dL 64* 61*   CREATININE mg/dL 3.66* 4.05*   GLUCOSE mg/dL 163* 248*   CALCIUM mg/dL 7.9* 8.3*     Urine:  UA  Results from last 7 days   Lab Units 11/08/24  1157 11/08/24 0334 11/07/24 2029   COLOR UA  Yellow  --   --    CLARITY UA  Clear  --   --    PH, URINE  5.5  --   --    GLUCOSE UA  Negative  --   --    BILIRUBIN UA  Negative  --   --    BLOOD UA  Negative  --   --    EGFR mL/min/1.73  --  14.2* 12.6*   UROBILINOGEN UA  0.2 E.U./dL  --   --        Hospital Problem List     Active Hospital Problems    Diagnosis     **Acute renal failure superimposed on stage 3b chronic kidney disease     Metabolic acidosis     Intractable nausea     Fibromyalgia     Hypokalemia     Type 2 diabetes mellitus, without long-term current use of insulin          Assessment/Plan   Tasha Perez is a 54 y.o. female with renal failure admitted with electrolyte dysfunction and SUGAR on CKD, with longstanding issues with malnutrition at least in part attributed to gastroparesis.  I was consulted by the hospitalist team for evaluation of replacement of feeding jejunostomy.  I discussed with the patient in great detail the benefits of jejunostomy, and reviewed the pitfalls and complications that generally arise from a, which she has experienced in the past, and she understands these complications; however, patient out rightly refuses placement of a feeding jejunostomy, both because she wants to avoid complications such as she had last time, and because she feels she is doing a better job of eating, despite her current issues with nausea.  Also, in light of her upcoming  consultation with gastroparesis expert, she is hopeful that she will gain some symptom relief in the near future.    I did  her that should she want a jejunostomy, I do not think it is unreasonable given her malnutrition and the fact that volume depletion likely led to this current hospitalization; we also discussed that this was not an urgent or emergent issue, and would not happen over the weekend, but that we could potentially facilitate this on Monday, with either myself or one of my partners-she again politely refused jejunostomy placement.  I reminded her that either myself or one of my partners would be happy to help her, in the future, should she opt for replacement of her jejunostomy; I likewise discussed with her that I do think that is indicated, and that the issues she had with prior feeding tubes were somewhat expected, and not at all out of the ordinary.  She expresses understanding, and will let us know if she requires her services in the future.    Given that the patient refuses jejunostomy placement, I will sign off at this time.  Please call or page general surgery, should she change her mind, or develop conditions that require further surgical evaluation.    Smoking cessation counseling: Every day-was counseled to stop smoking, she is not interested in cessation efforts/interventions/assistance from me regarding this issue today  BMI counselin.3-needs improved protein calorie nutrition  Med rec complete: Yes  VTE: Per primary    Time Spent: I spent 30 minutes caring for Tasha Perez on this date of service. This time includes time spent by me in the following activities: reviewing tests, obtaining and/or reviewing a separately obtained history, performing a medically appropriate examination and/or evaluation, counseling and educating the patient/family/caregiver, ordering medications, tests, or procedures, and documenting information in the medical record.     Bart Sevilla MD    11/8/2024 18:00 CST

## 2024-11-08 NOTE — H&P
[facility] Medicine Services  HISTORY AND PHYSICAL    Date of Admission: 11/7/2024  Primary Care Physician: KIRILL Murguia MD    Subjective   Primary Historian: Patient    Chief Complaint: Fatigue, elevated creatinine    History of Present Illness  The patient is a 54-year-old woman with a past medical history of fibromyalgia, osteoporosis, insulin-dependent diabetes, gastroparesis, severe malnutrition, and CKD 3B with repeated acute renal failures. She presented with complaints of fatigue of 3 days duration and elevated creatinine.    The patient has been having fatigue over the last 3 days.  She had labs drawn by her nephrologist in the outpatient setting and she was recommended to come to the emergency room due to elevated creatinine.  In the ER, her creatinine was elevated at 4.05. Her creatinine on 10/26/2024 was 1.9 and her baseline appears to be around 2.4. She also had CO2 of 14, and hypokalemia with potassium of 2.8. She does complain of nausea and poor appetite over this last 3 days but denies vomiting, diarrhea. She complains of some dysuria. She received oral potassium and was recommended for admission.    Review of Systems   Otherwise complete ROS reviewed and negative except as mentioned in the HPI.    Past Medical History:   Past Medical History:   Diagnosis Date    Arthritis     Contusion     Degenerative disc disease, cervical     Diabetes mellitus     Elevated cholesterol     Fibromyalgia     Neuropathy     Osteoporosis     Pancreatitis     Raynaud disease     Renal insufficiency     Smoker 02/08/2022    Vitamin D deficiency 04/26/2022     Past Surgical History:  Past Surgical History:   Procedure Laterality Date    APPENDECTOMY      CHOLECYSTECTOMY      COLONOSCOPY      ENDOSCOPY N/A 10/08/2019    Procedure: ESOPHAGOGASTRODUODENOSCOPY WITH ANESTHESIA;  Surgeon: Aleks Vaughn DO;  Location: [facility] ENDOSCOPY;  Service: Gastroenterology    ENDOSCOPY WITH  "GASTROSTOMY TUBE INSERTION N/A 6/15/2022    Procedure: ESOPHAGOGASTRODUODENOSCOPY WITH GASTROSTOMY TUBE INSERTION;  Surgeon: Jersey Lee MD;  Location: [facility] ENDOSCOPY;  Service: Gastroenterology;  Laterality: N/A;  pre peg placement  post peg placement  Dr. Murguia    ENTEROSCOPY SMALL BOWEL N/A 11/3/2022    Procedure: Esophagogastroduodenoscopy with Peg Removal;  Surgeon: Aleks Vaughn DO;  Location: [facility] ENDOSCOPY;  Service: Gastroenterology;  Laterality: N/A;  pre; peg removal  post; peg removal   KIRILL Murguia MD        HYSTERECTOMY       Social History:  reports that she has been smoking cigarettes. She has a 7.8 pack-year smoking history. She does not have any smokeless tobacco history on file. She reports that she does not drink alcohol and does not use drugs.    Family History: family history is not on file. She was adopted.       Allergies:  Allergies   Allergen Reactions    Bee Venom Anaphylaxis    Hydrocodone Anaphylaxis    Ibuprofen Other (See Comments)     \"SHUTS MY KIDNEYS DOWN\" PER PATIENT     Pineapple Anaphylaxis    Pineapple Extract Anaphylaxis    Wasp Venom Anaphylaxis    Wasp Venom Protein Anaphylaxis    Hydrocodone-Acetaminophen Dizziness, Nausea And Vomiting, Nausea Only and Unknown - Low Severity    Penicillins Nausea And Vomiting    Sitagliptin Other (See Comments)     Other reaction(s): Abdominal Pain      Metformin Other (See Comments)     Pt states it messes with her kidneys    Chocolate Rash     Dark chocolate only, told to RD 7/10/24       Medications:  Prior to Admission medications    Medication Sig Start Date End Date Taking? Authorizing Provider   aspirin 81 MG EC tablet Take 1 tablet by mouth Daily.    ProviderKevin MD   atorvastatin (LIPITOR) 80 MG tablet Take 1 tablet by mouth Every Night.    ProviderKevin MD   Calcium Carbonate-Vitamin D 600-5 MG-MCG tablet Take 1 tablet by mouth Daily.    ProviderKevin MD   cetirizine (zyrTEC) 10 MG tablet " Take 1 tablet by mouth Daily As Needed for Allergies.    Kevin Mejia MD   Cholecalciferol (Vitamin D3) 50 MCG (2000 UT) capsule Take 1 capsule by mouth Daily.    Kevin Mejia MD   dapagliflozin Propanediol (Farxiga) 10 MG tablet Take 10 mg by mouth Daily.    Kevin Mejia MD   DULoxetine (CYMBALTA) 60 MG capsule Take 1 capsule by mouth Daily.    Kevin Mejia MD   hydroxychloroquine (PLAQUENIL) 200 MG tablet Take 2 tablets by mouth Daily.    Kevin Mejia MD   Insulin Aspart (novoLOG) 100 UNIT/ML injection Inject 90 Units under the skin into the appropriate area as directed Daily. Up to 90 units daily via omnipod    Kevin Mejia MD   linaclotide (LINZESS) 290 MCG capsule capsule Take 1 capsule by mouth Every Morning Before Breakfast.    Kevin Mejia MD   metoprolol succinate XL (TOPROL-XL) 25 MG 24 hr tablet Take 1 tablet by mouth Daily.    Kevin Mejia MD   nortriptyline (PAMELOR) 25 MG capsule Take 1 capsule by mouth Every Night.    Kevin Mejia MD   omeprazole (priLOSEC) 40 MG capsule Take 1 capsule by mouth Every Other Day.    Kevin Mejia MD   ondansetron ODT (ZOFRAN-ODT) 4 MG disintegrating tablet Place 1 tablet on the tongue Every 8 (Eight) Hours As Needed for Nausea or Vomiting.    Kevin Mejia MD   rOPINIRole (REQUIP) 1 MG tablet Take 1 tablet by mouth 3 (Three) Times a Day.    Kevin Mejia MD   sodium bicarbonate 650 MG tablet Take 1 tablet by mouth 2 (Two) Times a Day.    Kevin Mejia MD   sodium chloride 0.9 % solution Infuse 1,000 mL into a venous catheter Daily.    Kevin Mejia MD   sucralfate (CARAFATE) 1 g tablet Take 1 tablet by mouth 4 (Four) Times a Day.    Kevin Mejia MD     I have utilized all available immediate resources to obtain, update, or review the patient's current medications (including all prescriptions, over-the-counter products, herbals,  "cannabis/cannabidiol products, and vitamin/mineral/dietary (nutritional) supplements).    Objective     Vital Signs: /66   Pulse 105   Temp 97.8 °F (36.6 °C)   Resp 16   Ht 154.9 cm (61\")   Wt 44.2 kg (97 lb 8 oz)   SpO2 97%   BMI 18.42 kg/m²   Physical Exam  Constitutional:       General: She is not in acute distress.     Appearance: She is ill-appearing. She is not diaphoretic.      Comments: Thin and chronically ill-appearing woman.   HENT:      Head: Normocephalic and atraumatic.      Right Ear: External ear normal.      Left Ear: External ear normal.      Nose: No congestion or rhinorrhea.      Mouth/Throat:      Mouth: Mucous membranes are moist.      Pharynx: No oropharyngeal exudate or posterior oropharyngeal erythema.   Eyes:      General: No scleral icterus.     Extraocular Movements: Extraocular movements intact.      Conjunctiva/sclera: Conjunctivae normal.   Cardiovascular:      Rate and Rhythm: Normal rate and regular rhythm.      Heart sounds: Normal heart sounds. No murmur heard.  Pulmonary:      Effort: Pulmonary effort is normal. No respiratory distress.      Breath sounds: Normal breath sounds. No wheezing, rhonchi or rales.   Abdominal:      General: Abdomen is flat. There is no distension.      Palpations: Abdomen is soft.      Tenderness: There is no abdominal tenderness. There is no guarding.   Musculoskeletal:         General: No swelling, tenderness or deformity.      Cervical back: Neck supple. No rigidity. No muscular tenderness.      Right lower leg: No edema.      Left lower leg: No edema.   Lymphadenopathy:      Cervical: No cervical adenopathy.   Skin:     General: Skin is warm and dry.   Neurological:      General: No focal deficit present.      Mental Status: She is alert and oriented to person, place, and time.      Cranial Nerves: No cranial nerve deficit.      Motor: No weakness.   Psychiatric:         Mood and Affect: Mood normal.         Behavior: Behavior normal.   "       Thought Content: Thought content normal.        Results Reviewed:  Lab Results (last 24 hours)       Procedure Component Value Units Date/Time    Basic Metabolic Panel [[id number]]  (Abnormal) Collected: 11/07/24 2029    Specimen: Blood Updated: 11/07/24 2116     Glucose 248 mg/dL      BUN 61 mg/dL      Creatinine 4.05 mg/dL      Sodium 134 mmol/L      Potassium 2.8 mmol/L      Chloride 104 mmol/L      CO2 14.0 mmol/L      Calcium 8.3 mg/dL      BUN/Creatinine Ratio 15.1     Anion Gap 16.0 mmol/L      eGFR 12.6 mL/min/1.73      Comment: <15 Indicative of kidney failure       Narrative:      GFR Normal >60  Chronic Kidney Disease <60  Kidney Failure <15      Magnesium [[id number]]  (Normal) Collected: 11/07/24 2029    Specimen: Blood Updated: 11/07/24 2116     Magnesium 1.7 mg/dL     CBC & Differential [[id number]]  (Abnormal) Collected: 11/07/24 1942    Specimen: Blood Updated: 11/07/24 2010    Narrative:      The following orders were created for panel order CBC & Differential.  Procedure                               Abnormality         Status                     ---------                               -----------         ------                     CBC Auto Differential[[id number]]        Abnormal            Final result               Scan Slide[[id number]]                                       Final result                 Please view results for these tests on the individual orders.    CBC Auto Differential [[id number]]  (Abnormal) Collected: 11/07/24 1942    Specimen: Blood Updated: 11/07/24 2010     WBC 15.14 10*3/mm3      RBC 3.90 10*6/mm3      Hemoglobin 12.3 g/dL      Hematocrit 37.2 %      MCV 95.4 fL      MCH 31.5 pg      MCHC 33.1 g/dL      RDW 14.0 %      RDW-SD 48.8 fl      MPV 10.7 fL      Platelets 270 10*3/mm3     Scan Slide [[id number]] Collected: 11/07/24 1942    Specimen: Blood Updated: 11/07/24 2010     Anisocytosis Slight/1+     Poikilocytes Slight/1+     Polychromasia Slight/1+     Clumped  Platelets Present          Imaging Results (Last 24 Hours)       ** No results found for the last 24 hours. **          I have personally reviewed and interpreted the radiology studies and ECG obtained at time of admission.     Assessment / Plan   Assessment:   Active Hospital Problems    Diagnosis     **Hypokalemia     Metabolic acidosis     Intractable nausea     Fibromyalgia     Acute renal failure superimposed on stage 3b chronic kidney disease     Type 2 diabetes mellitus, without long-term current use of insulin      Treatment Plan  The patient will be admitted to my service here at [facility].  The patient has elevated creatinine consistent with SUGAR on CKD stage IIIb.  Her creatinine has been fluctuating wildly over the last few weeks.  Patient was initially started on IV normal saline but due to worsening metabolic acidosis with she will be switched to IV sodium bicarbonate bolus and drip.  Hold p.o. sodium bicarbonate for now.  Replete potassium cautiously.  Will have nephrology consultation in the morning for further recommendations.    Start antiemetics for intractable nausea, likely secondary to her gastroparesis.    Insulin sliding scale for type 2 diabetes mellitus    DVT prophylaxis with SCDs    CODE STATUS is full code    Medical Decision Making  Number and Complexity of problems: 3 acute, moderate severity, moderate complexity medical problems.  Differential Diagnosis: None    Conditions and Status        Condition is unchanged.     [facility] Data  External documents reviewed: None  Cardiac tracing (EKG, telemetry) interpretation: None  Radiology interpretation: None  Labs reviewed: CBC, BMP reviewed by me  Any tests that were considered but not ordered: None     Decision rules/scores evaluated (example [id number], [place], etc): Not applicable     Discussed with: Patient and her  Vincent Perez     Care Planning  Shared decision making: Patient and her  Vincent Perez  Code status  and discussions: CODE STATUS is full code    Disposition  Social Determinants of Health that impact treatment or disposition: None  Estimated length of stay is 2 to 3 days.     I confirmed that the patient's advanced care plan is present, code status is documented, and a surrogate decision maker is listed in the patient's medical record.     The patient's surrogate decision maker is her  Vincent Perez.     The patient was seen and examined by me on 11/7/2024 at 21:30 PM    Electronically signed by Donnie Wright MD, 11/07/24, 21:32 CST.

## 2024-11-08 NOTE — PAYOR COMM NOTE
"11/8/24 [facility] [phone]  [phone]    UR PHONE [phone]    ER ADMIT ON 11/7/24 OBSERVATION.    11/8/24 MD CHANGED TO INPATIENT.    11/8/24 INPATIENT ORDER. FAXING FOR INPATIENT REVIEW.          Yash Perez (54 y.o. Female)       Date of Birth   1970    Social Security Number       Address   [address] [place] [id number]    Home Phone   [phone]    MRN   [id number]       Alevism   Anglican    Marital Status                               Admission Date   11/7/24    Admission Type   Emergency    Admitting Provider   Adrián Glovre DO    Attending Provider   Adrián Glover DO    Department, Room/Bed   [facility] 4C, 490/1       Discharge Date       Discharge Disposition       Discharge Destination                                 Attending Provider: Adrián Glover DO    Allergies: Bee Venom, Hydrocodone, Ibuprofen, Pineapple, Pineapple Extract, Wasp Venom, Wasp Venom Protein, Hydrocodone-acetaminophen, Penicillins, Sitagliptin, Metformin, Chocolate    Isolation: None   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)   Code Status: CPR    Ht: 154.9 cm (61\")   Wt: 39.2 kg (86 lb 8 oz)    Admission Cmt: None   Principal Problem: Hypokalemia [E87.6]                   Active Insurance as of 11/7/2024       Primary Coverage       Payor Plan Insurance Group Employer/Plan Group    [facility]        Payor Plan Address Payor Plan Phone Number Payor Plan Fax Number Effective Dates    [address]   8/1/2019 - None Entered    [facility] [id number]         Subscriber Name Subscriber Birth Date Member ID       YASH PEREZ 1970 [id number]                     Emergency Contacts        (Rel.) Home Phone Work Phone Mobile Phone    KYLEJENNIFER LONGORIA (Spouse) [phone] -- [phone]    KYLECASSIA LONGORIA (Daughter) -- -- [phone]             [facility] Encounter Date/Time: 11/7/2024 " 1907   Hospital Account: [id number]    MRN: [id number]   Patient:  Yash Perez   Contact Serial #: [id number]   SSN:          ENCOUNTER             Patient Class: Inpatient   Unit: [address] Service: Medicine     Bed: 490/1   Admitting Provider: Adrián Glover DO   Referring Physician:     Attending Provider: Adrián Glover DO   Adm Diagnosis: Hypokalemia [E87.6]               PATIENT             Name: Yash Perez : 1970 (54 yrs)   Address: [address] [place] [address] Sex: Female   City: [place] [address]   County: [facility]   Marital Status:  Ethnicity: NOT                                                                         Race:    Primary Care Provider: KIRILL Murguia MD Patients Phone: Home Phone: [phone]     Mobile Phone: [phone]     EMERGENCY CONTACT   Contact Name Legal Guardian? Relationship to Patient Home Phone Work Phone Mobile Phone   1. KYLESWATIJENNIFER  2. CASSIA PEREZ      Spouse  Daughter [phone]   GUARANTOR             Guarantor: Yash Perez     : 1970   Address: [address] [place] [address] Sex: [place] [address]     Relation to Patient: Self       Home Phone: [phone]   Guarantor ID: [id number]       Work Phone:     GUARANTOR EMPLOYER   Employer:           Status: NOT EMPLO*   COVERAGE          PRIMARY INSURANCE   Payor: [employer] [place] Plan: [employer] [place]   Group Number:   Insurance Type: INDEMNITY   Subscriber Name: YASH PEREZ Subscriber : 1970   Subscriber ID: [id number] Coverage Address: [facility] [id number]  [place] [address]   Pat. Rel. to Subscriber: Self Coverage Phone:     SECONDARY INSURANCE   Payor: N/A Plan: N/A   Group Number:   Insurance Type:     Subscriber Name:   Subscriber :     Subscriber ID:   Coverage Address:     Pat. Rel. to Subscriber:   Coverage Phone:        Contact Serial # ([id number])         2024    Chart ID  "([id number])           Faraz Mota MD   Physician  Emergency Medicine     ED Provider Notes      Signed     Date of Service: 11/07/24 1912  Creation Time: 11/07/24 1912     Signed       Expand All Collapse All       Subjective  History of Present Illness  54-year-old female.  Has a history of chronic kidney disease.  Had some labs drawn.  Was having a elevated creatinine levels.  Advised to come to the emergency room for evaluation.  Patient states that she feels fatigued.  No vomiting.  No diarrhea.  No other symptoms at this time.        Review of Systems   Constitutional:  Positive for fatigue.   All other systems reviewed and are negative.        Medical History        Past Medical History:   Diagnosis Date    Arthritis      Contusion      Degenerative disc disease, cervical      Diabetes mellitus      Elevated cholesterol      Fibromyalgia      Neuropathy      Osteoporosis      Pancreatitis      Raynaud disease      Renal insufficiency      Smoker 02/08/2022    Vitamin D deficiency 04/26/2022            Allergies         Allergies   Allergen Reactions    Bee Venom Anaphylaxis    Hydrocodone Anaphylaxis    Ibuprofen Other (See Comments)       \"SHUTS MY KIDNEYS DOWN\" PER PATIENT     Pineapple Anaphylaxis    Pineapple Extract Anaphylaxis    Wasp Venom Anaphylaxis    Wasp Venom Protein Anaphylaxis    Hydrocodone-Acetaminophen Dizziness, Nausea And Vomiting, Nausea Only and Unknown - Low Severity    Penicillins Nausea And Vomiting    Sitagliptin Other (See Comments)       Other reaction(s): Abdominal Pain       Metformin Other (See Comments)       Pt states it messes with her kidneys    Chocolate Rash       Dark chocolate only, told to RD 7/10/24            Surgical History         Past Surgical History:   Procedure Laterality Date    APPENDECTOMY        CHOLECYSTECTOMY        COLONOSCOPY        ENDOSCOPY N/A 10/08/2019     Procedure: ESOPHAGOGASTRODUODENOSCOPY WITH ANESTHESIA;  Surgeon: Roderick, " Aleks ANN DO;  Location: [facility] ENDOSCOPY;  Service: Gastroenterology    ENDOSCOPY WITH GASTROSTOMY TUBE INSERTION N/A 6/15/2022     Procedure: ESOPHAGOGASTRODUODENOSCOPY WITH GASTROSTOMY TUBE INSERTION;  Surgeon: Jersey Lee MD;  Location: [facility] ENDOSCOPY;  Service: Gastroenterology;  Laterality: N/A;  pre peg placement  post peg placement  Dr. Murguia    ENTEROSCOPY SMALL BOWEL N/A 11/3/2022     Procedure: Esophagogastroduodenoscopy with Peg Removal;  Surgeon: Aleks Vaughn DO;  Location: [facility] ENDOSCOPY;  Service: Gastroenterology;  Laterality: N/A;  pre; peg removal  post; peg removal   KIRILL Murguia MD          HYSTERECTOMY                      Family History   Adopted: Yes   Problem Relation Age of Onset    Colon polyps Neg Hx      Colon cancer Neg Hx           Social History   Social History            Socioeconomic History    Marital status:    Tobacco Use    Smoking status: Every Day       Current packs/day: 0.25       Average packs/day: 0.3 packs/day for 31.0 years (7.8 ttl pk-yrs)       Types: Cigarettes   Vaping Use    Vaping status: Never Used   Substance and Sexual Activity    Alcohol use: No    Drug use: No    Sexual activity: Defer                     Objective[]Expand by Default  Physical Exam  Vitals and nursing note reviewed.   Constitutional:       Appearance: Normal appearance.   HENT:      Head: Normocephalic and atraumatic.      Mouth/Throat:      Mouth: Mucous membranes are moist.   Eyes:      Extraocular Movements: Extraocular movements intact.      Pupils: Pupils are equal, round, and reactive to light.   Cardiovascular:      Rate and Rhythm: Normal rate and regular rhythm.      Heart sounds: Normal heart sounds.   Pulmonary:      Effort: Pulmonary effort is normal.      Breath sounds: Normal breath sounds.   Abdominal:      General: Bowel sounds are normal.      Palpations: Abdomen is soft.      Tenderness: There is no abdominal tenderness.   Skin:     General: Skin  is warm and dry.   Neurological:      General: No focal deficit present.      Mental Status: She is alert and oriented to person, place, and time.   Psychiatric:         Mood and Affect: Mood normal.         Behavior: Behavior normal.            Procedures                 ED Course                                          Lab Results (last 24 hours)         Procedure Component Value Units Date/Time     CBC & Differential [[id number]]  (Abnormal) Collected: 11/07/24 1942     Specimen: Blood Updated: 11/07/24 2010     Narrative:       The following orders were created for panel order CBC & Differential.  Procedure                               Abnormality         Status                     ---------                               -----------         ------                     CBC Auto Differential[[id number]]        Abnormal            Final result               Scan Slide[[id number]]                                       Final result                  Please view results for these tests on the individual orders.     CBC Auto Differential [[id number]]  (Abnormal) Collected: 11/07/24 1942     Specimen: Blood Updated: 11/07/24 2010       WBC 15.14 10*3/mm3         RBC 3.90 10*6/mm3         Hemoglobin 12.3 g/dL         Hematocrit 37.2 %         MCV 95.4 fL         MCH 31.5 pg         MCHC 33.1 g/dL         RDW 14.0 %         RDW-SD 48.8 fl         MPV 10.7 fL         Platelets 270 10*3/mm3       Scan Slide [[id number]] Collected: 11/07/24 1942     Specimen: Blood Updated: 11/07/24 2010       Anisocytosis Slight/1+       Poikilocytes Slight/1+       Polychromasia Slight/1+       Clumped Platelets Present     Basic Metabolic Panel [[id number]]  (Abnormal) Collected: 11/07/24 2029     Specimen: Blood Updated: 11/07/24 2116       Glucose 248 mg/dL         BUN 61 mg/dL         Creatinine 4.05 mg/dL         Sodium 134 mmol/L         Potassium 2.8 mmol/L         Chloride 104 mmol/L         CO2 14.0 mmol/L         Calcium 8.3 mg/dL          BUN/Creatinine Ratio 15.1       Anion Gap 16.0 mmol/L         eGFR 12.6 mL/min/1.73         Comment: <15 Indicative of kidney failure        Narrative:       GFR Normal >60  Chronic Kidney Disease <60  Kidney Failure <15        Magnesium [[id number]]  (Normal) Collected: 11/07/24 2029     Specimen: Blood Updated: 11/07/24 2116       Magnesium 1.7 mg/dL               No orders to display      Medications   sodium chloride 0.9 % flush 10 mL (has no administration in time range)   potassium chloride (MICRO-K/KLOR-CON) CR capsule (has no administration in time range)         Medical Decision Making  54-year-old female with history of kidney disease.  Patient's kidney function is worse than her baseline.  Patient also was found to be hypokalemic.  Potassium was replaced.  Case was discussed with the hospitalist on-call.  They have accepted the patient under their services.     Problems Addressed:  Acute kidney injury superimposed on chronic kidney disease: complicated acute illness or injury  Hypokalemia: complicated acute illness or injury  Other fatigue: complicated acute illness or injury     Amount and/or Complexity of Data Reviewed  Labs: ordered. Decision-making details documented in ED Course.     Risk  Prescription drug management.  Decision regarding hospitalization.           Final diagnoses:   Acute kidney injury superimposed on chronic kidney disease   Other fatigue   Hypokalemia         ED Disposition  ED Disposition         ED Disposition   Decision to Admit    Condition   --    Comment   Level of Care: Remote Telemetry [26]   Diagnosis: Hypokalemia [927117]   Admitting Physician: LAUREEN WRIGHT [[id number]]   Attending Physician: LAUREEN WRIGHT [[id number]]                      No follow-up provider specified.         Medication List       No changes were made to your prescriptions during this visit.               Faraz Mota MD  11/07/24 2136                     Laureen Wright MD    Physician  Hospitalist     H&P      Signed     Date of Service: 11/07/24 2131  Creation Time: 11/07/24 2131     Signed       Expand All [facility] Medicine Services  HISTORY AND PHYSICAL     Date of Admission: 11/7/2024  Primary Care Physician: KIRILL Murguia MD     Subjective   Primary Historian: Patient     Chief Complaint: Fatigue, elevated creatinine     History of Present Illness  The patient is a 54-year-old woman with a past medical history of fibromyalgia, osteoporosis, insulin-dependent diabetes, gastroparesis, severe malnutrition, and CKD 3B with repeated acute renal failures. She presented with complaints of fatigue of 3 days duration and elevated creatinine.     The patient has been having fatigue over the last 3 days.  She had labs drawn by her nephrologist in the outpatient setting and she was recommended to come to the emergency room due to elevated creatinine.  In the ER, her creatinine was elevated at 4.05. Her creatinine on 10/26/2024 was 1.9 and her baseline appears to be around 2.4. She also had CO2 of 14, and hypokalemia with potassium of 2.8. She does complain of nausea and poor appetite over this last 3 days but denies vomiting, diarrhea. She complains of some dysuria. She received oral potassium and was recommended for admission.     Review of Systems   Otherwise complete ROS reviewed and negative except as mentioned in the HPI.     Past Medical History:   Medical History[]Expand by Default        Past Medical History:   Diagnosis Date    Arthritis      Contusion      Degenerative disc disease, cervical      Diabetes mellitus      Elevated cholesterol      Fibromyalgia      Neuropathy      Osteoporosis      Pancreatitis      Raynaud disease      Renal insufficiency      Smoker 02/08/2022    Vitamin D deficiency 04/26/2022         Past Surgical History:  Surgical History         Past Surgical History:   Procedure Laterality Date     "APPENDECTOMY        CHOLECYSTECTOMY        COLONOSCOPY        ENDOSCOPY N/A 10/08/2019     Procedure: ESOPHAGOGASTRODUODENOSCOPY WITH ANESTHESIA;  Surgeon: Aleks Vaughn DO;  Location:  [facility] ENDOSCOPY;  Service: Gastroenterology    ENDOSCOPY WITH GASTROSTOMY TUBE INSERTION N/A 6/15/2022     Procedure: ESOPHAGOGASTRODUODENOSCOPY WITH GASTROSTOMY TUBE INSERTION;  Surgeon: Jersey Lee MD;  Location:  [facility] ENDOSCOPY;  Service: Gastroenterology;  Laterality: N/A;  pre peg placement  post peg placement  Dr. Murguia    ENTEROSCOPY SMALL BOWEL N/A 11/3/2022     Procedure: Esophagogastroduodenoscopy with Peg Removal;  Surgeon: Aleks Vaughn DO;  Location:  [facility] ENDOSCOPY;  Service: Gastroenterology;  Laterality: N/A;  pre; peg removal  post; peg removal   KIRILL Murguia MD          HYSTERECTOMY             Social History:  reports that she has been smoking cigarettes. She has a 7.8 pack-year smoking history. She does not have any smokeless tobacco history on file. She reports that she does not drink alcohol and does not use drugs.     Family History: family history is not on file. She was adopted.        Allergies:  Allergies         Allergies   Allergen Reactions    Bee Venom Anaphylaxis    Hydrocodone Anaphylaxis    Ibuprofen Other (See Comments)       \"SHUTS MY KIDNEYS DOWN\" PER PATIENT     Pineapple Anaphylaxis    Pineapple Extract Anaphylaxis    Wasp Venom Anaphylaxis    Wasp Venom Protein Anaphylaxis    Hydrocodone-Acetaminophen Dizziness, Nausea And Vomiting, Nausea Only and Unknown - Low Severity    Penicillins Nausea And Vomiting    Sitagliptin Other (See Comments)       Other reaction(s): Abdominal Pain       Metformin Other (See Comments)       Pt states it messes with her kidneys    Chocolate Rash       Dark chocolate only, told to RD 7/10/24            Medications:          Prior to Admission medications    Medication Sig Start Date End Date Taking? Authorizing Provider   aspirin 81 MG EC " tablet Take 1 tablet by mouth Daily.       Kevin Mejia MD   atorvastatin (LIPITOR) 80 MG tablet Take 1 tablet by mouth Every Night.       Kevin Mejia MD   Calcium Carbonate-Vitamin D 600-5 MG-MCG tablet Take 1 tablet by mouth Daily.       Kevin Mejia MD   cetirizine (zyrTEC) 10 MG tablet Take 1 tablet by mouth Daily As Needed for Allergies.       Kevin Mejia MD   Cholecalciferol (Vitamin D3) 50 MCG (2000 UT) capsule Take 1 capsule by mouth Daily.       Kevin Mejia MD   dapagliflozin Propanediol (Farxiga) 10 MG tablet Take 10 mg by mouth Daily.       Kevin Mejia MD   DULoxetine (CYMBALTA) 60 MG capsule Take 1 capsule by mouth Daily.       Kevin Mejia MD   hydroxychloroquine (PLAQUENIL) 200 MG tablet Take 2 tablets by mouth Daily.       Kevin Mejia MD   Insulin Aspart (novoLOG) 100 UNIT/ML injection Inject 90 Units under the skin into the appropriate area as directed Daily. Up to 90 units daily via omnipod       Kevin Mejia MD   linaclotide (LINZESS) 290 MCG capsule capsule Take 1 capsule by mouth Every Morning Before Breakfast.       Kevin Mejia MD   metoprolol succinate XL (TOPROL-XL) 25 MG 24 hr tablet Take 1 tablet by mouth Daily.       Kevin Mejia MD   nortriptyline (PAMELOR) 25 MG capsule Take 1 capsule by mouth Every Night.       Kevin Mejia MD   omeprazole (priLOSEC) 40 MG capsule Take 1 capsule by mouth Every Other Day.       Kevin Mejia MD   ondansetron ODT (ZOFRAN-ODT) 4 MG disintegrating tablet Place 1 tablet on the tongue Every 8 (Eight) Hours As Needed for Nausea or Vomiting.       Kevin Mejia MD   rOPINIRole (REQUIP) 1 MG tablet Take 1 tablet by mouth 3 (Three) Times a Day.       Kevin Mejia MD   sodium bicarbonate 650 MG tablet Take 1 tablet by mouth 2 (Two) Times a Day.       Kevin Mejia MD   sodium chloride 0.9 % solution Infuse 1,000 mL into  "a venous catheter Daily.       Provider, MD Kevin   sucralfate (CARAFATE) 1 g tablet Take 1 tablet by mouth 4 (Four) Times a Day.       Provider, MD Kevin      I have utilized all available immediate resources to obtain, update, or review the patient's current medications (including all prescriptions, over-the-counter products, herbals, cannabis/cannabidiol products, and vitamin/mineral/dietary (nutritional) supplements).     Objective      Vital Signs: /66   Pulse 105   Temp 97.8 °F (36.6 °C)   Resp 16   Ht 154.9 cm (61\")   Wt 44.2 kg (97 lb 8 oz)   SpO2 97%   BMI 18.42 kg/m²   Physical Exam  Constitutional:       General: She is not in acute distress.     Appearance: She is ill-appearing. She is not diaphoretic.      Comments: Thin and chronically ill-appearing woman.   HENT:      Head: Normocephalic and atraumatic.      Right Ear: External ear normal.      Left Ear: External ear normal.      Nose: No congestion or rhinorrhea.      Mouth/Throat:      Mouth: Mucous membranes are moist.      Pharynx: No oropharyngeal exudate or posterior oropharyngeal erythema.   Eyes:      General: No scleral icterus.     Extraocular Movements: Extraocular movements intact.      Conjunctiva/sclera: Conjunctivae normal.   Cardiovascular:      Rate and Rhythm: Normal rate and regular rhythm.      Heart sounds: Normal heart sounds. No murmur heard.  Pulmonary:      Effort: Pulmonary effort is normal. No respiratory distress.      Breath sounds: Normal breath sounds. No wheezing, rhonchi or rales.   Abdominal:      General: Abdomen is flat. There is no distension.      Palpations: Abdomen is soft.      Tenderness: There is no abdominal tenderness. There is no guarding.   Musculoskeletal:         General: No swelling, tenderness or deformity.      Cervical back: Neck supple. No rigidity. No muscular tenderness.      Right lower leg: No edema.      Left lower leg: No edema.   Lymphadenopathy:      Cervical: No " cervical adenopathy.   Skin:     General: Skin is warm and dry.   Neurological:      General: No focal deficit present.      Mental Status: She is alert and oriented to person, place, and time.      Cranial Nerves: No cranial nerve deficit.      Motor: No weakness.   Psychiatric:         Mood and Affect: Mood normal.         Behavior: Behavior normal.         Thought Content: Thought content normal.         Results Reviewed:  Lab Results (last 24 hours)         Procedure Component Value Units Date/Time     Basic Metabolic Panel [[id number]]  (Abnormal) Collected: 11/07/24 2029     Specimen: Blood Updated: 11/07/24 2116       Glucose 248 mg/dL         BUN 61 mg/dL         Creatinine 4.05 mg/dL         Sodium 134 mmol/L         Potassium 2.8 mmol/L         Chloride 104 mmol/L         CO2 14.0 mmol/L         Calcium 8.3 mg/dL         BUN/Creatinine Ratio 15.1       Anion Gap 16.0 mmol/L         eGFR 12.6 mL/min/1.73         Comment: <15 Indicative of kidney failure        Narrative:       GFR Normal >60  Chronic Kidney Disease <60  Kidney Failure <15        Magnesium [[id number]]  (Normal) Collected: 11/07/24 2029     Specimen: Blood Updated: 11/07/24 2116       Magnesium 1.7 mg/dL       CBC & Differential [[id number]]  (Abnormal) Collected: 11/07/24 1942     Specimen: Blood Updated: 11/07/24 2010     Narrative:       The following orders were created for panel order CBC & Differential.  Procedure                               Abnormality         Status                     ---------                               -----------         ------                     CBC Auto Differential[[id number]]        Abnormal            Final result               Scan Slide[[id number]]                                       Final result                  Please view results for these tests on the individual orders.     CBC Auto Differential [[id number]]  (Abnormal) Collected: 11/07/24 1942     Specimen: Blood Updated: 11/07/24 2010       WBC  15.14 10*3/mm3         RBC 3.90 10*6/mm3         Hemoglobin 12.3 g/dL         Hematocrit 37.2 %         MCV 95.4 fL         MCH 31.5 pg         MCHC 33.1 g/dL         RDW 14.0 %         RDW-SD 48.8 fl         MPV 10.7 fL         Platelets 270 10*3/mm3       Scan Slide [[id number]] Collected: 11/07/24 1942     Specimen: Blood Updated: 11/07/24 2010       Anisocytosis Slight/1+       Poikilocytes Slight/1+       Polychromasia Slight/1+       Clumped Platelets Present             Imaging Results (Last 24 Hours)         ** No results found for the last 24 hours. **             I have personally reviewed and interpreted the radiology studies and ECG obtained at time of admission.      Assessment / Plan   Assessment:        Active Hospital Problems     Diagnosis      **Hypokalemia      Metabolic acidosis      Intractable nausea      Fibromyalgia      Acute renal failure superimposed on stage 3b chronic kidney disease      Type 2 diabetes mellitus, without long-term current use of insulin        Treatment Plan  The patient will be admitted to my service here at [facility].  The patient has elevated creatinine consistent with SUGAR on CKD stage IIIb.  Her creatinine has been fluctuating wildly over the last few weeks.  Patient was initially started on IV normal saline but due to worsening metabolic acidosis with she will be switched to IV sodium bicarbonate bolus and drip.  Hold p.o. sodium bicarbonate for now.  Replete potassium cautiously.  Will have nephrology consultation in the morning for further recommendations.     Start antiemetics for intractable nausea, likely secondary to her gastroparesis.     Insulin sliding scale for type 2 diabetes mellitus     DVT prophylaxis with SCDs     CODE STATUS is full code     Medical Decision Making  Number and Complexity of problems: 3 acute, moderate severity, moderate complexity medical problems.  Differential Diagnosis: None     Conditions and Status        Condition  is unchanged.     [facility] Data  External documents reviewed: None  Cardiac tracing (EKG, telemetry) interpretation: None  Radiology interpretation: None  Labs reviewed: CBC, BMP reviewed by me  Any tests that were considered but not ordered: None     Decision rules/scores evaluated (example [id number], Wells, etc): Not applicable     Discussed with: Patient and her  Vincent Perez     Care Planning  Shared decision making: Patient and her  Vincent Perez  Code status and discussions: CODE STATUS is full code     Disposition  Social Determinants of Health that impact treatment or disposition: None  Estimated length of stay is 2 to 3 days.      I confirmed that the patient's advanced care plan is present, code status is documented, and a surrogate decision maker is listed in the patient's medical record.      The patient's surrogate decision maker is her  Vincent Perez.      The patient was seen and examined by me on 11/7/2024 at 21:30 PM     Electronically signed by Donnie Wright MD, 11/07/24, 21:32 CST.                         Date/Time Temp Pulse Resp BP Patient Position Device (Oxygen Therapy) SpO2   11/08/24 0725 97.8 (36.6) 94 16 95/63 Lying room air 98   11/08/24 0455 97.8 (36.6) 84 16 83/59 Abnormal   Lying room air 98   BP: RN notified at 11/08/24 0455   11/07/24 2230 -- -- -- -- -- room air --   11/07/24 2204 97.5 (36.4) 104  16 101/83 Sitting room air 100   Pulse: RN notified at 11/07/24 2204   11/07/24 2143 -- 93 -- -- -- -- 100   11/07/24 1836 97.8 (36.6) 105 16 102/66 -- room air 97          Basic Metabolic Panel [LAB15] (Order [id number])  Order  Date: 11/7/2024 Department: [address] Released By/Authorizing: Donnie Wright MD (auto-released)     Reprint Order Requisition    Basic Metabolic Panel (Order #[id number]) on 11/7/24         Contains critical data Basic Metabolic Panel  Order: [id number]  Status: Final result       Visible to patient: No (not released)        Next appt: None    Specimen Information: Blood   0 Result Notes            Component  Ref Range & Units 03:34  (11/8/24) 1 d ago  (11/7/24) 1 d ago  (11/7/24) 13 d ago  (10/26/24) 13 d ago  (10/26/24) 13 d ago  (10/26/24) 2 wk ago  (10/25/24)   Glucose  65 - 99 mg/dL 163 High  162 High  R,  High  156 High  R,  High  231 High  R,  High  R, CM   BUN  6 - 20 mg/dL 64 High   61 High   64 High      Creatinine  0.57 - 1.00 mg/dL 3.66 High   4.05 High   1.90 High      Sodium  136 - 145 mmol/L 135 Low   134 Low   137     Potassium  3.5 - 5.2 mmol/L 3.0 Low   2.8 Low   2.8 Low      Chloride  98 - 107 mmol/L 111 High   104  97 Low      CO2  22.0 - 29.0 mmol/L 8.0 Low Critical   14.0 Low   22.0     Calcium  8.6 - 10.5 mg/dL 7.9 Low   8.3 Low   8.6     BUN/Creatinine Ratio  7.0 - 25.0 17.5  15.1  33.7 High      Anion Gap  5.0 - 15.0 mmol/L 16.0 High   16.0 High   18.0 High      eGFR  >60.0 mL/min/1.73 14.2 Low   12.6 Low  CM  31.2 Low                 Patient Communication    CBC & Differential     Not Released  Not seen        CBC Auto Differential     Not Released  Not seen     Results   CBC & Differential (Order [id number])  Linked Results    Procedure Abnormality Status   CBC Auto Differential Abnormal Abnormal  Final result      Contains abnormal data CBC & Differential  Order: [id number]  Status: Final result       Visible to patient: No (not released)    Specimen Information: Blood   0 Result Notes       Specimen Collected: 11/08/24 03:34 CST Last Resulted: 11/08/24 04:26 CST       Order Details        View Encounter        Lab and Collection Details        Routing        Result History     View All Conversations on this Encounter     Result Care Coordination      Patient Communication     Not Released  Not seen Back to Top      Contains abnormal data CBC Auto Differential  Order: [id number] - Part of Panel Order [id number]  Status: Final result       Visible to patient: No (not released)    Specimen  Information: Blood   0 Result Notes            Component  Ref Range & Units 03:34  (11/8/24) 1 d ago  (11/7/24) 13 d ago  (10/26/24) 2 wk ago  (10/25/24) 2 wk ago  (10/24/24) 1 mo ago  (9/13/24) 1 mo ago  (9/12/24)   WBC  3.40 - 10.80 10*3/mm3 13.85 High  15.14 High  11.47 High  13.44 High  16.43 High  9.61 9.32   RBC  3.77 - 5.28 10*6/mm3 3.56 Low  3.90 3.45 Low  3.68 Low  3.78 2.89 Low  3.01 Low    Hemoglobin  12.0 - 15.9 g/dL 11.0 Low  12.3 10.6 Low  11.3 Low  11.8 Low  8.8 Low  9.3 Low    Hematocrit  34.0 - 46.6 % 36.6 37.2 33.2 Low  35.1 36.0 27.6 Low  28.2 Low    MCV  79.0 - 97.0 fL 102.8 High  95.4 96.2 95.4 95.2 95.5 93.7   MCH  26.6 - 33.0 pg 30.9 31.5 30.7 30.7 31.2 30.4 30.9   MCHC  31.5 - 35.7 g/dL 30.1 Low  33.1 31.9 32.2 32.8 31.9 33.0   RDW  12.3 - 15.4 % 13.9 14.0 13.0 13.0 13.1 14.7 14.6   RDW-SD  37.0 - 54.0 fl 52.2 48.8 45.6 45.7 45.7 50.8 50.1   MPV  6.0 - 12.0 fL 10.1 10.7 10.4 10.3 10.1 9.6 10.2   Platelets  140 - 450 10*3/mm3 230 270 433 446 487 High  299 315   Neutrophil %  42.7 - 76.0 % 72.1 77.4 High  CM 67.8 58.9 82.5 High  54.6    Lymphocyte %  19.6 - 45.3 % 18.9 Low  15.9 Low  CM 24.1 30.0 9.8 Low  35.4    Monocyte %  5.0 - 12.0 % 5.8 4.4 Low  CM 5.6 7.6 6.1 6.5    Eosinophil %  0.3 - 6.2 % 2.2 1.5 CM 1.8 2.6 0.8 2.9    Basophil %  0.0 - 1.5 % 0.4 0.3 CM 0.4 0.5 0.3 0.3    Immature Grans %  0.0 - 0.5 % 0.6 High  0.5 CM 0.3 0.4 0.5 0.3    Neutrophils, Absolute  1.70 - 7.00 10*3/mm3 9.98 High  11.73 High  CM 7.76 High  7.92 High  13.56 High  5.25    Lymphocytes, Absolute  0.70 - 3.10 10*3/mm3 2.62 2.41 CM 2.77 4.03 High  1.61 3.40 High     Monocytes, Absolute  0.10 - 0.90 10*3/mm3 0.81 0.66 CM 0.64 1.02 High  1.00 High  0.62    Eosinophils, Absolute  0.00 - 0.40 10*3/mm3 0.30 0.22 CM 0.21 0.35 0.13 0.28    Basophils, Absolute  0.00 - 0.20 10*3/mm3 0.06 0.05 CM 0.05 0.07 0.05 0.03    Immature Grans, Absolute  0.00 - 0.05 10*3/mm3 0.08 High  0.07 High  CM 0.04                                 Current Facility-Administered Medications   Medication Dose Route Frequency Provider Last Rate Last Admin    acetaminophen (TYLENOL) tablet 650 mg  650 mg Oral Q4H PRN Donnie Wright MD        Or    acetaminophen (TYLENOL) 160 MG/5ML oral solution 650 mg  650 mg Oral Q4H PRN Donnie Wright MD        Or    acetaminophen (TYLENOL) suppository 650 mg  650 mg Rectal Q4H PRN Donnie Wright MD        sennosides-docusate (PERICOLACE) 8.6-50 MG per tablet 2 tablet  2 tablet Oral BID PRN oDnnie Wright MD        And    polyethylene glycol (MIRALAX) packet 17 g  17 g Oral Daily PRN Donnie Wright MD        And    bisacodyl (DULCOLAX) suppository 10 mg  10 mg Rectal Daily PRN Donnie Wright MD        cetirizine (zyrTEC) tablet 5 mg  5 mg Oral Daily PRN Donnie Wright MD        cholecalciferol (VITAMIN D3) tablet 2,000 Units  2,000 Units Oral Daily Donnie Wright MD   2,000 Units at 11/08/24 0837    dextrose (D50W) (25 g/50 mL) IV injection 10-50 mL  10-50 mL Intravenous Q15 Min PRN Donnie Wright MD        dextrose (GLUTOSE) oral gel 15 g  15 g Oral Q15 Min PRN Donnie Wright MD        empagliflozin (JARDIANCE) tablet 10 mg  10 mg Oral Daily Donnie Wright MD   10 mg at 11/08/24 0837    Glucagon (GLUCAGEN) injection 1 mg  1 mg Intramuscular Q15 Min PRN Donnie Wright MD        insulin glargine (LANTUS, SEMGLEE) injection 1-200 Units  1-200 Units Subcutaneous Nightly - Glucommander Donnie Wright MD        insulin lispro (humaLOG) injection 1-200 Units  1-200 Units Subcutaneous 4x Daily With Meals & Nightly Donnie Wright MD   1 Units at 11/08/24 0850    insulin lispro (humaLOG) injection 1-200 Units  1-200 Units Subcutaneous PRN Donnie Wright MD        lubiprostone (AMITIZA) capsule 8 mcg  8 mcg Oral BID Donnie Wright MD   8 mcg at 11/08/24 0837    metoprolol succinate XL (TOPROL-XL) 24 hr tablet 25 mg  25 mg Oral Daily  Donnie Wright MD   25 mg at 11/08/24 0837    nitroglycerin (NITROSTAT) SL tablet 0.4 mg  0.4 mg Sublingual Q5 Min PRN Donnie Wright MD        nortriptyline (PAMELOR) capsule 25 mg  25 mg Oral Nightly Donnie Wright MD        ondansetron (ZOFRAN) injection 4 mg  4 mg Intravenous Q6H PRN Donnie Wright MD        pantoprazole (PROTONIX) EC tablet 40 mg  40 mg Oral Q AM Donnie Wright MD   40 mg at 11/08/24 0535    potassium chloride (MICRO-K/KLOR-CON) CR capsule  40 mEq Oral Daily Donnie Wright MD   40 mEq at 11/08/24 0534    rOPINIRole (REQUIP) tablet 1 mg  1 mg Oral TID Donnie Wright MD   1 mg at 11/08/24 0838    sodium bicarbonate 8.4 % 150 mEq in dextrose (D5W) 5 % 1,000 mL infusion (greater than 100 mEq)  150 mEq Intravenous Continuous Donnie Wright  mL/hr at 11/08/24 0748 Currently Infusing at 11/08/24 0748    sodium chloride 0.9 % flush 10 mL  10 mL Intravenous Q12H Donnie Wright MD   10 mL at 11/08/24 0001    sodium chloride 0.9 % flush 10 mL  10 mL Intravenous PRN Donnie Wright MD        sodium chloride 0.9 % infusion 40 mL  40 mL Intravenous PRN Donnie Wright MD        sucralfate (CARAFATE) tablet 1 g  1 g Oral 4x Daily AC & at Bedtime Donnie Wright MD   1 g at 11/08/24 0838

## 2024-11-08 NOTE — PLAN OF CARE
Goal Outcome Evaluation:              Outcome Evaluation: Nutrition assessment complete. Pt familiar to [facility] nutrition services. MSA hx of severe malnutrition. Remains an active problem with gastroparesis. Hx of PEG. Recent J-tube x 2.5 weeks with problems with infection: pt reported pain at EAD insertion site and drainage. Pt has been taking some food by mouth and medications by mouth. Hopeful for gastric stimulator appointment November 20. Pt has been drinking Ensure at home. Ordering Boost Gluocse Control vanilla while inpatient. Food allergies noted on EHR. Receiving daily menu selections of consistent CHO meal plan. Wt stnaindg 86.8lb and most recent standing weight on file was 3/7/24= 98.5lb. BMI 16.24 kg/m^2. Skin intact. No difficulty chewing or swallowing. Stools are formed and firm. No vitamins, minerals, or herbal supplements on a regular basis. Vitamin D on MAR at this time. Pt continues with OP hydration. Will continue to monitor per protocol.

## 2024-11-08 NOTE — ED PROVIDER NOTES
"Subjective   History of Present Illness  54-year-old female.  Has a history of chronic kidney disease.  Had some labs drawn.  Was having a elevated creatinine levels.  Advised to come to the emergency room for evaluation.  Patient states that she feels fatigued.  No vomiting.  No diarrhea.  No other symptoms at this time.      Review of Systems   Constitutional:  Positive for fatigue.   All other systems reviewed and are negative.      Past Medical History:   Diagnosis Date    Arthritis     Contusion     Degenerative disc disease, cervical     Diabetes mellitus     Elevated cholesterol     Fibromyalgia     Neuropathy     Osteoporosis     Pancreatitis     Raynaud disease     Renal insufficiency     Smoker 02/08/2022    Vitamin D deficiency 04/26/2022       Allergies   Allergen Reactions    Bee Venom Anaphylaxis    Hydrocodone Anaphylaxis    Ibuprofen Other (See Comments)     \"SHUTS MY KIDNEYS DOWN\" PER PATIENT     Pineapple Anaphylaxis    Pineapple Extract Anaphylaxis    Wasp Venom Anaphylaxis    Wasp Venom Protein Anaphylaxis    Hydrocodone-Acetaminophen Dizziness, Nausea And Vomiting, Nausea Only and Unknown - Low Severity    Penicillins Nausea And Vomiting    Sitagliptin Other (See Comments)     Other reaction(s): Abdominal Pain      Metformin Other (See Comments)     Pt states it messes with her kidneys    Chocolate Rash     Dark chocolate only, told to RD 7/10/24       Past Surgical History:   Procedure Laterality Date    APPENDECTOMY      CHOLECYSTECTOMY      COLONOSCOPY      ENDOSCOPY N/A 10/08/2019    Procedure: ESOPHAGOGASTRODUODENOSCOPY WITH ANESTHESIA;  Surgeon: Aleks Vaughn DO;  Location: [facility] ENDOSCOPY;  Service: Gastroenterology    ENDOSCOPY WITH GASTROSTOMY TUBE INSERTION N/A 6/15/2022    Procedure: ESOPHAGOGASTRODUODENOSCOPY WITH GASTROSTOMY TUBE INSERTION;  Surgeon: Jersey Lee MD;  Location: [facility] ENDOSCOPY;  Service: Gastroenterology;  Laterality: N/A;  pre peg placement  post peg " placement  Dr. Murguia    ENTEROSCOPY SMALL BOWEL N/A 11/3/2022    Procedure: Esophagogastroduodenoscopy with Peg Removal;  Surgeon: Aleks Vaughn DO;  Location: [facility] ENDOSCOPY;  Service: Gastroenterology;  Laterality: N/A;  pre; peg removal  post; peg removal   KIRILL Murguia MD        HYSTERECTOMY         Family History   Adopted: Yes   Problem Relation Age of Onset    Colon polyps Neg Hx     Colon cancer Neg Hx        Social History     Socioeconomic History    Marital status:    Tobacco Use    Smoking status: Every Day     Current packs/day: 0.25     Average packs/day: 0.3 packs/day for 31.0 years (7.8 ttl pk-yrs)     Types: Cigarettes   Vaping Use    Vaping status: Never Used   Substance and Sexual Activity    Alcohol use: No    Drug use: No    Sexual activity: Defer           Objective   Physical Exam  Vitals and nursing note reviewed.   Constitutional:       Appearance: Normal appearance.   HENT:      Head: Normocephalic and atraumatic.      Mouth/Throat:      Mouth: Mucous membranes are moist.   Eyes:      Extraocular Movements: Extraocular movements intact.      Pupils: Pupils are equal, round, and reactive to light.   Cardiovascular:      Rate and Rhythm: Normal rate and regular rhythm.      Heart sounds: Normal heart sounds.   Pulmonary:      Effort: Pulmonary effort is normal.      Breath sounds: Normal breath sounds.   Abdominal:      General: Bowel sounds are normal.      Palpations: Abdomen is soft.      Tenderness: There is no abdominal tenderness.   Skin:     General: Skin is warm and dry.   Neurological:      General: No focal deficit present.      Mental Status: She is alert and oriented to person, place, and time.   Psychiatric:         Mood and Affect: Mood normal.         Behavior: Behavior normal.         Procedures           ED Course                                             Lab Results (last 24 hours)       Procedure Component Value Units Date/Time    CBC & Differential  [[id number]]  (Abnormal) Collected: 11/07/24 1942    Specimen: Blood Updated: 11/07/24 2010    Narrative:      The following orders were created for panel order CBC & Differential.  Procedure                               Abnormality         Status                     ---------                               -----------         ------                     CBC Auto Differential[[id number]]        Abnormal            Final result               Scan Slide[[id number]]                                       Final result                 Please view results for these tests on the individual orders.    CBC Auto Differential [[id number]]  (Abnormal) Collected: 11/07/24 1942    Specimen: Blood Updated: 11/07/24 2010     WBC 15.14 10*3/mm3      RBC 3.90 10*6/mm3      Hemoglobin 12.3 g/dL      Hematocrit 37.2 %      MCV 95.4 fL      MCH 31.5 pg      MCHC 33.1 g/dL      RDW 14.0 %      RDW-SD 48.8 fl      MPV 10.7 fL      Platelets 270 10*3/mm3     Scan Slide [[id number]] Collected: 11/07/24 1942    Specimen: Blood Updated: 11/07/24 2010     Anisocytosis Slight/1+     Poikilocytes Slight/1+     Polychromasia Slight/1+     Clumped Platelets Present    Basic Metabolic Panel [[id number]]  (Abnormal) Collected: 11/07/24 2029    Specimen: Blood Updated: 11/07/24 2116     Glucose 248 mg/dL      BUN 61 mg/dL      Creatinine 4.05 mg/dL      Sodium 134 mmol/L      Potassium 2.8 mmol/L      Chloride 104 mmol/L      CO2 14.0 mmol/L      Calcium 8.3 mg/dL      BUN/Creatinine Ratio 15.1     Anion Gap 16.0 mmol/L      eGFR 12.6 mL/min/1.73      Comment: <15 Indicative of kidney failure       Narrative:      GFR Normal >60  Chronic Kidney Disease <60  Kidney Failure <15      Magnesium [[id number]]  (Normal) Collected: 11/07/24 2029    Specimen: Blood Updated: 11/07/24 2116     Magnesium 1.7 mg/dL           No orders to display     Medications   sodium chloride 0.9 % flush 10 mL (has no administration in time range)   potassium chloride  (MICRO-K/KLOR-CON) CR capsule (has no administration in time range)       Medical Decision Making  54-year-old female with history of kidney disease.  Patient's kidney function is worse than her baseline.  Patient also was found to be hypokalemic.  Potassium was replaced.  Case was discussed with the hospitalist on-call.  They have accepted the patient under their services.    Problems Addressed:  Acute kidney injury superimposed on chronic kidney disease: complicated acute illness or injury  Hypokalemia: complicated acute illness or injury  Other fatigue: complicated acute illness or injury    Amount and/or Complexity of Data Reviewed  Labs: ordered. Decision-making details documented in ED Course.    Risk  Prescription drug management.  Decision regarding hospitalization.        Final diagnoses:   Acute kidney injury superimposed on chronic kidney disease   Other fatigue   Hypokalemia       ED Disposition  ED Disposition       ED Disposition   Decision to Admit    Condition   --    Comment   Level of Care: Remote Telemetry [26]   Diagnosis: Hypokalemia [651367]   Admitting Physician: LAUREEN VALLEJO [[id number]]   Attending Physician: LAUREEN VALLEJO [[id number]]                 No follow-up provider specified.       Medication List      No changes were made to your prescriptions during this visit.            Faraz Mota MD  11/07/24 3990

## 2024-11-09 LAB
ANION GAP SERPL CALCULATED.3IONS-SCNC: 7 MMOL/L (ref 5–15)
BASOPHILS # BLD AUTO: 0.03 10*3/MM3 (ref 0–0.2)
BASOPHILS NFR BLD AUTO: 0.3 % (ref 0–1.5)
BUN SERPL-MCNC: 37 MG/DL (ref 6–20)
BUN/CREAT SERPL: 23.9 (ref 7–25)
CALCIUM SPEC-SCNC: 6.6 MG/DL (ref 8.6–10.5)
CHLORIDE SERPL-SCNC: 108 MMOL/L (ref 98–107)
CO2 SERPL-SCNC: 29 MMOL/L (ref 22–29)
CREAT SERPL-MCNC: 1.55 MG/DL (ref 0.57–1)
DEPRECATED RDW RBC AUTO: 46.5 FL (ref 37–54)
EGFRCR SERPLBLD CKD-EPI 2021: 39.9 ML/MIN/1.73
EOSINOPHIL # BLD AUTO: 0.2 10*3/MM3 (ref 0–0.4)
EOSINOPHIL NFR BLD AUTO: 2.2 % (ref 0.3–6.2)
ERYTHROCYTE [DISTWIDTH] IN BLOOD BY AUTOMATED COUNT: 13.6 % (ref 12.3–15.4)
GLUCOSE BLDC GLUCOMTR-MCNC: 123 MG/DL (ref 70–130)
GLUCOSE BLDC GLUCOMTR-MCNC: 130 MG/DL (ref 70–130)
GLUCOSE BLDC GLUCOMTR-MCNC: 145 MG/DL (ref 70–130)
GLUCOSE BLDC GLUCOMTR-MCNC: 155 MG/DL (ref 70–130)
GLUCOSE BLDC GLUCOMTR-MCNC: 256 MG/DL (ref 70–130)
GLUCOSE SERPL-MCNC: 126 MG/DL (ref 65–99)
HCT VFR BLD AUTO: 27.4 % (ref 34–46.6)
HGB BLD-MCNC: 8.9 G/DL (ref 12–15.9)
IMM GRANULOCYTES # BLD AUTO: 0.03 10*3/MM3 (ref 0–0.05)
IMM GRANULOCYTES NFR BLD AUTO: 0.3 % (ref 0–0.5)
LYMPHOCYTES # BLD AUTO: 2.83 10*3/MM3 (ref 0.7–3.1)
LYMPHOCYTES NFR BLD AUTO: 30.7 % (ref 19.6–45.3)
MAGNESIUM SERPL-MCNC: 1.2 MG/DL (ref 1.6–2.6)
MCH RBC QN AUTO: 30.5 PG (ref 26.6–33)
MCHC RBC AUTO-ENTMCNC: 32.5 G/DL (ref 31.5–35.7)
MCV RBC AUTO: 93.8 FL (ref 79–97)
MONOCYTES # BLD AUTO: 0.72 10*3/MM3 (ref 0.1–0.9)
MONOCYTES NFR BLD AUTO: 7.8 % (ref 5–12)
NEUTROPHILS NFR BLD AUTO: 5.41 10*3/MM3 (ref 1.7–7)
NEUTROPHILS NFR BLD AUTO: 58.7 % (ref 42.7–76)
NRBC BLD AUTO-RTO: 0 /100 WBC (ref 0–0.2)
PLATELET # BLD AUTO: 211 10*3/MM3 (ref 140–450)
PMV BLD AUTO: 10.1 FL (ref 6–12)
POTASSIUM SERPL-SCNC: 2.7 MMOL/L (ref 3.5–5.2)
POTASSIUM SERPL-SCNC: 3.7 MMOL/L (ref 3.5–5.2)
RBC # BLD AUTO: 2.92 10*6/MM3 (ref 3.77–5.28)
SODIUM SERPL-SCNC: 144 MMOL/L (ref 136–145)
WBC NRBC COR # BLD AUTO: 9.22 10*3/MM3 (ref 3.4–10.8)

## 2024-11-09 PROCEDURE — 84132 ASSAY OF SERUM POTASSIUM: CPT | Performed by: FAMILY MEDICINE

## 2024-11-09 PROCEDURE — 63710000001 INSULIN GLARGINE PER 5 UNITS: Performed by: INTERNAL MEDICINE

## 2024-11-09 PROCEDURE — 0 DEXTROSE 5 % SOLUTION 1,000 ML FLEX CONT: Performed by: INTERNAL MEDICINE

## 2024-11-09 PROCEDURE — 25010000002 MAGNESIUM SULFATE IN D5W 1G/100ML (PREMIX) 1-5 GM/100ML-% SOLUTION: Performed by: FAMILY MEDICINE

## 2024-11-09 PROCEDURE — 63710000001 INSULIN LISPRO (HUMAN) PER 5 UNITS: Performed by: INTERNAL MEDICINE

## 2024-11-09 PROCEDURE — 82948 REAGENT STRIP/BLOOD GLUCOSE: CPT

## 2024-11-09 PROCEDURE — 85025 COMPLETE CBC W/AUTO DIFF WBC: CPT | Performed by: INTERNAL MEDICINE

## 2024-11-09 PROCEDURE — 83735 ASSAY OF MAGNESIUM: CPT | Performed by: FAMILY MEDICINE

## 2024-11-09 PROCEDURE — 80048 BASIC METABOLIC PNL TOTAL CA: CPT | Performed by: INTERNAL MEDICINE

## 2024-11-09 RX ORDER — POTASSIUM CHLORIDE 1500 MG/1
40 TABLET, EXTENDED RELEASE ORAL ONCE
Status: COMPLETED | OUTPATIENT
Start: 2024-11-09 | End: 2024-11-09

## 2024-11-09 RX ORDER — MAGNESIUM SULFATE 1 G/100ML
1 INJECTION INTRAVENOUS
Status: COMPLETED | OUTPATIENT
Start: 2024-11-09 | End: 2024-11-09

## 2024-11-09 RX ADMIN — POTASSIUM CHLORIDE 40 MEQ: 1500 TABLET, EXTENDED RELEASE ORAL at 10:52

## 2024-11-09 RX ADMIN — SUCRALFATE 1 G: 1 TABLET ORAL at 10:52

## 2024-11-09 RX ADMIN — ROPINIROLE 1 MG: 1 TABLET, FILM COATED ORAL at 16:42

## 2024-11-09 RX ADMIN — INSULIN GLARGINE 5 UNITS: 100 INJECTION, SOLUTION SUBCUTANEOUS at 21:42

## 2024-11-09 RX ADMIN — INSULIN LISPRO 2 UNITS: 100 INJECTION, SOLUTION INTRAVENOUS; SUBCUTANEOUS at 17:29

## 2024-11-09 RX ADMIN — Medication 10 ML: at 21:44

## 2024-11-09 RX ADMIN — MAGNESIUM SULFATE 1 G: 1 INJECTION INTRAVENOUS at 14:12

## 2024-11-09 RX ADMIN — ROPINIROLE 1 MG: 1 TABLET, FILM COATED ORAL at 08:46

## 2024-11-09 RX ADMIN — SODIUM BICARBONATE 150 MEQ: 84 INJECTION, SOLUTION INTRAVENOUS at 03:59

## 2024-11-09 RX ADMIN — MAGNESIUM SULFATE 1 G: 1 INJECTION INTRAVENOUS at 14:57

## 2024-11-09 RX ADMIN — INSULIN LISPRO 1 UNITS: 100 INJECTION, SOLUTION INTRAVENOUS; SUBCUTANEOUS at 21:42

## 2024-11-09 RX ADMIN — SUCRALFATE 1 G: 1 TABLET ORAL at 07:54

## 2024-11-09 RX ADMIN — NORTRIPTYLINE HYDROCHLORIDE 25 MG: 25 CAPSULE ORAL at 21:42

## 2024-11-09 RX ADMIN — MAGNESIUM SULFATE 1 G: 1 INJECTION INTRAVENOUS at 15:59

## 2024-11-09 RX ADMIN — INSULIN LISPRO 1 UNITS: 100 INJECTION, SOLUTION INTRAVENOUS; SUBCUTANEOUS at 08:46

## 2024-11-09 RX ADMIN — POTASSIUM CHLORIDE 40 MEQ: 750 CAPSULE, EXTENDED RELEASE ORAL at 08:46

## 2024-11-09 RX ADMIN — PANTOPRAZOLE SODIUM 40 MG: 40 TABLET, DELAYED RELEASE ORAL at 06:11

## 2024-11-09 RX ADMIN — SUCRALFATE 1 G: 1 TABLET ORAL at 16:42

## 2024-11-09 RX ADMIN — ROPINIROLE 1 MG: 1 TABLET, FILM COATED ORAL at 21:42

## 2024-11-09 RX ADMIN — SUCRALFATE 1 G: 1 TABLET ORAL at 21:42

## 2024-11-09 RX ADMIN — Medication 2000 UNITS: at 08:46

## 2024-11-09 RX ADMIN — INSULIN LISPRO 1 UNITS: 100 INJECTION, SOLUTION INTRAVENOUS; SUBCUTANEOUS at 13:13

## 2024-11-09 NOTE — PLAN OF CARE
Goal Outcome Evaluation:              Outcome Evaluation: Pt  appetite and intake is very low. She is not interested in eating, she only drinks very minimum water. Surgeon has suggested J tube placement, but pt is refusing secondary  to  complications with last J tube placement a month ago. Cont to have Bicarb with D5W infusion.

## 2024-11-09 NOTE — PROGRESS NOTES
Nephrology ([facility]) Progress Note      Patient:  Tasha Perez  YOB: 1970  Date of Service: 11/9/2024  MRN: [id number]   Acct: [id number]   Primary Care Physician: KIRILL Murguia MD  Advance Directive:   Code Status and Medical Interventions: CPR (Attempt to Resuscitate); Full Support   Ordered at: 11/07/24 2230     Level Of Support Discussed With:    Patient     Code Status (Patient has no pulse and is not breathing):    CPR (Attempt to Resuscitate)     Medical Interventions (Patient has pulse or is breathing):    Full Support     Admit Date: 11/7/2024       Hospital Day: 1  Referring Provider: No ref. provider found      Patient personally seen and examined.  Complete chart including Consults, Notes, Operative Reports, Labs, Cardiology, and Radiology studies reviewed as able.        Subjective:  Tasha Perez is a 54 y.o. female for whom we were consulted for evaluation and treatment of acute kidney injury. Baseline chronic kidney disease stage 3b with baseline creatinine approximately 1.6   Follows at our [place] office. History of chronic GI illnesses with nausea/vomiting, rheumatoid arthritis, type 2 diabetes, gastroparesis, and chronic pancreatitis. History of multiple hospitalizations for SUGAR due to volume depletion. Most recent discharge from hospital was 10/26.  Just over a month ago she had J-tube placed which then came out a few weeks after it was placed.  Outpatient labs showed worsening renal function so was directed to ER for treatment. Initial labs in ER were creatinine 4.05, sodium 134, potassium 2.8, CO2 14.0.  Admitted to medical floor for treatment. Started on bicarbonate IV fluids and potassium was replaced. Complaint of nausea but no vomiting or diarrhea. Denies pain or dyspnea. No edema. No changes in urination. Denies NSAID use.   Today, patient is awake with no new complaints.  She has been feeling better.  Her repeat serum creatinine was  1.5.    Allergies:  Bee venom, Hydrocodone, Ibuprofen, Pineapple, Pineapple extract, Wasp venom, Wasp venom protein, Hydrocodone-acetaminophen, Penicillins, Sitagliptin, Metformin, and Chocolate    Home Meds:  Medications Prior to Admission   Medication Sig Dispense Refill Last Dose/Taking    rOPINIRole (REQUIP) 1 MG tablet Take 1 tablet by mouth 3 (Three) Times a Day.   11/7/2024    aspirin 81 MG EC tablet Take 1 tablet by mouth Daily.       atorvastatin (LIPITOR) 80 MG tablet Take 1 tablet by mouth Every Night.       Calcium Carbonate-Vitamin D 600-5 MG-MCG tablet Take 1 tablet by mouth Daily.       cetirizine (zyrTEC) 10 MG tablet Take 1 tablet by mouth Daily As Needed for Allergies.       Cholecalciferol (Vitamin D3) 50 MCG (2000 UT) capsule Take 1 capsule by mouth Daily.       dapagliflozin Propanediol (Farxiga) 10 MG tablet Take 10 mg by mouth Daily.       DULoxetine (CYMBALTA) 60 MG capsule Take 1 capsule by mouth Daily.       hydroxychloroquine (PLAQUENIL) 200 MG tablet Take 2 tablets by mouth Daily.       Insulin Aspart (novoLOG) 100 UNIT/ML injection Inject 90 Units under the skin into the appropriate area as directed Daily. Up to 90 units daily via omnipod       linaclotide (LINZESS) 290 MCG capsule capsule Take 1 capsule by mouth Every Morning Before Breakfast.       metoprolol succinate XL (TOPROL-XL) 25 MG 24 hr tablet Take 1 tablet by mouth Daily.       nortriptyline (PAMELOR) 25 MG capsule Take 1 capsule by mouth Every Night.       omeprazole (priLOSEC) 40 MG capsule Take 1 capsule by mouth Every Other Day.       ondansetron ODT (ZOFRAN-ODT) 4 MG disintegrating tablet Place 1 tablet on the tongue Every 8 (Eight) Hours As Needed for Nausea or Vomiting.       sodium bicarbonate 650 MG tablet Take 1 tablet by mouth 2 (Two) Times a Day.       sodium chloride 0.9 % solution Infuse 1,000 mL into a venous catheter Daily.       sucralfate (CARAFATE) 1 g tablet Take 1 tablet by mouth 4 (Four) Times a Day.           Medicines:  Current Facility-Administered Medications   Medication Dose Route Frequency Provider Last Rate Last Admin    acetaminophen (TYLENOL) tablet 650 mg  650 mg Oral Q4H PRN Donnie Wright MD        Or    acetaminophen (TYLENOL) 160 MG/5ML oral solution 650 mg  650 mg Oral Q4H PRN Donnie Wright MD        Or    acetaminophen (TYLENOL) suppository 650 mg  650 mg Rectal Q4H PRN Donnie Wright MD        sennosides-docusate (PERICOLACE) 8.6-50 MG per tablet 2 tablet  2 tablet Oral BID PRN Donnie Wright MD        And    polyethylene glycol (MIRALAX) packet 17 g  17 g Oral Daily PRN Donnie Wright MD        And    bisacodyl (DULCOLAX) suppository 10 mg  10 mg Rectal Daily PRN Donnie Wright MD        Calcium Replacement - Follow Nurse / BPA Driven Protocol   Does not apply PRN Adrián Glover DO        cetirizine (zyrTEC) tablet 5 mg  5 mg Oral Daily PRN Donnie Wright MD        cholecalciferol (VITAMIN D3) tablet 2,000 Units  2,000 Units Oral Daily Donnie Wright MD   2,000 Units at 11/09/24 0846    dextrose (D50W) (25 g/50 mL) IV injection 10-50 mL  10-50 mL Intravenous Q15 Min PRN Donnie Wright MD        dextrose (GLUTOSE) oral gel 15 g  15 g Oral Q15 Min PRN Donnie Wright MD        empagliflozin (JARDIANCE) tablet 10 mg  10 mg Oral Daily Donnie Wright MD   10 mg at 11/08/24 0837    Glucagon (GLUCAGEN) injection 1 mg  1 mg Intramuscular Q15 Min PRN Donnie Wright MD        insulin glargine (LANTUS, SEMGLEE) injection 1-200 Units  1-200 Units Subcutaneous Nightly - Glucommander Donnie Wright MD   6 Units at 11/08/24 2138    insulin lispro (humaLOG) injection 1-200 Units  1-200 Units Subcutaneous 4x Daily With Meals & Nightly Donnie Wright MD   1 Units at 11/09/24 1313    insulin lispro (humaLOG) injection 1-200 Units  1-200 Units Subcutaneous PRN Donnie Wright MD        lubiprostone (AMITIZA) capsule 8 mcg  8 mcg Oral BID  Donnie Wright MD   8 mcg at 11/08/24 2138    Magnesium Standard Dose Replacement - Follow Nurse / BPA Driven Protocol   Does not apply PRN Adrián Glover DO        magnesium sulfate in D5W 1g/100mL (PREMIX)  1 g Intravenous Q1H Adrián Glover DO        metoprolol succinate XL (TOPROL-XL) 24 hr tablet 25 mg  25 mg Oral Daily Donnie Wright MD   25 mg at 11/08/24 0837    nitroglycerin (NITROSTAT) SL tablet 0.4 mg  0.4 mg Sublingual Q5 Min PRN Donnie Wright MD        nortriptyline (PAMELOR) capsule 25 mg  25 mg Oral Nightly Donnie Wright MD   25 mg at 11/08/24 2138    ondansetron (ZOFRAN) injection 4 mg  4 mg Intravenous Q6H PRN Donnie Wright MD        pantoprazole (PROTONIX) EC tablet 40 mg  40 mg Oral Q AM Donnie Wright MD   40 mg at 11/09/24 0611    Phosphorus Replacement - Follow Nurse / BPA Driven Protocol   Does not apply PRN Adrián Glover DO        potassium chloride (MICRO-K/KLOR-CON) CR capsule  40 mEq Oral Daily Donnie Wright MD   40 mEq at 11/09/24 0846    Potassium Replacement - Follow Nurse / BPA Driven Protocol   Does not apply PRN Adrián Glover DO        rOPINIRole (REQUIP) tablet 1 mg  1 mg Oral TID Donnie Wright MD   1 mg at 11/09/24 0846    sodium chloride 0.9 % flush 10 mL  10 mL Intravenous Q12H Donnie Wright MD   10 mL at 11/08/24 0001    sucralfate (CARAFATE) tablet 1 g  1 g Oral 4x Daily AC & at Bedtime Donnie Wright MD   1 g at 11/09/24 1052       Past Medical History:  Past Medical History:   Diagnosis Date    Arthritis     Contusion     Degenerative disc disease, cervical     Diabetes mellitus     Elevated cholesterol     Fibromyalgia     Neuropathy     Osteoporosis     Pancreatitis     Raynaud disease     Renal insufficiency     Smoker 02/08/2022    Vitamin D deficiency 04/26/2022       Past Surgical History:  Past Surgical History:   Procedure Laterality Date    APPENDECTOMY      CHOLECYSTECTOMY       COLONOSCOPY      ENDOSCOPY N/A 10/08/2019    Procedure: ESOPHAGOGASTRODUODENOSCOPY WITH ANESTHESIA;  Surgeon: Aleks Vaughn DO;  Location:  [facility];  Service: Gastroenterology    ENDOSCOPY WITH GASTROSTOMY TUBE INSERTION N/A 6/15/2022    Procedure: ESOPHAGOGASTRODUODENOSCOPY WITH GASTROSTOMY TUBE INSERTION;  Surgeon: Jersey Lee MD;  Location:  [facility];  Service: Gastroenterology;  Laterality: N/A;  pre peg placement  post peg placement  Dr. Murguia    ENTEROSCOPY SMALL BOWEL N/A 11/3/2022    Procedure: Esophagogastroduodenoscopy with Peg Removal;  Surgeon: Aleks Vaughn DO;  Location:  [facility];  Service: Gastroenterology;  Laterality: N/A;  pre; peg removal  post; peg removal   KIRILL Murguia MD        HYSTERECTOMY         Family History  Family History   Adopted: Yes   Problem Relation Age of Onset    Colon polyps Neg Hx     Colon cancer Neg Hx        Social History  Social History     Socioeconomic History    Marital status:    Tobacco Use    Smoking status: Every Day     Current packs/day: 0.25     Average packs/day: 0.3 packs/day for 31.0 years (7.8 ttl pk-yrs)     Types: Cigarettes   Vaping Use    Vaping status: Never Used   Substance and Sexual Activity    Alcohol use: No    Drug use: No    Sexual activity: Defer         Review of Systems:  History obtained from chart review and the patient  General ROS: No fever or chills  Respiratory ROS: No cough, shortness of breath, wheezing  Cardiovascular ROS: No chest pain or palpitations  Gastrointestinal ROS: No abdominal pain or melena  Genito-Urinary ROS: No dysuria or hematuria  Psych ROS: No anxiety and depression  14 point ROS reviewed with the patient and negative except as noted above and in the HPI unless unable to obtain.      Objective:  Patient Vitals for the past 24 hrs:   BP Temp Temp src Pulse Resp SpO2   11/09/24 1128 98/58 98.3 °F (36.8 °C) Oral 87 18 99 %   11/09/24 0728 90/58 98.1 °F (36.7 °C) Oral 78  "16 98 %   11/09/24 0417 (!) 89/53 98.2 °F (36.8 °C) Oral 83 14 99 %   11/09/24 0100 98/58 -- -- 86 -- --   11/08/24 2300 (!) 64/40 -- -- -- -- --   11/08/24 2257 (!) 71/43 98.3 °F (36.8 °C) Oral 88 16 98 %   11/08/24 1950 93/61 97.8 °F (36.6 °C) Oral 89 16 99 %   11/08/24 1653 102/97 98 °F (36.7 °C) Oral 91 -- --       Intake/Output Summary (Last 24 hours) at 11/9/2024 1353  Last data filed at 11/9/2024 1200  Gross per 24 hour   Intake 1840 ml   Output 800 ml   Net 1040 ml     General: awake/alert   Chest:  clear to auscultation bilaterally without respiratory distress  CVS: regular rate and rhythm  Abdominal: soft, nontender, positive bowel sounds  Extremities: no cyanosis or edema  Skin: warm and dry without rash      Labs:  Results from last 7 days   Lab Units 11/09/24  1118 11/08/24  0334 11/07/24  1942   WBC 10*3/mm3 9.22 13.85* 15.14*   HEMOGLOBIN g/dL 8.9* 11.0* 12.3   HEMATOCRIT % 27.4* 36.6 37.2   PLATELETS 10*3/mm3 211 230 270         Results from last 7 days   Lab Units 11/09/24  0634 11/08/24  0334 11/07/24 2029   SODIUM mmol/L 144 135* 134*   POTASSIUM mmol/L 2.7* 3.0* 2.8*   CHLORIDE mmol/L 108* 111* 104   CO2 mmol/L 29.0 8.0* 14.0*   BUN mg/dL 37* 64* 61*   CREATININE mg/dL 1.55* 3.66* 4.05*   CALCIUM mg/dL 6.6* 7.9* 8.3*   EGFR mL/min/1.73 39.9* 14.2* 12.6*   GLUCOSE mg/dL 126* 163* 248*       Radiology:   Imaging Results (Last 72 Hours)       ** No results found for the last 72 hours. **            Culture:  No results found for: \"BLOODCX\", \"URINECX\", \"WOUNDCX\", \"MRSACX\", \"RESPCX\", \"STOOLCX\"      Assessment    Acute kidney injury, prerenal--improving  Baseline chronic kidney disease stage 3b  Type 2 diabetes with DKA  Chronic pancreatitis  Gastroparesis  Metabolic acidosis  Hypokalemia  Hyponatremia--improving  Hypomagnesemia    Plan:  May hold IV fluid  Replace potassium , magnesium  Monitor labs  Agree with replacing jejunal tube for supplemental feeding and hydration.    Feliberto Barrow, " MD  11/9/2024  13:53 CST

## 2024-11-09 NOTE — PROGRESS NOTES
[facility] Medicine Services  INPATIENT PROGRESS NOTE    Patient Name: Tasha Perez  Date of Admission: 11/7/2024  Today's Date: 11/09/24  Length of Stay: 1  Primary Care Physician: KIRILL Murguia MD    Subjective   Chief Complaint: Fatigue, worsening renal function  HPI     The patient continues to improve.  Renal function is as good as I have ever seen with creatinine 1.55 today.  Potassium low at 2.7 and will be replaced per protocol.  Magnesium will be performed on blood drawn earlier today.  Will discontinue IV fluids at this point given renal recovery and resolution of acidosis with closure of anion gap.  White blood cell count today within normal limits.  Hemoglobin 8.9.    Review of Systems   All pertinent negatives and positives are as above. All other systems have been reviewed and are negative unless otherwise stated.     Objective    Temp:  [97.8 °F (36.6 °C)-98.3 °F (36.8 °C)] 98.3 °F (36.8 °C)  Heart Rate:  [78-91] 87  Resp:  [14-18] 18  BP: ()/(40-97) 98/58  Physical Exam    Constitutional:       General: She is not in acute distress.     Appearance: She is not diaphoretic.      Comments: Underweight and chronically ill-appearing woman.   HENT:      Head: Normocephalic and atraumatic.      Right Ear: External ear normal.      Left Ear: External ear normal.      Nose: No congestion or rhinorrhea.      Mouth: Mucous membranes are moist.   Eyes:      General: No scleral icterus.     Extraocular Movements: Extraocular movements intact.      Conjunctiva/sclera: Conjunctivae normal.   Cardiovascular:      Rate and Rhythm: Normal rate and regular rhythm.      Heart sounds: Normal heart sounds. No murmur heard.  Pulmonary:      Effort: Pulmonary effort is normal. No respiratory distress.      Breath sounds: Normal breath sounds. No wheezing, rhonchi or rales.   Abdominal:      General: Abdomen is flat. There is no distension.      Palpations: Abdomen is soft.     "  Tenderness: There is no abdominal tenderness. There is no guarding.   Musculoskeletal:         General: No swelling, tenderness or deformity.      Right lower leg: No edema.      Left lower leg: No edema.   Skin:     General: Skin is warm and dry.   Neurological:      General: No focal deficit present.      Mental Status: She is alert and oriented to person, place, and time.      Motor: No weakness.   Psychiatric:         Mood and Affect: Mood normal.         Behavior: Behavior normal.         Thought Content: Thought content normal.     Results Review:  I have reviewed the labs, radiology results, and diagnostic studies.    Laboratory Data:   Results from last 7 days   Lab Units 11/09/24  1118 11/08/24  0334 11/07/24  1942   WBC 10*3/mm3 9.22 13.85* 15.14*   HEMOGLOBIN g/dL 8.9* 11.0* 12.3   HEMATOCRIT % 27.4* 36.6 37.2   PLATELETS 10*3/mm3 211 230 270        Results from last 7 days   Lab Units 11/09/24  0634 11/08/24  0334 11/07/24 2029   SODIUM mmol/L 144 135* 134*   POTASSIUM mmol/L 2.7* 3.0* 2.8*   CHLORIDE mmol/L 108* 111* 104   CO2 mmol/L 29.0 8.0* 14.0*   BUN mg/dL 37* 64* 61*   CREATININE mg/dL 1.55* 3.66* 4.05*   CALCIUM mg/dL 6.6* 7.9* 8.3*   GLUCOSE mg/dL 126* 163* 248*       Culture Data:   No results found for: \"BLOODCX\", \"URINECX\", \"WOUNDCX\", \"MRSACX\", \"RESPCX\", \"STOOLCX\"    Radiology Data:   Imaging Results (Last 24 Hours)       ** No results found for the last 24 hours. **            I have reviewed the patient's current medications.     Assessment/Plan   Assessment  Active Hospital Problems    Diagnosis     **Acute renal failure superimposed on stage 3b chronic kidney disease     Metabolic acidosis     Intractable nausea     Fibromyalgia     Hypokalemia     Type 2 diabetes mellitus, without long-term current use of insulin        Treatment Plan  Discontinue IV fluids  Oral intake encouraged  Stat magnesium level    Medical Decision Making  Number and Complexity of problems: 3 acute, moderate " severity, moderate complexity medical problems.  Differential Diagnosis: None     Conditions and Status        Condition is unchanged.     [facility] Data  External documents reviewed: Care Everywhere  Cardiac tracing (EKG, telemetry) interpretation: See HPI  Radiology interpretation: See HPI  Labs reviewed: The HPI  Any tests that were considered but not ordered: None     Decision rules/scores evaluated (example [id number], Wells, etc): Not applicable     Discussed with: Patient and her  Vincent Perez     Care Planning  Shared decision making: Patient and her  Vincent Perez  Code status and discussions: CODE STATUS is full code     Disposition  Social Determinants of Health that impact treatment or disposition: None  I expect the patient to be discharged to home in 1-2 days.     Electronically signed by Adrián Glover DO, 11/09/24, 12:21 CST.

## 2024-11-09 NOTE — NURSING NOTE
BP has been progressively getting  lower. Pt remains asymptomatic. BP was 93/61 @ 2000, 71/43 @ 2330 per machine and manual BP was 64/40. Called  placed to hospitalist on call, Dr. Gonzalez and received orders for fluid bolus and Midodrine 10mg now.    BP  98/58 rechecking after receiving fluid bolus and Midodrine

## 2024-11-10 LAB
ANION GAP SERPL CALCULATED.3IONS-SCNC: 5 MMOL/L (ref 5–15)
BASOPHILS # BLD AUTO: 0.03 10*3/MM3 (ref 0–0.2)
BASOPHILS NFR BLD AUTO: 0.3 % (ref 0–1.5)
BUN SERPL-MCNC: 22 MG/DL (ref 6–20)
BUN/CREAT SERPL: 15.8 (ref 7–25)
CALCIUM SPEC-SCNC: 6.9 MG/DL (ref 8.6–10.5)
CHLORIDE SERPL-SCNC: 106 MMOL/L (ref 98–107)
CO2 SERPL-SCNC: 32 MMOL/L (ref 22–29)
CREAT SERPL-MCNC: 1.39 MG/DL (ref 0.57–1)
DEPRECATED RDW RBC AUTO: 47.1 FL (ref 37–54)
EGFRCR SERPLBLD CKD-EPI 2021: 45.5 ML/MIN/1.73
EOSINOPHIL # BLD AUTO: 0.24 10*3/MM3 (ref 0–0.4)
EOSINOPHIL NFR BLD AUTO: 2.5 % (ref 0.3–6.2)
ERYTHROCYTE [DISTWIDTH] IN BLOOD BY AUTOMATED COUNT: 13.6 % (ref 12.3–15.4)
GLUCOSE BLDC GLUCOMTR-MCNC: 157 MG/DL (ref 70–130)
GLUCOSE BLDC GLUCOMTR-MCNC: 197 MG/DL (ref 70–130)
GLUCOSE BLDC GLUCOMTR-MCNC: 244 MG/DL (ref 70–130)
GLUCOSE BLDC GLUCOMTR-MCNC: 98 MG/DL (ref 70–130)
GLUCOSE SERPL-MCNC: 155 MG/DL (ref 65–99)
HCT VFR BLD AUTO: 28.6 % (ref 34–46.6)
HGB BLD-MCNC: 9.3 G/DL (ref 12–15.9)
IMM GRANULOCYTES # BLD AUTO: 0.04 10*3/MM3 (ref 0–0.05)
IMM GRANULOCYTES NFR BLD AUTO: 0.4 % (ref 0–0.5)
LYMPHOCYTES # BLD AUTO: 3.36 10*3/MM3 (ref 0.7–3.1)
LYMPHOCYTES NFR BLD AUTO: 34.6 % (ref 19.6–45.3)
MAGNESIUM SERPL-MCNC: 2 MG/DL (ref 1.6–2.6)
MCH RBC QN AUTO: 31.1 PG (ref 26.6–33)
MCHC RBC AUTO-ENTMCNC: 32.5 G/DL (ref 31.5–35.7)
MCV RBC AUTO: 95.7 FL (ref 79–97)
MONOCYTES # BLD AUTO: 0.62 10*3/MM3 (ref 0.1–0.9)
MONOCYTES NFR BLD AUTO: 6.4 % (ref 5–12)
NEUTROPHILS NFR BLD AUTO: 5.42 10*3/MM3 (ref 1.7–7)
NEUTROPHILS NFR BLD AUTO: 55.8 % (ref 42.7–76)
NRBC BLD AUTO-RTO: 0 /100 WBC (ref 0–0.2)
PLATELET # BLD AUTO: 218 10*3/MM3 (ref 140–450)
PMV BLD AUTO: 10 FL (ref 6–12)
POTASSIUM SERPL-SCNC: 4 MMOL/L (ref 3.5–5.2)
RBC # BLD AUTO: 2.99 10*6/MM3 (ref 3.77–5.28)
SODIUM SERPL-SCNC: 143 MMOL/L (ref 136–145)
WBC NRBC COR # BLD AUTO: 9.71 10*3/MM3 (ref 3.4–10.8)

## 2024-11-10 PROCEDURE — 63710000001 INSULIN GLARGINE PER 5 UNITS: Performed by: INTERNAL MEDICINE

## 2024-11-10 PROCEDURE — 85025 COMPLETE CBC W/AUTO DIFF WBC: CPT | Performed by: INTERNAL MEDICINE

## 2024-11-10 PROCEDURE — 80048 BASIC METABOLIC PNL TOTAL CA: CPT | Performed by: INTERNAL MEDICINE

## 2024-11-10 PROCEDURE — 83735 ASSAY OF MAGNESIUM: CPT | Performed by: FAMILY MEDICINE

## 2024-11-10 PROCEDURE — 63710000001 INSULIN LISPRO (HUMAN) PER 5 UNITS: Performed by: INTERNAL MEDICINE

## 2024-11-10 PROCEDURE — 82948 REAGENT STRIP/BLOOD GLUCOSE: CPT

## 2024-11-10 RX ORDER — NIFEDIPINE 30 MG/1
30 TABLET, EXTENDED RELEASE ORAL DAILY
COMMUNITY
End: 2024-11-13 | Stop reason: HOSPADM

## 2024-11-10 RX ADMIN — SUCRALFATE 1 G: 1 TABLET ORAL at 07:43

## 2024-11-10 RX ADMIN — INSULIN LISPRO 1 UNITS: 100 INJECTION, SOLUTION INTRAVENOUS; SUBCUTANEOUS at 13:03

## 2024-11-10 RX ADMIN — ROPINIROLE 1 MG: 1 TABLET, FILM COATED ORAL at 20:27

## 2024-11-10 RX ADMIN — Medication 2000 UNITS: at 08:48

## 2024-11-10 RX ADMIN — INSULIN GLARGINE 5 UNITS: 100 INJECTION, SOLUTION SUBCUTANEOUS at 20:30

## 2024-11-10 RX ADMIN — INSULIN LISPRO 3 UNITS: 100 INJECTION, SOLUTION INTRAVENOUS; SUBCUTANEOUS at 18:03

## 2024-11-10 RX ADMIN — INSULIN LISPRO 2 UNITS: 100 INJECTION, SOLUTION INTRAVENOUS; SUBCUTANEOUS at 08:49

## 2024-11-10 RX ADMIN — ROPINIROLE 1 MG: 1 TABLET, FILM COATED ORAL at 16:40

## 2024-11-10 RX ADMIN — Medication 10 ML: at 20:30

## 2024-11-10 RX ADMIN — NORTRIPTYLINE HYDROCHLORIDE 25 MG: 25 CAPSULE ORAL at 20:28

## 2024-11-10 RX ADMIN — ROPINIROLE 1 MG: 1 TABLET, FILM COATED ORAL at 08:49

## 2024-11-10 RX ADMIN — PANTOPRAZOLE SODIUM 40 MG: 40 TABLET, DELAYED RELEASE ORAL at 06:30

## 2024-11-10 RX ADMIN — SUCRALFATE 1 G: 1 TABLET ORAL at 20:28

## 2024-11-10 RX ADMIN — SUCRALFATE 1 G: 1 TABLET ORAL at 11:39

## 2024-11-10 RX ADMIN — POTASSIUM CHLORIDE 40 MEQ: 750 CAPSULE, EXTENDED RELEASE ORAL at 08:49

## 2024-11-10 RX ADMIN — SUCRALFATE 1 G: 1 TABLET ORAL at 16:40

## 2024-11-10 RX ADMIN — Medication 10 ML: at 08:50

## 2024-11-10 NOTE — PROGRESS NOTES
Nephrology ([facility]) Progress Note      Patient:  Tasha Perez  YOB: 1970  Date of Service: 11/10/2024  MRN: [id number]   Acct: [id number]   Primary Care Physician: KIRILL Murguia MD  Advance Directive:   Code Status and Medical Interventions: CPR (Attempt to Resuscitate); Full Support   Ordered at: 11/07/24 2230     Level Of Support Discussed With:    Patient     Code Status (Patient has no pulse and is not breathing):    CPR (Attempt to Resuscitate)     Medical Interventions (Patient has pulse or is breathing):    Full Support     Admit Date: 11/7/2024       Hospital Day: 2  Referring Provider: No ref. provider found      Patient personally seen and examined.  Complete chart including Consults, Notes, Operative Reports, Labs, Cardiology, and Radiology studies reviewed as able.        Subjective:  Tasha Perez is a 54 y.o. female for whom we were consulted for evaluation and treatment of acute kidney injury. Baseline chronic kidney disease stage 3b with baseline creatinine approximately 1.6   Follows at our [place] office. History of chronic GI illnesses with nausea/vomiting, rheumatoid arthritis, type 2 diabetes, gastroparesis, and chronic pancreatitis. History of multiple hospitalizations for SUGAR due to volume depletion. Most recent discharge from hospital was 10/26.  Just over a month ago she had J-tube placed which then came out a few weeks after it was placed.  Outpatient labs showed worsening renal function so was directed to ER for treatment. Initial labs in ER were creatinine 4.05, sodium 134, potassium 2.8, CO2 14.0.  Admitted to medical floor for treatment. Started on bicarbonate IV fluids and potassium was replaced. Complaint of nausea but no vomiting or diarrhea. Denies pain or dyspnea. No edema. No changes in urination. Denies NSAID use.   Today, patient is awake with no new complaints.  She has been feeling better.  Her repeat serum creatinine was 1.3.   She is now awaiting j tube placement.    Allergies:  Bee venom, Hydrocodone, Ibuprofen, Pineapple, Pineapple extract, Wasp venom, Wasp venom protein, Hydrocodone-acetaminophen, Penicillins, Sitagliptin, Metformin, and Chocolate    Home Meds:  Medications Prior to Admission   Medication Sig Dispense Refill Last Dose/Taking    aspirin 81 MG EC tablet Take 1 tablet by mouth Daily.   Taking    atorvastatin (LIPITOR) 80 MG tablet Take 1 tablet by mouth Every Night.   Taking    Calcium Carbonate-Vitamin D 600-5 MG-MCG tablet Take 1 tablet by mouth Daily.   Taking    Cholecalciferol (Vitamin D3) 50 MCG (2000 UT) capsule Take 1 capsule by mouth Daily.   Taking    dapagliflozin Propanediol (Farxiga) 10 MG tablet Take 10 mg by mouth Daily.   Taking    DULoxetine (CYMBALTA) 60 MG capsule Take 1 capsule by mouth Daily.   Taking    hydroxychloroquine (PLAQUENIL) 200 MG tablet Take 2 tablets by mouth Daily.   Taking    Insulin Aspart (novoLOG) 100 UNIT/ML injection Inject 90 Units under the skin into the appropriate area as directed Daily. Up to 90 units daily via omnipod   Taking    linaclotide (LINZESS) 290 MCG capsule capsule Take 1 capsule by mouth Every Morning Before Breakfast.   Taking    NIFEdipine XL (PROCARDIA XL) 30 MG 24 hr tablet Take 1 tablet by mouth Daily.   Taking    nortriptyline (PAMELOR) 25 MG capsule Take 1 capsule by mouth Every Night.   Taking    omeprazole (priLOSEC) 40 MG capsule Take 1 capsule by mouth Every Other Day.   Taking    rOPINIRole (REQUIP) 1 MG tablet Take 1 tablet by mouth 3 (Three) Times a Day.   11/7/2024    sodium bicarbonate 650 MG tablet Take 1 tablet by mouth 2 (Two) Times a Day.   Taking    sodium chloride 0.9 % solution Infuse 1,000 mL into a venous catheter Daily.   Taking    sucralfate (CARAFATE) 1 g tablet Take 1 tablet by mouth 4 (Four) Times a Day.   Taking    cetirizine (zyrTEC) 10 MG tablet Take 1 tablet by mouth Daily As Needed for Allergies.       metoprolol succinate XL  (TOPROL-XL) 25 MG 24 hr tablet Take 1 tablet by mouth Daily.       ondansetron ODT (ZOFRAN-ODT) 4 MG disintegrating tablet Place 1 tablet on the tongue Every 8 (Eight) Hours As Needed for Nausea or Vomiting.          Medicines:  Current Facility-Administered Medications   Medication Dose Route Frequency Provider Last Rate Last Admin    acetaminophen (TYLENOL) tablet 650 mg  650 mg Oral Q4H PRN Donnie Wright MD        Or    acetaminophen (TYLENOL) 160 MG/5ML oral solution 650 mg  650 mg Oral Q4H PRN Donnie Wright MD        Or    acetaminophen (TYLENOL) suppository 650 mg  650 mg Rectal Q4H PRN Donnie Wright MD        sennosides-docusate (PERICOLACE) 8.6-50 MG per tablet 2 tablet  2 tablet Oral BID PRN Donnie Wright MD        And    polyethylene glycol (MIRALAX) packet 17 g  17 g Oral Daily PRN Donnie Wright MD        And    bisacodyl (DULCOLAX) suppository 10 mg  10 mg Rectal Daily PRN Donnie Wright MD        Calcium Replacement - Follow Nurse / BPA Driven Protocol   Does not apply PRN Adrián Glover DO        cetirizine (zyrTEC) tablet 5 mg  5 mg Oral Daily PRN Donnie Wright MD        cholecalciferol (VITAMIN D3) tablet 2,000 Units  2,000 Units Oral Daily Donnie Wright MD   2,000 Units at 11/10/24 0848    dextrose (D50W) (25 g/50 mL) IV injection 10-50 mL  10-50 mL Intravenous Q15 Min PRN Donnie Wright MD        dextrose (GLUTOSE) oral gel 15 g  15 g Oral Q15 Min PRN Donnie Wright MD        empagliflozin (JARDIANCE) tablet 10 mg  10 mg Oral Daily Donnie Wright MD   10 mg at 11/08/24 0837    Glucagon (GLUCAGEN) injection 1 mg  1 mg Intramuscular Q15 Min PRN Donnie Wright MD        insulin glargine (LANTUS, SEMGLEE) injection 1-200 Units  1-200 Units Subcutaneous Nightly - Glucommander Kyle, Sotonte E, MD   5 Units at 11/09/24 2142    insulin lispro (humaLOG) injection 1-200 Units  1-200 Units Subcutaneous 4x Daily With Meals & Nightly  Donnie Wright MD   1 Units at 11/10/24 1303    insulin lispro (humaLOG) injection 1-200 Units  1-200 Units Subcutaneous PRN Donnie Wright MD        lubiprostone (AMITIZA) capsule 8 mcg  8 mcg Oral BID Donnie Wright MD   8 mcg at 11/08/24 2138    Magnesium Standard Dose Replacement - Follow Nurse / BPA Driven Protocol   Does not apply PRN Adrián Glover DO        nitroglycerin (NITROSTAT) SL tablet 0.4 mg  0.4 mg Sublingual Q5 Min PRN Donnie Wright MD        nortriptyline (PAMELOR) capsule 25 mg  25 mg Oral Nightly Donnie Wright MD   25 mg at 11/09/24 2142    ondansetron (ZOFRAN) injection 4 mg  4 mg Intravenous Q6H PRN Donnie Wright MD        pantoprazole (PROTONIX) EC tablet 40 mg  40 mg Oral Q AM Donnie Wright MD   40 mg at 11/10/24 0630    Phosphorus Replacement - Follow Nurse / BPA Driven Protocol   Does not apply PRN Adrián Glover DO        potassium chloride (MICRO-K/KLOR-CON) CR capsule  40 mEq Oral Daily Donnie Wright MD   40 mEq at 11/10/24 0849    Potassium Replacement - Follow Nurse / BPA Driven Protocol   Does not apply PRN Adrián Glover DO        rOPINIRole (REQUIP) tablet 1 mg  1 mg Oral TID Donnie Wright MD   1 mg at 11/10/24 0849    sodium chloride 0.9 % flush 10 mL  10 mL Intravenous Q12H Donnie Wright MD   10 mL at 11/10/24 0850    sucralfate (CARAFATE) tablet 1 g  1 g Oral 4x Daily AC & at Bedtime Donnie Wright MD   1 g at 11/10/24 1139       Past Medical History:  Past Medical History:   Diagnosis Date    Arthritis     Contusion     Degenerative disc disease, cervical     Diabetes mellitus     Elevated cholesterol     Fibromyalgia     Neuropathy     Osteoporosis     Pancreatitis     Raynaud disease     Renal insufficiency     Smoker 02/08/2022    Vitamin D deficiency 04/26/2022       Past Surgical History:  Past Surgical History:   Procedure Laterality Date    APPENDECTOMY      CHOLECYSTECTOMY      COLONOSCOPY       ENDOSCOPY N/A 10/08/2019    Procedure: ESOPHAGOGASTRODUODENOSCOPY WITH ANESTHESIA;  Surgeon: Aleks Vaughn DO;  Location:  [facility];  Service: Gastroenterology    ENDOSCOPY WITH GASTROSTOMY TUBE INSERTION N/A 6/15/2022    Procedure: ESOPHAGOGASTRODUODENOSCOPY WITH GASTROSTOMY TUBE INSERTION;  Surgeon: Jersey Lee MD;  Location:  [facility];  Service: Gastroenterology;  Laterality: N/A;  pre peg placement  post peg placement  Dr. Murguia    ENTEROSCOPY SMALL BOWEL N/A 11/3/2022    Procedure: Esophagogastroduodenoscopy with Peg Removal;  Surgeon: Aleks Vaughn DO;  Location: [facility];  Service: Gastroenterology;  Laterality: N/A;  pre; peg removal  post; peg removal   KIRILL Murguia MD        HYSTERECTOMY         Family History  Family History   Adopted: Yes   Problem Relation Age of Onset    Colon polyps Neg Hx     Colon cancer Neg Hx        Social History  Social History     Socioeconomic History    Marital status:    Tobacco Use    Smoking status: Every Day     Current packs/day: 0.25     Average packs/day: 0.3 packs/day for 31.0 years (7.8 ttl pk-yrs)     Types: Cigarettes   Vaping Use    Vaping status: Never Used   Substance and Sexual Activity    Alcohol use: No    Drug use: No    Sexual activity: Defer         Review of Systems:  History obtained from chart review and the patient  General ROS: No fever or chills  Respiratory ROS: No cough, shortness of breath, wheezing  Cardiovascular ROS: No chest pain or palpitations  Gastrointestinal ROS: No abdominal pain or melena  Genito-Urinary ROS: No dysuria or hematuria  Psych ROS: No anxiety and depression  14 point ROS reviewed with the patient and negative except as noted above and in the HPI unless unable to obtain.      Objective:  Patient Vitals for the past 24 hrs:   BP Temp Temp src Pulse Resp SpO2   11/10/24 1608 142/84 98.4 °F (36.9 °C) Oral 90 16 100 %   11/10/24 1127 97/63 97.9 °F (36.6 °C) Oral 84 16 99 %  "  11/10/24 0736 122/78 98.3 °F (36.8 °C) Oral 89 16 99 %   11/10/24 0411 118/69 98.3 °F (36.8 °C) Oral 86 14 97 %   11/10/24 0021 102/62 97.8 °F (36.6 °C) Oral 90 14 96 %   11/09/24 1953 (!) 87/54 97.9 °F (36.6 °C) Oral 86 16 100 %       Intake/Output Summary (Last 24 hours) at 11/10/2024 1627  Last data filed at 11/10/2024 1608  Gross per 24 hour   Intake 808 ml   Output 1000 ml   Net -192 ml     General: awake/alert   Chest:  clear to auscultation bilaterally without respiratory distress  CVS: regular rate and rhythm  Abdominal: soft, nontender, positive bowel sounds  Extremities: no cyanosis or edema  Skin: warm and dry without rash      Labs:  Results from last 7 days   Lab Units 11/10/24  0455 11/09/24  1118 11/08/24  0334   WBC 10*3/mm3 9.71 9.22 13.85*   HEMOGLOBIN g/dL 9.3* 8.9* 11.0*   HEMATOCRIT % 28.6* 27.4* 36.6   PLATELETS 10*3/mm3 218 211 230         Results from last 7 days   Lab Units 11/10/24  0455 11/09/24  1530 11/09/24  0634 11/08/24  0334   SODIUM mmol/L 143  --  144 135*   POTASSIUM mmol/L 4.0 3.7 2.7* 3.0*   CHLORIDE mmol/L 106  --  108* 111*   CO2 mmol/L 32.0*  --  29.0 8.0*   BUN mg/dL 22*  --  37* 64*   CREATININE mg/dL 1.39*  --  1.55* 3.66*   CALCIUM mg/dL 6.9*  --  6.6* 7.9*   EGFR mL/min/1.73 45.5*  --  39.9* 14.2*   GLUCOSE mg/dL 155*  --  126* 163*       Radiology:   Imaging Results (Last 72 Hours)       ** No results found for the last 72 hours. **            Culture:  No results found for: \"BLOODCX\", \"URINECX\", \"WOUNDCX\", \"MRSACX\", \"RESPCX\", \"STOOLCX\"      Assessment    Acute kidney injury, prerenal--improving  Baseline chronic kidney disease stage 3b  Type 2 diabetes with DKA  Chronic pancreatitis  Gastroparesis  Metabolic acidosis  Hypokalemia  Hyponatremia--improving  Hypomagnesemia    Plan:  Continue hydration by mouth  Replace potassium , magnesium  Monitor labs  Agree with replacing jejunal tube for supplemental feeding and hydration.    Feliberto Barrow, " MD  11/10/2024  16:27 CST

## 2024-11-10 NOTE — PLAN OF CARE
Goal Outcome Evaluation:              Outcome Evaluation: Pt  appetite and intake is very low. She is not interested in eating, she only drinks very minimum water. Surgeon has suggested J tube placement, but pt is refusing secondary  to  complications with last J tube placement a month ago. Cont to have Bicarb with D5W infusion.      Pt has improved in the last 24 hrs. She is eating better. She ate a sandwich and drank  ginger ale at bedtime. SBP has been upper 80s to low 90s. Amitiza has hypotension as side effect. It was held on days and night dose and BP has improved this shift. Last BP was 118/69. Metoprolol was held on day shift as well. Pt states she feels much better. She has spoke to her family and wishes to have the J tube placed, hopefully Mon or Tues. Pt has been asking to go outside and smoke. She refuses a nicotine patch.                            Normal vision: sees adequately in most situations; can see medication labels, newsprint

## 2024-11-10 NOTE — PLAN OF CARE
Goal Outcome Evaluation:  Plan of Care Reviewed With: patient           Outcome Evaluation: Surgeon to speak with patient tomorrow. Patient reports that she spoke with family and has decided to retry the J-tube. Patient has been eating 25-50 percent of meals provided today. Denies complaints of pain or nausea. Electrolyte replacement given. Repeat labs in morning per protocol. BP low. Pt asymptomatic.

## 2024-11-10 NOTE — PROGRESS NOTES
[facility] Medicine Services  INPATIENT PROGRESS NOTE    Patient Name: Tasha Perez  Date of Admission: 11/7/2024  Today's Date: 11/10/24  Length of Stay: 2  Primary Care Physician: KIRILL Murguia MD    Subjective   Chief Complaint: Fatigue  HPI     The patient's renal function is at baseline.  Potassium is within normal 1-2 after replacement per protocol.  Creatinine 1.39 today.  CBC this a.m. is stable with white blood cell count within normal limits and hemoglobin 9.3.  The patient has agreed to proceed with J-tube placement after discussion with her daughter.  We discussed discharge today with outpatient placement of a J-tube however the patient would like to wait until general surgery reassess is here for possible J-tube placement while hospitalized.    Review of Systems   All pertinent negatives and positives are as above. All other systems have been reviewed and are negative unless otherwise stated.     Objective    Temp:  [97.8 °F (36.6 °C)-98.3 °F (36.8 °C)] 97.9 °F (36.6 °C)  Heart Rate:  [84-92] 84  Resp:  [14-18] 16  BP: ()/(51-78) 97/63  Physical Exam    Constitutional:       General: She is not in acute distress.     Appearance: She is not diaphoretic.      Comments: Underweight and chronically ill-appearing.  HENT:      Head: Normocephalic and atraumatic.      Right Ear: External ear normal.      Left Ear: External ear normal.      Nose: No congestion or rhinorrhea.      Mouth: Mucous membranes are moist.   Eyes:      General: No scleral icterus.     Extraocular Movements: Extraocular movements intact.      Conjunctiva/sclera: Conjunctivae normal.   Cardiovascular:      Rate and Rhythm: Normal rate and regular rhythm.      Heart sounds: Normal heart sounds. No murmur heard.  Pulmonary:      Effort: Pulmonary effort is normal. No respiratory distress.      Breath sounds: Normal breath sounds. No wheezing, rhonchi or rales.   Abdominal:      General:  "Abdomen is flat. There is no distension.      Palpations: Abdomen is soft.      Tenderness: There is no abdominal tenderness. There is no guarding.   Musculoskeletal:         General: No swelling, tenderness or deformity.      Right lower leg: No edema.      Left lower leg: No edema.   Skin:     General: Skin is warm and dry.   Neurological:      General: No focal deficit present.      Mental Status: She is alert and oriented to person, place, and time.      Motor: No weakness.   Psychiatric:         Mood and Affect: Mood normal.         Behavior: Behavior normal.         Thought Content: Thought content normal.     Results Review:  I have reviewed the labs, radiology results, and diagnostic studies.    Laboratory Data:   Results from last 7 days   Lab Units 11/10/24  0455 11/09/24  1118 11/08/24  0334   WBC 10*3/mm3 9.71 9.22 13.85*   HEMOGLOBIN g/dL 9.3* 8.9* 11.0*   HEMATOCRIT % 28.6* 27.4* 36.6   PLATELETS 10*3/mm3 218 211 230        Results from last 7 days   Lab Units 11/10/24  0455 11/09/24  1530 11/09/24  0634 11/08/24  0334   SODIUM mmol/L 143  --  144 135*   POTASSIUM mmol/L 4.0 3.7 2.7* 3.0*   CHLORIDE mmol/L 106  --  108* 111*   CO2 mmol/L 32.0*  --  29.0 8.0*   BUN mg/dL 22*  --  37* 64*   CREATININE mg/dL 1.39*  --  1.55* 3.66*   CALCIUM mg/dL 6.9*  --  6.6* 7.9*   GLUCOSE mg/dL 155*  --  126* 163*       Culture Data:   No results found for: \"BLOODCX\", \"URINECX\", \"WOUNDCX\", \"MRSACX\", \"RESPCX\", \"STOOLCX\"    Radiology Data:   Imaging Results (Last 24 Hours)       ** No results found for the last 24 hours. **            I have reviewed the patient's current medications.     Assessment/Plan   Assessment  Active Hospital Problems    Diagnosis     **Acute renal failure superimposed on stage 3b chronic kidney disease     Metabolic acidosis     Intractable nausea     Fibromyalgia     Hypokalemia     Type 2 diabetes mellitus, without long-term current use of insulin        Treatment Plan  Recheck BMP in " a.m.  Oral intake encouraged    Medical Decision Making  Number and Complexity of problems: 3 acute, moderate severity, moderate complexity medical problems.  Differential Diagnosis: None     Conditions and Status        Condition is unchanged.     [facility] Data  External documents reviewed: Care Everywhere  Cardiac tracing (EKG, telemetry) interpretation: See HPI  Radiology interpretation: See HPI  Labs reviewed: The HPI  Any tests that were considered but not ordered: None     Decision rules/scores evaluated (example [id number], Wells, etc): Not applicable     Discussed with: Patient and her  Vincent Perez     Care Planning  Shared decision making: Patient and her  Vincent Perez  Code status and discussions: CODE STATUS is full code     Disposition  Social Determinants of Health that impact treatment or disposition: None  I expect the patient to be discharged to home in 1-2 days.         Electronically signed by Adrián Glover DO, 11/10/24, 12:11 CST.

## 2024-11-11 LAB
ALBUMIN SERPL-MCNC: 3.3 G/DL (ref 3.5–5.2)
ALP SERPL-CCNC: 127 U/L (ref 39–117)
ALT SERPL W P-5'-P-CCNC: 14 U/L (ref 1–33)
AST SERPL-CCNC: 21 U/L (ref 1–32)
BILIRUB CONJ SERPL-MCNC: <0.2 MG/DL (ref 0–0.3)
BILIRUB INDIRECT SERPL-MCNC: ABNORMAL MG/DL
BILIRUB SERPL-MCNC: <0.2 MG/DL (ref 0–1.2)
GLUCOSE BLDC GLUCOMTR-MCNC: 138 MG/DL (ref 70–130)
GLUCOSE BLDC GLUCOMTR-MCNC: 162 MG/DL (ref 70–130)
GLUCOSE BLDC GLUCOMTR-MCNC: 171 MG/DL (ref 70–130)
GLUCOSE BLDC GLUCOMTR-MCNC: 191 MG/DL (ref 70–130)
PROT SERPL-MCNC: 6.3 G/DL (ref 6–8.5)

## 2024-11-11 PROCEDURE — 25810000003 SODIUM CHLORIDE 0.9 % SOLUTION: Performed by: INTERNAL MEDICINE

## 2024-11-11 PROCEDURE — 63710000001 INSULIN GLARGINE PER 5 UNITS: Performed by: INTERNAL MEDICINE

## 2024-11-11 PROCEDURE — 82948 REAGENT STRIP/BLOOD GLUCOSE: CPT

## 2024-11-11 PROCEDURE — 80076 HEPATIC FUNCTION PANEL: CPT | Performed by: SURGERY

## 2024-11-11 PROCEDURE — 99253 IP/OBS CNSLTJ NEW/EST LOW 45: CPT | Performed by: INTERNAL MEDICINE

## 2024-11-11 PROCEDURE — 63710000001 INSULIN LISPRO (HUMAN) PER 5 UNITS: Performed by: INTERNAL MEDICINE

## 2024-11-11 PROCEDURE — 99232 SBSQ HOSP IP/OBS MODERATE 35: CPT | Performed by: SURGERY

## 2024-11-11 RX ORDER — SODIUM CHLORIDE 9 MG/ML
75 INJECTION, SOLUTION INTRAVENOUS CONTINUOUS
Status: DISPENSED | OUTPATIENT
Start: 2024-11-11 | End: 2024-11-12

## 2024-11-11 RX ORDER — CALCIUM CARBONATE 500 MG/1
1 TABLET, CHEWABLE ORAL 2 TIMES DAILY
Status: DISCONTINUED | OUTPATIENT
Start: 2024-11-11 | End: 2024-11-13 | Stop reason: HOSPADM

## 2024-11-11 RX ADMIN — ROPINIROLE 1 MG: 1 TABLET, FILM COATED ORAL at 20:32

## 2024-11-11 RX ADMIN — ANTACID TABLETS 1 TABLET: 500 TABLET, CHEWABLE ORAL at 20:32

## 2024-11-11 RX ADMIN — PANTOPRAZOLE SODIUM 40 MG: 40 TABLET, DELAYED RELEASE ORAL at 06:46

## 2024-11-11 RX ADMIN — INSULIN GLARGINE 5 UNITS: 100 INJECTION, SOLUTION SUBCUTANEOUS at 20:33

## 2024-11-11 RX ADMIN — ROPINIROLE 1 MG: 1 TABLET, FILM COATED ORAL at 16:38

## 2024-11-11 RX ADMIN — Medication 10 ML: at 20:33

## 2024-11-11 RX ADMIN — NORTRIPTYLINE HYDROCHLORIDE 25 MG: 25 CAPSULE ORAL at 20:33

## 2024-11-11 RX ADMIN — SUCRALFATE 1 G: 1 TABLET ORAL at 20:32

## 2024-11-11 RX ADMIN — SODIUM CHLORIDE 75 ML/HR: 900 INJECTION, SOLUTION INTRAVENOUS at 07:58

## 2024-11-11 RX ADMIN — INSULIN LISPRO 1 UNITS: 100 INJECTION, SOLUTION INTRAVENOUS; SUBCUTANEOUS at 17:44

## 2024-11-11 RX ADMIN — SUCRALFATE 1 G: 1 TABLET ORAL at 06:45

## 2024-11-11 RX ADMIN — SUCRALFATE 1 G: 1 TABLET ORAL at 16:38

## 2024-11-11 NOTE — PROGRESS NOTES
[facility] Medicine Services  INPATIENT PROGRESS NOTE    Patient Name: Tasha Perez  Date of Admission: 11/7/2024  Today's Date: 11/11/24  Length of Stay: 3  Primary Care Physician: KIRILL Murguia MD    Subjective   Chief Complaint: follow-up SUGAR  HPI   Patient reports that she feels much, much better.  It sounds like she has been eating and drinking much better as compared to her baseline as well.  We discussed the results of her basic metabolic panel which is significantly improved.  She reports that she has had time to think this weekend about the decision regarding jejunostomy placement, and has also been in discussion with her daughter, and they do want to move forward with J-tube placement now.    Review of Systems   All pertinent negatives and positives are as above. All other systems have been reviewed and are negative unless otherwise stated.     Objective    Temp:  [97.6 °F (36.4 °C)-98.4 °F (36.9 °C)] 97.9 °F (36.6 °C)  Heart Rate:  [84-92] 84  Resp:  [14-18] 14  BP: ()/(63-84) 113/73  Physical Exam  Vitals reviewed.   Constitutional:       General: She is not in acute distress.  HENT:      Head: Normocephalic.      Mouth/Throat:      Mouth: Mucous membranes are moist.   Eyes:      Pupils: Pupils are equal, round, and reactive to light.   Pulmonary:      Effort: Pulmonary effort is normal. No respiratory distress.   Abdominal:      Palpations: Abdomen is soft.      Tenderness: There is no abdominal tenderness.      Comments: Previous G-tube and J-tube sites noted   Musculoskeletal:         General: No deformity.   Skin:     General: Skin is warm.   Neurological:      General: No focal deficit present.      Mental Status: She is alert. Mental status is at baseline.   Psychiatric:         Mood and Affect: Mood normal.       Results Review:  I have reviewed the labs, radiology results, and diagnostic studies.    Laboratory Data:   Results from last 7 days   Lab  "Units 11/10/24  0455 11/09/24  1118 11/08/24  0334   WBC 10*3/mm3 9.71 9.22 13.85*   HEMOGLOBIN g/dL 9.3* 8.9* 11.0*   HEMATOCRIT % 28.6* 27.4* 36.6   PLATELETS 10*3/mm3 218 211 230        Results from last 7 days   Lab Units 11/10/24  0455 11/09/24  1530 11/09/24  0634 11/08/24  0334   SODIUM mmol/L 143  --  144 135*   POTASSIUM mmol/L 4.0 3.7 2.7* 3.0*   CHLORIDE mmol/L 106  --  108* 111*   CO2 mmol/L 32.0*  --  29.0 8.0*   BUN mg/dL 22*  --  37* 64*   CREATININE mg/dL 1.39*  --  1.55* 3.66*   CALCIUM mg/dL 6.9*  --  6.6* 7.9*   GLUCOSE mg/dL 155*  --  126* 163*       Culture Data:   No results found for: \"BLOODCX\", \"URINECX\", \"WOUNDCX\", \"MRSACX\", \"RESPCX\", \"STOOLCX\"    Radiology Data:   Imaging Results (Last 24 Hours)       ** No results found for the last 24 hours. **            I have reviewed the patient's current medications.     Assessment/Plan   Assessment  Active Hospital Problems    Diagnosis     **Acute renal failure superimposed on stage 3b chronic kidney disease     Metabolic acidosis     Intractable nausea     Gastroparesis     Fibromyalgia     Hypokalemia     Type 2 diabetes mellitus, without long-term current use of insulin     Severe malnutrition        Treatment Plan  General surgery recommendations reviewed - patient initially refused J-tube placement however after considering her options over the weekend and discussing with her daughter she now wants to move forward with jejunostomy tube placement    Make NPO now (she has not had anything to eat/drink this morning; she reports \"a sip of water\" to take her meds)  Creatinine down to 1.39 this morning (4.05 on arrival) - this appears to be better than previous baseline.  Based on checkout from Dr. Glover, it sounds like patient has a follow-up with a GI specialist in [place], [place] later this month on 11/20 for consideration of gastric stimulator  Nutrition consultation for tube feeding and fluid recommendations to implement following " J-tube placement  I will start some gentle IVFs now given her NPO status  Patient has been refusing Amitiza  Hold additional K supplement  BMP in AM  OK to d/c cardiac telemetry  Dispo - possibly home tomorrow pending the above?    Medical Decision Making  Number and Complexity of problems: Moderate complexity      Conditions and Status        Condition is improving.     [facility] Data  External documents reviewed: none  Cardiac tracing (EKG, telemetry) interpretation: no new EKGs  Radiology interpretation: no new radiology studies  Labs reviewed: as above  Any tests that were considered but not ordered: none     Decision rules/scores evaluated (example [id number], Wells, etc): none     Discussed with: patient, bedside nurse, Lisa     Care Planning  Shared decision making: Discussed with patient with agreement to proceed with treatment plan as outlined  Code status and discussions: Full code    Disposition  Social Determinants of Health that impact treatment or disposition: None apparent at this time  I expect the patient to be discharged to home possibly tomorrow pending the above    Electronically signed by Otto Del Rosario MD, 11/11/24, 07:02 CST.

## 2024-11-11 NOTE — PLAN OF CARE
Goal Outcome Evaluation:  Plan of Care Reviewed With: patient        Progress: improving  Outcome Evaluation: VSS. Pt tolerating meals. No complaints. NPO at midnight for GI for NJ tube placement.

## 2024-11-11 NOTE — PROGRESS NOTES
Nephrology ([facility]) Progress Note      Patient:  Tasha Perez  YOB: 1970  Date of Service: 11/11/2024  MRN: [id number]   Acct: [id number]   Primary Care Physician: KIRILL Murguia MD  Advance Directive:   Code Status and Medical Interventions: CPR (Attempt to Resuscitate); Full Support   Ordered at: 11/07/24 2230     Level Of Support Discussed With:    Patient     Code Status (Patient has no pulse and is not breathing):    CPR (Attempt to Resuscitate)     Medical Interventions (Patient has pulse or is breathing):    Full Support     Admit Date: 11/7/2024       Hospital Day: 3  Referring Provider: No ref. provider found      Patient personally seen and examined.  Complete chart including Consults, Notes, Operative Reports, Labs, Cardiology, and Radiology studies reviewed as able.    Chief complaint: Abnormal labs.    Subjective:  Tasha Perez is a 54 y.o. female for whom we were consulted for evaluation and treatment of acute kidney injury/chronic kidney disease.  She has stage IIIb chronic kidney disease baseline GFR is 35 mL.  She has a history of chronic GI illness with nausea vomiting/chronic pancreatitis at, rheumatoid arthritis, type 2 diabetes, gastroparesis.  She has recurrent nausea vomiting leading to intravascular volume depletion, requiring frequent hospitalization for IV fluid administration.  She was directed to come to the hospital as she had creatinine of 4.0 on routine lab draw.  She was found to be severely acidotic with a Bicarb of 14 mmol.  She was admitted to medical floor and was treated with IV fluid sodium bicarbonate.  She has significant improvement of renal function, serum creatinine slowly going back to the baseline.  She has recent history of gastrostomy and jejunostomy and both failed.  She does not want to consider any other gastric tube at this time.    This morning she feels well, hoping to go home soon.    Allergies:  Bee venom,  Hydrocodone, Ibuprofen, Pineapple, Pineapple extract, Wasp venom, Wasp venom protein, Hydrocodone-acetaminophen, Penicillins, Sitagliptin, Metformin, and Chocolate    Home Meds:  Medications Prior to Admission   Medication Sig Dispense Refill Last Dose/Taking    aspirin 81 MG EC tablet Take 1 tablet by mouth Daily.   Taking    atorvastatin (LIPITOR) 80 MG tablet Take 1 tablet by mouth Every Night.   Taking    Calcium Carbonate-Vitamin D 600-5 MG-MCG tablet Take 1 tablet by mouth Daily.   Taking    Cholecalciferol (Vitamin D3) 50 MCG (2000 UT) capsule Take 1 capsule by mouth Daily.   Taking    dapagliflozin Propanediol (Farxiga) 10 MG tablet Take 10 mg by mouth Daily.   Taking    DULoxetine (CYMBALTA) 60 MG capsule Take 1 capsule by mouth Daily.   Taking    hydroxychloroquine (PLAQUENIL) 200 MG tablet Take 2 tablets by mouth Daily.   Taking    Insulin Aspart (novoLOG) 100 UNIT/ML injection Inject 90 Units under the skin into the appropriate area as directed Daily. Up to 90 units daily via omnipod   Taking    linaclotide (LINZESS) 290 MCG capsule capsule Take 1 capsule by mouth Every Morning Before Breakfast.   Taking    NIFEdipine XL (PROCARDIA XL) 30 MG 24 hr tablet Take 1 tablet by mouth Daily.   Taking    nortriptyline (PAMELOR) 25 MG capsule Take 1 capsule by mouth Every Night.   Taking    omeprazole (priLOSEC) 40 MG capsule Take 1 capsule by mouth Every Other Day.   Taking    rOPINIRole (REQUIP) 1 MG tablet Take 1 tablet by mouth 3 (Three) Times a Day.   11/7/2024    sodium bicarbonate 650 MG tablet Take 1 tablet by mouth 2 (Two) Times a Day.   Taking    sodium chloride 0.9 % solution Infuse 1,000 mL into a venous catheter Daily.   Taking    sucralfate (CARAFATE) 1 g tablet Take 1 tablet by mouth 4 (Four) Times a Day.   Taking    cetirizine (zyrTEC) 10 MG tablet Take 1 tablet by mouth Daily As Needed for Allergies.       metoprolol succinate XL (TOPROL-XL) 25 MG 24 hr tablet Take 1 tablet by mouth Daily.        ondansetron ODT (ZOFRAN-ODT) 4 MG disintegrating tablet Place 1 tablet on the tongue Every 8 (Eight) Hours As Needed for Nausea or Vomiting.          Medicines:  Current Facility-Administered Medications   Medication Dose Route Frequency Provider Last Rate Last Admin    acetaminophen (TYLENOL) tablet 650 mg  650 mg Oral Q4H PRN Donnie Wright MD        Or    acetaminophen (TYLENOL) 160 MG/5ML oral solution 650 mg  650 mg Oral Q4H PRN Donnie Wright MD        Or    acetaminophen (TYLENOL) suppository 650 mg  650 mg Rectal Q4H PRN Donnie Wright MD        sennosides-docusate (PERICOLACE) 8.6-50 MG per tablet 2 tablet  2 tablet Oral BID PRN Donnie Wright MD        And    polyethylene glycol (MIRALAX) packet 17 g  17 g Oral Daily PRN Donnie Wright MD        And    bisacodyl (DULCOLAX) suppository 10 mg  10 mg Rectal Daily PRN Donnie Wright MD        Calcium Replacement - Follow Nurse / BPA Driven Protocol   Does not apply PRN Adrián Glover DO        cetirizine (zyrTEC) tablet 5 mg  5 mg Oral Daily PRN Donnie Wright MD        cholecalciferol (VITAMIN D3) tablet 2,000 Units  2,000 Units Oral Daily Donnie Wright MD   2,000 Units at 11/10/24 0848    dextrose (D50W) (25 g/50 mL) IV injection 10-50 mL  10-50 mL Intravenous Q15 Min PRN Donnie Wright MD        dextrose (GLUTOSE) oral gel 15 g  15 g Oral Q15 Min PRN Donnie Wright MD        empagliflozin (JARDIANCE) tablet 10 mg  10 mg Oral Daily Donnie Wright MD   10 mg at 11/08/24 0837    Glucagon (GLUCAGEN) injection 1 mg  1 mg Intramuscular Q15 Min PRN Donnie Wright MD        insulin glargine (LANTUS, SEMGLEE) injection 1-200 Units  1-200 Units Subcutaneous Nightly - Glucommander Donnie Wright MD   5 Units at 11/10/24 2030    insulin lispro (humaLOG) injection 1-200 Units  1-200 Units Subcutaneous 4x Daily With Meals & Nightly Donnie Wright MD   3 Units at 11/10/24 1803    insulin lispro  (humaLOG) injection 1-200 Units  1-200 Units Subcutaneous PRN Donnie Wright MD        [Held by provider] lubiprostone (AMITIZA) capsule 8 mcg  8 mcg Oral BID Donnie Wright MD   8 mcg at 11/08/24 2138    Magnesium Standard Dose Replacement - Follow Nurse / BPA Driven Protocol   Does not apply PRN Adrián Glover DO        nitroglycerin (NITROSTAT) SL tablet 0.4 mg  0.4 mg Sublingual Q5 Min PRN Donnie Wright MD        nortriptyline (PAMELOR) capsule 25 mg  25 mg Oral Nightly Donnie Wright MD   25 mg at 11/10/24 2028    ondansetron (ZOFRAN) injection 4 mg  4 mg Intravenous Q6H PRN Donnie Wright MD        pantoprazole (PROTONIX) EC tablet 40 mg  40 mg Oral Q AM Donnie Wright MD   40 mg at 11/11/24 0646    Phosphorus Replacement - Follow Nurse / BPA Driven Protocol   Does not apply PRN Adrián Glover DO        [Held by provider] potassium chloride (MICRO-K/KLOR-CON) CR capsule  40 mEq Oral Daily Donnie Wright MD   40 mEq at 11/10/24 0849    Potassium Replacement - Follow Nurse / BPA Driven Protocol   Does not apply PRN Adrián Glover DO        rOPINIRole (REQUIP) tablet 1 mg  1 mg Oral TID Donnie Wright MD   1 mg at 11/10/24 2027    sodium chloride 0.9 % flush 10 mL  10 mL Intravenous Q12H Donnie Wright MD   10 mL at 11/10/24 2030    sodium chloride 0.9 % infusion  75 mL/hr Intravenous Continuous Otto Del Rosario MD 75 mL/hr at 11/11/24 0758 75 mL/hr at 11/11/24 0758    sucralfate (CARAFATE) tablet 1 g  1 g Oral 4x Daily AC & at Bedtime Donnie Wright MD   1 g at 11/11/24 0645       Past Medical History:  Past Medical History:   Diagnosis Date    Arthritis     Contusion     Degenerative disc disease, cervical     Diabetes mellitus     Elevated cholesterol     Fibromyalgia     Neuropathy     Osteoporosis     Pancreatitis     Raynaud disease     Renal insufficiency     Smoker 02/08/2022    Vitamin D deficiency 04/26/2022       Past Surgical  History:  Past Surgical History:   Procedure Laterality Date    APPENDECTOMY      CHOLECYSTECTOMY      COLONOSCOPY      ENDOSCOPY N/A 10/08/2019    Procedure: ESOPHAGOGASTRODUODENOSCOPY WITH ANESTHESIA;  Surgeon: Aleks Vaughn DO;  Location:  [facility];  Service: Gastroenterology    ENDOSCOPY WITH GASTROSTOMY TUBE INSERTION N/A 6/15/2022    Procedure: ESOPHAGOGASTRODUODENOSCOPY WITH GASTROSTOMY TUBE INSERTION;  Surgeon: Jersey Lee MD;  Location:  [facility];  Service: Gastroenterology;  Laterality: N/A;  pre peg placement  post peg placement  Dr. Murguia    ENTEROSCOPY SMALL BOWEL N/A 11/3/2022    Procedure: Esophagogastroduodenoscopy with Peg Removal;  Surgeon: Aleks Vaughn DO;  Location:  [facility];  Service: Gastroenterology;  Laterality: N/A;  pre; peg removal  post; peg removal   KIRILL Murguia MD        HYSTERECTOMY         Family History  Family History   Adopted: Yes   Problem Relation Age of Onset    Colon polyps Neg Hx     Colon cancer Neg Hx        Social History  Social History     Socioeconomic History    Marital status:    Tobacco Use    Smoking status: Every Day     Current packs/day: 0.25     Average packs/day: 0.3 packs/day for 31.0 years (7.8 ttl pk-yrs)     Types: Cigarettes   Vaping Use    Vaping status: Never Used   Substance and Sexual Activity    Alcohol use: No    Drug use: No    Sexual activity: Defer       Review of Systems:  History obtained from chart review and the patient  General ROS: No fever or chills  Respiratory ROS: No cough, shortness of breath, wheezing  Cardiovascular ROS: No chest pain or palpitations  Gastrointestinal ROS: No abdominal pain or melena  Genito-Urinary ROS: No dysuria or hematuria  Psych ROS: No anxiety and depression  14 point ROS reviewed with the patient and negative except as noted above and in the HPI unless unable to obtain.    Objective:  Patient Vitals for the past 24 hrs:   BP Temp Temp src Pulse Resp SpO2  "  11/11/24 1107 126/80 98.4 °F (36.9 °C) Oral 79 16 100 %   11/11/24 0742 138/86 98 °F (36.7 °C) Oral 88 16 100 %   11/11/24 0345 113/73 97.9 °F (36.6 °C) Oral 84 14 99 %   11/11/24 0100 134/79 98.1 °F (36.7 °C) Oral 86 16 99 %   11/10/24 1959 126/76 97.6 °F (36.4 °C) Oral 92 18 100 %   11/10/24 1608 142/84 98.4 °F (36.9 °C) Oral 90 16 100 %       Intake/Output Summary (Last 24 hours) at 11/11/2024 1509  Last data filed at 11/11/2024 1300  Gross per 24 hour   Intake 240 ml   Output 1200 ml   Net -960 ml     General: awake/alert   HEENT: Normocephalic atraumatic head  Neck: Supple with no JVD or carotid bruits.  Chest:  clear to auscultation bilaterally without respiratory distress  CVS: regular rate and rhythm  Abdominal: soft, nontender, positive bowel sounds  Extremities: no cyanosis or edema  Skin: warm and dry without rash      Labs:  Results from last 7 days   Lab Units 11/10/24  0455 11/09/24  1118 11/08/24  0334   WBC 10*3/mm3 9.71 9.22 13.85*   HEMOGLOBIN g/dL 9.3* 8.9* 11.0*   HEMATOCRIT % 28.6* 27.4* 36.6   PLATELETS 10*3/mm3 218 211 230         Results from last 7 days   Lab Units 11/11/24  1043 11/10/24  0455 11/09/24  1530 11/09/24  0634 11/08/24  0334   SODIUM mmol/L  --  143  --  144 135*   POTASSIUM mmol/L  --  4.0 3.7 2.7* 3.0*   CHLORIDE mmol/L  --  106  --  108* 111*   CO2 mmol/L  --  32.0*  --  29.0 8.0*   BUN mg/dL  --  22*  --  37* 64*   CREATININE mg/dL  --  1.39*  --  1.55* 3.66*   CALCIUM mg/dL  --  6.9*  --  6.6* 7.9*   EGFR mL/min/1.73  --  45.5*  --  39.9* 14.2*   BILIRUBIN mg/dL <0.2  --   --   --   --    ALK PHOS U/L 127*  --   --   --   --    ALT (SGPT) U/L 14  --   --   --   --    AST (SGOT) U/L 21  --   --   --   --    GLUCOSE mg/dL  --  155*  --  126* 163*       Radiology:   Imaging Results (Last 72 Hours)       ** No results found for the last 72 hours. **            Culture:  No results found for: \"BLOODCX\", \"URINECX\", \"WOUNDCX\", \"MRSACX\", \"RESPCX\", \"STOOLCX\"      Assessment   1.  " Acute kidney injury/resolving.  2.  Intravascular volume depletion.  3.  Stage IIIb chronic kidney disease baseline.  4.  Recurrent history of volume depletion.  5.  Chronic pancreatitis/gastroparesis.  6.  Anemia of chronic kidney disease.  7.  Hypocalcemia.    Plan:  1.  Give intravenous calcium gluconate.  2.  Continue Jardiance.  3.  Continue to encourage her to drink more water.  4.  Continue calcium and vitamin D supplement at home.      Janak Harvey MD  11/11/2024  15:09 CST

## 2024-11-11 NOTE — PROGRESS NOTES
"       GENERAL SURGERY INPATIENT PROGRESS NOTE    Patient Name:  Tasha Perez  YOB: 1970  MRN: [id number]    SUBJECTIVE    No unexpected events overnight.  The patient has been n.p.o. since midnight.  She is considered having jejunostomy feeding tube replaced.  Has discussed a nasojejunal feeding tube with the patient.  She currently reports that she is able to eat about 25% to 50% of her meals.  Her biggest issue is nausea and vomiting.  She does have a PICC line for IV fluids.    Allergies:  Allergies   Allergen Reactions    Bee Venom Anaphylaxis    Hydrocodone Anaphylaxis    Ibuprofen Other (See Comments)     \"SHUTS MY KIDNEYS DOWN\" PER PATIENT     Pineapple Anaphylaxis    Pineapple Extract Anaphylaxis    Wasp Venom Anaphylaxis    Wasp Venom Protein Anaphylaxis    Hydrocodone-Acetaminophen Dizziness, Nausea And Vomiting, Nausea Only and Unknown - Low Severity    Penicillins Nausea And Vomiting    Sitagliptin Other (See Comments)     Other reaction(s): Abdominal Pain      Metformin Other (See Comments)     Pt states it messes with her kidneys    Chocolate Rash     Dark chocolate only, told to RD 7/10/24       Medications:  cholecalciferol, 2,000 Units, Oral, Daily  empagliflozin, 10 mg, Oral, Daily  insulin glargine, 1-200 Units, Subcutaneous, Nightly - Glucommander  insulin lispro, 1-200 Units, Subcutaneous, 4x Daily With Meals & Nightly  [Held by provider] lubiprostone, 8 mcg, Oral, BID  nortriptyline, 25 mg, Oral, Nightly  pantoprazole, 40 mg, Oral, Q AM  [Held by provider] potassium chloride, 40 mEq, Oral, Daily  rOPINIRole, 1 mg, Oral, TID  sodium chloride, 10 mL, Intravenous, Q12H  sucralfate, 1 g, Oral, 4x Daily AC & at Bedtime    sodium chloride, 75 mL/hr, Last Rate: 75 mL/hr (11/11/24 0758)        OBJECTIVE    VITAL SIGNS  /80 (BP Location: Left arm, Patient Position: Sitting)   Pulse 79   Temp 98.4 °F (36.9 °C) (Oral)   Resp 16   Ht 154.9 cm (61\")   Wt 39.2 kg (86 lb 8 oz)   " SpO2 100%   BMI 16.34 kg/m²     Intake/Output Summary (Last 24 hours) at 11/11/2024 1223  Last data filed at 11/10/2024 1800  Gross per 24 hour   Intake 240 ml   Output 400 ml   Net -160 ml       PHYSICAL EXAM    General: Pleasant middle-age female, sitting up in bed, thin body habitus, in no acute distress.  HEENT:  NCAT, PERRL, EOM intact bilaterally, hearing intact, nares patent  Heart:  Regular rate, normotensive, no JVD bilaterally  Lungs:  Normal respiratory effort, regular respiratory rate, no wheezing  Abdomen: Flat  Extremities:  No cyanosis, clubbing or edema.  PICC line in the left upper extremity in place.  Musculoskeletal:  Normal development of bilateral upper extremities. Normal development of bilateral lower extremities.  Range of motion intact.  No evidence of trauma.  Skin:  No rashes, ecchymosis or jaundice. Dry and non-diaphoretic. Skin color is consistent with ethnicity.    RESULTS    Labs:  I personally reviewed all pertinent labs.   Imaging:  I personally reviewed all pertinent imaging studies.   Pathology:        ASSESSMENT AND PLAN    Active Hospital Problems    Diagnosis     **Acute renal failure superimposed on stage 3b chronic kidney disease     Metabolic acidosis     Intractable nausea     Gastroparesis     Fibromyalgia     Hypokalemia     Type 2 diabetes mellitus, without long-term current use of insulin     Severe malnutrition          DAILY CARE PLAN    The patient recently underwent a laparoscopic jejunostomy tube placement within the last 4 weeks [facility].  This had subsequently clogged and was removed with plans to replace, however the patient declined at the time.  She also has a PICC line in place for IV fluids.  I have explained to the patient that reoperating on the J-tube site in this short of a postop timeframe may put the patient at an increased risk of an intra-abdominal leak from the J-tube site which would most certainly delay her gastric stimulator  placement. I believe the safest way to reestablis her enteral feeding access at this time would be to place a temporary nasojejunal feeding tube until she is able to see her gastroenterologist in [place] on 11/20.  If she is scheduled for a gastric pacemaker, she may not require enteral feeding access.  Additionally, if she absolutely needs the jejunostomy tube replaced interventional radiology could perform a percutaneous J-tube placement which would be less invasive than a laparoscopic approach.  Okay for a diet from my standpoint, however if GI is to be consulted for endoscopic nasojejunal feeding tube placement then defer n.p.o. status to them.  General surgery will continue to follow.    I discussed the patient's findings and my recommendations with the patient/family, as well as the primary care team.    Ludwig Correa MD, [facility]  General Surgery  11/11/2024  12:23 CST    Please note that portions of this note were completed with a voice recognition program.

## 2024-11-11 NOTE — CONSULTS
[facility] Gastroenterology  Initial Inpatient Consult Note    Referring Provider: No ref. provider found    Date of Admission: 11/7/2024  Date of Service:  11/11/24    Reason for Consultation: Nasojejunal tube placement    Subjective     History of present illness: 54-year-old lady with history of diabetes, fibromyalgia, diabetic gastroparesis with malnutrition status post PEG placement and recent placement of surgical jejunostomy tube in October 2024 that stayed for 2-1/2-week and then was removed, patient stated that she had frequent problems with it including skin infection around the tube, clogging leading to multiple ER visits and MD visit, she was evaluated by surgical team for placement of jejunostomy tube but wanted to try nasojejunal tube for now, I explained to the patient that this is relatively a temporary measure, and carries a risk of sinusitis, and the tube is small and could be occluded  Regarding her gastroparesis anorexia and malnutrition she had consultation on the 20th of this month in [place] for potential gastric stimulation placement by gastroparesis expressed      Past Medical History:  Past Medical History:   Diagnosis Date    Arthritis     Contusion     Degenerative disc disease, cervical     Diabetes mellitus     Elevated cholesterol     Fibromyalgia     Neuropathy     Osteoporosis     Pancreatitis     Raynaud disease     Renal insufficiency     Smoker 02/08/2022    Vitamin D deficiency 04/26/2022       Past Surgical History:  Past Surgical History:   Procedure Laterality Date    APPENDECTOMY      CHOLECYSTECTOMY      COLONOSCOPY      ENDOSCOPY N/A 10/08/2019    Procedure: ESOPHAGOGASTRODUODENOSCOPY WITH ANESTHESIA;  Surgeon: Aleks Vaughn DO;  Location: [facility] ENDOSCOPY;  Service: Gastroenterology    ENDOSCOPY WITH GASTROSTOMY TUBE INSERTION N/A 6/15/2022    Procedure: ESOPHAGOGASTRODUODENOSCOPY WITH GASTROSTOMY TUBE INSERTION;  Surgeon: Jersey Lee MD;  Location: [facility]  ENDOSCOPY;  Service: Gastroenterology;  Laterality: N/A;  pre peg placement  post peg placement  Dr. Murguia    ENTEROSCOPY SMALL BOWEL N/A 11/3/2022    Procedure: Esophagogastroduodenoscopy with Peg Removal;  Surgeon: Aleks Vaughn DO;  Location: [facility] ENDOSCOPY;  Service: Gastroenterology;  Laterality: N/A;  pre; peg removal  post; peg removal   KIRILL Murguia MD        HYSTERECTOMY          Social History:   Social History     Tobacco Use    Smoking status: Every Day     Current packs/day: 0.25     Average packs/day: 0.3 packs/day for 31.0 years (7.8 ttl pk-yrs)     Types: Cigarettes    Smokeless tobacco: Not on file   Substance Use Topics    Alcohol use: No        Family History:  Family History   Adopted: Yes   Problem Relation Age of Onset    Colon polyps Neg Hx     Colon cancer Neg Hx        Home Meds:  Medications Prior to Admission   Medication Sig Dispense Refill Last Dose/Taking    aspirin 81 MG EC tablet Take 1 tablet by mouth Daily.   Taking    atorvastatin (LIPITOR) 80 MG tablet Take 1 tablet by mouth Every Night.   Taking    Calcium Carbonate-Vitamin D 600-5 MG-MCG tablet Take 1 tablet by mouth Daily.   Taking    Cholecalciferol (Vitamin D3) 50 MCG (2000 UT) capsule Take 1 capsule by mouth Daily.   Taking    dapagliflozin Propanediol (Farxiga) 10 MG tablet Take 10 mg by mouth Daily.   Taking    DULoxetine (CYMBALTA) 60 MG capsule Take 1 capsule by mouth Daily.   Taking    hydroxychloroquine (PLAQUENIL) 200 MG tablet Take 2 tablets by mouth Daily.   Taking    Insulin Aspart (novoLOG) 100 UNIT/ML injection Inject 90 Units under the skin into the appropriate area as directed Daily. Up to 90 units daily via omnipod   Taking    linaclotide (LINZESS) 290 MCG capsule capsule Take 1 capsule by mouth Every Morning Before Breakfast.   Taking    NIFEdipine XL (PROCARDIA XL) 30 MG 24 hr tablet Take 1 tablet by mouth Daily.   Taking    nortriptyline (PAMELOR) 25 MG capsule Take 1 capsule by mouth Every  Night.   Taking    omeprazole (priLOSEC) 40 MG capsule Take 1 capsule by mouth Every Other Day.   Taking    rOPINIRole (REQUIP) 1 MG tablet Take 1 tablet by mouth 3 (Three) Times a Day.   11/7/2024    sodium bicarbonate 650 MG tablet Take 1 tablet by mouth 2 (Two) Times a Day.   Taking    sodium chloride 0.9 % solution Infuse 1,000 mL into a venous catheter Daily.   Taking    sucralfate (CARAFATE) 1 g tablet Take 1 tablet by mouth 4 (Four) Times a Day.   Taking    cetirizine (zyrTEC) 10 MG tablet Take 1 tablet by mouth Daily As Needed for Allergies.       metoprolol succinate XL (TOPROL-XL) 25 MG 24 hr tablet Take 1 tablet by mouth Daily.       ondansetron ODT (ZOFRAN-ODT) 4 MG disintegrating tablet Place 1 tablet on the tongue Every 8 (Eight) Hours As Needed for Nausea or Vomiting.          Current Meds:     Current Facility-Administered Medications:     acetaminophen (TYLENOL) tablet 650 mg, 650 mg, Oral, Q4H PRN **OR** acetaminophen (TYLENOL) 160 MG/5ML oral solution 650 mg, 650 mg, Oral, Q4H PRN **OR** acetaminophen (TYLENOL) suppository 650 mg, 650 mg, Rectal, Q4H PRN, Donnie Wright MD    sennosides-docusate (PERICOLACE) 8.6-50 MG per tablet 2 tablet, 2 tablet, Oral, BID PRN **AND** polyethylene glycol (MIRALAX) packet 17 g, 17 g, Oral, Daily PRN **AND** [DISCONTINUED] bisacodyl (DULCOLAX) EC tablet 5 mg, 5 mg, Oral, Daily PRN **AND** bisacodyl (DULCOLAX) suppository 10 mg, 10 mg, Rectal, Daily PRN, Donnie Wright MD    Calcium Replacement - Follow Nurse / BPA Driven Protocol, , Does not apply, PRN, Adrián Glover S, DO    cetirizine (zyrTEC) tablet 5 mg, 5 mg, Oral, Daily PRN, Donnie Wright MD    cholecalciferol (VITAMIN D3) tablet 2,000 Units, 2,000 Units, Oral, Daily, Donnie Wright MD, 2,000 Units at 11/10/24 0848    dextrose (D50W) (25 g/50 mL) IV injection 10-50 mL, 10-50 mL, Intravenous, Q15 Min PRN, Donnie Wright MD    dextrose (GLUTOSE) oral gel 15 g, 15 g, Oral, Q15 Min  PRN, Donnie Wright MD    empagliflozin (JARDIANCE) tablet 10 mg, 10 mg, Oral, Daily, Donnie Wright MD, 10 mg at 11/08/24 0837    Glucagon (GLUCAGEN) injection 1 mg, 1 mg, Intramuscular, Q15 Min PRN, Donnie Wright MD    insulin glargine (LANTUS, SEMGLEE) injection 1-200 Units, 1-200 Units, Subcutaneous, Nightly - Glucommander, Donnie Wright MD, 5 Units at 11/10/24 2030    insulin lispro (humaLOG) injection 1-200 Units, 1-200 Units, Subcutaneous, 4x Daily With Meals & Nightly, Donnie Wright MD, 3 Units at 11/10/24 1803    insulin lispro (humaLOG) injection 1-200 Units, 1-200 Units, Subcutaneous, PRN, Donnie Wright MD    [Held by provider] lubiprostone (AMITIZA) capsule 8 mcg, 8 mcg, Oral, BID, Donnie Wright MD, 8 mcg at 11/08/24 2138    Magnesium Standard Dose Replacement - Follow Nurse / BPA Driven Protocol, , Does not apply, PRN, Adrián Glover DO    nitroglycerin (NITROSTAT) SL tablet 0.4 mg, 0.4 mg, Sublingual, Q5 Min PRN, Donnie Wright MD    nortriptyline (PAMELOR) capsule 25 mg, 25 mg, Oral, Nightly, Donnie Wright MD, 25 mg at 11/10/24 2028    ondansetron (ZOFRAN) injection 4 mg, 4 mg, Intravenous, Q6H PRN, Donnie Wright MD    pantoprazole (PROTONIX) EC tablet 40 mg, 40 mg, Oral, Q AM, Donnie Wright MD, 40 mg at 11/11/24 0646    Phosphorus Replacement - Follow Nurse / BPA Driven Protocol, , Does not apply, Adlaberto MCMULLEN Maurice S, DO    [Held by provider] potassium chloride (MICRO-K/KLOR-CON) CR capsule, 40 mEq, Oral, Daily, Donnie Wright MD, 40 mEq at 11/10/24 0849    Potassium Replacement - Follow Nurse / BPA Driven Protocol, , Does not apply, PRN, Adrián Glover, DO    rOPINIRole (REQUIP) tablet 1 mg, 1 mg, Oral, TID, Donnie Wright MD, 1 mg at 11/10/24 2027    sodium chloride 0.9 % flush 10 mL, 10 mL, Intravenous, Q12H, Donnie Wright MD, 10 mL at 11/10/24 2030    sodium chloride 0.9 % infusion, 75 mL/hr,  "Intravenous, Continuous, Otto Del Rosario MD, Last Rate: 75 mL/hr at 11/11/24 0758, 75 mL/hr at 11/11/24 0758    sucralfate (CARAFATE) tablet 1 g, 1 g, Oral, 4x Daily AC & at Bedtime, Donnie Wright MD, 1 g at 11/11/24 0645    Allergies:  Allergies   Allergen Reactions    Bee Venom Anaphylaxis    Hydrocodone Anaphylaxis    Ibuprofen Other (See Comments)     \"SHUTS MY KIDNEYS DOWN\" PER PATIENT     Pineapple Anaphylaxis    Pineapple Extract Anaphylaxis    Wasp Venom Anaphylaxis    Wasp Venom Protein Anaphylaxis    Hydrocodone-Acetaminophen Dizziness, Nausea And Vomiting, Nausea Only and Unknown - Low Severity    Penicillins Nausea And Vomiting    Sitagliptin Other (See Comments)     Other reaction(s): Abdominal Pain      Metformin Other (See Comments)     Pt states it messes with her kidneys    Chocolate Rash     Dark chocolate only, told to RD 7/10/24       Vital Signs  Temp:  [97.6 °F (36.4 °C)-98.4 °F (36.9 °C)] 98.4 °F (36.9 °C)  Heart Rate:  [79-92] 79  Resp:  [14-18] 16  BP: (113-142)/(73-86) 126/80  Body mass index is 16.34 kg/m².    Intake/Output Summary (Last 24 hours) at 11/11/2024 1400  Last data filed at 11/11/2024 1300  Gross per 24 hour   Intake 240 ml   Output 1200 ml   Net -960 ml     I/O this shift:  In: 0   Out: 800 [Urine:800]    Review of Systems     As above    Objective     Physical Exam:  Gen: Very thin middle-aged female, sitting up in bed, in no acute distress  HEENT: NCAT, EOMI, anicteric sclerae  CV: RRR, normotensive  Resp: nonlabored on room air, sats appropriate  Abd: soft, nontender, nondistended without palpable mass; there are well-healed PEG and jejunostomy sites on her left side of her abdomen.  MSK: moves all four, no c/c/e  Neuro: no focal deficits, cranial nerves grossly intact  Psy:  appropriate, cooperative       Results Review:    I have reviewed all of the patients current test results  Results from last 7 days   Lab Units 11/10/24  0455 11/09/24  1118 11/08/24  0334 "   WBC 10*3/mm3 9.71 9.22 13.85*   HEMOGLOBIN g/dL 9.3* 8.9* 11.0*   HEMATOCRIT % 28.6* 27.4* 36.6   PLATELETS 10*3/mm3 218 211 230       Results from last 7 days   Lab Units 11/11/24  1043 11/10/24  0455 11/09/24  1530 11/09/24  0634 11/08/24  0334   SODIUM mmol/L  --  143  --  144 135*   POTASSIUM mmol/L  --  4.0 3.7 2.7* 3.0*   CHLORIDE mmol/L  --  106  --  108* 111*   CO2 mmol/L  --  32.0*  --  29.0 8.0*   BUN mg/dL  --  22*  --  37* 64*   CREATININE mg/dL  --  1.39*  --  1.55* 3.66*   CALCIUM mg/dL  --  6.9*  --  6.6* 7.9*   BILIRUBIN mg/dL <0.2  --   --   --   --    ALK PHOS U/L 127*  --   --   --   --    ALT (SGPT) U/L 14  --   --   --   --    AST (SGOT) U/L 21  --   --   --   --    GLUCOSE mg/dL  --  155*  --  126* 163*             Lab Results   Lab Value Date/Time    LIPASE 69 (H) 08/27/2024 1908    LIPASE 83 (H) 03/04/2024 2143    LIPASE 68 (H) 01/22/2024 0931    LIPASE 36 12/30/2023 1219    LIPASE 47 11/29/2023 0054    LIPASE 51 11/19/2023 1930    LIPASE 39 11/13/2023 2249    LIPASE 191 (H) 07/14/2022 1409    LIPASE 264 (H) 10/07/2019 0456       Radiology:  XR Chest 1 View  Narrative: EXAM/TECHNIQUE: XR CHEST 1 VW-     INDICATION: Assess for Fluid Overload     COMPARISON: 3/4/2024     FINDINGS:     Right-sided PICC with tip overlying the caval atrial junction. Cardiac  silhouette is within normal limits.     No pleural effusion or visible pneumothorax. No focal consolidation.     No acute osseous finding.     Impression:    No acute findings.     This report was signed and finalized on 8/27/2024 8:54 PM by Dr. Jin Bruner MD.               Assessment & Plan       Acute renal failure superimposed on stage 3b chronic kidney disease    Severe malnutrition    Type 2 diabetes mellitus, without long-term current use of insulin    Hypokalemia    Fibromyalgia    Gastroparesis    Intractable nausea    Metabolic acidosis      Impression/Plan    Gastroparesis, anorexia and malnutrition  Had previously had PEG  tube  Had jejunostomy tube last month with multiple issues, currently  Has an appointment in [place] with gastroparesis specialist for possible gastric stimulation device  I discussed with the patient and the hospitalist  Will plan nasojejunal tube tomorrow until she sees the gastroparesis specialist in 10 days  N.p.o. after midnight    Electronically signed by Tiffanie Saucedo MD, 11/11/24, 2:00 PM CST.         Tiffanie Saucedo MD  11/11/24  14:00 CST

## 2024-11-11 NOTE — PLAN OF CARE
Goal Outcome Evaluation:  Plan of Care Reviewed With: patient           Outcome Evaluation: Electolytes unremarkable. Patient eating  percent of all meals. Up independently. Requesting to speak to surgeon again about J-tube placement. VSS. No complaints. Inuslin coverage given per glucomander.

## 2024-11-11 NOTE — PLAN OF CARE
Goal Outcome Evaluation:           Progress: improving  Outcome Evaluation: VSS, tele - S . pt denies any N/V, eating and taking her pills without issues. pt refusing amitiza. up ad jessy, steady with ambulation

## 2024-11-11 NOTE — CASE MANAGEMENT/SOCIAL WORK
Continued Stay Note   Nayla     Patient Name: Tasha Perez  MRN: [id number]  Today's Date: 11/11/2024    Admit Date: 11/7/2024    Plan: Home vs home with HH?   Discharge Plan       Row Name 11/11/24 0935       Plan    Plan Home vs home with HH?    Plan Comments Note plans for J tube placement today.  SW following for needs.                   Discharge Codes    No documentation.                 Expected Discharge Date and Time       Expected Discharge Date Expected Discharge Time    Nov 11, 2024               ZOLTAN Kirk

## 2024-11-11 NOTE — CONSULTS
Inpatient Nutrition Services  Tube Feeding Assessment  Patient Name:  Tasha Perez  YOB: 1970  MRN: [id number]  Admit Date:  11/7/2024  Assessment Date:  11/11/2024      Reason for Assessment    Reason For Assessment physician consult;TF/PN     Diagnosis diabetes diagnosis/complications;hematological/related complications;nutrition related history          Nutrition/Diet History       Row Name 11/11/24 1032          Nutrition/Diet History    Typical Intake (Food/Fluid/EN/PN) Proceeding with J-tube. Reports previous enteral feedings were covered with insurance, provided by [employer]. Reported hyperkalemia with enteral feeding, oral intake, and outpatient IVF. Stated nephrology continued to order outpatient IVF. Uncertain of these details for outpatient IVF orders. [facility] from [facility] reviewed with patient. Issues with J-tube clogging were also barrier to optimal nutrition support. Will await for J-tube placement, possibly today. Pt will require a pump to provide continuous feedings; syringe/bolus feeding through small bowel feeding tube not best practice for tolerance. Pt will require enteral nutrition support for primary/sole-source nutrition. PMH severe malnutrition and gastroparesis with frequent readmission for electrolyte disturbance (low potassium) and dysglycemia.     Food Intolerance(s) Food allergies: chocolate, pineapple     Factors Affecting Nutritional Intake restricted diet;altered gastrointestinal function       RECOMMEND:   Nutrition Prescription EN    Enteral Prescription Enteral begin/change;Enteral to supply     Enteral Route --  J-tube might be placed 11/11     Product Peptamin 1.5 john     TF Delivery Method Continuous     Continuous TF Goal Rate (mL/hr) 45 mL/hr     Continuous TF Starting Rate (mL/hr) 20 mL/hr     Continuous TF Goal Volume (mL) 990 mL     Water flush (mL)  45 mL     Water Flush Frequency Per hour     New EN Prescription Ordered? No,  recommended        EN to Supply    Kcal/Day 1485 Kcal/Day     Kcal/Kg 38 Kcal/Kg     Kcal/Kg Weight Method Actual weight     Protein (gm/day) 67 gm/day     Meet Estimated Kcal Need (%) 104 %     Meet Estimated Protein Need (%) 140 %     TF Free H2O (mL) 760 mL     Total Free H2O (mL/day) 1750 mL/day     Fiber Per Day (gm/day) 0 gm/day     K+ (mg/day) 2059 mg/day     Na+ (mg/day) 871 mg/day     Phos (mg) 990 mg       Labs/Tests/Procedures/Meds       Row Name 11/11/24 1039          Labs/Procedures/Meds    Lab Results Reviewed reviewed     Lab Results Comments Glu , BUN, Cr, H/H        Diagnostic Tests/Procedures    Diagnostic Test/Procedure Reviewed reviewed        Medications    Pertinent Medications Reviewed reviewed     Pertinent Medications Comments See MAR          Physical Findings       [facility] Name 11/11/24 1039          Physical Findings    Overall Physical Appearance Room air, BM 11/11, Alexandru Score 21.          Nutrition Prescription Ordered       [facility] Name 11/11/24 1040          Nutrition Prescription PO    Current PO Diet NPO          Evaluation of Received Nutrient/Fluid Intake       Row Name 11/11/24 1040          Nutrient/Fluid Evaluation    Number of Days Evaluated 3 days        Fluid Intake Evaluation    Oral Fluid (mL) 629        PO Evaluation    Number of Meals 6     % PO Intake 71         Problem/Interventions:   Problem 1       [facility] Name 11/11/24 1041          Nutrition Diagnoses Problem 1    Problem 1 Underweight     Etiology (related to) Medical Diagnosis;Factors Affecting Nutrition     Nutrition related Increased nutrition needs     Endocrine DM     Gastrointestinal Gastroparesis     Renal CKD     Appetite Improved     Reported GI Symptoms GI Distress     Signs/Symptoms (evidenced by) Report of Mnimal PO Intake;BMI;Unintended Weight Change;Report/Observation;PO Intake     Percent (%) intake recorded 71 %     Over number of meals 6     BMI 16 - 16.9     Unintended Weight Change Loss     Number  of Pounds Lost 11.7     Weight loss time period 8 m.     Reported/Observed By RD/Tech  previous admissions     Other Comment multiple NFPEs to endorse severe malnutrition          Intervention Goal       Row Name 11/11/24 1041          Intervention Goal    General Disease management/therapy;Nutrition support treatment     TF/PN Inititiate TF/PN;Establish TF tolerance;Tolerate TF at goal;Deliver (%) goal;Deliver estimated need (%)     Deliver % of Goal 75 %     Deliver % of Estimated Need 60 %  60-80     Weight Maintain weight  or appropriate weight gain          Nutrition Intervention       Row Name 11/11/24 1041          Nutrition Intervention    RD/Tech Action Care plan reviewd;Recommend/ordered     Recommended/Ordered EN               Nutrition Prescription       Row Name 11/11/24 1042          Nutrition Prescription PO    PO Prescription Other (comment)  resume PO when medically appropriate        Nutrition Prescription EN    Enteral Prescription Enteral begin/change;Enteral to supply     Enteral Route --  J-tube might be placed 11/11     Product Peptamin 1.5 john     TF Delivery Method Continuous     Continuous TF Goal Rate (mL/hr) 45 mL/hr     Continuous TF Starting Rate (mL/hr) 20 mL/hr     Continuous TF Goal Volume (mL) 990 mL     Water flush (mL)  45 mL     Water Flush Frequency Per hour     New EN Prescription Ordered? No, recommended        EN to Supply    Kcal/Day 1485 Kcal/Day     Kcal/Kg 38 Kcal/Kg     Kcal/Kg Weight Method Actual weight     Protein (gm/day) 67 gm/day     Meet Estimated Kcal Need (%) 104 %     Meet Estimated Protein Need (%) 140 %     TF Free H2O (mL) 760 mL     Total Free H2O (mL/day) 1750 mL/day     Fiber Per Day (gm/day) 0 gm/day     K+ (mg/day) 2059 mg/day     Na+ (mg/day) 871 mg/day     Phos (mg) 990 mg          Education/Evaluation       Row Name 11/11/24 1044          Education    Education Other (comment)  will continue to monitor for edu needs        Monitor/Evaluation     Monitor Per protocol         Electronically signed by:  Mae Loredo RDN, LD  11/11/24 10:45 CST

## 2024-11-12 ENCOUNTER — ANESTHESIA (OUTPATIENT)
Dept: GASTROENTEROLOGY | Facility: HOSPITAL | Age: 54
End: 2024-11-12
Payer: COMMERCIAL

## 2024-11-12 ENCOUNTER — ANESTHESIA EVENT (OUTPATIENT)
Dept: GASTROENTEROLOGY | Facility: HOSPITAL | Age: 54
End: 2024-11-12
Payer: COMMERCIAL

## 2024-11-12 LAB
ANION GAP SERPL CALCULATED.3IONS-SCNC: 7 MMOL/L (ref 5–15)
BUN SERPL-MCNC: 25 MG/DL (ref 6–20)
BUN/CREAT SERPL: 15.2 (ref 7–25)
CALCIUM SPEC-SCNC: 9.2 MG/DL (ref 8.6–10.5)
CHLORIDE SERPL-SCNC: 102 MMOL/L (ref 98–107)
CO2 SERPL-SCNC: 32 MMOL/L (ref 22–29)
CREAT SERPL-MCNC: 1.64 MG/DL (ref 0.57–1)
EGFRCR SERPLBLD CKD-EPI 2021: 37.3 ML/MIN/1.73
GLUCOSE BLDC GLUCOMTR-MCNC: 134 MG/DL (ref 70–130)
GLUCOSE BLDC GLUCOMTR-MCNC: 232 MG/DL (ref 70–130)
GLUCOSE BLDC GLUCOMTR-MCNC: 366 MG/DL (ref 70–130)
GLUCOSE SERPL-MCNC: 171 MG/DL (ref 65–99)
POTASSIUM SERPL-SCNC: 4.3 MMOL/L (ref 3.5–5.2)
PREALB SERPL-MCNC: 21.4 MG/DL (ref 20–40)
SODIUM SERPL-SCNC: 141 MMOL/L (ref 136–145)

## 2024-11-12 PROCEDURE — 63710000001 INSULIN LISPRO (HUMAN) PER 5 UNITS: Performed by: INTERNAL MEDICINE

## 2024-11-12 PROCEDURE — 99233 SBSQ HOSP IP/OBS HIGH 50: CPT | Performed by: SURGERY

## 2024-11-12 PROCEDURE — 0DHA8UZ INSERTION OF FEEDING DEVICE INTO JEJUNUM, VIA NATURAL OR ARTIFICIAL OPENING ENDOSCOPIC: ICD-10-PCS | Performed by: INTERNAL MEDICINE

## 2024-11-12 PROCEDURE — 82948 REAGENT STRIP/BLOOD GLUCOSE: CPT

## 2024-11-12 PROCEDURE — 63710000001 INSULIN GLARGINE PER 5 UNITS: Performed by: INTERNAL MEDICINE

## 2024-11-12 PROCEDURE — 25010000002 PROPOFOL 10 MG/ML EMULSION

## 2024-11-12 PROCEDURE — 80048 BASIC METABOLIC PNL TOTAL CA: CPT | Performed by: INTERNAL MEDICINE

## 2024-11-12 PROCEDURE — 84134 ASSAY OF PREALBUMIN: CPT | Performed by: SURGERY

## 2024-11-12 PROCEDURE — 0DJ08ZZ INSPECTION OF UPPER INTESTINAL TRACT, VIA NATURAL OR ARTIFICIAL OPENING ENDOSCOPIC: ICD-10-PCS | Performed by: INTERNAL MEDICINE

## 2024-11-12 PROCEDURE — 43235 EGD DIAGNOSTIC BRUSH WASH: CPT | Performed by: INTERNAL MEDICINE

## 2024-11-12 PROCEDURE — 25010000002 LIDOCAINE PF 2% 2 % SOLUTION

## 2024-11-12 RX ORDER — LIDOCAINE HYDROCHLORIDE 20 MG/ML
INJECTION, SOLUTION EPIDURAL; INFILTRATION; INTRACAUDAL; PERINEURAL AS NEEDED
Status: DISCONTINUED | OUTPATIENT
Start: 2024-11-12 | End: 2024-11-12 | Stop reason: SURG

## 2024-11-12 RX ORDER — PROPOFOL 10 MG/ML
VIAL (ML) INTRAVENOUS AS NEEDED
Status: DISCONTINUED | OUTPATIENT
Start: 2024-11-12 | End: 2024-11-12 | Stop reason: SURG

## 2024-11-12 RX ADMIN — ROPINIROLE 1 MG: 1 TABLET, FILM COATED ORAL at 20:24

## 2024-11-12 RX ADMIN — Medication 10 ML: at 20:25

## 2024-11-12 RX ADMIN — SUCRALFATE 1 G: 1 TABLET ORAL at 06:41

## 2024-11-12 RX ADMIN — INSULIN GLARGINE 5 UNITS: 100 INJECTION, SOLUTION SUBCUTANEOUS at 20:23

## 2024-11-12 RX ADMIN — INSULIN LISPRO 3 UNITS: 100 INJECTION, SOLUTION INTRAVENOUS; SUBCUTANEOUS at 17:52

## 2024-11-12 RX ADMIN — Medication 2000 UNITS: at 15:45

## 2024-11-12 RX ADMIN — PROPOFOL 400 MG: 10 INJECTION, EMULSION INTRAVENOUS at 11:40

## 2024-11-12 RX ADMIN — PANTOPRAZOLE SODIUM 40 MG: 40 TABLET, DELAYED RELEASE ORAL at 06:41

## 2024-11-12 RX ADMIN — NORTRIPTYLINE HYDROCHLORIDE 25 MG: 25 CAPSULE ORAL at 20:24

## 2024-11-12 RX ADMIN — SUCRALFATE 1 G: 1 TABLET ORAL at 20:24

## 2024-11-12 RX ADMIN — LIDOCAINE HYDROCHLORIDE 80 MG: 20 INJECTION, SOLUTION EPIDURAL; INFILTRATION; INTRACAUDAL; PERINEURAL at 11:40

## 2024-11-12 RX ADMIN — ROPINIROLE 1 MG: 1 TABLET, FILM COATED ORAL at 15:45

## 2024-11-12 RX ADMIN — ANTACID TABLETS 1 TABLET: 500 TABLET, CHEWABLE ORAL at 20:24

## 2024-11-12 RX ADMIN — SUCRALFATE 1 G: 1 TABLET ORAL at 17:53

## 2024-11-12 RX ADMIN — EMPAGLIFLOZIN 10 MG: 10 TABLET, FILM COATED ORAL at 15:45

## 2024-11-12 RX ADMIN — INSULIN LISPRO 1 UNITS: 100 INJECTION, SOLUTION INTRAVENOUS; SUBCUTANEOUS at 20:23

## 2024-11-12 NOTE — PROGRESS NOTES
Nephrology ([facility]) Progress Note      Patient:  Tasha Perez  YOB: 1970  Date of Service: 11/12/2024  MRN: [id number]   Acct: [id number]   Primary Care Physician: KIRILL Murguia MD  Advance Directive:   Code Status and Medical Interventions: CPR (Attempt to Resuscitate); Full Support   Ordered at: 11/07/24 2230     Level Of Support Discussed With:    Patient     Code Status (Patient has no pulse and is not breathing):    CPR (Attempt to Resuscitate)     Medical Interventions (Patient has pulse or is breathing):    Full Support     Admit Date: 11/7/2024       Hospital Day: 4  Referring Provider: No ref. provider found      Patient personally seen and examined.  Complete chart including Consults, Notes, Operative Reports, Labs, Cardiology, and Radiology studies reviewed as able.    Chief complaint: Abnormal labs.    Subjective:  Tasha Perez is a 54 y.o. female for whom we were consulted for evaluation and treatment of acute kidney injury/chronic kidney disease.  She has stage IIIb chronic kidney disease baseline GFR is 35 mL.  She has a history of chronic GI illness with nausea vomiting/chronic pancreatitis at, rheumatoid arthritis, type 2 diabetes, gastroparesis.  She has recurrent nausea vomiting leading to intravascular volume depletion, requiring frequent hospitalization for IV fluid administration.  She was directed to come to the hospital as she had creatinine of 4.0 on routine lab draw.  She was found to be severely acidotic with a Bicarb of 14 mmol.  She was admitted to medical floor and was treated with IV fluid sodium bicarbonate.  She has significant improvement of renal function, serum creatinine slowly going back to the baseline.  She has recent history of gastrostomy and jejunostomy and both failed.  She does not want to consider any other gastric tube at this time.    This afternoon she underwent nasojejunal tube placement.  She denies any shortness  of breath.    Allergies:  Bee venom, Hydrocodone, Ibuprofen, Pineapple, Pineapple extract, Wasp venom, Wasp venom protein, Hydrocodone-acetaminophen, Penicillins, Sitagliptin, Metformin, and Chocolate    Home Meds:  Medications Prior to Admission   Medication Sig Dispense Refill Last Dose/Taking    aspirin 81 MG EC tablet Take 1 tablet by mouth Daily.   Taking    atorvastatin (LIPITOR) 80 MG tablet Take 1 tablet by mouth Every Night.   Taking    Calcium Carbonate-Vitamin D 600-5 MG-MCG tablet Take 1 tablet by mouth Daily.   Taking    Cholecalciferol (Vitamin D3) 50 MCG (2000 UT) capsule Take 1 capsule by mouth Daily.   Taking    dapagliflozin Propanediol (Farxiga) 10 MG tablet Take 10 mg by mouth Daily.   Taking    DULoxetine (CYMBALTA) 60 MG capsule Take 1 capsule by mouth Daily.   Taking    hydroxychloroquine (PLAQUENIL) 200 MG tablet Take 2 tablets by mouth Daily.   Taking    Insulin Aspart (novoLOG) 100 UNIT/ML injection Inject 90 Units under the skin into the appropriate area as directed Daily. Up to 90 units daily via omnipod   Taking    linaclotide (LINZESS) 290 MCG capsule capsule Take 1 capsule by mouth Every Morning Before Breakfast.   Taking    NIFEdipine XL (PROCARDIA XL) 30 MG 24 hr tablet Take 1 tablet by mouth Daily.   Taking    nortriptyline (PAMELOR) 25 MG capsule Take 1 capsule by mouth Every Night.   Taking    omeprazole (priLOSEC) 40 MG capsule Take 1 capsule by mouth Every Other Day.   Taking    rOPINIRole (REQUIP) 1 MG tablet Take 1 tablet by mouth 3 (Three) Times a Day.   11/7/2024    sodium bicarbonate 650 MG tablet Take 1 tablet by mouth 2 (Two) Times a Day.   Taking    sodium chloride 0.9 % solution Infuse 1,000 mL into a venous catheter Daily.   Taking    sucralfate (CARAFATE) 1 g tablet Take 1 tablet by mouth 4 (Four) Times a Day.   Taking    cetirizine (zyrTEC) 10 MG tablet Take 1 tablet by mouth Daily As Needed for Allergies.       metoprolol succinate XL (TOPROL-XL) 25 MG 24 hr tablet  Take 1 tablet by mouth Daily.       ondansetron ODT (ZOFRAN-ODT) 4 MG disintegrating tablet Place 1 tablet on the tongue Every 8 (Eight) Hours As Needed for Nausea or Vomiting.          Medicines:  Current Facility-Administered Medications   Medication Dose Route Frequency Provider Last Rate Last Admin    acetaminophen (TYLENOL) tablet 650 mg  650 mg Oral Q4H PRN Donnie Wright MD        Or    acetaminophen (TYLENOL) 160 MG/5ML oral solution 650 mg  650 mg Oral Q4H PRN Donnie Wright MD        Or    acetaminophen (TYLENOL) suppository 650 mg  650 mg Rectal Q4H PRN Donnie Wright MD        sennosides-docusate (PERICOLACE) 8.6-50 MG per tablet 2 tablet  2 tablet Oral BID PRN Donnie Wright MD        And    polyethylene glycol (MIRALAX) packet 17 g  17 g Oral Daily PRN Donnie Wright MD        And    bisacodyl (DULCOLAX) suppository 10 mg  10 mg Rectal Daily PRN Donnie Wright MD        calcium carbonate (TUMS) chewable tablet 500 mg (200 mg elemental)  1 tablet Oral BID Janak Harvey MD   1 tablet at 11/11/24 2032    Calcium Replacement - Follow Nurse / BPA Driven Protocol   Not Applicable PRN Adrián Glover DO        cetirizine (zyrTEC) tablet 5 mg  5 mg Oral Daily PRN Donnie Wright MD        cholecalciferol (VITAMIN D3) tablet 2,000 Units  2,000 Units Oral Daily Donnie Wright MD   2,000 Units at 11/10/24 0848    dextrose (D50W) (25 g/50 mL) IV injection 10-50 mL  10-50 mL Intravenous Q15 Min PRN Donnie Wright MD        dextrose (GLUTOSE) oral gel 15 g  15 g Oral Q15 Min PRN Donnie Wright MD        empagliflozin (JARDIANCE) tablet 10 mg  10 mg Oral Daily Donnie Wright MD   10 mg at 11/08/24 0837    Glucagon (GLUCAGEN) injection 1 mg  1 mg Intramuscular Q15 Min PRN Donnie Wright MD        insulin glargine (LANTUS, SEMGLEE) injection 1-200 Units  1-200 Units Subcutaneous Nightly - GlucomDonnie Pedroza MD   5 Units at 11/11/24 2033     insulin lispro (humaLOG) injection 1-200 Units  1-200 Units Subcutaneous 4x Daily With Meals & Nightly Donnie Wright MD   1 Units at 11/11/24 1744    insulin lispro (humaLOG) injection 1-200 Units  1-200 Units Subcutaneous PRN Donnie Wright MD        [Held by provider] lubiprostone (AMITIZA) capsule 8 mcg  8 mcg Oral BID Donnie Wright MD   8 mcg at 11/08/24 2138    Magnesium Standard Dose Replacement - Follow Nurse / BPA Driven Protocol   Not Applicable PRN Adrián Glover DO        nitroglycerin (NITROSTAT) SL tablet 0.4 mg  0.4 mg Sublingual Q5 Min PRN Donnie Wright MD        nortriptyline (PAMELOR) capsule 25 mg  25 mg Oral Nightly Donnie Wright MD   25 mg at 11/11/24 2033    ondansetron (ZOFRAN) injection 4 mg  4 mg Intravenous Q6H PRN Donnie Wright MD        pantoprazole (PROTONIX) EC tablet 40 mg  40 mg Oral Q AM Donnie Wright MD   40 mg at 11/12/24 0641    Phosphorus Replacement - Follow Nurse / BPA Driven Protocol   Not Applicable PRAdrián Díaz DO        [Held by provider] potassium chloride (MICRO-K/KLOR-CON) CR capsule  40 mEq Oral Daily Donnie Wright MD   40 mEq at 11/10/24 0849    Potassium Replacement - Follow Nurse / BPA Driven Protocol   Not Applicable PRAdrián Díaz DO        rOPINIRole (REQUIP) tablet 1 mg  1 mg Oral TID Donnie Wright MD   1 mg at 11/11/24 2032    sodium chloride 0.9 % flush 10 mL  10 mL Intravenous Q12H Donnie Wright MD   10 mL at 11/11/24 2033    sucralfate (CARAFATE) tablet 1 g  1 g Oral 4x Daily AC & at Bedtime Donnie Wright MD   1 g at 11/12/24 0641       Past Medical History:  Past Medical History:   Diagnosis Date    Arthritis     Contusion     Degenerative disc disease, cervical     Diabetes mellitus     Elevated cholesterol     Fibromyalgia     Neuropathy     Osteoporosis     Pancreatitis     Raynaud disease     Renal insufficiency     Smoker 02/08/2022    Vitamin D deficiency  04/26/2022       Past Surgical History:  Past Surgical History:   Procedure Laterality Date    APPENDECTOMY      CHOLECYSTECTOMY      COLONOSCOPY      ENDOSCOPY N/A 10/08/2019    Procedure: ESOPHAGOGASTRODUODENOSCOPY WITH ANESTHESIA;  Surgeon: Aleks Vaughn DO;  Location: [facility] ENDOSCOPY;  Service: Gastroenterology    ENDOSCOPY N/A 11/12/2024    Procedure: ESOPHAGOGASTRODUODENOSCOPY WITH ANESTHESIA;  Surgeon: Tiffanie Saucedo MD;  Location: [facility] ENDOSCOPY;  Service: Gastroenterology;  Laterality: N/A;  pre op: Gastroparesis, Intractable nausea  post op: insertion of dobhoff   PCP: Brandon Murguia    ENDOSCOPY WITH GASTROSTOMY TUBE INSERTION N/A 6/15/2022    Procedure: ESOPHAGOGASTRODUODENOSCOPY WITH GASTROSTOMY TUBE INSERTION;  Surgeon: Jersey Lee MD;  Location: [facility] ENDOSCOPY;  Service: Gastroenterology;  Laterality: N/A;  pre peg placement  post peg placement  Dr. Murguia    ENTEROSCOPY SMALL BOWEL N/A 11/3/2022    Procedure: Esophagogastroduodenoscopy with Peg Removal;  Surgeon: Aleks Vaughn DO;  Location: [facility] ENDOSCOPY;  Service: Gastroenterology;  Laterality: N/A;  pre; peg removal  post; peg removal   KIRILL Murguia MD        HYSTERECTOMY         Family History  Family History   Adopted: Yes   Problem Relation Age of Onset    Colon polyps Neg Hx     Colon cancer Neg Hx        Social History  Social History     Socioeconomic History    Marital status:    Tobacco Use    Smoking status: Every Day     Current packs/day: 0.25     Average packs/day: 0.3 packs/day for 31.0 years (7.8 ttl pk-yrs)     Types: Cigarettes   Vaping Use    Vaping status: Never Used   Substance and Sexual Activity    Alcohol use: No    Drug use: No    Sexual activity: Defer       Review of Systems:  History obtained from chart review and the patient  General ROS: No fever or chills  Respiratory ROS: No cough, shortness of breath, wheezing  Cardiovascular ROS: No chest pain or palpitations  Gastrointestinal  ROS: No abdominal pain or melena  Genito-Urinary ROS: No dysuria or hematuria  Psych ROS: No anxiety and depression  14 point ROS reviewed with the patient and negative except as noted above and in the HPI unless unable to obtain.    Objective:  Patient Vitals for the past 24 hrs:   BP Temp Temp src Pulse Resp SpO2   11/12/24 1225 106/76 -- -- -- 18 --   11/12/24 1220 -- -- -- -- 17 --   11/12/24 1215 98/75 -- -- -- 20 --   11/12/24 1210 -- -- -- -- 18 --   11/12/24 1205 (!) 79/59 -- -- -- 19 --   11/12/24 1201 (!) 83/61 -- -- 97 20 100 %   11/12/24 0735 139/90 98.4 °F (36.9 °C) Oral 83 16 100 %   11/12/24 0440 108/60 98.4 °F (36.9 °C) Oral 78 16 98 %   11/11/24 2008 125/81 98.1 °F (36.7 °C) Oral 97 16 100 %   11/11/24 1519 110/72 98.4 °F (36.9 °C) Oral 89 16 98 %       Intake/Output Summary (Last 24 hours) at 11/12/2024 1447  Last data filed at 11/11/2024 2300  Gross per 24 hour   Intake 240 ml   Output --   Net 240 ml     General: awake/alert   HEENT: Normocephalic atraumatic head/nasojejunal tube  Neck: Supple with no JVD or carotid bruits.  Chest:  clear to auscultation bilaterally without respiratory distress  CVS: regular rate and rhythm  Abdominal: soft, nontender, positive bowel sounds  Extremities: no cyanosis or edema  Skin: warm and dry without rash      Labs:  Results from last 7 days   Lab Units 11/10/24  0455 11/09/24  1118 11/08/24  0334   WBC 10*3/mm3 9.71 9.22 13.85*   HEMOGLOBIN g/dL 9.3* 8.9* 11.0*   HEMATOCRIT % 28.6* 27.4* 36.6   PLATELETS 10*3/mm3 218 211 230         Results from last 7 days   Lab Units 11/12/24  0626 11/11/24  1043 11/10/24  0455 11/09/24  1530 11/09/24  0634   SODIUM mmol/L 141  --  143  --  144   POTASSIUM mmol/L 4.3  --  4.0 3.7 2.7*   CHLORIDE mmol/L 102  --  106  --  108*   CO2 mmol/L 32.0*  --  32.0*  --  29.0   BUN mg/dL 25*  --  22*  --  37*   CREATININE mg/dL 1.64*  --  1.39*  --  1.55*   CALCIUM mg/dL 9.2  --  6.9*  --  6.6*   EGFR mL/min/1.73 37.3*  --  45.5*  --   "39.9*   BILIRUBIN mg/dL  --  <0.2  --   --   --    ALK PHOS U/L  --  127*  --   --   --    ALT (SGPT) U/L  --  14  --   --   --    AST (SGOT) U/L  --  21  --   --   --    GLUCOSE mg/dL 171*  --  155*  --  126*       Radiology:   Imaging Results (Last 72 Hours)       ** No results found for the last 72 hours. **            Culture:  No results found for: \"BLOODCX\", \"URINECX\", \"WOUNDCX\", \"MRSACX\", \"RESPCX\", \"STOOLCX\"      Assessment   1.  Acute kidney injury/resolving.  2.  Intravascular volume depletion.  3.  Stage IIIb chronic kidney disease baseline.  4.  Recurrent history of volume depletion.  5.  Chronic pancreatitis/gastroparesis.  6.  Anemia of chronic kidney disease.  7.  Hypocalcemia.    Plan:  1.  Resume H2O/Gatorade via nasojejunal tube  2.  Continue Jardiance.  3.  Possible discharge to home soon.  4.  Plan was discussed with the patient      Janak Harvey MD  11/12/2024  14:47 CST    "

## 2024-11-12 NOTE — PLAN OF CARE
Goal Outcome Evaluation: No complaints of pain this shift. Patient had a NJ tube placed this shift. Patient to go home with tube feedings through the NJ tube. She has an appointment with a gastro specialist in [place] on 11/20. Patient to have gastric stimulator placed in [place] . Patient safety to be maintained this shift, continue to monitor and report abnormal to provider.

## 2024-11-12 NOTE — PROGRESS NOTES
Nasal jejunal tube placed endoscopically using the ultrathin scope going from the nose to the distal duodenum under direct visualization, position of the tube passing through the antrum was documented on withdrawal  Advance feeding via nasojejunal tube  Will keep appointment with gastro based recent specialist for possible gastric stimulation device in [place] on November 20  Will sign off  Please call for reevaluation if needed

## 2024-11-12 NOTE — CONSULTS
Inpatient Nutrition Services  Tube Feeding Orders for Discharge  Patient Name:  Tasha Perez  YOB: 1970  MRN: [id number]  Admit Date:  11/7/2024  Assessment Date:  11/12/2024      Reason for Assessment    Reason For Assessment physician consult;TF/PN     Diagnosis diabetes diagnosis/complications;hematological/related complications;nutrition related history          Nutrition/Diet History       Row Name 11/11/24 1032          Nutrition/Diet History    Typical Intake (Food/Fluid/EN/PN) Proceeding with J-tube.   Pt will require a pump to provide continuous feedings; syringe/bolus feeding through small bowel feeding tube not best practice for tolerance.  Pt will require enteral nutrition support for primary/sole-source nutrition. PMH severe malnutrition and gastroparesis with frequent readmission for electrolyte disturbance (low potassium) and dysglycemia.  Formula and recommendations below. Home health infusion formula equivalent acceptable.     Food Intolerance(s) Food allergies: chocolate, pineapple     Factors Affecting Nutritional Intake restricted diet;altered gastrointestinal function         Order for home:     Nutrition Prescription EN    Enteral Prescription Enteral begin/change;Enteral to supply     Enteral Route --  J-tube might be placed 11/11     Product Peptamin 1.5 john     TF Delivery Method Continuous     Continuous TF Goal Rate (mL/hr) 45 mL/hr     Continuous TF Starting Rate (mL/hr) 20 mL/hr     Continuous TF Goal Volume (mL) 990 mL     Water flush (mL)  45 mL     Water Flush Frequency Per hour     New EN Prescription Ordered? No, recommended        EN to Supply    Kcal/Day 1485 Kcal/Day     Kcal/Kg 38 Kcal/Kg     Kcal/Kg Weight Method Actual weight     Protein (gm/day) 67 gm/day     Meet Estimated Kcal Need (%) 104 %     Meet Estimated Protein Need (%) 140 %     TF Free H2O (mL) 760 mL     Total Free H2O (mL/day) 1750 mL/day     Fiber Per Day (gm/day) 0 gm/day     K+ (mg/day) 2059  mg/day     Na+ (mg/day) 871 mg/day     Phos (mg) 990 mg      Discharge planning underway. RD available for recommendations and support as needed.    Electronically signed by:  Mae Loredo RDN, ERNESTINA  11/12/24 08:12 CST

## 2024-11-12 NOTE — CASE MANAGEMENT/SOCIAL WORK
Continued Stay Note   [place]     Patient Name: Tasha Perez  MRN: [id number]  Today's Date: 11/12/2024    Admit Date: 11/7/2024    Plan: Home   Discharge Plan       Row Name 11/12/24 1543       Plan    Plan Comments [facility] plans to deliver pump to patient's home this evening.                   Discharge Codes    No documentation.                 Expected Discharge Date and Time       Expected Discharge Date Expected Discharge Time    Nov 13, 2024               ZOLTAN Kirk

## 2024-11-12 NOTE — DISCHARGE SUMMARY
[facility] Medicine Services  DISCHARGE SUMMARY       Date of Admission: 11/7/2024  Date of Discharge:  11/13/2024  Primary Care Physician: KIRILL Murguia MD    Presenting Problem/History of Present Illness:  Weakness and fatigue; elevated creatinine    Final Discharge Diagnoses:  Active Hospital Problems    Diagnosis     **Acute renal failure superimposed on stage 3b chronic kidney disease     Metabolic acidosis     Intractable nausea     Gastroparesis     Fibromyalgia     Hypokalemia     Type 2 diabetes mellitus, without long-term current use of insulin     Severe malnutrition        Consults: Nephrology; General Surgery; Gastroenterology    Procedures Performed: Endoscopic placement of nasojejunal tube by gastroenterology performed on 11/12/2024.    Nasal jejunal tube placed endoscopically using the ultrathin scope going from the nose to the distal duodenum under direct visualization, position of the tube passing through the antrum was documented on withdrawal. Advance feeding via nasojejunal tube.    Pertinent Test Results:   Results for orders placed during the hospital encounter of 10/05/19    Adult Transthoracic Echo Complete W/ Cont if Necessary Per Protocol    Interpretation Summary  · Left ventricular systolic function is normal.    NORMAL STUDY      Imaging Results (All)       None          LAB RESULTS:      Lab 11/10/24  0455 11/09/24  1118 11/08/24 0334 11/07/24 1942   WBC 9.71 9.22 13.85* 15.14*   HEMOGLOBIN 9.3* 8.9* 11.0* 12.3   HEMATOCRIT 28.6* 27.4* 36.6 37.2   PLATELETS 218 211 230 270   NEUTROS ABS 5.42 5.41 9.98* 11.73*   IMMATURE GRANS (ABS) 0.04 0.03 0.08* 0.07*   LYMPHS ABS 3.36* 2.83 2.62 2.41   MONOS ABS 0.62 0.72 0.81 0.66   EOS ABS 0.24 0.20 0.30 0.22   MCV 95.7 93.8 102.8* 95.4         Lab 11/12/24  0626 11/10/24  0455 11/09/24  1530 11/09/24  0634 11/08/24 0334 11/07/24 2029   SODIUM 141 143  --  144 135* 134*   POTASSIUM 4.3 4.0 3.7 2.7*  3.0* 2.8*   CHLORIDE 102 106  --  108* 111* 104   CO2 32.0* 32.0*  --  29.0 8.0* 14.0*   ANION GAP 7.0 5.0  --  7.0 16.0* 16.0*   BUN 25* 22*  --  37* 64* 61*   CREATININE 1.64* 1.39*  --  1.55* 3.66* 4.05*   EGFR 37.3* 45.5*  --  39.9* 14.2* 12.6*   GLUCOSE 171* 155*  --  126* 163* 248*   CALCIUM 9.2 6.9*  --  6.6* 7.9* 8.3*   MAGNESIUM  --  2.0  --  1.2*  --  1.7         Lab 11/11/24  1043   TOTAL PROTEIN 6.3   ALBUMIN 3.3*   ALT (SGPT) 14   AST (SGOT) 21   BILIRUBIN <0.2   BILIRUBIN DIRECT <0.2   ALK PHOS 127*                     Brief Urine Lab Results  (Last result in the past 365 days)        Color   Clarity   Blood   Leuk Est   Nitrite   Protein   CREAT   Urine HCG        11/08/24 1157 Yellow   Clear   Negative   Negative   Negative   30 mg/dL (1+)                 Microbiology Results (last 10 days)       ** No results found for the last 240 hours. **            Hospital Course:   Patient is a very pleasant 54-year-old female with insulin-dependent diabetes, gastroparesis, stage IIIb chronic kidney disease that presented to our facility on 11/7/2024 due to worsening weakness and fatigue, and outpatient labs performed by nephrology revealing an elevated creatinine concerning for acute kidney injury on chronic kidney disease.  Patient was directed to come to the emergency department, was admitted to the hospitalist service, with consultation by her nephrology team who followed with us during this hospitalization.    Patient has had multiple hospital admissions for acute kidney injury in the setting of dehydration and intravascular volume depletion.  She has a known history of significant gastroparesis, and in fact we learned that she has an appointment coming up with a GI specialist in [place], [place] regarding consideration of gastric stimulator placement.  Patient has a PICC line in her right upper extremity and has been receiving intravenous fluids in the outpatient setting given her tendencies towards  acute kidney injury and dehydration.  In addition, she has also had G-tube placement in addition to J-tube placement in the past.  More recently, she had a J-tube placed at [facility], it sounds like there were difficulties with this tube frequently becoming clogged, and I do not know the full details but at some point the J-tube was removed.    With lack of enteral access, and given her issues with recurrent nausea and vomiting in the setting of gastroparesis, patient required rehospitalization for acute kidney injury and dehydration.  Following administration of intravenous fluids that she has had significant improvement in her renal function.  On arrival her creatinine was 4.05.  Today on the day of discharge her creatinine is now down to 1.64 which is actually better than her previous baseline.    I initially consulted general surgery for consideration of reinsertion of a surgical J-tube, however reoperating on the J-tube site within this short postoperative timeframe would pose some increased risks of an intra-abdominal leak, and even could delay her gastric stimulator placement.  For that reason gastroenterology was consulted for plans for endoscopic placement of a nasojejunal feeding tube.  This procedure is planned for today on 11/12/2024.    Following nasojejunal feeding tube placement plans are in place for discharge home to begin continuous tube feeds.  I am in the process of discussing with nutrition in addition to social work to make sure that patient has a home pump for tube feeding use.  Nephrology has also been following along and will arrange plans for outpatient hydration via the NJ tube as well.  Her previous orders for IV hydration via her PICC line will be discontinued.    All in all, patient is doing much better.  Plans in place for close outpatient follow-up with nephrology ideally within 1 week with plans for a repeat basic metabolic panel to confirm stability of her renal  "function and electrolytes.  In addition, plans for outpatient follow-up with gastroenterology as scheduled on 11/20/2024 with plans for consideration of gastric stimulator given her ongoing difficulties with nausea, vomiting, malnutrition and anorexia in the setting of gastroparesis.    Addendum: GI did endoscopically place a Dobbhoff tube terminating in the distal duodenum.  Tube feeds have been started, currently at 35 mL/h, and patient states that she is tolerating without problem.  She indicated to me this morning that she has been notified that her feeding pump was delivered to her home yesterday afternoon.  She is very eager for discharge home today.  Her main complaint is that the NJ tube location bothers her (\"just hanging off my nose\").  She indicates that when she swallows at times it feels like it is irritating the back of her throat and makes it more difficult to swallow.  She does report that she has been able to eat, drink, and take her medication, however.  We discussed that by nature this type of tube is temporary, but to focus our efforts especially over the course of the next 1 month to keep this tube in place for purposes of optimizing her nutritional and hydration status.  She expressed agreement with this plan.  We also discussed that this tactic is a \"bridge\" that hopefully will allow her to be in a better clinical state until her new GI specialist in [place], [place], can outline next steps for her treatment moving forward.  In addition to tube feeds which will be arranged at discharge on a continuous basis via this NJ tube, nephrology has also left recommendations for water/Gatorade supplement to be provided via this tube as well.  Plans for very close outpatient follow-up as outlined above.      Physical Exam on Discharge:  /90 (BP Location: Left arm, Patient Position: Lying)   Pulse 83   Temp 98.4 °F (36.9 °C) (Oral)   Resp 16   Ht 154.9 cm (61\")   Wt 39.2 kg (86 lb 8 oz)   " SpO2 100%   BMI 16.34 kg/m²   Physical Exam  See my progress note from today    Condition on Discharge: medically stable    Discharge Disposition: home      Discharge Medications:     Discharge Medications        ASK your doctor about these medications        Instructions Start Date   aspirin 81 MG EC tablet   81 mg, Daily      atorvastatin 80 MG tablet  Commonly known as: LIPITOR   80 mg, Nightly      Calcium Carbonate-Vitamin D 600-5 MG-MCG tablet   1 tablet, Daily      cetirizine 10 MG tablet  Commonly known as: zyrTEC   10 mg, Daily PRN      DULoxetine 60 MG capsule  Commonly known as: CYMBALTA   60 mg, Daily      Farxiga 10 MG tablet  Generic drug: dapagliflozin Propanediol   10 mg, Daily      hydroxychloroquine 200 MG tablet  Commonly known as: PLAQUENIL   400 mg, Daily      Insulin Aspart 100 UNIT/ML injection  Commonly known as: novoLOG   90 Units, Daily      linaclotide 290 MCG capsule capsule  Commonly known as: LINZESS   290 mcg, Every Morning Before Breakfast      metoprolol succinate XL 25 MG 24 hr tablet  Commonly known as: TOPROL-XL   25 mg, Daily      NIFEdipine XL 30 MG 24 hr tablet  Commonly known as: PROCARDIA XL   30 mg, Daily      nortriptyline 25 MG capsule  Commonly known as: PAMELOR   25 mg, Nightly      omeprazole 40 MG capsule  Commonly known as: priLOSEC   40 mg, Every Other Day      ondansetron ODT 4 MG disintegrating tablet  Commonly known as: ZOFRAN-ODT   4 mg, Every 8 Hours PRN      rOPINIRole 1 MG tablet  Commonly known as: REQUIP   1 mg, 3 Times Daily      sodium bicarbonate 650 MG tablet   650 mg, 2 Times Daily      sodium chloride 0.9 % solution   1,000 mL, Daily      sucralfate 1 g tablet  Commonly known as: CARAFATE   1 g, 4 Times Daily      Vitamin D3 50 MCG (2000 UT) capsule   2,000 Units, Daily               Discharge Diet: Diabetic diet as tolerated; in addition patient will have a nasojejunal tube at discharge and tube feedings and hydration via free water replacement will  be coordinated for home use in anticipation of discharge.    Activity at Discharge: as tolerated    Follow-up Appointments:   Patient has a follow-up with a GI specialist in [place], [place] regarding discussions for a gastric stimulator on 11/20/2024.  In addition I would like her to have close outpatient follow-up with her primary care provider in addition to nephrology within 1 week with plans for a repeat basic metabolic panel to reassess her renal function and electrolytes.    Test Results Pending at Discharge: none    Electronically signed by Otto Del Rosario MD, 11/12/24, 08:00 CST.    Time: 35 minutes.

## 2024-11-12 NOTE — ANESTHESIA POSTPROCEDURE EVALUATION
Patient: Tasha Perez    Procedure Summary       Date: 11/12/24 Room / Location: [facility] ENDOSCOPY 4 / [facility] ENDOSCOPY    Anesthesia Start: 1138 Anesthesia Stop: 1201    Procedure: ESOPHAGOGASTRODUODENOSCOPY WITH ANESTHESIA Diagnosis:       Gastroparesis      (Gastroparesis [K31.84])    Surgeons: Tiffanie Saucedo MD Provider: Braulio Murray CRNA    Anesthesia Type: MAC ASA Status: 3 - Emergent            Anesthesia Type: MAC    Vitals  No vitals data found for the desired time range.          Post Anesthesia Care and Evaluation    Patient location during evaluation: PHASE II  Patient participation: complete - patient participated  Level of consciousness: awake and alert  Pain score: 0    Airway patency: patent  Anesthetic complications: No anesthetic complications  PONV Status: none  Cardiovascular status: acceptable  Respiratory status: acceptable  Hydration status: acceptable  No anesthesia care post op    
Universal Safety Interventions

## 2024-11-12 NOTE — CASE MANAGEMENT/SOCIAL WORK
Continued Stay Note  CRISTAL Winslow     Patient Name: Tasha Perez  MRN: [id number]  Today's Date: 11/12/2024    Admit Date: 11/7/2024    Plan: Home   Discharge Plan       Row Name 11/12/24 0825       Plan    Plan Home    Plan Comments SW has informed [place] with [facility] of need for tube feedings at home again and goal for discharge today.                   Discharge Codes    No documentation.                 Expected Discharge Date and Time       Expected Discharge Date Expected Discharge Time    Nov 11, 2024               ZOLTAN Kirk

## 2024-11-12 NOTE — ANESTHESIA PREPROCEDURE EVALUATION
Anesthesia Evaluation     no history of anesthetic complications:   NPO Solid Status: > 8 hours  NPO Liquid Status: > 8 hours           Airway   Mallampati: I  TM distance: >3 FB  Neck ROM: full  No difficulty expected  Dental      Pulmonary    (+) a smoker Current,  Cardiovascular   Exercise tolerance: good (4-7 METS)    Patient on routine beta blocker    (+) hypertension, hyperlipidemia  (-) CAD      Neuro/Psych  (+) numbness  (-) seizures, TIA, CVA  GI/Hepatic/Renal/Endo    (+) renal disease-, diabetes mellitus type 2 using insulin    Musculoskeletal     Abdominal    Substance History      OB/GYN          Other   arthritis,                       Anesthesia Plan    ASA 3 - emergent     MAC     (gastroparesis anorexia and malnutrition; aspiration risk discussed )  intravenous induction     Anesthetic plan, risks, benefits, and alternatives have been provided, discussed and informed consent has been obtained with: patient.        CODE STATUS:    Level Of Support Discussed With: Patient  Code Status (Patient has no pulse and is not breathing): CPR (Attempt to Resuscitate)  Medical Interventions (Patient has pulse or is breathing): Full Support

## 2024-11-12 NOTE — PROGRESS NOTES
"       GENERAL SURGERY INPATIENT PROGRESS NOTE    Patient Name:  Tasha Perez  YOB: 1970  MRN: [id number]    SUBJECTIVE    The patient underwent EGD with nasojejunal feeding tube placement.  She is complaining that the tube is bothersome.  It is irritating the back of her throat and making it uncomfortable to swallow, particularly pills.  However, she is able to eat a regular diet during this conversation.  No complaints of abdominal pain, nausea or desire to vomit.    Allergies:  Allergies   Allergen Reactions    Bee Venom Anaphylaxis    Hydrocodone Anaphylaxis    Ibuprofen Other (See Comments)     \"SHUTS MY KIDNEYS DOWN\" PER PATIENT     Pineapple Anaphylaxis    Pineapple Extract Anaphylaxis    Wasp Venom Anaphylaxis    Wasp Venom Protein Anaphylaxis    Hydrocodone-Acetaminophen Dizziness, Nausea And Vomiting, Nausea Only and Unknown - Low Severity    Penicillins Nausea And Vomiting    Sitagliptin Other (See Comments)     Other reaction(s): Abdominal Pain      Metformin Other (See Comments)     Pt states it messes with her kidneys    Chocolate Rash     Dark chocolate only, told to RD 7/10/24       Medications:  calcium carbonate, 1 tablet, Oral, BID  cholecalciferol, 2,000 Units, Oral, Daily  empagliflozin, 10 mg, Oral, Daily  insulin glargine, 1-200 Units, Subcutaneous, Nightly - Glucommander  insulin lispro, 1-200 Units, Subcutaneous, 4x Daily With Meals & Nightly  [Held by provider] lubiprostone, 8 mcg, Oral, BID  nortriptyline, 25 mg, Oral, Nightly  pantoprazole, 40 mg, Oral, Q AM  [Held by provider] potassium chloride, 40 mEq, Oral, Daily  rOPINIRole, 1 mg, Oral, TID  sodium chloride, 10 mL, Intravenous, Q12H  sucralfate, 1 g, Oral, 4x Daily AC & at Bedtime         OBJECTIVE    VITAL SIGNS  /76   Pulse 97   Temp 98.4 °F (36.9 °C) (Oral)   Resp 18   Ht 154.9 cm (61\")   Wt 39.2 kg (86 lb 8 oz)   SpO2 100%   BMI 16.34 kg/m²     Intake/Output Summary (Last 24 hours) at 11/12/2024 " 1649  Last data filed at 11/11/2024 2300  Gross per 24 hour   Intake 240 ml   Output --   Net 240 ml       PHYSICAL EXAM    General: Middle-age female, sitting up in bed, in no acute distress.  HEENT:  NCAT, PERRL, EOM intact bilaterally, hearing intact, nares patent  Heart:  Regular rate, normotensive, no JVD bilaterally  Lungs:  Normal respiratory effort, regular respiratory rate, no wheezing  Abdomen: Flat, soft  Extremities:  No cyanosis, clubbing or edema.   Musculoskeletal:  Normal development of bilateral upper extremities. Normal development of bilateral lower extremities.  Range of motion intact.  No evidence of trauma.  Skin:  No rashes, ecchymosis or jaundice. Dry and non-diaphoretic. Skin color is consistent with ethnicity.    RESULTS    Labs:  I personally reviewed all pertinent labs.   Imaging:  I personally reviewed all pertinent imaging studies.   Pathology:        ASSESSMENT AND PLAN    Active Hospital Problems    Diagnosis     **Acute renal failure superimposed on stage 3b chronic kidney disease     Metabolic acidosis     Intractable nausea     Gastroparesis     Fibromyalgia     Hypokalemia     Type 2 diabetes mellitus, without long-term current use of insulin     Severe malnutrition          DAILY CARE PLAN    The patient is status post EGD and nasojejunal feeding tube placement.  I did  the patient yesterday that the nasojejunal feeding tube can cause discomfort in the nose, mouth and throat.  I also counseled the patient that she is not mandated to keep this tube, and can remove it at any time if she so desires.  However, I have explained to the patient that if she cannot eat that the nasoenteral feeding access would be critical for supplying nutrition medication.  However, it is unclear how accurate her diagnosis is as she is seemingly tolerating a regular diabetic diet.  I have no plans on placing a feeding tube on this patient, as I do not believe that there is an absolute indication  for jejunostomy feeding tube placement in this patient.  She can follow up with the advanced gastroenterologist for gastric stimulator placement.  I will turn the remainder of her care to her primary team and gastroenterologist.  General surgery will sign off at this time.    I discussed the patient's findings and my recommendations with the patient/family, as well as the primary care team.    Ludwig Correa MD, FACS  General Surgery  11/12/2024  16:49 CST    Please note that portions of this note were completed with a voice recognition program.

## 2024-11-12 NOTE — PROGRESS NOTES
"    [facility] Medicine Services  INPATIENT PROGRESS NOTE    Patient Name: Tasha Peerz  Date of Admission: 11/7/2024  Today's Date: 11/12/24  Length of Stay: 4  Primary Care Physician: KIRILL Murguia MD    Subjective   Chief Complaint: follow-up SUGAR  HPI   Patient reports that she is doing well this morning.  She is agreeable to NJ tube placement endoscopically this morning.  Overall, she is feeling much better.  We even discussed a plan for her to possibly go home later this afternoon after NJ tube placement.  She does report that she no longer has her home pump for tube feeds - \"they recently came and picked it up.\"    Review of Systems   All pertinent negatives and positives are as above. All other systems have been reviewed and are negative unless otherwise stated.     Objective    Temp:  [98 °F (36.7 °C)-98.4 °F (36.9 °C)] 98.4 °F (36.9 °C)  Heart Rate:  [78-97] 78  Resp:  [16] 16  BP: (108-138)/(60-86) 108/60  Physical Exam  Vitals reviewed.   Constitutional:       General: She is not in acute distress.  HENT:      Head: Normocephalic.      Mouth/Throat:      Mouth: Mucous membranes are moist.   Eyes:      Pupils: Pupils are equal, round, and reactive to light.   Pulmonary:      Effort: Pulmonary effort is normal. No respiratory distress.   Neurological:      General: No focal deficit present.      Mental Status: She is alert. Mental status is at baseline.   Psychiatric:         Mood and Affect: Mood normal.       Results Review:  I have reviewed the labs, radiology results, and diagnostic studies.    Laboratory Data:   Results from last 7 days   Lab Units 11/10/24  0455 11/09/24  1118 11/08/24  0334   WBC 10*3/mm3 9.71 9.22 13.85*   HEMOGLOBIN g/dL 9.3* 8.9* 11.0*   HEMATOCRIT % 28.6* 27.4* 36.6   PLATELETS 10*3/mm3 218 211 230        Results from last 7 days   Lab Units 11/12/24  0626 11/11/24  1043 11/10/24  0455 11/09/24  1530 11/09/24  0634   SODIUM mmol/L 141  --  " "143  --  144   POTASSIUM mmol/L 4.3  --  4.0 3.7 2.7*   CHLORIDE mmol/L 102  --  106  --  108*   CO2 mmol/L 32.0*  --  32.0*  --  29.0   BUN mg/dL 25*  --  22*  --  37*   CREATININE mg/dL 1.64*  --  1.39*  --  1.55*   CALCIUM mg/dL 9.2  --  6.9*  --  6.6*   BILIRUBIN mg/dL  --  <0.2  --   --   --    ALK PHOS U/L  --  127*  --   --   --    ALT (SGPT) U/L  --  14  --   --   --    AST (SGOT) U/L  --  21  --   --   --    GLUCOSE mg/dL 171*  --  155*  --  126*       Culture Data:   No results found for: \"BLOODCX\", \"URINECX\", \"WOUNDCX\", \"MRSACX\", \"RESPCX\", \"STOOLCX\"    Radiology Data:   Imaging Results (Last 24 Hours)       ** No results found for the last 24 hours. **            I have reviewed the patient's current medications.     Assessment/Plan   Assessment  Active Hospital Problems    Diagnosis     **Acute renal failure superimposed on stage 3b chronic kidney disease     Metabolic acidosis     Intractable nausea     Gastroparesis     Fibromyalgia     Hypokalemia     Type 2 diabetes mellitus, without long-term current use of insulin     Severe malnutrition        Treatment Plan  Discussed with Dr. Correa yesterday    NPO at midnight for endoscopic placement of a nasojejunal tube by GI today  Nutrition following - plan to start tube feeds thereafter and patient will require a pump for continuous feedings at home; will discuss with   Creatinine 1.64 this morning (4.05 on arrival) - markedly improved overall  Patient has a follow-up with a GI specialist in [place], [place] later this month on 11/20 for consideration of gastric stimulator - she reported to me this morning this will be a tele-health visit.  Dispo - possibly home later this afternoon pending the above    Medical Decision Making  Number and Complexity of problems: Moderate complexity      Conditions and Status        Condition is improving.     MDM Data  External documents reviewed: none  Cardiac tracing (EKG, telemetry) interpretation: no new " EKGs  Radiology interpretation: no new radiology studies  Labs reviewed: as above  Any tests that were considered but not ordered: none     Decision rules/scores evaluated (example [id number], [place], etc): none     Discussed with: patient, bedside nurse, Mee.  Will be in communication with SW, nutrition, and Dr. Harvey with Nephrology as well     Care Planning  Shared decision making: Discussed with patient with agreement to proceed with treatment plan as outlined  Code status and discussions: Full code    Disposition  Social Determinants of Health that impact treatment or disposition: None apparent at this time  I expect the patient to be discharged to home later this afternoon pending the above    Electronically signed by Otto Del Rosario MD, 11/12/24, 07:23 CST.

## 2024-11-12 NOTE — PLAN OF CARE
Goal Outcome Evaluation:           Progress: improving  Outcome Evaluation: VSS. pt tolerating meals, denies N/V. pt up ad jessy. NPO at midnight for procedure.

## 2024-11-13 ENCOUNTER — READMISSION MANAGEMENT (OUTPATIENT)
Dept: CALL CENTER | Facility: HOSPITAL | Age: 54
End: 2024-11-13
Payer: COMMERCIAL

## 2024-11-13 VITALS
HEART RATE: 97 BPM | SYSTOLIC BLOOD PRESSURE: 131 MMHG | OXYGEN SATURATION: 100 % | TEMPERATURE: 98.1 F | BODY MASS INDEX: 16.33 KG/M2 | HEIGHT: 61 IN | WEIGHT: 86.5 LBS | RESPIRATION RATE: 16 BRPM | DIASTOLIC BLOOD PRESSURE: 86 MMHG

## 2024-11-13 LAB
ALBUMIN SERPL-MCNC: 3.1 G/DL (ref 3.5–5.2)
ALBUMIN/GLOB SERPL: 1.2 G/DL
ALP SERPL-CCNC: 107 U/L (ref 39–117)
ALT SERPL W P-5'-P-CCNC: 13 U/L (ref 1–33)
ANION GAP SERPL CALCULATED.3IONS-SCNC: 8 MMOL/L (ref 5–15)
AST SERPL-CCNC: 15 U/L (ref 1–32)
BASOPHILS # BLD AUTO: 0.03 10*3/MM3 (ref 0–0.2)
BASOPHILS NFR BLD AUTO: 0.3 % (ref 0–1.5)
BILIRUB SERPL-MCNC: <0.2 MG/DL (ref 0–1.2)
BUN SERPL-MCNC: 29 MG/DL (ref 6–20)
BUN/CREAT SERPL: 18.8 (ref 7–25)
CALCIUM SPEC-SCNC: 8.8 MG/DL (ref 8.6–10.5)
CHLORIDE SERPL-SCNC: 102 MMOL/L (ref 98–107)
CO2 SERPL-SCNC: 30 MMOL/L (ref 22–29)
CREAT SERPL-MCNC: 1.54 MG/DL (ref 0.57–1)
DEPRECATED RDW RBC AUTO: 48.4 FL (ref 37–54)
EGFRCR SERPLBLD CKD-EPI 2021: 40.2 ML/MIN/1.73
EOSINOPHIL # BLD AUTO: 0.48 10*3/MM3 (ref 0–0.4)
EOSINOPHIL NFR BLD AUTO: 5.3 % (ref 0.3–6.2)
ERYTHROCYTE [DISTWIDTH] IN BLOOD BY AUTOMATED COUNT: 13.2 % (ref 12.3–15.4)
GLOBULIN UR ELPH-MCNC: 2.6 GM/DL
GLUCOSE BLDC GLUCOMTR-MCNC: 204 MG/DL (ref 70–130)
GLUCOSE SERPL-MCNC: 205 MG/DL (ref 65–99)
HCT VFR BLD AUTO: 33.2 % (ref 34–46.6)
HGB BLD-MCNC: 10.3 G/DL (ref 12–15.9)
IMM GRANULOCYTES # BLD AUTO: 0.02 10*3/MM3 (ref 0–0.05)
IMM GRANULOCYTES NFR BLD AUTO: 0.2 % (ref 0–0.5)
LYMPHOCYTES # BLD AUTO: 2.87 10*3/MM3 (ref 0.7–3.1)
LYMPHOCYTES NFR BLD AUTO: 31.7 % (ref 19.6–45.3)
MCH RBC QN AUTO: 30.9 PG (ref 26.6–33)
MCHC RBC AUTO-ENTMCNC: 31 G/DL (ref 31.5–35.7)
MCV RBC AUTO: 99.7 FL (ref 79–97)
MONOCYTES # BLD AUTO: 0.41 10*3/MM3 (ref 0.1–0.9)
MONOCYTES NFR BLD AUTO: 4.5 % (ref 5–12)
NEUTROPHILS NFR BLD AUTO: 5.24 10*3/MM3 (ref 1.7–7)
NEUTROPHILS NFR BLD AUTO: 58 % (ref 42.7–76)
NRBC BLD AUTO-RTO: 0 /100 WBC (ref 0–0.2)
PLATELET # BLD AUTO: 232 10*3/MM3 (ref 140–450)
PMV BLD AUTO: 10.1 FL (ref 6–12)
POTASSIUM SERPL-SCNC: 4.4 MMOL/L (ref 3.5–5.2)
PROT SERPL-MCNC: 5.7 G/DL (ref 6–8.5)
RBC # BLD AUTO: 3.33 10*6/MM3 (ref 3.77–5.28)
SODIUM SERPL-SCNC: 140 MMOL/L (ref 136–145)
WBC NRBC COR # BLD AUTO: 9.05 10*3/MM3 (ref 3.4–10.8)

## 2024-11-13 PROCEDURE — 63710000001 INSULIN LISPRO (HUMAN) PER 5 UNITS: Performed by: INTERNAL MEDICINE

## 2024-11-13 PROCEDURE — 85025 COMPLETE CBC W/AUTO DIFF WBC: CPT | Performed by: INTERNAL MEDICINE

## 2024-11-13 PROCEDURE — 82948 REAGENT STRIP/BLOOD GLUCOSE: CPT

## 2024-11-13 PROCEDURE — 80053 COMPREHEN METABOLIC PANEL: CPT | Performed by: INTERNAL MEDICINE

## 2024-11-13 RX ADMIN — SUCRALFATE 1 G: 1 TABLET ORAL at 11:19

## 2024-11-13 RX ADMIN — Medication 2000 UNITS: at 08:50

## 2024-11-13 RX ADMIN — ROPINIROLE 1 MG: 1 TABLET, FILM COATED ORAL at 08:50

## 2024-11-13 RX ADMIN — ANTACID TABLETS 1 TABLET: 500 TABLET, CHEWABLE ORAL at 08:50

## 2024-11-13 RX ADMIN — SUCRALFATE 1 G: 1 TABLET ORAL at 06:30

## 2024-11-13 RX ADMIN — INSULIN LISPRO 3 UNITS: 100 INJECTION, SOLUTION INTRAVENOUS; SUBCUTANEOUS at 08:50

## 2024-11-13 RX ADMIN — PANTOPRAZOLE SODIUM 40 MG: 40 TABLET, DELAYED RELEASE ORAL at 06:30

## 2024-11-13 RX ADMIN — EMPAGLIFLOZIN 10 MG: 10 TABLET, FILM COATED ORAL at 08:50

## 2024-11-13 NOTE — CASE MANAGEMENT/SOCIAL WORK
Continued Stay Note  CRISTAL Winslow     Patient Name: Tasha Perez  MRN: [id number]  Today's Date: 11/13/2024    Admit Date: 11/7/2024    Plan: Home   Discharge Plan       Row Name 11/13/24 0822       Plan    Plan Home    Plan Comments SW informed [place] with [employer] of patient's dc orders.    Final Discharge Disposition Code 01 - home or self-care                   Discharge Codes    No documentation.                 Expected Discharge Date and Time       Expected Discharge Date Expected Discharge Time    Nov 12, 2024               ZOLTAN Kirk

## 2024-11-13 NOTE — OUTREACH NOTE
Prep Survey      Flowsheet Row Responses   [facility] facility patient discharged from? [facility]   Is LACE score < 7 ? No   Eligibility Readm Mgmt   Discharge diagnosis Acute renal failure superimposed on stage 3b chronic kidney disease   Does the patient have one of the following disease processes/diagnoses(primary or secondary)? Other   Prep survey completed? Yes            Marielle KIM - Registered Nurse

## 2024-11-13 NOTE — PLAN OF CARE
PIV removed.  Discharge instructions reviewed and all patient's questions answered.     Problem: Adult Inpatient Plan of Care  Goal: Plan of Care Review  11/13/2024 1125 by Kandis Lau RN  Outcome: Adequate for Care Transition  11/13/2024 1125 by Kandis Lau RN  Outcome: Progressing  Goal: Patient-Specific Goal (Individualized)  11/13/2024 1125 by Kandis Lau RN  Outcome: Adequate for Care Transition  11/13/2024 1125 by Kandis Lau RN  Outcome: Progressing  Goal: Absence of Hospital-Acquired Illness or Injury  11/13/2024 1125 by Kandis Lau RN  Outcome: Adequate for Care Transition  11/13/2024 1125 by Kandis Lau RN  Outcome: Progressing  Goal: Optimal Comfort and Wellbeing  11/13/2024 1125 by Kandis Lau RN  Outcome: Adequate for Care Transition  11/13/2024 1125 by Kandis Lau RN  Outcome: Progressing  Goal: Readiness for Transition of Care  11/13/2024 1125 by Kandis Lau RN  Outcome: Adequate for Care Transition  11/13/2024 1125 by Kandis Lau RN  Outcome: Progressing     Problem: Acute Kidney Injury/Impairment  Goal: Fluid and Electrolyte Balance  11/13/2024 1125 by Kandis Lau RN  Outcome: Adequate for Care Transition  11/13/2024 1125 by Kandis Lau RN  Outcome: Progressing  Goal: Improved Oral Intake  11/13/2024 1125 by Kandis Lau RN  Outcome: Adequate for Care Transition  11/13/2024 1125 by Kandis Lau RN  Outcome: Progressing  Goal: Effective Renal Function  11/13/2024 1125 by Kandis Lau RN  Outcome: Adequate for Care Transition  11/13/2024 1125 by Kandis Lau RN  Outcome: Progressing     Problem: Electrolyte Imbalance  Goal: Electrolyte Balance  11/13/2024 1125 by Kandis Lau RN  Outcome: Adequate for Care Transition  11/13/2024 1125 by Kandis Lau RN  Outcome: Progressing   Goal Outcome Evaluation:

## 2024-11-13 NOTE — PAYOR COMM NOTE
"REF:   [id number]     [facility]  FAX  [phone]     Yash Perez (54 y.o. Female)       Date of Birth   1970    Social Security Number       Address   [address] [place] [id number]    Home Phone   [phone]    MRN   [id number]       [employer]   Congregational    Marital Status                               Admission Date   11/7/24    Admission Type   Emergency    Admitting Provider   Otto Del Rosario MD    Attending Provider       Department, Room/Bed   [facility] 4C, 490/1       Discharge Date   11/13/2024    Discharge Disposition   Home or Self Care    Discharge Destination                                 Attending Provider: (none)   Allergies: Bee Venom, Hydrocodone, Ibuprofen, Pineapple, Pineapple Extract, Wasp Venom, Wasp Venom Protein, Hydrocodone-acetaminophen, Penicillins, Sitagliptin, Metformin, Chocolate    Isolation: Contact   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)   Code Status: CPR    Ht: 154.9 cm (61\")   Wt: 39.2 kg (86 lb 8 oz)    Admission Cmt: None   Principal Problem: Acute renal failure superimposed on stage 3b chronic kidney disease [N17.9,N18.32]                   Active Insurance as of 11/7/2024       Primary Coverage       Payor Plan Insurance Group Employer/Plan Group    [facility]        Payor Plan Address Payor Plan Phone Number Payor Plan Fax Number Effective Dates    [address]   8/1/2019 - None Entered    [facility] [address]         Subscriber Name Subscriber Birth Date Member ID       YASH PEREZ 1970 [id number]                     Emergency Contacts        (Rel.) Home Phone Work Phone Mobile Phone    JENNIFER PEREZ (Spouse) [phone] -- [phone]    CASSIA PEREZ (Daughter) -- -- [phone]                 Discharge Summary        Otto Del Rosario MD at 11/13/24 0800                [facility] Medicine Services  DISCHARGE SUMMARY "       Date of Admission: 11/7/2024  Date of Discharge:  11/13/2024  Primary Care Physician: KIRILL Murguia MD    Presenting Problem/History of Present Illness:  Weakness and fatigue; elevated creatinine    Final Discharge Diagnoses:  Active Hospital Problems    Diagnosis     **Acute renal failure superimposed on stage 3b chronic kidney disease     Metabolic acidosis     Intractable nausea     Gastroparesis     Fibromyalgia     Hypokalemia     Type 2 diabetes mellitus, without long-term current use of insulin     Severe malnutrition        Consults: Nephrology; General Surgery; Gastroenterology    Procedures Performed: Endoscopic placement of nasojejunal tube by gastroenterology performed on 11/12/2024.    Nasal jejunal tube placed endoscopically using the ultrathin scope going from the nose to the distal duodenum under direct visualization, position of the tube passing through the antrum was documented on withdrawal. Advance feeding via nasojejunal tube.    Pertinent Test Results:   Results for orders placed during the hospital encounter of 10/05/19    Adult Transthoracic Echo Complete W/ Cont if Necessary Per Protocol    Interpretation Summary  · Left ventricular systolic function is normal.    NORMAL STUDY      Imaging Results (All)       None          LAB RESULTS:      Lab 11/10/24  0455 11/09/24  1118 11/08/24 0334 11/07/24 1942   WBC 9.71 9.22 13.85* 15.14*   HEMOGLOBIN 9.3* 8.9* 11.0* 12.3   HEMATOCRIT 28.6* 27.4* 36.6 37.2   PLATELETS 218 211 230 270   NEUTROS ABS 5.42 5.41 9.98* 11.73*   IMMATURE GRANS (ABS) 0.04 0.03 0.08* 0.07*   LYMPHS ABS 3.36* 2.83 2.62 2.41   MONOS ABS 0.62 0.72 0.81 0.66   EOS ABS 0.24 0.20 0.30 0.22   MCV 95.7 93.8 102.8* 95.4         Lab 11/12/24  0626 11/10/24  0455 11/09/24  1530 11/09/24  0634 11/08/24 0334 11/07/24 2029   SODIUM 141 143  --  144 135* 134*   POTASSIUM 4.3 4.0 3.7 2.7* 3.0* 2.8*   CHLORIDE 102 106  --  108* 111* 104   CO2 32.0* 32.0*  --  29.0 8.0* 14.0*    ANION GAP 7.0 5.0  --  7.0 16.0* 16.0*   BUN 25* 22*  --  37* 64* 61*   CREATININE 1.64* 1.39*  --  1.55* 3.66* 4.05*   EGFR 37.3* 45.5*  --  39.9* 14.2* 12.6*   GLUCOSE 171* 155*  --  126* 163* 248*   CALCIUM 9.2 6.9*  --  6.6* 7.9* 8.3*   MAGNESIUM  --  2.0  --  1.2*  --  1.7         Lab 11/11/24  1043   TOTAL PROTEIN 6.3   ALBUMIN 3.3*   ALT (SGPT) 14   AST (SGOT) 21   BILIRUBIN <0.2   BILIRUBIN DIRECT <0.2   ALK PHOS 127*                     Brief Urine Lab Results  (Last result in the past 365 days)        Color   Clarity   Blood   Leuk Est   Nitrite   Protein   CREAT   Urine HCG        11/08/24 1157 Yellow   Clear   Negative   Negative   Negative   30 mg/dL (1+)                 Microbiology Results (last 10 days)       ** No results found for the last 240 hours. **            Hospital Course:   Patient is a very pleasant 54-year-old female with insulin-dependent diabetes, gastroparesis, stage IIIb chronic kidney disease that presented to our facility on 11/7/2024 due to worsening weakness and fatigue, and outpatient labs performed by nephrology revealing an elevated creatinine concerning for acute kidney injury on chronic kidney disease.  Patient was directed to come to the emergency department, was admitted to the hospitalist service, with consultation by her nephrology team who followed with us during this hospitalization.    Patient has had multiple hospital admissions for acute kidney injury in the setting of dehydration and intravascular volume depletion.  She has a known history of significant gastroparesis, and in fact we learned that she has an appointment coming up with a GI specialist in [place], [place] regarding consideration of gastric stimulator placement.  Patient has a PICC line in her right upper extremity and has been receiving intravenous fluids in the outpatient setting given her tendencies towards acute kidney injury and dehydration.  In addition, she has also had G-tube placement in  addition to J-tube placement in the past.  More recently, she had a J-tube placed at [facility], it sounds like there were difficulties with this tube frequently becoming clogged, and I do not know the full details but at some point the J-tube was removed.    With lack of enteral access, and given her issues with recurrent nausea and vomiting in the setting of gastroparesis, patient required rehospitalization for acute kidney injury and dehydration.  Following administration of intravenous fluids that she has had significant improvement in her renal function.  On arrival her creatinine was 4.05.  Today on the day of discharge her creatinine is now down to 1.64 which is actually better than her previous baseline.    I initially consulted general surgery for consideration of reinsertion of a surgical J-tube, however reoperating on the J-tube site within this short postoperative timeframe would pose some increased risks of an intra-abdominal leak, and even could delay her gastric stimulator placement.  For that reason gastroenterology was consulted for plans for endoscopic placement of a nasojejunal feeding tube.  This procedure is planned for today on 11/12/2024.    Following nasojejunal feeding tube placement plans are in place for discharge home to begin continuous tube feeds.  I am in the process of discussing with nutrition in addition to social work to make sure that patient has a home pump for tube feeding use.  Nephrology has also been following along and will arrange plans for outpatient hydration via the NJ tube as well.  Her previous orders for IV hydration via her PICC line will be discontinued.    All in all, patient is doing much better.  Plans in place for close outpatient follow-up with nephrology ideally within 1 week with plans for a repeat basic metabolic panel to confirm stability of her renal function and electrolytes.  In addition, plans for outpatient follow-up with gastroenterology  "as scheduled on 11/20/2024 with plans for consideration of gastric stimulator given her ongoing difficulties with nausea, vomiting, malnutrition and anorexia in the setting of gastroparesis.    Addendum: GI did endoscopically place a Dobbhoff tube terminating in the distal duodenum.  Tube feeds have been started, currently at 35 mL/h, and patient states that she is tolerating without problem.  She indicated to me this morning that she has been notified that her feeding pump was delivered to her home yesterday afternoon.  She is very eager for discharge home today.  Her main complaint is that the NJ tube location bothers her (\"just hanging off my nose\").  She indicates that when she swallows at times it feels like it is irritating the back of her throat and makes it more difficult to swallow.  She does report that she has been able to eat, drink, and take her medication, however.  We discussed that by nature this type of tube is temporary, but to focus our efforts especially over the course of the next 1 month to keep this tube in place for purposes of optimizing her nutritional and hydration status.  She expressed agreement with this plan.  We also discussed that this tactic is a \"bridge\" that hopefully will allow her to be in a better clinical state until her new GI specialist in [place], [place], can outline next steps for her treatment moving forward.  In addition to tube feeds which will be arranged at discharge on a continuous basis via this NJ tube, nephrology has also left recommendations for water/Gatorade supplement to be provided via this tube as well.  Plans for very close outpatient follow-up as outlined above.      Physical Exam on Discharge:  /90 (BP Location: Left arm, Patient Position: Lying)   Pulse 83   Temp 98.4 °F (36.9 °C) (Oral)   Resp 16   Ht 154.9 cm (61\")   Wt 39.2 kg (86 lb 8 oz)   SpO2 100%   BMI 16.34 kg/m²   Physical Exam  See my progress note from today    Condition on " Discharge: medically stable    Discharge Disposition: home      Discharge Medications:     Discharge Medications        ASK your doctor about these medications        Instructions Start Date   aspirin 81 MG EC tablet   81 mg, Daily      atorvastatin 80 MG tablet  Commonly known as: LIPITOR   80 mg, Nightly      Calcium Carbonate-Vitamin D 600-5 MG-MCG tablet   1 tablet, Daily      cetirizine 10 MG tablet  Commonly known as: zyrTEC   10 mg, Daily PRN      DULoxetine 60 MG capsule  Commonly known as: CYMBALTA   60 mg, Daily      Farxiga 10 MG tablet  Generic drug: dapagliflozin Propanediol   10 mg, Daily      hydroxychloroquine 200 MG tablet  Commonly known as: PLAQUENIL   400 mg, Daily      Insulin Aspart 100 UNIT/ML injection  Commonly known as: novoLOG   90 Units, Daily      linaclotide 290 MCG capsule capsule  Commonly known as: LINZESS   290 mcg, Every Morning Before Breakfast      metoprolol succinate XL 25 MG 24 hr tablet  Commonly known as: TOPROL-XL   25 mg, Daily      NIFEdipine XL 30 MG 24 hr tablet  Commonly known as: PROCARDIA XL   30 mg, Daily      nortriptyline 25 MG capsule  Commonly known as: PAMELOR   25 mg, Nightly      omeprazole 40 MG capsule  Commonly known as: priLOSEC   40 mg, Every Other Day      ondansetron ODT 4 MG disintegrating tablet  Commonly known as: ZOFRAN-ODT   4 mg, Every 8 Hours PRN      rOPINIRole 1 MG tablet  Commonly known as: REQUIP   1 mg, 3 Times Daily      sodium bicarbonate 650 MG tablet   650 mg, 2 Times Daily      sodium chloride 0.9 % solution   1,000 mL, Daily      sucralfate 1 g tablet  Commonly known as: CARAFATE   1 g, 4 Times Daily      Vitamin D3 50 MCG (2000 UT) capsule   2,000 Units, Daily               Discharge Diet: Diabetic diet as tolerated; in addition patient will have a nasojejunal tube at discharge and tube feedings and hydration via free water replacement will be coordinated for home use in anticipation of discharge.    Activity at Discharge: as  tolerated    Follow-up Appointments:   Patient has a follow-up with a GI specialist in [place], [place] regarding discussions for a gastric stimulator on 11/20/2024.  In addition I would like her to have close outpatient follow-up with her primary care provider in addition to nephrology within 1 week with plans for a repeat basic metabolic panel to reassess her renal function and electrolytes.    Test Results Pending at Discharge: none    Electronically signed by Otto Del Rosario MD, 11/12/24, 08:00 CST.    Time: 35 minutes.          Electronically signed by Otto Del Rosario MD at 11/13/24 0813       Discharge Order (From admission, onward)       Start     Ordered    11/13/24 0802  Discharge patient  Once        Expected Discharge Date: 11/12/24   Expected Discharge Time: Midday   Discharge Disposition: Home or Self Care   Physician of Record for Attribution - Please select from Treatment Team: JOSE ANGELES [[id number]]   Review needed by CMO to determine Physician of Record: No      Question Answer Comment   Physician of Record for Attribution - Please select from Treatment Team JOSE ANGELES    Review needed by CMO to determine Physician of Record No        11/13/24 0803

## 2024-11-13 NOTE — PLAN OF CARE
Goal Outcome Evaluation:           Progress: improving  Outcome Evaluation: VSS ex BP was soft overnight. pt A/A/Ox4, up ad jessy. pt placed on continuous tube feeds per order at 1800, upped to 35ml/hr at 0200, pt tolerating. pt denies any N/V or pain. pt does state that the nasal tube gets in the way at times.

## 2024-11-14 ENCOUNTER — HOSPITAL ENCOUNTER (OUTPATIENT)
Facility: HOSPITAL | Age: 54
Setting detail: OBSERVATION
Discharge: HOME | DRG: 683 | End: 2024-11-16
Attending: STUDENT IN AN ORGANIZED HEALTH CARE EDUCATION/TRAINING PROGRAM | Admitting: INTERNAL MEDICINE
Payer: COMMERCIAL

## 2024-11-14 ENCOUNTER — NURSE TRIAGE (OUTPATIENT)
Dept: CALL CENTER | Facility: HOSPITAL | Age: 54
End: 2024-11-14
Payer: COMMERCIAL

## 2024-11-14 ENCOUNTER — READMISSION MANAGEMENT (OUTPATIENT)
Dept: CALL CENTER | Facility: HOSPITAL | Age: 54
End: 2024-11-14
Payer: COMMERCIAL

## 2024-11-14 ENCOUNTER — APPOINTMENT (OUTPATIENT)
Dept: GENERAL RADIOLOGY | Facility: HOSPITAL | Age: 54
DRG: 683 | End: 2024-11-14
Payer: COMMERCIAL

## 2024-11-14 DIAGNOSIS — K94.20 COMPLICATION OF FEEDING TUBE: Primary | ICD-10-CM

## 2024-11-14 PROBLEM — Z46.59 ENCOUNTER FOR NASOGASTRIC (NG) TUBE PLACEMENT: Status: ACTIVE | Noted: 2024-11-14

## 2024-11-14 PROCEDURE — G0378 HOSPITAL OBSERVATION PER HR: HCPCS

## 2024-11-14 PROCEDURE — 71045 X-RAY EXAM CHEST 1 VIEW: CPT

## 2024-11-14 PROCEDURE — 25810000003 SODIUM CHLORIDE 0.9 % SOLUTION: Performed by: INTERNAL MEDICINE

## 2024-11-14 PROCEDURE — 99285 EMERGENCY DEPT VISIT HI MDM: CPT

## 2024-11-14 RX ORDER — BISACODYL 5 MG/1
5 TABLET, DELAYED RELEASE ORAL DAILY PRN
Status: DISCONTINUED | OUTPATIENT
Start: 2024-11-14 | End: 2024-11-14

## 2024-11-14 RX ORDER — ACETAMINOPHEN 650 MG/1
650 SUPPOSITORY RECTAL EVERY 4 HOURS PRN
Status: DISCONTINUED | OUTPATIENT
Start: 2024-11-14 | End: 2024-11-16 | Stop reason: HOSPADM

## 2024-11-14 RX ORDER — DULOXETIN HYDROCHLORIDE 30 MG/1
60 CAPSULE, DELAYED RELEASE ORAL DAILY
Status: DISCONTINUED | OUTPATIENT
Start: 2024-11-15 | End: 2024-11-16 | Stop reason: HOSPADM

## 2024-11-14 RX ORDER — ROPINIROLE 1 MG/1
1 TABLET, FILM COATED ORAL 3 TIMES DAILY
Status: DISCONTINUED | OUTPATIENT
Start: 2024-11-14 | End: 2024-11-16 | Stop reason: HOSPADM

## 2024-11-14 RX ORDER — LUBIPROSTONE 8 UG/1
8 CAPSULE ORAL 2 TIMES DAILY
Status: DISCONTINUED | OUTPATIENT
Start: 2024-11-14 | End: 2024-11-16 | Stop reason: HOSPADM

## 2024-11-14 RX ORDER — ONDANSETRON 2 MG/ML
4 INJECTION INTRAMUSCULAR; INTRAVENOUS EVERY 6 HOURS PRN
Status: DISCONTINUED | OUTPATIENT
Start: 2024-11-14 | End: 2024-11-16 | Stop reason: HOSPADM

## 2024-11-14 RX ORDER — SODIUM CHLORIDE 0.9 % (FLUSH) 0.9 %
10 SYRINGE (ML) INJECTION AS NEEDED
Status: DISCONTINUED | OUTPATIENT
Start: 2024-11-14 | End: 2024-11-16 | Stop reason: HOSPADM

## 2024-11-14 RX ORDER — POLYETHYLENE GLYCOL 3350 17 G/17G
17 POWDER, FOR SOLUTION ORAL DAILY PRN
Status: DISCONTINUED | OUTPATIENT
Start: 2024-11-14 | End: 2024-11-16 | Stop reason: HOSPADM

## 2024-11-14 RX ORDER — SODIUM CHLORIDE 9 MG/ML
75 INJECTION, SOLUTION INTRAVENOUS CONTINUOUS
Status: DISPENSED | OUTPATIENT
Start: 2024-11-14 | End: 2024-11-15

## 2024-11-14 RX ORDER — NORTRIPTYLINE HYDROCHLORIDE 25 MG/1
25 CAPSULE ORAL NIGHTLY
Status: DISCONTINUED | OUTPATIENT
Start: 2024-11-14 | End: 2024-11-16 | Stop reason: HOSPADM

## 2024-11-14 RX ORDER — BISACODYL 10 MG
10 SUPPOSITORY, RECTAL RECTAL DAILY PRN
Status: DISCONTINUED | OUTPATIENT
Start: 2024-11-14 | End: 2024-11-16 | Stop reason: HOSPADM

## 2024-11-14 RX ORDER — SODIUM CHLORIDE 0.9 % (FLUSH) 0.9 %
10 SYRINGE (ML) INJECTION EVERY 12 HOURS SCHEDULED
Status: DISCONTINUED | OUTPATIENT
Start: 2024-11-14 | End: 2024-11-16 | Stop reason: HOSPADM

## 2024-11-14 RX ORDER — SODIUM CHLORIDE 9 MG/ML
40 INJECTION, SOLUTION INTRAVENOUS AS NEEDED
Status: DISCONTINUED | OUTPATIENT
Start: 2024-11-14 | End: 2024-11-16 | Stop reason: HOSPADM

## 2024-11-14 RX ORDER — ACETAMINOPHEN 325 MG/1
650 TABLET ORAL EVERY 4 HOURS PRN
Status: DISCONTINUED | OUTPATIENT
Start: 2024-11-14 | End: 2024-11-16 | Stop reason: HOSPADM

## 2024-11-14 RX ORDER — PANTOPRAZOLE SODIUM 40 MG/1
40 TABLET, DELAYED RELEASE ORAL
Status: DISCONTINUED | OUTPATIENT
Start: 2024-11-15 | End: 2024-11-16 | Stop reason: HOSPADM

## 2024-11-14 RX ORDER — AMOXICILLIN 250 MG
2 CAPSULE ORAL 2 TIMES DAILY PRN
Status: DISCONTINUED | OUTPATIENT
Start: 2024-11-14 | End: 2024-11-16 | Stop reason: HOSPADM

## 2024-11-14 RX ORDER — ACETAMINOPHEN 160 MG/5ML
650 SOLUTION ORAL EVERY 4 HOURS PRN
Status: DISCONTINUED | OUTPATIENT
Start: 2024-11-14 | End: 2024-11-16 | Stop reason: HOSPADM

## 2024-11-14 RX ADMIN — LUBIPROSTONE 8 MCG: 8 CAPSULE, GELATIN COATED ORAL at 23:23

## 2024-11-14 RX ADMIN — ROPINIROLE 1 MG: 1 TABLET, FILM COATED ORAL at 22:38

## 2024-11-14 RX ADMIN — NORTRIPTYLINE HYDROCHLORIDE 25 MG: 25 CAPSULE ORAL at 23:23

## 2024-11-14 RX ADMIN — SODIUM CHLORIDE 75 ML/HR: 9 INJECTION, SOLUTION INTRAVENOUS at 22:39

## 2024-11-14 RX ADMIN — Medication 10 ML: at 23:25

## 2024-11-14 NOTE — TELEPHONE ENCOUNTER
"Rene had a  Dobbhoff feeding tube that was placed nasally.  This am when she woke up it is half way out.  She does not have HH.  Instructed to go to the ED and they will be able to check placement for her.  Reason for Disposition  • Patient sounds very sick or weak to the triager    Additional Information  • Negative: Sounds like a life-threatening emergency to the triager  • Negative: Chest pain  • Negative: Difficulty breathing  • Negative: Acting confused (e.g., disoriented, slurred speech) or excessively sleepy  • Negative: Post-Op tonsil and adenoid surgery, symptoms or questions about  • Negative: Surgical incision symptoms and questions  • Negative: [1] Pain or burning with passing urine (urination) AND [2] male  • Negative: [1] Pain or burning with passing urine (urination) AND [2] female  • Negative: Constipation  • Negative: New or worsening leg (calf, thigh) pain  • Negative: New or worsening leg swelling  • Negative: Dizziness is severe, or persists > 24 hours after surgery  • Negative: Pain, redness, swelling, or pus at IV Site  • Negative: Symptoms arising from use of a urinary catheter (e.g., Coude, Saldaña)  • Negative: Cast problems or questions  • Negative: Medication question  • Negative: [1] Widespread rash AND [2] bright red, sunburn-like  • Negative: [1] SEVERE headache AND [2] after spinal (epidural) anesthesia  • Negative: [1] Vomiting AND [2] persists > 4 hours  • Negative: [1] Vomiting AND [2] abdomen looks much more swollen than usual  • Negative: [1] Drinking very little AND [2] dehydration suspected (e.g., no urine > 12 hours, very dry mouth, very lightheaded)    Answer Assessment - Initial Assessment Questions  1. SYMPTOM: \"What's the main symptom you're concerned about?\" (e.g., pain, fever, vomiting)      Feeding tube half out.  2. ONSET: \"When did problem  start?\"      Woke up with it half out this am.  3. SURGERY: \"What surgery did you have?\"      EDG with Dobbhoff insertion  4. DATE " "of SURGERY: \"When was the surgery?\"       1/11  5. ANESTHESIA: \" What type of anesthesia did you have?\" (e.g., general, spinal, epidural, local)      Not sure  6. PAIN: \"Is there any pain?\" If Yes, ask: \"How bad is it?\"  (Scale 1-10; or mild, moderate, severe)      no  7. FEVER: \"Do you have a fever?\" If Yes, ask: \"What is your temperature, how was it measured, and when did it start?\"      no  8. VOMITING: \"Is there any vomiting?\" If Yes, ask: \"How many times?\"      no  9. BLEEDING: \"Is there any bleeding?\" If Yes, ask: \"How much?\" and \"Where?\"      no  10. OTHER SYMPTOMS: \"Do you have any other symptoms?\" (e.g., drainage from wound, painful urination, constipation)        no    Protocols used: Post-Op Symptoms and Questions-ADULT-    "

## 2024-11-15 ENCOUNTER — ANESTHESIA EVENT (OUTPATIENT)
Dept: GASTROENTEROLOGY | Facility: HOSPITAL | Age: 54
End: 2024-11-15
Payer: COMMERCIAL

## 2024-11-15 ENCOUNTER — ANESTHESIA (OUTPATIENT)
Dept: GASTROENTEROLOGY | Facility: HOSPITAL | Age: 54
End: 2024-11-15
Payer: COMMERCIAL

## 2024-11-15 LAB
ANION GAP SERPL CALCULATED.3IONS-SCNC: 10 MMOL/L (ref 5–15)
BASOPHILS # BLD AUTO: 0.04 10*3/MM3 (ref 0–0.2)
BASOPHILS NFR BLD AUTO: 0.4 % (ref 0–1.5)
BUN SERPL-MCNC: 30 MG/DL (ref 6–20)
BUN/CREAT SERPL: 18.9 (ref 7–25)
CALCIUM SPEC-SCNC: 8.1 MG/DL (ref 8.6–10.5)
CHLORIDE SERPL-SCNC: 108 MMOL/L (ref 98–107)
CO2 SERPL-SCNC: 23 MMOL/L (ref 22–29)
CREAT SERPL-MCNC: 1.59 MG/DL (ref 0.57–1)
DEPRECATED RDW RBC AUTO: 49.4 FL (ref 37–54)
EGFRCR SERPLBLD CKD-EPI 2021: 38.7 ML/MIN/1.73
EOSINOPHIL # BLD AUTO: 0.49 10*3/MM3 (ref 0–0.4)
EOSINOPHIL NFR BLD AUTO: 5.1 % (ref 0.3–6.2)
ERYTHROCYTE [DISTWIDTH] IN BLOOD BY AUTOMATED COUNT: 13.5 % (ref 12.3–15.4)
GLUCOSE SERPL-MCNC: 175 MG/DL (ref 65–99)
HCT VFR BLD AUTO: 30.5 % (ref 34–46.6)
HGB BLD-MCNC: 9.1 G/DL (ref 12–15.9)
IMM GRANULOCYTES # BLD AUTO: 0.04 10*3/MM3 (ref 0–0.05)
IMM GRANULOCYTES NFR BLD AUTO: 0.4 % (ref 0–0.5)
LYMPHOCYTES # BLD AUTO: 3.04 10*3/MM3 (ref 0.7–3.1)
LYMPHOCYTES NFR BLD AUTO: 31.8 % (ref 19.6–45.3)
MCH RBC QN AUTO: 30.1 PG (ref 26.6–33)
MCHC RBC AUTO-ENTMCNC: 29.8 G/DL (ref 31.5–35.7)
MCV RBC AUTO: 101 FL (ref 79–97)
MONOCYTES # BLD AUTO: 0.44 10*3/MM3 (ref 0.1–0.9)
MONOCYTES NFR BLD AUTO: 4.6 % (ref 5–12)
NEUTROPHILS NFR BLD AUTO: 5.51 10*3/MM3 (ref 1.7–7)
NEUTROPHILS NFR BLD AUTO: 57.7 % (ref 42.7–76)
NRBC BLD AUTO-RTO: 0 /100 WBC (ref 0–0.2)
PLATELET # BLD AUTO: 258 10*3/MM3 (ref 140–450)
PMV BLD AUTO: 10 FL (ref 6–12)
POTASSIUM SERPL-SCNC: 3.3 MMOL/L (ref 3.5–5.2)
RBC # BLD AUTO: 3.02 10*6/MM3 (ref 3.77–5.28)
SODIUM SERPL-SCNC: 141 MMOL/L (ref 136–145)
WBC NRBC COR # BLD AUTO: 9.56 10*3/MM3 (ref 3.4–10.8)

## 2024-11-15 PROCEDURE — 85025 COMPLETE CBC W/AUTO DIFF WBC: CPT | Performed by: INTERNAL MEDICINE

## 2024-11-15 PROCEDURE — 99253 IP/OBS CNSLTJ NEW/EST LOW 45: CPT | Performed by: INTERNAL MEDICINE

## 2024-11-15 PROCEDURE — 25010000002 PROPOFOL 10 MG/ML EMULSION: Performed by: NURSE ANESTHETIST, CERTIFIED REGISTERED

## 2024-11-15 PROCEDURE — 25010000002 LIDOCAINE PF 2% 2 % SOLUTION: Performed by: NURSE ANESTHETIST, CERTIFIED REGISTERED

## 2024-11-15 PROCEDURE — 43197 ESOPHAGOSCOPY FLEX DX BRUSH: CPT | Performed by: INTERNAL MEDICINE

## 2024-11-15 PROCEDURE — 80048 BASIC METABOLIC PNL TOTAL CA: CPT | Performed by: INTERNAL MEDICINE

## 2024-11-15 PROCEDURE — G0378 HOSPITAL OBSERVATION PER HR: HCPCS

## 2024-11-15 PROCEDURE — 36415 COLL VENOUS BLD VENIPUNCTURE: CPT | Performed by: INTERNAL MEDICINE

## 2024-11-15 PROCEDURE — 25010000002 POTASSIUM CHLORIDE 10 MEQ/100ML SOLUTION: Performed by: INTERNAL MEDICINE

## 2024-11-15 RX ORDER — POTASSIUM CHLORIDE 7.45 MG/ML
10 INJECTION INTRAVENOUS
Status: DISCONTINUED | OUTPATIENT
Start: 2024-11-15 | End: 2024-11-15

## 2024-11-15 RX ORDER — POTASSIUM CHLORIDE 7.45 MG/ML
10 INJECTION INTRAVENOUS
Status: DISPENSED | OUTPATIENT
Start: 2024-11-15 | End: 2024-11-15

## 2024-11-15 RX ORDER — POTASSIUM CHLORIDE 7.45 MG/ML
10 INJECTION INTRAVENOUS
Status: COMPLETED | OUTPATIENT
Start: 2024-11-15 | End: 2024-11-15

## 2024-11-15 RX ORDER — PROPOFOL 10 MG/ML
VIAL (ML) INTRAVENOUS AS NEEDED
Status: DISCONTINUED | OUTPATIENT
Start: 2024-11-15 | End: 2024-11-15 | Stop reason: SURG

## 2024-11-15 RX ORDER — LIDOCAINE HYDROCHLORIDE 20 MG/ML
INJECTION, SOLUTION EPIDURAL; INFILTRATION; INTRACAUDAL; PERINEURAL AS NEEDED
Status: DISCONTINUED | OUTPATIENT
Start: 2024-11-15 | End: 2024-11-15 | Stop reason: SURG

## 2024-11-15 RX ADMIN — ROPINIROLE 1 MG: 1 TABLET, FILM COATED ORAL at 15:05

## 2024-11-15 RX ADMIN — POTASSIUM CHLORIDE 10 MEQ: 10 INJECTION, SOLUTION INTRAVENOUS at 20:08

## 2024-11-15 RX ADMIN — LUBIPROSTONE 8 MCG: 8 CAPSULE, GELATIN COATED ORAL at 20:08

## 2024-11-15 RX ADMIN — POTASSIUM CHLORIDE 10 MEQ: 10 INJECTION, SOLUTION INTRAVENOUS at 18:08

## 2024-11-15 RX ADMIN — PROPOFOL 100 MG: 10 INJECTION, EMULSION INTRAVENOUS at 10:25

## 2024-11-15 RX ADMIN — POTASSIUM CHLORIDE 10 MEQ: 7.46 INJECTION, SOLUTION INTRAVENOUS at 17:16

## 2024-11-15 RX ADMIN — ROPINIROLE 1 MG: 1 TABLET, FILM COATED ORAL at 20:08

## 2024-11-15 RX ADMIN — PROPOFOL 50 MG: 10 INJECTION, EMULSION INTRAVENOUS at 10:29

## 2024-11-15 RX ADMIN — LIDOCAINE HYDROCHLORIDE 100 MG: 20 INJECTION, SOLUTION EPIDURAL; INFILTRATION; INTRACAUDAL; PERINEURAL at 10:25

## 2024-11-15 RX ADMIN — PROPOFOL 50 MG: 10 INJECTION, EMULSION INTRAVENOUS at 10:35

## 2024-11-15 RX ADMIN — NORTRIPTYLINE HYDROCHLORIDE 25 MG: 25 CAPSULE ORAL at 20:08

## 2024-11-15 RX ADMIN — POTASSIUM CHLORIDE 10 MEQ: 10 INJECTION, SOLUTION INTRAVENOUS at 19:15

## 2024-11-15 NOTE — H&P
[facility] Medicine Services  HISTORY AND PHYSICAL    Date of Admission: 11/14/2024  Primary Care Physician: KIRILL Murguia MD    Subjective   Primary Historian: Patient    Chief Complaint: Dislodged nasojejunal tube    History of Present Illness  The patient is a 54-year-old woman with a past medical history of insulin-dependent diabetes mellitus complicated by gastroparesis, and multiple hospital admissions for acute kidney injury from dehydration and intravascular volume depletion.  She was recently treated for intravascular volume depletion and SUGAR and was discharged on 11/13/2024 with a nasojejunal tube being placed for nutrition.  She now presents with complaints of displaced nasojejunal tube.    The patient reports that she has been sneezing frequently at home since she was discharged yesterday.  She subsequently noticed that the nasojejunal tube had been displaced with about 3 of the tube coming out of her nose.  On account of her symptoms, she came to the emergency room.  Chest x-ray did confirm that the nasojejunal tube had been near completely dislodged.  She was discussed with gastroenterologist and was planned for admission and replacement of the nasojejunal tube in the morning.    Review of Systems   Otherwise complete ROS reviewed and negative except as mentioned in the HPI.    Past Medical History:   Past Medical History:   Diagnosis Date    Arthritis     Contusion     Degenerative disc disease, cervical     Diabetes mellitus     Elevated cholesterol     Fibromyalgia     Neuropathy     Osteoporosis     Pancreatitis     Raynaud disease     Renal insufficiency     Smoker 02/08/2022    Vitamin D deficiency 04/26/2022     Past Surgical History:  Past Surgical History:   Procedure Laterality Date    APPENDECTOMY      CHOLECYSTECTOMY      COLONOSCOPY      ENDOSCOPY N/A 10/08/2019    Procedure: ESOPHAGOGASTRODUODENOSCOPY WITH ANESTHESIA;  Surgeon: Aleks Vaughn,  "DO;  Location: [facility] ENDOSCOPY;  Service: Gastroenterology    ENDOSCOPY N/A 11/12/2024    Procedure: ESOPHAGOGASTRODUODENOSCOPY WITH ANESTHESIA;  Surgeon: Tiffanie Saucedo MD;  Location: [facility] ENDOSCOPY;  Service: Gastroenterology;  Laterality: N/A;  pre op: Gastroparesis, Intractable nausea  post op: insertion of dobhoff   PCP: Brandon Murguia    ENDOSCOPY WITH GASTROSTOMY TUBE INSERTION N/A 6/15/2022    Procedure: ESOPHAGOGASTRODUODENOSCOPY WITH GASTROSTOMY TUBE INSERTION;  Surgeon: Jersey Lee MD;  Location: [facility] ENDOSCOPY;  Service: Gastroenterology;  Laterality: N/A;  pre peg placement  post peg placement  Dr. Murguia    ENTEROSCOPY SMALL BOWEL N/A 11/3/2022    Procedure: Esophagogastroduodenoscopy with Peg Removal;  Surgeon: Aleks Vaughn DO;  Location: [facility] ENDOSCOPY;  Service: Gastroenterology;  Laterality: N/A;  pre; peg removal  post; peg removal   KIRILL Murguia MD        HYSTERECTOMY       Social History:  reports that she has been smoking cigarettes. She has a 7.8 pack-year smoking history. She does not have any smokeless tobacco history on file. She reports that she does not drink alcohol and does not use drugs.    Family History: family history is not on file. She was adopted.       Allergies:  Allergies   Allergen Reactions    Bee Venom Anaphylaxis    Hydrocodone Anaphylaxis    Ibuprofen Other (See Comments)     \"SHUTS MY KIDNEYS DOWN\" PER PATIENT     Pineapple Anaphylaxis    Pineapple Extract Anaphylaxis    Wasp Venom Anaphylaxis    Wasp Venom Protein Anaphylaxis    Hydrocodone-Acetaminophen Dizziness, Nausea And Vomiting, Nausea Only and Unknown - Low Severity    Penicillins Nausea And Vomiting    Sitagliptin Other (See Comments)     Other reaction(s): Abdominal Pain      Metformin Other (See Comments)     Pt states it messes with her kidneys    Chocolate Rash     Dark chocolate only, told to RD 7/10/24       Medications:  Prior to Admission medications    Medication Sig Start Date " End Date Taking? Authorizing Provider   aspirin 81 MG EC tablet Take 1 tablet by mouth Daily.    Kevin Mejia MD   atorvastatin (LIPITOR) 80 MG tablet Take 1 tablet by mouth Every Night.    Kevin Mejia MD   Calcium Carbonate-Vitamin D 600-5 MG-MCG tablet Take 1 tablet by mouth Daily.    Kevin Mejia MD   cetirizine (zyrTEC) 10 MG tablet Take 1 tablet by mouth Daily As Needed for Allergies.    Kevin Mejia MD   Cholecalciferol (Vitamin D3) 50 MCG (2000 UT) capsule Take 1 capsule by mouth Daily.    Kevin Mejia MD   dapagliflozin Propanediol (Farxiga) 10 MG tablet Take 10 mg by mouth Daily.    Kevin Mejia MD   DULoxetine (CYMBALTA) 60 MG capsule Take 1 capsule by mouth Daily.    Kevin Mejia MD   hydroxychloroquine (PLAQUENIL) 200 MG tablet Take 2 tablets by mouth Daily.    Kevin Mejia MD   Insulin Aspart (novoLOG) 100 UNIT/ML injection Inject 90 Units under the skin into the appropriate area as directed Daily. Up to 90 units daily via omnipod    Kevin Mejia MD   linaclotide (LINZESS) 290 MCG capsule capsule Take 1 capsule by mouth Every Morning Before Breakfast.    Kevin Mejia MD   nortriptyline (PAMELOR) 25 MG capsule Take 1 capsule by mouth Every Night.    Kevin Mejia MD   omeprazole (priLOSEC) 40 MG capsule Take 1 capsule by mouth Every Other Day.    Kevin Mejia MD   ondansetron ODT (ZOFRAN-ODT) 4 MG disintegrating tablet Place 1 tablet on the tongue Every 8 (Eight) Hours As Needed for Nausea or Vomiting.    Kevin Mejia MD   rOPINIRole (REQUIP) 1 MG tablet Take 1 tablet by mouth 3 (Three) Times a Day.    Kevin Mejia MD   sucralfate (CARAFATE) 1 g tablet Take 1 tablet by mouth 4 (Four) Times a Day.    Kevin Mejia MD     I have utilized all available immediate resources to obtain, update, or review the patient's current medications (including all prescriptions,  "over-the-counter products, herbals, cannabis/cannabidiol products, and vitamin/mineral/dietary (nutritional) supplements).    Objective     Vital Signs: BP (!) 146/102   Pulse 111   Temp 98.7 °F (37.1 °C)   Resp 19   Ht 154.9 cm (61\")   Wt 40.8 kg (90 lb)   SpO2 100%   BMI 17.01 kg/m²   Physical Exam  Constitutional:       General: She is not in acute distress.     Appearance: She is ill-appearing. She is not diaphoretic.      Comments: Thin and chronically ill-appearing woman.   HENT:      Head: Normocephalic and atraumatic.      Comments: Dislodged nasojejunal tube present.     Right Ear: External ear normal.      Left Ear: External ear normal.      Nose: No congestion or rhinorrhea.      Mouth/Throat:      Mouth: Mucous membranes are moist.      Pharynx: No oropharyngeal exudate or posterior oropharyngeal erythema.   Eyes:      General: No scleral icterus.     Extraocular Movements: Extraocular movements intact.      Conjunctiva/sclera: Conjunctivae normal.   Cardiovascular:      Rate and Rhythm: Normal rate and regular rhythm.      Heart sounds: Normal heart sounds. No murmur heard.  Pulmonary:      Effort: Pulmonary effort is normal. No respiratory distress.      Breath sounds: Normal breath sounds. No wheezing, rhonchi or rales.   Abdominal:      General: Abdomen is flat. There is no distension.      Palpations: Abdomen is soft.      Tenderness: There is no abdominal tenderness. There is no guarding.   Musculoskeletal:         General: No swelling, tenderness or deformity.      Cervical back: Neck supple. No rigidity. No muscular tenderness.      Right lower leg: No edema.      Left lower leg: No edema.   Lymphadenopathy:      Cervical: No cervical adenopathy.   Skin:     General: Skin is warm and dry.   Neurological:      General: No focal deficit present.      Mental Status: She is alert and oriented to person, place, and time.      Cranial Nerves: No cranial nerve deficit.      Motor: No weakness. "   Psychiatric:         Mood and Affect: Mood normal.         Behavior: Behavior normal.         Thought Content: Thought content normal.        Results Reviewed:  Lab Results (last 24 hours)       ** No results found for the last 24 hours. **          Imaging Results (Last 24 Hours)       Procedure Component Value Units Date/Time    XR Chest 1 View [[id number]] Collected: 11/14/24 2054     Updated: 11/14/24 2100    Narrative:      XR CHEST 1 VW- 11/14/2024 7:51 PM     HISTORY: Nasojejunal tube placement       COMPARISON: Chest x-ray dated 8/27/2024     FINDINGS:  Upright frontal radiograph of the chest was obtained     No lung consolidation. No pleural effusion or pneumothorax. The  cardiomediastinal silhouette and pulmonary vascularity are within normal  limits. The osseous structures and surrounding soft tissues demonstrate  no acute abnormality. Central venous catheter is in good position.  Enteric tube is present although the visualized portion is all external  to the patient. I do not see any of the enteric tube along the  esophagus.       Impression:      1.  Enteric tube is present although the visualized portion is all  external to the patient. I do not see any of the enteric tube along the  esophagus, suggesting that the tube is near completely dislodged.     This report was signed and finalized on 11/14/2024 8:57 PM by Dr Ari Kaur.             I have personally reviewed and interpreted the radiology studies and ECG obtained at time of admission.     Assessment / Plan   Assessment:   Active Hospital Problems    Diagnosis     **Encounter for nasogastric (NG) tube placement     Type 2 diabetes mellitus, without long-term current use of insulin        Treatment Plan  The patient will be admitted to my service here at [facility].  Keep n.p.o. and start IV fluids.  Gastroenterology consultation will be obtained for nasojejunal tube placement in the morning.    Insulin sliding scale for type 2  diabetes mellitus    DVT prophylaxis with SCDs    CODE STATUS is full code    Medical Decision Making  Number and Complexity of problems: 1 acute, moderate severity, moderate complexity medical problem.  1 chronic stable medical problem.  Differential Diagnosis: None    Conditions and Status        Condition is unchanged.     [facility] Data  External documents reviewed: Discharge summary from 11/13/2024 reviewed by me  Cardiac tracing (EKG, telemetry) interpretation: None  Radiology interpretation: Chest x-ray reviewed by me.  As above.  Labs reviewed: CBC, BMP reviewed by me  Any tests that were considered but not ordered: None     Decision rules/scores evaluated (example [id number], [place], etc): Not applicable     Discussed with: Patient's  Vincent powell     Care Planning  Shared decision making: Patient and her  Patient's  Vincent powell  Code status and discussions: CODE STATUS is full code    Disposition  Social Determinants of Health that impact treatment or disposition: None  Estimated length of stay is 1 to 2 days    I confirmed that the patient's advanced care plan is present, code status is documented, and a surrogate decision maker is listed in the patient's medical record.     The patient's surrogate decision maker is Patient's  Vincent powell    The patient was seen and examined by me on 11/14/2024 at 9:20 PM    Electronically signed by Donnie Wright MD, 11/14/24, 21:14 CST.

## 2024-11-15 NOTE — CONSULTS
[facility] Gastroenterology  Initial Inpatient Consult Note    Referring Provider: No ref. provider found    Date of Admission: 11/14/2024  Date of Service:  11/15/24    Reason for Consultation: Placement of nasojejunal tube due to severe gastroparesis    Subjective     History of present illness:  54-year-old lady with history of diabetes, fibromyalgia, diabetic gastroparesis with malnutrition status post PEG placement and recent placement of surgical jejunostomy tube in October 2024 that stayed for 2-1/2-week and then was removed, had nasojejunal tube placed and November 12, 2024 and was discharged, patient stated that the sweats, uncomfortable, she stated that she sneezed and it got dislodged and came for replacement   As far as her negative except for the above jejunostomy tube patient stated that she had frequent problems with it including skin infection around the tube, clogging leading to multiple ER visits and MD visit, she was evaluated by surgical team for placement of jejunostomy tube but wanted to try nasojejunal tube for now, I explained to the patient that this is relatively a temporary measure, and carries a risk of sinusitis, and the tube is small and could be occluded  Regarding her gastroparesis anorexia and malnutrition she had consultation on the 20th of this month in [place] for potential gastric stimulation placement by gastroparesis expressed      Past Medical History:  Past Medical History:   Diagnosis Date    Arthritis     Contusion     Degenerative disc disease, cervical     Diabetes mellitus     Elevated cholesterol     Fibromyalgia     Neuropathy     Osteoporosis     Pancreatitis     Raynaud disease     Renal insufficiency     Smoker 02/08/2022    Vitamin D deficiency 04/26/2022       Past Surgical History:  Past Surgical History:   Procedure Laterality Date    APPENDECTOMY      CHOLECYSTECTOMY      COLONOSCOPY      ENDOSCOPY N/A 10/08/2019    Procedure: ESOPHAGOGASTRODUODENOSCOPY  WITH ANESTHESIA;  Surgeon: Aleks Vaughn DO;  Location:  [facility] ENDOSCOPY;  Service: Gastroenterology    ENDOSCOPY N/A 11/12/2024    Procedure: ESOPHAGOGASTRODUODENOSCOPY WITH ANESTHESIA;  Surgeon: Tiffanie Saucedo MD;  Location:  [facility] ENDOSCOPY;  Service: Gastroenterology;  Laterality: N/A;  pre op: Gastroparesis, Intractable nausea  post op: insertion of dobhoff   PCP: Brandon Murguia    ENDOSCOPY WITH GASTROSTOMY TUBE INSERTION N/A 6/15/2022    Procedure: ESOPHAGOGASTRODUODENOSCOPY WITH GASTROSTOMY TUBE INSERTION;  Surgeon: Jersey Lee MD;  Location:  [facility] ENDOSCOPY;  Service: Gastroenterology;  Laterality: N/A;  pre peg placement  post peg placement  Dr. Murguia    ENTEROSCOPY SMALL BOWEL N/A 11/3/2022    Procedure: Esophagogastroduodenoscopy with Peg Removal;  Surgeon: Aleks Vaughn DO;  Location:  [facility] ENDOSCOPY;  Service: Gastroenterology;  Laterality: N/A;  pre; peg removal  post; peg removal   KIRILL Murguia MD        HYSTERECTOMY          Social History:   Social History     Tobacco Use    Smoking status: Every Day     Current packs/day: 0.25     Average packs/day: 0.3 packs/day for 31.0 years (7.8 ttl pk-yrs)     Types: Cigarettes    Smokeless tobacco: Not on file   Substance Use Topics    Alcohol use: No        Family History:  Family History   Adopted: Yes   Problem Relation Age of Onset    Colon polyps Neg Hx     Colon cancer Neg Hx        Home Meds:  Medications Prior to Admission   Medication Sig Dispense Refill Last Dose/Taking    aspirin 81 MG EC tablet Take 1 tablet by mouth Daily.   11/14/2024 Morning    atorvastatin (LIPITOR) 80 MG tablet Take 1 tablet by mouth Every Night.   11/13/2024    Calcium Carbonate-Vitamin D 600-5 MG-MCG tablet Take 1 tablet by mouth Daily.   11/14/2024    cetirizine (zyrTEC) 10 MG tablet Take 1 tablet by mouth Daily As Needed for Allergies.   11/14/2024    Cholecalciferol (Vitamin D3) 50 MCG (2000 UT) capsule Take 1 capsule by mouth Daily.    11/14/2024    dapagliflozin Propanediol (Farxiga) 10 MG tablet Take 10 mg by mouth Daily.   11/14/2024    DULoxetine (CYMBALTA) 60 MG capsule Take 1 capsule by mouth Daily.   11/14/2024    hydroxychloroquine (PLAQUENIL) 200 MG tablet Take 2 tablets by mouth Daily.   11/14/2024    Insulin Aspart (novoLOG) 100 UNIT/ML injection Inject 90 Units under the skin into the appropriate area as directed Daily. Up to 90 units daily via omnipod   11/14/2024    linaclotide (LINZESS) 290 MCG capsule capsule Take 1 capsule by mouth Every Morning Before Breakfast.   11/14/2024    nortriptyline (PAMELOR) 25 MG capsule Take 1 capsule by mouth Every Night.   11/13/2024    omeprazole (priLOSEC) 40 MG capsule Take 1 capsule by mouth Every Other Day.   11/14/2024    ondansetron ODT (ZOFRAN-ODT) 4 MG disintegrating tablet Place 1 tablet on the tongue Every 8 (Eight) Hours As Needed for Nausea or Vomiting.   Past Month    rOPINIRole (REQUIP) 1 MG tablet Take 1 tablet by mouth 3 (Three) Times a Day.   11/14/2024    sucralfate (CARAFATE) 1 g tablet Take 1 tablet by mouth 4 (Four) Times a Day.   11/14/2024       Current Meds:     Current Facility-Administered Medications:     acetaminophen (TYLENOL) tablet 650 mg, 650 mg, Oral, Q4H PRN **OR** acetaminophen (TYLENOL) 160 MG/5ML oral solution 650 mg, 650 mg, Oral, Q4H PRN **OR** acetaminophen (TYLENOL) suppository 650 mg, 650 mg, Rectal, Q4H PRN, Donnie Wright MD    sennosides-docusate (PERICOLACE) 8.6-50 MG per tablet 2 tablet, 2 tablet, Oral, BID PRN **AND** polyethylene glycol (MIRALAX) packet 17 g, 17 g, Oral, Daily PRN **AND** [DISCONTINUED] bisacodyl (DULCOLAX) EC tablet 5 mg, 5 mg, Oral, Daily PRN **AND** bisacodyl (DULCOLAX) suppository 10 mg, 10 mg, Rectal, Daily PRN, Donnie Wright MD    DULoxetine (CYMBALTA) DR capsule 60 mg, 60 mg, Oral, Daily, Donnie Wright MD    empagliflozin (JARDIANCE) tablet 25 mg, 25 mg, Oral, Daily, Donnie Wright MD    lubiprostone  "(AMITIZA) capsule 8 mcg, 8 mcg, Oral, BID, Donnie Wright MD, 8 mcg at 11/14/24 2323    nortriptyline (PAMELOR) capsule 25 mg, 25 mg, Oral, Nightly, Donnie Wright MD, 25 mg at 11/14/24 2323    ondansetron (ZOFRAN) injection 4 mg, 4 mg, Intravenous, Q6H PRN, Donnie Wright MD    pantoprazole (PROTONIX) EC tablet 40 mg, 40 mg, Oral, Q AM, Donnie Wright MD    rOPINIRole (REQUIP) tablet 1 mg, 1 mg, Oral, TID, Donnie Wright MD, 1 mg at 11/14/24 2238    sodium chloride 0.9 % flush 10 mL, 10 mL, Intravenous, Q12H, Donnie Wright MD, 10 mL at 11/14/24 2325    sodium chloride 0.9 % flush 10 mL, 10 mL, Intravenous, PRN, Donnie Wright MD    sodium chloride 0.9 % infusion 40 mL, 40 mL, Intravenous, PRN, Donnie Wright MD    sodium chloride 0.9 % infusion, 75 mL/hr, Intravenous, Continuous, Donnie Wright MD, Last Rate: 75 mL/hr at 11/14/24 2239, 75 mL/hr at 11/14/24 2239    Allergies:  Allergies   Allergen Reactions    Bee Venom Anaphylaxis    Hydrocodone Anaphylaxis    Ibuprofen Other (See Comments)     \"SHUTS MY KIDNEYS DOWN\" PER PATIENT     Pineapple Anaphylaxis    Pineapple Extract Anaphylaxis    Wasp Venom Anaphylaxis    Wasp Venom Protein Anaphylaxis    Hydrocodone-Acetaminophen Dizziness, Nausea And Vomiting, Nausea Only and Unknown - Low Severity    Penicillins Nausea And Vomiting    Sitagliptin Other (See Comments)     Other reaction(s): Abdominal Pain      Metformin Other (See Comments)     Pt states it messes with her kidneys    Chocolate Rash     Dark chocolate only, told to RD 7/10/24       Vital Signs  Temp:  [98.3 °F (36.8 °C)-98.7 °F (37.1 °C)] 98.3 °F (36.8 °C)  Heart Rate:  [] 96  Resp:  [18-22] 18  BP: ()/() 101/73  Body mass index is 17.01 kg/m².  No intake or output data in the 24 hours ending 11/15/24 1056  No intake/output data recorded.    Review of Systems     Negative except for the above      Objective     Physical Exam:  Gen: Very " thin middle-aged female, sitting up in bed, in no acute distress  HEENT: NCAT, EOMI, anicteric sclerae  CV: RRR, normotensive  Resp: nonlabored on room air, sats appropriate  Abd: soft, nontender, nondistended without palpable mass; there are well-healed PEG and jejunostomy sites on her left side of her abdomen.  MSK: moves all four, no c/c/e  Neuro: no focal deficits, cranial nerves grossly intact  Psy:  appropriate, cooperative       Results Review:    I have reviewed all of the patients current test results  Results from last 7 days   Lab Units 11/15/24  0435 11/13/24  0426 11/10/24  0455   WBC 10*3/mm3 9.56 9.05 9.71   HEMOGLOBIN g/dL 9.1* 10.3* 9.3*   HEMATOCRIT % 30.5* 33.2* 28.6*   PLATELETS 10*3/mm3 258 232 218       Results from last 7 days   Lab Units 11/15/24  0435 11/13/24  0426 11/12/24  0626 11/11/24  1043   SODIUM mmol/L 141 140 141  --    POTASSIUM mmol/L 3.3* 4.4 4.3  --    CHLORIDE mmol/L 108* 102 102  --    CO2 mmol/L 23.0 30.0* 32.0*  --    BUN mg/dL 30* 29* 25*  --    CREATININE mg/dL 1.59* 1.54* 1.64*  --    CALCIUM mg/dL 8.1* 8.8 9.2  --    BILIRUBIN mg/dL  --  <0.2  --  <0.2   ALK PHOS U/L  --  107  --  127*   ALT (SGPT) U/L  --  13  --  14   AST (SGOT) U/L  --  15  --  21   GLUCOSE mg/dL 175* 205* 171*  --              Lab Results   Lab Value Date/Time    LIPASE 69 (H) 08/27/2024 1908    LIPASE 83 (H) 03/04/2024 2143    LIPASE 68 (H) 01/22/2024 0931    LIPASE 36 12/30/2023 1219    LIPASE 47 11/29/2023 0054    LIPASE 51 11/19/2023 1930    LIPASE 39 11/13/2023 2249    LIPASE 191 (H) 07/14/2022 1409    LIPASE 264 (H) 10/07/2019 0456       Radiology:    Imaging Results (Last 24 Hours)       Procedure Component Value Units Date/Time    XR Chest 1 View [[id number]] Collected: 11/14/24 2054     Updated: 11/14/24 2100    Narrative:      XR CHEST 1 VW- 11/14/2024 7:51 PM     HISTORY: Nasojejunal tube placement       COMPARISON: Chest x-ray dated 8/27/2024     FINDINGS:  Upright frontal radiograph of  the chest was obtained     No lung consolidation. No pleural effusion or pneumothorax. The  cardiomediastinal silhouette and pulmonary vascularity are within normal  limits. The osseous structures and surrounding soft tissues demonstrate  no acute abnormality. Central venous catheter is in good position.  Enteric tube is present although the visualized portion is all external  to the patient. I do not see any of the enteric tube along the  esophagus.       Impression:      1.  Enteric tube is present although the visualized portion is all  external to the patient. I do not see any of the enteric tube along the  esophagus, suggesting that the tube is near completely dislodged.     This report was signed and finalized on 11/14/2024 8:57 PM by Dr Ari Kaur.                 Assessment & Plan       Encounter for nasogastric (NG) tube placement    Type 2 diabetes mellitus, without long-term current use of insulin      Impression/Plan    Gastroparesis, anorexia and malnutrition  Had previously had PEG tube  Had jejunostomy tube last month with multiple issues, currently  Has an appointment in [place] with gastroparesis specialist for possible gastric stimulation device  Had nasojejunal tube on November 12, 2024 but stayed only for 2 days  Status post placement of another nasal jejunal tube using ultrathin scope  Giving removal of jejunostomy tube that was done last month with frequent ER visits, placement of nasal jejunal tube 3 days ago and removal yesterday, I suspect that the patient actually removed it herself, I would suggest psychosocial evaluation  Advanced nasal congestion or tube feeds  Will sign off  Please call for reevaluation if needed        Electronically signed by Tiffanie Saucedo MD, 11/15/24, 10:56 AM CST.         Tiffanie Saucedo MD  11/15/24  10:56 CST

## 2024-11-15 NOTE — ANESTHESIA POSTPROCEDURE EVALUATION
Patient: Tasha Perez    Procedure Summary       Date: 11/15/24 Room / Location: [facility] ENDOSCOPY 4 / [facility]    Anesthesia Start: 1024 Anesthesia Stop: 1041    Procedure: ESOPHAGOGASTRODUODENOSCOPY WITH ANESTHESIA WITH NG TUBE INSERTION Diagnosis:     Surgeons: Tiffanie Saucedo MD Provider: Vincent James CRNA    Anesthesia Type: MAC ASA Status: 3 - Emergent            Anesthesia Type: MAC    Vitals  No vitals data found for the desired time range.          Post Anesthesia Care and Evaluation    Patient location during evaluation: PHASE II  Patient participation: complete - patient participated  Level of consciousness: awake and sleepy but conscious  Pain score: 0  Pain management: adequate    Airway patency: patent  Anesthetic complications: No anesthetic complications    Cardiovascular status: acceptable  Respiratory status: acceptable  Hydration status: acceptable

## 2024-11-15 NOTE — ED NOTES
Nursing report ED to floor  Tasha Perez  54 y.o.  female    HPI:   Chief Complaint   Patient presents with    feeding tube dislodged        Admitting doctor:   Donnie Wright MD    Consulting provider(s):  Consults       Date and Time Order Name Status Description    11/11/2024 12:00 PM Inpatient Gastroenterology Consult Completed     11/8/2024 12:49 PM Inpatient General Surgery Consult Completed     11/7/2024 10:30 PM Inpatient Nephrology Consult Completed     10/24/2024  9:15 PM Inpatient Nephrology Consult Completed              Admitting diagnosis:   The encounter diagnosis was Complication of feeding tube.    Code status:   Current Code Status       Date Active Code Status Order ID Comments User Context       11/14/2024 2158 CPR (Attempt to Resuscitate) [id number]  Donnie Wright MD ED        Question Answer    Code Status (Patient has no pulse and is not breathing) CPR (Attempt to Resuscitate)    Medical Interventions (Patient has pulse or is breathing) Full Support    Level Of Support Discussed With Patient                    Allergies:   Bee venom, Hydrocodone, Ibuprofen, Pineapple, Pineapple extract, Wasp venom, Wasp venom protein, Hydrocodone-acetaminophen, Penicillins, Sitagliptin, Metformin, and Chocolate    Intake and Output  No intake or output data in the 24 hours ending 11/14/24 2205    Weight:       11/14/24  1849   Weight: 40.8 kg (90 lb)       Most recent vitals:   Vitals:    11/14/24 2108 11/14/24 2116 11/14/24 2131 11/14/24 2146   BP: 128/87 134/90  122/61   Pulse: 97 104 107 105   Resp:       Temp:       SpO2: 97% 97% 99% 97%   Weight:       Height:         Oxygen Therapy: .    Active LDAs/IV Access:   Lines, Drains & Airways       Active LDAs       Name Placement date Placement time Site Days    PICC Single Lumen 04/25/24 Right Basilic 04/25/24  1038  Basilic  203    Insulin Pump 11/19/23  2300  -- 360                    Labs (abnormal labs have a star):   Labs Reviewed - No data to  display    Meds given in ED:   Medications - No data to display  No current facility-administered medications for this encounter.       NIH Stroke Scale:       Isolation/Infection(s):  No active isolations   MDRO     COVID Testing  Collected .  Resulted .    Nursing report ED to floor:  Mental status: A/O x 4  Ambulatory status: Ad jessy  Precautions: N/A    ED nurse phone extentsion- ..

## 2024-11-15 NOTE — PROGRESS NOTES
"Enter Query Response Below      Query Response: unable to determine             If applicable, please update the problem list.     Patient: Tasha Perez        : 1970  Account: [id number]           Admit Date:         How to Respond to this query:       a. Click New Note     b. Answer query within the yellow box.                c. Update the Problem List, if applicable.      If you have any questions about this query contact me at: [email or web address]     Dr. Barrow and Mr. Elena,     Risk factors: 54-year-old female with a history of \"Type 2 diabetes mellitus\" admitted () with \"metabolic acidosis and hypokalemia\" per the H and P.   Clinical indicators: Nephrology consult () and Nephrology progress notes dated ( and 11/10) state \"Type 2 diabetes with DKA\".   Treatment: IV normal saline and IV Sodium bicarbonate in 5% dextrose    After study, was Type 2 diabetes with DKA clinically supported during this admission?    Type 2 diabetes with DKA was supported with additional clinical indicators: ____________  Type 2 diabetes with DKA was not supported  Other- specify_____________  Unable to determine    By submitting this query, we are merely seeking further clarification of documentation to accurately reflect all conditions that you are monitoring, evaluating, treating or that extend the hospitalization or utilize additional resources of care. Please utilize your independent clinical judgment when addressing the question(s) above.     This query and your response, once completed, will be entered into the legal medical record.    Sincerely,  Efren Breaux RN  Clinical Documentation Integrity Program   "

## 2024-11-15 NOTE — PROGRESS NOTES
[facility] Medicine Services  INPATIENT PROGRESS NOTE    Patient Name: Tasha Perez  Date of Admission: 11/14/2024  Today's Date: 11/15/24  Length of Stay: 0  Primary Care Physician: KIRILL Murguia MD    Subjective   Chief Complaint: follow-up dislodgement of NJ tube  HPI   Patient indicated it was sneezing episodes that resulted in her NJ started to come out??  NJ tube reinserted this morning endoscopically by gastroenterology.  Patient reports that she would feel more comfortable if we resume tube feeds today to ensure that she tolerates, and if so, plan for discharge home in the a.m. tomorrow.  Denies any abdominal pain.  She does report an episode of vomiting yesterday morning as well.  Denies any symptoms of nausea currently.      Review of Systems   All pertinent negatives and positives are as above. All other systems have been reviewed and are negative unless otherwise stated.     Objective    Temp:  [98.3 °F (36.8 °C)-98.7 °F (37.1 °C)] 98.4 °F (36.9 °C)  Heart Rate:  [] 91  Resp:  [18-22] 18  BP: ()/() 123/78  Physical Exam  Vitals reviewed.   Constitutional:       General: She is not in acute distress.     Appearance: She is not ill-appearing or toxic-appearing.   HENT:      Nose:      Comments: New NJ tube secured in place  Abdominal:      General: There is no distension.      Palpations: Abdomen is soft.      Tenderness: There is no abdominal tenderness.   Skin:     General: Skin is warm.   Neurological:      Mental Status: She is alert. Mental status is at baseline.   Psychiatric:         Mood and Affect: Mood normal.         Results Review:  I have reviewed the labs, radiology results, and diagnostic studies.    Laboratory Data:   Results from last 7 days   Lab Units 11/15/24  0435 11/13/24  0426 11/10/24  0455   WBC 10*3/mm3 9.56 9.05 9.71   HEMOGLOBIN g/dL 9.1* 10.3* 9.3*   HEMATOCRIT % 30.5* 33.2* 28.6*   PLATELETS 10*3/mm3 258 232 218       "  Results from last 7 days   Lab Units 11/15/24  0435 11/13/24  0426 11/12/24  0626 11/11/24  1043   SODIUM mmol/L 141 140 141  --    POTASSIUM mmol/L 3.3* 4.4 4.3  --    CHLORIDE mmol/L 108* 102 102  --    CO2 mmol/L 23.0 30.0* 32.0*  --    BUN mg/dL 30* 29* 25*  --    CREATININE mg/dL 1.59* 1.54* 1.64*  --    CALCIUM mg/dL 8.1* 8.8 9.2  --    BILIRUBIN mg/dL  --  <0.2  --  <0.2   ALK PHOS U/L  --  107  --  127*   ALT (SGPT) U/L  --  13  --  14   AST (SGOT) U/L  --  15  --  21   GLUCOSE mg/dL 175* 205* 171*  --        Culture Data:   No results found for: \"BLOODCX\", \"URINECX\", \"WOUNDCX\", \"MRSACX\", \"RESPCX\", \"STOOLCX\"    Radiology Data:   Imaging Results (Last 24 Hours)       Procedure Component Value Units Date/Time    XR Chest 1 View [[id number]] Collected: 11/14/24 2054     Updated: 11/14/24 2100    Narrative:      XR CHEST 1 VW- 11/14/2024 7:51 PM     HISTORY: Nasojejunal tube placement       COMPARISON: Chest x-ray dated 8/27/2024     FINDINGS:  Upright frontal radiograph of the chest was obtained     No lung consolidation. No pleural effusion or pneumothorax. The  cardiomediastinal silhouette and pulmonary vascularity are within normal  limits. The osseous structures and surrounding soft tissues demonstrate  no acute abnormality. Central venous catheter is in good position.  Enteric tube is present although the visualized portion is all external  to the patient. I do not see any of the enteric tube along the  esophagus.       Impression:      1.  Enteric tube is present although the visualized portion is all  external to the patient. I do not see any of the enteric tube along the  esophagus, suggesting that the tube is near completely dislodged.     This report was signed and finalized on 11/14/2024 8:57 PM by Dr Ari Kaur.               I have reviewed the patient's current medications.     Assessment/Plan   Assessment  Active Hospital Problems    Diagnosis     **Encounter for nasogastric (NG) tube " placement     Gastroparesis     Hypokalemia     Stage 3b chronic kidney disease     Type 2 diabetes mellitus, with long-term current use of insulin        Treatment Plan  NJ tube re-inserted endoscopically by GI  Plan to restart TFs/hydration today via NJ tube to confirm she tolerates well prior to discharge  Resume home medications as appropriate  Labs reviewed; stable (including renal function - remains at baseline)  IV K supplement  Patient has outpatient follow-up with gastroenterology in [place], [place] via telehealth visit on 11/20/2024 regarding consideration of gastric stimulator.  7.   Anticipate discharge home in AM tomorrow pending the above      Medical Decision Making  Number and Complexity of problems: Moderate complexity      Conditions and Status        Condition is improving.     MDM Data  External documents reviewed: none  Cardiac tracing (EKG, telemetry) interpretation: none  Radiology interpretation: no new radiology studies  Labs reviewed: as above  Any tests that were considered but not ordered: none     Decision rules/scores evaluated (example [id number], Wells, etc): none     Discussed with: patient     Care Planning  Shared decision making: Discussed with patient with agreement to proceed with treatment plan as outlined  Code status and discussions: Full code    Disposition  Social Determinants of Health that impact treatment or disposition: None apparent at this time  I expect the patient to be discharged to home in AM tomorrow        Electronically signed by Otto Del Rosario MD, 11/15/24, 11:24 CST.

## 2024-11-15 NOTE — CONSULTS
Nutrition Services  TF Assessment    Patient Name:  Tasha Perez  YOB: 1970  MRN: [id number]  Admit Date:  11/14/2024    Initial nutrition consult for TF assessment. Pt was recently in the hospital 11/7-11/13. She was d/c'd with NJ tube in place for enteral nutrition. She is was also ordered a C.CHO diet as tolerated. Pt with gastroparesis causing N/V, inability to maintain nutrition and fluid status. She returns to the hospital 11/14 with NJ tube nearly dislodged. She reports she was sneezing a lot at home. She had NJ tube replaced today. To resume TF and d/c likely in the morning. Will resume previous orders of Impact Peptide 1.5 at a goal rate of 45mL/hour with a 45mL/hour water flush every hour via pump. Will start TF at 25mL/hour and increase by 10mL every 2 hours back to goal rate as tolerated. Pt with dx of severe malnutrition in the context of chronic disease.     Electronically signed by:  Vikki Daniels RDN, LD  11/15/24 14:27 CST

## 2024-11-15 NOTE — ED PROVIDER NOTES
"Subjective   History of Present Illness  54-year-old female PMH diabetes, diabetic gastroparesis, fibromyalgia, neuropathy, pancreatitis, chronic kidney disease presents for feeding tube displacement.   patient had been discharged on 11/13/2024.  She was admitted for SUGAR on CKD.  Has trouble tolerating p.o. intake secondary to her gastroparesis.  While inpatient a  nasojejunal tube was placed endoscopically by gastroenterology.  She had been counseled on having a J-tube but had declined that at the time as she had J-tube a few months prior which had come out on its own.  Today the patient reports irritation from the G-tube causing her to sneeze and majority of the J-tube has since come out. She would like to get admitted for tube replacement        Review of Systems   All other systems reviewed and are negative.      Past Medical History:   Diagnosis Date    Arthritis     Contusion     Degenerative disc disease, cervical     Diabetes mellitus     Elevated cholesterol     Fibromyalgia     Neuropathy     Osteoporosis     Pancreatitis     Raynaud disease     Renal insufficiency     Smoker 02/08/2022    Vitamin D deficiency 04/26/2022       Allergies   Allergen Reactions    Bee Venom Anaphylaxis    Hydrocodone Anaphylaxis    Ibuprofen Other (See Comments)     \"SHUTS MY KIDNEYS DOWN\" PER PATIENT     Pineapple Anaphylaxis    Pineapple Extract Anaphylaxis    Wasp Venom Anaphylaxis    Wasp Venom Protein Anaphylaxis    Hydrocodone-Acetaminophen Dizziness, Nausea And Vomiting, Nausea Only and Unknown - Low Severity    Penicillins Nausea And Vomiting    Sitagliptin Other (See Comments)     Other reaction(s): Abdominal Pain      Metformin Other (See Comments)     Pt states it messes with her kidneys    Chocolate Rash     Dark chocolate only, told to RD 7/10/24       Past Surgical History:   Procedure Laterality Date    APPENDECTOMY      CHOLECYSTECTOMY      COLONOSCOPY      ENDOSCOPY N/A 10/08/2019    Procedure: " ESOPHAGOGASTRODUODENOSCOPY WITH ANESTHESIA;  Surgeon: Aleks Vaughn DO;  Location: [facility] ENDOSCOPY;  Service: Gastroenterology    ENDOSCOPY N/A 11/12/2024    Procedure: ESOPHAGOGASTRODUODENOSCOPY WITH ANESTHESIA;  Surgeon: Tiffanie Saucedo MD;  Location: [facility] ENDOSCOPY;  Service: Gastroenterology;  Laterality: N/A;  pre op: Gastroparesis, Intractable nausea  post op: insertion of dobhoff   PCP: Brandon Murguia    ENDOSCOPY WITH GASTROSTOMY TUBE INSERTION N/A 6/15/2022    Procedure: ESOPHAGOGASTRODUODENOSCOPY WITH GASTROSTOMY TUBE INSERTION;  Surgeon: Jersey Lee MD;  Location: [facility] ENDOSCOPY;  Service: Gastroenterology;  Laterality: N/A;  pre peg placement  post peg placement  Dr. Murguia    ENTEROSCOPY SMALL BOWEL N/A 11/3/2022    Procedure: Esophagogastroduodenoscopy with Peg Removal;  Surgeon: Aleks Vaughn DO;  Location: [facility] ENDOSCOPY;  Service: Gastroenterology;  Laterality: N/A;  pre; peg removal  post; peg removal   KIRILL Murguia MD        HYSTERECTOMY         Family History   Adopted: Yes   Problem Relation Age of Onset    Colon polyps Neg Hx     Colon cancer Neg Hx        Social History     Socioeconomic History    Marital status:    Tobacco Use    Smoking status: Every Day     Current packs/day: 0.25     Average packs/day: 0.3 packs/day for 31.0 years (7.8 ttl pk-yrs)     Types: Cigarettes   Vaping Use    Vaping status: Never Used   Substance and Sexual Activity    Alcohol use: No    Drug use: No    Sexual activity: Defer           Objective   Physical Exam  Vitals reviewed.   Constitutional:       Comments: Underweight   HENT:      Head: Normocephalic and atraumatic.      Right Ear: External ear normal.      Left Ear: External ear normal.      Nose: Nose normal.      Mouth/Throat:      Mouth: Mucous membranes are moist.   Eyes:      Conjunctiva/sclera: Conjunctivae normal.   Cardiovascular:      Rate and Rhythm: Normal rate and regular rhythm.   Pulmonary:      Effort:  Pulmonary effort is normal.      Breath sounds: Normal breath sounds.   Abdominal:      General: Abdomen is flat.   Musculoskeletal:         General: Normal range of motion.      Cervical back: Normal range of motion.   Skin:     General: Skin is warm and dry.      Capillary Refill: Capillary refill takes less than 2 seconds.      Comments: Right-sided PICC line in place.   Neurological:      General: No focal deficit present.      Mental Status: She is alert and oriented to person, place, and time.         Procedures           ED Course                                                       Medical Decision Making  Patient presented for feeding tube dislodgment.  She reports sneezing causing the nasojejunal tube to come out.    There is nearly 3 feet of nasal jejunal tube out of her nose.  I have never seen someone  sneeze out a tube like this.     ---------  I discussed the case with the on-call GI and states he is going to put in another nasojejunal tube.  The patient had initially been wanting NJ tube however she is agreeable to getting an NG tube if that is the option.    Discussed the case with the hospitalist to was agreeable to admission.    Amount and/or Complexity of Data Reviewed  Radiology: ordered.        Final diagnoses:   Complication of feeding tube       ED Disposition  ED Disposition       ED Disposition   Decision to Admit    Condition   --    Comment   Level of Care: Med/Surg [1]   Diagnosis: Encounter for nasogastric (NG) tube placement [[id number]]   Admitting Physician: LAUREEN VALLEJO [[id number]]   Attending Physician: LAUREEN VALLEJO [[id number]]                 No follow-up provider specified.       Medication List      No changes were made to your prescriptions during this visit.            Og Ramirez DO  11/14/24 2163

## 2024-11-15 NOTE — PLAN OF CARE
Goal Outcome Evaluation:  Plan of Care Reviewed With: patient        Progress: no change                 Pt came to ER after Feeding tube dislodged. A/O x 4. No C/O pain N/V. up Ad jessy. RA. Fluids infusing per orders. NPO for GI consult. Fluids infusing per orders. SCDS. VSS. Safety maintained.

## 2024-11-16 ENCOUNTER — READMISSION MANAGEMENT (OUTPATIENT)
Dept: CALL CENTER | Facility: HOSPITAL | Age: 54
End: 2024-11-16
Payer: COMMERCIAL

## 2024-11-16 VITALS
HEART RATE: 96 BPM | OXYGEN SATURATION: 99 % | RESPIRATION RATE: 18 BRPM | WEIGHT: 98 LBS | TEMPERATURE: 99 F | BODY MASS INDEX: 18.5 KG/M2 | HEIGHT: 61 IN | DIASTOLIC BLOOD PRESSURE: 76 MMHG | SYSTOLIC BLOOD PRESSURE: 123 MMHG

## 2024-11-16 LAB
GLUCOSE BLDC GLUCOMTR-MCNC: 218 MG/DL (ref 70–130)
GLUCOSE BLDC GLUCOMTR-MCNC: 274 MG/DL (ref 70–130)
POTASSIUM SERPL-SCNC: 4.2 MMOL/L (ref 3.5–5.2)

## 2024-11-16 PROCEDURE — 84132 ASSAY OF SERUM POTASSIUM: CPT | Performed by: INTERNAL MEDICINE

## 2024-11-16 PROCEDURE — 63710000001 INSULIN LISPRO (HUMAN) PER 5 UNITS: Performed by: INTERNAL MEDICINE

## 2024-11-16 PROCEDURE — 82948 REAGENT STRIP/BLOOD GLUCOSE: CPT

## 2024-11-16 PROCEDURE — G0378 HOSPITAL OBSERVATION PER HR: HCPCS

## 2024-11-16 RX ORDER — NICOTINE POLACRILEX 4 MG
15 LOZENGE BUCCAL
Status: DISCONTINUED | OUTPATIENT
Start: 2024-11-16 | End: 2024-11-16 | Stop reason: HOSPADM

## 2024-11-16 RX ORDER — INSULIN LISPRO 100 [IU]/ML
2-9 INJECTION, SOLUTION INTRAVENOUS; SUBCUTANEOUS
Status: DISCONTINUED | OUTPATIENT
Start: 2024-11-16 | End: 2024-11-16 | Stop reason: HOSPADM

## 2024-11-16 RX ORDER — DEXTROSE MONOHYDRATE 25 G/50ML
25 INJECTION, SOLUTION INTRAVENOUS
Status: DISCONTINUED | OUTPATIENT
Start: 2024-11-16 | End: 2024-11-16 | Stop reason: HOSPADM

## 2024-11-16 RX ADMIN — PANTOPRAZOLE SODIUM 40 MG: 40 TABLET, DELAYED RELEASE ORAL at 06:31

## 2024-11-16 RX ADMIN — ROPINIROLE 1 MG: 1 TABLET, FILM COATED ORAL at 09:09

## 2024-11-16 RX ADMIN — EMPAGLIFLOZIN 25 MG: 25 TABLET, FILM COATED ORAL at 09:09

## 2024-11-16 RX ADMIN — LUBIPROSTONE 8 MCG: 8 CAPSULE, GELATIN COATED ORAL at 09:10

## 2024-11-16 RX ADMIN — Medication 10 ML: at 09:11

## 2024-11-16 RX ADMIN — DULOXETINE HYDROCHLORIDE 60 MG: 30 CAPSULE, DELAYED RELEASE ORAL at 09:09

## 2024-11-16 RX ADMIN — INSULIN LISPRO 4 UNITS: 100 INJECTION, SOLUTION INTRAVENOUS; SUBCUTANEOUS at 09:10

## 2024-11-16 NOTE — PLAN OF CARE
Goal Outcome Evaluation:  Plan of Care Reviewed With: patient        Progress: improving  Outcome Evaluation: Pt had dobhoff placed today under anesthesia. to left nare. Nutrition consult done. feeding started at 25cc an hour, to be increased by 10cc q2hr until goal reached of 45cc/hr, If pt tolerates will be discharged tomorrow. Tolerating diabetic diet. up ad jessy. no complaints of pain or nausea.

## 2024-11-16 NOTE — PLAN OF CARE
Goal Outcome Evaluation:  Plan of Care Reviewed With: patient        Progress: improving     A/O x 4. Feeding increased to 35cc at 2000 and 45cc at 2200. tollerated well. No c/o pain N/V. Up ad jessy. Voiding. Accucheck. VSS. Safety maintained.

## 2024-11-16 NOTE — DISCHARGE SUMMARY
[facility] Medicine Services  DISCHARGE SUMMARY       Date of Admission: 11/14/2024  Date of Discharge:  11/16/2024  Primary Care Physician: KIRILL Murguia MD    Presenting Problem/History of Present Illness:  Dislodged NJ tube    Final Discharge Diagnoses:  Active Hospital Problems    Diagnosis     **Encounter for nasogastric (NG) tube placement     Gastroparesis     Hypokalemia     Stage 3b chronic kidney disease     Type 2 diabetes mellitus, with long-term current use of insulin        Consults: Gastroenterology    Procedures Performed:   Gastroenterology performed EGD with placement of another nasojejunal tube with ultrathin scope on 11/15/2024.    Pertinent Test Results:   Results for orders placed during the hospital encounter of 10/05/19    Adult Transthoracic Echo Complete W/ Cont if Necessary Per Protocol    Interpretation Summary  · Left ventricular systolic function is normal.    NORMAL STUDY      Imaging Results (All)       Procedure Component Value Units Date/Time    XR Chest 1 View [[id number]] Collected: 11/14/24 2054     Updated: 11/14/24 2100    Narrative:      XR CHEST 1 VW- 11/14/2024 7:51 PM     HISTORY: Nasojejunal tube placement       COMPARISON: Chest x-ray dated 8/27/2024     FINDINGS:  Upright frontal radiograph of the chest was obtained     No lung consolidation. No pleural effusion or pneumothorax. The  cardiomediastinal silhouette and pulmonary vascularity are within normal  limits. The osseous structures and surrounding soft tissues demonstrate  no acute abnormality. Central venous catheter is in good position.  Enteric tube is present although the visualized portion is all external  to the patient. I do not see any of the enteric tube along the  esophagus.       Impression:      1.  Enteric tube is present although the visualized portion is all  external to the patient. I do not see any of the enteric tube along the  esophagus, suggesting that  the tube is near completely dislodged.     This report was signed and finalized on 11/14/2024 8:57 PM by Dr Ari Kaur.             LAB RESULTS:      Lab 11/15/24  0435 11/13/24  0426 11/10/24  0455 11/09/24  1118   WBC 9.56 9.05 9.71 9.22   HEMOGLOBIN 9.1* 10.3* 9.3* 8.9*   HEMATOCRIT 30.5* 33.2* 28.6* 27.4*   PLATELETS 258 232 218 211   NEUTROS ABS 5.51 5.24 5.42 5.41   IMMATURE GRANS (ABS) 0.04 0.02 0.04 0.03   LYMPHS ABS 3.04 2.87 3.36* 2.83   MONOS ABS 0.44 0.41 0.62 0.72   EOS ABS 0.49* 0.48* 0.24 0.20   .0* 99.7* 95.7 93.8         Lab 11/16/24  0430 11/15/24  0435 11/13/24  0426 11/12/24  0626 11/10/24  0455   SODIUM  --  141 140 141 143   POTASSIUM 4.2 3.3* 4.4 4.3 4.0   CHLORIDE  --  108* 102 102 106   CO2  --  23.0 30.0* 32.0* 32.0*   ANION GAP  --  10.0 8.0 7.0 5.0   BUN  --  30* 29* 25* 22*   CREATININE  --  1.59* 1.54* 1.64* 1.39*   EGFR  --  38.7* 40.2* 37.3* 45.5*   GLUCOSE  --  175* 205* 171* 155*   CALCIUM  --  8.1* 8.8 9.2 6.9*   MAGNESIUM  --   --   --   --  2.0         Lab 11/13/24 0426 11/11/24  1043   TOTAL PROTEIN 5.7* 6.3   ALBUMIN 3.1* 3.3*   GLOBULIN 2.6  --    ALT (SGPT) 13 14   AST (SGOT) 15 21   BILIRUBIN <0.2 <0.2   BILIRUBIN DIRECT  --  <0.2   ALK PHOS 107 127*                     Brief Urine Lab Results  (Last result in the past 365 days)        Color   Clarity   Blood   Leuk Est   Nitrite   Protein   CREAT   Urine HCG        11/08/24 1157 Yellow   Clear   Negative   Negative   Negative   30 mg/dL (1+)                 Microbiology Results (last 10 days)       ** No results found for the last 240 hours. **            Hospital Course:   Patient is a 54-year-old female with past medical history significant for gastroparesis, stage III chronic kidney disease, diabetes mellitus, the presented to our facility on 11/14/2024 due to her NJ tube becoming dislodged.  Patient had just been discharged from our facility on 11/13 with an NJ tube in place, plan to receive continuous tube  "feeds at home including free water and electrolyte flush, with plans for follow-up with gastroenterology in [place], [place] for discussion regarding possible gastric stimulator.  Patient indicated that she had a couple of sneezing spells after getting home and this resulted in her NJ tube becoming dislodged.  Ultimately, NJ tube was completely removed.      Gastroenterology was consulted and patient was taken for endoscopic placement of an NJ tube utilizing an ultrathin scope.  This procedure was completed on 11/15/2024.  Tube feeds were resumed to ensure patient tolerating without complication, this morning she is up to 45 cc/h, no significant residuals, and doing well.  During her recent hospitalization we arranged for outpatient tube feeds in addition to acquiring a tube feed pump.  Plan to discharge home today to continue outpatient tube feeding and hydration regimen with close outpatient follow-up as described above with gastroenterology on 11/20/2024.    Her renal function and electrolytes have remained stable during this hospitalization.  No other medication adjustments have occurred.    NJ tube back in place and plans for discharge this morning.      Physical Exam on Discharge:  BP 96/57 (BP Location: Left arm, Patient Position: Lying)   Pulse 103   Temp 98.5 °F (36.9 °C) (Oral)   Resp 16   Ht 154.9 cm (61\")   Wt 44.5 kg (98 lb)   SpO2 97%   BMI 18.52 kg/m²   Physical Exam  Vitals reviewed.   Constitutional:       General: She is not in acute distress.     Appearance: She is not toxic-appearing.   HENT:      Head: Normocephalic.      Nose:      Comments: NJ in place  Pulmonary:      Effort: Pulmonary effort is normal. No respiratory distress.   Abdominal:      General: There is no distension.      Palpations: Abdomen is soft.      Tenderness: There is no abdominal tenderness.   Neurological:      Mental Status: She is alert. Mental status is at baseline.   Psychiatric:         Mood and Affect: " Mood normal.       Condition on Discharge: medically stable    Discharge Disposition:  Home or Self Care    Discharge Medications:     Discharge Medications        Continue These Medications        Instructions Start Date   aspirin 81 MG EC tablet   81 mg, Daily      atorvastatin 80 MG tablet  Commonly known as: LIPITOR   80 mg, Nightly      Calcium Carbonate-Vitamin D 600-5 MG-MCG tablet   1 tablet, Daily      cetirizine 10 MG tablet  Commonly known as: zyrTEC   10 mg, Daily PRN      DULoxetine 60 MG capsule  Commonly known as: CYMBALTA   60 mg, Daily      Farxiga 10 MG tablet  Generic drug: dapagliflozin Propanediol   10 mg, Daily      hydroxychloroquine 200 MG tablet  Commonly known as: PLAQUENIL   400 mg, Daily      Insulin Aspart 100 UNIT/ML injection  Commonly known as: novoLOG   90 Units, Daily      linaclotide 290 MCG capsule capsule  Commonly known as: LINZESS   290 mcg, Every Morning Before Breakfast      nortriptyline 25 MG capsule  Commonly known as: PAMELOR   25 mg, Nightly      omeprazole 40 MG capsule  Commonly known as: priLOSEC   40 mg, Every Other Day      ondansetron ODT 4 MG disintegrating tablet  Commonly known as: ZOFRAN-ODT   4 mg, Every 8 Hours PRN      rOPINIRole 1 MG tablet  Commonly known as: REQUIP   1 mg, 3 Times Daily      sucralfate 1 g tablet  Commonly known as: CARAFATE   1 g, 4 Times Daily      Vitamin D3 50 MCG (2000 UT) capsule   2,000 Units, Daily               Discharge Diet: Continue tube feeds via NJ tube with free water flush in addition to Gatorade Zero as previously outlined by Dr. Harvey with nephrology.    Activity at Discharge: As tolerated    Follow-up Appointments:   Keep established follow-up appointment with gastroenterology in [place], [place] scheduled for 11/20/2024.  1 week follow-up with primary care provider as well    Test Results Pending at Discharge: none    Electronically signed by Otto Del Rosario MD, 11/16/24, 07:07 CST.    Time: 20 minutes.

## 2024-11-16 NOTE — PLAN OF CARE
Goal Outcome Evaluation:              Outcome Evaluation: A/Ox4, vitals stable. Pt had dobhoff placed yesterday, she is tolerating goal rate of 45cc/hr, Tolerating diabetic diet. up ad jessy. no complaints of pain or nausea. up ad jessy. safety maintained. discharge instructions given and reviewed with pt, she verbalized understanding. pt states she has TF supplies at home, plans to f/u with GI in [place]. pt is ready to discharge home.

## 2024-11-16 NOTE — OUTREACH NOTE
Prep Survey      Flowsheet Row Responses   [facility] facility patient discharged from? [place]   Is LACE score < 7 ? No   Eligibility Readm Mgmt   Discharge diagnosis ESOPHAGOGASTRODUODENOSCOPY WITH ANESTHESIA WITH NG TUBE INSERTION   Does the patient have one of the following disease processes/diagnoses(primary or secondary)? Other   Prep survey completed? Yes            Marielle KIM - Registered Nurse

## 2024-11-20 ENCOUNTER — READMISSION MANAGEMENT (OUTPATIENT)
Dept: CALL CENTER | Facility: HOSPITAL | Age: 54
End: 2024-11-20
Payer: COMMERCIAL

## 2024-11-20 NOTE — OUTREACH NOTE
Medical Week 1 Survey      Flowsheet Row Responses   [facility] patient discharged from? [place]   Does the patient have one of the following disease processes/diagnoses(primary or secondary)? Other   Week 1 attempt successful? Yes   Call start time 1619   Call end time 1619   Discharge diagnosis ESOPHAGOGASTRODUODENOSCOPY WITH ANESTHESIA WITH NG TUBE INSERTION   Meds reviewed with patient/caregiver? Yes   Is the patient having any side effects they believe may be caused by any medication additions or changes? No   Does the patient have all medications ordered at discharge? Yes   Is the patient taking all medications as directed (includes completed medication regime)? Yes   Does the patient have a primary care provider?  Yes   Does the patient have an appointment with their PCP within 7 days of discharge? Yes   Has the patient kept scheduled appointments due by today? Yes   Has home health visited the patient within 72 hours of discharge? N/A   Psychosocial issues? No   Did the patient receive a copy of their discharge instructions? Yes   Nursing interventions Reviewed instructions with patient   What is the patient's perception of their health status since discharge? Improving   Week 1 call completed? Yes   Graduated Yes   Wrap up additional comments Pt doing well and has seen PCP.   Call end time 1619            Cherelle COMBS - Registered Nurse

## 2024-12-08 ENCOUNTER — HOSPITAL ENCOUNTER (INPATIENT)
Facility: HOSPITAL | Age: 54
LOS: 6 days | Discharge: HOME | DRG: 683 | End: 2024-12-14
Attending: FAMILY MEDICINE | Admitting: INTERNAL MEDICINE
Payer: COMMERCIAL

## 2024-12-08 ENCOUNTER — APPOINTMENT (OUTPATIENT)
Dept: GENERAL RADIOLOGY | Facility: HOSPITAL | Age: 54
DRG: 683 | End: 2024-12-08
Payer: COMMERCIAL

## 2024-12-08 DIAGNOSIS — R42 DIZZINESS: ICD-10-CM

## 2024-12-08 DIAGNOSIS — E83.51 HYPOCALCEMIA: ICD-10-CM

## 2024-12-08 DIAGNOSIS — N18.31 ACUTE RENAL FAILURE SUPERIMPOSED ON STAGE 3A CHRONIC KIDNEY DISEASE, UNSPECIFIED ACUTE RENAL FAILURE TYPE: Primary | ICD-10-CM

## 2024-12-08 DIAGNOSIS — N17.9 ACUTE RENAL FAILURE SUPERIMPOSED ON STAGE 3A CHRONIC KIDNEY DISEASE, UNSPECIFIED ACUTE RENAL FAILURE TYPE: Primary | ICD-10-CM

## 2024-12-08 DIAGNOSIS — E87.6 HYPOKALEMIA: ICD-10-CM

## 2024-12-08 LAB
ALBUMIN SERPL-MCNC: 3.6 G/DL (ref 3.5–5.2)
ALBUMIN/GLOB SERPL: 1 G/DL
ALP SERPL-CCNC: 149 U/L (ref 39–117)
ALP SERPL-CCNC: 182 U/L (ref 39–117)
ALT SERPL W P-5'-P-CCNC: 33 U/L (ref 1–33)
ANION GAP SERPL CALCULATED.3IONS-SCNC: 14 MMOL/L (ref 5–15)
APTT PPP: 26.9 SECONDS (ref 24.5–36)
AST SERPL-CCNC: 25 U/L (ref 1–32)
BACTERIA UR QL AUTO: ABNORMAL /HPF
BASOPHILS # BLD AUTO: 0.03 10*3/MM3 (ref 0–0.2)
BASOPHILS NFR BLD AUTO: 0.3 % (ref 0–1.5)
BILIRUB SERPL-MCNC: <0.2 MG/DL (ref 0–1.2)
BILIRUB UR QL STRIP: NEGATIVE
BUN SERPL-MCNC: 56 MG/DL (ref 6–20)
BUN/CREAT SERPL: 20.4 (ref 7–25)
CALCIUM SPEC-SCNC: 7.8 MG/DL (ref 8.6–10.5)
CHLORIDE SERPL-SCNC: 99 MMOL/L (ref 98–107)
CLARITY UR: CLEAR
CO2 SERPL-SCNC: 17 MMOL/L (ref 22–29)
COLOR UR: YELLOW
CREAT SERPL-MCNC: 2.75 MG/DL (ref 0.57–1)
D-LACTATE SERPL-SCNC: 1.3 MMOL/L (ref 0.5–2)
DEPRECATED RDW RBC AUTO: 44.2 FL (ref 37–54)
EGFRCR SERPLBLD CKD-EPI 2021: 20.1 ML/MIN/1.73
EOSINOPHIL # BLD AUTO: 0.08 10*3/MM3 (ref 0–0.4)
EOSINOPHIL NFR BLD AUTO: 0.7 % (ref 0.3–6.2)
ERYTHROCYTE [DISTWIDTH] IN BLOOD BY AUTOMATED COUNT: 13.4 % (ref 12.3–15.4)
GEN 5 1HR TROPONIN T REFLEX: 12 NG/L
GLOBULIN UR ELPH-MCNC: 3.5 GM/DL
GLUCOSE BLDC GLUCOMTR-MCNC: 184 MG/DL (ref 70–130)
GLUCOSE SERPL-MCNC: 241 MG/DL (ref 65–99)
GLUCOSE UR STRIP-MCNC: NEGATIVE MG/DL
HCT VFR BLD AUTO: 32.1 % (ref 34–46.6)
HGB BLD-MCNC: 10.7 G/DL (ref 12–15.9)
HGB UR QL STRIP.AUTO: NEGATIVE
HYALINE CASTS UR QL AUTO: ABNORMAL /LPF
IMM GRANULOCYTES # BLD AUTO: 0.25 10*3/MM3 (ref 0–0.05)
IMM GRANULOCYTES NFR BLD AUTO: 2.1 % (ref 0–0.5)
INR PPP: 0.97 (ref 0.91–1.09)
KETONES UR QL STRIP: NEGATIVE
LEUKOCYTE ESTERASE UR QL STRIP.AUTO: NEGATIVE
LIPASE SERPL-CCNC: 59 U/L (ref 13–60)
LYMPHOCYTES # BLD AUTO: 4.02 10*3/MM3 (ref 0.7–3.1)
LYMPHOCYTES NFR BLD AUTO: 34.5 % (ref 19.6–45.3)
MAGNESIUM SERPL-MCNC: 1.7 MG/DL (ref 1.6–2.6)
MCH RBC QN AUTO: 29.8 PG (ref 26.6–33)
MCHC RBC AUTO-ENTMCNC: 33.3 G/DL (ref 31.5–35.7)
MCV RBC AUTO: 89.4 FL (ref 79–97)
MONOCYTES # BLD AUTO: 0.74 10*3/MM3 (ref 0.1–0.9)
MONOCYTES NFR BLD AUTO: 6.4 % (ref 5–12)
NEUTROPHILS NFR BLD AUTO: 56 % (ref 42.7–76)
NEUTROPHILS NFR BLD AUTO: 6.52 10*3/MM3 (ref 1.7–7)
NITRITE UR QL STRIP: NEGATIVE
NRBC BLD AUTO-RTO: 0 /100 WBC (ref 0–0.2)
PH UR STRIP.AUTO: 6 [PH] (ref 5–8)
PHOSPHATE SERPL-MCNC: 3 MG/DL (ref 2.5–4.5)
PLATELET # BLD AUTO: 326 10*3/MM3 (ref 140–450)
PMV BLD AUTO: 9.9 FL (ref 6–12)
POTASSIUM SERPL-SCNC: 2.9 MMOL/L (ref 3.5–5.2)
PROT SERPL-MCNC: 7.1 G/DL (ref 6–8.5)
PROT UR QL STRIP: ABNORMAL
PROTHROMBIN TIME: 13.3 SECONDS (ref 11.8–14.8)
QT INTERVAL: 330 MS
QTC INTERVAL: 464 MS
RBC # BLD AUTO: 3.59 10*6/MM3 (ref 3.77–5.28)
RBC # UR STRIP: ABNORMAL /HPF
REF LAB TEST METHOD: ABNORMAL
SODIUM SERPL-SCNC: 130 MMOL/L (ref 136–145)
SP GR UR STRIP: 1.02 (ref 1–1.03)
SQUAMOUS #/AREA URNS HPF: ABNORMAL /HPF
TROPONIN T NUMERIC DELTA: -3 NG/L
TROPONIN T SERPL HS-MCNC: 15 NG/L
UROBILINOGEN UR QL STRIP: ABNORMAL
WBC # UR STRIP: ABNORMAL /HPF
WBC NRBC COR # BLD AUTO: 11.64 10*3/MM3 (ref 3.4–10.8)

## 2024-12-08 PROCEDURE — 25010000002 POTASSIUM CHLORIDE 10 MEQ/100ML SOLUTION: Performed by: FAMILY MEDICINE

## 2024-12-08 PROCEDURE — 71045 X-RAY EXAM CHEST 1 VIEW: CPT

## 2024-12-08 PROCEDURE — 85610 PROTHROMBIN TIME: CPT | Performed by: FAMILY MEDICINE

## 2024-12-08 PROCEDURE — 83735 ASSAY OF MAGNESIUM: CPT | Performed by: FAMILY MEDICINE

## 2024-12-08 PROCEDURE — 81001 URINALYSIS AUTO W/SCOPE: CPT | Performed by: FAMILY MEDICINE

## 2024-12-08 PROCEDURE — 84484 ASSAY OF TROPONIN QUANT: CPT | Performed by: FAMILY MEDICINE

## 2024-12-08 PROCEDURE — 82948 REAGENT STRIP/BLOOD GLUCOSE: CPT

## 2024-12-08 PROCEDURE — 84075 ASSAY ALKALINE PHOSPHATASE: CPT | Performed by: NURSE PRACTITIONER

## 2024-12-08 PROCEDURE — 80053 COMPREHEN METABOLIC PANEL: CPT | Performed by: FAMILY MEDICINE

## 2024-12-08 PROCEDURE — 25810000003 SODIUM CHLORIDE 0.9 % SOLUTION: Performed by: FAMILY MEDICINE

## 2024-12-08 PROCEDURE — 83605 ASSAY OF LACTIC ACID: CPT | Performed by: FAMILY MEDICINE

## 2024-12-08 PROCEDURE — 25010000002 SODIUM CHLORIDE 0.9 % WITH KCL 20 MEQ 20-0.9 MEQ/L-% SOLUTION: Performed by: NURSE PRACTITIONER

## 2024-12-08 PROCEDURE — 85730 THROMBOPLASTIN TIME PARTIAL: CPT | Performed by: FAMILY MEDICINE

## 2024-12-08 PROCEDURE — 36415 COLL VENOUS BLD VENIPUNCTURE: CPT

## 2024-12-08 PROCEDURE — 84100 ASSAY OF PHOSPHORUS: CPT | Performed by: NURSE PRACTITIONER

## 2024-12-08 PROCEDURE — 87040 BLOOD CULTURE FOR BACTERIA: CPT | Performed by: FAMILY MEDICINE

## 2024-12-08 PROCEDURE — 85025 COMPLETE CBC W/AUTO DIFF WBC: CPT | Performed by: FAMILY MEDICINE

## 2024-12-08 PROCEDURE — 93005 ELECTROCARDIOGRAM TRACING: CPT | Performed by: FAMILY MEDICINE

## 2024-12-08 PROCEDURE — 87154 CUL TYP ID BLD PTHGN 6+ TRGT: CPT | Performed by: FAMILY MEDICINE

## 2024-12-08 PROCEDURE — 83690 ASSAY OF LIPASE: CPT | Performed by: FAMILY MEDICINE

## 2024-12-08 PROCEDURE — 25010000002 POTASSIUM CHLORIDE 10 MEQ/100ML SOLUTION: Performed by: NURSE PRACTITIONER

## 2024-12-08 PROCEDURE — 99285 EMERGENCY DEPT VISIT HI MDM: CPT

## 2024-12-08 PROCEDURE — 93005 ELECTROCARDIOGRAM TRACING: CPT

## 2024-12-08 PROCEDURE — 51798 US URINE CAPACITY MEASURE: CPT

## 2024-12-08 PROCEDURE — 63710000001 INSULIN REGULAR HUMAN PER 5 UNITS: Performed by: NURSE PRACTITIONER

## 2024-12-08 RX ORDER — SODIUM CHLORIDE 0.9 % (FLUSH) 0.9 %
20 SYRINGE (ML) INJECTION AS NEEDED
Status: DISCONTINUED | OUTPATIENT
Start: 2024-12-08 | End: 2024-12-14 | Stop reason: HOSPADM

## 2024-12-08 RX ORDER — ACETAMINOPHEN 325 MG/1
650 TABLET ORAL EVERY 4 HOURS PRN
Status: DISCONTINUED | OUTPATIENT
Start: 2024-12-08 | End: 2024-12-14 | Stop reason: HOSPADM

## 2024-12-08 RX ORDER — SODIUM CHLORIDE 9 MG/ML
40 INJECTION, SOLUTION INTRAVENOUS AS NEEDED
Status: DISCONTINUED | OUTPATIENT
Start: 2024-12-08 | End: 2024-12-14 | Stop reason: HOSPADM

## 2024-12-08 RX ORDER — BISACODYL 10 MG
10 SUPPOSITORY, RECTAL RECTAL DAILY PRN
Status: DISCONTINUED | OUTPATIENT
Start: 2024-12-08 | End: 2024-12-14 | Stop reason: HOSPADM

## 2024-12-08 RX ORDER — POTASSIUM CHLORIDE 7.45 MG/ML
10 INJECTION INTRAVENOUS ONCE
Status: COMPLETED | OUTPATIENT
Start: 2024-12-08 | End: 2024-12-08

## 2024-12-08 RX ORDER — AMOXICILLIN 250 MG
2 CAPSULE ORAL 2 TIMES DAILY PRN
Status: DISCONTINUED | OUTPATIENT
Start: 2024-12-08 | End: 2024-12-14 | Stop reason: HOSPADM

## 2024-12-08 RX ORDER — SODIUM CHLORIDE 0.9 % (FLUSH) 0.9 %
10 SYRINGE (ML) INJECTION AS NEEDED
Status: DISCONTINUED | OUTPATIENT
Start: 2024-12-08 | End: 2024-12-14 | Stop reason: HOSPADM

## 2024-12-08 RX ORDER — SODIUM CHLORIDE 0.9 % (FLUSH) 0.9 %
10 SYRINGE (ML) INJECTION EVERY 12 HOURS SCHEDULED
Status: DISCONTINUED | OUTPATIENT
Start: 2024-12-08 | End: 2024-12-09

## 2024-12-08 RX ORDER — POTASSIUM CHLORIDE 7.45 MG/ML
10 INJECTION INTRAVENOUS
Status: DISPENSED | OUTPATIENT
Start: 2024-12-08 | End: 2024-12-08

## 2024-12-08 RX ORDER — SODIUM CHLORIDE AND POTASSIUM CHLORIDE 150; 900 MG/100ML; MG/100ML
100 INJECTION, SOLUTION INTRAVENOUS CONTINUOUS
Status: DISCONTINUED | OUTPATIENT
Start: 2024-12-08 | End: 2024-12-09

## 2024-12-08 RX ORDER — DEXTROSE MONOHYDRATE 25 G/50ML
25 INJECTION, SOLUTION INTRAVENOUS
Status: DISCONTINUED | OUTPATIENT
Start: 2024-12-08 | End: 2024-12-14 | Stop reason: HOSPADM

## 2024-12-08 RX ORDER — POLYETHYLENE GLYCOL 3350 17 G/17G
17 POWDER, FOR SOLUTION ORAL DAILY PRN
Status: DISCONTINUED | OUTPATIENT
Start: 2024-12-08 | End: 2024-12-14 | Stop reason: HOSPADM

## 2024-12-08 RX ORDER — NICOTINE 21 MG/24HR
1 PATCH, TRANSDERMAL 24 HOURS TRANSDERMAL EVERY 24 HOURS
Status: DISCONTINUED | OUTPATIENT
Start: 2024-12-08 | End: 2024-12-14 | Stop reason: HOSPADM

## 2024-12-08 RX ORDER — ACETAMINOPHEN 650 MG/1
650 SUPPOSITORY RECTAL EVERY 4 HOURS PRN
Status: DISCONTINUED | OUTPATIENT
Start: 2024-12-08 | End: 2024-12-14 | Stop reason: HOSPADM

## 2024-12-08 RX ORDER — SODIUM CHLORIDE 9 MG/ML
40 INJECTION, SOLUTION INTRAVENOUS AS NEEDED
Status: DISCONTINUED | OUTPATIENT
Start: 2024-12-08 | End: 2024-12-09

## 2024-12-08 RX ORDER — ACETAMINOPHEN 160 MG/5ML
650 SOLUTION ORAL EVERY 4 HOURS PRN
Status: DISCONTINUED | OUTPATIENT
Start: 2024-12-08 | End: 2024-12-14 | Stop reason: HOSPADM

## 2024-12-08 RX ORDER — ONDANSETRON 4 MG/1
4 TABLET, ORALLY DISINTEGRATING ORAL EVERY 6 HOURS PRN
Status: DISCONTINUED | OUTPATIENT
Start: 2024-12-08 | End: 2024-12-14 | Stop reason: HOSPADM

## 2024-12-08 RX ORDER — NICOTINE POLACRILEX 4 MG
15 LOZENGE BUCCAL
Status: DISCONTINUED | OUTPATIENT
Start: 2024-12-08 | End: 2024-12-14 | Stop reason: HOSPADM

## 2024-12-08 RX ORDER — SODIUM CHLORIDE 0.9 % (FLUSH) 0.9 %
10 SYRINGE (ML) INJECTION EVERY 12 HOURS SCHEDULED
Status: DISCONTINUED | OUTPATIENT
Start: 2024-12-08 | End: 2024-12-14 | Stop reason: HOSPADM

## 2024-12-08 RX ORDER — ONDANSETRON 2 MG/ML
4 INJECTION INTRAMUSCULAR; INTRAVENOUS EVERY 6 HOURS PRN
Status: DISCONTINUED | OUTPATIENT
Start: 2024-12-08 | End: 2024-12-14 | Stop reason: HOSPADM

## 2024-12-08 RX ADMIN — POTASSIUM CHLORIDE 10 MEQ: 7.46 INJECTION, SOLUTION INTRAVENOUS at 16:19

## 2024-12-08 RX ADMIN — Medication 10 ML: at 20:03

## 2024-12-08 RX ADMIN — POTASSIUM CHLORIDE 10 MEQ: 7.46 INJECTION, SOLUTION INTRAVENOUS at 18:16

## 2024-12-08 RX ADMIN — POTASSIUM CHLORIDE 10 MEQ: 7.46 INJECTION, SOLUTION INTRAVENOUS at 20:21

## 2024-12-08 RX ADMIN — SODIUM CHLORIDE 1563 ML: 9 INJECTION, SOLUTION INTRAVENOUS at 13:24

## 2024-12-08 RX ADMIN — NICOTINE 1 PATCH: 14 PATCH, EXTENDED RELEASE TRANSDERMAL at 16:19

## 2024-12-08 RX ADMIN — POTASSIUM CHLORIDE 10 MEQ: 7.46 INJECTION, SOLUTION INTRAVENOUS at 15:04

## 2024-12-08 RX ADMIN — INSULIN HUMAN 2 UNITS: 100 INJECTION, SOLUTION PARENTERAL at 17:29

## 2024-12-08 RX ADMIN — POTASSIUM CHLORIDE 10 MEQ: 7.46 INJECTION, SOLUTION INTRAVENOUS at 19:28

## 2024-12-08 RX ADMIN — POTASSIUM CHLORIDE 10 MEQ: 7.46 INJECTION, SOLUTION INTRAVENOUS at 17:29

## 2024-12-08 RX ADMIN — POTASSIUM CHLORIDE AND SODIUM CHLORIDE 100 ML/HR: 900; 150 INJECTION, SOLUTION INTRAVENOUS at 16:19

## 2024-12-08 NOTE — ED NOTES
Nursing report ED to floor  Tasha Perez  54 y.o.  female    HPI:   Chief Complaint   Patient presents with    Urinary Retention    Dizziness       Admitting doctor:   Donnie Wright MD    Consulting provider(s):  Consults       Date and Time Order Name Status Description    11/15/2024  9:10 AM Inpatient Gastroenterology Consult Completed     11/11/2024 12:00 PM Inpatient Gastroenterology Consult Completed     11/8/2024 12:49 PM Inpatient General Surgery Consult Completed     11/7/2024 10:30 PM Inpatient Nephrology Consult Completed              Admitting diagnosis:   The primary encounter diagnosis was Acute renal failure superimposed on stage 3a chronic kidney disease, unspecified acute renal failure type. Diagnoses of Dizziness, Hypokalemia, and Hypocalcemia were also pertinent to this visit.    Code status:   Current Code Status       Date Active Code Status Order ID Comments User Context       Prior            Allergies:   Bee venom, Hydrocodone, Ibuprofen, Pineapple, Pineapple extract, Wasp venom, Wasp venom protein, Hydrocodone-acetaminophen, Penicillins, Sitagliptin, Metformin, and Chocolate    Intake and Output  No intake or output data in the 24 hours ending 12/08/24 1508    Weight:       12/08/24  1232   Weight: 37.6 kg (83 lb)       Most recent vitals:   Vitals:    12/08/24 1401 12/08/24 1416 12/08/24 1431 12/08/24 1446   BP: 90/57 91/65 (!) 86/61 (!) 86/61   BP Location:       Patient Position:       Pulse: 94 96 93 93   Resp:       Temp:       TempSrc:       SpO2:  98% 97% 98%   Weight:       Height:         Oxygen Therapy: .    Active LDAs/IV Access:   Lines, Drains & Airways       Active LDAs       Name Placement date Placement time Site Days    PICC Single Lumen 04/25/24 Right Basilic 04/25/24  1038  Basilic  227    NG/OG Tube Nasoduodenal 8 Fr Left nostril 11/15/24  1033  Left nostril  23    Insulin Pump 11/19/23  2300  -- 384                    Labs (abnormal labs have a star):   Labs  Reviewed   COMPREHENSIVE METABOLIC PANEL - Abnormal; Notable for the following components:       Result Value    Glucose 241 (*)     BUN 56 (*)     Creatinine 2.75 (*)     Sodium 130 (*)     Potassium 2.9 (*)     CO2 17.0 (*)     Calcium 7.8 (*)     Alkaline Phosphatase 182 (*)     eGFR 20.1 (*)     All other components within normal limits    Narrative:     GFR Normal >60  Chronic Kidney Disease <60  Kidney Failure <15     CBC WITH AUTO DIFFERENTIAL - Abnormal; Notable for the following components:    WBC 11.64 (*)     RBC 3.59 (*)     Hemoglobin 10.7 (*)     Hematocrit 32.1 (*)     Immature Grans % 2.1 (*)     Lymphocytes, Absolute 4.02 (*)     Immature Grans, Absolute 0.25 (*)     All other components within normal limits   TROPONIN - Abnormal; Notable for the following components:    HS Troponin T 15 (*)     All other components within normal limits    Narrative:     High Sensitive Troponin T Reference Range:  <14.0 ng/L- Negative Female for AMI  <22.0 ng/L- Negative Male for AMI  >=14 - Abnormal Female indicating possible myocardial injury.  >=22 - Abnormal Male indicating possible myocardial injury.   Clinicians would have to utilize clinical acumen, EKG, Troponin, and serial changes to determine if it is an Acute Myocardial Infarction or myocardial injury due to an underlying chronic condition.        URINALYSIS W/ CULTURE IF INDICATED - Abnormal; Notable for the following components:    Protein,  mg/dL (2+) (*)     All other components within normal limits    Narrative:     In absence of clinical symptoms, the presence of pyuria, bacteria, and/or nitrites on the urinalysis result does not correlate with infection.   URINALYSIS, MICROSCOPIC ONLY - Abnormal; Notable for the following components:    Bacteria, UA 1+ (*)     Squamous Epithelial Cells, UA 7-12 (*)     All other components within normal limits   PROTIME-INR - Normal   APTT - Normal   LIPASE - Normal   LACTIC ACID, PLASMA - Normal   MAGNESIUM  - Normal   BLOOD CULTURE   BLOOD CULTURE   HIGH SENSITIVITIY TROPONIN T 2HR   CBC AND DIFFERENTIAL    Narrative:     The following orders were created for panel order CBC & Differential.  Procedure                               Abnormality         Status                     ---------                               -----------         ------                     CBC Auto Differential[[id number]]        Abnormal            Final result                 Please view results for these tests on the individual orders.       Meds given in ED:   Medications   potassium chloride 10 mEq in 100 mL IVPB (10 mEq Intravenous New Bag 12/8/24 1504)   sodium chloride 0.9 % bolus 1,563 mL (1,563 mL Intravenous New Bag 12/8/24 1324)           NIH Stroke Scale:       Isolation/Infection(s):  No active isolations   No active infections     COVID Testing  Collected .  Resulted .    Nursing report ED to floor:  Mental status: .alert and oriented   Ambulatory status: .up ad jessy  Precautions: .    ED nurse phone extentsion- ..

## 2024-12-08 NOTE — H&P
"    [facility] Medicine Services  HISTORY AND PHYSICAL    Date of Admission: 12/8/2024  Primary Care Physician: KIRILL Murguia MD    Subjective   Primary Historian:     Chief Complaint: Weakness and dizziness    History of Present Illness  Tasha Perez is a 54-year-old female with a past medical history of gastroparesis with chronic nausea, Raynaud's disease on Plaquenil, insulin dependent type 2 diabetes with indwelling pump (OmniPod), chronic pancreatitis, chronic kidney disease stage IIIb, malnutrition.  Patient is well-known to hospitalist practice, she has had at least 1 admission every month (if not more) since the beginning of the year.  Patient is usually admitted with SUGAR on CKD, hypomagnesia and hypokalemia.  She presents to [facility] ED with complaints of, \"felt like I kidneys are shutting down\", weakness and dizziness secondary to dehydration.  She also has complaints of abdominal pain, nausea, decreased appetite, dizziness, weakness and vomiting. She reported pain 5 on a scale of 1-10.  ER workup reveals troponin 15, sodium 130, potassium 2.9, CO2 17, BUN 56, creatinine 2.75, GFR 20.1, glucose 241, calcium 7.8, alkaline phosphatase 182, albumin normal at 3.6, lactic acid 1.3, lipase 59, WBC 11.6, hemoglobin 10.7, UA contaminated, blood culture obtained, chest x-ray reveals well-expanded lungs. No acute cardiopulmonary process identified. Enteric tubes present within the stomach.  EKG reveals sinus tachycardia 119.     Most recent admission 11/14 - 11/16, 2024 due to dislodged NG tube.  Tube was initially placed 11/12/2024 for fluid and nutrition insulation.  Plan per documentation follow-up with gastroenterology in [facility] for discussion regarding possible gastric stimulator placement.    Patient did undergo temporary gastric stimulator placement on Friday 12/6,  states she was tolerating well, no nausea or vomiting.  Able to eat small amounts " without distress.  Plan is for permanent stimulator to be placed on 12/16.  Patient is sleeping.  She does a awake and somewhat to gentle verbal stimuli.  When asked if she is hurting she states no, nausea-no.  She does not follow commands.  She does not open her eyes.  When asked if she takes insulin again she states no, however she does have an insulin pump with sensor located on left upper posterior arm.   states that her daughter has control of the readings.  Glucose are 241, I do not feel patient is able to treat per pump therefore we will DC for now.  I did explain to  I will keep her n.p.o. until she is more awake and able to trial oral intake.  He did verbalize understanding.  Patient is hypotensive lowest documented blood pressure 73/54 with a heart rate of 122.  She has been treated with fluid bolus current blood pressure 93/67 with heart rate of 95.  Oxygen saturation 99% on room air.  She is admitted for further evaluation and treatment.    Review of Systems   Otherwise complete ROS reviewed and negative except as mentioned in the HPI.    Past Medical History:   Past Medical History:   Diagnosis Date    Arthritis     Contusion     Degenerative disc disease, cervical     Diabetes mellitus     Elevated cholesterol     Fibromyalgia     Neuropathy     Osteoporosis     Pancreatitis     Raynaud disease     Renal insufficiency     Smoker 02/08/2022    Vitamin D deficiency 04/26/2022     Past Surgical History:  Past Surgical History:   Procedure Laterality Date    APPENDECTOMY      CHOLECYSTECTOMY      COLONOSCOPY      ENDOSCOPY N/A 10/08/2019    Procedure: ESOPHAGOGASTRODUODENOSCOPY WITH ANESTHESIA;  Surgeon: Aleks Vaughn DO;  Location: [facility] ENDOSCOPY;  Service: Gastroenterology    ENDOSCOPY N/A 11/12/2024    Procedure: ESOPHAGOGASTRODUODENOSCOPY WITH ANESTHESIA;  Surgeon: Tiffanie Saucedo MD;  Location: [facility] ENDOSCOPY;  Service: Gastroenterology;  Laterality: N/A;  pre op:  "Gastroparesis, Intractable nausea  post op: insertion of dobhoff   PCP: Brandon Murguia    ENDOSCOPY N/A 11/15/2024    Procedure: ESOPHAGOGASTRODUODENOSCOPY WITH ANESTHESIA WITH NG TUBE INSERTION;  Surgeon: Tiffanie Saucedo MD;  Location:  [facility] ENDOSCOPY;  Service: Gastroenterology;  Laterality: N/A;  PRE OP: DOBHOFF  POST OP: DOBHOFF  PCP: TELLY PACK    ENDOSCOPY WITH GASTROSTOMY TUBE INSERTION N/A 6/15/2022    Procedure: ESOPHAGOGASTRODUODENOSCOPY WITH GASTROSTOMY TUBE INSERTION;  Surgeon: Jersey Lee MD;  Location: [facility] ENDOSCOPY;  Service: Gastroenterology;  Laterality: N/A;  pre peg placement  post peg placement  Dr. Murguia    ENTEROSCOPY SMALL BOWEL N/A 11/3/2022    Procedure: Esophagogastroduodenoscopy with Peg Removal;  Surgeon: Aleks Vaughn DO;  Location:  [facility] ENDOSCOPY;  Service: Gastroenterology;  Laterality: N/A;  pre; peg removal  post; peg removal   KIRILL Murguia MD        HYSTERECTOMY       Social History:  reports that she has been smoking cigarettes. She has a 7.8 pack-year smoking history. She does not have any smokeless tobacco history on file. She reports that she does not drink alcohol and does not use drugs.    Family History: family history is not on file. She was adopted.       Allergies:  Allergies   Allergen Reactions    Bee Venom Anaphylaxis    Hydrocodone Anaphylaxis    Ibuprofen Other (See Comments)     \"SHUTS MY KIDNEYS DOWN\" PER PATIENT     Pineapple Anaphylaxis    Pineapple Extract Anaphylaxis    Wasp Venom Anaphylaxis    Wasp Venom Protein Anaphylaxis    Hydrocodone-Acetaminophen Dizziness, Nausea And Vomiting, Nausea Only and Unknown - Low Severity    Penicillins Nausea And Vomiting    Sitagliptin Other (See Comments)     Other reaction(s): Abdominal Pain      Metformin Other (See Comments)     Pt states it messes with her kidneys    Chocolate Rash     Dark chocolate only, told to RD 7/10/24       Medications:  Prior to Admission medications    Medication Sig " Start Date End Date Taking? Authorizing Provider   aspirin 81 MG EC tablet Take 1 tablet by mouth Daily.    Kevin Mejia MD   atorvastatin (LIPITOR) 80 MG tablet Take 1 tablet by mouth Every Night.    Kevin Mejia MD   Calcium Carbonate-Vitamin D 600-5 MG-MCG tablet Take 1 tablet by mouth Daily.    Kevin Mejia MD   cetirizine (zyrTEC) 10 MG tablet Take 1 tablet by mouth Daily As Needed for Allergies.    Kevin Mejia MD   Cholecalciferol (Vitamin D3) 50 MCG (2000 UT) capsule Take 1 capsule by mouth Daily.    Kevin Mejia MD   dapagliflozin Propanediol (Farxiga) 10 MG tablet Take 10 mg by mouth Daily.    Kevin Mejia MD   DULoxetine (CYMBALTA) 60 MG capsule Take 1 capsule by mouth Daily.    Kevin Mejia MD   hydroxychloroquine (PLAQUENIL) 200 MG tablet Take 2 tablets by mouth Daily.    Kevin Mejia MD   Insulin Aspart (novoLOG) 100 UNIT/ML injection Inject 90 Units under the skin into the appropriate area as directed Daily. Up to 90 units daily via omnipod    Kevin Mejia MD   linaclotide (LINZESS) 290 MCG capsule capsule Take 1 capsule by mouth Every Morning Before Breakfast.    Kevin Mejia MD   nortriptyline (PAMELOR) 25 MG capsule Take 1 capsule by mouth Every Night.    Kevin Mejia MD   omeprazole (priLOSEC) 40 MG capsule Take 1 capsule by mouth Every Other Day.    Kevin Mejia MD   ondansetron ODT (ZOFRAN-ODT) 4 MG disintegrating tablet Place 1 tablet on the tongue Every 8 (Eight) Hours As Needed for Nausea or Vomiting.    Kevin Mejia MD   rOPINIRole (REQUIP) 1 MG tablet Take 1 tablet by mouth 3 (Three) Times a Day.    Kevin Mejia MD   sucralfate (CARAFATE) 1 g tablet Take 1 tablet by mouth 4 (Four) Times a Day.    Kevin Mejia MD     I have utilized all available immediate resources to obtain, update, or review the patient's current medications (including all prescriptions,  "over-the-counter products, herbals, cannabis/cannabidiol products, and vitamin/mineral/dietary (nutritional) supplements).    Objective     Vital Signs: BP (!) 89/59 (BP Location: Left arm, Patient Position: Lying)   Pulse 82   Temp 97.4 °F (36.3 °C) (Oral)   Resp 18   Ht 154.9 cm (61\")   Wt 38.2 kg (84 lb 4 oz)   SpO2 99%   BMI 15.92 kg/m²   Physical Exam  Vitals reviewed.   Constitutional:       Appearance: She is ill-appearing.      Comments: Frail, cachectic   HENT:      Head: Normocephalic and atraumatic.      Mouth/Throat:      Mouth: Mucous membranes are dry.      Pharynx: Oropharynx is clear.   Eyes:      Conjunctiva/sclera: Conjunctivae normal.      Pupils: Pupils are equal, round, and reactive to light.   Neck:      Comments: No JVD  Cardiovascular:      Rate and Rhythm: Regular rhythm. Tachycardia present.   Pulmonary:      Effort: Pulmonary effort is normal.      Breath sounds: Normal breath sounds.   Abdominal:      General: There is no distension.      Palpations: Abdomen is soft.      Comments: Temporary gastric stimulator her left nare and use   Musculoskeletal:      Cervical back: Normal range of motion.      Right lower leg: No edema.      Left lower leg: No edema.   Skin:     General: Skin is warm and dry.   Neurological:      Comments: Somnolent, awakens easily and answers questions appropriately   Psychiatric:         Attention and Perception: She is inattentive.         Mood and Affect: Affect is flat.        Results Reviewed:  Lab Results (last 24 hours)       Procedure Component Value Units Date/Time    Urinalysis With Culture If Indicated - Urine, Clean Catch [[id number]]  (Abnormal) Collected: 12/08/24 1355    Specimen: Urine, Clean Catch Updated: 12/08/24 1422     Color, UA Yellow     Appearance, UA Clear     pH, UA 6.0     Specific Gravity, UA 1.021     Glucose, UA Negative     Ketones, UA Negative     Bilirubin, UA Negative     Blood, UA Negative     Protein,  mg/dL (2+)     " Leuk Esterase, UA Negative     Nitrite, UA Negative     Urobilinogen, UA 0.2 E.U./dL    Urinalysis, Microscopic Only - Urine, Clean Catch [[id number]]  (Abnormal) Collected: 12/08/24 1355    Specimen: Urine, Clean Catch Updated: 12/08/24 1422     RBC, UA 0-2 /HPF      WBC, UA 0-2 /HPF      Comment: Urine culture not indicated.        Bacteria, UA 1+ /HPF      Squamous Epithelial Cells, UA 7-12 /HPF      Hyaline Casts, UA 0-2 /LPF      Methodology Manual Light Microscopy    Magnesium [[id number]]  (Normal) Collected: 12/08/24 1319    Specimen: Blood from Arm, Right Updated: 12/08/24 1411     Magnesium 1.7 mg/dL     Comprehensive Metabolic Panel [[id number]]  (Abnormal) Collected: 12/08/24 1319    Specimen: Blood from Arm, Right Updated: 12/08/24 1358     Glucose 241 mg/dL      BUN 56 mg/dL      Creatinine 2.75 mg/dL      Sodium 130 mmol/L      Potassium 2.9 mmol/L      Chloride 99 mmol/L      CO2 17.0 mmol/L      Calcium 7.8 mg/dL      Total Protein 7.1 g/dL      Albumin 3.6 g/dL      ALT (SGPT) 33 U/L      AST (SGOT) 25 U/L      Alkaline Phosphatase 182 U/L      Total Bilirubin <0.2 mg/dL      Globulin 3.5 gm/dL      A/G Ratio 1.0 g/dL      BUN/Creatinine Ratio 20.4     Anion Gap 14.0 mmol/L      eGFR 20.1 mL/min/1.73     Lactic Acid, Plasma [[id number]]  (Normal) Collected: 12/08/24 1319    Specimen: Blood from Arm, Right Updated: 12/08/24 1356     Lactate 1.3 mmol/L     High Sensitivity Troponin T [[id number]]  (Abnormal) Collected: 12/08/24 1319    Specimen: Blood from Arm, Right Updated: 12/08/24 1355     HS Troponin T 15 ng/L     Lipase [[id number]]  (Normal) Collected: 12/08/24 1319    Specimen: Blood from Arm, Right Updated: 12/08/24 1353     Lipase 59 U/L     Protime-INR [[id number]]  (Normal) Collected: 12/08/24 1319    Specimen: Blood from Arm, Right Updated: 12/08/24 1346     Protime 13.3 Seconds      INR 0.97    aPTT [[id number]]  (Normal) Collected: 12/08/24 1319    Specimen: Blood from Arm, Right Updated:  12/08/24 1346     PTT 26.9 seconds     Blood Culture - Blood, Hand, Right [[id number]] Collected: 12/08/24 1319    Specimen: Blood from Hand, Right Updated: 12/08/24 1339    Blood Culture - Blood, Blood, PICC Line [[id number]] Collected: 12/08/24 1319    Specimen: Blood, PICC Line Updated: 12/08/24 1339    CBC Auto Differential [[id number]]  (Abnormal) Collected: 12/08/24 1319    Specimen: Blood from Arm, Right Updated: 12/08/24 1339     WBC 11.64 10*3/mm3      RBC 3.59 10*6/mm3      Hemoglobin 10.7 g/dL      Hematocrit 32.1 %      MCV 89.4 fL      MCH 29.8 pg      MCHC 33.3 g/dL      RDW 13.4 %      RDW-SD 44.2 fl      MPV 9.9 fL      Platelets 326 10*3/mm3      Neutrophil % 56.0 %      Lymphocyte % 34.5 %      Monocyte % 6.4 %      Eosinophil % 0.7 %      Basophil % 0.3 %      Immature Grans % 2.1 %      Neutrophils, Absolute 6.52 10*3/mm3      Lymphocytes, Absolute 4.02 10*3/mm3      Monocytes, Absolute 0.74 10*3/mm3      Eosinophils, Absolute 0.08 10*3/mm3      Basophils, Absolute 0.03 10*3/mm3      Immature Grans, Absolute 0.25 10*3/mm3      nRBC 0.0 /100 WBC           Imaging Results (Last 24 Hours)       Procedure Component Value Units Date/Time    XR Chest 1 View [[id number]] Collected: 12/08/24 1408     Updated: 12/08/24 1414    Narrative:      XR CHEST 1 VW-     HISTORY: Short of breath     COMPARISON: 11/14/2024     FINDINGS: Frontal view of the chest obtained.     Right upper extremity PICC line tip projects over the SVC. Enteric  tubes, one tip present in the proximal stomach with the other present in  the body of the stomach.     Lungs are well-expanded and clear. Cardiomediastinal contours are  normal. No pleural effusion or pneumothorax. No acute regional bony  pathology. Cholecystectomy clips.       Impression:      1. Well-expanded lungs. No acute cardiopulmonary process identified.  Enteric tubes present within the stomach.        This report was signed and finalized on 12/8/2024 2:10 PM by  Dr. Gloria Roe MD.             Assessment / Plan   Assessment:   Active Hospital Problems    Diagnosis     Hyponatremia     Gastroparesis     Acute renal failure superimposed on stage 3b chronic kidney disease     Hypokalemia     Type 2 diabetes mellitus, with long-term current use of insulin     Severe malnutrition        Treatment Plan  The patient will be admitted to Dr. Wright's service here at [facility].     1.  Hyponatremia, continue hydration, patient has received sepsis bolus in the emergency department, will continue normal saline with 20 mEq of potassium at 100 mL/hour x 20 hours, labs in a.m.    2.  Acute renal failure superimposed on stage IIIb chronic kidney disease, continue hydration as stated above    3.  Gastroparesis, as needed antiemetics, temporary gastric stimulator and use    4.  Hypokalemia, replace, labs in a.m.    5.  Type 2 diabetes with long-term insulin-pump in use, will DC pump for now, monitor glucose every 6 hours until patient able to eat, regular insulin sliding scale coverage    6.  Severe malnutrition, will add diet when patient alert enough to eat.  N.p.o. with ice chips for now    7.  Home medications reviewed, will hold until swallowing safety determined, DVT prophylaxis with SCDs, NicoDerm patch, smoking cessation at discharge, supplemental oxygen as needed, continuous pulse oximetry, turn every 2 hours    Medical Decision Making  Number and Complexity of problems: 6  3 problems, acute, high complexity, unchanged  3 problems, chronic, high complexity, unchanged  Differential Diagnosis: None    Conditions and Status        Condition is unchanged.     [facility] Data  External documents reviewed: [facility] from [facility]  Cardiac tracing (EKG, telemetry) interpretation: Reviewed  Radiology interpretation: Reviewed  Labs reviewed: Yes  Any tests that were considered but not ordered: No     Decision rules/scores evaluated (example [id number], Wells,  etc): No     Discussed with: Patient,  and Dr. Wright     Care Planning  Shared decision making: Patient,  and Dr. Wright  Code status and discussions: Full    Disposition  Social Determinants of Health that impact treatment or disposition: None  Estimated length of stay is 1-2 days.     I confirmed that the patient's advanced care plan is present, code status is documented, and a surrogate decision maker is listed in the patient's medical record.     The patient's surrogate decision maker is .     The patient was seen and examined by me on 12/8/2024 at 2:30 PM.    Electronically signed by RAKESH Batista, 12/08/24, 16:35 CST.

## 2024-12-08 NOTE — ED PROVIDER NOTES
HPI:     Patient is a 54-year-old female with chronic history of abdominal pain and nausea and decreased appetite and gastroparesis who presents to the emergency room feeling dizzy and weak inability to keep much fluid down.  She states that she feels like her kidneys are shutting down again and she is dehydrated that is causing her to be very weak.  Flank and back pain 5 out of 10    REVIEW OF SYSTEMS  CONSTITUTIONAL:  No complaints of fever, chills,or weakness  EYES:  No complaints of discharge   ENT: No complaints of sore throat or ear pain  CARDIOVASCULAR:  No complaints of chest pain, palpitations, or swelling  RESPIRATORY:  No complaints of cough or shortness of breath  GI: Positive for mild abdominal cramping and flank pain.    MUSCULOSKELETAL:  No complaints of back pain  SKIN:  No complaints of rash  NEUROLOGIC: Positive for dizziness ENDOCRINE:  No complaints of polyuria or polydipsia  LYMPHATIC:  No complaints of swollen glands  GENITOURINARY: Positive for decreased urination.  Flank pain  PAST MEDICAL HISTORY  Past Medical History:   Diagnosis Date    Arthritis     Contusion     Degenerative disc disease, cervical     Diabetes mellitus     Elevated cholesterol     Fibromyalgia     Neuropathy     Osteoporosis     Pancreatitis     Raynaud disease     Renal insufficiency     Smoker 02/08/2022    Vitamin D deficiency 04/26/2022       FAMILY HISTORY  Family History   Adopted: Yes   Problem Relation Age of Onset    Colon polyps Neg Hx     Colon cancer Neg Hx        SOCIAL HISTORY  Social History     Socioeconomic History    Marital status:    Tobacco Use    Smoking status: Every Day     Current packs/day: 0.25     Average packs/day: 0.3 packs/day for 31.0 years (7.8 ttl pk-yrs)     Types: Cigarettes   Vaping Use    Vaping status: Never Used   Substance and Sexual Activity    Alcohol use: No    Drug use: No    Sexual activity: Defer       IMMUNIZATION HISTORY  Deferred to primary care  physician.    SURGICAL HISTORY  Past Surgical History:   Procedure Laterality Date    APPENDECTOMY      CHOLECYSTECTOMY      COLONOSCOPY      ENDOSCOPY N/A 10/08/2019    Procedure: ESOPHAGOGASTRODUODENOSCOPY WITH ANESTHESIA;  Surgeon: Aleks Vaughn DO;  Location: [facility] ENDOSCOPY;  Service: Gastroenterology    ENDOSCOPY N/A 11/12/2024    Procedure: ESOPHAGOGASTRODUODENOSCOPY WITH ANESTHESIA;  Surgeon: Tiffanie Saucedo MD;  Location: [facility] ENDOSCOPY;  Service: Gastroenterology;  Laterality: N/A;  pre op: Gastroparesis, Intractable nausea  post op: insertion of dobhoff   PCP: Brandon Murguia    ENDOSCOPY N/A 11/15/2024    Procedure: ESOPHAGOGASTRODUODENOSCOPY WITH ANESTHESIA WITH NG TUBE INSERTION;  Surgeon: Tiffanie Saucedo MD;  Location: [facility] ENDOSCOPY;  Service: Gastroenterology;  Laterality: N/A;  PRE OP: DOBHOFF  POST OP: DOBHOFF  PCP: TELLY PACK    ENDOSCOPY WITH GASTROSTOMY TUBE INSERTION N/A 6/15/2022    Procedure: ESOPHAGOGASTRODUODENOSCOPY WITH GASTROSTOMY TUBE INSERTION;  Surgeon: Jersey Lee MD;  Location: [facility] ENDOSCOPY;  Service: Gastroenterology;  Laterality: N/A;  pre peg placement  post peg placement  Dr. Murguia    ENTEROSCOPY SMALL BOWEL N/A 11/3/2022    Procedure: Esophagogastroduodenoscopy with Peg Removal;  Surgeon: Aleks Vaughn DO;  Location: [facility] ENDOSCOPY;  Service: Gastroenterology;  Laterality: N/A;  pre; peg removal  post; peg removal   KIRILL Murguia MD        HYSTERECTOMY         CURRENT MEDICATIONS    Current Facility-Administered Medications:     potassium chloride 10 mEq in 100 mL IVPB, 10 mEq, Intravenous, Once, Tru Thomas Jr., MD    Current Outpatient Medications:     aspirin 81 MG EC tablet, Take 1 tablet by mouth Daily., Disp: , Rfl:     atorvastatin (LIPITOR) 80 MG tablet, Take 1 tablet by mouth Every Night., Disp: , Rfl:     Calcium Carbonate-Vitamin D 600-5 MG-MCG tablet, Take 1 tablet by mouth Daily., Disp: , Rfl:     cetirizine (zyrTEC) 10  "MG tablet, Take 1 tablet by mouth Daily As Needed for Allergies., Disp: , Rfl:     Cholecalciferol (Vitamin D3) 50 MCG (2000 UT) capsule, Take 1 capsule by mouth Daily., Disp: , Rfl:     dapagliflozin Propanediol (Farxiga) 10 MG tablet, Take 10 mg by mouth Daily., Disp: , Rfl:     DULoxetine (CYMBALTA) 60 MG capsule, Take 1 capsule by mouth Daily., Disp: , Rfl:     hydroxychloroquine (PLAQUENIL) 200 MG tablet, Take 2 tablets by mouth Daily., Disp: , Rfl:     Insulin Aspart (novoLOG) 100 UNIT/ML injection, Inject 90 Units under the skin into the appropriate area as directed Daily. Up to 90 units daily via omnipod, Disp: , Rfl:     linaclotide (LINZESS) 290 MCG capsule capsule, Take 1 capsule by mouth Every Morning Before Breakfast., Disp: , Rfl:     nortriptyline (PAMELOR) 25 MG capsule, Take 1 capsule by mouth Every Night., Disp: , Rfl:     omeprazole (priLOSEC) 40 MG capsule, Take 1 capsule by mouth Every Other Day., Disp: , Rfl:     ondansetron ODT (ZOFRAN-ODT) 4 MG disintegrating tablet, Place 1 tablet on the tongue Every 8 (Eight) Hours As Needed for Nausea or Vomiting., Disp: , Rfl:     rOPINIRole (REQUIP) 1 MG tablet, Take 1 tablet by mouth 3 (Three) Times a Day., Disp: , Rfl:     sucralfate (CARAFATE) 1 g tablet, Take 1 tablet by mouth 4 (Four) Times a Day., Disp: , Rfl:     ALLERGIES  Allergies   Allergen Reactions    Bee Venom Anaphylaxis    Hydrocodone Anaphylaxis    Ibuprofen Other (See Comments)     \"SHUTS MY KIDNEYS DOWN\" PER PATIENT     Pineapple Anaphylaxis    Pineapple Extract Anaphylaxis    Wasp Venom Anaphylaxis    Wasp Venom Protein Anaphylaxis    Hydrocodone-Acetaminophen Dizziness, Nausea And Vomiting, Nausea Only and Unknown - Low Severity    Penicillins Nausea And Vomiting    Sitagliptin Other (See Comments)     Other reaction(s): Abdominal Pain      Metformin Other (See Comments)     Pt states it messes with her kidneys    Chocolate Rash     Dark chocolate only, told to RD 7/10/24 " "      ABDOMINAL EXAM    VITAL SIGNS:   BP 91/65   Pulse 96   Temp 98.2 °F (36.8 °C) (Oral)   Resp 20   Ht 154.9 cm (61\")   Wt 37.6 kg (83 lb)   SpO2 98%   BMI 15.68 kg/m²     Constitutional: Patient is alert and in no distress.  Patient with moderate bilateral flank and abdominal discomfort.    ENT: There is a normal pharynx with no acute erythema or exudate and oral mucosa is moist.  Nose is clear with no drainage.  Tympanic membranes intact and non-erythemic    Cardiovascular: S1-S2 regular rate and rhythm. No murmurs, rubs or gallops.  Pulses are equal bilaterally and there is no pitting edema.    Respiratory: Patient is clear to auscultation bilaterally with no wheezing or rhonchi.  Chest wall is nontender.  There are no external lesions on the chest.  There is no crepitance.    Gastrointestinal: Scaphoid abdomen with mild diffuse tenderness but no rebound or guarding.    Genitourinary: Bilateral costovertebral angle left greater than right tenderness to percussion.  No suprapubic tenderness to palpation    Integument: No acute lesions noted.  Color appears to be normal.    Bristow Coma Scale: Total score 15    Neurological: Patient is alert and oriented x4 and no acute findings noted.  Speech is fluent and cognition is normal.  No evidence of acute CVA.  Cranial nerves II through XII intact.  Patient with normal motor function as well as reflexes and sensation    Psychiatric: Normal affect and mood.            RADIOLOGY/PROCEDURES        XR Chest 1 View   Final Result   1. Well-expanded lungs. No acute cardiopulmonary process identified.   Enteric tubes present within the stomach.           This report was signed and finalized on 12/8/2024 2:10 PM by Dr. Gloria Roe MD.                 FUTURE APPOINTMENTS     No future appointments.       EKG (reviewed and interpreted by me): Normal sinus tachycardia with a ventricular rate of 119.  No acute ST segment elevation or depression noted      COURSE & " MEDICAL DECISION MAKING       Patient's partial differential diagnosis can include:    Gastroparesis, acute renal failure, volume depletion, gastritis, gastroenteritis, colitis, enteritis, volvulus, intussusception, esophageal spasms, gastroparesis, GERD, peptic ulcer disease, perforated esophagus, perforated peptic ulcer, partial bowel obstruction, bowel obstruction,, AAA, mesenteric adenitis, mesenteric ischemia, constipation, irritable bowel, diverticulitis, diverticulosis, Crohn's disease, ulcerative colitis, celiac disease and others    Due to patient's acute on chronic renal failure and low potassium low calcium patient will need to be admitted to the hospital.  Spoke with nurse practitioner vonnie Botello who states that she patient can be admitted to attending physician Dr. Wright    Patient's level of risk: Moderate       CRITICAL CARE    CRITICAL CARE: No    CRITICAL CARE TIME: None      Recent Results (from the past 24 hours)   ECG 12 Lead Tachycardia    Collection Time: 12/08/24 12:42 PM   Result Value Ref Range    QT Interval 330 ms    QTC Interval 464 ms   Comprehensive Metabolic Panel    Collection Time: 12/08/24  1:19 PM    Specimen: Arm, Right; Blood   Result Value Ref Range    Glucose 241 (H) 65 - 99 mg/dL    BUN 56 (H) 6 - 20 mg/dL    Creatinine 2.75 (H) 0.57 - 1.00 mg/dL    Sodium 130 (L) 136 - 145 mmol/L    Potassium 2.9 (L) 3.5 - 5.2 mmol/L    Chloride 99 98 - 107 mmol/L    CO2 17.0 (L) 22.0 - 29.0 mmol/L    Calcium 7.8 (L) 8.6 - 10.5 mg/dL    Total Protein 7.1 6.0 - 8.5 g/dL    Albumin 3.6 3.5 - 5.2 g/dL    ALT (SGPT) 33 1 - 33 U/L    AST (SGOT) 25 1 - 32 U/L    Alkaline Phosphatase 182 (H) 39 - 117 U/L    Total Bilirubin <0.2 0.0 - 1.2 mg/dL    Globulin 3.5 gm/dL    A/G Ratio 1.0 g/dL    BUN/Creatinine Ratio 20.4 7.0 - 25.0    Anion Gap 14.0 5.0 - 15.0 mmol/L    eGFR 20.1 (L) >60.0 mL/min/1.73   Protime-INR    Collection Time: 12/08/24  1:19 PM    Specimen: Arm, Right; Blood   Result  Value Ref Range    Protime 13.3 11.8 - 14.8 Seconds    INR 0.97 0.91 - 1.09   aPTT    Collection Time: 12/08/24  1:19 PM    Specimen: Arm, Right; Blood   Result Value Ref Range    PTT 26.9 24.5 - 36.0 seconds   Lipase    Collection Time: 12/08/24  1:19 PM    Specimen: Arm, Right; Blood   Result Value Ref Range    Lipase 59 13 - 60 U/L   Lactic Acid, Plasma    Collection Time: 12/08/24  1:19 PM    Specimen: Arm, Right; Blood   Result Value Ref Range    Lactate 1.3 0.5 - 2.0 mmol/L   CBC Auto Differential    Collection Time: 12/08/24  1:19 PM    Specimen: Arm, Right; Blood   Result Value Ref Range    WBC 11.64 (H) 3.40 - 10.80 10*3/mm3    RBC 3.59 (L) 3.77 - 5.28 10*6/mm3    Hemoglobin 10.7 (L) 12.0 - 15.9 g/dL    Hematocrit 32.1 (L) 34.0 - 46.6 %    MCV 89.4 79.0 - 97.0 fL    MCH 29.8 26.6 - 33.0 pg    MCHC 33.3 31.5 - 35.7 g/dL    RDW 13.4 12.3 - 15.4 %    RDW-SD 44.2 37.0 - 54.0 fl    MPV 9.9 6.0 - 12.0 fL    Platelets 326 140 - 450 10*3/mm3    Neutrophil % 56.0 42.7 - 76.0 %    Lymphocyte % 34.5 19.6 - 45.3 %    Monocyte % 6.4 5.0 - 12.0 %    Eosinophil % 0.7 0.3 - 6.2 %    Basophil % 0.3 0.0 - 1.5 %    Immature Grans % 2.1 (H) 0.0 - 0.5 %    Neutrophils, Absolute 6.52 1.70 - 7.00 10*3/mm3    Lymphocytes, Absolute 4.02 (H) 0.70 - 3.10 10*3/mm3    Monocytes, Absolute 0.74 0.10 - 0.90 10*3/mm3    Eosinophils, Absolute 0.08 0.00 - 0.40 10*3/mm3    Basophils, Absolute 0.03 0.00 - 0.20 10*3/mm3    Immature Grans, Absolute 0.25 (H) 0.00 - 0.05 10*3/mm3    nRBC 0.0 0.0 - 0.2 /100 WBC   High Sensitivity Troponin T    Collection Time: 12/08/24  1:19 PM    Specimen: Arm, Right; Blood   Result Value Ref Range    HS Troponin T 15 (H) <14 ng/L   Magnesium    Collection Time: 12/08/24  1:19 PM    Specimen: Arm, Right; Blood   Result Value Ref Range    Magnesium 1.7 1.6 - 2.6 mg/dL   Urinalysis With Culture If Indicated - Urine, Clean Catch    Collection Time: 12/08/24  1:55 PM    Specimen: Urine, Clean Catch   Result Value Ref  Range    Color, UA Yellow Yellow, Straw    Appearance, UA Clear Clear    pH, UA 6.0 5.0 - 8.0    Specific Gravity, UA 1.021 1.005 - 1.030    Glucose, UA Negative Negative    Ketones, UA Negative Negative    Bilirubin, UA Negative Negative    Blood, UA Negative Negative    Protein,  mg/dL (2+) (A) Negative    Leuk Esterase, UA Negative Negative    Nitrite, UA Negative Negative    Urobilinogen, UA 0.2 E.U./dL 0.2 - 1.0 E.U./dL   Urinalysis, Microscopic Only - Urine, Clean Catch    Collection Time: 12/08/24  1:55 PM    Specimen: Urine, Clean Catch   Result Value Ref Range    RBC, UA 0-2 None Seen, 0-2 /HPF    WBC, UA 0-2 None Seen, 0-2 /HPF    Bacteria, UA 1+ (A) None Seen /HPF    Squamous Epithelial Cells, UA 7-12 (A) None Seen, 0-2 /HPF    Hyaline Casts, UA 0-2 None Seen /LPF    Methodology Manual Light Microscopy           Old charts were reviewed per [facility] EMR.  Pertinent details are summarized above.  All laboratory, radiologic, and EKG studies that were performed in the Emergency Department were a necessary part of the evaluation needed to exclude unstable or  emergent medical conditions.     Patient was hemodynamically and neurologically stable in the ED.   Pertinent studies were reviewed as above.     The patient received:  Medications   potassium chloride 10 mEq in 100 mL IVPB (has no administration in time range)   sodium chloride 0.9 % bolus 1,563 mL (1,563 mL Intravenous New Bag 12/8/24 1324)            Diagnoses that have been ruled out:   None   Diagnoses that are still under consideration:   None   Final diagnoses:   Dizziness   Hypokalemia   Acute renal failure superimposed on stage 3a chronic kidney disease, unspecified acute renal failure type   Hypocalcemia        FINAL IMPRESSION   Diagnosis Plan   1. Acute renal failure superimposed on stage 3a chronic kidney disease, unspecified acute renal failure type        2. Dizziness        3. Hypokalemia        4. Hypocalcemia              Tru MCCOLLUM  William Block MD        ED Disposition       ED Disposition   Decision to Admit    Condition   --    Comment   Level of Care: Telemetry [5]   Diagnosis: Acute on chronic renal failure [[id number]]   Admitting Physician: LAUREEN VALLEJO [[id number]]   Attending Physician: LAUREEN VALLEJO [[id number]]   Certification: I Certify That Inpatient Hospital Services Are Medically Necessary For Greater Than 2 Midnights                   Dragon disclaimer:  Part of this note may be an electronic transcription/translation of spoken language to printed text using the Dragon Dictation System.     I have reviewed the patient’s prescription history via a prescription monitoring program.  This information is consistent with my knowledge of the patient’s controlled substance use history.        Tru Thomas Jr., MD  12/08/24 [id number]

## 2024-12-09 LAB
ANION GAP SERPL CALCULATED.3IONS-SCNC: 11 MMOL/L (ref 5–15)
BUN SERPL-MCNC: 34 MG/DL (ref 6–20)
BUN/CREAT SERPL: 20.6 (ref 7–25)
CALCIUM SPEC-SCNC: 7.6 MG/DL (ref 8.6–10.5)
CHLORIDE SERPL-SCNC: 111 MMOL/L (ref 98–107)
CO2 SERPL-SCNC: 17 MMOL/L (ref 22–29)
CREAT SERPL-MCNC: 1.65 MG/DL (ref 0.57–1)
DEPRECATED RDW RBC AUTO: 47 FL (ref 37–54)
EGFRCR SERPLBLD CKD-EPI 2021: 37 ML/MIN/1.73
ERYTHROCYTE [DISTWIDTH] IN BLOOD BY AUTOMATED COUNT: 13.8 % (ref 12.3–15.4)
GLUCOSE BLDC GLUCOMTR-MCNC: 157 MG/DL (ref 70–130)
GLUCOSE BLDC GLUCOMTR-MCNC: 204 MG/DL (ref 70–130)
GLUCOSE BLDC GLUCOMTR-MCNC: 278 MG/DL (ref 70–130)
GLUCOSE BLDC GLUCOMTR-MCNC: 300 MG/DL (ref 70–130)
GLUCOSE BLDC GLUCOMTR-MCNC: 96 MG/DL (ref 70–130)
GLUCOSE SERPL-MCNC: 71 MG/DL (ref 65–99)
HBA1C MFR BLD: 9.1 % (ref 4.8–5.6)
HCT VFR BLD AUTO: 29.8 % (ref 34–46.6)
HGB BLD-MCNC: 9.5 G/DL (ref 12–15.9)
MCH RBC QN AUTO: 29.6 PG (ref 26.6–33)
MCHC RBC AUTO-ENTMCNC: 31.9 G/DL (ref 31.5–35.7)
MCV RBC AUTO: 92.8 FL (ref 79–97)
PLATELET # BLD AUTO: 304 10*3/MM3 (ref 140–450)
PMV BLD AUTO: 9.6 FL (ref 6–12)
POTASSIUM SERPL-SCNC: 3.1 MMOL/L (ref 3.5–5.2)
RBC # BLD AUTO: 3.21 10*6/MM3 (ref 3.77–5.28)
SODIUM SERPL-SCNC: 139 MMOL/L (ref 136–145)
WBC NRBC COR # BLD AUTO: 12.96 10*3/MM3 (ref 3.4–10.8)

## 2024-12-09 PROCEDURE — 83036 HEMOGLOBIN GLYCOSYLATED A1C: CPT | Performed by: INTERNAL MEDICINE

## 2024-12-09 PROCEDURE — 85027 COMPLETE CBC AUTOMATED: CPT | Performed by: NURSE PRACTITIONER

## 2024-12-09 PROCEDURE — 82948 REAGENT STRIP/BLOOD GLUCOSE: CPT

## 2024-12-09 PROCEDURE — 80048 BASIC METABOLIC PNL TOTAL CA: CPT | Performed by: NURSE PRACTITIONER

## 2024-12-09 PROCEDURE — 63710000001 INSULIN REGULAR HUMAN PER 5 UNITS: Performed by: NURSE PRACTITIONER

## 2024-12-09 PROCEDURE — 25010000002 POTASSIUM CHLORIDE 10 MEQ/100ML SOLUTION: Performed by: INTERNAL MEDICINE

## 2024-12-09 PROCEDURE — 36415 COLL VENOUS BLD VENIPUNCTURE: CPT | Performed by: NURSE PRACTITIONER

## 2024-12-09 PROCEDURE — 25010000002 SODIUM CHLORIDE 0.9 % WITH KCL 20 MEQ 20-0.9 MEQ/L-% SOLUTION: Performed by: INTERNAL MEDICINE

## 2024-12-09 PROCEDURE — 25010000002 SODIUM CHLORIDE 0.9 % WITH KCL 20 MEQ 20-0.9 MEQ/L-% SOLUTION: Performed by: NURSE PRACTITIONER

## 2024-12-09 PROCEDURE — 63710000001 INSULIN REGULAR HUMAN PER 5 UNITS: Performed by: INTERNAL MEDICINE

## 2024-12-09 RX ORDER — PROMETHAZINE HYDROCHLORIDE 25 MG/1
25 TABLET ORAL EVERY 6 HOURS PRN
COMMUNITY

## 2024-12-09 RX ORDER — SODIUM BICARBONATE 650 MG/1
650 TABLET ORAL 2 TIMES DAILY
COMMUNITY
Start: 2024-09-23

## 2024-12-09 RX ORDER — ROPINIROLE 1 MG/1
1 TABLET, FILM COATED ORAL 3 TIMES DAILY
Status: DISCONTINUED | OUTPATIENT
Start: 2024-12-09 | End: 2024-12-14 | Stop reason: HOSPADM

## 2024-12-09 RX ORDER — NORTRIPTYLINE HYDROCHLORIDE 25 MG/1
25 CAPSULE ORAL NIGHTLY
COMMUNITY
End: 2024-12-14 | Stop reason: HOSPADM

## 2024-12-09 RX ORDER — MISOPROSTOL 200 UG/1
200 TABLET ORAL 4 TIMES DAILY
COMMUNITY

## 2024-12-09 RX ORDER — SUCRALFATE 1 G/1
1 TABLET ORAL
Status: DISCONTINUED | OUTPATIENT
Start: 2024-12-09 | End: 2024-12-14 | Stop reason: HOSPADM

## 2024-12-09 RX ORDER — DULOXETIN HYDROCHLORIDE 30 MG/1
30 CAPSULE, DELAYED RELEASE ORAL DAILY
Status: ON HOLD | COMMUNITY
End: 2024-12-09

## 2024-12-09 RX ORDER — ASPIRIN 81 MG/1
81 TABLET ORAL DAILY
Status: DISCONTINUED | OUTPATIENT
Start: 2024-12-09 | End: 2024-12-14 | Stop reason: HOSPADM

## 2024-12-09 RX ORDER — SODIUM BICARBONATE 650 MG/1
650 TABLET ORAL 2 TIMES DAILY
Status: DISCONTINUED | OUTPATIENT
Start: 2024-12-09 | End: 2024-12-14 | Stop reason: HOSPADM

## 2024-12-09 RX ORDER — HYDROXYCHLOROQUINE SULFATE 200 MG/1
400 TABLET, FILM COATED ORAL DAILY
COMMUNITY

## 2024-12-09 RX ORDER — SODIUM CHLORIDE AND POTASSIUM CHLORIDE 150; 900 MG/100ML; MG/100ML
100 INJECTION, SOLUTION INTRAVENOUS CONTINUOUS
Status: DISCONTINUED | OUTPATIENT
Start: 2024-12-09 | End: 2024-12-10

## 2024-12-09 RX ORDER — ATORVASTATIN CALCIUM 40 MG/1
40 TABLET, FILM COATED ORAL NIGHTLY
Status: DISCONTINUED | OUTPATIENT
Start: 2024-12-09 | End: 2024-12-14 | Stop reason: HOSPADM

## 2024-12-09 RX ORDER — POTASSIUM CHLORIDE 7.45 MG/ML
10 INJECTION INTRAVENOUS
Status: DISPENSED | OUTPATIENT
Start: 2024-12-09 | End: 2024-12-09

## 2024-12-09 RX ORDER — MAGNESIUM OXIDE 400 MG/1
400 TABLET ORAL DAILY
Status: ON HOLD | COMMUNITY
End: 2024-12-09

## 2024-12-09 RX ORDER — PANTOPRAZOLE SODIUM 40 MG/1
40 TABLET, DELAYED RELEASE ORAL
Status: DISCONTINUED | OUTPATIENT
Start: 2024-12-09 | End: 2024-12-14 | Stop reason: HOSPADM

## 2024-12-09 RX ADMIN — ACETAMINOPHEN 650 MG: 325 TABLET ORAL at 11:46

## 2024-12-09 RX ADMIN — SODIUM BICARBONATE 650 MG: 650 TABLET ORAL at 11:39

## 2024-12-09 RX ADMIN — PANTOPRAZOLE SODIUM 40 MG: 40 TABLET, DELAYED RELEASE ORAL at 11:39

## 2024-12-09 RX ADMIN — ATORVASTATIN CALCIUM 40 MG: 40 TABLET, FILM COATED ORAL at 21:27

## 2024-12-09 RX ADMIN — Medication 10 ML: at 09:11

## 2024-12-09 RX ADMIN — POTASSIUM CHLORIDE 10 MEQ: 7.46 INJECTION, SOLUTION INTRAVENOUS at 14:54

## 2024-12-09 RX ADMIN — SUCRALFATE 1 G: 1 TABLET ORAL at 18:25

## 2024-12-09 RX ADMIN — POTASSIUM CHLORIDE AND SODIUM CHLORIDE 100 ML/HR: 900; 150 INJECTION, SOLUTION INTRAVENOUS at 13:15

## 2024-12-09 RX ADMIN — POTASSIUM CHLORIDE 10 MEQ: 7.46 INJECTION, SOLUTION INTRAVENOUS at 13:15

## 2024-12-09 RX ADMIN — POTASSIUM CHLORIDE 10 MEQ: 7.46 INJECTION, SOLUTION INTRAVENOUS at 16:15

## 2024-12-09 RX ADMIN — ROPINIROLE 1 MG: 1 TABLET, FILM COATED ORAL at 09:10

## 2024-12-09 RX ADMIN — POTASSIUM CHLORIDE AND SODIUM CHLORIDE 100 ML/HR: 900; 150 INJECTION, SOLUTION INTRAVENOUS at 02:30

## 2024-12-09 RX ADMIN — SUCRALFATE 1 G: 1 TABLET ORAL at 21:27

## 2024-12-09 RX ADMIN — INSULIN HUMAN 2 UNITS: 100 INJECTION, SOLUTION PARENTERAL at 18:25

## 2024-12-09 RX ADMIN — INSULIN HUMAN 6 UNITS: 100 INJECTION, SOLUTION PARENTERAL at 02:19

## 2024-12-09 RX ADMIN — INSULIN HUMAN 4 UNITS: 100 INJECTION, SOLUTION PARENTERAL at 11:45

## 2024-12-09 RX ADMIN — SUCRALFATE 1 G: 1 TABLET ORAL at 11:39

## 2024-12-09 RX ADMIN — INSULIN HUMAN 7 UNITS: 100 INJECTION, SOLUTION PARENTERAL at 21:26

## 2024-12-09 RX ADMIN — SODIUM BICARBONATE 650 MG: 650 TABLET ORAL at 21:27

## 2024-12-09 RX ADMIN — ROPINIROLE 1 MG: 1 TABLET, FILM COATED ORAL at 21:27

## 2024-12-09 RX ADMIN — ROPINIROLE 1 MG: 1 TABLET, FILM COATED ORAL at 16:15

## 2024-12-09 RX ADMIN — ROPINIROLE 1 MG: 1 TABLET, FILM COATED ORAL at 03:26

## 2024-12-09 RX ADMIN — ASPIRIN 81 MG: 81 TABLET, COATED ORAL at 11:39

## 2024-12-09 NOTE — PROGRESS NOTES
[facility] Medicine Services  INPATIENT PROGRESS NOTE    Patient Name: Tasha Perez  Date of Admission: 12/8/2024  Today's Date: 12/09/24  Length of Stay: 1  Primary Care Physician: KIRILL Murguia MD    Subjective   Chief Complaint: follow-up nausea and vomiting  HPI   Patient reports that she is already feeling much better.  She indicated that she got very, very lethargic, lightheaded, and dizzy, and was having numerous episodes of nausea and vomiting.  Was experiencing similar symptoms to that which have resulted in multiple previous hospitalizations.  She did have a temporary gastric stimulator placed recently, initially was doing very well, and then symptoms began to get worse.  She contacted her GI specialist earlier this morning in [place], [place] and they recommended she change the stimulator from the green lead over to the black lead which has been accomplished.  She reports feeling much better since that time.  In fact, she states that she has been able to eat some duron, toast, and some scrambled eggs earlier this morning.    Review of Systems   All pertinent negatives and positives are as above. All other systems have been reviewed and are negative unless otherwise stated.     Objective    Temp:  [97.4 °F (36.3 °C)-99.8 °F (37.7 °C)] 99.8 °F (37.7 °C)  Heart Rate:  [] 112  Resp:  [16-20] 18  BP: ()/(54-76) 99/61  Physical Exam  Vitals reviewed.   Constitutional:       General: She is not in acute distress.     Appearance: She is not ill-appearing or toxic-appearing.   HENT:      Head: Normocephalic.      Nose:      Comments: Temporary gastric stimulator noted entering left nare  Eyes:      Pupils: Pupils are equal, round, and reactive to light.   Cardiovascular:      Rate and Rhythm: Normal rate.   Pulmonary:      Effort: Pulmonary effort is normal. No respiratory distress.   Abdominal:      General: There is no distension.      Palpations:  "Abdomen is soft.      Tenderness: There is no abdominal tenderness.   Neurological:      General: No focal deficit present.      Mental Status: She is alert. Mental status is at baseline.   Psychiatric:         Mood and Affect: Mood normal.         Results Review:  I have reviewed the labs, radiology results, and diagnostic studies.    Laboratory Data:   Results from last 7 days   Lab Units 12/09/24  0435 12/08/24  1319   WBC 10*3/mm3 12.96* 11.64*   HEMOGLOBIN g/dL 9.5* 10.7*   HEMATOCRIT % 29.8* 32.1*   PLATELETS 10*3/mm3 304 326        Results from last 7 days   Lab Units 12/09/24  0435 12/08/24  1503 12/08/24  1319   SODIUM mmol/L 139  --  130*   POTASSIUM mmol/L 3.1*  --  2.9*   CHLORIDE mmol/L 111*  --  99   CO2 mmol/L 17.0*  --  17.0*   BUN mg/dL 34*  --  56*   CREATININE mg/dL 1.65*  --  2.75*   CALCIUM mg/dL 7.6*  --  7.8*   BILIRUBIN mg/dL  --   --  <0.2   ALK PHOS U/L  --  149* 182*   ALT (SGPT) U/L  --   --  33   AST (SGOT) U/L  --   --  25   GLUCOSE mg/dL 71  --  241*       Culture Data:   No results found for: \"BLOODCX\", \"URINECX\", \"WOUNDCX\", \"MRSACX\", \"RESPCX\", \"STOOLCX\"    Radiology Data:   Imaging Results (Last 24 Hours)       Procedure Component Value Units Date/Time    XR Chest 1 View [[id number]] Collected: 12/08/24 1408     Updated: 12/08/24 1414    Narrative:      XR CHEST 1 VW-     HISTORY: Short of breath     COMPARISON: 11/14/2024     FINDINGS: Frontal view of the chest obtained.     Right upper extremity PICC line tip projects over the SVC. Enteric  tubes, one tip present in the proximal stomach with the other present in  the body of the stomach.     Lungs are well-expanded and clear. Cardiomediastinal contours are  normal. No pleural effusion or pneumothorax. No acute regional bony  pathology. Cholecystectomy clips.       Impression:      1. Well-expanded lungs. No acute cardiopulmonary process identified.  Enteric tubes present within the stomach.        This report was signed and " finalized on 12/8/2024 2:10 PM by Dr. Gloria Roe MD.               I have reviewed the patient's current medications.     Assessment/Plan   Assessment  Active Hospital Problems    Diagnosis     Hyponatremia     Gastroparesis     Acute renal failure superimposed on stage 3b chronic kidney disease     Hypokalemia     Type 2 diabetes mellitus, with long-term current use of insulin     Severe malnutrition        Treatment Plan  Continue IVFs with normal saline  Continue KCl supplement  Diet advanced to ADA diet (GI soft, bland - patient did report she ate duron, toast, and some scrambled eggs this morning)  Repeat A1c  SSI coverage only for now; most recent blood sugar this AM was 71 and 96  Resume home medications as appropriate  Plan to repeat BMP in AM tomorrow  Patient had a temporary gastric stimulator placed on Friday, 12/6.  Patient indicated that plans are in place for a permanent stimulator to be placed on Monday, 12/16.  She contacted her GI specialist in [place], [place] this morning and they recommended that she transition from the green lead to the black lead on her temporary gastric stimulator; she reported that her symptoms were much better after making this change.  Dispo: possibly home in AM tomorrow pending lab results (renal function and electrolytes), ability to tolerate PO diet without problem, and with plans in place for follow-up with her GI specialist in [place], [place] 1 week from today        Medical Decision Making  Number and Complexity of problems: Moderate complexity      Conditions and Status        Condition is improving.     MDM Data  External documents reviewed: none  Cardiac tracing (EKG, telemetry) interpretation: no new EKGs  Radiology interpretation: no new radiology studies this AM  Labs reviewed: as above  Any tests that were considered but not ordered: none     Decision rules/scores evaluated (example [id number], Wells, etc): none     Discussed with: patient, bedside  nurse, Ellie     Care Planning  Shared decision making: Discussed with patient with agreement to proceed with treatment plan as outlined  Code status and discussions: Full code    Disposition  Social Determinants of Health that impact treatment or disposition: None apparent at this time  I expect the patient to be discharged to home possibly tomorrow pending the above        Electronically signed by Otto Del Rosario MD, 12/09/24, 10:26 CST.

## 2024-12-09 NOTE — PAYOR COMM NOTE
"ADMIT INPT 12-8-24   [phone]      Yash Perez (54 y.o. Female)       Date of Birth   1970    Social Security Number       Address   [address] [place] [id number]    Home Phone   [phone]    MRN   [id number]       Presybeterian   Orthodoxy    Marital Status                               Admission Date   12/8/24    Admission Type   Emergency    Admitting Provider   Donnie Wright MD    Attending Provider   Donnie Wright MD    Department, Room/Bed   [facility] 4B, 407/1       Discharge Date       Discharge Disposition       Discharge Destination                                 Attending Provider: Donnie Wright MD    Allergies: Bee Venom, Hydrocodone, Ibuprofen, Pineapple, Pineapple Extract, Wasp Venom, Wasp Venom Protein, Hydrocodone-acetaminophen, Penicillins, Sitagliptin, Metformin, Chocolate    Isolation: None   Infection: None   Code Status: CPR    Ht: 154.9 cm (61\")   Wt: 38.2 kg (84 lb 4 oz)    Admission Cmt: None   Principal Problem: None                  Active Insurance as of 12/8/2024       Primary Coverage       Payor Plan Insurance Group Employer/Plan Group    [employer] [place] [place]        Payor Plan Address Payor Plan Phone Number Payor Plan Fax Number Effective Dates    [address]   8/1/2019 - None Entered    [facility] [id number]         Subscriber Name Subscriber Birth Date Member ID       YASH PEREZ 1970 [id number]                     Emergency Contacts        (Rel.) Home Phone Work Phone Mobile Phone    JENNIFER PEREZ (Spouse) [phone] -- [phone]    KYLECASSIA LONGORIA (Daughter) -- -- [phone]                 History & Physical        Rosmery Del Rosario APRN at 12/08/24 1430       Attestation signed by Donnie Wright MD at 12/08/24 1921    I performed a substantive part of the MDM during the patient's E/M visit. I personally made or   approved the documented management plan " "and acknowledge its risk of complications.   (Independent Interpretation) My (EKG/X-Ray/US/CT) interpretation -chest x-ray shows no acute cardiopulmonary process identified.  Enteric tubes in the stomach.     (Discussion) Management/test interpretation discussed with RAKESH Salcido.    Patient presented with poor appetite and lethargy.      Physical exam  General: Cachectic, lethargic  Abdominal: NG tube in situ, abdomen nontender, flat, normal bowel sounds  Cardiovascular: Regular rate and rhythm, normal heart sounds, no murmurs    Plan  Start IV fluids and replete potassium.  Monitor renal function.                    [facility]  HISTORY AND PHYSICAL    Date of Admission: 12/8/2024  Primary Care Physician: KIRILL Murguia MD    Subjective   Primary Historian:     Chief Complaint: Weakness and dizziness    History of Present Illness  Tasha Perez is a 54-year-old female with a past medical history of gastroparesis with chronic nausea, Raynaud's disease on Plaquenil, insulin dependent type 2 diabetes with indwelling pump (OmniPod), chronic pancreatitis, chronic kidney disease stage IIIb, malnutrition.  Patient is well-known to hospitalist practice, she has had at least 1 admission every month (if not more) since the beginning of the year.  Patient is usually admitted with SUGAR on CKD, hypomagnesia and hypokalemia.  She presents to [facility] ED with complaints of, \"felt like I kidneys are shutting down\", weakness and dizziness secondary to dehydration.  She also has complaints of abdominal pain, nausea, decreased appetite, dizziness, weakness and vomiting. She reported pain 5 on a scale of 1-10.  ER workup reveals troponin 15, sodium 130, potassium 2.9, CO2 17, BUN 56, creatinine 2.75, GFR 20.1, glucose 241, calcium 7.8, alkaline phosphatase 182, albumin normal at 3.6, lactic acid 1.3, lipase 59, WBC 11.6, hemoglobin 10.7, UA contaminated, blood culture " obtained, chest x-ray reveals well-expanded lungs. No acute cardiopulmonary process identified. Enteric tubes present within the stomach.  EKG reveals sinus tachycardia 119.     Most recent admission 11/14 - 11/16, 2024 due to dislodged NG tube.  Tube was initially placed 11/12/2024 for fluid and nutrition insulation.  Plan per documentation follow-up with gastroenterology in [facility] for discussion regarding possible gastric stimulator placement.    Patient did undergo temporary gastric stimulator placement on Friday 12/6,  states she was tolerating well, no nausea or vomiting.  Able to eat small amounts without distress.  Plan is for permanent stimulator to be placed on 12/16.  Patient is sleeping.  She does a awake and somewhat to gentle verbal stimuli.  When asked if she is hurting she states no, nausea-no.  She does not follow commands.  She does not open her eyes.  When asked if she takes insulin again she states no, however she does have an insulin pump with sensor located on left upper posterior arm.   states that her daughter has control of the readings.  Glucose are 241, I do not feel patient is able to treat per pump therefore we will DC for now.  I did explain to  I will keep her n.p.o. until she is more awake and able to trial oral intake.  He did verbalize understanding.  Patient is hypotensive lowest documented blood pressure 73/54 with a heart rate of 122.  She has been treated with fluid bolus current blood pressure 93/67 with heart rate of 95.  Oxygen saturation 99% on room air.  She is admitted for further evaluation and treatment.    Review of Systems   Otherwise complete ROS reviewed and negative except as mentioned in the HPI.    Past Medical History:   Past Medical History:   Diagnosis Date    Arthritis     Contusion     Degenerative disc disease, cervical     Diabetes mellitus     Elevated cholesterol     Fibromyalgia     Neuropathy     Osteoporosis     Pancreatitis      Raynaud disease     Renal insufficiency     Smoker 02/08/2022    Vitamin D deficiency 04/26/2022     Past Surgical History:  Past Surgical History:   Procedure Laterality Date    APPENDECTOMY      CHOLECYSTECTOMY      COLONOSCOPY      ENDOSCOPY N/A 10/08/2019    Procedure: ESOPHAGOGASTRODUODENOSCOPY WITH ANESTHESIA;  Surgeon: Aleks Vaughn DO;  Location: [facility] ENDOSCOPY;  Service: Gastroenterology    ENDOSCOPY N/A 11/12/2024    Procedure: ESOPHAGOGASTRODUODENOSCOPY WITH ANESTHESIA;  Surgeon: Tiffanie Saucedo MD;  Location: [facility] ENDOSCOPY;  Service: Gastroenterology;  Laterality: N/A;  pre op: Gastroparesis, Intractable nausea  post op: insertion of dobhoff   PCP: Brandon Murguia    ENDOSCOPY N/A 11/15/2024    Procedure: ESOPHAGOGASTRODUODENOSCOPY WITH ANESTHESIA WITH NG TUBE INSERTION;  Surgeon: Tiffanie Saucedo MD;  Location: [facility] ENDOSCOPY;  Service: Gastroenterology;  Laterality: N/A;  PRE OP: DOBHOFF  POST OP: DOBHOFF  PCP: TELLY PACK    ENDOSCOPY WITH GASTROSTOMY TUBE INSERTION N/A 6/15/2022    Procedure: ESOPHAGOGASTRODUODENOSCOPY WITH GASTROSTOMY TUBE INSERTION;  Surgeon: Jersey Lee MD;  Location: [facility] ENDOSCOPY;  Service: Gastroenterology;  Laterality: N/A;  pre peg placement  post peg placement  Dr. Murguia    ENTEROSCOPY SMALL BOWEL N/A 11/3/2022    Procedure: Esophagogastroduodenoscopy with Peg Removal;  Surgeon: Aleks Vaughn DO;  Location: [facility] ENDOSCOPY;  Service: Gastroenterology;  Laterality: N/A;  pre; peg removal  post; peg removal   KIRILL Murguia MD        HYSTERECTOMY       Social History:  reports that she has been smoking cigarettes. She has a 7.8 pack-year smoking history. She does not have any smokeless tobacco history on file. She reports that she does not drink alcohol and does not use drugs.    Family History: family history is not on file. She was adopted.       Allergies:  Allergies   Allergen Reactions    Bee Venom Anaphylaxis    Hydrocodone Anaphylaxis  "   Ibuprofen Other (See Comments)     \"SHUTS MY KIDNEYS DOWN\" PER PATIENT     Pineapple Anaphylaxis    Pineapple Extract Anaphylaxis    Wasp Venom Anaphylaxis    Wasp Venom Protein Anaphylaxis    Hydrocodone-Acetaminophen Dizziness, Nausea And Vomiting, Nausea Only and Unknown - Low Severity    Penicillins Nausea And Vomiting    Sitagliptin Other (See Comments)     Other reaction(s): Abdominal Pain      Metformin Other (See Comments)     Pt states it messes with her kidneys    Chocolate Rash     Dark chocolate only, told to RD 7/10/24       Medications:  Prior to Admission medications    Medication Sig Start Date End Date Taking? Authorizing Provider   aspirin 81 MG EC tablet Take 1 tablet by mouth Daily.    Kevin Mejia MD   atorvastatin (LIPITOR) 80 MG tablet Take 1 tablet by mouth Every Night.    Kevin Mejia MD   Calcium Carbonate-Vitamin D 600-5 MG-MCG tablet Take 1 tablet by mouth Daily.    Kevin Mejia MD   cetirizine (zyrTEC) 10 MG tablet Take 1 tablet by mouth Daily As Needed for Allergies.    Kevin Mejia MD   Cholecalciferol (Vitamin D3) 50 MCG (2000 UT) capsule Take 1 capsule by mouth Daily.    Kevin Mejia MD   dapagliflozin Propanediol (Farxiga) 10 MG tablet Take 10 mg by mouth Daily.    Kevin Mejia MD   DULoxetine (CYMBALTA) 60 MG capsule Take 1 capsule by mouth Daily.    Kevin Mejia MD   hydroxychloroquine (PLAQUENIL) 200 MG tablet Take 2 tablets by mouth Daily.    Kevin Mejia MD   Insulin Aspart (novoLOG) 100 UNIT/ML injection Inject 90 Units under the skin into the appropriate area as directed Daily. Up to 90 units daily via omnipod    Kevin Mejia MD   linaclotide (LINZESS) 290 MCG capsule capsule Take 1 capsule by mouth Every Morning Before Breakfast.    Kevin Mejia MD   nortriptyline (PAMELOR) 25 MG capsule Take 1 capsule by mouth Every Night.    Kevin Mejia MD   omeprazole (priLOSEC) 40 MG " "capsule Take 1 capsule by mouth Every Other Day.    Kevin Mejia MD   ondansetron ODT (ZOFRAN-ODT) 4 MG disintegrating tablet Place 1 tablet on the tongue Every 8 (Eight) Hours As Needed for Nausea or Vomiting.    Kevin Mejia MD   rOPINIRole (REQUIP) 1 MG tablet Take 1 tablet by mouth 3 (Three) Times a Day.    Kevin Mejia MD   sucralfate (CARAFATE) 1 g tablet Take 1 tablet by mouth 4 (Four) Times a Day.    Kevin Mejia MD     I have utilized all available immediate resources to obtain, update, or review the patient's current medications (including all prescriptions, over-the-counter products, herbals, cannabis/cannabidiol products, and vitamin/mineral/dietary (nutritional) supplements).    Objective     Vital Signs: BP (!) 89/59 (BP Location: Left arm, Patient Position: Lying)   Pulse 82   Temp 97.4 °F (36.3 °C) (Oral)   Resp 18   Ht 154.9 cm (61\")   Wt 38.2 kg (84 lb 4 oz)   SpO2 99%   BMI 15.92 kg/m²   Physical Exam  Vitals reviewed.   Constitutional:       Appearance: She is ill-appearing.      Comments: Frail, cachectic   HENT:      Head: Normocephalic and atraumatic.      Mouth/Throat:      Mouth: Mucous membranes are dry.      Pharynx: Oropharynx is clear.   Eyes:      Conjunctiva/sclera: Conjunctivae normal.      Pupils: Pupils are equal, round, and reactive to light.   Neck:      Comments: No JVD  Cardiovascular:      Rate and Rhythm: Regular rhythm. Tachycardia present.   Pulmonary:      Effort: Pulmonary effort is normal.      Breath sounds: Normal breath sounds.   Abdominal:      General: There is no distension.      Palpations: Abdomen is soft.      Comments: Temporary gastric stimulator her left nare and use   Musculoskeletal:      Cervical back: Normal range of motion.      Right lower leg: No edema.      Left lower leg: No edema.   Skin:     General: Skin is warm and dry.   Neurological:      Comments: Somnolent, awakens easily and answers questions " appropriately   Psychiatric:         Attention and Perception: She is inattentive.         Mood and Affect: Affect is flat.        Results Reviewed:  Lab Results (last 24 hours)       Procedure Component Value Units Date/Time    Urinalysis With Culture If Indicated - Urine, Clean Catch [[id number]]  (Abnormal) Collected: 12/08/24 1355    Specimen: Urine, Clean Catch Updated: 12/08/24 1422     Color, UA Yellow     Appearance, UA Clear     pH, UA 6.0     Specific Gravity, UA 1.021     Glucose, UA Negative     Ketones, UA Negative     Bilirubin, UA Negative     Blood, UA Negative     Protein,  mg/dL (2+)     Leuk Esterase, UA Negative     Nitrite, UA Negative     Urobilinogen, UA 0.2 E.U./dL    Urinalysis, Microscopic Only - Urine, Clean Catch [[id number]]  (Abnormal) Collected: 12/08/24 1355    Specimen: Urine, Clean Catch Updated: 12/08/24 1422     RBC, UA 0-2 /HPF      WBC, UA 0-2 /HPF      Comment: Urine culture not indicated.        Bacteria, UA 1+ /HPF      Squamous Epithelial Cells, UA 7-12 /HPF      Hyaline Casts, UA 0-2 /LPF      Methodology Manual Light Microscopy    Magnesium [[id number]]  (Normal) Collected: 12/08/24 1319    Specimen: Blood from Arm, Right Updated: 12/08/24 1411     Magnesium 1.7 mg/dL     Comprehensive Metabolic Panel [[id number]]  (Abnormal) Collected: 12/08/24 1319    Specimen: Blood from Arm, Right Updated: 12/08/24 1358     Glucose 241 mg/dL      BUN 56 mg/dL      Creatinine 2.75 mg/dL      Sodium 130 mmol/L      Potassium 2.9 mmol/L      Chloride 99 mmol/L      CO2 17.0 mmol/L      Calcium 7.8 mg/dL      Total Protein 7.1 g/dL      Albumin 3.6 g/dL      ALT (SGPT) 33 U/L      AST (SGOT) 25 U/L      Alkaline Phosphatase 182 U/L      Total Bilirubin <0.2 mg/dL      Globulin 3.5 gm/dL      A/G Ratio 1.0 g/dL      BUN/Creatinine Ratio 20.4     Anion Gap 14.0 mmol/L      eGFR 20.1 mL/min/1.73     Lactic Acid, Plasma [[id number]]  (Normal) Collected: 12/08/24 1319    Specimen: Blood from  Arm, Right Updated: 12/08/24 1356     Lactate 1.3 mmol/L     High Sensitivity Troponin T [[id number]]  (Abnormal) Collected: 12/08/24 1319    Specimen: Blood from Arm, Right Updated: 12/08/24 1355     HS Troponin T 15 ng/L     Lipase [[id number]]  (Normal) Collected: 12/08/24 1319    Specimen: Blood from Arm, Right Updated: 12/08/24 1353     Lipase 59 U/L     Protime-INR [[id number]]  (Normal) Collected: 12/08/24 1319    Specimen: Blood from Arm, Right Updated: 12/08/24 1346     Protime 13.3 Seconds      INR 0.97    aPTT [[id number]]  (Normal) Collected: 12/08/24 1319    Specimen: Blood from Arm, Right Updated: 12/08/24 1346     PTT 26.9 seconds     Blood Culture - Blood, Hand, Right [[id number]] Collected: 12/08/24 1319    Specimen: Blood from Hand, Right Updated: 12/08/24 1339    Blood Culture - Blood, Blood, PICC Line [[id number]] Collected: 12/08/24 1319    Specimen: Blood, PICC Line Updated: 12/08/24 1339    CBC Auto Differential [[id number]]  (Abnormal) Collected: 12/08/24 1319    Specimen: Blood from Arm, Right Updated: 12/08/24 1339     WBC 11.64 10*3/mm3      RBC 3.59 10*6/mm3      Hemoglobin 10.7 g/dL      Hematocrit 32.1 %      MCV 89.4 fL      MCH 29.8 pg      MCHC 33.3 g/dL      RDW 13.4 %      RDW-SD 44.2 fl      MPV 9.9 fL      Platelets 326 10*3/mm3      Neutrophil % 56.0 %      Lymphocyte % 34.5 %      Monocyte % 6.4 %      Eosinophil % 0.7 %      Basophil % 0.3 %      Immature Grans % 2.1 %      Neutrophils, Absolute 6.52 10*3/mm3      Lymphocytes, Absolute 4.02 10*3/mm3      Monocytes, Absolute 0.74 10*3/mm3      Eosinophils, Absolute 0.08 10*3/mm3      Basophils, Absolute 0.03 10*3/mm3      Immature Grans, Absolute 0.25 10*3/mm3      nRBC 0.0 /100 WBC           Imaging Results (Last 24 Hours)       Procedure Component Value Units Date/Time    XR Chest 1 View [[id number]] Collected: 12/08/24 1408     Updated: 12/08/24 1414    Narrative:      XR CHEST 1 VW-     HISTORY: Short of breath     COMPARISON:  11/14/2024     FINDINGS: Frontal view of the chest obtained.     Right upper extremity PICC line tip projects over the SVC. Enteric  tubes, one tip present in the proximal stomach with the other present in  the body of the stomach.     Lungs are well-expanded and clear. Cardiomediastinal contours are  normal. No pleural effusion or pneumothorax. No acute regional bony  pathology. Cholecystectomy clips.       Impression:      1. Well-expanded lungs. No acute cardiopulmonary process identified.  Enteric tubes present within the stomach.        This report was signed and finalized on 12/8/2024 2:10 PM by Dr. Gloria Roe MD.             Assessment / Plan   Assessment:   Active Hospital Problems    Diagnosis     Hyponatremia     Gastroparesis     Acute renal failure superimposed on stage 3b chronic kidney disease     Hypokalemia     Type 2 diabetes mellitus, with long-term current use of insulin     Severe malnutrition        Treatment Plan  The patient will be admitted to Dr. Wright's service here at [facility].     1.  Hyponatremia, continue hydration, patient has received sepsis bolus in the emergency department, will continue normal saline with 20 mEq of potassium at 100 mL/hour x 20 hours, labs in a.m.    2.  Acute renal failure superimposed on stage IIIb chronic kidney disease, continue hydration as stated above    3.  Gastroparesis, as needed antiemetics, temporary gastric stimulator and use    4.  Hypokalemia, replace, labs in a.m.    5.  Type 2 diabetes with long-term insulin-pump in use, will DC pump for now, monitor glucose every 6 hours until patient able to eat, regular insulin sliding scale coverage    6.  Severe malnutrition, will add diet when patient alert enough to eat.  N.p.o. with ice chips for now    7.  Home medications reviewed, will hold until swallowing safety determined, DVT prophylaxis with SCDs, NicoDerm patch, smoking cessation at discharge, supplemental oxygen as needed,  continuous pulse oximetry, turn every 2 hours    Medical Decision Making  Number and Complexity of problems: 6  3 problems, acute, high complexity, unchanged  3 problems, chronic, high complexity, unchanged  Differential Diagnosis: None    Conditions and Status        Condition is unchanged.     [facility] Data  External documents reviewed: Records from [facility] records  Cardiac tracing (EKG, telemetry) interpretation: Reviewed  Radiology interpretation: Reviewed  Labs reviewed: Yes  Any tests that were considered but not ordered: No     Decision rules/scores evaluated (example [id number], Wells, etc): No     Discussed with: Patient,  and Dr. Wright     Care Planning  Shared decision making: Patient,  and Dr. Wright  Code status and discussions: Full    Disposition  Social Determinants of Health that impact treatment or disposition: None  Estimated length of stay is 1-2 days.     I confirmed that the patient's advanced care plan is present, code status is documented, and a surrogate decision maker is listed in the patient's medical record.     The patient's surrogate decision maker is .     The patient was seen and examined by me on 12/8/2024 at 2:30 PM.    Electronically signed by RAKESH Batista, 12/08/24, 16:35 CST.               Electronically signed by Donnie Wright MD at 12/08/24 1921          Emergency Department Notes        Tamera Arcos, RN at 12/08/24 1508          Nursing report ED to floor  Tasha Perez  54 y.o.  female    HPI:   Chief Complaint   Patient presents with    Urinary Retention    Dizziness       Admitting doctor:   Donnie Wright MD    Consulting provider(s):  Consults       Date and Time Order Name Status Description    11/15/2024  9:10 AM Inpatient Gastroenterology Consult Completed     11/11/2024 12:00 PM Inpatient Gastroenterology Consult Completed     11/8/2024 12:49 PM Inpatient General Surgery Consult Completed     11/7/2024 10:30 PM  Inpatient Nephrology Consult Completed              Admitting diagnosis:   The primary encounter diagnosis was Acute renal failure superimposed on stage 3a chronic kidney disease, unspecified acute renal failure type. Diagnoses of Dizziness, Hypokalemia, and Hypocalcemia were also pertinent to this visit.    Code status:   Current Code Status       Date Active Code Status Order ID Comments User Context       Prior            Allergies:   Bee venom, Hydrocodone, Ibuprofen, Pineapple, Pineapple extract, Wasp venom, Wasp venom protein, Hydrocodone-acetaminophen, Penicillins, Sitagliptin, Metformin, and Chocolate    Intake and Output  No intake or output data in the 24 hours ending 12/08/24 1508    Weight:       12/08/24  1232   Weight: 37.6 kg (83 lb)       Most recent vitals:   Vitals:    12/08/24 1401 12/08/24 1416 12/08/24 1431 12/08/24 1446   BP: 90/57 91/65 (!) 86/61 (!) 86/61   BP Location:       Patient Position:       Pulse: 94 96 93 93   Resp:       Temp:       TempSrc:       SpO2:  98% 97% 98%   Weight:       Height:         Oxygen Therapy: .    Active LDAs/IV Access:   Lines, Drains & Airways       Active LDAs       Name Placement date Placement time Site Days    PICC Single Lumen 04/25/24 Right Basilic 04/25/24  1038  Basilic  227    NG/OG Tube Nasoduodenal 8 Fr Left nostril 11/15/24  1033  Left nostril  23    Insulin Pump 11/19/23  2300  -- 384                    Labs (abnormal labs have a star):   Labs Reviewed   COMPREHENSIVE METABOLIC PANEL - Abnormal; Notable for the following components:       Result Value    Glucose 241 (*)     BUN 56 (*)     Creatinine 2.75 (*)     Sodium 130 (*)     Potassium 2.9 (*)     CO2 17.0 (*)     Calcium 7.8 (*)     Alkaline Phosphatase 182 (*)     eGFR 20.1 (*)     All other components within normal limits    Narrative:     GFR Normal >60  Chronic Kidney Disease <60  Kidney Failure <15     CBC WITH AUTO DIFFERENTIAL - Abnormal; Notable for the following components:    WBC  11.64 (*)     RBC 3.59 (*)     Hemoglobin 10.7 (*)     Hematocrit 32.1 (*)     Immature Grans % 2.1 (*)     Lymphocytes, Absolute 4.02 (*)     Immature Grans, Absolute 0.25 (*)     All other components within normal limits   TROPONIN - Abnormal; Notable for the following components:    HS Troponin T 15 (*)     All other components within normal limits    Narrative:     High Sensitive Troponin T Reference Range:  <14.0 ng/L- Negative Female for AMI  <22.0 ng/L- Negative Male for AMI  >=14 - Abnormal Female indicating possible myocardial injury.  >=22 - Abnormal Male indicating possible myocardial injury.   Clinicians would have to utilize clinical acumen, EKG, Troponin, and serial changes to determine if it is an Acute Myocardial Infarction or myocardial injury due to an underlying chronic condition.        URINALYSIS W/ CULTURE IF INDICATED - Abnormal; Notable for the following components:    Protein,  mg/dL (2+) (*)     All other components within normal limits    Narrative:     In absence of clinical symptoms, the presence of pyuria, bacteria, and/or nitrites on the urinalysis result does not correlate with infection.   URINALYSIS, MICROSCOPIC ONLY - Abnormal; Notable for the following components:    Bacteria, UA 1+ (*)     Squamous Epithelial Cells, UA 7-12 (*)     All other components within normal limits   PROTIME-INR - Normal   APTT - Normal   LIPASE - Normal   LACTIC ACID, PLASMA - Normal   MAGNESIUM - Normal   BLOOD CULTURE   BLOOD CULTURE   HIGH SENSITIVITIY TROPONIN T 2HR   CBC AND DIFFERENTIAL    Narrative:     The following orders were created for panel order CBC & Differential.  Procedure                               Abnormality         Status                     ---------                               -----------         ------                     CBC Auto Differential[[id number]]        Abnormal            Final result                 Please view results for these tests on the individual orders.        Meds given in ED:   Medications   potassium chloride 10 mEq in 100 mL IVPB (10 mEq Intravenous New Bag 12/8/24 1504)   sodium chloride 0.9 % bolus 1,563 mL (1,563 mL Intravenous New Bag 12/8/24 1324)           NIH Stroke Scale:       Isolation/Infection(s):  No active isolations   No active infections     COVID Testing  Collected .  Resulted .    Nursing report ED to floor:  Mental status: .alert and oriented   Ambulatory status: .up ad jessy  Precautions: .    ED nurse phone extentsion- ..       Electronically signed by Tamera Arcos RN at 12/08/24 1509       Tamera Arcos RN at 12/08/24 1342          Bladder scan completed. 208ml     Electronically signed by Tamera Arcos RN at 12/08/24 1343       Tru Thomas Jr., MD at 12/08/24 1304          HPI:     Patient is a 54-year-old female with chronic history of abdominal pain and nausea and decreased appetite and gastroparesis who presents to the emergency room feeling dizzy and weak inability to keep much fluid down.  She states that she feels like her kidneys are shutting down again and she is dehydrated that is causing her to be very weak.  Flank and back pain 5 out of 10    REVIEW OF SYSTEMS  CONSTITUTIONAL:  No complaints of fever, chills,or weakness  EYES:  No complaints of discharge   ENT: No complaints of sore throat or ear pain  CARDIOVASCULAR:  No complaints of chest pain, palpitations, or swelling  RESPIRATORY:  No complaints of cough or shortness of breath  GI: Positive for mild abdominal cramping and flank pain.    MUSCULOSKELETAL:  No complaints of back pain  SKIN:  No complaints of rash  NEUROLOGIC: Positive for dizziness ENDOCRINE:  No complaints of polyuria or polydipsia  LYMPHATIC:  No complaints of swollen glands  GENITOURINARY: Positive for decreased urination.  Flank pain  PAST MEDICAL HISTORY  Past Medical History:   Diagnosis Date    Arthritis     Contusion     Degenerative disc disease, cervical     Diabetes mellitus     Elevated  cholesterol     Fibromyalgia     Neuropathy     Osteoporosis     Pancreatitis     Raynaud disease     Renal insufficiency     Smoker 02/08/2022    Vitamin D deficiency 04/26/2022       FAMILY HISTORY  Family History   Adopted: Yes   Problem Relation Age of Onset    Colon polyps Neg Hx     Colon cancer Neg Hx        SOCIAL HISTORY  Social History     Socioeconomic History    Marital status:    Tobacco Use    Smoking status: Every Day     Current packs/day: 0.25     Average packs/day: 0.3 packs/day for 31.0 years (7.8 ttl pk-yrs)     Types: Cigarettes   Vaping Use    Vaping status: Never Used   Substance and Sexual Activity    Alcohol use: No    Drug use: No    Sexual activity: Defer       IMMUNIZATION HISTORY  Deferred to primary care physician.    SURGICAL HISTORY  Past Surgical History:   Procedure Laterality Date    APPENDECTOMY      CHOLECYSTECTOMY      COLONOSCOPY      ENDOSCOPY N/A 10/08/2019    Procedure: ESOPHAGOGASTRODUODENOSCOPY WITH ANESTHESIA;  Surgeon: Aleks Vaughn DO;  Location: [facility] ENDOSCOPY;  Service: Gastroenterology    ENDOSCOPY N/A 11/12/2024    Procedure: ESOPHAGOGASTRODUODENOSCOPY WITH ANESTHESIA;  Surgeon: Tiffnaie Saucedo MD;  Location: [facility] ENDOSCOPY;  Service: Gastroenterology;  Laterality: N/A;  pre op: Gastroparesis, Intractable nausea  post op: insertion of dobhoff   PCP: Brandon Murguia    ENDOSCOPY N/A 11/15/2024    Procedure: ESOPHAGOGASTRODUODENOSCOPY WITH ANESTHESIA WITH NG TUBE INSERTION;  Surgeon: Tiffanie Saucedo MD;  Location: [facility] ENDOSCOPY;  Service: Gastroenterology;  Laterality: N/A;  PRE OP: DOBHOFF  POST OP: DOBHOFF  PCP: TELLY PACK    ENDOSCOPY WITH GASTROSTOMY TUBE INSERTION N/A 6/15/2022    Procedure: ESOPHAGOGASTRODUODENOSCOPY WITH GASTROSTOMY TUBE INSERTION;  Surgeon: Jersey Lee MD;  Location: [facility] ENDOSCOPY;  Service: Gastroenterology;  Laterality: N/A;  pre peg placement  post peg placement  Dr. Murguia    ENTEROSCOPY SMALL BOWEL N/A  11/3/2022    Procedure: Esophagogastroduodenoscopy with Peg Removal;  Surgeon: Aleks Vaughn DO;  Location: [facility] ENDOSCOPY;  Service: Gastroenterology;  Laterality: N/A;  pre; peg removal  post; peg removal   KIRILL Murguia MD        HYSTERECTOMY         CURRENT MEDICATIONS    Current Facility-Administered Medications:     potassium chloride 10 mEq in 100 mL IVPB, 10 mEq, Intravenous, Once, Tru Thomas Jr., MD    Current Outpatient Medications:     aspirin 81 MG EC tablet, Take 1 tablet by mouth Daily., Disp: , Rfl:     atorvastatin (LIPITOR) 80 MG tablet, Take 1 tablet by mouth Every Night., Disp: , Rfl:     Calcium Carbonate-Vitamin D 600-5 MG-MCG tablet, Take 1 tablet by mouth Daily., Disp: , Rfl:     cetirizine (zyrTEC) 10 MG tablet, Take 1 tablet by mouth Daily As Needed for Allergies., Disp: , Rfl:     Cholecalciferol (Vitamin D3) 50 MCG (2000 UT) capsule, Take 1 capsule by mouth Daily., Disp: , Rfl:     dapagliflozin Propanediol (Farxiga) 10 MG tablet, Take 10 mg by mouth Daily., Disp: , Rfl:     DULoxetine (CYMBALTA) 60 MG capsule, Take 1 capsule by mouth Daily., Disp: , Rfl:     hydroxychloroquine (PLAQUENIL) 200 MG tablet, Take 2 tablets by mouth Daily., Disp: , Rfl:     Insulin Aspart (novoLOG) 100 UNIT/ML injection, Inject 90 Units under the skin into the appropriate area as directed Daily. Up to 90 units daily via omnipod, Disp: , Rfl:     linaclotide (LINZESS) 290 MCG capsule capsule, Take 1 capsule by mouth Every Morning Before Breakfast., Disp: , Rfl:     nortriptyline (PAMELOR) 25 MG capsule, Take 1 capsule by mouth Every Night., Disp: , Rfl:     omeprazole (priLOSEC) 40 MG capsule, Take 1 capsule by mouth Every Other Day., Disp: , Rfl:     ondansetron ODT (ZOFRAN-ODT) 4 MG disintegrating tablet, Place 1 tablet on the tongue Every 8 (Eight) Hours As Needed for Nausea or Vomiting., Disp: , Rfl:     rOPINIRole (REQUIP) 1 MG tablet, Take 1 tablet by mouth 3 (Three) Times a Day.,  "Disp: , Rfl:     sucralfate (CARAFATE) 1 g tablet, Take 1 tablet by mouth 4 (Four) Times a Day., Disp: , Rfl:     ALLERGIES  Allergies   Allergen Reactions    Bee Venom Anaphylaxis    Hydrocodone Anaphylaxis    Ibuprofen Other (See Comments)     \"SHUTS MY KIDNEYS DOWN\" PER PATIENT     Pineapple Anaphylaxis    Pineapple Extract Anaphylaxis    Wasp Venom Anaphylaxis    Wasp Venom Protein Anaphylaxis    Hydrocodone-Acetaminophen Dizziness, Nausea And Vomiting, Nausea Only and Unknown - Low Severity    Penicillins Nausea And Vomiting    Sitagliptin Other (See Comments)     Other reaction(s): Abdominal Pain      Metformin Other (See Comments)     Pt states it messes with her kidneys    Chocolate Rash     Dark chocolate only, told to RD 7/10/24       ABDOMINAL EXAM    VITAL SIGNS:   BP 91/65   Pulse 96   Temp 98.2 °F (36.8 °C) (Oral)   Resp 20   Ht 154.9 cm (61\")   Wt 37.6 kg (83 lb)   SpO2 98%   BMI 15.68 kg/m²     Constitutional: Patient is alert and in no distress.  Patient with moderate bilateral flank and abdominal discomfort.    ENT: There is a normal pharynx with no acute erythema or exudate and oral mucosa is moist.  Nose is clear with no drainage.  Tympanic membranes intact and non-erythemic    Cardiovascular: S1-S2 regular rate and rhythm. No murmurs, rubs or gallops.  Pulses are equal bilaterally and there is no pitting edema.    Respiratory: Patient is clear to auscultation bilaterally with no wheezing or rhonchi.  Chest wall is nontender.  There are no external lesions on the chest.  There is no crepitance.    Gastrointestinal: Scaphoid abdomen with mild diffuse tenderness but no rebound or guarding.    Genitourinary: Bilateral costovertebral angle left greater than right tenderness to percussion.  No suprapubic tenderness to palpation    Integument: No acute lesions noted.  Color appears to be normal.    [place] Coma Scale: Total score 15    Neurological: Patient is alert and oriented x4 and no acute " findings noted.  Speech is fluent and cognition is normal.  No evidence of acute CVA.  Cranial nerves II through XII intact.  Patient with normal motor function as well as reflexes and sensation    Psychiatric: Normal affect and mood.            RADIOLOGY/PROCEDURES        XR Chest 1 View   Final Result   1. Well-expanded lungs. No acute cardiopulmonary process identified.   Enteric tubes present within the stomach.           This report was signed and finalized on 12/8/2024 2:10 PM by Dr. Gloria Roe MD.                 FUTURE APPOINTMENTS     No future appointments.       EKG (reviewed and interpreted by me): Normal sinus tachycardia with a ventricular rate of 119.  No acute ST segment elevation or depression noted      COURSE & MEDICAL DECISION MAKING       Patient's partial differential diagnosis can include:    Gastroparesis, acute renal failure, volume depletion, gastritis, gastroenteritis, colitis, enteritis, volvulus, intussusception, esophageal spasms, gastroparesis, GERD, peptic ulcer disease, perforated esophagus, perforated peptic ulcer, partial bowel obstruction, bowel obstruction,, AAA, mesenteric adenitis, mesenteric ischemia, constipation, irritable bowel, diverticulitis, diverticulosis, Crohn's disease, ulcerative colitis, celiac disease and others    Due to patient's acute on chronic renal failure and low potassium low calcium patient will need to be admitted to the hospital.  Spoke with nurse practitioner vonnie Botello who states that she patient can be admitted to attending physician Dr. Wright    Patient's level of risk: Moderate       CRITICAL CARE    CRITICAL CARE: No    CRITICAL CARE TIME: None      Recent Results (from the past 24 hours)   ECG 12 Lead Tachycardia    Collection Time: 12/08/24 12:42 PM   Result Value Ref Range    QT Interval 330 ms    QTC Interval 464 ms   Comprehensive Metabolic Panel    Collection Time: 12/08/24  1:19 PM    Specimen: Arm, Right; Blood   Result  Value Ref Range    Glucose 241 (H) 65 - 99 mg/dL    BUN 56 (H) 6 - 20 mg/dL    Creatinine 2.75 (H) 0.57 - 1.00 mg/dL    Sodium 130 (L) 136 - 145 mmol/L    Potassium 2.9 (L) 3.5 - 5.2 mmol/L    Chloride 99 98 - 107 mmol/L    CO2 17.0 (L) 22.0 - 29.0 mmol/L    Calcium 7.8 (L) 8.6 - 10.5 mg/dL    Total Protein 7.1 6.0 - 8.5 g/dL    Albumin 3.6 3.5 - 5.2 g/dL    ALT (SGPT) 33 1 - 33 U/L    AST (SGOT) 25 1 - 32 U/L    Alkaline Phosphatase 182 (H) 39 - 117 U/L    Total Bilirubin <0.2 0.0 - 1.2 mg/dL    Globulin 3.5 gm/dL    A/G Ratio 1.0 g/dL    BUN/Creatinine Ratio 20.4 7.0 - 25.0    Anion Gap 14.0 5.0 - 15.0 mmol/L    eGFR 20.1 (L) >60.0 mL/min/1.73   Protime-INR    Collection Time: 12/08/24  1:19 PM    Specimen: Arm, Right; Blood   Result Value Ref Range    Protime 13.3 11.8 - 14.8 Seconds    INR 0.97 0.91 - 1.09   aPTT    Collection Time: 12/08/24  1:19 PM    Specimen: Arm, Right; Blood   Result Value Ref Range    PTT 26.9 24.5 - 36.0 seconds   Lipase    Collection Time: 12/08/24  1:19 PM    Specimen: Arm, Right; Blood   Result Value Ref Range    Lipase 59 13 - 60 U/L   Lactic Acid, Plasma    Collection Time: 12/08/24  1:19 PM    Specimen: Arm, Right; Blood   Result Value Ref Range    Lactate 1.3 0.5 - 2.0 mmol/L   CBC Auto Differential    Collection Time: 12/08/24  1:19 PM    Specimen: Arm, Right; Blood   Result Value Ref Range    WBC 11.64 (H) 3.40 - 10.80 10*3/mm3    RBC 3.59 (L) 3.77 - 5.28 10*6/mm3    Hemoglobin 10.7 (L) 12.0 - 15.9 g/dL    Hematocrit 32.1 (L) 34.0 - 46.6 %    MCV 89.4 79.0 - 97.0 fL    MCH 29.8 26.6 - 33.0 pg    MCHC 33.3 31.5 - 35.7 g/dL    RDW 13.4 12.3 - 15.4 %    RDW-SD 44.2 37.0 - 54.0 fl    MPV 9.9 6.0 - 12.0 fL    Platelets 326 140 - 450 10*3/mm3    Neutrophil % 56.0 42.7 - 76.0 %    Lymphocyte % 34.5 19.6 - 45.3 %    Monocyte % 6.4 5.0 - 12.0 %    Eosinophil % 0.7 0.3 - 6.2 %    Basophil % 0.3 0.0 - 1.5 %    Immature Grans % 2.1 (H) 0.0 - 0.5 %    Neutrophils, Absolute 6.52 1.70 - 7.00  10*3/mm3    Lymphocytes, Absolute 4.02 (H) 0.70 - 3.10 10*3/mm3    Monocytes, Absolute 0.74 0.10 - 0.90 10*3/mm3    Eosinophils, Absolute 0.08 0.00 - 0.40 10*3/mm3    Basophils, Absolute 0.03 0.00 - 0.20 10*3/mm3    Immature Grans, Absolute 0.25 (H) 0.00 - 0.05 10*3/mm3    nRBC 0.0 0.0 - 0.2 /100 WBC   High Sensitivity Troponin T    Collection Time: 12/08/24  1:19 PM    Specimen: Arm, Right; Blood   Result Value Ref Range    HS Troponin T 15 (H) <14 ng/L   Magnesium    Collection Time: 12/08/24  1:19 PM    Specimen: Arm, Right; Blood   Result Value Ref Range    Magnesium 1.7 1.6 - 2.6 mg/dL   Urinalysis With Culture If Indicated - Urine, Clean Catch    Collection Time: 12/08/24  1:55 PM    Specimen: Urine, Clean Catch   Result Value Ref Range    Color, UA Yellow Yellow, Straw    Appearance, UA Clear Clear    pH, UA 6.0 5.0 - 8.0    Specific Gravity, UA 1.021 1.005 - 1.030    Glucose, UA Negative Negative    Ketones, UA Negative Negative    Bilirubin, UA Negative Negative    Blood, UA Negative Negative    Protein,  mg/dL (2+) (A) Negative    Leuk Esterase, UA Negative Negative    Nitrite, UA Negative Negative    Urobilinogen, UA 0.2 E.U./dL 0.2 - 1.0 E.U./dL   Urinalysis, Microscopic Only - Urine, Clean Catch    Collection Time: 12/08/24  1:55 PM    Specimen: Urine, Clean Catch   Result Value Ref Range    RBC, UA 0-2 None Seen, 0-2 /HPF    WBC, UA 0-2 None Seen, 0-2 /HPF    Bacteria, UA 1+ (A) None Seen /HPF    Squamous Epithelial Cells, UA 7-12 (A) None Seen, 0-2 /HPF    Hyaline Casts, UA 0-2 None Seen /LPF    Methodology Manual Light Microscopy           Old charts were reviewed per [facility] EMR.  Pertinent details are summarized above.  All laboratory, radiologic, and EKG studies that were performed in the Emergency Department were a necessary part of the evaluation needed to exclude unstable or  emergent medical conditions.     Patient was hemodynamically and neurologically stable in the ED.   Pertinent studies  were reviewed as above.     The patient received:  Medications   potassium chloride 10 mEq in 100 mL IVPB (has no administration in time range)   sodium chloride 0.9 % bolus 1,563 mL (1,563 mL Intravenous New Bag 12/8/24 1324)            Diagnoses that have been ruled out:   None   Diagnoses that are still under consideration:   None   Final diagnoses:   Dizziness   Hypokalemia   Acute renal failure superimposed on stage 3a chronic kidney disease, unspecified acute renal failure type   Hypocalcemia        FINAL IMPRESSION   Diagnosis Plan   1. Acute renal failure superimposed on stage 3a chronic kidney disease, unspecified acute renal failure type        2. Dizziness        3. Hypokalemia        4. Hypocalcemia              Tru Thomas Jr, MD        ED Disposition       ED Disposition   Decision to Admit    Condition   --    Comment   Level of Care: Telemetry [5]   Diagnosis: Acute on chronic renal failure [591959]   Admitting Physician: LAUREEN VALLEJO [[id number]]   Attending Physician: LAUREEN VALLEJO [[id number]]   Certification: I Certify That Inpatient Hospital Services Are Medically Necessary For Greater Than 2 Midnights                   Dragon disclaimer:  Part of this note may be an electronic transcription/translation of spoken language to printed text using the Dragon Dictation System.     I have reviewed the patient’s prescription history via a prescription monitoring program.  This information is consistent with my knowledge of the patient’s controlled substance use history.        Tru Thomas Jr., MD  12/08/24 1436      Electronically signed by Tru Thomas Jr., MD at 12/08/24 1436       Facility-Administered Medications as of 12/9/2024   Medication Dose Route Frequency Provider Last Rate Last Admin    acetaminophen (TYLENOL) tablet 650 mg  650 mg Oral Q4H PRN Rosmery Del Rosario APRN        Or    acetaminophen (TYLENOL) 160 MG/5ML oral solution 650 mg  650 mg Oral Q4H PRN Miguel Ángel  RAKESH Arteaga        Or    acetaminophen (TYLENOL) suppository 650 mg  650 mg Rectal Q4H PRN Rosmery Del Rosario APRN        sennosides-docusate (PERICOLACE) 8.6-50 MG per tablet 2 tablet  2 tablet Oral BID PRN Rosmery Del Rosario APRN        And    polyethylene glycol (MIRALAX) packet 17 g  17 g Oral Daily PRN Rosmery Del Rosario APRN        And    bisacodyl (DULCOLAX) suppository 10 mg  10 mg Rectal Daily PRN Rosmery Del Rosario APRN        dextrose (D50W) (25 g/50 mL) IV injection 25 g  25 g Intravenous Q15 Min PRN Rosmery Del Rosario APRN        dextrose (GLUTOSE) oral gel 15 g  15 g Oral Q15 Min PRN Rosmery Del Rosario APRN        influenza virus vacc split PF FLUZONE 0.5 mL  0.5 mL Intramuscular During Hospitalization Donnie Wright MD        insulin regular (humuLIN R,novoLIN R) injection 2-9 Units  2-9 Units Subcutaneous Q6H Rosmery Del Rosario APRN   6 Units at 24 0219    nicotine (NICODERM CQ) 14 MG/24HR patch 1 patch  1 patch Transdermal Q24H Rosmery Del Rosario APRN   1 patch at 24 1619    nicotine polacrilex (NICORETTE) gum 2 mg  2 mg Mouth/Throat Q1H PRN Rosmery Del Rosario APRN        ondansetron ODT (ZOFRAN-ODT) disintegrating tablet 4 mg  4 mg Oral Q6H PRN Rosmery Del Rosario APRN        Or    ondansetron (ZOFRAN) injection 4 mg  4 mg Intravenous Q6H PRN Rosmery Del Rosario APRN        [COMPLETED] potassium chloride 10 mEq in 100 mL IVPB  10 mEq Intravenous Once Tru Thomas Jr.,  mL/hr at 24 1504 10 mEq at 24 1504    [] potassium chloride 10 mEq in 100 mL IVPB  10 mEq Intravenous Q1H Rosmery Del Rosario APRN 100 mL/hr at 24 10 mEq at 24    rOPINIRole (REQUIP) tablet 1 mg  1 mg Oral TID Donnie Wright MD   1 mg at 24 0326    [COMPLETED] sodium chloride 0.9 % bolus 1,563 mL  30 mL/kg (Ideal) Intravenous Once Tru Thomas Jr., MD 1,563 mL/hr at 24 1324 1,563 mL at 24 1324    sodium chloride 0.9 % flush 10 mL  10  mL Intravenous Q12H Donnie Wright MD   10 mL at 12/08/24 2003    sodium chloride 0.9 % flush 10 mL  10 mL Intravenous PRN Donnie Wright MD        sodium chloride 0.9 % flush 10 mL  10 mL Intravenous Q12H Rosmery Del Rosario APRN   10 mL at 12/08/24 2003    sodium chloride 0.9 % flush 10 mL  10 mL Intravenous PRN Rosmery Del Rosario APRN        sodium chloride 0.9 % flush 20 mL  20 mL Intravenous PRN Donnie Wright MD        sodium chloride 0.9 % infusion 40 mL  40 mL Intravenous PRN Donnie Wright MD        sodium chloride 0.9 % infusion 40 mL  40 mL Intravenous PRN Rosmery Del Rosario APRN        sodium chloride 0.9 % with KCl 20 mEq/L infusion  100 mL/hr Intravenous Continuous Rosmery Del Rosario APRN 100 mL/hr at 12/09/24 0230 100 mL/hr at 12/09/24 0230     Orders (all)        Start     Ordered    12/09/24 0600  Daily CHG Bath While Central Line in Place  Daily       12/08/24 1537    12/09/24 0600  CBC (No Diff)  Morning Draw         12/08/24 1539    12/09/24 0600  Basic Metabolic Panel  Morning Draw         12/08/24 1539    12/09/24 0400  rOPINIRole (REQUIP) tablet 1 mg  3 Times Daily         12/09/24 0305    12/09/24 0250  influenza virus vacc split PF FLUZONE 0.5 mL  During Hospitalization         12/09/24 0251    12/09/24 0250  Inpatient Nutrition Consult  Once        Provider:  (Not yet assigned)    12/09/24 0251    12/09/24 0212  POC Glucose Once  PROCEDURE ONCE        Comments: Complete no more than 45 minutes prior to patient eating      12/09/24 0200    12/08/24 2100  sodium chloride 0.9 % flush 10 mL  Every 12 Hours Scheduled         12/08/24 1537    12/08/24 2100  sodium chloride 0.9 % flush 10 mL  Every 12 Hours Scheduled         12/08/24 1539    12/08/24 1935  Diet: Regular/House, Diabetic; Consistent Carbohydrate; Texture: Regular (IDDSI 7); Fluid Consistency: Thin (IDDSI 0)  Diet Effective Now         12/08/24 1935    12/08/24 1800  Oral Care  2 Times Daily       12/08/24 7272     "12/08/24 1800  POC Glucose Q6H  Every 6 Hours      Comments: Complete no more than 45 minutes prior to patient eating      12/08/24 1539    12/08/24 1800  insulin regular (humuLIN R,novoLIN R) injection 2-9 Units  Every 6 Hours Scheduled         12/08/24 1539    12/08/24 1634  Disconnect insulin pump for now  Misc Nursing Order (Specify)  Once        Comments: Disconnect insulin pump for now    12/08/24 1633    12/08/24 1629  Phosphorus  Once         12/08/24 1628    12/08/24 1612  POC Glucose Once  PROCEDURE ONCE        Comments: Complete no more than 45 minutes prior to patient eating      12/08/24 1553    12/08/24 1600  Vital Signs  Every 4 Hours       12/08/24 1539    12/08/24 1600  Neuro Checks  Every 4 Hours       12/08/24 1539    12/08/24 1555  sodium chloride 0.9 % with KCl 20 mEq/L infusion  Continuous         12/08/24 1539    12/08/24 1555  potassium chloride 10 mEq in 100 mL IVPB  Every 1 Hour         12/08/24 1539    12/08/24 1555  nicotine (NICODERM CQ) 14 MG/24HR patch 1 patch  Every 24 Hours         12/08/24 1539    12/08/24 1540  Tobacco Cessation Education  Prior to Discharge         12/08/24 1539    12/08/24 1539  nicotine polacrilex (NICORETTE) gum 2 mg  Every 1 Hour PRN         12/08/24 1539    12/08/24 1539  Alkaline Phosphatase  Once         12/08/24 1539    12/08/24 1538  ondansetron ODT (ZOFRAN-ODT) disintegrating tablet 4 mg  Every 6 Hours PRN        Placed in \"Or\" Linked Group    12/08/24 1539    12/08/24 1538  ondansetron (ZOFRAN) injection 4 mg  Every 6 Hours PRN        Placed in \"Or\" Linked Group    12/08/24 1539    12/08/24 1538  sennosides-docusate (PERICOLACE) 8.6-50 MG per tablet 2 tablet  2 Times Daily PRN        Placed in \"And\" Linked Group    12/08/24 1539    12/08/24 1538  polyethylene glycol (MIRALAX) packet 17 g  Daily PRN        Placed in \"And\" Linked Group    12/08/24 1539    12/08/24 1538  bisacodyl (DULCOLAX) suppository 10 mg  Daily PRN        Placed in \"And\" Linked Group " "   12/08/24 1539    12/08/24 1538  acetaminophen (TYLENOL) tablet 650 mg  Every 4 Hours PRN        Placed in \"Or\" Linked Group    12/08/24 1539    12/08/24 1538  acetaminophen (TYLENOL) 160 MG/5ML oral solution 650 mg  Every 4 Hours PRN        Placed in \"Or\" Linked Group    12/08/24 1539    12/08/24 1538  acetaminophen (TYLENOL) suppository 650 mg  Every 4 Hours PRN        Placed in \"Or\" Linked Group    12/08/24 1539    12/08/24 1537  Connectors / Hubs Must Be Scrubbed 15 Seconds Using 70% Alcohol Before Access - Allow to Dry Before Accessing Line  Continuous         12/08/24 1537    12/08/24 1537  Change CHG Dressing or Transparent Dressing with CHG Disk, Needleless Connectors and Securement Device Every 7 Days  Weekly        Comments: Per [facility] Policy    12/08/24 1537    12/08/24 1536  sodium chloride 0.9 % flush 10 mL  As Needed         12/08/24 1537    12/08/24 1536  sodium chloride 0.9 % flush 20 mL  As Needed         12/08/24 1537    12/08/24 1536  sodium chloride 0.9 % infusion 40 mL  As Needed         12/08/24 1537    12/08/24 1536  NPO Diet NPO Type: Ice Chips  Diet Effective Now,   Status:  Canceled         12/08/24 1539    12/08/24 1535  Intake & Output  Every Shift       12/08/24 1539    12/08/24 1535  Weigh Patient  Once         12/08/24 1539    12/08/24 1535  Insert Peripheral IV  Once         12/08/24 1539    12/08/24 1535  Saline Lock & Maintain IV Access  Continuous         12/08/24 1539    12/08/24 1535  Code Status and Medical Interventions: CPR (Attempt to Resuscitate); Full Support  Continuous         12/08/24 1539    12/08/24 1535  Place Sequential Compression Device  Once         12/08/24 1539    12/08/24 1535  Maintain Sequential Compression Device  Continuous         12/08/24 1539    12/08/24 1535  Continuous Pulse Oximetry  Continuous         12/08/24 1539    12/08/24 1535  Turn Patient  Now Then Every 2 Hours         12/08/24 1539    12/08/24 1534  dextrose (GLUTOSE) oral gel 15 g  Every 15 " Minutes PRN         12/08/24 1539    12/08/24 1534  dextrose (D50W) (25 g/50 mL) IV injection 25 g  Every 15 Minutes PRN         12/08/24 1539    12/08/24 1534  sodium chloride 0.9 % flush 10 mL  As Needed         12/08/24 1539    12/08/24 1534  sodium chloride 0.9 % infusion 40 mL  As Needed         12/08/24 1539    12/08/24 1519  High Sensitivity Troponin T 2Hr  PROCEDURE ONCE         12/08/24 1355    12/08/24 1436  Inpatient Admission  Once         12/08/24 1435    12/08/24 1436  Cardiac Monitoring  Continuous        Comments: Follow Standing Orders As Outlined in Process Instructions (Open Order Report to View Full Instructions)    12/08/24 1435    12/08/24 1419  potassium chloride 10 mEq in 100 mL IVPB  Once         12/08/24 1403    12/08/24 1409  Urinalysis, Microscopic Only - Urine, Clean Catch  Once         12/08/24 1408    12/08/24 1404  Magnesium  Once         12/08/24 1403    12/08/24 1314  sodium chloride 0.9 % bolus 1,563 mL  Once         12/08/24 1258    12/08/24 1308  Urinalysis With Culture If Indicated - Urine, Clean Catch  STAT         12/08/24 1307    12/08/24 1307  XR Chest 1 View  1 Time Imaging         12/08/24 1306    12/08/24 1306  Bladder scan  Once         12/08/24 1305    12/08/24 1306  High Sensitivity Troponin T  Once         12/08/24 1306    12/08/24 1258  CBC & Differential  Once         12/08/24 1258    12/08/24 1258  Comprehensive Metabolic Panel  Once         12/08/24 1258    12/08/24 1258  Protime-INR  Once         12/08/24 1258    12/08/24 1258  aPTT  Once         12/08/24 1258    12/08/24 1258  Lipase  Once         12/08/24 1258    12/08/24 1258  Lactic Acid, Plasma  Once         12/08/24 1258    12/08/24 1258  Blood Culture - Blood, Hand, Right  Once         12/08/24 1258    12/08/24 1258  Blood Culture - Blood, Blood, PICC Line  Once         12/08/24 1258    12/08/24 1258  CBC Auto Differential  PROCEDURE ONCE         12/08/24 1258    12/08/24 1237  ECG 12 Lead Tachycardia   Once         12/08/24 1237    12/08/24 0000  Telemetry Scan  Once         12/08/24 0000    09/23/24 0000  sodium bicarbonate 650 MG tablet  2 Times Daily         12/09/24 0246    Unscheduled  Change Dressing to IV Site As Needed When Damp, Loose or Soiled  As Needed       12/08/24 1537    Unscheduled  Change Needleless Connectors  As Needed      Comments: Change Needleless Connectors When:  - Administration Set Changed  - Dressing Changed  - Removed For Any Reason  - Residual Blood or Debris Within Connector  - Prior to Drawing Blood Cultures  - Contamination of Connector  - After Administration of Blood or Blood Components    12/08/24 1537    Unscheduled  Follow Hypoglycemia Standing Orders For Blood Glucose <70 & Notify Provider of Treatment  As Needed      Comments: Follow Hypoglycemia Orders As Outlined in Process Instructions (Open Order Report to View Full Instructions)  Notify Provider Any Time Hypoglycemia Treatment is Administered    12/08/24 1539    Unscheduled  Up With Assistance  As Needed       12/08/24 1539    Unscheduled  Oxygen Therapy- Nasal Cannula; Titrate 1-6 LPM Per SpO2; 90 - 95%  Continuous PRN       12/08/24 1539    --  amitriptyline (ELAVIL) 25 MG tablet  Nightly         12/08/24 1517    --  insulin aspart (NovoLOG) 100 UNIT/ML patient supplied pump  Continuous         12/08/24 1533    --  magnesium oxide (MAG-OX) 400 MG tablet  Daily         12/09/24 0246

## 2024-12-09 NOTE — CONSULTS
"Adult Nutrition  Assessment/PES    Patient Name:  Tasha Perez  YOB: 1970  MRN: [id number]  Admit Date:  12/8/2024    Assessment Date:  12/9/2024   Reason for Assessment       Row Name 12/09/24 1115          Reason for Assessment    Reason For Assessment nurse/nurse practitioner consult     Diagnosis endocrine conditions;gastrointestinal disease;nutrition related history     Identified At Risk by Screening Criteria MST SCORE 2+;reduced oral intake over the last month;unintentional loss of 10 lbs or more in the past 2 mos               Nutrition/Diet History       Row Name 12/09/24 1117          Nutrition/Diet History    Typical Intake (Food/Fluid/EN/PN) Pt in room alone. PMH includes severe malnutrition, DM, gastroparesis, and CKD. Pt has had multiple previous admissions. Pt reported fatigue, weakness, tremor, poor appetite, and N/V. Pt had a temporary gastric stimulator placed last week, which has helped her tolerate meals better. She notes that she is able to keep food down when the stimulator is in the black lead. Pt tolerated breakfast today. NG tube remains in place. Pt declined ONS. She is anxious to go home for her appointment for the permanent stimulator on Monday, 12/16. Pt is requesting double portions of protein to \"gain weight,\" which she will receive. Currently on a low irritant diet, and the dietitian explained the rationale. However, pt noted that her doctors at [place] advised her to eat freely and requested her diet be changed back. Will monitor.     Food Intolerance(s) Chocolate, pineapple     Factors Affecting Nutritional Intake altered gastrointestinal function;nausea;fatigue;appetite               Labs/Tests/Procedures/Meds       Row Name 12/09/24 1130          Labs/Procedures/Meds    Lab Results Reviewed reviewed     Lab Results Comments Creat 1.65, K 3.1, BUN 34        Diagnostic Tests/Procedures    Diagnostic Test/Procedure Reviewed reviewed        Medications    " Pertinent Medications Reviewed reviewed     Pertinent Medications Comments Novolog, HumuLIN               Physical Findings       Row Name 12/09/24 1131          Physical Findings    Overall Physical Appearance Alexandru Score 20, room air, fragile     Enteral Access Devices nasogastric tube               Estimated/Assessed Needs - Anthropometrics       Row Name 12/09/24 1140          Anthropometrics    Calculation Weight 48 kg (105 lb 13.1 oz)  IBW        Estimated/Assessed Needs    Additional Documentation KCAL/KG (Group);Protein Requirements (Group);Fluid Requirements (Group)        KCAL/KG    KCAL/KG 35 Kcal/Kg (kcal);40 Kcal/Kg (kcal)     35 Kcal/Kg (kcal) 1680     40 Kcal/Kg (kcal) 1920        Protein Requirements    Weight Used For Protein Calculations 48 kg (105 lb 13.1 oz)     Est Protein Requirement Amount (gms/kg) 1.0 gm protein     Estimated Protein Requirements (gms/day) 48        Fluid Requirements    Fluid Requirements (mL/day) 1680               Nutrition Prescription Ordered       Row Name 12/09/24 1141          Nutrition Prescription PO    Current PO Diet Regular     Fluid Consistency Thin     Common Modifiers Consistent Carbohydrate;GI Soft/[place]               Evaluation of Received Nutrient/Fluid Intake       Row Name 12/09/24 1142          Nutrient/Fluid Evaluation    Number of Days Evaluated 1 day        Fluid Intake Evaluation    Oral Fluid (mL) 840        PO Evaluation    Number of Days PO Intake Evaluated 1 day     Number of Meals 1     % PO Intake 50%            Problem/Interventions:   Problem 1       Row Name 12/09/24 1143          Nutrition Diagnoses Problem 1    Problem 1 Predicted Suboptimal Intake     Etiology (related to) Factors Affecting Nutrition;Medical Diagnosis     Gastrointestinal Gastroparesis     Appetite Poor     Reported GI Symptoms N & V     Signs/Symptoms (evidenced by) Report of Mnimal PO Intake;Report/Observation;BMI;% IBW     BMI Less than 16     Percent (%) IBW 80 %      Reported/Observed By Patient     Reported GI Symptoms Nausea and vomiting               Intervention Goal       Row Name 12/09/24 1146          Intervention Goal    General Disease management/therapy;Meet nutritional needs for age/condition;Reduce/improve symptoms     PO Tolerate PO;Meet estimated needs     Weight Appropriate weight gain               Nutrition Intervention       Row Name 12/09/24 1146          Nutrition Intervention    RD/Tech Action Supplement offered/refused;Follow Tx progress;Care plan reviewd;Encourage intake;Adjusted portion;Advise available snack  Double protein per pt request               Nutrition Prescription       Row Name 12/09/24 1229          Nutrition Prescription PO    PO Prescription Begin/change diet     Begin/Change Diet to Regular     Common Modifiers Consistent Carbohydrate               Education/Evaluation       Row Name 12/09/24 1148          Education    Education No discharge needs identified at this time        Monitor/Evaluation    Monitor Per protocol            Electronically signed by:  yNa Cox RDN, ERNESTINA  12/09/24 12:30 CST

## 2024-12-10 ENCOUNTER — TELEPHONE (OUTPATIENT)
Age: 54
End: 2024-12-10
Payer: COMMERCIAL

## 2024-12-10 LAB
ALBUMIN SERPL-MCNC: 2.5 G/DL (ref 3.5–5.2)
ALBUMIN/GLOB SERPL: 1 G/DL
ALP SERPL-CCNC: 112 U/L (ref 39–117)
ALT SERPL W P-5'-P-CCNC: 16 U/L (ref 1–33)
ANION GAP SERPL CALCULATED.3IONS-SCNC: 9 MMOL/L (ref 5–15)
AST SERPL-CCNC: 15 U/L (ref 1–32)
BACTERIA BLD CULT: NORMAL
BASOPHILS # BLD AUTO: 0.01 10*3/MM3 (ref 0–0.2)
BASOPHILS NFR BLD AUTO: 0.1 % (ref 0–1.5)
BILIRUB SERPL-MCNC: <0.2 MG/DL (ref 0–1.2)
BOTTLE TYPE: NORMAL
BUN SERPL-MCNC: 19 MG/DL (ref 6–20)
BUN/CREAT SERPL: 15.3 (ref 7–25)
CALCIUM SPEC-SCNC: 7.1 MG/DL (ref 8.6–10.5)
CHLORIDE SERPL-SCNC: 109 MMOL/L (ref 98–107)
CO2 SERPL-SCNC: 17 MMOL/L (ref 22–29)
CREAT SERPL-MCNC: 1.24 MG/DL (ref 0.57–1)
DEPRECATED RDW RBC AUTO: 44.9 FL (ref 37–54)
EGFRCR SERPLBLD CKD-EPI 2021: 52.1 ML/MIN/1.73
EOSINOPHIL # BLD AUTO: 0.08 10*3/MM3 (ref 0–0.4)
EOSINOPHIL NFR BLD AUTO: 0.6 % (ref 0.3–6.2)
ERYTHROCYTE [DISTWIDTH] IN BLOOD BY AUTOMATED COUNT: 13.7 % (ref 12.3–15.4)
GLOBULIN UR ELPH-MCNC: 2.6 GM/DL
GLUCOSE BLDC GLUCOMTR-MCNC: 152 MG/DL (ref 70–130)
GLUCOSE BLDC GLUCOMTR-MCNC: 175 MG/DL (ref 70–130)
GLUCOSE BLDC GLUCOMTR-MCNC: 198 MG/DL (ref 70–130)
GLUCOSE BLDC GLUCOMTR-MCNC: 281 MG/DL (ref 70–130)
GLUCOSE SERPL-MCNC: 123 MG/DL (ref 65–99)
HCT VFR BLD AUTO: 25.1 % (ref 34–46.6)
HGB BLD-MCNC: 8.4 G/DL (ref 12–15.9)
IMM GRANULOCYTES # BLD AUTO: 0.24 10*3/MM3 (ref 0–0.05)
IMM GRANULOCYTES NFR BLD AUTO: 1.8 % (ref 0–0.5)
LYMPHOCYTES # BLD AUTO: 3.2 10*3/MM3 (ref 0.7–3.1)
LYMPHOCYTES NFR BLD AUTO: 24.4 % (ref 19.6–45.3)
MAGNESIUM SERPL-MCNC: 1.1 MG/DL (ref 1.6–2.6)
MCH RBC QN AUTO: 30.2 PG (ref 26.6–33)
MCHC RBC AUTO-ENTMCNC: 33.5 G/DL (ref 31.5–35.7)
MCV RBC AUTO: 90.3 FL (ref 79–97)
MONOCYTES # BLD AUTO: 1.1 10*3/MM3 (ref 0.1–0.9)
MONOCYTES NFR BLD AUTO: 8.4 % (ref 5–12)
NEUTROPHILS NFR BLD AUTO: 64.7 % (ref 42.7–76)
NEUTROPHILS NFR BLD AUTO: 8.5 10*3/MM3 (ref 1.7–7)
NRBC BLD AUTO-RTO: 0 /100 WBC (ref 0–0.2)
PLATELET # BLD AUTO: 282 10*3/MM3 (ref 140–450)
PMV BLD AUTO: 9.4 FL (ref 6–12)
POTASSIUM SERPL-SCNC: 3.9 MMOL/L (ref 3.5–5.2)
PROT SERPL-MCNC: 5.1 G/DL (ref 6–8.5)
RBC # BLD AUTO: 2.78 10*6/MM3 (ref 3.77–5.28)
SODIUM SERPL-SCNC: 135 MMOL/L (ref 136–145)
WBC NRBC COR # BLD AUTO: 13.13 10*3/MM3 (ref 3.4–10.8)

## 2024-12-10 PROCEDURE — 63710000001 INSULIN REGULAR HUMAN PER 5 UNITS: Performed by: INTERNAL MEDICINE

## 2024-12-10 PROCEDURE — 85025 COMPLETE CBC W/AUTO DIFF WBC: CPT | Performed by: INTERNAL MEDICINE

## 2024-12-10 PROCEDURE — 87040 BLOOD CULTURE FOR BACTERIA: CPT | Performed by: INTERNAL MEDICINE

## 2024-12-10 PROCEDURE — 83735 ASSAY OF MAGNESIUM: CPT | Performed by: INTERNAL MEDICINE

## 2024-12-10 PROCEDURE — 99254 IP/OBS CNSLTJ NEW/EST MOD 60: CPT | Performed by: INTERNAL MEDICINE

## 2024-12-10 PROCEDURE — 82948 REAGENT STRIP/BLOOD GLUCOSE: CPT

## 2024-12-10 PROCEDURE — 80053 COMPREHEN METABOLIC PANEL: CPT | Performed by: INTERNAL MEDICINE

## 2024-12-10 PROCEDURE — 25010000002 VANCOMYCIN 1000 MG/200ML SOLUTION: Performed by: INTERNAL MEDICINE

## 2024-12-10 PROCEDURE — 25010000002 MAGNESIUM SULFATE IN D5W 1G/100ML (PREMIX) 1-5 GM/100ML-% SOLUTION: Performed by: INTERNAL MEDICINE

## 2024-12-10 PROCEDURE — 25010000002 SODIUM CHLORIDE 0.9 % WITH KCL 20 MEQ 20-0.9 MEQ/L-% SOLUTION: Performed by: INTERNAL MEDICINE

## 2024-12-10 PROCEDURE — 25010000002 VANCOMYCIN 750 MG/150ML SOLUTION: Performed by: INTERNAL MEDICINE

## 2024-12-10 RX ORDER — MAGNESIUM SULFATE 1 G/100ML
1 INJECTION INTRAVENOUS ONCE
Status: COMPLETED | OUTPATIENT
Start: 2024-12-10 | End: 2024-12-10

## 2024-12-10 RX ORDER — VANCOMYCIN 750 MG/150ML
750 INJECTION, SOLUTION INTRAVENOUS EVERY 24 HOURS
Status: DISCONTINUED | OUTPATIENT
Start: 2024-12-10 | End: 2024-12-12

## 2024-12-10 RX ORDER — VANCOMYCIN 1 G/200ML
1000 INJECTION, SOLUTION INTRAVENOUS ONCE
Status: COMPLETED | OUTPATIENT
Start: 2024-12-10 | End: 2024-12-10

## 2024-12-10 RX ADMIN — VANCOMYCIN 750 MG: 750 INJECTION, SOLUTION INTRAVENOUS at 22:02

## 2024-12-10 RX ADMIN — VANCOMYCIN 1000 MG: 1 INJECTION, SOLUTION INTRAVENOUS at 09:46

## 2024-12-10 RX ADMIN — SODIUM BICARBONATE 650 MG: 650 TABLET ORAL at 22:01

## 2024-12-10 RX ADMIN — ASPIRIN 81 MG: 81 TABLET, COATED ORAL at 09:45

## 2024-12-10 RX ADMIN — ACETAMINOPHEN 650 MG: 325 TABLET ORAL at 22:01

## 2024-12-10 RX ADMIN — MAGNESIUM SULFATE IN DEXTROSE 1 G: 10 INJECTION, SOLUTION INTRAVENOUS at 09:46

## 2024-12-10 RX ADMIN — Medication 10 ML: at 22:03

## 2024-12-10 RX ADMIN — ROPINIROLE 1 MG: 1 TABLET, FILM COATED ORAL at 16:16

## 2024-12-10 RX ADMIN — Medication 10 ML: at 09:47

## 2024-12-10 RX ADMIN — SUCRALFATE 1 G: 1 TABLET ORAL at 17:21

## 2024-12-10 RX ADMIN — INSULIN HUMAN 6 UNITS: 100 INJECTION, SOLUTION PARENTERAL at 12:26

## 2024-12-10 RX ADMIN — SODIUM BICARBONATE 650 MG: 650 TABLET ORAL at 09:46

## 2024-12-10 RX ADMIN — SUCRALFATE 1 G: 1 TABLET ORAL at 22:02

## 2024-12-10 RX ADMIN — ACETAMINOPHEN 650 MG: 325 TABLET ORAL at 04:58

## 2024-12-10 RX ADMIN — PANTOPRAZOLE SODIUM 40 MG: 40 TABLET, DELAYED RELEASE ORAL at 05:04

## 2024-12-10 RX ADMIN — INSULIN HUMAN 2 UNITS: 100 INJECTION, SOLUTION PARENTERAL at 09:45

## 2024-12-10 RX ADMIN — INSULIN HUMAN 2 UNITS: 100 INJECTION, SOLUTION PARENTERAL at 17:21

## 2024-12-10 RX ADMIN — SUCRALFATE 1 G: 1 TABLET ORAL at 09:45

## 2024-12-10 RX ADMIN — POTASSIUM CHLORIDE AND SODIUM CHLORIDE 100 ML/HR: 900; 150 INJECTION, SOLUTION INTRAVENOUS at 05:26

## 2024-12-10 RX ADMIN — ROPINIROLE 1 MG: 1 TABLET, FILM COATED ORAL at 09:46

## 2024-12-10 RX ADMIN — ROPINIROLE 1 MG: 1 TABLET, FILM COATED ORAL at 22:01

## 2024-12-10 RX ADMIN — ACETAMINOPHEN 650 MG: 325 TABLET ORAL at 01:13

## 2024-12-10 RX ADMIN — SUCRALFATE 1 G: 1 TABLET ORAL at 12:26

## 2024-12-10 RX ADMIN — ATORVASTATIN CALCIUM 40 MG: 40 TABLET, FILM COATED ORAL at 22:01

## 2024-12-10 RX ADMIN — INSULIN HUMAN 2 UNITS: 100 INJECTION, SOLUTION PARENTERAL at 22:02

## 2024-12-10 NOTE — PROGRESS NOTES
[facility]  INPATIENT PROGRESS NOTE    Patient Name: Tasha Perez  Date of Admission: 12/8/2024  Today's Date: 12/10/24  Length of Stay: 2  Primary Care Physician: KIRILL Murguia MD    Subjective   Chief Complaint: follow-up nausea and vomiting  Urinary Retention  Dizziness     Patient reports she is tolerating her diet well today.  However she had fevers early this morning.  She has leukocytosis and now blood cultures from her PICC line 2 days ago are growing gram-positive cocci.     Review of Systems   Neurological:  Negative for dizziness.      All pertinent negatives and positives are as above. All other systems have been reviewed and are negative unless otherwise stated.     Objective    Temp:  [98.6 °F (37 °C)-101 °F (38.3 °C)] 98.6 °F (37 °C)  Heart Rate:  [] 99  Resp:  [16-18] 16  BP: ()/(56-71) 98/56  Physical Exam  Vitals reviewed.   Constitutional:       General: She is not in acute distress.     Appearance: She is ill-appearing. She is not toxic-appearing.      Comments: Thin and chronically ill appearing woman.   HENT:      Head: Normocephalic.      Nose:      Comments: Temporary gastric stimulator noted entering left nare  Eyes:      Pupils: Pupils are equal, round, and reactive to light.   Cardiovascular:      Rate and Rhythm: Normal rate.   Pulmonary:      Effort: Pulmonary effort is normal. No respiratory distress.   Abdominal:      General: There is no distension.      Palpations: Abdomen is soft.      Tenderness: There is no abdominal tenderness.   Neurological:      General: No focal deficit present.      Mental Status: She is alert. Mental status is at baseline.   Psychiatric:         Mood and Affect: Mood normal.       Results Review:  I have reviewed the labs, radiology results, and diagnostic studies.    Laboratory Data:   Results from last 7 days   Lab Units 12/10/24  0319 12/09/24  0435 12/08/24  1319   WBC 10*3/mm3  "13.13* 12.96* 11.64*   HEMOGLOBIN g/dL 8.4* 9.5* 10.7*   HEMATOCRIT % 25.1* 29.8* 32.1*   PLATELETS 10*3/mm3 282 304 326        Results from last 7 days   Lab Units 12/10/24  0319 12/09/24  0435 12/08/24  1503 12/08/24  1319   SODIUM mmol/L 135* 139  --  130*   POTASSIUM mmol/L 3.9 3.1*  --  2.9*   CHLORIDE mmol/L 109* 111*  --  99   CO2 mmol/L 17.0* 17.0*  --  17.0*   BUN mg/dL 19 34*  --  56*   CREATININE mg/dL 1.24* 1.65*  --  2.75*   CALCIUM mg/dL 7.1* 7.6*  --  7.8*   BILIRUBIN mg/dL <0.2  --   --  <0.2   ALK PHOS U/L 112  --  149* 182*   ALT (SGPT) U/L 16  --   --  33   AST (SGOT) U/L 15  --   --  25   GLUCOSE mg/dL 123* 71  --  241*       Culture Data:   No results found for: \"BLOODCX\", \"URINECX\", \"WOUNDCX\", \"MRSACX\", \"RESPCX\", \"STOOLCX\"    Radiology Data:   Imaging Results (Last 24 Hours)       ** No results found for the last 24 hours. **            I have reviewed the patient's current medications.     Assessment/Plan   Assessment  Active Hospital Problems    Diagnosis     Hyponatremia     Gastroparesis     Acute renal failure superimposed on stage 3b chronic kidney disease     Hypokalemia     Type 2 diabetes mellitus, with long-term current use of insulin     Severe malnutrition    Fevers and positive blood cultures from PICC line    Treatment Plan  Patient had fevers early this morning and blood culture from PICC line growing gram-positive cocci's.  Will repeat blood cultures from PICC and left and right arms.  In the meantime we will start IV vancomycin and consult infectious disease for further recommendations.  Renal function is improving.  Discontinue IV fluids  Continue KCl supplement  Diet advanced to ADA diet (GI soft, bland - patient is tolerating regular diet.  Repeat A1c  SSI coverage only for now  Resume home medications as appropriate  Patient had a temporary gastric stimulator placed on Friday, 12/6.  Patient indicated that plans are in place for a permanent stimulator to be placed on " Monday, 12/16.  She contacted her GI specialist in [place], [place] this morning and they recommended that she transition from the green lead to the black lead on her temporary gastric stimulator; she reported that her symptoms were much better after making this change.  Dispo: Planning in progress.  Patient would like to be discharged prior to Sunday so she can meet up with her GI specialist appointment in [place] on Monday.  However blood cultures are positive at this time and this will need to be sorted out.    Medical Decision Making  Number and Complexity of problems: Moderate complexity      Conditions and Status        Condition is improving.     MDM Data  External documents reviewed: none  Cardiac tracing (EKG, telemetry) interpretation: no new EKGs  Radiology interpretation: no new radiology studies this AM  Labs reviewed: as above  Any tests that were considered but not ordered: none     Decision rules/scores evaluated (example [id number], [place], etc): none     Discussed with: patient     Care Planning  Shared decision making: Discussed with patient with agreement to proceed with treatment plan as outlined  Code status and discussions: Full code    Disposition  Social Determinants of Health that impact treatment or disposition: None apparent at this time  I expect the patient to be discharged to home in the next 3 to 4 days if blood cultures are negative.    Electronically signed by Donnie Wright MD, 12/10/24, 14:26 CST.

## 2024-12-10 NOTE — CONSULTS
"INFECTIOUS DISEASES CONSULT NOTE    Patient:  Tasha Perez 54 y.o. female  ROOM # 407/1  YOB: 1970  MRN: [id number]  CSN:  [id number]  Admit date: 12/8/2024   Admitting Physician: Donnie Wright MD  Primary Care Physician: KIRILL Murguia MD  REFERRING PROVIDER: No ref. provider found    Reason for Consultation: \"Fevers, gram-positive cocci in blood culture, chronic PICC line for outpatient IV fluids\"    History of Present Illness/Chief Complaint: Pleasant 54-year-old woman.  She indicates she had been feeling well.  She had been weak and tired.  She reports feeling dizzy.  She indicates she had to keep her eyes directed somewhat downward.  If she would look straight ahead or she would feel some dizziness and would feel out of balance.  She has had some running and stuffy nose.  She has had some low-grade fever.  Her grandson who lives with her has had some recent respiratory symptoms.  Her main symptom remains generalized weakness and fatigue.  She has recently had appointment with a gastric motility clinic and [place].  She currently has a nasogastric gastric stimulator in place.  She indicates that as a trial with possible placement of a permanent gastric stimulator.  She has a PICC line that has been in place since February.  She receives IV fluids at home to help minimize problems with recurrent episodes of volume depletion.  She has been receiving antibiotic treatment with vancomycin.  Blood cultures have demonstrated gram-positive cocci.  Infectious diseases asked to evaluate and offer recommendations.    Patient was seen and examined the evening of December 10, 2024.  Note initiated shortly after I saw her on December 10.  Note completed the morning of December 11, 2024.    Current Scheduled Medications:   aspirin, 81 mg, Oral, Daily  atorvastatin, 40 mg, Oral, Nightly  insulin regular, 2-9 Units, Subcutaneous, 4x Daily AC & at Bedtime  nicotine, 1 patch, Transdermal, " "Q24H  pantoprazole, 40 mg, Oral, Q AM  rOPINIRole, 1 mg, Oral, TID  sodium bicarbonate, 650 mg, Oral, BID  sodium chloride, 10 mL, Intravenous, Q12H  sucralfate, 1 g, Oral, 4x Daily AC & at Bedtime  vancomycin, 750 mg, Intravenous, Q24H      Current PRN Medications:    acetaminophen **OR** acetaminophen **OR** acetaminophen    senna-docusate sodium **AND** polyethylene glycol **AND** bisacodyl    dextrose    dextrose    influenza vaccine    nicotine polacrilex    ondansetron ODT **OR** ondansetron    sodium chloride    sodium chloride    sodium chloride    sodium chloride    Allergies:    Allergies   Allergen Reactions    Bee Venom Anaphylaxis    Hydrocodone Anaphylaxis    Ibuprofen Other (See Comments)     \"SHUTS MY KIDNEYS DOWN\" PER PATIENT     Pineapple Anaphylaxis    Pineapple Extract Anaphylaxis    Wasp Venom Anaphylaxis    Wasp Venom Protein Anaphylaxis    Hydrocodone-Acetaminophen Dizziness, Nausea And Vomiting, Nausea Only and Unknown - Low Severity    Penicillins Nausea And Vomiting    Sitagliptin Other (See Comments)     Other reaction(s): Abdominal Pain      Metformin Other (See Comments)     Pt states it messes with her kidneys    Chocolate Rash     Dark chocolate only, told to RD 7/10/24     Past Medical History: Has had chronic PICC line in place for administration of IV fluids at home.  Recent evaluation by GI motility clinic in [place] with placement of a gastric stimulator via nasopharynx.  Previous bloodstream infection with stenotrophomonas.  Recurrent episodes of volume depletion/acute renal dysfunction.  Arthritis.  Diabetes mellitus.  Elevated cholesterol.  Fibromyalgia.  Osteoporosis.  Pancreatitis.  Raynaud's.  Renal disease.  Tobacco use.     Past Surgical History: Appendectomy.  Cholecystectomy.  Endoscopy with gastrostomy tube insertion.  Enteroscopy of small bowel.  Hysterectomy.     Social History: She smokes tobacco.  No alcohol use.  No illicit drug use.  She indicates she lives " "with her  and daughter in [facility] which is in [facility].     Family History: Adopted     Exposure History: No close contacts have been ill    Review of Systems see HPI.  No dyspnea.  No diarrhea.  No abdominal pain.  No urinary tract complaints.    Vital Signs:  /71 (BP Location: Left arm, Patient Position: Lying)   Pulse 103   Temp 98.5 °F (36.9 °C) (Oral)   Resp 18   Ht 154.9 cm (61\")   Wt 38.2 kg (84 lb 4 oz)   SpO2 100%   BMI 15.92 kg/m²  Temp (24hrs), Av.5 °F (37.5 °C), Min:98.5 °F (36.9 °C), Max:101 °F (38.3 °C)    Physical Exam  Vital signs - reviewed.  Line/IV (PICC) site - No erythema or tenderness.  Alert, pleasant, no distress  Sclera nonicteric  Lungs clear to auscultation without crackles  Heart regular rhythm without murmur  Abdomen soft and nontender  Nasogastric stimulator in place  Skin without rash    Lab Results:  CBC:   Results from last 7 days   Lab Units 12/10/24  0319 24  0435 24  1319   WBC 10*3/mm3 13.13* 12.96* 11.64*   HEMOGLOBIN g/dL 8.4* 9.5* 10.7*   HEMATOCRIT % 25.1* 29.8* 32.1*   PLATELETS 10*3/mm3 282 304 326     CMP:   Results from last 7 days   Lab Units 12/10/24  0319 24  0435 24  1503 24  1319   SODIUM mmol/L 135* 139  --  130*   POTASSIUM mmol/L 3.9 3.1*  --  2.9*   CHLORIDE mmol/L 109* 111*  --  99   CO2 mmol/L 17.0* 17.0*  --  17.0*   BUN mg/dL 19 34*  --  56*   CREATININE mg/dL 1.24* 1.65*  --  2.75*   CALCIUM mg/dL 7.1* 7.6*  --  7.8*   BILIRUBIN mg/dL <0.2  --   --  <0.2   ALK PHOS U/L 112  --  149* 182*   ALT (SGPT) U/L 16  --   --  33   AST (SGOT) U/L 15  --   --  25   GLUCOSE mg/dL 123* 71  --  241*     Culture results:  Blood cultures 2024-no growth  Blood cultures 2024-micrococcus species (likely contaminant)  Blood cultures December 10, 2024-pending  Blood cultures December 10, 2024-pending    Radiology:   Chest x-ray 2024:  IMPRESSION:  1. Well-expanded lungs. No " acute cardiopulmonary process identified.  Enteric tubes present within the stomach.      Additional Studies Reviewed: None    Impression:   1.  Generalized weakness and fatigue.  Also experiencing fever.  Although the positive blood culture for micrococcus likely represents contaminant, remain concerned about the possibility of PICC line infection contributing to her fever given the duration of PICC line has been in place and lack of other definite localizing symptoms.  She has had some very minor upper respiratory symptoms and URI could explain fever, but symptoms are rather mild and not definitive.  2.  History of problems with GI motility and recurrent episodes of volume depletion  3.  Diabetes mellitus  4.  History of pancreatitis    Recommendations:    Continue treatment with vancomycin  Await follow-up blood culture results  Would recommend PICC line removal  Would see if adequate oral intake can be maintained with gastric stimulator in place  Hopefully could avoid need for repeat PICC insertion  Ischial recommendations depending upon culture results and clinical course  Continue to follow    Ramin Chavez MD  12/10/24  17:54 CST

## 2024-12-10 NOTE — PLAN OF CARE
Goal Outcome Evaluation:            Pt ws unhappy with the gi soft diet-states she can eat whatever she wants per her gi doctor-left the floor at end of shft

## 2024-12-10 NOTE — PLAN OF CARE
Goal Outcome Evaluation:  Plan of Care Reviewed With: patient        Progress: no change  Outcome Evaluation: VSS. A&Ox4. Fluids infusing. No c/o pain. -124 PVCs on tele. Safety maintained.

## 2024-12-10 NOTE — TELEPHONE ENCOUNTER
Message sent to [facility]    Consult   Tasha Perez 1970   Nyla @ [facility] [phone] called in consult. Dr Wright is consulting for Fevers, GPC in blood culture. Has chronic PICC line for outpatient IV fluids.  The patient is in room 407. You last saw  patient in the office on 3/8/2024 for history of Stenotrophomonas bloodstream infection.

## 2024-12-10 NOTE — PROGRESS NOTES
"Pharmacy Dosing Service  Pharmacokinetics  Vancomycin Initial Evaluation  Assessment/Action/Plan:  Loading dose?: 1000 mg  Current Order: Vancomycin 750 mg IVPB every 24 hours  Current end date:12/16/24  Levels: Not ordered at this time  Additional antimicrobial agent(s): none  MRSA Nasal PCR ordered: No    Vancomycin dosage initiated based on population pharmacokinetic parameters. Pharmacy will continue to follow daily and adjust dose accordingly.     Subjective:  Tasha Perez is a 54 y.o. female with a Vancomycin \"Pharmacy to Dose\" consult for the treatment of Bacteremia , day 1 of 7 of treatment.    AUC Model Data:  Loading dose: 1000 mg at 10:00 12/10/2024.  Regimen: 750 mg IV every 24 hours.  Start time: 21:00 on 12/10/2024  Exposure target: AUC24 (range)400-600 mg/L.hr   AUC24,ss: 455 mg/L.hr  Probability of AUC24 > 400: 65 %  Ctrough,ss: 13.6 mg/L  Probability of Ctrough,ss > 20: 15 %  Probability of nephrotoxicity (Lodise LALI 2009): 9 %    Objective:  Ht: 154.9 cm (61\"); Wt: 38.2 kg (84 lb 4 oz)  Estimated Creatinine Clearance: 31.6 mL/min (A) (by C-G formula based on SCr of 1.24 mg/dL (H)).   Creatinine   Date Value Ref Range Status   12/10/2024 1.24 (H) 0.57 - 1.00 mg/dL Final   12/09/2024 1.65 (H) 0.57 - 1.00 mg/dL Final   12/08/2024 2.75 (H) 0.57 - 1.00 mg/dL Final   09/08/2021 1.1 (H) 0.5 - 0.9 mg/dL Final   09/07/2021 2.2 (H) 0.5 - 0.9 mg/dL Final      Lab Results   Component Value Date    WBC 13.13 (H) 12/10/2024    WBC 12.96 (H) 12/09/2024    WBC 11.64 (H) 12/08/2024      Baseline culture results:  Microbiology Results (last 10 days)       Procedure Component Value - Date/Time    Blood Culture - Blood, Hand, Right [[id number]]  (Normal) Collected: 12/08/24 1319    Lab Status: Preliminary result Specimen: Blood from Hand, Right Updated: 12/09/24 1346     Blood Culture No growth at 24 hours    Blood Culture - Blood, Blood, PICC Line [[id number]]  (Abnormal) Collected: 12/08/24 1319    Lab Status: " Preliminary result Specimen: Blood, PICC Line Updated: 12/10/24 0829     Blood Culture Abnormal Stain     Gram Stain Aerobic Bottle Gram positive cocci in clusters            L Emiliano Leggett RPH  12/10/24 08:43 CST

## 2024-12-11 PROBLEM — R50.9 FEVER: Status: ACTIVE | Noted: 2024-12-11

## 2024-12-11 LAB
25(OH)D3 SERPL-MCNC: 11.3 NG/ML (ref 30–100)
ANION GAP SERPL CALCULATED.3IONS-SCNC: 9 MMOL/L (ref 5–15)
BUN SERPL-MCNC: 15 MG/DL (ref 6–20)
BUN/CREAT SERPL: 13.3 (ref 7–25)
CALCIUM SPEC-SCNC: 7.1 MG/DL (ref 8.6–10.5)
CHLORIDE SERPL-SCNC: 106 MMOL/L (ref 98–107)
CO2 SERPL-SCNC: 19 MMOL/L (ref 22–29)
CREAT SERPL-MCNC: 1.13 MG/DL (ref 0.57–1)
EGFRCR SERPLBLD CKD-EPI 2021: 58.3 ML/MIN/1.73
GLUCOSE BLDC GLUCOMTR-MCNC: 152 MG/DL (ref 70–130)
GLUCOSE BLDC GLUCOMTR-MCNC: 199 MG/DL (ref 70–130)
GLUCOSE BLDC GLUCOMTR-MCNC: 205 MG/DL (ref 70–130)
GLUCOSE BLDC GLUCOMTR-MCNC: 221 MG/DL (ref 70–130)
GLUCOSE SERPL-MCNC: 176 MG/DL (ref 65–99)
MAGNESIUM SERPL-MCNC: 1.1 MG/DL (ref 1.6–2.6)
MAGNESIUM SERPL-MCNC: 2.7 MG/DL (ref 1.6–2.6)
PHOSPHATE SERPL-MCNC: 1 MG/DL (ref 2.5–4.5)
PHOSPHATE SERPL-MCNC: 4 MG/DL (ref 2.5–4.5)
POTASSIUM SERPL-SCNC: 4 MMOL/L (ref 3.5–5.2)
PTH-INTACT SERPL-MCNC: 84.7 PG/ML (ref 15–65)
SODIUM SERPL-SCNC: 134 MMOL/L (ref 136–145)

## 2024-12-11 PROCEDURE — 80048 BASIC METABOLIC PNL TOTAL CA: CPT | Performed by: INTERNAL MEDICINE

## 2024-12-11 PROCEDURE — 25810000003 SODIUM CHLORIDE 0.9 % SOLUTION 250 ML FLEX CONT: Performed by: INTERNAL MEDICINE

## 2024-12-11 PROCEDURE — 83735 ASSAY OF MAGNESIUM: CPT | Performed by: INTERNAL MEDICINE

## 2024-12-11 PROCEDURE — 25010000002 MAGNESIUM SULFATE 2 GM/50ML SOLUTION: Performed by: INTERNAL MEDICINE

## 2024-12-11 PROCEDURE — 84100 ASSAY OF PHOSPHORUS: CPT | Performed by: INTERNAL MEDICINE

## 2024-12-11 PROCEDURE — 83970 ASSAY OF PARATHORMONE: CPT | Performed by: INTERNAL MEDICINE

## 2024-12-11 PROCEDURE — 82306 VITAMIN D 25 HYDROXY: CPT | Performed by: INTERNAL MEDICINE

## 2024-12-11 PROCEDURE — 82948 REAGENT STRIP/BLOOD GLUCOSE: CPT

## 2024-12-11 PROCEDURE — 99232 SBSQ HOSP IP/OBS MODERATE 35: CPT | Performed by: INTERNAL MEDICINE

## 2024-12-11 PROCEDURE — 63710000001 INSULIN REGULAR HUMAN PER 5 UNITS: Performed by: INTERNAL MEDICINE

## 2024-12-11 PROCEDURE — 87070 CULTURE OTHR SPECIMN AEROBIC: CPT | Performed by: INTERNAL MEDICINE

## 2024-12-11 PROCEDURE — 25810000003 SODIUM CHLORIDE 0.9 % SOLUTION: Performed by: INTERNAL MEDICINE

## 2024-12-11 RX ORDER — SODIUM CHLORIDE 9 MG/ML
75 INJECTION, SOLUTION INTRAVENOUS CONTINUOUS
Status: DISPENSED | OUTPATIENT
Start: 2024-12-11 | End: 2024-12-12

## 2024-12-11 RX ORDER — MAGNESIUM SULFATE HEPTAHYDRATE 40 MG/ML
2 INJECTION, SOLUTION INTRAVENOUS
Status: COMPLETED | OUTPATIENT
Start: 2024-12-11 | End: 2024-12-11

## 2024-12-11 RX ADMIN — INSULIN HUMAN 4 UNITS: 100 INJECTION, SOLUTION PARENTERAL at 11:27

## 2024-12-11 RX ADMIN — ACETAMINOPHEN 650 MG: 325 TABLET ORAL at 08:23

## 2024-12-11 RX ADMIN — NICOTINE 1 PATCH: 14 PATCH, EXTENDED RELEASE TRANSDERMAL at 15:47

## 2024-12-11 RX ADMIN — ROPINIROLE 1 MG: 1 TABLET, FILM COATED ORAL at 08:23

## 2024-12-11 RX ADMIN — POTASSIUM PHOSPHATE, MONOBASIC AND POTASSIUM PHOSPHATE, DIBASIC 15 MMOL: 224; 236 INJECTION, SOLUTION, CONCENTRATE INTRAVENOUS at 15:46

## 2024-12-11 RX ADMIN — MAGNESIUM SULFATE HEPTAHYDRATE 2 G: 2 INJECTION, SOLUTION INTRAVENOUS at 11:27

## 2024-12-11 RX ADMIN — POTASSIUM PHOSPHATE, MONOBASIC AND POTASSIUM PHOSPHATE, DIBASIC 15 MMOL: 224; 236 INJECTION, SOLUTION, CONCENTRATE INTRAVENOUS at 06:31

## 2024-12-11 RX ADMIN — INSULIN HUMAN 2 UNITS: 100 INJECTION, SOLUTION PARENTERAL at 08:23

## 2024-12-11 RX ADMIN — SODIUM BICARBONATE 650 MG: 650 TABLET ORAL at 08:23

## 2024-12-11 RX ADMIN — MAGNESIUM SULFATE HEPTAHYDRATE 2 G: 2 INJECTION, SOLUTION INTRAVENOUS at 08:22

## 2024-12-11 RX ADMIN — INSULIN HUMAN 2 UNITS: 100 INJECTION, SOLUTION PARENTERAL at 21:23

## 2024-12-11 RX ADMIN — INSULIN HUMAN 2 UNITS: 100 INJECTION, SOLUTION PARENTERAL at 16:51

## 2024-12-11 RX ADMIN — SUCRALFATE 1 G: 1 TABLET ORAL at 08:23

## 2024-12-11 RX ADMIN — Medication 10 ML: at 11:39

## 2024-12-11 RX ADMIN — SUCRALFATE 1 G: 1 TABLET ORAL at 16:51

## 2024-12-11 RX ADMIN — SODIUM BICARBONATE 650 MG: 650 TABLET ORAL at 21:23

## 2024-12-11 RX ADMIN — ASPIRIN 81 MG: 81 TABLET, COATED ORAL at 08:23

## 2024-12-11 RX ADMIN — ATORVASTATIN CALCIUM 40 MG: 40 TABLET, FILM COATED ORAL at 21:23

## 2024-12-11 RX ADMIN — POTASSIUM PHOSPHATE, MONOBASIC AND POTASSIUM PHOSPHATE, DIBASIC 15 MMOL: 224; 236 INJECTION, SOLUTION, CONCENTRATE INTRAVENOUS at 11:27

## 2024-12-11 RX ADMIN — ACETAMINOPHEN 650 MG: 325 TABLET ORAL at 16:51

## 2024-12-11 RX ADMIN — SUCRALFATE 1 G: 1 TABLET ORAL at 21:23

## 2024-12-11 RX ADMIN — SODIUM CHLORIDE 75 ML/HR: 9 INJECTION, SOLUTION INTRAVENOUS at 11:27

## 2024-12-11 RX ADMIN — ROPINIROLE 1 MG: 1 TABLET, FILM COATED ORAL at 15:47

## 2024-12-11 RX ADMIN — SUCRALFATE 1 G: 1 TABLET ORAL at 11:38

## 2024-12-11 RX ADMIN — MAGNESIUM SULFATE HEPTAHYDRATE 2 G: 2 INJECTION, SOLUTION INTRAVENOUS at 06:31

## 2024-12-11 RX ADMIN — ROPINIROLE 1 MG: 1 TABLET, FILM COATED ORAL at 21:23

## 2024-12-11 RX ADMIN — PANTOPRAZOLE SODIUM 40 MG: 40 TABLET, DELAYED RELEASE ORAL at 06:18

## 2024-12-11 NOTE — PLAN OF CARE
Goal Outcome Evaluation:  Plan of Care Reviewed With: patient        Progress: no change  Outcome Evaluation: VSS. A&Ox4. C/o H/A and ran a low fever. See MAR for intervention. Phosphorus and mag levels low this morning. Will follow electrolyte replacement protocol. -135. Safety maintained.

## 2024-12-11 NOTE — CONSULTS
Nephrology ([facility]) Consult Note      Patient:  Tasha Perez  YOB: 1970  Date of Service: 12/10/2024  MRN: [id number]   Acct: [id number]   Primary Care Physician: KIRILL Murguia MD  Advance Directive:   Code Status and Medical Interventions: CPR (Attempt to Resuscitate); Full Support   Ordered at: 12/08/24 1539     Level Of Support Discussed With:    Patient     Code Status (Patient has no pulse and is not breathing):    CPR (Attempt to Resuscitate)     Medical Interventions (Patient has pulse or is breathing):    Full Support     Admit Date: 12/8/2024       Hospital Day: 2  Referring Provider: No ref. provider found      Patient personally seen and examined.  Complete chart including Consults, Notes, Operative Reports, Labs, Cardiology, and Radiology studies reviewed as able.        Subjective:  Tasha Perez is a 54 y.o. female for whom we were consulted for evaluation and treatment of acute kidney injury. History of recurrent hospitalization for SUGAR due to volume depletion. History of chronic GI illnesses including nausea/vomiting and chronic pancreatitis. She had a PICC line and typically received outpatient IV fluids every other day.  She had had worsening renal failure along with reported nausea and vomiting so presented to the emergency room for further evaluation.  As is typical, for renal function improved with fluids.  We were consulted on hospital day 3 for further evaluation.  Patient noted clinical improvement.  No new complaints upon questioning.  Had questions about fever earlier today.    Allergies:  Bee venom, Hydrocodone, Ibuprofen, Pineapple, Pineapple extract, Wasp venom, Wasp venom protein, Hydrocodone-acetaminophen, Penicillins, Sitagliptin, Metformin, and Chocolate    Home Meds:  Medications Prior to Admission   Medication Sig Dispense Refill Last Dose/Taking    aspirin 81 MG EC tablet Take 1 tablet by mouth Daily.   12/7/2024    atorvastatin  (LIPITOR) 80 MG tablet Take 1 tablet by mouth Every Night.   12/7/2024    Calcium Carbonate-Vitamin D 600-5 MG-MCG tablet Take 1 tablet by mouth Daily.   12/7/2024    cetirizine (zyrTEC) 10 MG tablet Take 1 tablet by mouth Daily As Needed for Allergies.   12/7/2024    Cholecalciferol (Vitamin D3) 50 MCG (2000 UT) capsule Take 1 capsule by mouth Daily.   12/7/2024    dapagliflozin Propanediol (Farxiga) 10 MG tablet Take 10 mg by mouth Daily.   12/7/2024    DULoxetine (CYMBALTA) 60 MG capsule Take 1 capsule by mouth Daily.   12/7/2024    hydroxychloroquine (PLAQUENIL) 200 MG tablet Take 2 tablets by mouth Daily.   12/7/2024    insulin aspart (NovoLOG) 100 UNIT/ML patient supplied pump Inject 1 Units under the skin into the appropriate area as directed Continuous.   Past Month    linaclotide (LINZESS) 290 MCG capsule capsule Take 1 capsule by mouth Every Morning Before Breakfast.   12/7/2024    miSOPROStol (CYTOTEC) 200 MCG tablet Take 1 tablet by mouth 4 (Four) Times a Day.   12/7/2024    nortriptyline (PAMELOR) 25 MG capsule Take 1 capsule by mouth Every Night.   12/7/2024    omeprazole (priLOSEC) 40 MG capsule Take 1 capsule by mouth Every Other Day.   12/7/2024    promethazine (PHENERGAN) 25 MG tablet Take 1 tablet by mouth Every 6 (Six) Hours As Needed for Nausea or Vomiting.   Past Week    rOPINIRole (REQUIP) 1 MG tablet Take 1 tablet by mouth 3 (Three) Times a Day.   12/7/2024    sodium bicarbonate 650 MG tablet Take 1 tablet by mouth 2 (Two) Times a Day.   12/7/2024    sucralfate (CARAFATE) 1 g tablet Take 1 tablet by mouth 4 (Four) Times a Day.   12/7/2024       Medicines:  Current Facility-Administered Medications   Medication Dose Route Frequency Provider Last Rate Last Admin    acetaminophen (TYLENOL) tablet 650 mg  650 mg Oral Q4H PRN Rosmery Del Rosario APRN   650 mg at 12/10/24 0458    Or    acetaminophen (TYLENOL) 160 MG/5ML oral solution 650 mg  650 mg Oral Q4H PRN Rosmery Del Rosario, APRN        Or     acetaminophen (TYLENOL) suppository 650 mg  650 mg Rectal Q4H PRN Rosmery Del Rosario APRN        aspirin EC tablet 81 mg  81 mg Oral Daily Otto Del Rosario MD   81 mg at 12/10/24 0945    atorvastatin (LIPITOR) tablet 40 mg  40 mg Oral Nightly Otto Del Rosario MD   40 mg at 12/09/24 2127    sennosides-docusate (PERICOLACE) 8.6-50 MG per tablet 2 tablet  2 tablet Oral BID PRN Rosmery Del Rosario APRN        And    polyethylene glycol (MIRALAX) packet 17 g  17 g Oral Daily PRN Rosmery Del Rosario APRN        And    bisacodyl (DULCOLAX) suppository 10 mg  10 mg Rectal Daily PRN Rosmery Del Rosario APRN        dextrose (D50W) (25 g/50 mL) IV injection 25 g  25 g Intravenous Q15 Min PRN Rosmery Del Rosario APRN        dextrose (GLUTOSE) oral gel 15 g  15 g Oral Q15 Min PRN Rosmery Del Rosario APRN        influenza virus vacc split PF FLUZONE 0.5 mL  0.5 mL Intramuscular During Hospitalization Donnie Wright MD        insulin regular (humuLIN R,novoLIN R) injection 2-9 Units  2-9 Units Subcutaneous 4x Daily AC & at Bedtime Otto Del Rosario MD   2 Units at 12/10/24 1721    nicotine (NICODERM CQ) 14 MG/24HR patch 1 patch  1 patch Transdermal Q24H Rosmery Del Rosario APRN   1 patch at 12/08/24 1619    nicotine polacrilex (NICORETTE) gum 2 mg  2 mg Mouth/Throat Q1H PRN Rosmery Del Rosario APRN        ondansetron ODT (ZOFRAN-ODT) disintegrating tablet 4 mg  4 mg Oral Q6H PRN Rosmery Del Rosario APRN        Or    ondansetron (ZOFRAN) injection 4 mg  4 mg Intravenous Q6H PRN Rosmery Del Rosario APRN        pantoprazole (PROTONIX) EC tablet 40 mg  40 mg Oral Q AM Otto Del Rosario MD   40 mg at 12/10/24 0504    rOPINIRole (REQUIP) tablet 1 mg  1 mg Oral TID Donnie Wright MD   1 mg at 12/10/24 1616    sodium bicarbonate tablet 650 mg  650 mg Oral BID Otto Del Rosario MD   650 mg at 12/10/24 0946    sodium chloride 0.9 % flush 10 mL  10 mL Intravenous PRN Donnie Wright MD        sodium chloride 0.9 %  flush 10 mL  10 mL Intravenous Q12H Rosmery Del Rosario APRN   10 mL at 12/10/24 0947    sodium chloride 0.9 % flush 10 mL  10 mL Intravenous PRN Rosmery Del Rosario APRN        sodium chloride 0.9 % flush 20 mL  20 mL Intravenous PRN Donnie Wright MD        sodium chloride 0.9 % infusion 40 mL  40 mL Intravenous PRN Rosmery Del Rosario APRN        sucralfate (CARAFATE) tablet 1 g  1 g Oral 4x Daily AC & at Bedtime Otto Del Rosario MD   1 g at 12/10/24 1721    vancomycin 750 mg/150 mL 0.9% NS IVPB  750 mg Intravenous Q24H Donnie Wright MD           Past Medical History:  Past Medical History:   Diagnosis Date    Arthritis     Contusion     Degenerative disc disease, cervical     Diabetes mellitus     Elevated cholesterol     Fibromyalgia     Neuropathy     Osteoporosis     Pancreatitis     Raynaud disease     Renal insufficiency     Smoker 02/08/2022    Vitamin D deficiency 04/26/2022       Past Surgical History:  Past Surgical History:   Procedure Laterality Date    APPENDECTOMY      CHOLECYSTECTOMY      COLONOSCOPY      ENDOSCOPY N/A 10/08/2019    Procedure: ESOPHAGOGASTRODUODENOSCOPY WITH ANESTHESIA;  Surgeon: Aleks Vaughn DO;  Location: [facility] ENDOSCOPY;  Service: Gastroenterology    ENDOSCOPY N/A 11/12/2024    Procedure: ESOPHAGOGASTRODUODENOSCOPY WITH ANESTHESIA;  Surgeon: Tiffanie Saucedo MD;  Location: [facility] ENDOSCOPY;  Service: Gastroenterology;  Laterality: N/A;  pre op: Gastroparesis, Intractable nausea  post op: insertion of dobhoff   PCP: Brandon Murguia    ENDOSCOPY N/A 11/15/2024    Procedure: ESOPHAGOGASTRODUODENOSCOPY WITH ANESTHESIA WITH NG TUBE INSERTION;  Surgeon: Tiffanie Saucedo MD;  Location: [facility] ENDOSCOPY;  Service: Gastroenterology;  Laterality: N/A;  PRE OP: DOBHOFF  POST OP: DOBHOFF  PCP: TELLY PACK    ENDOSCOPY WITH GASTROSTOMY TUBE INSERTION N/A 6/15/2022    Procedure: ESOPHAGOGASTRODUODENOSCOPY WITH GASTROSTOMY TUBE INSERTION;  Surgeon: Jersey Lee MD;   Location:  [facility] ENDOSCOPY;  Service: Gastroenterology;  Laterality: N/A;  pre peg placement  post peg placement  Dr. Murguia    ENTEROSCOPY SMALL BOWEL N/A 11/3/2022    Procedure: Esophagogastroduodenoscopy with Peg Removal;  Surgeon: Aleks Vaughn DO;  Location:  [facility] ENDOSCOPY;  Service: Gastroenterology;  Laterality: N/A;  pre; peg removal  post; peg removal   KIRILL Murguia MD        HYSTERECTOMY         Family History  Family History   Adopted: Yes   Problem Relation Age of Onset    Colon polyps Neg Hx     Colon cancer Neg Hx        Social History  Social History     Socioeconomic History    Marital status:    Tobacco Use    Smoking status: Every Day     Current packs/day: 0.50     Average packs/day: 0.5 packs/day for 31.9 years (16.0 ttl pk-yrs)     Types: Cigarettes     Start date: 1993   Vaping Use    Vaping status: Never Used   Substance and Sexual Activity    Alcohol use: No    Drug use: No    Sexual activity: Defer         Review of Systems:  History obtained from chart review and the patient  General ROS: No fever or chills  Respiratory ROS: No cough, shortness of breath, wheezing  Cardiovascular ROS: No chest pain or palpitations  Gastrointestinal ROS: No abdominal pain or melena  Genito-Urinary ROS: No dysuria or hematuria  Psych ROS: No anxiety and depression  14 point ROS reviewed with the patient and negative except as noted above and in the HPI unless unable to obtain.      Objective:  Patient Vitals for the past 24 hrs:   BP Temp Temp src Pulse Resp SpO2   12/10/24 1500 101/71 98.5 °F (36.9 °C) Oral 103 18 100 %   12/10/24 1044 98/56 98.6 °F (37 °C) Oral 99 16 100 %   12/10/24 0700 98/57 99 °F (37.2 °C) Oral 98 16 97 %   12/10/24 0410 102/61 (!) 100.8 °F (38.2 °C) Oral 117 16 96 %   12/10/24 0030 112/71 (!) 101 °F (38.3 °C) Oral 117 16 97 %   12/09/24 1941 105/68 99 °F (37.2 °C) Oral 109 16 99 %       Intake/Output Summary (Last 24 hours) at 12/10/2024 0238  Last data filed at  12/10/2024 1300  Gross per 24 hour   Intake 600 ml   Output --   Net 600 ml     General: awake/alert   Chest:  clear to auscultation bilaterally without respiratory distress  CVS: regular rate and rhythm  Abdominal: soft, nontender, positive bowel sounds  Extremities: no cyanosis or edema  Skin: warm and dry without rash      Labs:  Results from last 7 days   Lab Units 12/10/24  0319 12/09/24  0435 12/08/24  1319   WBC 10*3/mm3 13.13* 12.96* 11.64*   HEMOGLOBIN g/dL 8.4* 9.5* 10.7*   HEMATOCRIT % 25.1* 29.8* 32.1*   PLATELETS 10*3/mm3 282 304 326         Results from last 7 days   Lab Units 12/10/24  0319 12/09/24  0435 12/08/24  1503 12/08/24  1319   SODIUM mmol/L 135* 139  --  130*   POTASSIUM mmol/L 3.9 3.1*  --  2.9*   CHLORIDE mmol/L 109* 111*  --  99   CO2 mmol/L 17.0* 17.0*  --  17.0*   BUN mg/dL 19 34*  --  56*   CREATININE mg/dL 1.24* 1.65*  --  2.75*   CALCIUM mg/dL 7.1* 7.6*  --  7.8*   EGFR mL/min/1.73 52.1* 37.0*  --  20.1*   BILIRUBIN mg/dL <0.2  --   --  <0.2   ALK PHOS U/L 112  --  149* 182*   ALT (SGPT) U/L 16  --   --  33   AST (SGOT) U/L 15  --   --  25   GLUCOSE mg/dL 123* 71  --  241*       Radiology:   Imaging Results (Last 72 Hours)       Procedure Component Value Units Date/Time    XR Chest 1 View [[id number]] Collected: 12/08/24 1408     Updated: 12/08/24 1414    Narrative:      XR CHEST 1 VW-     HISTORY: Short of breath     COMPARISON: 11/14/2024     FINDINGS: Frontal view of the chest obtained.     Right upper extremity PICC line tip projects over the SVC. Enteric  tubes, one tip present in the proximal stomach with the other present in  the body of the stomach.     Lungs are well-expanded and clear. Cardiomediastinal contours are  normal. No pleural effusion or pneumothorax. No acute regional bony  pathology. Cholecystectomy clips.       Impression:      1. Well-expanded lungs. No acute cardiopulmonary process identified.  Enteric tubes present within the stomach.        This report was  signed and finalized on 12/8/2024 2:10 PM by Dr. Gloria Roe MD.               Culture:  Blood Culture   Date Value Ref Range Status   12/08/2024 No growth at 2 days  Preliminary   12/08/2024 Abnormal Stain (C)  Preliminary         Assessment   Acute kidney injury-prerenal  Chronic kidney disease stage IIIb  Diabetes type 2 with diabetic nephropathy  Chronic pancreatitis  Hypokalemia  Metabolic acidosis  Hypotension  Hyponatremia     Plan:  Discussed with patient, nursing  Workup reviewed today  Monitor labs   Follow-up in the office 1 week at discharge recommended  IV fluids adjusted as per orders  Insulin regimen per primary service      Thank you for the consult, we appreciate the opportunity to provide care to your patients.  Feel free to contact me if I can be of any further assistance.      Raymond Gilbert MD  12/10/2024  18:19 CST

## 2024-12-11 NOTE — PROGRESS NOTES
"Infectious Diseases Progress Note    Patient:  Tasha Perez  YOB: 1970  MRN: [id number]   Admit date: 12/8/2024   Admitting Physician: Otto Del Rosario MD  Primary Care Physician: KIRILL Murguia MD    Chief Complaint/Interval History: She had fever earlier this morning.  PICC line has been removed.  Will monitor temperature curve and blood culture results following PICC line removal.  She does indicate the gastric stimulator that is in place has helped her.  She feels she can maintain oral intake.  She was scheduled for a permanent gastric stimulator placement on Monday.  She has no new localizing symptoms.  She has not coughing.  She is not dyspneic.    Intake/Output Summary (Last 24 hours) at 12/11/2024 1131  Last data filed at 12/11/2024 0900  Gross per 24 hour   Intake 700 ml   Output --   Net 700 ml     Allergies:   Allergies   Allergen Reactions    Bee Venom Anaphylaxis    Hydrocodone Anaphylaxis    Ibuprofen Other (See Comments)     \"SHUTS MY KIDNEYS DOWN\" PER PATIENT     Pineapple Anaphylaxis    Pineapple Extract Anaphylaxis    Wasp Venom Anaphylaxis    Wasp Venom Protein Anaphylaxis    Hydrocodone-Acetaminophen Dizziness, Nausea And Vomiting, Nausea Only and Unknown - Low Severity    Penicillins Nausea And Vomiting    Sitagliptin Other (See Comments)     Other reaction(s): Abdominal Pain      Metformin Other (See Comments)     Pt states it messes with her kidneys    Chocolate Rash     Dark chocolate only, told to RD 7/10/24     Current Scheduled Medications:   aspirin, 81 mg, Oral, Daily  atorvastatin, 40 mg, Oral, Nightly  insulin regular, 2-9 Units, Subcutaneous, 4x Daily AC & at Bedtime  magnesium sulfate, 2 g, Intravenous, Q2H  nicotine, 1 patch, Transdermal, Q24H  pantoprazole, 40 mg, Oral, Q AM  potassium phosphate, 15 mmol, Intravenous, Q3H  rOPINIRole, 1 mg, Oral, TID  sodium bicarbonate, 650 mg, Oral, BID  sodium chloride, 10 mL, Intravenous, Q12H  sucralfate, 1 g, Oral, 4x " "Daily AC & at Bedtime  vancomycin, 750 mg, Intravenous, Q24H      sodium chloride, 75 mL/hr, Last Rate: 75 mL/hr (24 1127)       Current PRN Medications:    acetaminophen **OR** acetaminophen **OR** acetaminophen    senna-docusate sodium **AND** polyethylene glycol **AND** bisacodyl    dextrose    dextrose    influenza vaccine    Magnesium Standard Dose Replacement - Follow Nurse / BPA Driven Protocol    nicotine polacrilex    ondansetron ODT **OR** ondansetron    Phosphorus Replacement - Follow Nurse / BPA Driven Protocol    sodium chloride    sodium chloride    sodium chloride    sodium chloride    Review of Systems see HPI    Vital Signs:  Temp (24hrs), Av.8 °F (37.7 °C), Min:98.4 °F (36.9 °C), Max:101.6 °F (38.7 °C)    BP 98/64 (BP Location: Left arm, Patient Position: Lying)   Pulse 112   Temp 98.9 °F (37.2 °C) (Oral)   Resp 18   Ht 154.9 cm (61\")   Wt 38.2 kg (84 lb 4 oz)   SpO2 100%   BMI 15.92 kg/m²     Physical Exam  Vital signs - reviewed.  Line/IV site - No erythema, warmth, induration, or tenderness.  Lungs clear without crackles  Abdomen is soft and nontender  Prior PICC line site without erythema or induration    Lab Results:  CBC:   Results from last 7 days   Lab Units 12/10/24  03124  0435 24  1319   WBC 10*3/mm3 13.13* 12.96* 11.64*   HEMOGLOBIN g/dL 8.4* 9.5* 10.7*   HEMATOCRIT % 25.1* 29.8* 32.1*   PLATELETS 10*3/mm3 282 304 326     BMP:  Results from last 7 days   Lab Units 24  0331 12/10/24  03124  0435 24  1319   SODIUM mmol/L 134* 135* 139 130*   POTASSIUM mmol/L 4.0 3.9 3.1* 2.9*   CHLORIDE mmol/L 106 109* 111* 99   CO2 mmol/L 19.0* 17.0* 17.0* 17.0*   BUN mg/dL 15 19 34* 56*   CREATININE mg/dL 1.13* 1.24* 1.65* 2.75*   GLUCOSE mg/dL 176* 123* 71 241*   CALCIUM mg/dL 7.1* 7.1* 7.6* 7.8*   ALT (SGPT) U/L  --  16  --  33     Culture Results:   Blood Culture   Date Value Ref Range Status   12/10/2024 No growth at 24 hours  Preliminary "   12/10/2024 No growth at 24 hours  Preliminary   12/08/2024 No growth at 2 days  Preliminary   12/08/2024 Micrococcus species (C)  Final     Discussed Gram stain of blood culture with microbiology laboratory:  There are aerobic gram-positive cocci in clusters  There also appears to be some possible aerobic branching gram-positive bacilli    Radiology: None  Additional Studies Reviewed: None    Impression:   1.  Generalized weakness/fatigue along with fever-possible PICC line infection.  There may be a second bacteria on the blood cultures with Gram stain indicating possible aerobic branching gram-positive bacilli.  These would be more likely to be a potential pathogen than the micrococcus species that was identified from initial culture.  It appears the blood culture that was positive was drawn via the PICC line.  Bacteria present could have been colonizer within the lumen or the hub of the PICC line.  2.  History of issues regarding GI motility with recurrent episodes of volume depletion  3.  Diabetes mellitus-she indicates she was diagnosed at age 36  4.  She reports prior history of pancreatitis    Recommendations:   Continue vancomycin at present  Monitor cultures, temperature curve, and clinical course  Await follow-up blood cultures drawn on the 10th  Await additional ID on initial blood cultures  Continue to follow    Ramin Chavez MD

## 2024-12-11 NOTE — PLAN OF CARE
Problem: Adult Inpatient Plan of Care  Goal: Plan of Care Review  Outcome: Progressing  Goal: Patient-Specific Goal (Individualized)  Outcome: Progressing  Goal: Absence of Hospital-Acquired Illness or Injury  Outcome: Progressing  Intervention: Identify and Manage Fall Risk  Recent Flowsheet Documentation  Taken 12/11/2024 0801 by Rolando Rubio RN  Safety Promotion/Fall Prevention: safety round/check completed  Goal: Optimal Comfort and Wellbeing  Outcome: Progressing  Intervention: Provide Person-Centered Care  Recent Flowsheet Documentation  Taken 12/11/2024 0801 by Rolando Rubio RN  Trust Relationship/Rapport:   care explained   choices provided   emotional support provided   empathic listening provided   questions answered   reassurance provided   questions encouraged   thoughts/feelings acknowledged  Goal: Readiness for Transition of Care  Outcome: Progressing     Problem: Chronic Kidney Disease  Goal: Optimal Coping with Chronic Illness  Outcome: Progressing  Intervention: Support Psychosocial Response  Recent Flowsheet Documentation  Taken 12/11/2024 0801 by Rolando Rubio RN  Family/Support System Care: support provided  Goal: Electrolyte Balance  Outcome: Progressing  Goal: Fluid Balance  Outcome: Progressing  Goal: Optimal Functional Ability  Outcome: Progressing  Intervention: Optimize Functional Ability  Recent Flowsheet Documentation  Taken 12/11/2024 0801 by Rolando Rubio RN  Activity Management:   ambulated in room   up in stretcher chair   activity encouraged  Goal: Optimal Oral Intake  Outcome: Progressing  Goal: Minimize Renal Failure Effects  Outcome: Progressing       Goal Outcome Evaluation:      Pt kept safe and comfortable with needs attended to as appropriate, Tmax: 101.6F oral, given PRN Tylenol, Dc'd PICC and peripheral IV access initiated by VAT for IVF and electrolyte replacement, (+) blood cultures noted, PICC line catheter tip sent for cultures, safety  precautions and measures to maintain skin integrity implemented

## 2024-12-11 NOTE — PROGRESS NOTES
[facility] Medicine Services  INPATIENT PROGRESS NOTE    Patient Name: Tasha Perez  Date of Admission: 12/8/2024  Today's Date: 12/11/24  Length of Stay: 3  Primary Care Physician: KIRILL Murguia MD    Subjective   Chief Complaint: Fever  HPI   Patient continues to have fever.  In fact, her fever this morning during my assessment was 101.6.  She indicated to me this morning that she has been having fever since last week intermittently.  She does report that she has had a recurrent PICC line in place since February.  She denies any pain, tenderness, warmth, etc. at the site where her PICC line is in place.  She denies any other indwelling devices.  She does have a continuous glucose monitor on her left upper extremity.  Gastric stimulator also in place.  She denies any other pain symptoms.  Reports having a headache.  Denies any neck stiffness.      Review of Systems   All pertinent negatives and positives are as above. All other systems have been reviewed and are negative unless otherwise stated.     Objective    Temp:  [98.4 °F (36.9 °C)-101.6 °F (38.7 °C)] 101.6 °F (38.7 °C)  Heart Rate:  [] 122  Resp:  [16-20] 20  BP: ()/(56-76) 117/71  Physical Exam  Vitals reviewed.   Constitutional:       Comments: Warm to touch - has fever   HENT:      Head: Normocephalic.      Nose:      Comments: Temporary gastric stimulator noted entering left nare  Eyes:      Pupils: Pupils are equal, round, and reactive to light.   Cardiovascular:      Rate and Rhythm: Regular rhythm. Tachycardia present.   Pulmonary:      Effort: Pulmonary effort is normal. No respiratory distress.   Musculoskeletal:      Comments: PICC RUE - no significant erythema, tenderness, fluctuance, etc noted.   Skin:     General: Skin is warm.   Neurological:      General: No focal deficit present.      Mental Status: She is alert. Mental status is at baseline.   Psychiatric:         Mood and Affect: Mood  "normal.         Results Review:  I have reviewed the labs, radiology results, and diagnostic studies.    Laboratory Data:   Results from last 7 days   Lab Units 12/10/24  0319 12/09/24  0435 12/08/24  1319   WBC 10*3/mm3 13.13* 12.96* 11.64*   HEMOGLOBIN g/dL 8.4* 9.5* 10.7*   HEMATOCRIT % 25.1* 29.8* 32.1*   PLATELETS 10*3/mm3 282 304 326        Results from last 7 days   Lab Units 12/11/24  0331 12/10/24  0319 12/09/24  0435 12/08/24  1503 12/08/24  1319   SODIUM mmol/L 134* 135* 139  --  130*   POTASSIUM mmol/L 4.0 3.9 3.1*  --  2.9*   CHLORIDE mmol/L 106 109* 111*  --  99   CO2 mmol/L 19.0* 17.0* 17.0*  --  17.0*   BUN mg/dL 15 19 34*  --  56*   CREATININE mg/dL 1.13* 1.24* 1.65*  --  2.75*   CALCIUM mg/dL 7.1* 7.1* 7.6*  --  7.8*   BILIRUBIN mg/dL  --  <0.2  --   --  <0.2   ALK PHOS U/L  --  112  --  149* 182*   ALT (SGPT) U/L  --  16  --   --  33   AST (SGOT) U/L  --  15  --   --  25   GLUCOSE mg/dL 176* 123* 71  --  241*       Culture Data:   No results found for: \"BLOODCX\", \"URINECX\", \"WOUNDCX\", \"MRSACX\", \"RESPCX\", \"STOOLCX\"    Radiology Data:   Imaging Results (Last 24 Hours)       ** No results found for the last 24 hours. **            I have reviewed the patient's current medications.     Assessment/Plan   Assessment  Active Hospital Problems    Diagnosis     Fever     Hyponatremia     Gastroparesis     Positive blood cultures with Micrococcus species     Hypophosphatemia     Hypomagnesemia     Acute renal failure superimposed on stage 3b chronic kidney disease     Hypokalemia     Type 2 diabetes mellitus, with long-term current use of insulin     Severe malnutrition        Treatment Plan  Tmax 101.6 - this morning at 0800; she told me this morning she has been having fevers since \"last week.\"  Blood cultures from 12/8 are positive for Micrococcus species  D/C RUE PICC line today (she indicated this morning this PICC has been in place since February)  Place peripheral IV - will consult Vascular access to " "assist  IV Vancomycin  Repeat blood cultures obtained; will also culture tip of PICC line upon removal  Infectious Disease following and appreciate their assistance  UA negative  CXR negative  Continue IVFs with normal saline  Mg and Phos supplements  A1c 9.0  Currently only on SSI coverage.  Patient uses insulin pump in outpatient setting and may have to resume in near future pending BS trend and status of PO intake  Patient had a temporary gastric stimulator placed on Friday, 12/6.  Patient indicated that plans are in place for a permanent stimulator to be placed on Monday, 12/16.  She expressed concern to me this morning that \"I have to be out of here soon...can't miss my appointment in [place] on Monday.\"      Medical Decision Making  Number and Complexity of problems: High complexity      Conditions and Status        Condition is unchanged; fever curve trending worse     MDM Data  External documents reviewed: none  Cardiac tracing (EKG, telemetry) interpretation: no new EKGs  Radiology interpretation: no new radiology studies this AM  Labs reviewed: as above  Any tests that were considered but not ordered: none     Decision rules/scores evaluated (example [id number], Wells, etc): none     Discussed with: patient, bedside nurse, Rolando.  Also discussed with Dr. Chavez with Infectious Disease     Care Planning  Shared decision making: Discussed with patient with agreement to proceed with treatment plan as outlined  Code status and discussions: Full code    Disposition  Social Determinants of Health that impact treatment or disposition: None apparent at this time  I expect the patient to be discharged:  to be determined in setting of ongoing fever and suspected bacteremia        Electronically signed by Otto Del Rosario MD, 12/11/24, 08:25 CST.   "

## 2024-12-11 NOTE — PROGRESS NOTES
"Pharmacy Dosing Service  Pharmacokinetics  Vancomycin Follow-up Evaluation    Assessment/Action/Plan:  Current Order: Vancomycin 750 mg IVPB every 24 hours  Current end date:12/216/24  Levels: Trough level ordered, due 2000 12/12  Additional antimicrobial agent(s): none    Vanc tx initiated yesterday with bolus dose given.  Renal function stable.  Trough level ordered, due at 2000 12/12 prior to 4th dose. Pharmacy will continue to follow daily and adjust dose accordingly.     Subjective:  Tasha Perez is a 54 y.o. female currently on Vancomycin 750 mg IV every 24 hours for the treatment of Bacteremia, day 2 of 7 of treatment.    AUC Model Data:  Loading dose: 1000 mg  Regimen: 750 mg IV every 24 hours.  Exposure target: AUC24 (range)400-600 mg/L.hr   AUC24,ss: 455 mg/L.hr  Probability of AUC24 > 400: 64 %  Ctrough,ss: 13.4 mg/L  Probability of Ctrough,ss > 20: 18 %  Probability of nephrotoxicity (Lodise LALI 2009): 9 %    Objective:  Ht: 154.9 cm (61\"); Wt: 38.2 kg (84 lb 4 oz)  Estimated Creatinine Clearance: 34.7 mL/min (A) (by C-G formula based on SCr of 1.13 mg/dL (H)).   Creatinine   Date Value Ref Range Status   12/11/2024 1.13 (H) 0.57 - 1.00 mg/dL Final   12/10/2024 1.24 (H) 0.57 - 1.00 mg/dL Final   12/09/2024 1.65 (H) 0.57 - 1.00 mg/dL Final   09/08/2021 1.1 (H) 0.5 - 0.9 mg/dL Final   09/07/2021 2.2 (H) 0.5 - 0.9 mg/dL Final      Lab Results   Component Value Date    WBC 13.13 (H) 12/10/2024    WBC 12.96 (H) 12/09/2024    WBC 11.64 (H) 12/08/2024       No results found for: \"VANCOPEAK\", \"VANCOTROUGH\", \"VANCORANDOM\"    Culture Results:  Microbiology Results (last 10 days)       Procedure Component Value - Date/Time    Blood Culture - Blood, Hand, Right [[id number]]  (Normal) Collected: 12/08/24 1319    Lab Status: Preliminary result Specimen: Blood from Hand, Right Updated: 12/10/24 1345     Blood Culture No growth at 2 days    Blood Culture - Blood, Blood, PICC Line [[id number]]  (Abnormal) Collected: " 12/08/24 1319    Lab Status: Final result Specimen: Blood, PICC Line Updated: 12/11/24 0623     Blood Culture Micrococcus species     Isolated from --     Gram Stain Aerobic Bottle Gram positive cocci in clusters    Narrative:      Probable contaminant requires clinical correlation, susceptibility not performed unless requested by physician.      Blood Culture ID, PCR - Blood, Blood, PICC Line [[id number]] Collected: 12/08/24 1319    Lab Status: Final result Specimen: Blood, PICC Line Updated: 12/10/24 0945     BCID, PCR Negative by BCID PCR. Culture to Follow.     BOTTLE TYPE Aerobic Bottle            L Emiliano Leggett RPH   12/11/24 08:07 CST

## 2024-12-12 PROBLEM — D64.9 ANEMIA: Status: ACTIVE | Noted: 2024-12-12

## 2024-12-12 LAB
ALBUMIN SERPL-MCNC: 2.6 G/DL (ref 3.5–5.2)
ALBUMIN/GLOB SERPL: 1 G/DL
ALP SERPL-CCNC: 132 U/L (ref 39–117)
ALT SERPL W P-5'-P-CCNC: 16 U/L (ref 1–33)
ANION GAP SERPL CALCULATED.3IONS-SCNC: 11 MMOL/L (ref 5–15)
AST SERPL-CCNC: 23 U/L (ref 1–32)
BACTERIA SPEC AEROBE CULT: ABNORMAL
BASOPHILS # BLD AUTO: 0.01 10*3/MM3 (ref 0–0.2)
BASOPHILS NFR BLD AUTO: 0.1 % (ref 0–1.5)
BILIRUB SERPL-MCNC: <0.2 MG/DL (ref 0–1.2)
BUN SERPL-MCNC: 15 MG/DL (ref 6–20)
BUN/CREAT SERPL: 10 (ref 7–25)
CALCIUM SPEC-SCNC: 7 MG/DL (ref 8.6–10.5)
CHLORIDE SERPL-SCNC: 104 MMOL/L (ref 98–107)
CO2 SERPL-SCNC: 20 MMOL/L (ref 22–29)
CREAT SERPL-MCNC: 1.5 MG/DL (ref 0.57–1)
DEPRECATED RDW RBC AUTO: 44.8 FL (ref 37–54)
EGFRCR SERPLBLD CKD-EPI 2021: 41.5 ML/MIN/1.73
EOSINOPHIL # BLD AUTO: 0.11 10*3/MM3 (ref 0–0.4)
EOSINOPHIL NFR BLD AUTO: 1.1 % (ref 0.3–6.2)
ERYTHROCYTE [DISTWIDTH] IN BLOOD BY AUTOMATED COUNT: 13.7 % (ref 12.3–15.4)
GLOBULIN UR ELPH-MCNC: 2.7 GM/DL
GLUCOSE BLDC GLUCOMTR-MCNC: 190 MG/DL (ref 70–130)
GLUCOSE BLDC GLUCOMTR-MCNC: 213 MG/DL (ref 70–130)
GLUCOSE BLDC GLUCOMTR-MCNC: 231 MG/DL (ref 70–130)
GLUCOSE BLDC GLUCOMTR-MCNC: 287 MG/DL (ref 70–130)
GLUCOSE SERPL-MCNC: 168 MG/DL (ref 65–99)
GRAM STN SPEC: ABNORMAL
HCT VFR BLD AUTO: 23.7 % (ref 34–46.6)
HGB BLD-MCNC: 7.8 G/DL (ref 12–15.9)
IMM GRANULOCYTES # BLD AUTO: 0.15 10*3/MM3 (ref 0–0.05)
IMM GRANULOCYTES NFR BLD AUTO: 1.5 % (ref 0–0.5)
LYMPHOCYTES # BLD AUTO: 2.65 10*3/MM3 (ref 0.7–3.1)
LYMPHOCYTES NFR BLD AUTO: 25.8 % (ref 19.6–45.3)
MAGNESIUM SERPL-MCNC: 1.9 MG/DL (ref 1.6–2.6)
MCH RBC QN AUTO: 29.2 PG (ref 26.6–33)
MCHC RBC AUTO-ENTMCNC: 32.9 G/DL (ref 31.5–35.7)
MCV RBC AUTO: 88.8 FL (ref 79–97)
MONOCYTES # BLD AUTO: 0.67 10*3/MM3 (ref 0.1–0.9)
MONOCYTES NFR BLD AUTO: 6.5 % (ref 5–12)
NEUTROPHILS NFR BLD AUTO: 6.7 10*3/MM3 (ref 1.7–7)
NEUTROPHILS NFR BLD AUTO: 65 % (ref 42.7–76)
NRBC BLD AUTO-RTO: 0 /100 WBC (ref 0–0.2)
PHOSPHATE SERPL-MCNC: 2.1 MG/DL (ref 2.5–4.5)
PHOSPHATE SERPL-MCNC: 3.2 MG/DL (ref 2.5–4.5)
PLATELET # BLD AUTO: 297 10*3/MM3 (ref 140–450)
PMV BLD AUTO: 9.5 FL (ref 6–12)
POTASSIUM SERPL-SCNC: 4.4 MMOL/L (ref 3.5–5.2)
PROT SERPL-MCNC: 5.3 G/DL (ref 6–8.5)
RBC # BLD AUTO: 2.67 10*6/MM3 (ref 3.77–5.28)
SODIUM SERPL-SCNC: 135 MMOL/L (ref 136–145)
WBC NRBC COR # BLD AUTO: 10.29 10*3/MM3 (ref 3.4–10.8)

## 2024-12-12 PROCEDURE — 85025 COMPLETE CBC W/AUTO DIFF WBC: CPT | Performed by: INTERNAL MEDICINE

## 2024-12-12 PROCEDURE — 82948 REAGENT STRIP/BLOOD GLUCOSE: CPT

## 2024-12-12 PROCEDURE — 83735 ASSAY OF MAGNESIUM: CPT | Performed by: INTERNAL MEDICINE

## 2024-12-12 PROCEDURE — 25010000002 VANCOMYCIN 750 MG/150ML SOLUTION: Performed by: INTERNAL MEDICINE

## 2024-12-12 PROCEDURE — 63710000001 INSULIN GLARGINE PER 5 UNITS: Performed by: INTERNAL MEDICINE

## 2024-12-12 PROCEDURE — 99232 SBSQ HOSP IP/OBS MODERATE 35: CPT | Performed by: INTERNAL MEDICINE

## 2024-12-12 PROCEDURE — 25010000002 LINEZOLID 600 MG/300ML SOLUTION: Performed by: INTERNAL MEDICINE

## 2024-12-12 PROCEDURE — 80053 COMPREHEN METABOLIC PANEL: CPT | Performed by: INTERNAL MEDICINE

## 2024-12-12 PROCEDURE — 63710000001 INSULIN REGULAR HUMAN PER 5 UNITS: Performed by: INTERNAL MEDICINE

## 2024-12-12 PROCEDURE — 25810000003 SODIUM CHLORIDE 0.9 % SOLUTION: Performed by: INTERNAL MEDICINE

## 2024-12-12 PROCEDURE — 84100 ASSAY OF PHOSPHORUS: CPT | Performed by: INTERNAL MEDICINE

## 2024-12-12 PROCEDURE — 25810000003 SODIUM CHLORIDE 0.9 % SOLUTION 250 ML FLEX CONT: Performed by: INTERNAL MEDICINE

## 2024-12-12 RX ORDER — LINEZOLID 2 MG/ML
600 INJECTION, SOLUTION INTRAVENOUS EVERY 12 HOURS
Status: DISCONTINUED | OUTPATIENT
Start: 2024-12-12 | End: 2024-12-14 | Stop reason: HOSPADM

## 2024-12-12 RX ORDER — CHOLECALCIFEROL (VITAMIN D3) 25 MCG
1000 TABLET ORAL DAILY
Status: DISCONTINUED | OUTPATIENT
Start: 2024-12-12 | End: 2024-12-14 | Stop reason: HOSPADM

## 2024-12-12 RX ADMIN — VANCOMYCIN 750 MG: 750 INJECTION, SOLUTION INTRAVENOUS at 00:39

## 2024-12-12 RX ADMIN — SUCRALFATE 1 G: 1 TABLET ORAL at 21:47

## 2024-12-12 RX ADMIN — ASPIRIN 81 MG: 81 TABLET, COATED ORAL at 09:02

## 2024-12-12 RX ADMIN — LINEZOLID 600 MG: 600 INJECTION, SOLUTION INTRAVENOUS at 17:25

## 2024-12-12 RX ADMIN — ROPINIROLE 1 MG: 1 TABLET, FILM COATED ORAL at 16:05

## 2024-12-12 RX ADMIN — PANTOPRAZOLE SODIUM 40 MG: 40 TABLET, DELAYED RELEASE ORAL at 04:37

## 2024-12-12 RX ADMIN — ROPINIROLE 1 MG: 1 TABLET, FILM COATED ORAL at 21:47

## 2024-12-12 RX ADMIN — Medication 1000 UNITS: at 12:52

## 2024-12-12 RX ADMIN — Medication 10 ML: at 21:47

## 2024-12-12 RX ADMIN — SODIUM BICARBONATE 650 MG: 650 TABLET ORAL at 21:47

## 2024-12-12 RX ADMIN — INSULIN HUMAN 4 UNITS: 100 INJECTION, SOLUTION PARENTERAL at 21:47

## 2024-12-12 RX ADMIN — INSULIN GLARGINE 7 UNITS: 100 INJECTION, SOLUTION SUBCUTANEOUS at 12:52

## 2024-12-12 RX ADMIN — INSULIN HUMAN 4 UNITS: 100 INJECTION, SOLUTION PARENTERAL at 12:13

## 2024-12-12 RX ADMIN — ROPINIROLE 1 MG: 1 TABLET, FILM COATED ORAL at 09:02

## 2024-12-12 RX ADMIN — SUCRALFATE 1 G: 1 TABLET ORAL at 11:10

## 2024-12-12 RX ADMIN — SUCRALFATE 1 G: 1 TABLET ORAL at 09:02

## 2024-12-12 RX ADMIN — SODIUM CHLORIDE 75 ML/HR: 9 INJECTION, SOLUTION INTRAVENOUS at 06:18

## 2024-12-12 RX ADMIN — SODIUM BICARBONATE 650 MG: 650 TABLET ORAL at 09:02

## 2024-12-12 RX ADMIN — INSULIN HUMAN 2 UNITS: 100 INJECTION, SOLUTION PARENTERAL at 09:02

## 2024-12-12 RX ADMIN — SODIUM PHOSPHATE, MONOBASIC, MONOHYDRATE AND SODIUM PHOSPHATE, DIBASIC, ANHYDROUS 15 MMOL: 276; 142 INJECTION, SOLUTION INTRAVENOUS at 06:18

## 2024-12-12 RX ADMIN — ATORVASTATIN CALCIUM 40 MG: 40 TABLET, FILM COATED ORAL at 21:47

## 2024-12-12 RX ADMIN — SUCRALFATE 1 G: 1 TABLET ORAL at 17:25

## 2024-12-12 RX ADMIN — INSULIN HUMAN 2 UNITS: 100 INJECTION, SOLUTION PARENTERAL at 17:38

## 2024-12-12 NOTE — PROGRESS NOTES
[facility] Medicine Services  INPATIENT PROGRESS NOTE    Patient Name: Tasha Perez  Date of Admission: 12/8/2024  Today's Date: 12/12/24  Length of Stay: 4  Primary Care Physician: KIRILL Murguia MD    Subjective   Chief Complaint: Fever  HPI   Fever has improved.  PICC line was removed yesterday on 12/11/2024.  Patient reports that she is feeling much better.  Her appetite has improved.  She had eaten nearly all of her lunch including a grilled chicken sandwich with some broccoli.  She reports no issues with nausea or vomiting.  She voiced no new complaints.    Review of Systems   All pertinent negatives and positives are as above. All other systems have been reviewed and are negative unless otherwise stated.     Objective    Temp:  [98.5 °F (36.9 °C)-99.7 °F (37.6 °C)] 98.5 °F (36.9 °C)  Heart Rate:  [105-119] 105  Resp:  [18-20] 18  BP: ()/(67-92) 108/73  Physical Exam  Vitals reviewed.   HENT:      Head: Normocephalic.      Nose:      Comments: Temporary gastric stimulator noted entering left nare  Eyes:      Pupils: Pupils are equal, round, and reactive to light.   Pulmonary:      Effort: Pulmonary effort is normal. No respiratory distress.   Musculoskeletal:      Comments: PICC RUE now removed   Skin:     General: Skin is warm.   Neurological:      General: No focal deficit present.      Mental Status: She is alert. Mental status is at baseline.   Psychiatric:         Mood and Affect: Mood normal.         Results Review:  I have reviewed the labs, radiology results, and diagnostic studies.    Laboratory Data:   Results from last 7 days   Lab Units 12/12/24  0413 12/10/24  0319 12/09/24  0435   WBC 10*3/mm3 10.29 13.13* 12.96*   HEMOGLOBIN g/dL 7.8* 8.4* 9.5*   HEMATOCRIT % 23.7* 25.1* 29.8*   PLATELETS 10*3/mm3 297 282 304        Results from last 7 days   Lab Units 12/12/24  0413 12/11/24  0331 12/10/24  0319 12/09/24  0435 12/08/24  1503 12/08/24  1319  "  SODIUM mmol/L 135* 134* 135*   < >  --  130*   POTASSIUM mmol/L 4.4 4.0 3.9   < >  --  2.9*   CHLORIDE mmol/L 104 106 109*   < >  --  99   CO2 mmol/L 20.0* 19.0* 17.0*   < >  --  17.0*   BUN mg/dL 15 15 19   < >  --  56*   CREATININE mg/dL 1.50* 1.13* 1.24*   < >  --  2.75*   CALCIUM mg/dL 7.0* 7.1* 7.1*   < >  --  7.8*   BILIRUBIN mg/dL <0.2  --  <0.2  --   --  <0.2   ALK PHOS U/L 132*  --  112  --  149* 182*   ALT (SGPT) U/L 16  --  16  --   --  33   AST (SGOT) U/L 23  --  15  --   --  25   GLUCOSE mg/dL 168* 176* 123*   < >  --  241*    < > = values in this interval not displayed.       Culture Data:   No results found for: \"BLOODCX\", \"URINECX\", \"WOUNDCX\", \"MRSACX\", \"RESPCX\", \"STOOLCX\"    Radiology Data:   Imaging Results (Last 24 Hours)       ** No results found for the last 24 hours. **            I have reviewed the patient's current medications.     Assessment/Plan   Assessment  Active Hospital Problems    Diagnosis     Anemia     Fever     Hyponatremia     Gastroparesis     Positive blood cultures with Micrococcus species     Hypophosphatemia     Hypomagnesemia     Acute renal failure superimposed on stage 3b chronic kidney disease     Hypokalemia     Type 2 diabetes mellitus, with long-term current use of insulin     Severe malnutrition        Treatment Plan  Follow-up blood cultures results - branching gram positive bacilli noted; final ID pending.  Repeat blood culture from 12/10 also growing same.  CHERELLE PICC line dc'd 12/11 (she indicated this morning this PICC has been in place since February)  Peripheral IV in place  IV Vancomycin; adjustment to antibiotics pending clinical course and final results of blood cultures  Infectious Disease following and appreciate their assistance  UA negative  CXR negative  Phos replacement; K and Mg now normal  A1c 9.0  Add vitamin D supplement  Currently only on SSI coverage.  Patient uses insulin pump in outpatient setting.  Plan to start low dose Lantus.  Monitor BS " "trend closely  Patient had a temporary gastric stimulator placed on Friday, 12/6.  Patient indicated that plans are in place for a permanent stimulator to be placed on Monday, 12/16.  She expressed concern to me this morning that \"I have to be out of here soon...can't miss my appointment in [place] on Monday.\"  We did discuss that it will important for us to make sure infection is eradicated before pursuing an invasive procedure, including gastric stimulator implant.  Will outline next steps for her once final ID from blood cultures are available, and will discuss with Dr. Chavez.      Medical Decision Making  Number and Complexity of problems: High complexity      Conditions and Status        Condition is improving     MDM Data  External documents reviewed: none  Cardiac tracing (EKG, telemetry) interpretation: no new EKGs  Radiology interpretation: no new radiology studies this AM  Labs reviewed: as above  Any tests that were considered but not ordered: none     Decision rules/scores evaluated (example [id number], Wells, etc): none     Discussed with: patient     Care Planning  Shared decision making: Discussed with patient with agreement to proceed with treatment plan as outlined  Code status and discussions: Full code    Disposition  Social Determinants of Health that impact treatment or disposition: None apparent at this time  I expect the patient to be discharged:  to be determined       Electronically signed by Otto Del Rosario MD, 12/12/24, 12:19 CST.   "

## 2024-12-12 NOTE — PROGRESS NOTES
Nephrology ([facility]) Progress Note      Patient:  Tasha Perez  YOB: 1970  Date of Service: 12/11/2024  MRN: [id number]   Acct: [id number]   Primary Care Physician: KIRILL Murguia MD  Advance Directive:   Code Status and Medical Interventions: CPR (Attempt to Resuscitate); Full Support   Ordered at: 12/08/24 1539     Level Of Support Discussed With:    Patient     Code Status (Patient has no pulse and is not breathing):    CPR (Attempt to Resuscitate)     Medical Interventions (Patient has pulse or is breathing):    Full Support     Admit Date: 12/8/2024       Hospital Day: 3  Referring Provider: No ref. provider found      Patient personally seen and examined.  Complete chart including Consults, Notes, Operative Reports, Labs, Cardiology, and Radiology studies reviewed as able.        Subjective:  Tasha Perez is a 54 y.o. female for whom we were consulted for evaluation and treatment of acute kidney injury. History of recurrent hospitalization for SUGAR due to volume depletion. History of chronic GI illnesses including nausea/vomiting and chronic pancreatitis. She had a PICC line and typically received outpatient IV fluids every other day.  She had had worsening renal failure along with reported nausea and vomiting so presented to the emergency room for further evaluation.  As is typical, for renal function improved with fluids.  We were consulted on hospital day 3 for further evaluation.  Patient noted clinical improvement.  No new complaints upon questioning.  Had questions about fever earlier today.     Today, no overnight events.  Denied current chest pain, shortness of air at rest, nausea or vomiting.  Remains on IV fluids.    Allergies:  Bee venom, Hydrocodone, Ibuprofen, Pineapple, Pineapple extract, Wasp venom, Wasp venom protein, Hydrocodone-acetaminophen, Penicillins, Sitagliptin, Metformin, and Chocolate    Home Meds:  Medications Prior to Admission    Medication Sig Dispense Refill Last Dose/Taking    aspirin 81 MG EC tablet Take 1 tablet by mouth Daily.   12/7/2024    atorvastatin (LIPITOR) 80 MG tablet Take 1 tablet by mouth Every Night.   12/7/2024    Calcium Carbonate-Vitamin D 600-5 MG-MCG tablet Take 1 tablet by mouth Daily.   12/7/2024    cetirizine (zyrTEC) 10 MG tablet Take 1 tablet by mouth Daily As Needed for Allergies.   12/7/2024    Cholecalciferol (Vitamin D3) 50 MCG (2000 UT) capsule Take 1 capsule by mouth Daily.   12/7/2024    dapagliflozin Propanediol (Farxiga) 10 MG tablet Take 10 mg by mouth Daily.   12/7/2024    DULoxetine (CYMBALTA) 60 MG capsule Take 1 capsule by mouth Daily.   12/7/2024    hydroxychloroquine (PLAQUENIL) 200 MG tablet Take 2 tablets by mouth Daily.   12/7/2024    insulin aspart (NovoLOG) 100 UNIT/ML patient supplied pump Inject 1 Units under the skin into the appropriate area as directed Continuous.   Past Month    linaclotide (LINZESS) 290 MCG capsule capsule Take 1 capsule by mouth Every Morning Before Breakfast.   12/7/2024    miSOPROStol (CYTOTEC) 200 MCG tablet Take 1 tablet by mouth 4 (Four) Times a Day.   12/7/2024    nortriptyline (PAMELOR) 25 MG capsule Take 1 capsule by mouth Every Night.   12/7/2024    omeprazole (priLOSEC) 40 MG capsule Take 1 capsule by mouth Every Other Day.   12/7/2024    promethazine (PHENERGAN) 25 MG tablet Take 1 tablet by mouth Every 6 (Six) Hours As Needed for Nausea or Vomiting.   Past Week    rOPINIRole (REQUIP) 1 MG tablet Take 1 tablet by mouth 3 (Three) Times a Day.   12/7/2024    sodium bicarbonate 650 MG tablet Take 1 tablet by mouth 2 (Two) Times a Day.   12/7/2024    sucralfate (CARAFATE) 1 g tablet Take 1 tablet by mouth 4 (Four) Times a Day.   12/7/2024       Medicines:  Current Facility-Administered Medications   Medication Dose Route Frequency Provider Last Rate Last Admin    acetaminophen (TYLENOL) tablet 650 mg  650 mg Oral Q4H PRN Rosmery Del Rosario APRN   650 mg at  12/11/24 1651    Or    acetaminophen (TYLENOL) 160 MG/5ML oral solution 650 mg  650 mg Oral Q4H PRN Rosmery Del Rosario APRN        Or    acetaminophen (TYLENOL) suppository 650 mg  650 mg Rectal Q4H PRN Rosmery Del Rosario APRN        aspirin EC tablet 81 mg  81 mg Oral Daily Otto Del Rosario MD   81 mg at 12/11/24 0823    atorvastatin (LIPITOR) tablet 40 mg  40 mg Oral Nightly Otto Del Rosario MD   40 mg at 12/10/24 2201    sennosides-docusate (PERICOLACE) 8.6-50 MG per tablet 2 tablet  2 tablet Oral BID PRN Rosmery Del Rosario APRN        And    polyethylene glycol (MIRALAX) packet 17 g  17 g Oral Daily PRN Rosmery Del Rosario APRN        And    bisacodyl (DULCOLAX) suppository 10 mg  10 mg Rectal Daily PRN Rosmery Del Rosario APRN        dextrose (D50W) (25 g/50 mL) IV injection 25 g  25 g Intravenous Q15 Min PRN Romsery Del Rosario APRN        dextrose (GLUTOSE) oral gel 15 g  15 g Oral Q15 Min PRN Rosmery Del Rosario APRN        influenza virus vacc split PF FLUZONE 0.5 mL  0.5 mL Intramuscular During Hospitalization Donnie Wright MD        insulin regular (humuLIN R,novoLIN R) injection 2-9 Units  2-9 Units Subcutaneous 4x Daily AC & at Bedtime Otto Del Rosario MD   2 Units at 12/11/24 1651    Magnesium Standard Dose Replacement - Follow Nurse / BPA Driven Protocol   Not Applicable PRN Donnie Wright MD        nicotine (NICODERM CQ) 14 MG/24HR patch 1 patch  1 patch Transdermal Q24H Rosmery Del Rosario APRN   1 patch at 12/11/24 1547    nicotine polacrilex (NICORETTE) gum 2 mg  2 mg Mouth/Throat Q1H PRN Rosmery Del Rosario APRN        ondansetron ODT (ZOFRAN-ODT) disintegrating tablet 4 mg  4 mg Oral Q6H PRN Rosmery Del Rosario APRN        Or    ondansetron (ZOFRAN) injection 4 mg  4 mg Intravenous Q6H PRN Del Rosario, Rosmery D, APRN        pantoprazole (PROTONIX) EC tablet 40 mg  40 mg Oral Q AM Otto Del Rosario MD   40 mg at 12/11/24 0618    Phosphorus Replacement - Follow Nurse / BPA  Driven Protocol   Not Applicable PRN Donnie Wright MD        rOPINIRole (REQUIP) tablet 1 mg  1 mg Oral TID Donnie Wright MD   1 mg at 12/11/24 1547    sodium bicarbonate tablet 650 mg  650 mg Oral BID Otto Del Rosario MD   650 mg at 12/11/24 0823    sodium chloride 0.9 % flush 10 mL  10 mL Intravenous PRN Donnie Wright MD        sodium chloride 0.9 % flush 10 mL  10 mL Intravenous Q12H Rosmery Del Rosario, APRN   10 mL at 12/11/24 1139    sodium chloride 0.9 % flush 10 mL  10 mL Intravenous PRN Rosmery Del Rosario, RAKESH        sodium chloride 0.9 % flush 20 mL  20 mL Intravenous PRN Donnie Wright MD        sodium chloride 0.9 % infusion 40 mL  40 mL Intravenous PRN Rosmery Del Rosario, RAKESH        sodium chloride 0.9 % infusion  75 mL/hr Intravenous Continuous Otto Del Rosario MD 75 mL/hr at 12/11/24 1127 75 mL/hr at 12/11/24 1127    sucralfate (CARAFATE) tablet 1 g  1 g Oral 4x Daily AC & at Bedtime Otto Del Rosario MD   1 g at 12/11/24 1651    vancomycin 750 mg/150 mL 0.9% NS IVPB  750 mg Intravenous Q24H Donnie Wright MD   750 mg at 12/10/24 2202       Past Medical History:  Past Medical History:   Diagnosis Date    Arthritis     Contusion     Degenerative disc disease, cervical     Diabetes mellitus     Elevated cholesterol     Fibromyalgia     Neuropathy     Osteoporosis     Pancreatitis     Raynaud disease     Renal insufficiency     Smoker 02/08/2022    Vitamin D deficiency 04/26/2022       Past Surgical History:  Past Surgical History:   Procedure Laterality Date    APPENDECTOMY      CHOLECYSTECTOMY      COLONOSCOPY      ENDOSCOPY N/A 10/08/2019    Procedure: ESOPHAGOGASTRODUODENOSCOPY WITH ANESTHESIA;  Surgeon: Aleks Vaughn DO;  Location: [facility] ENDOSCOPY;  Service: Gastroenterology    ENDOSCOPY N/A 11/12/2024    Procedure: ESOPHAGOGASTRODUODENOSCOPY WITH ANESTHESIA;  Surgeon: Tiffanie Saucedo MD;  Location: [facility] ENDOSCOPY;  Service: Gastroenterology;   Laterality: N/A;  pre op: Gastroparesis, Intractable nausea  post op: insertion of dobhoff   PCP: Brandon Murguia    ENDOSCOPY N/A 11/15/2024    Procedure: ESOPHAGOGASTRODUODENOSCOPY WITH ANESTHESIA WITH NG TUBE INSERTION;  Surgeon: Tiffanie Saucedo MD;  Location:  [facility] ENDOSCOPY;  Service: Gastroenterology;  Laterality: N/A;  PRE OP: DOBHOFF  POST OP: DOBHOFF  PCP: TELLY PACK    ENDOSCOPY WITH GASTROSTOMY TUBE INSERTION N/A 6/15/2022    Procedure: ESOPHAGOGASTRODUODENOSCOPY WITH GASTROSTOMY TUBE INSERTION;  Surgeon: Jersey Lee MD;  Location:  [facility] ENDOSCOPY;  Service: Gastroenterology;  Laterality: N/A;  pre peg placement  post peg placement  Dr. Murguia    ENTEROSCOPY SMALL BOWEL N/A 11/3/2022    Procedure: Esophagogastroduodenoscopy with Peg Removal;  Surgeon: Aleks Vaughn DO;  Location:  [facility] ENDOSCOPY;  Service: Gastroenterology;  Laterality: N/A;  pre; peg removal  post; peg removal   KIRILL Murguia MD        HYSTERECTOMY         Family History  Family History   Adopted: Yes   Problem Relation Age of Onset    Colon polyps Neg Hx     Colon cancer Neg Hx        Social History  Social History     Socioeconomic History    Marital status:    Tobacco Use    Smoking status: Every Day     Current packs/day: 0.50     Average packs/day: 0.5 packs/day for 31.9 years (16.0 ttl pk-yrs)     Types: Cigarettes     Start date: 1993   Vaping Use    Vaping status: Never Used   Substance and Sexual Activity    Alcohol use: No    Drug use: No    Sexual activity: Defer       Review of Systems:  History obtained from chart review and the patient  General ROS: No fever or chills  Respiratory ROS: No cough, shortness of breath, wheezing  Cardiovascular ROS: No chest pain or palpitations  Gastrointestinal ROS: No abdominal pain or melena  Genito-Urinary ROS: No dysuria or hematuria  Psych ROS: No anxiety and depression  14 point ROS reviewed with the patient and negative except as noted above and in the HPI  unless unable to obtain.    Objective:  Patient Vitals for the past 24 hrs:   BP Temp Temp src Pulse Resp SpO2   12/11/24 1442 108/74 98.7 °F (37.1 °C) Oral 109 18 100 %   12/11/24 1057 98/64 98.9 °F (37.2 °C) Oral 112 18 100 %   12/11/24 0801 117/71 (!) 101.6 °F (38.7 °C) Oral (!) 122 20 99 %   12/11/24 0442 98/65 98.4 °F (36.9 °C) Oral 110 18 98 %   12/10/24 2316 123/76 (!) 101 °F (38.3 °C) Oral 118 18 99 %   12/10/24 2046 125/76 100.5 °F (38.1 °C) Oral 117 18 98 %       Intake/Output Summary (Last 24 hours) at 12/11/2024 1908  Last data filed at 12/11/2024 1849  Gross per 24 hour   Intake 720 ml   Output --   Net 720 ml     General: awake/alert   Chest:  clear to auscultation bilaterally without respiratory distress  CVS: regular rate and rhythm  Abdominal: soft, nontender, positive bowel sounds  Extremities: no cyanosis or edema  Skin: warm and dry without rash      Labs:  Results from last 7 days   Lab Units 12/10/24  0319 12/09/24  0435 12/08/24  1319   WBC 10*3/mm3 13.13* 12.96* 11.64*   HEMOGLOBIN g/dL 8.4* 9.5* 10.7*   HEMATOCRIT % 25.1* 29.8* 32.1*   PLATELETS 10*3/mm3 282 304 326         Results from last 7 days   Lab Units 12/11/24  0331 12/10/24  0319 12/09/24  0435 12/08/24  1503 12/08/24  1319   SODIUM mmol/L 134* 135* 139  --  130*   POTASSIUM mmol/L 4.0 3.9 3.1*  --  2.9*   CHLORIDE mmol/L 106 109* 111*  --  99   CO2 mmol/L 19.0* 17.0* 17.0*  --  17.0*   BUN mg/dL 15 19 34*  --  56*   CREATININE mg/dL 1.13* 1.24* 1.65*  --  2.75*   CALCIUM mg/dL 7.1* 7.1* 7.6*  --  7.8*   EGFR mL/min/1.73 58.3* 52.1* 37.0*  --  20.1*   BILIRUBIN mg/dL  --  <0.2  --   --  <0.2   ALK PHOS U/L  --  112  --  149* 182*   ALT (SGPT) U/L  --  16  --   --  33   AST (SGOT) U/L  --  15  --   --  25   GLUCOSE mg/dL 176* 123* 71  --  241*       Radiology:   Imaging Results (Last 72 Hours)       ** No results found for the last 72 hours. **            Culture:  Blood Culture   Date Value Ref Range Status   12/10/2024 No growth  at 24 hours  Preliminary   12/10/2024 No growth at 24 hours  Preliminary   12/10/2024 No growth at 24 hours  Preliminary   12/08/2024 No growth at 3 days  Preliminary   12/08/2024 Micrococcus species (C)  Preliminary         Assessment   Acute kidney injury-prerenal  Chronic kidney disease stage IIIb  Diabetes type 2 with diabetic nephropathy  Chronic pancreatitis  Hypokalemia  Metabolic acidosis  Hypotension  Hyponatremia     Plan:  Discussed with patient, nursing  Workup reviewed today  Monitor labs   Follow-up in the office 1 week at discharge recommended  IV fluids adjusted as per orders, would continue until discharge given the multitude of recurrent admissions due to prerenal kidney injury  Insulin regimen per primary service          Raymond Gilbert MD  12/11/2024  19:08 CST

## 2024-12-12 NOTE — PLAN OF CARE
Goal Outcome Evaluation:  Plan of Care Reviewed With: patient        Progress: no change  Outcome Evaluation: VSS. A&Ox4. Phosphorus this morning was low, following electrolyte replacement protocol. ST  on tele. Safety maintained.

## 2024-12-12 NOTE — PROGRESS NOTES
"Pharmacy Dosing Service  Pharmacokinetics  Vancomycin Follow-up Evaluation    Assessment/Action/Plan:  Current Order: Vancomycin 750 mg IVPB every 24 hours  Current end date:12/16/24  Levels: Trough level ordered, due at 2000 12/12  Additional antimicrobial agent(s): none    Vanc tx initiated on 12/10 with bolus dose given.  Renal function declined slightly overnight.  Trough level due at 2000 tonight before 4th dose. Pharmacy will continue to follow daily and adjust dose accordingly.     Subjective:  Tasha Perez is a 54 y.o. female currently on Vancomycin 750 mg IV every 24 hours for the treatment of Bacteremia, day 3 of 7 of treatment.    AUC Model Data:  Regimen: 750 mg IV every 24 hours.  Exposure target: AUC24 (range)400-600 mg/L.hr   AUC24,ss: 550 mg/L.hr  Probability of AUC24 > 400: 82 %  Ctrough,ss: 17.3 mg/L  Probability of Ctrough,ss > 20: 37 %  Probability of nephrotoxicity (Lodise LALI 2009): 13 %    Objective:  Ht: 154.9 cm (61\"); Wt: 38.2 kg (84 lb 4 oz)  Estimated Creatinine Clearance: 26.2 mL/min (A) (by C-G formula based on SCr of 1.5 mg/dL (H)).   Creatinine   Date Value Ref Range Status   12/12/2024 1.50 (H) 0.57 - 1.00 mg/dL Final   12/11/2024 1.13 (H) 0.57 - 1.00 mg/dL Final   12/10/2024 1.24 (H) 0.57 - 1.00 mg/dL Final   09/08/2021 1.1 (H) 0.5 - 0.9 mg/dL Final   09/07/2021 2.2 (H) 0.5 - 0.9 mg/dL Final      Lab Results   Component Value Date    WBC 10.29 12/12/2024    WBC 13.13 (H) 12/10/2024    WBC 12.96 (H) 12/09/2024       No results found for: \"VANCOPEAK\", \"VANCOTROUGH\", \"VANCORANDOM\"    Culture Results:  Microbiology Results (last 10 days)       Procedure Component Value - Date/Time    Catheter Culture - Cath Tip, Arm, Right [[id number]] Collected: 12/11/24 1113    Lab Status: Preliminary result Specimen: Cath Tip from Arm, Right Updated: 12/12/24 0636     Catheter Culture No growth    Blood Culture - Blood, Blood, Central Line [[id number]]  (Normal) Collected: 12/10/24 1612    Lab " Status: Preliminary result Specimen: Blood, Central Line Updated: 12/11/24 1646     Blood Culture No growth at 24 hours    Blood Culture - Blood, Arm, Left [[id number]]  (Normal) Collected: 12/10/24 0858    Lab Status: Preliminary result Specimen: Blood from Arm, Left Updated: 12/11/24 0915     Blood Culture No growth at 24 hours    Blood Culture - Blood, Arm, Left [[id number]]  (Normal) Collected: 12/10/24 0856    Lab Status: Preliminary result Specimen: Blood from Arm, Left Updated: 12/11/24 0915     Blood Culture No growth at 24 hours    Blood Culture - Blood, Hand, Right [[id number]]  (Abnormal) Collected: 12/08/24 1319    Lab Status: Preliminary result Specimen: Blood from Hand, Right Updated: 12/12/24 0734     Blood Culture Culture in progress     Gram Stain Aerobic Bottle Branching gram positive bacilli    Narrative:      Blood culture became positive after 72 hours.  Molecular testing will not be performed unless requested by physician.    Blood Culture - Blood, Blood, PICC Line [[id number]]  (Abnormal) Collected: 12/08/24 1319    Lab Status: Preliminary result Specimen: Blood, PICC Line Updated: 12/12/24 0632     Blood Culture Micrococcus species     Isolated from --     Gram Stain Aerobic Bottle Gram positive cocci in clusters      Aerobic Bottle Branching gram positive bacilli    Narrative:      Probable contaminant requires clinical correlation, susceptibility not performed unless requested by physician.      Blood Culture ID, PCR - Blood, Blood, PICC Line [[id number]] Collected: 12/08/24 1319    Lab Status: Final result Specimen: Blood, PICC Line Updated: 12/10/24 0945     BCID, PCR Negative by BCID PCR. Culture to Follow.     BOTTLE TYPE Aerobic Bottle            CRISTÓBAL Leggett RPH   12/12/24 08:20 CST

## 2024-12-12 NOTE — PROGRESS NOTES
"Infectious Diseases Progress Note    Patient:  Tasha Perez  YOB: 1970  MRN: [id number]   Admit date: 12/8/2024   Admitting Physician: Otto Del Rosario MD  Primary Care Physician: KIRILL Murguia MD    Chief Complaint/Interval History: She is feeling better.  Temperature curve is improved.  Her vital signs are stable.  She is tolerating oral intake.  She indicates the nasal gastric stimulator is helping.  She is hoping to get the permanent stimulator placed on Monday.  She has had additional blood cultures grow branching gram-positive rods from aerobic bottles.  Final ID on the organism pending.  Patient reports no history of any sulfa allergy.    Intake/Output Summary (Last 24 hours) at 12/12/2024 1532  Last data filed at 12/12/2024 0847  Gross per 24 hour   Intake 480 ml   Output --   Net 480 ml     Allergies:   Allergies   Allergen Reactions    Bee Venom Anaphylaxis    Hydrocodone Anaphylaxis    Ibuprofen Other (See Comments)     \"SHUTS MY KIDNEYS DOWN\" PER PATIENT     Pineapple Anaphylaxis    Pineapple Extract Anaphylaxis    Wasp Venom Anaphylaxis    Wasp Venom Protein Anaphylaxis    Hydrocodone-Acetaminophen Dizziness, Nausea And Vomiting, Nausea Only and Unknown - Low Severity    Penicillins Nausea And Vomiting    Sitagliptin Other (See Comments)     Other reaction(s): Abdominal Pain      Metformin Other (See Comments)     Pt states it messes with her kidneys    Chocolate Rash     Dark chocolate only, told to RD 7/10/24     Current Scheduled Medications:   aspirin, 81 mg, Oral, Daily  atorvastatin, 40 mg, Oral, Nightly  cholecalciferol, 1,000 Units, Oral, Daily  insulin glargine, 7 Units, Subcutaneous, Daily  insulin regular, 2-9 Units, Subcutaneous, 4x Daily AC & at Bedtime  nicotine, 1 patch, Transdermal, Q24H  pantoprazole, 40 mg, Oral, Q AM  rOPINIRole, 1 mg, Oral, TID  sodium bicarbonate, 650 mg, Oral, BID  sodium chloride, 10 mL, Intravenous, Q12H  sucralfate, 1 g, Oral, 4x Daily " "AC & at Bedtime  vancomycin, 750 mg, Intravenous, Q24H          Current PRN Medications:    acetaminophen **OR** acetaminophen **OR** acetaminophen    senna-docusate sodium **AND** polyethylene glycol **AND** bisacodyl    dextrose    dextrose    influenza vaccine    Magnesium Standard Dose Replacement - Follow Nurse / BPA Driven Protocol    nicotine polacrilex    ondansetron ODT **OR** ondansetron    Phosphorus Replacement - Follow Nurse / BPA Driven Protocol    sodium chloride    sodium chloride    sodium chloride    sodium chloride    Review of Systems see HPI    Vital Signs:  Temp (24hrs), Av.2 °F (37.3 °C), Min:98.5 °F (36.9 °C), Max:99.7 °F (37.6 °C)    /73 (BP Location: Left arm, Patient Position: Sitting)   Pulse 105   Temp 98.5 °F (36.9 °C) (Oral)   Resp 18   Ht 154.9 cm (61\")   Wt 38.2 kg (84 lb 4 oz)   SpO2 98%   BMI 15.92 kg/m²     Physical Exam  Vital signs - reviewed.  Line/IV (peripheral) site - No erythema, warmth, induration, or tenderness.  Alert, pleasant, no distress  Abdomen is soft and nontender  Nasal gastric stimulator in place    Lab Results:  CBC:   Results from last 7 days   Lab Units 12/12/24  0413 12/10/24  0319 12/09/24  0435 12/08/24  1319   WBC 10*3/mm3 10.29 13.13* 12.96* 11.64*   HEMOGLOBIN g/dL 7.8* 8.4* 9.5* 10.7*   HEMATOCRIT % 23.7* 25.1* 29.8* 32.1*   PLATELETS 10*3/mm3 297 282 304 326     BMP:  Results from last 7 days   Lab Units 24  0331 12/10/24  0319 12/09/24  04324  1319   SODIUM mmol/L 135* 134* 135* 139 130*   POTASSIUM mmol/L 4.4 4.0 3.9 3.1* 2.9*   CHLORIDE mmol/L 104 106 109* 111* 99   CO2 mmol/L 20.0* 19.0* 17.0* 17.0* 17.0*   BUN mg/dL 15 15 19 34* 56*   CREATININE mg/dL 1.50* 1.13* 1.24* 1.65* 2.75*   GLUCOSE mg/dL 168* 176* 123* 71 241*   CALCIUM mg/dL 7.0* 7.1* 7.1* 7.6* 7.8*   ALT (SGPT) U/L 16  --  16  --  33     Culture Results:   Blood Culture   Date Value Ref Range Status   12/10/2024 Abnormal Stain (C)  " Preliminary   12/10/2024 No growth at 2 days  Preliminary   12/10/2024 No growth at 2 days  Preliminary   12/08/2024 Culture in progress  Preliminary   12/08/2024 Micrococcus species (C)  Preliminary     Blood cultures December 8, 2024-Aerobic branching gram-positive bacilli  Blood cultures December 8, 2024-aerobic bottle with gram-positive cocci in clusters  Aerobic bottle also had gram-positive branching bacilli  Blood cultures December 10, 2024-no growth  Blood cultures December 10, 2024-no growth  Blood cultures December 10, 2024 drawn from central line (I assume this was PICC line prior to removal)-aerobic branching gram-positive bacilli    Radiology: None  Additional Studies Reviewed: None    Impression:   1.  Positive blood cultures for micrococcus-likely contaminant  2.  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  History of GI motility issues-she has had good response to nasogastric stimulator  4.  Diabetes mellitus    Recommendations:   Reviewed antibiotics with good activity against a wide range of Nocardia pending blood culture results.  Bactrim typically with activity, but she has renal dysfunction that could be impacted by Bactrim treatment.  Linezolid also provides coverage for majority of Nocardia species.  Will change her antibiotic to linezolid  Contacted microbiology lab in [place]-not adequate growth yet for further identification  Continue supportive care  Continue to follow  She seems to be progressing following PICC line removal  Discussed with Dr. Miguel Ángel Chavez MD

## 2024-12-13 ENCOUNTER — APPOINTMENT (OUTPATIENT)
Dept: GENERAL RADIOLOGY | Facility: HOSPITAL | Age: 54
DRG: 683 | End: 2024-12-13
Payer: COMMERCIAL

## 2024-12-13 LAB
ANION GAP SERPL CALCULATED.3IONS-SCNC: 11 MMOL/L (ref 5–15)
BACTERIA SPEC AEROBE CULT: ABNORMAL
BASOPHILS # BLD AUTO: 0.03 10*3/MM3 (ref 0–0.2)
BASOPHILS NFR BLD AUTO: 0.4 % (ref 0–1.5)
BUN SERPL-MCNC: 16 MG/DL (ref 6–20)
BUN/CREAT SERPL: 12.1 (ref 7–25)
CALCIUM SPEC-SCNC: 8.4 MG/DL (ref 8.6–10.5)
CATHETER CULTURE: NORMAL
CHLORIDE SERPL-SCNC: 102 MMOL/L (ref 98–107)
CO2 SERPL-SCNC: 24 MMOL/L (ref 22–29)
CREAT SERPL-MCNC: 1.32 MG/DL (ref 0.57–1)
DEPRECATED RDW RBC AUTO: 46.6 FL (ref 37–54)
EGFRCR SERPLBLD CKD-EPI 2021: 48.4 ML/MIN/1.73
EOSINOPHIL # BLD AUTO: 0.28 10*3/MM3 (ref 0–0.4)
EOSINOPHIL NFR BLD AUTO: 3.3 % (ref 0.3–6.2)
ERYTHROCYTE [DISTWIDTH] IN BLOOD BY AUTOMATED COUNT: 13.9 % (ref 12.3–15.4)
GLUCOSE BLDC GLUCOMTR-MCNC: 171 MG/DL (ref 70–130)
GLUCOSE BLDC GLUCOMTR-MCNC: 211 MG/DL (ref 70–130)
GLUCOSE BLDC GLUCOMTR-MCNC: 259 MG/DL (ref 70–130)
GLUCOSE BLDC GLUCOMTR-MCNC: 337 MG/DL (ref 70–130)
GLUCOSE SERPL-MCNC: 99 MG/DL (ref 65–99)
GRAM STN SPEC: ABNORMAL
GRAM STN SPEC: ABNORMAL
HCT VFR BLD AUTO: 27.8 % (ref 34–46.6)
HGB BLD-MCNC: 8.8 G/DL (ref 12–15.9)
IMM GRANULOCYTES # BLD AUTO: 0.12 10*3/MM3 (ref 0–0.05)
IMM GRANULOCYTES NFR BLD AUTO: 1.4 % (ref 0–0.5)
ISOLATED FROM: ABNORMAL
LYMPHOCYTES # BLD AUTO: 3.17 10*3/MM3 (ref 0.7–3.1)
LYMPHOCYTES NFR BLD AUTO: 37.8 % (ref 19.6–45.3)
MCH RBC QN AUTO: 29.2 PG (ref 26.6–33)
MCHC RBC AUTO-ENTMCNC: 31.7 G/DL (ref 31.5–35.7)
MCV RBC AUTO: 92.4 FL (ref 79–97)
MONOCYTES # BLD AUTO: 0.7 10*3/MM3 (ref 0.1–0.9)
MONOCYTES NFR BLD AUTO: 8.3 % (ref 5–12)
NEUTROPHILS NFR BLD AUTO: 4.09 10*3/MM3 (ref 1.7–7)
NEUTROPHILS NFR BLD AUTO: 48.8 % (ref 42.7–76)
NRBC BLD AUTO-RTO: 0 /100 WBC (ref 0–0.2)
PLATELET # BLD AUTO: 355 10*3/MM3 (ref 140–450)
PMV BLD AUTO: 9.5 FL (ref 6–12)
POTASSIUM SERPL-SCNC: 4 MMOL/L (ref 3.5–5.2)
RBC # BLD AUTO: 3.01 10*6/MM3 (ref 3.77–5.28)
SODIUM SERPL-SCNC: 137 MMOL/L (ref 136–145)
WBC NRBC COR # BLD AUTO: 8.39 10*3/MM3 (ref 3.4–10.8)

## 2024-12-13 PROCEDURE — 63710000001 INSULIN REGULAR HUMAN PER 5 UNITS: Performed by: INTERNAL MEDICINE

## 2024-12-13 PROCEDURE — 85025 COMPLETE CBC W/AUTO DIFF WBC: CPT | Performed by: INTERNAL MEDICINE

## 2024-12-13 PROCEDURE — 63710000001 INSULIN GLARGINE PER 5 UNITS: Performed by: INTERNAL MEDICINE

## 2024-12-13 PROCEDURE — 25010000002 LINEZOLID 600 MG/300ML SOLUTION: Performed by: INTERNAL MEDICINE

## 2024-12-13 PROCEDURE — 80048 BASIC METABOLIC PNL TOTAL CA: CPT | Performed by: INTERNAL MEDICINE

## 2024-12-13 PROCEDURE — 82948 REAGENT STRIP/BLOOD GLUCOSE: CPT

## 2024-12-13 PROCEDURE — 73562 X-RAY EXAM OF KNEE 3: CPT

## 2024-12-13 PROCEDURE — 99232 SBSQ HOSP IP/OBS MODERATE 35: CPT | Performed by: INTERNAL MEDICINE

## 2024-12-13 RX ADMIN — SODIUM BICARBONATE 650 MG: 650 TABLET ORAL at 21:13

## 2024-12-13 RX ADMIN — ACETAMINOPHEN 650 MG: 325 TABLET ORAL at 08:55

## 2024-12-13 RX ADMIN — ROPINIROLE 1 MG: 1 TABLET, FILM COATED ORAL at 17:31

## 2024-12-13 RX ADMIN — INSULIN HUMAN 7 UNITS: 100 INJECTION, SOLUTION PARENTERAL at 12:07

## 2024-12-13 RX ADMIN — DOCUSATE SODIUM 50 MG AND SENNOSIDES 8.6 MG 2 TABLET: 8.6; 5 TABLET, FILM COATED ORAL at 09:04

## 2024-12-13 RX ADMIN — SUCRALFATE 1 G: 1 TABLET ORAL at 21:13

## 2024-12-13 RX ADMIN — ASPIRIN 81 MG: 81 TABLET, COATED ORAL at 08:55

## 2024-12-13 RX ADMIN — ROPINIROLE 1 MG: 1 TABLET, FILM COATED ORAL at 21:13

## 2024-12-13 RX ADMIN — INSULIN GLARGINE 7 UNITS: 100 INJECTION, SOLUTION SUBCUTANEOUS at 08:56

## 2024-12-13 RX ADMIN — LINEZOLID 600 MG: 600 INJECTION, SOLUTION INTRAVENOUS at 05:28

## 2024-12-13 RX ADMIN — ACETAMINOPHEN 650 MG: 325 TABLET ORAL at 21:21

## 2024-12-13 RX ADMIN — INSULIN HUMAN 6 UNITS: 100 INJECTION, SOLUTION PARENTERAL at 08:56

## 2024-12-13 RX ADMIN — SUCRALFATE 1 G: 1 TABLET ORAL at 17:30

## 2024-12-13 RX ADMIN — ATORVASTATIN CALCIUM 40 MG: 40 TABLET, FILM COATED ORAL at 21:13

## 2024-12-13 RX ADMIN — ROPINIROLE 1 MG: 1 TABLET, FILM COATED ORAL at 08:55

## 2024-12-13 RX ADMIN — Medication 10 ML: at 08:57

## 2024-12-13 RX ADMIN — SUCRALFATE 1 G: 1 TABLET ORAL at 12:07

## 2024-12-13 RX ADMIN — Medication 1000 UNITS: at 08:55

## 2024-12-13 RX ADMIN — LINEZOLID 600 MG: 600 INJECTION, SOLUTION INTRAVENOUS at 17:31

## 2024-12-13 RX ADMIN — PANTOPRAZOLE SODIUM 40 MG: 40 TABLET, DELAYED RELEASE ORAL at 05:28

## 2024-12-13 RX ADMIN — SODIUM BICARBONATE 650 MG: 650 TABLET ORAL at 08:55

## 2024-12-13 RX ADMIN — SUCRALFATE 1 G: 1 TABLET ORAL at 09:04

## 2024-12-13 RX ADMIN — INSULIN HUMAN 4 UNITS: 100 INJECTION, SOLUTION PARENTERAL at 17:30

## 2024-12-13 RX ADMIN — Medication 10 ML: at 21:13

## 2024-12-13 RX ADMIN — INSULIN HUMAN 2 UNITS: 100 INJECTION, SOLUTION PARENTERAL at 21:13

## 2024-12-13 NOTE — PLAN OF CARE
Goal Outcome Evaluation:           Progress: improving  Outcome Evaluation: VSS AOX4 No c/o pain. IV ABX changed today by TIP HUNTER. Ambulating in [place] today. No falls or injuries. -126 up to 140 with walking per tele.

## 2024-12-13 NOTE — PROGRESS NOTES
"Infectious Diseases Progress Note    Patient:  Tasha Perez  YOB: 1970  MRN: [id number]   Admit date: 12/8/2024   Admitting Physician: Otto Del Rosario MD  Primary Care Physician: KIRILL Murguia MD    Chief Complaint/Interval History: She feels okay.  She remains without fever.  No chills or sweats.  Tolerating oral intake.  She hopes to discharge home at the latest by tomorrow so that she can prepare for a trip to [place] to undergo placement of a gastric stimulator on Monday.  She has been hemodynamically stable.  She is tolerating antibiotic treat without side effect.  She reports having bowel movements.  She was in very good spirits today.  I contacted the microbiology laboratory.  There was growth on plates that were saved here at [facility].  Had contacted [facility] micro lab in [place] yesterday and was going to take longer to identify the gram-positive mrakel.  Was able to ask micro lab here to send the plate to [facility] in [place] to do multi tough testing which provides rapid identification.  It appears they identified the micrococcus which should been previous identified.  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    No intake or output data in the 24 hours ending 12/13/24 1341  Allergies:   Allergies   Allergen Reactions    Bee Venom Anaphylaxis    Hydrocodone Anaphylaxis    Ibuprofen Other (See Comments)     \"SHUTS MY KIDNEYS DOWN\" PER PATIENT     Pineapple Anaphylaxis    Pineapple Extract Anaphylaxis    Wasp Venom Anaphylaxis    Wasp Venom Protein Anaphylaxis    Hydrocodone-Acetaminophen Dizziness, Nausea And Vomiting, Nausea Only and Unknown - Low Severity    Penicillins Nausea And Vomiting    Sitagliptin Other (See Comments)     Other reaction(s): Abdominal Pain      Metformin Other (See Comments)     Pt states it messes with her kidneys    Chocolate Rash     Dark chocolate only, told to RD 7/10/24     Current Scheduled " "Medications:   aspirin, 81 mg, Oral, Daily  atorvastatin, 40 mg, Oral, Nightly  cholecalciferol, 1,000 Units, Oral, Daily  [START ON 2024] insulin glargine, 8 Units, Subcutaneous, Daily  insulin regular, 2-9 Units, Subcutaneous, 4x Daily AC & at Bedtime  Linezolid, 600 mg, Intravenous, Q12H  nicotine, 1 patch, Transdermal, Q24H  pantoprazole, 40 mg, Oral, Q AM  rOPINIRole, 1 mg, Oral, TID  sodium bicarbonate, 650 mg, Oral, BID  sodium chloride, 10 mL, Intravenous, Q12H  sucralfate, 1 g, Oral, 4x Daily AC & at Bedtime          Current PRN Medications:    acetaminophen **OR** acetaminophen **OR** acetaminophen    senna-docusate sodium **AND** polyethylene glycol **AND** bisacodyl    dextrose    dextrose    influenza vaccine    Magnesium Standard Dose Replacement - Follow Nurse / BPA Driven Protocol    nicotine polacrilex    ondansetron ODT **OR** ondansetron    Phosphorus Replacement - Follow Nurse / BPA Driven Protocol    sodium chloride    sodium chloride    sodium chloride    sodium chloride    Review of Systems see HPI    Vital Signs:  Temp (24hrs), Av °F (36.7 °C), Min:97.9 °F (36.6 °C), Max:98.1 °F (36.7 °C)    /77 (BP Location: Left arm, Patient Position: Sitting)   Pulse 110   Temp 97.9 °F (36.6 °C) (Oral)   Resp 18   Ht 154.9 cm (61\")   Wt 38.2 kg (84 lb 4 oz)   SpO2 95%   BMI 15.92 kg/m²     Physical Exam  Vital signs - reviewed.  Line/IV site - No erythema, warmth, induration, or tenderness.  Alert, pleasant, no distress  Smiling and comfortable appearing  Heart without murmur  Abdomen is soft and nontender    Lab Results:  CBC:   Results from last 7 days   Lab Units 24  0126 24  0413 12/10/24  0319 24  0435 24  1319   WBC 10*3/mm3 8.39 10.29 13.13* 12.96* 11.64*   HEMOGLOBIN g/dL 8.8* 7.8* 8.4* 9.5* 10.7*   HEMATOCRIT % 27.8* 23.7* 25.1* 29.8* 32.1*   PLATELETS 10*3/mm3 355 297 282 304 326     BMP:  Results from last 7 days   Lab Units 24  0126 " 12/12/24  0413 12/11/24  0331 12/10/24  0319 12/09/24  0435 12/08/24  1319   SODIUM mmol/L 137 135* 134* 135* 139 130*   POTASSIUM mmol/L 4.0 4.4 4.0 3.9 3.1* 2.9*   CHLORIDE mmol/L 102 104 106 109* 111* 99   CO2 mmol/L 24.0 20.0* 19.0* 17.0* 17.0* 17.0*   BUN mg/dL 16 15 15 19 34* 56*   CREATININE mg/dL 1.32* 1.50* 1.13* 1.24* 1.65* 2.75*   GLUCOSE mg/dL 99 168* 176* 123* 71 241*   CALCIUM mg/dL 8.4* 7.0* 7.1* 7.1* 7.6* 7.8*   ALT (SGPT) U/L  --  16  --  16  --  33     Culture Results:     Blood Culture   Date Value Ref Range Status   12/10/2024 Abnormal Stain (C)   Preliminary   12/10/2024 No growth at 2 days   Preliminary   12/10/2024 No growth at 2 days   Preliminary   12/08/2024 Culture in progress   Preliminary   12/08/2024 Micrococcus species (C)   Preliminary      Blood cultures December 8, 2024-Aerobic branching gram-positive bacilli  Blood cultures December 8, 2024-aerobic bottle with gram-positive cocci in clusters  Aerobic bottle also had gram-positive branching bacilli  Blood cultures December 10, 2024-no growth  Blood cultures December 10, 2024-no growth  Blood cultures December 10, 2024 drawn from central line (I assume this was PICC line prior to removal)-aerobic branching gram-positive bacilli    (Per discussion with Capri the multi tough appears to demonstrate Mycolicibacterium fortutitum group    Radiology: None    Additional Studies Reviewed: None    Impression:   Hard to know best approach.  I think she had positive blood cultures for micrococcus and Mycobacterium fortuitum group.  These may have been primarily within the line itself or attached to the line.  I do not think she likely has any disseminated infection to remote sites.  She defervesced very quickly with line removal.  She has had problems with line infections in the past.  She had a line in place for a long time.  She appears to be tolerating oral intake well with the gastric stimulator and would likely benefit from getting her  permanent gastric stimulator and avoiding additional line placement and IV fluid support.  She also has an element of chronic renal dysfunction.  Amikacin commonly used intravenously for Mycobacterium infection would have significant risk for nephrotoxicity.  Bactrim would also have some risk as well.  Feel the risk-benefit would be in favor of treating with 3 oral antibiotics with likely activity against Mycobacterium fortuitum group.  Await susceptibility testing.  Considering all factors, given her defervescent's, normal white count, lack of any fever, lack of any localizing symptoms etc. feel would be reasonable to proceed with placement of her gastric stimulator.    Recommendations:   Continue treatment with linezolid 600 milligrams orally every 12 hours  Doxycycline 100 mg orally every 12 hours  Levofloxacin 500 mg orally daily  Okay with me for discharge home with prescriptions for 10 days of the above medications  I would like to see her back on Monday, December 23, 2024  Feel would be reasonable to proceed with her gastric stimulator placement on Monday  Would be happy to see sooner if any new or worsening problems in the interim    Ramin Chavez MD

## 2024-12-13 NOTE — PROGRESS NOTES
[facility] Medicine Services  INPATIENT PROGRESS NOTE    Patient Name: Tasha Perez  Date of Admission: 12/8/2024  Today's Date: 12/13/24  Length of Stay: 5  Primary Care Physician: KIRILL Murguia MD    Subjective   Chief Complaint: Fever  HPI   Fever has improved.  Patient reports that she feels much better.  She is tolerating her diet.  She has been ambulating in the hallway.  She reported that she really needs to be discharged from the hospital tomorrow if at all possible, so that she can begin heading towards [place] on Sunday, if she really wants the gastric stimulator placed on Monday if at all possible.    Review of Systems   All pertinent negatives and positives are as above. All other systems have been reviewed and are negative unless otherwise stated.     Objective    Temp:  [97.9 °F (36.6 °C)-98.5 °F (36.9 °C)] 97.9 °F (36.6 °C)  Heart Rate:  [102-110] 110  Resp:  [18] 18  BP: (106-147)/(63-96) 109/77  Physical Exam  Vitals reviewed.   Constitutional:       Comments: Clinically looks substantially better; she has been walking in the halls   HENT:      Head: Normocephalic.      Nose:      Comments: Temporary gastric stimulator noted entering left nare  Eyes:      Pupils: Pupils are equal, round, and reactive to light.   Pulmonary:      Effort: Pulmonary effort is normal. No respiratory distress.   Skin:     General: Skin is warm.   Neurological:      General: No focal deficit present.      Mental Status: She is alert. Mental status is at baseline.   Psychiatric:         Mood and Affect: Mood normal.         Results Review:  I have reviewed the labs, radiology results, and diagnostic studies.    Laboratory Data:   Results from last 7 days   Lab Units 12/13/24  0126 12/12/24  0413 12/10/24  0319   WBC 10*3/mm3 8.39 10.29 13.13*   HEMOGLOBIN g/dL 8.8* 7.8* 8.4*   HEMATOCRIT % 27.8* 23.7* 25.1*   PLATELETS 10*3/mm3 355 297 282        Results from last 7 days   Lab  "Units 12/13/24  0126 12/12/24  0413 12/11/24  0331 12/10/24  0319 12/09/24  0435 12/08/24  1503 12/08/24  1319   SODIUM mmol/L 137 135* 134* 135*   < >  --  130*   POTASSIUM mmol/L 4.0 4.4 4.0 3.9   < >  --  2.9*   CHLORIDE mmol/L 102 104 106 109*   < >  --  99   CO2 mmol/L 24.0 20.0* 19.0* 17.0*   < >  --  17.0*   BUN mg/dL 16 15 15 19   < >  --  56*   CREATININE mg/dL 1.32* 1.50* 1.13* 1.24*   < >  --  2.75*   CALCIUM mg/dL 8.4* 7.0* 7.1* 7.1*   < >  --  7.8*   BILIRUBIN mg/dL  --  <0.2  --  <0.2  --   --  <0.2   ALK PHOS U/L  --  132*  --  112  --  149* 182*   ALT (SGPT) U/L  --  16  --  16  --   --  33   AST (SGOT) U/L  --  23  --  15  --   --  25   GLUCOSE mg/dL 99 168* 176* 123*   < >  --  241*    < > = values in this interval not displayed.       Culture Data:   No results found for: \"BLOODCX\", \"URINECX\", \"WOUNDCX\", \"MRSACX\", \"RESPCX\", \"STOOLCX\"    Radiology Data:   Imaging Results (Last 24 Hours)       ** No results found for the last 24 hours. **            I have reviewed the patient's current medications.     Assessment/Plan   Assessment  Active Hospital Problems    Diagnosis     Anemia     Fever     Hyponatremia     Gastroparesis     Positive blood cultures with Micrococcus species     Hypophosphatemia     Hypomagnesemia     Acute renal failure superimposed on stage 3b chronic kidney disease     Hypokalemia     Type 2 diabetes mellitus, with long-term current use of insulin        Treatment Plan  Discussed with Dr. Chavez  Antibiotics have been transitioned to IV Linezolid, to include good coverage against Nocardia given branching gram positive bacilli noted on blood cultures; final ID pending.  Repeat blood culture from 12/10 also growing same.  RUE PICC line dc'd 12/11 (this PICC has been in place since February)  Peripheral IV in place  A1c 9.0.  Patient uses insulin pump in outpatient setting.  Low dose Lantus started yesterday.  Monitor BS trend closely especially as patient reports tendencies " "towards hypoglycemia  Vitamin D supplement  Patient had a temporary gastric stimulator placed on Friday, 12/6.  Patient indicated that plans are in place for a permanent stimulator to be placed on Monday, 12/16.  She expressed concern that \"I have to be out of here soon...can't miss my appointment in [place] on Monday.\"  We did discuss that it will important for us to make sure infection is eradicated before pursuing an invasive procedure, including gastric stimulator implant.  Will outline next steps for her once final ID from blood cultures are available, and will discuss with Dr. Chavez.      Medical Decision Making  Number and Complexity of problems: High complexity      Conditions and Status        Condition is improving     MDM Data  External documents reviewed: none  Cardiac tracing (EKG, telemetry) interpretation: no new EKGs  Radiology interpretation: no new radiology studies this AM  Labs reviewed: as above  Any tests that were considered but not ordered: none     Decision rules/scores evaluated (example [id number], [place], etc): none     Discussed with: patient and Dr. Chavez     Care Planning  Shared decision making: Discussed with patient with agreement to proceed with treatment plan as outlined  Code status and discussions: Full code    Disposition  Social Determinants of Health that impact treatment or disposition: None apparent at this time  I expect the patient to be discharged:  to be determined       Electronically signed by Otto Del Rosario MD, 12/13/24, 11:09 CST.   "

## 2024-12-13 NOTE — PLAN OF CARE
Goal Outcome Evaluation:  Plan of Care Reviewed With: patient           Outcome Evaluation: Pt in room alone, sitting in chair. Intake in the last 72 hours: 57%; PO fluids 593 ml. Pt noted that she was not feeling well a couple of days ago but is now better and mentioned eating most of her breakfast today. Elevated glucose noted; glucose range in the last 72 hours: . Currently on Lantus and Humulin. Tolerating gastric stimulator well. Pt reports no N/V or abdominal pain. Only complaint is constipation. Pt mentioned she might D/C tomorrow. NG tube still in place. Last BM 12/11. New labs: Creat 1.32, A1C 9.1, Glu 337, Alb 2.6. Will monitor.

## 2024-12-13 NOTE — PROGRESS NOTES
Nephrology ([facility]) Progress Note      Patient:  Tasha Perez  YOB: 1970  Date of Service: 12/12/2024  MRN: [id number]   Acct: [id number]   Primary Care Physician: KIRILL Murguia MD  Advance Directive:   Code Status and Medical Interventions: CPR (Attempt to Resuscitate); Full Support   Ordered at: 12/08/24 1539     Level Of Support Discussed With:    Patient     Code Status (Patient has no pulse and is not breathing):    CPR (Attempt to Resuscitate)     Medical Interventions (Patient has pulse or is breathing):    Full Support     Admit Date: 12/8/2024       Hospital Day: 4  Referring Provider: No ref. provider found      Patient personally seen and examined.  Complete chart including Consults, Notes, Operative Reports, Labs, Cardiology, and Radiology studies reviewed as able.        Subjective:  Tasha Perez is a 54 y.o. female for whom we were consulted for evaluation and treatment of acute kidney injury. History of recurrent hospitalization for SUGAR due to volume depletion. History of chronic GI illnesses including nausea/vomiting and chronic pancreatitis. She had a PICC line and typically received outpatient IV fluids every other day.  She had had worsening renal failure along with reported nausea and vomiting so presented to the emergency room for further evaluation.  As is typical, for renal function improved with fluids.  We were consulted on hospital day 3 for further evaluation.  Patient noted clinical improvement.  No new complaints upon questioning.  Had questions about fever earlier today.     Today, no overnight events.  Denied current chest pain, shortness of air at rest, nausea or vomiting.  No IV fluids infusing today.  Creatinine up from 1.1-1.5.    Allergies:  Bee venom, Hydrocodone, Ibuprofen, Pineapple, Pineapple extract, Wasp venom, Wasp venom protein, Hydrocodone-acetaminophen, Penicillins, Sitagliptin, Metformin, and Chocolate    Home  Meds:  Medications Prior to Admission   Medication Sig Dispense Refill Last Dose/Taking    aspirin 81 MG EC tablet Take 1 tablet by mouth Daily.   12/7/2024    atorvastatin (LIPITOR) 80 MG tablet Take 1 tablet by mouth Every Night.   12/7/2024    Calcium Carbonate-Vitamin D 600-5 MG-MCG tablet Take 1 tablet by mouth Daily.   12/7/2024    cetirizine (zyrTEC) 10 MG tablet Take 1 tablet by mouth Daily As Needed for Allergies.   12/7/2024    Cholecalciferol (Vitamin D3) 50 MCG (2000 UT) capsule Take 1 capsule by mouth Daily.   12/7/2024    dapagliflozin Propanediol (Farxiga) 10 MG tablet Take 10 mg by mouth Daily.   12/7/2024    DULoxetine (CYMBALTA) 60 MG capsule Take 1 capsule by mouth Daily.   12/7/2024    hydroxychloroquine (PLAQUENIL) 200 MG tablet Take 2 tablets by mouth Daily.   12/7/2024    insulin aspart (NovoLOG) 100 UNIT/ML patient supplied pump Inject 1 Units under the skin into the appropriate area as directed Continuous.   Past Month    linaclotide (LINZESS) 290 MCG capsule capsule Take 1 capsule by mouth Every Morning Before Breakfast.   12/7/2024    miSOPROStol (CYTOTEC) 200 MCG tablet Take 1 tablet by mouth 4 (Four) Times a Day.   12/7/2024    nortriptyline (PAMELOR) 25 MG capsule Take 1 capsule by mouth Every Night.   12/7/2024    omeprazole (priLOSEC) 40 MG capsule Take 1 capsule by mouth Every Other Day.   12/7/2024    promethazine (PHENERGAN) 25 MG tablet Take 1 tablet by mouth Every 6 (Six) Hours As Needed for Nausea or Vomiting.   Past Week    rOPINIRole (REQUIP) 1 MG tablet Take 1 tablet by mouth 3 (Three) Times a Day.   12/7/2024    sodium bicarbonate 650 MG tablet Take 1 tablet by mouth 2 (Two) Times a Day.   12/7/2024    sucralfate (CARAFATE) 1 g tablet Take 1 tablet by mouth 4 (Four) Times a Day.   12/7/2024       Medicines:  Current Facility-Administered Medications   Medication Dose Route Frequency Provider Last Rate Last Admin    acetaminophen (TYLENOL) tablet 650 mg  650 mg Oral Q4H PRN  Rosmery Del Rosario APRN   650 mg at 12/11/24 1651    Or    acetaminophen (TYLENOL) 160 MG/5ML oral solution 650 mg  650 mg Oral Q4H PRN Rosmery Del Rosario APRN        Or    acetaminophen (TYLENOL) suppository 650 mg  650 mg Rectal Q4H PRN Rosmery Del Rosario APRN        aspirin EC tablet 81 mg  81 mg Oral Daily Otto Del Rosario MD   81 mg at 12/12/24 0902    atorvastatin (LIPITOR) tablet 40 mg  40 mg Oral Nightly Otto Del Rosario MD   40 mg at 12/11/24 2123    sennosides-docusate (PERICOLACE) 8.6-50 MG per tablet 2 tablet  2 tablet Oral BID PRN Rosmery Del Rosario APRN        And    polyethylene glycol (MIRALAX) packet 17 g  17 g Oral Daily PRN Rosmery Del Rosario APRN        And    bisacodyl (DULCOLAX) suppository 10 mg  10 mg Rectal Daily PRN Rosmery Del Rosario APRN        cholecalciferol (VITAMIN D3) tablet 1,000 Units  1,000 Units Oral Daily Otto Del Rosario MD   1,000 Units at 12/12/24 1252    dextrose (D50W) (25 g/50 mL) IV injection 25 g  25 g Intravenous Q15 Min PRN Rosmery Del Rosario APRN        dextrose (GLUTOSE) oral gel 15 g  15 g Oral Q15 Min PRN Rosmery Del Rosario APRN        influenza virus vacc split PF FLUZONE 0.5 mL  0.5 mL Intramuscular During Hospitalization Donnie Wright MD        insulin glargine (LANTUS, SEMGLEE) injection 7 Units  7 Units Subcutaneous Daily Otto Del Rosario MD   7 Units at 12/12/24 1252    insulin regular (humuLIN R,novoLIN R) injection 2-9 Units  2-9 Units Subcutaneous 4x Daily AC & at Bedtime Otto Del Rosario MD   2 Units at 12/12/24 1738    Linezolid (ZYVOX) 600 mg 300 mL  600 mg Intravenous Q12H Ramin Galdamez  mL/hr at 12/12/24 1725 600 mg at 12/12/24 1725    Magnesium Standard Dose Replacement - Follow Nurse / BPA Driven Protocol   Not Applicable PRN Donnie Wright MD        nicotine (NICODERM CQ) 14 MG/24HR patch 1 patch  1 patch Transdermal Q24H Rosmery Del Rosario, APRN   1 patch at 12/11/24 1547    nicotine polacrilex  (NICORETTE) gum 2 mg  2 mg Mouth/Throat Q1H PRN Rosmery Del Rosario APRN        ondansetron ODT (ZOFRAN-ODT) disintegrating tablet 4 mg  4 mg Oral Q6H PRN Rosmery Del Rosario APRN        Or    ondansetron (ZOFRAN) injection 4 mg  4 mg Intravenous Q6H PRN Rosmery Del Rosario APRN        pantoprazole (PROTONIX) EC tablet 40 mg  40 mg Oral Q AM Otto Del Rosario MD   40 mg at 12/12/24 0437    Phosphorus Replacement - Follow Nurse / BPA Driven Protocol   Not Applicable PRN Donnie Wright MD        rOPINIRole (REQUIP) tablet 1 mg  1 mg Oral TID Donnie Wright MD   1 mg at 12/12/24 1605    sodium bicarbonate tablet 650 mg  650 mg Oral BID Otto Del Rosario MD   650 mg at 12/12/24 0902    sodium chloride 0.9 % flush 10 mL  10 mL Intravenous PRN Donnie Wright MD        sodium chloride 0.9 % flush 10 mL  10 mL Intravenous Q12H Rosmery Del Rosario APRN   10 mL at 12/11/24 1139    sodium chloride 0.9 % flush 10 mL  10 mL Intravenous PRN Rosmery Del Rosario APRN        sodium chloride 0.9 % flush 20 mL  20 mL Intravenous PRN Donnie Wright MD        sodium chloride 0.9 % infusion 40 mL  40 mL Intravenous PRN Rosmery Del Rosario APRN        sucralfate (CARAFATE) tablet 1 g  1 g Oral 4x Daily AC & at Bedtime Otto Del Rosario MD   1 g at 12/12/24 1725       Past Medical History:  Past Medical History:   Diagnosis Date    Arthritis     Contusion     Degenerative disc disease, cervical     Diabetes mellitus     Elevated cholesterol     Fibromyalgia     Neuropathy     Osteoporosis     Pancreatitis     Raynaud disease     Renal insufficiency     Smoker 02/08/2022    Vitamin D deficiency 04/26/2022       Past Surgical History:  Past Surgical History:   Procedure Laterality Date    APPENDECTOMY      CHOLECYSTECTOMY      COLONOSCOPY      ENDOSCOPY N/A 10/08/2019    Procedure: ESOPHAGOGASTRODUODENOSCOPY WITH ANESTHESIA;  Surgeon: Aleks Vaughn DO;  Location: [facility] ENDOSCOPY;  Service: Gastroenterology     ENDOSCOPY N/A 11/12/2024    Procedure: ESOPHAGOGASTRODUODENOSCOPY WITH ANESTHESIA;  Surgeon: Tiffanie Saucedo MD;  Location: [facility] ENDOSCOPY;  Service: Gastroenterology;  Laterality: N/A;  pre op: Gastroparesis, Intractable nausea  post op: insertion of dobhoff   PCP: Brandon Murguia    ENDOSCOPY N/A 11/15/2024    Procedure: ESOPHAGOGASTRODUODENOSCOPY WITH ANESTHESIA WITH NG TUBE INSERTION;  Surgeon: Tiffanie Saucedo MD;  Location: [facility] ENDOSCOPY;  Service: Gastroenterology;  Laterality: N/A;  PRE OP: DOBHOFF  POST OP: DOBHOFF  PCP: TELLY PACK    ENDOSCOPY WITH GASTROSTOMY TUBE INSERTION N/A 6/15/2022    Procedure: ESOPHAGOGASTRODUODENOSCOPY WITH GASTROSTOMY TUBE INSERTION;  Surgeon: Jersey Lee MD;  Location: [facility] ENDOSCOPY;  Service: Gastroenterology;  Laterality: N/A;  pre peg placement  post peg placement  Dr. Murguia    ENTEROSCOPY SMALL BOWEL N/A 11/3/2022    Procedure: Esophagogastroduodenoscopy with Peg Removal;  Surgeon: Aleks Vaughn DO;  Location: [facility] ENDOSCOPY;  Service: Gastroenterology;  Laterality: N/A;  pre; peg removal  post; peg removal   KIRILL Murguia MD        HYSTERECTOMY         Family History  Family History   Adopted: Yes   Problem Relation Age of Onset    Colon polyps Neg Hx     Colon cancer Neg Hx        Social History  Social History     Socioeconomic History    Marital status:    Tobacco Use    Smoking status: Every Day     Current packs/day: 0.50     Average packs/day: 0.5 packs/day for 31.9 years (16.0 ttl pk-yrs)     Types: Cigarettes     Start date: 1993   Vaping Use    Vaping status: Never Used   Substance and Sexual Activity    Alcohol use: No    Drug use: No    Sexual activity: Defer       Review of Systems:  History obtained from chart review and the patient  General ROS: No fever or chills  Respiratory ROS: No cough, shortness of breath, wheezing  Cardiovascular ROS: No chest pain or palpitations  Gastrointestinal ROS: No abdominal pain or  melena  Genito-Urinary ROS: No dysuria or hematuria  Psych ROS: No anxiety and depression  14 point ROS reviewed with the patient and negative except as noted above and in the HPI unless unable to obtain.    Objective:  Patient Vitals for the past 24 hrs:   BP Temp Temp src Pulse Resp SpO2   12/12/24 1127 108/73 98.5 °F (36.9 °C) Oral 105 18 98 %   12/12/24 0843 117/92 98.8 °F (37.1 °C) Oral 119 20 99 %   12/12/24 0437 121/70 99.7 °F (37.6 °C) Oral 115 18 98 %   12/12/24 0011 128/86 99.6 °F (37.6 °C) Oral 112 20 100 %   12/11/24 1900 95/67 99.2 °F (37.3 °C) Oral 117 18 --       Intake/Output Summary (Last 24 hours) at 12/12/2024 1855  Last data filed at 12/12/2024 0847  Gross per 24 hour   Intake 240 ml   Output --   Net 240 ml     General: awake/alert   Chest:  clear to auscultation bilaterally without respiratory distress  CVS: regular rate and rhythm  Abdominal: soft, nontender, positive bowel sounds  Extremities: no cyanosis or edema  Skin: warm and dry without rash      Labs:  Results from last 7 days   Lab Units 12/12/24  0413 12/10/24  0319 12/09/24  0435   WBC 10*3/mm3 10.29 13.13* 12.96*   HEMOGLOBIN g/dL 7.8* 8.4* 9.5*   HEMATOCRIT % 23.7* 25.1* 29.8*   PLATELETS 10*3/mm3 297 282 304         Results from last 7 days   Lab Units 12/12/24  0413 12/11/24  0331 12/10/24  0319 12/09/24  0435 12/08/24  1503 12/08/24  1319   SODIUM mmol/L 135* 134* 135*   < >  --  130*   POTASSIUM mmol/L 4.4 4.0 3.9   < >  --  2.9*   CHLORIDE mmol/L 104 106 109*   < >  --  99   CO2 mmol/L 20.0* 19.0* 17.0*   < >  --  17.0*   BUN mg/dL 15 15 19   < >  --  56*   CREATININE mg/dL 1.50* 1.13* 1.24*   < >  --  2.75*   CALCIUM mg/dL 7.0* 7.1* 7.1*   < >  --  7.8*   EGFR mL/min/1.73 41.5* 58.3* 52.1*   < >  --  20.1*   BILIRUBIN mg/dL <0.2  --  <0.2  --   --  <0.2   ALK PHOS U/L 132*  --  112  --  149* 182*   ALT (SGPT) U/L 16  --  16  --   --  33   AST (SGOT) U/L 23  --  15  --   --  25   GLUCOSE mg/dL 168* 176* 123*   < >  --  241*     < > = values in this interval not displayed.       Radiology:   Imaging Results (Last 72 Hours)       ** No results found for the last 72 hours. **            Culture:  Blood Culture   Date Value Ref Range Status   12/10/2024 No growth at 24 hours  Preliminary   12/10/2024 No growth at 24 hours  Preliminary   12/10/2024 No growth at 24 hours  Preliminary   12/08/2024 No growth at 3 days  Preliminary   12/08/2024 Micrococcus species (C)  Preliminary         Assessment   Acute kidney injury-prerenal  Chronic kidney disease stage IIIb  Diabetes type 2 with diabetic nephropathy  Chronic pancreatitis  Hypokalemia  Metabolic acidosis  Hypotension  Hyponatremia     Plan:  Discussed with patient, nursing  Workup reviewed today  Monitor labs   Follow-up in the office 1 week at discharge recommended  IV fluids adjusted as per orders, would recommend continuing until discharge given the multitude of recurrent admissions due to prerenal kidney injury and rising creatinine today  Insulin regimen per primary service          Raymond Gilbert MD  12/12/2024  18:55 CST

## 2024-12-13 NOTE — PLAN OF CARE
Goal Outcome Evaluation:  Plan of Care Reviewed With: patient           Outcome Evaluation: A&O. Up ad jessy, amb in room. Tachycardic on tele. Afebrile. Consistent carb diet. C/o pain, see MAR. Accuchecks. SCDs. Safety maintained.

## 2024-12-13 NOTE — PLAN OF CARE
Goal Outcome Evaluation:  Plan of Care Reviewed With: patient        Progress: no change     Pt rested well overnight. Pt did not have any complaints of pain. Pt ran sinus/tachy ; per tele. Safety maintained during shift. Call light within reach.

## 2024-12-14 ENCOUNTER — READMISSION MANAGEMENT (OUTPATIENT)
Dept: CALL CENTER | Facility: HOSPITAL | Age: 54
End: 2024-12-14
Payer: COMMERCIAL

## 2024-12-14 VITALS
HEIGHT: 61 IN | HEART RATE: 113 BPM | RESPIRATION RATE: 16 BRPM | DIASTOLIC BLOOD PRESSURE: 77 MMHG | BODY MASS INDEX: 15.91 KG/M2 | TEMPERATURE: 97.8 F | OXYGEN SATURATION: 100 % | SYSTOLIC BLOOD PRESSURE: 118 MMHG | WEIGHT: 84.25 LBS

## 2024-12-14 PROBLEM — T80.219A PICC LINE INFECTION: Status: ACTIVE | Noted: 2024-12-14

## 2024-12-14 PROBLEM — S82.002A CLOSED NONDISPLACED FRACTURE OF LEFT PATELLA: Status: ACTIVE | Noted: 2024-12-14

## 2024-12-14 LAB
GLUCOSE BLDC GLUCOMTR-MCNC: 268 MG/DL (ref 70–130)
HOLD SPECIMEN: NORMAL

## 2024-12-14 PROCEDURE — 82948 REAGENT STRIP/BLOOD GLUCOSE: CPT

## 2024-12-14 PROCEDURE — 25010000002 LINEZOLID 600 MG/300ML SOLUTION: Performed by: INTERNAL MEDICINE

## 2024-12-14 PROCEDURE — 63710000001 INSULIN GLARGINE PER 5 UNITS: Performed by: INTERNAL MEDICINE

## 2024-12-14 PROCEDURE — 63710000001 INSULIN REGULAR HUMAN PER 5 UNITS: Performed by: INTERNAL MEDICINE

## 2024-12-14 RX ORDER — LEVOFLOXACIN 500 MG/1
500 TABLET, FILM COATED ORAL DAILY
Qty: 10 TABLET | Refills: 0 | Status: SHIPPED | OUTPATIENT
Start: 2024-12-14 | End: 2024-12-24

## 2024-12-14 RX ORDER — HYDROCORTISONE ACETATE 25 MG/1
25 SUPPOSITORY RECTAL 2 TIMES DAILY
Status: DISCONTINUED | OUTPATIENT
Start: 2024-12-14 | End: 2024-12-14 | Stop reason: HOSPADM

## 2024-12-14 RX ORDER — DOCUSATE SODIUM 100 MG/1
100 CAPSULE, LIQUID FILLED ORAL 2 TIMES DAILY
Status: DISCONTINUED | OUTPATIENT
Start: 2024-12-14 | End: 2024-12-14 | Stop reason: HOSPADM

## 2024-12-14 RX ORDER — LINEZOLID 600 MG/1
600 TABLET, FILM COATED ORAL 2 TIMES DAILY
Qty: 20 TABLET | Refills: 0 | Status: SHIPPED | OUTPATIENT
Start: 2024-12-14 | End: 2024-12-18

## 2024-12-14 RX ORDER — HYDROCORTISONE ACETATE 25 MG/1
25 SUPPOSITORY RECTAL 2 TIMES DAILY
Qty: 12 EACH | Refills: 1 | Status: SHIPPED | OUTPATIENT
Start: 2024-12-14

## 2024-12-14 RX ORDER — DOXYCYCLINE 100 MG/1
100 CAPSULE ORAL 2 TIMES DAILY
Qty: 20 CAPSULE | Refills: 0 | Status: SHIPPED | OUTPATIENT
Start: 2024-12-14 | End: 2024-12-24

## 2024-12-14 RX ADMIN — PANTOPRAZOLE SODIUM 40 MG: 40 TABLET, DELAYED RELEASE ORAL at 05:13

## 2024-12-14 RX ADMIN — LINEZOLID 600 MG: 600 INJECTION, SOLUTION INTRAVENOUS at 04:30

## 2024-12-14 RX ADMIN — Medication 1000 UNITS: at 09:02

## 2024-12-14 RX ADMIN — ROPINIROLE 1 MG: 1 TABLET, FILM COATED ORAL at 09:02

## 2024-12-14 RX ADMIN — ASPIRIN 81 MG: 81 TABLET, COATED ORAL at 09:02

## 2024-12-14 RX ADMIN — DOCUSATE SODIUM 100 MG: 100 CAPSULE, LIQUID FILLED ORAL at 09:02

## 2024-12-14 RX ADMIN — SUCRALFATE 1 G: 1 TABLET ORAL at 09:02

## 2024-12-14 RX ADMIN — SODIUM BICARBONATE 650 MG: 650 TABLET ORAL at 09:02

## 2024-12-14 RX ADMIN — Medication 10 ML: at 09:04

## 2024-12-14 RX ADMIN — INSULIN HUMAN 6 UNITS: 100 INJECTION, SOLUTION PARENTERAL at 09:02

## 2024-12-14 RX ADMIN — INSULIN GLARGINE 8 UNITS: 100 INJECTION, SOLUTION SUBCUTANEOUS at 09:02

## 2024-12-14 NOTE — SIGNIFICANT NOTE
I was called by the nurse telling me that the patient had a witnessed fall in the hallway of the unit landing on her left knee.  As reported, patient denies any head trauma or loss of consciousness or headache.  She was assessed by the nursing supervisor and they are suggesting to do an x-ray of the left knee just make sure we are not missing anything.  Order is placed.  I also added fall precautions.    Update:   X ray left knee showing left patellar non-displaced fracture.  I placed a consult for orthopedics, called their answering service, expecting a call back.  Patient informed.    Update:  Orthopedic, Dr. Skinner called and agreed to consult in the morning.  Recommended to apply long leg knee rigid immobilizer and to keep the knee straight.  I communicated the recommendation with the nurse who will arrange for the immobilizer and apply it.

## 2024-12-14 NOTE — NURSING NOTE
Pt had a witnessed fall in the hallway of the unit. Pt has a small skin tear to L knee. No c/o pain. Incoming nurse was informed of incident.

## 2024-12-14 NOTE — PLAN OF CARE
Goal Outcome Evaluation:  Plan of Care Reviewed With: patient        Progress: no change   Pt had fall during day shift on 12/13. Pt MD made aware of xray results. Ortho consult placed and knee imobilizer placed on pt for am. Pt complained of slight pain that was made better with placement of brace and Tylenol. Pt denied wanting an ice pack for extremity. Bed alarm is set and audible. Pt ran sinus/ sinus tachy 106/136; per tele. Safety maintained during shift. Call light within reach.

## 2024-12-14 NOTE — PROGRESS NOTES
Nephrology ([facility]) Progress Note      Patient:  Tasha Perez  YOB: 1970  Date of Service: 12/13/2024  MRN: [id number]   Acct: [id number]   Primary Care Physician: KIRILL Mugruia MD  Advance Directive:   Code Status and Medical Interventions: CPR (Attempt to Resuscitate); Full Support   Ordered at: 12/08/24 1539     Level Of Support Discussed With:    Patient     Code Status (Patient has no pulse and is not breathing):    CPR (Attempt to Resuscitate)     Medical Interventions (Patient has pulse or is breathing):    Full Support     Admit Date: 12/8/2024       Hospital Day: 5  Referring Provider: No ref. provider found      Patient personally seen and examined.  Complete chart including Consults, Notes, Operative Reports, Labs, Cardiology, and Radiology studies reviewed as able.        Subjective:  Tasha Perez is a 54 y.o. female for whom we were consulted for evaluation and treatment of acute kidney injury. History of recurrent hospitalization for SUGAR due to volume depletion. History of chronic GI illnesses including nausea/vomiting and chronic pancreatitis. She had a PICC line and typically received outpatient IV fluids every other day.  She had had worsening renal failure along with reported nausea and vomiting so presented to the emergency room for further evaluation.  As is typical, for renal function improved with fluids.  We were consulted on hospital day 3 for further evaluation.  Patient noted clinical improvement.  No new complaints upon questioning.  Had questions about fever earlier today.     Today, no overnight events.  Denied current chest pain, shortness of air at rest, nausea or vomiting.  No IV fluids infusing today.  Creatinine up from 1.1-1.5 yesterday and back down to 1.3 today.    Allergies:  Bee venom, Hydrocodone, Ibuprofen, Pineapple, Pineapple extract, Wasp venom, Wasp venom protein, Hydrocodone-acetaminophen, Penicillins, Sitagliptin,  Metformin, and Chocolate    Home Meds:  Medications Prior to Admission   Medication Sig Dispense Refill Last Dose/Taking    aspirin 81 MG EC tablet Take 1 tablet by mouth Daily.   12/7/2024    atorvastatin (LIPITOR) 80 MG tablet Take 1 tablet by mouth Every Night.   12/7/2024    Calcium Carbonate-Vitamin D 600-5 MG-MCG tablet Take 1 tablet by mouth Daily.   12/7/2024    cetirizine (zyrTEC) 10 MG tablet Take 1 tablet by mouth Daily As Needed for Allergies.   12/7/2024    Cholecalciferol (Vitamin D3) 50 MCG (2000 UT) capsule Take 1 capsule by mouth Daily.   12/7/2024    dapagliflozin Propanediol (Farxiga) 10 MG tablet Take 10 mg by mouth Daily.   12/7/2024    DULoxetine (CYMBALTA) 60 MG capsule Take 1 capsule by mouth Daily.   12/7/2024    hydroxychloroquine (PLAQUENIL) 200 MG tablet Take 2 tablets by mouth Daily.   12/7/2024    insulin aspart (NovoLOG) 100 UNIT/ML patient supplied pump Inject 1 Units under the skin into the appropriate area as directed Continuous.   Past Month    linaclotide (LINZESS) 290 MCG capsule capsule Take 1 capsule by mouth Every Morning Before Breakfast.   12/7/2024    miSOPROStol (CYTOTEC) 200 MCG tablet Take 1 tablet by mouth 4 (Four) Times a Day.   12/7/2024    nortriptyline (PAMELOR) 25 MG capsule Take 1 capsule by mouth Every Night.   12/7/2024    omeprazole (priLOSEC) 40 MG capsule Take 1 capsule by mouth Every Other Day.   12/7/2024    promethazine (PHENERGAN) 25 MG tablet Take 1 tablet by mouth Every 6 (Six) Hours As Needed for Nausea or Vomiting.   Past Week    rOPINIRole (REQUIP) 1 MG tablet Take 1 tablet by mouth 3 (Three) Times a Day.   12/7/2024    sodium bicarbonate 650 MG tablet Take 1 tablet by mouth 2 (Two) Times a Day.   12/7/2024    sucralfate (CARAFATE) 1 g tablet Take 1 tablet by mouth 4 (Four) Times a Day.   12/7/2024       Medicines:  Current Facility-Administered Medications   Medication Dose Route Frequency Provider Last Rate Last Admin    acetaminophen (TYLENOL)  tablet 650 mg  650 mg Oral Q4H PRN Rosmery Del Rosario APRN   650 mg at 12/13/24 0855    Or    acetaminophen (TYLENOL) 160 MG/5ML oral solution 650 mg  650 mg Oral Q4H PRN Rosmery Del Rosario APRN        Or    acetaminophen (TYLENOL) suppository 650 mg  650 mg Rectal Q4H PRN Rosmery Del Rosario APRN        aspirin EC tablet 81 mg  81 mg Oral Daily Otto Del Rosario MD   81 mg at 12/13/24 0855    atorvastatin (LIPITOR) tablet 40 mg  40 mg Oral Nightly Otto Del Rosario MD   40 mg at 12/12/24 2147    sennosides-docusate (PERICOLACE) 8.6-50 MG per tablet 2 tablet  2 tablet Oral BID PRN Rosmery Del Rosario APRN   2 tablet at 12/13/24 0904    And    polyethylene glycol (MIRALAX) packet 17 g  17 g Oral Daily PRN Rosmery Del Rosario APRN        And    bisacodyl (DULCOLAX) suppository 10 mg  10 mg Rectal Daily PRN Rosmery Del Rosario APRN        cholecalciferol (VITAMIN D3) tablet 1,000 Units  1,000 Units Oral Daily Otto Del Rosario MD   1,000 Units at 12/13/24 0855    dextrose (D50W) (25 g/50 mL) IV injection 25 g  25 g Intravenous Q15 Min PRN Rosmery Del Rosario APRN        dextrose (GLUTOSE) oral gel 15 g  15 g Oral Q15 Min PRN Rosmery Del Rosario APRN        influenza virus vacc split PF FLUZONE 0.5 mL  0.5 mL Intramuscular During Hospitalization Donnie Wright MD        [START ON 12/14/2024] insulin glargine (LANTUS, SEMGLEE) injection 8 Units  8 Units Subcutaneous Daily Otto Del Rosario MD        insulin regular (humuLIN R,novoLIN R) injection 2-9 Units  2-9 Units Subcutaneous 4x Daily AC & at Bedtime Otto Del Rosario MD   4 Units at 12/13/24 1730    Linezolid (ZYVOX) 600 mg 300 mL  600 mg Intravenous Q12H Ramin Galdamez  mL/hr at 12/13/24 1731 600 mg at 12/13/24 1731    Magnesium Standard Dose Replacement - Follow Nurse / BPA Driven Protocol   Not Applicable PRN Donnie Wright MD        nicotine (NICODERM CQ) 14 MG/24HR patch 1 patch  1 patch Transdermal Q24H Rosmery Del Rosario, APRN    1 patch at 12/11/24 1547    nicotine polacrilex (NICORETTE) gum 2 mg  2 mg Mouth/Throat Q1H PRN Rosmery Del Rosario APRN        ondansetron ODT (ZOFRAN-ODT) disintegrating tablet 4 mg  4 mg Oral Q6H PRN Rosmery Del Rosario APRN        Or    ondansetron (ZOFRAN) injection 4 mg  4 mg Intravenous Q6H PRN Rosmery Del Rosario APRN        pantoprazole (PROTONIX) EC tablet 40 mg  40 mg Oral Q AM Otto Del Rosario MD   40 mg at 12/13/24 0528    Phosphorus Replacement - Follow Nurse / BPA Driven Protocol   Not Applicable PRN Donnie Wright MD        rOPINIRole (REQUIP) tablet 1 mg  1 mg Oral TID Donnie Wright MD   1 mg at 12/13/24 1731    sodium bicarbonate tablet 650 mg  650 mg Oral BID Otto Del Rosario MD   650 mg at 12/13/24 0855    sodium chloride 0.9 % flush 10 mL  10 mL Intravenous PRN Donnie Wright MD        sodium chloride 0.9 % flush 10 mL  10 mL Intravenous Q12H Rosmery Del Rosario APRN   10 mL at 12/13/24 0857    sodium chloride 0.9 % flush 10 mL  10 mL Intravenous PRN Rosmery Del Rosario APRN        sodium chloride 0.9 % flush 20 mL  20 mL Intravenous PRN Donnie Wright MD        sodium chloride 0.9 % infusion 40 mL  40 mL Intravenous PRN Rosmery Del Rosario APRN        sucralfate (CARAFATE) tablet 1 g  1 g Oral 4x Daily AC & at Bedtime Otto Del Rosario MD   1 g at 12/13/24 1730       Past Medical History:  Past Medical History:   Diagnosis Date    Arthritis     Contusion     Degenerative disc disease, cervical     Diabetes mellitus     Elevated cholesterol     Fibromyalgia     Neuropathy     Osteoporosis     Pancreatitis     Raynaud disease     Renal insufficiency     Smoker 02/08/2022    Vitamin D deficiency 04/26/2022       Past Surgical History:  Past Surgical History:   Procedure Laterality Date    APPENDECTOMY      CHOLECYSTECTOMY      COLONOSCOPY      ENDOSCOPY N/A 10/08/2019    Procedure: ESOPHAGOGASTRODUODENOSCOPY WITH ANESTHESIA;  Surgeon: Aleks Vaughn DO;  Location:  [facility] END[facility];  Service: Gastroenterology    ENDOSCOPY N/A 11/12/2024    Procedure: ESOPHAGOGASTRODUODENOSCOPY WITH ANESTHESIA;  Surgeon: Tiffanie Saucedo MD;  Location: [facility] ENDOSCOPY;  Service: Gastroenterology;  Laterality: N/A;  pre op: Gastroparesis, Intractable nausea  post op: insertion of dobhoff   PCP: Brandon Murguia    ENDOSCOPY N/A 11/15/2024    Procedure: ESOPHAGOGASTRODUODENOSCOPY WITH ANESTHESIA WITH NG TUBE INSERTION;  Surgeon: Tiffanie Saucedo MD;  Location: [facility] ENDOSCOPY;  Service: Gastroenterology;  Laterality: N/A;  PRE OP: DOBHOFF  POST OP: DOBHOFF  PCP: TELLY PACK    ENDOSCOPY WITH GASTROSTOMY TUBE INSERTION N/A 6/15/2022    Procedure: ESOPHAGOGASTRODUODENOSCOPY WITH GASTROSTOMY TUBE INSERTION;  Surgeon: Jersey Lee MD;  Location: [facility] ENDOSCOPY;  Service: Gastroenterology;  Laterality: N/A;  pre peg placement  post peg placement  Dr. Murguia    ENTEROSCOPY SMALL BOWEL N/A 11/3/2022    Procedure: Esophagogastroduodenoscopy with Peg Removal;  Surgeon: Aleks Vaughn DO;  Location: [facility] ENDOSCOPY;  Service: Gastroenterology;  Laterality: N/A;  pre; peg removal  post; peg removal   KIRILL Murguia MD        HYSTERECTOMY         Family History  Family History   Adopted: Yes   Problem Relation Age of Onset    Colon polyps Neg Hx     Colon cancer Neg Hx        Social History  Social History     Socioeconomic History    Marital status:    Tobacco Use    Smoking status: Every Day     Current packs/day: 0.50     Average packs/day: 0.5 packs/day for 31.9 years (16.0 ttl pk-yrs)     Types: Cigarettes     Start date: 1993   Vaping Use    Vaping status: Never Used   Substance and Sexual Activity    Alcohol use: No    Drug use: No    Sexual activity: Defer       Review of Systems:  History obtained from chart review and the patient  General ROS: No fever or chills  Respiratory ROS: No cough, shortness of breath, wheezing  Cardiovascular ROS: No chest pain or  palpitations  Gastrointestinal ROS: No abdominal pain or melena  Genito-Urinary ROS: No dysuria or hematuria  Psych ROS: No anxiety and depression  14 point ROS reviewed with the patient and negative except as noted above and in the HPI unless unable to obtain.    Objective:  Patient Vitals for the past 24 hrs:   BP Temp Temp src Pulse Resp SpO2   12/13/24 2006 146/87 98 °F (36.7 °C) Oral 108 16 100 %   12/13/24 1600 121/90 97.4 °F (36.3 °C) Oral 116 18 94 %   12/13/24 1058 109/77 97.9 °F (36.6 °C) Oral 110 18 95 %   12/13/24 0802 147/96 98.1 °F (36.7 °C) Oral 102 18 97 %   12/13/24 0340 106/63 98.1 °F (36.7 °C) Oral 110 18 96 %   12/12/24 2126 124/82 98 °F (36.7 °C) Oral 105 18 98 %       Intake/Output Summary (Last 24 hours) at 12/13/2024 2056  Last data filed at 12/13/2024 1812  Gross per 24 hour   Intake 240 ml   Output --   Net 240 ml     General: awake/alert   Chest:  clear to auscultation bilaterally without respiratory distress  CVS: regular rate and rhythm  Abdominal: soft, nontender, positive bowel sounds  Extremities: no cyanosis or edema  Skin: warm and dry without rash      Labs:  Results from last 7 days   Lab Units 12/13/24  0126 12/12/24  0413 12/10/24  0319   WBC 10*3/mm3 8.39 10.29 13.13*   HEMOGLOBIN g/dL 8.8* 7.8* 8.4*   HEMATOCRIT % 27.8* 23.7* 25.1*   PLATELETS 10*3/mm3 355 297 282         Results from last 7 days   Lab Units 12/13/24  0126 12/12/24  0413 12/11/24  0331 12/10/24  0319 12/09/24  0435 12/08/24  1503 12/08/24  1319   SODIUM mmol/L 137 135* 134* 135*   < >  --  130*   POTASSIUM mmol/L 4.0 4.4 4.0 3.9   < >  --  2.9*   CHLORIDE mmol/L 102 104 106 109*   < >  --  99   CO2 mmol/L 24.0 20.0* 19.0* 17.0*   < >  --  17.0*   BUN mg/dL 16 15 15 19   < >  --  56*   CREATININE mg/dL 1.32* 1.50* 1.13* 1.24*   < >  --  2.75*   CALCIUM mg/dL 8.4* 7.0* 7.1* 7.1*   < >  --  7.8*   EGFR mL/min/1.73 48.4* 41.5* 58.3* 52.1*   < >  --  20.1*   BILIRUBIN mg/dL  --  <0.2  --  <0.2  --   --  <0.2   ALK  PHOS U/L  --  132*  --  112  --  149* 182*   ALT (SGPT) U/L  --  16  --  16  --   --  33   AST (SGOT) U/L  --  23  --  15  --   --  25   GLUCOSE mg/dL 99 168* 176* 123*   < >  --  241*    < > = values in this interval not displayed.       Radiology:   Imaging Results (Last 72 Hours)       Procedure Component Value Units Date/Time    XR Knee 3 View Left [[id number]] Resulted: 12/13/24 2040     Updated: 12/13/24 2041            Culture:  Blood Culture   Date Value Ref Range Status   12/10/2024 No growth at 24 hours  Preliminary   12/10/2024 No growth at 24 hours  Preliminary   12/10/2024 No growth at 24 hours  Preliminary   12/08/2024 No growth at 3 days  Preliminary   12/08/2024 Micrococcus species (C)  Preliminary         Assessment   Acute kidney injury-prerenal  Chronic kidney disease stage IIIb  Diabetes type 2 with diabetic nephropathy  Chronic pancreatitis  Hypokalemia  Metabolic acidosis  Hypotension  Hyponatremia     Plan:  Discussed with patient, nursing  Workup reviewed today  Monitor labs   Follow-up in the office 1 week at discharge recommended  IV fluids adjusted as per orders, off at the moment hoping for discharge soon  Insulin regimen per primary service  Antibiotic per ID consult, reviewed as current      Raymond Gilbert MD  12/13/2024  20:56 CST

## 2024-12-14 NOTE — PAYOR COMM NOTE
"[facility] HOME 12-14-24  Yash Perez (54 y.o. Female)       Date of Birth   1970    Social Security Number       Address   [address] [place] [id number]    Home Phone   [phone]    MRN   [id number]       Adventism   Sabianist    Marital Status                               Admission Date   12/8/24    Admission Type   Emergency    Admitting Provider   Otto Del Rosario MD    Attending Provider       Department, Room/Bed   [facility] 4B, 407/1       Discharge Date   12/14/2024    Discharge Disposition   Home or Self Care    Discharge Destination                                 Attending Provider: (none)   Allergies: Bee Venom, Hydrocodone, Ibuprofen, Pineapple, Pineapple Extract, Wasp Venom, Wasp Venom Protein, Hydrocodone-acetaminophen, Penicillins, Sitagliptin, Metformin, Chocolate    Isolation: None   Infection: None   Code Status: Prior    Ht: 154.9 cm (61\")   Wt: 38.2 kg (84 lb 4 oz)    Admission Cmt: None   Principal Problem: None                  Active Insurance as of 12/8/2024       Primary Coverage       Payor Plan Insurance Group Employer/Plan Group    [facility] [employer] [place] [facility]        Payor Plan Address Payor Plan Phone Number Payor Plan Fax Number Effective Dates    [address]   8/1/2019 - None Entered    [facility] [id number]         Subscriber Name Subscriber Birth Date Member ID       YASH PEREZ 1970 [id number]                     Emergency Contacts        (Rel.) Home Phone Work Phone Mobile Phone    JENNIFER PEREZ (Spouse) [phone] -- [phone]    KYLECASSIA LONGORIA (Daughter) -- -- [phone]                 Discharge Summary        Otto Del Rosario MD at 12/14/24 0718                [facility]  DISCHARGE SUMMARY       Date of Admission: 12/8/2024  Date of Discharge:  12/14/2024  Primary Care Physician: KIRILL Murguia MD    Presenting Problem/History " of Present Illness:  Concern for acute renal failure; also with intermittent fever    Final Discharge Diagnoses:  Active Hospital Problems    Diagnosis     Closed nondisplaced fracture of left patella     PICC line infection     Anemia     Fever     Hyponatremia     Gastroparesis     Positive blood cultures with Mycobacterium fortuitum     Hypophosphatemia     Hypomagnesemia     Acute renal failure superimposed on stage 3b chronic kidney disease     Hypokalemia     Type 2 diabetes mellitus, with long-term current use of insulin        Consults: Infectious Disease; Orthopedic Surgery; Nephrology      Pertinent Test Results:   Results for orders placed during the hospital encounter of 10/05/19    Adult Transthoracic Echo Complete W/ Cont if Necessary Per Protocol    Interpretation Summary  · Left ventricular systolic function is normal.    NORMAL STUDY      Imaging Results (All)       Procedure Component Value Units Date/Time    XR Knee 3 View Left [[id number]] Collected: 12/13/24 2049     Updated: 12/13/24 2056    Narrative:      XR KNEE 3 VW LEFT- 12/13/2024 7:40 PM     HISTORY: fell on her left knee; N17.9-Acute kidney failure, unspecified;  N18.31-Chronic kidney disease, stage 3a; R42-Dizziness and giddiness;  E87.6-Hypokalemia; E83.51-Hypocalcemia     COMPARISON: None      FINDINGS:  Frontal, lateral and notch views of the left knee were obtained.     Nondisplaced horizontal fracture across the distal pole of the patella.  Mild capsular distention. Distal femur and proximal tibia are intact.  Joint spaces appear well maintained.       Impression:      1. There appears to be a nondisplaced horizontal fracture across the  distal pole of the patella.           This report was signed and finalized on 12/13/2024 8:53 PM by Dr Ari Kaur.       XR Chest 1 View [[id number]] Collected: 12/08/24 1408     Updated: 12/08/24 1414    Narrative:      XR CHEST 1 VW-     HISTORY: Short of breath     COMPARISON: 11/14/2024      FINDINGS: Frontal view of the chest obtained.     Right upper extremity PICC line tip projects over the SVC. Enteric  tubes, one tip present in the proximal stomach with the other present in  the body of the stomach.     Lungs are well-expanded and clear. Cardiomediastinal contours are  normal. No pleural effusion or pneumothorax. No acute regional bony  pathology. Cholecystectomy clips.       Impression:      1. Well-expanded lungs. No acute cardiopulmonary process identified.  Enteric tubes present within the stomach.        This report was signed and finalized on 12/8/2024 2:10 PM by Dr. Gloria Roe MD.             LAB RESULTS:      Lab 12/13/24  0126 12/12/24  0413 12/10/24  0319 12/09/24  0435 12/08/24  1319   WBC 8.39 10.29 13.13* 12.96* 11.64*   HEMOGLOBIN 8.8* 7.8* 8.4* 9.5* 10.7*   HEMATOCRIT 27.8* 23.7* 25.1* 29.8* 32.1*   PLATELETS 355 297 282 304 326   NEUTROS ABS 4.09 6.70 8.50*  --  6.52   IMMATURE GRANS (ABS) 0.12* 0.15* 0.24*  --  0.25*   LYMPHS ABS 3.17* 2.65 3.20*  --  4.02*   MONOS ABS 0.70 0.67 1.10*  --  0.74   EOS ABS 0.28 0.11 0.08  --  0.08   MCV 92.4 88.8 90.3 92.8 89.4   LACTATE  --   --   --   --  1.3   PROTIME  --   --   --   --  13.3   APTT  --   --   --   --  26.9         Lab 12/13/24  0126 12/12/24  1633 12/12/24  0413 12/11/24  2046 12/11/24  1510 12/11/24  0331 12/10/24  0319 12/09/24  0435 12/08/24  1503 12/08/24  1319   SODIUM 137  --  135*  --   --  134* 135* 139  --  130*   POTASSIUM 4.0  --  4.4  --   --  4.0 3.9 3.1*  --  2.9*   CHLORIDE 102  --  104  --   --  106 109* 111*  --  99   CO2 24.0  --  20.0*  --   --  19.0* 17.0* 17.0*  --  17.0*   ANION GAP 11.0  --  11.0  --   --  9.0 9.0 11.0  --  14.0   BUN 16  --  15  --   --  15 19 34*  --  56*   CREATININE 1.32*  --  1.50*  --   --  1.13* 1.24* 1.65*  --  2.75*   EGFR 48.4*  --  41.5*  --   --  58.3* 52.1* 37.0*  --  20.1*   GLUCOSE 99  --  168*  --   --  176* 123* 71  --  241*   CALCIUM 8.4*  --  7.0*  --   --  7.1*  7.1* 7.6*  --  7.8*   MAGNESIUM  --   --  1.9  --  2.7* 1.1* 1.1*  --   --  1.7   PHOSPHORUS  --  3.2 2.1* 4.0  --  1.0*  --   --  3.0  --    HEMOGLOBIN A1C  --   --   --   --   --   --   --  9.10*  --   --          Lab 12/12/24  0413 12/10/24  0319 12/08/24  1503 12/08/24  1319   TOTAL PROTEIN 5.3* 5.1*  --  7.1   ALBUMIN 2.6* 2.5*  --  3.6   GLOBULIN 2.7 2.6  --  3.5   ALT (SGPT) 16 16  --  33   AST (SGOT) 23 15  --  25   BILIRUBIN <0.2 <0.2  --  <0.2   ALK PHOS 132* 112 149* 182*   LIPASE  --   --   --  59         Lab 12/08/24  1503 12/08/24  1319   HSTROP T 12 15*   PROTIME  --  13.3   INR  --  0.97                 Brief Urine Lab Results  (Last result in the past 365 days)        Color   Clarity   Blood   Leuk Est   Nitrite   Protein   CREAT   Urine HCG        12/08/24 1355 Yellow   Clear   Negative   Negative   Negative   100 mg/dL (2+)                 Microbiology Results (last 10 days)       Procedure Component Value - Date/Time    Catheter Culture - Cath Tip, Arm, Right [[id number]] Collected: 12/11/24 1113    Lab Status: Final result Specimen: Cath Tip from Arm, Right Updated: 12/13/24 1002     Catheter Culture No growth at 2 days    Blood Culture - Blood, Blood, Central Line [[id number]]  (Abnormal) Collected: 12/10/24 1612    Lab Status: Preliminary result Specimen: Blood, Central Line Updated: 12/12/24 1219     Blood Culture Abnormal Stain     Gram Stain Aerobic Bottle Branching gram positive bacilli    Blood Culture - Blood, Arm, Left [[id number]]  (Normal) Collected: 12/10/24 0858    Lab Status: Preliminary result Specimen: Blood from Arm, Left Updated: 12/13/24 0915     Blood Culture No growth at 3 days    Blood Culture - Blood, Arm, Left [[id number]]  (Normal) Collected: 12/10/24 0856    Lab Status: Preliminary result Specimen: Blood from Arm, Left Updated: 12/13/24 0915     Blood Culture No growth at 3 days    Blood Culture - Blood, Hand, Right [[id number]]  (Abnormal) Collected: 12/08/24 1319    Lab  Status: Preliminary result Specimen: Blood from Hand, Right Updated: 12/13/24 0613     Blood Culture Gram Positive Rods     Comment: Slow growing organism.        Isolated from --     Gram Stain Aerobic Bottle Branching gram positive bacilli    Narrative:      Blood culture became positive after 72 hours.  Molecular testing will not be performed unless requested by physician.    Blood Culture - Blood, Blood, PICC Line [[id number]]  (Abnormal) Collected: 12/08/24 1319    Lab Status: Preliminary result Specimen: Blood, PICC Line Updated: 12/13/24 0613     Blood Culture Micrococcus species     Isolated from --     Gram Stain Aerobic Bottle Gram positive cocci in clusters      Aerobic Bottle Branching gram positive bacilli    Narrative:      Probable contaminant requires clinical correlation, susceptibility not performed unless requested by physician.      Blood Culture ID, PCR - Blood, Blood, PICC Line [[id number]] Collected: 12/08/24 1319    Lab Status: Final result Specimen: Blood, PICC Line Updated: 12/10/24 0945     BCID, PCR Negative by BCID PCR. Culture to Follow.     BOTTLE TYPE Aerobic Bottle            Hospital Course:   Patient is a 54-year-old female with past medical history significant for insulin-dependent diabetes, chronic kidney disease, history of gastroparesis and multiple hospitalizations for recurrent nausea and vomiting, dehydration, acute renal failure, that presented to our facility on 12/8/2024 for symptoms of weakness, dizziness, nausea and vomiting, and concern for acute renal failure.  Patient has had debilitating gastroparesis and in fact recently had a temporary gastric stimulator placed on Friday, 12/6.  It sounds like she had 2 different leads placed on the temporary stimulator, unfortunately she began to experience symptoms of recurrent nausea and vomiting, she knows her body well enough when she feels like she is getting too dehydrated and is going into renal failure, and when she  presented to our hospital she was found to have a creatinine of 2.75 in addition to multiple electrolyte disturbances.  She was admitted to the hospitalist service for further workup and management.  Please note the patient does have a PICC line in place, and has been receiving intravenous fluids in the outpatient setting due to her issues of gastroparesis, nausea vomiting, and renal insufficiency that subsequently occurs.    Patient did communicate with her GI specialist in [place] and recommendations were to transition over to the alternate lead on her temporary gastric stimulator.  Since that time patient has done substantially better.  In fact, we are able to advance her diet which she has been tolerating without problem.  She is not having any nausea and vomiting.  Her renal function and electrolytes are much better after intravenous fluids and electrolyte replacement.  Plans are in place for her to go to [place], [place] tomorrow so that she can receive permanent gastric stimulator implantation on Monday, 12/16/2024.    Early on during this hospitalization patient began to spike a fever.  Patient indicated that she had been experiencing intermittent fever for about a week preceding this hospitalization.  Her fever curve continued to climb and there was concerned about the possibility of a PICC line infection.  Her blood cultures did return positive, eventually with branching gram-positive bacilli.  Infectious disease was consulted.  Her right upper extremity PICC line was removed.  Almost immediately after removing her PICC line patient's symptoms got substantially better, her fever curve improved, and patient has made clinical improvement on a daily basis since that time.  Her blood cultures appear to be positive for Mycobacterium fortuitum group, and infectious disease has made the following recommendations:    Recommendations:   Continue treatment with linezolid 600 milligrams orally every 12  "hours  Doxycycline 100 mg orally every 12 hours  Levofloxacin 500 mg orally daily  Okay with me for discharge home with prescriptions for 10 days of the above medications  I would like to see her back on Monday, December 23, 2024  Feel would be reasonable to proceed with her gastric stimulator placement on Monday  Would be happy to see sooner if any new or worsening problems in the interim    Prescriptions for linezolid, doxycycline, levofloxacin will be called into her pharmacy in [place], [place].  Will make arrangements for outpatient follow-up with Dr. Chavez in the infectious disease clinic on Monday, December 23.  Patient will proceed with gastric stimulator implantation on Monday as described above.    Unfortunately, last night around 7 PM patient reports that she was ambulating in the hallway, slipped on a wet spot in the floor, landed on her left knee, and x-ray imaging did confirm the presence of a nondisplaced horizontal patella fracture.  Patient was in a left knee immobilizer when I saw her this morning.  She stated that from a pain perspective her symptoms were adequately controlled.  She indicated to me rather quickly her desire of being discharged from the hospital today given plans for her and her family to get to [place] for her upcoming procedure.    Addendum: I have just spoken with Dr. Skinner with orthopedic surgery this morning.  He indicated that a longer, rigid immobilizer to the left lower extremity to ensure that it remains straight and extended (not hyperextended) is needed.  If the immobilizer can keep the knee straight then she can weight bear on the LLE \"peg-leg like a pirate\" with an understanding that it cannot bend.  Outpatient follow-up in Orthopedic Surgery clinic.  I informed her to keep her leg elevated while at rest.  Would recommend that she use ice.  Avoid all NSAIDs, but can use Tylenol to assist with pain control.    I went back to inform MsGabriel Chris regarding my conversation " "with Dr. Skinner.  She is in agreement with this plan.  She did indicate to me that her hemorrhoids are flaring up and bleeding some, she didn't want this to impede her discharge today, but wanted to know if there was anything that could be used to treat this.  I will prescribe her Anusol HC suppositories.  We discussed that it is very important to avoid constipation.  She states that she uses Linzess in the outpatient setting.  Again, she states that she does not want this to impede discharge today and will get this followed up in the outpatient setting, but wants to make sure that nothing nevin her from getting her planned procedure on Monday.    Physical Exam on Discharge:  /89 (BP Location: Left arm, Patient Position: Lying)   Pulse 112   Temp 97.9 °F (36.6 °C) (Oral)   Resp 16   Ht 154.9 cm (61\")   Wt 38.2 kg (84 lb 4 oz)   SpO2 95%   BMI 15.92 kg/m²   Physical Exam  Vitals reviewed.   Constitutional:       General: She is not in acute distress.     Appearance: She is not ill-appearing or toxic-appearing.   HENT:      Head: Normocephalic.      Nose:      Comments: Temporary gastric stimulator in place (entrance left nare)     Mouth/Throat:      Mouth: Mucous membranes are moist.   Pulmonary:      Effort: Pulmonary effort is normal. No respiratory distress.   Musculoskeletal:      Comments: Left knee immobilizer currently in place   Neurological:      General: No focal deficit present.      Mental Status: She is alert.   Psychiatric:         Mood and Affect: Mood normal.       Condition on Discharge: medically stable    Discharge Disposition:  Home or Self Care    Discharge Medications:     Discharge Medications        New Medications        Instructions Start Date   doxycycline 100 MG capsule  Commonly known as: VIBRAMYCIN   100 mg, Oral, 2 Times Daily      hydrocortisone 25 MG suppository  Commonly known as: ANUSOL-HC   25 mg, Rectal, 2 Times Daily      levoFLOXacin 500 MG tablet  Commonly known as: " LEVAQUIN   500 mg, Oral, Daily      linezolid 600 MG tablet  Commonly known as: ZYVOX   600 mg, Oral, 2 Times Daily             Continue These Medications        Instructions Start Date   aspirin 81 MG EC tablet   81 mg, Daily      atorvastatin 80 MG tablet  Commonly known as: LIPITOR   80 mg, Nightly      Calcium Carbonate-Vitamin D 600-5 MG-MCG tablet   1 tablet, Daily      cetirizine 10 MG tablet  Commonly known as: zyrTEC   10 mg, Daily PRN      Farxiga 10 MG tablet  Generic drug: dapagliflozin Propanediol   10 mg, Daily      hydroxychloroquine 200 MG tablet  Commonly known as: PLAQUENIL   400 mg, Daily      insulin aspart 100 UNIT/ML patient supplied pump  Commonly known as: NovoLOG   1 Units, Continuous      linaclotide 290 MCG capsule capsule  Commonly known as: LINZESS   290 mcg, Every Morning Before Breakfast      miSOPROStol 200 MCG tablet  Commonly known as: CYTOTEC   200 mcg, 4 Times Daily      omeprazole 40 MG capsule  Commonly known as: priLOSEC   40 mg, Oral, Every Other Day      promethazine 25 MG tablet  Commonly known as: PHENERGAN   25 mg, Every 6 Hours PRN      rOPINIRole 1 MG tablet  Commonly known as: REQUIP   1 mg, Oral, 3 Times Daily      sodium bicarbonate 650 MG tablet   650 mg, 2 Times Daily      sucralfate 1 g tablet  Commonly known as: CARAFATE   1 g, 4 Times Daily      Vitamin D3 50 MCG (2000 UT) capsule   2,000 Units, Oral, Daily             Stop These Medications      DULoxetine 60 MG capsule  Commonly known as: CYMBALTA     nortriptyline 25 MG capsule  Commonly known as: PAMELOR              Discharge Diet: diabetic diet as tolerated    Activity at Discharge: Will outline specific left lower extremity weightbearing instructions pending assessment by orthopedic surgery today in the setting of a nondisplaced left patella fracture    Follow-up Appointments:   Patient will follow-up with Dr. Chavez of infectious disease on Monday, December 23, 2024.  Patient is going to [place] tomorrow  in anticipation of gastric stimulator implantation on Monday, 12/16/2024.  Will also make close outpatient follow-up arrangements with orthopedic surgery regarding recent fall with nondisplaced left patella fracture.    Test Results Pending at Discharge: none    Electronically signed by Otto Del Rosario MD, 12/14/24, 07:40 CST.    Time: 35 minutes.          Electronically signed by Otto Del Rosario MD at 12/14/24 0744

## 2024-12-14 NOTE — CONSULTS
Orthopaedic Surgery Consult Note      12/14/2024   11:04 CST    Name:  Tasha Perez  MRN:    [id number]     Acct:     [id number]   Room:  [facility]/1   Day: 6     Admit Date: 12/8/2024  PCP: KIRILL Murguia MD        Subjective:     C/C: Left patella fracture. We are seeing this patient in consultation for an orthopaedic evaluation.  The patient was apparently inpatient and sustained a mechanical fall overnight resulting in the acute onset of left anterior knee pain.  X-rays demonstrated a nondisplaced transverse inferior pole left patella fracture.  Orthopedics was consulted as a result.    Pain: 3 /10      Code Status:    Code Status and Medical Interventions: CPR (Attempt to Resuscitate); Full Support   Ordered at: 12/08/24 1539     Level Of Support Discussed With:    Patient     Code Status (Patient has no pulse and is not breathing):    CPR (Attempt to Resuscitate)     Medical Interventions (Patient has pulse or is breathing):    Full Support         Past Medical History:    Past Medical History:   Diagnosis Date    Arthritis     Contusion     Degenerative disc disease, cervical     Diabetes mellitus     Elevated cholesterol     Fibromyalgia     Neuropathy     Osteoporosis     Pancreatitis     Raynaud disease     Renal insufficiency     Smoker 02/08/2022    Vitamin D deficiency 04/26/2022       Past Surgical History:    Past Surgical History:   Procedure Laterality Date    APPENDECTOMY      CHOLECYSTECTOMY      COLONOSCOPY      ENDOSCOPY N/A 10/08/2019    Procedure: ESOPHAGOGASTRODUODENOSCOPY WITH ANESTHESIA;  Surgeon: Aleks Vaughn DO;  Location: [facility] ENDOSCOPY;  Service: Gastroenterology    ENDOSCOPY N/A 11/12/2024    Procedure: ESOPHAGOGASTRODUODENOSCOPY WITH ANESTHESIA;  Surgeon: Tiffanie Saucedo MD;  Location: [facility] ENDOSCOPY;  Service: Gastroenterology;  Laterality: N/A;  pre op: Gastroparesis, Intractable nausea  post op: insertion of dobhoff   PCP: Brandon Murguia    ENDOSCOPY N/A 11/15/2024     Procedure: ESOPHAGOGASTRODUODENOSCOPY WITH ANESTHESIA WITH NG TUBE INSERTION;  Surgeon: Tiffanie Saucedo MD;  Location:  [facility] ENDOSCOPY;  Service: Gastroenterology;  Laterality: N/A;  PRE OP: DOBHOFF  POST OP: DOBHOFF  PCP: TELLY PACK    ENDOSCOPY WITH GASTROSTOMY TUBE INSERTION N/A 6/15/2022    Procedure: ESOPHAGOGASTRODUODENOSCOPY WITH GASTROSTOMY TUBE INSERTION;  Surgeon: Jersey Lee MD;  Location:  [facility] ENDOSCOPY;  Service: Gastroenterology;  Laterality: N/A;  pre peg placement  post peg placement  Dr. Murguia    ENTEROSCOPY SMALL BOWEL N/A 11/3/2022    Procedure: Esophagogastroduodenoscopy with Peg Removal;  Surgeon: Aleks Vaughn DO;  Location:  [facility] ENDOSCOPY;  Service: Gastroenterology;  Laterality: N/A;  pre; peg removal  post; peg removal   KIRILL Murguia MD        HYSTERECTOMY         Current Medications:   Prior to Admission medications    Medication Sig Start Date End Date Taking? Authorizing Provider   aspirin 81 MG EC tablet Take 1 tablet by mouth Daily.   Yes Kevin Mejia MD   atorvastatin (LIPITOR) 80 MG tablet Take 1 tablet by mouth Every Night.   Yes Kevin Mejia MD   Calcium Carbonate-Vitamin D 600-5 MG-MCG tablet Take 1 tablet by mouth Daily.   Yes Kevin Mejia MD   cetirizine (zyrTEC) 10 MG tablet Take 1 tablet by mouth Daily As Needed for Allergies.   Yes Kevin Mejia MD   Cholecalciferol (Vitamin D3) 50 MCG (2000 UT) capsule Take 1 capsule by mouth Daily.   Yes Kevin Mejia MD   dapagliflozin Propanediol (Farxiga) 10 MG tablet Take 10 mg by mouth Daily.   Yes Kevin Mejia MD   DULoxetine (CYMBALTA) 60 MG capsule Take 1 capsule by mouth Daily.   Yes Kevin Mejia MD   hydroxychloroquine (PLAQUENIL) 200 MG tablet Take 2 tablets by mouth Daily.   Yes Kevin Mejia MD   insulin aspart (NovoLOG) 100 UNIT/ML patient supplied pump Inject 1 Units under the skin into the appropriate area as directed Continuous.    Yes Kevin Mejia MD   linaclotide (LINZESS) 290 MCG capsule capsule Take 1 capsule by mouth Every Morning Before Breakfast.   Yes Kevin Mejia MD   miSOPROStol (CYTOTEC) 200 MCG tablet Take 1 tablet by mouth 4 (Four) Times a Day.   Yes Kevin Mejia MD   nortriptyline (PAMELOR) 25 MG capsule Take 1 capsule by mouth Every Night.   Yes Kevin Mejia MD   omeprazole (priLOSEC) 40 MG capsule Take 1 capsule by mouth Every Other Day.   Yes Kevin Mejia MD   promethazine (PHENERGAN) 25 MG tablet Take 1 tablet by mouth Every 6 (Six) Hours As Needed for Nausea or Vomiting.   Yes Kevin Mejia MD   rOPINIRole (REQUIP) 1 MG tablet Take 1 tablet by mouth 3 (Three) Times a Day.   Yes Kevin Mejia MD   sodium bicarbonate 650 MG tablet Take 1 tablet by mouth 2 (Two) Times a Day. 9/23/24  Yes Kevin Mejia MD   sucralfate (CARAFATE) 1 g tablet Take 1 tablet by mouth 4 (Four) Times a Day.   Yes Kevin Mejia MD   doxycycline (VIBRAMYCIN) 100 MG capsule Take 1 capsule by mouth 2 (Two) Times a Day for 10 days. 12/14/24 12/24/24  Otto Del Rosario MD   hydrocortisone (ANUSOL-HC) 25 MG suppository Insert 1 suppository into the rectum 2 (Two) Times a Day. 12/14/24   Otto Del Rosario MD   levoFLOXacin (LEVAQUIN) 500 MG tablet Take 1 tablet by mouth Daily for 10 days. 12/14/24 12/24/24  Otto Del Rosario MD   linezolid (ZYVOX) 600 MG tablet Take 1 tablet by mouth 2 (Two) Times a Day for 10 days. 12/14/24 12/24/24  Otto Del Rosario MD       Allergies:  Bee venom, Hydrocodone, Ibuprofen, Pineapple, Pineapple extract, Wasp venom, Wasp venom protein, Hydrocodone-acetaminophen, Penicillins, Sitagliptin, Metformin, and Chocolate    Social History:   Social History     Socioeconomic History    Marital status:    Tobacco Use    Smoking status: Every Day     Current packs/day: 0.50     Average packs/day: 0.5 packs/day for 32.0 years (16.0 ttl  "pk-yrs)     Types: Cigarettes     Start date:    Vaping Use    Vaping status: Never Used   Substance and Sexual Activity    Alcohol use: No    Drug use: No    Sexual activity: Defer       Family History:   Family History   Adopted: Yes   Problem Relation Age of Onset    Colon polyps Neg Hx     Colon cancer Neg Hx            REVIEW OF SYSTEMS:    Review of systems from admission H&P are discussed with the patient and noted to be unchanged    Vitals:  /77 (BP Location: Left arm, Patient Position: Sitting)   Pulse 113   Temp 97.8 °F (36.6 °C) (Oral)   Resp 16   Ht 154.9 cm (61\")   Wt 38.2 kg (84 lb 4 oz)   SpO2 100%   BMI 15.92 kg/m²   Temp (24hrs), Av.8 °F (36.6 °C), Min:97.4 °F (36.3 °C), Max:98.1 °F (36.7 °C)      I/O (24Hr):    Intake/Output Summary (Last 24 hours) at 2024 1104  Last data filed at 2024 2327  Gross per 24 hour   Intake 360 ml   Output --   Net 360 ml       Labs:  Lab Results (last 72 hours)       Procedure Component Value Units Date/Time    Blood Culture - Blood, Arm, Left [[id number]]  (Abnormal) Collected: 12/10/24 0858    Specimen: Blood from Arm, Left Updated: 24 1009     Blood Culture Abnormal Stain     Gram Stain Aerobic Bottle Branching gram positive bacilli    Narrative:      Blood culture became positive after 72 hours.  Molecular testing will not be performed unless requested by physician.    Blood Culture - Blood, Hand, Right [[id number]]  (Abnormal) Collected: 24 1319    Specimen: Blood from Hand, Right Updated: 24 0944     Blood Culture Gram Positive Rods     Comment: Slow growing organism.        Isolated from --     Gram Stain Aerobic Bottle Branching gram positive bacilli    Narrative:      Blood culture became positive after 72 hours.  Molecular testing will not be performed unless requested by physician.    Blood Culture - Blood, Arm, Left [[id number]]  (Normal) Collected: 12/10/24 0856    Specimen: Blood from Arm, Left Updated: " 12/14/24 0915     Blood Culture No growth at 4 days    POC Glucose Once [[id number]]  (Abnormal) Collected: 12/14/24 0805    Specimen: Blood Updated: 12/14/24 0856     Glucose 268 mg/dL      Comment: : [id number] Ward HeatherMeter ID: [id number]       POC Glucose Once [[id number]]  (Abnormal) Collected: 12/13/24 2007    Specimen: Blood Updated: 12/13/24 2018     Glucose 171 mg/dL      Comment: : [id number] Oleksandr RileyMeter ID: [id number]       POC Glucose Once [[id number]]  (Abnormal) Collected: 12/13/24 1633    Specimen: Blood Updated: 12/13/24 1644     Glucose 211 mg/dL      Comment: : [id number] Bennie WillshMeter ID: [id number]       Miscellaneous Micro Request - Specimen Not Sent, Specimen Not Sent [[id number]] Collected: 12/08/24 1319    Specimen: Specimen Not Sent Updated: 12/13/24 1352    POC Glucose Once [[id number]]  (Abnormal) Collected: 12/13/24 1100    Specimen: Blood Updated: 12/13/24 1111     Glucose 337 mg/dL      Comment: : [id number] Lai VogelgieMeter ID: [id number]       Catheter Culture - Cath Tip, Arm, Right [[id number]] Collected: 12/11/24 1113    Specimen: Cath Tip from Arm, Right Updated: 12/13/24 1002     Catheter Culture No growth at 2 days    POC Glucose Once [[id number]]  (Abnormal) Collected: 12/13/24 0804    Specimen: Blood Updated: 12/13/24 0815     Glucose 259 mg/dL      Comment: : [id number] Lai VogelgieMeter ID: [id number]       Blood Culture - Blood, Blood, PICC Line [[id number]]  (Abnormal) Collected: 12/08/24 1319    Specimen: Blood, PICC Line Updated: 12/13/24 0613     Blood Culture Micrococcus species     Isolated from --     Gram Stain Aerobic Bottle Gram positive cocci in clusters      Aerobic Bottle Branching gram positive bacilli    Narrative:      Probable contaminant requires clinical correlation, susceptibility not performed unless requested by physician.      Basic Metabolic Panel [[id number]]  (Abnormal) Collected: 12/13/24 0126    Specimen: Blood  Updated: 12/13/24 0212     Glucose 99 mg/dL      BUN 16 mg/dL      Creatinine 1.32 mg/dL      Sodium 137 mmol/L      Potassium 4.0 mmol/L      Chloride 102 mmol/L      CO2 24.0 mmol/L      Calcium 8.4 mg/dL      BUN/Creatinine Ratio 12.1     Anion Gap 11.0 mmol/L      eGFR 48.4 mL/min/1.73     Narrative:      GFR Categories in Chronic Kidney Disease (CKD)      GFR Category          GFR (mL/min/1.73)    Interpretation  G1                     90 or greater         Normal or high (1)  G2                      60-89                Mild decrease (1)  G3a                   45-59                Mild to moderate decrease  G3b                   30-44                Moderate to severe decrease  G4                    15-29                Severe decrease  G5                    14 or less           Kidney failure          (1)In the absence of evidence of kidney disease, neither GFR category G1 or G2 fulfill the criteria for CKD.    eGFR calculation 2021 CKD-EPI creatinine equation, which does not include race as a factor    CBC & Differential [[id number]]  (Abnormal) Collected: 12/13/24 0126    Specimen: Blood Updated: 12/13/24 0153    Narrative:      The following orders were created for panel order CBC & Differential.  Procedure                               Abnormality         Status                     ---------                               -----------         ------                     CBC Auto Differential[[id number]]        Abnormal            Final result                 Please view results for these tests on the individual orders.    CBC Auto Differential [[id number]]  (Abnormal) Collected: 12/13/24 0126    Specimen: Blood Updated: 12/13/24 0153     WBC 8.39 10*3/mm3      RBC 3.01 10*6/mm3      Hemoglobin 8.8 g/dL      Hematocrit 27.8 %      MCV 92.4 fL      MCH 29.2 pg      MCHC 31.7 g/dL      RDW 13.9 %      RDW-SD 46.6 fl      MPV 9.5 fL      Platelets 355 10*3/mm3      Neutrophil % 48.8 %      Lymphocyte % 37.8 %       Monocyte % 8.3 %      Eosinophil % 3.3 %      Basophil % 0.4 %      Immature Grans % 1.4 %      Neutrophils, Absolute 4.09 10*3/mm3      Lymphocytes, Absolute 3.17 10*3/mm3      Monocytes, Absolute 0.70 10*3/mm3      Eosinophils, Absolute 0.28 10*3/mm3      Basophils, Absolute 0.03 10*3/mm3      Immature Grans, Absolute 0.12 10*3/mm3      nRBC 0.0 /100 WBC     POC Glucose Once [[id number]]  (Abnormal) Collected: 12/12/24 2105    Specimen: Blood Updated: 12/12/24 2117     Glucose 213 mg/dL      Comment: : [id number] Mecosta SerenityMeter ID: [id number]       POC Glucose Once [[id number]]  (Abnormal) Collected: 12/12/24 1700    Specimen: Blood Updated: 12/12/24 1740     Glucose 190 mg/dL      Comment: : [id number] Sean EuraisaMeter ID: [id number]       Phosphorus [[id number]]  (Normal) Collected: 12/12/24 1633    Specimen: Blood Updated: 12/12/24 1728     Phosphorus 3.2 mg/dL     Blood Culture - Blood, Blood, Central Line [[id number]]  (Abnormal) Collected: 12/10/24 1612    Specimen: Blood, Central Line Updated: 12/12/24 1219     Blood Culture Abnormal Stain     Gram Stain Aerobic Bottle Branching gram positive bacilli    POC Glucose Once [[id number]]  (Abnormal) Collected: 12/12/24 1128    Specimen: Blood Updated: 12/12/24 1141     Glucose 231 mg/dL      Comment: : [id number] Sean EuraisaMeter ID: [id number]       POC Glucose Once [[id number]]  (Abnormal) Collected: 12/12/24 0849    Specimen: Blood Updated: 12/12/24 0910     Glucose 287 mg/dL      Comment: : [id number] Sean EuraisaMeter ID: [id number]       Magnesium [[id number]]  (Normal) Collected: 12/12/24 0413    Specimen: Blood Updated: 12/12/24 0508     Magnesium 1.9 mg/dL     Phosphorus [[id number]]  (Abnormal) Collected: 12/12/24 0413    Specimen: Blood Updated: 12/12/24 0508     Phosphorus 2.1 mg/dL     Comprehensive Metabolic Panel [[id number]]  (Abnormal) Collected: 12/12/24 0413    Specimen: Blood Updated: 12/12/24 0500     Glucose 168 mg/dL       BUN 15 mg/dL      Creatinine 1.50 mg/dL      Sodium 135 mmol/L      Potassium 4.4 mmol/L      Chloride 104 mmol/L      CO2 20.0 mmol/L      Calcium 7.0 mg/dL      Total Protein 5.3 g/dL      Albumin 2.6 g/dL      ALT (SGPT) 16 U/L      AST (SGOT) 23 U/L      Alkaline Phosphatase 132 U/L      Total Bilirubin <0.2 mg/dL      Globulin 2.7 gm/dL      A/G Ratio 1.0 g/dL      BUN/Creatinine Ratio 10.0     Anion Gap 11.0 mmol/L      eGFR 41.5 mL/min/1.73     Narrative:      GFR Categories in Chronic Kidney Disease (CKD)      GFR Category          GFR (mL/min/1.73)    Interpretation  G1                     90 or greater         Normal or high (1)  G2                      60-89                Mild decrease (1)  G3a                   45-59                Mild to moderate decrease  G3b                   30-44                Moderate to severe decrease  G4                    15-29                Severe decrease  G5                    14 or less           Kidney failure          (1)In the absence of evidence of kidney disease, neither GFR category G1 or G2 fulfill the criteria for CKD.    eGFR calculation 2021 CKD-EPI creatinine equation, which does not include race as a factor    CBC & Differential [[id number]]  (Abnormal) Collected: 12/12/24 0413    Specimen: Blood Updated: 12/12/24 0435    Narrative:      The following orders were created for panel order CBC & Differential.  Procedure                               Abnormality         Status                     ---------                               -----------         ------                     CBC Auto Differential[[id number]]        Abnormal            Final result                 Please view results for these tests on the individual orders.    CBC Auto Differential [[id number]]  (Abnormal) Collected: 12/12/24 0413    Specimen: Blood Updated: 12/12/24 0435     WBC 10.29 10*3/mm3      RBC 2.67 10*6/mm3      Hemoglobin 7.8 g/dL      Hematocrit 23.7 %      MCV 88.8 fL       MCH 29.2 pg      MCHC 32.9 g/dL      RDW 13.7 %      RDW-SD 44.8 fl      MPV 9.5 fL      Platelets 297 10*3/mm3      Neutrophil % 65.0 %      Lymphocyte % 25.8 %      Monocyte % 6.5 %      Eosinophil % 1.1 %      Basophil % 0.1 %      Immature Grans % 1.5 %      Neutrophils, Absolute 6.70 10*3/mm3      Lymphocytes, Absolute 2.65 10*3/mm3      Monocytes, Absolute 0.67 10*3/mm3      Eosinophils, Absolute 0.11 10*3/mm3      Basophils, Absolute 0.01 10*3/mm3      Immature Grans, Absolute 0.15 10*3/mm3      nRBC 0.0 /100 WBC     Phosphorus [[id number]]  (Normal) Collected: 12/11/24 2046    Specimen: Blood Updated: 12/11/24 2133     Phosphorus 4.0 mg/dL     POC Glucose Once [[id number]]  (Abnormal) Collected: 12/11/24 1926    Specimen: Blood Updated: 12/11/24 1937     Glucose 199 mg/dL      Comment: : [id number] Eulalia DestinyMeter ID: [id number]       POC Glucose Once [[id number]]  (Abnormal) Collected: 12/11/24 1641    Specimen: Blood Updated: 12/11/24 1653     Glucose 152 mg/dL      Comment: : [id number] Rubio EthanMeter ID: [id number]       Magnesium [[id number]]  (Abnormal) Collected: 12/11/24 1510    Specimen: Blood Updated: 12/11/24 1544     Magnesium 2.7 mg/dL     Vitamin D,25-Hydroxy [[id number]]  (Abnormal) Collected: 12/11/24 0330    Specimen: Blood Updated: 12/11/24 1149     25 Hydroxy, Vitamin D 11.3 ng/ml     Narrative:      Reference Range for Total Vitamin D 25(OH)     Deficiency <20.0 ng/mL   Insufficiency 21-29 ng/mL   Sufficiency  ng/mL  Toxicity >100 ng/ml      POC Glucose Once [[id number]]  (Abnormal) Collected: 12/11/24 1057    Specimen: Blood Updated: 12/11/24 1118     Glucose 205 mg/dL      Comment: : [id number] Sean EuraisaMeter ID: [id number]               Radiology:  Imaging Results (Last 72 Hours)       Procedure Component Value Units Date/Time    XR Knee 3 View Left [[id number]] Collected: 12/13/24 2049     Updated: 12/13/24 2056    Narrative:      XR KNEE 3 VW LEFT- 12/13/2024  7:40 PM     HISTORY: fell on her left knee; N17.9-Acute kidney failure, unspecified;  N18.31-Chronic kidney disease, stage 3a; R42-Dizziness and giddiness;  E87.6-Hypokalemia; E83.51-Hypocalcemia     COMPARISON: None      FINDINGS:  Frontal, lateral and notch views of the left knee were obtained.     Nondisplaced horizontal fracture across the distal pole of the patella.  Mild capsular distention. Distal femur and proximal tibia are intact.  Joint spaces appear well maintained.       Impression:      1. There appears to be a nondisplaced horizontal fracture across the  distal pole of the patella.           This report was signed and finalized on 12/13/2024 8:53 PM by Dr Ari Kaur.               Physical Exam:     PHYSICAL EXAM:      Physical Examination:  Vitals:   Vitals:    12/13/24 2006 12/13/24 2327 12/14/24 0509 12/14/24 0840   BP: 146/87 128/84 127/89 118/77   BP Location: Left arm Left arm Left arm Left arm   Patient Position: Lying Lying Lying Sitting   Pulse: 108 101 112 113   Resp: 16 16 16 16   Temp: 98 °F (36.7 °C) 98.1 °F (36.7 °C) 97.9 °F (36.6 °C) 97.8 °F (36.6 °C)   TempSrc: Oral Oral Oral Oral   SpO2: 100% 98% 95% 100%   Weight:       Height:         General:  Appears stated age, no distress.  Orientation:  Alert and oriented to time, place, and person.  Mood and Affect:  Cooperative and pleasant.  Gait:  Resting comfortably in bed.  Cardiovascular:  Symmetric 1-2 plus pulses in upper and lower extremities.  Lymph:  No cervical or inguinal lymphadenopathy noted.  Sensation:  Grossly intact to light touch.  DTR:  Normal, no pathologic reflexes.  Coordination/balance:  Normal    Musculoskeletal:  Left lower extremity exam:  There is no tenderness to palpation about the hip, ankle or foot, but the patient does report discomfort with palpation anteriorly at the knee.  No palpable defect or deformity is noted with the patella.  Mild soft tissue swelling with minimal excoriation/abrasion.  No obvious  ecchymosis.  Adjacent muscle compartments are soft compressible.  Overlying skin is otherwise intact.        Radiology Review  My review of patient's x-rays shows the patient to have a nondisplaced transverse inferior pole left patella fracture     Assessment:     Primary Problem  <principal problem not specified> - nondisplaced transverse inferior pole left patella fracture    Plan:   A  prolonged conversation was had with the patient regarding the fracture location and morphology.  She is pleased to learn that not only well surgical intervention not be necessary, but she will be able to maintain mobility in the form of weightbearing with her knee held in terminal extension using a knee immobilizer.  2.   Elevated weightbearing as tolerated with knee held in terminal extension.  Avoid flexion   3.   Patient may be discharged so that she can go to [place] to do her planned procedure on Monday.  4.   Follow-up in 2 to 4 weeks for x-ray evaluation

## 2024-12-14 NOTE — DISCHARGE SUMMARY
[facility]  DISCHARGE SUMMARY       Date of Admission: 12/8/2024  Date of Discharge:  12/14/2024  Primary Care Physician: KIRILL Murguia MD    Presenting Problem/History of Present Illness:  Concern for acute renal failure; also with intermittent fever    Final Discharge Diagnoses:  Active Hospital Problems    Diagnosis     Closed nondisplaced fracture of left patella     PICC line infection     Anemia     Fever     Hyponatremia     Gastroparesis     Positive blood cultures with Mycobacterium fortuitum     Hypophosphatemia     Hypomagnesemia     Acute renal failure superimposed on stage 3b chronic kidney disease     Hypokalemia     Type 2 diabetes mellitus, with long-term current use of insulin        Consults: Infectious Disease; Orthopedic Surgery; Nephrology      Pertinent Test Results:   Results for orders placed during the hospital encounter of 10/05/19    Adult Transthoracic Echo Complete W/ Cont if Necessary Per Protocol    Interpretation Summary  · Left ventricular systolic function is normal.    NORMAL STUDY      Imaging Results (All)       Procedure Component Value Units Date/Time    XR Knee 3 View Left [[id number]] Collected: 12/13/24 2049     Updated: 12/13/24 2056    Narrative:      XR KNEE 3 VW LEFT- 12/13/2024 7:40 PM     HISTORY: fell on her left knee; N17.9-Acute kidney failure, unspecified;  N18.31-Chronic kidney disease, stage 3a; R42-Dizziness and giddiness;  E87.6-Hypokalemia; E83.51-Hypocalcemia     COMPARISON: None      FINDINGS:  Frontal, lateral and notch views of the left knee were obtained.     Nondisplaced horizontal fracture across the distal pole of the patella.  Mild capsular distention. Distal femur and proximal tibia are intact.  Joint spaces appear well maintained.       Impression:      1. There appears to be a nondisplaced horizontal fracture across the  distal pole of the patella.           This report was signed and  finalized on 12/13/2024 8:53 PM by Dr Ari Kaur.       XR Chest 1 View [[id number]] Collected: 12/08/24 1408     Updated: 12/08/24 1414    Narrative:      XR CHEST 1 VW-     HISTORY: Short of breath     COMPARISON: 11/14/2024     FINDINGS: Frontal view of the chest obtained.     Right upper extremity PICC line tip projects over the SVC. Enteric  tubes, one tip present in the proximal stomach with the other present in  the body of the stomach.     Lungs are well-expanded and clear. Cardiomediastinal contours are  normal. No pleural effusion or pneumothorax. No acute regional bony  pathology. Cholecystectomy clips.       Impression:      1. Well-expanded lungs. No acute cardiopulmonary process identified.  Enteric tubes present within the stomach.        This report was signed and finalized on 12/8/2024 2:10 PM by Dr. Gloria Roe MD.             LAB RESULTS:      Lab 12/13/24  0126 12/12/24  0413 12/10/24  0319 12/09/24  0435 12/08/24  1319   WBC 8.39 10.29 13.13* 12.96* 11.64*   HEMOGLOBIN 8.8* 7.8* 8.4* 9.5* 10.7*   HEMATOCRIT 27.8* 23.7* 25.1* 29.8* 32.1*   PLATELETS 355 297 282 304 326   NEUTROS ABS 4.09 6.70 8.50*  --  6.52   IMMATURE GRANS (ABS) 0.12* 0.15* 0.24*  --  0.25*   LYMPHS ABS 3.17* 2.65 3.20*  --  4.02*   MONOS ABS 0.70 0.67 1.10*  --  0.74   EOS ABS 0.28 0.11 0.08  --  0.08   MCV 92.4 88.8 90.3 92.8 89.4   LACTATE  --   --   --   --  1.3   PROTIME  --   --   --   --  13.3   APTT  --   --   --   --  26.9         Lab 12/13/24  0126 12/12/24  1633 12/12/24  0413 12/11/24  2046 12/11/24  1510 12/11/24  0331 12/10/24  0319 12/09/24  0435 12/08/24  1503 12/08/24  1319   SODIUM 137  --  135*  --   --  134* 135* 139  --  130*   POTASSIUM 4.0  --  4.4  --   --  4.0 3.9 3.1*  --  2.9*   CHLORIDE 102  --  104  --   --  106 109* 111*  --  99   CO2 24.0  --  20.0*  --   --  19.0* 17.0* 17.0*  --  17.0*   ANION GAP 11.0  --  11.0  --   --  9.0 9.0 11.0  --  14.0   BUN 16  --  15  --   --  15 19 34*  --   56*   CREATININE 1.32*  --  1.50*  --   --  1.13* 1.24* 1.65*  --  2.75*   EGFR 48.4*  --  41.5*  --   --  58.3* 52.1* 37.0*  --  20.1*   GLUCOSE 99  --  168*  --   --  176* 123* 71  --  241*   CALCIUM 8.4*  --  7.0*  --   --  7.1* 7.1* 7.6*  --  7.8*   MAGNESIUM  --   --  1.9  --  2.7* 1.1* 1.1*  --   --  1.7   PHOSPHORUS  --  3.2 2.1* 4.0  --  1.0*  --   --  3.0  --    HEMOGLOBIN A1C  --   --   --   --   --   --   --  9.10*  --   --          Lab 12/12/24  0413 12/10/24  0319 12/08/24  1503 12/08/24  1319   TOTAL PROTEIN 5.3* 5.1*  --  7.1   ALBUMIN 2.6* 2.5*  --  3.6   GLOBULIN 2.7 2.6  --  3.5   ALT (SGPT) 16 16  --  33   AST (SGOT) 23 15  --  25   BILIRUBIN <0.2 <0.2  --  <0.2   ALK PHOS 132* 112 149* 182*   LIPASE  --   --   --  59         Lab 12/08/24  1503 12/08/24  1319   HSTROP T 12 15*   PROTIME  --  13.3   INR  --  0.97                 Brief Urine Lab Results  (Last result in the past 365 days)        Color   Clarity   Blood   Leuk Est   Nitrite   Protein   CREAT   Urine HCG        12/08/24 1355 Yellow   Clear   Negative   Negative   Negative   100 mg/dL (2+)                 Microbiology Results (last 10 days)       Procedure Component Value - Date/Time    Catheter Culture - Cath Tip, Arm, Right [[id number]] Collected: 12/11/24 1113    Lab Status: Final result Specimen: Cath Tip from Arm, Right Updated: 12/13/24 1002     Catheter Culture No growth at 2 days    Blood Culture - Blood, Blood, Central Line [[id number]]  (Abnormal) Collected: 12/10/24 1612    Lab Status: Preliminary result Specimen: Blood, Central Line Updated: 12/12/24 1219     Blood Culture Abnormal Stain     Gram Stain Aerobic Bottle Branching gram positive bacilli    Blood Culture - Blood, Arm, Left [[id number]]  (Normal) Collected: 12/10/24 0858    Lab Status: Preliminary result Specimen: Blood from Arm, Left Updated: 12/13/24 0915     Blood Culture No growth at 3 days    Blood Culture - Blood, Arm, Left [[id number]]  (Normal) Collected:  12/10/24 0856    Lab Status: Preliminary result Specimen: Blood from Arm, Left Updated: 12/13/24 0915     Blood Culture No growth at 3 days    Blood Culture - Blood, Hand, Right [[id number]]  (Abnormal) Collected: 12/08/24 1319    Lab Status: Preliminary result Specimen: Blood from Hand, Right Updated: 12/13/24 0613     Blood Culture Gram Positive Rods     Comment: Slow growing organism.        Isolated from --     Gram Stain Aerobic Bottle Branching gram positive bacilli    Narrative:      Blood culture became positive after 72 hours.  Molecular testing will not be performed unless requested by physician.    Blood Culture - Blood, Blood, PICC Line [[id number]]  (Abnormal) Collected: 12/08/24 1319    Lab Status: Preliminary result Specimen: Blood, PICC Line Updated: 12/13/24 0613     Blood Culture Micrococcus species     Isolated from --     Gram Stain Aerobic Bottle Gram positive cocci in clusters      Aerobic Bottle Branching gram positive bacilli    Narrative:      Probable contaminant requires clinical correlation, susceptibility not performed unless requested by physician.      Blood Culture ID, PCR - Blood, Blood, PICC Line [[id number]] Collected: 12/08/24 1319    Lab Status: Final result Specimen: Blood, PICC Line Updated: 12/10/24 0945     BCID, PCR Negative by BCID PCR. Culture to Follow.     BOTTLE TYPE Aerobic Bottle            Hospital Course:   Patient is a 54-year-old female with past medical history significant for insulin-dependent diabetes, chronic kidney disease, history of gastroparesis and multiple hospitalizations for recurrent nausea and vomiting, dehydration, acute renal failure, that presented to our facility on 12/8/2024 for symptoms of weakness, dizziness, nausea and vomiting, and concern for acute renal failure.  Patient has had debilitating gastroparesis and in fact recently had a temporary gastric stimulator placed on Friday, 12/6.  It sounds like she had 2 different leads placed on the  temporary stimulator, unfortunately she began to experience symptoms of recurrent nausea and vomiting, she knows her body well enough when she feels like she is getting too dehydrated and is going into renal failure, and when she presented to our hospital she was found to have a creatinine of 2.75 in addition to multiple electrolyte disturbances.  She was admitted to the hospitalist service for further workup and management.  Please note the patient does have a PICC line in place, and has been receiving intravenous fluids in the outpatient setting due to her issues of gastroparesis, nausea vomiting, and renal insufficiency that subsequently occurs.    Patient did communicate with her GI specialist in [place] and recommendations were to transition over to the alternate lead on her temporary gastric stimulator.  Since that time patient has done substantially better.  In fact, we are able to advance her diet which she has been tolerating without problem.  She is not having any nausea and vomiting.  Her renal function and electrolytes are much better after intravenous fluids and electrolyte replacement.  Plans are in place for her to go to [place], [place] tomorrow so that she can receive permanent gastric stimulator implantation on Monday, 12/16/2024.    Early on during this hospitalization patient began to spike a fever.  Patient indicated that she had been experiencing intermittent fever for about a week preceding this hospitalization.  Her fever curve continued to climb and there was concerned about the possibility of a PICC line infection.  Her blood cultures did return positive, eventually with branching gram-positive bacilli.  Infectious disease was consulted.  Her right upper extremity PICC line was removed.  Almost immediately after removing her PICC line patient's symptoms got substantially better, her fever curve improved, and patient has made clinical improvement on a daily basis since that time.  Her  "blood cultures appear to be positive for Mycobacterium fortuitum group, and infectious disease has made the following recommendations:    Recommendations:   Continue treatment with linezolid 600 milligrams orally every 12 hours  Doxycycline 100 mg orally every 12 hours  Levofloxacin 500 mg orally daily  Okay with me for discharge home with prescriptions for 10 days of the above medications  I would like to see her back on Monday, December 23, 2024  Feel would be reasonable to proceed with her gastric stimulator placement on Monday  Would be happy to see sooner if any new or worsening problems in the interim    Prescriptions for linezolid, doxycycline, levofloxacin will be called into her pharmacy in [place], [place].  Will make arrangements for outpatient follow-up with Dr. Chavez in the infectious disease clinic on Monday, December 23.  Patient will proceed with gastric stimulator implantation on Monday as described above.    Unfortunately, last night around 7 PM patient reports that she was ambulating in the hallway, slipped on a wet spot in the floor, landed on her left knee, and x-ray imaging did confirm the presence of a nondisplaced horizontal patella fracture.  Patient was in a left knee immobilizer when I saw her this morning.  She stated that from a pain perspective her symptoms were adequately controlled.  She indicated to me rather quickly her desire of being discharged from the hospital today given plans for her and her family to get to [place] for her upcoming procedure.    Addendum: I have just spoken with Dr. Skinner with orthopedic surgery this morning.  He indicated that a longer, rigid immobilizer to the left lower extremity to ensure that it remains straight and extended (not hyperextended) is needed.  If the immobilizer can keep the knee straight then she can weight bear on the LLE \"peg-leg like a pirate\" with an understanding that it cannot bend.  Outpatient follow-up in Orthopedic Surgery " "clinic.  I informed her to keep her leg elevated while at rest.  Would recommend that she use ice.  Avoid all NSAIDs, but can use Tylenol to assist with pain control.    I went back to inform Ms. Perez regarding my conversation with Dr. Skinner.  She is in agreement with this plan.  She did indicate to me that her hemorrhoids are flaring up and bleeding some, she didn't want this to impede her discharge today, but wanted to know if there was anything that could be used to treat this.  I will prescribe her Anusol HC suppositories.  We discussed that it is very important to avoid constipation.  She states that she uses Linzess in the outpatient setting.  Again, she states that she does not want this to impede discharge today and will get this followed up in the outpatient setting, but wants to make sure that nothing nevin her from getting her planned procedure on Monday.    Physical Exam on Discharge:  /89 (BP Location: Left arm, Patient Position: Lying)   Pulse 112   Temp 97.9 °F (36.6 °C) (Oral)   Resp 16   Ht 154.9 cm (61\")   Wt 38.2 kg (84 lb 4 oz)   SpO2 95%   BMI 15.92 kg/m²   Physical Exam  Vitals reviewed.   Constitutional:       General: She is not in acute distress.     Appearance: She is not ill-appearing or toxic-appearing.   HENT:      Head: Normocephalic.      Nose:      Comments: Temporary gastric stimulator in place (entrance left nare)     Mouth/Throat:      Mouth: Mucous membranes are moist.   Pulmonary:      Effort: Pulmonary effort is normal. No respiratory distress.   Musculoskeletal:      Comments: Left knee immobilizer currently in place   Neurological:      General: No focal deficit present.      Mental Status: She is alert.   Psychiatric:         Mood and Affect: Mood normal.       Condition on Discharge: medically stable    Discharge Disposition:  Home or Self Care    Discharge Medications:     Discharge Medications        New Medications        Instructions Start Date "   doxycycline 100 MG capsule  Commonly known as: VIBRAMYCIN   100 mg, Oral, 2 Times Daily      hydrocortisone 25 MG suppository  Commonly known as: ANUSOL-HC   25 mg, Rectal, 2 Times Daily      levoFLOXacin 500 MG tablet  Commonly known as: LEVAQUIN   500 mg, Oral, Daily      linezolid 600 MG tablet  Commonly known as: ZYVOX   600 mg, Oral, 2 Times Daily             Continue These Medications        Instructions Start Date   aspirin 81 MG EC tablet   81 mg, Daily      atorvastatin 80 MG tablet  Commonly known as: LIPITOR   80 mg, Nightly      Calcium Carbonate-Vitamin D 600-5 MG-MCG tablet   1 tablet, Daily      cetirizine 10 MG tablet  Commonly known as: zyrTEC   10 mg, Daily PRN      Farxiga 10 MG tablet  Generic drug: dapagliflozin Propanediol   10 mg, Daily      hydroxychloroquine 200 MG tablet  Commonly known as: PLAQUENIL   400 mg, Daily      insulin aspart 100 UNIT/ML patient supplied pump  Commonly known as: NovoLOG   1 Units, Continuous      linaclotide 290 MCG capsule capsule  Commonly known as: LINZESS   290 mcg, Every Morning Before Breakfast      miSOPROStol 200 MCG tablet  Commonly known as: CYTOTEC   200 mcg, 4 Times Daily      omeprazole 40 MG capsule  Commonly known as: priLOSEC   40 mg, Oral, Every Other Day      promethazine 25 MG tablet  Commonly known as: PHENERGAN   25 mg, Every 6 Hours PRN      rOPINIRole 1 MG tablet  Commonly known as: REQUIP   1 mg, Oral, 3 Times Daily      sodium bicarbonate 650 MG tablet   650 mg, 2 Times Daily      sucralfate 1 g tablet  Commonly known as: CARAFATE   1 g, 4 Times Daily      Vitamin D3 50 MCG (2000 UT) capsule   2,000 Units, Oral, Daily             Stop These Medications      DULoxetine 60 MG capsule  Commonly known as: CYMBALTA     nortriptyline 25 MG capsule  Commonly known as: PAMELOR              Discharge Diet: diabetic diet as tolerated    Activity at Discharge: Will outline specific left lower extremity weightbearing instructions pending assessment  by orthopedic surgery today in the setting of a nondisplaced left patella fracture    Follow-up Appointments:   Patient will follow-up with Dr. Chavez of infectious disease on Monday, December 23, 2024.  Patient is going to removal tomorrow in anticipation of gastric stimulator implantation on Monday, 12/16/2024.  Will also make close outpatient follow-up arrangements with orthopedic surgery regarding recent fall with nondisplaced left patella fracture.    Test Results Pending at Discharge: none    Electronically signed by Otto Del Rosario MD, 12/14/24, 07:40 CST.    Time: 35 minutes.

## 2024-12-15 LAB
BACTERIA SPEC AEROBE CULT: ABNORMAL
BACTERIA SPEC AEROBE CULT: ABNORMAL
GRAM STN SPEC: ABNORMAL
GRAM STN SPEC: ABNORMAL

## 2024-12-15 NOTE — OUTREACH NOTE
Prep Survey      Flowsheet Row Responses   [facility] patient discharged from? [facility]   Is LACE score < 7 ? No   Eligibility Readm Mgmt   Discharge diagnosis Weakness and dizziness   Does the patient have one of the following disease processes/diagnoses(primary or secondary)? Other   Does the patient have Home health ordered? No   Is there a DME ordered? No   Prep survey completed? Yes            ONI ROY - Registered Nurse

## 2024-12-16 ENCOUNTER — TELEPHONE (OUTPATIENT)
Age: 54
End: 2024-12-16
Payer: COMMERCIAL

## 2024-12-16 NOTE — TELEPHONE ENCOUNTER
I called patient and left a voicemail on her answering machine asking her to please return my call re: Dr. Chavez has offered a hospital follow-up appt for Monday 12/23/24.  I left my return phone #.

## 2024-12-17 LAB
QT INTERVAL: 330 MS
QTC INTERVAL: 464 MS

## 2024-12-17 NOTE — TELEPHONE ENCOUNTER
I called patient again and spoke with her and offered a hospital follow-up with Dr. Chavez on Monday 12/23/24 at 10 AM.  She informed me she already has an appt with Dr. Chavez that day at 1 PM.  I informed her the appt was never scheduled at that time.  She asked if she could still come at 1 PM because she has a hour-long drive.  I told her that should be fine.  She informed me that her pharmacy only gave her 2 antibiotic prescriptions instead of 3.  She isn't sure why.  I told her I will check with her pharmacy.  Sent to scheduling for an override.

## 2024-12-18 ENCOUNTER — TELEPHONE (OUTPATIENT)
Age: 54
End: 2024-12-18
Payer: COMMERCIAL

## 2024-12-18 ENCOUNTER — READMISSION MANAGEMENT (OUTPATIENT)
Dept: CALL CENTER | Facility: HOSPITAL | Age: 54
End: 2024-12-18
Payer: COMMERCIAL

## 2024-12-18 DIAGNOSIS — R78.81 BACTEREMIA: ICD-10-CM

## 2024-12-18 DIAGNOSIS — A31.8 INFECTION DUE TO MYCOBACTERIUM FORTUITUM: Primary | ICD-10-CM

## 2024-12-18 RX ORDER — LINEZOLID 600 MG/1
600 TABLET, FILM COATED ORAL EVERY 12 HOURS
Qty: 20 TABLET | Refills: 0 | Status: SHIPPED | OUTPATIENT
Start: 2024-12-18 | End: 2024-12-28

## 2024-12-18 NOTE — OUTREACH NOTE
Medical Week 1 Survey      Flowsheet Row Responses   List of hospitals in [place] patient discharged from? [facility]   Does the patient have one of the following disease processes/diagnoses(primary or secondary)? Other   Week 1 attempt successful? No   Unsuccessful attempts Attempt 1            ONI ROY - Registered Nurse

## 2024-12-18 NOTE — TELEPHONE ENCOUNTER
I called [facility] in [place] and spoke with Vera.  She confirmed the linezolid prescription was not filled because it needs a prior authorization.  She picked up the doxycycline and levofloxacin on 12/14/24.  She asked that I send a new eRx from Dr. Chavez for linezolid, so the PA request can be sent back to Dr. Chavez.      15:15 CST  I informed Dr. Chavez of this in office.  He said to send eRx for linezolid 600 MG PO every 12 hours for 10 days.

## 2024-12-19 ENCOUNTER — TELEPHONE (OUTPATIENT)
Age: 54
End: 2024-12-19

## 2024-12-19 NOTE — TELEPHONE ENCOUNTER
Leilagilbert Tipton [birth year]  Mrn: [id number]  Marge DOI 12/13  Knee brace -Fractured left knee  Xrays  [employer] med[employer]  [phone]  richardson

## 2024-12-20 ENCOUNTER — TELEPHONE (OUTPATIENT)
Age: 54
End: 2024-12-20
Payer: COMMERCIAL

## 2024-12-20 NOTE — TELEPHONE ENCOUNTER
Pt called back and she is unable to do the 1-6 she needs a different time but she wants it later due to how far they have to drive.

## 2024-12-20 NOTE — PROGRESS NOTES
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  She had had a line infection.  She grew Mycobacterium species.  Attempted to give her 3 drug treatment.  She did not fill or take all the prescriptions as prescribed.  She is feeling fine at today's visit.  She is without fever, chills, or sweats.  She seems to be tolerating oral intake.  No new localizing symptoms.    The  was down on this date.  Notes were completed by hand.  There is no scanned in handwritten office note for today's visit.  I completed dictation from memory on January 7, 2025 as this was an open chart.  CBL    Allergies:   Allergies   Allergen Reactions    Bee Venom Anaphylaxis    Hydrocodone Anaphylaxis    Hydroxychloroquine Other (See Comments)     Hair loss    Ibuprofen Other (See Comments)     \"SHUTS MY KIDNEYS DOWN\" PER PATIENT     Pineapple Anaphylaxis    Pineapple Extract Anaphylaxis    Wasp Venom Anaphylaxis    Wasp Venom Protein Anaphylaxis    Hydrocodone-Acetaminophen Dizziness, Nausea And Vomiting, Nausea Only and Unknown - Low Severity    Penicillins Nausea And Vomiting    Sitagliptin Other (See Comments)     Other reaction(s): Abdominal Pain      Metformin Other (See Comments)     Pt states it messes with her kidneys    Chocolate Rash     Dark chocolate only, told to RD 7/10/24    Poison Shelly Extract Itching and Rash     Current Scheduled Medications:   Current Outpatient Medications on File Prior to Visit   Medication Sig    aspirin 81 MG EC tablet Take 1 tablet by mouth Daily.    atorvastatin (LIPITOR) 80 MG tablet Take 1 tablet by mouth Every Night.    Calcium Carbonate-Vitamin D 600-5 MG-MCG tablet Take 1 tablet by " "mouth Daily.    cetirizine (zyrTEC) 10 MG tablet Take 1 tablet by mouth Daily As Needed for Allergies.    Cholecalciferol (Vitamin D3) 50 MCG (2000 UT) capsule Take 1 capsule by mouth Daily.    dapagliflozin Propanediol (Farxiga) 10 MG tablet Take 10 mg by mouth Daily.    hydrocortisone (ANUSOL-HC) 25 MG suppository Insert 1 suppository into the rectum 2 (Two) Times a Day.    hydroxychloroquine (PLAQUENIL) 200 MG tablet Take 2 tablets by mouth Daily.    insulin aspart (NovoLOG) 100 UNIT/ML patient supplied pump Inject 1 Units under the skin into the appropriate area as directed Continuous.    linaclotide (LINZESS) 290 MCG capsule capsule Take 1 capsule by mouth Every Morning Before Breakfast.    miSOPROStol (CYTOTEC) 200 MCG tablet Take 1 tablet by mouth 4 (Four) Times a Day.    omeprazole (priLOSEC) 40 MG capsule Take 1 capsule by mouth Every Other Day.    promethazine (PHENERGAN) 25 MG tablet Take 1 tablet by mouth Every 6 (Six) Hours As Needed for Nausea or Vomiting.    rOPINIRole (REQUIP) 1 MG tablet Take 1 tablet by mouth 3 (Three) Times a Day.    sodium bicarbonate 650 MG tablet Take 1 tablet by mouth 2 (Two) Times a Day.    sucralfate (CARAFATE) 1 g tablet Take 1 tablet by mouth 4 (Four) Times a Day.     No current facility-administered medications on file prior to visit.      Venous Access Review  Line/IV site: No current IV Access    Antimicrobial Review  Currently on antibiotics/antifungals: YES/NO: YES  Start Date of Therapy: 12-: doxycycline and levofloxacin   >>>>She has not picked up linezolid prescription - PA was required and obtained - Rx was ready for pick-up on Friday 12/20/24, patient was notified  If therapy completed, date complete: NA  Estimated end of treatment date (EOT): 12- (10 days)    Review of Systems See HPI.    Vital Signs:  BP 91/58   Pulse 97   Temp 98 °F (36.7 °C)   Ht 154.9 cm (61\")   Wt 41 kg (90 lb 6.4 oz)   SpO2 99%   Breastfeeding No   BMI 17.08 kg/m² "     Physical Exam  Vital signs - reviewed.  She appears energetic.  She is alert and interactive.  She is stronger appearing than when I have seen her in the hospital.  She seems to be doing very well.  Heart without murmur  Abdomen soft and nontender  No abdominal distention  No mass, guarding, or rebound    Lab/Imaging/Other Information:   Blood culture results were reviewed again today.  Blood Culture - Blood, Hand, Right (12/08/2024 13:19)                         Impression & Recommendations:   Diagnoses and all orders for this visit:    1. 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 Bacteremia  -     CBC Auto Differential; Future  -     C-reactive Protein; Future  -     Blood Culture - Blood,; Future  -     Blood Culture - Blood,; Future    She seems to be stable at this time.  Hopefully she can continue to be maintained from a GI standpoint off any IV line access.  She has had problems with recurrent infections in the past.  I suspect her bloodstream infection with Mycobacterium fortuitum was cleared most likely with line removal.  Although she did not complete the course of antibiotic therapy I had recommended because she did not fill one of the prescriptions, at this point she is symptom-free, seems to be doing well, and feel the risk-benefit is in favor of following off antibiotic treatment at this time.  I would like her to undergo follow-up blood cultures along with CBC and CRP in 2 weeks.  I would like her to return for earlier follow-up if any new or worsening problems.  Otherwise follow-up in 3 weeks.  She was comfortable with that approach.    Follow Up:   Patient Instructions   Stop oral levofloxacin  Stop oral doxycycline  Do NOT begin linezolid prescription   Labs in 2 weeks - Blood cultures X 2, CBC, CRP (she was sent with a paper [facility]  requisition form to have labs drawn at a [facility] facility the week of 1/6/25. KATERINE Wade RN)      Return in about 3 weeks (around 1/13/2025).  Patient was provided After Visit Summary.     Ramin Chavez MD      CC: Brandon Murguia MD

## 2024-12-20 NOTE — TELEPHONE ENCOUNTER
I spoke with Landy with patient's pharmacy.  She said ran her linezolid prescription and insurance approved.  She has in stock and will get ready for pick-up today.      09:28 CST  I received a call from Ambrocio, pharmacist with her pharmacy.  He has on record that she is taking duloxetine (Cymbalta) 60 MG daily, which is contraindicated with linezolid.  I reviewed her chart at [facility] and saw duloxetine (Cymbalta) was discontinued in November.  Also, her discharge paperwork dated 12/14/24 reinforces this stating, stop taking duloxetine (Cymbalta).  I thanked him for calling.      09:35 CST  I called patient and informed her that her 3rd antibiotic prescription required a prior authorization.  This has been obtained and her pharmacy is getting her linezolid prescription ready for  today.  I reminded her of her appt with Dr. Chavez on Monday at 1 PM.

## 2024-12-22 LAB
BACTERIA SPEC AEROBE CULT: ABNORMAL
GRAM STN SPEC: ABNORMAL
ISOLATED FROM: ABNORMAL

## 2024-12-23 ENCOUNTER — OFFICE VISIT (OUTPATIENT)
Age: 54
End: 2024-12-23
Payer: COMMERCIAL

## 2024-12-23 VITALS
BODY MASS INDEX: 17.07 KG/M2 | HEIGHT: 61 IN | HEART RATE: 97 BPM | SYSTOLIC BLOOD PRESSURE: 91 MMHG | TEMPERATURE: 98 F | WEIGHT: 90.4 LBS | OXYGEN SATURATION: 99 % | DIASTOLIC BLOOD PRESSURE: 58 MMHG

## 2024-12-23 DIAGNOSIS — R78.81 BACTEREMIA: ICD-10-CM

## 2024-12-23 DIAGNOSIS — A31.8 INFECTION DUE TO MYCOBACTERIUM FORTUITUM: Primary | ICD-10-CM

## 2024-12-23 PROCEDURE — 99214 OFFICE O/P EST MOD 30 MIN: CPT | Performed by: INTERNAL MEDICINE

## 2024-12-26 ENCOUNTER — TELEPHONE (OUTPATIENT)
Age: 54
End: 2024-12-26
Payer: COMMERCIAL

## 2024-12-26 NOTE — PATIENT INSTRUCTIONS
Stop oral levofloxacin  Stop oral doxycycline  Do NOT begin linezolid prescription   Labs in 2 weeks - Blood cultures X 2, CBC, CRP (she was sent with a paper [employer] requisition form to have labs drawn at a [facility] facility the week of 1/6/25. KATERINE Wade RN)

## 2024-12-26 NOTE — TELEPHONE ENCOUNTER
I called [facility] in [place] and spoke with pharmacist to cancel linezolid prescription.  I was told she pick it up around 5 PM on Monday 12/23/24.

## 2024-12-27 ENCOUNTER — PATIENT ROUNDING (BHMG ONLY) (OUTPATIENT)
Age: 54
End: 2024-12-27
Payer: COMMERCIAL

## 2024-12-27 NOTE — PROGRESS NOTES
December 27, 2024    [facility] may [facility] speak with Tasha Perez?    My name is Jean    I am  with [facility]  [address] [place] [id number] [phone].    Before we get started may I verify your date of birth? 1970    I am calling to officially welcome you to our practice and ask about your recent visit. Is this a good time to talk? No.    The call was answered by Tasha's sister, who stated that Tasha was not home at the time.

## 2024-12-31 ENCOUNTER — TELEPHONE (OUTPATIENT)
Age: 54
End: 2024-12-31
Payer: COMMERCIAL

## 2024-12-31 NOTE — TELEPHONE ENCOUNTER
Called and spoke with Tasha. We scheduled a follow-up visit with Scott on January 27.    Originally, she was to have a three-week follow-up around January 13. With that in mind, she was to have labs drawn the week of January 6. Since the visit was scheduled farther out than expected, she is planning to have labs drawn the week of January 20.    She was given a paper requisition to take to [facility].    Routed to clinical for review just in case the paper requisition has an expiration date, or in case Scott needs to see her before January 27.

## 2025-01-03 LAB
BACTERIA SPEC AEROBE CULT: ABNORMAL
GRAM STN SPEC: ABNORMAL
ISOLATED FROM: ABNORMAL

## 2025-01-07 ENCOUNTER — READMISSION MANAGEMENT (OUTPATIENT)
Dept: CALL CENTER | Facility: HOSPITAL | Age: 55
End: 2025-01-07
Payer: COMMERCIAL

## 2025-01-07 NOTE — OUTREACH NOTE
Medical Week 3 Survey      Flowsheet Row Responses   [facility] [place] patient discharged from? [place]   Does the patient have one of the following disease processes/diagnoses(primary or secondary)? Other   Week 3 attempt successful? Yes   Call start time 1514   Call end time 1515   Discharge diagnosis Weakness and dizziness   Person spoke with today (if not patient) and relationship Spouse- Vincent   What is the patient's perception of their health status since discharge? Improving   Week 3 Call Completed? Yes   Graduated Yes   Wrap up additional comments Spouse states patient is doing well. No concerns or questions noted.   Call end time 1515            Marielle KIM - Registered Nurse

## 2025-01-14 ENCOUNTER — OFFICE VISIT (OUTPATIENT)
Age: 55
End: 2025-01-14
Payer: MEDICAID

## 2025-01-14 VITALS — BODY MASS INDEX: 17.83 KG/M2 | HEIGHT: 60 IN | WEIGHT: 90.8 LBS

## 2025-01-14 DIAGNOSIS — S82.035A CLOSED NONDISPLACED TRANSVERSE FRACTURE OF LEFT PATELLA, INITIAL ENCOUNTER: Primary | ICD-10-CM

## 2025-01-14 PROCEDURE — 99214 OFFICE O/P EST MOD 30 MIN: CPT | Performed by: PHYSICIAN ASSISTANT

## 2025-01-14 RX ORDER — BISACODYL 5 MG/1
5 TABLET, DELAYED RELEASE ORAL DAILY
COMMUNITY

## 2025-01-14 RX ORDER — PROMETHAZINE HYDROCHLORIDE 25 MG/1
25 TABLET ORAL EVERY 6 HOURS PRN
COMMUNITY
Start: 2024-11-20

## 2025-01-14 RX ORDER — BENZOCAINE/MENTHOL 6 MG-10 MG
LOZENGE MUCOUS MEMBRANE
COMMUNITY
Start: 2024-05-02

## 2025-01-14 RX ORDER — ACETAMINOPHEN 160 MG
2000 TABLET,DISINTEGRATING ORAL DAILY
COMMUNITY
Start: 2024-10-03

## 2025-01-14 RX ORDER — BUPROPION HYDROCHLORIDE 150 MG/1
1 TABLET ORAL DAILY
COMMUNITY

## 2025-01-14 RX ORDER — NIFEDIPINE 30 MG/1
30 TABLET, EXTENDED RELEASE ORAL DAILY
COMMUNITY
Start: 2024-11-06

## 2025-01-14 RX ORDER — CYCLOBENZAPRINE HCL 10 MG
TABLET ORAL
COMMUNITY

## 2025-01-14 RX ORDER — POTASSIUM CHLORIDE 1500 MG/1
TABLET, EXTENDED RELEASE ORAL
COMMUNITY
Start: 2024-10-19

## 2025-01-14 RX ORDER — SODIUM CHLORIDE 9 MG/ML
INJECTION, SOLUTION INTRAVENOUS
COMMUNITY
Start: 2024-11-01

## 2025-01-14 RX ORDER — PROCHLORPERAZINE 25 MG/1
SUPPOSITORY RECTAL
COMMUNITY
Start: 2024-12-17

## 2025-01-14 RX ORDER — PREDNISONE 5 MG/1
1 TABLET ORAL DAILY
COMMUNITY

## 2025-01-14 RX ORDER — SUCRALFATE 1 G/1
TABLET ORAL
COMMUNITY

## 2025-01-14 ASSESSMENT — ENCOUNTER SYMPTOMS
COLOR CHANGE: 0
BACK PAIN: 0

## 2025-01-14 NOTE — ASSESSMENT & PLAN NOTE
Radiographic evaluation of the left knee today in office demonstrates a stable and well-healing transverse patellar fracture with no sign of displacement or other acute osseous abnormality.    The patient can discontinue the use of the knee immobilizer at this time.  She is to limit deep flexion of the knee but can continue weightbearing as tolerated.  We will see her back as needed.

## 2025-01-14 NOTE — PROGRESS NOTES
[facility]  [facility]  [address] [facility] [address] [place] [address]  Dept: [phone]  Dept Fax: [phone]  Loc: [phone]    Leila Tipton is a 54 y.o. female who presents today for her medical conditions/complaints as noted below.  Leila Tipton is complaining of Consultation (Left Knee)        HPI:   Patient is a pleasant 54-year-old female presenting to the clinic today following up after an injury falling directly onto her left knee about a month ago.  This resulted in a nondisplaced horizontal patellar fracture.  She has been in a knee immobilizer since and notes most of her pain has subsided since.        Past Medical History:   Diagnosis Date    Allergic rhinitis     Arthritis     Asthma     Constipation     Diabetes mellitus (HCC)     Diabetic neuropathy (HCC)     Fibromyalgia     Gastroparesis     GERD (gastroesophageal reflux disease)     Hyperlipidemia     Hypertension     Hypokalemia     Migraines     Neuromuscular disorder (HCC)     Osteoarthritis     Osteoporosis     Pancreatitis     Plantar wart of left foot     Raynaud disease     Rheumatoid arthritis (HCC)     Stage 3b chronic kidney disease (HCC)     Tobacco abuse        Past Surgical History:   Procedure Laterality Date    APPENDECTOMY      CHOLECYSTECTOMY      HYSTERECTOMY (CERVIX STATUS UNKNOWN)         Family History   Adopted: Yes   Problem Relation Age of Onset    Asthma Daughter     Asthma Son        Social History     Tobacco Use    Smoking status: Every Day     Current packs/day: 0.50     Average packs/day: 0.5 packs/day for 33.0 years (16.5 ttl pk-yrs)     Types: Cigarettes     Start date: 1992    Smokeless tobacco: Never    Tobacco comments:     Started at age 22   Substance Use Topics    Alcohol use: Never        Current Outpatient Medications   Medication Sig Dispense Refill    bisacodyl (DULCOLAX) 5 MG EC tablet Take 1 tablet by mouth daily      buPROPion (WELLBUTRIN XL)

## 2025-01-14 NOTE — PROGRESS NOTES
[facility]  [facility]  [address] [facility] [place] [address]  Dept: [phone]  Dept Fax: [phone]  Loc: [phone]    Leila Tipton is a 54 y.o. female who presents today for her medical conditions/complaints as noted below.  Leila Tipton is complaining of Consultation (Left Knee)        HPI:   Patient is a pleasant 54-year-old female presenting to the clinic today as a consult of her left knee pain.  She had a fall about a month ago resulting in a nondisplaced patellar transverse fracture.  She has been in a knee immobilizer since and has been ambulating.  She feels that this has been improving daily.        Past Medical History:   Diagnosis Date    Allergic rhinitis     Arthritis     Asthma     Constipation     Diabetes mellitus (HCC)     Diabetic neuropathy (HCC)     Fibromyalgia     Gastroparesis     GERD (gastroesophageal reflux disease)     Hyperlipidemia     Hypertension     Hypokalemia     Migraines     Neuromuscular disorder (HCC)     Osteoarthritis     Osteoporosis     Pancreatitis     Plantar wart of left foot     Raynaud disease     Rheumatoid arthritis (HCC)     Stage 3b chronic kidney disease (HCC)     Tobacco abuse        Past Surgical History:   Procedure Laterality Date    APPENDECTOMY      CHOLECYSTECTOMY      HYSTERECTOMY (CERVIX STATUS UNKNOWN)         Family History   Adopted: Yes   Problem Relation Age of Onset    Asthma Daughter     Asthma Son        Social History     Tobacco Use    Smoking status: Every Day     Current packs/day: 0.50     Average packs/day: 0.5 packs/day for 33.0 years (16.5 ttl pk-yrs)     Types: Cigarettes     Start date: 1992    Smokeless tobacco: Never    Tobacco comments:     Started at age 22   Substance Use Topics    Alcohol use: Never        Current Outpatient Medications   Medication Sig Dispense Refill    bisacodyl (DULCOLAX) 5 MG EC tablet Take 1 tablet by mouth daily      buPROPion (WELLBUTRIN

## 2025-01-27 ENCOUNTER — OFFICE VISIT (OUTPATIENT)
Age: 55
End: 2025-01-27
Payer: COMMERCIAL

## 2025-01-27 ENCOUNTER — TELEPHONE (OUTPATIENT)
Age: 55
End: 2025-01-27
Payer: COMMERCIAL

## 2025-01-27 VITALS
TEMPERATURE: 97.9 F | DIASTOLIC BLOOD PRESSURE: 69 MMHG | SYSTOLIC BLOOD PRESSURE: 104 MMHG | WEIGHT: 93.4 LBS | BODY MASS INDEX: 17.64 KG/M2 | HEART RATE: 96 BPM | HEIGHT: 61 IN | OXYGEN SATURATION: 96 %

## 2025-01-27 DIAGNOSIS — A31.8 INFECTION DUE TO MYCOBACTERIUM FORTUITUM: Primary | ICD-10-CM

## 2025-01-27 PROCEDURE — 99214 OFFICE O/P EST MOD 30 MIN: CPT | Performed by: INTERNAL MEDICINE

## 2025-01-27 PROCEDURE — 1160F RVW MEDS BY RX/DR IN RCRD: CPT | Performed by: INTERNAL MEDICINE

## 2025-01-27 PROCEDURE — 1159F MED LIST DOCD IN RCRD: CPT | Performed by: INTERNAL MEDICINE

## 2025-01-27 NOTE — PROGRESS NOTES
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  She is doing very well.  She had had previous positive blood cultures for Mycobacterium fortuitum.  She had had line in place at that time.  The line was removed.  She completed a partial course of antibiotic treatment.  She basically was asymptomatic subsequent to line removal.  When I last saw her on December 23 she had completed her antibiotic treatment at that time.  She has had no subsequent antibiotic treatment.  She is not having any fevers or chills or other signs or symptoms to suggest infection.  She indicates her appetite is excellent.  She indicates she is eating very well.  She is going to have a gastric stimulator placed in early February.  She indicates she had laboratory work drawn on Tuesday of last week.  Those were the labs I had ordered at the time of her last appointment on December 23.  Her follow-up labs were to include CBC, CRP, and blood cultures.    Allergies:   Allergies   Allergen Reactions    Bee Venom Anaphylaxis    Hydrocodone Anaphylaxis    Hydroxychloroquine Other (See Comments)     Hair loss    Ibuprofen Other (See Comments)     \"SHUTS MY KIDNEYS DOWN\" PER PATIENT     Pineapple Anaphylaxis    Pineapple Extract Anaphylaxis    Wasp Venom Anaphylaxis    Wasp Venom Protein Anaphylaxis    Hydrocodone-Acetaminophen Dizziness, Nausea And Vomiting, Nausea Only and Unknown - Low Severity    Penicillins Nausea And Vomiting    Sitagliptin Other (See Comments)     Other reaction(s): Abdominal Pain      Metformin Other (See Comments)     Pt states it messes with her kidneys    Chocolate Rash     Dark chocolate only, told " to RD 7/10/24    Poison Shelly Extract Itching and Rash     Current Scheduled Medications:   Current Outpatient Medications on File Prior to Visit   Medication Sig    aspirin 81 MG EC tablet Take 1 tablet by mouth Daily.    atorvastatin (LIPITOR) 80 MG tablet Take 1 tablet by mouth Every Night.    Calcium Carbonate-Vitamin D 600-5 MG-MCG tablet Take 1 tablet by mouth Daily.    cetirizine (zyrTEC) 10 MG tablet Take 1 tablet by mouth Daily As Needed for Allergies.    Cholecalciferol (Vitamin D3) 50 MCG (2000 UT) capsule Take 1 capsule by mouth Daily.    dapagliflozin Propanediol (Farxiga) 10 MG tablet Take 10 mg by mouth Daily.    hydrocortisone (ANUSOL-HC) 25 MG suppository Insert 1 suppository into the rectum 2 (Two) Times a Day.    hydroxychloroquine (PLAQUENIL) 200 MG tablet Take 2 tablets by mouth Daily.    insulin aspart (NovoLOG) 100 UNIT/ML patient supplied pump Inject 1 Units under the skin into the appropriate area as directed Continuous.    linaclotide (LINZESS) 290 MCG capsule capsule Take 1 capsule by mouth Every Morning Before Breakfast.    miSOPROStol (CYTOTEC) 200 MCG tablet Take 1 tablet by mouth 4 (Four) Times a Day.    omeprazole (priLOSEC) 40 MG capsule Take 1 capsule by mouth Every Other Day.    promethazine (PHENERGAN) 25 MG tablet Take 1 tablet by mouth Every 6 (Six) Hours As Needed for Nausea or Vomiting.    rOPINIRole (REQUIP) 1 MG tablet Take 1 tablet by mouth 3 (Three) Times a Day.    sodium bicarbonate 650 MG tablet Take 1 tablet by mouth 2 (Two) Times a Day.    sucralfate (CARAFATE) 1 g tablet Take 1 tablet by mouth 4 (Four) Times a Day.     No current facility-administered medications on file prior to visit.      Venous Access Review  Line/IV site: No current IV Access  Antimicrobial Review  Currently on antibiotics/antifungals: YES/NO: NO  Start Date of Therapy: 12-: doxycycline and levofloxacin   >>>>She has not picked up linezolid prescription - PA was required and obtained - Rx  "was ready for pick-up on Friday 12/20/24, patient was notified  If therapy completed, date complete: NA  Estimated end of treatment date (EOT): 12- (10 days)    Review of Systems See HPI.  She did not report any back pain or joint symptoms.    Vital Signs:  /69 (BP Location: Left arm, Patient Position: Sitting, Cuff Size: Adult)   Pulse 96   Temp 97.9 °F (36.6 °C) (Oral)   Ht 154.9 cm (61\")   Wt 42.4 kg (93 lb 6.4 oz)   SpO2 96%   BMI 17.65 kg/m²     Physical Exam  Vital signs - reviewed.  Alert, pleasant, smiling, interactive, and in no distress  Her abdomen is soft and nontender  She has no guarding or rebound  Heart without murmur  She is comfortable appearing  Lungs clear to auscultation without crackles    Lab/Imaging/Other Information:     Impression & Recommendations:   Diagnoses and all orders for this visit:    1. 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  Suspect the line removal was the most important part of her treatment.  She completed antibiotic therapy on December 23.  She has not had any subsequent signs or symptoms to suggest recurrent or persistent infection.  At this point risk-benefit seems to be in favor of continuing off antimicrobial therapy.  To be conservative I would like to see her 1 more time in 4  weeks to make sure she remains symptom-free from an infection standpoint since this is not the most common bloodstream iinfection we see in terms of line related infection.  She was comfortable with that plan.  She knows to call for earlier appointment sooner if any fevers or other localizing signs or symptoms of infection in the interim.    Follow Up:   There are no Patient Instructions on file for this visit.  Return in about 4 weeks (around 2/24/2025).  Patient was provided After Visit Summary.     Cheryle King MA    CC: Brandon Murguia MD     "

## 2025-01-27 NOTE — TELEPHONE ENCOUNTER
Called [facility]  spoke with Misa I asked if she could fax over most recent labs she stated that she has sent request to office and she place them as urgent.

## 2025-02-13 NOTE — PROGRESS NOTES
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  She is not eating as well as she was before.  She had had her permanent gastric stimulator placed.  She is drinking some liquids.  She is not eating much solid food.  She has had some nausea.  She has had some diarrhea.  She indicates she is not feeling well.  She indicates she is feeling like she does when she is volume depleted and is having kidney issues.  She is not experiencing fever.  She is not having chills or sweats.  Her daughter is with her today.  She indicates her daughter has been encouraging her to get labs and go to the emergency room but the patient has been refusing.  I did ask her if she would be willing to get laboratory work today after she leaves the clinic and she indicated she would do so.    Allergies:   Allergies   Allergen Reactions    Bee Venom Anaphylaxis    Hydrocodone Anaphylaxis    Hydroxychloroquine Other (See Comments)     Hair loss    Ibuprofen Other (See Comments)     \"SHUTS MY KIDNEYS DOWN\" PER PATIENT     Pineapple Anaphylaxis    Pineapple Extract Anaphylaxis    Wasp Venom Anaphylaxis    Wasp Venom Protein Anaphylaxis    Hydrocodone-Acetaminophen Dizziness, Nausea And Vomiting, Nausea Only and Unknown - Low Severity    Penicillins Nausea And Vomiting    Sitagliptin Other (See Comments)     Other reaction(s): Abdominal Pain      Metformin Other (See Comments)     Pt states it messes with her kidneys    Chocolate Rash     Dark chocolate only, told to RD 7/10/24    Poison Shelly Extract Itching and Rash     Current Scheduled Medications:   Current " Outpatient Medications on File Prior to Visit   Medication Sig    aspirin 81 MG EC tablet Take 1 tablet by mouth Daily.    atorvastatin (LIPITOR) 80 MG tablet Take 1 tablet by mouth Every Night.    Calcium Carbonate-Vitamin D 600-5 MG-MCG tablet Take 1 tablet by mouth Daily.    cetirizine (zyrTEC) 10 MG tablet Take 1 tablet by mouth Daily As Needed for Allergies.    Cholecalciferol (Vitamin D3) 50 MCG (2000 UT) capsule Take 1 capsule by mouth Daily.    dapagliflozin Propanediol (Farxiga) 10 MG tablet Take 10 mg by mouth Daily.    hydrocortisone (ANUSOL-HC) 25 MG suppository Insert 1 suppository into the rectum 2 (Two) Times a Day.    hydroxychloroquine (PLAQUENIL) 200 MG tablet Take 2 tablets by mouth Daily.    insulin aspart (NovoLOG) 100 UNIT/ML patient supplied pump Inject 1 Units under the skin into the appropriate area as directed Continuous.    linaclotide (LINZESS) 290 MCG capsule capsule Take 1 capsule by mouth Every Morning Before Breakfast.    miSOPROStol (CYTOTEC) 200 MCG tablet Take 1 tablet by mouth 4 (Four) Times a Day.    omeprazole (priLOSEC) 40 MG capsule Take 1 capsule by mouth Every Other Day.    promethazine (PHENERGAN) 25 MG tablet Take 1 tablet by mouth Every 6 (Six) Hours As Needed for Nausea or Vomiting.    rOPINIRole (REQUIP) 1 MG tablet Take 1 tablet by mouth 3 (Three) Times a Day.    sodium bicarbonate 650 MG tablet Take 1 tablet by mouth 2 (Two) Times a Day.    sucralfate (CARAFATE) 1 g tablet Take 1 tablet by mouth 4 (Four) Times a Day.     No current facility-administered medications on file prior to visit.      Venous Access Review  Line/IV site: No current IV Access    Antimicrobial Review  Currently on antibiotics/antifungals: YES/NO: NO  Start Date of Therapy:12-: doxycycline and levofloxacin   >>>>She has not picked up linezolid prescription - PA was required and obtained - Rx was ready for pick-up on Friday 12/20/24, patient was notified  If therapy completed, date  "complete: NA      Review of Systems See HPI.    Vital Signs:  Ht 154.9 cm (61\")   Wt 38.7 kg (85 lb 6.4 oz)   BMI 16.14 kg/m²   Temperature 97.1  Heart rate 91  Oxygen saturation 100%    Physical Exam  Vital signs - reviewed.  She is able to go from sitting to standing and ambulate in the clinic without limitation  She looks tired overall  Lungs are clear without crackles  Heart without murmur  Abdomen is soft  No distention, guarding, rebound  Gastric stimulator in place without induration, swelling, or drainage    Lab/Imaging/Other Information:     Impression & Recommendations:   Diagnoses and all orders for this visit:    1. 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 Renal insufficiency  -     Comprehensive Metabolic Panel; Future    She does not seem to be manifesting signs or symptoms suggestive of infection.  To be conservative I did want to get follow-up blood cultures today since she had had a Mycobacterium recovered from blood culture previously.  She may be manifesting some symptoms to suggest she is not maintaining adequate oral intake and she may have some early volume depletion.  Told her I would recommend we go ahead and get general laboratory work today including an assessment of her renal function.  Explained we might need to call her and have her get assistance for care from her general internal medicine doctor or her nephrologist if volume depletion has recurred.  Explained we might have to recommend admission to the hospital as well depending upon the results.  She was agreeable to get laboratory work today.  She will follow-up in 1 month.    Follow Up:   There are no Patient Instructions on file for this visit.  No follow-ups on file.  Patient was provided After Visit Summary.     Ramin Chavez MD      CC: Brandon Murguia MD   "

## 2025-02-17 ENCOUNTER — TELEPHONE (OUTPATIENT)
Age: 55
End: 2025-02-17
Payer: COMMERCIAL

## 2025-02-17 NOTE — TELEPHONE ENCOUNTER
Patient's daughter called stating that patient was needing labs before her appt on Monday I told her that Dr Chavez did not order labs at last appt but if she needs them he will order at her next appt.

## 2025-02-24 ENCOUNTER — OFFICE VISIT (OUTPATIENT)
Age: 55
End: 2025-02-24
Payer: COMMERCIAL

## 2025-02-24 ENCOUNTER — TELEPHONE (OUTPATIENT)
Age: 55
End: 2025-02-24
Payer: COMMERCIAL

## 2025-02-24 ENCOUNTER — LAB (OUTPATIENT)
Dept: LAB | Facility: HOSPITAL | Age: 55
End: 2025-02-24
Payer: COMMERCIAL

## 2025-02-24 VITALS — HEIGHT: 61 IN | BODY MASS INDEX: 16.12 KG/M2 | WEIGHT: 85.4 LBS

## 2025-02-24 DIAGNOSIS — A31.8 INFECTION DUE TO MYCOBACTERIUM FORTUITUM: Primary | ICD-10-CM

## 2025-02-24 DIAGNOSIS — E87.6 HYPOKALEMIA: Primary | ICD-10-CM

## 2025-02-24 DIAGNOSIS — A31.8 INFECTION DUE TO MYCOBACTERIUM FORTUITUM: ICD-10-CM

## 2025-02-24 DIAGNOSIS — N28.9 RENAL INSUFFICIENCY: ICD-10-CM

## 2025-02-24 LAB
ALBUMIN SERPL-MCNC: 3.9 G/DL (ref 3.5–5.2)
ALBUMIN/GLOB SERPL: 1.1 G/DL
ALP SERPL-CCNC: 137 U/L (ref 39–117)
ALT SERPL W P-5'-P-CCNC: 10 U/L (ref 1–33)
ANION GAP SERPL CALCULATED.3IONS-SCNC: 13 MMOL/L (ref 5–15)
AST SERPL-CCNC: 14 U/L (ref 1–32)
BILIRUB SERPL-MCNC: <0.2 MG/DL (ref 0–1.2)
BUN SERPL-MCNC: 49 MG/DL (ref 6–20)
BUN/CREAT SERPL: 19.3 (ref 7–25)
CALCIUM SPEC-SCNC: 8.3 MG/DL (ref 8.6–10.5)
CHLORIDE SERPL-SCNC: 106 MMOL/L (ref 98–107)
CO2 SERPL-SCNC: 17 MMOL/L (ref 22–29)
CREAT SERPL-MCNC: 2.54 MG/DL (ref 0.57–1)
CRP SERPL-MCNC: <0.3 MG/DL (ref 0–0.5)
EGFRCR SERPLBLD CKD-EPI 2021: 22.1 ML/MIN/1.73
GLOBULIN UR ELPH-MCNC: 3.5 GM/DL
GLUCOSE SERPL-MCNC: 129 MG/DL (ref 65–99)
POTASSIUM SERPL-SCNC: 2.6 MMOL/L (ref 3.5–5.2)
PROT SERPL-MCNC: 7.4 G/DL (ref 6–8.5)
SODIUM SERPL-SCNC: 136 MMOL/L (ref 136–145)

## 2025-02-24 PROCEDURE — 87040 BLOOD CULTURE FOR BACTERIA: CPT

## 2025-02-24 PROCEDURE — 99214 OFFICE O/P EST MOD 30 MIN: CPT | Performed by: INTERNAL MEDICINE

## 2025-02-24 PROCEDURE — 80053 COMPREHEN METABOLIC PANEL: CPT

## 2025-02-24 PROCEDURE — 86140 C-REACTIVE PROTEIN: CPT

## 2025-02-24 PROCEDURE — 36415 COLL VENOUS BLD VENIPUNCTURE: CPT

## 2025-02-24 PROCEDURE — 1159F MED LIST DOCD IN RCRD: CPT | Performed by: INTERNAL MEDICINE

## 2025-02-24 PROCEDURE — 1160F RVW MEDS BY RX/DR IN RCRD: CPT | Performed by: INTERNAL MEDICINE

## 2025-02-24 NOTE — TELEPHONE ENCOUNTER
Spoke with her on the phone this evening.  Reviewed her lab results with her.  Explained her renal function has shown some deterioration and I suspect volume depletion from her decreased oral intake and some occasional vomiting.  Her last episode of vomiting was yesterday.  I reviewed her low potassium as well.  She does have potassium at home and she typically takes 10 mill equivalents every 12 hours.  She reports that she has been taking the potassium.  She indicates sometimes when she has vomited she is seen the pills are not absorbed.  I told her the safest approach would be to go to the emergency room to get IV fluids and potassium supplementation.  She wants to try to remain as an outpatient.  I encouraged her to drink plenty of fluid.  I encouraged her to have oral intake of food.  Recommended that she take 4 tablets of her 10 mill equivalent potassium now and then in 2 to 3 hours take 2 tablets of her 10 mill equivalent potassium tablets.  I would give her 60 mill equivalents this evening.  I would like her to go to her local lab tomorrow in [facility] to get repeat BMP.  I also told her if she feels worse during this evening or she cannot maintain oral intake that she should go to the ER for evaluation.  She voiced understanding.  She had her daughter on speaker phone as well who understood these recommendations as well.  Message sent to nursing staff to try and help facilitate message sent to clinical staff to try to help arrange my order for BMP tomorrow.    Ramin Chavez MD  6:40 PM        Message sent to Dr. Chavez:   [3:46 PM] Judith Wade ([facility])     Tasha Perez  1970    Ofe with [facility] lab called critical KCL level of 2.6      [3:49 PM] Ramin Chavez ([facility])   Can you text me rest bmp      [3:50 PM] Judith Wade ([facility])   Lab Results   Component Value Date    GLUCOSE 129 (H) 02/24/2025    BUN 49 (H) 02/24/2025    CREATININE 2.54 (H) 02/24/2025     02/24/2025    K 2.6 (C) 02/24/2025    CL  106 02/24/2025    CALCIUM 8.3 (L) 02/24/2025    PROTEINTOT 7.4 02/24/2025    ALBUMIN 3.9 02/24/2025    ALT 10 02/24/2025    AST 14 02/24/2025    ALKPHOS 137 (H) 02/24/2025    BILITOT <0.2 02/24/2025    GLOB 3.5 02/24/2025    AGRATIO 1.1 02/24/2025    BCR 19.3 02/24/2025    ANIONGAP 13.0 02/24/2025    EGFR 22.1 (L) 02/24/2025      [4:41 PM] Ramin Chavez ([facility])   Please Teams me her phone number.    [4:42 PM] Judith Wade ([facility])    [phone]    Shandra Perez (daughter)  [phone]

## 2025-02-26 ENCOUNTER — TELEPHONE (OUTPATIENT)
Age: 55
End: 2025-02-26
Payer: COMMERCIAL

## 2025-02-26 NOTE — TELEPHONE ENCOUNTER
I called [facility] and spoke with Latasha to see if they have BMP results from yesterday.  Latasha confirmed she was at [facility] yesterday.  She transferred me to the lab because she couldn't see results.  I spoke with Rebekah in the lab.  She informed me their machine went down yesterday.  As soon as she can get QC going, she will run the labs.  She has our fax #.      Message sent to Dr. Chavez:   [11:27 AM] Judith Wade ([facility])     Tasha Chris   1970      I routed her 2/25/25 BMP from [facility] to your in-basket for review.        KCL 4.2 (3.5-5.1)   sCr 2.4 (0.7-1.5)       2/24/25 sCr at [facility] was 2.54 (0.57-1.00)

## 2025-02-27 ENCOUNTER — APPOINTMENT (OUTPATIENT)
Dept: CT IMAGING | Facility: HOSPITAL | Age: 55
End: 2025-02-27
Payer: COMMERCIAL

## 2025-02-27 ENCOUNTER — HOSPITAL ENCOUNTER (EMERGENCY)
Facility: HOSPITAL | Age: 55
Discharge: LEFT BEFORE DISCHARGE | End: 2025-02-27
Payer: COMMERCIAL

## 2025-02-27 ENCOUNTER — HOSPITAL ENCOUNTER (INPATIENT)
Age: 55
LOS: 2 days | Discharge: HOME | End: 2025-03-01
Attending: EMERGENCY MEDICINE
Payer: MEDICAID

## 2025-02-27 VITALS
OXYGEN SATURATION: 100 % | HEIGHT: 61 IN | DIASTOLIC BLOOD PRESSURE: 70 MMHG | TEMPERATURE: 97.2 F | BODY MASS INDEX: 15.86 KG/M2 | WEIGHT: 84 LBS | HEART RATE: 113 BPM | RESPIRATION RATE: 18 BRPM | SYSTOLIC BLOOD PRESSURE: 105 MMHG

## 2025-02-27 DIAGNOSIS — N18.9 ACUTE ON CHRONIC RENAL INSUFFICIENCY: Primary | ICD-10-CM

## 2025-02-27 DIAGNOSIS — N28.9 ACUTE ON CHRONIC RENAL INSUFFICIENCY: Primary | ICD-10-CM

## 2025-02-27 PROBLEM — R00.0 TACHYCARDIA: Status: ACTIVE | Noted: 2025-02-27

## 2025-02-27 PROBLEM — A04.9 BACTERIAL COLITIS: Status: ACTIVE | Noted: 2025-02-27

## 2025-02-27 PROBLEM — A41.9 SEPSIS (HCC): Status: ACTIVE | Noted: 2025-02-27

## 2025-02-27 PROBLEM — D72.828 NEUTROPHILIC LEUKOCYTOSIS: Status: ACTIVE | Noted: 2021-09-06

## 2025-02-27 LAB
ALBUMIN SERPL-MCNC: 3.8 G/DL (ref 3.5–5.2)
ALBUMIN SERPL-MCNC: 4.1 G/DL (ref 3.5–5.2)
ALBUMIN/GLOB SERPL: 1.1 G/DL
ALP SERPL-CCNC: 134 U/L (ref 39–117)
ALP SERPL-CCNC: 145 U/L (ref 35–104)
ALT SERPL W P-5'-P-CCNC: 13 U/L (ref 1–33)
ALT SERPL-CCNC: 13 U/L (ref 5–33)
ANION GAP SERPL CALCULATED.3IONS-SCNC: 14 MMOL/L (ref 8–16)
ANION GAP SERPL CALCULATED.3IONS-SCNC: 17 MMOL/L (ref 5–15)
AST SERPL-CCNC: 16 U/L (ref 5–32)
AST SERPL-CCNC: 17 U/L (ref 1–32)
B PARAPERT DNA SPEC QL NAA+PROBE: NOT DETECTED
B PERT DNA SPEC QL NAA+PROBE: NOT DETECTED
BACTERIA UR QL AUTO: ABNORMAL /HPF
BASOPHILS # BLD MANUAL: 0 10*3/MM3 (ref 0–0.2)
BASOPHILS # BLD: 0 K/UL (ref 0–0.2)
BASOPHILS NFR BLD MANUAL: 0 % (ref 0–1.5)
BASOPHILS NFR BLD: 0.2 % (ref 0–1)
BILIRUB SERPL-MCNC: 0.2 MG/DL (ref 0.2–1.2)
BILIRUB SERPL-MCNC: 0.2 MG/DL (ref 0–1.2)
BILIRUB UR QL STRIP: NEGATIVE
BUN SERPL-MCNC: 42 MG/DL (ref 6–20)
BUN SERPL-MCNC: 42 MG/DL (ref 6–20)
BUN/CREAT SERPL: 16 (ref 7–25)
C PNEUM DNA NPH QL NAA+NON-PROBE: NOT DETECTED
CALCIUM SERPL-MCNC: 8.9 MG/DL (ref 8.6–10)
CALCIUM SPEC-SCNC: 8.4 MG/DL (ref 8.6–10.5)
CHLORIDE SERPL-SCNC: 107 MMOL/L (ref 98–107)
CHLORIDE SERPL-SCNC: 108 MMOL/L (ref 98–107)
CHP ED QC CHECK: NORMAL
CLARITY UR: CLEAR
CO2 SERPL-SCNC: 12 MMOL/L (ref 22–29)
CO2 SERPL-SCNC: 15 MMOL/L (ref 22–29)
COLOR UR: YELLOW
CREAT SERPL-MCNC: 2.3 MG/DL (ref 0.5–0.9)
CREAT SERPL-MCNC: 2.62 MG/DL (ref 0.57–1)
D-LACTATE SERPL-SCNC: 1.4 MMOL/L (ref 0.5–2)
DEPRECATED RDW RBC AUTO: 51.3 FL (ref 37–54)
EGFRCR SERPLBLD CKD-EPI 2021: 21.3 ML/MIN/1.73
EOSINOPHIL # BLD MANUAL: 0.36 10*3/MM3 (ref 0–0.4)
EOSINOPHIL # BLD: 0.2 K/UL (ref 0–0.6)
EOSINOPHIL NFR BLD MANUAL: 2 % (ref 0.3–6.2)
EOSINOPHIL NFR BLD: 1.3 % (ref 0–5)
ERYTHROCYTE [DISTWIDTH] IN BLOOD BY AUTOMATED COUNT: 15.3 % (ref 11.5–14.5)
ERYTHROCYTE [DISTWIDTH] IN BLOOD BY AUTOMATED COUNT: 15.7 % (ref 12.3–15.4)
FLUAV SUBTYP SPEC NAA+PROBE: NOT DETECTED
FLUBV RNA ISLT QL NAA+PROBE: NOT DETECTED
GLOBULIN UR ELPH-MCNC: 3.6 GM/DL
GLUCOSE BLD-MCNC: 257 MG/DL
GLUCOSE BLD-MCNC: 257 MG/DL (ref 70–99)
GLUCOSE SERPL-MCNC: 131 MG/DL (ref 65–99)
GLUCOSE SERPL-MCNC: 221 MG/DL (ref 70–99)
GLUCOSE UR STRIP-MCNC: NEGATIVE MG/DL
HADV DNA SPEC NAA+PROBE: NOT DETECTED
HCOV 229E RNA SPEC QL NAA+PROBE: NOT DETECTED
HCOV HKU1 RNA SPEC QL NAA+PROBE: NOT DETECTED
HCOV NL63 RNA SPEC QL NAA+PROBE: NOT DETECTED
HCOV OC43 RNA SPEC QL NAA+PROBE: NOT DETECTED
HCT VFR BLD AUTO: 36.8 % (ref 34–46.6)
HCT VFR BLD AUTO: 40 % (ref 37–47)
HGB BLD-MCNC: 11.9 G/DL (ref 12–15.9)
HGB BLD-MCNC: 12.6 G/DL (ref 12–16)
HGB UR QL STRIP.AUTO: NEGATIVE
HMPV RNA NPH QL NAA+NON-PROBE: NOT DETECTED
HOLD SPECIMEN: NORMAL
HPIV1 RNA ISLT QL NAA+PROBE: NOT DETECTED
HPIV2 RNA SPEC QL NAA+PROBE: NOT DETECTED
HPIV3 RNA NPH QL NAA+PROBE: NOT DETECTED
HPIV4 P GENE NPH QL NAA+PROBE: NOT DETECTED
HYALINE CASTS UR QL AUTO: ABNORMAL /LPF
IMM GRANULOCYTES # BLD: 0.1 K/UL
KETONES UR QL STRIP: NEGATIVE
LACTATE BLDV-SCNC: 1.1 MMOL/L (ref 0.5–1.9)
LEUKOCYTE ESTERASE UR QL STRIP.AUTO: NEGATIVE
LIPASE SERPL-CCNC: 71 U/L (ref 13–60)
LYMPHOCYTES # BLD MANUAL: 4.52 10*3/MM3 (ref 0.7–3.1)
LYMPHOCYTES # BLD: 4 K/UL (ref 1.1–4.5)
LYMPHOCYTES NFR BLD MANUAL: 4 % (ref 5–12)
LYMPHOCYTES NFR BLD: 27.3 % (ref 20–40)
M PNEUMO IGG SER IA-ACNC: NOT DETECTED
MAGNESIUM SERPL-MCNC: 1.4 MG/DL (ref 1.6–2.6)
MCH RBC QN AUTO: 28.9 PG (ref 26.6–33)
MCH RBC QN AUTO: 29 PG (ref 27–31)
MCHC RBC AUTO-ENTMCNC: 31.5 G/DL (ref 33–37)
MCHC RBC AUTO-ENTMCNC: 32.3 G/DL (ref 31.5–35.7)
MCV RBC AUTO: 89.3 FL (ref 79–97)
MCV RBC AUTO: 92 FL (ref 81–99)
MONOCYTES # BLD: 0.6 K/UL (ref 0–0.9)
MONOCYTES # BLD: 0.72 10*3/MM3 (ref 0.1–0.9)
MONOCYTES NFR BLD: 3.8 % (ref 0–10)
MYELOCYTES NFR BLD MANUAL: 1 % (ref 0–0)
NEUTROPHILS # BLD AUTO: 12.14 10*3/MM3 (ref 1.7–7)
NEUTROPHILS # BLD: 9.7 K/UL (ref 1.5–7.5)
NEUTROPHILS NFR BLD MANUAL: 64.6 % (ref 42.7–76)
NEUTS BAND NFR BLD MANUAL: 3 % (ref 0–5)
NEUTS SEG NFR BLD: 66.8 % (ref 50–65)
NEUTS VAC BLD QL SMEAR: ABNORMAL
NITRITE UR QL STRIP: NEGATIVE
PERFORMED ON: ABNORMAL
PH UR STRIP.AUTO: 6 [PH] (ref 5–8)
PLAT MORPH BLD: NORMAL
PLATELET # BLD AUTO: 431 10*3/MM3 (ref 140–450)
PLATELET # BLD AUTO: 451 K/UL (ref 130–400)
PMV BLD AUTO: 9.6 FL (ref 6–12)
PMV BLD AUTO: 9.9 FL (ref 9.4–12.3)
POTASSIUM SERPL-SCNC: 2.8 MMOL/L (ref 3.5–5.1)
POTASSIUM SERPL-SCNC: 3 MMOL/L (ref 3.5–5.1)
POTASSIUM SERPL-SCNC: 3 MMOL/L (ref 3.5–5.2)
PROCALCITONIN: 0.09 NG/ML (ref 0–0.09)
PROT SERPL-MCNC: 7.4 G/DL (ref 6–8.5)
PROT SERPL-MCNC: 8 G/DL (ref 6.4–8.3)
PROT UR QL STRIP: ABNORMAL
QT INTERVAL: 352 MS
QTC INTERVAL: 449 MS
RBC # BLD AUTO: 4.12 10*6/MM3 (ref 3.77–5.28)
RBC # BLD AUTO: 4.35 M/UL (ref 4.2–5.4)
RBC # UR STRIP: ABNORMAL /HPF
RBC MORPH BLD: NORMAL
REF LAB TEST METHOD: ABNORMAL
RHINOVIRUS RNA SPEC NAA+PROBE: NOT DETECTED
RSV RNA NPH QL NAA+NON-PROBE: NOT DETECTED
SARS-COV-2 RNA RESP QL NAA+PROBE: NOT DETECTED
SODIUM SERPL-SCNC: 136 MMOL/L (ref 136–145)
SODIUM SERPL-SCNC: 137 MMOL/L (ref 136–145)
SP GR UR STRIP: 1.02 (ref 1–1.03)
SQUAMOUS #/AREA URNS HPF: ABNORMAL /HPF
TSH SERPL DL<=0.05 MIU/L-ACNC: 0.59 UIU/ML (ref 0.27–4.2)
UROBILINOGEN UR QL STRIP: ABNORMAL
VARIANT LYMPHS NFR BLD MANUAL: 22.2 % (ref 19.6–45.3)
VARIANT LYMPHS NFR BLD MANUAL: 3 % (ref 0–5)
WBC # BLD AUTO: 14.5 K/UL (ref 4.8–10.8)
WBC # UR STRIP: ABNORMAL /HPF
WBC NRBC COR # BLD AUTO: 17.94 10*3/MM3 (ref 3.4–10.8)
WHOLE BLOOD HOLD COAG: NORMAL
WHOLE BLOOD HOLD SPECIMEN: NORMAL

## 2025-02-27 PROCEDURE — 6360000002 HC RX W HCPCS: Performed by: HOSPITALIST

## 2025-02-27 PROCEDURE — 84145 PROCALCITONIN (PCT): CPT

## 2025-02-27 PROCEDURE — 85007 BL SMEAR W/DIFF WBC COUNT: CPT

## 2025-02-27 PROCEDURE — 83605 ASSAY OF LACTIC ACID: CPT

## 2025-02-27 PROCEDURE — 85025 COMPLETE CBC W/AUTO DIFF WBC: CPT

## 2025-02-27 PROCEDURE — 1200000000 HC SEMI PRIVATE

## 2025-02-27 PROCEDURE — 2580000003 HC RX 258: Performed by: EMERGENCY MEDICINE

## 2025-02-27 PROCEDURE — 6370000000 HC RX 637 (ALT 250 FOR IP): Performed by: HOSPITALIST

## 2025-02-27 PROCEDURE — 80053 COMPREHEN METABOLIC PANEL: CPT

## 2025-02-27 PROCEDURE — 81001 URINALYSIS AUTO W/SCOPE: CPT

## 2025-02-27 PROCEDURE — 83690 ASSAY OF LIPASE: CPT

## 2025-02-27 PROCEDURE — 82962 GLUCOSE BLOOD TEST: CPT

## 2025-02-27 PROCEDURE — 36415 COLL VENOUS BLD VENIPUNCTURE: CPT

## 2025-02-27 PROCEDURE — 0202U NFCT DS 22 TRGT SARS-COV-2: CPT

## 2025-02-27 PROCEDURE — 99285 EMERGENCY DEPT VISIT HI MDM: CPT

## 2025-02-27 PROCEDURE — 74176 CT ABD & PELVIS W/O CONTRAST: CPT

## 2025-02-27 PROCEDURE — 83735 ASSAY OF MAGNESIUM: CPT

## 2025-02-27 PROCEDURE — 2580000003 HC RX 258: Performed by: HOSPITALIST

## 2025-02-27 PROCEDURE — 99284 EMERGENCY DEPT VISIT MOD MDM: CPT

## 2025-02-27 PROCEDURE — 93005 ELECTROCARDIOGRAM TRACING: CPT

## 2025-02-27 PROCEDURE — 2500000003 HC RX 250 WO HCPCS: Performed by: HOSPITALIST

## 2025-02-27 PROCEDURE — 84443 ASSAY THYROID STIM HORMONE: CPT

## 2025-02-27 RX ORDER — VITAMIN B COMPLEX
2000 TABLET ORAL DAILY
Status: DISCONTINUED | OUTPATIENT
Start: 2025-02-28 | End: 2025-03-01 | Stop reason: HOSPADM

## 2025-02-27 RX ORDER — ASPIRIN 81 MG/1
81 TABLET, CHEWABLE ORAL DAILY
Status: DISCONTINUED | OUTPATIENT
Start: 2025-02-28 | End: 2025-03-01 | Stop reason: HOSPADM

## 2025-02-27 RX ORDER — NIFEDIPINE 30 MG/1
30 TABLET, EXTENDED RELEASE ORAL DAILY
Status: DISCONTINUED | OUTPATIENT
Start: 2025-02-28 | End: 2025-03-01 | Stop reason: HOSPADM

## 2025-02-27 RX ORDER — NORTRIPTYLINE HYDROCHLORIDE 25 MG/1
25 CAPSULE ORAL NIGHTLY
Status: DISCONTINUED | OUTPATIENT
Start: 2025-02-27 | End: 2025-03-01 | Stop reason: HOSPADM

## 2025-02-27 RX ORDER — PREGABALIN 50 MG/1
150 CAPSULE ORAL 2 TIMES DAILY
Status: DISCONTINUED | OUTPATIENT
Start: 2025-02-27 | End: 2025-03-01 | Stop reason: HOSPADM

## 2025-02-27 RX ORDER — DULOXETIN HYDROCHLORIDE 60 MG/1
60 CAPSULE, DELAYED RELEASE ORAL DAILY
Status: DISCONTINUED | OUTPATIENT
Start: 2025-02-28 | End: 2025-03-01 | Stop reason: HOSPADM

## 2025-02-27 RX ORDER — PANTOPRAZOLE SODIUM 40 MG/1
40 TABLET, DELAYED RELEASE ORAL
Status: DISCONTINUED | OUTPATIENT
Start: 2025-02-28 | End: 2025-03-01 | Stop reason: HOSPADM

## 2025-02-27 RX ORDER — CALCITRIOL 0.25 UG/1
0.25 CAPSULE, LIQUID FILLED ORAL DAILY
Status: DISCONTINUED | OUTPATIENT
Start: 2025-02-28 | End: 2025-03-01 | Stop reason: HOSPADM

## 2025-02-27 RX ORDER — LANOLIN ALCOHOL/MO/W.PET/CERES
400 CREAM (GRAM) TOPICAL 3 TIMES DAILY
Status: DISCONTINUED | OUTPATIENT
Start: 2025-02-27 | End: 2025-03-01 | Stop reason: HOSPADM

## 2025-02-27 RX ORDER — METOCLOPRAMIDE 5 MG/1
5 TABLET ORAL EVERY 6 HOURS PRN
Status: DISCONTINUED | OUTPATIENT
Start: 2025-02-27 | End: 2025-02-28

## 2025-02-27 RX ORDER — ONDANSETRON 2 MG/ML
4 INJECTION INTRAMUSCULAR; INTRAVENOUS EVERY 6 HOURS PRN
Status: DISCONTINUED | OUTPATIENT
Start: 2025-02-27 | End: 2025-03-01 | Stop reason: HOSPADM

## 2025-02-27 RX ORDER — ACETAMINOPHEN 325 MG/1
650 TABLET ORAL EVERY 4 HOURS PRN
Status: DISCONTINUED | OUTPATIENT
Start: 2025-02-27 | End: 2025-03-01 | Stop reason: HOSPADM

## 2025-02-27 RX ORDER — CALCIUM CARBONATE 500(1250)
500 TABLET ORAL DAILY
Status: DISCONTINUED | OUTPATIENT
Start: 2025-02-28 | End: 2025-03-01 | Stop reason: HOSPADM

## 2025-02-27 RX ORDER — INSULIN GLARGINE 100 [IU]/ML
10 INJECTION, SOLUTION SUBCUTANEOUS NIGHTLY
Status: DISCONTINUED | OUTPATIENT
Start: 2025-02-27 | End: 2025-03-01 | Stop reason: HOSPADM

## 2025-02-27 RX ORDER — 0.9 % SODIUM CHLORIDE 0.9 %
500 INTRAVENOUS SOLUTION INTRAVENOUS ONCE
Status: COMPLETED | OUTPATIENT
Start: 2025-02-27 | End: 2025-02-28

## 2025-02-27 RX ORDER — HEPARIN SODIUM 5000 [USP'U]/ML
5000 INJECTION, SOLUTION INTRAVENOUS; SUBCUTANEOUS EVERY 8 HOURS SCHEDULED
Status: DISCONTINUED | OUTPATIENT
Start: 2025-02-27 | End: 2025-02-28

## 2025-02-27 RX ORDER — POTASSIUM CHLORIDE 1500 MG/1
20 TABLET, EXTENDED RELEASE ORAL ONCE
Status: COMPLETED | OUTPATIENT
Start: 2025-02-27 | End: 2025-02-27

## 2025-02-27 RX ORDER — ATORVASTATIN CALCIUM 40 MG/1
80 TABLET, FILM COATED ORAL NIGHTLY
Status: DISCONTINUED | OUTPATIENT
Start: 2025-02-27 | End: 2025-03-01 | Stop reason: HOSPADM

## 2025-02-27 RX ORDER — SODIUM CHLORIDE 0.9 % (FLUSH) 0.9 %
10 SYRINGE (ML) INJECTION AS NEEDED
Status: DISCONTINUED | OUTPATIENT
Start: 2025-02-27 | End: 2025-02-27 | Stop reason: HOSPADM

## 2025-02-27 RX ORDER — CYCLOBENZAPRINE HCL 10 MG
10 TABLET ORAL NIGHTLY PRN
Status: DISCONTINUED | OUTPATIENT
Start: 2025-02-27 | End: 2025-03-01 | Stop reason: HOSPADM

## 2025-02-27 RX ORDER — ACETAMINOPHEN 650 MG/1
650 SUPPOSITORY RECTAL EVERY 4 HOURS PRN
Status: DISCONTINUED | OUTPATIENT
Start: 2025-02-27 | End: 2025-03-01 | Stop reason: HOSPADM

## 2025-02-27 RX ORDER — MECOBALAMIN 5000 MCG
5 TABLET,DISINTEGRATING ORAL NIGHTLY PRN
Status: DISCONTINUED | OUTPATIENT
Start: 2025-02-27 | End: 2025-03-01 | Stop reason: HOSPADM

## 2025-02-27 RX ORDER — 0.9 % SODIUM CHLORIDE 0.9 %
500 INTRAVENOUS SOLUTION INTRAVENOUS ONCE
Status: DISCONTINUED | OUTPATIENT
Start: 2025-02-27 | End: 2025-02-27

## 2025-02-27 RX ORDER — SODIUM CHLORIDE 0.9 % (FLUSH) 0.9 %
5-40 SYRINGE (ML) INJECTION PRN
Status: DISCONTINUED | OUTPATIENT
Start: 2025-02-27 | End: 2025-03-01 | Stop reason: HOSPADM

## 2025-02-27 RX ORDER — SODIUM CHLORIDE 9 MG/ML
INJECTION, SOLUTION INTRAVENOUS PRN
Status: DISCONTINUED | OUTPATIENT
Start: 2025-02-27 | End: 2025-03-01 | Stop reason: HOSPADM

## 2025-02-27 RX ORDER — ROPINIROLE 1 MG/1
1 TABLET, FILM COATED ORAL 3 TIMES DAILY
Status: DISCONTINUED | OUTPATIENT
Start: 2025-02-27 | End: 2025-03-01 | Stop reason: HOSPADM

## 2025-02-27 RX ORDER — ALBUTEROL SULFATE 0.83 MG/ML
2.5 SOLUTION RESPIRATORY (INHALATION) EVERY 4 HOURS PRN
Status: DISCONTINUED | OUTPATIENT
Start: 2025-02-27 | End: 2025-03-01 | Stop reason: HOSPADM

## 2025-02-27 RX ORDER — BISACODYL 5 MG/1
5 TABLET, DELAYED RELEASE ORAL DAILY
Status: DISCONTINUED | OUTPATIENT
Start: 2025-02-28 | End: 2025-03-01 | Stop reason: HOSPADM

## 2025-02-27 RX ORDER — SODIUM CHLORIDE 0.9 % (FLUSH) 0.9 %
5-40 SYRINGE (ML) INJECTION EVERY 12 HOURS SCHEDULED
Status: DISCONTINUED | OUTPATIENT
Start: 2025-02-27 | End: 2025-03-01 | Stop reason: HOSPADM

## 2025-02-27 RX ORDER — DIPHENHYDRAMINE HCL 25 MG
50 TABLET ORAL NIGHTLY
Status: DISCONTINUED | OUTPATIENT
Start: 2025-02-27 | End: 2025-03-01 | Stop reason: HOSPADM

## 2025-02-27 RX ORDER — GLUCAGON 1 MG/ML
1 KIT INJECTION PRN
Status: DISCONTINUED | OUTPATIENT
Start: 2025-02-27 | End: 2025-03-01 | Stop reason: HOSPADM

## 2025-02-27 RX ORDER — SODIUM CHLORIDE, SODIUM LACTATE, POTASSIUM CHLORIDE, AND CALCIUM CHLORIDE .6; .31; .03; .02 G/100ML; G/100ML; G/100ML; G/100ML
1000 INJECTION, SOLUTION INTRAVENOUS ONCE
Status: COMPLETED | OUTPATIENT
Start: 2025-02-27 | End: 2025-02-27

## 2025-02-27 RX ORDER — ONDANSETRON 4 MG/1
4 TABLET, ORALLY DISINTEGRATING ORAL EVERY 8 HOURS PRN
Status: DISCONTINUED | OUTPATIENT
Start: 2025-02-27 | End: 2025-03-01 | Stop reason: HOSPADM

## 2025-02-27 RX ORDER — DEXTROSE MONOHYDRATE 100 MG/ML
INJECTION, SOLUTION INTRAVENOUS CONTINUOUS PRN
Status: DISCONTINUED | OUTPATIENT
Start: 2025-02-27 | End: 2025-03-01 | Stop reason: HOSPADM

## 2025-02-27 RX ORDER — SUCRALFATE 1 G/1
1 TABLET ORAL
Status: DISCONTINUED | OUTPATIENT
Start: 2025-02-27 | End: 2025-03-01 | Stop reason: HOSPADM

## 2025-02-27 RX ORDER — BUPROPION HYDROCHLORIDE 150 MG/1
150 TABLET ORAL DAILY
Status: DISCONTINUED | OUTPATIENT
Start: 2025-02-28 | End: 2025-03-01 | Stop reason: HOSPADM

## 2025-02-27 RX ORDER — CETIRIZINE HYDROCHLORIDE 10 MG/1
10 TABLET ORAL DAILY PRN
Status: DISCONTINUED | OUTPATIENT
Start: 2025-02-28 | End: 2025-03-01 | Stop reason: HOSPADM

## 2025-02-27 RX ORDER — POLYETHYLENE GLYCOL 3350 17 G/17G
17 POWDER, FOR SOLUTION ORAL DAILY PRN
Status: DISCONTINUED | OUTPATIENT
Start: 2025-02-27 | End: 2025-03-01 | Stop reason: HOSPADM

## 2025-02-27 RX ORDER — INSULIN LISPRO 100 [IU]/ML
0-4 INJECTION, SOLUTION INTRAVENOUS; SUBCUTANEOUS
Status: DISCONTINUED | OUTPATIENT
Start: 2025-02-27 | End: 2025-03-01 | Stop reason: HOSPADM

## 2025-02-27 RX ORDER — GUAIFENESIN 200 MG/10ML
LIQUID ORAL 2 TIMES DAILY
Status: DISCONTINUED | OUTPATIENT
Start: 2025-02-28 | End: 2025-03-01 | Stop reason: HOSPADM

## 2025-02-27 RX ORDER — SODIUM BICARBONATE 650 MG/1
325 TABLET ORAL 2 TIMES DAILY
Status: DISCONTINUED | OUTPATIENT
Start: 2025-02-27 | End: 2025-03-01 | Stop reason: HOSPADM

## 2025-02-27 RX ADMIN — PIPERACILLIN AND TAZOBACTAM 4500 MG: 4; .5 INJECTION, POWDER, LYOPHILIZED, FOR SOLUTION INTRAVENOUS at 23:32

## 2025-02-27 RX ADMIN — ROPINIROLE HYDROCHLORIDE 1 MG: 1 TABLET, FILM COATED ORAL at 23:34

## 2025-02-27 RX ADMIN — HEPARIN SODIUM 5000 UNITS: 5000 INJECTION INTRAVENOUS; SUBCUTANEOUS at 22:28

## 2025-02-27 RX ADMIN — NORTRIPTYLINE HYDROCHLORIDE 25 MG: 25 CAPSULE ORAL at 22:27

## 2025-02-27 RX ADMIN — POTASSIUM CHLORIDE 20 MEQ: 1500 TABLET, EXTENDED RELEASE ORAL at 19:55

## 2025-02-27 RX ADMIN — SODIUM CHLORIDE, POTASSIUM CHLORIDE, SODIUM LACTATE AND CALCIUM CHLORIDE 1000 ML: 600; 310; 30; 20 INJECTION, SOLUTION INTRAVENOUS at 18:46

## 2025-02-27 RX ADMIN — Medication 400 MG: at 23:34

## 2025-02-27 RX ADMIN — SUCRALFATE 1 G: 1 TABLET ORAL at 22:28

## 2025-02-27 RX ADMIN — INSULIN GLARGINE 10 UNITS: 100 INJECTION, SOLUTION SUBCUTANEOUS at 22:29

## 2025-02-27 RX ADMIN — SODIUM CHLORIDE 500 ML: 9 INJECTION, SOLUTION INTRAVENOUS at 23:22

## 2025-02-27 RX ADMIN — DIPHENHYDRAMINE HYDROCHLORIDE 50 MG: 25 TABLET ORAL at 22:26

## 2025-02-27 RX ADMIN — SODIUM BICARBONATE 325 MG: 650 TABLET ORAL at 22:30

## 2025-02-27 RX ADMIN — Medication 5 MG: at 22:27

## 2025-02-27 RX ADMIN — ATORVASTATIN CALCIUM 80 MG: 80 TABLET, FILM COATED ORAL at 22:25

## 2025-02-27 RX ADMIN — PREGABALIN 150 MG: 75 CAPSULE ORAL at 22:26

## 2025-02-27 RX ADMIN — INSULIN LISPRO 2 UNITS: 100 INJECTION, SOLUTION INTRAVENOUS; SUBCUTANEOUS at 23:33

## 2025-02-27 RX ADMIN — SODIUM BICARBONATE: 84 INJECTION, SOLUTION INTRAVENOUS at 22:13

## 2025-02-27 ASSESSMENT — ENCOUNTER SYMPTOMS
VOMITING: 1
NAUSEA: 1
DIARRHEA: 1

## 2025-02-27 NOTE — ED NOTES
MEDICAL SCREENING    Reason for Visit: Patient is a 54-year-old female who presents emergency department today for complaint of abnormal lab.  Patient was unaware of what lab was abnormal.  Upon review of records patient had a potassium of 2.6 that resulted 2/24/2025. Patient has a history of CKD, not on dialysis. Does see Dr. Bustos. Patient states she was told to come here for evaluation. She states she is weak, has been vomiting, has had a loss of appetite. This has been going on for x2 weeks now. She does not make much urine. She is also having abdominal pain, vomiting yesterday, has not vomited today.  She states she has also had congestion, denies any cough or wheezing.  Denies any chest pain, shortness of breath, fever, chills, or any other symptoms.   She is non-toxic appearing, chronically ill appearing, and is no acute distress.     Patient initially seen in triage.  The patient was advised further evaluation and diagnostic testing will be needed, some of the treatment and testing will be initiated in the lobby in order to begin the process.  The patient will be returned to the waiting area for the time being and will  be reassessed by a subsequent ED provider.  The patient will be brought back to the treatment area in as timely manner as possible.       Jessica Woo, APRN  02/27/25 [id number]

## 2025-02-28 ENCOUNTER — APPOINTMENT (OUTPATIENT)
Dept: ULTRASOUND IMAGING | Age: 55
End: 2025-02-28
Payer: MEDICAID

## 2025-02-28 PROBLEM — E44.0 MODERATE MALNUTRITION: Status: ACTIVE | Noted: 2025-02-28

## 2025-02-28 LAB
ANION GAP SERPL CALCULATED.3IONS-SCNC: 15 MMOL/L (ref 8–16)
BACTERIA URNS QL MICRO: NEGATIVE /HPF
BASOPHILS # BLD: 0 K/UL (ref 0–0.2)
BASOPHILS NFR BLD: 0.2 % (ref 0–1)
BILIRUB UR QL STRIP: NEGATIVE
BUN SERPL-MCNC: 39 MG/DL (ref 6–20)
CALCIUM SERPL-MCNC: 7.9 MG/DL (ref 8.6–10)
CHLORIDE SERPL-SCNC: 108 MMOL/L (ref 98–107)
CLARITY UR: CLEAR
CO2 SERPL-SCNC: 18 MMOL/L (ref 22–29)
COLOR UR: YELLOW
CREAT SERPL-MCNC: 1.9 MG/DL (ref 0.5–0.9)
CREAT UR-MCNC: 75.3 MG/DL (ref 28–217)
CRYSTALS URNS MICRO: NORMAL /HPF
EOSINOPHIL # BLD: 0.3 K/UL (ref 0–0.6)
EOSINOPHIL NFR BLD: 2.2 % (ref 0–5)
EOSINOPHIL URNS QL MICRO: NORMAL
EPI CELLS #/AREA URNS AUTO: 1 /HPF (ref 0–5)
ERYTHROCYTE [DISTWIDTH] IN BLOOD BY AUTOMATED COUNT: 15.3 % (ref 11.5–14.5)
FERRITIN SERPL-MCNC: 267 NG/ML (ref 13–150)
FOLATE SERPL-MCNC: 8.2 NG/ML (ref 4.8–37.3)
GLUCOSE BLD-MCNC: 156 MG/DL (ref 70–99)
GLUCOSE BLD-MCNC: 162 MG/DL
GLUCOSE BLD-MCNC: 162 MG/DL (ref 70–99)
GLUCOSE BLD-MCNC: 196 MG/DL (ref 70–99)
GLUCOSE BLD-MCNC: 214 MG/DL (ref 70–99)
GLUCOSE BLD-MCNC: 300 MG/DL (ref 70–99)
GLUCOSE BLD-MCNC: 83 MG/DL (ref 70–99)
GLUCOSE SERPL-MCNC: 74 MG/DL (ref 70–99)
GLUCOSE UR STRIP.AUTO-MCNC: NEGATIVE MG/DL
HCT VFR BLD AUTO: 31.3 % (ref 37–47)
HGB BLD-MCNC: 9.9 G/DL (ref 12–16)
HGB UR STRIP.AUTO-MCNC: NEGATIVE MG/L
HYALINE CASTS #/AREA URNS AUTO: 4 /HPF (ref 0–8)
IMM GRANULOCYTES # BLD: 0.1 K/UL
IRON SATN MFR SERPL: 13 % (ref 15–50)
IRON SERPL-MCNC: 36 UG/DL (ref 37–145)
KETONES UR STRIP.AUTO-MCNC: NEGATIVE MG/DL
LEUKOCYTE ESTERASE UR QL STRIP.AUTO: ABNORMAL
LYMPHOCYTES # BLD: 3.9 K/UL (ref 1.1–4.5)
LYMPHOCYTES NFR BLD: 30.8 % (ref 20–40)
MAGNESIUM SERPL-MCNC: 1.2 MG/DL (ref 1.6–2.6)
MAGNESIUM SERPL-MCNC: 3.7 MG/DL (ref 1.6–2.6)
MCH RBC QN AUTO: 29.1 PG (ref 27–31)
MCHC RBC AUTO-ENTMCNC: 31.6 G/DL (ref 33–37)
MCV RBC AUTO: 92.1 FL (ref 81–99)
MONOCYTES # BLD: 0.7 K/UL (ref 0–0.9)
MONOCYTES NFR BLD: 5.2 % (ref 0–10)
NEUTROPHILS # BLD: 7.7 K/UL (ref 1.5–7.5)
NEUTS SEG NFR BLD: 61 % (ref 50–65)
NITRITE UR QL STRIP.AUTO: NEGATIVE
OSMOLALITY URINE: 406 MOSM/KG (ref 250–1200)
PERFORMED ON: ABNORMAL
PERFORMED ON: NORMAL
PH UR STRIP.AUTO: 6 [PH] (ref 5–8)
PLATELET # BLD AUTO: 366 K/UL (ref 130–400)
PMV BLD AUTO: 10.2 FL (ref 9.4–12.3)
POTASSIUM SERPL-SCNC: 3.2 MMOL/L (ref 3.5–5)
POTASSIUM SERPL-SCNC: 4 MMOL/L (ref 3.5–5.1)
PROT UR STRIP.AUTO-MCNC: 30 MG/DL
RBC # BLD AUTO: 3.4 M/UL (ref 4.2–5.4)
RBC #/AREA URNS AUTO: 1 /HPF (ref 0–4)
SODIUM SERPL-SCNC: 141 MMOL/L (ref 136–145)
SODIUM UR-SCNC: 25 MMOL/L
SP GR UR STRIP.AUTO: 1.01 (ref 1–1.03)
TIBC SERPL-MCNC: 267 UG/DL (ref 250–400)
UROBILINOGEN UR STRIP.AUTO-MCNC: 0.2 E.U./DL
VIT B12 SERPL-MCNC: 773 PG/ML (ref 232–1245)
WBC # BLD AUTO: 12.6 K/UL (ref 4.8–10.8)
WBC #/AREA URNS AUTO: 1 /HPF (ref 0–5)

## 2025-02-28 PROCEDURE — 84132 ASSAY OF SERUM POTASSIUM: CPT

## 2025-02-28 PROCEDURE — 36415 COLL VENOUS BLD VENIPUNCTURE: CPT

## 2025-02-28 PROCEDURE — 2500000003 HC RX 250 WO HCPCS: Performed by: HOSPITALIST

## 2025-02-28 PROCEDURE — 83735 ASSAY OF MAGNESIUM: CPT

## 2025-02-28 PROCEDURE — 82962 GLUCOSE BLOOD TEST: CPT

## 2025-02-28 PROCEDURE — 2580000003 HC RX 258: Performed by: HOSPITALIST

## 2025-02-28 PROCEDURE — 80048 BASIC METABOLIC PNL TOTAL CA: CPT

## 2025-02-28 PROCEDURE — 82607 VITAMIN B-12: CPT

## 2025-02-28 PROCEDURE — 83540 ASSAY OF IRON: CPT

## 2025-02-28 PROCEDURE — 82728 ASSAY OF FERRITIN: CPT

## 2025-02-28 PROCEDURE — 76770 US EXAM ABDO BACK WALL COMP: CPT

## 2025-02-28 PROCEDURE — 84300 ASSAY OF URINE SODIUM: CPT

## 2025-02-28 PROCEDURE — 83935 ASSAY OF URINE OSMOLALITY: CPT

## 2025-02-28 PROCEDURE — 6370000000 HC RX 637 (ALT 250 FOR IP): Performed by: STUDENT IN AN ORGANIZED HEALTH CARE EDUCATION/TRAINING PROGRAM

## 2025-02-28 PROCEDURE — 82746 ASSAY OF FOLIC ACID SERUM: CPT

## 2025-02-28 PROCEDURE — 6370000000 HC RX 637 (ALT 250 FOR IP): Performed by: HOSPITALIST

## 2025-02-28 PROCEDURE — 6360000002 HC RX W HCPCS: Performed by: HOSPITALIST

## 2025-02-28 PROCEDURE — 83550 IRON BINDING TEST: CPT

## 2025-02-28 PROCEDURE — 82570 ASSAY OF URINE CREATININE: CPT

## 2025-02-28 PROCEDURE — 85025 COMPLETE CBC W/AUTO DIFF WBC: CPT

## 2025-02-28 PROCEDURE — 6370000000 HC RX 637 (ALT 250 FOR IP): Performed by: INTERNAL MEDICINE

## 2025-02-28 PROCEDURE — 87205 SMEAR GRAM STAIN: CPT

## 2025-02-28 PROCEDURE — 1200000000 HC SEMI PRIVATE

## 2025-02-28 PROCEDURE — 81001 URINALYSIS AUTO W/SCOPE: CPT

## 2025-02-28 PROCEDURE — 83605 ASSAY OF LACTIC ACID: CPT

## 2025-02-28 PROCEDURE — 94760 N-INVAS EAR/PLS OXIMETRY 1: CPT

## 2025-02-28 RX ORDER — METOCLOPRAMIDE 5 MG/1
5 TABLET ORAL
Status: DISCONTINUED | OUTPATIENT
Start: 2025-02-28 | End: 2025-03-01 | Stop reason: HOSPADM

## 2025-02-28 RX ORDER — POTASSIUM CHLORIDE 1500 MG/1
40 TABLET, EXTENDED RELEASE ORAL ONCE
Status: COMPLETED | OUTPATIENT
Start: 2025-02-28 | End: 2025-02-28

## 2025-02-28 RX ORDER — MAGNESIUM SULFATE IN WATER 40 MG/ML
4000 INJECTION, SOLUTION INTRAVENOUS ONCE
Status: COMPLETED | OUTPATIENT
Start: 2025-02-28 | End: 2025-02-28

## 2025-02-28 RX ORDER — POTASSIUM CHLORIDE 1500 MG/1
40 TABLET, EXTENDED RELEASE ORAL DAILY
Status: DISCONTINUED | OUTPATIENT
Start: 2025-02-28 | End: 2025-03-01 | Stop reason: HOSPADM

## 2025-02-28 RX ORDER — HEPARIN SODIUM 5000 [USP'U]/ML
5000 INJECTION, SOLUTION INTRAVENOUS; SUBCUTANEOUS 2 TIMES DAILY
Status: DISCONTINUED | OUTPATIENT
Start: 2025-02-28 | End: 2025-03-01 | Stop reason: HOSPADM

## 2025-02-28 RX ADMIN — SODIUM BICARBONATE 325 MG: 650 TABLET ORAL at 08:23

## 2025-02-28 RX ADMIN — PANCRELIPASE LIPASE, PANCRELIPASE PROTEASE, PANCRELIPASE AMYLASE 5000 UNITS: 5000; 17000; 24000 CAPSULE, DELAYED RELEASE ORAL at 16:40

## 2025-02-28 RX ADMIN — SODIUM BICARBONATE 325 MG: 650 TABLET ORAL at 20:57

## 2025-02-28 RX ADMIN — SUCRALFATE 1 G: 1 TABLET ORAL at 05:51

## 2025-02-28 RX ADMIN — SODIUM CHLORIDE, PRESERVATIVE FREE 10 ML: 5 INJECTION INTRAVENOUS at 08:31

## 2025-02-28 RX ADMIN — PREGABALIN 150 MG: 75 CAPSULE ORAL at 20:56

## 2025-02-28 RX ADMIN — NORTRIPTYLINE HYDROCHLORIDE 25 MG: 25 CAPSULE ORAL at 21:01

## 2025-02-28 RX ADMIN — NIFEDIPINE 30 MG: 30 TABLET, EXTENDED RELEASE ORAL at 08:23

## 2025-02-28 RX ADMIN — MAGNESIUM SULFATE HEPTAHYDRATE 4000 MG: 40 INJECTION, SOLUTION INTRAVENOUS at 05:41

## 2025-02-28 RX ADMIN — PANCRELIPASE LIPASE, PANCRELIPASE PROTEASE, PANCRELIPASE AMYLASE 20000 UNITS: 20000; 63000; 84000 CAPSULE, DELAYED RELEASE ORAL at 08:23

## 2025-02-28 RX ADMIN — ROPINIROLE HYDROCHLORIDE 1 MG: 1 TABLET, FILM COATED ORAL at 20:57

## 2025-02-28 RX ADMIN — SODIUM BICARBONATE: 84 INJECTION, SOLUTION INTRAVENOUS at 16:43

## 2025-02-28 RX ADMIN — DIPHENHYDRAMINE HYDROCHLORIDE 50 MG: 25 TABLET ORAL at 20:58

## 2025-02-28 RX ADMIN — Medication 400 MG: at 08:23

## 2025-02-28 RX ADMIN — PIPERACILLIN AND TAZOBACTAM 3375 MG: 3; .375 INJECTION, POWDER, LYOPHILIZED, FOR SOLUTION INTRAVENOUS at 08:47

## 2025-02-28 RX ADMIN — PANCRELIPASE LIPASE, PANCRELIPASE PROTEASE, PANCRELIPASE AMYLASE 5000 UNITS: 5000; 17000; 24000 CAPSULE, DELAYED RELEASE ORAL at 08:28

## 2025-02-28 RX ADMIN — BUPROPION HYDROCHLORIDE 150 MG: 150 TABLET, EXTENDED RELEASE ORAL at 08:23

## 2025-02-28 RX ADMIN — LINACLOTIDE 290 MCG: 145 CAPSULE, GELATIN COATED ORAL at 05:51

## 2025-02-28 RX ADMIN — SUCRALFATE 1 G: 1 TABLET ORAL at 16:40

## 2025-02-28 RX ADMIN — METOCLOPRAMIDE 5 MG: 5 TABLET ORAL at 14:14

## 2025-02-28 RX ADMIN — PANCRELIPASE LIPASE, PANCRELIPASE PROTEASE, PANCRELIPASE AMYLASE 5000 UNITS: 5000; 17000; 24000 CAPSULE, DELAYED RELEASE ORAL at 11:06

## 2025-02-28 RX ADMIN — Medication 400 MG: at 20:57

## 2025-02-28 RX ADMIN — SUCRALFATE 1 G: 1 TABLET ORAL at 10:34

## 2025-02-28 RX ADMIN — INSULIN LISPRO 3 UNITS: 100 INJECTION, SOLUTION INTRAVENOUS; SUBCUTANEOUS at 20:58

## 2025-02-28 RX ADMIN — ASPIRIN 81 MG: 81 TABLET, CHEWABLE ORAL at 08:23

## 2025-02-28 RX ADMIN — PANCRELIPASE LIPASE, PANCRELIPASE PROTEASE, PANCRELIPASE AMYLASE 20000 UNITS: 20000; 63000; 84000 CAPSULE, DELAYED RELEASE ORAL at 11:06

## 2025-02-28 RX ADMIN — PANTOPRAZOLE SODIUM 40 MG: 40 TABLET, DELAYED RELEASE ORAL at 05:51

## 2025-02-28 RX ADMIN — CALCIUM 500 MG: 500 TABLET ORAL at 08:23

## 2025-02-28 RX ADMIN — PANCRELIPASE LIPASE, PANCRELIPASE PROTEASE, PANCRELIPASE AMYLASE 20000 UNITS: 20000; 63000; 84000 CAPSULE, DELAYED RELEASE ORAL at 16:40

## 2025-02-28 RX ADMIN — HEPARIN SODIUM 5000 UNITS: 5000 INJECTION INTRAVENOUS; SUBCUTANEOUS at 05:51

## 2025-02-28 RX ADMIN — ATORVASTATIN CALCIUM 80 MG: 80 TABLET, FILM COATED ORAL at 20:57

## 2025-02-28 RX ADMIN — BISACODYL 5 MG: 5 TABLET, COATED ORAL at 08:24

## 2025-02-28 RX ADMIN — POTASSIUM CHLORIDE 40 MEQ: 1500 TABLET, EXTENDED RELEASE ORAL at 05:51

## 2025-02-28 RX ADMIN — ROPINIROLE HYDROCHLORIDE 1 MG: 1 TABLET, FILM COATED ORAL at 14:14

## 2025-02-28 RX ADMIN — ROPINIROLE HYDROCHLORIDE 1 MG: 1 TABLET, FILM COATED ORAL at 08:23

## 2025-02-28 RX ADMIN — Medication 400 MG: at 14:14

## 2025-02-28 RX ADMIN — POTASSIUM CHLORIDE 40 MEQ: 1500 TABLET, EXTENDED RELEASE ORAL at 11:06

## 2025-02-28 RX ADMIN — DULOXETINE HYDROCHLORIDE 60 MG: 60 CAPSULE, DELAYED RELEASE ORAL at 08:23

## 2025-02-28 RX ADMIN — Medication 2000 UNITS: at 08:22

## 2025-02-28 RX ADMIN — POTASSIUM CHLORIDE 40 MEQ: 1500 TABLET, EXTENDED RELEASE ORAL at 00:19

## 2025-02-28 RX ADMIN — INSULIN GLARGINE 10 UNITS: 100 INJECTION, SOLUTION SUBCUTANEOUS at 20:58

## 2025-02-28 RX ADMIN — PREGABALIN 150 MG: 75 CAPSULE ORAL at 08:28

## 2025-02-28 RX ADMIN — HEPARIN SODIUM 5000 UNITS: 5000 INJECTION INTRAVENOUS; SUBCUTANEOUS at 20:57

## 2025-02-28 RX ADMIN — SODIUM CHLORIDE, PRESERVATIVE FREE 10 ML: 5 INJECTION INTRAVENOUS at 21:01

## 2025-02-28 RX ADMIN — CALCITRIOL CAPSULES 0.25 MCG 0.25 MCG: 0.25 CAPSULE ORAL at 08:23

## 2025-02-28 RX ADMIN — INSULIN LISPRO 1 UNITS: 100 INJECTION, SOLUTION INTRAVENOUS; SUBCUTANEOUS at 11:07

## 2025-02-28 RX ADMIN — Medication: at 14:15

## 2025-02-28 RX ADMIN — SUCRALFATE 1 G: 1 TABLET ORAL at 20:57

## 2025-02-28 SDOH — ECONOMIC STABILITY: FOOD INSECURITY: WITHIN THE PAST 12 MONTHS, YOU WORRIED THAT YOUR FOOD WOULD RUN OUT BEFORE YOU GOT MONEY TO BUY MORE.: PATIENT DECLINED

## 2025-02-28 SDOH — ECONOMIC STABILITY: INCOME INSECURITY
IN THE PAST 12 MONTHS, HAS THE ELECTRIC, GAS, OIL, OR WATER COMPANY THREATENED TO SHUT OFF SERVICE IN YOUR HOME?: PATIENT DECLINED

## 2025-02-28 SDOH — ECONOMIC STABILITY: INCOME INSECURITY: HOW HARD IS IT FOR YOU TO PAY FOR THE VERY BASICS LIKE FOOD, HOUSING, MEDICAL CARE, AND HEATING?: PATIENT DECLINED

## 2025-02-28 ASSESSMENT — PAIN SCALES - GENERAL
PAINLEVEL_OUTOF10: 0
PAINLEVEL_OUTOF10: 6

## 2025-02-28 ASSESSMENT — PAIN DESCRIPTION - LOCATION: LOCATION: BACK;HIP

## 2025-02-28 ASSESSMENT — PATIENT HEALTH QUESTIONNAIRE - PHQ9
SUM OF ALL RESPONSES TO PHQ QUESTIONS 1-9: 0
2. FEELING DOWN, DEPRESSED OR HOPELESS: NOT AT ALL
1. LITTLE INTEREST OR PLEASURE IN DOING THINGS: NOT AT ALL
SUM OF ALL RESPONSES TO PHQ9 QUESTIONS 1 & 2: 0
SUM OF ALL RESPONSES TO PHQ QUESTIONS 1-9: 0

## 2025-02-28 NOTE — ED NOTES
ED TO INPATIENT SBAR HANDOFF    Patient Name: Leila Tipton   : 1970  54 y.o.   Family/Caregiver Present: No  Code Status Order: Full Code    C-SSRS:    Sitter No  Restraints:         Situation  Chief Complaint:   Chief Complaint   Patient presents with    Abnormal Lab     Sent by nephro for admission due to ABN labs. Pt states she feels weak      Patient Diagnosis: Acute kidney injury superimposed on chronic kidney disease [N17.9, N18.9]     Brief Description of Patient's Condition: sent by Dr. Oreilly for poor kidney fx, h/o chronic renal failure  Mental Status: oriented, alert, coherent, and logical  Arrived from: home    Imaging:   US RENAL COMPLETE    (Results Pending)     COVID-19 Results:   Internal Administration   First Dose      Second Dose           Last COVID Lab SARS-CoV-2, NAAT (no units)   Date Value   2021 Not Detected           Abnormal labs:   Abnormal Labs Reviewed   CBC WITH AUTO DIFFERENTIAL - Abnormal; Notable for the following components:       Result Value    WBC 14.5 (*)     MCHC 31.5 (*)     RDW 15.3 (*)     Platelets 451 (*)     Neutrophils % 66.8 (*)     Neutrophils Absolute 9.7 (*)     All other components within normal limits   COMPREHENSIVE METABOLIC PANEL - Abnormal; Notable for the following components:    Potassium 3.0 (*)     Chloride 108 (*)     CO2 15 (*)     Glucose 221 (*)     BUN 42 (*)     Creatinine 2.3 (*)     Est, Glom Filt Rate 25 (*)     Alkaline Phosphatase 145 (*)     All other components within normal limits   POCT GLUCOSE - Abnormal; Notable for the following components:    POC Glucose 257 (*)     All other components within normal limits     Background  Allergies:   Allergies   Allergen Reactions    Bee Venom Shortness Of Breath and Swelling    Hydroxychloroquine Other (See Comments)     Other Reaction(s): Other (see comments)    Hair loss    Pineapple Anaphylaxis, Shortness Of Breath and Swelling    Pineapple Extract Anaphylaxis    Wasp Venom  Left;Ventral Forearm (Active)     Pertinent or High Risk Medications/Drips: no   If Yes, please provide details:    Blood Product Administration: no  If Yes, please provide details:    Sepsis Risk Score      Admitted with Sepsis? No    Recommendation  Incomplete orders:  UA  Patient Belongings: bedside  Additional Comments:    If any further questions, please call Sending RN at [phone]    Electronically signed by: Electronically signed by Alcides Landers RN on 2/27/2025 at 11:17 PM

## 2025-02-28 NOTE — CARE COORDINATION
Case Management Assessment  Initial Evaluation    Date/Time of Evaluation: 2/28/2025 2:41 PM  Assessment Completed by: TAMEKA TAVERAS    If patient is discharged prior to next notation, then this note serves as note for discharge by case management.    Patient Name: Leila Tipton                   YOB: 1970  Diagnosis: Acute on chronic renal insufficiency [N28.9, N18.9]  Acute kidney injury superimposed on chronic kidney disease [N17.9, N18.9]                   Date / Time: 2/27/2025  5:44 PM    Patient Admission Status: Inpatient   Readmission Risk (Low < 19, Mod (19-27), High > 27): Readmission Risk Score: 14.9    Current PCP: Wei Dubois MD  PCP verified by CM? (P) Yes    Chart Reviewed: Yes      History Provided by: (P) Patient  Patient Orientation: (P) Alert and Oriented, Person, Place, Situation, Self    Patient Cognition: (P) Alert    Hospitalization in the last 30 days (Readmission):  No    If yes, Readmission Assessment in  Navigator will be completed.    Advance Directives:      Code Status: Full Code   Patient's Primary Decision Maker is: (P) Legal Next of Kin      Discharge Planning:    Patient lives with: (P) Spouse/Significant Other, Children Type of Home: (P) House  Primary Care Giver: (P) Self  Patient Support Systems include: (P) Spouse/Significant Other, Family Members   Current Financial resources: (P) Medicaid  Current community resources: (P) None  Current services prior to admission: (P) None            Current DME:              Type of Home Care services:  (P) None    ADLS  Prior functional level: (P) Independent in ADLs/IADLs  Current functional level: (P) Independent in ADLs/IADLs    PT AM-PAC:   /24  OT AM-PAC:   /24    Family can provide assistance at DC: (P) Yes  Would you like Case Management to discuss the discharge plan with any other family members/significant others, and if so, who? (P) Yes  Plans to Return to Present Housing: (P) Yes  Other Identified  2:41 PM       02/28/25 [id number]   Service Assessment   Patient Orientation Alert and Oriented;Person;Place;Situation;Self   Cognition Alert   History Provided By Patient   Primary Caregiver Self   Accompanied By/Relationship n/a   Support Systems Spouse/Significant Other;Family Members   Patient's Healthcare Decision Maker is: Legal Next of Kin   PCP Verified by CM Yes   Last Visit to PCP Within last 3 months   Prior Functional Level Independent in ADLs/IADLs   Current Functional Level Independent in ADLs/IADLs   Can patient return to prior living arrangement Yes   Ability to make needs known: Good   Family able to assist with home care needs: Yes   Would you like for me to discuss the discharge plan with any other family members/significant others, and if so, who? Yes   Financial Resources Medicaid   Community Resources None   CM/SW Referral Emotional support   Social/Functional History   Lives With Spouse   Type of Home House   Home Layout One level   Home Access Stairs to enter with rails   Entrance Stairs - Number of Steps 3   Entrance Stairs - Rails Both   Bathroom Shower/Tub Tub/Shower unit;Walk-in shower   Bathroom Toilet Standard   Bathroom Equipment None   Bathroom Accessibility Accessible   Home Equipment None   Receives Help From Family   Prior Level of Assist for ADLs Independent   Prior Level of Assist for Homemaking Independent   Homemaking Responsibilities Yes   Ambulation Assistance Independent   Prior Level of Assist for Transfers Independent   Active  Yes   Mode of Transportation Car   Education n/a   Occupation Other (Comment)  (in the process of applying for disability)   Type of Occupation n/a   Discharge Planning   Type of Residence House   Living Arrangements Spouse/Significant Other;Children   Current Services Prior To Admission None   Potential Assistance Needed N/A   DME Ordered? No   Potential Assistance Purchasing Medications No   Type of Home Care Services None   Patient expects to be

## 2025-02-28 NOTE — ED PROVIDER NOTES
[facility] EMERGENCY DEPARTMENT  EMERGENCY DEPARTMENT ENCOUNTER      Pt Name: Leila Tipton  MRN: [id number]  Birthdate 1970  Date of evaluation: 2/27/2025  Provider: Jose Leung Jr, MD    CHIEF COMPLAINT       Chief Complaint   Patient presents with    Abnormal Lab     Sent by nephro for admission due to ABN labs. Pt states she feels weak          HISTORY OF PRESENT ILLNESS   (Location/Symptom, Timing/Onset,Context/Setting, Quality, Duration, Modifying Factors, Severity)  Note limiting factors.   Leila Tipton is a 54 y.o. female who presents to the emergency department for evaluation after she was sent in by nephrology for worsening renal function. Had labs done 3 days ago that showed GFR down to 22.1, and repeat labs today showed Gfr 21.3.   Has been followed by ID for mycobacterium fortuitum infection previously, and Dr. Clayton was concerned about volume depletion from decreased oral intake.      HPI    NursingNotes were reviewed.    REVIEW OF SYSTEMS    (2-9 systems for level 4, 10 or more for level 5)     Review of Systems    A complete review of systems was performed and is negative except as noted above in the HPI.       PAST MEDICAL HISTORY     Past Medical History:   Diagnosis Date    Allergic rhinitis     Arthritis     Asthma     Constipation     Diabetes mellitus (HCC)     Diabetic neuropathy (HCC)     Fibromyalgia     Gastroparesis     GERD (gastroesophageal reflux disease)     Hyperlipidemia     Hypertension     Hypokalemia     Migraines     Neuromuscular disorder (HCC)     Osteoarthritis     Osteoporosis     Pancreatitis     Plantar wart of left foot     Raynaud disease     Rheumatoid arthritis (HCC)     Stage 3b chronic kidney disease (HCC)     Tobacco abuse          SURGICAL HISTORY       Past Surgical History:   Procedure Laterality Date    APPENDECTOMY      CHOLECYSTECTOMY      HYSTERECTOMY (CERVIX STATUS UNKNOWN)           CURRENT MEDICATIONS       Previous Medications    ALBUTEROL SULFATE

## 2025-02-28 NOTE — CONSULTS
Nephrology ([facility]) Consult Note      Patient:  Leila Tipton  YOB: 1970  Date of Service: 2/28/2025  MRN: [id number]   Acct: [id number]   Primary Care Physician: Wei Dubois MD  Advance Directive: Full Code  Admit Date: 2/27/2025       Hospital Day: 1  Referring Provider: Bairon Cortez MD    Patient independently seen and examined, Chart, Consults, Notes, Operative notes, Labs, Cardiology, and Radiology studies reviewed as available.    Chief complaint: Abnormal labs.    Subjective:  Leila Tipton is a 54 y.o. female for whom we were consulted for evaluation and treatment of acute kidney injury/chronic kidney disease.  She has history of stage IIIb chronic kidney disease, follows Dr. Oreilly at [facility].  Patient was sent from nephrology office as she was found to have significant decline in renal function with GFR consistent with 21 mL.  Her baseline GFR is usually 40.  She has frequent hospitalization with intravascular volume depletion as she was unable to drink enough fluid.  Hospital course remarkable for IV fluid administration and has improvement of renal function.    This morning she is fully alert and awake, sitting up and eating breakfast.    Allergies:  Bee venom, Hydroxychloroquine, Pineapple, Pineapple extract, Wasp venom, Wasp venom protein, Sitagliptin, Metformin, Motrin [ibuprofen], Chocolate, Hydrocodone, and Poison ivy extract    Medicines:  Current Facility-Administered Medications   Medication Dose Route Frequency Provider Last Rate Last Admin    heparin (porcine) injection 5,000 Units  5,000 Units SubCUTAneous BID Stu Aceves MD        sodium bicarbonate 150 mEq in dextrose 5 % 1,000 mL infusion   IntraVENous Continuous Stu Aceves MD 75 mL/hr at 02/27/25 2213 New Bag at 02/27/25 2213    albuterol (PROVENTIL) (2.5 MG/3ML) 0.083% nebulizer solution 2.5 mg  2.5 mg Nebulization Q4H PRN Stu Aceves MD        aspirin chewable

## 2025-02-28 NOTE — PROGRESS NOTES
Comprehensive Nutrition Assessment    Type and Reason for Visit:  Positive nutrition screen    Nutrition Recommendations/Plan:   Nepro w/ lunch and dinner  Check A1c  Add moderate malnutrition to problem list     Malnutrition Assessment:  Malnutrition Status:  Moderate malnutrition (02/28/25 1329)    Context:  Acute Illness     Findings of the 6 clinical characteristics of malnutrition:  Energy Intake:  Mild decrease in energy intake  Weight Loss:  Mild weight loss     Body Fat Loss:  Mild body fat loss Orbital   Muscle Mass Loss:  Mild muscle mass loss Temples (temporalis)  Fluid Accumulation:  No fluid accumulation     Strength:  Not Performed    Nutrition Assessment:    +NS for weight loss and decreased appetite. Pt reports her appetite is improving. Current weight is < UBW of 105-110 lbs, however, no significant weight loss noted in past 30 days. Pt is agreeable to ONS. Recommend Nepro w/ lunch and dinner and check A1c.    Nutrition Related Findings:    K 3.2, Mg 1.2, Glu , IV fluids @ 75 mLs/hr Wound Type: None       Current Nutrition Intake & Therapies:    Average Meal Intake: Unable to assess  Average Supplements Intake: None Ordered  ADULT DIET; Regular; 4 carb choices (60 gm/meal); Low Fat/Low Chol/High Fiber/MIGUEL; No Added Salt (3-4 gm); Low Phosphorus (Less than 1000 mg)    Anthropometric Measures:  Height: 152.4 cm (5')  Ideal Body Weight (IBW): 100 lbs (45 kg)    Admission Body Weight: 45.5 kg (100 lb 5 oz)  Current Body Weight: 45.5 kg (100 lb 5 oz), 100.3 % IBW. Weight Source: Bed scale  Current BMI (kg/m2): 19.6  Usual Body Weight: 42.4 kg (93 lb 7.6 oz) (Per visit 1-27-25)   % Weight Change (Calculated): 7.3  Weight Adjustment For: No Adjustment   BMI Categories: Normal Weight (BMI 18.5-24.9)    Estimated Daily Nutrient Needs:  Energy Requirements Based On: Kcal/kg  Weight Used for Energy Requirements: Current  Energy (kcal/day): 3736-2225 (25-30/kg)  Weight Used for Protein Requirements:  Current  Protein (g/day): 46-91 (1-2/kg)  Method Used for Fluid Requirements: 1 ml/kcal  Fluid (ml/day): 3798-2797 (25-30/kg)    Nutrition Diagnosis:   Moderate malnutrition related to inadequate protein-energy intake as evidenced by criteria as identified in malnutrition assessment    Nutrition Interventions:   Food and/or Nutrient Delivery: Continue Current Diet, Start Oral Nutrition Supplement  Nutrition Education/Counseling: No recommendation at this time  Coordination of Nutrition Care: Continue to monitor while inpatient  Plan of Care discussed with: Pt    Goals:  Goals: PO intake 50% or greater  Type of Goal: New goal  Previous Goal Met: New Goal    Nutrition Monitoring and Evaluation:   Behavioral-Environmental Outcomes: None Identified  Food/Nutrient Intake Outcomes: Food and Nutrient Intake, Supplement Intake  Physical Signs/Symptoms Outcomes: Biochemical Data, Fluid Status or Edema, Skin, Weight    Discharge Planning:    Too soon to determine     Devika Segura RD  Contact: [phone]

## 2025-02-28 NOTE — PROGRESS NOTES
Daily Progress Note    Date:2025  Patient: Leila Tipton  : 1970  MRN:[id number]  CODE:Full Code No additional code details  PCP:Wei Dubois MD    Admit Date: 2025  5:44 PM   LOS: 1 day     Chief Complaint   Patient presents with   • Abnormal Lab     Sent by nephro for admission due to ABN labs. Pt states she feels weak          Subjective: NAEON. Tolerating PO diet without issue. States that her poor PO intake at home is due to food smelling and tasting poorly.         Hospital Summary: 55 yo F with gastroparesis s/p recent gastric pacemaker, CKD IIIb, chronic diarrhea who presented to [facility] ED due to worsening renal function noted on outpatient labs.   Labs showed Cr 2.3 from baseline ~1.7, K 3.0, CO2 15, BUN 42, WBC 14.5.  CT abdomen   Admitted to the hospitalist service for further management. Nephrology consulted for ABHISHEK.  Started on bicarb IVF. Renal function has improved with Cr down to 1.9. Pt feels somewhat better overall.             Review of Systems   All other systems reviewed and are negative.      Objective:      Vital signs in last 24 hours:  Patient Vitals for the past 24 hrs:   BP Temp Temp src Pulse Resp SpO2   25 1057 (!) 85/55 97.3 °F (36.3 °C) -- 92 14 100 %   25 0745 100/71 97.5 °F (36.4 °C) Oral 83 16 100 %   25 0605 -- -- -- -- -- 98 %   25 0200 (!) 89/63 98.4 °F (36.9 °C) Oral 93 16 100 %   25 0029 (!) 88/58 -- -- 92 18 96 %   25 0015 (!) 82/52 -- -- 95 15 97 %   25 2330 90/66 -- -- 93 15 --   25 2315 (!) 85/64 -- -- 94 16 94 %   25 2221 (!) 86/60 -- -- -- -- --   25 2200 (!) 89/61 -- -- (!) 106 15 97 %   25 2100 (!) 89/65 -- -- (!) 105 15 97 %   25 116/74 -- -- (!) 102 17 100 %   25 1815 -- 97.9 °F (36.6 °C) -- -- -- --   25 1742 94/68 -- -- 97 16 100 %       No intake/output data recorded.  No intake/output data recorded.    Physical Exam  Constitutional:       General: She is

## 2025-02-28 NOTE — PLAN OF CARE
SUBJECTIVE:    Sent in by nephrology for admission for ABHISHEK on CKD, is also hypokalemic      OBJECTIVE:    BP 94/68   Pulse 97   Temp 97.9 °F (36.6 °C)   Resp 16   SpO2 100%       ASSESSMENTS & PLANS:    ABHISHEK (Cr baseline 1.6 now at 2.3) on CKD stage 3B (baseline GFR 39) with hyPOkalemia (Potassium 3.0 PoA):  Admit to Medical Tele herring, Nephrology area preferred if available  Strict Is and Os  Daily Weights  Elevate HoB 30' atleast  Elevate Legs to prevent sliding down in bed  Added on to UA Urine OSM/EOS/Na/Cr  Avoid Nephrotoxins as able: NSAIDs/ACEi/ARB/Diuretic/Aminoglycosides/IodinatedContrast etc  Renal US in AM   Nephrology Consultant to be contacted now  IVF bolus with elliot COLON by EP  IVF with NaHCO3 150meq per liter D5 water soluition, to run at 75cc/h  BMP with Mag reflex daily  CBC with Differential daily  20meq Kcl PO x once  Recheck in a hour     Chronic Medical Problems:  Continue Home regimen as able & indicated  Any oral antihyperglycemics will be subbed to an insulin regimen with POCT scheduled & PRN as well as provision of an antihypoglycemic orders set for safety     Supportive and Prophylactic Txx:  DVT PPx: Heparin 5,000 units SQ Q8h  GI (PUD) PPx: not indicated  PT: not indicated      Case flagged by ED provider for consultation / anticipated admission  Chart reviewed   Orders entered by me with CPOE  Case d/w NP swing shift hospitlaist

## 2025-02-28 NOTE — PROGRESS NOTES
Leila Tipton arrived to room # 439.   Presented with: ABHISHEK  Mental Status: Patient is oriented and alert.   Vitals:    02/28/25 0200   BP: (!) 89/63   Pulse: 93   Resp: 16   Temp: 98.4 °F (36.9 °C)   SpO2: 100%     Patient safety contract and falls prevention contract reviewed with patient Yes.  Oriented Patient to room.  Call light within reach. Yes.  Needs, issues or concerns expressed at this time: no.      Electronically signed by Carrie Bailey RN on 2/28/2025 at 2:35 AM

## 2025-02-28 NOTE — H&P
[facility]      Hospitalist - History & Physical      PCP: Wei Dubois MD    Date of Admission: 2/27/2025    Date of Service: 2/27/2025    Chief Complaint: Abnormal lab     History Of Present Illness:   The patient is a 54 y.o. female who presented to [facility] ED for evaluation of abnormal lab. Pt has history of gastroparesis, gastric stimulator/pacemaker placement, RA, CKD IIIb, diabetes, neuropathy, hypertension and hyperlipidemia.    Pt reports history of CKD followed by [facility] nephrology with recent outpatient labs noting declining renal function. She denies fevers. She gives history of intermittent problems over past few years with nausea and vomiting related to gastroparesis and diarrhea that she associated with IBS.    In ED, creatinine 2.3/bun 42, sodium 137, potassium 3.0, wbc 14.5k, hgb 12.6, platelets 451k. Pt is admitted inpatient to hospitalist.    Past Medical History:        Diagnosis Date    Allergic rhinitis     Arthritis     Asthma     Constipation     Diabetes mellitus (HCC)     Diabetic neuropathy (HCC)     Fibromyalgia     Gastroparesis     GERD (gastroesophageal reflux disease)     Hyperlipidemia     Hypertension     Hypokalemia     Migraines     Neuromuscular disorder (HCC)     Osteoarthritis     Osteoporosis     Pancreatitis     Plantar wart of left foot     Raynaud disease     Rheumatoid arthritis (HCC)     Stage 3b chronic kidney disease (HCC)     Tobacco abuse        Past Surgical History:        Procedure Laterality Date    APPENDECTOMY      CHOLECYSTECTOMY      HYSTERECTOMY (CERVIX STATUS UNKNOWN)         Home Medications:  Prior to Admission medications    Medication Sig Start Date End Date Taking? Authorizing Provider   bisacodyl (DULCOLAX) 5 MG EC tablet Take 1 tablet by mouth daily    ProviderAdrian MD   buPROPion (WELLBUTRIN XL) 150 MG extended release tablet Take 1 tablet by mouth daily    ProviderAdrian MD   vitamin D (VITAMIN D3) 50 MCG (2000 UT)    CO2 15*   BUN 42*   CREATININE 2.3*   CALCIUM 8.9     Recent Labs     02/27/25  1830   AST 16   ALT 13   BILITOT 0.2   ALKPHOS 145*       CT ABDOMEN PELVIS WO CONTRAST    Result Date: 2/27/2025  CT ABDOMEN PELVIS WO CONTRAST- 2/27/2025 2:47 PM HISTORY: abdominal pain COMPARISON: CT scan dated 2/6/2024 DOSE LENGTH PRODUCT: 127.01 mGy.cm. Automated exposure control was also utilized to decrease patient radiation dose. TECHNIQUE: Noncontrast images of the abdomen and pelvis obtained without oral contrast. Multiplanar reformatted images were provided for review. FINDINGS: LOWER CHEST: Lung bases are clear. Included portions of the mediastinum are unremarkable. LIVER: Liver appears grossly normal without contrast. BILIARY SYSTEM: The gallbladder has been removed. There is no evidence of biliary ductal dilation. PANCREAS: Pancreas appears grossly normal without contrast. SPLEEN: Unremarkable. ADRENALS: Unremarkable. KIDNEYS: Bilateral kidneys are unremarkable. The ureters are decompressed. RETROPERITONEUM: No mass, lymphadenopathy or hemorrhage. GI TRACT: Stomach is nondistended. Small bowel loops are nondilated. Scattered air-fluid levels along the ascending and transverse colon. Prior appendectomy. OTHER: There is no mesenteric mass, lymphadenopathy, free air or free fluid. No acute bony abnormality seen. Gastric stimulator. PELVIS: No mass lesion, fluid collection or significant lymphadenopathy is seen in the pelvis. Urinary bladder is decompressed.    1.  Liquid stool along the ascending and transverse colon with scattered air-fluid levels. This is nonspecific but could potentially reflect an enteritis. The bowel is nondistended. 2.  Otherwise, no acute process. This report was signed and finalized on 2/27/2025 4:07 PM by Dr Inderjit Melendez.      Assessment/Plan:  Principal Problem:    Acute kidney injury superimposed on chronic kidney disease  Active Problems:    Hypokalemia, 3.0 PoA    CKD stage 3b, GFR 30-44 ml/min

## 2025-03-01 VITALS
HEART RATE: 96 BPM | TEMPERATURE: 96.8 F | OXYGEN SATURATION: 100 % | WEIGHT: 100.31 LBS | BODY MASS INDEX: 19.69 KG/M2 | SYSTOLIC BLOOD PRESSURE: 88 MMHG | HEIGHT: 60 IN | DIASTOLIC BLOOD PRESSURE: 66 MMHG | RESPIRATION RATE: 14 BRPM

## 2025-03-01 LAB
ALBUMIN SERPL-MCNC: 2.6 G/DL (ref 3.5–5.2)
ALP SERPL-CCNC: 88 U/L (ref 35–104)
ALT SERPL-CCNC: 7 U/L (ref 5–33)
ANION GAP SERPL CALCULATED.3IONS-SCNC: 8 MMOL/L (ref 8–16)
AST SERPL-CCNC: 11 U/L (ref 5–32)
BACTERIA SPEC AEROBE CULT: NORMAL
BACTERIA SPEC AEROBE CULT: NORMAL
BASOPHILS # BLD: 0 K/UL (ref 0–0.2)
BASOPHILS NFR BLD: 0.2 % (ref 0–1)
BILIRUB SERPL-MCNC: <0.2 MG/DL (ref 0.2–1.2)
BUN SERPL-MCNC: 26 MG/DL (ref 6–20)
CALCIUM SERPL-MCNC: 7.3 MG/DL (ref 8.6–10)
CHLORIDE SERPL-SCNC: 109 MMOL/L (ref 98–107)
CO2 SERPL-SCNC: 28 MMOL/L (ref 22–29)
CREAT SERPL-MCNC: 1.6 MG/DL (ref 0.5–0.9)
EOSINOPHIL # BLD: 0.3 K/UL (ref 0–0.6)
EOSINOPHIL NFR BLD: 2.9 % (ref 0–5)
ERYTHROCYTE [DISTWIDTH] IN BLOOD BY AUTOMATED COUNT: 15.3 % (ref 11.5–14.5)
GLUCOSE BLD-MCNC: 160 MG/DL (ref 70–99)
GLUCOSE BLD-MCNC: 174 MG/DL (ref 70–99)
GLUCOSE SERPL-MCNC: 112 MG/DL (ref 70–99)
HCT VFR BLD AUTO: 27.6 % (ref 37–47)
HGB BLD-MCNC: 9 G/DL (ref 12–16)
IMM GRANULOCYTES # BLD: 0.1 K/UL
LYMPHOCYTES # BLD: 3.9 K/UL (ref 1.1–4.5)
LYMPHOCYTES NFR BLD: 37.1 % (ref 20–40)
MCH RBC QN AUTO: 29.5 PG (ref 27–31)
MCHC RBC AUTO-ENTMCNC: 32.6 G/DL (ref 33–37)
MCV RBC AUTO: 90.5 FL (ref 81–99)
MONOCYTES # BLD: 0.5 K/UL (ref 0–0.9)
MONOCYTES NFR BLD: 5 % (ref 0–10)
NEUTROPHILS # BLD: 5.7 K/UL (ref 1.5–7.5)
NEUTS SEG NFR BLD: 54.2 % (ref 50–65)
PERFORMED ON: ABNORMAL
PERFORMED ON: ABNORMAL
PLATELET # BLD AUTO: 342 K/UL (ref 130–400)
PMV BLD AUTO: 10.3 FL (ref 9.4–12.3)
POTASSIUM SERPL-SCNC: 3.8 MMOL/L (ref 3.5–5)
POTASSIUM SERPL-SCNC: 3.8 MMOL/L (ref 3.5–5.1)
PROT SERPL-MCNC: 5.1 G/DL (ref 6.4–8.3)
RBC # BLD AUTO: 3.05 M/UL (ref 4.2–5.4)
SODIUM SERPL-SCNC: 145 MMOL/L (ref 136–145)
WBC # BLD AUTO: 10.5 K/UL (ref 4.8–10.8)

## 2025-03-01 PROCEDURE — 6360000002 HC RX W HCPCS: Performed by: HOSPITALIST

## 2025-03-01 PROCEDURE — 2500000003 HC RX 250 WO HCPCS: Performed by: HOSPITALIST

## 2025-03-01 PROCEDURE — 94760 N-INVAS EAR/PLS OXIMETRY 1: CPT

## 2025-03-01 PROCEDURE — 6370000000 HC RX 637 (ALT 250 FOR IP): Performed by: INTERNAL MEDICINE

## 2025-03-01 PROCEDURE — 80053 COMPREHEN METABOLIC PANEL: CPT

## 2025-03-01 PROCEDURE — 6370000000 HC RX 637 (ALT 250 FOR IP): Performed by: HOSPITALIST

## 2025-03-01 PROCEDURE — 36415 COLL VENOUS BLD VENIPUNCTURE: CPT

## 2025-03-01 PROCEDURE — 6370000000 HC RX 637 (ALT 250 FOR IP): Performed by: STUDENT IN AN ORGANIZED HEALTH CARE EDUCATION/TRAINING PROGRAM

## 2025-03-01 PROCEDURE — 82962 GLUCOSE BLOOD TEST: CPT

## 2025-03-01 PROCEDURE — 85025 COMPLETE CBC W/AUTO DIFF WBC: CPT

## 2025-03-01 RX ADMIN — DULOXETINE HYDROCHLORIDE 60 MG: 60 CAPSULE, DELAYED RELEASE ORAL at 08:03

## 2025-03-01 RX ADMIN — PANCRELIPASE LIPASE, PANCRELIPASE PROTEASE, PANCRELIPASE AMYLASE 20000 UNITS: 20000; 63000; 84000 CAPSULE, DELAYED RELEASE ORAL at 12:23

## 2025-03-01 RX ADMIN — ASPIRIN 81 MG: 81 TABLET, CHEWABLE ORAL at 08:03

## 2025-03-01 RX ADMIN — Medication 2000 UNITS: at 08:03

## 2025-03-01 RX ADMIN — CALCITRIOL CAPSULES 0.25 MCG 0.25 MCG: 0.25 CAPSULE ORAL at 08:03

## 2025-03-01 RX ADMIN — SUCRALFATE 1 G: 1 TABLET ORAL at 06:17

## 2025-03-01 RX ADMIN — PANCRELIPASE LIPASE, PANCRELIPASE PROTEASE, PANCRELIPASE AMYLASE 5000 UNITS: 5000; 17000; 24000 CAPSULE, DELAYED RELEASE ORAL at 08:02

## 2025-03-01 RX ADMIN — PANCRELIPASE LIPASE, PANCRELIPASE PROTEASE, PANCRELIPASE AMYLASE 20000 UNITS: 20000; 63000; 84000 CAPSULE, DELAYED RELEASE ORAL at 08:03

## 2025-03-01 RX ADMIN — BISACODYL 5 MG: 5 TABLET, COATED ORAL at 08:03

## 2025-03-01 RX ADMIN — SODIUM BICARBONATE 325 MG: 650 TABLET ORAL at 08:03

## 2025-03-01 RX ADMIN — PREGABALIN 150 MG: 75 CAPSULE ORAL at 08:03

## 2025-03-01 RX ADMIN — SODIUM CHLORIDE, PRESERVATIVE FREE 10 ML: 5 INJECTION INTRAVENOUS at 08:10

## 2025-03-01 RX ADMIN — PANTOPRAZOLE SODIUM 40 MG: 40 TABLET, DELAYED RELEASE ORAL at 06:17

## 2025-03-01 RX ADMIN — POTASSIUM CHLORIDE 40 MEQ: 1500 TABLET, EXTENDED RELEASE ORAL at 08:02

## 2025-03-01 RX ADMIN — METOCLOPRAMIDE 5 MG: 5 TABLET ORAL at 06:17

## 2025-03-01 RX ADMIN — ROPINIROLE HYDROCHLORIDE 1 MG: 1 TABLET, FILM COATED ORAL at 08:02

## 2025-03-01 RX ADMIN — Medication: at 08:15

## 2025-03-01 RX ADMIN — HEPARIN SODIUM 5000 UNITS: 5000 INJECTION INTRAVENOUS; SUBCUTANEOUS at 08:09

## 2025-03-01 RX ADMIN — CALCIUM 500 MG: 500 TABLET ORAL at 08:03

## 2025-03-01 RX ADMIN — NIFEDIPINE 30 MG: 30 TABLET, EXTENDED RELEASE ORAL at 08:03

## 2025-03-01 RX ADMIN — METOCLOPRAMIDE 5 MG: 5 TABLET ORAL at 11:19

## 2025-03-01 RX ADMIN — LINACLOTIDE 290 MCG: 145 CAPSULE, GELATIN COATED ORAL at 06:17

## 2025-03-01 RX ADMIN — BUPROPION HYDROCHLORIDE 150 MG: 150 TABLET, EXTENDED RELEASE ORAL at 08:03

## 2025-03-01 RX ADMIN — Medication 400 MG: at 08:03

## 2025-03-01 RX ADMIN — SUCRALFATE 1 G: 1 TABLET ORAL at 11:19

## 2025-03-01 RX ADMIN — SODIUM CHLORIDE, PRESERVATIVE FREE 10 ML: 5 INJECTION INTRAVENOUS at 08:19

## 2025-03-01 RX ADMIN — PANCRELIPASE LIPASE, PANCRELIPASE PROTEASE, PANCRELIPASE AMYLASE 5000 UNITS: 5000; 17000; 24000 CAPSULE, DELAYED RELEASE ORAL at 12:23

## 2025-03-01 NOTE — DISCHARGE SUMMARY
[address] [place] [address]    DEPARTMENT OF HOSPITALIST MEDICINE      DISCHARGE SUMMARY:      PATIENT NAME:  Leila Tipton  :  1970  MRN:  [id number]    Admission Date:   2025  5:44 PM Attending: Bairon Cortez MD   Discharge Date:   3/1/2025              PCP: Wei Dubois MD  Length of Stay: 2 days     Chief Complaint on Admission:   Chief Complaint   Patient presents with    Abnormal Lab     Sent by nephro for admission due to ABN labs. Pt states she feels weak        Consultants:     IP CONSULT TO NEPHROLOGY       CUMULATIVE  HOSPITAL  COURSE  AND  TREATMENT:  53 yo F with gastroparesis s/p recent gastric pacemaker, CKD IIIb, chronic diarrhea who presented to [facility] ED due to worsening renal function noted on outpatient labs.   Labs showed Cr 2.3 from baseline ~1.7, K 3.0, CO2 15, BUN 42, WBC 14.5.  CT abdomen   Admitted to the hospitalist service for further management. Nephrology consulted for ABHISHEK.  Started on bicarb IVF. Renal function has normalized with Cr down to 1.6. Pt feels better overall. No episodes of diarrhea occurred during hospitalization. She tolerated PO diet without significant nausea or vomiting.     Pt discharged home in stable condition. Follow up with PCP within 1-2 weeks. Follow up with nephrology as scheduled.    Discharge Problem List:   Principal Problem:    Acute kidney injury superimposed on chronic kidney disease  Active Problems:    Neutrophilic leukocytosis    Hypokalemia, 3.0 PoA    CKD stage 3b, GFR 30-44 ml/min ([facility])    Tachycardia    Moderate malnutrition  Resolved Problems:    * No resolved hospital problems. *         Last dated Assessment and Plan ... 25    Sepsis ruled out  Colitis ruled out  -- Pt denied abdominal pain  -- CT abdomen showed liquid stool along the ascending and transverse colon; this is consistent with her chronic diarrhea - no evidence of colitis  -- Stop ABX given no source of infection - this will likely only worsen her  Disposition: Home    Recommended Follow Up:  No follow-up provider specified.  Followup Appointments Scheduled at Time of Discharge:  No future appointments.     Discharge Instructions:   Please see the discharge paperwork.    Patient was seen at bedside today, and the examination shows improvement since yesterday.    Diet Instructions:  ADULT DIET; Regular; 4 carb choices (60 gm/meal); Low Fat/Low Chol/High Fiber/MIGUEL; No Added Salt (3-4 gm); Low Phosphorus (Less than 1000 mg)  ADULT ORAL NUTRITION SUPPLEMENT; Lunch, Dinner; Renal Oral Supplement     Detailed discharge directions delivered to the patient by myself and our nursing staff, who verbalizes understanding and is satisfied with the plan. Patient has been advised to continue all medications as prescribed and advised, and f/u with PCP within 1 week. Patient is stable from medical standpoint to be discharged.    Total time spent during patient evaluation and assessment, discussion with the nurse/family, addressing discharge medications/scripts and coordination of care for safe discharge was 32 minutes.      Signed Electronically:    Bairon Cortez MD  10:39 AM 3/1/2025

## 2025-03-01 NOTE — PLAN OF CARE
Problem: Chronic Conditions and Co-morbidities  Goal: Patient's chronic conditions and co-morbidity symptoms are monitored and maintained or improved  3/1/2025 0304 by Carrie Bailey RN  Outcome: Progressing  2/28/2025 1512 by Yaa Jacinto LPN  Outcome: Progressing     Problem: Safety - Adult  Goal: Free from fall injury  3/1/2025 0304 by Carrie Bailey RN  Outcome: Progressing  2/28/2025 1512 by Yaa Jacinto LPN  Outcome: Progressing     Problem: ABCDS Injury Assessment  Goal: Absence of physical injury  3/1/2025 0304 by Carrie Bailey RN  Outcome: Progressing  2/28/2025 1512 by Yaa Jacinto LPN  Outcome: Progressing     Problem: Pain  Goal: Verbalizes/displays adequate comfort level or baseline comfort level  3/1/2025 0304 by Carrie Bailey RN  Outcome: Progressing  2/28/2025 1512 by Yaa Jacinto LPN  Outcome: Progressing     Problem: Nutrition Deficit:  Goal: Optimize nutritional status  3/1/2025 0304 by Carrie Bailey RN  Outcome: Progressing  2/28/2025 1512 by Yaa Jacinto LPN  Outcome: Progressing

## 2025-03-01 NOTE — PLAN OF CARE
Problem: Chronic Conditions and Co-morbidities  Goal: Patient's chronic conditions and co-morbidity symptoms are monitored and maintained or improved  3/1/2025 1253 by Elijah Yanez RN  Outcome: Progressing  3/1/2025 0304 by Carrie Bailey RN  Outcome: Progressing     Problem: Safety - Adult  Goal: Free from fall injury  3/1/2025 1253 by Elijah Yanez, RN  Outcome: Progressing  3/1/2025 0304 by Carrie Bailey RN  Outcome: Progressing     Problem: ABCDS Injury Assessment  Goal: Absence of physical injury  3/1/2025 1253 by Elijah Yanez RN  Outcome: Progressing  3/1/2025 0304 by Carrie Bailey RN  Outcome: Progressing

## 2025-03-01 NOTE — PROGRESS NOTES
Nephrology ([facility]) Progress Note      Patient:  Leila Tipton  YOB: 1970  Date of Service: 3/1/2025  MRN: [id number]   Acct: [id number]   Primary Care Physician: Wei Dubois MD  Advance Directive: Full Code  Admit Date: 2/27/2025       Hospital Day: 2  Referring Provider: Bairon Cortez MD    Patient independently seen and examined, Chart, Consults, Notes, Operative notes, Labs, Cardiology, and Radiology studies reviewed as available.    Chief complaint: Abnormal labs.    Subjective:  Leila Tipton is a 54 y.o. female for whom we were consulted for evaluation and treatment of acute kidney injury/chronic kidney disease.  She has history of stage IIIb chronic kidney disease, follows Dr. Oreilly at [facility].  Patient was sent from nephrology office as she was found to have significant decline in renal function with GFR consistent with 21 mL.  Her baseline GFR is usually 40.  She has frequent hospitalization with intravascular volume depletion as she was unable to drink enough fluid.  Hospital course remarkable for IV fluid administration and has improvement of renal function.    This morning patient is feeling much better.  She is hoping to go home.  She also wants to have a PICC line for outpatient IV fluid administration.    Allergies:  Bee venom, Hydroxychloroquine, Pineapple, Pineapple extract, Wasp venom, Wasp venom protein, Sitagliptin, Metformin, Motrin [ibuprofen], Chocolate, Hydrocodone, and Poison ivy extract    Medicines:  Current Facility-Administered Medications   Medication Dose Route Frequency Provider Last Rate Last Admin    heparin (porcine) injection 5,000 Units  5,000 Units SubCUTAneous BID Stu Aceves MD   5,000 Units at 03/01/25 0809    potassium chloride (KLOR-CON M) extended release tablet 40 mEq  40 mEq Oral Daily Ortega Miguel MD   40 mEq at 03/01/25 0802    metoclopramide (REGLAN) tablet 5 mg  5 mg Oral TID AC Bairon Cortez MD   5  Inderjit Melendez.       Assessment   1.  Acute kidney injury/resolving.  2.  Intravascular volume depletion.  3.  Stage IIIb CKD baseline  4.  Type II diabetic nephropathy.  5.  Metabolic acidemia/renal tubular acidosis.    Plan:  1.  Possible discharge today.  2.  Encouraged her to drink more water.  3.   PICC line needed for administration of IV fluid as an outpatient.    Ortega Miguel MD  03/01/25  11:00 AM

## 2025-03-04 LAB
QT INTERVAL: 352 MS
QTC INTERVAL: 449 MS

## 2025-03-18 ENCOUNTER — APPOINTMENT (OUTPATIENT)
Dept: CT IMAGING | Facility: HOSPITAL | Age: 55
End: 2025-03-18
Payer: COMMERCIAL

## 2025-03-18 ENCOUNTER — HOSPITAL ENCOUNTER (OUTPATIENT)
Facility: HOSPITAL | Age: 55
Setting detail: OBSERVATION
Discharge: HOME | End: 2025-03-21
Attending: STUDENT IN AN ORGANIZED HEALTH CARE EDUCATION/TRAINING PROGRAM | Admitting: STUDENT IN AN ORGANIZED HEALTH CARE EDUCATION/TRAINING PROGRAM
Payer: COMMERCIAL

## 2025-03-18 DIAGNOSIS — R11.2 NAUSEA AND VOMITING, UNSPECIFIED VOMITING TYPE: ICD-10-CM

## 2025-03-18 DIAGNOSIS — K85.90 ACUTE PANCREATITIS, UNSPECIFIED COMPLICATION STATUS, UNSPECIFIED PANCREATITIS TYPE: ICD-10-CM

## 2025-03-18 DIAGNOSIS — R74.8 ELEVATED LIPASE: ICD-10-CM

## 2025-03-18 DIAGNOSIS — N17.9 ACUTE KIDNEY INJURY SUPERIMPOSED ON CHRONIC KIDNEY DISEASE: Primary | ICD-10-CM

## 2025-03-18 DIAGNOSIS — N18.9 ACUTE KIDNEY INJURY SUPERIMPOSED ON CHRONIC KIDNEY DISEASE: Primary | ICD-10-CM

## 2025-03-18 LAB
ALBUMIN SERPL-MCNC: 3.5 G/DL (ref 3.5–5.2)
ALBUMIN/GLOB SERPL: 1 G/DL
ALP SERPL-CCNC: 146 U/L (ref 39–117)
ALT SERPL W P-5'-P-CCNC: 11 U/L (ref 1–33)
ANION GAP SERPL CALCULATED.3IONS-SCNC: 13 MMOL/L (ref 5–15)
AST SERPL-CCNC: 18 U/L (ref 1–32)
B PARAPERT DNA SPEC QL NAA+PROBE: NOT DETECTED
B PERT DNA SPEC QL NAA+PROBE: NOT DETECTED
BACTERIA UR QL AUTO: NORMAL /HPF
BASOPHILS # BLD AUTO: 0.03 10*3/MM3 (ref 0–0.2)
BASOPHILS NFR BLD AUTO: 0.3 % (ref 0–1.5)
BILIRUB SERPL-MCNC: <0.2 MG/DL (ref 0–1.2)
BILIRUB UR QL STRIP: NEGATIVE
BUN SERPL-MCNC: 34 MG/DL (ref 6–20)
BUN/CREAT SERPL: 15.9 (ref 7–25)
C PNEUM DNA NPH QL NAA+NON-PROBE: NOT DETECTED
CALCIUM SPEC-SCNC: 8.6 MG/DL (ref 8.6–10.5)
CHLORIDE SERPL-SCNC: 110 MMOL/L (ref 98–107)
CHOLEST SERPL-MCNC: 211 MG/DL (ref 0–200)
CLARITY UR: CLEAR
CO2 SERPL-SCNC: 15 MMOL/L (ref 22–29)
COLOR UR: YELLOW
CREAT SERPL-MCNC: 2.14 MG/DL (ref 0.57–1)
DEPRECATED RDW RBC AUTO: 53.5 FL (ref 37–54)
EGFRCR SERPLBLD CKD-EPI 2021: 27.1 ML/MIN/1.73
EOSINOPHIL # BLD AUTO: 0.3 10*3/MM3 (ref 0–0.4)
EOSINOPHIL NFR BLD AUTO: 2.8 % (ref 0.3–6.2)
ERYTHROCYTE [DISTWIDTH] IN BLOOD BY AUTOMATED COUNT: 16.1 % (ref 12.3–15.4)
FLUAV SUBTYP SPEC NAA+PROBE: NOT DETECTED
FLUBV RNA ISLT QL NAA+PROBE: NOT DETECTED
GLOBULIN UR ELPH-MCNC: 3.5 GM/DL
GLUCOSE BLDC GLUCOMTR-MCNC: 102 MG/DL (ref 70–130)
GLUCOSE BLDC GLUCOMTR-MCNC: 186 MG/DL (ref 70–130)
GLUCOSE SERPL-MCNC: 326 MG/DL (ref 65–99)
GLUCOSE UR STRIP-MCNC: NEGATIVE MG/DL
GRAN CASTS URNS QL MICRO: NORMAL /LPF
HADV DNA SPEC NAA+PROBE: NOT DETECTED
HCOV 229E RNA SPEC QL NAA+PROBE: NOT DETECTED
HCOV HKU1 RNA SPEC QL NAA+PROBE: NOT DETECTED
HCOV NL63 RNA SPEC QL NAA+PROBE: NOT DETECTED
HCOV OC43 RNA SPEC QL NAA+PROBE: NOT DETECTED
HCT VFR BLD AUTO: 35.4 % (ref 34–46.6)
HDLC SERPL-MCNC: 53 MG/DL (ref 40–60)
HGB BLD-MCNC: 11.2 G/DL (ref 12–15.9)
HGB UR QL STRIP.AUTO: NEGATIVE
HMPV RNA NPH QL NAA+NON-PROBE: NOT DETECTED
HOLD SPECIMEN: NORMAL
HPIV1 RNA ISLT QL NAA+PROBE: NOT DETECTED
HPIV2 RNA SPEC QL NAA+PROBE: NOT DETECTED
HPIV3 RNA NPH QL NAA+PROBE: NOT DETECTED
HPIV4 P GENE NPH QL NAA+PROBE: NOT DETECTED
HYALINE CASTS UR QL AUTO: NORMAL /LPF
IMM GRANULOCYTES # BLD AUTO: 0.21 10*3/MM3 (ref 0–0.05)
IMM GRANULOCYTES NFR BLD AUTO: 2 % (ref 0–0.5)
KETONES UR QL STRIP: ABNORMAL
LDH SERPL-CCNC: 303 U/L (ref 135–214)
LDLC SERPL CALC-MCNC: 102 MG/DL (ref 0–100)
LDLC/HDLC SERPL: 1.73 {RATIO}
LEUKOCYTE ESTERASE UR QL STRIP.AUTO: NEGATIVE
LIPASE SERPL-CCNC: 503 U/L (ref 13–60)
LYMPHOCYTES # BLD AUTO: 2.86 10*3/MM3 (ref 0.7–3.1)
LYMPHOCYTES NFR BLD AUTO: 26.7 % (ref 19.6–45.3)
M PNEUMO IGG SER IA-ACNC: NOT DETECTED
MAGNESIUM SERPL-MCNC: 1.6 MG/DL (ref 1.6–2.6)
MCH RBC QN AUTO: 28.9 PG (ref 26.6–33)
MCHC RBC AUTO-ENTMCNC: 31.6 G/DL (ref 31.5–35.7)
MCV RBC AUTO: 91.5 FL (ref 79–97)
MONOCYTES # BLD AUTO: 0.38 10*3/MM3 (ref 0.1–0.9)
MONOCYTES NFR BLD AUTO: 3.5 % (ref 5–12)
NEUTROPHILS NFR BLD AUTO: 6.95 10*3/MM3 (ref 1.7–7)
NEUTROPHILS NFR BLD AUTO: 64.7 % (ref 42.7–76)
NITRITE UR QL STRIP: NEGATIVE
NRBC BLD AUTO-RTO: 0 /100 WBC (ref 0–0.2)
PH UR STRIP.AUTO: 5.5 [PH] (ref 5–8)
PHOSPHATE SERPL-MCNC: 3.8 MG/DL (ref 2.5–4.5)
PLATELET # BLD AUTO: 552 10*3/MM3 (ref 140–450)
PMV BLD AUTO: 9.9 FL (ref 6–12)
POTASSIUM SERPL-SCNC: 3.7 MMOL/L (ref 3.5–5.2)
PROT SERPL-MCNC: 7 G/DL (ref 6–8.5)
PROT UR QL STRIP: ABNORMAL
RBC # BLD AUTO: 3.87 10*6/MM3 (ref 3.77–5.28)
RBC # UR STRIP: NORMAL /HPF
REF LAB TEST METHOD: NORMAL
RHINOVIRUS RNA SPEC NAA+PROBE: NOT DETECTED
RSV RNA NPH QL NAA+NON-PROBE: NOT DETECTED
SARS-COV-2 RNA RESP QL NAA+PROBE: NOT DETECTED
SODIUM SERPL-SCNC: 138 MMOL/L (ref 136–145)
SODIUM UR-SCNC: 102 MMOL/L
SP GR UR STRIP: 1.02 (ref 1–1.03)
SQUAMOUS #/AREA URNS HPF: NORMAL /HPF
TRIGL SERPL-MCNC: 332 MG/DL (ref 0–150)
URATE SERPL-MCNC: 5.6 MG/DL (ref 2.4–5.7)
UROBILINOGEN UR QL STRIP: ABNORMAL
VLDLC SERPL-MCNC: 56 MG/DL (ref 5–40)
WBC # UR STRIP: NORMAL /HPF
WBC NRBC COR # BLD AUTO: 10.73 10*3/MM3 (ref 3.4–10.8)
WHOLE BLOOD HOLD COAG: NORMAL
WHOLE BLOOD HOLD SPECIMEN: NORMAL

## 2025-03-18 PROCEDURE — 0202U NFCT DS 22 TRGT SARS-COV-2: CPT | Performed by: PHYSICIAN ASSISTANT

## 2025-03-18 PROCEDURE — 74176 CT ABD & PELVIS W/O CONTRAST: CPT

## 2025-03-18 PROCEDURE — 81001 URINALYSIS AUTO W/SCOPE: CPT | Performed by: PHYSICIAN ASSISTANT

## 2025-03-18 PROCEDURE — 25010000002 MAGNESIUM SULFATE 2 GM/50ML SOLUTION

## 2025-03-18 PROCEDURE — 83615 LACTATE (LD) (LDH) ENZYME: CPT

## 2025-03-18 PROCEDURE — 25810000003 SODIUM CHLORIDE 0.9 % SOLUTION

## 2025-03-18 PROCEDURE — 80053 COMPREHEN METABOLIC PANEL: CPT | Performed by: PHYSICIAN ASSISTANT

## 2025-03-18 PROCEDURE — 96375 TX/PRO/DX INJ NEW DRUG ADDON: CPT

## 2025-03-18 PROCEDURE — 82570 ASSAY OF URINE CREATININE: CPT | Performed by: INTERNAL MEDICINE

## 2025-03-18 PROCEDURE — G0378 HOSPITAL OBSERVATION PER HR: HCPCS

## 2025-03-18 PROCEDURE — 99285 EMERGENCY DEPT VISIT HI MDM: CPT

## 2025-03-18 PROCEDURE — 83690 ASSAY OF LIPASE: CPT | Performed by: PHYSICIAN ASSISTANT

## 2025-03-18 PROCEDURE — 80061 LIPID PANEL: CPT | Performed by: STUDENT IN AN ORGANIZED HEALTH CARE EDUCATION/TRAINING PROGRAM

## 2025-03-18 PROCEDURE — 25810000003 SODIUM CHLORIDE 0.9 % SOLUTION: Performed by: PHYSICIAN ASSISTANT

## 2025-03-18 PROCEDURE — 84550 ASSAY OF BLOOD/URIC ACID: CPT | Performed by: INTERNAL MEDICINE

## 2025-03-18 PROCEDURE — 85025 COMPLETE CBC W/AUTO DIFF WBC: CPT | Performed by: PHYSICIAN ASSISTANT

## 2025-03-18 PROCEDURE — 82948 REAGENT STRIP/BLOOD GLUCOSE: CPT

## 2025-03-18 PROCEDURE — 96366 THER/PROPH/DIAG IV INF ADDON: CPT

## 2025-03-18 PROCEDURE — 84300 ASSAY OF URINE SODIUM: CPT | Performed by: INTERNAL MEDICINE

## 2025-03-18 PROCEDURE — 83735 ASSAY OF MAGNESIUM: CPT | Performed by: PHYSICIAN ASSISTANT

## 2025-03-18 PROCEDURE — 63710000001 INSULIN REGULAR HUMAN PER 5 UNITS

## 2025-03-18 PROCEDURE — 84100 ASSAY OF PHOSPHORUS: CPT | Performed by: PHYSICIAN ASSISTANT

## 2025-03-18 PROCEDURE — 82043 UR ALBUMIN QUANTITATIVE: CPT | Performed by: INTERNAL MEDICINE

## 2025-03-18 PROCEDURE — 96365 THER/PROPH/DIAG IV INF INIT: CPT

## 2025-03-18 RX ORDER — DEXTROSE MONOHYDRATE 25 G/50ML
25 INJECTION, SOLUTION INTRAVENOUS
Status: DISCONTINUED | OUTPATIENT
Start: 2025-03-18 | End: 2025-03-21 | Stop reason: HOSPADM

## 2025-03-18 RX ORDER — SODIUM CHLORIDE 9 MG/ML
40 INJECTION, SOLUTION INTRAVENOUS AS NEEDED
Status: DISCONTINUED | OUTPATIENT
Start: 2025-03-18 | End: 2025-03-21 | Stop reason: HOSPADM

## 2025-03-18 RX ORDER — ACETAMINOPHEN 650 MG/1
650 SUPPOSITORY RECTAL EVERY 4 HOURS PRN
Status: DISCONTINUED | OUTPATIENT
Start: 2025-03-18 | End: 2025-03-21 | Stop reason: HOSPADM

## 2025-03-18 RX ORDER — ROPINIROLE 1 MG/1
1 TABLET, FILM COATED ORAL 3 TIMES DAILY
Status: DISCONTINUED | OUTPATIENT
Start: 2025-03-18 | End: 2025-03-21 | Stop reason: HOSPADM

## 2025-03-18 RX ORDER — CETIRIZINE HYDROCHLORIDE 10 MG/1
10 TABLET ORAL DAILY PRN
Status: DISCONTINUED | OUTPATIENT
Start: 2025-03-18 | End: 2025-03-21 | Stop reason: HOSPADM

## 2025-03-18 RX ORDER — ONDANSETRON 2 MG/ML
4 INJECTION INTRAMUSCULAR; INTRAVENOUS EVERY 6 HOURS PRN
Status: DISCONTINUED | OUTPATIENT
Start: 2025-03-18 | End: 2025-03-21 | Stop reason: HOSPADM

## 2025-03-18 RX ORDER — PANTOPRAZOLE SODIUM 40 MG/10ML
40 INJECTION, POWDER, LYOPHILIZED, FOR SOLUTION INTRAVENOUS EVERY 12 HOURS SCHEDULED
Status: DISCONTINUED | OUTPATIENT
Start: 2025-03-18 | End: 2025-03-21

## 2025-03-18 RX ORDER — MAGNESIUM SULFATE HEPTAHYDRATE 40 MG/ML
2 INJECTION, SOLUTION INTRAVENOUS ONCE
Status: COMPLETED | OUTPATIENT
Start: 2025-03-18 | End: 2025-03-18

## 2025-03-18 RX ORDER — MORPHINE SULFATE 2 MG/ML
2 INJECTION, SOLUTION INTRAMUSCULAR; INTRAVENOUS EVERY 4 HOURS PRN
Status: DISCONTINUED | OUTPATIENT
Start: 2025-03-18 | End: 2025-03-20

## 2025-03-18 RX ORDER — ATORVASTATIN CALCIUM 40 MG/1
80 TABLET, FILM COATED ORAL NIGHTLY
Status: DISCONTINUED | OUTPATIENT
Start: 2025-03-18 | End: 2025-03-21 | Stop reason: HOSPADM

## 2025-03-18 RX ORDER — SODIUM CHLORIDE 9 MG/ML
125 INJECTION, SOLUTION INTRAVENOUS CONTINUOUS
Status: DISCONTINUED | OUTPATIENT
Start: 2025-03-18 | End: 2025-03-20

## 2025-03-18 RX ORDER — ASPIRIN 81 MG/1
81 TABLET ORAL DAILY
Status: DISCONTINUED | OUTPATIENT
Start: 2025-03-18 | End: 2025-03-21 | Stop reason: HOSPADM

## 2025-03-18 RX ORDER — ACETAMINOPHEN 325 MG/1
650 TABLET ORAL EVERY 4 HOURS PRN
Status: DISCONTINUED | OUTPATIENT
Start: 2025-03-18 | End: 2025-03-21 | Stop reason: HOSPADM

## 2025-03-18 RX ORDER — SODIUM CHLORIDE 0.9 % (FLUSH) 0.9 %
10 SYRINGE (ML) INJECTION EVERY 12 HOURS SCHEDULED
Status: DISCONTINUED | OUTPATIENT
Start: 2025-03-18 | End: 2025-03-21 | Stop reason: HOSPADM

## 2025-03-18 RX ORDER — SODIUM CHLORIDE 0.9 % (FLUSH) 0.9 %
10 SYRINGE (ML) INJECTION AS NEEDED
Status: DISCONTINUED | OUTPATIENT
Start: 2025-03-18 | End: 2025-03-21 | Stop reason: HOSPADM

## 2025-03-18 RX ORDER — ONDANSETRON 4 MG/1
4 TABLET, ORALLY DISINTEGRATING ORAL EVERY 6 HOURS PRN
Status: DISCONTINUED | OUTPATIENT
Start: 2025-03-18 | End: 2025-03-21 | Stop reason: HOSPADM

## 2025-03-18 RX ORDER — ACETAMINOPHEN 160 MG/5ML
650 SOLUTION ORAL EVERY 4 HOURS PRN
Status: DISCONTINUED | OUTPATIENT
Start: 2025-03-18 | End: 2025-03-21 | Stop reason: HOSPADM

## 2025-03-18 RX ORDER — NICOTINE POLACRILEX 4 MG
15 LOZENGE BUCCAL
Status: DISCONTINUED | OUTPATIENT
Start: 2025-03-18 | End: 2025-03-21 | Stop reason: HOSPADM

## 2025-03-18 RX ADMIN — Medication 10 ML: at 21:15

## 2025-03-18 RX ADMIN — PANTOPRAZOLE SODIUM 40 MG: 40 INJECTION, POWDER, FOR SOLUTION INTRAVENOUS at 21:15

## 2025-03-18 RX ADMIN — INSULIN HUMAN 2 UNITS: 100 INJECTION, SOLUTION PARENTERAL at 21:10

## 2025-03-18 RX ADMIN — SODIUM BICARBONATE: 84 INJECTION, SOLUTION INTRAVENOUS at 21:15

## 2025-03-18 RX ADMIN — MAGNESIUM SULFATE HEPTAHYDRATE 2 G: 40 INJECTION, SOLUTION INTRAVENOUS at 16:44

## 2025-03-18 RX ADMIN — SODIUM CHLORIDE 1000 ML: 9 INJECTION, SOLUTION INTRAVENOUS at 13:38

## 2025-03-18 RX ADMIN — SODIUM CHLORIDE 125 ML/HR: 9 INJECTION, SOLUTION INTRAVENOUS at 16:46

## 2025-03-18 NOTE — PLAN OF CARE
Goal Outcome Evaluation:  Plan of Care Reviewed With: patient           Outcome Evaluation: pt admitted from ED; up ad jessy; ambulating in room; room air; denies need for pain medication at this time; safety maintained

## 2025-03-18 NOTE — ED PROVIDER NOTES
MEDICAL SCREENING:    Reason for Visit: Abnormal labs    Patient initially seen in triage.  The patient was advised further evaluation and diagnostic testing will be needed, some of the treatment and testing will be initiated in the lobby in order to begin the process.  The patient will be returned to the waiting area for the time being and possibly be re-assessed by a subsequent ED provider.  The patient will be brought back to the treatment area in as timely manner as possible.    Patient is a 54-year-old female presenting to ED with abnormal kidney functions.  PMH significant for insulin-dependent diabetes, arthritis, Raynaud's disease, osteoporosis, fibromyalgia, history of renal insufficiency.  Patient states 4 days ago she had lab work done 1 week in advance of her routine appointment with Dr. Jeffers for which she was called today and advised that she had abnormal kidney functions requiring ER evaluation.  Patient states that over the past 5 days she has noticed some increase in fatigue, tiredness, intermittent lightheadedness.  Patient states that for years she has suffered with vomiting and diarrhea which is at its baseline with no increase.  Patient denies any decreased urination, dysuria.  Patient denies abdominal pain, fevers, chills, diaphoresis, any known sick contact.  Patient denies use of any medications outside of the Zofran which she typically has at home with no improvement and presents at this time for further evaluation.      Discussed with patient need for workup to include repeat labs and urinalysis for which she is amenable with no further questions, concerns, needs at this time.    RECORDS REVIEWED SHOW: Patient was called by infectious disease on 3/16/2025 to discuss negative blood cultures as well as review recent labs which revealed sodium 138, potassium 3.5, chloride 113, bicarb 9, BUN 48, creatinine 2, calcium 9.9 and GFR of 29, total protein 7.6 and magnesium 1.5.     Fernandez Grant,  ALLI  03/18/25 1228       Fernandez Grant PA-C  03/18/25 1330       Fernandez Grant PA-C  03/18/25 1330       Fernandez Grant PA-C  03/18/25 2359

## 2025-03-18 NOTE — ED NOTES
HPI      Patient is a 54-year-old female presenting to ED with abnormal kidney functions.  PMH significant for insulin-dependent diabetes, arthritis, Raynaud's disease, osteoporosis, fibromyalgia, history of renal insufficiency.  Patient states 4 days ago she had lab work done 1 week in advance of her routine appointment with Dr. Jeffers for which she was called today and advised that she had abnormal kidney functions requiring ER evaluation.  Patient states that over the past 5 days she has noticed some increase in fatigue, tiredness, intermittent lightheadedness.  Patient states that for years she has suffered with vomiting and diarrhea which is at its baseline with no increase.  Patient denies any decreased urination, dysuria.  Patient denies abdominal pain, fevers, chills, diaphoresis, any known sick contact.  Patient denies use of any medications outside of the Zofran which she typically has at home with no improvement and presents at this time for further evaluation.    RECORDS REVIEWED SHOW: Patient was called by infectious disease on 3/16/2025 to discuss negative blood cultures as well as review recent labs which revealed sodium 138, potassium 3.5, chloride 113, bicarb 9, BUN 48, creatinine 2, calcium 9.9 and GFR of 29, total protein 7.6 and magnesium 1.5.       ROS    Review of Systems   Constitutional: POSITIVE for fatigue.  Negative for chills, diaphoresis and fever.   HENT:  Negative for trouble swallowing, sore throat.  Eyes:  Negative.   Respiratory: Negative.  Negative for cough.    Cardiovascular: Negative.    Gastrointestinal:  Positive for nausea and vomiting. Negative for abdominal pain, constipation and diarrhea.        Denies hematemesis   Genitourinary: Negative.  Negative for dysuria and flank pain, and hematuria.   Musculoskeletal: Negative.  Negative for myalgias, neck pain and neck stiffness.   Skin: Negative.  Negative for rash and wound.   Neurological: POSITIVE for weakness. Negative for  dizziness, weakness, light-headedness and numbness.   Psychiatric/Behavioral: Negative.     All other systems reviewed and are negative.    PHYSICAL EXAMINATION:    Physical Exam  Vitals and nursing note reviewed.   Constitutional:       General: She is not in acute distress.     Appearance: Normal appearance. She is normal weight. She is not ill-appearing, toxic-appearing or diaphoretic.   HENT:      Head: Normocephalic.      Mouth/Throat:      Mouth: Mucous membranes are dry.      Pharynx: Oropharynx is clear. No oropharyngeal exudate or posterior oropharyngeal erythema.      Comments: No intraoral rashes, lesions, petechiae  Eyes:      Conjunctiva/sclera: Conjunctivae normal.      Pupils: Pupils are equal, round, and reactive to light.   Cardiovascular:      Rate and Rhythm: Normal rate and regular rhythm.   Pulmonary:      Effort: Pulmonary effort is normal.      Breath sounds: Normal breath sounds.   Abdominal:      General: Bowel sounds are normal. There is no distension.      Palpations: Abdomen is soft.      Tenderness: There is no abdominal tenderness. There is no right CVA tenderness, left CVA tenderness or guarding.   Musculoskeletal:         General: Normal range of motion.      Cervical back: Neck supple.      Right lower leg: No edema.      Left lower leg: No edema.   Skin:     General: Skin is warm and dry.      Coloration: Skin is not jaundiced.   Neurological:      Mental Status: She is alert and oriented to person, place, and time.      Gait: Gait normal.   Psychiatric:         Mood and Affect: Mood normal.         Behavior: Behavior normal.         ED COURSE:    Patient is a 54-year-old female presenting to ED with abnormal kidney functions.  PMH significant for insulin-dependent diabetes, arthritis, Raynaud's disease, osteoporosis, fibromyalgia, history of renal insufficiency.  Upon initial evaluation patient resting in the bed slightly anxious but otherwise in no acute distress.   Nontoxic-appearing, non-ill-appearing, nondiaphoretic.  Patient with unremarkable vital signs.  Examination is unremarkable to include abdomen soft, nontender, nondistended with no CVAT.  Discussed with patient need for workup to include labs, analysis, CT imaging as well as ability for IV fluids for which she is amenable with no further questions, concerns, or needs at this time.    Differential diagnosis: SUGAR superimposed on CKD, electrolyte disturbance, pancreatitis, urinary tract infection, systemic infection, COVID-19, influenza, diverticulitis, diverticulosis, other    Lab work revealed normal blood cell count, stable H&H, thrombocytosis 552 and no further CBC abnormalities.  CMP with worsening renal functions creatinine 2.14, BUN 34, GFR of 27.1.  Hyperglycemia 326 with no further significant electrolyte disturbances including normal magnesium and phosphorus.  CMP otherwise unremarkable with normal anion gap 13.  No hepatic dysfunction.  Lipase elevated at 503.  Urinalysis with trace ketones, 30 protein.  No evidence of infection.  Respiratory panel unremarkable.  CT imaging the abdomen and pelvis performed without contrast showed: 1. The right and transverse colon is mildly distended with liquefied stool and air-fluid levels suggesting an ongoing diarrheal illness. No mechanical obstruction. The stomach is food filled. A gastric stimulator projects over the upper abdomen. Evidence of previous gastrostomy tube. 2. Radiographic findings suggesting acute pancreatitis with inflammatory stranding in the anterior pararenal space adjacent to the pancreatic head and body of the pancreas. No fluid collection present. No ductal dilatation. 3. Previous hysterectomy. No adnexal mass or free fluid. The urinary bladder is normal in appearance. 4. Suspected bilateral adrenal adenomas with both adrenal glands enlarged but remaining adreniform in shape. Case discussed with Dr. Harvey, nephrology, who is in agreement with IV  hydration at this time and will kindly consult on patient during admission.  Case further discussed with Ms. Odom  RAKESH for hospitalist group who will kindly accept patient under the services of Dr. Clarke, hospitalist.  Discussed with patient need for admission as well as n.p.o. status for which she is amenable with no further questions, concerns, needs at this time.      FINAL DIAGNOSIS:     PANCREATITIS  SUGAR superimposed on CKD  Elevated lipase  Nausea, vomiting.     Fernandez Grant PA-C  03/18/25 9641

## 2025-03-18 NOTE — CONSULTS
Nephrology ([facility]) Consult Note      Patient:  Tasha Perez  YOB: 1970  Date of Service: 3/18/2025  MRN: [id number]   Acct: [id number]   Primary Care Physician: KIRILL Murguia MD  Advance Directive:   Code Status and Medical Interventions: CPR (Attempt to Resuscitate); Full Support   Ordered at: 03/18/25 1608     Code Status (Patient has no pulse and is not breathing):    CPR (Attempt to Resuscitate)     Medical Interventions (Patient has pulse or is breathing):    Full Support     Level Of Support Discussed With:    Patient     Admit Date: 3/18/2025       Hospital Day: 0  Referring Provider: No ref. provider found      Patient Seen, Chart, Consults, Notes, Labs, Radiology studies reviewed.    Chief complaint: Abdominal pain/nausea vomiting    Subjective:  Tasha Perez is a 54 y.o. female  whom we were consulted for acute kidney injury/chronic kidney disease.  She has history of stage IIIb chronic kidney disease baseline, follows [facility].  Patient has recurrent hospitalization due to intravascular volume depletion, needing IV fluid administration.  She has history of recurrent acute pancreatitis, gastroparesis, insulin-dependent type 2 diabetes, osteoarthritis, Raynaud's disease and fibromyalgia.  Presenting to the emergency room with complaint of nausea vomiting associated with mid gastric abdominal pain.  CT scan of the abdomen confirmed with acute pancreatitis.  Her serum creatinine 2.0 mg, baseline creatinine usually 1.4 mg.  She is currently admitted for the treatment of acute pancreatitis/dehydration caused by nausea and vomiting.  In emergency room she has received 1 L of IV fluid    Allergies:  Bee venom, Hydrocodone, Hydroxychloroquine, Ibuprofen, Pineapple, Pineapple extract, Wasp venom, Wasp venom protein, Hydrocodone-acetaminophen, Penicillins, Sitagliptin, Metformin, Chocolate, and Poison ivy extract    Home Meds:  (Not in a hospital  admission)      Medicines:  Current Facility-Administered Medications   Medication Dose Route Frequency Provider Last Rate Last Admin    magnesium sulfate 2g/50 mL (PREMIX) infusion  2 g Intravenous Once Elisabeth APRN   2 g at 03/18/25 1644    Morphine sulfate (PF) injection 2 mg  2 mg Intravenous Q4H PRN Michelle Clarke MD        sodium chloride 0.9 % flush 10 mL  10 mL Intravenous PRN Fernandez Grant PA-C        sodium chloride 0.9 % infusion  125 mL/hr Intravenous Continuous Elisabeth APRN 125 mL/hr at 03/18/25 1646 125 mL/hr at 03/18/25 1646     Current Outpatient Medications   Medication Sig Dispense Refill    aspirin 81 MG EC tablet Take 1 tablet by mouth Daily.      atorvastatin (LIPITOR) 80 MG tablet Take 1 tablet by mouth Every Night.      Calcium Carbonate-Vitamin D 600-5 MG-MCG tablet Take 1 tablet by mouth Daily.      cetirizine (zyrTEC) 10 MG tablet Take 1 tablet by mouth Daily As Needed for Allergies.      Cholecalciferol (Vitamin D3) 50 MCG (2000 UT) capsule Take 1 capsule by mouth Daily.      dapagliflozin Propanediol (Farxiga) 10 MG tablet Take 10 mg by mouth Daily.      hydrocortisone (ANUSOL-HC) 25 MG suppository Insert 1 suppository into the rectum 2 (Two) Times a Day. 12 each 1    hydroxychloroquine (PLAQUENIL) 200 MG tablet Take 2 tablets by mouth Daily.      insulin aspart (NovoLOG) 100 UNIT/ML patient supplied pump Inject 1 Units under the skin into the appropriate area as directed Continuous.      linaclotide (LINZESS) 290 MCG capsule capsule Take 1 capsule by mouth Every Morning Before Breakfast.      miSOPROStol (CYTOTEC) 200 MCG tablet Take 1 tablet by mouth 4 (Four) Times a Day.      omeprazole (priLOSEC) 40 MG capsule Take 1 capsule by mouth Every Other Day.      promethazine (PHENERGAN) 25 MG tablet Take 1 tablet by mouth Every 6 (Six) Hours As Needed for Nausea or Vomiting.      rOPINIRole (REQUIP) 1 MG tablet Take 1 tablet by mouth 3 (Three) Times a Day.       sodium bicarbonate 650 MG tablet Take 1 tablet by mouth 2 (Two) Times a Day.      sucralfate (CARAFATE) 1 g tablet Take 1 tablet by mouth 4 (Four) Times a Day.         Past Medical History:  Past Medical History:   Diagnosis Date    Arthritis     Contusion     Degenerative disc disease, cervical     Diabetes mellitus     Elevated cholesterol     Fibromyalgia     Neuropathy     Osteoporosis     Pancreatitis     Raynaud disease     Renal insufficiency     Smoker 02/08/2022    Vitamin D deficiency 04/26/2022       Past Surgical History:  Past Surgical History:   Procedure Laterality Date    APPENDECTOMY      CHOLECYSTECTOMY      COLONOSCOPY      ENDOSCOPY N/A 10/08/2019    Procedure: ESOPHAGOGASTRODUODENOSCOPY WITH ANESTHESIA;  Surgeon: Aleks Vaughn DO;  Location: [facility] ENDOSCOPY;  Service: Gastroenterology    ENDOSCOPY N/A 11/12/2024    Procedure: ESOPHAGOGASTRODUODENOSCOPY WITH ANESTHESIA;  Surgeon: Tiffanie Saucedo MD;  Location: [facility] ENDOSCOPY;  Service: Gastroenterology;  Laterality: N/A;  pre op: Gastroparesis, Intractable nausea  post op: insertion of dobhoff   PCP: Brandon Murguia    ENDOSCOPY N/A 11/15/2024    Procedure: ESOPHAGOGASTRODUODENOSCOPY WITH ANESTHESIA WITH NG TUBE INSERTION;  Surgeon: Tiffanie Saucedo MD;  Location: [facility] ENDOSCOPY;  Service: Gastroenterology;  Laterality: N/A;  PRE OP: DOBHOFF  POST OP: DOBHOFF  PCP: TELLY PACK    ENDOSCOPY WITH GASTROSTOMY TUBE INSERTION N/A 6/15/2022    Procedure: ESOPHAGOGASTRODUODENOSCOPY WITH GASTROSTOMY TUBE INSERTION;  Surgeon: Jersey Lee MD;  Location: [facility] ENDOSCOPY;  Service: Gastroenterology;  Laterality: N/A;  pre peg placement  post peg placement  Dr. Murguia    ENTEROSCOPY SMALL BOWEL N/A 11/3/2022    Procedure: Esophagogastroduodenoscopy with Peg Removal;  Surgeon: Aleks Vaughn DO;  Location: [facility] ENDOSCOPY;  Service: Gastroenterology;  Laterality: N/A;  pre; peg removal  post; peg removal   KIRILL Murguia MD         "HYSTERECTOMY         Family History  Family History   Adopted: Yes   Problem Relation Age of Onset    Colon polyps Neg Hx     Colon cancer Neg Hx        Social History  Social History     Socioeconomic History    Marital status:    Tobacco Use    Smoking status: Every Day     Current packs/day: 0.50     Average packs/day: 0.5 packs/day for 32.2 years (16.1 ttl pk-yrs)     Types: Cigarettes     Start date: 1993     Passive exposure: Current   Vaping Use    Vaping status: Never Used   Substance and Sexual Activity    Alcohol use: No    Drug use: No    Sexual activity: Defer         Review of Systems:  History obtained from chart review and the patient  General ROS: No fever or chills  Respiratory ROS: No cough, shortness of breath, wheezing  Cardiovascular ROS: no chest pain or dyspnea on exertion  Gastrointestinal ROS: Nausea vomiting  Genito-Urinary ROS: No dysuria or hematuria  14 point ROS reviewed with the patient and negative except as noted above and in the HPI unless unable to obtain.    Objective:  /74 (BP Location: Right arm, Patient Position: Sitting)   Pulse 94   Temp 97.5 °F (36.4 °C) (Oral)   Resp 18   Ht 154.9 cm (61\")   Wt 40.6 kg (89 lb 8 oz)   SpO2 100%   BMI 16.91 kg/m²   No intake or output data in the 24 hours ending 03/18/25 1726  General: awake/alert   HEENT: Normocephalic atraumatic head  Neck: Supple without JVD or carotid bruits  Chest:  clear to auscultation bilaterally without respiratory distress  CVS: regular rate and rhythm  Abdominal: soft, nontender, normal bowel sounds  Extremities: no cyanosis or edema  Skin: warm and dry without rash      Labs:    Results from last 7 days   Lab Units 03/18/25  1221   WBC 10*3/mm3 10.73   HEMOGLOBIN g/dL 11.2*   HEMATOCRIT % 35.4   PLATELETS 10*3/mm3 552*       Results from last 7 days   Lab Units 03/18/25  1221   SODIUM mmol/L 138   POTASSIUM mmol/L 3.7   CHLORIDE mmol/L 110*   CO2 mmol/L 15.0*   BUN mg/dL 34*   CREATININE mg/dL " 2.14*   CALCIUM mg/dL 8.6   BILIRUBIN mg/dL <0.2   ALK PHOS U/L 146*   ALT (SGPT) U/L 11   AST (SGOT) U/L 18   GLUCOSE mg/dL 326*   EGFR mL/min/1.73 27.1*         Radiology:   Imaging Results (Last 24 Hours)       Procedure Component Value Units Date/Time    CT Abdomen Pelvis Without Contrast [[id number]] Collected: 03/18/25 1426     Updated: 03/18/25 1441    Narrative:      CT ABDOMEN PELVIS WO CONTRAST- 3/18/2025 12:48 PM     HISTORY: nv, abd pain, elevaed lipase, [place]      COMPARISON: 2/27/2025     DLP: 145.24 mGy.cm . All CT scans are performed using dose optimization  techniques as appropriate to the performed exam and including at least  one of the following: Automated exposure control, adjustment of the mA  and/or kV according to size, and the use of the iterative reconstruction  technique.     TECHNIQUE: Noncontrast enhanced images of the abdomen and pelvis  obtained without oral contrast.     FINDINGS:  The lung bases and base of the heart are unremarkable.     LIVER: No focal liver lesion.     BILIARY SYSTEM: The gallbladder is surgically absent. No biliary  dilatation present..     PANCREAS: There is some subtle stranding within the anterior pararenal  space associated with the pancreatic head and proximal body of the  pancreas suggesting pancreatitis. Correlation is recommended with the  patient's amylase and lipase levels..     SPLEEN: Unremarkable.     KIDNEYS AND ADRENALS: Both adrenal glands are enlarged but remain  adreniform in shape. I would favor bilateral adrenal adenomas. No  evidence of renal mass or perinephric fluid collection. No  nephrolithiasis..  The ureters are decompressed and normal in  appearance.     RETROPERITONEUM: No mass, lymphadenopathy or hemorrhage.     GI TRACT: There is mild colonic distention of the right and transverse  colon with scattered air-fluid levels suggesting there may be an ongoing  diarrheal illness. No mechanical obstruction. The stomach is food  filled. A  "gastric stimulator projects over the upper abdomen.. Surgical  clips at the base of the cecum suggest prior appendectomy..     OTHER: There is no mesenteric mass, lymphadenopathy or fluid collection.  The osseous structures and soft tissues demonstrate no worrisome  lesions. There is a small defect along the left anterolateral abdominal  wall likely related to prior gastrostomy tube placement.     PELVIS: Previous hysterectomy. No adnexal mass or free fluid.. The  urinary bladder is normal in appearance.       Impression:      1. The right and transverse colon is mildly distended with liquefied  stool and air-fluid levels suggesting an ongoing diarrheal illness. No  mechanical obstruction. The stomach is food filled. A gastric stimulator  projects over the upper abdomen. Evidence of previous gastrostomy tube.  2. Radiographic findings suggesting acute pancreatitis with inflammatory  stranding in the anterior pararenal space adjacent to the pancreatic  head and body of the pancreas. No fluid collection present. No ductal  dilatation.  3. Previous hysterectomy. No adnexal mass or free fluid. The urinary  bladder is normal in appearance.  4. Suspected bilateral adrenal adenomas with both adrenal glands  enlarged but remaining adreniform in shape.           This report was signed and finalized on 3/18/2025 2:38 PM by Dr. Alexis Ray MD.               Culture:  No components found for: \"WOUNDCUL\", \"3\"  No components found for: \"CSFCUL\", \"3\"  No components found for: \"BC\", \"3\"  No components found for: \"URINECUL\", \"3\"      Assessment   1.  Acute kidney injury/intravascular volume depletion.  2.  Stage IIIb CKD baseline.  3.  Type 2 diabetes with nephropathy.  4.  Gastroparesis, status post stomach pacemaker  5.  Acute pancreatitis.  6.  Metabolic acidemia.  7.  Recurrent volume depletion    Plan:  1.  IV fluid with sodium bicarb.  2.  Urinary electrolytes.  3.  Monitor renal function closely      Thank you for the " consult, we appreciate the opportunity to provide care to your patients.  Feel free to contact me if I can be of any further assistance.      Janak Harvey MD  3/18/2025  17:26 CDT

## 2025-03-18 NOTE — H&P
[facility] Medicine Services  HISTORY AND PHYSICAL    Date of Admission: 3/18/2025  Primary Care Physician: KIRILL Murguia MD    Subjective   Primary Historian: Patient    Chief Complaint: Abdominal pain    History of Present Illness  Tasha Perez is a 54-year-old female with a past medical history of arthritis, degenerative disc disease, diabetes mellitus type 2 on long-term insulin, hypercholesteremia, fibromyalgia, neuropathy, Raynaud's disease, CKD stage IIIb, 1 PPD smoker, and nonalcoholic pancreatitis x 5.  Patient presented to [facility] emergency department 3/18/2025 after the encouragement of Dr. Barrow her nephrologist as her kidney function had declined enough that he would like her treated with IV hydration in the hospital.  Upon admission patient stated that she has been having increasing abdominal pain over the last several days.  With associated nausea and no vomiting.  No complaints of diarrhea.  Additional complaint patient has is that she is continuing to significantly lose more weight does not feel that her gastric stimulator is helping at all.  CT of the abdomen was obtained which revealed acute pancreatitis with inflammatory stranding in the anterior pararenal space adjacent to the pancreatic head and body of the pancreas.  No fluid collection present no ductal dilation or abscess.  Patient has no additional complaints of shortness of breath, chest pain or palpitations.  Patient was made aware she will be admitted for management of pancreatitis and fluid hydration for SUGAR superimposed on CKD.  Patient's questions were answered to the best of my ability and she is in agreement for admission and treatment.      Review of Systems   Constitutional:  Positive for fatigue.   Gastrointestinal:  Positive for abdominal pain, nausea and vomiting.   Neurological:  Positive for weakness.      Otherwise complete ROS reviewed and negative except as mentioned in the  HPI.    Past Medical History:   Past Medical History:   Diagnosis Date    Arthritis     Contusion     Degenerative disc disease, cervical     Diabetes mellitus     Elevated cholesterol     Fibromyalgia     Neuropathy     Osteoporosis     Pancreatitis     Raynaud disease     Renal insufficiency     Smoker 02/08/2022    Vitamin D deficiency 04/26/2022     Past Surgical History:  Past Surgical History:   Procedure Laterality Date    APPENDECTOMY      CHOLECYSTECTOMY      COLONOSCOPY      ENDOSCOPY N/A 10/08/2019    Procedure: ESOPHAGOGASTRODUODENOSCOPY WITH ANESTHESIA;  Surgeon: Aleks Vaughn DO;  Location: [facility] ENDOSCOPY;  Service: Gastroenterology    ENDOSCOPY N/A 11/12/2024    Procedure: ESOPHAGOGASTRODUODENOSCOPY WITH ANESTHESIA;  Surgeon: Tiffanie Saucedo MD;  Location: [facility] ENDOSCOPY;  Service: Gastroenterology;  Laterality: N/A;  pre op: Gastroparesis, Intractable nausea  post op: insertion of dobhoff   PCP: Brandon Murguia    ENDOSCOPY N/A 11/15/2024    Procedure: ESOPHAGOGASTRODUODENOSCOPY WITH ANESTHESIA WITH NG TUBE INSERTION;  Surgeon: Tiffanie Saucedo MD;  Location: [facility] ENDOSCOPY;  Service: Gastroenterology;  Laterality: N/A;  PRE OP: DOBHOFF  POST OP: DOBHOFF  PCP: TELLY PACK    ENDOSCOPY WITH GASTROSTOMY TUBE INSERTION N/A 6/15/2022    Procedure: ESOPHAGOGASTRODUODENOSCOPY WITH GASTROSTOMY TUBE INSERTION;  Surgeon: Jersey Lee MD;  Location: [facility] ENDOSCOPY;  Service: Gastroenterology;  Laterality: N/A;  pre peg placement  post peg placement  Dr. Murguia    ENTEROSCOPY SMALL BOWEL N/A 11/3/2022    Procedure: Esophagogastroduodenoscopy with Peg Removal;  Surgeon: Aleks Vaughn DO;  Location: [facility] ENDOSCOPY;  Service: Gastroenterology;  Laterality: N/A;  pre; peg removal  post; peg removal   KIRILL Murguia MD        HYSTERECTOMY       Social History:  reports that she has been smoking cigarettes. She started smoking about 32 years ago. She has a 16.1 pack-year smoking history.  "She has been exposed to tobacco smoke. She does not have any smokeless tobacco history on file. She reports that she does not drink alcohol and does not use drugs.    Family History: family history is not on file. She was adopted.       Allergies:  Allergies   Allergen Reactions    Bee Venom Anaphylaxis    Hydrocodone Anaphylaxis    Hydroxychloroquine Other (See Comments)     Hair loss    Ibuprofen Other (See Comments)     \"SHUTS MY KIDNEYS DOWN\" PER PATIENT     Pineapple Anaphylaxis    Pineapple Extract Anaphylaxis    Wasp Venom Anaphylaxis    Wasp Venom Protein Anaphylaxis    Hydrocodone-Acetaminophen Dizziness, Nausea And Vomiting, Nausea Only and Unknown - Low Severity    Penicillins Nausea And Vomiting    Sitagliptin Other (See Comments)     Other reaction(s): Abdominal Pain      Metformin Other (See Comments)     Pt states it messes with her kidneys    Chocolate Rash     Dark chocolate only, told to RD 7/10/24    Poison Shelly Extract Itching and Rash       Medications:  Prior to Admission medications    Medication Sig Start Date End Date Taking? Authorizing Provider   aspirin 81 MG EC tablet Take 1 tablet by mouth Daily.    Kevin Mejia MD   atorvastatin (LIPITOR) 80 MG tablet Take 1 tablet by mouth Every Night.    Kevin Mejia MD   Calcium Carbonate-Vitamin D 600-5 MG-MCG tablet Take 1 tablet by mouth Daily.    Kevin Mejia MD   cetirizine (zyrTEC) 10 MG tablet Take 1 tablet by mouth Daily As Needed for Allergies.    Kevin Mejia MD   Cholecalciferol (Vitamin D3) 50 MCG (2000 UT) capsule Take 1 capsule by mouth Daily.    Kevin Mejia MD   dapagliflozin Propanediol (Farxiga) 10 MG tablet Take 10 mg by mouth Daily.    Kevin Mejia MD   hydrocortisone (ANUSOL-HC) 25 MG suppository Insert 1 suppository into the rectum 2 (Two) Times a Day. 12/14/24   Otto Del Rosario MD   hydroxychloroquine (PLAQUENIL) 200 MG tablet Take 2 tablets by mouth Daily.    " "Kevin Mejia MD   insulin aspart (NovoLOG) 100 UNIT/ML patient supplied pump Inject 1 Units under the skin into the appropriate area as directed Continuous.    Kevin Mejia MD   linaclotide (LINZESS) 290 MCG capsule capsule Take 1 capsule by mouth Every Morning Before Breakfast.    Kevin Mejia MD   miSOPROStol (CYTOTEC) 200 MCG tablet Take 1 tablet by mouth 4 (Four) Times a Day.    Kevin Mejia MD   omeprazole (priLOSEC) 40 MG capsule Take 1 capsule by mouth Every Other Day.    Kevin Mejia MD   promethazine (PHENERGAN) 25 MG tablet Take 1 tablet by mouth Every 6 (Six) Hours As Needed for Nausea or Vomiting.    Kevin Mejia MD   rOPINIRole (REQUIP) 1 MG tablet Take 1 tablet by mouth 3 (Three) Times a Day.    Kevin Mejia MD   sodium bicarbonate 650 MG tablet Take 1 tablet by mouth 2 (Two) Times a Day. 9/23/24   Kevin Mejia MD   sucralfate (CARAFATE) 1 g tablet Take 1 tablet by mouth 4 (Four) Times a Day.    Kevin Mejia MD     I have utilized all available immediate resources to obtain, update, or review the patient's current medications (including all prescriptions, over-the-counter products, herbals, cannabis/cannabidiol products, and vitamin/mineral/dietary (nutritional) supplements).    Objective     Vital Signs: /74 (BP Location: Right arm, Patient Position: Sitting)   Pulse 94   Temp 97.5 °F (36.4 °C) (Oral)   Resp 18   Ht 154.9 cm (61\")   Wt 40.6 kg (89 lb 8 oz)   SpO2 100%   BMI 16.91 kg/m²   Physical Exam  Vitals and nursing note reviewed.   Constitutional:       Appearance: Normal appearance. She is cachectic. She is not ill-appearing or toxic-appearing.      Comments: Room air   HENT:      Right Ear: Tympanic membrane normal.      Left Ear: Tympanic membrane normal.      Nose: Nose normal.      Mouth/Throat:      Mouth: Mucous membranes are moist.      Pharynx: Oropharynx is clear.   Eyes:      Pupils: Pupils " are equal, round, and reactive to light.   Cardiovascular:      Rate and Rhythm: Normal rate and regular rhythm.      Pulses: Normal pulses.      Heart sounds: Normal heart sounds.   Pulmonary:      Effort: Pulmonary effort is normal.      Breath sounds: Normal breath sounds.   Abdominal:      General: Abdomen is flat. Bowel sounds are normal. There is no distension.      Palpations: Abdomen is soft.      Tenderness: There is abdominal tenderness.   Musculoskeletal:      Cervical back: Normal range of motion.      Right lower leg: No edema.      Left lower leg: No edema.   Skin:     General: Skin is warm and dry.      Capillary Refill: Capillary refill takes less than 2 seconds.   Neurological:      General: No focal deficit present.      Mental Status: She is alert and oriented to person, place, and time.   Psychiatric:         Mood and Affect: Mood normal.         Behavior: Behavior normal.         Thought Content: Thought content normal.         Judgment: Judgment normal.        Results Reviewed:  Lab Results (last 24 hours)       Procedure Component Value Units Date/Time    Respiratory Panel PCR w/COVID-19(SARS-CoV-2) DYLAN/ROSALBA/KHOA/PAD/COR/LISA In-House, NP Swab in UTM/VTM, 2 HR TAT - Swab, Nasopharynx [[id number]]  (Normal) Collected: 03/18/25 1222    Specimen: Swab from Nasopharynx Updated: 03/18/25 1416     ADENOVIRUS, PCR Not Detected     Coronavirus 229E Not Detected     Coronavirus HKU1 Not Detected     Coronavirus NL63 Not Detected     Coronavirus OC43 Not Detected     COVID19 Not Detected     Human Metapneumovirus Not Detected     Human Rhinovirus/Enterovirus Not Detected     Influenza A PCR Not Detected     Influenza B PCR Not Detected     Parainfluenza Virus 1 Not Detected     Parainfluenza Virus 2 Not Detected     Parainfluenza Virus 3 Not Detected     Parainfluenza Virus 4 Not Detected     RSV, PCR Not Detected     Bordetella pertussis pcr Not Detected     Bordetella parapertussis PCR Not Detected      Chlamydophila pneumoniae PCR Not Detected     Mycoplasma pneumo by PCR Not Detected            Urinalysis With Culture If Indicated - Urine, Clean Catch [[id number]]  (Abnormal) Collected: 03/18/25 1335    Specimen: Urine, Clean Catch Updated: 03/18/25 1356     Color, UA Yellow     Appearance, UA Clear     pH, UA 5.5     Specific Gravity, UA 1.018     Glucose, UA Negative     Ketones, UA Trace     Bilirubin, UA Negative     Blood, UA Negative     Protein, UA 30 mg/dL (1+)     Leuk Esterase, UA Negative     Nitrite, UA Negative     Urobilinogen, UA 0.2 E.U./dL            Urinalysis, Microscopic Only - Urine, Clean Catch [[id number]] Collected: 03/18/25 1335    Specimen: Urine, Clean Catch Updated: 03/18/25 1356     RBC, UA None Seen /HPF      WBC, UA 0-2 /HPF      Comment: Urine culture not indicated.        Bacteria, UA None Seen /HPF      Squamous Epithelial Cells, UA 0-2 /HPF      Hyaline Casts, UA 0-2 /LPF      Granular Casts, UA 0-2 /LPF      Methodology Manual Light Microscopy    Lipase [[id number]]  (Abnormal) Collected: 03/18/25 1221    Specimen: Blood Updated: 03/18/25 1310     Lipase 503 U/L      Comment: Specimen hemolyzed.  Results may be falsely decreased.       Comprehensive Metabolic Panel [[id number]]  (Abnormal) Collected: 03/18/25 1221    Specimen: Blood Updated: 03/18/25 1256     Glucose 326 mg/dL      BUN 34 mg/dL      Creatinine 2.14 mg/dL      Sodium 138 mmol/L      Potassium 3.7 mmol/L      Comment: Specimen hemolyzed.  Result may be falsely elevated.        Chloride 110 mmol/L      CO2 15.0 mmol/L      Calcium 8.6 mg/dL      Total Protein 7.0 g/dL      Albumin 3.5 g/dL      ALT (SGPT) 11 U/L      Comment: Specimen hemolyzed.  Result may  be falsely elevated.        AST (SGOT) 18 U/L      Comment: Specimen hemolyzed.  Result may be falsely elevated.        Alkaline Phosphatase 146 U/L      Total Bilirubin <0.2 mg/dL      Globulin 3.5 gm/dL      A/G Ratio 1.0 g/dL      BUN/Creatinine Ratio 15.9      Anion Gap 13.0 mmol/L      eGFR 27.1 mL/min/1.73             Magnesium [[id number]]  (Normal) Collected: 03/18/25 1221    Specimen: Blood Updated: 03/18/25 1256     Magnesium 1.6 mg/dL     Phosphorus [[id number]]  (Normal) Collected: 03/18/25 1221    Specimen: Blood Updated: 03/18/25 1250     Phosphorus 3.8 mg/dL     CBC & Differential [[id number]]  (Abnormal) Collected: 03/18/25 1221    Specimen: Blood Updated: 03/18/25 1236            CBC Auto Differential [[id number]]  (Abnormal) Collected: 03/18/25 1221    Specimen: Blood Updated: 03/18/25 1236     WBC 10.73 10*3/mm3      RBC 3.87 10*6/mm3      Hemoglobin 11.2 g/dL      Hematocrit 35.4 %      MCV 91.5 fL      MCH 28.9 pg      MCHC 31.6 g/dL      RDW 16.1 %      RDW-SD 53.5 fl      MPV 9.9 fL      Platelets 552 10*3/mm3      Neutrophil % 64.7 %      Lymphocyte % 26.7 %      Monocyte % 3.5 %      Eosinophil % 2.8 %      Basophil % 0.3 %      Immature Grans % 2.0 %      Neutrophils, Absolute 6.95 10*3/mm3      Lymphocytes, Absolute 2.86 10*3/mm3      Monocytes, Absolute 0.38 10*3/mm3      Eosinophils, Absolute 0.30 10*3/mm3      Basophils, Absolute 0.03 10*3/mm3      Immature Grans, Absolute 0.21 10*3/mm3      nRBC 0.0 /100 WBC           Imaging Results (Last 24 Hours)       Procedure Component Value Units Date/Time    CT Abdomen Pelvis Without Contrast [[id number]] Collected: 03/18/25 1426     Updated: 03/18/25 1441    Narrative:      CT ABDOMEN PELVIS WO CONTRAST- 3/18/2025 12:48 PM     HISTORY: nv, abd pain, elevaed lipase, [place]      COMPARISON: 2/27/2025     DLP: 145.24 mGy.cm . All CT scans are performed using dose optimization  techniques as appropriate to the performed exam and including at least  one of the following: Automated exposure control, adjustment of the mA  and/or kV according to size, and the use of the iterative reconstruction  technique.     TECHNIQUE: Noncontrast enhanced images of the abdomen and pelvis  obtained without oral contrast.      FINDINGS:  The lung bases and base of the heart are unremarkable.     LIVER: No focal liver lesion.     BILIARY SYSTEM: The gallbladder is surgically absent. No biliary  dilatation present..     PANCREAS: There is some subtle stranding within the anterior pararenal  space associated with the pancreatic head and proximal body of the  pancreas suggesting pancreatitis. Correlation is recommended with the  patient's amylase and lipase levels..     SPLEEN: Unremarkable.     KIDNEYS AND ADRENALS: Both adrenal glands are enlarged but remain  adreniform in shape. I would favor bilateral adrenal adenomas. No  evidence of renal mass or perinephric fluid collection. No  nephrolithiasis..  The ureters are decompressed and normal in  appearance.     RETROPERITONEUM: No mass, lymphadenopathy or hemorrhage.     GI TRACT: There is mild colonic distention of the right and transverse  colon with scattered air-fluid levels suggesting there may be an ongoing  diarrheal illness. No mechanical obstruction. The stomach is food  filled. A gastric stimulator projects over the upper abdomen.. Surgical  clips at the base of the cecum suggest prior appendectomy..     OTHER: There is no mesenteric mass, lymphadenopathy or fluid collection.  The osseous structures and soft tissues demonstrate no worrisome  lesions. There is a small defect along the left anterolateral abdominal  wall likely related to prior gastrostomy tube placement.     PELVIS: Previous hysterectomy. No adnexal mass or free fluid.. The  urinary bladder is normal in appearance.       Impression:      1. The right and transverse colon is mildly distended with liquefied  stool and air-fluid levels suggesting an ongoing diarrheal illness. No  mechanical obstruction. The stomach is food filled. A gastric stimulator  projects over the upper abdomen. Evidence of previous gastrostomy tube.  2. Radiographic findings suggesting acute pancreatitis with inflammatory  stranding in the  anterior pararenal space adjacent to the pancreatic  head and body of the pancreas. No fluid collection present. No ductal  dilatation.  3. Previous hysterectomy. No adnexal mass or free fluid. The urinary  bladder is normal in appearance.  4. Suspected bilateral adrenal adenomas with both adrenal glands  enlarged but remaining adreniform in shape.           This report was signed and finalized on 3/18/2025 2:38 PM by Dr. Alexis Ray MD.               Assessment / Plan   Assessment:   Active Hospital Problems    Diagnosis     **Acute pancreatitis     Acute kidney injury superimposed on stage 3b chronic kidney disease     Type 2 diabetes mellitus, with long-term current use of insulin     Severe malnutrition        Treatment Plan  The patient will be admitted to Dr. Clarke service here at [facility].     1.  Acute pancreatitis-aggressive fluid hydration with normal saline at 125 as patient has allergy to LR.  Morphine 2 mg every 4 for severe pain.  N.p.o. with ice chips, daily lipase and CMP.  Will continue to follow closely.  Magnesium of 1.6 replaced with 2G IV    2.  SUGAR on CKD-normal saline at 125 mL/HR per discussion with Dr. Harvey.  Will continue to follow closely with daily CMP, holding all nephrotoxic agents.    3.  Type 2 diabetes mellitus-A1c pending, Accu-Cheks before meals and at bedtime with moderate sliding scale insulin.    4.  Severe malnutrition-dietitian consultation to assist will add limits when patient is able to tolerate p.o.    5.  Reviewed and restarted home medications as appropriate.    VTE prophylaxis with SCDs  Labs in a.m.    Medical Decision Making  1 acute, high complexity, unchanged  1 acute on chronic, high complexity, unchanged  1 chronic, moderate complexity, unchanged    Number and Complexity of problems: 3  Differential Diagnosis: None  Conditions and Status        Condition is unchanged.     [facility] Data  External documents reviewed: Epic review of previous  hospitalizations and most recent pancreatitis admission  Cardiac tracing (EKG, telemetry) interpretation: 3/18/2025 EKG per cardiology reviewed  Radiology interpretation:/18/2025 CT of the abdomen pelvis per radiology reviewed  Labs reviewed:3/18/2025 CMP, lipase, CBC with differential, urinalysis, respiratory PCR viewed, repeat labs in a.m.   any tests that were considered but not ordered: None     Decision rules/scores evaluated (example [id number], Wells, etc): [place]'s Criteria for Pancreatitis Mortality - MDCalc  Calculated on Mar 18 2025 6:10 PM  1 points -> [place]'s Criteria (On Admission) Severe pancreatitis unlikely.  1 points -> [place]'s Criteria (Cumulative) 1% predicted mortality.     Discussed with: Dr. Clarke, Dr. Harvey, nephrology and patient     Care Planning  Shared decision making: Dr. Clarke, Dr. Harvey, nephrology and patient   code status and discussions: Full code per patient    Disposition  Social Determinants of Health that impact treatment or disposition: None determined at this time  Estimated length of stay is 1-2 days.     I confirmed that the patient's advanced care plan is present, code status is documented, and a surrogate decision maker is listed in the patient's medical record.     The patient's surrogate decision maker is her  Vincent.     The patient was seen and examined by me on 3/18/2025 at 1553.    Electronically signed by RAKESH Vieyra, 03/18/25, 17:35 CDT.

## 2025-03-19 LAB
ALBUMIN SERPL-MCNC: 2.7 G/DL (ref 3.5–5.2)
ALBUMIN UR-MCNC: <1.2 MG/DL
ALBUMIN/GLOB SERPL: 1 G/DL
ALP SERPL-CCNC: 110 U/L (ref 39–117)
ALT SERPL W P-5'-P-CCNC: 8 U/L (ref 1–33)
ANION GAP SERPL CALCULATED.3IONS-SCNC: 12 MMOL/L (ref 5–15)
AST SERPL-CCNC: 16 U/L (ref 1–32)
BASOPHILS # BLD AUTO: 0.03 10*3/MM3 (ref 0–0.2)
BASOPHILS NFR BLD AUTO: 0.3 % (ref 0–1.5)
BILIRUB SERPL-MCNC: <0.2 MG/DL (ref 0–1.2)
BUN SERPL-MCNC: 31 MG/DL (ref 6–20)
BUN/CREAT SERPL: 17.4 (ref 7–25)
CALCIUM SPEC-SCNC: 7.9 MG/DL (ref 8.6–10.5)
CHLORIDE SERPL-SCNC: 113 MMOL/L (ref 98–107)
CO2 SERPL-SCNC: 16 MMOL/L (ref 22–29)
CREAT SERPL-MCNC: 1.78 MG/DL (ref 0.57–1)
CREAT UR-MCNC: 62.8 MG/DL
DEPRECATED RDW RBC AUTO: 50.9 FL (ref 37–54)
EGFRCR SERPLBLD CKD-EPI 2021: 33.8 ML/MIN/1.73
EOSINOPHIL # BLD AUTO: 0.36 10*3/MM3 (ref 0–0.4)
EOSINOPHIL NFR BLD AUTO: 3.2 % (ref 0.3–6.2)
ERYTHROCYTE [DISTWIDTH] IN BLOOD BY AUTOMATED COUNT: 15.8 % (ref 12.3–15.4)
GLOBULIN UR ELPH-MCNC: 2.6 GM/DL
GLUCOSE BLDC GLUCOMTR-MCNC: 108 MG/DL (ref 70–130)
GLUCOSE BLDC GLUCOMTR-MCNC: 109 MG/DL (ref 70–130)
GLUCOSE BLDC GLUCOMTR-MCNC: 132 MG/DL (ref 70–130)
GLUCOSE BLDC GLUCOMTR-MCNC: 142 MG/DL (ref 70–130)
GLUCOSE SERPL-MCNC: 113 MG/DL (ref 65–99)
HBA1C MFR BLD: 9.2 % (ref 4.8–5.6)
HCT VFR BLD AUTO: 28.4 % (ref 34–46.6)
HGB BLD-MCNC: 9.2 G/DL (ref 12–15.9)
IMM GRANULOCYTES # BLD AUTO: 0.15 10*3/MM3 (ref 0–0.05)
IMM GRANULOCYTES NFR BLD AUTO: 1.3 % (ref 0–0.5)
LIPASE SERPL-CCNC: 219 U/L (ref 13–60)
LYMPHOCYTES # BLD AUTO: 3.91 10*3/MM3 (ref 0.7–3.1)
LYMPHOCYTES NFR BLD AUTO: 34.3 % (ref 19.6–45.3)
MAGNESIUM SERPL-MCNC: 1.9 MG/DL (ref 1.6–2.6)
MCH RBC QN AUTO: 28.8 PG (ref 26.6–33)
MCHC RBC AUTO-ENTMCNC: 32.4 G/DL (ref 31.5–35.7)
MCV RBC AUTO: 89 FL (ref 79–97)
MICROALBUMIN/CREAT UR: NORMAL MG/G{CREAT}
MONOCYTES # BLD AUTO: 0.6 10*3/MM3 (ref 0.1–0.9)
MONOCYTES NFR BLD AUTO: 5.3 % (ref 5–12)
NEUTROPHILS NFR BLD AUTO: 55.6 % (ref 42.7–76)
NEUTROPHILS NFR BLD AUTO: 6.34 10*3/MM3 (ref 1.7–7)
NRBC BLD AUTO-RTO: 0 /100 WBC (ref 0–0.2)
PLATELET # BLD AUTO: 485 10*3/MM3 (ref 140–450)
PMV BLD AUTO: 10.1 FL (ref 6–12)
POTASSIUM SERPL-SCNC: 2.5 MMOL/L (ref 3.5–5.2)
POTASSIUM SERPL-SCNC: 2.8 MMOL/L (ref 3.5–5.2)
PROT SERPL-MCNC: 5.3 G/DL (ref 6–8.5)
RBC # BLD AUTO: 3.19 10*6/MM3 (ref 3.77–5.28)
SODIUM SERPL-SCNC: 141 MMOL/L (ref 136–145)
TSH SERPL DL<=0.05 MIU/L-ACNC: 0.39 UIU/ML (ref 0.27–4.2)
WBC NRBC COR # BLD AUTO: 11.39 10*3/MM3 (ref 3.4–10.8)

## 2025-03-19 PROCEDURE — G0378 HOSPITAL OBSERVATION PER HR: HCPCS

## 2025-03-19 PROCEDURE — 96366 THER/PROPH/DIAG IV INF ADDON: CPT

## 2025-03-19 PROCEDURE — 96376 TX/PRO/DX INJ SAME DRUG ADON: CPT

## 2025-03-19 PROCEDURE — 82948 REAGENT STRIP/BLOOD GLUCOSE: CPT

## 2025-03-19 PROCEDURE — 25010000002 POTASSIUM CHLORIDE 10 MEQ/100ML SOLUTION

## 2025-03-19 PROCEDURE — 96368 THER/DIAG CONCURRENT INF: CPT

## 2025-03-19 PROCEDURE — 83036 HEMOGLOBIN GLYCOSYLATED A1C: CPT

## 2025-03-19 PROCEDURE — 84443 ASSAY THYROID STIM HORMONE: CPT

## 2025-03-19 PROCEDURE — 25010000002 LIDOCAINE 1 % SOLUTION: Performed by: STUDENT IN AN ORGANIZED HEALTH CARE EDUCATION/TRAINING PROGRAM

## 2025-03-19 PROCEDURE — C1751 CATH, INF, PER/CENT/MIDLINE: HCPCS

## 2025-03-19 PROCEDURE — 84132 ASSAY OF SERUM POTASSIUM: CPT | Performed by: NURSE PRACTITIONER

## 2025-03-19 PROCEDURE — 83735 ASSAY OF MAGNESIUM: CPT

## 2025-03-19 PROCEDURE — 85025 COMPLETE CBC W/AUTO DIFF WBC: CPT | Performed by: INTERNAL MEDICINE

## 2025-03-19 PROCEDURE — 83690 ASSAY OF LIPASE: CPT

## 2025-03-19 PROCEDURE — 36415 COLL VENOUS BLD VENIPUNCTURE: CPT | Performed by: INTERNAL MEDICINE

## 2025-03-19 PROCEDURE — 80053 COMPREHEN METABOLIC PANEL: CPT | Performed by: INTERNAL MEDICINE

## 2025-03-19 PROCEDURE — 25010000002 POTASSIUM CHLORIDE 10 MEQ/100ML SOLUTION: Performed by: NURSE PRACTITIONER

## 2025-03-19 RX ORDER — ROPINIROLE 1 MG/1
1 TABLET, FILM COATED ORAL ONCE AS NEEDED
Status: COMPLETED | OUTPATIENT
Start: 2025-03-19 | End: 2025-03-20

## 2025-03-19 RX ORDER — LIDOCAINE HYDROCHLORIDE 10 MG/ML
1 INJECTION, SOLUTION INFILTRATION; PERINEURAL ONCE
Status: COMPLETED | OUTPATIENT
Start: 2025-03-19 | End: 2025-03-19

## 2025-03-19 RX ORDER — SODIUM CHLORIDE 0.9 % (FLUSH) 0.9 %
10 SYRINGE (ML) INJECTION EVERY 12 HOURS SCHEDULED
Status: DISCONTINUED | OUTPATIENT
Start: 2025-03-19 | End: 2025-03-21 | Stop reason: HOSPADM

## 2025-03-19 RX ORDER — SODIUM CHLORIDE 0.9 % (FLUSH) 0.9 %
20 SYRINGE (ML) INJECTION AS NEEDED
Status: DISCONTINUED | OUTPATIENT
Start: 2025-03-19 | End: 2025-03-21 | Stop reason: HOSPADM

## 2025-03-19 RX ORDER — NORTRIPTYLINE HYDROCHLORIDE 25 MG/1
25 CAPSULE ORAL NIGHTLY
Status: ON HOLD | COMMUNITY

## 2025-03-19 RX ORDER — DULOXETIN HYDROCHLORIDE 30 MG/1
60 CAPSULE, DELAYED RELEASE ORAL DAILY
Status: DISCONTINUED | OUTPATIENT
Start: 2025-03-20 | End: 2025-03-21 | Stop reason: HOSPADM

## 2025-03-19 RX ORDER — SODIUM CHLORIDE 0.9 % (FLUSH) 0.9 %
10 SYRINGE (ML) INJECTION AS NEEDED
Status: DISCONTINUED | OUTPATIENT
Start: 2025-03-19 | End: 2025-03-21 | Stop reason: HOSPADM

## 2025-03-19 RX ORDER — ALBUTEROL SULFATE 90 UG/1
2 INHALANT RESPIRATORY (INHALATION) 3 TIMES DAILY PRN
Status: ON HOLD | COMMUNITY

## 2025-03-19 RX ORDER — DULOXETIN HYDROCHLORIDE 60 MG/1
60 CAPSULE, DELAYED RELEASE ORAL DAILY
Status: ON HOLD | COMMUNITY

## 2025-03-19 RX ORDER — POTASSIUM CHLORIDE 7.45 MG/ML
10 INJECTION INTRAVENOUS
Status: COMPLETED | OUTPATIENT
Start: 2025-03-19 | End: 2025-03-19

## 2025-03-19 RX ORDER — NORTRIPTYLINE HYDROCHLORIDE 25 MG/1
25 CAPSULE ORAL NIGHTLY
Status: DISCONTINUED | OUTPATIENT
Start: 2025-03-19 | End: 2025-03-21 | Stop reason: HOSPADM

## 2025-03-19 RX ORDER — POTASSIUM CHLORIDE 7.45 MG/ML
10 INJECTION INTRAVENOUS ONCE
Status: COMPLETED | OUTPATIENT
Start: 2025-03-19 | End: 2025-03-19

## 2025-03-19 RX ORDER — SODIUM CHLORIDE 9 MG/ML
40 INJECTION, SOLUTION INTRAVENOUS AS NEEDED
Status: DISCONTINUED | OUTPATIENT
Start: 2025-03-19 | End: 2025-03-21 | Stop reason: HOSPADM

## 2025-03-19 RX ADMIN — Medication 10 ML: at 21:58

## 2025-03-19 RX ADMIN — PANTOPRAZOLE SODIUM 40 MG: 40 INJECTION, POWDER, FOR SOLUTION INTRAVENOUS at 10:06

## 2025-03-19 RX ADMIN — NORTRIPTYLINE HYDROCHLORIDE 25 MG: 25 CAPSULE ORAL at 21:58

## 2025-03-19 RX ADMIN — POTASSIUM CHLORIDE 10 MEQ: 7.46 INJECTION, SOLUTION INTRAVENOUS at 14:08

## 2025-03-19 RX ADMIN — POTASSIUM CHLORIDE 10 MEQ: 7.46 INJECTION, SOLUTION INTRAVENOUS at 06:54

## 2025-03-19 RX ADMIN — SODIUM BICARBONATE: 84 INJECTION, SOLUTION INTRAVENOUS at 18:29

## 2025-03-19 RX ADMIN — POTASSIUM CHLORIDE 10 MEQ: 7.46 INJECTION, SOLUTION INTRAVENOUS at 12:35

## 2025-03-19 RX ADMIN — LIDOCAINE HYDROCHLORIDE 1 ML: 10 INJECTION, SOLUTION INFILTRATION; PERINEURAL at 16:15

## 2025-03-19 RX ADMIN — PANTOPRAZOLE SODIUM 40 MG: 40 INJECTION, POWDER, FOR SOLUTION INTRAVENOUS at 21:58

## 2025-03-19 RX ADMIN — SODIUM BICARBONATE: 84 INJECTION, SOLUTION INTRAVENOUS at 06:54

## 2025-03-19 RX ADMIN — POTASSIUM CHLORIDE 10 MEQ: 7.46 INJECTION, SOLUTION INTRAVENOUS at 16:46

## 2025-03-19 RX ADMIN — Medication 10 ML: at 10:06

## 2025-03-19 NOTE — CONSULTS
Patient or patient's representative gives informed consent after an explanation of the risks (air embolism; catheter occlusion; phlebitis; catheter infection; bloodstream infection; vein thrombosis; hematoma/bleeding at the insertion site; slight discomfort; accidental puncture of an artery, nerve, or tendon; dislodging of device), benefits (longer dwell time, outpatient treatment, medications that cannot run peripherally), and alternatives (short peripheral catheter, centrally inserted central line, or permanent catheter) of PICC placement. Time provided to ask and answer questions.    Pt had 4 [place] dual lumen PICC placed in left basilic vein. Pt tolerated procedure well. 35 cm of catheter internal and 2 cm external. Catheter to vein ratio 35 %. Tip confirmation by 3CG. All lumens flush and draw well. Sterile dressing applied.

## 2025-03-19 NOTE — CONSULTS
Inpatient Nutrition Services  Malnutrition Severity Assessment  Patient Name:  Tasha Perez  YOB: 1970  MRN: [id number]  Admit Date:  3/18/2025   Reason for Assessment       Row Name 03/19/25 1122          Reason for Assessment    Reason For Assessment identified at risk by screening criteria;nurse/nurse practitioner consult;per organizational policy     Diagnosis gastrointestinal disease;surgery/postoperative complications;renal disease;diabetes diagnosis/complications;endocrine conditions     Identified At Risk by Screening Criteria BMI          Nutrition/Diet History       Row Name 03/19/25 1123          Nutrition/Diet History    Typical Intake (Food/Fluid/EN/PN) Food allergies: chocolate, pineapple. UBW 2 years ago 120lb. Weight ebbs and flows between 85-95lb in the last year. No N/V/D, constipation at this time. BM this morning. Poor appetite prior to admission. Gastric stimulator placed in mid February and a follow-up in person appointment in May. Pt was advised regular diet s/p gastric stimulator. No PICC at this time; does not receive OP hydration. Pt called nephrology office re: outpatient electrolytes 2 weeks ago and is exploring this option while inpatient. Unable to monitor glu at home due to the allergic reaction to the adhesive for the CGM. Pt reports that a new glucose monitoring device is being shipped to her house and is expected to arrive in 2 days. Pt will have education about using the new CGM with home health or company providing the CGM. Acute dx of pancreatitis. NPO and ice chips diet order. No plan for CLD at this time. Previous MSAs remain relevant at this time. Will continue to follow per protocol.     Food Intolerance(s) Pineapple, chocolate     Factors Affecting Nutritional Intake altered gastrointestinal function;restricted diet         Malnutrition Severity Assessment       Row Name 03/19/25 1129          Malnutrition Severity Assessment    Malnutrition Type Chronic  Disease - Related Malnutrition        Insufficient Energy Intake     Insufficient Energy Intake Findings Severe     Insufficient Energy Intake  <75% of est. energy requirement for >7d)        Unintentional Weight Loss     Unintentional Weight Loss Findings None  weight fluctuations noted        Muscle Loss    Loss of Muscle Mass Findings Severe     Latter-day Region Moderate - slight depression     Clavicle Bone Region Severe - protruding prominent bone     Acromion Bone Region Severe - squared shoulders, bones, and acromion process protrusion prominent     Scapular Bone Region Moderate - mild depression, bones may show slightly     Dorsal Hand Region Moderate - slight depression     Patellar Region Severe - prominent bone, square looking, very little muscle definition     Anterior Thigh Region Severe - line/depression along thigh, obviously thin     Posterior Calf Region Severe - thin with very little definition/firmness        Fat Loss    Subcutaneous Fat Loss Findings Moderate     Orbital Region  Moderate -  somewhat hollowness, slightly dark circles     Upper Arm Region Moderate - some fat tissue, not ample     Thoracic & Lumbar Region Moderate - ribs visible with mild depressions, iliac crest somewhat prominent        Criteria Met (Must meet criteria for severity in at least 2 of these categories: M Wasting, Fat Loss, Fluid, Secondary Signs, Wt. Status, Intake)    Patient meets criteria for  Severe Malnutrition          Labs/Tests/Procedures/Meds       Row Name 03/19/25 1127          Labs/Procedures/Meds    Lab Results Reviewed reviewed     Lab Results Comments K+, Cl, BUN, Cr, A1c, TG, cholesterol, lipase, WBC, H/H, urine sodium WNL        Diagnostic Tests/Procedures    Diagnostic Test/Procedure Reviewed reviewed     Diagnostic Test/Procedures Comments gastric pacer 2/10/25        Medications    Pertinent Medications Reviewed reviewed     Pertinent Medications Comments See MAR          Physical Findings       Row  Name 03/19/25 1128          Physical Findings    Overall Physical Appearance Room air, no skin breakdown, BM this morning (small per pt), mouth/tongue/throat WNL - pt denies pain, Alexandru score 22. See MSA.          Estimated/Assessed Needs - Anthropometrics       Row Name 03/19/25 1128          Anthropometrics    Calculation Weight 40.6 kg (89 lb 8 oz)        Estimated/Assessed Needs    Additional Documentation Fluid Requirements (Group);KCAL/KG (Group);Protein Requirements (Group)        KCAL/KG    KCAL/KG 30 Kcal/Kg (kcal);Kcal/KG (kcal) comment     30 Kcal/Kg (kcal) 1217.91     KCAL/KG (kcal) + 500 kcal/day for weight gain= 1718 kcal/day        Protein Requirements    Weight Used For Protein Calculations 54.4 kg (120 lb)  IBW     Est Protein Requirement Amount (gms/kg) 1.0 gm protein     Estimated Protein Requirements (gms/day) 54.43        Fluid Requirements    Fluid Requirements (mL/day) 1218          Nutrition Prescription Ordered       Row Name 03/19/25 1129          Nutrition Prescription PO    Current PO Diet Sips/ice chips;NPO          Evaluation of Received Nutrient/Fluid Intake       Row Name 03/19/25 1129          Nutrient/Fluid Evaluation    Number of Days Evaluated Other (comment)  admission < 24 hr, NPO and ice chips        Fluid Intake Evaluation    Other Fluid (mL) 912         Problem/Interventions:   Problem 1       Row Name 03/19/25 1131          Nutrition Diagnoses Problem 1    Problem 1 Other (comment)  Severe malnutrition in context of chronic disease     Etiology (related to) Medical Diagnosis     Endocrine DM;Other (comment)  with neurologic complications     Gastrointestinal Gastroparesis;Other (comment)  s/p gastric stimulator 2/10/25     Immune Rheumatoid arthritis;Other (comment)  Raynaud disease     Infectious Disease MRSA;Other (comment)  MDR, hx of SIRS and infected PICC     Musculoskeletal Other (comment)  fibromylagia     Renal CKD     Signs/Symptoms (evidenced by) Report of Mnimal  PO Intake;NPO;Clear Liquid Diet;Report/Observation;BMI;% IBW;% UBW     BMI 16 - 16.9     Percent (%) IBW 85 %     Percent (%) UBW 75 %     Reported/Observed By Patient;RD/Tech     Appetite Poor     Other Comment muscle and fat loss per NFPE remains consistent on this date 3/19/25          Intervention Goal       Row Name 03/19/25 1134          Intervention Goal    General Disease management/therapy;Meet nutritional needs for age/condition;Reduce/improve symptoms     PO Establish PO;Tolerate PO;Advance diet     Weight Appropriate weight gain          Nutrition Intervention       Row Name 03/19/25 1135          Nutrition Intervention    RD/Tech Action Care plan reviewd;Follow Tx progress;Await begin PO          Nutrition Prescription       Row Name 03/19/25 1135          Nutrition Prescription PO    PO Prescription Begin/change supplement     Supplement Boost Glucose Control     Supplement Frequency Daily     New PO Prescription Ordered? No, recommended        Other Orders    Supplement Vitamin mineral supplement;Calcium supplement;Iron supplement;Prenatal vitamin     Supplement Ordered? No, recommended          Education/Evaluation       Row Name 03/19/25 1135          Education    Education No discharge needs identified at this time;Previous education by JOHANA/ERNESTINA        Monitor/Evaluation    Monitor Per protocol         Electronically signed by:  Mae Loredo RDN, LD  03/19/25 11:35 CDT

## 2025-03-19 NOTE — PROGRESS NOTES
[facility] Medicine Services  INPATIENT PROGRESS NOTE    Patient Name: Tasha Perez  Date of Admission: 3/18/2025  Today's Date: 03/19/25  Length of Stay: 1  Primary Care Physician: KIRILL Murguia MD    Subjective   Chief Complaint: tired   HPI   No acute events overnight.  Patient's abdominal pain is now improved.  She feels hungry and is ready to attempt clear liquid diet.  She remains on fluids.    She is requesting placement of a PICC line for outpatient IV fluid administration.  Per the EMR her last 1 was around December, she states it was removed in January.    Review of Systems   All pertinent negatives and positives are as above. All other systems have been reviewed and are negative unless otherwise stated.     Objective    Temp:  [97.5 °F (36.4 °C)-98.7 °F (37.1 °C)] 98.1 °F (36.7 °C)  Heart Rate:  [78-94] 78  Resp:  [14-20] 18  BP: ()/(55-86) 91/60  Physical Exam  GEN: Awake, underweight, alert, interactive, in NAD on room air   HEENT: Atraumatic,  EOMI, Anicteric  Lungs: CTAB, no wheezing/rales/rhonchi  Heart: RRR, +S1/s2, no rub  ABD: soft, nt/nd, +BS, no guarding/rebound  Extremities: atraumatic, no cyanosis, no edema  Skin: no rashes or lesions  Neuro: AAOx3, no focal deficits  Psych: normal mood & affect        Results Review:  I have reviewed the labs, radiology results, and diagnostic studies.    Laboratory Data:   Results from last 7 days   Lab Units 03/19/25  0437 03/18/25  1221   WBC 10*3/mm3 11.39* 10.73   HEMOGLOBIN g/dL 9.2* 11.2*   HEMATOCRIT % 28.4* 35.4   PLATELETS 10*3/mm3 485* 552*        Results from last 7 days   Lab Units 03/19/25  0921 03/19/25  0437 03/18/25  1221   SODIUM mmol/L  --  141 138   POTASSIUM mmol/L 2.8* 2.5* 3.7   CHLORIDE mmol/L  --  113* 110*   CO2 mmol/L  --  16.0* 15.0*   BUN mg/dL  --  31* 34*   CREATININE mg/dL  --  1.78* 2.14*   CALCIUM mg/dL  --  7.9* 8.6   BILIRUBIN mg/dL  --  <0.2 <0.2   ALK PHOS U/L  --  110 146*    ALT (SGPT) U/L  --  8 11   AST (SGOT) U/L  --  16 18   GLUCOSE mg/dL  --  113* 326*       Culture Data:   [unfilled]    Radiology Data:   Imaging Results (Last 24 Hours)       Procedure Component Value Units Date/Time    CT Abdomen Pelvis Without Contrast [[id number]] Collected: 03/18/25 1426     Updated: 03/18/25 1441    Narrative:      CT ABDOMEN PELVIS WO CONTRAST- 3/18/2025 12:48 PM     HISTORY: nv, abd pain, elevaed lipase, [place]      COMPARISON: 2/27/2025     DLP: 145.24 mGy.cm . All CT scans are performed using dose optimization  techniques as appropriate to the performed exam and including at least  one of the following: Automated exposure control, adjustment of the mA  and/or kV according to size, and the use of the iterative reconstruction  technique.     TECHNIQUE: Noncontrast enhanced images of the abdomen and pelvis  obtained without oral contrast.     FINDINGS:  The lung bases and base of the heart are unremarkable.     LIVER: No focal liver lesion.     BILIARY SYSTEM: The gallbladder is surgically absent. No biliary  dilatation present..     PANCREAS: There is some subtle stranding within the anterior pararenal  space associated with the pancreatic head and proximal body of the  pancreas suggesting pancreatitis. Correlation is recommended with the  patient's amylase and lipase levels..     SPLEEN: Unremarkable.     KIDNEYS AND ADRENALS: Both adrenal glands are enlarged but remain  adreniform in shape. I would favor bilateral adrenal adenomas. No  evidence of renal mass or perinephric fluid collection. No  nephrolithiasis..  The ureters are decompressed and normal in  appearance.     RETROPERITONEUM: No mass, lymphadenopathy or hemorrhage.     GI TRACT: There is mild colonic distention of the right and transverse  colon with scattered air-fluid levels suggesting there may be an ongoing  diarrheal illness. No mechanical obstruction. The stomach is food  filled. A gastric stimulator projects over the  upper abdomen.. Surgical  clips at the base of the cecum suggest prior appendectomy..     OTHER: There is no mesenteric mass, lymphadenopathy or fluid collection.  The osseous structures and soft tissues demonstrate no worrisome  lesions. There is a small defect along the left anterolateral abdominal  wall likely related to prior gastrostomy tube placement.     PELVIS: Previous hysterectomy. No adnexal mass or free fluid.. The  urinary bladder is normal in appearance.       Impression:      1. The right and transverse colon is mildly distended with liquefied  stool and air-fluid levels suggesting an ongoing diarrheal illness. No  mechanical obstruction. The stomach is food filled. A gastric stimulator  projects over the upper abdomen. Evidence of previous gastrostomy tube.  2. Radiographic findings suggesting acute pancreatitis with inflammatory  stranding in the anterior pararenal space adjacent to the pancreatic  head and body of the pancreas. No fluid collection present. No ductal  dilatation.  3. Previous hysterectomy. No adnexal mass or free fluid. The urinary  bladder is normal in appearance.  4. Suspected bilateral adrenal adenomas with both adrenal glands  enlarged but remaining adreniform in shape.           This report was signed and finalized on 3/18/2025 2:38 PM by Dr. Alexis Ray MD.               I have reviewed the patient's current medications.     Assessment/Plan   Assessment  Active Hospital Problems    Diagnosis     **Acute pancreatitis     Acute kidney injury superimposed on stage 3b chronic kidney disease     Type 2 diabetes mellitus, with long-term current use of insulin     Severe malnutrition      Tasha Perez is a 54-year-old female with a past medical history of arthritis, degenerative disc disease, diabetes mellitus type 2 on long-term insulin, hypercholesteremia, fibromyalgia, neuropathy, Raynaud's disease, CKD stage IIIb, 1 PPD smoker, and nonalcoholic pancreatitis x 5.  Patient  presented to [facility] emergency department 3/18/2025 after the encouragement of Dr. Barrow her nephrologist as her kidney function had declined enough that he would like her treated with IV hydration in the hospital. She was admitted for acute pancreatitis and SUGAR.    Treatment Plan:    # Acute pancreatitis  # Acute kidney injury on CKD3b - improving  # Chronic metabolic acidosis  # Hypokalemia  - Continue fluids  - Advance diet as tolerated  - Nephrology following  - Potassium repleted  -PICC consult placed  -  this patient is frequently admitted and known to the hospital, but this admission is my first time meeting her. Given the constellation of her symptoms - considering possible IgG-4 related disease. She has been to multiple other hospitals as well who may have checked in the past, but I do not see any workup on the chart. I will check an IgG4 level since she is here. Bearing in mind, antibody levels are not diagnostic.     # Type 2 diabetes - HgbA1c 9.2%  # Severe Gastroparesis s/p gastric stimulator   Home meds: Farxiga, NovoLog  - Continue sliding scale      Medical Decision Making  Number and Complexity of problems: 3 acute on chronic  Differential Diagnosis: As above    Conditions and Status        Improved.     MDM Data  External documents reviewed: Reviewed  Cardiac tracing (EKG, telemetry) interpretation: Reviewed  Radiology interpretation: Reviewed  Labs reviewed: As above  Any tests that were considered but not ordered: None     Decision rules/scores evaluated (example [id number], [place], etc): None     Discussed with: Patient and nursing staff     Care Planning  Shared decision making: Patient apprised of current labs, vitals, imaging and treatment plan.  They are agreeable with proceeding with plans as discussed.   Code status and discussions: Full code    Disposition  Social Determinants of Health that impact treatment or disposition: None  I expect the patient to be discharged to home in 2-3  days.         Electronically signed by Michelle Clarke MD, 03/19/25, 10:40 CDT.

## 2025-03-19 NOTE — PLAN OF CARE
Goal Outcome Evaluation:           Progress: no change  Outcome Evaluation: Pt up ad jessy. IVF infusing. Accuchecks. NPO with ice chips. NO c/o pain this shift. Fall preventions in place. Safety maintained.

## 2025-03-19 NOTE — PROGRESS NOTES
Nephrology ([facility]) Progress Note      Patient:  Tasha Perez  YOB: 1970  Date of Service: 3/19/2025  MRN: [id number]   Acct: [id number]   Primary Care Physician: KIRILL Murguia MD  Advance Directive:   Code Status and Medical Interventions: CPR (Attempt to Resuscitate); Full Support   Ordered at: 03/18/25 1608     Code Status (Patient has no pulse and is not breathing):    CPR (Attempt to Resuscitate)     Medical Interventions (Patient has pulse or is breathing):    Full Support     Level Of Support Discussed With:    Patient     Admit Date: 3/18/2025       Hospital Day: 1  Referring Provider: No ref. provider found      Patient personally seen and examined.  Complete chart including Consults, Notes, Operative Reports, Labs, Cardiology, and Radiology studies reviewed as able.        Subjective:  Tasha Perez is a 54 y.o. female for whom we were consulted for evaluation and treatment of acute kidney injury. Baseline chronic kidney disease stage 3b. Follows with Dr Barrow in our office. History of many previous hospitalizations for SUGAR due to volume depletion. History of recurrent pancreatitis, gastroparesis, type 2 diabetes, osteoarthritis. Has had feeding tubes and PICC lines many times in the past but these never manage to stay in long for a variety of reasons. Presented this time with abdominal pain, nausea, vomiting. Labs in ER showed creatinine 2.0. Admitted to medical floor and started on IV fluids.     Today is awake and alert. Feeling a little better, no nausea so far this AM.   Potassium 2.5 on AM labs. Noted discussion between nursing staff, hospitalist, and pharmacy regarding use of electrolyte replacement protocol. Nephrology was not contacted. No repeat labs ordered so far today.     Allergies:  Bee venom, Hydrocodone, Hydroxychloroquine, Ibuprofen, Pineapple, Pineapple extract, Wasp venom, Wasp venom protein, Hydrocodone-acetaminophen, Penicillins,  Sitagliptin, Metformin, Chocolate, and Poison ivy extract    Home Meds:  Medications Prior to Admission   Medication Sig Dispense Refill Last Dose/Taking    aspirin 81 MG EC tablet Take 1 tablet by mouth Daily.       atorvastatin (LIPITOR) 80 MG tablet Take 1 tablet by mouth Every Night.       Calcium Carbonate-Vitamin D 600-5 MG-MCG tablet Take 1 tablet by mouth Daily.       cetirizine (zyrTEC) 10 MG tablet Take 1 tablet by mouth Daily As Needed for Allergies.       Cholecalciferol (Vitamin D3) 50 MCG (2000 UT) capsule Take 1 capsule by mouth Daily.       dapagliflozin Propanediol (Farxiga) 10 MG tablet Take 10 mg by mouth Daily.       hydrocortisone (ANUSOL-HC) 25 MG suppository Insert 1 suppository into the rectum 2 (Two) Times a Day. 12 each 1     hydroxychloroquine (PLAQUENIL) 200 MG tablet Take 2 tablets by mouth Daily.       insulin aspart (NovoLOG) 100 UNIT/ML patient supplied pump Inject 1 Units under the skin into the appropriate area as directed Continuous.       linaclotide (LINZESS) 290 MCG capsule capsule Take 1 capsule by mouth Every Morning Before Breakfast.       miSOPROStol (CYTOTEC) 200 MCG tablet Take 1 tablet by mouth 4 (Four) Times a Day.       omeprazole (priLOSEC) 40 MG capsule Take 1 capsule by mouth Every Other Day.       promethazine (PHENERGAN) 25 MG tablet Take 1 tablet by mouth Every 6 (Six) Hours As Needed for Nausea or Vomiting.       rOPINIRole (REQUIP) 1 MG tablet Take 1 tablet by mouth 3 (Three) Times a Day.       sodium bicarbonate 650 MG tablet Take 1 tablet by mouth 2 (Two) Times a Day.       sucralfate (CARAFATE) 1 g tablet Take 1 tablet by mouth 4 (Four) Times a Day.          Medicines:  Current Facility-Administered Medications   Medication Dose Route Frequency Provider Last Rate Last Admin    acetaminophen (TYLENOL) tablet 650 mg  650 mg Oral Q4H PRN Elisabeth Josue APRN        Or    acetaminophen (TYLENOL) 160 MG/5ML oral solution 650 mg  650 mg Oral Q4H PRN ,  RAKESH Barber        Or    acetaminophen (TYLENOL) suppository 650 mg  650 mg Rectal Q4H PRN Elisabeth Josue APRN        [Held by provider] aspirin EC tablet 81 mg  81 mg Oral Daily Elisabeth Josue APRN        [Held by provider] atorvastatin (LIPITOR) tablet 80 mg  80 mg Oral Nightly Elisabeth harper APRN        [Held by provider] cetirizine (zyrTEC) tablet 10 mg  10 mg Oral Daily PRN Elisabeth Josue APRN        dextrose (D50W) (25 g/50 mL) IV injection 25 g  25 g Intravenous Q15 Min PRN Elisabeth Josue APRN        dextrose (GLUTOSE) oral gel 15 g  15 g Oral Q15 Min PRN Elisabeth Josue APRN        [Held by provider] empagliflozin (JARDIANCE) tablet 10 mg  10 mg Oral Daily Elisabeth Josue APRN        insulin regular (humuLIN R,novoLIN R) injection 2-9 Units  2-9 Units Subcutaneous Q6H Elisabeth Josue APRN   2 Units at 03/18/25 2110    Morphine sulfate (PF) injection 2 mg  2 mg Intravenous Q4H PRN Michelle Clarke MD        ondansetron ODT (ZOFRAN-ODT) disintegrating tablet 4 mg  4 mg Oral Q6H PRN Elisabeth Josue APRN        Or    ondansetron (ZOFRAN) injection 4 mg  4 mg Intravenous Q6H PRN Elisabeth Josue APRN        pantoprazole (PROTONIX) injection 40 mg  40 mg Intravenous Q12H Elisabeth Josue APRN   40 mg at 03/18/25 2115    [Held by provider] rOPINIRole (REQUIP) tablet 1 mg  1 mg Oral TID Elisabeth Josue APRN        sodium chloride 0.45 % 925 mL with sodium bicarbonate 8.4 % 75 mEq infusion   Intravenous Continuous Janak Harvey  mL/hr at 03/19/25 0654 New Bag at 03/19/25 0654    sodium chloride 0.9 % flush 10 mL  10 mL Intravenous PRN Fernandez Grant PA-C        sodium chloride 0.9 % flush 10 mL  10 mL Intravenous Q12H , Elisabeth, APRN   10 mL at 03/18/25 2115    sodium chloride 0.9 % flush 10 mL  10 mL Intravenous PRN Elisabeth Josue APRN        sodium chloride 0.9 % infusion 40 mL  40 mL Intravenous PRN Elisabeth Josue APRN        sodium chloride 0.9 % infusion   125 mL/hr Intravenous Continuous Elisabeth APRN 125 mL/hr at 03/18/25 1646 125 mL/hr at 03/18/25 1646       Past Medical History:  Past Medical History:   Diagnosis Date    Arthritis     Contusion     Degenerative disc disease, cervical     Diabetes mellitus     Elevated cholesterol     Fibromyalgia     Neuropathy     Osteoporosis     Pancreatitis     Raynaud disease     Renal insufficiency     Smoker 02/08/2022    Vitamin D deficiency 04/26/2022       Past Surgical History:  Past Surgical History:   Procedure Laterality Date    APPENDECTOMY      CHOLECYSTECTOMY      COLONOSCOPY      ENDOSCOPY N/A 10/08/2019    Procedure: ESOPHAGOGASTRODUODENOSCOPY WITH ANESTHESIA;  Surgeon: Aleks Vaughn DO;  Location: [facility] ENDOSCOPY;  Service: Gastroenterology    ENDOSCOPY N/A 11/12/2024    Procedure: ESOPHAGOGASTRODUODENOSCOPY WITH ANESTHESIA;  Surgeon: Tiffanie Saucedo MD;  Location: [facility] ENDOSCOPY;  Service: Gastroenterology;  Laterality: N/A;  pre op: Gastroparesis, Intractable nausea  post op: insertion of dobhoff   PCP: Brandon Murguia    ENDOSCOPY N/A 11/15/2024    Procedure: ESOPHAGOGASTRODUODENOSCOPY WITH ANESTHESIA WITH NG TUBE INSERTION;  Surgeon: Tiffanie Saucedo MD;  Location: [facility] ENDOSCOPY;  Service: Gastroenterology;  Laterality: N/A;  PRE OP: DOBHOFF  POST OP: DOBHOFF  PCP: TELLY PACK    ENDOSCOPY WITH GASTROSTOMY TUBE INSERTION N/A 6/15/2022    Procedure: ESOPHAGOGASTRODUODENOSCOPY WITH GASTROSTOMY TUBE INSERTION;  Surgeon: Jersey Lee MD;  Location: [facility] ENDOSCOPY;  Service: Gastroenterology;  Laterality: N/A;  pre peg placement  post peg placement  Dr. Murguia    ENTEROSCOPY SMALL BOWEL N/A 11/3/2022    Procedure: Esophagogastroduodenoscopy with Peg Removal;  Surgeon: Aleks Vaughn DO;  Location: [facility] ENDOSCOPY;  Service: Gastroenterology;  Laterality: N/A;  pre; peg removal  post; peg removal   KIRILL Murguia MD        HYSTERECTOMY         Family History  Family History  "  Adopted: Yes   Problem Relation Age of Onset    Colon polyps Neg Hx     Colon cancer Neg Hx        Social History  Social History     Socioeconomic History    Marital status:    Tobacco Use    Smoking status: Every Day     Current packs/day: 0.50     Average packs/day: 0.5 packs/day for 32.2 years (16.1 ttl pk-yrs)     Types: Cigarettes     Start date: 1993     Passive exposure: Current   Vaping Use    Vaping status: Never Used   Substance and Sexual Activity    Alcohol use: No    Drug use: No    Sexual activity: Defer       Review of Systems:  History obtained from chart review and the patient  General ROS: No fever or chills  Respiratory ROS: No cough, shortness of breath, wheezing  Cardiovascular ROS: No chest pain or palpitations  Gastrointestinal ROS: No abdominal pain or melena  Genito-Urinary ROS: No dysuria or hematuria  Psych ROS: No anxiety and depression  14 point ROS reviewed with the patient and negative except as noted above and in the HPI unless unable to obtain.    Objective:  Patient Vitals for the past 24 hrs:   BP Temp Temp src Pulse Resp SpO2 Height Weight   03/19/25 0758 91/60 98.1 °F (36.7 °C) Oral 78 18 98 % -- --   03/19/25 0345 97/60 98.1 °F (36.7 °C) Oral 78 16 99 % -- --   03/18/25 2330 97/55 98.7 °F (37.1 °C) Oral 80 14 98 % -- --   03/18/25 2021 121/86 98.6 °F (37 °C) Oral 88 18 100 % -- --   03/18/25 1812 117/78 97.7 °F (36.5 °C) Oral 82 18 100 % -- --   03/18/25 1435 123/74 97.5 °F (36.4 °C) Oral 94 18 100 % -- --   03/18/25 1149 115/74 97.9 °F (36.6 °C) Oral 94 20 100 % 154.9 cm (61\") 40.6 kg (89 lb 8 oz)       Intake/Output Summary (Last 24 hours) at 3/19/2025 0920  Last data filed at 3/19/2025 0652  Gross per 24 hour   Intake 912 ml   Output 800 ml   Net 112 ml     General: awake/alert   Chest:  clear to auscultation bilaterally without respiratory distress  CVS: regular rate and rhythm  Abdominal: soft, nontender, positive bowel sounds  Extremities: no cyanosis or " edema  Skin: warm and dry without rash      Labs:  Results from last 7 days   Lab Units 03/19/25  0437 03/18/25  1221   WBC 10*3/mm3 11.39* 10.73   HEMOGLOBIN g/dL 9.2* 11.2*   HEMATOCRIT % 28.4* 35.4   PLATELETS 10*3/mm3 485* 552*         Results from last 7 days   Lab Units 03/19/25  0437 03/18/25  1221   SODIUM mmol/L 141 138   POTASSIUM mmol/L 2.5* 3.7   CHLORIDE mmol/L 113* 110*   CO2 mmol/L 16.0* 15.0*   BUN mg/dL 31* 34*   CREATININE mg/dL 1.78* 2.14*   CALCIUM mg/dL 7.9* 8.6   EGFR mL/min/1.73 33.8* 27.1*   BILIRUBIN mg/dL <0.2 <0.2   ALK PHOS U/L 110 146*   ALT (SGPT) U/L 8 11   AST (SGOT) U/L 16 18   GLUCOSE mg/dL 113* 326*       Radiology:   Imaging Results (Last 72 Hours)       Procedure Component Value Units Date/Time    CT Abdomen Pelvis Without Contrast [[id number]] Collected: 03/18/25 1426     Updated: 03/18/25 1441    Narrative:      CT ABDOMEN PELVIS WO CONTRAST- 3/18/2025 12:48 PM     HISTORY: nv, abd pain, elevaed lipase, [place]      COMPARISON: 2/27/2025     DLP: 145.24 mGy.cm . All CT scans are performed using dose optimization  techniques as appropriate to the performed exam and including at least  one of the following: Automated exposure control, adjustment of the mA  and/or kV according to size, and the use of the iterative reconstruction  technique.     TECHNIQUE: Noncontrast enhanced images of the abdomen and pelvis  obtained without oral contrast.     FINDINGS:  The lung bases and base of the heart are unremarkable.     LIVER: No focal liver lesion.     BILIARY SYSTEM: The gallbladder is surgically absent. No biliary  dilatation present..     PANCREAS: There is some subtle stranding within the anterior pararenal  space associated with the pancreatic head and proximal body of the  pancreas suggesting pancreatitis. Correlation is recommended with the  patient's amylase and lipase levels..     SPLEEN: Unremarkable.     KIDNEYS AND ADRENALS: Both adrenal glands are enlarged but remain  adreniform  "in shape. I would favor bilateral adrenal adenomas. No  evidence of renal mass or perinephric fluid collection. No  nephrolithiasis..  The ureters are decompressed and normal in  appearance.     RETROPERITONEUM: No mass, lymphadenopathy or hemorrhage.     GI TRACT: There is mild colonic distention of the right and transverse  colon with scattered air-fluid levels suggesting there may be an ongoing  diarrheal illness. No mechanical obstruction. The stomach is food  filled. A gastric stimulator projects over the upper abdomen.. Surgical  clips at the base of the cecum suggest prior appendectomy..     OTHER: There is no mesenteric mass, lymphadenopathy or fluid collection.  The osseous structures and soft tissues demonstrate no worrisome  lesions. There is a small defect along the left anterolateral abdominal  wall likely related to prior gastrostomy tube placement.     PELVIS: Previous hysterectomy. No adnexal mass or free fluid.. The  urinary bladder is normal in appearance.       Impression:      1. The right and transverse colon is mildly distended with liquefied  stool and air-fluid levels suggesting an ongoing diarrheal illness. No  mechanical obstruction. The stomach is food filled. A gastric stimulator  projects over the upper abdomen. Evidence of previous gastrostomy tube.  2. Radiographic findings suggesting acute pancreatitis with inflammatory  stranding in the anterior pararenal space adjacent to the pancreatic  head and body of the pancreas. No fluid collection present. No ductal  dilatation.  3. Previous hysterectomy. No adnexal mass or free fluid. The urinary  bladder is normal in appearance.  4. Suspected bilateral adrenal adenomas with both adrenal glands  enlarged but remaining adreniform in shape.           This report was signed and finalized on 3/18/2025 2:38 PM by Dr. Alexis Ray MD.               Culture:  No results found for: \"BLOODCX\", \"URINECX\", \"WOUNDCX\", \"MRSACX\", \"RESPCX\", " "\"STOOLCX\"      Assessment    Acute kidney injury, prerenal due to volume depletion--improving  Baseline chronic kidney disease stage 3b  Hypokalemia  Type 2 diabetes  Acute pancreatitis  Gastroparesis  Metabolic acidosis    Plan:   STAT potassium now  Continue to replace potassium if needed. Would note that nephrology is on call 24/7 for questions concerning patients we are following in the hospital.   Continue bicarbonate IV fluids  Monitor labs for additonal renal recovery      Ford Elena, APRN  3/19/2025  09:20 CDT    "

## 2025-03-19 NOTE — PAYOR COMM NOTE
"ADMIT 3/18/2025  REF: [id number]    [facility]  JAMIR,  [phone]  OR  FAX  [phone]        Yash Perez (54 y.o. Female)       Date of Birth   1970    Social Security Number       Address   [address] [place] [address]    Home Phone   [phone]    MRN   [id number]       Adventism   Mormonism    Marital Status                               Admission Date   3/18/2025    Admission Type   Emergency    Admitting Provider   Michelle Clarke MD    Attending Provider   Michelle Clarke MD    Department, Room/Bed   [facility] 3C, 372/1       Discharge Date       Discharge Disposition       Discharge Destination                                 Attending Provider: Michelle Clarke MD    Allergies: Bee Venom, Hydrocodone, Hydroxychloroquine, Ibuprofen, Pineapple, Pineapple Extract, Wasp Venom, Wasp Venom Protein, Hydrocodone-acetaminophen, Penicillins, Sitagliptin, Metformin, Chocolate, Poison Ivy Extract    Isolation: None   Infection: None   Code Status: CPR    Ht: 154.9 cm (61\")   Wt: 40.6 kg (89 lb 8 oz)    Admission Cmt: None   Principal Problem: Acute pancreatitis [K85.90]                   Active Insurance as of 3/18/2025       Primary Coverage       Payor Plan Insurance Group Employer/Plan Group    [facility]        Payor Plan Address Payor Plan Phone Number Payor Plan Fax Number Effective Dates    [address]   8/1/2019 - None Entered    [facility] [id number]         Subscriber Name Subscriber Birth Date Member ID       YASH PEREZ 1970 [id number]                     Emergency Contacts        (Rel.) Home Phone Work Phone Mobile Phone    JENNIFER PEREZ (Spouse) [phone] -- [phone]    CASSIA PEREZ (Daughter) -- -- [phone]          3/18/2025 Event Details User   11:36 Patient arrival  Supriya Alvarez RN   11:37:03 Arrival Complaint kidney pain    11:46 HPI HPI  Stated " "Reason for Visit: pt arrives to ed, sent from dr kaplan's office, for abnormal creatinine. pt states she has hx CKD and provider states it has worsened. pt denies dialysis. pt states she is always nauseous and vomitting. pt reports swelling in hands and feet  History Obtained From: patient Supriya Alvarez RN   11:46:02 Trigger for Triage Start  Supriya Alvarez RN   11:46:02 Triage Started  Supriya Alvarez RN   11:46:02 Chief Complaints Updated Abnormal Lab Supriya Alvarez RN     11:48 Pain Pain  (0-10) Pain Rating: Rest: 4 Supriya Alvarez RN     11:49 Vital Signs Vital Signs  Temp: 97.9 °F (36.6 °C)  Temp src: Oral  Heart Rate: 94  Heart Rate Source: Monitor  Resp: 20  Resp Rate Source: Visual  BP: 115/74  BP Location: Right arm  BP Method: Automatic  Patient Position: Sitting  BMI (Calculated): 16.9  Oxygen Therapy  SpO2: 100 %  Pulse Oximetry Type: Intermittent  Device (Oxygen Therapy): room air  Vitals Timer  Restart Vitals Timer: Yes  Height and Weight  Height: 154.9 cm (61\")  Height Method: Actual  Weight: 40.6 kg (89 lb 8 oz)  Weight Method: Standing scale  Other flowsheet entries  Ideal Body Weight k.8 Supriya Alvarez RN     13:10:51 Lipase Resulted Abnormal Result  Collected: 3/18/2025 12:21  Last updated: 3/18/2025 13:10  Status: Final result  Lipase: 503 U/L High  [Ref Range: 13 - 60] (Specimen hemolyzed.  Results may be falsely decreased.) Ratna Schawrtz     13:38 Medication New Bag sodium chloride 0.9 % bolus 1,000 mL - Dose: 1,000 mL ; Rate: 2,000 mL/hr ; Route: Intravenous ; Line: Peripheral IV 25 1315 Left;Posterior Hand ; Scheduled Time: 1232 Vika Snider RN Joyner, Keisha R RN   Registered Nurse  Nursing     Plan of Care      Signed     Date of Service: 25  Creation Time: 25     Signed         Goal Outcome Evaluation:  Plan of Care Reviewed With: patient  Outcome Evaluation: pt admitted from ED; up ad jessy; ambulating in room; room air; denies " need for pain medication at this time; safety maintained                  Bryanna Zelaya RNA   Registered Nurse  Nursing     Plan of Care      Attested     Date of Service: 03/19/25 0411  Creation Time: 03/19/25 0411     Attested           Attestation signed by Tricia Saleh RN at 03/19/25 0735     I agree with this care plan.              Goal Outcome Evaluation:  Progress: no change  Outcome Evaluation: Pt up ad jessy. IVF infusing. Accuchecks. NPO with ice chips. NO c/o pain this shift. Fall preventions in place. Safety maintained.                     Cosigned by: Tricia Saleh RN at 03/19/25 0735   Tricia Saleh, RN   Registered Nurse  Nursing     Nursing Note      Signed     Date of Service: 03/19/25 0636  Creation Time: 03/19/25 0636     Signed         Patient had potassium of 2.5, received orders from Dr. Phillips for 10mEq one time IV and protocol orders. Patient has history of CKD spoke with Asa in pharmacy stated that patient's creatinine clearance is 26. He recommends based on creatinine clearance give the one time 10mEq IV dose of potassium and redraw lab after one time dose.                       14:07:30 ED Notes HPI        Patient is a 54-year-old female presenting to ED with abnormal kidney functions.  PMH significant for insulin-dependent diabetes, arthritis, Raynaud's disease, osteoporosis, fibromyalgia, history of renal insufficiency.  Patient states 4 days ago she had lab work done 1 week in advance of her routine appointment with Dr. Jeffers for which she was called today and advised that she had abnormal kidney functions requiring ER evaluation.  Patient states that over the past 5 days she has noticed some increase in fatigue, tiredness, intermittent lightheadedness.  Patient states that for years she has suffered with vomiting and diarrhea which is at its baseline with no increase.  Patient denies any decreased urination, dysuria.  Patient denies abdominal pain, fevers, chills,  diaphoresis, any known sick contact.  Patient denies use of any medications outside of the Zofran which she typically has at home with no improvement and presents at this time for further evaluation.     RECORDS REVIEWED SHOW: Patient was called by infectious disease on 3/16/2025 to discuss negative blood cultures as well as review recent labs which revealed sodium 138, potassium 3.5, chloride 113, bicarb 9, BUN 48, creatinine 2, calcium 9.9 and GFR of 29, total protein 7.6 and magnesium 1.5.        ROS     Review of Systems   Constitutional: POSITIVE for fatigue.  Negative for chills, diaphoresis and fever.   HENT:  Negative for trouble swallowing, sore throat.  Eyes:  Negative.   Respiratory: Negative.  Negative for cough.    Cardiovascular: Negative.    Gastrointestinal:  Positive for nausea and vomiting. Negative for abdominal pain, constipation and diarrhea.        Denies hematemesis   Genitourinary: Negative.  Negative for dysuria and flank pain, and hematuria.   Musculoskeletal: Negative.  Negative for myalgias, neck pain and neck stiffness.   Skin: Negative.  Negative for rash and wound.   Neurological: POSITIVE for weakness. Negative for dizziness, weakness, light-headedness and numbness.   Psychiatric/Behavioral: Negative.     All other systems reviewed and are negative.     PHYSICAL EXAMINATION:     Physical Exam  Vitals and nursing note reviewed.   Constitutional:       General: She is not in acute distress.     Appearance: Normal appearance. She is normal weight. She is not ill-appearing, toxic-appearing or diaphoretic.   HENT:      Head: Normocephalic.      Mouth/Throat:      Mouth: Mucous membranes are dry.      Pharynx: Oropharynx is clear. No oropharyngeal exudate or posterior oropharyngeal erythema.      Comments: No intraoral rashes, lesions, petechiae  Eyes:      Conjunctiva/sclera: Conjunctivae normal.      Pupils: Pupils are equal, round, and reactive to light.   Cardiovascular:      Rate and  Rhythm: Normal rate and regular rhythm.   Pulmonary:      Effort: Pulmonary effort is normal.      Breath sounds: Normal breath sounds.   Abdominal:      General: Bowel sounds are normal. There is no distension.      Palpations: Abdomen is soft.      Tenderness: There is no abdominal tenderness. There is no right CVA tenderness, left CVA tenderness or guarding.   Musculoskeletal:         General: Normal range of motion.      Cervical back: Neck supple.      Right lower leg: No edema.      Left lower leg: No edema.   Skin:     General: Skin is warm and dry.      Coloration: Skin is not jaundiced.   Neurological:      Mental Status: She is alert and oriented to person, place, and time.      Gait: Gait normal.   Psychiatric:         Mood and Affect: Mood normal.         Behavior: Behavior normal.            ED COURSE:     Patient is a 54-year-old female presenting to ED with abnormal kidney functions.  PMH significant for insulin-dependent diabetes, arthritis, Raynaud's disease, osteoporosis, fibromyalgia, history of renal insufficiency.  Upon initial evaluation patient resting in the bed slightly anxious but otherwise in no acute distress.  Nontoxic-appearing, non-ill-appearing, nondiaphoretic.  Patient with unremarkable vital signs.  Examination is unremarkable to include abdomen soft, nontender, nondistended with no CVAT.  Discussed with patient need for workup to include labs, analysis, CT imaging as well as ability for IV fluids for which she is amenable with no further questions, concerns, or needs at this time.     Differential diagnosis: SUGAR superimposed on CKD, electrolyte disturbance, pancreatitis, urinary tract infection, systemic infection, COVID-19, influenza, diverticulitis, diverticulosis, other     Lab work revealed normal blood cell count, stable H&H, thrombocytosis 552 and no further CBC abnormalities.  CMP with worsening renal functions creatinine 2.14, BUN 34, GFR of 27.1.  Hyperglycemia 326 with no  further significant electrolyte disturbances including normal magnesium and phosphorus.  CMP otherwise unremarkable with normal anion gap 13.  No hepatic dysfunction.  Lipase elevated at 503.  Urinalysis with trace ketones, 30 protein.  No evidence of infection.  Respiratory panel unremarkable.  CT imaging the abdomen and pelvis performed without contrast showed: 1. The right and transverse colon is mildly distended with liquefied stool and air-fluid levels suggesting an ongoing diarrheal illness. No mechanical obstruction. The stomach is food filled. A gastric stimulator projects over the upper abdomen. Evidence of previous gastrostomy tube. 2. Radiographic findings suggesting acute pancreatitis with inflammatory stranding in the anterior pararenal space adjacent to the pancreatic head and body of the pancreas. No fluid collection present. No ductal dilatation. 3. Previous hysterectomy. No adnexal mass or free fluid. The urinary bladder is normal in appearance. 4. Suspected bilateral adrenal adenomas with both adrenal glands enlarged but remaining adreniform in shape. Case discussed with Dr. Harvey, nephrology, who is in agreement with IV hydration at this time and will kindly consult on patient during admission.  Case further discussed with Ms. Ilene CAMERON for hospitalist group who will kindly accept patient under the services of Dr. Clarke, hospitalist.  Discussed with patient need for admission as well as n.p.o. status for which she is amenable with no further questions, concerns, needs at this time.        FINAL DIAGNOSIS:      PANCREATITIS  SUGAR superimposed on CKD  Elevated lipase  Nausea, vomiting.     Fernandez Grant PA-C  03/18/25 6275    Fernandez Grant PA-C; Cosign required   14:07:34 Imaging Exam Ended       6:44 Medication New Bag magnesium sulfate 2g/50 mL (PREMIX) infusion - Dose: 2 g ; Route: Intravenous ; Line: Peripheral IV 03/18/25 1315 Left;Posterior Hand ; Scheduled Time: 1613 Del,  AHSAN Kc   16:45 Sepsis Predictive Model Early Detection of Sepsis PA score  Early Detection of Sepsis PA score: 0.82 Inpatient, Batch Job   16:46 Medication New Bag sodium chloride 0.9 % infusion - Dose: 125 mL/hr ; Rate: 125 mL/hr ; Route: Intravenous ; Line: Peripheral IV 03/18/25 1315 Left;Posterior Hand ; Scheduled Time: 1610 Yamini Mathis RN          History & Physical        , RAKESH Barber at 03/18/25 1553       Attestation signed by Michelle Clarke MD at 03/18/25 1944    I performed a substantive part of the MDM during the patient’s E/M visit. I personally made or approved the documented management plan and acknowledge its risk of complications.     Labs and imaging reviewed.  Management/test interpretation discussed with RAKESH Vieyra.    Electronically signed by Michelle Clarke MD, 3/18/2025, 19:44 CDT.            Study Result    Narrative & Impression   CT ABDOMEN PELVIS WO CONTRAST- 3/18/2025 12:48 PM     HISTORY: nv, abd pain, elevaed lipase, [place]      COMPARISON: 2/27/2025     DLP: 145.24 mGy.cm . All CT scans are performed using dose optimization  techniques as appropriate to the performed exam and including at least  one of the following: Automated exposure control, adjustment of the mA  and/or kV according to size, and the use of the iterative reconstruction  technique.     TECHNIQUE: Noncontrast enhanced images of the abdomen and pelvis  obtained without oral contrast.     FINDINGS:  The lung bases and base of the heart are unremarkable.     LIVER: No focal liver lesion.     BILIARY SYSTEM: The gallbladder is surgically absent. No biliary  dilatation present..     PANCREAS: There is some subtle stranding within the anterior pararenal  space associated with the pancreatic head and proximal body of the  pancreas suggesting pancreatitis. Correlation is recommended with the  patient's amylase and lipase levels..     SPLEEN: Unremarkable.     KIDNEYS AND ADRENALS: Both adrenal  glands are enlarged but remain  adreniform in shape. I would favor bilateral adrenal adenomas. No  evidence of renal mass or perinephric fluid collection. No  nephrolithiasis..  The ureters are decompressed and normal in  appearance.     RETROPERITONEUM: No mass, lymphadenopathy or hemorrhage.     GI TRACT: There is mild colonic distention of the right and transverse  colon with scattered air-fluid levels suggesting there may be an ongoing  diarrheal illness. No mechanical obstruction. The stomach is food  filled. A gastric stimulator projects over the upper abdomen.. Surgical  clips at the base of the cecum suggest prior appendectomy..     OTHER: There is no mesenteric mass, lymphadenopathy or fluid collection.  The osseous structures and soft tissues demonstrate no worrisome  lesions. There is a small defect along the left anterolateral abdominal  wall likely related to prior gastrostomy tube placement.     PELVIS: Previous hysterectomy. No adnexal mass or free fluid.. The  urinary bladder is normal in appearance.     IMPRESSION:  1. The right and transverse colon is mildly distended with liquefied  stool and air-fluid levels suggesting an ongoing diarrheal illness. No  mechanical obstruction. The stomach is food filled. A gastric stimulator  projects over the upper abdomen. Evidence of previous gastrostomy tube.  2. Radiographic findings suggesting acute pancreatitis with inflammatory  stranding in the anterior pararenal space adjacent to the pancreatic  head and body of the pancreas. No fluid collection present. No ductal  dilatation.  3. Previous hysterectomy. No adnexal mass or free fluid. The urinary  bladder is normal in appearance.  4. Suspected bilateral adrenal adenomas with both adrenal glands  enlarged but remaining adreniform in shape.           This report was signed and finalized on 3/18/2025 2:38 PM by Dr. Alexis Ray MD.                 [facility]  Services  HISTORY AND PHYSICAL    Date of Admission: 3/18/2025  Primary Care Physician: KIRILL Murguia MD    Subjective   Primary Historian: Patient    Chief Complaint: Abdominal pain    History of Present Illness  Tasha Perez is a 54-year-old female with a past medical history of arthritis, degenerative disc disease, diabetes mellitus type 2 on long-term insulin, hypercholesteremia, fibromyalgia, neuropathy, Raynaud's disease, CKD stage IIIb, 1 PPD smoker, and nonalcoholic pancreatitis x 5.  Patient presented to [facility] emergency department 3/18/2025 after the encouragement of Dr. Barrow her nephrologist as her kidney function had declined enough that he would like her treated with IV hydration in the hospital.  Upon admission patient stated that she has been having increasing abdominal pain over the last several days.  With associated nausea and no vomiting.  No complaints of diarrhea.  Additional complaint patient has is that she is continuing to significantly lose more weight does not feel that her gastric stimulator is helping at all.  CT of the abdomen was obtained which revealed acute pancreatitis with inflammatory stranding in the anterior pararenal space adjacent to the pancreatic head and body of the pancreas.  No fluid collection present no ductal dilation or abscess.  Patient has no additional complaints of shortness of breath, chest pain or palpitations.  Patient was made aware she will be admitted for management of pancreatitis and fluid hydration for SUGAR superimposed on CKD.  Patient's questions were answered to the best of my ability and she is in agreement for admission and treatment.      Review of Systems   Constitutional:  Positive for fatigue.   Gastrointestinal:  Positive for abdominal pain, nausea and vomiting.   Neurological:  Positive for weakness.      Otherwise complete ROS reviewed and negative except as mentioned in the HPI.    Past Medical History:   Past Medical History:    Diagnosis Date    Arthritis     Contusion     Degenerative disc disease, cervical     Diabetes mellitus     Elevated cholesterol     Fibromyalgia     Neuropathy     Osteoporosis     Pancreatitis     Raynaud disease     Renal insufficiency     Smoker 02/08/2022    Vitamin D deficiency 04/26/2022     Past Surgical History:  Past Surgical History:   Procedure Laterality Date    APPENDECTOMY      CHOLECYSTECTOMY      COLONOSCOPY      ENDOSCOPY N/A 10/08/2019    Procedure: ESOPHAGOGASTRODUODENOSCOPY WITH ANESTHESIA;  Surgeon: Aleks Vaughn DO;  Location: [facility] ENDOSCOPY;  Service: Gastroenterology    ENDOSCOPY N/A 11/12/2024    Procedure: ESOPHAGOGASTRODUODENOSCOPY WITH ANESTHESIA;  Surgeon: Tiffanie Saucedo MD;  Location: [facility] ENDOSCOPY;  Service: Gastroenterology;  Laterality: N/A;  pre op: Gastroparesis, Intractable nausea  post op: insertion of dobhoff   PCP: Brandon Murguia    ENDOSCOPY N/A 11/15/2024    Procedure: ESOPHAGOGASTRODUODENOSCOPY WITH ANESTHESIA WITH NG TUBE INSERTION;  Surgeon: Tiffanie Saucedo MD;  Location: [facility] ENDOSCOPY;  Service: Gastroenterology;  Laterality: N/A;  PRE OP: DOBHOFF  POST OP: DOBHOFF  PCP: TELLY PACK    ENDOSCOPY WITH GASTROSTOMY TUBE INSERTION N/A 6/15/2022    Procedure: ESOPHAGOGASTRODUODENOSCOPY WITH GASTROSTOMY TUBE INSERTION;  Surgeon: Jersey Lee MD;  Location: [facility] ENDOSCOPY;  Service: Gastroenterology;  Laterality: N/A;  pre peg placement  post peg placement  Dr. Murguia    ENTEROSCOPY SMALL BOWEL N/A 11/3/2022    Procedure: Esophagogastroduodenoscopy with Peg Removal;  Surgeon: Aleks Vaughn DO;  Location: [facility] ENDOSCOPY;  Service: Gastroenterology;  Laterality: N/A;  pre; peg removal  post; peg removal   KIRILL Murguia MD        HYSTERECTOMY       Social History:  reports that she has been smoking cigarettes. She started smoking about 32 years ago. She has a 16.1 pack-year smoking history. She has been exposed to tobacco smoke. She does not  "have any smokeless tobacco history on file. She reports that she does not drink alcohol and does not use drugs.    Family History: family history is not on file. She was adopted.       Allergies:  Allergies   Allergen Reactions    Bee Venom Anaphylaxis    Hydrocodone Anaphylaxis    Hydroxychloroquine Other (See Comments)     Hair loss    Ibuprofen Other (See Comments)     \"SHUTS MY KIDNEYS DOWN\" PER PATIENT     Pineapple Anaphylaxis    Pineapple Extract Anaphylaxis    Wasp Venom Anaphylaxis    Wasp Venom Protein Anaphylaxis    Hydrocodone-Acetaminophen Dizziness, Nausea And Vomiting, Nausea Only and Unknown - Low Severity    Penicillins Nausea And Vomiting    Sitagliptin Other (See Comments)     Other reaction(s): Abdominal Pain      Metformin Other (See Comments)     Pt states it messes with her kidneys    Chocolate Rash     Dark chocolate only, told to RD 7/10/24    Poison Shelly Extract Itching and Rash       Medications:  Prior to Admission medications    Medication Sig Start Date End Date Taking? Authorizing Provider   aspirin 81 MG EC tablet Take 1 tablet by mouth Daily.    Kevin Mejia MD   atorvastatin (LIPITOR) 80 MG tablet Take 1 tablet by mouth Every Night.    Kevin Mejia MD   Calcium Carbonate-Vitamin D 600-5 MG-MCG tablet Take 1 tablet by mouth Daily.    Kevin Mejia MD   cetirizine (zyrTEC) 10 MG tablet Take 1 tablet by mouth Daily As Needed for Allergies.    Kevin Mejia MD   Cholecalciferol (Vitamin D3) 50 MCG (2000 UT) capsule Take 1 capsule by mouth Daily.    Kevin Mejia MD   dapagliflozin Propanediol (Farxiga) 10 MG tablet Take 10 mg by mouth Daily.    Kevin Mejia MD   hydrocortisone (ANUSOL-HC) 25 MG suppository Insert 1 suppository into the rectum 2 (Two) Times a Day. 12/14/24   Otto Del Rosario MD   hydroxychloroquine (PLAQUENIL) 200 MG tablet Take 2 tablets by mouth Daily.    Kevin Mejia MD   insulin aspart (NovoLOG) 100 " "UNIT/ML patient supplied pump Inject 1 Units under the skin into the appropriate area as directed Continuous.    Kevin Mejia MD   linaclotide (LINZESS) 290 MCG capsule capsule Take 1 capsule by mouth Every Morning Before Breakfast.    Kevin Mejia MD   miSOPROStol (CYTOTEC) 200 MCG tablet Take 1 tablet by mouth 4 (Four) Times a Day.    Kevin Mejia MD   omeprazole (priLOSEC) 40 MG capsule Take 1 capsule by mouth Every Other Day.    Kevin Mejia MD   promethazine (PHENERGAN) 25 MG tablet Take 1 tablet by mouth Every 6 (Six) Hours As Needed for Nausea or Vomiting.    Kevin Mejia MD   rOPINIRole (REQUIP) 1 MG tablet Take 1 tablet by mouth 3 (Three) Times a Day.    Kevin Mejia MD   sodium bicarbonate 650 MG tablet Take 1 tablet by mouth 2 (Two) Times a Day. 9/23/24   Kevin Mejia MD   sucralfate (CARAFATE) 1 g tablet Take 1 tablet by mouth 4 (Four) Times a Day.    Kevin Mejia MD     I have utilized all available immediate resources to obtain, update, or review the patient's current medications (including all prescriptions, over-the-counter products, herbals, cannabis/cannabidiol products, and vitamin/mineral/dietary (nutritional) supplements).    Objective     Vital Signs: /74 (BP Location: Right arm, Patient Position: Sitting)   Pulse 94   Temp 97.5 °F (36.4 °C) (Oral)   Resp 18   Ht 154.9 cm (61\")   Wt 40.6 kg (89 lb 8 oz)   SpO2 100%   BMI 16.91 kg/m²   Physical Exam  Vitals and nursing note reviewed.   Constitutional:       Appearance: Normal appearance. She is cachectic. She is not ill-appearing or toxic-appearing.      Comments: Room air   HENT:      Right Ear: Tympanic membrane normal.      Left Ear: Tympanic membrane normal.      Nose: Nose normal.      Mouth/Throat:      Mouth: Mucous membranes are moist.      Pharynx: Oropharynx is clear.   Eyes:      Pupils: Pupils are equal, round, and reactive to light. "   Cardiovascular:      Rate and Rhythm: Normal rate and regular rhythm.      Pulses: Normal pulses.      Heart sounds: Normal heart sounds.   Pulmonary:      Effort: Pulmonary effort is normal.      Breath sounds: Normal breath sounds.   Abdominal:      General: Abdomen is flat. Bowel sounds are normal. There is no distension.      Palpations: Abdomen is soft.      Tenderness: There is abdominal tenderness.   Musculoskeletal:      Cervical back: Normal range of motion.      Right lower leg: No edema.      Left lower leg: No edema.   Skin:     General: Skin is warm and dry.      Capillary Refill: Capillary refill takes less than 2 seconds.   Neurological:      General: No focal deficit present.      Mental Status: She is alert and oriented to person, place, and time.   Psychiatric:         Mood and Affect: Mood normal.         Behavior: Behavior normal.         Thought Content: Thought content normal.         Judgment: Judgment normal.        Results Reviewed:  Lab Results (last 24 hours)       Procedure Component Value Units Date/Time    Respiratory Panel PCR w/COVID-19(SARS-CoV-2) DYLAN/ROSALBA/KHOA/PAD/COR/LISA In-House, NP Swab in UTM/VTM, 2 HR TAT - Swab, Nasopharynx [[id number]]  (Normal) Collected: 03/18/25 1222    Specimen: Swab from Nasopharynx Updated: 03/18/25 1416     ADENOVIRUS, PCR Not Detected     Coronavirus 229E Not Detected     Coronavirus HKU1 Not Detected     Coronavirus NL63 Not Detected     Coronavirus OC43 Not Detected     COVID19 Not Detected     Human Metapneumovirus Not Detected     Human Rhinovirus/Enterovirus Not Detected     Influenza A PCR Not Detected     Influenza B PCR Not Detected     Parainfluenza Virus 1 Not Detected     Parainfluenza Virus 2 Not Detected     Parainfluenza Virus 3 Not Detected     Parainfluenza Virus 4 Not Detected     RSV, PCR Not Detected     Bordetella pertussis pcr Not Detected     Bordetella parapertussis PCR Not Detected     Chlamydophila pneumoniae PCR Not Detected      Mycoplasma pneumo by PCR Not Detected            Urinalysis With Culture If Indicated - Urine, Clean Catch [[id number]]  (Abnormal) Collected: 03/18/25 1335    Specimen: Urine, Clean Catch Updated: 03/18/25 1356     Color, UA Yellow     Appearance, UA Clear     pH, UA 5.5     Specific Gravity, UA 1.018     Glucose, UA Negative     Ketones, UA Trace     Bilirubin, UA Negative     Blood, UA Negative     Protein, UA 30 mg/dL (1+)     Leuk Esterase, UA Negative     Nitrite, UA Negative     Urobilinogen, UA 0.2 E.U./dL            Urinalysis, Microscopic Only - Urine, Clean Catch [[id number]] Collected: 03/18/25 1335    Specimen: Urine, Clean Catch Updated: 03/18/25 1356     RBC, UA None Seen /HPF      WBC, UA 0-2 /HPF      Comment: Urine culture not indicated.        Bacteria, UA None Seen /HPF      Squamous Epithelial Cells, UA 0-2 /HPF      Hyaline Casts, UA 0-2 /LPF      Granular Casts, UA 0-2 /LPF      Methodology Manual Light Microscopy    Lipase [[id number]]  (Abnormal) Collected: 03/18/25 1221    Specimen: Blood Updated: 03/18/25 1310     Lipase 503 U/L      Comment: Specimen hemolyzed.  Results may be falsely decreased.       Comprehensive Metabolic Panel [[id number]]  (Abnormal) Collected: 03/18/25 1221    Specimen: Blood Updated: 03/18/25 1256     Glucose 326 mg/dL      BUN 34 mg/dL      Creatinine 2.14 mg/dL      Sodium 138 mmol/L      Potassium 3.7 mmol/L      Comment: Specimen hemolyzed.  Result may be falsely elevated.        Chloride 110 mmol/L      CO2 15.0 mmol/L      Calcium 8.6 mg/dL      Total Protein 7.0 g/dL      Albumin 3.5 g/dL      ALT (SGPT) 11 U/L      Comment: Specimen hemolyzed.  Result may  be falsely elevated.        AST (SGOT) 18 U/L      Comment: Specimen hemolyzed.  Result may be falsely elevated.        Alkaline Phosphatase 146 U/L      Total Bilirubin <0.2 mg/dL      Globulin 3.5 gm/dL      A/G Ratio 1.0 g/dL      BUN/Creatinine Ratio 15.9     Anion Gap 13.0 mmol/L      eGFR 27.1  mL/min/1.73             Magnesium [[id number]]  (Normal) Collected: 03/18/25 1221    Specimen: Blood Updated: 03/18/25 1256     Magnesium 1.6 mg/dL     Phosphorus [[id number]]  (Normal) Collected: 03/18/25 1221    Specimen: Blood Updated: 03/18/25 1250     Phosphorus 3.8 mg/dL     CBC & Differential [[id number]]  (Abnormal) Collected: 03/18/25 1221    Specimen: Blood Updated: 03/18/25 1236            CBC Auto Differential [[id number]]  (Abnormal) Collected: 03/18/25 1221    Specimen: Blood Updated: 03/18/25 1236     WBC 10.73 10*3/mm3      RBC 3.87 10*6/mm3      Hemoglobin 11.2 g/dL      Hematocrit 35.4 %      MCV 91.5 fL      MCH 28.9 pg      MCHC 31.6 g/dL      RDW 16.1 %      RDW-SD 53.5 fl      MPV 9.9 fL      Platelets 552 10*3/mm3      Neutrophil % 64.7 %      Lymphocyte % 26.7 %      Monocyte % 3.5 %      Eosinophil % 2.8 %      Basophil % 0.3 %      Immature Grans % 2.0 %      Neutrophils, Absolute 6.95 10*3/mm3      Lymphocytes, Absolute 2.86 10*3/mm3      Monocytes, Absolute 0.38 10*3/mm3      Eosinophils, Absolute 0.30 10*3/mm3      Basophils, Absolute 0.03 10*3/mm3      Immature Grans, Absolute 0.21 10*3/mm3      nRBC 0.0 /100 WBC           Imaging Results (Last 24 Hours)       Procedure Component Value Units Date/Time    CT Abdomen Pelvis Without Contrast [[id number]] Collected: 03/18/25 1426     Updated: 03/18/25 1441    Narrative:      CT ABDOMEN PELVIS WO CONTRAST- 3/18/2025 12:48 PM     HISTORY: nv, abd pain, elevaed lipase, [place]      COMPARISON: 2/27/2025     DLP: 145.24 mGy.cm . All CT scans are performed using dose optimization  techniques as appropriate to the performed exam and including at least  one of the following: Automated exposure control, adjustment of the mA  and/or kV according to size, and the use of the iterative reconstruction  technique.     TECHNIQUE: Noncontrast enhanced images of the abdomen and pelvis  obtained without oral contrast.     FINDINGS:  The lung bases and base of the  heart are unremarkable.     LIVER: No focal liver lesion.     BILIARY SYSTEM: The gallbladder is surgically absent. No biliary  dilatation present..     PANCREAS: There is some subtle stranding within the anterior pararenal  space associated with the pancreatic head and proximal body of the  pancreas suggesting pancreatitis. Correlation is recommended with the  patient's amylase and lipase levels..     SPLEEN: Unremarkable.     KIDNEYS AND ADRENALS: Both adrenal glands are enlarged but remain  adreniform in shape. I would favor bilateral adrenal adenomas. No  evidence of renal mass or perinephric fluid collection. No  nephrolithiasis..  The ureters are decompressed and normal in  appearance.     RETROPERITONEUM: No mass, lymphadenopathy or hemorrhage.     GI TRACT: There is mild colonic distention of the right and transverse  colon with scattered air-fluid levels suggesting there may be an ongoing  diarrheal illness. No mechanical obstruction. The stomach is food  filled. A gastric stimulator projects over the upper abdomen.. Surgical  clips at the base of the cecum suggest prior appendectomy..     OTHER: There is no mesenteric mass, lymphadenopathy or fluid collection.  The osseous structures and soft tissues demonstrate no worrisome  lesions. There is a small defect along the left anterolateral abdominal  wall likely related to prior gastrostomy tube placement.     PELVIS: Previous hysterectomy. No adnexal mass or free fluid.. The  urinary bladder is normal in appearance.       Impression:      1. The right and transverse colon is mildly distended with liquefied  stool and air-fluid levels suggesting an ongoing diarrheal illness. No  mechanical obstruction. The stomach is food filled. A gastric stimulator  projects over the upper abdomen. Evidence of previous gastrostomy tube.  2. Radiographic findings suggesting acute pancreatitis with inflammatory  stranding in the anterior pararenal space adjacent to the  pancreatic  head and body of the pancreas. No fluid collection present. No ductal  dilatation.  3. Previous hysterectomy. No adnexal mass or free fluid. The urinary  bladder is normal in appearance.  4. Suspected bilateral adrenal adenomas with both adrenal glands  enlarged but remaining adreniform in shape.           This report was signed and finalized on 3/18/2025 2:38 PM by Dr. Alexis Ray MD.               Assessment / Plan   Assessment:   Active Hospital Problems    Diagnosis     **Acute pancreatitis     Acute kidney injury superimposed on stage 3b chronic kidney disease     Type 2 diabetes mellitus, with long-term current use of insulin     Severe malnutrition        Treatment Plan  The patient will be admitted to Dr. Clarke service here at [facility].     1.  Acute pancreatitis-aggressive fluid hydration with normal saline at 125 as patient has allergy to LR.  Morphine 2 mg every 4 for severe pain.  N.p.o. with ice chips, daily lipase and CMP.  Will continue to follow closely.  Magnesium of 1.6 replaced with 2G IV    2.  SUGAR on CKD-normal saline at 125 mL/HR per discussion with Dr. Harvey.  Will continue to follow closely with daily CMP, holding all nephrotoxic agents.    3.  Type 2 diabetes mellitus-A1c pending, Accu-Cheks before meals and at bedtime with moderate sliding scale insulin.    4.  Severe malnutrition-dietitian consultation to assist will add limits when patient is able to tolerate p.o.    5.  Reviewed and restarted home medications as appropriate.    VTE prophylaxis with SCDs  Labs in a.m.    Medical Decision Making  1 acute, high complexity, unchanged  1 acute on chronic, high complexity, unchanged  1 chronic, moderate complexity, unchanged    Number and Complexity of problems: 3  Differential Diagnosis: None  Conditions and Status        Condition is unchanged.     [facility] Data  External documents reviewed: Epic review of previous hospitalizations and most recent pancreatitis  admission  Cardiac tracing (EKG, telemetry) interpretation: 3/18/2025 EKG per cardiology reviewed  Radiology interpretation:/18/2025 CT of the abdomen pelvis per radiology reviewed  Labs reviewed:3/18/2025 CMP, lipase, CBC with differential, urinalysis, respiratory PCR viewed, repeat labs in a.m.   any tests that were considered but not ordered: None     Decision rules/scores evaluated (example [id number], Wells, etc): Ookala's Criteria for Pancreatitis Mortality - MDCalc  Calculated on Mar 18 2025 6:10 PM  1 points -> Ookala's Criteria (On Admission) Severe pancreatitis unlikely.  1 points -> Almaz's Criteria (Cumulative) 1% predicted mortality.     Discussed with: Dr. Clarke, Dr. Harvey, nephrology and patient     Care Planning  Shared decision making: Dr. Clarke, Dr. Harvey, nephrology and patient   code status and discussions: Full code per patient    Disposition  Social Determinants of Health that impact treatment or disposition: None determined at this time  Estimated length of stay is 1-2 days.     I confirmed that the patient's advanced care plan is present, code status is documented, and a surrogate decision maker is listed in the patient's medical record.     The patient's surrogate decision maker is her  Vincent.     The patient was seen and examined by me on 3/18/2025 at 1553.    Electronically signed by RAKESH Vieyra, 03/18/25, 17:35 CDT.               Electronically signed by Michelle Clarke MD at 03/18/25 1944          Emergency Department Notes        Vika Snider RN at 03/18/25 1638          Please See IPASS.     Electronically signed by Vika Snider RN at 03/18/25 1638       Fernandez Grant PA-C at 03/18/25 1407          HPI      Patient is a 54-year-old female presenting to ED with abnormal kidney functions.  PMH significant for insulin-dependent diabetes, arthritis, Raynaud's disease, osteoporosis, fibromyalgia, history of renal insufficiency.  Patient states 4 days  ago she had lab work done 1 week in advance of her routine appointment with Dr. Jeffers for which she was called today and advised that she had abnormal kidney functions requiring ER evaluation.  Patient states that over the past 5 days she has noticed some increase in fatigue, tiredness, intermittent lightheadedness.  Patient states that for years she has suffered with vomiting and diarrhea which is at its baseline with no increase.  Patient denies any decreased urination, dysuria.  Patient denies abdominal pain, fevers, chills, diaphoresis, any known sick contact.  Patient denies use of any medications outside of the Zofran which she typically has at home with no improvement and presents at this time for further evaluation.    RECORDS REVIEWED SHOW: Patient was called by infectious disease on 3/16/2025 to discuss negative blood cultures as well as review recent labs which revealed sodium 138, potassium 3.5, chloride 113, bicarb 9, BUN 48, creatinine 2, calcium 9.9 and GFR of 29, total protein 7.6 and magnesium 1.5.       ROS    Review of Systems   Constitutional: POSITIVE for fatigue.  Negative for chills, diaphoresis and fever.   HENT:  Negative for trouble swallowing, sore throat.  Eyes:  Negative.   Respiratory: Negative.  Negative for cough.    Cardiovascular: Negative.    Gastrointestinal:  Positive for nausea and vomiting. Negative for abdominal pain, constipation and diarrhea.        Denies hematemesis   Genitourinary: Negative.  Negative for dysuria and flank pain, and hematuria.   Musculoskeletal: Negative.  Negative for myalgias, neck pain and neck stiffness.   Skin: Negative.  Negative for rash and wound.   Neurological: POSITIVE for weakness. Negative for dizziness, weakness, light-headedness and numbness.   Psychiatric/Behavioral: Negative.     All other systems reviewed and are negative.    PHYSICAL EXAMINATION:    Physical Exam  Vitals and nursing note reviewed.   Constitutional:       General: She  is not in acute distress.     Appearance: Normal appearance. She is normal weight. She is not ill-appearing, toxic-appearing or diaphoretic.   HENT:      Head: Normocephalic.      Mouth/Throat:      Mouth: Mucous membranes are dry.      Pharynx: Oropharynx is clear. No oropharyngeal exudate or posterior oropharyngeal erythema.      Comments: No intraoral rashes, lesions, petechiae  Eyes:      Conjunctiva/sclera: Conjunctivae normal.      Pupils: Pupils are equal, round, and reactive to light.   Cardiovascular:      Rate and Rhythm: Normal rate and regular rhythm.   Pulmonary:      Effort: Pulmonary effort is normal.      Breath sounds: Normal breath sounds.   Abdominal:      General: Bowel sounds are normal. There is no distension.      Palpations: Abdomen is soft.      Tenderness: There is no abdominal tenderness. There is no right CVA tenderness, left CVA tenderness or guarding.   Musculoskeletal:         General: Normal range of motion.      Cervical back: Neck supple.      Right lower leg: No edema.      Left lower leg: No edema.   Skin:     General: Skin is warm and dry.      Coloration: Skin is not jaundiced.   Neurological:      Mental Status: She is alert and oriented to person, place, and time.      Gait: Gait normal.   Psychiatric:         Mood and Affect: Mood normal.         Behavior: Behavior normal.         ED COURSE:    Patient is a 54-year-old female presenting to ED with abnormal kidney functions.  PMH significant for insulin-dependent diabetes, arthritis, Raynaud's disease, osteoporosis, fibromyalgia, history of renal insufficiency.  Upon initial evaluation patient resting in the bed slightly anxious but otherwise in no acute distress.  Nontoxic-appearing, non-ill-appearing, nondiaphoretic.  Patient with unremarkable vital signs.  Examination is unremarkable to include abdomen soft, nontender, nondistended with no CVAT.  Discussed with patient need for workup to include labs, analysis, CT imaging  as well as ability for IV fluids for which she is amenable with no further questions, concerns, or needs at this time.    Differential diagnosis: SUGAR superimposed on CKD, electrolyte disturbance, pancreatitis, urinary tract infection, systemic infection, COVID-19, influenza, diverticulitis, diverticulosis, other    Lab work revealed normal blood cell count, stable H&H, thrombocytosis 552 and no further CBC abnormalities.  CMP with worsening renal functions creatinine 2.14, BUN 34, GFR of 27.1.  Hyperglycemia 326 with no further significant electrolyte disturbances including normal magnesium and phosphorus.  CMP otherwise unremarkable with normal anion gap 13.  No hepatic dysfunction.  Lipase elevated at 503.  Urinalysis with trace ketones, 30 protein.  No evidence of infection.  Respiratory panel unremarkable.  CT imaging the abdomen and pelvis performed without contrast showed: 1. The right and transverse colon is mildly distended with liquefied stool and air-fluid levels suggesting an ongoing diarrheal illness. No mechanical obstruction. The stomach is food filled. A gastric stimulator projects over the upper abdomen. Evidence of previous gastrostomy tube. 2. Radiographic findings suggesting acute pancreatitis with inflammatory stranding in the anterior pararenal space adjacent to the pancreatic head and body of the pancreas. No fluid collection present. No ductal dilatation. 3. Previous hysterectomy. No adnexal mass or free fluid. The urinary bladder is normal in appearance. 4. Suspected bilateral adrenal adenomas with both adrenal glands enlarged but remaining adreniform in shape. Case discussed with Dr. Harvey, nephrology, who is in agreement with IV hydration at this time and will kindly consult on patient during admission.  Case further discussed with Ms. Ilene CAMERON for hospitalist group who will kindly accept patient under the services of Dr. Clarke, hospitalist.  Discussed with patient need for  admission as well as n.p.o. status for which she is amenable with no further questions, concerns, needs at this time.      FINAL DIAGNOSIS:     PANCREATITIS  SUGAR superimposed on CKD  Elevated lipase  Nausea, vomiting.     Fernandez Grant PA-C  03/18/25 1552      Electronically signed by Fernandez Grant PA-C at 03/18/25 1552       Fernandez Grant PA-C at 03/18/25 1330          .     Fernandez Grant PA-C  03/18/25 1613      Electronically signed by Fernandez Grant PA-C at 03/18/25 1613       Fernandez Grant PA-C at 03/18/25 1214          MEDICAL SCREENING:    Reason for Visit: Abnormal labs    Patient initially seen in triage.  The patient was advised further evaluation and diagnostic testing will be needed, some of the treatment and testing will be initiated in the lobby in order to begin the process.  The patient will be returned to the waiting area for the time being and possibly be re-assessed by a subsequent ED provider.  The patient will be brought back to the treatment area in as timely manner as possible.    Patient is a 54-year-old female presenting to ED with abnormal kidney functions.  PMH significant for insulin-dependent diabetes, arthritis, Raynaud's disease, osteoporosis, fibromyalgia, history of renal insufficiency.  Patient states 4 days ago she had lab work done 1 week in advance of her routine appointment with Dr. Jeffers for which she was called today and advised that she had abnormal kidney functions requiring ER evaluation.  Patient states that over the past 5 days she has noticed some increase in fatigue, tiredness, intermittent lightheadedness.  Patient states that for years she has suffered with vomiting and diarrhea which is at its baseline with no increase.  Patient denies any decreased urination, dysuria.  Patient denies abdominal pain, fevers, chills, diaphoresis, any known sick contact.  Patient denies use of any medications outside of the Zofran which she typically has at home with  no improvement and presents at this time for further evaluation.      Discussed with patient need for workup to include repeat labs and urinalysis for which she is amenable with no further questions, concerns, needs at this time.    RECORDS REVIEWED SHOW: Patient was called by infectious disease on 3/16/2025 to discuss negative blood cultures as well as review recent labs which revealed sodium 138, potassium 3.5, chloride 113, bicarb 9, BUN 48, creatinine 2, calcium 9.9 and GFR of 29, total protein 7.6 and magnesium 1.5.     Fernandez Grant PA-C  03/18/25 1228       Fernandez Grant PA-C  03/18/25 1330       Fernandez Grant PA-C  03/18/25 1330       Fernandez Grant PA-C  03/18/25 1456      Electronically signed by Fernandez Grant PA-C at 03/18/25 1456       Vital Signs (last 2 days)       Date/Time Temp Temp src Pulse Resp BP Patient Position SpO2    03/19/25 0758 98.1 (36.7) Oral 78 18 91/60 Lying 98    03/19/25 0345 98.1 (36.7) Oral 78 16 97/60 Lying 99    03/18/25 2330 98.7 (37.1) Oral 80 14 97/55 Lying 98    03/18/25 2021 98.6 (37) Oral 88 18 121/86 Lying 100    03/18/25 1812 97.7 (36.5) Oral 82 18 117/78 Sitting 100    03/18/25 14:35:34 97.5 (36.4) Oral 94 18 123/74 Sitting 100    03/18/25 1149 97.9 (36.6) Oral 94 20 115/74 Sitting 100          Oxygen Therapy (last 2 days)       Date/Time SpO2 Device (Oxygen Therapy) Flow (L/min) (Oxygen Therapy) Oxygen Concentration (%) ETCO2 (mmHg)    03/19/25 0800 -- room air -- -- --    03/19/25 0758 98 room air -- -- --    03/19/25 0345 99 room air -- -- --    03/18/25 2330 98 room air -- -- --    03/18/25 2110 -- room air -- -- --    03/18/25 2021 100 room air -- -- --    03/18/25 1812 100 room air -- -- --    03/18/25 1656 -- room air -- -- --    03/18/25 14:35:34 100 room air -- -- --    03/18/25 1149 100 room air -- -- --          Intake & Output (last 2 days)         03/17 0701 03/18 0700 03/18 0701 03/19 0700 03/19 0701 03/20 0700    I.V. (mL/kg)  912 (22.5)      Total Intake(mL/kg)  912 (22.5)     Urine (mL/kg/hr)  800     Total Output  800     Net  +112                  Facility-Administered Medications as of 3/19/2025   Medication Dose Route Frequency Provider Last Rate Last Admin    acetaminophen (TYLENOL) tablet 650 mg  650 mg Oral Q4H PRN Elisabeth Josue APRN        Or    acetaminophen (TYLENOL) 160 MG/5ML oral solution 650 mg  650 mg Oral Q4H PRN Elisabeth Josue APRN        Or    acetaminophen (TYLENOL) suppository 650 mg  650 mg Rectal Q4H PRN Elisabeth Josue APRN        [Held by provider] aspirin EC tablet 81 mg  81 mg Oral Daily Elisabeth harper APRN        [Held by provider] atorvastatin (LIPITOR) tablet 80 mg  80 mg Oral Nightly Elisabeth harper APRN        [Held by provider] cetirizine (zyrTEC) tablet 10 mg  10 mg Oral Daily PRN Elisabeth Josue APRN        dextrose (D50W) (25 g/50 mL) IV injection 25 g  25 g Intravenous Q15 Min PRN Elisabeth Josue APRN        dextrose (GLUTOSE) oral gel 15 g  15 g Oral Q15 Min PRN Elisabeth Josue APRN        [Held by provider] empagliflozin (JARDIANCE) tablet 10 mg  10 mg Oral Daily Elisabeth Josue APRN        insulin regular (humuLIN R,novoLIN R) injection 2-9 Units  2-9 Units Subcutaneous Q6H Elisabeth Josue APRN   2 Units at 03/18/25 2110    [COMPLETED] magnesium sulfate 2g/50 mL (PREMIX) infusion  2 g Intravenous Once Elisabeth Josue APRN   2 g at 03/18/25 1644    Morphine sulfate (PF) injection 2 mg  2 mg Intravenous Q4H PRN Michelle Clarke MD        ondansetron ODT (ZOFRAN-ODT) disintegrating tablet 4 mg  4 mg Oral Q6H PRN Elisabeth Josue APRN        Or    ondansetron (ZOFRAN) injection 4 mg  4 mg Intravenous Q6H PRN Elisabeth Josue APRN        pantoprazole (PROTONIX) injection 40 mg  40 mg Intravenous Q12H , RAKESH Barber   40 mg at 03/19/25 1006    [COMPLETED] potassium chloride 10 mEq in 100 mL IVPB  10 mEq Intravenous Once Elodia Phillips  mL/hr at 03/19/25 0654 10 mEq at  03/19/25 0654    potassium chloride 10 mEq in 100 mL IVPB  10 mEq Intravenous Q1H Ford Elena APRN        [Held by provider] rOPINIRole (REQUIP) tablet 1 mg  1 mg Oral TID Elisabeth Josue APRN        sodium chloride 0.45 % 925 mL with sodium bicarbonate 8.4 % 75 mEq infusion   Intravenous Continuous Janak Harvey  mL/hr at 03/19/25 0654 New Bag at 03/19/25 0654    [COMPLETED] sodium chloride 0.9 % bolus 1,000 mL  1,000 mL Intravenous Once Fernandez Grant PA-C   Stopped at 03/18/25 1518    sodium chloride 0.9 % flush 10 mL  10 mL Intravenous PRN Fernandez Grant PA-C        sodium chloride 0.9 % flush 10 mL  10 mL Intravenous Q12H Elisabeth Josue APRN   10 mL at 03/19/25 1006    sodium chloride 0.9 % flush 10 mL  10 mL Intravenous PRN Elisabeth Josue APRN        sodium chloride 0.9 % infusion 40 mL  40 mL Intravenous PRN Elisabeth Josue APRN        sodium chloride 0.9 % infusion  125 mL/hr Intravenous Continuous Elisabeth Josue APRN 125 mL/hr at 03/18/25 1646 125 mL/hr at 03/18/25 1646     Orders (last 48 hrs)        Start     Ordered    03/20/25 0600  Comprehensive Metabolic Panel  Morning Draw         03/19/25 1023    03/20/25 0600  Magnesium  Morning Draw         03/19/25 1023    03/19/25 1200  potassium chloride 10 mEq in 100 mL IVPB  Every 1 Hour         03/19/25 1023    03/19/25 0905  Potassium  STAT         03/19/25 0905    03/19/25 0800  Oral Care  2 Times Daily       03/18/25 1916    03/19/25 0715  potassium chloride 10 mEq in 100 mL IVPB  Once         03/19/25 0620    03/19/25 0620  Potassium Replacement - Follow Nurse / BPA Driven Protocol  As Needed,   Status:  Discontinued         03/19/25 0620    03/19/25 0620  Magnesium Standard Dose Replacement - Follow Nurse / BPA Driven Protocol  As Needed,   Status:  Discontinued         03/19/25 0620    03/19/25 0600  CBC Auto Differential  Morning Draw,   Status:  Canceled         03/18/25 1916    03/19/25 0600  Hemoglobin A1c  Morning  Draw         03/18/25 1916 03/19/25 0600  Comprehensive Metabolic Panel  Morning Draw,   Status:  Canceled         03/18/25 1916 03/19/25 0600  Lipase  Morning Draw         03/18/25 1916 03/19/25 0600  Magnesium  Morning Draw         03/18/25 1916 03/19/25 0600  TSH  Morning Draw         03/18/25 1916 03/19/25 0600  Comprehensive Metabolic Panel  Morning Draw         03/18/25 1732 03/19/25 0600  CBC & Differential  Morning Draw         03/18/25 1732 03/19/25 0600  CBC Auto Differential  PROCEDURE ONCE         03/18/25 2201    03/19/25 0521  POC Glucose Once  PROCEDURE ONCE        Comments: Complete no more than 45 minutes prior to patient eating      03/19/25 0509    03/18/25 2339  POC Glucose Once  PROCEDURE ONCE        Comments: Complete no more than 45 minutes prior to patient eating      03/18/25 2327    03/18/25 2200  POC Glucose 4x Daily Before Meals & at Bedtime  4 Times Daily Before Meals & at Bedtime      Comments: Complete no more than 45 minutes prior to patient eating      03/18/25 1916 03/18/25 2200  Incentive Spirometry  Every 4 Hours While Awake       03/18/25 1916 03/18/25 2114  POC Glucose Once  PROCEDURE ONCE        Comments: Complete no more than 45 minutes prior to patient eating      03/18/25 2102 03/18/25 2100  sodium chloride 0.9 % flush 10 mL  Every 12 Hours Scheduled         03/18/25 1916 03/18/25 2100  [Held by provider]  atorvastatin (LIPITOR) tablet 80 mg  Nightly        (On hold since yesterday at 1738 until manually unheld; held by , Arturo Barber Reason: Abnormal Labs)    03/18/25 1738 03/18/25 2100  pantoprazole (PROTONIX) injection 40 mg  Every 12 Hours Scheduled         03/18/25 1738 03/18/25 2015  insulin regular (humuLIN R,novoLIN R) injection 2-9 Units  Every 6 Hours Scheduled         03/18/25 1916 03/18/25 2000  Vital Signs  Every 4 Hours       03/18/25 1916 03/18/25 2000  Strict Intake & Output  Every Hour       03/18/25  "1916 03/18/25 1917  Weigh Patient  Once         03/18/25 1916 03/18/25 1917  Insert Peripheral IV  Once         03/18/25 1916 03/18/25 1917  Saline Lock & Maintain IV Access  Continuous         03/18/25 1916 03/18/25 1917  Place Sequential Compression Device  Once         03/18/25 1916 03/18/25 1917  Maintain Sequential Compression Device  Continuous         03/18/25 1916 03/18/25 1917  NPO Diet NPO Type: Strict NPO, Ice Chips  Diet Effective Now         03/18/25 1916 03/18/25 1917  Bowel Regimen Not Indicated  Once         03/18/25 1916 03/18/25 1916  acetaminophen (TYLENOL) tablet 650 mg  Every 4 Hours PRN        Placed in \"Or\" Linked Group    03/18/25 1916 03/18/25 1916  acetaminophen (TYLENOL) 160 MG/5ML oral solution 650 mg  Every 4 Hours PRN        Placed in \"Or\" Linked Group    03/18/25 1916 03/18/25 1916  acetaminophen (TYLENOL) suppository 650 mg  Every 4 Hours PRN        Placed in \"Or\" Linked Group    03/18/25 1916 03/18/25 1916  ondansetron ODT (ZOFRAN-ODT) disintegrating tablet 4 mg  Every 6 Hours PRN        Placed in \"Or\" Linked Group    03/18/25 1916 03/18/25 1916  ondansetron (ZOFRAN) injection 4 mg  Every 6 Hours PRN        Placed in \"Or\" Linked Group    03/18/25 1916 03/18/25 1916  sodium chloride 0.9 % flush 10 mL  As Needed         03/18/25 1916 03/18/25 1916  sodium chloride 0.9 % infusion 40 mL  As Needed         03/18/25 1916 03/18/25 1916  dextrose (GLUTOSE) oral gel 15 g  Every 15 Minutes PRN         03/18/25 1916 03/18/25 1916  dextrose (D50W) (25 g/50 mL) IV injection 25 g  Every 15 Minutes PRN         03/18/25 1916 03/18/25 1753  [Held by provider]  aspirin EC tablet 81 mg  Daily        (On hold since yesterday at 1738 until manually unheld; held by , Arturo Barber Reason: Abnormal Labs)    03/18/25 1738    03/18/25 1753  [Held by provider]  empagliflozin (JARDIANCE) tablet 10 mg  Daily        (On hold since yesterday at 1738 " until manually unheld; held by Elisabeth harper APRNHold Reason: Abnormal Labs)    03/18/25 1738    03/18/25 1753  [Held by provider]  rOPINIRole (REQUIP) tablet 1 mg  3 Times Daily        (On hold since yesterday at 1738 until manually unheld; held by Elisabeth Josue APRNHold Reason: Abnormal Labs)    03/18/25 1738    03/18/25 1748  sodium chloride 0.45 % 925 mL with sodium bicarbonate 8.4 % 75 mEq infusion  Continuous         03/18/25 1732    03/18/25 1737  [Held by provider]  cetirizine (zyrTEC) tablet 10 mg  Daily PRN        (On hold since yesterday at 1738 until manually unheld; held by Elisabeth Josue APRNHold Reason: Abnormal Labs)    03/18/25 1738    03/18/25 1733  Uric Acid  Once         03/18/25 1732    03/18/25 1733  Sodium, Urine, Random - Urine, Clean Catch  Once         03/18/25 1732    03/18/25 1733  Microalbumin / Creatinine Urine Ratio - Urine, Clean Catch  Once         03/18/25 1732    03/18/25 1733  Creatinine Urine Random (kidney function) GFR component - Urine, Clean Catch  Once         03/18/25 1732    03/18/25 1732  Inpatient Nutrition Consult  Once        Provider:  (Not yet assigned)    03/18/25 1731    03/18/25 1707  Lipid Panel  Once         03/18/25 1707    03/18/25 1613  magnesium sulfate 2g/50 mL (PREMIX) infusion  Once         03/18/25 1557    03/18/25 1610  sodium chloride 0.9 % infusion  Continuous         03/18/25 1554    03/18/25 1609  Lactate Dehydrogenase  Once         03/18/25 1608    03/18/25 1608  Code Status and Medical Interventions: CPR (Attempt to Resuscitate); Full Support  Continuous         03/18/25 1608    03/18/25 1555  Morphine sulfate (PF) injection 2 mg  Every 4 Hours PRN         03/18/25 1556    03/18/25 1553  Inpatient Admission  Once         03/18/25 1552    03/18/25 1532  Inpatient Nephrology Consult  STAT        Specialty:  Nephrology  Provider:  Janak Harvey MD    03/18/25 1532    03/18/25 1457  NPO Diet NPO Type: Strict NPO  Diet Effective Now,    "Status:  Canceled         03/18/25 1456    03/18/25 1344  Urinalysis, Microscopic Only - Urine, Clean Catch  Once         03/18/25 1343    03/18/25 1330  CT Abdomen Pelvis Without Contrast  1 Time Imaging         03/18/25 1329    03/18/25 1232  sodium chloride 0.9 % bolus 1,000 mL  Once         03/18/25 1216    03/18/25 1221  [place] Draw  STAT         03/18/25 1220    03/18/25 1221  Green Top (Gel)  PROCEDURE ONCE         03/18/25 1220    03/18/25 1221  Lavender Top  PROCEDURE ONCE         03/18/25 1220    03/18/25 1221  Red Top  PROCEDURE ONCE         03/18/25 1220    03/18/25 1221  Gray Top  PROCEDURE ONCE         03/18/25 1220    03/18/25 1221  Light Blue Top  PROCEDURE ONCE         03/18/25 1220    03/18/25 1217  Respiratory Panel PCR w/COVID-19(SARS-CoV-2) DYLAN/ROSALBA/KHOA/PAD/COR/LISA In-House, NP Swab in UTM/VTM, 2 HR TAT - Swab, Nasopharynx  Once         03/18/25 1216    03/18/25 1217  Phosphorus  Once         03/18/25 1216    03/18/25 1217  Magnesium  Once         03/18/25 1216    03/18/25 1216  CBC & Differential  Once         03/18/25 1216    03/18/25 1216  Comprehensive Metabolic Panel  Once         03/18/25 1216    03/18/25 1216  Lipase  Once         03/18/25 1216    03/18/25 1216  Urinalysis With Culture If Indicated - Urine, Clean Catch  Once         03/18/25 1216    03/18/25 1216  Insert Peripheral IV  Once        Placed in \"And\" Linked Group    03/18/25 1216    03/18/25 1216  CBC Auto Differential  PROCEDURE ONCE         03/18/25 1216    03/18/25 1215  sodium chloride 0.9 % flush 10 mL  As Needed        Placed in \"And\" Linked Group    03/18/25 1216    03/18/25 0000  Telemetry Scan  Once         03/18/25 0000    Unscheduled  Follow Hypoglycemia Standing Orders For Blood Glucose <70 & Notify Provider of Treatment  As Needed      Comments: Follow Hypoglycemia Orders As Outlined in Process Instructions (Open Order Report to View Full Instructions)  Notify Provider Any Time Hypoglycemia Treatment is " Administered    03/18/25 1916    Unscheduled  Up With Assistance  As Needed       03/18/25 1916    Unscheduled  Oxygen Therapy- Nasal Cannula; Titrate 1-6 LPM Per SpO2; 90 - 95%  Continuous PRN       03/18/25 1916                     Physician Progress Notes (last 48 hours)        Ford Elena, APRN at 03/19/25 0920          Nephrology ([facility]) Progress Note      Patient:  Tasha Perez  YOB: 1970  Date of Service: 3/19/2025  MRN: [id number]   Acct: [id number]   Primary Care Physician: KIRILL Murguia MD  Advance Directive:   Code Status and Medical Interventions: CPR (Attempt to Resuscitate); Full Support   Ordered at: 03/18/25 1608     Code Status (Patient has no pulse and is not breathing):    CPR (Attempt to Resuscitate)     Medical Interventions (Patient has pulse or is breathing):    Full Support     Level Of Support Discussed With:    Patient     Admit Date: 3/18/2025       Hospital Day: 1  Referring Provider: No ref. provider found      Patient personally seen and examined.  Complete chart including Consults, Notes, Operative Reports, Labs, Cardiology, and Radiology studies reviewed as able.        Subjective:  Tasha Perez is a 54 y.o. female for whom we were consulted for evaluation and treatment of acute kidney injury. Baseline chronic kidney disease stage 3b. Follows with Dr Barrow in our office. History of many previous hospitalizations for SUGAR due to volume depletion. History of recurrent pancreatitis, gastroparesis, type 2 diabetes, osteoarthritis. Has had feeding tubes and PICC lines many times in the past but these never manage to stay in long for a variety of reasons. Presented this time with abdominal pain, nausea, vomiting. Labs in ER showed creatinine 2.0. Admitted to medical floor and started on IV fluids.     Today is awake and alert. Feeling a little better, no nausea so far this AM.   Potassium 2.5 on AM labs. Noted discussion between  nursing staff, hospitalist, and pharmacy regarding use of electrolyte replacement protocol. Nephrology was not contacted. No repeat labs ordered so far today.     Allergies:  Bee venom, Hydrocodone, Hydroxychloroquine, Ibuprofen, Pineapple, Pineapple extract, Wasp venom, Wasp venom protein, Hydrocodone-acetaminophen, Penicillins, Sitagliptin, Metformin, Chocolate, and Poison ivy extract    Home Meds:  Medications Prior to Admission   Medication Sig Dispense Refill Last Dose/Taking    aspirin 81 MG EC tablet Take 1 tablet by mouth Daily.       atorvastatin (LIPITOR) 80 MG tablet Take 1 tablet by mouth Every Night.       Calcium Carbonate-Vitamin D 600-5 MG-MCG tablet Take 1 tablet by mouth Daily.       cetirizine (zyrTEC) 10 MG tablet Take 1 tablet by mouth Daily As Needed for Allergies.       Cholecalciferol (Vitamin D3) 50 MCG (2000 UT) capsule Take 1 capsule by mouth Daily.       dapagliflozin Propanediol (Farxiga) 10 MG tablet Take 10 mg by mouth Daily.       hydrocortisone (ANUSOL-HC) 25 MG suppository Insert 1 suppository into the rectum 2 (Two) Times a Day. 12 each 1     hydroxychloroquine (PLAQUENIL) 200 MG tablet Take 2 tablets by mouth Daily.       insulin aspart (NovoLOG) 100 UNIT/ML patient supplied pump Inject 1 Units under the skin into the appropriate area as directed Continuous.       linaclotide (LINZESS) 290 MCG capsule capsule Take 1 capsule by mouth Every Morning Before Breakfast.       miSOPROStol (CYTOTEC) 200 MCG tablet Take 1 tablet by mouth 4 (Four) Times a Day.       omeprazole (priLOSEC) 40 MG capsule Take 1 capsule by mouth Every Other Day.       promethazine (PHENERGAN) 25 MG tablet Take 1 tablet by mouth Every 6 (Six) Hours As Needed for Nausea or Vomiting.       rOPINIRole (REQUIP) 1 MG tablet Take 1 tablet by mouth 3 (Three) Times a Day.       sodium bicarbonate 650 MG tablet Take 1 tablet by mouth 2 (Two) Times a Day.       sucralfate (CARAFATE) 1 g tablet Take 1 tablet by mouth 4  (Four) Times a Day.          Medicines:  Current Facility-Administered Medications   Medication Dose Route Frequency Provider Last Rate Last Admin    acetaminophen (TYLENOL) tablet 650 mg  650 mg Oral Q4H PRN Elisabeth Josue APRN        Or    acetaminophen (TYLENOL) 160 MG/5ML oral solution 650 mg  650 mg Oral Q4H PRN Elisabeth Josue APRN        Or    acetaminophen (TYLENOL) suppository 650 mg  650 mg Rectal Q4H PRN Elisabeth Josue APRN        [Held by provider] aspirin EC tablet 81 mg  81 mg Oral Daily Elisabeth Josue APRN        [Held by provider] atorvastatin (LIPITOR) tablet 80 mg  80 mg Oral Nightly Elisabeth Josue APRN        [Held by provider] cetirizine (zyrTEC) tablet 10 mg  10 mg Oral Daily PRN Elisabeth Josue APRN        dextrose (D50W) (25 g/50 mL) IV injection 25 g  25 g Intravenous Q15 Min PRN Eliasbeth Josue APRN        dextrose (GLUTOSE) oral gel 15 g  15 g Oral Q15 Min PRN Elisabeth Josue APRN        [Held by provider] empagliflozin (JARDIANCE) tablet 10 mg  10 mg Oral Daily Elisabeth Josue APRN        insulin regular (humuLIN R,novoLIN R) injection 2-9 Units  2-9 Units Subcutaneous Q6H Elisabeth Josue APRN   2 Units at 03/18/25 2110    Morphine sulfate (PF) injection 2 mg  2 mg Intravenous Q4H PRN Michelle Clarke MD        ondansetron ODT (ZOFRAN-ODT) disintegrating tablet 4 mg  4 mg Oral Q6H PRN Elisabeth Josue APRN        Or    ondansetron (ZOFRAN) injection 4 mg  4 mg Intravenous Q6H PRN Elisabeth Josue APRN        pantoprazole (PROTONIX) injection 40 mg  40 mg Intravenous Q12H Elisabeth Josue APRN   40 mg at 03/18/25 2115    [Held by provider] rOPINIRole (REQUIP) tablet 1 mg  1 mg Oral TID Elisabeth Josue APRN        sodium chloride 0.45 % 925 mL with sodium bicarbonate 8.4 % 75 mEq infusion   Intravenous Continuous AliJanak  mL/hr at 03/19/25 0654 New Bag at 03/19/25 0654    sodium chloride 0.9 % flush 10 mL  10 mL Intravenous PRN Fernandez Grant,  PA-C        sodium chloride 0.9 % flush 10 mL  10 mL Intravenous Q12H , Elisabeth, APRN   10 mL at 03/18/25 2115    sodium chloride 0.9 % flush 10 mL  10 mL Intravenous PRN , Elisabeth, APRN        sodium chloride 0.9 % infusion 40 mL  40 mL Intravenous PRN , Elisabeth, APRN        sodium chloride 0.9 % infusion  125 mL/hr Intravenous Continuous , Elisabeth, APRN 125 mL/hr at 03/18/25 1646 125 mL/hr at 03/18/25 1646       Past Medical History:  Past Medical History:   Diagnosis Date    Arthritis     Contusion     Degenerative disc disease, cervical     Diabetes mellitus     Elevated cholesterol     Fibromyalgia     Neuropathy     Osteoporosis     Pancreatitis     Raynaud disease     Renal insufficiency     Smoker 02/08/2022    Vitamin D deficiency 04/26/2022       Past Surgical History:  Past Surgical History:   Procedure Laterality Date    APPENDECTOMY      CHOLECYSTECTOMY      COLONOSCOPY      ENDOSCOPY N/A 10/08/2019    Procedure: ESOPHAGOGASTRODUODENOSCOPY WITH ANESTHESIA;  Surgeon: Aleks Vaughn DO;  Location: [facility] ENDOSCOPY;  Service: Gastroenterology    ENDOSCOPY N/A 11/12/2024    Procedure: ESOPHAGOGASTRODUODENOSCOPY WITH ANESTHESIA;  Surgeon: Tiffanie Saucedo MD;  Location: [facility] ENDOSCOPY;  Service: Gastroenterology;  Laterality: N/A;  pre op: Gastroparesis, Intractable nausea  post op: insertion of dobhoff   PCP: Brandon Murguia    ENDOSCOPY N/A 11/15/2024    Procedure: ESOPHAGOGASTRODUODENOSCOPY WITH ANESTHESIA WITH NG TUBE INSERTION;  Surgeon: Tiffanie Saucedo MD;  Location: [facility] ENDOSCOPY;  Service: Gastroenterology;  Laterality: N/A;  PRE OP: DOBHOFF  POST OP: DOBHOFF  PCP: TELLY PACK    ENDOSCOPY WITH GASTROSTOMY TUBE INSERTION N/A 6/15/2022    Procedure: ESOPHAGOGASTRODUODENOSCOPY WITH GASTROSTOMY TUBE INSERTION;  Surgeon: Jersey Lee MD;  Location: [facility] ENDOSCOPY;  Service: Gastroenterology;  Laterality: N/A;  pre peg placement  post peg placement  Dr. Murguia  "   ENTEROSCOPY SMALL BOWEL N/A 11/3/2022    Procedure: Esophagogastroduodenoscopy with Peg Removal;  Surgeon: Aleks Vaughn W, DO;  Location: [facility] ENDOSCOPY;  Service: Gastroenterology;  Laterality: N/A;  pre; peg removal  post; peg removal   KIRILL Murguia MD        HYSTERECTOMY         Family History  Family History   Adopted: Yes   Problem Relation Age of Onset    Colon polyps Neg Hx     Colon cancer Neg Hx        Social History  Social History     Socioeconomic History    Marital status:    Tobacco Use    Smoking status: Every Day     Current packs/day: 0.50     Average packs/day: 0.5 packs/day for 32.2 years (16.1 ttl pk-yrs)     Types: Cigarettes     Start date: 1993     Passive exposure: Current   Vaping Use    Vaping status: Never Used   Substance and Sexual Activity    Alcohol use: No    Drug use: No    Sexual activity: Defer       Review of Systems:  History obtained from chart review and the patient  General ROS: No fever or chills  Respiratory ROS: No cough, shortness of breath, wheezing  Cardiovascular ROS: No chest pain or palpitations  Gastrointestinal ROS: No abdominal pain or melena  Genito-Urinary ROS: No dysuria or hematuria  Psych ROS: No anxiety and depression  14 point ROS reviewed with the patient and negative except as noted above and in the HPI unless unable to obtain.    Objective:  Patient Vitals for the past 24 hrs:   BP Temp Temp src Pulse Resp SpO2 Height Weight   03/19/25 0758 91/60 98.1 °F (36.7 °C) Oral 78 18 98 % -- --   03/19/25 0345 97/60 98.1 °F (36.7 °C) Oral 78 16 99 % -- --   03/18/25 2330 97/55 98.7 °F (37.1 °C) Oral 80 14 98 % -- --   03/18/25 2021 121/86 98.6 °F (37 °C) Oral 88 18 100 % -- --   03/18/25 1812 117/78 97.7 °F (36.5 °C) Oral 82 18 100 % -- --   03/18/25 1435 123/74 97.5 °F (36.4 °C) Oral 94 18 100 % -- --   03/18/25 1149 115/74 97.9 °F (36.6 °C) Oral 94 20 100 % 154.9 cm (61\") 40.6 kg (89 lb 8 oz)       Intake/Output Summary (Last 24 hours) at " 3/19/2025 0920  Last data filed at 3/19/2025 0652  Gross per 24 hour   Intake 912 ml   Output 800 ml   Net 112 ml     General: awake/alert   Chest:  clear to auscultation bilaterally without respiratory distress  CVS: regular rate and rhythm  Abdominal: soft, nontender, positive bowel sounds  Extremities: no cyanosis or edema  Skin: warm and dry without rash      Labs:  Results from last 7 days   Lab Units 03/19/25  0437 03/18/25  1221   WBC 10*3/mm3 11.39* 10.73   HEMOGLOBIN g/dL 9.2* 11.2*   HEMATOCRIT % 28.4* 35.4   PLATELETS 10*3/mm3 485* 552*         Results from last 7 days   Lab Units 03/19/25  0437 03/18/25  1221   SODIUM mmol/L 141 138   POTASSIUM mmol/L 2.5* 3.7   CHLORIDE mmol/L 113* 110*   CO2 mmol/L 16.0* 15.0*   BUN mg/dL 31* 34*   CREATININE mg/dL 1.78* 2.14*   CALCIUM mg/dL 7.9* 8.6   EGFR mL/min/1.73 33.8* 27.1*   BILIRUBIN mg/dL <0.2 <0.2   ALK PHOS U/L 110 146*   ALT (SGPT) U/L 8 11   AST (SGOT) U/L 16 18   GLUCOSE mg/dL 113* 326*       Radiology:   Imaging Results (Last 72 Hours)       Procedure Component Value Units Date/Time    CT Abdomen Pelvis Without Contrast [[id number]] Collected: 03/18/25 1426     Updated: 03/18/25 1441    Narrative:      CT ABDOMEN PELVIS WO CONTRAST- 3/18/2025 12:48 PM     HISTORY: nv, abd pain, elevaed lipase, [place]      COMPARISON: 2/27/2025     DLP: 145.24 mGy.cm . All CT scans are performed using dose optimization  techniques as appropriate to the performed exam and including at least  one of the following: Automated exposure control, adjustment of the mA  and/or kV according to size, and the use of the iterative reconstruction  technique.     TECHNIQUE: Noncontrast enhanced images of the abdomen and pelvis  obtained without oral contrast.     FINDINGS:  The lung bases and base of the heart are unremarkable.     LIVER: No focal liver lesion.     BILIARY SYSTEM: The gallbladder is surgically absent. No biliary  dilatation present..     PANCREAS: There is some subtle  stranding within the anterior pararenal  space associated with the pancreatic head and proximal body of the  pancreas suggesting pancreatitis. Correlation is recommended with the  patient's amylase and lipase levels..     SPLEEN: Unremarkable.     KIDNEYS AND ADRENALS: Both adrenal glands are enlarged but remain  adreniform in shape. I would favor bilateral adrenal adenomas. No  evidence of renal mass or perinephric fluid collection. No  nephrolithiasis..  The ureters are decompressed and normal in  appearance.     RETROPERITONEUM: No mass, lymphadenopathy or hemorrhage.     GI TRACT: There is mild colonic distention of the right and transverse  colon with scattered air-fluid levels suggesting there may be an ongoing  diarrheal illness. No mechanical obstruction. The stomach is food  filled. A gastric stimulator projects over the upper abdomen.. Surgical  clips at the base of the cecum suggest prior appendectomy..     OTHER: There is no mesenteric mass, lymphadenopathy or fluid collection.  The osseous structures and soft tissues demonstrate no worrisome  lesions. There is a small defect along the left anterolateral abdominal  wall likely related to prior gastrostomy tube placement.     PELVIS: Previous hysterectomy. No adnexal mass or free fluid.. The  urinary bladder is normal in appearance.       Impression:      1. The right and transverse colon is mildly distended with liquefied  stool and air-fluid levels suggesting an ongoing diarrheal illness. No  mechanical obstruction. The stomach is food filled. A gastric stimulator  projects over the upper abdomen. Evidence of previous gastrostomy tube.  2. Radiographic findings suggesting acute pancreatitis with inflammatory  stranding in the anterior pararenal space adjacent to the pancreatic  head and body of the pancreas. No fluid collection present. No ductal  dilatation.  3. Previous hysterectomy. No adnexal mass or free fluid. The urinary  bladder is normal in  "appearance.  4. Suspected bilateral adrenal adenomas with both adrenal glands  enlarged but remaining adreniform in shape.           This report was signed and finalized on 3/18/2025 2:38 PM by Dr. Alexis Ray MD.               Culture:  No results found for: \"BLOODCX\", \"URINECX\", \"WOUNDCX\", \"MRSACX\", \"RESPCX\", \"STOOLCX\"      Assessment    Acute kidney injury, prerenal due to volume depletion--improving  Baseline chronic kidney disease stage 3b  Hypokalemia  Type 2 diabetes  Acute pancreatitis  Gastroparesis  Metabolic acidosis    Plan:   STAT potassium now  Continue to replace potassium if needed. Would note that nephrology is on call 24/7 for questions concerning patients we are following in the hospital.   Continue bicarbonate IV fluids  Monitor labs for additonal renal recovery      RAKESH Zee  3/19/2025  09:20 CDT      Electronically signed by Ford lEena APRN at 03/19/25 0933          Consult Notes (last 48 hours)        Janak Harvey MD at 03/18/25 1726          Nephrology ([facility]) Consult Note      Patient:  Tasha Perez  YOB: 1970  Date of Service: 3/18/2025  MRN: [id number]   Acct: [id number]   Primary Care Physician: KIRILL Murguia MD  Advance Directive:   Code Status and Medical Interventions: CPR (Attempt to Resuscitate); Full Support   Ordered at: 03/18/25 1608     Code Status (Patient has no pulse and is not breathing):    CPR (Attempt to Resuscitate)     Medical Interventions (Patient has pulse or is breathing):    Full Support     Level Of Support Discussed With:    Patient     Admit Date: 3/18/2025       Hospital Day: 0  Referring Provider: No ref. provider found      Patient Seen, Chart, Consults, Notes, Labs, Radiology studies reviewed.    Chief complaint: Abdominal pain/nausea vomiting    Subjective:  Tasha Perez is a 54 y.o. female  whom we were consulted for acute kidney injury/chronic kidney disease.  She has " history of stage IIIb chronic kidney disease baseline, follows [facility] at [facility].  Patient has recurrent hospitalization due to intravascular volume depletion, needing IV fluid administration.  She has history of recurrent acute pancreatitis, gastroparesis, insulin-dependent type 2 diabetes, osteoarthritis, Raynaud's disease and fibromyalgia.  Presenting to the emergency room with complaint of nausea vomiting associated with mid gastric abdominal pain.  CT scan of the abdomen confirmed with acute pancreatitis.  Her serum creatinine 2.0 mg, baseline creatinine usually 1.4 mg.  She is currently admitted for the treatment of acute pancreatitis/dehydration caused by nausea and vomiting.  In emergency room she has received 1 L of IV fluid    Allergies:  Bee venom, Hydrocodone, Hydroxychloroquine, Ibuprofen, Pineapple, Pineapple extract, Wasp venom, Wasp venom protein, Hydrocodone-acetaminophen, Penicillins, Sitagliptin, Metformin, Chocolate, and Poison ivy extract    Home Meds:  (Not in a hospital admission)      Medicines:  Current Facility-Administered Medications   Medication Dose Route Frequency Provider Last Rate Last Admin    magnesium sulfate 2g/50 mL (PREMIX) infusion  2 g Intravenous Once Elisabeth Josue APRN   2 g at 03/18/25 1644    Morphine sulfate (PF) injection 2 mg  2 mg Intravenous Q4H PRN Michelle Clarke MD        sodium chloride 0.9 % flush 10 mL  10 mL Intravenous PRN Fernandez Grant PA-C        sodium chloride 0.9 % infusion  125 mL/hr Intravenous Continuous Elisabeth Josue APRN 125 mL/hr at 03/18/25 1646 125 mL/hr at 03/18/25 1646     Current Outpatient Medications   Medication Sig Dispense Refill    aspirin 81 MG EC tablet Take 1 tablet by mouth Daily.      atorvastatin (LIPITOR) 80 MG tablet Take 1 tablet by mouth Every Night.      Calcium Carbonate-Vitamin D 600-5 MG-MCG tablet Take 1 tablet by mouth Daily.      cetirizine (zyrTEC) 10 MG tablet Take 1 tablet by mouth Daily As  Needed for Allergies.      Cholecalciferol (Vitamin D3) 50 MCG (2000 UT) capsule Take 1 capsule by mouth Daily.      dapagliflozin Propanediol (Farxiga) 10 MG tablet Take 10 mg by mouth Daily.      hydrocortisone (ANUSOL-HC) 25 MG suppository Insert 1 suppository into the rectum 2 (Two) Times a Day. 12 each 1    hydroxychloroquine (PLAQUENIL) 200 MG tablet Take 2 tablets by mouth Daily.      insulin aspart (NovoLOG) 100 UNIT/ML patient supplied pump Inject 1 Units under the skin into the appropriate area as directed Continuous.      linaclotide (LINZESS) 290 MCG capsule capsule Take 1 capsule by mouth Every Morning Before Breakfast.      miSOPROStol (CYTOTEC) 200 MCG tablet Take 1 tablet by mouth 4 (Four) Times a Day.      omeprazole (priLOSEC) 40 MG capsule Take 1 capsule by mouth Every Other Day.      promethazine (PHENERGAN) 25 MG tablet Take 1 tablet by mouth Every 6 (Six) Hours As Needed for Nausea or Vomiting.      rOPINIRole (REQUIP) 1 MG tablet Take 1 tablet by mouth 3 (Three) Times a Day.      sodium bicarbonate 650 MG tablet Take 1 tablet by mouth 2 (Two) Times a Day.      sucralfate (CARAFATE) 1 g tablet Take 1 tablet by mouth 4 (Four) Times a Day.         Past Medical History:  Past Medical History:   Diagnosis Date    Arthritis     Contusion     Degenerative disc disease, cervical     Diabetes mellitus     Elevated cholesterol     Fibromyalgia     Neuropathy     Osteoporosis     Pancreatitis     Raynaud disease     Renal insufficiency     Smoker 02/08/2022    Vitamin D deficiency 04/26/2022       Past Surgical History:  Past Surgical History:   Procedure Laterality Date    APPENDECTOMY      CHOLECYSTECTOMY      COLONOSCOPY      ENDOSCOPY N/A 10/08/2019    Procedure: ESOPHAGOGASTRODUODENOSCOPY WITH ANESTHESIA;  Surgeon: Aleks Vaughn DO;  Location: [facility] ENDOSCOPY;  Service: Gastroenterology    ENDOSCOPY N/A 11/12/2024    Procedure: ESOPHAGOGASTRODUODENOSCOPY WITH ANESTHESIA;  Surgeon: Sheryl  MD Tiffanie;  Location: [facility] ENDOSCOPY;  Service: Gastroenterology;  Laterality: N/A;  pre op: Gastroparesis, Intractable nausea  post op: insertion of dobhoff   PCP: Brandon Murguia    ENDOSCOPY N/A 11/15/2024    Procedure: ESOPHAGOGASTRODUODENOSCOPY WITH ANESTHESIA WITH NG TUBE INSERTION;  Surgeon: Tiffanie Saucedo MD;  Location:  [facility] ENDOSCOPY;  Service: Gastroenterology;  Laterality: N/A;  PRE OP: DOBHOFF  POST OP: DOBHOFF  PCP: TELLY PACK    ENDOSCOPY WITH GASTROSTOMY TUBE INSERTION N/A 6/15/2022    Procedure: ESOPHAGOGASTRODUODENOSCOPY WITH GASTROSTOMY TUBE INSERTION;  Surgeon: Jersey Lee MD;  Location:  [facility] ENDOSCOPY;  Service: Gastroenterology;  Laterality: N/A;  pre peg placement  post peg placement  Dr. Murguia    ENTEROSCOPY SMALL BOWEL N/A 11/3/2022    Procedure: Esophagogastroduodenoscopy with Peg Removal;  Surgeon: Aleks Vaughn DO;  Location:  [facility] ENDOSCOPY;  Service: Gastroenterology;  Laterality: N/A;  pre; peg removal  post; peg removal   KIRILL Murguia MD        HYSTERECTOMY         Family History  Family History   Adopted: Yes   Problem Relation Age of Onset    Colon polyps Neg Hx     Colon cancer Neg Hx        Social History  Social History     Socioeconomic History    Marital status:    Tobacco Use    Smoking status: Every Day     Current packs/day: 0.50     Average packs/day: 0.5 packs/day for 32.2 years (16.1 ttl pk-yrs)     Types: Cigarettes     Start date: 1993     Passive exposure: Current   Vaping Use    Vaping status: Never Used   Substance and Sexual Activity    Alcohol use: No    Drug use: No    Sexual activity: Defer         Review of Systems:  History obtained from chart review and the patient  General ROS: No fever or chills  Respiratory ROS: No cough, shortness of breath, wheezing  Cardiovascular ROS: no chest pain or dyspnea on exertion  Gastrointestinal ROS: Nausea vomiting  Genito-Urinary ROS: No dysuria or hematuria  14 point ROS reviewed with the  "patient and negative except as noted above and in the HPI unless unable to obtain.    Objective:  /74 (BP Location: Right arm, Patient Position: Sitting)   Pulse 94   Temp 97.5 °F (36.4 °C) (Oral)   Resp 18   Ht 154.9 cm (61\")   Wt 40.6 kg (89 lb 8 oz)   SpO2 100%   BMI 16.91 kg/m²   No intake or output data in the 24 hours ending 03/18/25 1726  General: awake/alert   HEENT: Normocephalic atraumatic head  Neck: Supple without JVD or carotid bruits  Chest:  clear to auscultation bilaterally without respiratory distress  CVS: regular rate and rhythm  Abdominal: soft, nontender, normal bowel sounds  Extremities: no cyanosis or edema  Skin: warm and dry without rash      Labs:    Results from last 7 days   Lab Units 03/18/25  1221   WBC 10*3/mm3 10.73   HEMOGLOBIN g/dL 11.2*   HEMATOCRIT % 35.4   PLATELETS 10*3/mm3 552*       Results from last 7 days   Lab Units 03/18/25  1221   SODIUM mmol/L 138   POTASSIUM mmol/L 3.7   CHLORIDE mmol/L 110*   CO2 mmol/L 15.0*   BUN mg/dL 34*   CREATININE mg/dL 2.14*   CALCIUM mg/dL 8.6   BILIRUBIN mg/dL <0.2   ALK PHOS U/L 146*   ALT (SGPT) U/L 11   AST (SGOT) U/L 18   GLUCOSE mg/dL 326*   EGFR mL/min/1.73 27.1*         Radiology:   Imaging Results (Last 24 Hours)       Procedure Component Value Units Date/Time    CT Abdomen Pelvis Without Contrast [[id number]] Collected: 03/18/25 1426     Updated: 03/18/25 1441    Narrative:      CT ABDOMEN PELVIS WO CONTRAST- 3/18/2025 12:48 PM     HISTORY: nv, abd pain, elevaed lipase, [place]      COMPARISON: 2/27/2025     DLP: 145.24 mGy.cm . All CT scans are performed using dose optimization  techniques as appropriate to the performed exam and including at least  one of the following: Automated exposure control, adjustment of the mA  and/or kV according to size, and the use of the iterative reconstruction  technique.     TECHNIQUE: Noncontrast enhanced images of the abdomen and pelvis  obtained without oral contrast.     FINDINGS:  The " lung bases and base of the heart are unremarkable.     LIVER: No focal liver lesion.     BILIARY SYSTEM: The gallbladder is surgically absent. No biliary  dilatation present..     PANCREAS: There is some subtle stranding within the anterior pararenal  space associated with the pancreatic head and proximal body of the  pancreas suggesting pancreatitis. Correlation is recommended with the  patient's amylase and lipase levels..     SPLEEN: Unremarkable.     KIDNEYS AND ADRENALS: Both adrenal glands are enlarged but remain  adreniform in shape. I would favor bilateral adrenal adenomas. No  evidence of renal mass or perinephric fluid collection. No  nephrolithiasis..  The ureters are decompressed and normal in  appearance.     RETROPERITONEUM: No mass, lymphadenopathy or hemorrhage.     GI TRACT: There is mild colonic distention of the right and transverse  colon with scattered air-fluid levels suggesting there may be an ongoing  diarrheal illness. No mechanical obstruction. The stomach is food  filled. A gastric stimulator projects over the upper abdomen.. Surgical  clips at the base of the cecum suggest prior appendectomy..     OTHER: There is no mesenteric mass, lymphadenopathy or fluid collection.  The osseous structures and soft tissues demonstrate no worrisome  lesions. There is a small defect along the left anterolateral abdominal  wall likely related to prior gastrostomy tube placement.     PELVIS: Previous hysterectomy. No adnexal mass or free fluid.. The  urinary bladder is normal in appearance.       Impression:      1. The right and transverse colon is mildly distended with liquefied  stool and air-fluid levels suggesting an ongoing diarrheal illness. No  mechanical obstruction. The stomach is food filled. A gastric stimulator  projects over the upper abdomen. Evidence of previous gastrostomy tube.  2. Radiographic findings suggesting acute pancreatitis with inflammatory  stranding in the anterior pararenal  "space adjacent to the pancreatic  head and body of the pancreas. No fluid collection present. No ductal  dilatation.  3. Previous hysterectomy. No adnexal mass or free fluid. The urinary  bladder is normal in appearance.  4. Suspected bilateral adrenal adenomas with both adrenal glands  enlarged but remaining adreniform in shape.           This report was signed and finalized on 3/18/2025 2:38 PM by Dr. Alexis Ray MD.               Culture:  No components found for: \"WOUNDCUL\", \"3\"  No components found for: \"CSFCUL\", \"3\"  No components found for: \"BC\", \"3\"  No components found for: \"URINECUL\", \"3\"      Assessment   1.  Acute kidney injury/intravascular volume depletion.  2.  Stage IIIb CKD baseline.  3.  Type 2 diabetes with nephropathy.  4.  Gastroparesis, status post stomach pacemaker  5.  Acute pancreatitis.  6.  Metabolic acidemia.  7.  Recurrent volume depletion    Plan:  1.  IV fluid with sodium bicarb.  2.  Urinary electrolytes.  3.  Monitor renal function closely      Thank you for the consult, we appreciate the opportunity to provide care to your patients.  Feel free to contact me if I can be of any further assistance.      Janak Harvey MD  3/18/2025  17:26 CDT    Electronically signed by Janak Harvey MD at 03/18/25 1736       "

## 2025-03-19 NOTE — PLAN OF CARE
Goal Outcome Evaluation:  Plan of Care Reviewed With: patient           Outcome Evaluation: pt up ad jessy; IVF; K replacement; accu checks; clear liquids; denies pain or nausea; safety maintained

## 2025-03-19 NOTE — NURSING NOTE
Patient had potassium of 2.5, received orders from Dr. Phillips for 10mEq one time IV and protocol orders. Patient has history of CKD spoke with Asa in pharmacy stated that patient's creatinine clearance is 26. He recommends based on creatinine clearance give the one time 10mEq IV dose of potassium and redraw lab after one time dose.

## 2025-03-20 LAB
ALBUMIN SERPL-MCNC: 2.7 G/DL (ref 3.5–5.2)
ALBUMIN/GLOB SERPL: 1 G/DL
ALP SERPL-CCNC: 109 U/L (ref 39–117)
ALT SERPL W P-5'-P-CCNC: 12 U/L (ref 1–33)
ANION GAP SERPL CALCULATED.3IONS-SCNC: 9 MMOL/L (ref 5–15)
AST SERPL-CCNC: 18 U/L (ref 1–32)
BASOPHILS # BLD AUTO: 0.03 10*3/MM3 (ref 0–0.2)
BASOPHILS NFR BLD AUTO: 0.3 % (ref 0–1.5)
BILIRUB SERPL-MCNC: 0.2 MG/DL (ref 0–1.2)
BUN SERPL-MCNC: 16 MG/DL (ref 6–20)
BUN/CREAT SERPL: 13.9 (ref 7–25)
CALCIUM SPEC-SCNC: 8 MG/DL (ref 8.6–10.5)
CHLORIDE SERPL-SCNC: 106 MMOL/L (ref 98–107)
CO2 SERPL-SCNC: 25 MMOL/L (ref 22–29)
CREAT SERPL-MCNC: 1.15 MG/DL (ref 0.57–1)
DEPRECATED RDW RBC AUTO: 50.9 FL (ref 37–54)
EGFRCR SERPLBLD CKD-EPI 2021: 57.1 ML/MIN/1.73
EOSINOPHIL # BLD AUTO: 0.36 10*3/MM3 (ref 0–0.4)
EOSINOPHIL NFR BLD AUTO: 3.3 % (ref 0.3–6.2)
ERYTHROCYTE [DISTWIDTH] IN BLOOD BY AUTOMATED COUNT: 15.5 % (ref 12.3–15.4)
GLOBULIN UR ELPH-MCNC: 2.6 GM/DL
GLUCOSE BLDC GLUCOMTR-MCNC: 134 MG/DL (ref 70–130)
GLUCOSE BLDC GLUCOMTR-MCNC: 400 MG/DL (ref 70–130)
GLUCOSE SERPL-MCNC: 130 MG/DL (ref 65–99)
HCT VFR BLD AUTO: 30.8 % (ref 34–46.6)
HGB BLD-MCNC: 9.8 G/DL (ref 12–15.9)
IMM GRANULOCYTES # BLD AUTO: 0.07 10*3/MM3 (ref 0–0.05)
IMM GRANULOCYTES NFR BLD AUTO: 0.6 % (ref 0–0.5)
LYMPHOCYTES # BLD AUTO: 2.88 10*3/MM3 (ref 0.7–3.1)
LYMPHOCYTES NFR BLD AUTO: 26.7 % (ref 19.6–45.3)
MAGNESIUM SERPL-MCNC: 1.4 MG/DL (ref 1.6–2.6)
MCH RBC QN AUTO: 28.6 PG (ref 26.6–33)
MCHC RBC AUTO-ENTMCNC: 31.8 G/DL (ref 31.5–35.7)
MCV RBC AUTO: 89.8 FL (ref 79–97)
MONOCYTES # BLD AUTO: 0.53 10*3/MM3 (ref 0.1–0.9)
MONOCYTES NFR BLD AUTO: 4.9 % (ref 5–12)
NEUTROPHILS NFR BLD AUTO: 6.92 10*3/MM3 (ref 1.7–7)
NEUTROPHILS NFR BLD AUTO: 64.2 % (ref 42.7–76)
NRBC BLD AUTO-RTO: 0 /100 WBC (ref 0–0.2)
PLATELET # BLD AUTO: 454 10*3/MM3 (ref 140–450)
PMV BLD AUTO: 9.8 FL (ref 6–12)
POTASSIUM SERPL-SCNC: 2.9 MMOL/L (ref 3.5–5.2)
PROT SERPL-MCNC: 5.3 G/DL (ref 6–8.5)
RBC # BLD AUTO: 3.43 10*6/MM3 (ref 3.77–5.28)
SODIUM SERPL-SCNC: 140 MMOL/L (ref 136–145)
WBC NRBC COR # BLD AUTO: 10.79 10*3/MM3 (ref 3.4–10.8)

## 2025-03-20 PROCEDURE — 25010000002 MAGNESIUM SULFATE 2 GM/50ML SOLUTION: Performed by: NURSE PRACTITIONER

## 2025-03-20 PROCEDURE — 96366 THER/PROPH/DIAG IV INF ADDON: CPT

## 2025-03-20 PROCEDURE — 83735 ASSAY OF MAGNESIUM: CPT | Performed by: NURSE PRACTITIONER

## 2025-03-20 PROCEDURE — G0378 HOSPITAL OBSERVATION PER HR: HCPCS

## 2025-03-20 PROCEDURE — 80053 COMPREHEN METABOLIC PANEL: CPT | Performed by: NURSE PRACTITIONER

## 2025-03-20 PROCEDURE — 25010000002 POTASSIUM CHLORIDE 10 MEQ/100ML SOLUTION: Performed by: NURSE PRACTITIONER

## 2025-03-20 PROCEDURE — 82787 IGG 1 2 3 OR 4 EACH: CPT | Performed by: STUDENT IN AN ORGANIZED HEALTH CARE EDUCATION/TRAINING PROGRAM

## 2025-03-20 PROCEDURE — 82948 REAGENT STRIP/BLOOD GLUCOSE: CPT

## 2025-03-20 PROCEDURE — 85025 COMPLETE CBC W/AUTO DIFF WBC: CPT | Performed by: INTERNAL MEDICINE

## 2025-03-20 PROCEDURE — 96376 TX/PRO/DX INJ SAME DRUG ADON: CPT

## 2025-03-20 PROCEDURE — 25810000003 SODIUM CHLORIDE 0.9 % SOLUTION: Performed by: NURSE PRACTITIONER

## 2025-03-20 PROCEDURE — 63710000001 INSULIN REGULAR HUMAN PER 5 UNITS

## 2025-03-20 RX ORDER — SODIUM CHLORIDE 9 MG/ML
75 INJECTION, SOLUTION INTRAVENOUS CONTINUOUS
Status: DISCONTINUED | OUTPATIENT
Start: 2025-03-20 | End: 2025-03-21 | Stop reason: HOSPADM

## 2025-03-20 RX ORDER — POTASSIUM CHLORIDE 7.45 MG/ML
10 INJECTION INTRAVENOUS
Status: COMPLETED | OUTPATIENT
Start: 2025-03-20 | End: 2025-03-20

## 2025-03-20 RX ORDER — ROPINIROLE 1 MG/1
1 TABLET, FILM COATED ORAL ONCE AS NEEDED
Status: COMPLETED | OUTPATIENT
Start: 2025-03-20 | End: 2025-03-20

## 2025-03-20 RX ORDER — MAGNESIUM SULFATE HEPTAHYDRATE 40 MG/ML
2 INJECTION, SOLUTION INTRAVENOUS ONCE
Status: COMPLETED | OUTPATIENT
Start: 2025-03-20 | End: 2025-03-20

## 2025-03-20 RX ADMIN — POTASSIUM CHLORIDE 10 MEQ: 7.46 INJECTION, SOLUTION INTRAVENOUS at 15:52

## 2025-03-20 RX ADMIN — DULOXETINE HYDROCHLORIDE 60 MG: 30 CAPSULE, DELAYED RELEASE ORAL at 08:22

## 2025-03-20 RX ADMIN — POTASSIUM CHLORIDE 10 MEQ: 7.46 INJECTION, SOLUTION INTRAVENOUS at 09:46

## 2025-03-20 RX ADMIN — PANTOPRAZOLE SODIUM 40 MG: 40 INJECTION, POWDER, FOR SOLUTION INTRAVENOUS at 20:28

## 2025-03-20 RX ADMIN — NORTRIPTYLINE HYDROCHLORIDE 25 MG: 25 CAPSULE ORAL at 20:28

## 2025-03-20 RX ADMIN — POTASSIUM CHLORIDE 10 MEQ: 7.46 INJECTION, SOLUTION INTRAVENOUS at 11:17

## 2025-03-20 RX ADMIN — Medication 10 ML: at 08:22

## 2025-03-20 RX ADMIN — POTASSIUM CHLORIDE 10 MEQ: 7.46 INJECTION, SOLUTION INTRAVENOUS at 13:36

## 2025-03-20 RX ADMIN — INSULIN HUMAN 8 UNITS: 100 INJECTION, SOLUTION PARENTERAL at 20:27

## 2025-03-20 RX ADMIN — ROPINIROLE HYDROCHLORIDE 1 MG: 1 TABLET, FILM COATED ORAL at 22:47

## 2025-03-20 RX ADMIN — POTASSIUM CHLORIDE 10 MEQ: 7.46 INJECTION, SOLUTION INTRAVENOUS at 12:37

## 2025-03-20 RX ADMIN — SODIUM BICARBONATE: 84 INJECTION, SOLUTION INTRAVENOUS at 05:10

## 2025-03-20 RX ADMIN — SODIUM CHLORIDE 75 ML/HR: 9 INJECTION, SOLUTION INTRAVENOUS at 09:47

## 2025-03-20 RX ADMIN — MAGNESIUM SULFATE HEPTAHYDRATE 2 G: 40 INJECTION, SOLUTION INTRAVENOUS at 11:17

## 2025-03-20 RX ADMIN — PANTOPRAZOLE SODIUM 40 MG: 40 INJECTION, POWDER, FOR SOLUTION INTRAVENOUS at 08:22

## 2025-03-20 RX ADMIN — ROPINIROLE HYDROCHLORIDE 1 MG: 1 TABLET, FILM COATED ORAL at 00:00

## 2025-03-20 RX ADMIN — POTASSIUM CHLORIDE 10 MEQ: 7.46 INJECTION, SOLUTION INTRAVENOUS at 14:37

## 2025-03-20 NOTE — PROGRESS NOTES
Nephrology ([facility]) Progress Note      Patient:  Tahsa Perez  YOB: 1970  Date of Service: 3/20/2025  MRN: [id number]   Acct: [id number]   Primary Care Physician: KIRILL Murguia MD  Advance Directive:   Code Status and Medical Interventions: CPR (Attempt to Resuscitate); Full Support   Ordered at: 03/18/25 1608     Code Status (Patient has no pulse and is not breathing):    CPR (Attempt to Resuscitate)     Medical Interventions (Patient has pulse or is breathing):    Full Support     Level Of Support Discussed With:    Patient     Admit Date: 3/18/2025       Hospital Day: 2  Referring Provider: No ref. provider found      Patient personally seen and examined.  Complete chart including Consults, Notes, Operative Reports, Labs, Cardiology, and Radiology studies reviewed as able.        Subjective:  Tasha Perez is a 54 y.o. female for whom we were consulted for evaluation and treatment of acute kidney injury. Baseline chronic kidney disease stage 3b. Follows with Dr Barrow in our office. History of many previous hospitalizations for SUGAR due to volume depletion. History of recurrent pancreatitis, gastroparesis, type 2 diabetes, osteoarthritis. Has had feeding tubes and PICC lines many times in the past but these never manage to stay in long for a variety of reasons. Presented this time with abdominal pain, nausea, vomiting. Labs in ER showed creatinine 2.0. Admitted to medical floor and started on IV fluids.     Today is awake and alert. Feeling better overall. No new complaint or overnight issues. She had a PICC line placed yesterday.       Allergies:  Bee venom, Hydrocodone, Hydroxychloroquine, Ibuprofen, Pineapple, Pineapple extract, Wasp venom, Wasp venom protein, Hydrocodone-acetaminophen, Penicillins, Sitagliptin, Metformin, Chocolate, and Poison ivy extract    Home Meds:  Medications Prior to Admission   Medication Sig Dispense Refill Last Dose/Taking     aspirin 81 MG EC tablet Take 1 tablet by mouth Daily.   Taking    atorvastatin (LIPITOR) 80 MG tablet Take 1 tablet by mouth Every Night.   Taking    Calcium Carbonate-Vitamin D 600-5 MG-MCG tablet Take 1 tablet by mouth Daily.   Taking    cetirizine (zyrTEC) 10 MG tablet Take 1 tablet by mouth Daily As Needed for Allergies.   Taking As Needed    DULoxetine (CYMBALTA) 60 MG capsule Take 1 capsule by mouth Daily.   Taking    linaclotide (LINZESS) 290 MCG capsule capsule Take 1 capsule by mouth Every Morning Before Breakfast.   Taking    nortriptyline (PAMELOR) 25 MG capsule Take 1 capsule by mouth Every Night.   Taking    rOPINIRole (REQUIP) 1 MG tablet Take 1 tablet by mouth 3 (Three) Times a Day.   Taking    sodium bicarbonate 650 MG tablet Take 1 tablet by mouth 2 (Two) Times a Day.   Taking    sucralfate (CARAFATE) 1 g tablet Take 1 tablet by mouth 4 (Four) Times a Day.   Taking    albuterol sulfate  (90 Base) MCG/ACT inhaler Inhale 2 puffs 3 (Three) Times a Day As Needed for Wheezing.       omeprazole (priLOSEC) 40 MG capsule Take 1 capsule by mouth Every Other Day.   Unknown    promethazine (PHENERGAN) 25 MG tablet Take 1 tablet by mouth Every 6 (Six) Hours As Needed for Nausea or Vomiting.          Medicines:  Current Facility-Administered Medications   Medication Dose Route Frequency Provider Last Rate Last Admin    acetaminophen (TYLENOL) tablet 650 mg  650 mg Oral Q4H PRN Elisabeth Josue APRN        Or    acetaminophen (TYLENOL) 160 MG/5ML oral solution 650 mg  650 mg Oral Q4H PRN Elisabeth Josue APRN        Or    acetaminophen (TYLENOL) suppository 650 mg  650 mg Rectal Q4H PRN Elisabeth Josue APRN        [Held by provider] aspirin EC tablet 81 mg  81 mg Oral Daily Elisabeth Josue APRN        [Held by provider] atorvastatin (LIPITOR) tablet 80 mg  80 mg Oral Nightly Elisabeth Josue APRN        [Held by provider] cetirizine (zyrTEC) tablet 10 mg  10 mg Oral Daily PRN Elisabeth Josue APRN         dextrose (D50W) (25 g/50 mL) IV injection 25 g  25 g Intravenous Q15 Min PRN Elisabeth Josue APRN        dextrose (GLUTOSE) oral gel 15 g  15 g Oral Q15 Min PRN Elisabeth Josue APRN        DULoxetine (CYMBALTA) DR capsule 60 mg  60 mg Oral Daily Michelle Clarke MD   60 mg at 03/20/25 0822    insulin regular (humuLIN R,novoLIN R) injection 2-9 Units  2-9 Units Subcutaneous Q6H Elisabeth Josue APRN   2 Units at 03/18/25 2110    magnesium sulfate 2g/50 mL (PREMIX) infusion  2 g Intravenous Once Ford Elena APRN        Morphine sulfate (PF) injection 2 mg  2 mg Intravenous Q4H PRN Michelle Clarke MD        nortriptyline (PAMELOR) capsule 25 mg  25 mg Oral Nightly Michelle Clarke MD   25 mg at 03/19/25 2158    ondansetron ODT (ZOFRAN-ODT) disintegrating tablet 4 mg  4 mg Oral Q6H PRN Elisabeth Josue APRN        Or    ondansetron (ZOFRAN) injection 4 mg  4 mg Intravenous Q6H PRN Elisabeth Josue APRN        pantoprazole (PROTONIX) injection 40 mg  40 mg Intravenous Q12H Elisabeth Josue APRN   40 mg at 03/20/25 0822    potassium chloride 10 mEq in 100 mL IVPB  10 mEq Intravenous Q1H Ford Elena APRN 100 mL/hr at 03/20/25 0946 10 mEq at 03/20/25 0946    [Held by provider] rOPINIRole (REQUIP) tablet 1 mg  1 mg Oral TID Elisabeth Josue APRN        sodium chloride 0.9 % flush 10 mL  10 mL Intravenous PRN Fernandez Grant PA-C        sodium chloride 0.9 % flush 10 mL  10 mL Intravenous Q12H Elisabeth Josue APRN   10 mL at 03/20/25 0822    sodium chloride 0.9 % flush 10 mL  10 mL Intravenous PRN Elisabeth Josue APRN        sodium chloride 0.9 % flush 10 mL  10 mL Intravenous Q12H Michelle Clarke MD        sodium chloride 0.9 % flush 10 mL  10 mL Intravenous Q12H Michelle Clarke MD   10 mL at 03/19/25 2158    sodium chloride 0.9 % flush 10 mL  10 mL Intravenous PRN Michelle Clarke MD        sodium chloride 0.9 % flush 20 mL  20 mL Intravenous PRN Michelle Clarke,  MD        sodium chloride 0.9 % infusion 40 mL  40 mL Intravenous PRN Elisabeth Josue APRN        sodium chloride 0.9 % infusion 40 mL  40 mL Intravenous PRN Michelle Clarke MD        sodium chloride 0.9 % infusion  75 mL/hr Intravenous Continuous ElenaFord ham APRN 75 mL/hr at 03/20/25 0947 75 mL/hr at 03/20/25 0947       Past Medical History:  Past Medical History:   Diagnosis Date    Arthritis     Contusion     Degenerative disc disease, cervical     Diabetes mellitus     Elevated cholesterol     Fibromyalgia     Neuropathy     Osteoporosis     Pancreatitis     Raynaud disease     Renal insufficiency     Smoker 02/08/2022    Vitamin D deficiency 04/26/2022       Past Surgical History:  Past Surgical History:   Procedure Laterality Date    APPENDECTOMY      CHOLECYSTECTOMY      COLONOSCOPY      ENDOSCOPY N/A 10/08/2019    Procedure: ESOPHAGOGASTRODUODENOSCOPY WITH ANESTHESIA;  Surgeon: Aleks Vaughn DO;  Location: [facility] ENDOSCOPY;  Service: Gastroenterology    ENDOSCOPY N/A 11/12/2024    Procedure: ESOPHAGOGASTRODUODENOSCOPY WITH ANESTHESIA;  Surgeon: Tiffanie Saucedo MD;  Location: [facility] ENDOSCOPY;  Service: Gastroenterology;  Laterality: N/A;  pre op: Gastroparesis, Intractable nausea  post op: insertion of dobhoff   PCP: Brandon Murguia    ENDOSCOPY N/A 11/15/2024    Procedure: ESOPHAGOGASTRODUODENOSCOPY WITH ANESTHESIA WITH NG TUBE INSERTION;  Surgeon: Tiffanie Saucedo MD;  Location: [facility] ENDOSCOPY;  Service: Gastroenterology;  Laterality: N/A;  PRE OP: DOBHOFF  POST OP: DOBHOFF  PCP: TELLY PACK    ENDOSCOPY WITH GASTROSTOMY TUBE INSERTION N/A 6/15/2022    Procedure: ESOPHAGOGASTRODUODENOSCOPY WITH GASTROSTOMY TUBE INSERTION;  Surgeon: Jersey Lee MD;  Location: [facility] ENDOSCOPY;  Service: Gastroenterology;  Laterality: N/A;  pre peg placement  post peg placement  Dr. Murguia    ENTEROSCOPY SMALL BOWEL N/A 11/3/2022    Procedure: Esophagogastroduodenoscopy with Peg Removal;   Surgeon: Aleks Vaughn DO;  Location: [facility] ENDOSCOPY;  Service: Gastroenterology;  Laterality: N/A;  pre; peg removal  post; peg removal   KIRILL Murguia MD        HYSTERECTOMY         Family History  Family History   Adopted: Yes   Problem Relation Age of Onset    Colon polyps Neg Hx     Colon cancer Neg Hx        Social History  Social History     Socioeconomic History    Marital status:    Tobacco Use    Smoking status: Every Day     Current packs/day: 0.50     Average packs/day: 0.5 packs/day for 32.2 years (16.1 ttl pk-yrs)     Types: Cigarettes     Start date: 1993     Passive exposure: Current   Vaping Use    Vaping status: Never Used   Substance and Sexual Activity    Alcohol use: No    Drug use: No    Sexual activity: Defer       Review of Systems:  History obtained from chart review and the patient  General ROS: No fever or chills  Respiratory ROS: No cough, shortness of breath, wheezing  Cardiovascular ROS: No chest pain or palpitations  Gastrointestinal ROS: No abdominal pain or melena  Genito-Urinary ROS: No dysuria or hematuria  Psych ROS: No anxiety and depression  14 point ROS reviewed with the patient and negative except as noted above and in the HPI unless unable to obtain.    Objective:  Patient Vitals for the past 24 hrs:   BP Temp Temp src Pulse Resp SpO2   03/20/25 0648 121/74 97.9 °F (36.6 °C) Oral 89 18 95 %   03/20/25 0504 115/67 98.5 °F (36.9 °C) Oral 84 18 94 %   03/19/25 2013 118/76 97.9 °F (36.6 °C) Oral 82 18 99 %   03/19/25 1630 125/85 98.1 °F (36.7 °C) Oral 99 18 99 %   03/19/25 1218 96/65 98.2 °F (36.8 °C) -- 71 16 97 %       Intake/Output Summary (Last 24 hours) at 3/20/2025 0951  Last data filed at 3/20/2025 0648  Gross per 24 hour   Intake --   Output 800 ml   Net -800 ml     General: awake/alert   Chest:  clear to auscultation bilaterally without respiratory distress  CVS: regular rate and rhythm  Abdominal: soft, nontender, positive bowel sounds  Extremities: no  cyanosis or edema  Skin: warm and dry without rash      Labs:  Results from last 7 days   Lab Units 03/20/25  0551 03/19/25  0437 03/18/25  1221   WBC 10*3/mm3 10.79 11.39* 10.73   HEMOGLOBIN g/dL 9.8* 9.2* 11.2*   HEMATOCRIT % 30.8* 28.4* 35.4   PLATELETS 10*3/mm3 454* 485* 552*         Results from last 7 days   Lab Units 03/20/25  0551 03/19/25  0921 03/19/25  0437 03/18/25  1221   SODIUM mmol/L 140  --  141 138   POTASSIUM mmol/L 2.9* 2.8* 2.5* 3.7   CHLORIDE mmol/L 106  --  113* 110*   CO2 mmol/L 25.0  --  16.0* 15.0*   BUN mg/dL 16  --  31* 34*   CREATININE mg/dL 1.15*  --  1.78* 2.14*   CALCIUM mg/dL 8.0*  --  7.9* 8.6   EGFR mL/min/1.73 57.1*  --  33.8* 27.1*   BILIRUBIN mg/dL 0.2  --  <0.2 <0.2   ALK PHOS U/L 109  --  110 146*   ALT (SGPT) U/L 12  --  8 11   AST (SGOT) U/L 18  --  16 18   GLUCOSE mg/dL 130*  --  113* 326*       Radiology:   Imaging Results (Last 72 Hours)       Procedure Component Value Units Date/Time    CT Abdomen Pelvis Without Contrast [[id number]] Collected: 03/18/25 1426     Updated: 03/18/25 1441    Narrative:      CT ABDOMEN PELVIS WO CONTRAST- 3/18/2025 12:48 PM     HISTORY: nv, abd pain, elevaed lipase, [place]      COMPARISON: 2/27/2025     DLP: 145.24 mGy.cm . All CT scans are performed using dose optimization  techniques as appropriate to the performed exam and including at least  one of the following: Automated exposure control, adjustment of the mA  and/or kV according to size, and the use of the iterative reconstruction  technique.     TECHNIQUE: Noncontrast enhanced images of the abdomen and pelvis  obtained without oral contrast.     FINDINGS:  The lung bases and base of the heart are unremarkable.     LIVER: No focal liver lesion.     BILIARY SYSTEM: The gallbladder is surgically absent. No biliary  dilatation present..     PANCREAS: There is some subtle stranding within the anterior pararenal  space associated with the pancreatic head and proximal body of the  pancreas  suggesting pancreatitis. Correlation is recommended with the  patient's amylase and lipase levels..     SPLEEN: Unremarkable.     KIDNEYS AND ADRENALS: Both adrenal glands are enlarged but remain  adreniform in shape. I would favor bilateral adrenal adenomas. No  evidence of renal mass or perinephric fluid collection. No  nephrolithiasis..  The ureters are decompressed and normal in  appearance.     RETROPERITONEUM: No mass, lymphadenopathy or hemorrhage.     GI TRACT: There is mild colonic distention of the right and transverse  colon with scattered air-fluid levels suggesting there may be an ongoing  diarrheal illness. No mechanical obstruction. The stomach is food  filled. A gastric stimulator projects over the upper abdomen.. Surgical  clips at the base of the cecum suggest prior appendectomy..     OTHER: There is no mesenteric mass, lymphadenopathy or fluid collection.  The osseous structures and soft tissues demonstrate no worrisome  lesions. There is a small defect along the left anterolateral abdominal  wall likely related to prior gastrostomy tube placement.     PELVIS: Previous hysterectomy. No adnexal mass or free fluid.. The  urinary bladder is normal in appearance.       Impression:      1. The right and transverse colon is mildly distended with liquefied  stool and air-fluid levels suggesting an ongoing diarrheal illness. No  mechanical obstruction. The stomach is food filled. A gastric stimulator  projects over the upper abdomen. Evidence of previous gastrostomy tube.  2. Radiographic findings suggesting acute pancreatitis with inflammatory  stranding in the anterior pararenal space adjacent to the pancreatic  head and body of the pancreas. No fluid collection present. No ductal  dilatation.  3. Previous hysterectomy. No adnexal mass or free fluid. The urinary  bladder is normal in appearance.  4. Suspected bilateral adrenal adenomas with both adrenal glands  enlarged but remaining adreniform in  "shape.           This report was signed and finalized on 3/18/2025 2:38 PM by Dr. Alexis Ray MD.               Culture:  No results found for: \"BLOODCX\", \"URINECX\", \"WOUNDCX\", \"MRSACX\", \"RESPCX\", \"STOOLCX\"      Assessment    Acute kidney injury, prerenal due to volume depletion--improving  Baseline chronic kidney disease stage 3b  Hypokalemia  Hypomagnesemia   Type 2 diabetes  Acute pancreatitis  Gastroparesis  Metabolic acidosis    Plan:   Replace potassium and magnesium  Renal function recovering well, back to her baseline level  Agree with placement of PICC line, patient has used these in the past for outpatient IV fluids and has generally done well during the times the lines have been able to stay in place..       Ford Elena, APRN  3/20/2025  09:51 CDT    "

## 2025-03-20 NOTE — PROGRESS NOTES
[facility]  INPATIENT PROGRESS NOTE    Patient Name: Tasha Perez  Date of Admission: 3/18/2025  Today's Date: 03/20/25  Length of Stay: 2  Primary Care Physician: KIRILL Murguia MD    Subjective   Chief Complaint: None     No acute events overnight.  Patient is feeling better does not complain of any pain she is tolerating full liquid diet and ready to advance to solid food.  She has not had any episodes of vomiting or nausea.    Review of Systems   All pertinent negatives and positives are as above. All other systems have been reviewed and are negative unless otherwise stated.     Objective    Temp:  [97.9 °F (36.6 °C)-98.5 °F (36.9 °C)] 97.9 °F (36.6 °C)  Heart Rate:  [71-99] 89  Resp:  [16-18] 18  BP: ()/(65-85) 121/74  Physical Exam  GEN: Awake, underweight, alert, interactive, in NAD on room air   HEENT: Atraumatic,  EOMI, Anicteric  Lungs: CTAB, no wheezing/rales/rhonchi  Heart: RRR, +S1/s2, no rub  ABD: soft, nt/nd, +BS, no guarding/rebound  Extremities: atraumatic, no cyanosis, no edema  Skin: no rashes or lesions  Neuro: AAOx3, no focal deficits  Psych: normal mood & affect        Results Review:  I have reviewed the labs, radiology results, and diagnostic studies.    Laboratory Data:   Results from last 7 days   Lab Units 03/20/25  0551 03/19/25  0437 03/18/25  1221   WBC 10*3/mm3 10.79 11.39* 10.73   HEMOGLOBIN g/dL 9.8* 9.2* 11.2*   HEMATOCRIT % 30.8* 28.4* 35.4   PLATELETS 10*3/mm3 454* 485* 552*        Results from last 7 days   Lab Units 03/20/25  0551 03/19/25  0921 03/19/25  0437 03/18/25  1221   SODIUM mmol/L 140  --  141 138   POTASSIUM mmol/L 2.9* 2.8* 2.5* 3.7   CHLORIDE mmol/L 106  --  113* 110*   CO2 mmol/L 25.0  --  16.0* 15.0*   BUN mg/dL 16  --  31* 34*   CREATININE mg/dL 1.15*  --  1.78* 2.14*   CALCIUM mg/dL 8.0*  --  7.9* 8.6   BILIRUBIN mg/dL 0.2  --  <0.2 <0.2   ALK PHOS U/L 109  --  110 146*   ALT (SGPT) U/L 12  --  8  11   AST (SGOT) U/L 18  --  16 18   GLUCOSE mg/dL 130*  --  113* 326*       Culture Data:   @[facility]@    Radiology Data:   Imaging Results (Last 24 Hours)       ** No results found for the last 24 hours. **            I have reviewed the patient's current medications.     Assessment/Plan   Assessment  Active Hospital Problems    Diagnosis     **Acute pancreatitis     Acute kidney injury superimposed on stage 3b chronic kidney disease     Type 2 diabetes mellitus, with long-term current use of insulin     Severe malnutrition      Tasha Perez is a 54-year-old female with a past medical history of arthritis, degenerative disc disease, diabetes mellitus type 2 on long-term insulin, hypercholesteremia, fibromyalgia, neuropathy, Raynaud's disease, CKD stage IIIb, 1 PPD smoker, and nonalcoholic pancreatitis x 5.  Patient presented to [facility] emergency department 3/18/2025 after the encouragement of Dr. Barrow her nephrologist as her kidney function had declined enough that he would like her treated with IV hydration in the hospital. She was admitted for acute pancreatitis and SUGAR.    Treatment Plan:    # Acute pancreatitis  # Acute kidney injury on CKD3b - improving  # Chronic metabolic acidosis  # Hypokalemia  - Continue fluids  - Advance to solid food today  - Nephrology following  - Potassium repleted  - PICC consult placed  -  this patient is frequently admitted and known to the hospital, but this admission is my first time meeting her. Given the constellation of her symptoms - considering possible IgG-4 related disease. She has been to multiple other hospitals as well who may have checked in the past, but I do not see any workup on the chart. I will check an IgG4 level since she is here. Bearing in mind, antibody levels are not diagnostic.  - IgG4 level pending    # Type 2 diabetes - HgbA1c 9.2%  # Severe Gastroparesis s/p gastric stimulator   Home meds: Farxiga, NovoLog  - Continue sliding scale    Medical  Decision Making  Number and Complexity of problems: 3 acute on chronic  Differential Diagnosis: As above    Conditions and Status        Improved.     [facility] Data  External documents reviewed: Reviewed  Cardiac tracing (EKG, telemetry) interpretation: Reviewed  Radiology interpretation: Reviewed  Labs reviewed: As above  Any tests that were considered but not ordered: None     Decision rules/scores evaluated (example [id number], [place], etc): None     Discussed with: Patient and nursing staff     Care Planning  Shared decision making: Patient apprised of current labs, vitals, imaging and treatment plan.  They are agreeable with proceeding with plans as discussed.   Code status and discussions: Full code    Disposition  Social Determinants of Health that impact treatment or disposition: None  I expect the patient to be discharged to home in 2-3 days.         Electronically signed by Michelle Clarke MD, 03/20/25, 11:09 CDT.

## 2025-03-21 ENCOUNTER — READMISSION MANAGEMENT (OUTPATIENT)
Dept: CALL CENTER | Facility: HOSPITAL | Age: 55
End: 2025-03-21
Payer: COMMERCIAL

## 2025-03-21 VITALS
OXYGEN SATURATION: 99 % | BODY MASS INDEX: 16.9 KG/M2 | HEART RATE: 84 BPM | HEIGHT: 61 IN | RESPIRATION RATE: 18 BRPM | WEIGHT: 89.5 LBS | TEMPERATURE: 98.1 F | DIASTOLIC BLOOD PRESSURE: 85 MMHG | SYSTOLIC BLOOD PRESSURE: 135 MMHG

## 2025-03-21 PROBLEM — K85.90 PANCREATITIS: Status: ACTIVE | Noted: 2025-03-21

## 2025-03-21 LAB
ALBUMIN SERPL-MCNC: 2.6 G/DL (ref 3.5–5.2)
ALBUMIN/GLOB SERPL: 1 G/DL
ALP SERPL-CCNC: 102 U/L (ref 39–117)
ALT SERPL W P-5'-P-CCNC: 12 U/L (ref 1–33)
ANION GAP SERPL CALCULATED.3IONS-SCNC: 11 MMOL/L (ref 5–15)
AST SERPL-CCNC: 17 U/L (ref 1–32)
BASOPHILS # BLD AUTO: 0.02 10*3/MM3 (ref 0–0.2)
BASOPHILS NFR BLD AUTO: 0.2 % (ref 0–1.5)
BILIRUB SERPL-MCNC: <0.2 MG/DL (ref 0–1.2)
BUN SERPL-MCNC: 14 MG/DL (ref 6–20)
BUN/CREAT SERPL: 13.5 (ref 7–25)
CALCIUM SPEC-SCNC: 7.6 MG/DL (ref 8.6–10.5)
CHLORIDE SERPL-SCNC: 107 MMOL/L (ref 98–107)
CO2 SERPL-SCNC: 23 MMOL/L (ref 22–29)
CREAT SERPL-MCNC: 1.04 MG/DL (ref 0.57–1)
DEPRECATED RDW RBC AUTO: 50.2 FL (ref 37–54)
EGFRCR SERPLBLD CKD-EPI 2021: 64.4 ML/MIN/1.73
EOSINOPHIL # BLD AUTO: 0.19 10*3/MM3 (ref 0–0.4)
EOSINOPHIL NFR BLD AUTO: 1.6 % (ref 0.3–6.2)
ERYTHROCYTE [DISTWIDTH] IN BLOOD BY AUTOMATED COUNT: 15.3 % (ref 12.3–15.4)
GLOBULIN UR ELPH-MCNC: 2.5 GM/DL
GLUCOSE SERPL-MCNC: 92 MG/DL (ref 65–99)
HCT VFR BLD AUTO: 28.7 % (ref 34–46.6)
HGB BLD-MCNC: 9.3 G/DL (ref 12–15.9)
IGG4 SER-MCNC: 66 MG/DL (ref 2–96)
IMM GRANULOCYTES # BLD AUTO: 0.12 10*3/MM3 (ref 0–0.05)
IMM GRANULOCYTES NFR BLD AUTO: 1 % (ref 0–0.5)
LYMPHOCYTES # BLD AUTO: 2.22 10*3/MM3 (ref 0.7–3.1)
LYMPHOCYTES NFR BLD AUTO: 18.2 % (ref 19.6–45.3)
MAGNESIUM SERPL-MCNC: 1.9 MG/DL (ref 1.6–2.6)
MCH RBC QN AUTO: 29.2 PG (ref 26.6–33)
MCHC RBC AUTO-ENTMCNC: 32.4 G/DL (ref 31.5–35.7)
MCV RBC AUTO: 90 FL (ref 79–97)
MONOCYTES # BLD AUTO: 0.8 10*3/MM3 (ref 0.1–0.9)
MONOCYTES NFR BLD AUTO: 6.5 % (ref 5–12)
NEUTROPHILS NFR BLD AUTO: 72.5 % (ref 42.7–76)
NEUTROPHILS NFR BLD AUTO: 8.87 10*3/MM3 (ref 1.7–7)
NRBC BLD AUTO-RTO: 0 /100 WBC (ref 0–0.2)
PLATELET # BLD AUTO: 440 10*3/MM3 (ref 140–450)
PMV BLD AUTO: 9.8 FL (ref 6–12)
POTASSIUM SERPL-SCNC: 3.3 MMOL/L (ref 3.5–5.2)
PROT SERPL-MCNC: 5.1 G/DL (ref 6–8.5)
RBC # BLD AUTO: 3.19 10*6/MM3 (ref 3.77–5.28)
SODIUM SERPL-SCNC: 141 MMOL/L (ref 136–145)
WBC NRBC COR # BLD AUTO: 12.22 10*3/MM3 (ref 3.4–10.8)

## 2025-03-21 PROCEDURE — 85025 COMPLETE CBC W/AUTO DIFF WBC: CPT | Performed by: INTERNAL MEDICINE

## 2025-03-21 PROCEDURE — 83735 ASSAY OF MAGNESIUM: CPT | Performed by: STUDENT IN AN ORGANIZED HEALTH CARE EDUCATION/TRAINING PROGRAM

## 2025-03-21 PROCEDURE — 96376 TX/PRO/DX INJ SAME DRUG ADON: CPT

## 2025-03-21 PROCEDURE — G0378 HOSPITAL OBSERVATION PER HR: HCPCS

## 2025-03-21 PROCEDURE — 25810000003 SODIUM CHLORIDE 0.9 % SOLUTION: Performed by: NURSE PRACTITIONER

## 2025-03-21 PROCEDURE — 25010000002 POTASSIUM CHLORIDE 10 MEQ/100ML SOLUTION: Performed by: STUDENT IN AN ORGANIZED HEALTH CARE EDUCATION/TRAINING PROGRAM

## 2025-03-21 PROCEDURE — 80053 COMPREHEN METABOLIC PANEL: CPT | Performed by: INTERNAL MEDICINE

## 2025-03-21 PROCEDURE — 96366 THER/PROPH/DIAG IV INF ADDON: CPT

## 2025-03-21 RX ORDER — POTASSIUM CHLORIDE 1.5 G/1.58G
40 POWDER, FOR SOLUTION ORAL ONCE
Status: COMPLETED | OUTPATIENT
Start: 2025-03-21 | End: 2025-03-21

## 2025-03-21 RX ORDER — PANTOPRAZOLE SODIUM 40 MG/1
40 TABLET, DELAYED RELEASE ORAL
Status: DISCONTINUED | OUTPATIENT
Start: 2025-03-21 | End: 2025-03-21 | Stop reason: HOSPADM

## 2025-03-21 RX ORDER — POTASSIUM CHLORIDE 7.45 MG/ML
10 INJECTION INTRAVENOUS
Status: COMPLETED | OUTPATIENT
Start: 2025-03-21 | End: 2025-03-21

## 2025-03-21 RX ADMIN — SODIUM CHLORIDE 75 ML/HR: 9 INJECTION, SOLUTION INTRAVENOUS at 01:00

## 2025-03-21 RX ADMIN — POTASSIUM CHLORIDE 10 MEQ: 10 INJECTION, SOLUTION INTRAVENOUS at 11:30

## 2025-03-21 RX ADMIN — POTASSIUM CHLORIDE 40 MEQ: 1.5 POWDER, FOR SOLUTION ORAL at 10:27

## 2025-03-21 RX ADMIN — POTASSIUM CHLORIDE 10 MEQ: 10 INJECTION, SOLUTION INTRAVENOUS at 12:36

## 2025-03-21 RX ADMIN — POTASSIUM CHLORIDE 10 MEQ: 10 INJECTION, SOLUTION INTRAVENOUS at 10:27

## 2025-03-21 RX ADMIN — DULOXETINE HYDROCHLORIDE 60 MG: 30 CAPSULE, DELAYED RELEASE ORAL at 10:26

## 2025-03-21 RX ADMIN — PANTOPRAZOLE SODIUM 40 MG: 40 INJECTION, POWDER, FOR SOLUTION INTRAVENOUS at 10:27

## 2025-03-21 NOTE — PLAN OF CARE
Goal Outcome Evaluation:           Progress: improving  Outcome Evaluation: Nutrition follow up. Pt has progressed to a regular diet. She consumed 75% of breakfast this morning. She received 1100mL of IVF's yesterday. No po fluid intake recorded. Last BM noted on 3/17 prior to admission. She was treated for SUGAR and acute pancreatitis. Nephrology notes indicate her renal function is back to baseline and she is appropriate for d/c. No new weight for review. Cont to follow while in-pt.

## 2025-03-21 NOTE — CASE MANAGEMENT/SOCIAL WORK
Continued Stay Note   [place]     Patient Name: Tasha Perez  MRN: [id number]  Today's Date: 3/21/2025    Admit Date: 3/18/2025    Plan: Home   Discharge Plan       Row Name 03/21/25 1433       Plan    Plan Home    Patient/Family in Agreement with Plan yes    Final Discharge Disposition Code 01 - home or self-care    Final Note Pt is going home today. Noted she will need iv fluids at home. There were no orders put in by nephrology so they could be arranged. Spoke with Ayo at [facility] as that is where she has gotten the fluids in the past and they are not able to do them due to the iv solution shortage. Pt does not want to wait and wants to follow up with nephro's office for them to arrange if possible.    Addendum: [facility] may be able to do the solution. They can fill an order Monday for delivery for Tuesday for pt if needed.     Addendum 1500: Pt decided to leave before arrangements were able to be made and PICC line pulled. Pt wants to follow up with her nephrologist in office to arrange the fluids.                    Discharge Codes    No documentation.                 Expected Discharge Date and Time       Expected Discharge Date Expected Discharge Time    Mar 21, 2025               URBANO Cardona

## 2025-03-21 NOTE — PROGRESS NOTES
Nephrology ([facility]) Progress Note      Patient:  Tasha Perez  YOB: 1970  Date of Service: 3/21/2025  MRN: [id number]   Acct: [id number]   Primary Care Physician: KIRILL Murguia MD  Advance Directive:   Code Status and Medical Interventions: CPR (Attempt to Resuscitate); Full Support   Ordered at: 03/18/25 1608     Code Status (Patient has no pulse and is not breathing):    CPR (Attempt to Resuscitate)     Medical Interventions (Patient has pulse or is breathing):    Full Support     Level Of Support Discussed With:    Patient     Admit Date: 3/18/2025       Hospital Day: 3  Referring Provider: No ref. provider found      Patient personally seen and examined.  Complete chart including Consults, Notes, Operative Reports, Labs, Cardiology, and Radiology studies reviewed as able.        Subjective:  Tasha Perez is a 54 y.o. female for whom we were consulted for evaluation and treatment of acute kidney injury. Baseline chronic kidney disease stage 3b. Follows with Dr Barrow in our office. History of many previous hospitalizations for SUGAR due to volume depletion. History of recurrent pancreatitis, gastroparesis, type 2 diabetes, osteoarthritis. Has had feeding tubes and PICC lines many times in the past but these never manage to stay in long for a variety of reasons. Presented this time with abdominal pain, nausea, vomiting. Labs in ER showed creatinine 2.0. Admitted to medical floor and started on IV fluids. Renal function has shown good recovery. She had a PICC line placed on 3/19.     Today is awake and alert.  No new complaint or overnight issues. Urine output nonoliguric      Allergies:  Bee venom, Hydrocodone, Hydroxychloroquine, Ibuprofen, Pineapple, Pineapple extract, Wasp venom, Wasp venom protein, Hydrocodone-acetaminophen, Penicillins, Sitagliptin, Metformin, Chocolate, and Poison ivy extract    Home Meds:  Medications Prior to Admission   Medication Sig  Dispense Refill Last Dose/Taking    aspirin 81 MG EC tablet Take 1 tablet by mouth Daily.   Taking    atorvastatin (LIPITOR) 80 MG tablet Take 1 tablet by mouth Every Night.   Taking    Calcium Carbonate-Vitamin D 600-5 MG-MCG tablet Take 1 tablet by mouth Daily.   Taking    cetirizine (zyrTEC) 10 MG tablet Take 1 tablet by mouth Daily As Needed for Allergies.   Taking As Needed    DULoxetine (CYMBALTA) 60 MG capsule Take 1 capsule by mouth Daily.   Taking    linaclotide (LINZESS) 290 MCG capsule capsule Take 1 capsule by mouth Every Morning Before Breakfast.   Taking    nortriptyline (PAMELOR) 25 MG capsule Take 1 capsule by mouth Every Night.   Taking    rOPINIRole (REQUIP) 1 MG tablet Take 1 tablet by mouth 3 (Three) Times a Day.   Taking    sodium bicarbonate 650 MG tablet Take 1 tablet by mouth 2 (Two) Times a Day.   Taking    sucralfate (CARAFATE) 1 g tablet Take 1 tablet by mouth 4 (Four) Times a Day.   Taking    albuterol sulfate  (90 Base) MCG/ACT inhaler Inhale 2 puffs 3 (Three) Times a Day As Needed for Wheezing.       omeprazole (priLOSEC) 40 MG capsule Take 1 capsule by mouth Every Other Day.   Unknown    promethazine (PHENERGAN) 25 MG tablet Take 1 tablet by mouth Every 6 (Six) Hours As Needed for Nausea or Vomiting.          Medicines:  Current Facility-Administered Medications   Medication Dose Route Frequency Provider Last Rate Last Admin    acetaminophen (TYLENOL) tablet 650 mg  650 mg Oral Q4H PRN Elisabeth Josue APRN        Or    acetaminophen (TYLENOL) 160 MG/5ML oral solution 650 mg  650 mg Oral Q4H PRN Elisabeth Josue APRN        Or    acetaminophen (TYLENOL) suppository 650 mg  650 mg Rectal Q4H PRN Elisabeth Josue APRN        aspirin EC tablet 81 mg  81 mg Oral Daily Michelle Clarke MD        atorvastatin (LIPITOR) tablet 80 mg  80 mg Oral Nightly Michelle Clarke MD        [Held by provider] cetirizine (zyrTEC) tablet 10 mg  10 mg Oral Daily PRN Elisabeth Josue APRN         dextrose (D50W) (25 g/50 mL) IV injection 25 g  25 g Intravenous Q15 Min PRN Elisabeth Josue APRN        dextrose (GLUTOSE) oral gel 15 g  15 g Oral Q15 Min PRN Elisabeth Josue APRN        DULoxetine (CYMBALTA) DR capsule 60 mg  60 mg Oral Daily Michelle Clarke MD   60 mg at 03/21/25 1026    insulin regular (humuLIN R,novoLIN R) injection 2-9 Units  2-9 Units Subcutaneous Q6H Elisabeth Josue APRN   8 Units at 03/20/25 2027    nortriptyline (PAMELOR) capsule 25 mg  25 mg Oral Nightly Michelle Clarke MD   25 mg at 03/20/25 2028    ondansetron ODT (ZOFRAN-ODT) disintegrating tablet 4 mg  4 mg Oral Q6H PRN Elisabeth Josue APRN        Or    ondansetron (ZOFRAN) injection 4 mg  4 mg Intravenous Q6H PRN Elisabeth Josue APRN        pantoprazole (PROTONIX) injection 40 mg  40 mg Intravenous Q12H Elisabeth Josue APRN   40 mg at 03/21/25 1027    potassium chloride 10 mEq in 100 mL IVPB  10 mEq Intravenous Q1H Michelle Clarke  mL/hr at 03/21/25 1027 10 mEq at 03/21/25 1027    rOPINIRole (REQUIP) tablet 1 mg  1 mg Oral TID Michelle Clarke MD        sodium chloride 0.9 % flush 10 mL  10 mL Intravenous PRN Fernandez Grant PA-C        sodium chloride 0.9 % flush 10 mL  10 mL Intravenous Q12H Elisabeth Josue APRN   10 mL at 03/20/25 0822    sodium chloride 0.9 % flush 10 mL  10 mL Intravenous PRN Elisabeth Josue APRN        sodium chloride 0.9 % flush 10 mL  10 mL Intravenous Q12H Michelle Clarke MD        sodium chloride 0.9 % flush 10 mL  10 mL Intravenous Q12H Michelle Clarke MD   10 mL at 03/19/25 2158    sodium chloride 0.9 % flush 10 mL  10 mL Intravenous PRN Michelle Clarke MD        sodium chloride 0.9 % flush 20 mL  20 mL Intravenous PRN Michelle Clarke MD        sodium chloride 0.9 % infusion 40 mL  40 mL Intravenous PRN Elisabeth Josue APRN        sodium chloride 0.9 % infusion 40 mL  40 mL Intravenous PRN Michelle Clarke MD        sodium chloride 0.9 %  infusion  75 mL/hr Intravenous Continuous Ford Elena APRN 75 mL/hr at 03/21/25 0100 75 mL/hr at 03/21/25 0100       Past Medical History:  Past Medical History:   Diagnosis Date    Arthritis     Contusion     Degenerative disc disease, cervical     Diabetes mellitus     Elevated cholesterol     Fibromyalgia     Neuropathy     Osteoporosis     Pancreatitis     Raynaud disease     Renal insufficiency     Smoker 02/08/2022    Vitamin D deficiency 04/26/2022       Past Surgical History:  Past Surgical History:   Procedure Laterality Date    APPENDECTOMY      CHOLECYSTECTOMY      COLONOSCOPY      ENDOSCOPY N/A 10/08/2019    Procedure: ESOPHAGOGASTRODUODENOSCOPY WITH ANESTHESIA;  Surgeon: Aleks Vaughn DO;  Location: [facility] ENDOSCOPY;  Service: Gastroenterology    ENDOSCOPY N/A 11/12/2024    Procedure: ESOPHAGOGASTRODUODENOSCOPY WITH ANESTHESIA;  Surgeon: Tiffanie Saucedo MD;  Location: [facility] ENDOSCOPY;  Service: Gastroenterology;  Laterality: N/A;  pre op: Gastroparesis, Intractable nausea  post op: insertion of dobhoff   PCP: Brandon Murguia    ENDOSCOPY N/A 11/15/2024    Procedure: ESOPHAGOGASTRODUODENOSCOPY WITH ANESTHESIA WITH NG TUBE INSERTION;  Surgeon: Tiffanie Saucedo MD;  Location: [facility] ENDOSCOPY;  Service: Gastroenterology;  Laterality: N/A;  PRE OP: DOBHOFF  POST OP: DOBHOFF  PCP: TELLY PACK    ENDOSCOPY WITH GASTROSTOMY TUBE INSERTION N/A 6/15/2022    Procedure: ESOPHAGOGASTRODUODENOSCOPY WITH GASTROSTOMY TUBE INSERTION;  Surgeon: Jersey Lee MD;  Location: [facility] ENDOSCOPY;  Service: Gastroenterology;  Laterality: N/A;  pre peg placement  post peg placement  Dr. Murguia    ENTEROSCOPY SMALL BOWEL N/A 11/3/2022    Procedure: Esophagogastroduodenoscopy with Peg Removal;  Surgeon: Aleks Vaughn DO;  Location: [facility] ENDOSCOPY;  Service: Gastroenterology;  Laterality: N/A;  pre; peg removal  post; peg removal   KIRILL Murguia MD        HYSTERECTOMY         Family History  Family  History   Adopted: Yes   Problem Relation Age of Onset    Colon polyps Neg Hx     Colon cancer Neg Hx        Social History  Social History     Socioeconomic History    Marital status:    Tobacco Use    Smoking status: Every Day     Current packs/day: 0.50     Average packs/day: 0.5 packs/day for 32.2 years (16.1 ttl pk-yrs)     Types: Cigarettes     Start date: 1993     Passive exposure: Current   Vaping Use    Vaping status: Never Used   Substance and Sexual Activity    Alcohol use: No    Drug use: No    Sexual activity: Defer       Review of Systems:  History obtained from chart review and the patient  General ROS: No fever or chills  Respiratory ROS: No cough, shortness of breath, wheezing  Cardiovascular ROS: No chest pain or palpitations  Gastrointestinal ROS: No abdominal pain or melena  Genito-Urinary ROS: No dysuria or hematuria  Psych ROS: No anxiety and depression  14 point ROS reviewed with the patient and negative except as noted above and in the HPI unless unable to obtain.    Objective:  Patient Vitals for the past 24 hrs:   BP Temp Temp src Pulse Resp SpO2   03/21/25 0756 131/80 98.2 °F (36.8 °C) Oral 80 18 98 %   03/21/25 0354 118/73 97.9 °F (36.6 °C) Oral 88 18 96 %   03/20/25 1953 111/75 98.2 °F (36.8 °C) Oral 89 18 96 %   03/20/25 1612 111/69 98.2 °F (36.8 °C) Oral 93 16 98 %   03/20/25 1215 124/78 97.8 °F (36.6 °C) Oral 84 18 97 %       Intake/Output Summary (Last 24 hours) at 3/21/2025 1029  Last data filed at 3/21/2025 0207  Gross per 24 hour   Intake 1100 ml   Output --   Net 1100 ml     General: awake/alert   Chest:  clear to auscultation bilaterally without respiratory distress  CVS: regular rate and rhythm  Abdominal: soft, nontender, positive bowel sounds  Extremities: no cyanosis or edema  Skin: warm and dry without rash      Labs:  Results from last 7 days   Lab Units 03/21/25  0243 03/20/25  0551 03/19/25  0437   WBC 10*3/mm3 12.22* 10.79 11.39*   HEMOGLOBIN g/dL 9.3* 9.8* 9.2*    HEMATOCRIT % 28.7* 30.8* 28.4*   PLATELETS 10*3/mm3 440 454* 485*         Results from last 7 days   Lab Units 03/21/25  0243 03/20/25  0551 03/19/25  0921 03/19/25  0437   SODIUM mmol/L 141 140  --  141   POTASSIUM mmol/L 3.3* 2.9* 2.8* 2.5*   CHLORIDE mmol/L 107 106  --  113*   CO2 mmol/L 23.0 25.0  --  16.0*   BUN mg/dL 14 16  --  31*   CREATININE mg/dL 1.04* 1.15*  --  1.78*   CALCIUM mg/dL 7.6* 8.0*  --  7.9*   EGFR mL/min/1.73 64.4 57.1*  --  33.8*   BILIRUBIN mg/dL <0.2 0.2  --  <0.2   ALK PHOS U/L 102 109  --  110   ALT (SGPT) U/L 12 12  --  8   AST (SGOT) U/L 17 18  --  16   GLUCOSE mg/dL 92 130*  --  113*       Radiology:   Imaging Results (Last 72 Hours)       Procedure Component Value Units Date/Time    CT Abdomen Pelvis Without Contrast [[id number]] Collected: 03/18/25 1426     Updated: 03/18/25 1441    Narrative:      CT ABDOMEN PELVIS WO CONTRAST- 3/18/2025 12:48 PM     HISTORY: nv, abd pain, elevaed lipase, [place]      COMPARISON: 2/27/2025     DLP: 145.24 mGy.cm . All CT scans are performed using dose optimization  techniques as appropriate to the performed exam and including at least  one of the following: Automated exposure control, adjustment of the mA  and/or kV according to size, and the use of the iterative reconstruction  technique.     TECHNIQUE: Noncontrast enhanced images of the abdomen and pelvis  obtained without oral contrast.     FINDINGS:  The lung bases and base of the heart are unremarkable.     LIVER: No focal liver lesion.     BILIARY SYSTEM: The gallbladder is surgically absent. No biliary  dilatation present..     PANCREAS: There is some subtle stranding within the anterior pararenal  space associated with the pancreatic head and proximal body of the  pancreas suggesting pancreatitis. Correlation is recommended with the  patient's amylase and lipase levels..     SPLEEN: Unremarkable.     KIDNEYS AND ADRENALS: Both adrenal glands are enlarged but remain  adreniform in shape. I  "would favor bilateral adrenal adenomas. No  evidence of renal mass or perinephric fluid collection. No  nephrolithiasis..  The ureters are decompressed and normal in  appearance.     RETROPERITONEUM: No mass, lymphadenopathy or hemorrhage.     GI TRACT: There is mild colonic distention of the right and transverse  colon with scattered air-fluid levels suggesting there may be an ongoing  diarrheal illness. No mechanical obstruction. The stomach is food  filled. A gastric stimulator projects over the upper abdomen.. Surgical  clips at the base of the cecum suggest prior appendectomy..     OTHER: There is no mesenteric mass, lymphadenopathy or fluid collection.  The osseous structures and soft tissues demonstrate no worrisome  lesions. There is a small defect along the left anterolateral abdominal  wall likely related to prior gastrostomy tube placement.     PELVIS: Previous hysterectomy. No adnexal mass or free fluid.. The  urinary bladder is normal in appearance.       Impression:      1. The right and transverse colon is mildly distended with liquefied  stool and air-fluid levels suggesting an ongoing diarrheal illness. No  mechanical obstruction. The stomach is food filled. A gastric stimulator  projects over the upper abdomen. Evidence of previous gastrostomy tube.  2. Radiographic findings suggesting acute pancreatitis with inflammatory  stranding in the anterior pararenal space adjacent to the pancreatic  head and body of the pancreas. No fluid collection present. No ductal  dilatation.  3. Previous hysterectomy. No adnexal mass or free fluid. The urinary  bladder is normal in appearance.  4. Suspected bilateral adrenal adenomas with both adrenal glands  enlarged but remaining adreniform in shape.           This report was signed and finalized on 3/18/2025 2:38 PM by Dr. Alexis Ray MD.               Culture:  No results found for: \"BLOODCX\", \"URINECX\", \"WOUNDCX\", \"MRSACX\", \"RESPCX\", " "\"STOOLCX\"      Assessment    Acute kidney injury, prerenal due to volume depletion--resolved  Baseline chronic kidney disease stage 3b  Hypokalemia  Hypomagnesemia   Type 2 diabetes  Acute pancreatitis  Gastroparesis  Metabolic acidosis    Plan:   Replace potassium   Renal function back to her baseline level  OK with discharge from renal standpoint, follow up in our office in 2 weeks      Ford Elena, APRN  3/21/2025  10:29 CDT    "

## 2025-03-21 NOTE — PLAN OF CARE
Goal Outcome Evaluation:              Outcome Evaluation: (P) Patient was ready for discharge, upon review of discharge orders there were no orders for home IV fluids or home health management. SW was consulted for assistance. [facility] was notified but stated that they could not provide IVF due to fluid shortage. Patient made aware of this. Educated that she can't go home with PICC line without appropriate orders. SW stated they would try other revenures to obtain home fluids. However, patient made it clear that her  would be here shortly and he will not wait for her. She decided to have picc removed so that she may go home and will follow up with nephrology outpatient.

## 2025-03-21 NOTE — DISCHARGE SUMMARY
[facility] Medicine Services  DISCHARGE SUMMARY       Date of Admission: 3/18/2025  Date of Discharge:  3/21/2025  Primary Care Physician: KIRILL Murguia MD    Presenting Problem/History of Present Illness:  Tasha Perez is a 54-year-old female with a past medical history of arthritis, degenerative disc disease, diabetes mellitus type 2 on long-term insulin, hypercholesteremia, fibromyalgia, neuropathy, Raynaud's disease, CKD stage IIIb, 1 PPD smoker, and nonalcoholic pancreatitis x 5.  Patient presented to [facility] emergency department 3/18/2025 after the encouragement of Dr. Barrow her nephrologist as her kidney function had declined enough that he would like her treated with IV hydration in the hospital. She was admitted for acute pancreatitis and TRINIDAD.    Final Discharge Diagnoses:  Active Hospital Problems    Diagnosis     **Acute pancreatitis     Pancreatitis     Acute kidney injury superimposed on stage 3b chronic kidney disease     Hypokalemia     Type 2 diabetes mellitus, with long-term current use of insulin     Severe malnutrition        Consults: Nephrology    Procedures Performed: None    Pertinent Test Results:   Results for orders placed during the hospital encounter of 10/05/19    Adult Transthoracic Echo Complete W/ Cont if Necessary Per Protocol    Interpretation Summary  · Left ventricular systolic function is normal.    NORMAL STUDY      Imaging Results (All)       Procedure Component Value Units Date/Time    CT Abdomen Pelvis Without Contrast [[id number]] Collected: 03/18/25 1426     Updated: 03/18/25 1441    Narrative:      CT ABDOMEN PELVIS WO CONTRAST- 3/18/2025 12:48 PM     HISTORY: nv, abd pain, elevaed lipase, trinidad      COMPARISON: 2/27/2025     DLP: 145.24 mGy.cm . All CT scans are performed using dose optimization  techniques as appropriate to the performed exam and including at least  one of the following: Automated exposure control, adjustment of  the mA  and/or kV according to size, and the use of the iterative reconstruction  technique.     TECHNIQUE: Noncontrast enhanced images of the abdomen and pelvis  obtained without oral contrast.     FINDINGS:  The lung bases and base of the heart are unremarkable.     LIVER: No focal liver lesion.     BILIARY SYSTEM: The gallbladder is surgically absent. No biliary  dilatation present..     PANCREAS: There is some subtle stranding within the anterior pararenal  space associated with the pancreatic head and proximal body of the  pancreas suggesting pancreatitis. Correlation is recommended with the  patient's amylase and lipase levels..     SPLEEN: Unremarkable.     KIDNEYS AND ADRENALS: Both adrenal glands are enlarged but remain  adreniform in shape. I would favor bilateral adrenal adenomas. No  evidence of renal mass or perinephric fluid collection. No  nephrolithiasis..  The ureters are decompressed and normal in  appearance.     RETROPERITONEUM: No mass, lymphadenopathy or hemorrhage.     GI TRACT: There is mild colonic distention of the right and transverse  colon with scattered air-fluid levels suggesting there may be an ongoing  diarrheal illness. No mechanical obstruction. The stomach is food  filled. A gastric stimulator projects over the upper abdomen.. Surgical  clips at the base of the cecum suggest prior appendectomy..     OTHER: There is no mesenteric mass, lymphadenopathy or fluid collection.  The osseous structures and soft tissues demonstrate no worrisome  lesions. There is a small defect along the left anterolateral abdominal  wall likely related to prior gastrostomy tube placement.     PELVIS: Previous hysterectomy. No adnexal mass or free fluid.. The  urinary bladder is normal in appearance.       Impression:      1. The right and transverse colon is mildly distended with liquefied  stool and air-fluid levels suggesting an ongoing diarrheal illness. No  mechanical obstruction. The stomach is food  filled. A gastric stimulator  projects over the upper abdomen. Evidence of previous gastrostomy tube.  2. Radiographic findings suggesting acute pancreatitis with inflammatory  stranding in the anterior pararenal space adjacent to the pancreatic  head and body of the pancreas. No fluid collection present. No ductal  dilatation.  3. Previous hysterectomy. No adnexal mass or free fluid. The urinary  bladder is normal in appearance.  4. Suspected bilateral adrenal adenomas with both adrenal glands  enlarged but remaining adreniform in shape.           This report was signed and finalized on 3/18/2025 2:38 PM by Dr. Alexis Ray MD.             LAB RESULTS:      Lab 03/21/25  0243 03/20/25  0551 03/19/25  0437 03/18/25  1221   WBC 12.22* 10.79 11.39* 10.73   HEMOGLOBIN 9.3* 9.8* 9.2* 11.2*   HEMATOCRIT 28.7* 30.8* 28.4* 35.4   PLATELETS 440 454* 485* 552*   NEUTROS ABS 8.87* 6.92 6.34 6.95   IMMATURE GRANS (ABS) 0.12* 0.07* 0.15* 0.21*   LYMPHS ABS 2.22 2.88 3.91* 2.86   MONOS ABS 0.80 0.53 0.60 0.38   EOS ABS 0.19 0.36 0.36 0.30   MCV 90.0 89.8 89.0 91.5   LDH  --   --   --  303*         Lab 03/21/25  0243 03/20/25  0551 03/19/25  0921 03/19/25  0437 03/18/25  1221   SODIUM 141 140  --  141 138   POTASSIUM 3.3* 2.9* 2.8* 2.5* 3.7   CHLORIDE 107 106  --  113* 110*   CO2 23.0 25.0  --  16.0* 15.0*   ANION GAP 11.0 9.0  --  12.0 13.0   BUN 14 16  --  31* 34*   CREATININE 1.04* 1.15*  --  1.78* 2.14*   EGFR 64.4 57.1*  --  33.8* 27.1*   GLUCOSE 92 130*  --  113* 326*   CALCIUM 7.6* 8.0*  --  7.9* 8.6   MAGNESIUM 1.9 1.4*  --  1.9 1.6   PHOSPHORUS  --   --   --   --  3.8   HEMOGLOBIN A1C  --   --   --  9.20*  --    TSH  --   --   --  0.392  --          Lab 03/21/25  0243 03/20/25  0551 03/19/25  0437 03/18/25  1221   TOTAL PROTEIN 5.1* 5.3* 5.3* 7.0   ALBUMIN 2.6* 2.7* 2.7* 3.5   GLOBULIN 2.5 2.6 2.6 3.5   ALT (SGPT) 12 12 8 11   AST (SGOT) 17 18 16 18   BILIRUBIN <0.2 0.2 <0.2 <0.2   ALK PHOS 102 109 110 146*   LIPASE   --   --  219* 503*             Lab 03/18/25  1221   CHOLESTEROL 211*   LDL CHOL 102*   HDL CHOL 53   TRIGLYCERIDES 332*             Brief Urine Lab Results  (Last result in the past 365 days)        Color   Clarity   Blood   Leuk Est   Nitrite   Protein   CREAT   Urine HCG        03/18/25 2110             62.8               Microbiology Results (last 10 days)       Procedure Component Value - Date/Time    Respiratory Panel PCR w/COVID-19(SARS-CoV-2) DYLAN/ROSALBA/KHOA/PAD/COR/LISA In-House, NP Swab in UTM/VTM, 2 HR TAT - Swab, Nasopharynx [[id number]]  (Normal) Collected: 03/18/25 1222    Lab Status: Final result Specimen: Swab from Nasopharynx Updated: 03/18/25 1416     ADENOVIRUS, PCR Not Detected     Coronavirus 229E Not Detected     Coronavirus HKU1 Not Detected     Coronavirus NL63 Not Detected     Coronavirus OC43 Not Detected     COVID19 Not Detected     Human Metapneumovirus Not Detected     Human Rhinovirus/Enterovirus Not Detected     Influenza A PCR Not Detected     Influenza B PCR Not Detected     Parainfluenza Virus 1 Not Detected     Parainfluenza Virus 2 Not Detected     Parainfluenza Virus 3 Not Detected     Parainfluenza Virus 4 Not Detected     RSV, PCR Not Detected     Bordetella pertussis pcr Not Detected     Bordetella parapertussis PCR Not Detected     Chlamydophila pneumoniae PCR Not Detected     Mycoplasma pneumo by PCR Not Detected    Narrative:      In the setting of a positive respiratory panel with a viral infection PLUS a negative procalcitonin without other underlying concern for bacterial infection, consider observing off antibiotics or discontinuation of antibiotics and continue supportive care. If the respiratory panel is positive for atypical bacterial infection (Bordetella pertussis, Chlamydophila pneumoniae, or Mycoplasma pneumoniae), consider antibiotic de-escalation to target atypical bacterial infection.            Hospital Course:     Tasha Perez is a 54-year-old female with a past  "medical history of arthritis, degenerative disc disease, diabetes mellitus type 2 on long-term insulin, hypercholesteremia, fibromyalgia, neuropathy, Raynaud's disease, CKD stage IIIb, 1 PPD smoker, and nonalcoholic pancreatitis x 5.  Patient presented to [facility] emergency department 3/18/2025 after the encouragement of Dr. Barrow her nephrologist as her kidney function had declined enough that he would like her treated with IV hydration in the hospital. She was admitted for acute pancreatitis and SUGAR.     # Acute pancreatitis  # Acute kidney injury on CKD3b - improving  # Chronic metabolic acidosis  # Hypokalemia  - received fluids  - tolerating solid food prior to discharge, no N/V nor abdominal pain   - evaluated by nephrology  -  this patient is frequently admitted and known to the hospital, but this admission is my first time meeting her. Given the constellation of her symptoms (recurrent pancreatitis, diabetes, weight loss, occasional rash, gastroparesis, rheumatoid arthritis) - considering possible IgG-4 related disease. She has been to multiple other hospitals as well who may have checked in the past, but I do not see any workup on the chart. I will check an IgG4 level since she is here. Bearing in mind, antibody levels are not diagnostic. I discussed with her following up with her rheumatologist.   - IgG4 level still in process  - replaced potassium again prior to discharge      # Type 2 diabetes - HgbA1c 9.2%  # Severe Gastroparesis s/p gastric stimulator   Home meds: Farxiga, NovoLog  -Treated with SSI this admission  - Continue home regimen      Physical Exam on Discharge:  /85 (BP Location: Right arm, Patient Position: Sitting)   Pulse 84   Temp 98.1 °F (36.7 °C) (Oral)   Resp 18   Ht 154.9 cm (61\")   Wt 40.6 kg (89 lb 8 oz)   SpO2 99%   BMI 16.91 kg/m²   Physical Exam  GEN: Awake, underweight, alert, interactive, in NAD on room air   HEENT: Atraumatic,  EOMI, Anicteric  Lungs: CTAB, no " wheezing/rales/rhonchi  Heart: RRR, +S1/s2, no rub  ABD: soft, nt/nd, +BS, no guarding/rebound  Extremities: atraumatic, no cyanosis, no edema  Skin: no rashes or lesions  Neuro: AAOx3, no focal deficits  Psych: normal mood & affec    Condition on Discharge: Stable, at baseline    Discharge Disposition:  Home or Self Care    Discharge Medications:     Discharge Medications        Continue These Medications        Instructions Start Date   albuterol sulfate  (90 Base) MCG/ACT inhaler  Commonly known as: PROVENTIL HFA;VENTOLIN HFA;PROAIR HFA   2 puffs, Inhalation, 3 Times Daily PRN      aspirin 81 MG EC tablet   81 mg, Oral, Daily      atorvastatin 80 MG tablet  Commonly known as: LIPITOR   80 mg, Nightly      Calcium Carbonate-Vitamin D 600-5 MG-MCG tablet   1 tablet, Oral, Daily      cetirizine 10 MG tablet  Commonly known as: zyrTEC   10 mg, Daily PRN      DULoxetine 60 MG capsule  Commonly known as: CYMBALTA   60 mg, Oral, Daily      linaclotide 290 MCG capsule capsule  Commonly known as: LINZESS   290 mcg, Every Morning Before Breakfast      nortriptyline 25 MG capsule  Commonly known as: PAMELOR   25 mg, Oral, Nightly      omeprazole 40 MG capsule  Commonly known as: priLOSEC   40 mg, Every Other Day      promethazine 25 MG tablet  Commonly known as: PHENERGAN   25 mg, Every 6 Hours PRN      rOPINIRole 1 MG tablet  Commonly known as: REQUIP   1 mg, 3 Times Daily      sodium bicarbonate 650 MG tablet   650 mg, Oral, 2 Times Daily      sucralfate 1 g tablet  Commonly known as: CARAFATE   1 g, 4 Times Daily               Discharge Diet:   Diet Instructions    Diet as tolerated           Activity at Discharge:   Activity Instructions    As tolerated           Follow-up Appointments:   No future appointments.    Test Results Pending at Discharge: IgG4 levels    Electronically signed by Michelle Clarke MD, 03/21/25, 15:16 CDT.    Time: >30 minutes.

## 2025-03-21 NOTE — PLAN OF CARE
Goal Outcome Evaluation:  Plan of Care Reviewed With: patient        Progress: improving  Outcome Evaluation: Up ad jessy. Tolerating regular diet. Denies pain and nausea.

## 2025-03-22 NOTE — OUTREACH NOTE
Prep Survey      Flowsheet Row Responses   [facility] patient discharged from? [place]   Is LACE score < 7 ? No   Eligibility Readm Mgmt   Discharge diagnosis *Acute pancreatitis   Does the patient have one of the following disease processes/diagnoses(primary or secondary)? Other   Does the patient have Home health ordered? No   Is there a DME ordered? No   Prep survey completed? Yes            ONI ROY - Registered Nurse

## 2025-03-26 ENCOUNTER — READMISSION MANAGEMENT (OUTPATIENT)
Dept: CALL CENTER | Facility: HOSPITAL | Age: 55
End: 2025-03-26
Payer: COMMERCIAL

## 2025-03-26 NOTE — OUTREACH NOTE
Medical Week 1 Survey      Flowsheet Row Responses   [facility] patient discharged from? [place]   Does the patient have one of the following disease processes/diagnoses(primary or secondary)? Other   Week 1 attempt successful? Yes   Call start time 1559   Call end time 1605   Discharge diagnosis *Acute pancreatitis   Meds reviewed with patient/caregiver? Yes   Is the patient having any side effects they believe may be caused by any medication additions or changes? No   Prescription comments No new medications ordered at discharge   Is the patient taking all medications as directed (includes completed medication regime)? Yes   Does the patient have a primary care provider?  Yes   Does the patient have an appointment with their PCP within 7 days of discharge? Yes   Comments regarding PCP Has seen PCP for a follow up appt and has completed blood work   Has the patient kept scheduled appointments due by today? Yes   Has home health visited the patient within 72 hours of discharge? N/A   Psychosocial issues? No   Did the patient receive a copy of their discharge instructions? Yes   Nursing interventions Reviewed instructions with patient, Educated on MyChart, Assisted with MyChart setup   What is the patient's perception of their health status since discharge? Same   Is the patient/caregiver able to teach back signs and symptoms related to disease process for when to call PCP? Yes   Is the patient/caregiver able to teach back signs and symptoms related to disease process for when to call 911? Yes   Is the patient/caregiver able to teach back the hierarchy of who to call/visit for symptoms/problems? PCP, Specialist, Home health nurse, Urgent Care, ED, 911 Yes   Week 1 call completed? Yes   Would this patient benefit from a Referral to Amb Social Work? No   Is the patient interested in additional calls from an ambulatory ? No   Wrap up additional comments Doing about the same, has been to see PCP and is  waiting on results from her bloodwork yesterday, all questions addressed, educated patient on how to get into her mychart to get her medical records.   Call end time 1605            Gwendolyn MOCTEZUMA - Registered Nurse

## 2025-03-31 ENCOUNTER — APPOINTMENT (OUTPATIENT)
Dept: GENERAL RADIOLOGY | Facility: HOSPITAL | Age: 55
End: 2025-03-31
Payer: COMMERCIAL

## 2025-03-31 ENCOUNTER — APPOINTMENT (OUTPATIENT)
Dept: CT IMAGING | Facility: HOSPITAL | Age: 55
End: 2025-03-31
Payer: COMMERCIAL

## 2025-03-31 ENCOUNTER — HOSPITAL ENCOUNTER (INPATIENT)
Facility: HOSPITAL | Age: 55
LOS: 4 days | Discharge: HOME | End: 2025-04-04
Attending: INTERNAL MEDICINE | Admitting: INTERNAL MEDICINE
Payer: COMMERCIAL

## 2025-03-31 ENCOUNTER — READMISSION MANAGEMENT (OUTPATIENT)
Dept: CALL CENTER | Facility: HOSPITAL | Age: 55
End: 2025-03-31
Payer: COMMERCIAL

## 2025-03-31 DIAGNOSIS — E86.0 DEHYDRATION: ICD-10-CM

## 2025-03-31 DIAGNOSIS — N18.9 ACUTE KIDNEY INJURY SUPERIMPOSED ON CHRONIC KIDNEY DISEASE: Primary | ICD-10-CM

## 2025-03-31 DIAGNOSIS — E87.1 HYPONATREMIA: ICD-10-CM

## 2025-03-31 DIAGNOSIS — N17.9 ACUTE KIDNEY INJURY SUPERIMPOSED ON CHRONIC KIDNEY DISEASE: Primary | ICD-10-CM

## 2025-03-31 DIAGNOSIS — E87.20 METABOLIC ACIDOSIS: ICD-10-CM

## 2025-03-31 LAB
ACETONE BLD QL: NEGATIVE
ALBUMIN SERPL-MCNC: 4.2 G/DL (ref 3.5–5.2)
ALBUMIN/GLOB SERPL: 1 G/DL
ALP SERPL-CCNC: 174 U/L (ref 39–117)
ALT SERPL W P-5'-P-CCNC: 13 U/L (ref 1–33)
ANION GAP SERPL CALCULATED.3IONS-SCNC: 23 MMOL/L (ref 5–15)
ARTERIAL PATENCY WRIST A: POSITIVE
AST SERPL-CCNC: 18 U/L (ref 1–32)
ATMOSPHERIC PRESS: 754 MMHG
BACTERIA UR QL AUTO: NORMAL /HPF
BASE EXCESS BLDA CALC-SCNC: -20.7 MMOL/L (ref 0–2)
BASOPHILS # BLD AUTO: 0.08 10*3/MM3 (ref 0–0.2)
BASOPHILS NFR BLD AUTO: 0.3 % (ref 0–1.5)
BDY SITE: ABNORMAL
BILIRUB SERPL-MCNC: 0.2 MG/DL (ref 0–1.2)
BILIRUB UR QL STRIP: NEGATIVE
BODY TEMPERATURE: 37
BUN SERPL-MCNC: 78 MG/DL (ref 6–20)
BUN/CREAT SERPL: 17.1 (ref 7–25)
CALCIUM SPEC-SCNC: 8.5 MG/DL (ref 8.6–10.5)
CHLORIDE SERPL-SCNC: 95 MMOL/L (ref 98–107)
CK SERPL-CCNC: 30 U/L (ref 20–180)
CLARITY UR: ABNORMAL
CO2 SERPL-SCNC: 10 MMOL/L (ref 22–29)
COHGB MFR BLD: 2.4 % (ref 0–5)
COLOR UR: ABNORMAL
CREAT SERPL-MCNC: 4.55 MG/DL (ref 0.57–1)
DEPRECATED RDW RBC AUTO: 52.3 FL (ref 37–54)
EGFRCR SERPLBLD CKD-EPI 2021: 11 ML/MIN/1.73
EOSINOPHIL # BLD AUTO: 0.25 10*3/MM3 (ref 0–0.4)
EOSINOPHIL NFR BLD AUTO: 1.1 % (ref 0.3–6.2)
ERYTHROCYTE [DISTWIDTH] IN BLOOD BY AUTOMATED COUNT: 15.3 % (ref 12.3–15.4)
GLOBULIN UR ELPH-MCNC: 4.3 GM/DL
GLUCOSE BLDC GLUCOMTR-MCNC: 207 MG/DL (ref 70–130)
GLUCOSE SERPL-MCNC: 221 MG/DL (ref 65–99)
GLUCOSE UR STRIP-MCNC: NEGATIVE MG/DL
HCO3 BLDA-SCNC: 8.1 MMOL/L (ref 20–26)
HCT VFR BLD AUTO: 39.2 % (ref 34–46.6)
HCT VFR BLD CALC: 34.9 % (ref 38–51)
HGB BLD-MCNC: 12.3 G/DL (ref 12–15.9)
HGB BLDA-MCNC: 11.4 G/DL (ref 12–16)
HGB UR QL STRIP.AUTO: NEGATIVE
HOLD SPECIMEN: NORMAL
HOLD SPECIMEN: NORMAL
HYALINE CASTS UR QL AUTO: NORMAL /LPF
IMM GRANULOCYTES # BLD AUTO: 0.21 10*3/MM3 (ref 0–0.05)
IMM GRANULOCYTES NFR BLD AUTO: 0.9 % (ref 0–0.5)
KETONES UR QL STRIP: ABNORMAL
LEUKOCYTE ESTERASE UR QL STRIP.AUTO: ABNORMAL
LIPASE SERPL-CCNC: 89 U/L (ref 13–60)
LYMPHOCYTES # BLD AUTO: 3.02 10*3/MM3 (ref 0.7–3.1)
LYMPHOCYTES NFR BLD AUTO: 12.7 % (ref 19.6–45.3)
Lab: ABNORMAL
Lab: ABNORMAL
MAGNESIUM SERPL-MCNC: 1.9 MG/DL (ref 1.6–2.6)
MCH RBC QN AUTO: 29.1 PG (ref 26.6–33)
MCHC RBC AUTO-ENTMCNC: 31.4 G/DL (ref 31.5–35.7)
MCV RBC AUTO: 92.9 FL (ref 79–97)
METHGB BLD QL: 0.6 % (ref 0–3)
MODALITY: ABNORMAL
MONOCYTES # BLD AUTO: 1.4 10*3/MM3 (ref 0.1–0.9)
MONOCYTES NFR BLD AUTO: 5.9 % (ref 5–12)
NEUTROPHILS NFR BLD AUTO: 18.79 10*3/MM3 (ref 1.7–7)
NEUTROPHILS NFR BLD AUTO: 79.1 % (ref 42.7–76)
NITRITE UR QL STRIP: NEGATIVE
NOTIFIED BY: ABNORMAL
NOTIFIED WHO: ABNORMAL
NRBC BLD AUTO-RTO: 0 /100 WBC (ref 0–0.2)
OSMOLALITY SERPL: 310 MOSM/KG (ref 275–295)
OSMOLALITY UR: 354 MOSM/KG (ref 50–1400)
OXYHGB MFR BLDV: 94.5 % (ref 94–99)
PCO2 BLDA: 28.1 MM HG (ref 35–45)
PCO2 TEMP ADJ BLD: 28.1 MM HG (ref 35–45)
PH BLDA: 7.07 PH UNITS (ref 7.35–7.45)
PH UR STRIP.AUTO: <=5 [PH] (ref 5–8)
PH, TEMP CORRECTED: 7.07 PH UNITS (ref 7.35–7.45)
PLATELET # BLD AUTO: 501 10*3/MM3 (ref 140–450)
PMV BLD AUTO: 10 FL (ref 6–12)
PO2 BLDA: 101 MM HG (ref 83–108)
PO2 TEMP ADJ BLD: 101 MM HG (ref 83–108)
POTASSIUM BLDA-SCNC: 3.4 MMOL/L (ref 3.5–5.2)
POTASSIUM SERPL-SCNC: 3.9 MMOL/L (ref 3.5–5.2)
PROT SERPL-MCNC: 8.5 G/DL (ref 6–8.5)
PROT UR QL STRIP: ABNORMAL
RBC # BLD AUTO: 4.22 10*6/MM3 (ref 3.77–5.28)
RBC # UR STRIP: NORMAL /HPF
REF LAB TEST METHOD: NORMAL
SAO2 % BLDCOA: 97.4 % (ref 94–99)
SODIUM BLDA-SCNC: 134 MMOL/L (ref 136–145)
SODIUM SERPL-SCNC: 128 MMOL/L (ref 136–145)
SODIUM UR-SCNC: <20 MMOL/L
SP GR UR STRIP: 1.02 (ref 1–1.03)
SQUAMOUS #/AREA URNS HPF: NORMAL /HPF
TSH SERPL DL<=0.05 MIU/L-ACNC: 1.25 UIU/ML (ref 0.27–4.2)
UROBILINOGEN UR QL STRIP: ABNORMAL
VENTILATOR MODE: ABNORMAL
WBC # UR STRIP: NORMAL /HPF
WBC NRBC COR # BLD AUTO: 23.75 10*3/MM3 (ref 3.4–10.8)
WHOLE BLOOD HOLD COAG: NORMAL
WHOLE BLOOD HOLD SPECIMEN: NORMAL

## 2025-03-31 PROCEDURE — 36600 WITHDRAWAL OF ARTERIAL BLOOD: CPT

## 2025-03-31 PROCEDURE — 83930 ASSAY OF BLOOD OSMOLALITY: CPT | Performed by: NURSE PRACTITIONER

## 2025-03-31 PROCEDURE — 74176 CT ABD & PELVIS W/O CONTRAST: CPT

## 2025-03-31 PROCEDURE — 25010000002 CEFTRIAXONE PER 250 MG: Performed by: NURSE PRACTITIONER

## 2025-03-31 PROCEDURE — 82948 REAGENT STRIP/BLOOD GLUCOSE: CPT

## 2025-03-31 PROCEDURE — 80053 COMPREHEN METABOLIC PANEL: CPT

## 2025-03-31 PROCEDURE — 83735 ASSAY OF MAGNESIUM: CPT

## 2025-03-31 PROCEDURE — 83050 HGB METHEMOGLOBIN QUAN: CPT

## 2025-03-31 PROCEDURE — 71045 X-RAY EXAM CHEST 1 VIEW: CPT

## 2025-03-31 PROCEDURE — 83605 ASSAY OF LACTIC ACID: CPT | Performed by: NURSE PRACTITIONER

## 2025-03-31 PROCEDURE — 85025 COMPLETE CBC W/AUTO DIFF WBC: CPT

## 2025-03-31 PROCEDURE — 82375 ASSAY CARBOXYHB QUANT: CPT

## 2025-03-31 PROCEDURE — 82009 KETONE BODYS QUAL: CPT

## 2025-03-31 PROCEDURE — 84300 ASSAY OF URINE SODIUM: CPT | Performed by: NURSE PRACTITIONER

## 2025-03-31 PROCEDURE — 82550 ASSAY OF CK (CPK): CPT

## 2025-03-31 PROCEDURE — 25810000003 SODIUM CHLORIDE 0.9 % SOLUTION: Performed by: NURSE PRACTITIONER

## 2025-03-31 PROCEDURE — 93005 ELECTROCARDIOGRAM TRACING: CPT | Performed by: INTERNAL MEDICINE

## 2025-03-31 PROCEDURE — 25810000003 SODIUM CHLORIDE 0.9 % SOLUTION

## 2025-03-31 PROCEDURE — 82805 BLOOD GASES W/O2 SATURATION: CPT

## 2025-03-31 PROCEDURE — 87040 BLOOD CULTURE FOR BACTERIA: CPT | Performed by: NURSE PRACTITIONER

## 2025-03-31 PROCEDURE — 84443 ASSAY THYROID STIM HORMONE: CPT

## 2025-03-31 PROCEDURE — 83935 ASSAY OF URINE OSMOLALITY: CPT | Performed by: NURSE PRACTITIONER

## 2025-03-31 PROCEDURE — 93005 ELECTROCARDIOGRAM TRACING: CPT

## 2025-03-31 PROCEDURE — 83690 ASSAY OF LIPASE: CPT

## 2025-03-31 PROCEDURE — 93010 ELECTROCARDIOGRAM REPORT: CPT | Performed by: INTERNAL MEDICINE

## 2025-03-31 PROCEDURE — 99285 EMERGENCY DEPT VISIT HI MDM: CPT

## 2025-03-31 PROCEDURE — 81001 URINALYSIS AUTO W/SCOPE: CPT

## 2025-03-31 RX ORDER — NITROGLYCERIN 0.4 MG/1
0.4 TABLET SUBLINGUAL
Status: DISCONTINUED | OUTPATIENT
Start: 2025-03-31 | End: 2025-04-04 | Stop reason: HOSPADM

## 2025-03-31 RX ORDER — SODIUM CHLORIDE 0.9 % (FLUSH) 0.9 %
10 SYRINGE (ML) INJECTION AS NEEDED
Status: DISCONTINUED | OUTPATIENT
Start: 2025-03-31 | End: 2025-04-04 | Stop reason: HOSPADM

## 2025-03-31 RX ORDER — POLYETHYLENE GLYCOL 3350 17 G/17G
17 POWDER, FOR SOLUTION ORAL DAILY PRN
Status: DISCONTINUED | OUTPATIENT
Start: 2025-03-31 | End: 2025-04-04 | Stop reason: HOSPADM

## 2025-03-31 RX ORDER — CHLORHEXIDINE GLUCONATE 500 MG/1
1 CLOTH TOPICAL EVERY 24 HOURS
Status: DISCONTINUED | OUTPATIENT
Start: 2025-04-01 | End: 2025-04-03

## 2025-03-31 RX ORDER — BISACODYL 10 MG
10 SUPPOSITORY, RECTAL RECTAL DAILY PRN
Status: DISCONTINUED | OUTPATIENT
Start: 2025-03-31 | End: 2025-04-04 | Stop reason: HOSPADM

## 2025-03-31 RX ORDER — IPRATROPIUM BROMIDE AND ALBUTEROL SULFATE 2.5; .5 MG/3ML; MG/3ML
3 SOLUTION RESPIRATORY (INHALATION) EVERY 6 HOURS PRN
Status: DISCONTINUED | OUTPATIENT
Start: 2025-03-31 | End: 2025-04-04 | Stop reason: HOSPADM

## 2025-03-31 RX ORDER — INDOMETHACIN 25 MG/1
25 CAPSULE ORAL ONCE
Status: COMPLETED | OUTPATIENT
Start: 2025-03-31 | End: 2025-03-31

## 2025-03-31 RX ORDER — AMOXICILLIN 250 MG
2 CAPSULE ORAL 2 TIMES DAILY
Status: DISCONTINUED | OUTPATIENT
Start: 2025-04-01 | End: 2025-04-04 | Stop reason: HOSPADM

## 2025-03-31 RX ORDER — SODIUM CHLORIDE 0.9 % (FLUSH) 0.9 %
10 SYRINGE (ML) INJECTION EVERY 12 HOURS SCHEDULED
Status: DISCONTINUED | OUTPATIENT
Start: 2025-04-01 | End: 2025-04-04 | Stop reason: HOSPADM

## 2025-03-31 RX ORDER — ACETAMINOPHEN 650 MG/1
650 SUPPOSITORY RECTAL EVERY 4 HOURS PRN
Status: DISCONTINUED | OUTPATIENT
Start: 2025-03-31 | End: 2025-04-04 | Stop reason: HOSPADM

## 2025-03-31 RX ORDER — CHLORHEXIDINE GLUCONATE 500 MG/1
1 CLOTH TOPICAL ONCE
Status: COMPLETED | OUTPATIENT
Start: 2025-04-01 | End: 2025-03-31

## 2025-03-31 RX ORDER — SODIUM CHLORIDE 9 MG/ML
40 INJECTION, SOLUTION INTRAVENOUS AS NEEDED
Status: DISCONTINUED | OUTPATIENT
Start: 2025-03-31 | End: 2025-04-04 | Stop reason: HOSPADM

## 2025-03-31 RX ORDER — ACETAMINOPHEN 325 MG/1
650 TABLET ORAL EVERY 4 HOURS PRN
Status: DISCONTINUED | OUTPATIENT
Start: 2025-03-31 | End: 2025-04-04 | Stop reason: HOSPADM

## 2025-03-31 RX ORDER — ONDANSETRON 2 MG/ML
4 INJECTION INTRAMUSCULAR; INTRAVENOUS EVERY 6 HOURS PRN
Status: DISCONTINUED | OUTPATIENT
Start: 2025-03-31 | End: 2025-04-04 | Stop reason: HOSPADM

## 2025-03-31 RX ADMIN — SODIUM BICARBONATE 150 MEQ: 84 INJECTION INTRAVENOUS at 23:41

## 2025-03-31 RX ADMIN — Medication 1 APPLICATION: at 23:30

## 2025-03-31 RX ADMIN — SODIUM CHLORIDE 1000 ML: 9 INJECTION, SOLUTION INTRAVENOUS at 22:11

## 2025-03-31 RX ADMIN — SODIUM CHLORIDE 1000 ML: 9 INJECTION, SOLUTION INTRAVENOUS at 19:53

## 2025-03-31 RX ADMIN — Medication 10 ML: at 23:30

## 2025-03-31 RX ADMIN — SODIUM CHLORIDE 1000 MG: 900 INJECTION INTRAVENOUS at 23:34

## 2025-03-31 RX ADMIN — CHLORHEXIDINE GLUCONATE 1 APPLICATION: 500 CLOTH TOPICAL at 23:31

## 2025-03-31 RX ADMIN — SODIUM CHLORIDE 1000 ML: 9 INJECTION, SOLUTION INTRAVENOUS at 17:56

## 2025-03-31 RX ADMIN — SODIUM BICARBONATE 25 MEQ: 84 INJECTION INTRAVENOUS at 20:02

## 2025-03-31 NOTE — ED PROVIDER NOTES
"Subjective   History of Present Illness  Patient is a 54-year-old female who presents to the ER with complaints of generalized fatigue.  States that this has been ongoing for the past 2 days.  She does have history of chronic kidney disease.  Patient reports that she thinks she is in renal failure again.  Patient states that overall she just feels very tired.  She is complaining of pain everywhere due to her osteoporosis.  She states that her vision is \"white \".  Reports that this happens every time she gets in renal failure.  Patient denies any abdominal pain, nausea, vomiting, diarrhea.  Follows with nephrology, not on dialysis.        Review of Systems   Constitutional:  Positive for fatigue.   All other systems reviewed and are negative.      Past Medical History:   Diagnosis Date    Arthritis     Contusion     Degenerative disc disease, cervical     Diabetes mellitus     Elevated cholesterol     Fibromyalgia     Neuropathy     Osteoporosis     Pancreatitis     Raynaud disease     Renal insufficiency     Smoker 02/08/2022    Vitamin D deficiency 04/26/2022       Allergies   Allergen Reactions    Bee Venom Anaphylaxis    Hydrocodone Anaphylaxis    Hydroxychloroquine Other (See Comments)     Hair loss    Ibuprofen Other (See Comments)     \"SHUTS MY KIDNEYS DOWN\" PER PATIENT     Pineapple Anaphylaxis    Pineapple Extract Anaphylaxis    Wasp Venom Anaphylaxis    Wasp Venom Protein Anaphylaxis    Hydrocodone-Acetaminophen Dizziness, Nausea And Vomiting, Nausea Only and Unknown - Low Severity    Penicillins Nausea And Vomiting    Sitagliptin Other (See Comments)     Other reaction(s): Abdominal Pain      Metformin Other (See Comments)     Pt states it messes with her kidneys    Chocolate Rash     Dark chocolate only, told to RD 7/10/24    Poison Shelly Extract Itching and Rash       Past Surgical History:   Procedure Laterality Date    APPENDECTOMY      CHOLECYSTECTOMY      COLONOSCOPY      ENDOSCOPY N/A 10/08/2019    " Procedure: ESOPHAGOGASTRODUODENOSCOPY WITH ANESTHESIA;  Surgeon: Aleks Vaughn DO;  Location: [facility] ENDOSCOPY;  Service: Gastroenterology    ENDOSCOPY N/A 11/12/2024    Procedure: ESOPHAGOGASTRODUODENOSCOPY WITH ANESTHESIA;  Surgeon: Tiffanie Saucedo MD;  Location: [facility] ENDOSCOPY;  Service: Gastroenterology;  Laterality: N/A;  pre op: Gastroparesis, Intractable nausea  post op: insertion of dobhoff   PCP: Brandon Murguia    ENDOSCOPY N/A 11/15/2024    Procedure: ESOPHAGOGASTRODUODENOSCOPY WITH ANESTHESIA WITH NG TUBE INSERTION;  Surgeon: Tiffanie Saucedo MD;  Location: [facility] ENDOSCOPY;  Service: Gastroenterology;  Laterality: N/A;  PRE OP: DOBHOFF  POST OP: DOBHOFF  PCP: TELLY PACK    ENDOSCOPY WITH GASTROSTOMY TUBE INSERTION N/A 6/15/2022    Procedure: ESOPHAGOGASTRODUODENOSCOPY WITH GASTROSTOMY TUBE INSERTION;  Surgeon: Jersey Lee MD;  Location: [facility] ENDOSCOPY;  Service: Gastroenterology;  Laterality: N/A;  pre peg placement  post peg placement  Dr. Murguia    ENTEROSCOPY SMALL BOWEL N/A 11/3/2022    Procedure: Esophagogastroduodenoscopy with Peg Removal;  Surgeon: Aleks Vaughn DO;  Location: [facility] ENDOSCOPY;  Service: Gastroenterology;  Laterality: N/A;  pre; peg removal  post; peg removal   KIRILL Murguia MD        HYSTERECTOMY         Family History   Adopted: Yes   Problem Relation Age of Onset    Colon polyps Neg Hx     Colon cancer Neg Hx        Social History     Socioeconomic History    Marital status:    Tobacco Use    Smoking status: Every Day     Current packs/day: 0.50     Average packs/day: 0.5 packs/day for 32.2 years (16.1 ttl pk-yrs)     Types: Cigarettes     Start date: 1993     Passive exposure: Current   Vaping Use    Vaping status: Never Used   Substance and Sexual Activity    Alcohol use: No    Drug use: No    Sexual activity: Defer           Objective   Physical Exam  Vitals and nursing note reviewed.   Constitutional:       General: She is not in acute  distress.     Appearance: Normal appearance. She is ill-appearing. She is not toxic-appearing.   HENT:      Head: Normocephalic.   Cardiovascular:      Rate and Rhythm: Normal rate and regular rhythm.      Pulses: Normal pulses.      Heart sounds: Normal heart sounds.   Pulmonary:      Effort: Pulmonary effort is normal.      Breath sounds: Normal breath sounds.   Abdominal:      General: Abdomen is flat. Bowel sounds are normal. There is no distension.      Palpations: Abdomen is soft.      Tenderness: There is no abdominal tenderness. There is no guarding.   Musculoskeletal:         General: Normal range of motion.      Cervical back: Normal range of motion and neck supple.   Skin:     General: Skin is warm and dry.   Neurological:      General: No focal deficit present.      Mental Status: She is alert and oriented to person, place, and time. Mental status is at baseline.   Psychiatric:         Mood and Affect: Mood normal.         Behavior: Behavior normal.         Procedures       Labs Reviewed   COMPREHENSIVE METABOLIC PANEL - Abnormal; Notable for the following components:       Result Value    Glucose 221 (*)     BUN 78 (*)     Creatinine 4.55 (*)     Sodium 128 (*)     Chloride 95 (*)     CO2 10.0 (*)     Calcium 8.5 (*)     Alkaline Phosphatase 174 (*)     Anion Gap 23.0 (*)     eGFR 11.0 (*)     All other components within normal limits    Narrative:     GFR Categories in Chronic Kidney Disease (CKD)      GFR Category          GFR (mL/min/1.73)    Interpretation  G1                     90 or greater         Normal or high (1)  G2                      60-89                Mild decrease (1)  G3a                   45-59                Mild to moderate decrease  G3b                   30-44                Moderate to severe decrease  G4                    15-29                Severe decrease  G5                    14 or less           Kidney failure          (1)In the absence of evidence of kidney disease,  neither GFR category G1 or G2 fulfill the criteria for CKD.    eGFR calculation 2021 CKD-EPI creatinine equation, which does not include race as a factor   URINALYSIS W/ MICROSCOPIC IF INDICATED (NO CULTURE) - Abnormal; Notable for the following components:    Color, UA Dark Yellow (*)     Appearance, UA Cloudy (*)     Ketones, UA Trace (*)     Protein,  mg/dL (2+) (*)     Leuk Esterase, UA Trace (*)     All other components within normal limits   CBC WITH AUTO DIFFERENTIAL - Abnormal; Notable for the following components:    WBC 23.75 (*)     MCHC 31.4 (*)     Platelets 501 (*)     Neutrophil % 79.1 (*)     Lymphocyte % 12.7 (*)     Immature Grans % 0.9 (*)     Neutrophils, Absolute 18.79 (*)     Monocytes, Absolute 1.40 (*)     Immature Grans, Absolute 0.21 (*)     All other components within normal limits   LIPASE - Abnormal; Notable for the following components:    Lipase 89 (*)     All other components within normal limits   BLOOD GAS, ARTERIAL W/CO-OXIMETRY - Abnormal; Notable for the following components:    pH, Arterial 7.068 (*)     pCO2, Arterial 28.1 (*)     HCO3, Arterial 8.1 (*)     Base Excess, Arterial -20.7 (*)     Hemoglobin, Blood Gas 11.4 (*)     Hematocrit, Blood Gas 34.9 (*)     Sodium, Arterial 134 (*)     Potassium, Arterial 3.4 (*)     pH, Temp Corrected 7.068 (*)     pCO2, Temperature Corrected 28.1 (*)     All other components within normal limits   POCT GLUCOSE FINGERSTICK - Abnormal; Notable for the following components:    Glucose 207 (*)     All other components within normal limits   MAGNESIUM - Normal   CK - Normal   TSH - Normal   ACETONE - Normal   GASTROINTESTINAL PANEL, PCR (PREFERRED) DOES NOT INCLUDE CDIFF   CLOSTRIDIOIDES DIFFICILE TOXIN    Narrative:     The following orders were created for panel order Clostridioides difficile Toxin - Stool, Per Rectum.  Procedure                               Abnormality         Status                     ---------                                -----------         ------                     Clostridioides difficile...[[id number]]                                                   Please view results for these tests on the individual orders.   CLOSTRIDIOIDES DIFFICILE TOXIN, PCR   RAINBOW DRAW    Narrative:     The following orders were created for panel order [place] Draw.  Procedure                               Abnormality         Status                     ---------                               -----------         ------                     Green Top (Gel)[[id number]]                                  Final result               Lavender Top[[id number]]                                     Final result               Red Top[[id number]]                                          Final result               Light Blue Top[[id number]]                                   Final result                 Please view results for these tests on the individual orders.   URINALYSIS, MICROSCOPIC ONLY   BLOOD GAS, ARTERIAL W/CO-OXIMETRY   POCT GLUCOSE FINGERSTICK   CBC AND DIFFERENTIAL    Narrative:     The following orders were created for panel order CBC & Differential.  Procedure                               Abnormality         Status                     ---------                               -----------         ------                     CBC Auto Differential[[id number]]        Abnormal            Final result                 Please view results for these tests on the individual orders.   GREEN TOP   LAVENDER TOP   RED TOP   LIGHT BLUE TOP     CT Abdomen Pelvis Without Contrast   Final Result   1. Interval increase in the volume of gas and fluid throughout the colon   compatible with worsening of the diarrheal type illness, no mechanical   bowel obstruction is identified. There is no evidence of perforation or   intra-abdominal abscess.   2. The mild changes of acute pancreatitis seen adjacent to the   pancreatic head before appear resolved. A few small  calcifications are   noted within the pancreatic head suggesting chronic pancreatitis.   3. Evidence of previous cholecystectomy and appendectomy. Again   demonstrated is a gastric stimulator.   4. Stable low-attenuation thickening of both adrenal glands may be   related to adrenal hyperplasia or adenomas. This is likely benign.               This report was signed and finalized on 3/31/2025 6:45 PM by Dr. Jose Antonio Bermeo MD.          XR Chest 1 View   Final Result   1. No acute radiographic cardiopulmonary process.           This report was signed and finalized on 3/31/2025 3:55 PM by Dr. Gloria Roe MD.                  ED Course  ED Course as of 03/31/25 2010   Mon Mar 31, 2025   1956 Call placed to intensivist service for admission.  ABGs reveal pH 7.068, pCO2 28.1, HCO3 8.1.  Discussed with Dr. Mota. Amp of bicarb ordered. [KR]   2006 Discussed with RAKESH Marques with intensivist service.  She has accepted this patient for further management.  Patient is sitting up in no acute distress at this time. [KR]      ED Course User Index  [KR] Faye Reis APRN                                                       Medical Decision Making  Problems Addressed:  Acute kidney injury superimposed on chronic kidney disease: complicated acute illness or injury  Hyponatremia: complicated acute illness or injury  Metabolic acidosis: complicated acute illness or injury    Amount and/or Complexity of Data Reviewed  Labs: ordered.  Radiology: ordered.    Risk  Prescription drug management.  Decision regarding hospitalization.        Final diagnoses:   Acute kidney injury superimposed on chronic kidney disease   Hyponatremia   Metabolic acidosis       ED Disposition  ED Disposition       ED Disposition   Decision to Admit    Condition   --    Comment   Level of Care: Critical Care [6]   Diagnosis: Metabolic acidosis [673441]   Admitting Physician: VARINDER DOSS [[id number]]   Certification: I Certify That Inpatient  Hospital Services Are Medically Necessary For Greater Than 2 Midnights                 No follow-up provider specified.       Medication List      No changes were made to your prescriptions during this visit.            Faye Reis, RAKESH  03/31/25 2010

## 2025-04-01 PROBLEM — R94.31 QT PROLONGATION: Status: ACTIVE | Noted: 2025-04-01

## 2025-04-01 PROBLEM — K86.1 OTHER CHRONIC PANCREATITIS: Status: ACTIVE | Noted: 2025-04-01

## 2025-04-01 LAB
ADV 40+41 DNA STL QL NAA+NON-PROBE: NOT DETECTED
ALBUMIN SERPL-MCNC: 3.2 G/DL (ref 3.5–5.2)
ALBUMIN/GLOB SERPL: 1.1 G/DL
ALP SERPL-CCNC: 136 U/L (ref 39–117)
ALT SERPL W P-5'-P-CCNC: 11 U/L (ref 1–33)
ANION GAP SERPL CALCULATED.3IONS-SCNC: 13 MMOL/L (ref 5–15)
ANION GAP SERPL CALCULATED.3IONS-SCNC: 16 MMOL/L (ref 5–15)
ANION GAP SERPL CALCULATED.3IONS-SCNC: 20 MMOL/L (ref 5–15)
ANION GAP SERPL CALCULATED.3IONS-SCNC: 20 MMOL/L (ref 5–15)
ARTERIAL PATENCY WRIST A: ABNORMAL
AST SERPL-CCNC: 19 U/L (ref 1–32)
ASTRO TYP 1-8 RNA STL QL NAA+NON-PROBE: NOT DETECTED
ATMOSPHERIC PRESS: 755 MMHG
BASE EXCESS BLDA CALC-SCNC: -4.3 MMOL/L (ref 0–2)
BASOPHILS # BLD AUTO: 0.03 10*3/MM3 (ref 0–0.2)
BASOPHILS NFR BLD AUTO: 0.3 % (ref 0–1.5)
BDY SITE: ABNORMAL
BILIRUB SERPL-MCNC: 0.2 MG/DL (ref 0–1.2)
BODY TEMPERATURE: 37
BUN SERPL-MCNC: 52 MG/DL (ref 6–20)
BUN SERPL-MCNC: 57 MG/DL (ref 6–20)
BUN SERPL-MCNC: 65 MG/DL (ref 6–20)
BUN SERPL-MCNC: 67 MG/DL (ref 6–20)
BUN/CREAT SERPL: 18.4 (ref 7–25)
BUN/CREAT SERPL: 18.8 (ref 7–25)
BUN/CREAT SERPL: 19 (ref 7–25)
BUN/CREAT SERPL: 20 (ref 7–25)
C CAYETANENSIS DNA STL QL NAA+NON-PROBE: NOT DETECTED
C COLI+JEJ+UPSA DNA STL QL NAA+NON-PROBE: NOT DETECTED
C DIFF TOX GENS STL QL NAA+PROBE: NEGATIVE
CALCIUM SPEC-SCNC: 6.7 MG/DL (ref 8.6–10.5)
CALCIUM SPEC-SCNC: 7.1 MG/DL (ref 8.6–10.5)
CHLORIDE SERPL-SCNC: 100 MMOL/L (ref 98–107)
CHLORIDE SERPL-SCNC: 105 MMOL/L (ref 98–107)
CHLORIDE SERPL-SCNC: 105 MMOL/L (ref 98–107)
CHLORIDE SERPL-SCNC: 108 MMOL/L (ref 98–107)
CO2 SERPL-SCNC: 13 MMOL/L (ref 22–29)
CO2 SERPL-SCNC: 21 MMOL/L (ref 22–29)
CO2 SERPL-SCNC: 21 MMOL/L (ref 22–29)
CO2 SERPL-SCNC: 8 MMOL/L (ref 22–29)
CREAT SERPL-MCNC: 2.77 MG/DL (ref 0.57–1)
CREAT SERPL-MCNC: 3 MG/DL (ref 0.57–1)
CREAT SERPL-MCNC: 3.25 MG/DL (ref 0.57–1)
CREAT SERPL-MCNC: 3.65 MG/DL (ref 0.57–1)
CRYPTOSP DNA STL QL NAA+NON-PROBE: NOT DETECTED
D-LACTATE SERPL-SCNC: 1.2 MMOL/L (ref 0.5–2)
DEPRECATED RDW RBC AUTO: 51.2 FL (ref 37–54)
E HISTOLYT DNA STL QL NAA+NON-PROBE: NOT DETECTED
EAEC PAA PLAS AGGR+AATA ST NAA+NON-PRB: NOT DETECTED
EC STX1+STX2 GENES STL QL NAA+NON-PROBE: NOT DETECTED
EGFRCR SERPLBLD CKD-EPI 2021: 14.3 ML/MIN/1.73
EGFRCR SERPLBLD CKD-EPI 2021: 16.4 ML/MIN/1.73
EGFRCR SERPLBLD CKD-EPI 2021: 18.1 ML/MIN/1.73
EGFRCR SERPLBLD CKD-EPI 2021: 19.9 ML/MIN/1.73
EOSINOPHIL # BLD AUTO: 0.2 10*3/MM3 (ref 0–0.4)
EOSINOPHIL NFR BLD AUTO: 1.7 % (ref 0.3–6.2)
EPEC EAE GENE STL QL NAA+NON-PROBE: NOT DETECTED
ERYTHROCYTE [DISTWIDTH] IN BLOOD BY AUTOMATED COUNT: 15.1 % (ref 12.3–15.4)
ETEC LTA+ST1A+ST1B TOX ST NAA+NON-PROBE: NOT DETECTED
G LAMBLIA DNA STL QL NAA+NON-PROBE: NOT DETECTED
GLOBULIN UR ELPH-MCNC: 3 GM/DL
GLUCOSE BLDC GLUCOMTR-MCNC: 128 MG/DL (ref 70–130)
GLUCOSE BLDC GLUCOMTR-MCNC: 160 MG/DL (ref 70–130)
GLUCOSE BLDC GLUCOMTR-MCNC: 195 MG/DL (ref 70–130)
GLUCOSE BLDC GLUCOMTR-MCNC: 197 MG/DL (ref 70–130)
GLUCOSE BLDC GLUCOMTR-MCNC: 197 MG/DL (ref 70–130)
GLUCOSE SERPL-MCNC: 144 MG/DL (ref 65–99)
GLUCOSE SERPL-MCNC: 154 MG/DL (ref 65–99)
GLUCOSE SERPL-MCNC: 162 MG/DL (ref 65–99)
GLUCOSE SERPL-MCNC: 177 MG/DL (ref 65–99)
HBA1C MFR BLD: 8.6 % (ref 4.8–5.6)
HCO3 BLDA-SCNC: 20.1 MMOL/L (ref 20–26)
HCT VFR BLD AUTO: 33.3 % (ref 34–46.6)
HGB BLD-MCNC: 10.5 G/DL (ref 12–15.9)
IMM GRANULOCYTES # BLD AUTO: 0.15 10*3/MM3 (ref 0–0.05)
IMM GRANULOCYTES NFR BLD AUTO: 1.3 % (ref 0–0.5)
LYMPHOCYTES # BLD AUTO: 2.25 10*3/MM3 (ref 0.7–3.1)
LYMPHOCYTES NFR BLD AUTO: 19 % (ref 19.6–45.3)
Lab: ABNORMAL
MAGNESIUM SERPL-MCNC: 1.5 MG/DL (ref 1.6–2.6)
MAGNESIUM SERPL-MCNC: 2.2 MG/DL (ref 1.6–2.6)
MAGNESIUM SERPL-MCNC: 2.5 MG/DL (ref 1.6–2.6)
MCH RBC QN AUTO: 29.2 PG (ref 26.6–33)
MCHC RBC AUTO-ENTMCNC: 31.5 G/DL (ref 31.5–35.7)
MCV RBC AUTO: 92.5 FL (ref 79–97)
MODALITY: ABNORMAL
MONOCYTES # BLD AUTO: 0.85 10*3/MM3 (ref 0.1–0.9)
MONOCYTES NFR BLD AUTO: 7.2 % (ref 5–12)
NEUTROPHILS NFR BLD AUTO: 70.5 % (ref 42.7–76)
NEUTROPHILS NFR BLD AUTO: 8.34 10*3/MM3 (ref 1.7–7)
NOROVIRUS GI+II RNA STL QL NAA+NON-PROBE: NOT DETECTED
NRBC BLD AUTO-RTO: 0 /100 WBC (ref 0–0.2)
P SHIGELLOIDES DNA STL QL NAA+NON-PROBE: NOT DETECTED
PCO2 BLDA: 33.2 MM HG (ref 35–45)
PCO2 TEMP ADJ BLD: 33.2 MM HG (ref 35–45)
PH BLDA: 7.39 PH UNITS (ref 7.35–7.45)
PH, TEMP CORRECTED: 7.39 PH UNITS (ref 7.35–7.45)
PHOSPHATE SERPL-MCNC: 2.6 MG/DL (ref 2.5–4.5)
PHOSPHATE SERPL-MCNC: 5.7 MG/DL (ref 2.5–4.5)
PLATELET # BLD AUTO: 352 10*3/MM3 (ref 140–450)
PMV BLD AUTO: 10.3 FL (ref 6–12)
PO2 BLDA: 77.6 MM HG (ref 83–108)
PO2 TEMP ADJ BLD: 77.6 MM HG (ref 83–108)
POTASSIUM SERPL-SCNC: 2.7 MMOL/L (ref 3.5–5.2)
POTASSIUM SERPL-SCNC: 2.9 MMOL/L (ref 3.5–5.2)
POTASSIUM SERPL-SCNC: 3 MMOL/L (ref 3.5–5.2)
POTASSIUM SERPL-SCNC: 3.1 MMOL/L (ref 3.5–5.2)
PROT SERPL-MCNC: 6.2 G/DL (ref 6–8.5)
RBC # BLD AUTO: 3.6 10*6/MM3 (ref 3.77–5.28)
RVA RNA STL QL NAA+NON-PROBE: NOT DETECTED
S ENT+BONG DNA STL QL NAA+NON-PROBE: NOT DETECTED
SAO2 % BLDCOA: 96.7 % (ref 94–99)
SAPO I+II+IV+V RNA STL QL NAA+NON-PROBE: NOT DETECTED
SHIGELLA SP+EIEC IPAH ST NAA+NON-PROBE: NOT DETECTED
SODIUM SERPL-SCNC: 136 MMOL/L (ref 136–145)
SODIUM SERPL-SCNC: 137 MMOL/L (ref 136–145)
SODIUM SERPL-SCNC: 138 MMOL/L (ref 136–145)
SODIUM SERPL-SCNC: 139 MMOL/L (ref 136–145)
V CHOL+PARA+VUL DNA STL QL NAA+NON-PROBE: NOT DETECTED
V CHOLERAE DNA STL QL NAA+NON-PROBE: NOT DETECTED
VENTILATOR MODE: ABNORMAL
WBC NRBC COR # BLD AUTO: 11.82 10*3/MM3 (ref 3.4–10.8)
Y ENTEROCOL DNA STL QL NAA+NON-PROBE: NOT DETECTED

## 2025-04-01 PROCEDURE — 83036 HEMOGLOBIN GLYCOSYLATED A1C: CPT | Performed by: NURSE PRACTITIONER

## 2025-04-01 PROCEDURE — 82948 REAGENT STRIP/BLOOD GLUCOSE: CPT

## 2025-04-01 PROCEDURE — 87493 C DIFF AMPLIFIED PROBE: CPT

## 2025-04-01 PROCEDURE — 25010000002 CEFTRIAXONE PER 250 MG: Performed by: NURSE PRACTITIONER

## 2025-04-01 PROCEDURE — 25010000002 SODIUM CHLORIDE 0.9 % WITH KCL 20 MEQ 20-0.9 MEQ/L-% SOLUTION: Performed by: INTERNAL MEDICINE

## 2025-04-01 PROCEDURE — 80053 COMPREHEN METABOLIC PANEL: CPT | Performed by: NURSE PRACTITIONER

## 2025-04-01 PROCEDURE — 84100 ASSAY OF PHOSPHORUS: CPT | Performed by: NURSE PRACTITIONER

## 2025-04-01 PROCEDURE — 25010000002 MAGNESIUM SULFATE 2 GM/50ML SOLUTION: Performed by: NURSE PRACTITIONER

## 2025-04-01 PROCEDURE — 36600 WITHDRAWAL OF ARTERIAL BLOOD: CPT

## 2025-04-01 PROCEDURE — 25010000002 HEPARIN (PORCINE) PER 1000 UNITS: Performed by: NURSE PRACTITIONER

## 2025-04-01 PROCEDURE — 99254 IP/OBS CNSLTJ NEW/EST MOD 60: CPT | Performed by: INTERNAL MEDICINE

## 2025-04-01 PROCEDURE — 85060 BLOOD SMEAR INTERPRETATION: CPT | Performed by: INTERNAL MEDICINE

## 2025-04-01 PROCEDURE — 83735 ASSAY OF MAGNESIUM: CPT | Performed by: INTERNAL MEDICINE

## 2025-04-01 PROCEDURE — 83735 ASSAY OF MAGNESIUM: CPT | Performed by: NURSE PRACTITIONER

## 2025-04-01 PROCEDURE — 25010000002 ERYTHROMYCIN LACTOBIONATE PER 500 MG: Performed by: INTERNAL MEDICINE

## 2025-04-01 PROCEDURE — 87507 IADNA-DNA/RNA PROBE TQ 12-25: CPT

## 2025-04-01 PROCEDURE — 25010000002 POTASSIUM CHLORIDE 10 MEQ/100ML SOLUTION: Performed by: NURSE PRACTITIONER

## 2025-04-01 PROCEDURE — 25010000002 METOCLOPRAMIDE PER 10 MG: Performed by: NURSE PRACTITIONER

## 2025-04-01 PROCEDURE — 25010000002 ACETAMINOPHEN 10 MG/ML SOLUTION: Performed by: INTERNAL MEDICINE

## 2025-04-01 PROCEDURE — 85025 COMPLETE CBC W/AUTO DIFF WBC: CPT | Performed by: NURSE PRACTITIONER

## 2025-04-01 PROCEDURE — 25010000002 MAGNESIUM SULFATE IN D5W 1G/100ML (PREMIX) 1-5 GM/100ML-% SOLUTION: Performed by: INTERNAL MEDICINE

## 2025-04-01 PROCEDURE — 82803 BLOOD GASES ANY COMBINATION: CPT

## 2025-04-01 PROCEDURE — 63710000001 INSULIN LISPRO (HUMAN) PER 5 UNITS: Performed by: NURSE PRACTITIONER

## 2025-04-01 RX ORDER — PROMETHAZINE HYDROCHLORIDE 25 MG/1
25 TABLET ORAL EVERY 6 HOURS PRN
Status: DISCONTINUED | OUTPATIENT
Start: 2025-04-01 | End: 2025-04-04 | Stop reason: HOSPADM

## 2025-04-01 RX ORDER — POTASSIUM CHLORIDE 7.45 MG/ML
10 INJECTION INTRAVENOUS ONCE
Status: COMPLETED | OUTPATIENT
Start: 2025-04-01 | End: 2025-04-01

## 2025-04-01 RX ORDER — HEPARIN SODIUM 5000 [USP'U]/ML
5000 INJECTION, SOLUTION INTRAVENOUS; SUBCUTANEOUS EVERY 12 HOURS SCHEDULED
Status: DISCONTINUED | OUTPATIENT
Start: 2025-04-01 | End: 2025-04-04 | Stop reason: HOSPADM

## 2025-04-01 RX ORDER — TAMSULOSIN HYDROCHLORIDE 0.4 MG/1
0.4 CAPSULE ORAL DAILY
Status: DISCONTINUED | OUTPATIENT
Start: 2025-04-01 | End: 2025-04-04 | Stop reason: HOSPADM

## 2025-04-01 RX ORDER — CALCIUM CARBONATE 500 MG/1
1 TABLET, CHEWABLE ORAL DAILY
Status: DISCONTINUED | OUTPATIENT
Start: 2025-04-01 | End: 2025-04-04 | Stop reason: HOSPADM

## 2025-04-01 RX ORDER — MAGNESIUM SULFATE 1 G/100ML
1 INJECTION INTRAVENOUS ONCE
Status: COMPLETED | OUTPATIENT
Start: 2025-04-01 | End: 2025-04-01

## 2025-04-01 RX ORDER — TRIAMCINOLONE ACETONIDE 1 MG/G
1 CREAM TOPICAL DAILY
COMMUNITY

## 2025-04-01 RX ORDER — DEXTROSE MONOHYDRATE 25 G/50ML
25 INJECTION, SOLUTION INTRAVENOUS
Status: DISCONTINUED | OUTPATIENT
Start: 2025-04-01 | End: 2025-04-04 | Stop reason: HOSPADM

## 2025-04-01 RX ORDER — POTASSIUM CHLORIDE 1500 MG/1
20 TABLET, EXTENDED RELEASE ORAL ONCE
Status: COMPLETED | OUTPATIENT
Start: 2025-04-01 | End: 2025-04-01

## 2025-04-01 RX ORDER — CETIRIZINE HYDROCHLORIDE 10 MG/1
10 TABLET ORAL DAILY PRN
Status: DISCONTINUED | OUTPATIENT
Start: 2025-04-01 | End: 2025-04-04 | Stop reason: HOSPADM

## 2025-04-01 RX ORDER — ALBUTEROL SULFATE 0.83 MG/ML
2.5 SOLUTION RESPIRATORY (INHALATION) EVERY 6 HOURS PRN
Status: DISCONTINUED | OUTPATIENT
Start: 2025-04-01 | End: 2025-04-04 | Stop reason: HOSPADM

## 2025-04-01 RX ORDER — METOCLOPRAMIDE HYDROCHLORIDE 5 MG/ML
10 INJECTION INTRAMUSCULAR; INTRAVENOUS EVERY 6 HOURS
Status: DISCONTINUED | OUTPATIENT
Start: 2025-04-01 | End: 2025-04-01

## 2025-04-01 RX ORDER — ASPIRIN 81 MG/1
81 TABLET ORAL DAILY
Status: DISCONTINUED | OUTPATIENT
Start: 2025-04-01 | End: 2025-04-04 | Stop reason: HOSPADM

## 2025-04-01 RX ORDER — ACETAMINOPHEN 10 MG/ML
1000 INJECTION, SOLUTION INTRAVENOUS ONCE
Status: COMPLETED | OUTPATIENT
Start: 2025-04-01 | End: 2025-04-01

## 2025-04-01 RX ORDER — LUBIPROSTONE 8 UG/1
8 CAPSULE ORAL 2 TIMES DAILY
Status: DISCONTINUED | OUTPATIENT
Start: 2025-04-01 | End: 2025-04-04 | Stop reason: HOSPADM

## 2025-04-01 RX ORDER — ROPINIROLE 1 MG/1
1 TABLET, FILM COATED ORAL 3 TIMES DAILY
Status: DISCONTINUED | OUTPATIENT
Start: 2025-04-01 | End: 2025-04-04 | Stop reason: HOSPADM

## 2025-04-01 RX ORDER — CHOLECALCIFEROL (VITAMIN D3) 25 MCG
500 TABLET ORAL DAILY
Status: DISCONTINUED | OUTPATIENT
Start: 2025-04-01 | End: 2025-04-04 | Stop reason: HOSPADM

## 2025-04-01 RX ORDER — SODIUM BICARBONATE 650 MG/1
650 TABLET ORAL 2 TIMES DAILY
Status: DISCONTINUED | OUTPATIENT
Start: 2025-04-01 | End: 2025-04-02

## 2025-04-01 RX ORDER — INSULIN LISPRO 100 [IU]/ML
1-5 INJECTION, SOLUTION INTRAVENOUS; SUBCUTANEOUS
Status: DISCONTINUED | OUTPATIENT
Start: 2025-04-01 | End: 2025-04-04 | Stop reason: HOSPADM

## 2025-04-01 RX ORDER — POTASSIUM CHLORIDE 1.5 G/1.58G
40 POWDER, FOR SOLUTION ORAL ONCE
Status: COMPLETED | OUTPATIENT
Start: 2025-04-01 | End: 2025-04-01

## 2025-04-01 RX ORDER — METOCLOPRAMIDE HYDROCHLORIDE 5 MG/ML
5 INJECTION INTRAMUSCULAR; INTRAVENOUS EVERY 6 HOURS SCHEDULED
Status: DISCONTINUED | OUTPATIENT
Start: 2025-04-01 | End: 2025-04-01

## 2025-04-01 RX ORDER — METOCLOPRAMIDE HYDROCHLORIDE 5 MG/5ML
5 SOLUTION ORAL
Status: DISCONTINUED | OUTPATIENT
Start: 2025-04-01 | End: 2025-04-04 | Stop reason: HOSPADM

## 2025-04-01 RX ORDER — PANTOPRAZOLE SODIUM 40 MG/1
40 TABLET, DELAYED RELEASE ORAL
Status: DISCONTINUED | OUTPATIENT
Start: 2025-04-01 | End: 2025-04-04 | Stop reason: HOSPADM

## 2025-04-01 RX ORDER — POTASSIUM CHLORIDE 1500 MG/1
20 TABLET, EXTENDED RELEASE ORAL DAILY
Status: DISCONTINUED | OUTPATIENT
Start: 2025-04-02 | End: 2025-04-02

## 2025-04-01 RX ORDER — SODIUM CHLORIDE AND POTASSIUM CHLORIDE 150; 900 MG/100ML; MG/100ML
75 INJECTION, SOLUTION INTRAVENOUS CONTINUOUS
Status: DISPENSED | OUTPATIENT
Start: 2025-04-01 | End: 2025-04-04

## 2025-04-01 RX ORDER — ATORVASTATIN CALCIUM 40 MG/1
80 TABLET, FILM COATED ORAL NIGHTLY
Status: DISCONTINUED | OUTPATIENT
Start: 2025-04-01 | End: 2025-04-04 | Stop reason: HOSPADM

## 2025-04-01 RX ORDER — NICOTINE POLACRILEX 4 MG
15 LOZENGE BUCCAL
Status: DISCONTINUED | OUTPATIENT
Start: 2025-04-01 | End: 2025-04-04 | Stop reason: HOSPADM

## 2025-04-01 RX ORDER — DULOXETIN HYDROCHLORIDE 30 MG/1
30 CAPSULE, DELAYED RELEASE ORAL DAILY
Status: DISCONTINUED | OUTPATIENT
Start: 2025-04-01 | End: 2025-04-04 | Stop reason: HOSPADM

## 2025-04-01 RX ORDER — NORTRIPTYLINE HYDROCHLORIDE 25 MG/1
25 CAPSULE ORAL NIGHTLY
Status: DISCONTINUED | OUTPATIENT
Start: 2025-04-01 | End: 2025-04-04 | Stop reason: HOSPADM

## 2025-04-01 RX ORDER — MAGNESIUM SULFATE HEPTAHYDRATE 40 MG/ML
2 INJECTION, SOLUTION INTRAVENOUS ONCE
Status: COMPLETED | OUTPATIENT
Start: 2025-04-01 | End: 2025-04-01

## 2025-04-01 RX ADMIN — INSULIN LISPRO 2 UNITS: 100 INJECTION, SOLUTION INTRAVENOUS; SUBCUTANEOUS at 17:15

## 2025-04-01 RX ADMIN — LUBIPROSTONE 8 MCG: 8 CAPSULE, GELATIN COATED ORAL at 20:04

## 2025-04-01 RX ADMIN — HEPARIN SODIUM 5000 UNITS: 5000 INJECTION, SOLUTION INTRAVENOUS; SUBCUTANEOUS at 09:39

## 2025-04-01 RX ADMIN — SODIUM BICARBONATE 150 MEQ: 84 INJECTION INTRAVENOUS at 07:09

## 2025-04-01 RX ADMIN — MAGNESIUM SULFATE IN DEXTROSE 1 G: 10 INJECTION, SOLUTION INTRAVENOUS at 18:33

## 2025-04-01 RX ADMIN — Medication 1 APPLICATION: at 09:39

## 2025-04-01 RX ADMIN — METOCLOPRAMIDE HYDROCHLORIDE 5 MG: 5 SOLUTION ORAL at 20:04

## 2025-04-01 RX ADMIN — SODIUM CHLORIDE 1000 MG: 900 INJECTION INTRAVENOUS at 23:30

## 2025-04-01 RX ADMIN — Medication 10 ML: at 09:39

## 2025-04-01 RX ADMIN — ROPINIROLE HYDROCHLORIDE 1 MG: 1 TABLET, FILM COATED ORAL at 15:47

## 2025-04-01 RX ADMIN — METOCLOPRAMIDE 10 MG: 5 INJECTION, SOLUTION INTRAMUSCULAR; INTRAVENOUS at 05:25

## 2025-04-01 RX ADMIN — POTASSIUM CHLORIDE 20 MEQ: 1500 TABLET, EXTENDED RELEASE ORAL at 15:47

## 2025-04-01 RX ADMIN — HEPARIN SODIUM 5000 UNITS: 5000 INJECTION, SOLUTION INTRAVENOUS; SUBCUTANEOUS at 20:04

## 2025-04-01 RX ADMIN — NORTRIPTYLINE HYDROCHLORIDE 25 MG: 25 CAPSULE ORAL at 20:05

## 2025-04-01 RX ADMIN — INSULIN LISPRO 2 UNITS: 100 INJECTION, SOLUTION INTRAVENOUS; SUBCUTANEOUS at 08:03

## 2025-04-01 RX ADMIN — Medication 1 APPLICATION: at 20:05

## 2025-04-01 RX ADMIN — SENNOSIDES, DOCUSATE SODIUM 2 TABLET: 50; 8.6 TABLET, FILM COATED ORAL at 09:39

## 2025-04-01 RX ADMIN — ERYTHROMYCIN LACTOBIONATE 120.9 MG: 500 INJECTION, POWDER, LYOPHILIZED, FOR SOLUTION INTRAVENOUS at 11:02

## 2025-04-01 RX ADMIN — METOCLOPRAMIDE HYDROCHLORIDE 5 MG: 5 SOLUTION ORAL at 14:11

## 2025-04-01 RX ADMIN — INSULIN LISPRO 2 UNITS: 100 INJECTION, SOLUTION INTRAVENOUS; SUBCUTANEOUS at 20:59

## 2025-04-01 RX ADMIN — Medication 500 UNITS: at 12:33

## 2025-04-01 RX ADMIN — ATORVASTATIN CALCIUM 80 MG: 40 TABLET, FILM COATED ORAL at 20:10

## 2025-04-01 RX ADMIN — ROPINIROLE HYDROCHLORIDE 1 MG: 1 TABLET, FILM COATED ORAL at 20:04

## 2025-04-01 RX ADMIN — TAMSULOSIN HYDROCHLORIDE 0.4 MG: 0.4 CAPSULE ORAL at 20:59

## 2025-04-01 RX ADMIN — POTASSIUM CHLORIDE 40 MEQ: 1.5 POWDER, FOR SOLUTION ORAL at 05:54

## 2025-04-01 RX ADMIN — CHLORHEXIDINE GLUCONATE 1 APPLICATION: 500 CLOTH TOPICAL at 03:46

## 2025-04-01 RX ADMIN — PANTOPRAZOLE SODIUM 40 MG: 40 TABLET, DELAYED RELEASE ORAL at 05:25

## 2025-04-01 RX ADMIN — ACETAMINOPHEN 1000 MG: 10 INJECTION INTRAVENOUS at 14:11

## 2025-04-01 RX ADMIN — ASPIRIN 81 MG: 81 TABLET, COATED ORAL at 10:46

## 2025-04-01 RX ADMIN — MAGNESIUM SULFATE HEPTAHYDRATE 2 G: 2 INJECTION, SOLUTION INTRAVENOUS at 05:28

## 2025-04-01 RX ADMIN — DULOXETINE HYDROCHLORIDE 30 MG: 30 CAPSULE, DELAYED RELEASE ORAL at 10:46

## 2025-04-01 RX ADMIN — ANTACID TABLETS 1 TABLET: 500 TABLET, CHEWABLE ORAL at 12:33

## 2025-04-01 RX ADMIN — INSULIN LISPRO 2 UNITS: 100 INJECTION, SOLUTION INTRAVENOUS; SUBCUTANEOUS at 11:17

## 2025-04-01 RX ADMIN — ATORVASTATIN CALCIUM 80 MG: 40 TABLET, FILM COATED ORAL at 05:25

## 2025-04-01 RX ADMIN — METOCLOPRAMIDE HYDROCHLORIDE 5 MG: 5 SOLUTION ORAL at 17:16

## 2025-04-01 RX ADMIN — POTASSIUM CHLORIDE AND SODIUM CHLORIDE 75 ML/HR: 900; 150 INJECTION, SOLUTION INTRAVENOUS at 15:47

## 2025-04-01 RX ADMIN — LUBIPROSTONE 8 MCG: 8 CAPSULE, GELATIN COATED ORAL at 10:46

## 2025-04-01 RX ADMIN — SODIUM BICARBONATE 650 MG: 650 TABLET ORAL at 10:46

## 2025-04-01 RX ADMIN — Medication 10 ML: at 20:16

## 2025-04-01 RX ADMIN — SODIUM BICARBONATE 650 MG: 650 TABLET ORAL at 20:04

## 2025-04-01 RX ADMIN — POTASSIUM CHLORIDE 10 MEQ: 7.46 INJECTION, SOLUTION INTRAVENOUS at 06:02

## 2025-04-01 NOTE — PLAN OF CARE
Goal Outcome Evaluation:  Plan of Care Reviewed With: patient        Progress: no change  Outcome Evaluation: Pt arousing to voice. Oriented x 4. Room air. NSR. HR 90's. BP soft. Bicarb gtt at 150. WBC 11.82. CO2 13. Mag 1.5. K 2.7. Replacing K and mag. BUN 65. Creatinine 3.25.

## 2025-04-01 NOTE — H&P
[facility]   HISTORY AND PHYSICAL    Patient Name: Tasha Perez  : 1970  MRN: [id number]  Primary Care Physician:  KIRILL Murguia MD  Date of admission: 3/31/2025    Subjective   Subjective     Chief Complaint: Metabolic acidosis, acute on chronic renal insufficiency    History of Present Illness  54-year-old female patient past medical history type 2 diabetes, degenerative disc disease, fibromyalgia, neuropathy, hypertension, daily tobacco user, stage III kidney disease, nonalcoholic pancreatitis with a recent admission for acute pancreatitis on 2025 along with acute on chronic kidney disease presents back to the ED tonight with general malaise and ongoing vomiting.  Labs reveal leukocytosis at 23, stable hemoglobin hematocrit 12.3 and 39.3 with platelets of 501.  Glucose 207.  BUN 78, creatinine 4.55 Baseline creatinine on discharge was 1.04 with BUN of 14.  Sodium 128, potassium 3.9, chloride 95, bicarb 10, calcium 8.5 alk phos 174, gap 23, GFR 11 on admission.    ABG revealed a pH of 7.068, pCO2 28 PaO2 101 bicarb 8.1.    Serum acetone negative.  Urinalysis dark cloudy however negative for glucose, trace of ketones negative for infection markers.  Protein greater than 100.  TSH 1.2, lipase 89.  Magnesium 1.9.  CK 30    Patient was administered 1 amp of sodium bicarb 50 mill equivalents 8.4% solution followed by 2 L of NS.    She received a CT of the abdomen and pelvis as she was recently admitted for pancreatitis.  She did not complain of any abdominal pain.  She does complain of vomiting however no diarrhea.  The CT of the abdomen pelvis without contrast in the setting of acute on chronic renal disease reveals increase in the volume of gas and fluid throughout the colon compatible with worsening of the diarrheal type illness.  No mechanical bowel obstruction is identified.  No evidence of any perforation or intra-abdominal abscess.  The mild changes of the acute pancreatitis seen  adjacent to the pancreatic head before now appear resolved.  A few small calcifications are noted within the pancreatic head suggesting chronic pancreatitis.  Evidence of previous cholecystectomy and appendectomy again demonstrated is a gastric stimulator.  Stable low attenuation thickening of both adrenal glands may be related to adrenal hyperplasia or adenomas.  Likely benign.    Intensivist was consulted for metabolic acidosis in the setting of soft blood pressures and worsening kidney function.    I have seen and evaluated the patient in ER room 45.  She is alert to voice and will answer questions appropriately.  She did require some verbal stimulation.  She denies any diarrhea as I discussed the CT results.  No abdominal tenderness on my exam.  She endorses 4-5 bouts of daily vomiting over the last week or so actually she said since her discharge.    No fevers.  No chest pain or shortness of breath.  She reports her weakness worsen therefore she seeks medical attention in the ED tonight.    BP soft 90 systolic with a MAP of greater than 70.  We will plan for ICU/CCU admission under intensivist services.  She has received 1 out of the 2 L of NS that was ordered.  I feel in the setting of ongoing dehydration she will require more fluid resuscitation.  She will also require a bicarb drip in the setting of metabolic acidosis probably secondary to worsening renal function even consider ATN at this point.    Leukocytosis on admission.  We will add on a lactate and blood cultures and cover empirically with Rocephin until acute infection in the setting of hypotension can be ruled out.    CT of the abdomen and pelvis is concerning for diarrheal illness however patient adamantly denies any diarrhea.  She reports her last BM was yesterday and it was mostly formed.  Stool cultures have been ordered by ER provider therefore she has been placed in C. difficile precautions.  Weakness - Generalized  Symptoms include fatigue,  nausea and vomiting.        Review of Systems   Constitutional:  Positive for appetite change and fatigue.   Gastrointestinal:  Positive for nausea and vomiting.   All other systems reviewed and are negative.       Personal History     Past Medical History:   Diagnosis Date    Arthritis     Contusion     Degenerative disc disease, cervical     Diabetes mellitus     Elevated cholesterol     Fibromyalgia     Neuropathy     Osteoporosis     Pancreatitis     Raynaud disease     Renal insufficiency     Smoker 02/08/2022    Vitamin D deficiency 04/26/2022       Past Surgical History:   Procedure Laterality Date    APPENDECTOMY      CHOLECYSTECTOMY      COLONOSCOPY      ENDOSCOPY N/A 10/08/2019    Procedure: ESOPHAGOGASTRODUODENOSCOPY WITH ANESTHESIA;  Surgeon: Aleks Vaughn DO;  Location: [facility] ENDOSCOPY;  Service: Gastroenterology    ENDOSCOPY N/A 11/12/2024    Procedure: ESOPHAGOGASTRODUODENOSCOPY WITH ANESTHESIA;  Surgeon: Tiffanie Saucedo MD;  Location: [facility] ENDOSCOPY;  Service: Gastroenterology;  Laterality: N/A;  pre op: Gastroparesis, Intractable nausea  post op: insertion of dobhoff   PCP: Brandon Murguia    ENDOSCOPY N/A 11/15/2024    Procedure: ESOPHAGOGASTRODUODENOSCOPY WITH ANESTHESIA WITH NG TUBE INSERTION;  Surgeon: Tiffanie Saucedo MD;  Location: [facility] ENDOSCOPY;  Service: Gastroenterology;  Laterality: N/A;  PRE OP: DOBHOFF  POST OP: DOBHOFF  PCP: TELLY PACK    ENDOSCOPY WITH GASTROSTOMY TUBE INSERTION N/A 6/15/2022    Procedure: ESOPHAGOGASTRODUODENOSCOPY WITH GASTROSTOMY TUBE INSERTION;  Surgeon: Jersey Lee MD;  Location: [facility] ENDOSCOPY;  Service: Gastroenterology;  Laterality: N/A;  pre peg placement  post peg placement  Dr. Murguia    ENTEROSCOPY SMALL BOWEL N/A 11/3/2022    Procedure: Esophagogastroduodenoscopy with Peg Removal;  Surgeon: Aleks Vaughn DO;  Location: [facility] ENDOSCOPY;  Service: Gastroenterology;  Laterality: N/A;  pre; peg removal  post; peg removal   KIRILL Murguia  "MD Brandon        HYSTERECTOMY         Family History: family history is not on file. She was adopted. Otherwise pertinent FHx was reviewed and not pertinent to current issue.    Social History:  reports that she has been smoking cigarettes. She started smoking about 32 years ago. She has a 16.1 pack-year smoking history. She has been exposed to tobacco smoke. She does not have any smokeless tobacco history on file. She reports that she does not drink alcohol and does not use drugs.    Home Medications:  Calcium Carbonate-Vitamin D, DULoxetine, albuterol sulfate HFA, aspirin, atorvastatin, cetirizine, linaclotide, nortriptyline, omeprazole, promethazine, rOPINIRole, sodium bicarbonate, and sucralfate    Allergies:  Allergies   Allergen Reactions    Bee Venom Anaphylaxis    Hydrocodone Anaphylaxis    Hydroxychloroquine Other (See Comments)     Hair loss    Ibuprofen Other (See Comments)     \"SHUTS MY KIDNEYS DOWN\" PER PATIENT     Pineapple Anaphylaxis    Pineapple Extract Anaphylaxis    Wasp Venom Anaphylaxis    Wasp Venom Protein Anaphylaxis    Hydrocodone-Acetaminophen Dizziness, Nausea And Vomiting, Nausea Only and Unknown - Low Severity    Penicillins Nausea And Vomiting    Sitagliptin Other (See Comments)     Other reaction(s): Abdominal Pain      Metformin Other (See Comments)     Pt states it messes with her kidneys    Chocolate Rash     Dark chocolate only, told to RD 7/10/24    Poison Shelly Extract Itching and Rash       Objective    Objective     Vitals:   Temp:  [97.5 °F (36.4 °C)] 97.5 °F (36.4 °C)  Heart Rate:  [] 95  Resp:  [11-21] 11  BP: ()/(54-74) 90/58    Physical Exam  Vitals and nursing note reviewed. Exam conducted with a chaperone present.   Constitutional:       General: She is not in acute distress.     Appearance: She is not ill-appearing.   HENT:      Head: Normocephalic and atraumatic.      Nose: Nose normal.      Mouth/Throat:      Mouth: Mucous membranes are dry.   Eyes:      " Extraocular Movements: Extraocular movements intact.      Pupils: Pupils are equal, round, and reactive to light.   Cardiovascular:      Rate and Rhythm: Normal rate and regular rhythm.   Pulmonary:      Effort: Pulmonary effort is normal.      Breath sounds: Normal breath sounds.   Abdominal:      Palpations: Abdomen is soft.      Tenderness: There is no abdominal tenderness.      Comments: Abdominal stimulator noted to the right abdominal region secondary to gastroparesis.   Neurological:      Mental Status: She is alert.         Result Review    Result Review:  I have personally reviewed the results from the time of this admission to 3/31/2025 23:22 CDT and agree with these findings:  [x]  Laboratory list / accordion  []  Microbiology  [x]  Radiology  [x]  EKG/Telemetry   Echocardiogram 10/6/20/2019 shows an EF of 66 to 70% normal ventricular systolic and normal left ventricular diastolic pressure.  Cardiology/Vascular   []  Pathology  [x]  Old records-patient was admitted recently on 318 for worsening kidney function along with acute pancreatitis.  She was discharged on 321 after kidney function improved BUN 34, creatinine 2.14 on 3/18 admission.  She received IV fluid hydration and was discharged home.  []  Other:  Most notable findings include: Bicarb 10, creatinine 4.55, BUN 78.  Glucose 221.  Anion gap 23 ABG reveals pH is 7 pCO2 28 bicarb 8.1.  Patient was initiated on IV fluid resuscitation 2 L administered an amp of sodium bicarb.    Less concerns for DKA on admission his glucose is only 160.  pH less than 7.3, bicarb less than 15, she was negative for serum or urine ketones.  She denies treating her diabetes currently, I viewed her discharge summary medications that I do not see insulin listed on there.  She was discharged home with sodium bicarb 650 mg that she was fuss to take twice per day.  She does endorse ongoing vomiting therefore I wonder if she was taking her sodium bicarb in the setting of  acute vomiting.  She does have a gastric stimulator secondary to gastroparesis.  We will initiate sliding scale insulin coverage until we can sort through her home medication.    Differential diagnoses sepsis, shock, hypotension secondary to RADHA.  I did question the possibility of recent steroid usage however the patient denies.    Based on her ongoing vomiting gastroparesis I would suspect worsening kidney function felt to be secondary acute ongoing dehydration in the setting of active vomiting.    We will need to consult nephrology.  Continue with gentle fluid hydration, initiate sodium bicarb drip.  Continue with empiric antibiotics until infection can be ruled out.    Assessment & Plan   Assessment / Plan     Brief Patient Summary:  Tasha Perez is a 54 y.o. female who was admitted for metabolic acidosis, dehydration, acute vomiting worsening kidney function.    Active Hospital Problems:  Active Hospital Problems    Diagnosis     Metabolic acidosis    Acute on chronic CKD  Ongoing vomiting  Hypotension    Plan:   Admit to acute inpatient ICU/CCU under intensivist  Continue with IV fluid hydration in the setting of low BP felt to be secondary to acute dehydration.  Patient was administered 2 L of NS while in the ED.  An additional 1 L has been ordered  Initiate bicarb drip at 150 an hour  Consult nephrology  Less likely felt to be septic however cannot rule out septic shock.  CT of the abdomen and pelvis unremarkable for any acute findings.  Acute pancreatitis seems to be resolved.  Normal lipase.  Obtain lactate in the setting of leukocytosis, obtain blood cultures and cover empirically with Rocephin 1 g daily until acute infection can be ruled out  Considered adrenal insufficiency may consider Solu-Cortef if BP does not respond to IV fluids  Repeat BMP, magnesium along with CBC in the a.m.  Potassium 3.9 on admission.  After IV fluid resuscitation decreased to 3.1.  In the setting of worsening renal failure  will repeat BMP before replacing potassium.  Zofran 4 mg every 6-8 and Reglan 10 mg every 6 as needed for vomiting.  Consider ATN secondary to worsening dehydration and hypotension.  Low threshold for vasopressin agents to maintain maps greater than 65 if patient does not respond appropriately to fluid resuscitation.  BP has been soft 90 systolic however maps have been greater than 65.  DVT prophylaxis consider heparin in the setting of renal failure.  No contraindications for bleeding.  SCDs implemented overnight however will implement heparin for ongoing DVT prophylaxis  Implement omeprazole in the setting of past medical history of GERD  No concerns for diarrhea even though CT is concerning for diarrheal infection.  Stool cultures pending.  C. difficile precautions in the setting of CT findings.  In the setting of acute diarrheal infection I considered Flagyl.  If she presents with any loose stools consider implementing Flagyl for aerobic coverage.      VTE Prophylaxis: SCDs/heparin 5000 units every 12  Mechanical VTE prophylaxis orders are present.    No family at bedside.  Updated primary nurse of treatment plan answered all questions.  Updated the patient with treatment plan she agrees to proceed with acute inpatient treatment.    CODE STATUS:    Code Status (Patient has no pulse and is not breathing): CPR (Attempt to Resuscitate)  Medical Interventions (Patient has pulse or is breathing): Full Support  Level Of Support Discussed With: Patient    Admission Status:  I believe this patient meets inpatient status.    65 minutes of critical care provided. This time excludes other billable procedures. Time does include preparation of documents, medical consultations, review of old records, and direct bedside care. Patient is at high risk for life-threatening deterioration due to metabolic acidosis, acute on chronic renal insufficiency, hypotension    I admitted the patient overnight to the intensivist services.   Case will be discussed with Dr. Kendrick , In the a.m.  Final treatment plan and recommendations will be at her discretion.           RAKESH Torres

## 2025-04-01 NOTE — CASE MANAGEMENT/SOCIAL WORK
Discharge Planning Assessment  [facility]     Patient Name: Tasha Perez  MRN: [id number]  Today's Date: 4/1/2025    Admit Date: 3/31/2025        Discharge Needs Assessment       Row Name 04/01/25 1224       Living Environment    People in Home child(sandy), adult;spouse    Name(s) of People in Home Vincent Perez - spouse    Current Living Arrangements home    Primary Care Provided by self    Provides Primary Care For no one    Family Caregiver if Needed child(sandy), adult;spouse    Quality of Family Relationships supportive    Able to Return to Prior Arrangements yes       Resource/Environmental Concerns    Resource/Environmental Concerns none    Transportation Concerns none       Transition Planning    Patient/Family Anticipates Transition to home with family    Patient/Family Anticipated Services at Transition none    Transportation Anticipated family or friend will provide       Discharge Needs Assessment    Readmission Within the Last 30 Days no previous admission in last 30 days    Equipment Currently Used at Home none    Concerns to be Addressed no discharge needs identified    Anticipated Changes Related to Illness none    Equipment Needed After Discharge none    Current Discharge Risk chronically ill    Discharge Coordination/Progress Patient had requested to speak with .  Patient is requesting her medical records be sent to the social security/disability office in [place].  SW has reached out to medical records about this process.  Patient resides at home with her spouse and daughter.  Patient has a PCP and RX coverage.  Patient does not currently utilize DME or HH.  Patient states she plans to return home upon discharge.  SW following.                   Discharge Plan    No documentation.                      Demographic Summary    No documentation.                  Functional Status    No documentation.                  Psychosocial    No documentation.                  Abuse/Neglect    No  documentation.                  Legal    No documentation.                  Substance Abuse    No documentation.                  Patient Forms    No documentation.                     STEVEN KirkW

## 2025-04-01 NOTE — PAYOR COMM NOTE
"Yash Perez (54 y.o. Female)      [id number]     New  INPT Request  admit  3/31      CCU    [facility] phone    fax              Date of Birth   1970    Social Security Number       Address   [address] [place] [address]    Home Phone   [phone]    MRN   [id number]       Rastafari   Episcopalian    Marital Status                               Admission Date   3/31/2025    Admission Type   Emergency    Admitting Provider   Judit Kendrick MD    Attending Provider   Judit Kendrick MD    Department, Room/Bed   [facility] CARDIAC CARE, C003/1       Discharge Date       Discharge Disposition       Discharge Destination                                 Attending Provider: Judit Kendrick MD    Allergies: Bee Venom, Hydrocodone, Hydroxychloroquine, Ibuprofen, Pineapple, Pineapple Extract, Wasp Venom, Wasp Venom Protein, Hydrocodone-acetaminophen, Penicillins, Sitagliptin, Metformin, Chocolate, Poison Ivy Extract    Isolation: Spore   Infection: C.difficile (rule out) (03/31/25)   Code Status: CPR    Ht: 154.9 cm (61\")   Wt: 40.3 kg (88 lb 13.5 oz)    Admission Cmt: None   Principal Problem: None                  Active Insurance as of 3/31/2025       Primary Coverage       Payor Plan Insurance Group Employer/Plan Group    [facility]        Payor Plan Address Payor Plan Phone Number Payor Plan Fax Number Effective Dates    [address]   8/1/2019 - None Entered    [facility] [id number]         Subscriber Name Subscriber Birth Date Member ID       YASH PEREZ 1970 [id number]                     Emergency Contacts        (Rel.) Home Phone Work Phone Mobile Phone    JENNIFER PEREZ (Spouse) [phone] -- [phone]    CASSIA PEREZ (Daughter) -- -- [phone]                 History & Physical        Rina Taylor, APRN at 03/31/25 2322          [facility]" Health   HISTORY AND PHYSICAL    Patient Name: Tasha Perez  : 1970  MRN: [id number]  Primary Care Physician:  KIRILL Murguia MD  Date of admission: 3/31/2025    Subjective  Subjective     Chief Complaint: Metabolic acidosis, acute on chronic renal insufficiency    History of Present Illness  54-year-old female patient past medical history type 2 diabetes, degenerative disc disease, fibromyalgia, neuropathy, hypertension, daily tobacco user, stage III kidney disease, nonalcoholic pancreatitis with a recent admission for acute pancreatitis on 2025 along with acute on chronic kidney disease presents back to the ED tonight with general malaise and ongoing vomiting.  Labs reveal leukocytosis at 23, stable hemoglobin hematocrit 12.3 and 39.3 with platelets of 501.  Glucose 207.  BUN 78, creatinine 4.55 Baseline creatinine on discharge was 1.04 with BUN of 14.  Sodium 128, potassium 3.9, chloride 95, bicarb 10, calcium 8.5 alk phos 174, gap 23, GFR 11 on admission.    ABG revealed a pH of 7.068, pCO2 28 PaO2 101 bicarb 8.1.    Serum acetone negative.  Urinalysis dark cloudy however negative for glucose, trace of ketones negative for infection markers.  Protein greater than 100.  TSH 1.2, lipase 89.  Magnesium 1.9.  CK 30    Patient was administered 1 amp of sodium bicarb 50 mill equivalents 8.4% solution followed by 2 L of NS.    She received a CT of the abdomen and pelvis as she was recently admitted for pancreatitis.  She did not complain of any abdominal pain.  She does complain of vomiting however no diarrhea.  The CT of the abdomen pelvis without contrast in the setting of acute on chronic renal disease reveals increase in the volume of gas and fluid throughout the colon compatible with worsening of the diarrheal type illness.  No mechanical bowel obstruction is identified.  No evidence of any perforation or intra-abdominal abscess.  The mild changes of the acute pancreatitis seen adjacent to  the pancreatic head before now appear resolved.  A few small calcifications are noted within the pancreatic head suggesting chronic pancreatitis.  Evidence of previous cholecystectomy and appendectomy again demonstrated is a gastric stimulator.  Stable low attenuation thickening of both adrenal glands may be related to adrenal hyperplasia or adenomas.  Likely benign.    Intensivist was consulted for metabolic acidosis in the setting of soft blood pressures and worsening kidney function.    I have seen and evaluated the patient in ER room 45.  She is alert to voice and will answer questions appropriately.  She did require some verbal stimulation.  She denies any diarrhea as I discussed the CT results.  No abdominal tenderness on my exam.  She endorses 4-5 bouts of daily vomiting over the last week or so actually she said since her discharge.    No fevers.  No chest pain or shortness of breath.  She reports her weakness worsen therefore she seeks medical attention in the ED tonight.    BP soft 90 systolic with a MAP of greater than 70.  We will plan for ICU/CCU admission under intensivist services.  She has received 1 out of the 2 L of NS that was ordered.  I feel in the setting of ongoing dehydration she will require more fluid resuscitation.  She will also require a bicarb drip in the setting of metabolic acidosis probably secondary to worsening renal function even consider ATN at this point.    Leukocytosis on admission.  We will add on a lactate and blood cultures and cover empirically with Rocephin until acute infection in the setting of hypotension can be ruled out.    CT of the abdomen and pelvis is concerning for diarrheal illness however patient adamantly denies any diarrhea.  She reports her last BM was yesterday and it was mostly formed.  Stool cultures have been ordered by ER provider therefore she has been placed in C. difficile precautions.  Weakness - Generalized  Symptoms include fatigue, nausea and  vomiting.        Review of Systems   Constitutional:  Positive for appetite change and fatigue.   Gastrointestinal:  Positive for nausea and vomiting.   All other systems reviewed and are negative.       Personal History     Past Medical History:   Diagnosis Date    Arthritis     Contusion     Degenerative disc disease, cervical     Diabetes mellitus     Elevated cholesterol     Fibromyalgia     Neuropathy     Osteoporosis     Pancreatitis     Raynaud disease     Renal insufficiency     Smoker 02/08/2022    Vitamin D deficiency 04/26/2022       Past Surgical History:   Procedure Laterality Date    APPENDECTOMY      CHOLECYSTECTOMY      COLONOSCOPY      ENDOSCOPY N/A 10/08/2019    Procedure: ESOPHAGOGASTRODUODENOSCOPY WITH ANESTHESIA;  Surgeon: Aleks Vaughn DO;  Location: [facility] ENDOSCOPY;  Service: Gastroenterology    ENDOSCOPY N/A 11/12/2024    Procedure: ESOPHAGOGASTRODUODENOSCOPY WITH ANESTHESIA;  Surgeon: Tiffanie Saucedo MD;  Location: [facility] ENDOSCOPY;  Service: Gastroenterology;  Laterality: N/A;  pre op: Gastroparesis, Intractable nausea  post op: insertion of dobhoff   PCP: Brandon Murguia    ENDOSCOPY N/A 11/15/2024    Procedure: ESOPHAGOGASTRODUODENOSCOPY WITH ANESTHESIA WITH NG TUBE INSERTION;  Surgeon: Tiffanie Saucedo MD;  Location: [facility] ENDOSCOPY;  Service: Gastroenterology;  Laterality: N/A;  PRE OP: DOBHOFF  POST OP: DOBHOFF  PCP: TELLY PACK    ENDOSCOPY WITH GASTROSTOMY TUBE INSERTION N/A 6/15/2022    Procedure: ESOPHAGOGASTRODUODENOSCOPY WITH GASTROSTOMY TUBE INSERTION;  Surgeon: Jersey Lee MD;  Location: [facility] ENDOSCOPY;  Service: Gastroenterology;  Laterality: N/A;  pre peg placement  post peg placement  Dr. Murguia    ENTEROSCOPY SMALL BOWEL N/A 11/3/2022    Procedure: Esophagogastroduodenoscopy with Peg Removal;  Surgeon: Aleks Vaughn DO;  Location: [facility] ENDOSCOPY;  Service: Gastroenterology;  Laterality: N/A;  pre; peg removal  post; peg removal   KIRILL Murguia,  "MD        HYSTERECTOMY         Family History: family history is not on file. She was adopted. Otherwise pertinent FHx was reviewed and not pertinent to current issue.    Social History:  reports that she has been smoking cigarettes. She started smoking about 32 years ago. She has a 16.1 pack-year smoking history. She has been exposed to tobacco smoke. She does not have any smokeless tobacco history on file. She reports that she does not drink alcohol and does not use drugs.    Home Medications:  Calcium Carbonate-Vitamin D, DULoxetine, albuterol sulfate HFA, aspirin, atorvastatin, cetirizine, linaclotide, nortriptyline, omeprazole, promethazine, rOPINIRole, sodium bicarbonate, and sucralfate    Allergies:  Allergies   Allergen Reactions    Bee Venom Anaphylaxis    Hydrocodone Anaphylaxis    Hydroxychloroquine Other (See Comments)     Hair loss    Ibuprofen Other (See Comments)     \"SHUTS MY KIDNEYS DOWN\" PER PATIENT     Pineapple Anaphylaxis    Pineapple Extract Anaphylaxis    Wasp Venom Anaphylaxis    Wasp Venom Protein Anaphylaxis    Hydrocodone-Acetaminophen Dizziness, Nausea And Vomiting, Nausea Only and Unknown - Low Severity    Penicillins Nausea And Vomiting    Sitagliptin Other (See Comments)     Other reaction(s): Abdominal Pain      Metformin Other (See Comments)     Pt states it messes with her kidneys    Chocolate Rash     Dark chocolate only, told to RD 7/10/24    Poison Shelly Extract Itching and Rash       Objective   Objective     Vitals:   Temp:  [97.5 °F (36.4 °C)] 97.5 °F (36.4 °C)  Heart Rate:  [] 95  Resp:  [11-21] 11  BP: ()/(54-74) 90/58    Physical Exam  Vitals and nursing note reviewed. Exam conducted with a chaperone present.   Constitutional:       General: She is not in acute distress.     Appearance: She is not ill-appearing.   HENT:      Head: Normocephalic and atraumatic.      Nose: Nose normal.      Mouth/Throat:      Mouth: Mucous membranes are dry.   Eyes:      Extraocular " Movements: Extraocular movements intact.      Pupils: Pupils are equal, round, and reactive to light.   Cardiovascular:      Rate and Rhythm: Normal rate and regular rhythm.   Pulmonary:      Effort: Pulmonary effort is normal.      Breath sounds: Normal breath sounds.   Abdominal:      Palpations: Abdomen is soft.      Tenderness: There is no abdominal tenderness.      Comments: Abdominal stimulator noted to the right abdominal region secondary to gastroparesis.   Neurological:      Mental Status: She is alert.         Result Review   Result Review:  I have personally reviewed the results from the time of this admission to 3/31/2025 23:22 CDT and agree with these findings:  [x]  Laboratory list / accordion  []  Microbiology  [x]  Radiology  [x]  EKG/Telemetry   Echocardiogram 10/6/20/2019 shows an EF of 66 to 70% normal ventricular systolic and normal left ventricular diastolic pressure.  Cardiology/Vascular   []  Pathology  [x]  Old records-patient was admitted recently on 318 for worsening kidney function along with acute pancreatitis.  She was discharged on 321 after kidney function improved BUN 34, creatinine 2.14 on 3/18 admission.  She received IV fluid hydration and was discharged home.  []  Other:  Most notable findings include: Bicarb 10, creatinine 4.55, BUN 78.  Glucose 221.  Anion gap 23 ABG reveals pH is 7 pCO2 28 bicarb 8.1.  Patient was initiated on IV fluid resuscitation 2 L administered an amp of sodium bicarb.    Less concerns for DKA on admission his glucose is only 160.  pH less than 7.3, bicarb less than 15, she was negative for serum or urine ketones.  She denies treating her diabetes currently, I viewed her discharge summary medications that I do not see insulin listed on there.  She was discharged home with sodium bicarb 650 mg that she was fuss to take twice per day.  She does endorse ongoing vomiting therefore I wonder if she was taking her sodium bicarb in the setting of acute vomiting.   She does have a gastric stimulator secondary to gastroparesis.  We will initiate sliding scale insulin coverage until we can sort through her home medication.    Differential diagnoses sepsis, shock, hypotension secondary to RADHA.  I did question the possibility of recent steroid usage however the patient denies.    Based on her ongoing vomiting gastroparesis I would suspect worsening kidney function felt to be secondary acute ongoing dehydration in the setting of active vomiting.    We will need to consult nephrology.  Continue with gentle fluid hydration, initiate sodium bicarb drip.  Continue with empiric antibiotics until infection can be ruled out.    Assessment & Plan  Assessment / Plan     Brief Patient Summary:  Tasha Perez is a 54 y.o. female who was admitted for metabolic acidosis, dehydration, acute vomiting worsening kidney function.    Active Hospital Problems:  Active Hospital Problems    Diagnosis     Metabolic acidosis    Acute on chronic CKD  Ongoing vomiting  Hypotension    Plan:   Admit to acute inpatient ICU/CCU under intensivist  Continue with IV fluid hydration in the setting of low BP felt to be secondary to acute dehydration.  Patient was administered 2 L of NS while in the ED.  An additional 1 L has been ordered  Initiate bicarb drip at 150 an hour  Consult nephrology  Less likely felt to be septic however cannot rule out septic shock.  CT of the abdomen and pelvis unremarkable for any acute findings.  Acute pancreatitis seems to be resolved.  Normal lipase.  Obtain lactate in the setting of leukocytosis, obtain blood cultures and cover empirically with Rocephin 1 g daily until acute infection can be ruled out  Considered adrenal insufficiency may consider Solu-Cortef if BP does not respond to IV fluids  Repeat BMP, magnesium along with CBC in the a.m.  Potassium 3.9 on admission.  After IV fluid resuscitation decreased to 3.1.  In the setting of worsening renal failure will repeat BMP  before replacing potassium.  Zofran 4 mg every 6-8 and Reglan 10 mg every 6 as needed for vomiting.  Consider ATN secondary to worsening dehydration and hypotension.  Low threshold for vasopressin agents to maintain maps greater than 65 if patient does not respond appropriately to fluid resuscitation.  BP has been soft 90 systolic however maps have been greater than 65.  DVT prophylaxis consider heparin in the setting of renal failure.  No contraindications for bleeding.  SCDs implemented overnight however will implement heparin for ongoing DVT prophylaxis  Implement omeprazole in the setting of past medical history of GERD  No concerns for diarrhea even though CT is concerning for diarrheal infection.  Stool cultures pending.  C. difficile precautions in the setting of CT findings.  In the setting of acute diarrheal infection I considered Flagyl.  If she presents with any loose stools consider implementing Flagyl for aerobic coverage.      VTE Prophylaxis: SCDs/heparin 5000 units every 12  Mechanical VTE prophylaxis orders are present.    No family at bedside.  Updated primary nurse of treatment plan answered all questions.  Updated the patient with treatment plan she agrees to proceed with acute inpatient treatment.    CODE STATUS:    Code Status (Patient has no pulse and is not breathing): CPR (Attempt to Resuscitate)  Medical Interventions (Patient has pulse or is breathing): Full Support  Level Of Support Discussed With: Patient    Admission Status:  I believe this patient meets inpatient status.    65 minutes of critical care provided. This time excludes other billable procedures. Time does include preparation of documents, medical consultations, review of old records, and direct bedside care. Patient is at high risk for life-threatening deterioration due to metabolic acidosis, acute on chronic renal insufficiency, hypotension    I admitted the patient overnight to the intensivist services.  Case will be  "discussed with Dr. Kendrick , In the a.m.  Final treatment plan and recommendations will be at her discretion.           RAKESH Torres    Electronically signed by Rina Taylor APRN at 04/01/25 0403          Emergency Department Notes        Faye Reis, RAKESH at 03/31/25 4925          Subjective   History of Present Illness  Patient is a 54-year-old female who presents to the ER with complaints of generalized fatigue.  States that this has been ongoing for the past 2 days.  She does have history of chronic kidney disease.  Patient reports that she thinks she is in renal failure again.  Patient states that overall she just feels very tired.  She is complaining of pain everywhere due to her osteoporosis.  She states that her vision is \"white \".  Reports that this happens every time she gets in renal failure.  Patient denies any abdominal pain, nausea, vomiting, diarrhea.  Follows with nephrology, not on dialysis.        Review of Systems   Constitutional:  Positive for fatigue.   All other systems reviewed and are negative.      Past Medical History:   Diagnosis Date    Arthritis     Contusion     Degenerative disc disease, cervical     Diabetes mellitus     Elevated cholesterol     Fibromyalgia     Neuropathy     Osteoporosis     Pancreatitis     Raynaud disease     Renal insufficiency     Smoker 02/08/2022    Vitamin D deficiency 04/26/2022       Allergies   Allergen Reactions    Bee Venom Anaphylaxis    Hydrocodone Anaphylaxis    Hydroxychloroquine Other (See Comments)     Hair loss    Ibuprofen Other (See Comments)     \"SHUTS MY KIDNEYS DOWN\" PER PATIENT     Pineapple Anaphylaxis    Pineapple Extract Anaphylaxis    Wasp Venom Anaphylaxis    Wasp Venom Protein Anaphylaxis    Hydrocodone-Acetaminophen Dizziness, Nausea And Vomiting, Nausea Only and Unknown - Low Severity    Penicillins Nausea And Vomiting    Sitagliptin Other (See Comments)     Other reaction(s): Abdominal Pain      Metformin Other " (See Comments)     Pt states it messes with her kidneys    Chocolate Rash     Dark chocolate only, told to RD 7/10/24    Poison Shelly Extract Itching and Rash       Past Surgical History:   Procedure Laterality Date    APPENDECTOMY      CHOLECYSTECTOMY      COLONOSCOPY      ENDOSCOPY N/A 10/08/2019    Procedure: ESOPHAGOGASTRODUODENOSCOPY WITH ANESTHESIA;  Surgeon: Aleks Vaughn DO;  Location: [facility] ENDOSCOPY;  Service: Gastroenterology    ENDOSCOPY N/A 11/12/2024    Procedure: ESOPHAGOGASTRODUODENOSCOPY WITH ANESTHESIA;  Surgeon: Tiffanie Saucedo MD;  Location: [facility] ENDOSCOPY;  Service: Gastroenterology;  Laterality: N/A;  pre op: Gastroparesis, Intractable nausea  post op: insertion of dobhoff   PCP: Brandon Murguia    ENDOSCOPY N/A 11/15/2024    Procedure: ESOPHAGOGASTRODUODENOSCOPY WITH ANESTHESIA WITH NG TUBE INSERTION;  Surgeon: Tiffanie Saucedo MD;  Location: [facility] ENDOSCOPY;  Service: Gastroenterology;  Laterality: N/A;  PRE OP: DOBHOFF  POST OP: DOBHOFF  PCP: TELLY PACK    ENDOSCOPY WITH GASTROSTOMY TUBE INSERTION N/A 6/15/2022    Procedure: ESOPHAGOGASTRODUODENOSCOPY WITH GASTROSTOMY TUBE INSERTION;  Surgeon: Jersey Lee MD;  Location: [facility] ENDOSCOPY;  Service: Gastroenterology;  Laterality: N/A;  pre peg placement  post peg placement  Dr. Murguia    ENTEROSCOPY SMALL BOWEL N/A 11/3/2022    Procedure: Esophagogastroduodenoscopy with Peg Removal;  Surgeon: Aleks Vaughn DO;  Location: [facility] ENDOSCOPY;  Service: Gastroenterology;  Laterality: N/A;  pre; peg removal  post; peg removal   KIRILL Murguia MD        HYSTERECTOMY         Family History   Adopted: Yes   Problem Relation Age of Onset    Colon polyps Neg Hx     Colon cancer Neg Hx        Social History     Socioeconomic History    Marital status:    Tobacco Use    Smoking status: Every Day     Current packs/day: 0.50     Average packs/day: 0.5 packs/day for 32.2 years (16.1 ttl pk-yrs)     Types: Cigarettes     Start  date: 1993     Passive exposure: Current   Vaping Use    Vaping status: Never Used   Substance and Sexual Activity    Alcohol use: No    Drug use: No    Sexual activity: Defer           Objective   Physical Exam  Vitals and nursing note reviewed.   Constitutional:       General: She is not in acute distress.     Appearance: Normal appearance. She is ill-appearing. She is not toxic-appearing.   HENT:      Head: Normocephalic.   Cardiovascular:      Rate and Rhythm: Normal rate and regular rhythm.      Pulses: Normal pulses.      Heart sounds: Normal heart sounds.   Pulmonary:      Effort: Pulmonary effort is normal.      Breath sounds: Normal breath sounds.   Abdominal:      General: Abdomen is flat. Bowel sounds are normal. There is no distension.      Palpations: Abdomen is soft.      Tenderness: There is no abdominal tenderness. There is no guarding.   Musculoskeletal:         General: Normal range of motion.      Cervical back: Normal range of motion and neck supple.   Skin:     General: Skin is warm and dry.   Neurological:      General: No focal deficit present.      Mental Status: She is alert and oriented to person, place, and time. Mental status is at baseline.   Psychiatric:         Mood and Affect: Mood normal.         Behavior: Behavior normal.         Procedures      Labs Reviewed   COMPREHENSIVE METABOLIC PANEL - Abnormal; Notable for the following components:       Result Value    Glucose 221 (*)     BUN 78 (*)     Creatinine 4.55 (*)     Sodium 128 (*)     Chloride 95 (*)     CO2 10.0 (*)     Calcium 8.5 (*)     Alkaline Phosphatase 174 (*)     Anion Gap 23.0 (*)     eGFR 11.0 (*)     All other components within normal limits    Narrative:     GFR Categories in Chronic Kidney Disease (CKD)      GFR Category          GFR (mL/min/1.73)    Interpretation  G1                     90 or greater         Normal or high (1)  G2                      60-89                Mild decrease (1)  G3a                    45-59                Mild to moderate decrease  G3b                   30-44                Moderate to severe decrease  G4                    15-29                Severe decrease  G5                    14 or less           Kidney failure          (1)In the absence of evidence of kidney disease, neither GFR category G1 or G2 fulfill the criteria for CKD.    eGFR calculation 2021 CKD-EPI creatinine equation, which does not include race as a factor   URINALYSIS W/ MICROSCOPIC IF INDICATED (NO CULTURE) - Abnormal; Notable for the following components:    Color, UA Dark Yellow (*)     Appearance, UA Cloudy (*)     Ketones, UA Trace (*)     Protein,  mg/dL (2+) (*)     Leuk Esterase, UA Trace (*)     All other components within normal limits   CBC WITH AUTO DIFFERENTIAL - Abnormal; Notable for the following components:    WBC 23.75 (*)     MCHC 31.4 (*)     Platelets 501 (*)     Neutrophil % 79.1 (*)     Lymphocyte % 12.7 (*)     Immature Grans % 0.9 (*)     Neutrophils, Absolute 18.79 (*)     Monocytes, Absolute 1.40 (*)     Immature Grans, Absolute 0.21 (*)     All other components within normal limits   LIPASE - Abnormal; Notable for the following components:    Lipase 89 (*)     All other components within normal limits   BLOOD GAS, ARTERIAL W/CO-OXIMETRY - Abnormal; Notable for the following components:    pH, Arterial 7.068 (*)     pCO2, Arterial 28.1 (*)     HCO3, Arterial 8.1 (*)     Base Excess, Arterial -20.7 (*)     Hemoglobin, Blood Gas 11.4 (*)     Hematocrit, Blood Gas 34.9 (*)     Sodium, Arterial 134 (*)     Potassium, Arterial 3.4 (*)     pH, Temp Corrected 7.068 (*)     pCO2, Temperature Corrected 28.1 (*)     All other components within normal limits   POCT GLUCOSE FINGERSTICK - Abnormal; Notable for the following components:    Glucose 207 (*)     All other components within normal limits   MAGNESIUM - Normal   CK - Normal   TSH - Normal   ACETONE - Normal   GASTROINTESTINAL PANEL, PCR  (PREFERRED) DOES NOT INCLUDE CDIFF   CLOSTRIDIOIDES DIFFICILE TOXIN    Narrative:     The following orders were created for panel order Clostridioides difficile Toxin - Stool, Per Rectum.  Procedure                               Abnormality         Status                     ---------                               -----------         ------                     Clostridioides difficile...[[id number]]                                                   Please view results for these tests on the individual orders.   CLOSTRIDIOIDES DIFFICILE TOXIN, PCR   RAINBOW DRAW    Narrative:     The following orders were created for panel order [place] Draw.  Procedure                               Abnormality         Status                     ---------                               -----------         ------                     Green Top (Gel)[[id number]]                                  Final result               Lavender Top[[id number]]                                     Final result               Red Top[[id number]]                                          Final result               Light Blue Top[[id number]]                                   Final result                 Please view results for these tests on the individual orders.   URINALYSIS, MICROSCOPIC ONLY   BLOOD GAS, ARTERIAL W/CO-OXIMETRY   POCT GLUCOSE FINGERSTICK   CBC AND DIFFERENTIAL    Narrative:     The following orders were created for panel order CBC & Differential.  Procedure                               Abnormality         Status                     ---------                               -----------         ------                     CBC Auto Differential[[id number]]        Abnormal            Final result                 Please view results for these tests on the individual orders.   GREEN TOP   LAVENDER TOP   RED TOP   LIGHT BLUE TOP     CT Abdomen Pelvis Without Contrast   Final Result   1. Interval increase in the volume of gas and fluid throughout the colon    compatible with worsening of the diarrheal type illness, no mechanical   bowel obstruction is identified. There is no evidence of perforation or   intra-abdominal abscess.   2. The mild changes of acute pancreatitis seen adjacent to the   pancreatic head before appear resolved. A few small calcifications are   noted within the pancreatic head suggesting chronic pancreatitis.   3. Evidence of previous cholecystectomy and appendectomy. Again   demonstrated is a gastric stimulator.   4. Stable low-attenuation thickening of both adrenal glands may be   related to adrenal hyperplasia or adenomas. This is likely benign.               This report was signed and finalized on 3/31/2025 6:45 PM by Dr. Jose Antonio Bermeo MD.          XR Chest 1 View   Final Result   1. No acute radiographic cardiopulmonary process.           This report was signed and finalized on 3/31/2025 3:55 PM by Dr. Gloria Roe MD.                  ED Course  ED Course as of 03/31/25 2010   Mon Mar 31, 2025   1955 Call placed to intensivist service for admission.  ABGs reveal pH 7.068, pCO2 28.1, HCO3 8.1.  Discussed with Dr. Mota. Amp of bicarb ordered. [KR]   2006 Discussed with RAKESH Marques with intensivist service.  She has accepted this patient for further management.  Patient is sitting up in no acute distress at this time. [KR]      ED Course User Index  [KR] Faye Reis APRN                                                       Medical Decision Making  Problems Addressed:  Acute kidney injury superimposed on chronic kidney disease: complicated acute illness or injury  Hyponatremia: complicated acute illness or injury  Metabolic acidosis: complicated acute illness or injury    Amount and/or Complexity of Data Reviewed  Labs: ordered.  Radiology: ordered.    Risk  Prescription drug management.  Decision regarding hospitalization.        Final diagnoses:   Acute kidney injury superimposed on chronic kidney disease   Hyponatremia    Metabolic acidosis       ED Disposition  ED Disposition       ED Disposition   Decision to Admit    Condition   --    Comment   Level of Care: Critical Care [6]   Diagnosis: Metabolic acidosis [110904]   Admitting Physician: VARINDER DOSS [[id number]]   Certification: I Certify That Inpatient Hospital Services Are Medically Necessary For Greater Than 2 Midnights                 No follow-up provider specified.       Medication List      No changes were made to your prescriptions during this visit.            Faye Reis APRN  03/31/25 2010      Electronically signed by Faye Reis APRN at 03/31/25 2010       Vital Signs (last day)       Date/Time Temp Temp src Pulse Resp BP Patient Position SpO2    04/01/25 0645 -- -- 101 -- 83/61 -- 100    04/01/25 0630 -- -- 101 -- 98/61 -- 100    04/01/25 0615 -- -- 97 -- 82/54 -- 100    04/01/25 0600 -- -- 98 -- 96/61 -- 100    04/01/25 0545 -- -- 94 -- 89/58 -- 100    04/01/25 0530 -- -- 96 -- 75/57 -- 100    04/01/25 0515 -- -- 97 -- 94/62 -- 100    04/01/25 0500 -- -- 95 -- 95/68 -- 100    04/01/25 0445 -- -- 96 -- 100/68 -- 100    04/01/25 0430 -- -- 96 -- 96/67 -- 100    04/01/25 0415 -- -- 96 -- 95/71 -- 100    04/01/25 0400 -- -- 97 -- 93/68 -- 100    04/01/25 0345 97.6 (36.4) Oral 93 13 83/59 Lying 100    04/01/25 0330 -- -- 93 -- 78/56 -- 100    04/01/25 0315 -- -- 97 -- 99/68 -- 100    04/01/25 0300 -- -- 98 -- 100/69 -- 100    04/01/25 0245 -- -- 93 -- 93/59 -- 100    04/01/25 0230 -- -- 92 -- 86/65 -- 100    04/01/25 0215 -- -- 94 -- 80/59 -- 100    04/01/25 0200 -- -- 94 -- 105/67 -- 100    04/01/25 0145 -- -- 94 -- 90/65 -- 100    04/01/25 0130 -- -- 93 -- 91/63 -- 100    04/01/25 0115 -- -- 95 -- 92/58 -- 100    04/01/25 0100 -- -- 94 -- 82/63 -- 100    04/01/25 0045 -- -- 95 -- 84/61 -- 100    04/01/25 0030 -- -- 97 -- 101/79 -- 100    04/01/25 0015 -- -- 97 -- 90/65 -- 100    04/01/25 0000 -- -- 96 -- 93/60 -- 100    03/31/25 2345 97.6 (36.4) Oral 96 12  84/56 Lying 100    03/31/25 2330 -- -- 95 -- 78/66 -- 100    03/31/25 2315 -- -- 95 -- 90/58 -- 100    03/31/25 2300 -- -- 94 -- 83/54 -- 100    03/31/25 2245 -- -- 95 -- 83/55 -- 100    03/31/25 2230 -- -- 95 -- 80/54 -- 100    03/31/25 2215 -- -- 96 -- 91/58 -- 100    03/31/25 2200 -- -- 97 -- 87/59 -- 100    03/31/25 2145 -- -- 98 -- 92/65 -- 100    03/31/25 2130 -- -- 99 -- 102/72 -- 100    03/31/25 2115 97.5 (36.4) Oral 97 11 -- Lying 100    03/31/25 2101 -- -- 97 -- 100/65 -- --    03/31/25 2016 -- -- 97 -- 98/74 -- 96    03/31/25 2001 -- -- 96 21 99/69 -- 100    03/31/25 1946 -- -- 95 -- 86/61 -- 100    03/31/25 1931 -- -- 97 -- 90/65 -- 100    03/31/25 1916 -- -- 97 -- 90/66 -- 99    03/31/25 1746 -- -- 93 -- 83/61 -- 100    03/31/25 1737 -- -- 98 -- 82/59 Standing --    03/31/25 1733 -- -- 96 -- 82/58 Sitting --    03/31/25 1731 -- -- 96 -- 89/62 Lying --    03/31/25 1503 97.5 (36.4) Oral 101 18 86/62 Sitting 100          Current Facility-Administered Medications   Medication Dose Route Frequency Provider Last Rate Last Admin    acetaminophen (TYLENOL) tablet 650 mg  650 mg Oral Q4H PRN Rina Taylor APRN        Or    acetaminophen (TYLENOL) suppository 650 mg  650 mg Rectal Q4H PRN Rina Taylor APRN        atorvastatin (LIPITOR) tablet 80 mg  80 mg Oral Nightly Rina Taylor APRN   80 mg at 04/01/25 0525    sennosides-docusate (PERICOLACE) 8.6-50 MG per tablet 2 tablet  2 tablet Oral BID Rina Taylor APRN        And    polyethylene glycol (MIRALAX) packet 17 g  17 g Oral Daily PRN Rina Taylor APRN        And    bisacodyl (DULCOLAX) suppository 10 mg  10 mg Rectal Daily PRN Rina Taylor APRN        cefTRIAXone (ROCEPHIN) 1,000 mg in sodium chloride 0.9 % 100 mL MBP  1,000 mg Intravenous Q24H Rina Taylor APRN 200 mL/hr at 03/31/25 2334 1,000 mg at 03/31/25 2334    Chlorhexidine Gluconate Cloth 2 % pads 1 Application  1 Application Topical Q24H Rina Taylor APRN   1  Application at 04/01/25 0346    dextrose (D50W) (25 g/50 mL) IV injection 25 g  25 g Intravenous Q15 Min PRN Rina Taylor APRN        dextrose (GLUTOSE) oral gel 15 g  15 g Oral Q15 Min PRN Rina Taylor APRN        glucagon (GLUCAGEN) injection 1 mg  1 mg Intramuscular Q15 Min PRN Rina Taylor APRN        heparin (porcine) 5000 UNIT/ML injection 5,000 Units  5,000 Units Subcutaneous Q12H Rina Taylor APRN        Insulin Lispro (humaLOG) injection 1-5 Units  1-5 Units Subcutaneous 4x Daily AC & at Bedtime Rina Taylor APRN   2 Units at 04/01/25 0803    ipratropium-albuterol (DUO-NEB) nebulizer solution 3 mL  3 mL Nebulization Q6H PRN Rina Taylor APRN        metoclopramide (REGLAN) injection 10 mg  10 mg Intravenous Q6H Rina Taylor APRN   10 mg at 04/01/25 0525    mupirocin (BACTROBAN) 2 % nasal ointment 1 Application  1 Application Each Nare BID Rina Taylor APRN   1 Application at 03/31/25 2330    nitroglycerin (NITROSTAT) SL tablet 0.4 mg  0.4 mg Sublingual Q5 Min PRN Rina Taylor APRN        ondansetron (ZOFRAN) injection 4 mg  4 mg Intravenous Q6H PRN Rina Taylor APRN        pantoprazole (PROTONIX) EC tablet 40 mg  40 mg Oral Q AM Rina Taylor APRN   40 mg at 04/01/25 0525    sodium bicarbonate 8.4 % 150 mEq in sterile water (preservative free) 1,000 mL infusion (greater than 100 mEq)  150 mEq Intravenous Continuous Rina Taylor APRN 150 mL/hr at 04/01/25 0709 150 mEq at 04/01/25 0709    sodium chloride 0.9 % flush 10 mL  10 mL Intravenous PRN Judit Kendrick MD        sodium chloride 0.9 % flush 10 mL  10 mL Intravenous Q12H Rina Taylor APRN   10 mL at 03/31/25 2330    sodium chloride 0.9 % flush 10 mL  10 mL Intravenous PRN Rina Taylor, APRN        sodium chloride 0.9 % infusion 40 mL  40 mL Intravenous PRN Rina Taylor, APRN                4/1       still in CCU      arousing to voice. Oriented x 4. Room air. NSR. HR 90's. BP  soft. Bicarb gtt at 150. WBC 11.82. CO2 13. Mag 1.5. K 2.7. Replacing K and mag. BUN 65. Creatinine 3.25.

## 2025-04-01 NOTE — PAYOR COMM NOTE
"CLINICAL UPDATE    Yash Perez (54 y.o. Female)       Date of Birth   1970    Social Security Number       Address   [address] [place] [id number]    Home Phone   [phone]    MRN   [id number]       Muslim   Jewish    Marital Status                               Admission Date   3/31/2025    Admission Type   Emergency    Admitting Provider   Judit Kendrick MD    Attending Provider   Judit Kendrick MD    Department, Room/Bed   [facility] CARDIAC CARE, C003/1       Discharge Date       Discharge Disposition       Discharge Destination                                 Attending Provider: Judit Kendrick MD    Allergies: Bee Venom, Hydrocodone, Hydroxychloroquine, Ibuprofen, Pineapple, Pineapple Extract, Wasp Venom, Wasp Venom Protein, Hydrocodone-acetaminophen, Penicillins, Sitagliptin, Metformin, Chocolate, Poison Ivy Extract    Isolation: Spore   Infection: C.difficile (rule out) (03/31/25)   Code Status: CPR    Ht: 154.9 cm (61\")   Wt: 40.3 kg (88 lb 13.5 oz)    Admission Cmt: None   Principal Problem: None                  Active Insurance as of 3/31/2025       Primary Coverage       Payor Plan Insurance Group Employer/Plan Group    [employer] [facility] [employer] [facility]        Payor Plan Address Payor Plan Phone Number Payor Plan Fax Number Effective Dates    [address]   8/1/2019 - None Entered    [facility] [id number]         Subscriber Name Subscriber Birth Date Member ID       YASH PEREZ 1970 [id number]                     Emergency Contacts        (Rel.) Home Phone Work Phone Mobile Phone    KYLEJENNIFER LONGORIA (Spouse) [phone] -- [phone]    LUBNA PEREZTH DORISS (Daughter) -- -- [phone]              Current Facility-Administered Medications   Medication Dose Route Frequency Provider Last Rate Last Admin    acetaminophen (TYLENOL) tablet 650 mg  650 mg Oral Q4H PRN Rina Taylor, RAKESH        Or    " acetaminophen (TYLENOL) suppository 650 mg  650 mg Rectal Q4H PRN Rina Taylor APRN        albuterol (PROVENTIL) nebulizer solution 0.083% 2.5 mg/3mL  2.5 mg Nebulization Q6H PRN Judit Kendrick MD        aspirin EC tablet 81 mg  81 mg Oral Daily Judit Kendrick MD   81 mg at 04/01/25 1046    atorvastatin (LIPITOR) tablet 80 mg  80 mg Oral Nightly Rina Taylor APRN   80 mg at 04/01/25 0525    sennosides-docusate (PERICOLACE) 8.6-50 MG per tablet 2 tablet  2 tablet Oral BID Rina Taylor APRN   2 tablet at 04/01/25 0939    And    polyethylene glycol (MIRALAX) packet 17 g  17 g Oral Daily PRN Rian Taylor APRN        And    bisacodyl (DULCOLAX) suppository 10 mg  10 mg Rectal Daily PRN Rina Taylor APRN        calcium carbonate (TUMS) chewable tablet 500 mg (200 mg elemental)  1 tablet Oral Daily Judit Kendrick MD   1 tablet at 04/01/25 1233    And    cholecalciferol (VITAMIN D3) tablet 500 Units  500 Units Oral Daily Judit Kendrick MD   500 Units at 04/01/25 1233    cefTRIAXone (ROCEPHIN) 1,000 mg in sodium chloride 0.9 % 100 mL MBP  1,000 mg Intravenous Q24H Rina Taylor APRN 200 mL/hr at 03/31/25 2334 1,000 mg at 03/31/25 2334    cetirizine (zyrTEC) tablet 10 mg  10 mg Oral Daily PRN Judit Kendrick MD        Chlorhexidine Gluconate Cloth 2 % pads 1 Application  1 Application Topical Q24H Rina Taylor APRN   1 Application at 04/01/25 0346    dextrose (D50W) (25 g/50 mL) IV injection 25 g  25 g Intravenous Q15 Min PRN Rina Taylor APRN        dextrose (GLUTOSE) oral gel 15 g  15 g Oral Q15 Min PRN Rina Taylor APRN        DULoxetine (CYMBALTA) DR capsule 30 mg  30 mg Oral Daily Judit Kendrick MD   30 mg at 04/01/25 1046    glucagon (GLUCAGEN) injection 1 mg  1 mg Intramuscular Q15 Min PRN Rina Taylor APRN        heparin (porcine) 5000 UNIT/ML injection 5,000 Units  5,000 Units Subcutaneous Q12H Rina Taylor APRN   5,000 Units at  04/01/25 0939    Insulin Lispro (humaLOG) injection 1-5 Units  1-5 Units Subcutaneous 4x Daily AC & at Bedtime Rina Taylor APRN   2 Units at 04/01/25 1715    ipratropium-albuterol (DUO-NEB) nebulizer solution 3 mL  3 mL Nebulization Q6H PRN Rina Taylor APRN        lubiprostone (AMITIZA) capsule 8 mcg  8 mcg Oral BID Judit Kendrick MD   8 mcg at 04/01/25 1046    magnesium sulfate in D5W 1g/100mL (PREMIX)  1 g Intravenous Once Judit Kendrick MD        metoclopramide (REGLAN) solution 5 mg  5 mg Oral 4x Daily AC & at Bedtime Judit Kendrick MD   5 mg at 04/01/25 1716    mupirocin (BACTROBAN) 2 % nasal ointment 1 Application  1 Application Each Nare BID Rina Taylor APRN   1 Application at 04/01/25 0939    nitroglycerin (NITROSTAT) SL tablet 0.4 mg  0.4 mg Sublingual Q5 Min PRN Rina Taylor APRN        nortriptyline (PAMELOR) capsule 25 mg  25 mg Oral Nightly Judit Kendrick MD        ondansetron (ZOFRAN) injection 4 mg  4 mg Intravenous Q6H PRN Rina Taylor APRN        pantoprazole (PROTONIX) EC tablet 40 mg  40 mg Oral Q AM Rina Taylor APRN   40 mg at 04/01/25 0525    promethazine (PHENERGAN) tablet 25 mg  25 mg Oral Q6H PRN Judit Kendrick MD        rOPINIRole (REQUIP) tablet 1 mg  1 mg Oral TID Judit Kendrick MD   1 mg at 04/01/25 1547    sodium bicarbonate tablet 650 mg  650 mg Oral BID Judit Kendrick MD   650 mg at 04/01/25 1046    sodium chloride 0.9 % flush 10 mL  10 mL Intravenous PRN Judit Kendrick MD        sodium chloride 0.9 % flush 10 mL  10 mL Intravenous Q12H Rina Taylor APRN   10 mL at 04/01/25 0939    sodium chloride 0.9 % flush 10 mL  10 mL Intravenous PRN Rina Taylor APRN        sodium chloride 0.9 % infusion 40 mL  40 mL Intravenous PRN Rina Taylor APRN        sodium chloride 0.9 % with KCl 20 mEq/L infusion  75 mL/hr Intravenous Continuous Feliberto Barrow MD 75 mL/hr at 04/01/25 1547 75 mL/hr at  04/01/25 1547     Orders (last 24 hrs)        Start     Ordered    04/02/25 1851  Auto Discontinue GI Panel in 48 Hours if not Collected  ONCE GI PANEL         03/31/25 1850    04/02/25 1851  Auto Discontinue in 48 Hours if not Collected  ONCE CDIFF         03/31/25 1850    04/02/25 0600  Magnesium  Morning Draw         04/01/25 1030    04/02/25 0600  Comprehensive Metabolic Panel  Morning Draw         04/01/25 1030    04/01/25 2200  Magnesium  Once         04/01/25 1649    04/01/25 2100  nortriptyline (PAMELOR) capsule 25 mg  Nightly         04/01/25 1011    04/01/25 1745  magnesium sulfate in D5W 1g/100mL (PREMIX)  Once         04/01/25 1649    04/01/25 1631  POC Glucose Once  PROCEDURE ONCE        Comments: Complete no more than 45 minutes prior to patient eating      04/01/25 1619    04/01/25 1615  potassium chloride (KLOR-CON M20) CR tablet 20 mEq  Once         04/01/25 1516    04/01/25 1600  rOPINIRole (REQUIP) tablet 1 mg  3 Times Daily         04/01/25 1305    04/01/25 1600  sodium chloride 0.9 % with KCl 20 mEq/L infusion  Continuous         04/01/25 1514    04/01/25 1411  Magnesium  Once         04/01/25 1410    04/01/25 1400  acetaminophen (OFIRMEV) injection 1,000 mg  Once         04/01/25 1308    04/01/25 1200  Comprehensive Metabolic Panel  Once         04/01/25 0808    04/01/25 1200  metoclopramide (REGLAN) injection 5 mg  Every 6 Hours Scheduled,   Status:  Discontinued         04/01/25 0831    04/01/25 1200  Dietary Nutrition Supplements Boost Breeze (Ensure Clear); mixed berry  Daily With Breakfast, Lunch & Dinner       04/01/25 1040    04/01/25 1200  Dietary Nutrition Supplements Other; Italian Ice  Daily With Lunch & Dinner       04/01/25 1040    04/01/25 1130  metoclopramide (REGLAN) solution 5 mg  4 Times Daily Before Meals & Nightly         04/01/25 0831    04/01/25 1130  DULoxetine (CYMBALTA) DR capsule 30 mg  Daily         04/01/25 1011    04/01/25 1130  calcium carbonate (TUMS) chewable  "tablet 500 mg (200 mg elemental)  Daily        Placed in \"And\" Linked Group    04/01/25 1038    04/01/25 1130  cholecalciferol (VITAMIN D3) tablet 500 Units  Daily        Placed in \"And\" Linked Group    04/01/25 1038    04/01/25 1119  POC Glucose Once  PROCEDURE ONCE        Comments: Complete no more than 45 minutes prior to patient eating      04/01/25 1103    04/01/25 1100  POC Glucose 4x Daily Before Meals & at Bedtime  4 Times Daily Before Meals & at Bedtime      Comments: Complete no more than 45 minutes prior to patient eating      04/01/25 1004    04/01/25 1100  sodium bicarbonate 8.4 % 150 mEq in sterile water (preservative free) 1,000 mL infusion (greater than 100 mEq)  Continuous,   Status:  Discontinued         04/01/25 1004    04/01/25 1100  aspirin EC tablet 81 mg  Daily         04/01/25 1011    04/01/25 1100  lubiprostone (AMITIZA) capsule 8 mcg  2 Times Daily         04/01/25 1011    04/01/25 1100  sodium bicarbonate tablet 650 mg  2 Times Daily         04/01/25 1011    04/01/25 1040  Diet: Diabetic, Gastrointestinal; Consistent Carbohydrate; Low Irritant; Fluid Consistency: Thin (IDDSI 0)  Diet Effective Now         04/01/25 1039    04/01/25 1015  erythromycin (ERYTHROCIN) 120.9 mg in sodium chloride 0.9 % 100 mL IVPB  Once         04/01/25 0923    04/01/25 1009  promethazine (PHENERGAN) tablet 25 mg  Every 6 Hours PRN         04/01/25 1011    04/01/25 1007  cetirizine (zyrTEC) tablet 10 mg  Daily PRN         04/01/25 1011    04/01/25 1007  albuterol (PROVENTIL) nebulizer solution 0.083% 2.5 mg/3mL  Every 6 Hours PRN         04/01/25 1011    04/01/25 1002  PT Consult: Eval & Treat Discharge Placement Assessment  Once         04/01/25 1001    04/01/25 1002  OT Consult: Eval & Treat Early Mobility Assessment  Once         04/01/25 1001    04/01/25 0942  Blood Gas, Arterial -  PROCEDURE ONCE         04/01/25 0939    04/01/25 0938  Case Management  Consult  Once        Provider:  (Not yet " assigned)    04/01/25 0938    04/01/25 0919  Blood Gas, Arterial -  STAT         04/01/25 0918    04/01/25 0900  heparin (porcine) 5000 UNIT/ML injection 5,000 Units  Every 12 Hours Scheduled         04/01/25 0404    04/01/25 0834  Inpatient Gastroenterology Consult  Once        Specialty:  Gastroenterology  Provider:  Ashok Mathew MD    04/01/25 0834    04/01/25 0755  POC Glucose Once  PROCEDURE ONCE        Comments: Complete no more than 45 minutes prior to patient eating      04/01/25 0740    04/01/25 0730  Insulin Lispro (humaLOG) injection 1-5 Units  4 Times Daily Before Meals & Nightly         04/01/25 0343    04/01/25 0717  Hemoglobin A1c  Morning Draw         04/01/25 0459    04/01/25 0630  potassium chloride 10 mEq in 100 mL IVPB  Once         04/01/25 0544    04/01/25 0630  potassium chloride (KLOR-CON) packet 40 mEq  Once         04/01/25 0544    04/01/25 0600  Basic Metabolic Panel  Morning Draw,   Status:  Canceled         03/31/25 2301    04/01/25 0600  CBC Auto Differential  Morning Draw         03/31/25 2301    04/01/25 0600  Magnesium  Morning Draw         03/31/25 2301    04/01/25 0600  Phosphorus  Morning Draw         03/31/25 2301    04/01/25 0600  POC Glucose Q6H  Every 6 Hours,   Status:  Canceled      Comments: Complete no more than 45 minutes prior to patient eating      04/01/25 0343    04/01/25 0600  pantoprazole (PROTONIX) EC tablet 40 mg  Every Early Morning         04/01/25 0403    04/01/25 0545  magnesium sulfate 2g/50 mL (PREMIX) infusion  Once         04/01/25 0459    04/01/25 0526  POC Glucose Once  PROCEDURE ONCE        Comments: Complete no more than 45 minutes prior to patient eating      04/01/25 0514    04/01/25 0510  Basic Metabolic Panel  STAT         04/01/25 0509    04/01/25 0500  atorvastatin (LIPITOR) tablet 80 mg  Nightly         04/01/25 0403    04/01/25 0445  metoclopramide (REGLAN) injection 10 mg  Every 6 Hours,   Status:  Discontinued         04/01/25 0356     "04/01/25 0400  Chlorhexidine Gluconate Cloth 2 % pads 1 Application  Every 24 Hours         03/31/25 2301 04/01/25 0356  Inpatient Nephrology Consult  Once        Specialty:  Nephrology  Provider:  Feliberto Barrow MD    04/01/25 0356    04/01/25 0338  dextrose (GLUTOSE) oral gel 15 g  Every 15 Minutes PRN         04/01/25 0343    04/01/25 0338  dextrose (D50W) (25 g/50 mL) IV injection 25 g  Every 15 Minutes PRN         04/01/25 0343    04/01/25 0338  glucagon (GLUCAGEN) injection 1 mg  Every 15 Minutes PRN         04/01/25 0343    04/01/25 0330  sodium bicarbonate 8.4 % 150 mEq in sterile water (preservative free) 1,000 mL infusion (greater than 100 mEq)  Continuous,   Status:  Discontinued         04/01/25 0234    04/01/25 0000  sodium chloride 0.9 % flush 10 mL  Every 12 Hours Scheduled         03/31/25 2301 04/01/25 0000  mupirocin (BACTROBAN) 2 % nasal ointment 1 Application  2 Times Daily         03/31/25 2301 04/01/25 0000  Chlorhexidine Gluconate Cloth 2 % pads 1 Application  Once         03/31/25 2301 04/01/25 0000  sennosides-docusate (PERICOLACE) 8.6-50 MG per tablet 2 tablet  2 Times Daily        Placed in \"And\" Linked Group    03/31/25 2301 04/01/25 0000  sodium bicarbonate 8.4 % 150 mEq in sterile water (preservative free) 1,000 mL infusion (greater than 100 mEq)  Continuous,   Status:  Discontinued         03/31/25 2303 04/01/25 0000  cefTRIAXone (ROCEPHIN) 1,000 mg in sodium chloride 0.9 % 100 mL MBP  Every 24 Hours         03/31/25 2306 03/31/25 2307  Blood Culture - Blood, Hand, Left  Once        Placed in \"And\" Linked Group    03/31/25 2306 03/31/25 2307  Blood Culture - Blood, Arm, Right  Once        Comments: From a different site than #1.     Placed in \"And\" Linked Group    03/31/25 2306 03/31/25 2306  Lactic Acid, Plasma  STAT         03/31/25 2306 03/31/25 2302  Daily Weights  Daily       03/31/25 2301 03/31/25 2302  Osmolality, Serum  Once         " "03/31/25 2303    03/31/25 2302  Osmolality, Urine - Urine, Clean Catch  Once         03/31/25 2303 03/31/25 2302  Sodium, Urine, Random - Urine, Clean Catch  Once         03/31/25 2303 03/31/25 2302  Basic Metabolic Panel  STAT         03/31/25 2303 03/31/25 2300  ondansetron (ZOFRAN) injection 4 mg  Every 6 Hours PRN         03/31/25 2301 03/31/25 2300  ipratropium-albuterol (DUO-NEB) nebulizer solution 3 mL  Every 6 Hours PRN         03/31/25 2301 03/31/25 2300  acetaminophen (TYLENOL) tablet 650 mg  Every 4 Hours PRN        Placed in \"Or\" Linked Group    03/31/25 2301 03/31/25 2300  acetaminophen (TYLENOL) suppository 650 mg  Every 4 Hours PRN        Placed in \"Or\" Linked Group    03/31/25 2301 03/31/25 2300  sodium chloride 0.9 % bolus 1,000 mL  Once         03/31/25 2206 03/31/25 2300  Vital Signs Every Hour and Per Hospital Policy Based on Patient Condition  Every Hour       03/31/25 2301 03/31/25 2300  Intake & Output  Every Hour       03/31/25 2301 03/31/25 2300  CK  STAT,   Status:  Canceled         03/31/25 2301 03/31/25 2259  Place Sequential Compression Device  Once         03/31/25 2301 03/31/25 2259  Maintain Sequential Compression Device  Continuous         03/31/25 2301 03/31/25 2259  If Patient has BG Less Than 80 & is Symptomatic But Not on IV Insulin Protocol - Use Adult Hypoglycemia Treatment Orders  Continuous         03/31/25 2301 03/31/25 2259  Diet: Diabetic; Consistent Carbohydrate; Fluid Consistency: Thin (IDDSI 0)  Diet Effective Now,   Status:  Canceled         03/31/25 2301 03/31/25 2258  Continuous Cardiac Monitoring  Continuous        Comments: Follow Standing Orders As Outlined in Process Instructions (Open Order Report to View Full Instructions)    03/31/25 2301 03/31/25 2258  Maintain IV Access  Continuous,   Status:  Canceled         03/31/25 2301 03/31/25 2258  Telemetry - Place Orders & Notify Provider of Results When Patient " "Experiences Acute Chest Pain, Dysrhythmia or Respiratory Distress  Continuous        Comments: Open Order Report to View Parameters Requiring Provider Notification    03/31/25 2301 03/31/25 2258  Continuous Pulse Oximetry  Continuous         03/31/25 2301 03/31/25 2258  Height & Weight  Once         03/31/25 2301 03/31/25 2258  Oral Care - Patient Not on NPPV & Not Intubated  Every Shift       03/31/25 2301 03/31/25 2258  Target Arousal Level RASS 0 to -2  Continuous         03/31/25 2301 03/31/25 2258  Use Mobility Guidelines for Advancement of Activity  Continuous         03/31/25 2301 03/31/25 2258  Insert Peripheral IV  Once         03/31/25 2301 03/31/25 2258  Saline Lock & Maintain IV Access  Continuous         03/31/25 2301 03/31/25 2258  Code Status and Medical Interventions: CPR (Attempt to Resuscitate); Full Support  Continuous         03/31/25 2301 03/31/25 2257  nitroglycerin (NITROSTAT) SL tablet 0.4 mg  Every 5 Minutes PRN         03/31/25 2301 03/31/25 2257  sodium chloride 0.9 % flush 10 mL  As Needed         03/31/25 2301 03/31/25 2257  sodium chloride 0.9 % infusion 40 mL  As Needed         03/31/25 2301 03/31/25 2257  polyethylene glycol (MIRALAX) packet 17 g  Daily PRN        Placed in \"And\" Linked Group    03/31/25 2301 03/31/25 2257  bisacodyl (DULCOLAX) suppository 10 mg  Daily PRN        Placed in \"And\" Linked Group    03/31/25 2301 03/31/25 2012  sodium bicarbonate injection 8.4% 25 mEq  Once         03/31/25 1956 03/31/25 2010  Inpatient Admission  Once         03/31/25 2009    03/31/25 1957  Blood Gas, Arterial With Co-Ox  PROCEDURE ONCE         03/31/25 1957 03/31/25 1956  sodium chloride 0.9 % bolus 1,000 mL  Once         03/31/25 1940    03/31/25 1939  Blood Gas, Arterial With Co-Ox  STAT         03/31/25 1938 03/31/25 1939  Acetone  Once         03/31/25 1938 03/31/25 1851  Gastrointestinal Panel, PCR - Stool, Per Rectum  Once      "    03/31/25 1850    03/31/25 1851  Clostridioides difficile Toxin - Stool, Per Rectum  Once         03/31/25 1850    03/31/25 1851  Patient Isolation Contact Spore  Continuous        Comments: Isolation Precautions Per Clostridium Difficile (CDI) Infection Control Policy / Process    03/31/25 1850    03/31/25 1851  Clostridioides difficile Toxin, PCR - Stool, Per Rectum  PROCEDURE ONCE         03/31/25 1850    03/31/25 1749  sodium chloride 0.9 % bolus 1,000 mL  Once         03/31/25 1733    03/31/25 1508  sodium chloride 0.9 % flush 10 mL  As Needed         03/31/25 1509    Unscheduled  Oxygen Therapy- Nasal Cannula; Titrate 1-6 LPM Per SpO2; 90 - 95%  Continuous PRN       03/31/25 1509    Unscheduled  Follow Hypoglycemia Standing Orders For Blood Glucose <70 & Notify Provider of Treatment  As Needed      Comments: Follow Hypoglycemia Orders As Outlined in Process Instructions (Open Order Report to View Full Instructions)  Notify Provider Any Time Hypoglycemia Treatment is Administered    04/01/25 0343    Unscheduled  ECG 12 Lead QT Measurement  As Needed       04/01/25 0923    --  triamcinolone (KENALOG) 0.1 % cream  Daily         04/01/25 1409                  Ventilator/Non-Invasive Ventilation Settings (From admission, onward)      None          Physician Progress Notes (last 24 hours)  Notes from 03/31/25 1809 through 04/01/25 1809   No notes of this type exist for this encounter.          Consult Notes (last 24 hours)        Ashok Mathew MD at 04/01/25 1102                  [facility] Gastroenterology  Inpatient Consult Note  Today's date:  04/01/25    Tasha Perez  1970       Referring Provider: No Known Provider  Primary Physician: KIRILL Murguia MD     Date of Admission: 3/31/2025  Date of Service:  04/01/25    Reason for Consultation/Chief Complaint:   Gastroparesis    History of present illness:    Tasha is a 53 y/o female that was admitted to the hospital yesterday. Past medical  history significant for gastroparesis, pancreatitis, diabetes, chronic kidney disease. She was admitted to the hospital for volume depletion and acute kidney injury. On admission was found to have severe metabolic acidosis, acute kidney injury, hyponatremia, hypokalemia, leukocytosis. GI has been consulted for nausea and vomiting with history of gastroparesis. Stool culture and cdiff have been negative. CT abdomen/pelvis has showed some fluid and air filled loops of  bowel suggestive of possible enterocolitis, which has been seen on several CT scans since 01/2025. She follows with GI motility clinic due to the gastroparesis. Last seen in the office there on 12/16/24. She had temporary gastric stimulator which was placed 12/06/24. This was removed and permanent stimulator was recommended. She has had gastrostomy tube, G-J tube, and J- tubes in the past. She has tried and failed various medications for her symptoms. She had EGD here 11/12/24 and again 11/15/24 for placement of NJ tube which showed normal appearing esophagus, stomach, and duodenum.     Past Medical History:   Diagnosis Date    Arthritis     Contusion     Degenerative disc disease, cervical     Diabetes mellitus     Elevated cholesterol     Fibromyalgia     Neuropathy     Osteoporosis     Pancreatitis     Raynaud disease     Renal insufficiency     Smoker 02/08/2022    Vitamin D deficiency 04/26/2022         Past Surgical History:   Procedure Laterality Date    APPENDECTOMY      CHOLECYSTECTOMY      COLONOSCOPY      ENDOSCOPY N/A 10/08/2019    Procedure: ESOPHAGOGASTRODUODENOSCOPY WITH ANESTHESIA;  Surgeon: Aleks Vaughn DO;  Location: [facility] ENDOSCOPY;  Service: Gastroenterology    ENDOSCOPY N/A 11/12/2024    Procedure: ESOPHAGOGASTRODUODENOSCOPY WITH ANESTHESIA;  Surgeon: Tiffanie Saucedo MD;  Location: [facility] ENDOSCOPY;  Service: Gastroenterology;  Laterality: N/A;  pre op: Gastroparesis, Intractable nausea  post op: insertion of dobhoff   PCP:  "Brandon Murguia    ENDOSCOPY N/A 11/15/2024    Procedure: ESOPHAGOGASTRODUODENOSCOPY WITH ANESTHESIA WITH NG TUBE INSERTION;  Surgeon: Tiffanie Saucedo MD;  Location: [facility] ENDOSCOPY;  Service: Gastroenterology;  Laterality: N/A;  PRE OP: DOBHOFF  POST OP: DOBHOFF  PCP: TELLY PACK    ENDOSCOPY WITH GASTROSTOMY TUBE INSERTION N/A 6/15/2022    Procedure: ESOPHAGOGASTRODUODENOSCOPY WITH GASTROSTOMY TUBE INSERTION;  Surgeon: Jersey Lee MD;  Location: [facility] ENDOSCOPY;  Service: Gastroenterology;  Laterality: N/A;  pre peg placement  post peg placement  Dr. Murguia    ENTEROSCOPY SMALL BOWEL N/A 11/3/2022    Procedure: Esophagogastroduodenoscopy with Peg Removal;  Surgeon: Aleks Vaughn DO;  Location: [facility] ENDOSCOPY;  Service: Gastroenterology;  Laterality: N/A;  pre; peg removal  post; peg removal   KIRILL Murguia MD        HYSTERECTOMY            Allergies   Allergen Reactions    Bee Venom Anaphylaxis    Hydrocodone Anaphylaxis    Hydroxychloroquine Other (See Comments)     Hair loss    Ibuprofen Other (See Comments)     \"SHUTS MY KIDNEYS DOWN\" PER PATIENT     Pineapple Anaphylaxis    Pineapple Extract Anaphylaxis    Wasp Venom Anaphylaxis    Wasp Venom Protein Anaphylaxis    Hydrocodone-Acetaminophen Dizziness, Nausea And Vomiting, Nausea Only and Unknown - Low Severity    Penicillins Nausea And Vomiting    Sitagliptin Other (See Comments)     Other reaction(s): Abdominal Pain      Metformin Other (See Comments)     Pt states it messes with her kidneys    Chocolate Rash     Dark chocolate only, told to RD 7/10/24    Poison Shelly Extract Itching and Rash         Social History     Tobacco Use    Smoking status: Every Day     Current packs/day: 0.50     Average packs/day: 0.5 packs/day for 32.2 years (16.1 ttl pk-yrs)     Types: Cigarettes     Start date: 1993     Passive exposure: Current    Smokeless tobacco: Not on file   Substance Use Topics    Alcohol use: No          Family History   Adopted: Yes "   Problem Relation Age of Onset    Colon polyps Neg Hx     Colon cancer Neg Hx          Medications Prior to Admission   Medication Sig Dispense Refill Last Dose/Taking    albuterol sulfate  (90 Base) MCG/ACT inhaler Inhale 2 puffs 3 (Three) Times a Day As Needed for Wheezing.   Past Month    aspirin 81 MG EC tablet Take 1 tablet by mouth Daily.   3/31/2025    atorvastatin (LIPITOR) 80 MG tablet Take 1 tablet by mouth Every Night.   3/31/2025    Calcium Carbonate-Vitamin D 600-5 MG-MCG tablet Take 1 tablet by mouth Daily.   3/31/2025    cetirizine (zyrTEC) 10 MG tablet Take 1 tablet by mouth Daily As Needed for Allergies.   More than a month    DULoxetine (CYMBALTA) 60 MG capsule Take 1 capsule by mouth Daily.       linaclotide (LINZESS) 290 MCG capsule capsule Take 1 capsule by mouth Every Morning Before Breakfast.       nortriptyline (PAMELOR) 25 MG capsule Take 1 capsule by mouth Every Night.       omeprazole (priLOSEC) 40 MG capsule Take 1 capsule by mouth Every Other Day.       promethazine (PHENERGAN) 25 MG tablet Take 1 tablet by mouth Every 6 (Six) Hours As Needed for Nausea or Vomiting.       rOPINIRole (REQUIP) 1 MG tablet Take 1 tablet by mouth 3 (Three) Times a Day.       sodium bicarbonate 650 MG tablet Take 1 tablet by mouth 2 (Two) Times a Day.       sucralfate (CARAFATE) 1 g tablet Take 1 tablet by mouth 4 (Four) Times a Day.                Review of Systems:  Constitutional: No unexpected weight change, no fatigue, no unexplained fever, no sweats or chills.   HEENT: No icteric sclera.  No hearing or visual deficits.  No sore throat.  No chronic nasal discharge.  Pulmonary: No chronic cough.  No hemoptysis.  No shortness of breath.  Cardiovascular: No chest pain.  No palpitations.  No shortness of breath.  Gastrointestinal: As above.  Musculoskeletal/extremities: No peripheral edema.  No cyanosis.  No claudications.  No back pain.  Genitourinary: No dysuria.  No blood in stool.  No urethral  discharges.  Neurologic: No seizures.  No headaches.  No dizziness.  No gait problems.  Skin: No rash.  No icterus.  Mental: No psychosis.  No confusions.  No hallucinations.      Physical Exam:  Temp:  [96.9 °F (36.1 °C)-97.6 °F (36.4 °C)] 96.9 °F (36.1 °C)  Heart Rate:  [] 103  Resp:  [11-21] 16  BP: ()/(54-79) 86/64    General:   HEENT: Nonicteric sclerae.  Moist oral mucosa.  PERRLA.  EOMI.  Clear pharynx.  Lungs: Clear to auscultation bilaterally.  No wheezing, rales or rhonchi.  Heart: Regular rate and rhythm.  Normal S1 and S2, no S3, S4 or murmur.  Abdomen: Soft, nondistended, nontender to palpation, with normoactive bowel sounds, no hepatosplenomegaly, no palpable masses.  Extremities: No cyanosis, edema or pulse deficits.  Skin: No rash or jaundice.      Results Review:  Lab Results (last 24 hours)       Procedure Component Value Units Date/Time    Hemoglobin A1c [[id number]]  (Abnormal) Collected: 04/01/25 0353    Specimen: Blood Updated: 04/01/25 1058     Hemoglobin A1C 8.60 %     Narrative:      Hemoglobin A1C Ranges:    Increased Risk for Diabetes  5.7% to 6.4%  Diabetes                     >= 6.5%  Diabetic Goal                < 7.0%    Blood Gas, Arterial - [[id number]]  (Abnormal) Collected: 04/01/25 0939    Specimen: Arterial Blood Updated: 04/01/25 0941     Site Left Brachial     Adrien's Test N/A     pH, Arterial 7.390 pH units      pCO2, Arterial 33.2 mm Hg      Comment: 84 Value below reference range        pO2, Arterial 77.6 mm Hg      Comment: 84 Value below reference range        HCO3, Arterial 20.1 mmol/L      Base Excess, Arterial -4.3 mmol/L      Comment: 84 Value below reference range        O2 Saturation, Arterial 96.7 %      Temperature 37.0     Barometric Pressure for Blood Gas 755 mmHg      Modality Room Air     Ventilator Mode NA     Collected by [id number]     Comment: Meter: [id number]     :  Shania Nunez, RRT        pCO2, Temperature Corrected 33.2 mm Hg       pH, Temp Corrected 7.390 pH Units      pO2, Temperature Corrected 77.6 mm Hg     Gastrointestinal Panel, PCR - Stool, Per Rectum [[id number]]  (Normal) Collected: 04/01/25 0736    Specimen: Stool from Per Rectum Updated: 04/01/25 0912     Campylobacter Not Detected     Plesiomonas shigelloides Not Detected     Salmonella Not Detected     Vibrio Not Detected     Vibrio cholerae Not Detected     Yersinia enterocolitica Not Detected     Enteroaggregative E. coli (EAEC) Not Detected     Enteropathogenic E. coli (EPEC) Not Detected     Enterotoxigenic E. coli (ETEC) lt/st Not Detected     Shiga-like toxin-producing E. coli (STEC) stx1/stx2 Not Detected     Shigella/Enteroinvasive E. coli (EIEC) Not Detected     Cryptosporidium Not Detected     Cyclospora cayetanensis Not Detected     Entamoeba histolytica Not Detected     Giardia lamblia Not Detected     Adenovirus F40/41 Not Detected     Astrovirus Not Detected     Norovirus GI/GII Not Detected     Rotavirus A Not Detected     Sapovirus (I, II, IV or V) Not Detected    Clostridioides difficile Toxin - Stool, Per Rectum [[id number]]  (Normal) Collected: 04/01/25 0736    Specimen: Stool from Per Rectum Updated: 04/01/25 0847    Narrative:      The following orders were created for panel order Clostridioides difficile Toxin - Stool, Per Rectum.  Procedure                               Abnormality         Status                     ---------                               -----------         ------                     Clostridioides difficile...[[id number]]  Normal              Final result                 Please view results for these tests on the individual orders.    Clostridioides difficile Toxin, PCR - Stool, Per Rectum [[id number]]  (Normal) Collected: 04/01/25 0736    Specimen: Stool from Per Rectum Updated: 04/01/25 0847     Toxigenic C. difficile by PCR Negative    Narrative:      The result indicates the absence of toxigenic C. difficile from stool specimen.     POC  Glucose Once [[id number]]  (Abnormal) Collected: 04/01/25 0740    Specimen: Blood Updated: 04/01/25 0754     Glucose 197 mg/dL      Comment: : [id number] Tru HeatherMeter ID: [id number]       Basic Metabolic Panel [[id number]]  (Abnormal) Collected: 04/01/25 0353    Specimen: Blood Updated: 04/01/25 0538     Glucose 144 mg/dL      BUN 65 mg/dL      Creatinine 3.25 mg/dL      Sodium 138 mmol/L      Potassium 2.7 mmol/L      Chloride 105 mmol/L      CO2 13.0 mmol/L      Calcium 6.7 mg/dL      BUN/Creatinine Ratio 20.0     Anion Gap 20.0 mmol/L      eGFR 16.4 mL/min/1.73     Narrative:      GFR Categories in Chronic Kidney Disease (CKD)      GFR Category          GFR (mL/min/1.73)    Interpretation  G1                     90 or greater         Normal or high (1)  G2                      60-89                Mild decrease (1)  G3a                   45-59                Mild to moderate decrease  G3b                   30-44                Moderate to severe decrease  G4                    15-29                Severe decrease  G5                    14 or less           Kidney failure          (1)In the absence of evidence of kidney disease, neither GFR category G1 or G2 fulfill the criteria for CKD.    eGFR calculation 2021 CKD-EPI creatinine equation, which does not include race as a factor    POC Glucose Once [[id number]]  (Normal) Collected: 04/01/25 0514    Specimen: Blood Updated: 04/01/25 0525     Glucose 128 mg/dL      Comment: : [id number] Osmani ([place])  PaigeMeter ID: [id number]       Magnesium [[id number]]  (Abnormal) Collected: 04/01/25 0353    Specimen: Blood Updated: 04/01/25 0455     Magnesium 1.5 mg/dL     Phosphorus [[id number]]  (Abnormal) Collected: 04/01/25 0353    Specimen: Blood Updated: 04/01/25 0455     Phosphorus 5.7 mg/dL     CBC Auto Differential [[id number]]  (Abnormal) Collected: 04/01/25 0353    Specimen: Blood Updated: 04/01/25 0444     WBC 11.82 10*3/mm3      RBC 3.60 10*6/mm3       Hemoglobin 10.5 g/dL      Hematocrit 33.3 %      MCV 92.5 fL      MCH 29.2 pg      MCHC 31.5 g/dL      RDW 15.1 %      RDW-SD 51.2 fl      MPV 10.3 fL      Platelets 352 10*3/mm3      Neutrophil % 70.5 %      Lymphocyte % 19.0 %      Monocyte % 7.2 %      Eosinophil % 1.7 %      Basophil % 0.3 %      Immature Grans % 1.3 %      Neutrophils, Absolute 8.34 10*3/mm3      Lymphocytes, Absolute 2.25 10*3/mm3      Monocytes, Absolute 0.85 10*3/mm3      Eosinophils, Absolute 0.20 10*3/mm3      Basophils, Absolute 0.03 10*3/mm3      Immature Grans, Absolute 0.15 10*3/mm3      nRBC 0.0 /100 WBC     Basic Metabolic Panel [[id number]]  (Abnormal) Collected: 03/31/25 2330    Specimen: Blood Updated: 04/01/25 0016     Glucose 162 mg/dL      BUN 67 mg/dL      Creatinine 3.65 mg/dL      Sodium 136 mmol/L      Potassium 3.1 mmol/L      Comment: Slight hemolysis detected by analyzer. Result may be falsely elevated.        Chloride 108 mmol/L      CO2 8.0 mmol/L      Calcium 6.7 mg/dL      BUN/Creatinine Ratio 18.4     Anion Gap 20.0 mmol/L      eGFR 14.3 mL/min/1.73     Narrative:      GFR Categories in Chronic Kidney Disease (CKD)      GFR Category          GFR (mL/min/1.73)    Interpretation  G1                     90 or greater         Normal or high (1)  G2                      60-89                Mild decrease (1)  G3a                   45-59                Mild to moderate decrease  G3b                   30-44                Moderate to severe decrease  G4                    15-29                Severe decrease  G5                    14 or less           Kidney failure          (1)In the absence of evidence of kidney disease, neither GFR category G1 or G2 fulfill the criteria for CKD.    eGFR calculation 2021 CKD-EPI creatinine equation, which does not include race as a factor    Lactic Acid, Plasma [[id number]]  (Normal) Collected: 03/31/25 2330    Specimen: Blood Updated: 04/01/25 0001     Lactate 1.2 mmol/L     Blood Culture  - Blood, Hand, Left [[id number]] Collected: 03/31/25 2333    Specimen: Blood from Hand, Left Updated: 03/31/25 2340    Blood Culture - Blood, Arm, Right [[id number]] Collected: 03/31/25 2330    Specimen: Blood from Arm, Right Updated: 03/31/25 2339    Osmolality, Urine - Urine, Clean Catch [[id number]]  (Normal) Collected: 03/31/25 1739    Specimen: Urine, Clean Catch Updated: 03/31/25 2327     Osmolality, Urine 354 mOsm/kg     Osmolality, Serum [[id number]]  (Abnormal) Collected: 03/31/25 1654    Specimen: Blood Updated: 03/31/25 2319     Osmolality 310 mOsm/kg     Sodium, Urine, Random - Urine, Clean Catch [[id number]] Collected: 03/31/25 1739    Specimen: Urine, Clean Catch Updated: 03/31/25 2316     Sodium, Urine <20 mmol/L     Narrative:      Reference intervals for random urine have not been established.  Clinical usage is dependent upon physician's interpretation in combination with other laboratory tests.       Blood Gas, Arterial With Co-Ox [[id number]]  (Abnormal) Collected: 03/31/25 1957    Specimen: Arterial Blood Updated: 03/31/25 1956     Site Left Radial     Adrien's Test Positive     pH, Arterial 7.068 pH units      Comment: 85 Value below critical limit        pCO2, Arterial 28.1 mm Hg      Comment: 84 Value below reference range        pO2, Arterial 101.0 mm Hg      HCO3, Arterial 8.1 mmol/L      Comment: 84 Value below reference range        Base Excess, Arterial -20.7 mmol/L      Comment: 84 Value below reference range        O2 Saturation, Arterial 97.4 %      Hemoglobin, Blood Gas 11.4 g/dL      Comment: 84 Value below reference range        Hematocrit, Blood Gas 34.9 %      Comment: 84 Value below reference range        Oxyhemoglobin 94.5 %      Methemoglobin 0.60 %      Carboxyhemoglobin 2.4 %      Temperature 37.0     Sodium, Arterial 134 mmol/L      Comment: 84 Value below reference range        Potassium, Arterial 3.4 mmol/L      Comment: 84 Value below reference range        Barometric Pressure  for Blood Gas 754 mmHg      Modality Room Air     Ventilator Mode NA     Notified Who SPIKE BECERRAE APRN     Notified By Jamison Aldridge, CRT     Notified Time 03/31/2025 19:58     Collected by [id number]     Comment: Meter: [id number]     :  Jamison Aldridge, ANDRÉS        pH, Temp Corrected 7.068 pH Units      pCO2, Temperature Corrected 28.1 mm Hg      pO2, Temperature Corrected 101 mm Hg     Acetone [[id number]]  (Normal) Collected: 03/31/25 1654    Specimen: Blood Updated: 03/31/25 1943     Acetone Negative    Urinalysis With Microscopic If Indicated (No Culture) - Urine, Clean Catch [[id number]]  (Abnormal) Collected: 03/31/25 1739    Specimen: Urine, Clean Catch Updated: 03/31/25 1801     Color, UA Dark Yellow     Appearance, UA Cloudy     pH, UA <=5.0     Specific Gravity, UA 1.021     Glucose, UA Negative     Ketones, UA Trace     Bilirubin, UA Negative     Blood, UA Negative     Protein,  mg/dL (2+)     Leuk Esterase, UA Trace     Nitrite, UA Negative     Urobilinogen, UA 1.0 E.U./dL    Urinalysis, Microscopic Only - Urine, Clean Catch [[id number]] Collected: 03/31/25 1739    Specimen: Urine, Clean Catch Updated: 03/31/25 1801     RBC, UA None Seen /HPF      WBC, UA 0-2 /HPF      Bacteria, UA None Seen /HPF      Squamous Epithelial Cells, UA 0-2 /HPF      Hyaline Casts, UA None Seen /LPF      Methodology Manual Light Microscopy    Lipase [[id number]]  (Abnormal) Collected: 03/31/25 1654    Specimen: Blood Updated: 03/31/25 1746     Lipase 89 U/L     TSH [[id number]]  (Normal) Collected: 03/31/25 1654    Specimen: Blood Updated: 03/31/25 1732     TSH 1.250 uIU/mL     Comprehensive Metabolic Panel [[id number]]  (Abnormal) Collected: 03/31/25 1654    Specimen: Blood Updated: 03/31/25 1727     Glucose 221 mg/dL      BUN 78 mg/dL      Creatinine 4.55 mg/dL      Sodium 128 mmol/L      Potassium 3.9 mmol/L      Comment: Slight hemolysis detected by analyzer. Result may be falsely elevated.        Chloride 95  mmol/L      CO2 10.0 mmol/L      Calcium 8.5 mg/dL      Total Protein 8.5 g/dL      Albumin 4.2 g/dL      ALT (SGPT) 13 U/L      AST (SGOT) 18 U/L      Alkaline Phosphatase 174 U/L      Total Bilirubin 0.2 mg/dL      Globulin 4.3 gm/dL      A/G Ratio 1.0 g/dL      BUN/Creatinine Ratio 17.1     Anion Gap 23.0 mmol/L      eGFR 11.0 mL/min/1.73     Narrative:      GFR Categories in Chronic Kidney Disease (CKD)      GFR Category          GFR (mL/min/1.73)    Interpretation  G1                     90 or greater         Normal or high (1)  G2                      60-89                Mild decrease (1)  G3a                   45-59                Mild to moderate decrease  G3b                   30-44                Moderate to severe decrease  G4                    15-29                Severe decrease  G5                    14 or less           Kidney failure          (1)In the absence of evidence of kidney disease, neither GFR category G1 or G2 fulfill the criteria for CKD.    eGFR calculation 2021 CKD-EPI creatinine equation, which does not include race as a factor    Magnesium [[id number]]  (Normal) Collected: 03/31/25 1654    Specimen: Blood Updated: 03/31/25 1727     Magnesium 1.9 mg/dL     CK [[id number]]  (Normal) Collected: 03/31/25 1654    Specimen: Blood Updated: 03/31/25 1727     Creatine Kinase 30 U/L     [place] Draw [[id number]] Collected: 03/31/25 1654    Specimen: Blood Updated: 03/31/25 1715    Narrative:      The following orders were created for panel order [place] Draw.  Procedure                               Abnormality         Status                     ---------                               -----------         ------                     Green Top (Gel)[[id number]]                                  Final result               Lavender Top[[id number]]                                     Final result               Red Top[[id number]]                                          Final result               Light  Blue Top[[id number]]                                   Final result                 Please view results for these tests on the individual orders.    Green Top (Gel) [[id number]] Collected: 03/31/25 1654    Specimen: Blood Updated: 03/31/25 1715     Extra Tube Hold for add-ons.     Comment: Auto resulted.       Lavender Top [[id number]] Collected: 03/31/25 1654    Specimen: Blood Updated: 03/31/25 1715     Extra Tube hold for add-on     Comment: Auto resulted       Red Top [[id number]] Collected: 03/31/25 1654    Specimen: Blood Updated: 03/31/25 1715     Extra Tube Hold for add-ons.     Comment: Auto resulted.       Light Blue Top [[id number]] Collected: 03/31/25 1654    Specimen: Blood Updated: 03/31/25 1715     Extra Tube Hold for add-ons.     Comment: Auto resulted       CBC & Differential [[id number]]  (Abnormal) Collected: 03/31/25 1654    Specimen: Blood Updated: 03/31/25 1707    Narrative:      The following orders were created for panel order CBC & Differential.  Procedure                               Abnormality         Status                     ---------                               -----------         ------                     CBC Auto Differential[[id number]]        Abnormal            Final result                 Please view results for these tests on the individual orders.    CBC Auto Differential [[id number]]  (Abnormal) Collected: 03/31/25 1654    Specimen: Blood Updated: 03/31/25 1707     WBC 23.75 10*3/mm3      RBC 4.22 10*6/mm3      Hemoglobin 12.3 g/dL      Hematocrit 39.2 %      MCV 92.9 fL      MCH 29.1 pg      MCHC 31.4 g/dL      RDW 15.3 %      RDW-SD 52.3 fl      MPV 10.0 fL      Platelets 501 10*3/mm3      Neutrophil % 79.1 %      Lymphocyte % 12.7 %      Monocyte % 5.9 %      Eosinophil % 1.1 %      Basophil % 0.3 %      Immature Grans % 0.9 %      Neutrophils, Absolute 18.79 10*3/mm3      Lymphocytes, Absolute 3.02 10*3/mm3      Monocytes, Absolute 1.40 10*3/mm3      Eosinophils, Absolute  0.25 10*3/mm3      Basophils, Absolute 0.08 10*3/mm3      Immature Grans, Absolute 0.21 10*3/mm3      nRBC 0.0 /100 WBC     POC Glucose Once [[id number]]  (Abnormal) Collected: 03/31/25 1513    Specimen: Blood Updated: 03/31/25 1524     Glucose 207 mg/dL      Comment: : [id number] Jamey Jameser ID: [id number]                 Radiology Review:  Imaging Results (Last 72 Hours)       Procedure Component Value Units Date/Time    CT Abdomen Pelvis Without Contrast [[id number]] Collected: 03/31/25 1839     Updated: 03/31/25 1848    Narrative:      EXAMINATION: CT ABDOMEN PELVIS WO CONTRAST- 3/31/2025 6:39 PM     HISTORY: abd pain, leukocytosis; N17.9-Acute kidney failure,  unspecified; N18.9-Chronic kidney disease, unspecified;  E87.6-Brfg-wolhbfussf and hyponatremia     TOTAL DOSE: 294.8 mGy.cm (Automatic exposure control technique was  implemented in an effort to keep the radiation dose as low as possible  without compromising image quality)     REPORT: Spiral CT of the abdomen and pelvis was performed without  contrast from the lung bases through the pubic symphysis. Reconstructed  coronal and sagittal images are also reviewed.     Comparison: CT abdomen and pelvis without contrast 3/18/2025.     Review of the lung windows demonstrates mild bibasilar atelectasis.  Evaluation of the solid abdominal organs is limited without intravenous  contrast. Cholecystectomy clips are present. The liver, spleen appear  unremarkable, there is low-attenuation thickening of both adrenal glands  as before which may be related to adrenal hyperplasia or adrenal  adenomas. Some ingested food is noted in the stomach. There are few  small calcifications within the pancreatic head as before suggestive of  chronic pancreatitis. The fat stranding around the pancreatic head seen  before appears resolved. No evidence of acute pancreatitis currently. No  pancreatic ductal dilation is identified. The renal contours are smooth.  No  hydronephrosis is identified.     The ureters are decompressed. The bladder appears within normal limits.  There is persistent and increased volume of fluid throughout the colon,  with air-fluid levels and mild colonic distention. This is compatible  with worsening of a diarrheal type illness, no colonic wall thickening  or evidence of bowel obstruction is identified. There is no pneumatosis,  no free air or abscess. Appears the patient has had previous  appendectomy. There is streak artifact associated with an implanted  neurostimulator over the anterior right abdomen, with leads extending to  the anterior wall of the distal stomach. Review of bone windows shows no  acute abnormality. There is evidence of previous hysterectomy.       Impression:      1. Interval increase in the volume of gas and fluid throughout the colon  compatible with worsening of the diarrheal type illness, no mechanical  bowel obstruction is identified. There is no evidence of perforation or  intra-abdominal abscess.  2. The mild changes of acute pancreatitis seen adjacent to the  pancreatic head before appear resolved. A few small calcifications are  noted within the pancreatic head suggesting chronic pancreatitis.  3. Evidence of previous cholecystectomy and appendectomy. Again  demonstrated is a gastric stimulator.  4. Stable low-attenuation thickening of both adrenal glands may be  related to adrenal hyperplasia or adenomas. This is likely benign.           This report was signed and finalized on 3/31/2025 6:45 PM by Dr. Jose Antonio Bermeo MD.       XR Chest 1 View [[id number]] Collected: 03/31/25 1554     Updated: 03/31/25 1558    Narrative:      XR CHEST 1 VW-     HISTORY: Weak/Dizzy/AMS triage protocol     COMPARISON: 12/8/2024     FINDINGS: Frontal view of the chest obtained.     The lungs are well-expanded and clear. Cardiomediastinal contours are  normal. No pleural effusion or pneumothorax. No acute regional bony  pathology.        Impression:      1. No acute radiographic cardiopulmonary process.        This report was signed and finalized on 3/31/2025 3:55 PM by Dr. Gloria Roe MD.               Impression:  Gastroparesis. Recent permanent gastric stimulator placed at [facility]. Previously failed medications.   Acute on chronic kidney disease with acute kidney injury secondary to volume depletion.  Metabolic acidosis.  Electrolyte imbalance.    Plan:  Continue supportive care in ICU. IV fluids and electrolyte replacement as needed. She is already on scheduled Reglan. Unfortunately, not much more to add for her gastroparesis at this time. She says that she is tolerating a diet for the most part with only 1-2 days of vomiting per week. She will need outpatient follow up at at [facility] as scheduled. She has appointment scheduled for 5/1/25. No plan for repeat EGD at this time as she had this done 11/2025 and it was fairly unremarkable. Ok to continue with soft diet as tolerated.       Ashok Mathew MD  04/01/25   11:02 CDT     Electronically signed by Ashok Mathew MD at 04/01/25 1310       Ford Elena APRN at 04/01/25 1024        Consult Orders    1. Inpatient Nephrology Consult [[id number]] ordered by Rina Taylor APRN at 04/01/25 0356              Attestation signed by Feliberto Barrow MD at 04/01/25 3130      I saw and examined the patient independently.  I have reviewed this documentation and agree.  Tasha Perez is a 54 y.o. female for whom we were consulted for evaluation and treatment of acute kidney injury. Baseline chronic kidney disease stage 3b. Follows in our renal office in [place]. History of many previous hospitalizations for SUGAR due to volume depletion. History of recurrent pancreatitis, gastroparesis, type 2 diabetes, osteoarthritis. Has had feeding tubes and PICC lines many times in the past but these never manage to stay in long for a variety of reasons. Most  recent discharge from hospital was on 3/21. Patient had a PICC line placed during that admission in anticipation of ongoing need for outpatient IV fluids. However, on the day of discharge, there was apparently some sort of delay in getting the outpatient fluids ordered and patient was unwilling to wait--so she requested the PICC be removed so she could go ahead and go home. She returned to ER on 3/31 with fatigue, malaise. Labs showed creatinine up to 4.55, CO2 10.0, sodium 128, pH 7.068. Admitted to CCU and IV fluid resuscitation started.  She felt little better since admission.  She is nonoliguric.    Vitals: Reviewed    On exam, heart sounds regular lungs were clear to auscultation bilaterally no wheezes rales rhonchi, abdomen is mildly tender in epigastric area, no guarding, no organomegaly, lower extremity without edema.  Labs reviewed.    Assessment and plan:    Acute kidney injury, prerenal due to volume depletion--improving  Baseline chronic kidney disease stage 3b  Hypokalemia  Hypomagnesemia   Type 2 diabetes  Chronic pancreatitis  Gastroparesis  Metabolic acidosis    Agree with bicarbonate based IV fluids.  No dialysis is needed.  Replace magnesium and potassium.  Avoid nephrotoxins.  Highly suggest placing PICC line and arranging IV fluids at home.  Follow-up with gastroenterologist where she had gastric stimulator placed.                        Nephrology ([facility]) Consult Note      Patient:  Tasha Perez  YOB: 1970  Date of Service: 4/1/2025  MRN: [id number]   Acct: [id number]   Primary Care Physician: KIRILL Murguia MD  Advance Directive:   Code Status and Medical Interventions: CPR (Attempt to Resuscitate); Full Support   Ordered at: 03/31/25 2301     Code Status (Patient has no pulse and is not breathing):    CPR (Attempt to Resuscitate)     Medical Interventions (Patient has pulse or is breathing):    Full Support     Level Of Support Discussed With:     Patient     Admit Date: 3/31/2025       Hospital Day: 1  Referring Provider: No Known Provider      Patient personally seen and examined.  Complete chart including Consults, Notes, Operative Reports, Labs, Cardiology, and Radiology studies reviewed as able.        Subjective:  Tasha Perez is a 54 y.o. female for whom we were consulted for evaluation and treatment of acute kidney injury. Baseline chronic kidney disease stage 3b. Follows with Dr Barrow in our office. History of many previous hospitalizations for SUGAR due to volume depletion. History of recurrent pancreatitis, gastroparesis, type 2 diabetes, osteoarthritis. Has had feeding tubes and PICC lines many times in the past but these never manage to stay in long for a variety of reasons. Most recent discharge from hospital was on 3/21. Patient had a PICC line placed during that admission in anticipation of ongoing need for outpatient IV fluids. However, on the day of discharge, there was apparently some sort of delay in getting the outpatient fluids ordered and patient was unwilling to wait--so she requested the PICC be removed so she could go ahead and go home. She returned to ER on 3/31 with fatigue, malaise. Labs showed creatinine up to 4.55, CO2 10.0, sodium 128, pH 7.068. Admitted to CCU and IV fluid resuscitation started.  Currently is awake and alert with no complaint. Creatinine has improved to 3.25 overnight.    Allergies:  Bee venom, Hydrocodone, Hydroxychloroquine, Ibuprofen, Pineapple, Pineapple extract, Wasp venom, Wasp venom protein, Hydrocodone-acetaminophen, Penicillins, Sitagliptin, Metformin, Chocolate, and Poison ivy extract    Home Meds:  Medications Prior to Admission   Medication Sig Dispense Refill Last Dose/Taking    albuterol sulfate  (90 Base) MCG/ACT inhaler Inhale 2 puffs 3 (Three) Times a Day As Needed for Wheezing.   Past Month    aspirin 81 MG EC tablet Take 1 tablet by mouth Daily.   3/31/2025    atorvastatin  (LIPITOR) 80 MG tablet Take 1 tablet by mouth Every Night.   3/31/2025    Calcium Carbonate-Vitamin D 600-5 MG-MCG tablet Take 1 tablet by mouth Daily.   3/31/2025    cetirizine (zyrTEC) 10 MG tablet Take 1 tablet by mouth Daily As Needed for Allergies.   More than a month    DULoxetine (CYMBALTA) 60 MG capsule Take 1 capsule by mouth Daily.       linaclotide (LINZESS) 290 MCG capsule capsule Take 1 capsule by mouth Every Morning Before Breakfast.       nortriptyline (PAMELOR) 25 MG capsule Take 1 capsule by mouth Every Night.       omeprazole (priLOSEC) 40 MG capsule Take 1 capsule by mouth Every Other Day.       promethazine (PHENERGAN) 25 MG tablet Take 1 tablet by mouth Every 6 (Six) Hours As Needed for Nausea or Vomiting.       rOPINIRole (REQUIP) 1 MG tablet Take 1 tablet by mouth 3 (Three) Times a Day.       sodium bicarbonate 650 MG tablet Take 1 tablet by mouth 2 (Two) Times a Day.       sucralfate (CARAFATE) 1 g tablet Take 1 tablet by mouth 4 (Four) Times a Day.          Medicines:  Current Facility-Administered Medications   Medication Dose Route Frequency Provider Last Rate Last Admin    acetaminophen (TYLENOL) tablet 650 mg  650 mg Oral Q4H PRN Rina Taylor APRN        Or    acetaminophen (TYLENOL) suppository 650 mg  650 mg Rectal Q4H PRN Rina Taylor APRN        albuterol (PROVENTIL) nebulizer solution 0.083% 2.5 mg/3mL  2.5 mg Nebulization Q6H PRN Judit Kendrick MD        aspirin EC tablet 81 mg  81 mg Oral Daily Judit Kendrick MD        atorvastatin (LIPITOR) tablet 80 mg  80 mg Oral Nightly Rina Taylor APRN   80 mg at 04/01/25 0525    sennosides-docusate (PERICOLACE) 8.6-50 MG per tablet 2 tablet  2 tablet Oral BID Rina Taylor APRN   2 tablet at 04/01/25 0939    And    polyethylene glycol (MIRALAX) packet 17 g  17 g Oral Daily PRN Rina Taylor APRN        And    bisacodyl (DULCOLAX) suppository 10 mg  10 mg Rectal Daily PRN Rina Taylor APRN         cefTRIAXone (ROCEPHIN) 1,000 mg in sodium chloride 0.9 % 100 mL MBP  1,000 mg Intravenous Q24H Rina Taylor APRN 200 mL/hr at 03/31/25 2334 1,000 mg at 03/31/25 2334    cetirizine (zyrTEC) tablet 10 mg  10 mg Oral Daily PRN Judit Kendrick MD        Chlorhexidine Gluconate Cloth 2 % pads 1 Application  1 Application Topical Q24H Rina Taylor APRN   1 Application at 04/01/25 0346    dextrose (D50W) (25 g/50 mL) IV injection 25 g  25 g Intravenous Q15 Min PRN Rina Taylor APRN        dextrose (GLUTOSE) oral gel 15 g  15 g Oral Q15 Min PRN Rina Taylor APRN        DULoxetine (CYMBALTA) DR capsule 60 mg  60 mg Oral Daily Judit Kendrick MD        erythromycin (ERYTHROCIN) 120.9 mg in sodium chloride 0.9 % 100 mL IVPB  3 mg/kg Intravenous Once Judit Kendrick MD        glucagon (GLUCAGEN) injection 1 mg  1 mg Intramuscular Q15 Min PRN Rina Taylor APRN        heparin (porcine) 5000 UNIT/ML injection 5,000 Units  5,000 Units Subcutaneous Q12H Rina Taylor APRN   5,000 Units at 04/01/25 0939    Insulin Lispro (humaLOG) injection 1-5 Units  1-5 Units Subcutaneous 4x Daily AC & at Bedtime Rina Taylor APRN   2 Units at 04/01/25 0803    ipratropium-albuterol (DUO-NEB) nebulizer solution 3 mL  3 mL Nebulization Q6H PRN Rina Taylor APRN        lubiprostone (AMITIZA) capsule 8 mcg  8 mcg Oral BID Judit Kendrick MD        metoclopramide (REGLAN) solution 5 mg  5 mg Oral 4x Daily AC & at Bedtime Judit Kendrick MD        mupirocin (BACTROBAN) 2 % nasal ointment 1 Application  1 Application Each Nare BID Rina Taylor APRN   1 Application at 04/01/25 0939    nitroglycerin (NITROSTAT) SL tablet 0.4 mg  0.4 mg Sublingual Q5 Min PRN Rina Taylor APRN        nortriptyline (PAMELOR) capsule 25 mg  25 mg Oral Nightly Judit Kendrick MD        ondansetron (ZOFRAN) injection 4 mg  4 mg Intravenous Q6H PRN Rina Taylor APRN        pantoprazole (PROTONIX) EC  tablet 40 mg  40 mg Oral Q AM Rina Taylor, APRN   40 mg at 04/01/25 0525    promethazine (PHENERGAN) tablet 25 mg  25 mg Oral Q6H PRN Judit Kendrick MD        sodium bicarbonate 8.4 % 150 mEq in sterile water (preservative free) 1,000 mL infusion (greater than 100 mEq)  150 mEq Intravenous Continuous Judit Kendrick MD 75 mL/hr at 04/01/25 1007 Currently Infusing at 04/01/25 1007    sodium bicarbonate tablet 650 mg  650 mg Oral BID Judit Kendrick MD        sodium chloride 0.9 % flush 10 mL  10 mL Intravenous PRN Judit Kendrick MD        sodium chloride 0.9 % flush 10 mL  10 mL Intravenous Q12H Rina Taylor APRN   10 mL at 04/01/25 0939    sodium chloride 0.9 % flush 10 mL  10 mL Intravenous PRN Rina Taylor APRKATERINE        sodium chloride 0.9 % infusion 40 mL  40 mL Intravenous PRN Rina Taylor APRN           Past Medical History:  Past Medical History:   Diagnosis Date    Arthritis     Contusion     Degenerative disc disease, cervical     Diabetes mellitus     Elevated cholesterol     Fibromyalgia     Neuropathy     Osteoporosis     Pancreatitis     Raynaud disease     Renal insufficiency     Smoker 02/08/2022    Vitamin D deficiency 04/26/2022       Past Surgical History:  Past Surgical History:   Procedure Laterality Date    APPENDECTOMY      CHOLECYSTECTOMY      COLONOSCOPY      ENDOSCOPY N/A 10/08/2019    Procedure: ESOPHAGOGASTRODUODENOSCOPY WITH ANESTHESIA;  Surgeon: Aleks Vaughn DO;  Location: [facility] ENDOSCOPY;  Service: Gastroenterology    ENDOSCOPY N/A 11/12/2024    Procedure: ESOPHAGOGASTRODUODENOSCOPY WITH ANESTHESIA;  Surgeon: Tiffanie Saucedo MD;  Location: [facility] ENDOSCOPY;  Service: Gastroenterology;  Laterality: N/A;  pre op: Gastroparesis, Intractable nausea  post op: insertion of dobhoff   PCP: Brandon Murguia    ENDOSCOPY N/A 11/15/2024    Procedure: ESOPHAGOGASTRODUODENOSCOPY WITH ANESTHESIA WITH NG TUBE INSERTION;  Surgeon: Tiffanie Saucedo MD;   Location:  [facility] ENDOSCOPY;  Service: Gastroenterology;  Laterality: N/A;  PRE OP: DOBHOFF  POST OP: DOBHOFF  PCP: TELLY PACK    ENDOSCOPY WITH GASTROSTOMY TUBE INSERTION N/A 6/15/2022    Procedure: ESOPHAGOGASTRODUODENOSCOPY WITH GASTROSTOMY TUBE INSERTION;  Surgeon: Jersey Lee MD;  Location:  [facility] ENDOSCOPY;  Service: Gastroenterology;  Laterality: N/A;  pre peg placement  post peg placement  Dr. Murguia    ENTEROSCOPY SMALL BOWEL N/A 11/3/2022    Procedure: Esophagogastroduodenoscopy with Peg Removal;  Surgeon: Aleks Vaughn DO;  Location:  [facility] ENDOSCOPY;  Service: Gastroenterology;  Laterality: N/A;  pre; peg removal  post; peg removal   KIRILL Murguia MD        HYSTERECTOMY         Family History  Family History   Adopted: Yes   Problem Relation Age of Onset    Colon polyps Neg Hx     Colon cancer Neg Hx        Social History  Social History     Socioeconomic History    Marital status:    Tobacco Use    Smoking status: Every Day     Current packs/day: 0.50     Average packs/day: 0.5 packs/day for 32.2 years (16.1 ttl pk-yrs)     Types: Cigarettes     Start date: 1993     Passive exposure: Current   Vaping Use    Vaping status: Never Used   Substance and Sexual Activity    Alcohol use: No    Drug use: No    Sexual activity: Defer         Review of Systems:  History obtained from chart review and the patient  General ROS: No fever or chills  Respiratory ROS: No cough, shortness of breath, wheezing  Cardiovascular ROS: No chest pain or palpitations  Gastrointestinal ROS: No abdominal pain or melena  Genito-Urinary ROS: No dysuria or hematuria  Psych ROS: No anxiety and depression  14 point ROS reviewed with the patient and negative except as noted above and in the HPI unless unable to obtain.      Objective:  Patient Vitals for the past 24 hrs:   BP Temp Temp src Pulse Resp SpO2 Height Weight   04/01/25 0900 (!) 86/64 96.9 °F (36.1 °C) Oral 103 16 99 % -- --   04/01/25 0645 (!) 83/61 -- --  "101 -- 100 % -- --   04/01/25 0630 98/61 -- -- 101 -- 100 % -- --   04/01/25 0615 (!) 82/54 -- -- 97 -- 100 % -- --   04/01/25 0600 96/61 -- -- 98 -- 100 % -- --   04/01/25 0545 (!) 89/58 -- -- 94 -- 100 % -- --   04/01/25 0530 (!) 75/57 -- -- 96 -- 100 % -- --   04/01/25 0515 94/62 -- -- 97 -- 100 % -- --   04/01/25 0500 95/68 -- -- 95 -- 100 % -- --   04/01/25 0445 100/68 -- -- 96 -- 100 % -- --   04/01/25 0430 96/67 -- -- 96 -- 100 % -- --   04/01/25 0415 95/71 -- -- 96 -- 100 % -- --   04/01/25 0400 93/68 -- -- 97 -- 100 % -- --   04/01/25 0345 (!) 83/59 97.6 °F (36.4 °C) Oral 93 13 100 % -- --   04/01/25 0330 (!) 78/56 -- -- 93 -- 100 % -- --   04/01/25 0315 99/68 -- -- 97 -- 100 % -- --   04/01/25 0300 100/69 -- -- 98 -- 100 % -- --   04/01/25 0245 93/59 -- -- 93 -- 100 % -- --   04/01/25 0230 (!) 86/65 -- -- 92 -- 100 % -- --   04/01/25 0215 (!) 80/59 -- -- 94 -- 100 % -- --   04/01/25 0200 105/67 -- -- 94 -- 100 % -- --   04/01/25 0145 90/65 -- -- 94 -- 100 % -- --   04/01/25 0130 91/63 -- -- 93 -- 100 % -- --   04/01/25 0115 92/58 -- -- 95 -- 100 % -- --   04/01/25 0100 (!) 82/63 -- -- 94 -- 100 % -- --   04/01/25 0045 (!) 84/61 -- -- 95 -- 100 % -- --   04/01/25 0030 101/79 -- -- 97 -- 100 % -- --   04/01/25 0015 90/65 -- -- 97 -- 100 % -- --   04/01/25 0000 93/60 -- -- 96 -- 100 % -- --   03/31/25 2345 (!) 84/56 97.6 °F (36.4 °C) Oral 96 12 100 % -- --   03/31/25 2330 (!) 78/66 -- -- 95 -- 100 % -- --   03/31/25 2315 90/58 -- -- 95 -- 100 % -- --   03/31/25 2300 (!) 83/54 -- -- 94 -- 100 % -- --   03/31/25 2245 (!) 83/55 -- -- 95 -- 100 % -- --   03/31/25 2230 (!) 80/54 -- -- 95 -- 100 % -- --   03/31/25 2215 91/58 -- -- 96 -- 100 % -- --   03/31/25 2200 (!) 87/59 -- -- 97 -- 100 % -- --   03/31/25 2145 92/65 -- -- 98 -- 100 % -- --   03/31/25 2130 102/72 -- -- 99 -- 100 % -- --   03/31/25 2115 -- 97.5 °F (36.4 °C) Oral 97 11 100 % 154.9 cm (61\") 40.3 kg (88 lb 13.5 oz)   03/31/25 2101 100/65 -- -- 97 -- " "-- -- --   03/31/25 2016 98/74 -- -- 97 -- 96 % -- --   03/31/25 2001 99/69 -- -- 96 21 100 % -- --   03/31/25 1946 (!) 86/61 -- -- 95 -- 100 % -- --   03/31/25 1931 90/65 -- -- 97 -- 100 % -- --   03/31/25 1916 90/66 -- -- 97 -- 99 % -- --   03/31/25 1746 (!) 83/61 -- -- 93 -- 100 % -- --   03/31/25 1737 (!) 82/59 -- -- 98 -- -- -- --   03/31/25 1733 (!) 82/58 -- -- 96 -- -- -- --   03/31/25 1731 (!) 89/62 -- -- 96 -- -- -- --   03/31/25 1506 -- -- -- -- -- -- 154.9 cm (61\") 38.6 kg (85 lb)   03/31/25 1503 (!) 86/62 97.5 °F (36.4 °C) Oral 101 18 100 % -- --       Intake/Output Summary (Last 24 hours) at 4/1/2025 1024  Last data filed at 4/1/2025 0843  Gross per 24 hour   Intake 2140.51 ml   Output 300 ml   Net 1840.51 ml     General: awake/alert   Chest:  clear to auscultation bilaterally without respiratory distress  CVS: regular rate and rhythm  Abdominal: soft, nontender, positive bowel sounds  Extremities: no cyanosis or edema  Skin: warm and dry without rash      Labs:  Results from last 7 days   Lab Units 04/01/25  0353 03/31/25  1654   WBC 10*3/mm3 11.82* 23.75*   HEMOGLOBIN g/dL 10.5* 12.3   HEMATOCRIT % 33.3* 39.2   PLATELETS 10*3/mm3 352 501*         Results from last 7 days   Lab Units 04/01/25  0353 03/31/25  2330 03/31/25 1957 03/31/25  1654   SODIUM mmol/L 138 136  --  128*   SODIUM, ARTERIAL mmol/L  --   --  134*  --    POTASSIUM mmol/L 2.7* 3.1*  --  3.9   CHLORIDE mmol/L 105 108*  --  95*   CO2 mmol/L 13.0* 8.0*  --  10.0*   BUN mg/dL 65* 67*  --  78*   CREATININE mg/dL 3.25* 3.65*  --  4.55*   CALCIUM mg/dL 6.7* 6.7*  --  8.5*   EGFR mL/min/1.73 16.4* 14.3*  --  11.0*   BILIRUBIN mg/dL  --   --   --  0.2   ALK PHOS U/L  --   --   --  174*   ALT (SGPT) U/L  --   --   --  13   AST (SGOT) U/L  --   --   --  18   GLUCOSE mg/dL 144* 162*  --  221*       Radiology:   Imaging Results (Last 72 Hours)       Procedure Component Value Units Date/Time    CT Abdomen Pelvis Without Contrast [[id number]] " Collected: 03/31/25 1839     Updated: 03/31/25 1848    Narrative:      EXAMINATION: CT ABDOMEN PELVIS WO CONTRAST- 3/31/2025 6:39 PM     HISTORY: abd pain, leukocytosis; N17.9-Acute kidney failure,  unspecified; N18.9-Chronic kidney disease, unspecified;  E87.9-Ttsx-fsxfzvehvb and hyponatremia     TOTAL DOSE: 294.8 mGy.cm (Automatic exposure control technique was  implemented in an effort to keep the radiation dose as low as possible  without compromising image quality)     REPORT: Spiral CT of the abdomen and pelvis was performed without  contrast from the lung bases through the pubic symphysis. Reconstructed  coronal and sagittal images are also reviewed.     Comparison: CT abdomen and pelvis without contrast 3/18/2025.     Review of the lung windows demonstrates mild bibasilar atelectasis.  Evaluation of the solid abdominal organs is limited without intravenous  contrast. Cholecystectomy clips are present. The liver, spleen appear  unremarkable, there is low-attenuation thickening of both adrenal glands  as before which may be related to adrenal hyperplasia or adrenal  adenomas. Some ingested food is noted in the stomach. There are few  small calcifications within the pancreatic head as before suggestive of  chronic pancreatitis. The fat stranding around the pancreatic head seen  before appears resolved. No evidence of acute pancreatitis currently. No  pancreatic ductal dilation is identified. The renal contours are smooth.  No hydronephrosis is identified.     The ureters are decompressed. The bladder appears within normal limits.  There is persistent and increased volume of fluid throughout the colon,  with air-fluid levels and mild colonic distention. This is compatible  with worsening of a diarrheal type illness, no colonic wall thickening  or evidence of bowel obstruction is identified. There is no pneumatosis,  no free air or abscess. Appears the patient has had previous  appendectomy. There is streak  "artifact associated with an implanted  neurostimulator over the anterior right abdomen, with leads extending to  the anterior wall of the distal stomach. Review of bone windows shows no  acute abnormality. There is evidence of previous hysterectomy.       Impression:      1. Interval increase in the volume of gas and fluid throughout the colon  compatible with worsening of the diarrheal type illness, no mechanical  bowel obstruction is identified. There is no evidence of perforation or  intra-abdominal abscess.  2. The mild changes of acute pancreatitis seen adjacent to the  pancreatic head before appear resolved. A few small calcifications are  noted within the pancreatic head suggesting chronic pancreatitis.  3. Evidence of previous cholecystectomy and appendectomy. Again  demonstrated is a gastric stimulator.  4. Stable low-attenuation thickening of both adrenal glands may be  related to adrenal hyperplasia or adenomas. This is likely benign.           This report was signed and finalized on 3/31/2025 6:45 PM by Dr. Jose Antonio Bermeo MD.       XR Chest 1 View [[id number]] Collected: 03/31/25 1554     Updated: 03/31/25 1558    Narrative:      XR CHEST 1 VW-     HISTORY: Weak/Dizzy/AMS triage protocol     COMPARISON: 12/8/2024     FINDINGS: Frontal view of the chest obtained.     The lungs are well-expanded and clear. Cardiomediastinal contours are  normal. No pleural effusion or pneumothorax. No acute regional bony  pathology.       Impression:      1. No acute radiographic cardiopulmonary process.        This report was signed and finalized on 3/31/2025 3:55 PM by Dr. Gloria Roe MD.               Culture:  No results found for: \"BLOODCX\", \"URINECX\", \"WOUNDCX\", \"MRSACX\", \"RESPCX\", \"STOOLCX\"      Assessment    Acute kidney injury, prerenal due to volume depletion--improving  Baseline chronic kidney disease stage 3b  Hypokalemia  Hypomagnesemia   Type 2 diabetes  Chronic pancreatitis  Gastroparesis  Metabolic " acidosis    Plan:   Continue bicarbonate IV fluids   Replace potassium/magnesium as needed  Renal function improving, monitor ongoing recovery  Patient is fully aware of her need for outpatient IV fluids on an ongoing basis. Once again strongly encouraged her to allow these arrangements to be made (PICC line and scheduled outpatient IV fluids) so that she may avoid these recurrent hospital admissions  Further assessment and plan pending Dr Barrow's evaluation of patient      Thank you for the consult, we appreciate the opportunity to provide care to your patients.  Feel free to contact me if I can be of any further assistance.      Ford Elena, APRN  4/1/2025  10:24 CDT    Electronically signed by Feliberto Barrow MD at 04/01/25 9655

## 2025-04-01 NOTE — PLAN OF CARE
Goal Outcome Evaluation:  Plan of Care Reviewed With: patient        Progress: improving  Outcome Evaluation: Patient alert & orientated x 4, stable on RA. Reports being tired.  Nephrology and Gastrology consults called this AM.C/O pain 4/10 generalized ache PRN Tylenol oral offered x 2 but patient refused. MD called and once dose of IV Tylenol given for pain 4/10. Requip restarted. Pain 2/10 last reported. Stool sent to R/O C-Diff was negative. Patient taken out of R/O precautions. Numerous liquid stools. FMS placed. Bladder distention. Bladder scan done with >891. In & out cath performed with 950 ml's out. Potassium and Magnesium replaced this shift. IV fluids changed by nephrology. Appetite good. Safety maintained.

## 2025-04-01 NOTE — OUTREACH NOTE
Medical Week 2 Survey      Flowsheet Row Responses   [facility] patient discharged from? [place]   Does the patient have one of the following disease processes/diagnoses(primary or secondary)? Other   Week 2 attempt successful? No   Unsuccessful attempts Attempt 1   Revoke Readmitted   Revoke Readmitted            ONI ROY - Registered Nurse

## 2025-04-01 NOTE — PLAN OF CARE
Problem: Malnutrition  Goal: Improved Nutritional Intake  Outcome: Progressing   Goal Outcome Evaluation:  Plan of Care Reviewed With: patient        Progress: no change  Initial Nutrition assessment. Pt presented with N/V and dehydration. She has a hx of gastroparesis with a recent gastric stimulator placed. She thinks the stimulator may not be working properly as she had done well initially. She has been admitted to the hospital multiple times for similar symptoms. She has a hard time maintaining hydration and electrolyte balance. She has had sig weight loss in the past and she is underweight with a BMI of 16.79, but her weight has been relatively stable for the last year, no clinically sig weight changes. She does not like milky supplements, but agrees to try Boost Breeze with meals. She also likes Italian Ice. Initially ordered a C.CHO diet. Will change to a C.CHO, GI, low irritant for better GI tolerance. Electrolytes being replaced as needed. She had a recent dx of severe malnutrition by previous RD on 3/19 of severe malnutrition. She cont to qualify for severe malnutrition in the context of chronic disease.       Malnutrition Severity Assessment      Patient meets criteria for : Severe Malnutrition  Malnutrition Type (Last 8 Hours)       Malnutrition Severity Assessment       Row Name 04/03/25 0758       Malnutrition Severity Assessment    Malnutrition Type Chronic Disease - Related Malnutrition      Row Name 04/03/25 0758       Insufficient Energy Intake     Insufficient Energy Intake Findings Severe    Insufficient Energy Intake  <75% of est. energy requirement for > or equal to 1 month      Row Name 04/03/25 0758       Unintentional Weight Loss     Unintentional Weight Loss Findings None  Pt is underweight with a BMI of 16.79. She has not had clinically sig weight loss in the last year. Hx of sig weight loss      Row Name 04/03/25 0758       Muscle Loss    Loss of Muscle Mass Findings Severe    [place]  Region Moderate - slight depression    Clavicle Bone Region Severe - protruding prominent bone    Acromion Bone Region Severe - squared shoulders, bones, and acromion process protrusion prominent    Scapular Bone Region Moderate - mild depression, bones may show slightly    Dorsal Hand Region Moderate - slight depression    Patellar Region Severe - prominent bone, square looking, very little muscle definition    Anterior Thigh Region Severe - line/depression along thigh, obviously thin    Posterior Calf Region Severe - thin with very little definition/firmness      Row Name 04/03/25 0758       Fat Loss    Subcutaneous Fat Loss Findings Moderate    Orbital Region  Moderate -  somewhat hollowness, slightly dark circles    Upper Arm Region Moderate - some fat tissue, not ample    Thoracic & Lumbar Region Moderate - ribs visible with mild depressions, iliac crest somewhat prominent      Row Name 04/03/25 0758       Criteria Met (Must meet criteria for severity in at least 2 of these categories: M Wasting, Fat Loss, Fluid, Secondary Signs, Wt. Status, Intake)    Patient meets criteria for  Severe Malnutrition

## 2025-04-02 LAB
ALBUMIN SERPL-MCNC: 2.5 G/DL (ref 3.5–5.2)
ALBUMIN/GLOB SERPL: 1 G/DL
ALP SERPL-CCNC: 99 U/L (ref 39–117)
ALT SERPL W P-5'-P-CCNC: 7 U/L (ref 1–33)
ANION GAP SERPL CALCULATED.3IONS-SCNC: 12 MMOL/L (ref 5–15)
ANION GAP SERPL CALCULATED.3IONS-SCNC: 13 MMOL/L (ref 5–15)
AST SERPL-CCNC: 11 U/L (ref 1–32)
BASOPHILS # BLD AUTO: 0.02 10*3/MM3 (ref 0–0.2)
BASOPHILS NFR BLD AUTO: 0.2 % (ref 0–1.5)
BILIRUB SERPL-MCNC: <0.2 MG/DL (ref 0–1.2)
BUN SERPL-MCNC: 38 MG/DL (ref 6–20)
BUN SERPL-MCNC: 44 MG/DL (ref 6–20)
BUN/CREAT SERPL: 17.3 (ref 7–25)
BUN/CREAT SERPL: 17.4 (ref 7–25)
CALCIUM SPEC-SCNC: 6.9 MG/DL (ref 8.6–10.5)
CALCIUM SPEC-SCNC: 7.4 MG/DL (ref 8.6–10.5)
CHLORIDE SERPL-SCNC: 108 MMOL/L (ref 98–107)
CHLORIDE SERPL-SCNC: 108 MMOL/L (ref 98–107)
CO2 SERPL-SCNC: 17 MMOL/L (ref 22–29)
CO2 SERPL-SCNC: 20 MMOL/L (ref 22–29)
CREAT SERPL-MCNC: 2.18 MG/DL (ref 0.57–1)
CREAT SERPL-MCNC: 2.54 MG/DL (ref 0.57–1)
CYTOLOGIST CVX/VAG CYTO: NORMAL
DEPRECATED RDW RBC AUTO: 50.8 FL (ref 37–54)
EGFRCR SERPLBLD CKD-EPI 2021: 22.1 ML/MIN/1.73
EGFRCR SERPLBLD CKD-EPI 2021: 26.5 ML/MIN/1.73
EOSINOPHIL # BLD AUTO: 0.16 10*3/MM3 (ref 0–0.4)
EOSINOPHIL NFR BLD AUTO: 1.5 % (ref 0.3–6.2)
ERYTHROCYTE [DISTWIDTH] IN BLOOD BY AUTOMATED COUNT: 15.5 % (ref 12.3–15.4)
GLOBULIN UR ELPH-MCNC: 2.4 GM/DL
GLUCOSE BLDC GLUCOMTR-MCNC: 114 MG/DL (ref 70–130)
GLUCOSE BLDC GLUCOMTR-MCNC: 155 MG/DL (ref 70–130)
GLUCOSE BLDC GLUCOMTR-MCNC: 165 MG/DL (ref 70–130)
GLUCOSE BLDC GLUCOMTR-MCNC: 170 MG/DL (ref 70–130)
GLUCOSE SERPL-MCNC: 153 MG/DL (ref 65–99)
GLUCOSE SERPL-MCNC: 163 MG/DL (ref 65–99)
HCT VFR BLD AUTO: 27.1 % (ref 34–46.6)
HGB BLD-MCNC: 8.8 G/DL (ref 12–15.9)
IMM GRANULOCYTES # BLD AUTO: 0.04 10*3/MM3 (ref 0–0.05)
IMM GRANULOCYTES NFR BLD AUTO: 0.4 % (ref 0–0.5)
LYMPHOCYTES # BLD AUTO: 2.11 10*3/MM3 (ref 0.7–3.1)
LYMPHOCYTES NFR BLD AUTO: 20.1 % (ref 19.6–45.3)
MAGNESIUM SERPL-MCNC: 2.1 MG/DL (ref 1.6–2.6)
MAGNESIUM SERPL-MCNC: 2.2 MG/DL (ref 1.6–2.6)
MCH RBC QN AUTO: 29.4 PG (ref 26.6–33)
MCHC RBC AUTO-ENTMCNC: 32.5 G/DL (ref 31.5–35.7)
MCV RBC AUTO: 90.6 FL (ref 79–97)
MONOCYTES # BLD AUTO: 0.74 10*3/MM3 (ref 0.1–0.9)
MONOCYTES NFR BLD AUTO: 7.1 % (ref 5–12)
NEUTROPHILS NFR BLD AUTO: 7.41 10*3/MM3 (ref 1.7–7)
NEUTROPHILS NFR BLD AUTO: 70.7 % (ref 42.7–76)
NRBC BLD AUTO-RTO: 0 /100 WBC (ref 0–0.2)
PATH INTERP BLD-IMP: NORMAL
PHOSPHATE SERPL-MCNC: 2.2 MG/DL (ref 2.5–4.5)
PHOSPHATE SERPL-MCNC: 2.8 MG/DL (ref 2.5–4.5)
PLATELET # BLD AUTO: 334 10*3/MM3 (ref 140–450)
PMV BLD AUTO: 11 FL (ref 6–12)
POTASSIUM SERPL-SCNC: 3.3 MMOL/L (ref 3.5–5.2)
POTASSIUM SERPL-SCNC: 4 MMOL/L (ref 3.5–5.2)
PROT SERPL-MCNC: 4.9 G/DL (ref 6–8.5)
QT INTERVAL: 384 MS
QT INTERVAL: 394 MS
QTC INTERVAL: 477 MS
QTC INTERVAL: 497 MS
RBC # BLD AUTO: 2.99 10*6/MM3 (ref 3.77–5.28)
SODIUM SERPL-SCNC: 138 MMOL/L (ref 136–145)
SODIUM SERPL-SCNC: 140 MMOL/L (ref 136–145)
WBC NRBC COR # BLD AUTO: 10.48 10*3/MM3 (ref 3.4–10.8)

## 2025-04-02 PROCEDURE — 85025 COMPLETE CBC W/AUTO DIFF WBC: CPT | Performed by: INTERNAL MEDICINE

## 2025-04-02 PROCEDURE — 82948 REAGENT STRIP/BLOOD GLUCOSE: CPT

## 2025-04-02 PROCEDURE — 84100 ASSAY OF PHOSPHORUS: CPT | Performed by: INTERNAL MEDICINE

## 2025-04-02 PROCEDURE — 97165 OT EVAL LOW COMPLEX 30 MIN: CPT | Performed by: OCCUPATIONAL THERAPIST

## 2025-04-02 PROCEDURE — 97161 PT EVAL LOW COMPLEX 20 MIN: CPT

## 2025-04-02 PROCEDURE — 83735 ASSAY OF MAGNESIUM: CPT | Performed by: INTERNAL MEDICINE

## 2025-04-02 PROCEDURE — 99232 SBSQ HOSP IP/OBS MODERATE 35: CPT | Performed by: INTERNAL MEDICINE

## 2025-04-02 PROCEDURE — 25010000002 SODIUM CHLORIDE 0.9 % WITH KCL 20 MEQ 20-0.9 MEQ/L-% SOLUTION: Performed by: INTERNAL MEDICINE

## 2025-04-02 PROCEDURE — 25010000002 CALCIUM GLUCONATE-NACL 1-0.675 GM/50ML-% SOLUTION: Performed by: INTERNAL MEDICINE

## 2025-04-02 PROCEDURE — 25010000002 HEPARIN (PORCINE) PER 1000 UNITS: Performed by: NURSE PRACTITIONER

## 2025-04-02 PROCEDURE — 63710000001 INSULIN LISPRO (HUMAN) PER 5 UNITS: Performed by: NURSE PRACTITIONER

## 2025-04-02 PROCEDURE — 80053 COMPREHEN METABOLIC PANEL: CPT | Performed by: NURSE PRACTITIONER

## 2025-04-02 PROCEDURE — 83735 ASSAY OF MAGNESIUM: CPT | Performed by: NURSE PRACTITIONER

## 2025-04-02 RX ORDER — POTASSIUM CHLORIDE 1500 MG/1
20 TABLET, EXTENDED RELEASE ORAL ONCE
Status: COMPLETED | OUTPATIENT
Start: 2025-04-02 | End: 2025-04-02

## 2025-04-02 RX ORDER — SODIUM BICARBONATE 650 MG/1
650 TABLET ORAL 3 TIMES DAILY
Status: DISCONTINUED | OUTPATIENT
Start: 2025-04-02 | End: 2025-04-04 | Stop reason: HOSPADM

## 2025-04-02 RX ORDER — CALCIUM GLUCONATE 20 MG/ML
1000 INJECTION, SOLUTION INTRAVENOUS ONCE
Status: COMPLETED | OUTPATIENT
Start: 2025-04-02 | End: 2025-04-02

## 2025-04-02 RX ADMIN — POTASSIUM & SODIUM PHOSPHATES POWDER PACK 280-160-250 MG 2 PACKET: 280-160-250 PACK at 09:49

## 2025-04-02 RX ADMIN — Medication 500 UNITS: at 08:13

## 2025-04-02 RX ADMIN — POTASSIUM CHLORIDE AND SODIUM CHLORIDE 75 ML/HR: 900; 150 INJECTION, SOLUTION INTRAVENOUS at 17:42

## 2025-04-02 RX ADMIN — CALCIUM GLUCONATE 1000 MG: 20 INJECTION, SOLUTION INTRAVENOUS at 08:13

## 2025-04-02 RX ADMIN — LUBIPROSTONE 8 MCG: 8 CAPSULE, GELATIN COATED ORAL at 20:49

## 2025-04-02 RX ADMIN — INSULIN LISPRO 2 UNITS: 100 INJECTION, SOLUTION INTRAVENOUS; SUBCUTANEOUS at 12:29

## 2025-04-02 RX ADMIN — Medication 10 ML: at 08:17

## 2025-04-02 RX ADMIN — HEPARIN SODIUM 5000 UNITS: 5000 INJECTION, SOLUTION INTRAVENOUS; SUBCUTANEOUS at 20:49

## 2025-04-02 RX ADMIN — SODIUM BICARBONATE 650 MG: 650 TABLET ORAL at 20:49

## 2025-04-02 RX ADMIN — ASPIRIN 81 MG: 81 TABLET, COATED ORAL at 08:13

## 2025-04-02 RX ADMIN — HEPARIN SODIUM 5000 UNITS: 5000 INJECTION, SOLUTION INTRAVENOUS; SUBCUTANEOUS at 08:13

## 2025-04-02 RX ADMIN — LUBIPROSTONE 8 MCG: 8 CAPSULE, GELATIN COATED ORAL at 08:13

## 2025-04-02 RX ADMIN — ROPINIROLE HYDROCHLORIDE 1 MG: 1 TABLET, FILM COATED ORAL at 08:16

## 2025-04-02 RX ADMIN — PANTOPRAZOLE SODIUM 40 MG: 40 TABLET, DELAYED RELEASE ORAL at 06:38

## 2025-04-02 RX ADMIN — SENNOSIDES, DOCUSATE SODIUM 2 TABLET: 50; 8.6 TABLET, FILM COATED ORAL at 20:49

## 2025-04-02 RX ADMIN — ROPINIROLE HYDROCHLORIDE 1 MG: 1 TABLET, FILM COATED ORAL at 20:49

## 2025-04-02 RX ADMIN — NORTRIPTYLINE HYDROCHLORIDE 25 MG: 25 CAPSULE ORAL at 20:49

## 2025-04-02 RX ADMIN — METOCLOPRAMIDE HYDROCHLORIDE 5 MG: 5 SOLUTION ORAL at 20:49

## 2025-04-02 RX ADMIN — METOCLOPRAMIDE HYDROCHLORIDE 5 MG: 5 SOLUTION ORAL at 06:38

## 2025-04-02 RX ADMIN — INSULIN LISPRO 2 UNITS: 100 INJECTION, SOLUTION INTRAVENOUS; SUBCUTANEOUS at 20:58

## 2025-04-02 RX ADMIN — ANTACID TABLETS 1 TABLET: 500 TABLET, CHEWABLE ORAL at 08:16

## 2025-04-02 RX ADMIN — ROPINIROLE HYDROCHLORIDE 1 MG: 1 TABLET, FILM COATED ORAL at 16:37

## 2025-04-02 RX ADMIN — ATORVASTATIN CALCIUM 80 MG: 40 TABLET, FILM COATED ORAL at 20:50

## 2025-04-02 RX ADMIN — POTASSIUM CHLORIDE AND SODIUM CHLORIDE 75 ML/HR: 900; 150 INJECTION, SOLUTION INTRAVENOUS at 03:44

## 2025-04-02 RX ADMIN — SODIUM BICARBONATE 650 MG: 650 TABLET ORAL at 08:13

## 2025-04-02 RX ADMIN — CHLORHEXIDINE GLUCONATE 1 APPLICATION: 500 CLOTH TOPICAL at 04:17

## 2025-04-02 RX ADMIN — INSULIN LISPRO 2 UNITS: 100 INJECTION, SOLUTION INTRAVENOUS; SUBCUTANEOUS at 17:38

## 2025-04-02 RX ADMIN — Medication 10 ML: at 20:50

## 2025-04-02 RX ADMIN — DULOXETINE HYDROCHLORIDE 30 MG: 30 CAPSULE, DELAYED RELEASE ORAL at 08:13

## 2025-04-02 RX ADMIN — ACETAMINOPHEN 650 MG: 325 TABLET, FILM COATED ORAL at 04:26

## 2025-04-02 RX ADMIN — Medication 1 APPLICATION: at 20:49

## 2025-04-02 RX ADMIN — POTASSIUM CHLORIDE 20 MEQ: 1500 TABLET, EXTENDED RELEASE ORAL at 00:18

## 2025-04-02 RX ADMIN — Medication 1 APPLICATION: at 08:13

## 2025-04-02 RX ADMIN — SODIUM BICARBONATE 650 MG: 650 TABLET ORAL at 16:37

## 2025-04-02 RX ADMIN — METOCLOPRAMIDE HYDROCHLORIDE 5 MG: 5 SOLUTION ORAL at 16:37

## 2025-04-02 NOTE — PROGRESS NOTES
[facility] Intensivist Services    Date of Admission: 3/31/2025  Date of Note: 04/02/25  Primary Care Physician: KIRILL Murguia MD    History   Chief Complaint: Metabolic acidosis, acute on chronic renal insufficiency     History of Present Illness  54-year-old female patient past medical history type 2 diabetes, degenerative disc disease, fibromyalgia, neuropathy, hypertension, daily tobacco user, stage III kidney disease, nonalcoholic pancreatitis with a recent admission for acute pancreatitis on March 18, 2025 along with acute on chronic kidney disease presents back to the ED tonight with general malaise and ongoing vomiting.  Labs reveal leukocytosis at 23, stable hemoglobin hematocrit 12.3 and 39.3 with platelets of 501.  Glucose 207.  BUN 78, creatinine 4.55 Baseline creatinine on discharge was 1.04 with BUN of 14.  Sodium 128, potassium 3.9, chloride 95, bicarb 10, calcium 8.5 alk phos 174, gap 23, GFR 11 on admission.     ABG revealed a pH of 7.068, pCO2 28 PaO2 101 bicarb 8.1.     Serum acetone negative.  Urinalysis dark cloudy however negative for glucose, trace of ketones negative for infection markers.  Protein greater than 100.  TSH 1.2, lipase 89.  Magnesium 1.9.  CK 30     Patient was administered 1 amp of sodium bicarb 50 mill equivalents 8.4% solution followed by 2 L of NS.     She received a CT of the abdomen and pelvis as she was recently admitted for pancreatitis.  She did not complain of any abdominal pain.  She does complain of vomiting however no diarrhea.  The CT of the abdomen pelvis without contrast in the setting of acute on chronic renal disease reveals increase in the volume of gas and fluid throughout the colon compatible with worsening of the diarrheal type illness.  No mechanical bowel obstruction is identified.  No evidence of any perforation or intra-abdominal abscess.  The mild changes of the acute pancreatitis seen adjacent to the  pancreatic head before now appear resolved.  A few small calcifications are noted within the pancreatic head suggesting chronic pancreatitis.  Evidence of previous cholecystectomy and appendectomy again demonstrated is a gastric stimulator.  Stable low attenuation thickening of both adrenal glands may be related to adrenal hyperplasia or adenomas.  Likely benign.     Intensivist was consulted for metabolic acidosis in the setting of soft blood pressures and worsening kidney function.     I have seen and evaluated the patient in ER room 45.  She is alert to voice and will answer questions appropriately.  She did require some verbal stimulation.  She denies any diarrhea as I discussed the CT results.  No abdominal tenderness on my exam.  She endorses 4-5 bouts of daily vomiting over the last week or so actually she said since her discharge.     No fevers.  No chest pain or shortness of breath.  She reports her weakness worsen therefore she seeks medical attention in the ED tonight.     BP soft 90 systolic with a MAP of greater than 70.  We will plan for ICU/CCU admission under intensivist services.  She has received 1 out of the 2 L of NS that was ordered.  I feel in the setting of ongoing dehydration she will require more fluid resuscitation.  She will also require a bicarb drip in the setting of metabolic acidosis probably secondary to worsening renal function even consider ATN at this point.     Leukocytosis on admission.  We will add on a lactate and blood cultures and cover empirically with Rocephin until acute infection in the setting of hypotension can be ruled out.     CT of the abdomen and pelvis is concerning for diarrheal illness however patient adamantly denies any diarrhea.  She reports her last BM was yesterday and it was mostly formed.  Stool cultures have been ordered by ER provider therefore she has been placed in C. difficile precautions.    Weakness - Generalized  Symptoms include fatigue, nausea  "and vomiting.      4/2/2025:  No acute events overnight.  Patient tolerating her diet.  Blood pressure mildly low which is chronic for her but improves when she moves around or sits in a chair.  No other hemodynamic changes during that time.  Continues to have some diarrhea but improved.  QTc was 699 yesterday, back down to 476 today.  Electrolytes improved with repletion and improve diet.    IntervalHhistory   4/1/25: No acute events overnight.  Patient remains on bicarb gtt. at 150 an hour 150 mill equivalents.  Repeat ABG this morning her pH normalized and HCO3 improved to 20 so reduced bicarb gtt. to 75 cc/h.  Patient tolerated her breakfast.  So resumed her p.o. meds including sodium bicarb.  Continues to have to diarrhea but no further episodes of vomiting.      Social and Family History     Family History:  family history is not on file. She was adopted.    Tobacco/Social History:  reports that she has been smoking cigarettes. She started smoking about 32 years ago. She has a 16.1 pack-year smoking history. She has been exposed to tobacco smoke. She does not have any smokeless tobacco history on file. She reports that she does not drink alcohol and does not use drugs.    Allergies     Allergies:   She is allergic to bee venom, hydrocodone, hydroxychloroquine, ibuprofen, pineapple, pineapple extract, wasp venom, wasp venom protein, hydrocodone-acetaminophen, penicillins, sitagliptin, metformin, chocolate, and poison ivy extract.    Allergies   Allergen Reactions    Bee Venom Anaphylaxis    Hydrocodone Anaphylaxis    Hydroxychloroquine Other (See Comments)     Hair loss    Ibuprofen Other (See Comments)     \"SHUTS MY KIDNEYS DOWN\" PER PATIENT     Pineapple Anaphylaxis    Pineapple Extract Anaphylaxis    Wasp Venom Anaphylaxis    Wasp Venom Protein Anaphylaxis    Hydrocodone-Acetaminophen Dizziness, Nausea And Vomiting, Nausea Only and Unknown - Low Severity    Penicillins Nausea And Vomiting    Sitagliptin " Other (See Comments)     Other reaction(s): Abdominal Pain      Metformin Other (See Comments)     Pt states it messes with her kidneys    Chocolate Rash     Dark chocolate only, told to RD 7/10/24    Poison Shelly Extract Itching and Rash       Labs     Basic Labs:  Common labs          3/31/2025    16:54 3/31/2025    19:57 3/31/2025    23:30 4/1/2025    03:53 4/1/2025    12:26 4/1/2025    21:41 4/2/2025    03:38 4/2/2025    07:34   Common Labs   Glucose 221   162  144  177  154  153     BUN 78   67  65  57  52  44     Creatinine 4.55   3.65  3.25  3.00  2.77  2.54     Sodium 128  134  136  138  137  139  140     Potassium 3.9   3.1  2.7  2.9  3.0  3.3     Chloride 95   108  105  100  105  108     Calcium 8.5   6.7  6.7  7.1  6.7  6.9     Albumin 4.2     3.2   2.5     Total Bilirubin 0.2     0.2   <0.2     Alkaline Phosphatase 174     136   99     AST (SGOT) 18     19   11     ALT (SGPT) 13     11   7     WBC 23.75    11.82     10.48    Hemoglobin 12.3    10.5     8.8    Hematocrit 39.2    33.3     27.1    Platelets 501    352     334    Hemoglobin A1C    8.60            Diabetic:  A1C Last 3 Results          12/9/2024    04:35 3/19/2025    04:37 4/1/2025    03:53   HGBA1C Last 3 Results   Hemoglobin A1C 9.10  9.20  8.60        Inpatient Medications     Medications: Scheduled Meds:aspirin, 81 mg, Oral, Daily  atorvastatin, 80 mg, Oral, Nightly  calcium carbonate, 1 tablet, Oral, Daily   And  cholecalciferol, 500 Units, Oral, Daily  cefTRIAXone, 1,000 mg, Intravenous, Q24H  Chlorhexidine Gluconate Cloth, 1 Application, Topical, Q24H  DULoxetine, 30 mg, Oral, Daily  heparin (porcine), 5,000 Units, Subcutaneous, Q12H  insulin lispro, 1-5 Units, Subcutaneous, 4x Daily AC & at Bedtime  lubiprostone, 8 mcg, Oral, BID  metoclopramide, 5 mg, Oral, 4x Daily AC & at Bedtime  mupirocin, 1 Application, Each Nare, BID  nortriptyline, 25 mg, Oral, Nightly  pantoprazole, 40 mg, Oral, Q AM  potassium & sodium phosphates, 2 packet,  Oral, Once  rOPINIRole, 1 mg, Oral, TID  senna-docusate sodium, 2 tablet, Oral, BID  sodium bicarbonate, 650 mg, Oral, BID  sodium chloride, 10 mL, Intravenous, Q12H  tamsulosin, 0.4 mg, Oral, Daily      Continuous Infusions:sodium chloride 0.9 % with KCl 20 mEq, 75 mL/hr, Last Rate: 75 mL/hr (04/02/25 0344)      PRN Meds:.  acetaminophen **OR** acetaminophen    albuterol    senna-docusate sodium **AND** polyethylene glycol **AND** bisacodyl    cetirizine    dextrose    dextrose    glucagon (human recombinant)    ipratropium-albuterol    nitroglycerin    ondansetron    promethazine    sodium chloride    sodium chloride    sodium chloride    I have reviewed the patient's current medications.   Outpatient Medications     Outpatient Medications:   Outpatient Medications Marked as Taking for the 3/31/25 encounter (Hospital Encounter)   Medication Sig Dispense Refill    albuterol sulfate  (90 Base) MCG/ACT inhaler Inhale 2 puffs 3 (Three) Times a Day As Needed for Wheezing.      aspirin 81 MG EC tablet Take 1 tablet by mouth Daily.      atorvastatin (LIPITOR) 80 MG tablet Take 1 tablet by mouth Every Night.      Calcium Carbonate-Vitamin D 600-5 MG-MCG tablet Take 1 tablet by mouth Daily.      cetirizine (zyrTEC) 10 MG tablet Take 1 tablet by mouth Daily As Needed for Allergies.      DULoxetine (CYMBALTA) 60 MG capsule Take 1 capsule by mouth Daily.      linaclotide (LINZESS) 290 MCG capsule capsule Take 1 capsule by mouth Every Morning Before Breakfast.      nortriptyline (PAMELOR) 25 MG capsule Take 1 capsule by mouth Every Night.      omeprazole (priLOSEC) 40 MG capsule Take 1 capsule by mouth Every Other Day.      promethazine (PHENERGAN) 25 MG tablet Take 1 tablet by mouth Every 6 (Six) Hours As Needed for Nausea or Vomiting.      rOPINIRole (REQUIP) 1 MG tablet Take 1 tablet by mouth 3 (Three) Times a Day.      sodium bicarbonate 650 MG tablet Take 1 tablet by mouth 2 (Two) Times a Day.      sucralfate  "(CARAFATE) 1 g tablet Take 1 tablet by mouth 4 (Four) Times a Day.      triamcinolone (KENALOG) 0.1 % cream Apply 1 Application topically to the appropriate area as directed Daily. APPLY A THIN LAYER TOPICALLY TO THE AFFECTED AREAS BEFORE APPLYING DEXCOM         Current Antibiotics     cefTRIAXone (ROCEPHIN) 1000 mg IVPB in 100 mL NS (MBP)    Exam     Vitals: Her  height is 154.9 cm (61\") and weight is 42.8 kg (94 lb 5.7 oz). Her oral temperature is 98 °F (36.7 °C). Her blood pressure is 74/47 (abnormal) and her pulse is 105. Her respiration is 17 and oxygen saturation is 97%.     Physical Exam  Vitals and nursing note reviewed.   Constitutional:       General: She is not in acute distress.     Appearance: She is ill-appearing.  Very malnourished and underweight but not cachectic  HENT:      Head: Normocephalic and atraumatic.      Nose: Nose normal.      Mouth/Throat:      Mouth: Mucous membranes are dry.   Eyes:      Extraocular Movements: Extraocular movements intact.      Pupils: Pupils are equal, round, and reactive to light.   Cardiovascular:      Rate and Rhythm: Normal rate and regular rhythm.   Pulmonary:      Effort: Pulmonary effort is normal.      Breath sounds: Normal breath sounds.   Abdominal:      Palpations: Abdomen is soft.      Tenderness: There is no abdominal tenderness.      Comments: Abdominal stimulator noted to the right abdominal region secondary to gastroparesis.   Neurological:      Mental Status: She is alert.     Results and Cultures Review     Result Review:  I have personally reviewed the results from the time of this admission to 4/2/2025 09:00 CDT and agree with these findings:  [x]  Laboratory list / accordion  []  Microbiology  [x]  Radiology  [x]  EKG/Telemetry   [x]  Cardiology/Vascular   []  Pathology  [x]  Old records  []  Other:      Culture Data:   No results found for: \"BLOODCX\", \"URINECX\", \"WOUNDCX\", \"MRSACX\", \"RESPCX\", \"STOOLCX\"    Assessment/Plan     Active Hospital " Problems    Diagnosis  POA    **Dehydration [E86.0]  Yes    Other chronic pancreatitis [K86.1]  Yes    QT prolongation [R94.31]  No    Metabolic acidosis [E87.20]  Yes    Gastroparesis [K31.84]  Yes    Intractable nausea [R11.0]  Yes    Hypomagnesemia [E83.42]  Yes    Acute kidney injury superimposed on stage 3b chronic kidney disease [N17.9, N18.32]  Yes    Hypokalemia [E87.6]  Yes    Diabetic polyneuropathy associated with type 2 diabetes mellitus [E11.42]  Yes    Severe malnutrition [E43]  Yes      Resolved Hospital Problems   No resolved problems to display.     This is a 54-year-old female patient with past medical history type 2 diabetes, degenerative disc disease, fibromyalgia, neuropathy, hypertension, daily tobacco user, stage III kidney disease, nonalcoholic pancreatitis with a recent admission for acute pancreatitis on March 18, 2025 along with acute on chronic kidney disease and gastroparesis status post gastric stimulator placement who presented to the ED last night for general malaise, poor appetite, and ongoing vomiting and diarrhea with multiple electrolyte derangements and severe metabolic acidosis.      Plan:  Restarted p.o. bicarb pills yesterday. Will increase frequency from 650mg BID to TID. Drip shut off yesterday.   Repleting K+.  Nephrology started NS fluids with 20 mEq of potassium yesterday and recommended to continue today.  Repleting Mg+.    QTc at admission was 477.  Measuring it on patient cardiac monitor continuous since patient is receiving QT prolonging meds.  QTc was 699 yesterday after receiving reglan and erythromycin.  Gave 1 mg additional of magnesium.  Today Qtc is 476. Will continue to monitor and repeat EKG as needed as well as magnesium.  Avoid using further QT prolonging medications  Yesterday started Reglan 5 mg before every meal and at bedtime and gave 1 dose of erythromycin 3 mg/kg over 45 minutes which seem to help patient's symptoms improve.  GI consulted for further  recommendations as patient recently had gastric stimulator placed for her gastroparesis however she continues to have further symptoms.  Recommendation was to follow-up with outpatient GI motility clinic as there is nothing further that can be done inpatient from their standpoint.   Patient noted to have history of diabetes with an insulin pump but no longer has the pump.  Her glucose has been under 200 here in the hospital without any insulin given.  HG A1c here is 8.6.  Anemia panel suggest anemia of chronic disease which can artificially elevate HG A1c levels.  PT/OT  Dietitian consulted for nutritional recommendations    VTE Prophylaxis: Heparin SQ    Disposition:  Patient is stable to transition out of the ICU to the floor.    Total critical care time: Approximately 35 minutes    Due to a high probability of clinically significant, life threatening deterioration, the patient required my highest level of preparedness to intervene emergently and I personally spent this critical care time directly and personally managing the patient.     This critical care time included obtaining a history; examining the patient; pulse oximetry; ordering and review of studies; arranging urgent treatment with development of a management plan; evaluation of patient's response to treatment; frequent reassessment; and, discussions with other providers.    This critical care time was performed to assess and manage the high probability of imminent, life-threatening deterioration that could result in multi-organ failure. It was exclusive of separately billable procedures and treating other patients and teaching time.    Please see MDM section and the rest of the note for further information on patient assessment and treatment.    Part of this note may be an electronic transcription/translation of spoken language to printed text using the Dragon Dictation System.    Electronically signed by Judit Kendrick MD on 4/2/2025 at 09:00  CDT

## 2025-04-02 NOTE — PLAN OF CARE
Goal Outcome Evaluation:  Plan of Care Reviewed With: patient           Outcome Evaluation: OT eval completed. Pt presents alert and oriented x4, sitting up in bed. She reports at baseline being independent with all adls and mobility, residing at home with spouse. Today she was able to perform bed mobility with Mod I, donned slip on shoes independently. Completed sit <> stand t/fs and all functional mobility with Sup. At this time, pt remains at her baseline functioning does not require OT services. Anticipate d/c home with medically able.    Anticipated Discharge Disposition (OT): home

## 2025-04-02 NOTE — PLAN OF CARE
Goal Outcome Evaluation:  Plan of Care Reviewed With: patient        Progress: improving  Outcome Evaluation: Pt A/O x 4. RA. BP soft. Flomax started and administered per order. NS w/ 20 KCL infusing at 75. FMS output 200 ml. K 3.0 and nephrology notified. 20 mEq K PO given x 1. Repeat K 3.3. Bladder scan revealed > 500 ml. In & out cath performed with 600 ml out. Prn tylenol given x 1.

## 2025-04-02 NOTE — PROGRESS NOTES
Nephrology ([facility]) Progress Note      Patient:  Tasha Perez  YOB: 1970  Date of Service: 4/2/2025  MRN: [id number]   Acct: [id number]   Primary Care Physician: KIRILL Murguia MD  Advance Directive:   Code Status and Medical Interventions: CPR (Attempt to Resuscitate); Full Support   Ordered at: 03/31/25 2301     Code Status (Patient has no pulse and is not breathing):    CPR (Attempt to Resuscitate)     Medical Interventions (Patient has pulse or is breathing):    Full Support     Level Of Support Discussed With:    Patient     Admit Date: 3/31/2025       Hospital Day: 2  Referring Provider: No Known Provider      Patient personally seen and examined.  Complete chart including Consults, Notes, Operative Reports, Labs, Cardiology, and Radiology studies reviewed as able.        Subjective:  Tasha Perez is a 54 y.o. female for whom we were consulted for evaluation and treatment of acute kidney injury. Baseline chronic kidney disease stage 3b. Follows with Dr Barrow in our office. History of many previous hospitalizations for SUGAR due to volume depletion. History of recurrent pancreatitis, gastroparesis, type 2 diabetes, osteoarthritis. Has had feeding tubes and PICC lines many times in the past but these never manage to stay in long for a variety of reasons. Most recent discharge from hospital was on 3/21. Patient had a PICC line placed during that admission in anticipation of ongoing need for outpatient IV fluids. However, on the day of discharge, there was apparently some sort of delay in getting the outpatient fluids ordered and patient was unwilling to wait--so she requested the PICC be removed so she could go ahead and go home. She returned to ER on 3/31 with fatigue, malaise. Labs showed creatinine up to 4.55, CO2 10.0, sodium 128, pH 7.068. Admitted to CCU and IV fluid resuscitation started.  Creatinine has been improving, electrolytes being replaced as  indicated    Today is awake and alert. No new complaint. Noted some urinary retention overnight requiring in/out cath.    Allergies:  Bee venom, Hydrocodone, Hydroxychloroquine, Ibuprofen, Pineapple, Pineapple extract, Wasp venom, Wasp venom protein, Hydrocodone-acetaminophen, Penicillins, Sitagliptin, Metformin, Chocolate, and Poison ivy extract    Home Meds:  Medications Prior to Admission   Medication Sig Dispense Refill Last Dose/Taking    albuterol sulfate  (90 Base) MCG/ACT inhaler Inhale 2 puffs 3 (Three) Times a Day As Needed for Wheezing.   Past Week    aspirin 81 MG EC tablet Take 1 tablet by mouth Daily.   Past Week    atorvastatin (LIPITOR) 80 MG tablet Take 1 tablet by mouth Every Night.   Past Week    Calcium Carbonate-Vitamin D 600-5 MG-MCG tablet Take 1 tablet by mouth Daily.   Past Week    cetirizine (zyrTEC) 10 MG tablet Take 1 tablet by mouth Daily As Needed for Allergies.   Past Week    DULoxetine (CYMBALTA) 60 MG capsule Take 1 capsule by mouth Daily.   Past Week    linaclotide (LINZESS) 290 MCG capsule capsule Take 1 capsule by mouth Every Morning Before Breakfast.   Past Week    nortriptyline (PAMELOR) 25 MG capsule Take 1 capsule by mouth Every Night.   Past Week    omeprazole (priLOSEC) 40 MG capsule Take 1 capsule by mouth Every Other Day.   Past Week    promethazine (PHENERGAN) 25 MG tablet Take 1 tablet by mouth Every 6 (Six) Hours As Needed for Nausea or Vomiting.   Past Month    rOPINIRole (REQUIP) 1 MG tablet Take 1 tablet by mouth 3 (Three) Times a Day.   Past Week    sodium bicarbonate 650 MG tablet Take 1 tablet by mouth 2 (Two) Times a Day.   Past Week    sucralfate (CARAFATE) 1 g tablet Take 1 tablet by mouth 4 (Four) Times a Day.   Past Week    triamcinolone (KENALOG) 0.1 % cream Apply 1 Application topically to the appropriate area as directed Daily. APPLY A THIN LAYER TOPICALLY TO THE AFFECTED AREAS BEFORE APPLYING DEXCOM   Past Week       Medicines:  Current  Facility-Administered Medications   Medication Dose Route Frequency Provider Last Rate Last Admin    acetaminophen (TYLENOL) tablet 650 mg  650 mg Oral Q4H PRN Rina Taylor APRN   650 mg at 04/02/25 0426    Or    acetaminophen (TYLENOL) suppository 650 mg  650 mg Rectal Q4H PRN Rina Taylor APRN        albuterol (PROVENTIL) nebulizer solution 0.083% 2.5 mg/3mL  2.5 mg Nebulization Q6H PRN Judit Kendrick MD        aspirin EC tablet 81 mg  81 mg Oral Daily Judit Kendrick MD   81 mg at 04/02/25 0813    atorvastatin (LIPITOR) tablet 80 mg  80 mg Oral Nightly Rina Taylor APRN   80 mg at 04/01/25 2010    sennosides-docusate (PERICOLACE) 8.6-50 MG per tablet 2 tablet  2 tablet Oral BID Rina Taylor APRN   2 tablet at 04/01/25 0939    And    polyethylene glycol (MIRALAX) packet 17 g  17 g Oral Daily PRN Rina Taylor APRN        And    bisacodyl (DULCOLAX) suppository 10 mg  10 mg Rectal Daily PRN Rina Taylor APRN        calcium carbonate (TUMS) chewable tablet 500 mg (200 mg elemental)  1 tablet Oral Daily Judit Kendrick MD   1 tablet at 04/02/25 0816    And    cholecalciferol (VITAMIN D3) tablet 500 Units  500 Units Oral Daily Judit Kendrick MD   500 Units at 04/02/25 0813    cefTRIAXone (ROCEPHIN) 1,000 mg in sodium chloride 0.9 % 100 mL MBP  1,000 mg Intravenous Q24H Rina Taylor APRN 200 mL/hr at 04/01/25 2330 1,000 mg at 04/01/25 2330    cetirizine (zyrTEC) tablet 10 mg  10 mg Oral Daily PRN Judit Kendrick MD        Chlorhexidine Gluconate Cloth 2 % pads 1 Application  1 Application Topical Q24H Rina Taylor APRN   1 Application at 04/02/25 0417    dextrose (D50W) (25 g/50 mL) IV injection 25 g  25 g Intravenous Q15 Min PRN Rina Taylor APRN        dextrose (GLUTOSE) oral gel 15 g  15 g Oral Q15 Min PRN Rina Taylor APRN        DULoxetine (CYMBALTA) DR capsule 30 mg  30 mg Oral Daily Judit Kendrick MD   30 mg at 04/02/25 0813    glucagon  (GLUCAGEN) injection 1 mg  1 mg Intramuscular Q15 Min PRN Rina Taylor APRN        heparin (porcine) 5000 UNIT/ML injection 5,000 Units  5,000 Units Subcutaneous Q12H Rina Taylor APRN   5,000 Units at 04/02/25 0813    Insulin Lispro (humaLOG) injection 1-5 Units  1-5 Units Subcutaneous 4x Daily AC & at Bedtime Rina Taylor APRN   2 Units at 04/01/25 2059    ipratropium-albuterol (DUO-NEB) nebulizer solution 3 mL  3 mL Nebulization Q6H PRN Rina Taylor APRN        lubiprostone (AMITIZA) capsule 8 mcg  8 mcg Oral BID Judit Kendrick MD   8 mcg at 04/02/25 0813    metoclopramide (REGLAN) solution 5 mg  5 mg Oral 4x Daily AC & at Bedtime Judit Kendrick MD   5 mg at 04/02/25 0638    mupirocin (BACTROBAN) 2 % nasal ointment 1 Application  1 Application Each Nare BID Rina Taylor APRN   1 Application at 04/02/25 0813    nitroglycerin (NITROSTAT) SL tablet 0.4 mg  0.4 mg Sublingual Q5 Min PRN Rina Taylor APRN        nortriptyline (PAMELOR) capsule 25 mg  25 mg Oral Nightly Judit Kendrick MD   25 mg at 04/01/25 2005    ondansetron (ZOFRAN) injection 4 mg  4 mg Intravenous Q6H PRN Rina Taylor APRN        pantoprazole (PROTONIX) EC tablet 40 mg  40 mg Oral Q AM Rina Taylor APRN   40 mg at 04/02/25 0638    potassium & sodium phosphates (PHOS-NAK) 280-160-250 MG packet 2 packet  2 packet Oral Once Ford Elena APRN        promethazine (PHENERGAN) tablet 25 mg  25 mg Oral Q6H PRN Judit Kendrick MD        rOPINIRole (REQUIP) tablet 1 mg  1 mg Oral TID Judit Kendrick MD   1 mg at 04/02/25 0816    sodium bicarbonate tablet 650 mg  650 mg Oral BID Judit Kendrick MD   650 mg at 04/02/25 0813    sodium chloride 0.9 % flush 10 mL  10 mL Intravenous PRN Judit Kendrick MD        sodium chloride 0.9 % flush 10 mL  10 mL Intravenous Q12H Rina Taylor APRN   10 mL at 04/02/25 0817    sodium chloride 0.9 % flush 10 mL  10 mL Intravenous PRN Brandon  Rina D, APRN        sodium chloride 0.9 % infusion 40 mL  40 mL Intravenous PRN Rina Taylor, APRN        sodium chloride 0.9 % with KCl 20 mEq/L infusion  75 mL/hr Intravenous Continuous Feliberto Barrow MD 75 mL/hr at 04/02/25 0344 75 mL/hr at 04/02/25 0344    tamsulosin (FLOMAX) 24 hr capsule 0.4 mg  0.4 mg Oral Daily Alexis Donaldson APRN   0.4 mg at 04/01/25 2059       Past Medical History:  Past Medical History:   Diagnosis Date    Arthritis     Contusion     Degenerative disc disease, cervical     Diabetes mellitus     Elevated cholesterol     Fibromyalgia     Neuropathy     Osteoporosis     Pancreatitis     Raynaud disease     Renal insufficiency     Smoker 02/08/2022    Vitamin D deficiency 04/26/2022       Past Surgical History:  Past Surgical History:   Procedure Laterality Date    APPENDECTOMY      CHOLECYSTECTOMY      COLONOSCOPY      ENDOSCOPY N/A 10/08/2019    Procedure: ESOPHAGOGASTRODUODENOSCOPY WITH ANESTHESIA;  Surgeon: Aleks Vaughn DO;  Location: [facility] ENDOSCOPY;  Service: Gastroenterology    ENDOSCOPY N/A 11/12/2024    Procedure: ESOPHAGOGASTRODUODENOSCOPY WITH ANESTHESIA;  Surgeon: Tiffanie Saucedo MD;  Location: [facility] ENDOSCOPY;  Service: Gastroenterology;  Laterality: N/A;  pre op: Gastroparesis, Intractable nausea  post op: insertion of dobhoff   PCP: Brandon Murguia    ENDOSCOPY N/A 11/15/2024    Procedure: ESOPHAGOGASTRODUODENOSCOPY WITH ANESTHESIA WITH NG TUBE INSERTION;  Surgeon: Tiffanie Saucedo MD;  Location: [facility] ENDOSCOPY;  Service: Gastroenterology;  Laterality: N/A;  PRE OP: DOBHOFF  POST OP: DOBHOFF  PCP: TELLY PACK    ENDOSCOPY WITH GASTROSTOMY TUBE INSERTION N/A 6/15/2022    Procedure: ESOPHAGOGASTRODUODENOSCOPY WITH GASTROSTOMY TUBE INSERTION;  Surgeon: Jersey Lee MD;  Location: [facility] ENDOSCOPY;  Service: Gastroenterology;  Laterality: N/A;  pre peg placement  post peg placement  Dr. Murguia    ENTEROSCOPY SMALL BOWEL N/A 11/3/2022    Procedure:  Esophagogastroduodenoscopy with Peg Removal;  Surgeon: Aleks Vaughn DO;  Location: [facility] ENDOSCOPY;  Service: Gastroenterology;  Laterality: N/A;  pre; peg removal  post; peg removal   KIRILL Murguia MD        HYSTERECTOMY         Family History  Family History   Adopted: Yes   Problem Relation Age of Onset    Colon polyps Neg Hx     Colon cancer Neg Hx        Social History  Social History     Socioeconomic History    Marital status:    Tobacco Use    Smoking status: Every Day     Current packs/day: 0.50     Average packs/day: 0.5 packs/day for 32.2 years (16.1 ttl pk-yrs)     Types: Cigarettes     Start date: 1993     Passive exposure: Current   Vaping Use    Vaping status: Never Used   Substance and Sexual Activity    Alcohol use: No    Drug use: No    Sexual activity: Defer       Review of Systems:  History obtained from chart review and the patient  General ROS: No fever or chills  Respiratory ROS: No cough, shortness of breath, wheezing  Cardiovascular ROS: No chest pain or palpitations  Gastrointestinal ROS: No abdominal pain or melena  Genito-Urinary ROS: No dysuria or hematuria  Psych ROS: No anxiety and depression  14 point ROS reviewed with the patient and negative except as noted above and in the HPI unless unable to obtain.    Objective:  Patient Vitals for the past 24 hrs:   BP Temp Temp src Pulse Resp SpO2 Weight   04/02/25 0800 -- 98 °F (36.7 °C) Oral -- -- -- --   04/02/25 0600 (!) 74/47 -- -- 105 -- 97 % --   04/02/25 0430 (!) 83/50 -- -- 112 -- 98 % --   04/02/25 0400 (!) 74/42 98.7 °F (37.1 °C) Oral 112 17 97 % --   04/02/25 0330 (!) 84/48 -- -- 112 -- 97 % 42.8 kg (94 lb 5.7 oz)   04/02/25 0300 (!) 81/49 -- -- 111 -- 97 % --   04/02/25 0200 90/48 -- -- 110 -- 96 % --   04/02/25 0130 104/64 -- -- 113 -- 98 % --   04/02/25 0100 92/52 -- -- 108 -- 98 % --   04/02/25 0030 93/53 -- -- 102 -- 99 % --   04/02/25 0000 104/66 -- -- 101 -- 100 % --   04/01/25 2330 109/65 -- -- 99 -- 100 %  --   04/01/25 2315 -- 98.3 °F (36.8 °C) Oral 102 13 100 % --   04/01/25 2300 117/72 -- -- 107 -- 100 % --   04/01/25 2230 107/64 -- -- 102 -- 100 % --   04/01/25 2200 (!) 89/51 -- -- 106 -- 98 % --   04/01/25 2130 (!) 80/49 -- -- 98 -- 98 % --   04/01/25 2015 90/50 -- -- 98 -- 100 % --   04/01/25 2000 (!) 84/49 97.9 °F (36.6 °C) Oral 100 14 98 % --   04/01/25 1915 (!) 85/55 -- -- 99 -- 100 % --   04/01/25 1900 (!) 88/56 -- -- 99 -- 100 % --   04/01/25 1800 93/65 -- -- 99 -- 100 % --   04/01/25 1714 (!) 83/54 96.8 °F (36 °C) Oral 97 -- 100 % --   04/01/25 1600 -- -- -- 101 -- 100 % --   04/01/25 1500 (!) 87/58 -- -- 96 -- 100 % --   04/01/25 1400 94/59 -- -- 100 -- 100 % --   04/01/25 1300 99/65 -- -- 106 -- 100 % --   04/01/25 1200 96/65 97.6 °F (36.4 °C) Oral 104 15 100 % --   04/01/25 1100 100/69 -- -- 99 -- 100 % --   04/01/25 1000 90/63 -- -- 101 -- -- --   04/01/25 0900 (!) 86/64 96.9 °F (36.1 °C) Oral 103 16 99 % --       Intake/Output Summary (Last 24 hours) at 4/2/2025 0857  Last data filed at 4/2/2025 0418  Gross per 24 hour   Intake 1852.3 ml   Output 2350 ml   Net -497.7 ml     General: awake/alert   Chest:  clear to auscultation bilaterally without respiratory distress  CVS: regular rate and rhythm  Abdominal: soft, nontender, positive bowel sounds  Extremities: no cyanosis or edema  Skin: warm and dry without rash      Labs:  Results from last 7 days   Lab Units 04/02/25  0734 04/01/25  0353 03/31/25  1654   WBC 10*3/mm3 10.48 11.82* 23.75*   HEMOGLOBIN g/dL 8.8* 10.5* 12.3   HEMATOCRIT % 27.1* 33.3* 39.2   PLATELETS 10*3/mm3 334 352 501*         Results from last 7 days   Lab Units 04/02/25  0338 04/01/25  2141 04/01/25  1226 03/31/25  1957 03/31/25  1654   SODIUM mmol/L 140 139 137   < > 128*   SODIUM, ARTERIAL   --   --   --    < >  --    POTASSIUM mmol/L 3.3* 3.0* 2.9*   < > 3.9   CHLORIDE mmol/L 108* 105 100   < > 95*   CO2 mmol/L 20.0* 21.0* 21.0*   < > 10.0*   BUN mg/dL 44* 52* 57*   < > 78*    CREATININE mg/dL 2.54* 2.77* 3.00*   < > 4.55*   CALCIUM mg/dL 6.9* 6.7* 7.1*   < > 8.5*   EGFR mL/min/1.73 22.1* 19.9* 18.1*   < > 11.0*   BILIRUBIN mg/dL <0.2  --  0.2  --  0.2   ALK PHOS U/L 99  --  136*  --  174*   ALT (SGPT) U/L 7  --  11  --  13   AST (SGOT) U/L 11  --  19  --  18   GLUCOSE mg/dL 153* 154* 177*   < > 221*    < > = values in this interval not displayed.       Radiology:   Imaging Results (Last 72 Hours)       Procedure Component Value Units Date/Time    CT Abdomen Pelvis Without Contrast [[id number]] Collected: 03/31/25 1839     Updated: 03/31/25 1848    Narrative:      EXAMINATION: CT ABDOMEN PELVIS WO CONTRAST- 3/31/2025 6:39 PM     HISTORY: abd pain, leukocytosis; N17.9-Acute kidney failure,  unspecified; N18.9-Chronic kidney disease, unspecified;  E87.5-Uazs-[id number] and hyponatremia     TOTAL DOSE: 294.8 mGy.cm (Automatic exposure control technique was  implemented in an effort to keep the radiation dose as low as possible  without compromising image quality)     REPORT: Spiral CT of the abdomen and pelvis was performed without  contrast from the lung bases through the pubic symphysis. Reconstructed  coronal and sagittal images are also reviewed.     Comparison: CT abdomen and pelvis without contrast 3/18/2025.     Review of the lung windows demonstrates mild bibasilar atelectasis.  Evaluation of the solid abdominal organs is limited without intravenous  contrast. Cholecystectomy clips are present. The liver, spleen appear  unremarkable, there is low-attenuation thickening of both adrenal glands  as before which may be related to adrenal hyperplasia or adrenal  adenomas. Some ingested food is noted in the stomach. There are few  small calcifications within the pancreatic head as before suggestive of  chronic pancreatitis. The fat stranding around the pancreatic head seen  before appears resolved. No evidence of acute pancreatitis currently. No  pancreatic ductal dilation is identified.  The renal contours are smooth.  No hydronephrosis is identified.     The ureters are decompressed. The bladder appears within normal limits.  There is persistent and increased volume of fluid throughout the colon,  with air-fluid levels and mild colonic distention. This is compatible  with worsening of a diarrheal type illness, no colonic wall thickening  or evidence of bowel obstruction is identified. There is no pneumatosis,  no free air or abscess. Appears the patient has had previous  appendectomy. There is streak artifact associated with an implanted  neurostimulator over the anterior right abdomen, with leads extending to  the anterior wall of the distal stomach. Review of bone windows shows no  acute abnormality. There is evidence of previous hysterectomy.       Impression:      1. Interval increase in the volume of gas and fluid throughout the colon  compatible with worsening of the diarrheal type illness, no mechanical  bowel obstruction is identified. There is no evidence of perforation or  intra-abdominal abscess.  2. The mild changes of acute pancreatitis seen adjacent to the  pancreatic head before appear resolved. A few small calcifications are  noted within the pancreatic head suggesting chronic pancreatitis.  3. Evidence of previous cholecystectomy and appendectomy. Again  demonstrated is a gastric stimulator.  4. Stable low-attenuation thickening of both adrenal glands may be  related to adrenal hyperplasia or adenomas. This is likely benign.           This report was signed and finalized on 3/31/2025 6:45 PM by Dr. Jose Antonio Bermeo MD.       XR Chest 1 View [[id number]] Collected: 03/31/25 1554     Updated: 03/31/25 1558    Narrative:      XR CHEST 1 VW-     HISTORY: Weak/Dizzy/AMS triage protocol     COMPARISON: 12/8/2024     FINDINGS: Frontal view of the chest obtained.     The lungs are well-expanded and clear. Cardiomediastinal contours are  normal. No pleural effusion or pneumothorax. No acute  regional bony  pathology.       Impression:      1. No acute radiographic cardiopulmonary process.        This report was signed and finalized on 3/31/2025 3:55 PM by Dr. Gloria Roe MD.               Culture:  Blood Culture   Date Value Ref Range Status   03/31/2025 No growth at 24 hours  Preliminary   03/31/2025 No growth at 24 hours  Preliminary         Assessment    Acute kidney injury, prerenal due to volume depletion--improving  Baseline chronic kidney disease stage 3b  Hypokalemia  Hypomagnesemia   Hypophosphatemia   Type 2 diabetes  Chronic pancreatitis  Gastroparesis  Metabolic acidosis    Plan:   Continue current IV fluids  Replace potassium and other electrolytes as needed  Monitor labs for additional renal recovery      Ford Elena, APRN  4/2/2025  08:57 CDT

## 2025-04-02 NOTE — THERAPY DISCHARGE NOTE
Acute Care - Occupational Therapy Discharge  [facility]    Patient Name: Tasha Perez  : 1970    MRN: [id number]                              Today's Date: 2025       Admit Date: 3/31/2025    Visit Dx:     ICD-10-CM ICD-9-CM   1. Acute kidney injury superimposed on chronic kidney disease  N17.9 584.9    N18.9 585.9   2. Hyponatremia  E87.1 276.1   3. Metabolic acidosis  E87.20 276.2     Patient Active Problem List   Diagnosis    Severe malnutrition    Dehydration    Type 2 diabetes mellitus, with long-term current use of insulin    Diabetic polyneuropathy associated with type 2 diabetes mellitus    Raynaud disease    Acute UTI (urinary tract infection)    Hypokalemia    Stage 3b chronic kidney disease    Smoker    Vitamin D deficiency    Acute kidney injury superimposed on stage 3b chronic kidney disease    Hypomagnesemia    PEG tube malfunction    Osteoporosis    Osteopenia    Hypophosphatemia    Diarrhea of presumed infectious origin    Rheumatoid arthritis    Fibromyalgia    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    Positive blood cultures with Mycobacterium fortuitum    SIRS (systemic inflammatory response syndrome)    Postural low back pain    Gastroparesis    Intractable nausea    DKA (diabetic ketoacidosis)    Anemia    Metabolic acidosis    Loss of weight    Hx of insulin dependent diabetes mellitus    Hyponatremia    Encounter for nasogastric (NG) tube placement    Fever    Anemia    Closed nondisplaced fracture of left patella    PICC line infection    Acute pancreatitis    Pancreatitis    Other chronic pancreatitis    QT prolongation     Past Medical History:   Diagnosis Date    Arthritis     Contusion     Degenerative disc disease, cervical     Diabetes mellitus     Elevated cholesterol     Fibromyalgia     Neuropathy     Osteoporosis     Pancreatitis     Raynaud disease     Renal insufficiency     Smoker 2022    Vitamin D deficiency 2022     Past Surgical  History:   Procedure Laterality Date    APPENDECTOMY      CHOLECYSTECTOMY      COLONOSCOPY      ENDOSCOPY N/A 10/08/2019    Procedure: ESOPHAGOGASTRODUODENOSCOPY WITH ANESTHESIA;  Surgeon: Aleks Vaughn DO;  Location: [facility] ENDOSCOPY;  Service: Gastroenterology    ENDOSCOPY N/A 11/12/2024    Procedure: ESOPHAGOGASTRODUODENOSCOPY WITH ANESTHESIA;  Surgeon: Tiffanie Saucedo MD;  Location: [facility] ENDOSCOPY;  Service: Gastroenterology;  Laterality: N/A;  pre op: Gastroparesis, Intractable nausea  post op: insertion of dobhoff   PCP: Brandon Murguia    ENDOSCOPY N/A 11/15/2024    Procedure: ESOPHAGOGASTRODUODENOSCOPY WITH ANESTHESIA WITH NG TUBE INSERTION;  Surgeon: Tiffanie Saucedo MD;  Location: [facility] ENDOSCOPY;  Service: Gastroenterology;  Laterality: N/A;  PRE OP: DOBHOFF  POST OP: DOBHOFF  PCP: TELLY PACK    ENDOSCOPY WITH GASTROSTOMY TUBE INSERTION N/A 6/15/2022    Procedure: ESOPHAGOGASTRODUODENOSCOPY WITH GASTROSTOMY TUBE INSERTION;  Surgeon: Jersey Lee MD;  Location: [facility] ENDOSCOPY;  Service: Gastroenterology;  Laterality: N/A;  pre peg placement  post peg placement  Dr. Murguia    ENTEROSCOPY SMALL BOWEL N/A 11/3/2022    Procedure: Esophagogastroduodenoscopy with Peg Removal;  Surgeon: Aleks Vaughn DO;  Location: [facility] ENDOSCOPY;  Service: Gastroenterology;  Laterality: N/A;  pre; peg removal  post; peg removal   KIRILL Murguia MD        HYSTERECTOMY        General Information       Row Name 04/02/25 1025          OT Time and Intention    Document Type discharge evaluation/summary  -JW     Mode of Treatment occupational therapy  -JW     Patient Effort good  -JW       Row Name 04/02/25 1025          General Information    Patient Profile Reviewed yes  -JW     Prior Level of Function independent:;all household mobility;transfer;ADL's;bed mobility  -JW     Existing Precautions/Restrictions no known precautions/restrictions  -JW     Barriers to Rehab none identified  -JW       Row Name  04/02/25 1025          Living Environment    Current Living Arrangements home  -     People in Home child(sandy), adult  -       Row Name 04/02/25 1025          Cognition    Orientation Status (Cognition) oriented x 4  -       Row Name 04/02/25 1025          Safety Issues/Impairments Affecting Functional Mobility    Impairments Affecting Function (Mobility) --  -               User Key  (r) = Recorded By, (t) = Taken By, (c) = Cosigned By      Initials Name Provider Type     Gloria Huston OTR/L, ADRIANA Occupational Therapist                   Mobility/ADL's       Row Name 04/02/25 1025          Bed Mobility    Bed Mobility supine-sit;sit-supine  -     Supine-Sit [place] (Bed Mobility) modified independence  -     Sit-Supine [place] (Bed Mobility) modified independence  -     Assistive Device (Bed Mobility) head of bed elevated  -[facility] Name 04/02/25 1025          Transfers    Transfers sit-stand transfer;stand-sit transfer  -[facility] Name 04/02/25 1025          Sit-Stand Transfer    Sit-Stand [place] (Transfers) standby assist  -       Row Name 04/02/25 1025          Stand-Sit Transfer    Stand-Sit [place] (Transfers) standby assist  -[facility] Name 04/02/25 1025          Functional Mobility    Functional Mobility- Ind. Level supervision required  -     Functional Mobility- Comment around unit  -[facility] Name 04/02/25 1025          Activities of Daily Living    BADL Assessment/Intervention lower body dressing  -[facility] Name 04/02/25 1025          Lower Body Dressing Assessment/Training    [place] Level (Lower Body Dressing) don;shoes/slippers;independent  -     Position (Lower Body Dressing) edge of bed sitting  -               User Key  (r) = Recorded By, (t) = Taken By, (c) = Cosigned By      Initials Name Provider Type     Gloria Huston OTR/L, ADRIANA Occupational Therapist                   Obj/Interventions       Row Name 04/02/25  1025          Sensory Assessment (Somatosensory)    Sensory Assessment (Somatosensory) UE sensation intact  -JW       Row Name 04/02/25 1025          Vision Assessment/Intervention    Visual Impairment/Limitations WFL  -JW       Row Name 04/02/25 1025          Range of Motion Comprehensive    General Range of Motion bilateral upper extremity ROM [facility]  -JW       Row Name 04/02/25 1025          Strength Comprehensive (MMT)    Comment, General Manual Muscle Testing (MMT) Assessment B UE strength 5/5  -JW       Row Name 04/02/25 1025          Balance    Balance Assessment sitting static balance;sitting dynamic balance;standing static balance;standing dynamic balance  -     Static Sitting Balance independent  -     Dynamic Sitting Balance independent  -     Position, Sitting Balance unsupported;sitting edge of bed  -     Static Standing Balance standby assist  -     Dynamic Standing Balance standby assist  -     Position/Device Used, Standing Balance unsupported  -               User Key  (r) = Recorded By, (t) = Taken By, (c) = Cosigned By      Initials Name Provider Type    Gloria Uribe, OTR/L, CSRS Occupational Therapist                   Goals/Plan    No documentation.                  Clinical Impression       Row Name 04/02/25 1025          Pain Assessment    Pretreatment Pain Rating 0/10 - no pain  -     Posttreatment Pain Rating 0/10 - no pain  -JW       Row Name 04/02/25 1025          Plan of Care Review    Plan of Care Reviewed With patient  -     Outcome Evaluation OT eval completed. Pt presents alert and oriented x4, sitting up in bed. She reports at baseline being independent with all adls and mobility, residing at home with spouse. Today she was able to perform bed mobility with Mod I, donned slip on shoes independently. Completed sit <> stand t/fs and all functional mobility with Sup. At this time, pt remains at her baseline functioning does not require OT services. Anticipate  d/c home with medically able.  -WILLIS       Row Name 04/02/25 1025          Therapy Assessment/Plan (OT)    Criteria for Skilled Therapeutic Interventions Met (OT) no  -JW     Therapy Frequency (OT) evaluation only  -WILLIS       Row Name 04/02/25 1025          Therapy Plan Review/Discharge Plan (OT)    Anticipated Discharge Disposition (OT) home  -WILLIS       Row Name 04/02/25 1025          Positioning and Restraints    Pre-Treatment Position in bed  -JW     Post Treatment Position bed  -JW     In Bed notified nsg;sitting;call light within reach;encouraged to call for assist;patient within staff view  -               User Key  (r) = Recorded By, (t) = Taken By, (c) = Cosigned By      Initials Name Provider Type    Gloria Uribe, OTR/L, CSRS Occupational Therapist                   Outcome Measures       Row Name 04/02/25 1025          How much help from another is currently needed...    Putting on and taking off regular lower body clothing? 4  -JW     Bathing (including washing, rinsing, and drying) 4  -JW     Toileting (which includes using toilet bed pan or urinal) 4  -JW     Putting on and taking off regular upper body clothing 4  -JW     Taking care of personal grooming (such as brushing teeth) 4  -JW     Eating meals 4  -JW     AM-PAC 6 Clicks Score (OT) 24  -JW       Row Name 04/02/25 0800          How much help from another person do you currently need...    Turning from your back to your side while in flat bed without using bedrails? 4  -DA     Moving from lying on back to sitting on the side of a flat bed without bedrails? 4  -DA     Moving to and from a bed to a chair (including a wheelchair)? 3  -DA     Standing up from a chair using your arms (e.g., wheelchair, bedside chair)? 4  -DA     Climbing 3-5 steps with a railing? 3  -DA     To walk in hospital room? 3  -DA     AM-PAC 6 Clicks Score (PT) 21  -DA       Row Name 04/02/25 1025          Functional Assessment    Outcome Measure Options AM-PAC 6  Clicks Daily Activity (OT)  -               User Key  (r) = Recorded By, (t) = Taken By, (c) = Cosigned By      Initials Name Provider Type     Gloria Huston OTR/L, ADRIANA Occupational Therapist    Ruma Chaney, RN Registered Nurse                  Occupational Therapy Education       Title: PT OT SLP Therapies (In Progress)       Topic: Occupational Therapy (In Progress)       Point: ADL training (Done)       Learning Progress Summary            Patient Acceptance, E, VU by  at 4/2/2025 1122                      Point: Precautions (Done)       Learning Progress Summary            Patient Acceptance, E, VU by  at 4/2/2025 1122                                      User Key       Initials Effective Dates Name Provider Type [facility] 11/15/24 -  Gloria Huston OTR/L, CSRS Occupational Therapist OT                  OT Recommendation and Plan  Therapy Frequency (OT): evaluation only  Plan of Care Review  Plan of Care Reviewed With: patient  Outcome Evaluation: OT eval completed. Pt presents alert and oriented x4, sitting up in bed. She reports at baseline being independent with all adls and mobility, residing at home with spouse. Today she was able to perform bed mobility with Mod I, donned slip on shoes independently. Completed sit <> stand t/fs and all functional mobility with Sup. At this time, pt remains at her baseline functioning does not require OT services. Anticipate d/c home with medically able.  Plan of Care Reviewed With: patient  Outcome Evaluation: OT eval completed. Pt presents alert and oriented x4, sitting up in bed. She reports at baseline being independent with all adls and mobility, residing at home with spouse. Today she was able to perform bed mobility with Mod I, donned slip on shoes independently. Completed sit <> stand t/fs and all functional mobility with Sup. At this time, pt remains at her baseline functioning does not require OT services. Anticipate d/c  home with medically able.     Time Calculation:         Time Calculation- OT       Row Name 04/02/25 1025             Time Calculation- OT    OT Start Time 1025  add 10 minutes for CR  -JW      OT Stop Time 1055  -      OT Time Calculation (min) 30 min  -WILLIS      OT Received On 04/02/25  -      OT Goal Re-Cert Due Date --  -                User Key  (r) = Recorded By, (t) = Taken By, (c) = Cosigned By      Initials Name Provider Type    Gloria Uribe OTR/L, ADRIANA Occupational Therapist                  Therapy Charges for Today       Code Description Service Date Service Provider Modifiers Qty    [id number] HC OT EVAL LOW COMPLEXITY 3 4/2/2025 Gloria Huston OTR/L, ADRIANA GO 1               OT Discharge Summary  Anticipated Discharge Disposition (OT): home  Reason for Discharge: At baseline function  Outcomes Achieved: Other (evalx1)  Discharge Destination: Home    Gloria Esmeralda, OTR/L, CSRS  4/2/2025

## 2025-04-02 NOTE — PROGRESS NOTES
[facility] Intensivist Services    Date of Admission: 3/31/2025  Date of Note: 04/01/25  Primary Care Physician: KIRILL Murguia MD    History   Chief Complaint: Metabolic acidosis, acute on chronic renal insufficiency     History of Present Illness  54-year-old female patient past medical history type 2 diabetes, degenerative disc disease, fibromyalgia, neuropathy, hypertension, daily tobacco user, stage III kidney disease, nonalcoholic pancreatitis with a recent admission for acute pancreatitis on March 18, 2025 along with acute on chronic kidney disease presents back to the ED tonight with general malaise and ongoing vomiting.  Labs reveal leukocytosis at 23, stable hemoglobin hematocrit 12.3 and 39.3 with platelets of 501.  Glucose 207.  BUN 78, creatinine 4.55 Baseline creatinine on discharge was 1.04 with BUN of 14.  Sodium 128, potassium 3.9, chloride 95, bicarb 10, calcium 8.5 alk phos 174, gap 23, GFR 11 on admission.     ABG revealed a pH of 7.068, pCO2 28 PaO2 101 bicarb 8.1.     Serum acetone negative.  Urinalysis dark cloudy however negative for glucose, trace of ketones negative for infection markers.  Protein greater than 100.  TSH 1.2, lipase 89.  Magnesium 1.9.  CK 30     Patient was administered 1 amp of sodium bicarb 50 mill equivalents 8.4% solution followed by 2 L of NS.     She received a CT of the abdomen and pelvis as she was recently admitted for pancreatitis.  She did not complain of any abdominal pain.  She does complain of vomiting however no diarrhea.  The CT of the abdomen pelvis without contrast in the setting of acute on chronic renal disease reveals increase in the volume of gas and fluid throughout the colon compatible with worsening of the diarrheal type illness.  No mechanical bowel obstruction is identified.  No evidence of any perforation or intra-abdominal abscess.  The mild changes of the acute pancreatitis seen adjacent to the  pancreatic head before now appear resolved.  A few small calcifications are noted within the pancreatic head suggesting chronic pancreatitis.  Evidence of previous cholecystectomy and appendectomy again demonstrated is a gastric stimulator.  Stable low attenuation thickening of both adrenal glands may be related to adrenal hyperplasia or adenomas.  Likely benign.     Intensivist was consulted for metabolic acidosis in the setting of soft blood pressures and worsening kidney function.     I have seen and evaluated the patient in ER room 45.  She is alert to voice and will answer questions appropriately.  She did require some verbal stimulation.  She denies any diarrhea as I discussed the CT results.  No abdominal tenderness on my exam.  She endorses 4-5 bouts of daily vomiting over the last week or so actually she said since her discharge.     No fevers.  No chest pain or shortness of breath.  She reports her weakness worsen therefore she seeks medical attention in the ED tonight.     BP soft 90 systolic with a MAP of greater than 70.  We will plan for ICU/CCU admission under intensivist services.  She has received 1 out of the 2 L of NS that was ordered.  I feel in the setting of ongoing dehydration she will require more fluid resuscitation.  She will also require a bicarb drip in the setting of metabolic acidosis probably secondary to worsening renal function even consider ATN at this point.     Leukocytosis on admission.  We will add on a lactate and blood cultures and cover empirically with Rocephin until acute infection in the setting of hypotension can be ruled out.     CT of the abdomen and pelvis is concerning for diarrheal illness however patient adamantly denies any diarrhea.  She reports her last BM was yesterday and it was mostly formed.  Stool cultures have been ordered by ER provider therefore she has been placed in C. difficile precautions.    Weakness - Generalized  Symptoms include fatigue, nausea  and vomiting.      4/1/25:  No acute events overnight.  Patient remains on bicarb gtt. at 150 an hour 150 mill equivalents.  Repeat ABG this morning her pH normalized and HCO3 improved to 20 so reduced bicarb gtt. to 75 cc/h.  Patient tolerated her breakfast.  So resumed her p.o. meds including sodium bicarb.  Continues to have to diarrhea but no further episodes of vomiting.    Past Medical History     Active and Resolved Problems  Active Hospital Problems    Diagnosis  POA    Metabolic acidosis [E87.20]  Yes      Resolved Hospital Problems   No resolved problems to display.       Past Medical History:   Past Medical History:   Diagnosis Date    Arthritis     Contusion     Degenerative disc disease, cervical     Diabetes mellitus     Elevated cholesterol     Fibromyalgia     Neuropathy     Osteoporosis     Pancreatitis     Raynaud disease     Renal insufficiency     Smoker 02/08/2022    Vitamin D deficiency 04/26/2022       Prior Surgeries: She  has a past surgical history that includes Cholecystectomy; Hysterectomy; Colonoscopy; Esophagogastroduodenoscopy (N/A, 10/08/2019); Appendectomy; endoscopy with gastrostomy tube insertion (N/A, 6/15/2022); Small bowel enteroscopy (N/A, 11/3/2022); Esophagogastroduodenoscopy (N/A, 11/12/2024); and Esophagogastroduodenoscopy (N/A, 11/15/2024).    Past Surgical History:   Past Surgical History:   Procedure Laterality Date    APPENDECTOMY      CHOLECYSTECTOMY      COLONOSCOPY      ENDOSCOPY N/A 10/08/2019    Procedure: ESOPHAGOGASTRODUODENOSCOPY WITH ANESTHESIA;  Surgeon: Aleks Vaughn DO;  Location: [facility] ENDOSCOPY;  Service: Gastroenterology    ENDOSCOPY N/A 11/12/2024    Procedure: ESOPHAGOGASTRODUODENOSCOPY WITH ANESTHESIA;  Surgeon: Tiffanie Saucedo MD;  Location: [facility] ENDOSCOPY;  Service: Gastroenterology;  Laterality: N/A;  pre op: Gastroparesis, Intractable nausea  post op: insertion of dobhoff   PCP: Brandon Murguia    ENDOSCOPY N/A 11/15/2024    Procedure:  "ESOPHAGOGASTRODUODENOSCOPY WITH ANESTHESIA WITH NG TUBE INSERTION;  Surgeon: Tiffanie Saucedo MD;  Location:  [facility] ENDOSCOPY;  Service: Gastroenterology;  Laterality: N/A;  PRE OP: DOBHOFF  POST OP: DOBHOFF  PCP: TELLY PACK    ENDOSCOPY WITH GASTROSTOMY TUBE INSERTION N/A 6/15/2022    Procedure: ESOPHAGOGASTRODUODENOSCOPY WITH GASTROSTOMY TUBE INSERTION;  Surgeon: Jersey Lee MD;  Location:  [facility] ENDOSCOPY;  Service: Gastroenterology;  Laterality: N/A;  pre peg placement  post peg placement  Dr. Murguia    ENTEROSCOPY SMALL BOWEL N/A 11/3/2022    Procedure: Esophagogastroduodenoscopy with Peg Removal;  Surgeon: Aleks Vaughn DO;  Location:  [facility] ENDOSCOPY;  Service: Gastroenterology;  Laterality: N/A;  pre; peg removal  post; peg removal   KIRILL Murguia MD        HYSTERECTOMY         Social and Family History     Family History:  family history is not on file. She was adopted.    Tobacco/Social History:  reports that she has been smoking cigarettes. She started smoking about 32 years ago. She has a 16.1 pack-year smoking history. She has been exposed to tobacco smoke. She does not have any smokeless tobacco history on file. She reports that she does not drink alcohol and does not use drugs.    Allergies     Allergies:   She is allergic to bee venom, hydrocodone, hydroxychloroquine, ibuprofen, pineapple, pineapple extract, wasp venom, wasp venom protein, hydrocodone-acetaminophen, penicillins, sitagliptin, metformin, chocolate, and poison ivy extract.    Allergies   Allergen Reactions    Bee Venom Anaphylaxis    Hydrocodone Anaphylaxis    Hydroxychloroquine Other (See Comments)     Hair loss    Ibuprofen Other (See Comments)     \"SHUTS MY KIDNEYS DOWN\" PER PATIENT     Pineapple Anaphylaxis    Pineapple Extract Anaphylaxis    Wasp Venom Anaphylaxis    Wasp Venom Protein Anaphylaxis    Hydrocodone-Acetaminophen Dizziness, Nausea And Vomiting, Nausea Only and Unknown - Low Severity    Penicillins " Nausea And Vomiting    Sitagliptin Other (See Comments)     Other reaction(s): Abdominal Pain      Metformin Other (See Comments)     Pt states it messes with her kidneys    Chocolate Rash     Dark chocolate only, told to RD 7/10/24    Poison Shelly Extract Itching and Rash       Labs     Basic Labs:  Common labs          3/21/2025    02:43 3/31/2025    16:54 3/31/2025    19:57 3/31/2025    23:30 4/1/2025    03:53 4/1/2025    12:26   Common Labs   Glucose 92  221   162  144  177    BUN 14  78   67  65  57    Creatinine 1.04  4.55   3.65  3.25  3.00    Sodium 141  128  134  136  138  137    Potassium 3.3  3.9   3.1  2.7  2.9    Chloride 107  95   108  105  100    Calcium 7.6  8.5   6.7  6.7  7.1    Albumin 2.6  4.2     3.2    Total Bilirubin <0.2  0.2     0.2    Alkaline Phosphatase 102  174     136    AST (SGOT) 17  18     19    ALT (SGPT) 12  13     11    WBC 12.22  23.75    11.82     Hemoglobin 9.3  12.3    10.5     Hematocrit 28.7  39.2    33.3     Platelets 440  501    352     Hemoglobin A1C     8.60         Diabetic:  A1C Last 3 Results          12/9/2024    04:35 3/19/2025    04:37 4/1/2025    03:53   HGBA1C Last 3 Results   Hemoglobin A1C 9.10  9.20  8.60        Inpatient Medications     Medications: Scheduled Meds:aspirin, 81 mg, Oral, Daily  atorvastatin, 80 mg, Oral, Nightly  calcium carbonate, 1 tablet, Oral, Daily   And  cholecalciferol, 500 Units, Oral, Daily  cefTRIAXone, 1,000 mg, Intravenous, Q24H  Chlorhexidine Gluconate Cloth, 1 Application, Topical, Q24H  DULoxetine, 30 mg, Oral, Daily  heparin (porcine), 5,000 Units, Subcutaneous, Q12H  insulin lispro, 1-5 Units, Subcutaneous, 4x Daily AC & at Bedtime  lubiprostone, 8 mcg, Oral, BID  metoclopramide, 5 mg, Oral, 4x Daily AC & at Bedtime  mupirocin, 1 Application, Each Nare, BID  nortriptyline, 25 mg, Oral, Nightly  pantoprazole, 40 mg, Oral, Q AM  rOPINIRole, 1 mg, Oral, TID  senna-docusate sodium, 2 tablet, Oral, BID  sodium bicarbonate, 650 mg,  Oral, BID  sodium chloride, 10 mL, Intravenous, Q12H      Continuous Infusions:sodium chloride 0.9 % with KCl 20 mEq, 75 mL/hr, Last Rate: 75 mL/hr (04/01/25 4743)      PRN Meds:.  acetaminophen **OR** acetaminophen    albuterol    senna-docusate sodium **AND** polyethylene glycol **AND** bisacodyl    cetirizine    dextrose    dextrose    glucagon (human recombinant)    ipratropium-albuterol    nitroglycerin    ondansetron    promethazine    sodium chloride    sodium chloride    sodium chloride    I have reviewed the patient's current medications.   Outpatient Medications     Outpatient Medications:   Outpatient Medications Marked as Taking for the 3/31/25 encounter (Hospital Encounter)   Medication Sig Dispense Refill    albuterol sulfate  (90 Base) MCG/ACT inhaler Inhale 2 puffs 3 (Three) Times a Day As Needed for Wheezing.      aspirin 81 MG EC tablet Take 1 tablet by mouth Daily.      atorvastatin (LIPITOR) 80 MG tablet Take 1 tablet by mouth Every Night.      Calcium Carbonate-Vitamin D 600-5 MG-MCG tablet Take 1 tablet by mouth Daily.      cetirizine (zyrTEC) 10 MG tablet Take 1 tablet by mouth Daily As Needed for Allergies.      DULoxetine (CYMBALTA) 60 MG capsule Take 1 capsule by mouth Daily.      linaclotide (LINZESS) 290 MCG capsule capsule Take 1 capsule by mouth Every Morning Before Breakfast.      nortriptyline (PAMELOR) 25 MG capsule Take 1 capsule by mouth Every Night.      omeprazole (priLOSEC) 40 MG capsule Take 1 capsule by mouth Every Other Day.      promethazine (PHENERGAN) 25 MG tablet Take 1 tablet by mouth Every 6 (Six) Hours As Needed for Nausea or Vomiting.      rOPINIRole (REQUIP) 1 MG tablet Take 1 tablet by mouth 3 (Three) Times a Day.      sodium bicarbonate 650 MG tablet Take 1 tablet by mouth 2 (Two) Times a Day.      sucralfate (CARAFATE) 1 g tablet Take 1 tablet by mouth 4 (Four) Times a Day.      triamcinolone (KENALOG) 0.1 % cream Apply 1 Application topically to the  "appropriate area as directed Daily. APPLY A THIN LAYER TOPICALLY TO THE AFFECTED AREAS BEFORE APPLYING DEXCOM         Current Antibiotics     cefTRIAXone (ROCEPHIN) 1000 mg IVPB in 100 mL NS (MBP)    Exam     Vitals: Her  height is 154.9 cm (61\") and weight is 40.3 kg (88 lb 13.5 oz). Her oral temperature is 96.8 °F (36 °C). Her blood pressure is 85/55 (abnormal) and her pulse is 99. Her respiration is 15 and oxygen saturation is 100%.     Physical Exam  Vitals and nursing note reviewed.   Constitutional:       General: She is not in acute distress.     Appearance: She is ill-appearing.  Very malnourished and underweight but not cachectic  HENT:      Head: Normocephalic and atraumatic.      Nose: Nose normal.      Mouth/Throat:      Mouth: Mucous membranes are dry.   Eyes:      Extraocular Movements: Extraocular movements intact.      Pupils: Pupils are equal, round, and reactive to light.   Cardiovascular:      Rate and Rhythm: Normal rate and regular rhythm.   Pulmonary:      Effort: Pulmonary effort is normal.      Breath sounds: Normal breath sounds.   Abdominal:      Palpations: Abdomen is soft.      Tenderness: There is no abdominal tenderness.      Comments: Abdominal stimulator noted to the right abdominal region secondary to gastroparesis.   Neurological:      Mental Status: She is alert.     Results and Cultures Review     Result Review:  I have personally reviewed the results from the time of this admission to 4/1/2025 19:37 CDT and agree with these findings:  [x]  Laboratory list / accordion  []  Microbiology  [x]  Radiology  [x]  EKG/Telemetry   [x]  Cardiology/Vascular   []  Pathology  [x]  Old records  []  Other:      Culture Data:   No results found for: \"BLOODCX\", \"URINECX\", \"WOUNDCX\", \"MRSACX\", \"RESPCX\", \"STOOLCX\"    Assessment/Plan     Active Hospital Problems    Diagnosis  POA    **Dehydration [E86.0]  Yes    Other chronic pancreatitis [K86.1]  Yes    QT prolongation [R94.31]  No    Metabolic " acidosis [E87.20]  Yes    Gastroparesis [K31.84]  Yes    Intractable nausea [R11.0]  Yes    Hypomagnesemia [E83.42]  Yes    Acute kidney injury superimposed on stage 3b chronic kidney disease [N17.9, N18.32]  Yes    Hypokalemia [E87.6]  Yes    Diabetic polyneuropathy associated with type 2 diabetes mellitus [E11.42]  Yes    Severe malnutrition [E43]  Yes      Resolved Hospital Problems   No resolved problems to display.     This is a 54-year-old female patient with past medical history type 2 diabetes, degenerative disc disease, fibromyalgia, neuropathy, hypertension, daily tobacco user, stage III kidney disease, nonalcoholic pancreatitis with a recent admission for acute pancreatitis on March 18, 2025 along with acute on chronic kidney disease and gastroparesis status post gastric stimulator placement who presented to the ED last night for general malaise, poor appetite, and ongoing vomiting and diarrhea with multiple electrolyte derangements and severe metabolic acidosis.      Plan:  Continue fluid resuscitation with bicarb gtt.  Reduced to 75 cc/h.  Repeat ABG showed normalized pH and pCO2.  Will restart p.o. bicarb pills and stop the drip.  Repleting potassium.  Nephrology started NS fluids with 20 mEq of potassium  Magnesium continues to be low despite repletion.  Will repeat BMP, Mg +, and Phos at 10 PM.  QTc overnight was 477.  Measuring it on patient cardiac monitor continuous since patient is receiving QT prolonging meds.  QTc was 699 this afternoon.  Gave 1 mg additional of magnesium.  Will continue to monitor and repeat EKG as needed as well as magnesium.  And attempt to avoid using further QT prolonging medications  Started Reglan 5 mg before every meal and at bedtime and gave 1 dose of erythromycin 3 mg/kg over 45 minutes which seem to help patient's symptoms improved.  GI consulted for further recommendations as patient recently had gastric stimulator placed for her gastroparesis however she continues  to have further symptoms.  Will follow-up recommendations.  Patient noted to have history of diabetes with an insulin pump but no longer has the pump.  Her glucose has been under 200 here in the hospital without any insulin given.  HG A1c here today is 8.6.  I ordered an anemia panel to assess whether elevated A1c may be secondary to iron deficiency anemia.  PT/OT  Dietitian consulted for nutritional recommendations    VTE Prophylaxis: Heparin SQ      Total critical care time: Approximately 52 minutes    Due to a high probability of clinically significant, life threatening deterioration, the patient required my highest level of preparedness to intervene emergently and I personally spent this critical care time directly and personally managing the patient.     This critical care time included obtaining a history; examining the patient; pulse oximetry; ordering and review of studies; arranging urgent treatment with development of a management plan; evaluation of patient's response to treatment; frequent reassessment; and, discussions with other providers.    This critical care time was performed to assess and manage the high probability of imminent, life-threatening deterioration that could result in multi-organ failure. It was exclusive of separately billable procedures and treating other patients and teaching time.    Please see MDM section and the rest of the note for further information on patient assessment and treatment.    Part of this note may be an electronic transcription/translation of spoken language to printed text using the Dragon Dictation System.    Electronically signed by Judit Kendrick MD on 4/1/2025 at 19:37 CDT

## 2025-04-02 NOTE — THERAPY DISCHARGE NOTE
Patient Name: Tasha Perez  : 1970    MRN: [id number]                              Today's Date: 2025       Admit Date: 3/31/2025    Visit Dx:     ICD-10-CM ICD-9-CM   1. Acute kidney injury superimposed on chronic kidney disease  N17.9 584.9    N18.9 585.9   2. Hyponatremia  E87.1 276.1   3. Metabolic acidosis  E87.20 276.2     Patient Active Problem List   Diagnosis    Severe malnutrition    Dehydration    Type 2 diabetes mellitus, with long-term current use of insulin    Diabetic polyneuropathy associated with type 2 diabetes mellitus    Raynaud disease    Acute UTI (urinary tract infection)    Hypokalemia    Stage 3b chronic kidney disease    Smoker    Vitamin D deficiency    Acute kidney injury superimposed on stage 3b chronic kidney disease    Hypomagnesemia    PEG tube malfunction    Osteoporosis    Osteopenia    Hypophosphatemia    Diarrhea of presumed infectious origin    Rheumatoid arthritis    Fibromyalgia    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    Positive blood cultures with Mycobacterium fortuitum    SIRS (systemic inflammatory response syndrome)    Postural low back pain    Gastroparesis    Intractable nausea    DKA (diabetic ketoacidosis)    Anemia    Metabolic acidosis    Loss of weight    Hx of insulin dependent diabetes mellitus    Hyponatremia    Encounter for nasogastric (NG) tube placement    Fever    Anemia    Closed nondisplaced fracture of left patella    PICC line infection    Acute pancreatitis    Pancreatitis    Other chronic pancreatitis    QT prolongation     Past Medical History:   Diagnosis Date    Arthritis     Contusion     Degenerative disc disease, cervical     Diabetes mellitus     Elevated cholesterol     Fibromyalgia     Neuropathy     Osteoporosis     Pancreatitis     Raynaud disease     Renal insufficiency     Smoker 2022    Vitamin D deficiency 2022     Past Surgical History:   Procedure Laterality Date    APPENDECTOMY       CHOLECYSTECTOMY      COLONOSCOPY      ENDOSCOPY N/A 10/08/2019    Procedure: ESOPHAGOGASTRODUODENOSCOPY WITH ANESTHESIA;  Surgeon: Aleks Vaughn DO;  Location:  [facility];  Service: Gastroenterology    ENDOSCOPY N/A 11/12/2024    Procedure: ESOPHAGOGASTRODUODENOSCOPY WITH ANESTHESIA;  Surgeon: Tiffanie Saucedo MD;  Location: [facility];  Service: Gastroenterology;  Laterality: N/A;  pre op: Gastroparesis, Intractable nausea  post op: insertion of dobhoff   PCP: Brandon Murguia    ENDOSCOPY N/A 11/15/2024    Procedure: ESOPHAGOGASTRODUODENOSCOPY WITH ANESTHESIA WITH NG TUBE INSERTION;  Surgeon: Tiffanie Saucedo MD;  Location: [facility] ENDOSCOPY;  Service: Gastroenterology;  Laterality: N/A;  PRE OP: DOBHOFF  POST OP: DOBHOFF  PCP: TELLY PACK    ENDOSCOPY WITH GASTROSTOMY TUBE INSERTION N/A 6/15/2022    Procedure: ESOPHAGOGASTRODUODENOSCOPY WITH GASTROSTOMY TUBE INSERTION;  Surgeon: Jersey Lee MD;  Location: [facility] ENDOSCOPY;  Service: Gastroenterology;  Laterality: N/A;  pre peg placement  post peg placement  Dr. Murguia    ENTEROSCOPY SMALL BOWEL N/A 11/3/2022    Procedure: Esophagogastroduodenoscopy with Peg Removal;  Surgeon: Aleks Vaughn DO;  Location: [facility] ENDOSCOPY;  Service: Gastroenterology;  Laterality: N/A;  pre; peg removal  post; peg removal   KIRILL Murguia MD        HYSTERECTOMY        General Information       Row Name 04/02/25 1020          Physical Therapy Time and Intention    Document Type discharge evaluation/summary  Admitted for metabolic acidosis, dehydration, acute vomiting worsening kidney function.  -MELL     Mode of Treatment physical therapy  -MELL       Row Name 04/02/25 1020          General Information    Patient Profile Reviewed yes  -MELL     Prior Level of Function independent:;all household mobility;ADL's  -MELL     Existing Precautions/Restrictions no known precautions/restrictions  -MELL     Barriers to Rehab none identified  -MELL       Row Name 04/02/25  1020          Living Environment    Current Living Arrangements home  -MELL     People in Home child(sandy), adult;spouse  -MELL       Row Name 04/02/25 1020          Cognition    Orientation Status (Cognition) oriented x 4  -MELL               User Key  (r) = Recorded By, (t) = Taken By, (c) = Cosigned By      Initials Name Provider Type    Andrea Villalobos, PT DPT Physical Therapist                   Mobility       Row Name 04/02/25 1020          Bed Mobility    Bed Mobility supine-sit;sit-supine  -MELL     Supine-Sit [place] (Bed Mobility) supervision  -MELL     Sit-Supine [place] (Bed Mobility) supervision  -MELL     Assistive Device (Bed Mobility) head of bed elevated  -MELL       Row Name 04/02/25 1020          Sit-Stand Transfer    Sit-Stand [place] (Transfers) supervision  -MELL       [facility] Name 04/02/25 1020          Gait/Stairs (Locomotion)    [place] Level (Gait) supervision  -MELL     Distance in Feet (Gait) 200  -MELL               User Key  (r) = Recorded By, (t) = Taken By, (c) = Cosigned By      Initials Name Provider Type    Andrea Villalobos, PT DPT Physical Therapist                   Obj/Interventions       Row Name 04/02/25 1020          Range of Motion Comprehensive    General Range of Motion bilateral lower extremity ROM WFL  -MELL       [facility] Name 04/02/25 1020          Strength Comprehensive (MMT)    General Manual Muscle Testing (MMT) Assessment no strength deficits identified  -MELL       [facility] Name 04/02/25 1020          Balance    Balance Assessment sitting dynamic balance;standing dynamic balance  -MELL     Dynamic Sitting Balance supervision  -MELL     Position, Sitting Balance unsupported;sitting edge of bed  -MELL     Dynamic Standing Balance supervision  -MELL     Position/Device Used, Standing Balance unsupported  -MELL               User Key  (r) = Recorded By, (t) = Taken By, (c) = Cosigned By      Initials Name Provider Type    Andrea Villalobos PT DPT Physical Therapist                    Goals/Plan    No documentation.                  Clinical Impression       Row Name 04/02/25 1020          Pain    Pretreatment Pain Rating 0/10 - no pain  -MELL       Row Name 04/02/25 1020          Plan of Care Review    Plan of Care Reviewed With patient  -MELL     Outcome Evaluation PT brian complete. She is alert and oriented x 4. Mrs. Perez reports being independent in her mobility and ADL's prior to admit. Today she completes all bed, stance and gait training with supervision from PT. Ambulates 200 ft around the unit. She reports being at her baseline mobility and she had no further PT needs, questions or concerns. PT to sign off. Recommend d.c home w spouse.  -MELL       Row Name 04/02/25 1020          Therapy Assessment/Plan (PT)    Patient/Family Therapy Goals Statement (PT) go home soon  -MELL     Criteria for Skilled Interventions Met (PT) no;does not meet criteria for skilled intervention  -MELL     Therapy Frequency (PT) evaluation only  -MELL       Row Name 04/02/25 1020          Positioning and Restraints    Pre-Treatment Position in bed  -MELL     Post Treatment Position bed  -MELL     In Bed notified nsg;fowlers;call light within reach;encouraged to call for assist;patient within staff view  -MELL               User Key  (r) = Recorded By, (t) = Taken By, (c) = Cosigned By      Initials Name Provider Type    Andrea Villalobos, PT DPT Physical Therapist                   Outcome Measures       Row Name 04/02/25 1020 04/02/25 0800       How much help from another person do you currently need...    Turning from your back to your side while in flat bed without using bedrails? 4  -MELL 4  -DA    Moving from lying on back to sitting on the side of a flat bed without bedrails? 4  -MELL 4  -DA    Moving to and from a bed to a chair (including a wheelchair)? 4  -MELL 3  -DA    Standing up from a chair using your arms (e.g., wheelchair, bedside chair)? 4  -MELL 4  -DA    Climbing 3-5 steps with a railing? 4  -MELL 3  -DA    To walk  in hospital room? 4  -MELL 3  -DA    AM-PAC 6 Clicks Score (PT) 24  -MELL 21  -DA    Highest Level of Mobility Goal 8 --> Walked 250 feet or more  -MELL 6 --> Walk 10 steps or more  -DALE      Row Name 04/02/25 1025 04/02/25 1020       Functional Assessment    Outcome Measure Options AM-PAC 6 Clicks Daily Activity (OT)  -WILLIS AM-PAC 6 Clicks Basic Mobility (PT)  -MELL              User Key  (r) = Recorded By, (t) = Taken By, (c) = Cosigned By      Initials Name Provider Type    Andrea Villalobos, PT DPT Physical Therapist    Gloria Uribe, OTR/L, CSRS Occupational Therapist    Ruma Chaney, RN Registered Nurse                  Physical Therapy Education       Title: PT OT SLP Therapies (In Progress)       Topic: Physical Therapy (Resolved)       Point: Mobility training (Resolved)       Learning Progress Summary            Patient Acceptance, E, VU,DU by MELL at 4/2/2025 1020    Comment: benefits of activity, gait safety, 1 time PT eval                      Point: Home exercise program (Resolved)       Learner Progress:  Not documented in this visit.              Point: Body mechanics (Resolved)       Learner Progress:  Not documented in this visit.              Point: Precautions (Resolved)       Learner Progress:  Not documented in this visit.                              User Key       Initials Effective Dates Name Provider Type Discipline    MELL 02/03/23 -  Andrea Miranda, PT DPT Physical Therapist PT                  PT Recommendation and Plan     Outcome Evaluation: PT eval complete. She is alert and oriented x 4. Mrs. Perez reports being independent in her mobility and ADL's prior to admit. Today she completes all bed, stance and gait training with supervision from PT. Ambulates 200 ft around the unit. She reports being at her baseline mobility and she had no further PT needs, questions or concerns. PT to sign off. Recommend d.c home w spouse.     Time Calculation:         PT Charges       Row Name  04/02/25 1151             Time Calculation    Start Time 1020  -MELL      Stop Time 1050  + 8 min w chart review. 38 total minutes  -MELL      Time Calculation (min) 30 min  -MELL      PT Received On 04/02/25  -MELL         Untimed Charges    PT Eval/Re-eval Minutes 38  -MELL         Total Minutes    Untimed Charges Total Minutes 38  -MLEL       Total Minutes 38  -MELL                User Key  (r) = Recorded By, (t) = Taken By, (c) = Cosigned By      Initials Name Provider Type    Andrea Villalobos, PT DPT Physical Therapist                  Therapy Charges for Today       Code Description Service Date Service Provider Modifiers Qty    49902425486 HC PT EVAL LOW COMPLEXITY 3 4/2/2025 Andrea Miranda, PT DPT GP 1            PT G-Codes  Outcome Measure Options: AM-PAC 6 Clicks Daily Activity (OT)  AM-PAC 6 Clicks Score (PT): 24  AM-PAC 6 Clicks Score (OT): 24    PT Discharge Summary  Anticipated Discharge Disposition (PT): home with assist    Andrea Miranda, PT DPT  4/2/2025

## 2025-04-02 NOTE — PROGRESS NOTES
[facility] Gastroenterology  Inpatient Progress Note  Today's date:  04/02/25    Tasha Perez  1970       Reason for Follow Up:   Gastroparesis    Subjective:   Sitting up in chair. Tolerating diet. No vomiting. No abdominal pain. Has had some diarrhea, but some improvement.       Current Facility-Administered Medications:     acetaminophen (TYLENOL) tablet 650 mg, 650 mg, Oral, Q4H PRN, 650 mg at 04/02/25 0426 **OR** acetaminophen (TYLENOL) suppository 650 mg, 650 mg, Rectal, Q4H PRN, Rina Taylor APRN    albuterol (PROVENTIL) nebulizer solution 0.083% 2.5 mg/3mL, 2.5 mg, Nebulization, Q6H PRN, Judit Kendrick MD    aspirin EC tablet 81 mg, 81 mg, Oral, Daily, Judit Kendrick MD, 81 mg at 04/02/25 0813    atorvastatin (LIPITOR) tablet 80 mg, 80 mg, Oral, Nightly, Rina Taylor APRN, 80 mg at 04/01/25 2010    sennosides-docusate (PERICOLACE) 8.6-50 MG per tablet 2 tablet, 2 tablet, Oral, BID, 2 tablet at 04/01/25 0939 **AND** polyethylene glycol (MIRALAX) packet 17 g, 17 g, Oral, Daily PRN **AND** bisacodyl (DULCOLAX) suppository 10 mg, 10 mg, Rectal, Daily PRN, Rina Taylor APRN    calcium carbonate (TUMS) chewable tablet 500 mg (200 mg elemental), 1 tablet, Oral, Daily, 1 tablet at 04/02/25 0816 **AND** cholecalciferol (VITAMIN D3) tablet 500 Units, 500 Units, Oral, Daily, Judit Kendrick MD, 500 Units at 04/02/25 0813    cefTRIAXone (ROCEPHIN) 1,000 mg in sodium chloride 0.9 % 100 mL MBP, 1,000 mg, Intravenous, Q24H, Rina Taylor APRN, Last Rate: 200 mL/hr at 04/01/25 2330, 1,000 mg at 04/01/25 2330    cetirizine (zyrTEC) tablet 10 mg, 10 mg, Oral, Daily PRN, Judit Kendrick MD    Chlorhexidine Gluconate Cloth 2 % pads 1 Application, 1 Application, Topical, Q24H, Rina Taylor APRN, 1 Application at 04/02/25 0417    dextrose (D50W) (25 g/50 mL) IV injection 25 g, 25 g, Intravenous, Q15 Min PRN, Rina Taylor APRN    dextrose (GLUTOSE) oral gel 15 g, 15  g, Oral, Q15 Min PRN, Rina Taylor APRN    DULoxetine (CYMBALTA) DR capsule 30 mg, 30 mg, Oral, Daily, Judit Kendrick MD, 30 mg at 04/02/25 0813    glucagon (GLUCAGEN) injection 1 mg, 1 mg, Intramuscular, Q15 Min PRN, Rina Taylor APRN    heparin (porcine) 5000 UNIT/ML injection 5,000 Units, 5,000 Units, Subcutaneous, Q12H, Rina Taylor APRN, 5,000 Units at 04/02/25 0813    Insulin Lispro (humaLOG) injection 1-5 Units, 1-5 Units, Subcutaneous, 4x Daily AC & at Bedtime, Rina Taylor APRN, 2 Units at 04/01/25 2059    ipratropium-albuterol (DUO-NEB) nebulizer solution 3 mL, 3 mL, Nebulization, Q6H PRN, Rina Taylor APRN    lubiprostone (AMITIZA) capsule 8 mcg, 8 mcg, Oral, BID, Judit Kendrick MD, 8 mcg at 04/02/25 0813    metoclopramide (REGLAN) solution 5 mg, 5 mg, Oral, 4x Daily AC & at Bedtime, Judit Kendrick MD, 5 mg at 04/02/25 0638    mupirocin (BACTROBAN) 2 % nasal ointment 1 Application, 1 Application, Each Nare, BID, Rina Taylor APRN, 1 Application at 04/02/25 0813    nitroglycerin (NITROSTAT) SL tablet 0.4 mg, 0.4 mg, Sublingual, Q5 Min PRN, Rina Taylor APRN    nortriptyline (PAMELOR) capsule 25 mg, 25 mg, Oral, Nightly, Judit Kendrick MD, 25 mg at 04/01/25 2005    ondansetron (ZOFRAN) injection 4 mg, 4 mg, Intravenous, Q6H PRN, Rina Taylor, RAKESH    pantoprazole (PROTONIX) EC tablet 40 mg, 40 mg, Oral, Q AM, Rina Taylor APRN, 40 mg at 04/02/25 0638    promethazine (PHENERGAN) tablet 25 mg, 25 mg, Oral, Q6H PRN, Judit Kendrick MD    rOPINIRole (REQUIP) tablet 1 mg, 1 mg, Oral, TID, Judit Kendrick MD, 1 mg at 04/02/25 0816    sodium bicarbonate tablet 650 mg, 650 mg, Oral, TID, Judit Kendrick MD    sodium chloride 0.9 % flush 10 mL, 10 mL, Intravenous, PRN, Judit Kendrick MD    sodium chloride 0.9 % flush 10 mL, 10 mL, Intravenous, Q12H, Rina Taylor APRN, 10 mL at 04/02/25 0817    sodium chloride 0.9 % flush 10 mL, 10  mL, Intravenous, PRN, Rina Taylor APRN    sodium chloride 0.9 % infusion 40 mL, 40 mL, Intravenous, PRN, Rina Taylor APRN    sodium chloride 0.9 % with KCl 20 mEq/L infusion, 75 mL/hr, Intravenous, Continuous, Feliberto Barrow MD, Last Rate: 75 mL/hr at 04/02/25 0344, 75 mL/hr at 04/02/25 0344    tamsulosin (FLOMAX) 24 hr capsule 0.4 mg, 0.4 mg, Oral, Daily, Alexis Donaldson APRN, 0.4 mg at 04/01/25 2059      Vital Signs:  Temp:  [96.8 °F (36 °C)-98.7 °F (37.1 °C)] 98 °F (36.7 °C)  Heart Rate:  [] 108  Resp:  [13-19] 19  BP: ()/(42-78) 91/59    Physical Exam:  General: no acute distress, alert and oriented  HEENT: perrl, no sclera icterus  CV: rrr, no murmur  Resp: lungs clear to auscultation, no wheeze or rhonchi  Abd: soft, nontender, nondistended, no rebound our guarding  Skin: no rash, no jaundice     Results Review:   I have reviewed all of the patient's current test results    Results from last 7 days   Lab Units 04/02/25  0734 04/01/25  0353 03/31/25  1654   WBC 10*3/mm3 10.48 11.82* 23.75*   HEMOGLOBIN g/dL 8.8* 10.5* 12.3   HEMATOCRIT % 27.1* 33.3* 39.2   PLATELETS 10*3/mm3 334 352 501*       Results from last 7 days   Lab Units 04/02/25  0338 04/01/25  2141 04/01/25  1226 03/31/25  1957 03/31/25  1654   SODIUM mmol/L 140 139 137   < > 128*   SODIUM, ARTERIAL   --   --   --    < >  --    POTASSIUM mmol/L 3.3* 3.0* 2.9*   < > 3.9   CHLORIDE mmol/L 108* 105 100   < > 95*   CO2 mmol/L 20.0* 21.0* 21.0*   < > 10.0*   BUN mg/dL 44* 52* 57*   < > 78*   CREATININE mg/dL 2.54* 2.77* 3.00*   < > 4.55*   CALCIUM mg/dL 6.9* 6.7* 7.1*   < > 8.5*   BILIRUBIN mg/dL <0.2  --  0.2  --  0.2   ALK PHOS U/L 99  --  136*  --  174*   ALT (SGPT) U/L 7  --  11  --  13   AST (SGOT) U/L 11  --  19  --  18   GLUCOSE mg/dL 153* 154* 177*   < > 221*    < > = values in this interval not displayed.       Impression:  Gastroparesis. Recent permanent gastric stimulator placed at [facility].    Acute on chronic kidney disease with acute kidney injury secondary to volume depletion.  Metabolic acidosis.  Electrolyte imbalance.    Plan:  Continue with diet as tolerated. Continue with Reglan with meals, which she is on at home. Replace electrolytes as needed. Agree that she will benefit from outpatient IV hydration. Will need follow up at motility clinic at [facility] as scheduled. Nothing further to add from GI for her gastroparesis at this time. Please reconsult if needed.    Ashok Mathew MD  04/02/25  10:06 CDT

## 2025-04-02 NOTE — PLAN OF CARE
Goal Outcome Evaluation:  Plan of Care Reviewed With: patient           Outcome Evaluation: PT brian complete. She is alert and oriented x 4. Mrs. Perez reports being independent in her mobility and ADL's prior to admit. Today she completes all bed, stance and gait training with supervision from PT. Ambulates 200 ft around the unit. She reports being at her baseline mobility and she had no further PT needs, questions or concerns. PT to sign off. Recommend d.c home w spouse.    Anticipated Discharge Disposition (PT): home with assist

## 2025-04-02 NOTE — PLAN OF CARE
Goal Outcome Evaluation:  Plan of Care Reviewed With: patient        Progress: no change        Pt just got to the floor from the unit. A/o x4. Room air. No c/o pain at this time. Up adlib. Tele. PPP. TOURE. VSS. Call light within reach. Safety maintained

## 2025-04-03 LAB
ALBUMIN SERPL-MCNC: 3.1 G/DL (ref 3.5–5.2)
ALBUMIN/GLOB SERPL: 1 G/DL
ALP SERPL-CCNC: 122 U/L (ref 39–117)
ALT SERPL W P-5'-P-CCNC: 7 U/L (ref 1–33)
ANION GAP SERPL CALCULATED.3IONS-SCNC: 10 MMOL/L (ref 5–15)
AST SERPL-CCNC: 16 U/L (ref 1–32)
BASOPHILS # BLD AUTO: 0.02 10*3/MM3 (ref 0–0.2)
BASOPHILS NFR BLD AUTO: 0.2 % (ref 0–1.5)
BILIRUB SERPL-MCNC: <0.2 MG/DL (ref 0–1.2)
BUN SERPL-MCNC: 32 MG/DL (ref 6–20)
BUN/CREAT SERPL: 17.6 (ref 7–25)
CALCIUM SPEC-SCNC: 8.3 MG/DL (ref 8.6–10.5)
CHLORIDE SERPL-SCNC: 108 MMOL/L (ref 98–107)
CO2 SERPL-SCNC: 23 MMOL/L (ref 22–29)
CREAT SERPL-MCNC: 1.82 MG/DL (ref 0.57–1)
DEPRECATED RDW RBC AUTO: 51.3 FL (ref 37–54)
EGFRCR SERPLBLD CKD-EPI 2021: 32.9 ML/MIN/1.73
EOSINOPHIL # BLD AUTO: 0.25 10*3/MM3 (ref 0–0.4)
EOSINOPHIL NFR BLD AUTO: 2.2 % (ref 0.3–6.2)
ERYTHROCYTE [DISTWIDTH] IN BLOOD BY AUTOMATED COUNT: 15.2 % (ref 12.3–15.4)
GLOBULIN UR ELPH-MCNC: 3.2 GM/DL
GLUCOSE BLDC GLUCOMTR-MCNC: 134 MG/DL (ref 70–130)
GLUCOSE BLDC GLUCOMTR-MCNC: 185 MG/DL (ref 70–130)
GLUCOSE BLDC GLUCOMTR-MCNC: 216 MG/DL (ref 70–130)
GLUCOSE BLDC GLUCOMTR-MCNC: 218 MG/DL (ref 70–130)
GLUCOSE SERPL-MCNC: 229 MG/DL (ref 65–99)
HCT VFR BLD AUTO: 29.2 % (ref 34–46.6)
HGB BLD-MCNC: 9.1 G/DL (ref 12–15.9)
IMM GRANULOCYTES # BLD AUTO: 0.08 10*3/MM3 (ref 0–0.05)
IMM GRANULOCYTES NFR BLD AUTO: 0.7 % (ref 0–0.5)
LYMPHOCYTES # BLD AUTO: 2.43 10*3/MM3 (ref 0.7–3.1)
LYMPHOCYTES NFR BLD AUTO: 21.8 % (ref 19.6–45.3)
MAGNESIUM SERPL-MCNC: 1.6 MG/DL (ref 1.6–2.6)
MCH RBC QN AUTO: 28.6 PG (ref 26.6–33)
MCHC RBC AUTO-ENTMCNC: 31.2 G/DL (ref 31.5–35.7)
MCV RBC AUTO: 91.8 FL (ref 79–97)
MONOCYTES # BLD AUTO: 0.64 10*3/MM3 (ref 0.1–0.9)
MONOCYTES NFR BLD AUTO: 5.7 % (ref 5–12)
NEUTROPHILS NFR BLD AUTO: 69.4 % (ref 42.7–76)
NEUTROPHILS NFR BLD AUTO: 7.73 10*3/MM3 (ref 1.7–7)
NRBC BLD AUTO-RTO: 0 /100 WBC (ref 0–0.2)
PHOSPHATE SERPL-MCNC: 2.6 MG/DL (ref 2.5–4.5)
PLATELET # BLD AUTO: 352 10*3/MM3 (ref 140–450)
PMV BLD AUTO: 11 FL (ref 6–12)
POTASSIUM SERPL-SCNC: 4 MMOL/L (ref 3.5–5.2)
PROCALCITONIN SERPL-MCNC: 0.1 NG/ML (ref 0–0.25)
PROT SERPL-MCNC: 6.3 G/DL (ref 6–8.5)
RBC # BLD AUTO: 3.18 10*6/MM3 (ref 3.77–5.28)
SODIUM SERPL-SCNC: 141 MMOL/L (ref 136–145)
WBC NRBC COR # BLD AUTO: 11.15 10*3/MM3 (ref 3.4–10.8)

## 2025-04-03 PROCEDURE — 80053 COMPREHEN METABOLIC PANEL: CPT | Performed by: INTERNAL MEDICINE

## 2025-04-03 PROCEDURE — 25010000002 CEFTRIAXONE PER 250 MG: Performed by: NURSE PRACTITIONER

## 2025-04-03 PROCEDURE — 83735 ASSAY OF MAGNESIUM: CPT | Performed by: INTERNAL MEDICINE

## 2025-04-03 PROCEDURE — 25010000002 HEPARIN (PORCINE) PER 1000 UNITS: Performed by: NURSE PRACTITIONER

## 2025-04-03 PROCEDURE — 85025 COMPLETE CBC W/AUTO DIFF WBC: CPT | Performed by: INTERNAL MEDICINE

## 2025-04-03 PROCEDURE — 84145 PROCALCITONIN (PCT): CPT | Performed by: INTERNAL MEDICINE

## 2025-04-03 PROCEDURE — 25010000002 SODIUM CHLORIDE 0.9 % WITH KCL 20 MEQ 20-0.9 MEQ/L-% SOLUTION: Performed by: INTERNAL MEDICINE

## 2025-04-03 PROCEDURE — 63710000001 INSULIN LISPRO (HUMAN) PER 5 UNITS: Performed by: NURSE PRACTITIONER

## 2025-04-03 PROCEDURE — 84100 ASSAY OF PHOSPHORUS: CPT | Performed by: INTERNAL MEDICINE

## 2025-04-03 PROCEDURE — 82948 REAGENT STRIP/BLOOD GLUCOSE: CPT

## 2025-04-03 RX ADMIN — Medication 1 APPLICATION: at 09:52

## 2025-04-03 RX ADMIN — Medication 500 UNITS: at 09:53

## 2025-04-03 RX ADMIN — SODIUM BICARBONATE 650 MG: 650 TABLET ORAL at 20:16

## 2025-04-03 RX ADMIN — INSULIN LISPRO 3 UNITS: 100 INJECTION, SOLUTION INTRAVENOUS; SUBCUTANEOUS at 20:26

## 2025-04-03 RX ADMIN — HEPARIN SODIUM 5000 UNITS: 5000 INJECTION, SOLUTION INTRAVENOUS; SUBCUTANEOUS at 09:53

## 2025-04-03 RX ADMIN — ROPINIROLE HYDROCHLORIDE 1 MG: 1 TABLET, FILM COATED ORAL at 20:16

## 2025-04-03 RX ADMIN — ATORVASTATIN CALCIUM 80 MG: 40 TABLET, FILM COATED ORAL at 20:16

## 2025-04-03 RX ADMIN — NORTRIPTYLINE HYDROCHLORIDE 25 MG: 25 CAPSULE ORAL at 20:16

## 2025-04-03 RX ADMIN — POTASSIUM CHLORIDE AND SODIUM CHLORIDE 75 ML/HR: 900; 150 INJECTION, SOLUTION INTRAVENOUS at 17:05

## 2025-04-03 RX ADMIN — SODIUM BICARBONATE 650 MG: 650 TABLET ORAL at 17:02

## 2025-04-03 RX ADMIN — PANTOPRAZOLE SODIUM 40 MG: 40 TABLET, DELAYED RELEASE ORAL at 06:02

## 2025-04-03 RX ADMIN — Medication 1 APPLICATION: at 20:16

## 2025-04-03 RX ADMIN — TAMSULOSIN HYDROCHLORIDE 0.4 MG: 0.4 CAPSULE ORAL at 09:54

## 2025-04-03 RX ADMIN — SODIUM CHLORIDE 1000 MG: 900 INJECTION INTRAVENOUS at 00:21

## 2025-04-03 RX ADMIN — METOCLOPRAMIDE HYDROCHLORIDE 5 MG: 5 SOLUTION ORAL at 17:02

## 2025-04-03 RX ADMIN — ASPIRIN 81 MG: 81 TABLET, COATED ORAL at 09:53

## 2025-04-03 RX ADMIN — INSULIN LISPRO 3 UNITS: 100 INJECTION, SOLUTION INTRAVENOUS; SUBCUTANEOUS at 12:09

## 2025-04-03 RX ADMIN — SODIUM BICARBONATE 650 MG: 650 TABLET ORAL at 09:54

## 2025-04-03 RX ADMIN — DULOXETINE HYDROCHLORIDE 30 MG: 30 CAPSULE, DELAYED RELEASE ORAL at 09:53

## 2025-04-03 RX ADMIN — METOCLOPRAMIDE HYDROCHLORIDE 5 MG: 5 SOLUTION ORAL at 12:09

## 2025-04-03 RX ADMIN — INSULIN LISPRO 2 UNITS: 100 INJECTION, SOLUTION INTRAVENOUS; SUBCUTANEOUS at 17:02

## 2025-04-03 RX ADMIN — Medication 10 ML: at 09:54

## 2025-04-03 RX ADMIN — SENNOSIDES, DOCUSATE SODIUM 2 TABLET: 50; 8.6 TABLET, FILM COATED ORAL at 20:16

## 2025-04-03 RX ADMIN — ROPINIROLE HYDROCHLORIDE 1 MG: 1 TABLET, FILM COATED ORAL at 09:53

## 2025-04-03 RX ADMIN — METOCLOPRAMIDE HYDROCHLORIDE 5 MG: 5 SOLUTION ORAL at 09:52

## 2025-04-03 RX ADMIN — LUBIPROSTONE 8 MCG: 8 CAPSULE, GELATIN COATED ORAL at 09:53

## 2025-04-03 RX ADMIN — ANTACID TABLETS 1 TABLET: 500 TABLET, CHEWABLE ORAL at 09:53

## 2025-04-03 RX ADMIN — METOCLOPRAMIDE HYDROCHLORIDE 5 MG: 5 SOLUTION ORAL at 20:15

## 2025-04-03 RX ADMIN — LUBIPROSTONE 8 MCG: 8 CAPSULE, GELATIN COATED ORAL at 20:16

## 2025-04-03 RX ADMIN — HEPARIN SODIUM 5000 UNITS: 5000 INJECTION, SOLUTION INTRAVENOUS; SUBCUTANEOUS at 20:16

## 2025-04-03 RX ADMIN — ROPINIROLE HYDROCHLORIDE 1 MG: 1 TABLET, FILM COATED ORAL at 17:02

## 2025-04-03 RX ADMIN — SENNOSIDES, DOCUSATE SODIUM 2 TABLET: 50; 8.6 TABLET, FILM COATED ORAL at 09:53

## 2025-04-03 NOTE — PLAN OF CARE
Goal Outcome Evaluation:  Plan of Care Reviewed With: patient        Progress: no change        Pt a/o x4. Room air. Up adlib. No c/o pain. IV fluids per orders. Shower this AM. Voiding. Last BM yesterday. PPP. VSS. TOURE. Tele in place. Call light within reach. Safety maintained

## 2025-04-03 NOTE — PROGRESS NOTES
"    [facility] Medicine Services  INPATIENT PROGRESS NOTE    Patient Name: Tasha Perez  Date of Admission: 3/31/2025  Today's Date: 04/03/25  Length of Stay: 3  Primary Care Physician: KIRILL Murguia MD    Subjective   Chief Complaint: \"A lot better\"  HPI   Patient moved out of the ICU yesterday.  She reports that she is doing a lot better.  She is currently able to tolerate her diet.  She states that her symptoms of diarrhea have stopped.  She is agreeable to PICC line placement and resuming plans for outpatient IV fluids.  Unfortunately, she states that her GI symptoms recurred even after gastric stimulator placement earlier this year.    Review of Systems   All pertinent negatives and positives are as above. All other systems have been reviewed and are negative unless otherwise stated.     Objective    Temp:  [97 °F (36.1 °C)-99.8 °F (37.7 °C)] 98.1 °F (36.7 °C)  Heart Rate:  [103-120] 112  Resp:  [16-18] 18  BP: (120-153)/(74-87) 128/82  Physical Exam  Vitals reviewed.   Constitutional:       General: She is not in acute distress.     Appearance: She is not toxic-appearing.   HENT:      Head: Normocephalic.      Mouth/Throat:      Mouth: Mucous membranes are moist.   Pulmonary:      Effort: Pulmonary effort is normal. No respiratory distress.   Skin:     General: Skin is warm.   Neurological:      Mental Status: She is alert. Mental status is at baseline.   Psychiatric:         Mood and Affect: Mood normal.         Results Review:  I have reviewed the labs, radiology results, and diagnostic studies.    Laboratory Data:   Results from last 7 days   Lab Units 04/03/25  0912 04/02/25  0734 04/01/25  0353   WBC 10*3/mm3 11.15* 10.48 11.82*   HEMOGLOBIN g/dL 9.1* 8.8* 10.5*   HEMATOCRIT % 29.2* 27.1* 33.3*   PLATELETS 10*3/mm3 352 334 352        Results from last 7 days   Lab Units 04/03/25  0912 04/02/25  1214 04/02/25  0338 04/01/25  2141 04/01/25  1226   SODIUM mmol/L 141 138 " 140   < > 137   POTASSIUM mmol/L 4.0 4.0 3.3*   < > 2.9*   CHLORIDE mmol/L 108* 108* 108*   < > 100   CO2 mmol/L 23.0 17.0* 20.0*   < > 21.0*   BUN mg/dL 32* 38* 44*   < > 57*   CREATININE mg/dL 1.82* 2.18* 2.54*   < > 3.00*   CALCIUM mg/dL 8.3* 7.4* 6.9*   < > 7.1*   BILIRUBIN mg/dL <0.2  --  <0.2  --  0.2   ALK PHOS U/L 122*  --  99  --  136*   ALT (SGPT) U/L 7  --  7  --  11   AST (SGOT) U/L 16  --  11  --  19   GLUCOSE mg/dL 229* 163* 153*   < > 177*    < > = values in this interval not displayed.       Culture Data:   Blood Culture   Date Value Ref Range Status   03/31/2025 No growth at 2 days  Preliminary   03/31/2025 No growth at 2 days  Preliminary       Radiology Data:   Imaging Results (Last 24 Hours)       ** No results found for the last 24 hours. **            I have reviewed the patient's current medications.     Assessment/Plan   Assessment  Active Hospital Problems    Diagnosis     **Dehydration     Other chronic pancreatitis     QT prolongation     Metabolic acidosis     Gastroparesis     Intractable nausea     Hypomagnesemia     Acute kidney injury superimposed on stage 3b chronic kidney disease     Hypokalemia     Diabetic polyneuropathy associated with type 2 diabetes mellitus     Severe malnutrition        Treatment Plan  Transferred out of ICU on 4/2  GI and Nephrology recommendations reviewed  Continue IVFs with K supplement  Continue scheduled Reglan with meals  Needs follow-up with GI specialist in [place], [place] (who placed gastric stimulator)  Previously patient required an insulin pump - now no longer using.  A1c on 4/1 was 8.6.  SSI coverage and monitor BS trend  CBC and BMP in AM  Mobilize/ambulate  Patient has been on Abxs with Rocephin for 3 days.  Intensivist documentation reviewed and this was started empirically in setting of leukocytosis on admission.  WBC 11.15 today.  Add procalcitonin to labs already sent.  Blood cultures negative.  GI PCR and C. Diff studies negative.  CXR  with no acute process.  For now, plan to hold and monitor closely off of antibiotics.  Plan for PICC line (?tomorrow) and arrangement for IVFs in the outpatient setting; will communicate with SW today to make sure those plans are in process.  Dispo: anticipate home potentially in 1-2 days; patient reports she has outpatient follow-up with GI in [place] ~ May 1, 2025.      Medical Decision Making  Number and Complexity of problems: Moderate complexity      Conditions and Status        Condition is improving.     [facility] Data  External documents reviewed: none  Cardiac tracing (EKG, telemetry) interpretation: no new EKGs  Radiology interpretation: no new radiology studies  Labs reviewed: as above  Any tests that were considered but not ordered: none     Decision rules/scores evaluated (example [id number], [place], etc): none     Discussed with: patient     Care Planning  Shared decision making: Discussed with patient with agreement to proceed with treatment plan as outlined  Code status and discussions: Full code    Disposition  Social Determinants of Health that impact treatment or disposition: None apparent at this time  I expect the patient to be discharged to home ~1-2 days with plans for outpatient IVFs via PICC line as coordinated by Nephrology in the past        Electronically signed by Otto Del Rosario MD, 04/03/25, 13:57 CDT.

## 2025-04-03 NOTE — PROGRESS NOTES
Nephrology ([facility]) Progress Note      Patient:  Tasha Perez  YOB: 1970  Date of Service: 4/3/2025  MRN: [id number]   Acct: [id number]   Primary Care Physician: KIRILL Murguia MD  Advance Directive:   Code Status and Medical Interventions: CPR (Attempt to Resuscitate); Full Support   Ordered at: 03/31/25 2301     Code Status (Patient has no pulse and is not breathing):    CPR (Attempt to Resuscitate)     Medical Interventions (Patient has pulse or is breathing):    Full Support     Level Of Support Discussed With:    Patient     Admit Date: 3/31/2025       Hospital Day: 3  Referring Provider: No Known Provider      Patient personally seen and examined.  Complete chart including Consults, Notes, Operative Reports, Labs, Cardiology, and Radiology studies reviewed as able.        Subjective:  Tasha Perez is a 54 y.o. female for whom we were consulted for evaluation and treatment of acute kidney injury. Baseline chronic kidney disease stage 3b. Follows with Dr Barrow in our office. History of many previous hospitalizations for SUGAR due to volume depletion. History of recurrent pancreatitis, gastroparesis, type 2 diabetes, osteoarthritis. Has had feeding tubes and PICC lines many times in the past but these never manage to stay in long for a variety of reasons. Most recent discharge from hospital was on 3/21. Patient had a PICC line placed during that admission in anticipation of ongoing need for outpatient IV fluids. However, on the day of discharge, there was apparently some sort of delay in getting the outpatient fluids ordered and patient was unwilling to wait--so she requested the PICC be removed so she could go ahead and go home. She returned to ER on 3/31 with fatigue, malaise. Labs showed creatinine up to 4.55, CO2 10.0, sodium 128, pH 7.068. Admitted to CCU and IV fluid resuscitation started.  Creatinine has been improving, electrolytes being replaced as  indicated.  On 4/02 had urinary retention requiring in/out cath. Moved to medical floor    Today is awake and alert. No new complaint. No overnight issues.    Allergies:  Bee venom, Hydrocodone, Hydroxychloroquine, Ibuprofen, Pineapple, Pineapple extract, Wasp venom, Wasp venom protein, Hydrocodone-acetaminophen, Penicillins, Sitagliptin, Metformin, Chocolate, and Poison ivy extract    Home Meds:  Medications Prior to Admission   Medication Sig Dispense Refill Last Dose/Taking    albuterol sulfate  (90 Base) MCG/ACT inhaler Inhale 2 puffs 3 (Three) Times a Day As Needed for Wheezing.   Past Week    aspirin 81 MG EC tablet Take 1 tablet by mouth Daily.   Past Week    atorvastatin (LIPITOR) 80 MG tablet Take 1 tablet by mouth Every Night.   Past Week    Calcium Carbonate-Vitamin D 600-5 MG-MCG tablet Take 1 tablet by mouth Daily.   Past Week    cetirizine (zyrTEC) 10 MG tablet Take 1 tablet by mouth Daily As Needed for Allergies.   Past Week    DULoxetine (CYMBALTA) 60 MG capsule Take 1 capsule by mouth Daily.   Past Week    linaclotide (LINZESS) 290 MCG capsule capsule Take 1 capsule by mouth Every Morning Before Breakfast.   Past Week    nortriptyline (PAMELOR) 25 MG capsule Take 1 capsule by mouth Every Night.   Past Week    omeprazole (priLOSEC) 40 MG capsule Take 1 capsule by mouth Every Other Day.   Past Week    promethazine (PHENERGAN) 25 MG tablet Take 1 tablet by mouth Every 6 (Six) Hours As Needed for Nausea or Vomiting.   Past Month    rOPINIRole (REQUIP) 1 MG tablet Take 1 tablet by mouth 3 (Three) Times a Day.   Past Week    sodium bicarbonate 650 MG tablet Take 1 tablet by mouth 2 (Two) Times a Day.   Past Week    sucralfate (CARAFATE) 1 g tablet Take 1 tablet by mouth 4 (Four) Times a Day.   Past Week    triamcinolone (KENALOG) 0.1 % cream Apply 1 Application topically to the appropriate area as directed Daily. APPLY A THIN LAYER TOPICALLY TO THE AFFECTED AREAS BEFORE APPLYING DEXCOM   Past Week        Medicines:  Current Facility-Administered Medications   Medication Dose Route Frequency Provider Last Rate Last Admin    acetaminophen (TYLENOL) tablet 650 mg  650 mg Oral Q4H PRN Rina Taylor APRN   650 mg at 04/02/25 0426    Or    acetaminophen (TYLENOL) suppository 650 mg  650 mg Rectal Q4H PRN Rina Taylor APRN        albuterol (PROVENTIL) nebulizer solution 0.083% 2.5 mg/3mL  2.5 mg Nebulization Q6H PRN Judit Kendrick MD        aspirin EC tablet 81 mg  81 mg Oral Daily Judit Kendrick MD   81 mg at 04/02/25 0813    atorvastatin (LIPITOR) tablet 80 mg  80 mg Oral Nightly Rina Taylor APRN   80 mg at 04/02/25 2050    sennosides-docusate (PERICOLACE) 8.6-50 MG per tablet 2 tablet  2 tablet Oral BID Rina Taylor APRN   2 tablet at 04/02/25 2049    And    polyethylene glycol (MIRALAX) packet 17 g  17 g Oral Daily PRN Rina Taylor APRN        And    bisacodyl (DULCOLAX) suppository 10 mg  10 mg Rectal Daily PRN Rina Taylor APRN        calcium carbonate (TUMS) chewable tablet 500 mg (200 mg elemental)  1 tablet Oral Daily Judit Kendrick MD   1 tablet at 04/02/25 0816    And    cholecalciferol (VITAMIN D3) tablet 500 Units  500 Units Oral Daily Judit Kendrick MD   500 Units at 04/02/25 0813    cefTRIAXone (ROCEPHIN) 1,000 mg in sodium chloride 0.9 % 100 mL MBP  1,000 mg Intravenous Q24H Rina Taylor APRN 200 mL/hr at 04/03/25 0021 1,000 mg at 04/03/25 0021    cetirizine (zyrTEC) tablet 10 mg  10 mg Oral Daily PRN Judit Kendrick MD        dextrose (D50W) (25 g/50 mL) IV injection 25 g  25 g Intravenous Q15 Min PRN Rina Taylor APRN        dextrose (GLUTOSE) oral gel 15 g  15 g Oral Q15 Min PRN Rina Taylor APRN        DULoxetine (CYMBALTA) DR capsule 30 mg  30 mg Oral Daily Judit Kendrick MD   30 mg at 04/02/25 0813    glucagon (GLUCAGEN) injection 1 mg  1 mg Intramuscular Q15 Min PRN Rina Taylor, APRN        heparin (porcine) 5000  UNIT/ML injection 5,000 Units  5,000 Units Subcutaneous Q12H Rina Taylor APRN   5,000 Units at 04/02/25 2049    Insulin Lispro (humaLOG) injection 1-5 Units  1-5 Units Subcutaneous 4x Daily AC & at Bedtime Rina Taylor APRN   2 Units at 04/02/25 2058    ipratropium-albuterol (DUO-NEB) nebulizer solution 3 mL  3 mL Nebulization Q6H PRN Rina Taylor APRN        lubiprostone (AMITIZA) capsule 8 mcg  8 mcg Oral BID Judit Kendrick MD   8 mcg at 04/02/25 2049    metoclopramide (REGLAN) solution 5 mg  5 mg Oral 4x Daily AC & at Bedtime Judit Kendrick MD   5 mg at 04/02/25 2049    mupirocin (BACTROBAN) 2 % nasal ointment 1 Application  1 Application Each Nare BID Rina Taylor APRN   1 Application at 04/02/25 2049    nitroglycerin (NITROSTAT) SL tablet 0.4 mg  0.4 mg Sublingual Q5 Min PRN Rina Taylor APRN        nortriptyline (PAMELOR) capsule 25 mg  25 mg Oral Nightly Judit Kendrick MD   25 mg at 04/02/25 2049    ondansetron (ZOFRAN) injection 4 mg  4 mg Intravenous Q6H PRN Rina Taylor APRN        pantoprazole (PROTONIX) EC tablet 40 mg  40 mg Oral Q AM Rina Taylor APRN   40 mg at 04/03/25 0602    promethazine (PHENERGAN) tablet 25 mg  25 mg Oral Q6H PRN Judit Kendrick MD        rOPINIRole (REQUIP) tablet 1 mg  1 mg Oral TID Judit Kendrick MD   1 mg at 04/02/25 2049    sodium bicarbonate tablet 650 mg  650 mg Oral TID Judit Kendrick MD   650 mg at 04/02/25 2049    sodium chloride 0.9 % flush 10 mL  10 mL Intravenous PRN Judit Kendrick MD        sodium chloride 0.9 % flush 10 mL  10 mL Intravenous Q12H Rina Taylor APRN   10 mL at 04/02/25 2050    sodium chloride 0.9 % flush 10 mL  10 mL Intravenous PRN Rina Taylor APRN        sodium chloride 0.9 % infusion 40 mL  40 mL Intravenous PRN Rina Taylor APRN        sodium chloride 0.9 % with KCl 20 mEq/L infusion  75 mL/hr Intravenous Continuous Feliberto Barrow MD 75 mL/hr at  04/02/25 1742 75 mL/hr at 04/02/25 1742    tamsulosin (FLOMAX) 24 hr capsule 0.4 mg  0.4 mg Oral Daily Alexis Donaldson APRN   0.4 mg at 04/01/25 2059       Past Medical History:  Past Medical History:   Diagnosis Date    Arthritis     Contusion     Degenerative disc disease, cervical     Diabetes mellitus     Elevated cholesterol     Fibromyalgia     Neuropathy     Osteoporosis     Pancreatitis     Raynaud disease     Renal insufficiency     Smoker 02/08/2022    Vitamin D deficiency 04/26/2022       Past Surgical History:  Past Surgical History:   Procedure Laterality Date    APPENDECTOMY      CHOLECYSTECTOMY      COLONOSCOPY      ENDOSCOPY N/A 10/08/2019    Procedure: ESOPHAGOGASTRODUODENOSCOPY WITH ANESTHESIA;  Surgeon: Aleks Vaughn DO;  Location: [facility] ENDOSCOPY;  Service: Gastroenterology    ENDOSCOPY N/A 11/12/2024    Procedure: ESOPHAGOGASTRODUODENOSCOPY WITH ANESTHESIA;  Surgeon: Tiffanie Saucedo MD;  Location: [facility] ENDOSCOPY;  Service: Gastroenterology;  Laterality: N/A;  pre op: Gastroparesis, Intractable nausea  post op: insertion of dobhoff   PCP: Brandon Murguia    ENDOSCOPY N/A 11/15/2024    Procedure: ESOPHAGOGASTRODUODENOSCOPY WITH ANESTHESIA WITH NG TUBE INSERTION;  Surgeon: Tiffanie Saucedo MD;  Location: [facility] ENDOSCOPY;  Service: Gastroenterology;  Laterality: N/A;  PRE OP: DOBHOFF  POST OP: DOBHOFF  PCP: TELLY PCAK    ENDOSCOPY WITH GASTROSTOMY TUBE INSERTION N/A 6/15/2022    Procedure: ESOPHAGOGASTRODUODENOSCOPY WITH GASTROSTOMY TUBE INSERTION;  Surgeon: Jersey Lee MD;  Location: [facility] ENDOSCOPY;  Service: Gastroenterology;  Laterality: N/A;  pre peg placement  post peg placement  Dr. Murguia    ENTEROSCOPY SMALL BOWEL N/A 11/3/2022    Procedure: Esophagogastroduodenoscopy with Peg Removal;  Surgeon: Aleks Vaughn DO;  Location: [facility] ENDOSCOPY;  Service: Gastroenterology;  Laterality: N/A;  pre; peg removal  post; peg removal   KIRILL Murguia MD        HYSTERECTOMY          Family History  Family History   Adopted: Yes   Problem Relation Age of Onset    Colon polyps Neg Hx     Colon cancer Neg Hx        Social History  Social History     Socioeconomic History    Marital status:    Tobacco Use    Smoking status: Every Day     Current packs/day: 0.50     Average packs/day: 0.5 packs/day for 32.3 years (16.1 ttl pk-yrs)     Types: Cigarettes     Start date: 1993     Passive exposure: Current   Vaping Use    Vaping status: Never Used   Substance and Sexual Activity    Alcohol use: No    Drug use: No    Sexual activity: Defer       Review of Systems:  History obtained from chart review and the patient  General ROS: No fever or chills  Respiratory ROS: No cough, shortness of breath, wheezing  Cardiovascular ROS: No chest pain or palpitations  Gastrointestinal ROS: No abdominal pain or melena  Genito-Urinary ROS: No dysuria or hematuria  Psych ROS: No anxiety and depression  14 point ROS reviewed with the patient and negative except as noted above and in the HPI unless unable to obtain.    Objective:  Patient Vitals for the past 24 hrs:   BP Temp Temp src Pulse Resp SpO2   04/03/25 0806 153/87 97.8 °F (36.6 °C) Oral 103 18 100 %   04/03/25 0502 129/75 97 °F (36.1 °C) Oral 105 16 100 %   04/03/25 0028 128/78 98 °F (36.7 °C) Oral 120 16 100 %   04/02/25 1941 139/87 99.8 °F (37.7 °C) Oral 112 18 100 %   04/02/25 1828 121/74 98.6 °F (37 °C) Oral 112 16 100 %   04/02/25 1645 120/80 -- -- 113 18 100 %   04/02/25 1200 120/78 -- -- 102 -- 100 %   04/02/25 1100 115/79 -- -- 101 -- 100 %   04/02/25 1000 102/66 -- -- 101 19 100 %       Intake/Output Summary (Last 24 hours) at 4/3/2025 0931  Last data filed at 4/3/2025 0852  Gross per 24 hour   Intake 1255 ml   Output --   Net 1255 ml     General: awake/alert   Chest:  clear to auscultation bilaterally without respiratory distress  CVS: regular rate and rhythm  Abdominal: soft, nontender, positive bowel sounds  Extremities: no cyanosis or  edema  Skin: warm and dry without rash      Labs:  Results from last 7 days   Lab Units 04/02/25  0734 04/01/25  0353 03/31/25  1654   WBC 10*3/mm3 10.48 11.82* 23.75*   HEMOGLOBIN g/dL 8.8* 10.5* 12.3   HEMATOCRIT % 27.1* 33.3* 39.2   PLATELETS 10*3/mm3 334 352 501*         Results from last 7 days   Lab Units 04/02/25  1214 04/02/25  0338 04/01/25  2141 04/01/25  1226 03/31/25  1957 03/31/25  1654   SODIUM mmol/L 138 140 139 137   < > 128*   SODIUM, ARTERIAL   --   --   --   --    < >  --    POTASSIUM mmol/L 4.0 3.3* 3.0* 2.9*   < > 3.9   CHLORIDE mmol/L 108* 108* 105 100   < > 95*   CO2 mmol/L 17.0* 20.0* 21.0* 21.0*   < > 10.0*   BUN mg/dL 38* 44* 52* 57*   < > 78*   CREATININE mg/dL 2.18* 2.54* 2.77* 3.00*   < > 4.55*   CALCIUM mg/dL 7.4* 6.9* 6.7* 7.1*   < > 8.5*   EGFR mL/min/1.73 26.5* 22.1* 19.9* 18.1*   < > 11.0*   BILIRUBIN mg/dL  --  <0.2  --  0.2  --  0.2   ALK PHOS U/L  --  99  --  136*  --  174*   ALT (SGPT) U/L  --  7  --  11  --  13   AST (SGOT) U/L  --  11  --  19  --  18   GLUCOSE mg/dL 163* 153* 154* 177*   < > 221*    < > = values in this interval not displayed.       Radiology:   Imaging Results (Last 72 Hours)       Procedure Component Value Units Date/Time    CT Abdomen Pelvis Without Contrast [[id number]] Collected: 03/31/25 1839     Updated: 03/31/25 1848    Narrative:      EXAMINATION: CT ABDOMEN PELVIS WO CONTRAST- 3/31/2025 6:39 PM     HISTORY: abd pain, leukocytosis; N17.9-Acute kidney failure,  unspecified; N18.9-Chronic kidney disease, unspecified;  E87.4-Drbo-nbgulpfwey and hyponatremia     TOTAL DOSE: 294.8 mGy.cm (Automatic exposure control technique was  implemented in an effort to keep the radiation dose as low as possible  without compromising image quality)     REPORT: Spiral CT of the abdomen and pelvis was performed without  contrast from the lung bases through the pubic symphysis. Reconstructed  coronal and sagittal images are also reviewed.     Comparison: CT abdomen and  pelvis without contrast 3/18/2025.     Review of the lung windows demonstrates mild bibasilar atelectasis.  Evaluation of the solid abdominal organs is limited without intravenous  contrast. Cholecystectomy clips are present. The liver, spleen appear  unremarkable, there is low-attenuation thickening of both adrenal glands  as before which may be related to adrenal hyperplasia or adrenal  adenomas. Some ingested food is noted in the stomach. There are few  small calcifications within the pancreatic head as before suggestive of  chronic pancreatitis. The fat stranding around the pancreatic head seen  before appears resolved. No evidence of acute pancreatitis currently. No  pancreatic ductal dilation is identified. The renal contours are smooth.  No hydronephrosis is identified.     The ureters are decompressed. The bladder appears within normal limits.  There is persistent and increased volume of fluid throughout the colon,  with air-fluid levels and mild colonic distention. This is compatible  with worsening of a diarrheal type illness, no colonic wall thickening  or evidence of bowel obstruction is identified. There is no pneumatosis,  no free air or abscess. Appears the patient has had previous  appendectomy. There is streak artifact associated with an implanted  neurostimulator over the anterior right abdomen, with leads extending to  the anterior wall of the distal stomach. Review of bone windows shows no  acute abnormality. There is evidence of previous hysterectomy.       Impression:      1. Interval increase in the volume of gas and fluid throughout the colon  compatible with worsening of the diarrheal type illness, no mechanical  bowel obstruction is identified. There is no evidence of perforation or  intra-abdominal abscess.  2. The mild changes of acute pancreatitis seen adjacent to the  pancreatic head before appear resolved. A few small calcifications are  noted within the pancreatic head suggesting  chronic pancreatitis.  3. Evidence of previous cholecystectomy and appendectomy. Again  demonstrated is a gastric stimulator.  4. Stable low-attenuation thickening of both adrenal glands may be  related to adrenal hyperplasia or adenomas. This is likely benign.           This report was signed and finalized on 3/31/2025 6:45 PM by Dr. Jose Antonio Bermeo MD.       XR Chest 1 View [[id number]] Collected: 03/31/25 1554     Updated: 03/31/25 1558    Narrative:      XR CHEST 1 VW-     HISTORY: Weak/Dizzy/AMS triage protocol     COMPARISON: 12/8/2024     FINDINGS: Frontal view of the chest obtained.     The lungs are well-expanded and clear. Cardiomediastinal contours are  normal. No pleural effusion or pneumothorax. No acute regional bony  pathology.       Impression:      1. No acute radiographic cardiopulmonary process.        This report was signed and finalized on 3/31/2025 3:55 PM by Dr. Gloria Roe MD.               Culture:  Blood Culture   Date Value Ref Range Status   03/31/2025 No growth at 24 hours  Preliminary   03/31/2025 No growth at 24 hours  Preliminary         Assessment    Acute kidney injury, prerenal due to volume depletion--improving  Baseline chronic kidney disease stage 3b  Hypokalemia  Hypomagnesemia   Hypophosphatemia   Type 2 diabetes  Chronic pancreatitis  Gastroparesis  Metabolic acidosis    Plan:   Continue current IV fluids  AM labs still pending      Ford Elena, RAKESH  4/3/2025  09:31 CDT

## 2025-04-03 NOTE — PLAN OF CARE
Goal Outcome Evaluation:  Plan of Care Reviewed With: patient        Progress: improving  Outcome Evaluation: Patient A/O x 4. VSS. On room air. NSR - ST on telemetry. Tolerating IV fluids and IV ABT. No PRNs requested during the night. Safety maintained.

## 2025-04-03 NOTE — CASE MANAGEMENT/SOCIAL WORK
Continued Stay Note   [place]     Patient Name: Tasha Perez  MRN: [id number]  Today's Date: 4/3/2025    Admit Date: 3/31/2025        Discharge Plan       Row Name 04/03/25 1442       Plan    Plan Comments BRO received consult for home IVF at [facility]. BRO has sent a message to Pharmacist at [facility] to see if they can provide IVF. [facility] was unable to do IVF during last admission due to shortage.                   Discharge Codes    No documentation.                 Expected Discharge Date and Time       Expected Discharge Date Expected Discharge Time    Apr 5, 2025               BARB Barrera

## 2025-04-04 ENCOUNTER — READMISSION MANAGEMENT (OUTPATIENT)
Dept: CALL CENTER | Facility: HOSPITAL | Age: 55
End: 2025-04-04
Payer: COMMERCIAL

## 2025-04-04 VITALS
RESPIRATION RATE: 16 BRPM | TEMPERATURE: 98.2 F | HEIGHT: 61 IN | HEART RATE: 84 BPM | WEIGHT: 94.36 LBS | OXYGEN SATURATION: 99 % | DIASTOLIC BLOOD PRESSURE: 78 MMHG | SYSTOLIC BLOOD PRESSURE: 145 MMHG | BODY MASS INDEX: 17.81 KG/M2

## 2025-04-04 LAB
ANION GAP SERPL CALCULATED.3IONS-SCNC: 8 MMOL/L (ref 5–15)
BASOPHILS # BLD AUTO: 0.03 10*3/MM3 (ref 0–0.2)
BASOPHILS NFR BLD AUTO: 0.3 % (ref 0–1.5)
BUN SERPL-MCNC: 29 MG/DL (ref 6–20)
BUN/CREAT SERPL: 14.5 (ref 7–25)
CALCIUM SPEC-SCNC: 8.3 MG/DL (ref 8.6–10.5)
CHLORIDE SERPL-SCNC: 110 MMOL/L (ref 98–107)
CO2 SERPL-SCNC: 24 MMOL/L (ref 22–29)
CREAT SERPL-MCNC: 2 MG/DL (ref 0.57–1)
DEPRECATED RDW RBC AUTO: 50.9 FL (ref 37–54)
EGFRCR SERPLBLD CKD-EPI 2021: 29.4 ML/MIN/1.73
EOSINOPHIL # BLD AUTO: 0.4 10*3/MM3 (ref 0–0.4)
EOSINOPHIL NFR BLD AUTO: 3.6 % (ref 0.3–6.2)
ERYTHROCYTE [DISTWIDTH] IN BLOOD BY AUTOMATED COUNT: 15.2 % (ref 12.3–15.4)
GLUCOSE BLDC GLUCOMTR-MCNC: 141 MG/DL (ref 70–130)
GLUCOSE BLDC GLUCOMTR-MCNC: 184 MG/DL (ref 70–130)
GLUCOSE BLDC GLUCOMTR-MCNC: 200 MG/DL (ref 70–130)
GLUCOSE SERPL-MCNC: 124 MG/DL (ref 65–99)
HCT VFR BLD AUTO: 28.9 % (ref 34–46.6)
HGB BLD-MCNC: 9.1 G/DL (ref 12–15.9)
IMM GRANULOCYTES # BLD AUTO: 0.09 10*3/MM3 (ref 0–0.05)
IMM GRANULOCYTES NFR BLD AUTO: 0.8 % (ref 0–0.5)
LYMPHOCYTES # BLD AUTO: 4.4 10*3/MM3 (ref 0.7–3.1)
LYMPHOCYTES NFR BLD AUTO: 39.7 % (ref 19.6–45.3)
MCH RBC QN AUTO: 29.1 PG (ref 26.6–33)
MCHC RBC AUTO-ENTMCNC: 31.5 G/DL (ref 31.5–35.7)
MCV RBC AUTO: 92.3 FL (ref 79–97)
MONOCYTES # BLD AUTO: 0.56 10*3/MM3 (ref 0.1–0.9)
MONOCYTES NFR BLD AUTO: 5.1 % (ref 5–12)
NEUTROPHILS NFR BLD AUTO: 5.59 10*3/MM3 (ref 1.7–7)
NEUTROPHILS NFR BLD AUTO: 50.5 % (ref 42.7–76)
NRBC BLD AUTO-RTO: 0 /100 WBC (ref 0–0.2)
PLATELET # BLD AUTO: 367 10*3/MM3 (ref 140–450)
PMV BLD AUTO: 10.7 FL (ref 6–12)
POTASSIUM SERPL-SCNC: 4.5 MMOL/L (ref 3.5–5.2)
RBC # BLD AUTO: 3.13 10*6/MM3 (ref 3.77–5.28)
SODIUM SERPL-SCNC: 142 MMOL/L (ref 136–145)
WBC NRBC COR # BLD AUTO: 11.07 10*3/MM3 (ref 3.4–10.8)

## 2025-04-04 PROCEDURE — 25010000002 HEPARIN (PORCINE) PER 1000 UNITS: Performed by: NURSE PRACTITIONER

## 2025-04-04 PROCEDURE — 63710000001 INSULIN LISPRO (HUMAN) PER 5 UNITS: Performed by: NURSE PRACTITIONER

## 2025-04-04 PROCEDURE — 80048 BASIC METABOLIC PNL TOTAL CA: CPT | Performed by: INTERNAL MEDICINE

## 2025-04-04 PROCEDURE — 85025 COMPLETE CBC W/AUTO DIFF WBC: CPT | Performed by: INTERNAL MEDICINE

## 2025-04-04 PROCEDURE — 82948 REAGENT STRIP/BLOOD GLUCOSE: CPT

## 2025-04-04 PROCEDURE — 25010000002 LIDOCAINE PF 1% 1 % SOLUTION: Performed by: INTERNAL MEDICINE

## 2025-04-04 PROCEDURE — C1751 CATH, INF, PER/CENT/MIDLINE: HCPCS

## 2025-04-04 PROCEDURE — 02HV33Z INSERTION OF INFUSION DEVICE INTO SUPERIOR VENA CAVA, PERCUTANEOUS APPROACH: ICD-10-PCS | Performed by: INTERNAL MEDICINE

## 2025-04-04 PROCEDURE — 25010000002 SODIUM CHLORIDE 0.9 % WITH KCL 20 MEQ 20-0.9 MEQ/L-% SOLUTION: Performed by: INTERNAL MEDICINE

## 2025-04-04 RX ORDER — SODIUM CHLORIDE 0.9 % (FLUSH) 0.9 %
10 SYRINGE (ML) INJECTION EVERY 12 HOURS SCHEDULED
Status: DISCONTINUED | OUTPATIENT
Start: 2025-04-04 | End: 2025-04-04 | Stop reason: HOSPADM

## 2025-04-04 RX ORDER — SODIUM CHLORIDE 9 MG/ML
40 INJECTION, SOLUTION INTRAVENOUS AS NEEDED
Status: DISCONTINUED | OUTPATIENT
Start: 2025-04-04 | End: 2025-04-04 | Stop reason: HOSPADM

## 2025-04-04 RX ORDER — LIDOCAINE HYDROCHLORIDE 10 MG/ML
1 INJECTION, SOLUTION EPIDURAL; INFILTRATION; INTRACAUDAL; PERINEURAL ONCE
Status: COMPLETED | OUTPATIENT
Start: 2025-04-04 | End: 2025-04-04

## 2025-04-04 RX ORDER — TAMSULOSIN HYDROCHLORIDE 0.4 MG/1
0.4 CAPSULE ORAL DAILY
Qty: 30 CAPSULE | Refills: 0 | Status: SHIPPED | OUTPATIENT
Start: 2025-04-05 | End: 2025-05-05

## 2025-04-04 RX ORDER — SODIUM CHLORIDE 0.9 % (FLUSH) 0.9 %
10 SYRINGE (ML) INJECTION AS NEEDED
Status: DISCONTINUED | OUTPATIENT
Start: 2025-04-04 | End: 2025-04-04 | Stop reason: HOSPADM

## 2025-04-04 RX ORDER — SODIUM CHLORIDE 0.9 % (FLUSH) 0.9 %
20 SYRINGE (ML) INJECTION AS NEEDED
Status: DISCONTINUED | OUTPATIENT
Start: 2025-04-04 | End: 2025-04-04 | Stop reason: HOSPADM

## 2025-04-04 RX ORDER — METOCLOPRAMIDE HYDROCHLORIDE 5 MG/5ML
5 SOLUTION ORAL
Qty: 240 ML | Refills: 0 | Status: SHIPPED | OUTPATIENT
Start: 2025-04-04

## 2025-04-04 RX ADMIN — ANTACID TABLETS 1 TABLET: 500 TABLET, CHEWABLE ORAL at 08:56

## 2025-04-04 RX ADMIN — TAMSULOSIN HYDROCHLORIDE 0.4 MG: 0.4 CAPSULE ORAL at 08:56

## 2025-04-04 RX ADMIN — METOCLOPRAMIDE HYDROCHLORIDE 5 MG: 5 SOLUTION ORAL at 08:56

## 2025-04-04 RX ADMIN — ROPINIROLE HYDROCHLORIDE 1 MG: 1 TABLET, FILM COATED ORAL at 17:25

## 2025-04-04 RX ADMIN — Medication 500 UNITS: at 08:56

## 2025-04-04 RX ADMIN — SODIUM BICARBONATE 650 MG: 650 TABLET ORAL at 08:56

## 2025-04-04 RX ADMIN — LIDOCAINE HYDROCHLORIDE ANHYDROUS 1 ML: 10 INJECTION, SOLUTION INFILTRATION at 14:13

## 2025-04-04 RX ADMIN — Medication 1 APPLICATION: at 08:56

## 2025-04-04 RX ADMIN — SENNOSIDES, DOCUSATE SODIUM 2 TABLET: 50; 8.6 TABLET, FILM COATED ORAL at 08:56

## 2025-04-04 RX ADMIN — Medication 10 ML: at 08:57

## 2025-04-04 RX ADMIN — DULOXETINE HYDROCHLORIDE 30 MG: 30 CAPSULE, DELAYED RELEASE ORAL at 08:56

## 2025-04-04 RX ADMIN — HEPARIN SODIUM 5000 UNITS: 5000 INJECTION, SOLUTION INTRAVENOUS; SUBCUTANEOUS at 08:56

## 2025-04-04 RX ADMIN — ASPIRIN 81 MG: 81 TABLET, COATED ORAL at 08:56

## 2025-04-04 RX ADMIN — METOCLOPRAMIDE HYDROCHLORIDE 5 MG: 5 SOLUTION ORAL at 17:25

## 2025-04-04 RX ADMIN — METOCLOPRAMIDE HYDROCHLORIDE 5 MG: 5 SOLUTION ORAL at 11:33

## 2025-04-04 RX ADMIN — INSULIN LISPRO 2 UNITS: 100 INJECTION, SOLUTION INTRAVENOUS; SUBCUTANEOUS at 17:24

## 2025-04-04 RX ADMIN — INSULIN LISPRO 3 UNITS: 100 INJECTION, SOLUTION INTRAVENOUS; SUBCUTANEOUS at 11:33

## 2025-04-04 RX ADMIN — POTASSIUM CHLORIDE AND SODIUM CHLORIDE 75 ML/HR: 900; 150 INJECTION, SOLUTION INTRAVENOUS at 11:50

## 2025-04-04 RX ADMIN — ROPINIROLE HYDROCHLORIDE 1 MG: 1 TABLET, FILM COATED ORAL at 08:56

## 2025-04-04 RX ADMIN — LUBIPROSTONE 8 MCG: 8 CAPSULE, GELATIN COATED ORAL at 08:56

## 2025-04-04 RX ADMIN — PANTOPRAZOLE SODIUM 40 MG: 40 TABLET, DELAYED RELEASE ORAL at 05:14

## 2025-04-04 RX ADMIN — SODIUM BICARBONATE 650 MG: 650 TABLET ORAL at 17:25

## 2025-04-04 NOTE — CASE MANAGEMENT/SOCIAL WORK
"Continued Stay Note  [facility]     Patient Name: Tasha Perez  MRN: [id number]  Today's Date: 4/4/2025    Admit Date: 3/31/2025        Discharge Plan       Row Name 04/04/25 1627       Plan    Plan Comments [facility]  has accepted and can see pt on Monday. IVF cannot be delivered to pt home until Monday.    Final Discharge Disposition Code 06 - home with home health care      Row Name 04/04/25 1530       Plan    Plan Comments Per [facility] \"FYI: [employer] nor [employer] has a Saturday delivery option. The earliest we can get her fluids to her is Monday\". BRO will notify Physician.      Row Name 04/04/25 1513       Plan    Plan Comments Per [facility] \"I just tried to run her claims through and she is locked into a retail pharmacy. The patient will need to call her insurance and have [facility] added to her list of pharmacies she can use. This happens seldomly with medicaid insurance\". BRO informed pt that she would need to call her pharmacy to have [facility] added to her pharmacy list. BRO has also messaged [facility] intake to see if they can accept. If [facility] cannot accept, pt will have to go outpt for picc line care.                   Discharge Codes    No documentation.                 Expected Discharge Date and Time       Expected Discharge Date Expected Discharge Time    Apr 4, 2025               BARB Barrera    "

## 2025-04-04 NOTE — CASE MANAGEMENT/SOCIAL WORK
Continued Stay Note   [place]     Patient Name: Tasha Perez  MRN: [id number]  Today's Date: 4/4/2025    Admit Date: 3/31/2025        Discharge Plan       Row Name 04/04/25 1251       Plan    Plan Comments Orders have been sent to [facility] for IV fluids and message left with Pharmacist. SW is waiting to find out if pt can receive IVF at home. Will update Physician once answer received.                   Discharge Codes    No documentation.                 Expected Discharge Date and Time       Expected Discharge Date Expected Discharge Time    Apr 5, 2025               BARB Barrera

## 2025-04-04 NOTE — CASE MANAGEMENT/SOCIAL WORK
"Continued Stay Note  [facility]     Patient Name: Tasha Perez  MRN: [id number]  Today's Date: 4/4/2025    Admit Date: 3/31/2025        Discharge Plan       Row Name 04/04/25 0853       Plan    Plan Comments Per [facility] \"I just tried to run her claims through and she is locked into a retail pharmacy. The patient will need to call her insurance and have [facility] added to her list of pharmacies she can use. This happens seldomly with medicaid insurance\". BRO informed pt that she would need to call her pharmacy to have [facility] added to her pharmacy list. BRO has also messaged [facility] intake to see if they can accept. If [facility] cannot accept, pt will have to go outpt for picc line care.      Row Name 04/04/25 7147       Plan    Plan Comments Orders have been sent to [facility] for IV fluids and message left with Pharmacist. BRO is waiting to find out if pt can receive IVF at home. Will update Physician once answer received.                   Discharge Codes    No documentation.                 Expected Discharge Date and Time       Expected Discharge Date Expected Discharge Time    Apr 4, 2025               BARB Barrera    "

## 2025-04-04 NOTE — CASE MANAGEMENT/SOCIAL WORK
"Continued Stay Note  [facility]     Patient Name: Tasha Perez  MRN: [id number]  Today's Date: 4/4/2025    Admit Date: 3/31/2025        Discharge Plan       Row Name 04/04/25 1530       Plan    Plan Comments Per [facility] \"FYI: [employer] nor [employer] has a Saturday delivery option. The earliest we can get her fluids to her is Monday\". BRO will notify Physician.      Row Name 04/04/25 1513       Plan    Plan Comments Per [facility] \"I just tried to run her claims through and she is locked into a retail pharmacy. The patient will need to call her insurance and have [facility] added to her list of pharmacies she can use. This happens seldomly with medicaid insurance\". BRO informed pt that she would need to call her pharmacy to have [facility] added to her pharmacy list. BRO has also messaged [facility] intake to see if they can accept. If [facility] cannot accept, pt will have to go outpt for picc line care.      Row Name 04/04/25 1251       Plan    Plan Comments Orders have been sent to [facility] for IV fluids and message left with Pharmacist. BRO is waiting to find out if pt can receive IVF at home. Will update Physician once answer received.                   Discharge Codes    No documentation.                 Expected Discharge Date and Time       Expected Discharge Date Expected Discharge Time    Apr 4, 2025               BARB Barrera    "

## 2025-04-04 NOTE — DISCHARGE PLACEMENT REQUEST
"Yash Perez (54 y.o. Female)       Date of Birth   1970    Social Security Number       Address   [address] [place] [address]    Home Phone   [phone]    MRN   [id number]       Latter-day   Voodoo    Marital Status                               Admission Date   3/31/2025    Admission Type   Emergency    Admitting Provider   Ministerio Capone MD    Attending Provider   Ministerio Capone MD    Department, Room/Bed   [facility] 3A, 343/1       Discharge Date       Discharge Disposition       Discharge Destination                                 Attending Provider: Ministerio Capone MD    Allergies: Bee Venom, Hydrocodone, Hydroxychloroquine, Ibuprofen, Pineapple, Pineapple Extract, Wasp Venom, Wasp Venom Protein, Hydrocodone-acetaminophen, Penicillins, Sitagliptin, Metformin, Chocolate, Poison Ivy Extract    Isolation: None   Infection: None   Code Status: CPR    Ht: 154.9 cm (61\")   Wt: 42.8 kg (94 lb 5.7 oz)    Admission Cmt: None   Principal Problem: Dehydration [E86.0]                   Active Insurance as of 3/31/2025       Primary Coverage       Payor Plan Insurance Group Employer/Plan Group    [place]        Payor Plan Address Payor Plan Phone Number Payor Plan Fax Number Effective Dates    [address]   8/1/2019 - None Entered    [facility] [id number]         Subscriber Name Subscriber Birth Date Member ID       YASH PEREZ 1970 [id number]                     Emergency Contacts        (Rel.) Home Phone Work Phone Mobile Phone    KYLEJENNIFER LONGORIA (Spouse) [phone] -- [phone]    KYLECASSIA LONGORIA (Daughter) -- -- [phone]              Insurance Information                  [place] Phone: --    Subscriber: Yash Perez Subscriber#: [id number]    Group#: -- Precert#: --    Authorization#: [id number] Effective Date: --             History & Physical    "     Rina Taylor, APRN at [phone]          [facility]   HISTORY AND PHYSICAL    Patient Name: Tasha Perez  : 1970  MRN: [id number]  Primary Care Physician:  KIRILL Murguia MD  Date of admission: 3/31/2025    Subjective  Subjective     Chief Complaint: Metabolic acidosis, acute on chronic renal insufficiency    History of Present Illness  54-year-old female patient past medical history type 2 diabetes, degenerative disc disease, fibromyalgia, neuropathy, hypertension, daily tobacco user, stage III kidney disease, nonalcoholic pancreatitis with a recent admission for acute pancreatitis on 2025 along with acute on chronic kidney disease presents back to the ED tonight with general malaise and ongoing vomiting.  Labs reveal leukocytosis at 23, stable hemoglobin hematocrit 12.3 and 39.3 with platelets of 501.  Glucose 207.  BUN 78, creatinine 4.55 Baseline creatinine on discharge was 1.04 with BUN of 14.  Sodium 128, potassium 3.9, chloride 95, bicarb 10, calcium 8.5 alk phos 174, gap 23, GFR 11 on admission.    ABG revealed a pH of 7.068, pCO2 28 PaO2 101 bicarb 8.1.    Serum acetone negative.  Urinalysis dark cloudy however negative for glucose, trace of ketones negative for infection markers.  Protein greater than 100.  TSH 1.2, lipase 89.  Magnesium 1.9.  CK 30    Patient was administered 1 amp of sodium bicarb 50 mill equivalents 8.4% solution followed by 2 L of NS.    She received a CT of the abdomen and pelvis as she was recently admitted for pancreatitis.  She did not complain of any abdominal pain.  She does complain of vomiting however no diarrhea.  The CT of the abdomen pelvis without contrast in the setting of acute on chronic renal disease reveals increase in the volume of gas and fluid throughout the colon compatible with worsening of the diarrheal type illness.  No mechanical bowel obstruction is identified.  No evidence of any perforation or intra-abdominal abscess.   The mild changes of the acute pancreatitis seen adjacent to the pancreatic head before now appear resolved.  A few small calcifications are noted within the pancreatic head suggesting chronic pancreatitis.  Evidence of previous cholecystectomy and appendectomy again demonstrated is a gastric stimulator.  Stable low attenuation thickening of both adrenal glands may be related to adrenal hyperplasia or adenomas.  Likely benign.    Intensivist was consulted for metabolic acidosis in the setting of soft blood pressures and worsening kidney function.    I have seen and evaluated the patient in ER room 45.  She is alert to voice and will answer questions appropriately.  She did require some verbal stimulation.  She denies any diarrhea as I discussed the CT results.  No abdominal tenderness on my exam.  She endorses 4-5 bouts of daily vomiting over the last week or so actually she said since her discharge.    No fevers.  No chest pain or shortness of breath.  She reports her weakness worsen therefore she seeks medical attention in the ED tonight.    BP soft 90 systolic with a MAP of greater than 70.  We will plan for ICU/CCU admission under intensivist services.  She has received 1 out of the 2 L of NS that was ordered.  I feel in the setting of ongoing dehydration she will require more fluid resuscitation.  She will also require a bicarb drip in the setting of metabolic acidosis probably secondary to worsening renal function even consider ATN at this point.    Leukocytosis on admission.  We will add on a lactate and blood cultures and cover empirically with Rocephin until acute infection in the setting of hypotension can be ruled out.    CT of the abdomen and pelvis is concerning for diarrheal illness however patient adamantly denies any diarrhea.  She reports her last BM was yesterday and it was mostly formed.  Stool cultures have been ordered by ER provider therefore she has been placed in C. difficile  precautions.  Weakness - Generalized  Symptoms include fatigue, nausea and vomiting.        Review of Systems   Constitutional:  Positive for appetite change and fatigue.   Gastrointestinal:  Positive for nausea and vomiting.   All other systems reviewed and are negative.       Personal History     Past Medical History:   Diagnosis Date    Arthritis     Contusion     Degenerative disc disease, cervical     Diabetes mellitus     Elevated cholesterol     Fibromyalgia     Neuropathy     Osteoporosis     Pancreatitis     Raynaud disease     Renal insufficiency     Smoker 02/08/2022    Vitamin D deficiency 04/26/2022       Past Surgical History:   Procedure Laterality Date    APPENDECTOMY      CHOLECYSTECTOMY      COLONOSCOPY      ENDOSCOPY N/A 10/08/2019    Procedure: ESOPHAGOGASTRODUODENOSCOPY WITH ANESTHESIA;  Surgeon: Aleks Vaughn DO;  Location: [facility] ENDOSCOPY;  Service: Gastroenterology    ENDOSCOPY N/A 11/12/2024    Procedure: ESOPHAGOGASTRODUODENOSCOPY WITH ANESTHESIA;  Surgeon: Tiffanie Saucedo MD;  Location: [facility] ENDOSCOPY;  Service: Gastroenterology;  Laterality: N/A;  pre op: Gastroparesis, Intractable nausea  post op: insertion of dobhoff   PCP: Brandon Murguia    ENDOSCOPY N/A 11/15/2024    Procedure: ESOPHAGOGASTRODUODENOSCOPY WITH ANESTHESIA WITH NG TUBE INSERTION;  Surgeon: Tiffanie Saucedo MD;  Location: [facility] ENDOSCOPY;  Service: Gastroenterology;  Laterality: N/A;  PRE OP: DOBHOFF  POST OP: DOBHOFF  PCP: TELLY PACK    ENDOSCOPY WITH GASTROSTOMY TUBE INSERTION N/A 6/15/2022    Procedure: ESOPHAGOGASTRODUODENOSCOPY WITH GASTROSTOMY TUBE INSERTION;  Surgeon: Jersey Lee MD;  Location: [facility] ENDOSCOPY;  Service: Gastroenterology;  Laterality: N/A;  pre peg placement  post peg placement  Dr. Murguia    ENTEROSCOPY SMALL BOWEL N/A 11/3/2022    Procedure: Esophagogastroduodenoscopy with Peg Removal;  Surgeon: Aleks Vaughn DO;  Location: [facility] ENDOSCOPY;  Service: Gastroenterology;   "Laterality: N/A;  pre; peg removal  post; peg removal   KIRILL Murguia MD        HYSTERECTOMY         Family History: family history is not on file. She was adopted. Otherwise pertinent FHx was reviewed and not pertinent to current issue.    Social History:  reports that she has been smoking cigarettes. She started smoking about 32 years ago. She has a 16.1 pack-year smoking history. She has been exposed to tobacco smoke. She does not have any smokeless tobacco history on file. She reports that she does not drink alcohol and does not use drugs.    Home Medications:  Calcium Carbonate-Vitamin D, DULoxetine, albuterol sulfate HFA, aspirin, atorvastatin, cetirizine, linaclotide, nortriptyline, omeprazole, promethazine, rOPINIRole, sodium bicarbonate, and sucralfate    Allergies:  Allergies   Allergen Reactions    Bee Venom Anaphylaxis    Hydrocodone Anaphylaxis    Hydroxychloroquine Other (See Comments)     Hair loss    Ibuprofen Other (See Comments)     \"SHUTS MY KIDNEYS DOWN\" PER PATIENT     Pineapple Anaphylaxis    Pineapple Extract Anaphylaxis    Wasp Venom Anaphylaxis    Wasp Venom Protein Anaphylaxis    Hydrocodone-Acetaminophen Dizziness, Nausea And Vomiting, Nausea Only and Unknown - Low Severity    Penicillins Nausea And Vomiting    Sitagliptin Other (See Comments)     Other reaction(s): Abdominal Pain      Metformin Other (See Comments)     Pt states it messes with her kidneys    Chocolate Rash     Dark chocolate only, told to RD 7/10/24    Poison Shelly Extract Itching and Rash       Objective   Objective     Vitals:   Temp:  [97.5 °F (36.4 °C)] 97.5 °F (36.4 °C)  Heart Rate:  [] 95  Resp:  [11-21] 11  BP: ()/(54-74) 90/58    Physical Exam  Vitals and nursing note reviewed. Exam conducted with a chaperone present.   Constitutional:       General: She is not in acute distress.     Appearance: She is not ill-appearing.   HENT:      Head: Normocephalic and atraumatic.      Nose: Nose normal.      " Mouth/Throat:      Mouth: Mucous membranes are dry.   Eyes:      Extraocular Movements: Extraocular movements intact.      Pupils: Pupils are equal, round, and reactive to light.   Cardiovascular:      Rate and Rhythm: Normal rate and regular rhythm.   Pulmonary:      Effort: Pulmonary effort is normal.      Breath sounds: Normal breath sounds.   Abdominal:      Palpations: Abdomen is soft.      Tenderness: There is no abdominal tenderness.      Comments: Abdominal stimulator noted to the right abdominal region secondary to gastroparesis.   Neurological:      Mental Status: She is alert.         Result Review   Result Review:  I have personally reviewed the results from the time of this admission to 3/31/2025 23:22 CDT and agree with these findings:  [x]  Laboratory list / accordion  []  Microbiology  [x]  Radiology  [x]  EKG/Telemetry   Echocardiogram 10/6/20/2019 shows an EF of 66 to 70% normal ventricular systolic and normal left ventricular diastolic pressure.  Cardiology/Vascular   []  Pathology  [x]  Old records-patient was admitted recently on 318 for worsening kidney function along with acute pancreatitis.  She was discharged on 321 after kidney function improved BUN 34, creatinine 2.14 on 3/18 admission.  She received IV fluid hydration and was discharged home.  []  Other:  Most notable findings include: Bicarb 10, creatinine 4.55, BUN 78.  Glucose 221.  Anion gap 23 ABG reveals pH is 7 pCO2 28 bicarb 8.1.  Patient was initiated on IV fluid resuscitation 2 L administered an amp of sodium bicarb.    Less concerns for DKA on admission his glucose is only 160.  pH less than 7.3, bicarb less than 15, she was negative for serum or urine ketones.  She denies treating her diabetes currently, I viewed her discharge summary medications that I do not see insulin listed on there.  She was discharged home with sodium bicarb 650 mg that she was fuss to take twice per day.  She does endorse ongoing vomiting therefore I  wonder if she was taking her sodium bicarb in the setting of acute vomiting.  She does have a gastric stimulator secondary to gastroparesis.  We will initiate sliding scale insulin coverage until we can sort through her home medication.    Differential diagnoses sepsis, shock, hypotension secondary to RADHA.  I did question the possibility of recent steroid usage however the patient denies.    Based on her ongoing vomiting gastroparesis I would suspect worsening kidney function felt to be secondary acute ongoing dehydration in the setting of active vomiting.    We will need to consult nephrology.  Continue with gentle fluid hydration, initiate sodium bicarb drip.  Continue with empiric antibiotics until infection can be ruled out.    Assessment & Plan  Assessment / Plan     Brief Patient Summary:  Tasha Perez is a 54 y.o. female who was admitted for metabolic acidosis, dehydration, acute vomiting worsening kidney function.    Active Hospital Problems:  Active Hospital Problems    Diagnosis     Metabolic acidosis    Acute on chronic CKD  Ongoing vomiting  Hypotension    Plan:   Admit to acute inpatient ICU/CCU under intensivist  Continue with IV fluid hydration in the setting of low BP felt to be secondary to acute dehydration.  Patient was administered 2 L of NS while in the ED.  An additional 1 L has been ordered  Initiate bicarb drip at 150 an hour  Consult nephrology  Less likely felt to be septic however cannot rule out septic shock.  CT of the abdomen and pelvis unremarkable for any acute findings.  Acute pancreatitis seems to be resolved.  Normal lipase.  Obtain lactate in the setting of leukocytosis, obtain blood cultures and cover empirically with Rocephin 1 g daily until acute infection can be ruled out  Considered adrenal insufficiency may consider Solu-Cortef if BP does not respond to IV fluids  Repeat BMP, magnesium along with CBC in the a.m.  Potassium 3.9 on admission.  After IV fluid resuscitation  decreased to 3.1.  In the setting of worsening renal failure will repeat BMP before replacing potassium.  Zofran 4 mg every 6-8 and Reglan 10 mg every 6 as needed for vomiting.  Consider ATN secondary to worsening dehydration and hypotension.  Low threshold for vasopressin agents to maintain maps greater than 65 if patient does not respond appropriately to fluid resuscitation.  BP has been soft 90 systolic however maps have been greater than 65.  DVT prophylaxis consider heparin in the setting of renal failure.  No contraindications for bleeding.  SCDs implemented overnight however will implement heparin for ongoing DVT prophylaxis  Implement omeprazole in the setting of past medical history of GERD  No concerns for diarrhea even though CT is concerning for diarrheal infection.  Stool cultures pending.  C. difficile precautions in the setting of CT findings.  In the setting of acute diarrheal infection I considered Flagyl.  If she presents with any loose stools consider implementing Flagyl for aerobic coverage.      VTE Prophylaxis: SCDs/heparin 5000 units every 12  Mechanical VTE prophylaxis orders are present.    No family at bedside.  Updated primary nurse of treatment plan answered all questions.  Updated the patient with treatment plan she agrees to proceed with acute inpatient treatment.    CODE STATUS:    Code Status (Patient has no pulse and is not breathing): CPR (Attempt to Resuscitate)  Medical Interventions (Patient has pulse or is breathing): Full Support  Level Of Support Discussed With: Patient    Admission Status:  I believe this patient meets inpatient status.    65 minutes of critical care provided. This time excludes other billable procedures. Time does include preparation of documents, medical consultations, review of old records, and direct bedside care. Patient is at high risk for life-threatening deterioration due to metabolic acidosis, acute on chronic renal insufficiency, hypotension    I  admitted the patient overnight to the intensivist services.  Case will be discussed with Dr. Kendrick , In the a.m.  Final treatment plan and recommendations will be at her discretion.           RAKESH Torres    Electronically signed by Rina Taylor APRN at 04/01/25 0407          Physician Progress Notes (last 24 hours)        Ford Elena APRN at 04/04/25 0943          Nephrology ([facility]) Progress Note      Patient:  Tasha Perez  YOB: 1970  Date of Service: 4/4/2025  MRN: [id number]   Acct: [id number]   Primary Care Physician: KIRILL Murguia MD  Advance Directive:   Code Status and Medical Interventions: CPR (Attempt to Resuscitate); Full Support   Ordered at: 03/31/25 2301     Code Status (Patient has no pulse and is not breathing):    CPR (Attempt to Resuscitate)     Medical Interventions (Patient has pulse or is breathing):    Full Support     Level Of Support Discussed With:    Patient     Admit Date: 3/31/2025       Hospital Day: 4  Referring Provider: No Known Provider      Patient personally seen and examined.  Complete chart including Consults, Notes, Operative Reports, Labs, Cardiology, and Radiology studies reviewed as able.        Subjective:  Tasha Perez is a 54 y.o. female for whom we were consulted for evaluation and treatment of acute kidney injury. Baseline chronic kidney disease stage 3b. Follows with Dr Barrow in our office. History of many previous hospitalizations for SUGAR due to volume depletion. History of recurrent pancreatitis, gastroparesis, type 2 diabetes, osteoarthritis. Has had feeding tubes and PICC lines many times in the past but these never manage to stay in long for a variety of reasons. Most recent discharge from hospital was on 3/21. Patient had a PICC line placed during that admission in anticipation of ongoing need for outpatient IV fluids. However, on the day of discharge, there was apparently some  sort of delay in getting the outpatient fluids ordered and patient was unwilling to wait--so she requested the PICC be removed so she could go ahead and go home. She returned to ER on 3/31 with fatigue, malaise. Labs showed creatinine up to 4.55, CO2 10.0, sodium 128, pH 7.068. Admitted to CCU and IV fluid resuscitation started.  Creatinine has been improving, electrolytes being replaced as indicated.  On 4/02 had urinary retention requiring in/out cath. Moved to medical floor    Today is awake and alert. No new complaints or overnight issues.    Allergies:  Bee venom, Hydrocodone, Hydroxychloroquine, Ibuprofen, Pineapple, Pineapple extract, Wasp venom, Wasp venom protein, Hydrocodone-acetaminophen, Penicillins, Sitagliptin, Metformin, Chocolate, and Poison ivy extract    Home Meds:  Medications Prior to Admission   Medication Sig Dispense Refill Last Dose/Taking    albuterol sulfate  (90 Base) MCG/ACT inhaler Inhale 2 puffs 3 (Three) Times a Day As Needed for Wheezing.   Past Week    aspirin 81 MG EC tablet Take 1 tablet by mouth Daily.   Past Week    atorvastatin (LIPITOR) 80 MG tablet Take 1 tablet by mouth Every Night.   Past Week    Calcium Carbonate-Vitamin D 600-5 MG-MCG tablet Take 1 tablet by mouth Daily.   Past Week    cetirizine (zyrTEC) 10 MG tablet Take 1 tablet by mouth Daily As Needed for Allergies.   Past Week    DULoxetine (CYMBALTA) 60 MG capsule Take 1 capsule by mouth Daily.   Past Week    linaclotide (LINZESS) 290 MCG capsule capsule Take 1 capsule by mouth Every Morning Before Breakfast.   Past Week    nortriptyline (PAMELOR) 25 MG capsule Take 1 capsule by mouth Every Night.   Past Week    omeprazole (priLOSEC) 40 MG capsule Take 1 capsule by mouth Every Other Day.   Past Week    promethazine (PHENERGAN) 25 MG tablet Take 1 tablet by mouth Every 6 (Six) Hours As Needed for Nausea or Vomiting.   Past Month    rOPINIRole (REQUIP) 1 MG tablet Take 1 tablet by mouth 3 (Three) Times a Day.    Past Week    sodium bicarbonate 650 MG tablet Take 1 tablet by mouth 2 (Two) Times a Day.   Past Week    sucralfate (CARAFATE) 1 g tablet Take 1 tablet by mouth 4 (Four) Times a Day.   Past Week    triamcinolone (KENALOG) 0.1 % cream Apply 1 Application topically to the appropriate area as directed Daily. APPLY A THIN LAYER TOPICALLY TO THE AFFECTED AREAS BEFORE APPLYING DEXCOM   Past Week       Medicines:  Current Facility-Administered Medications   Medication Dose Route Frequency Provider Last Rate Last Admin    acetaminophen (TYLENOL) tablet 650 mg  650 mg Oral Q4H PRN Rina Taylor APRN   650 mg at 04/02/25 0426    Or    acetaminophen (TYLENOL) suppository 650 mg  650 mg Rectal Q4H PRN Rina Taylor APRN        albuterol (PROVENTIL) nebulizer solution 0.083% 2.5 mg/3mL  2.5 mg Nebulization Q6H PRN Judit Kendrick MD        aspirin EC tablet 81 mg  81 mg Oral Daily Judit Kendrick MD   81 mg at 04/04/25 0856    atorvastatin (LIPITOR) tablet 80 mg  80 mg Oral Nightly Rina Taylor APRN   80 mg at 04/03/25 2016    sennosides-docusate (PERICOLACE) 8.6-50 MG per tablet 2 tablet  2 tablet Oral BID Rina Taylor APRN   2 tablet at 04/04/25 0856    And    polyethylene glycol (MIRALAX) packet 17 g  17 g Oral Daily PRN Rina Taylor APRN        And    bisacodyl (DULCOLAX) suppository 10 mg  10 mg Rectal Daily PRN Rina Taylor APRN        calcium carbonate (TUMS) chewable tablet 500 mg (200 mg elemental)  1 tablet Oral Daily Judit Kendrick MD   1 tablet at 04/04/25 0856    And    cholecalciferol (VITAMIN D3) tablet 500 Units  500 Units Oral Daily Judit Kendrick MD   500 Units at 04/04/25 0856    cetirizine (zyrTEC) tablet 10 mg  10 mg Oral Daily PRN Judit Kendrick MD        dextrose (D50W) (25 g/50 mL) IV injection 25 g  25 g Intravenous Q15 Min PRN Brandon, Rina D, APRN        dextrose (GLUTOSE) oral gel 15 g  15 g Oral Q15 Min PRN Rina Taylor, RAKESH        DULoxetine  (CYMBALTA) DR capsule 30 mg  30 mg Oral Daily Judit Kendrick MD   30 mg at 04/04/25 0856    glucagon (GLUCAGEN) injection 1 mg  1 mg Intramuscular Q15 Min PRN Rina Taylor APRN        heparin (porcine) 5000 UNIT/ML injection 5,000 Units  5,000 Units Subcutaneous Q12H Rina Taylor APRN   5,000 Units at 04/04/25 0856    Insulin Lispro (humaLOG) injection 1-5 Units  1-5 Units Subcutaneous 4x Daily AC & at Bedtime Rina Taylor APRN   3 Units at 04/03/25 2026    ipratropium-albuterol (DUO-NEB) nebulizer solution 3 mL  3 mL Nebulization Q6H PRN Rina Taylor APRN        lubiprostone (AMITIZA) capsule 8 mcg  8 mcg Oral BID Judit Kendrick MD   8 mcg at 04/04/25 0856    metoclopramide (REGLAN) solution 5 mg  5 mg Oral 4x Daily AC & at Bedtime Judit Kendrick MD   5 mg at 04/04/25 0856    mupirocin (BACTROBAN) 2 % nasal ointment 1 Application  1 Application Each Nare BID Rina Taylor APRN   1 Application at 04/04/25 0856    nitroglycerin (NITROSTAT) SL tablet 0.4 mg  0.4 mg Sublingual Q5 Min PRN Rina Taylor APRN        nortriptyline (PAMELOR) capsule 25 mg  25 mg Oral Nightly Judit Kendrick MD   25 mg at 04/03/25 2016    ondansetron (ZOFRAN) injection 4 mg  4 mg Intravenous Q6H PRN Rina Taylor APRN        pantoprazole (PROTONIX) EC tablet 40 mg  40 mg Oral Q AM Rina Taylor APRN   40 mg at 04/04/25 0514    promethazine (PHENERGAN) tablet 25 mg  25 mg Oral Q6H PRN Judit Kendrick MD        rOPINIRole (REQUIP) tablet 1 mg  1 mg Oral TID Judit Kendrick MD   1 mg at 04/04/25 0856    sodium bicarbonate tablet 650 mg  650 mg Oral TID Judit Kendrick MD   650 mg at 04/04/25 0856    sodium chloride 0.9 % flush 10 mL  10 mL Intravenous PRN Judit Kendrick MD        sodium chloride 0.9 % flush 10 mL  10 mL Intravenous Q12H Rina Taylor APRN   10 mL at 04/04/25 0857    sodium chloride 0.9 % flush 10 mL  10 mL Intravenous PRN Rina Taylor, APRN         sodium chloride 0.9 % infusion 40 mL  40 mL Intravenous PRN Rina Taylor APRN        sodium chloride 0.9 % with KCl 20 mEq/L infusion  75 mL/hr Intravenous Continuous Feliberto Barrow MD 75 mL/hr at 04/03/25 1705 75 mL/hr at 04/03/25 1705    tamsulosin (FLOMAX) 24 hr capsule 0.4 mg  0.4 mg Oral Daily MendozaShonAlexis CHRISTIANARAKESH   0.4 mg at 04/04/25 0856       Past Medical History:  Past Medical History:   Diagnosis Date    Arthritis     Contusion     Degenerative disc disease, cervical     Diabetes mellitus     Elevated cholesterol     Fibromyalgia     Neuropathy     Osteoporosis     Pancreatitis     Raynaud disease     Renal insufficiency     Smoker 02/08/2022    Vitamin D deficiency 04/26/2022       Past Surgical History:  Past Surgical History:   Procedure Laterality Date    APPENDECTOMY      CHOLECYSTECTOMY      COLONOSCOPY      ENDOSCOPY N/A 10/08/2019    Procedure: ESOPHAGOGASTRODUODENOSCOPY WITH ANESTHESIA;  Surgeon: Aleks Vaughn DO;  Location: [facility] ENDOSCOPY;  Service: Gastroenterology    ENDOSCOPY N/A 11/12/2024    Procedure: ESOPHAGOGASTRODUODENOSCOPY WITH ANESTHESIA;  Surgeon: Tiffanie Saucedo MD;  Location: [facility] ENDOSCOPY;  Service: Gastroenterology;  Laterality: N/A;  pre op: Gastroparesis, Intractable nausea  post op: insertion of dobhoff   PCP: Brandon Murguia    ENDOSCOPY N/A 11/15/2024    Procedure: ESOPHAGOGASTRODUODENOSCOPY WITH ANESTHESIA WITH NG TUBE INSERTION;  Surgeon: Tiffanie Saucedo MD;  Location: [facility] ENDOSCOPY;  Service: Gastroenterology;  Laterality: N/A;  PRE OP: DOBHOFF  POST OP: DOBHOFF  PCP: TELLY PACK    ENDOSCOPY WITH GASTROSTOMY TUBE INSERTION N/A 6/15/2022    Procedure: ESOPHAGOGASTRODUODENOSCOPY WITH GASTROSTOMY TUBE INSERTION;  Surgeon: Jersey Lee MD;  Location: [facility] ENDOSCOPY;  Service: Gastroenterology;  Laterality: N/A;  pre peg placement  post peg placement  Dr. Murguia    ENTEROSCOPY SMALL BOWEL N/A 11/3/2022    Procedure:  Esophagogastroduodenoscopy with Peg Removal;  Surgeon: Aleks Vaughn DO;  Location: [facility] ENDOSCOPY;  Service: Gastroenterology;  Laterality: N/A;  pre; peg removal  post; peg removal   KIRILL Murguia MD        HYSTERECTOMY         Family History  Family History   Adopted: Yes   Problem Relation Age of Onset    Colon polyps Neg Hx     Colon cancer Neg Hx        Social History  Social History     Socioeconomic History    Marital status:    Tobacco Use    Smoking status: Every Day     Current packs/day: 0.50     Average packs/day: 0.5 packs/day for 32.3 years (16.1 ttl pk-yrs)     Types: Cigarettes     Start date: 1993     Passive exposure: Current   Vaping Use    Vaping status: Never Used   Substance and Sexual Activity    Alcohol use: No    Drug use: No    Sexual activity: Defer       Review of Systems:  History obtained from chart review and the patient  General ROS: No fever or chills  Respiratory ROS: No cough, shortness of breath, wheezing  Cardiovascular ROS: No chest pain or palpitations  Gastrointestinal ROS: No abdominal pain or melena  Genito-Urinary ROS: No dysuria or hematuria  Psych ROS: No anxiety and depression  14 point ROS reviewed with the patient and negative except as noted above and in the HPI unless unable to obtain.    Objective:  Patient Vitals for the past 24 hrs:   BP Temp Temp src Pulse Resp SpO2   04/04/25 0800 146/96 98.3 °F (36.8 °C) Oral 108 18 100 %   04/04/25 0626 135/89 98.2 °F (36.8 °C) Oral 100 18 100 %   04/03/25 1928 135/80 98.5 °F (36.9 °C) Oral 99 16 99 %   04/03/25 1622 126/79 98.3 °F (36.8 °C) Oral 107 16 99 %   04/03/25 1130 128/82 98.1 °F (36.7 °C) Oral 112 18 99 %       Intake/Output Summary (Last 24 hours) at 4/4/2025 0944  Last data filed at 4/4/2025 0900  Gross per 24 hour   Intake 1639.37 ml   Output --   Net 1639.37 ml     General: awake/alert   Chest:  clear to auscultation bilaterally without respiratory distress  CVS: regular rate and  rhythm  Abdominal: soft, nontender, positive bowel sounds  Extremities: no cyanosis or edema  Skin: warm and dry without rash      Labs:  Results from last 7 days   Lab Units 04/04/25  0508 04/03/25  0912 04/02/25  0734   WBC 10*3/mm3 11.07* 11.15* 10.48   HEMOGLOBIN g/dL 9.1* 9.1* 8.8*   HEMATOCRIT % 28.9* 29.2* 27.1*   PLATELETS 10*3/mm3 367 352 334         Results from last 7 days   Lab Units 04/04/25  0508 04/03/25  0912 04/02/25  1214 04/02/25  0338 04/01/25  2141 04/01/25  1226   SODIUM mmol/L 142 141 138 140   < > 137   POTASSIUM mmol/L 4.5 4.0 4.0 3.3*   < > 2.9*   CHLORIDE mmol/L 110* 108* 108* 108*   < > 100   CO2 mmol/L 24.0 23.0 17.0* 20.0*   < > 21.0*   BUN mg/dL 29* 32* 38* 44*   < > 57*   CREATININE mg/dL 2.00* 1.82* 2.18* 2.54*   < > 3.00*   CALCIUM mg/dL 8.3* 8.3* 7.4* 6.9*   < > 7.1*   EGFR mL/min/1.73 29.4* 32.9* 26.5* 22.1*   < > 18.1*   BILIRUBIN mg/dL  --  <0.2  --  <0.2  --  0.2   ALK PHOS U/L  --  122*  --  99  --  136*   ALT (SGPT) U/L  --  7  --  7  --  11   AST (SGOT) U/L  --  16  --  11  --  19   GLUCOSE mg/dL 124* 229* 163* 153*   < > 177*    < > = values in this interval not displayed.       Radiology:   Imaging Results (Last 72 Hours)       ** No results found for the last 72 hours. **            Culture:  Blood Culture   Date Value Ref Range Status   03/31/2025 No growth at 24 hours  Preliminary   03/31/2025 No growth at 24 hours  Preliminary         Assessment    Acute kidney injury, prerenal due to volume depletion--improving  Baseline chronic kidney disease stage 3b  Hypokalemia  Hypomagnesemia   Hypophosphatemia   Type 2 diabetes  Chronic pancreatitis  Gastroparesis  Metabolic acidosis    Plan:   Continue current IV fluids  Renal function improving.  Working on PICC line and outpatient IV fluid orders. Once these are in place would be OK for discharge from renal standpoint.      RAKESH Zee  4/4/2025  09:44 CDT      Electronically signed by Ford Elena, RAKESH  "at 04/04/25 0945       Otto Del Rosario MD at 04/03/25 1357              [facility] Medicine Services  INPATIENT PROGRESS NOTE    Patient Name: Tasha Perez  Date of Admission: 3/31/2025  Today's Date: 04/03/25  Length of Stay: 3  Primary Care Physician: KIRILL Murguia MD    Subjective   Chief Complaint: \"A lot better\"  HPI   Patient moved out of the ICU yesterday.  She reports that she is doing a lot better.  She is currently able to tolerate her diet.  She states that her symptoms of diarrhea have stopped.  She is agreeable to PICC line placement and resuming plans for outpatient IV fluids.  Unfortunately, she states that her GI symptoms recurred even after gastric stimulator placement earlier this year.    Review of Systems   All pertinent negatives and positives are as above. All other systems have been reviewed and are negative unless otherwise stated.     Objective    Temp:  [97 °F (36.1 °C)-99.8 °F (37.7 °C)] 98.1 °F (36.7 °C)  Heart Rate:  [103-120] 112  Resp:  [16-18] 18  BP: (120-153)/(74-87) 128/82  Physical Exam  Vitals reviewed.   Constitutional:       General: She is not in acute distress.     Appearance: She is not toxic-appearing.   HENT:      Head: Normocephalic.      Mouth/Throat:      Mouth: Mucous membranes are moist.   Pulmonary:      Effort: Pulmonary effort is normal. No respiratory distress.   Skin:     General: Skin is warm.   Neurological:      Mental Status: She is alert. Mental status is at baseline.   Psychiatric:         Mood and Affect: Mood normal.         Results Review:  I have reviewed the labs, radiology results, and diagnostic studies.    Laboratory Data:   Results from last 7 days   Lab Units 04/03/25  0912 04/02/25  0734 04/01/25  0353   WBC 10*3/mm3 11.15* 10.48 11.82*   HEMOGLOBIN g/dL 9.1* 8.8* 10.5*   HEMATOCRIT % 29.2* 27.1* 33.3*   PLATELETS 10*3/mm3 352 334 352        Results from last 7 days   Lab Units 04/03/25  0912 " 04/02/25  1214 04/02/25  0338 04/01/25  2141 04/01/25  1226   SODIUM mmol/L 141 138 140   < > 137   POTASSIUM mmol/L 4.0 4.0 3.3*   < > 2.9*   CHLORIDE mmol/L 108* 108* 108*   < > 100   CO2 mmol/L 23.0 17.0* 20.0*   < > 21.0*   BUN mg/dL 32* 38* 44*   < > 57*   CREATININE mg/dL 1.82* 2.18* 2.54*   < > 3.00*   CALCIUM mg/dL 8.3* 7.4* 6.9*   < > 7.1*   BILIRUBIN mg/dL <0.2  --  <0.2  --  0.2   ALK PHOS U/L 122*  --  99  --  136*   ALT (SGPT) U/L 7  --  7  --  11   AST (SGOT) U/L 16  --  11  --  19   GLUCOSE mg/dL 229* 163* 153*   < > 177*    < > = values in this interval not displayed.       Culture Data:   Blood Culture   Date Value Ref Range Status   03/31/2025 No growth at 2 days  Preliminary   03/31/2025 No growth at 2 days  Preliminary       Radiology Data:   Imaging Results (Last 24 Hours)       ** No results found for the last 24 hours. **            I have reviewed the patient's current medications.     Assessment/Plan   Assessment  Active Hospital Problems    Diagnosis     **Dehydration     Other chronic pancreatitis     QT prolongation     Metabolic acidosis     Gastroparesis     Intractable nausea     Hypomagnesemia     Acute kidney injury superimposed on stage 3b chronic kidney disease     Hypokalemia     Diabetic polyneuropathy associated with type 2 diabetes mellitus     Severe malnutrition        Treatment Plan  Transferred out of ICU on 4/2  GI and Nephrology recommendations reviewed  Continue IVFs with K supplement  Continue scheduled Reglan with meals  Needs follow-up with GI specialist in [place], [place] (who placed gastric stimulator)  Previously patient required an insulin pump - now no longer using.  A1c on 4/1 was 8.6.  SSI coverage and monitor BS trend  CBC and BMP in AM  Mobilize/ambulate  Patient has been on Abxs with Rocephin for 3 days.  Intensivist documentation reviewed and this was started empirically in setting of leukocytosis on admission.  WBC 11.15 today.  Add procalcitonin to labs  already sent.  Blood cultures negative.  GI PCR and C. Diff studies negative.  CXR with no acute process.  For now, plan to hold and monitor closely off of antibiotics.  Plan for PICC line (?tomorrow) and arrangement for IVFs in the outpatient setting; will communicate with SW today to make sure those plans are in process.  Dispo: anticipate home potentially in 1-2 days; patient reports she has outpatient follow-up with GI in [place] ~ May 1, 2025.      Medical Decision Making  Number and Complexity of problems: Moderate complexity      Conditions and Status        Condition is improving.     [facility] Data  External documents reviewed: none  Cardiac tracing (EKG, telemetry) interpretation: no new EKGs  Radiology interpretation: no new radiology studies  Labs reviewed: as above  Any tests that were considered but not ordered: none     Decision rules/scores evaluated (example [id number], Wells, etc): none     Discussed with: patient     Care Planning  Shared decision making: Discussed with patient with agreement to proceed with treatment plan as outlined  Code status and discussions: Full code    Disposition  Social Determinants of Health that impact treatment or disposition: None apparent at this time  I expect the patient to be discharged to home ~1-2 days with plans for outpatient IVFs via PICC line as coordinated by Nephrology in the past        Electronically signed by Otto Del Rosario MD, 25, 13:57 CDT.     Electronically signed by Otto Del Rosario MD at [address] [place] [address]  Phone:  [phone]  Fax:  [phone]        Patient: ROOM: [facility]   Tasha Perez MRN:  [id number]   [address] [place] [address] :  1970  SSN:    Phone: [phone] Sex:  F   PCP: KIRILL Murguia                Emergency Contact Information      Name Relation Home Work Mobile     JENNIFER PEREZ Spouse [phone]      CASSIA PEREZ Daughter     [phone]       Other Contacts    None on File         INSURANCE PA[employer]AN [employer] # SUBSCRIBER ID   Primary:    [facility] [id number]   [id number]   Admitting Diagnosis: Metabolic acidosis [E87.20]  Order Date:  Apr 3, 2025        Case Management  Consult       (Order ID: [id number])     Diagnosis:         Priority:  Routine Expected Date:   Expiration Date:        Interval:  Once Count:    Reason for consult? Other (see comments)  Comments: just wanted to make sure this was on the radar: plan for home with outpatient IVFs via PICC (like she had in the past)        Authorizing Provider:Otto Del Rosario MD  Authorizing Provider's NPI: [id number]  Order Entered By: Otto Del Rosario MD 4/3/2025  2:15 PM     Electronically signed by: Otto Del Rosario MD 4/3/2025  2:15 PM      [address] [address]  Phone:  [phone]  Fax:  [phone]        Patient: ROOM: [facility]   Tasha Perez MRN:  [id number]   [address] [place] [address] :  1970  SSN:    Phone: [phone] Sex:  F   PCP: KIRILL Murguia                Emergency Contact Information      Name Relation Home Work Mobile     JENNIFER PEREZ Spouse [phone]     CASSIA PEREZ Daughter     [phone]       Other Contacts    None on File         INSURANCE PAYOR PLAN GROUP # SUBSCRIBER ID   Primary:    [facility] [id number]   [id number]   Admitting Diagnosis: Metabolic acidosis [E87.20]  Order Date:  2025        Vascular Access Consult       (Order ID: [id number])     Diagnosis:         Priority:  Routine Expected Date:   Expiration Date:        Interval:  Once Count:    Access Needed: Central Venous Access  Reason for Consult: IV fluids at time of discharge  Lumens: IV Team to Determine Number of Lumens        Authorizing Provider:Ministerio Capone MD  Authorizing Provider's NPI:  [id number]  Order Entered By: Ministerio Capone MD 4/4/2025 11:00 AM     Electronically signed by: Ministerio Capone MD 4/4/2025 11:00 AM

## 2025-04-04 NOTE — CONSULTS
Patient or patient's representative gives informed consent after an explanation of the risks (air embolism; catheter occlusion; phlebitis; catheter infection; bloodstream infection; vein thrombosis; hematoma/bleeding at the insertion site; slight discomfort; accidental puncture of an artery, nerve, or tendon; dislodging of device), benefits (longer dwell time, outpatient treatment, medications that cannot run peripherally), and alternatives (short peripheral catheter, centrally inserted central line, or permanent catheter) of PICC placement. Time provided to ask and answer questions.    Pt had 4 [place] single lumen PICC placed in right basilic vein. Pt tolerated procedure well. 40 cm of catheter internal and 0 cm external. Catheter to vein ratio 25 %. Tip confirmation by 3cg. All lumens flush and draw well. Sterile dressing applied.

## 2025-04-04 NOTE — PROGRESS NOTES
Nephrology ([facility]) Progress Note      Patient:  Tasha Perez  YOB: 1970  Date of Service: 4/4/2025  MRN: [id number]   Acct: [id number]   Primary Care Physician: KIRILL Murguia MD  Advance Directive:   Code Status and Medical Interventions: CPR (Attempt to Resuscitate); Full Support   Ordered at: 03/31/25 2301     Code Status (Patient has no pulse and is not breathing):    CPR (Attempt to Resuscitate)     Medical Interventions (Patient has pulse or is breathing):    Full Support     Level Of Support Discussed With:    Patient     Admit Date: 3/31/2025       Hospital Day: 4  Referring Provider: No Known Provider      Patient personally seen and examined.  Complete chart including Consults, Notes, Operative Reports, Labs, Cardiology, and Radiology studies reviewed as able.        Subjective:  Tasha Perez is a 54 y.o. female for whom we were consulted for evaluation and treatment of acute kidney injury. Baseline chronic kidney disease stage 3b. Follows with Dr Barrow in our office. History of many previous hospitalizations for SUGAR due to volume depletion. History of recurrent pancreatitis, gastroparesis, type 2 diabetes, osteoarthritis. Has had feeding tubes and PICC lines many times in the past but these never manage to stay in long for a variety of reasons. Most recent discharge from hospital was on 3/21. Patient had a PICC line placed during that admission in anticipation of ongoing need for outpatient IV fluids. However, on the day of discharge, there was apparently some sort of delay in getting the outpatient fluids ordered and patient was unwilling to wait--so she requested the PICC be removed so she could go ahead and go home. She returned to ER on 3/31 with fatigue, malaise. Labs showed creatinine up to 4.55, CO2 10.0, sodium 128, pH 7.068. Admitted to CCU and IV fluid resuscitation started.  Creatinine has been improving, electrolytes being replaced as  indicated.  On 4/02 had urinary retention requiring in/out cath. Moved to medical floor    Today is awake and alert. No new complaints or overnight issues.    Allergies:  Bee venom, Hydrocodone, Hydroxychloroquine, Ibuprofen, Pineapple, Pineapple extract, Wasp venom, Wasp venom protein, Hydrocodone-acetaminophen, Penicillins, Sitagliptin, Metformin, Chocolate, and Poison ivy extract    Home Meds:  Medications Prior to Admission   Medication Sig Dispense Refill Last Dose/Taking    albuterol sulfate  (90 Base) MCG/ACT inhaler Inhale 2 puffs 3 (Three) Times a Day As Needed for Wheezing.   Past Week    aspirin 81 MG EC tablet Take 1 tablet by mouth Daily.   Past Week    atorvastatin (LIPITOR) 80 MG tablet Take 1 tablet by mouth Every Night.   Past Week    Calcium Carbonate-Vitamin D 600-5 MG-MCG tablet Take 1 tablet by mouth Daily.   Past Week    cetirizine (zyrTEC) 10 MG tablet Take 1 tablet by mouth Daily As Needed for Allergies.   Past Week    DULoxetine (CYMBALTA) 60 MG capsule Take 1 capsule by mouth Daily.   Past Week    linaclotide (LINZESS) 290 MCG capsule capsule Take 1 capsule by mouth Every Morning Before Breakfast.   Past Week    nortriptyline (PAMELOR) 25 MG capsule Take 1 capsule by mouth Every Night.   Past Week    omeprazole (priLOSEC) 40 MG capsule Take 1 capsule by mouth Every Other Day.   Past Week    promethazine (PHENERGAN) 25 MG tablet Take 1 tablet by mouth Every 6 (Six) Hours As Needed for Nausea or Vomiting.   Past Month    rOPINIRole (REQUIP) 1 MG tablet Take 1 tablet by mouth 3 (Three) Times a Day.   Past Week    sodium bicarbonate 650 MG tablet Take 1 tablet by mouth 2 (Two) Times a Day.   Past Week    sucralfate (CARAFATE) 1 g tablet Take 1 tablet by mouth 4 (Four) Times a Day.   Past Week    triamcinolone (KENALOG) 0.1 % cream Apply 1 Application topically to the appropriate area as directed Daily. APPLY A THIN LAYER TOPICALLY TO THE AFFECTED AREAS BEFORE APPLYING DEXCOM   Past Week        Medicines:  Current Facility-Administered Medications   Medication Dose Route Frequency Provider Last Rate Last Admin    acetaminophen (TYLENOL) tablet 650 mg  650 mg Oral Q4H PRN Rina Taylor APRN   650 mg at 04/02/25 0426    Or    acetaminophen (TYLENOL) suppository 650 mg  650 mg Rectal Q4H PRN Rina Taylor APRN        albuterol (PROVENTIL) nebulizer solution 0.083% 2.5 mg/3mL  2.5 mg Nebulization Q6H PRN Judit Kendrick MD        aspirin EC tablet 81 mg  81 mg Oral Daily Judit Kendrick MD   81 mg at 04/04/25 0856    atorvastatin (LIPITOR) tablet 80 mg  80 mg Oral Nightly Rina Taylor APRN   80 mg at 04/03/25 2016    sennosides-docusate (PERICOLACE) 8.6-50 MG per tablet 2 tablet  2 tablet Oral BID Rina Taylor APRN   2 tablet at 04/04/25 0856    And    polyethylene glycol (MIRALAX) packet 17 g  17 g Oral Daily PRN Rina Taylor APRN        And    bisacodyl (DULCOLAX) suppository 10 mg  10 mg Rectal Daily PRN Rina Taylor APRN        calcium carbonate (TUMS) chewable tablet 500 mg (200 mg elemental)  1 tablet Oral Daily Judit Kendrick MD   1 tablet at 04/04/25 0856    And    cholecalciferol (VITAMIN D3) tablet 500 Units  500 Units Oral Daily Judit Kendrick MD   500 Units at 04/04/25 0856    cetirizine (zyrTEC) tablet 10 mg  10 mg Oral Daily PRN Judit Kendrick MD        dextrose (D50W) (25 g/50 mL) IV injection 25 g  25 g Intravenous Q15 Min PRN Rina Taylor APRN        dextrose (GLUTOSE) oral gel 15 g  15 g Oral Q15 Min PRN Rina Taylor APRN        DULoxetine (CYMBALTA) DR capsule 30 mg  30 mg Oral Daily Judit Kendrick MD   30 mg at 04/04/25 0856    glucagon (GLUCAGEN) injection 1 mg  1 mg Intramuscular Q15 Min PRN Rina Taylor APRN        heparin (porcine) 5000 UNIT/ML injection 5,000 Units  5,000 Units Subcutaneous Q12H Rina Taylor APRN   5,000 Units at 04/04/25 0856    Insulin Lispro (humaLOG) injection 1-5 Units  1-5 Units  Subcutaneous 4x Daily AC & at Bedtime Rina Taylor APRN   3 Units at 04/03/25 2026    ipratropium-albuterol (DUO-NEB) nebulizer solution 3 mL  3 mL Nebulization Q6H PRN Rina Taylor APRN        lubiprostone (AMITIZA) capsule 8 mcg  8 mcg Oral BID Judit Kendrick MD   8 mcg at 04/04/25 0856    metoclopramide (REGLAN) solution 5 mg  5 mg Oral 4x Daily AC & at Bedtime Judit Kendrick MD   5 mg at 04/04/25 0856    mupirocin (BACTROBAN) 2 % nasal ointment 1 Application  1 Application Each Nare BID Rina Taylor APRN   1 Application at 04/04/25 0856    nitroglycerin (NITROSTAT) SL tablet 0.4 mg  0.4 mg Sublingual Q5 Min PRN Rina Taylor APRN        nortriptyline (PAMELOR) capsule 25 mg  25 mg Oral Nightly Judit Kendrick MD   25 mg at 04/03/25 2016    ondansetron (ZOFRAN) injection 4 mg  4 mg Intravenous Q6H PRN Rina Taylor APRN        pantoprazole (PROTONIX) EC tablet 40 mg  40 mg Oral Q AM Rina Taylor APRN   40 mg at 04/04/25 0514    promethazine (PHENERGAN) tablet 25 mg  25 mg Oral Q6H PRN Judit Kendrick MD        rOPINIRole (REQUIP) tablet 1 mg  1 mg Oral TID Judit Kendrick MD   1 mg at 04/04/25 0856    sodium bicarbonate tablet 650 mg  650 mg Oral TID Judit Kendrick MD   650 mg at 04/04/25 0856    sodium chloride 0.9 % flush 10 mL  10 mL Intravenous PRN Judit Kendrick MD        sodium chloride 0.9 % flush 10 mL  10 mL Intravenous Q12H Rina Taylor APRN   10 mL at 04/04/25 0857    sodium chloride 0.9 % flush 10 mL  10 mL Intravenous PRN Rina Taylor APRN        sodium chloride 0.9 % infusion 40 mL  40 mL Intravenous PRN Brandon, Rina D, APRN        sodium chloride 0.9 % with KCl 20 mEq/L infusion  75 mL/hr Intravenous Continuous Feliberto Barrow MD 75 mL/hr at 04/03/25 1705 75 mL/hr at 04/03/25 1705    tamsulosin (FLOMAX) 24 hr capsule 0.4 mg  0.4 mg Oral Daily Alexis Donaldson APRN   0.4 mg at 04/04/25 0856       Past Medical  History:  Past Medical History:   Diagnosis Date    Arthritis     Contusion     Degenerative disc disease, cervical     Diabetes mellitus     Elevated cholesterol     Fibromyalgia     Neuropathy     Osteoporosis     Pancreatitis     Raynaud disease     Renal insufficiency     Smoker 02/08/2022    Vitamin D deficiency 04/26/2022       Past Surgical History:  Past Surgical History:   Procedure Laterality Date    APPENDECTOMY      CHOLECYSTECTOMY      COLONOSCOPY      ENDOSCOPY N/A 10/08/2019    Procedure: ESOPHAGOGASTRODUODENOSCOPY WITH ANESTHESIA;  Surgeon: Aleks Vaughn DO;  Location: [facility] ENDOSCOPY;  Service: Gastroenterology    ENDOSCOPY N/A 11/12/2024    Procedure: ESOPHAGOGASTRODUODENOSCOPY WITH ANESTHESIA;  Surgeon: Tiffanie Saucedo MD;  Location: [facility] ENDOSCOPY;  Service: Gastroenterology;  Laterality: N/A;  pre op: Gastroparesis, Intractable nausea  post op: insertion of dobhoff   PCP: Brandon Murguia    ENDOSCOPY N/A 11/15/2024    Procedure: ESOPHAGOGASTRODUODENOSCOPY WITH ANESTHESIA WITH NG TUBE INSERTION;  Surgeon: Tiffanie Saucedo MD;  Location: [facility] ENDOSCOPY;  Service: Gastroenterology;  Laterality: N/A;  PRE OP: DOBHOFF  POST OP: DOBHOFF  PCP: TELLY PACK    ENDOSCOPY WITH GASTROSTOMY TUBE INSERTION N/A 6/15/2022    Procedure: ESOPHAGOGASTRODUODENOSCOPY WITH GASTROSTOMY TUBE INSERTION;  Surgeon: Jersey Lee MD;  Location: [facility] ENDOSCOPY;  Service: Gastroenterology;  Laterality: N/A;  pre peg placement  post peg placement  Dr. Murguia    ENTEROSCOPY SMALL BOWEL N/A 11/3/2022    Procedure: Esophagogastroduodenoscopy with Peg Removal;  Surgeon: Aleks Vaughn DO;  Location: [facility] ENDOSCOPY;  Service: Gastroenterology;  Laterality: N/A;  pre; peg removal  post; peg removal   KIRILL Murguia MD        HYSTERECTOMY         Family History  Family History   Adopted: Yes   Problem Relation Age of Onset    Colon polyps Neg Hx     Colon cancer Neg Hx        Social History  Social History      Socioeconomic History    Marital status:    Tobacco Use    Smoking status: Every Day     Current packs/day: 0.50     Average packs/day: 0.5 packs/day for 32.3 years (16.1 ttl pk-yrs)     Types: Cigarettes     Start date: 1993     Passive exposure: Current   Vaping Use    Vaping status: Never Used   Substance and Sexual Activity    Alcohol use: No    Drug use: No    Sexual activity: Defer       Review of Systems:  History obtained from chart review and the patient  General ROS: No fever or chills  Respiratory ROS: No cough, shortness of breath, wheezing  Cardiovascular ROS: No chest pain or palpitations  Gastrointestinal ROS: No abdominal pain or melena  Genito-Urinary ROS: No dysuria or hematuria  Psych ROS: No anxiety and depression  14 point ROS reviewed with the patient and negative except as noted above and in the HPI unless unable to obtain.    Objective:  Patient Vitals for the past 24 hrs:   BP Temp Temp src Pulse Resp SpO2   04/04/25 0800 146/96 98.3 °F (36.8 °C) Oral 108 18 100 %   04/04/25 0626 135/89 98.2 °F (36.8 °C) Oral 100 18 100 %   04/03/25 1928 135/80 98.5 °F (36.9 °C) Oral 99 16 99 %   04/03/25 1622 126/79 98.3 °F (36.8 °C) Oral 107 16 99 %   04/03/25 1130 128/82 98.1 °F (36.7 °C) Oral 112 18 99 %       Intake/Output Summary (Last 24 hours) at 4/4/2025 0944  Last data filed at 4/4/2025 0900  Gross per 24 hour   Intake 1639.37 ml   Output --   Net 1639.37 ml     General: awake/alert   Chest:  clear to auscultation bilaterally without respiratory distress  CVS: regular rate and rhythm  Abdominal: soft, nontender, positive bowel sounds  Extremities: no cyanosis or edema  Skin: warm and dry without rash      Labs:  Results from last 7 days   Lab Units 04/04/25  0508 04/03/25  0912 04/02/25  0734   WBC 10*3/mm3 11.07* 11.15* 10.48   HEMOGLOBIN g/dL 9.1* 9.1* 8.8*   HEMATOCRIT % 28.9* 29.2* 27.1*   PLATELETS 10*3/mm3 367 352 334         Results from last 7 days   Lab Units 04/04/25  8190  04/03/25  0912 04/02/25  1214 04/02/25  0338 04/01/25  2141 04/01/25  1226   SODIUM mmol/L 142 141 138 140   < > 137   POTASSIUM mmol/L 4.5 4.0 4.0 3.3*   < > 2.9*   CHLORIDE mmol/L 110* 108* 108* 108*   < > 100   CO2 mmol/L 24.0 23.0 17.0* 20.0*   < > 21.0*   BUN mg/dL 29* 32* 38* 44*   < > 57*   CREATININE mg/dL 2.00* 1.82* 2.18* 2.54*   < > 3.00*   CALCIUM mg/dL 8.3* 8.3* 7.4* 6.9*   < > 7.1*   EGFR mL/min/1.73 29.4* 32.9* 26.5* 22.1*   < > 18.1*   BILIRUBIN mg/dL  --  <0.2  --  <0.2  --  0.2   ALK PHOS U/L  --  122*  --  99  --  136*   ALT (SGPT) U/L  --  7  --  7  --  11   AST (SGOT) U/L  --  16  --  11  --  19   GLUCOSE mg/dL 124* 229* 163* 153*   < > 177*    < > = values in this interval not displayed.       Radiology:   Imaging Results (Last 72 Hours)       ** No results found for the last 72 hours. **            Culture:  Blood Culture   Date Value Ref Range Status   03/31/2025 No growth at 24 hours  Preliminary   03/31/2025 No growth at 24 hours  Preliminary         Assessment    Acute kidney injury, prerenal due to volume depletion--improving  Baseline chronic kidney disease stage 3b  Hypokalemia  Hypomagnesemia   Hypophosphatemia   Type 2 diabetes  Chronic pancreatitis  Gastroparesis  Metabolic acidosis    Plan:   Continue current IV fluids  Renal function improving.  Working on PICC line and outpatient IV fluid orders. Once these are in place would be OK for discharge from renal standpoint.      Ford Elena, APRN  4/4/2025  09:44 CDT

## 2025-04-04 NOTE — PLAN OF CARE
Goal Outcome Evaluation:  Plan of Care Reviewed With: patient        Progress: improving  Outcome Evaluation: Patient A/O x 4. VSS. NSR-ST on telemetry. Tolerating  IV fluids. Lost IV access temporarily, however was able to be restarted with ultrasound guidance. No PRNs requested. No new concerns. Safety maintained.

## 2025-04-04 NOTE — DISCHARGE SUMMARY
[facility] ER Medicine Services  DISCHARGE SUMMARY       Date of Admission: 3/31/2025  Date of Discharge:  4/4/2025  Primary Care Physician: KIRILL Murguia MD    Presenting Problem/History of Present Illness:  Metabolic acidosis, acute on chronic renal insufficiency     Final Discharge Diagnoses:  Active Hospital Problems    Diagnosis     **Dehydration     Other chronic pancreatitis     QT prolongation     Metabolic acidosis     Gastroparesis     Intractable nausea     Hypomagnesemia     Acute kidney injury superimposed on stage 3b chronic kidney disease     Hypokalemia     Diabetic polyneuropathy associated with type 2 diabetes mellitus     Severe malnutrition        Consults: Neurology  Gastroenterology    Procedures Performed: none    Pertinent Test Results:   Results for orders placed during the hospital encounter of 10/05/19    Adult Transthoracic Echo Complete W/ Cont if Necessary Per Protocol    Interpretation Summary  · Left ventricular systolic function is normal.    NORMAL STUDY      Imaging Results (All)       Procedure Component Value Units Date/Time    CT Abdomen Pelvis Without Contrast [[id number]] Collected: 03/31/25 1839     Updated: 03/31/25 1848    Narrative:      EXAMINATION: CT ABDOMEN PELVIS WO CONTRAST- 3/31/2025 6:39 PM     HISTORY: abd pain, leukocytosis; N17.9-Acute kidney failure,  unspecified; N18.9-Chronic kidney disease, unspecified;  E87.2-Zfku-hotaxzkqhk and hyponatremia     TOTAL DOSE: 294.8 mGy.cm (Automatic exposure control technique was  implemented in an effort to keep the radiation dose as low as possible  without compromising image quality)     REPORT: Spiral CT of the abdomen and pelvis was performed without  contrast from the lung bases through the pubic symphysis. Reconstructed  coronal and sagittal images are also reviewed.     Comparison: CT abdomen and pelvis without contrast 3/18/2025.     Review of the lung windows demonstrates mild  bibasilar atelectasis.  Evaluation of the solid abdominal organs is limited without intravenous  contrast. Cholecystectomy clips are present. The liver, spleen appear  unremarkable, there is low-attenuation thickening of both adrenal glands  as before which may be related to adrenal hyperplasia or adrenal  adenomas. Some ingested food is noted in the stomach. There are few  small calcifications within the pancreatic head as before suggestive of  chronic pancreatitis. The fat stranding around the pancreatic head seen  before appears resolved. No evidence of acute pancreatitis currently. No  pancreatic ductal dilation is identified. The renal contours are smooth.  No hydronephrosis is identified.     The ureters are decompressed. The bladder appears within normal limits.  There is persistent and increased volume of fluid throughout the colon,  with air-fluid levels and mild colonic distention. This is compatible  with worsening of a diarrheal type illness, no colonic wall thickening  or evidence of bowel obstruction is identified. There is no pneumatosis,  no free air or abscess. Appears the patient has had previous  appendectomy. There is streak artifact associated with an implanted  neurostimulator over the anterior right abdomen, with leads extending to  the anterior wall of the distal stomach. Review of bone windows shows no  acute abnormality. There is evidence of previous hysterectomy.       Impression:      1. Interval increase in the volume of gas and fluid throughout the colon  compatible with worsening of the diarrheal type illness, no mechanical  bowel obstruction is identified. There is no evidence of perforation or  intra-abdominal abscess.  2. The mild changes of acute pancreatitis seen adjacent to the  pancreatic head before appear resolved. A few small calcifications are  noted within the pancreatic head suggesting chronic pancreatitis.  3. Evidence of previous cholecystectomy and appendectomy.  Again  demonstrated is a gastric stimulator.  4. Stable low-attenuation thickening of both adrenal glands may be  related to adrenal hyperplasia or adenomas. This is likely benign.           This report was signed and finalized on 3/31/2025 6:45 PM by Dr. Jose Antonio Bermeo MD.       XR Chest 1 View [[id number]] Collected: 03/31/25 1554     Updated: 03/31/25 1558    Narrative:      XR CHEST 1 VW-     HISTORY: Weak/Dizzy/AMS triage protocol     COMPARISON: 12/8/2024     FINDINGS: Frontal view of the chest obtained.     The lungs are well-expanded and clear. Cardiomediastinal contours are  normal. No pleural effusion or pneumothorax. No acute regional bony  pathology.       Impression:      1. No acute radiographic cardiopulmonary process.        This report was signed and finalized on 3/31/2025 3:55 PM by Dr. Gloria Roe MD.             LAB RESULTS:      Lab 04/04/25  0508 04/03/25  0912 04/02/25  0734 04/01/25  0353 03/31/25  2330 03/31/25  1654   WBC 11.07* 11.15* 10.48 11.82*  --  23.75*   HEMOGLOBIN 9.1* 9.1* 8.8* 10.5*  --  12.3   HEMATOCRIT 28.9* 29.2* 27.1* 33.3*  --  39.2   PLATELETS 367 352 334 352  --  501*   NEUTROS ABS 5.59 7.73* 7.41* 8.34*  --  18.79*   IMMATURE GRANS (ABS) 0.09* 0.08* 0.04 0.15*  --  0.21*   LYMPHS ABS 4.40* 2.43 2.11 2.25  --  3.02   MONOS ABS 0.56 0.64 0.74 0.85  --  1.40*   EOS ABS 0.40 0.25 0.16 0.20  --  0.25   MCV 92.3 91.8 90.6 92.5  --  92.9   PROCALCITONIN  --  0.10  --   --   --   --    LACTATE  --   --   --   --  1.2  --          Lab 04/04/25  0508 04/03/25  0912 04/02/25  1214 04/02/25  0338 04/01/25  2141 04/01/25  1226 04/01/25  0353 03/31/25 1957 03/31/25  1654   SODIUM 142 141 138 140 139 137 138   < > 128*   SODIUM, ARTERIAL  --   --   --   --   --   --   --    < >  --    POTASSIUM 4.5 4.0 4.0 3.3* 3.0* 2.9* 2.7*   < > 3.9   CHLORIDE 110* 108* 108* 108* 105 100 105   < > 95*   CO2 24.0 23.0 17.0* 20.0* 21.0* 21.0* 13.0*   < > 10.0*   ANION GAP 8.0 10.0 13.0 12.0  13.0 16.0* 20.0*   < > 23.0*   BUN 29* 32* 38* 44* 52* 57* 65*   < > 78*   CREATININE 2.00* 1.82* 2.18* 2.54* 2.77* 3.00* 3.25*   < > 4.55*   EGFR 29.4* 32.9* 26.5* 22.1* 19.9* 18.1* 16.4*   < > 11.0*   GLUCOSE 124* 229* 163* 153* 154* 177* 144*   < > 221*   CALCIUM 8.3* 8.3* 7.4* 6.9* 6.7* 7.1* 6.7*   < > 8.5*   MAGNESIUM  --  1.6 2.1 2.2 2.5 2.2 1.5*  --  1.9   PHOSPHORUS  --  2.6 2.8 2.2* 2.6  --  5.7*  --   --    HEMOGLOBIN A1C  --   --   --   --   --   --  8.60*  --   --    TSH  --   --   --   --   --   --   --   --  1.250    < > = values in this interval not displayed.         Lab 04/03/25  0912 04/02/25  0338 04/01/25  1226 03/31/25  1654   TOTAL PROTEIN 6.3 4.9* 6.2 8.5   ALBUMIN 3.1* 2.5* 3.2* 4.2   GLOBULIN 3.2 2.4 3.0 4.3   ALT (SGPT) 7 7 11 13   AST (SGOT) 16 11 19 18   BILIRUBIN <0.2 <0.2 0.2 0.2   ALK PHOS 122* 99 136* 174*   LIPASE  --   --   --  89*                     Lab 04/01/25  0939 03/31/25  1957   PH, ARTERIAL 7.390 7.068*   PCO2, ARTERIAL 33.2* 28.1*   PO2 ART 77.6* 101.0   O2 SATURATION ART 96.7 97.4   HCO3 ART 20.1 8.1*   BASE EXCESS ART -4.3* -20.7*   CARBOXYHEMOGLOBIN  --  2.4     Brief Urine Lab Results  (Last result in the past 365 days)        Color   Clarity   Blood   Leuk Est   Nitrite   Protein   CREAT   Urine HCG        03/31/25 1739 Dark Yellow   Cloudy   Negative   Trace   Negative   100 mg/dL (2+)                 Microbiology Results (last 10 days)       Procedure Component Value - Date/Time    Gastrointestinal Panel, PCR - Stool, Per Rectum [[id number]]  (Normal) Collected: 04/01/25 0736    Lab Status: Final result Specimen: Stool from Per Rectum Updated: 04/01/25 0912     Campylobacter Not Detected     Plesiomonas shigelloides Not Detected     Salmonella Not Detected     Vibrio Not Detected     Vibrio cholerae Not Detected     Yersinia enterocolitica Not Detected     Enteroaggregative E. coli (EAEC) Not Detected     Enteropathogenic E. coli (EPEC) Not Detected      Enterotoxigenic E. coli (ETEC) lt/st Not Detected     Shiga-like toxin-producing E. coli (STEC) stx1/stx2 Not Detected     Shigella/Enteroinvasive E. coli (EIEC) Not Detected     Cryptosporidium Not Detected     Cyclospora cayetanensis Not Detected     Entamoeba histolytica Not Detected     Giardia lamblia Not Detected     Adenovirus F40/41 Not Detected     Astrovirus Not Detected     Norovirus GI/GII Not Detected     Rotavirus A Not Detected     Sapovirus (I, II, IV or V) Not Detected    Clostridioides difficile Toxin - Stool, Per Rectum [[id number]]  (Normal) Collected: 04/01/25 0736    Lab Status: Final result Specimen: Stool from Per Rectum Updated: 04/01/25 0847    Narrative:      The following orders were created for panel order Clostridioides difficile Toxin - Stool, Per Rectum.  Procedure                               Abnormality         Status                     ---------                               -----------         ------                     Clostridioides difficile...[[id number]]  Normal              Final result                 Please view results for these tests on the individual orders.    Clostridioides difficile Toxin, PCR - Stool, Per Rectum [[id number]]  (Normal) Collected: 04/01/25 0736    Lab Status: Final result Specimen: Stool from Per Rectum Updated: 04/01/25 0847     Toxigenic C. difficile by PCR Negative    Narrative:      The result indicates the absence of toxigenic C. difficile from stool specimen.     Blood Culture - Blood, Hand, Left [[id number]]  (Normal) Collected: 03/31/25 2333    Lab Status: Preliminary result Specimen: Blood from Hand, Left Updated: 04/03/25 2345     Blood Culture No growth at 3 days    Blood Culture - Blood, Arm, Right [[id number]]  (Normal) Collected: 03/31/25 2330    Lab Status: Preliminary result Specimen: Blood from Arm, Right Updated: 04/03/25 2345     Blood Culture No growth at 3 days            Hospital Course:   54-year-old female patient past medical  history type 2 diabetes, degenerative disc disease, fibromyalgia, neuropathy, hypertension, daily tobacco user, stage III kidney disease, nonalcoholic pancreatitis with a recent admission for acute pancreatitis on March 18, 2025 along with acute on chronic kidney disease presents back to the ED tonight with general malaise and ongoing vomiting.  Labs reveal leukocytosis at 23, stable hemoglobin hematocrit 12.3 and 39.3 with platelets of 501.  Glucose 207.  BUN 78, creatinine 4.55 Baseline creatinine on discharge was 1.04 with BUN of 14.  Sodium 128, potassium 3.9, chloride 95, bicarb 10, calcium 8.5 alk phos 174, gap 23, GFR 11 on admission.     ABG revealed a pH of 7.068, pCO2 28 PaO2 101 bicarb 8.1.     Serum acetone negative.  Urinalysis dark cloudy however negative for glucose, trace of ketones negative for infection markers.  Protein greater than 100.  TSH 1.2, lipase 89.  Magnesium 1.9.  CK 30     Patient was administered 1 amp of sodium bicarb 50 mill equivalents 8.4% solution followed by 2 L of NS.     She received a CT of the abdomen and pelvis as she was recently admitted for pancreatitis.  She did not complain of any abdominal pain.  She does complain of vomiting however no diarrhea.  The CT of the abdomen pelvis without contrast in the setting of acute on chronic renal disease reveals increase in the volume of gas and fluid throughout the colon compatible with worsening of the diarrheal type illness.  No mechanical bowel obstruction is identified.  No evidence of any perforation or intra-abdominal abscess.  The mild changes of the acute pancreatitis seen adjacent to the pancreatic head before now appear resolved.  A few small calcifications are noted within the pancreatic head suggesting chronic pancreatitis.  Evidence of previous cholecystectomy and appendectomy again demonstrated is a gastric stimulator.  Stable low attenuation thickening of both adrenal glands may be related to adrenal hyperplasia or  adenomas.  Likely benign.     Intensivist was consulted for metabolic acidosis in the setting of soft blood pressures and worsening kidney function.     I have seen and evaluated the patient in ER room 45.  She is alert to voice and will answer questions appropriately.  She did require some verbal stimulation.  She denies any diarrhea as I discussed the CT results.  No abdominal tenderness on my exam.  She endorses 4-5 bouts of daily vomiting over the last week or so actually she said since her discharge.     No fevers.  No chest pain or shortness of breath.  She reports her weakness worsen therefore she seeks medical attention in the ED tonight.     BP soft 90 systolic with a MAP of greater than 70.  We will plan for ICU/CCU admission under intensivist services.  She has received 1 out of the 2 L of NS that was ordered.  I feel in the setting of ongoing dehydration she will require more fluid resuscitation.  She will also require a bicarb drip in the setting of metabolic acidosis probably secondary to worsening renal function even consider ATN at this point.     Leukocytosis on admission.  We will add on a lactate and blood cultures and cover empirically with Rocephin until acute infection in the setting of hypotension can be ruled out.     CT of the abdomen and pelvis is concerning for diarrheal illness however patient adamantly denies any diarrhea.  She reports her last BM was yesterday and it was mostly formed.  Stool cultures have been ordered by ER provider therefore she has been placed in C. difficile precautions.    Patient was admitted, patient initially was in the ICU, patient was evaluated by gastroenterology and nephrology, patient was Reglan, IV fluids, electrolyte protocol, there was no plan for repeat EGD at the time, patient was started on soft diet was advanced, patient was discharged home today after PICC line placement, patient will need to be placed on IV fluids 3 times a week for  "3-month        Physical Exam on Discharge:  /88 (BP Location: Right arm, Patient Position: Lying)   Pulse 109   Temp 98.3 °F (36.8 °C) (Oral)   Resp 18   Ht 154.9 cm (61\")   Wt 42.8 kg (94 lb 5.7 oz)   SpO2 100%   BMI 17.83 kg/m²   Physical Exam  General: Patient is alert, awake, oriented x 3 in no apparent distress at time of examination.  HEENT: Normocephalic, atraumatic, pupils are equal reactive to light and accommodate  Neck: Supple, no JVD, no carotid bruits  CVS: S1, S2, regular rhythm and rate  Lungs: Clear to station bilaterally  Abdomen: Soft, nontender, nondistended bowel sounds are present  Extremities: No cyanosis, no clubbing, no edema pulses are intact  Neurologic: No focal deficits  Psychiatric: Normal affect    Condition on Discharge: Stable    Discharge Disposition:  Home or Self Care    Discharge Medications:     Discharge Medications        New Medications        Instructions Start Date   metoclopramide 10 MG/10ML solution solution  Commonly known as: REGLAN   5 mg, Oral, 4 Times Daily Before Meals & Nightly      tamsulosin 0.4 MG capsule 24 hr capsule  Commonly known as: FLOMAX   0.4 mg, Oral, Daily   Start Date: April 5, 2025            Continue These Medications        Instructions Start Date   albuterol sulfate  (90 Base) MCG/ACT inhaler  Commonly known as: PROVENTIL HFA;VENTOLIN HFA;PROAIR HFA   2 puffs, 3 Times Daily PRN      aspirin 81 MG EC tablet   81 mg, Oral, Daily      atorvastatin 80 MG tablet  Commonly known as: LIPITOR   80 mg, Nightly      Calcium Carbonate-Vitamin D 600-5 MG-MCG tablet   1 tablet, Oral, Daily      cetirizine 10 MG tablet  Commonly known as: zyrTEC   10 mg, Daily PRN      DULoxetine 60 MG capsule  Commonly known as: CYMBALTA   60 mg, Daily      linaclotide 290 MCG capsule capsule  Commonly known as: LINZESS   290 mcg, Every Morning Before Breakfast      nortriptyline 25 MG capsule  Commonly known as: PAMELOR   25 mg, Nightly      omeprazole 40 " MG capsule  Commonly known as: priLOSEC   40 mg, Every Other Day      promethazine 25 MG tablet  Commonly known as: PHENERGAN   25 mg, Every 6 Hours PRN      rOPINIRole 1 MG tablet  Commonly known as: REQUIP   1 mg, 3 Times Daily      sodium bicarbonate 650 MG tablet   650 mg, 2 Times Daily      sucralfate 1 g tablet  Commonly known as: CARAFATE   1 g, 4 Times Daily      triamcinolone 0.1 % cream  Commonly known as: KENALOG   1 Application, Daily                   Discharge Diet:     Activity at Discharge:   Activity Instructions       Activity as Tolerated              Follow-up Appointments:   No future appointments.    Test Results Pending at Discharge: none    Electronically signed by Ministerio Capone MD, 04/04/25, 15:06 CDT.    Time: 30 minutes.

## 2025-04-04 NOTE — PLAN OF CARE
Goal Outcome Evaluation: Pt is alert and oriented, vitals are stable, no c/o pain, no s/s of distress, PICC placed by VATs this afternoon, pt is indep in room, pt has orders to discharge home

## 2025-04-05 LAB
BACTERIA SPEC AEROBE CULT: NORMAL
BACTERIA SPEC AEROBE CULT: NORMAL

## 2025-04-05 NOTE — OUTREACH NOTE
Prep Survey      Flowsheet Row Responses   [facility] facility patient discharged from? [place]   Is LACE score < 7 ? No   Eligibility Readm Mgmt   Discharge diagnosis Dehydration, SUGAR o CKD   Does the patient have one of the following disease processes/diagnoses(primary or secondary)? Other   Does the patient have Home health ordered? Yes   What is the Home health agency?  H, [facility] home infusion   Is there a DME ordered? No   Prep survey completed? Yes            Lizbeth COMBS - Registered Nurse

## 2025-04-05 NOTE — PAYOR COMM NOTE
"[facility] 4-4-25    Yash Perez (54 y.o. Female)       Date of Birth   1970    Social Security Number       Address   [address] [place] [id number]    Home Phone   [phone]    MRN   [id number]       Latter-day   Christianity    Marital Status                               Admission Date   3/31/2025    Admission Type   Emergency    Admitting Provider   Ministerio Capone MD    Attending Provider       Department, Room/Bed   [facility] 3A, 343/1       Discharge Date   4/4/2025    Discharge Disposition   Home or Self Care    Discharge Destination                                 Attending Provider: (none)   Allergies: Bee Venom, Hydrocodone, Hydroxychloroquine, Ibuprofen, Pineapple, Pineapple Extract, Wasp Venom, Wasp Venom Protein, Hydrocodone-acetaminophen, Penicillins, Sitagliptin, Metformin, Chocolate, Poison Ivy Extract    Isolation: None   Infection: None   Code Status: Prior    Ht: 154.9 cm (61\")   Wt: 42.8 kg (94 lb 5.7 oz)    Admission Cmt: None   Principal Problem: Dehydration [E86.0]                   Active Insurance as of 3/31/2025       Primary Coverage       Payor Plan Insurance Group Employer/Plan Group    [employer]        Payor Plan Address Payor Plan Phone Number Payor Plan Fax Number Effective Dates    [address]   8/1/2019 - None Entered    [facility] [id number]         Subscriber Name Subscriber Birth Date Member ID       YASH PEREZ 1970 [id number]                     Emergency Contacts        (Rel.) Home Phone Work Phone Mobile Phone    KYLEJENNIFER LONGORIA (Spouse) [phone] -- [phone]    LUBNA PEREZAVERY VARGAS (Daughter) -- -- [phone]                 Discharge Summary        Ministerio Capone MD at 04/04/25 1506                [facility] Medicine Services  DISCHARGE SUMMARY       Date of Admission: 3/31/2025  Date of Discharge:  4/4/2025  Primary Care Physician: " KIRILL Murguia MD    Presenting Problem/History of Present Illness:  Metabolic acidosis, acute on chronic renal insufficiency     Final Discharge Diagnoses:  Active Hospital Problems    Diagnosis     **Dehydration     Other chronic pancreatitis     QT prolongation     Metabolic acidosis     Gastroparesis     Intractable nausea     Hypomagnesemia     Acute kidney injury superimposed on stage 3b chronic kidney disease     Hypokalemia     Diabetic polyneuropathy associated with type 2 diabetes mellitus     Severe malnutrition        Consults: Neurology  Gastroenterology    Procedures Performed: none    Pertinent Test Results:   Results for orders placed during the hospital encounter of 10/05/19    Adult Transthoracic Echo Complete W/ Cont if Necessary Per Protocol    Interpretation Summary  · Left ventricular systolic function is normal.    NORMAL STUDY      Imaging Results (All)       Procedure Component Value Units Date/Time    CT Abdomen Pelvis Without Contrast [[id number]] Collected: 03/31/25 1839     Updated: 03/31/25 1848    Narrative:      EXAMINATION: CT ABDOMEN PELVIS WO CONTRAST- 3/31/2025 6:39 PM     HISTORY: abd pain, leukocytosis; N17.9-Acute kidney failure,  unspecified; N18.9-Chronic kidney disease, unspecified;  E87.7-Deff-hctvlbcjaz and hyponatremia     TOTAL DOSE: 294.8 mGy.cm (Automatic exposure control technique was  implemented in an effort to keep the radiation dose as low as possible  without compromising image quality)     REPORT: Spiral CT of the abdomen and pelvis was performed without  contrast from the lung bases through the pubic symphysis. Reconstructed  coronal and sagittal images are also reviewed.     Comparison: CT abdomen and pelvis without contrast 3/18/2025.     Review of the lung windows demonstrates mild bibasilar atelectasis.  Evaluation of the solid abdominal organs is limited without intravenous  contrast. Cholecystectomy clips are present. The liver, spleen  appear  unremarkable, there is low-attenuation thickening of both adrenal glands  as before which may be related to adrenal hyperplasia or adrenal  adenomas. Some ingested food is noted in the stomach. There are few  small calcifications within the pancreatic head as before suggestive of  chronic pancreatitis. The fat stranding around the pancreatic head seen  before appears resolved. No evidence of acute pancreatitis currently. No  pancreatic ductal dilation is identified. The renal contours are smooth.  No hydronephrosis is identified.     The ureters are decompressed. The bladder appears within normal limits.  There is persistent and increased volume of fluid throughout the colon,  with air-fluid levels and mild colonic distention. This is compatible  with worsening of a diarrheal type illness, no colonic wall thickening  or evidence of bowel obstruction is identified. There is no pneumatosis,  no free air or abscess. Appears the patient has had previous  appendectomy. There is streak artifact associated with an implanted  neurostimulator over the anterior right abdomen, with leads extending to  the anterior wall of the distal stomach. Review of bone windows shows no  acute abnormality. There is evidence of previous hysterectomy.       Impression:      1. Interval increase in the volume of gas and fluid throughout the colon  compatible with worsening of the diarrheal type illness, no mechanical  bowel obstruction is identified. There is no evidence of perforation or  intra-abdominal abscess.  2. The mild changes of acute pancreatitis seen adjacent to the  pancreatic head before appear resolved. A few small calcifications are  noted within the pancreatic head suggesting chronic pancreatitis.  3. Evidence of previous cholecystectomy and appendectomy. Again  demonstrated is a gastric stimulator.  4. Stable low-attenuation thickening of both adrenal glands may be  related to adrenal hyperplasia or adenomas. This is  likely benign.           This report was signed and finalized on 3/31/2025 6:45 PM by Dr. Jose Antonio Bermeo MD.       XR Chest 1 View [[id number]] Collected: 03/31/25 1554     Updated: 03/31/25 1558    Narrative:      XR CHEST 1 VW-     HISTORY: Weak/Dizzy/AMS triage protocol     COMPARISON: 12/8/2024     FINDINGS: Frontal view of the chest obtained.     The lungs are well-expanded and clear. Cardiomediastinal contours are  normal. No pleural effusion or pneumothorax. No acute regional bony  pathology.       Impression:      1. No acute radiographic cardiopulmonary process.        This report was signed and finalized on 3/31/2025 3:55 PM by Dr. Gloria Roe MD.             LAB RESULTS:      Lab 04/04/25  0508 04/03/25  0912 04/02/25  0734 04/01/25  0353 03/31/25  2330 03/31/25  1654   WBC 11.07* 11.15* 10.48 11.82*  --  23.75*   HEMOGLOBIN 9.1* 9.1* 8.8* 10.5*  --  12.3   HEMATOCRIT 28.9* 29.2* 27.1* 33.3*  --  39.2   PLATELETS 367 352 334 352  --  501*   NEUTROS ABS 5.59 7.73* 7.41* 8.34*  --  18.79*   IMMATURE GRANS (ABS) 0.09* 0.08* 0.04 0.15*  --  0.21*   LYMPHS ABS 4.40* 2.43 2.11 2.25  --  3.02   MONOS ABS 0.56 0.64 0.74 0.85  --  1.40*   EOS ABS 0.40 0.25 0.16 0.20  --  0.25   MCV 92.3 91.8 90.6 92.5  --  92.9   PROCALCITONIN  --  0.10  --   --   --   --    LACTATE  --   --   --   --  1.2  --          Lab 04/04/25  0508 04/03/25  0912 04/02/25  1214 04/02/25  0338 04/01/25  2141 04/01/25  1226 04/01/25  0353 03/31/25  1957 03/31/25  1654   SODIUM 142 141 138 140 139 137 138   < > 128*   SODIUM, ARTERIAL  --   --   --   --   --   --   --    < >  --    POTASSIUM 4.5 4.0 4.0 3.3* 3.0* 2.9* 2.7*   < > 3.9   CHLORIDE 110* 108* 108* 108* 105 100 105   < > 95*   CO2 24.0 23.0 17.0* 20.0* 21.0* 21.0* 13.0*   < > 10.0*   ANION GAP 8.0 10.0 13.0 12.0 13.0 16.0* 20.0*   < > 23.0*   BUN 29* 32* 38* 44* 52* 57* 65*   < > 78*   CREATININE 2.00* 1.82* 2.18* 2.54* 2.77* 3.00* 3.25*   < > 4.55*   EGFR 29.4* 32.9* 26.5*  22.1* 19.9* 18.1* 16.4*   < > 11.0*   GLUCOSE 124* 229* 163* 153* 154* 177* 144*   < > 221*   CALCIUM 8.3* 8.3* 7.4* 6.9* 6.7* 7.1* 6.7*   < > 8.5*   MAGNESIUM  --  1.6 2.1 2.2 2.5 2.2 1.5*  --  1.9   PHOSPHORUS  --  2.6 2.8 2.2* 2.6  --  5.7*  --   --    HEMOGLOBIN A1C  --   --   --   --   --   --  8.60*  --   --    TSH  --   --   --   --   --   --   --   --  1.250    < > = values in this interval not displayed.         Lab 04/03/25  0912 04/02/25  0338 04/01/25  1226 03/31/25  1654   TOTAL PROTEIN 6.3 4.9* 6.2 8.5   ALBUMIN 3.1* 2.5* 3.2* 4.2   GLOBULIN 3.2 2.4 3.0 4.3   ALT (SGPT) 7 7 11 13   AST (SGOT) 16 11 19 18   BILIRUBIN <0.2 <0.2 0.2 0.2   ALK PHOS 122* 99 136* 174*   LIPASE  --   --   --  89*                     Lab 04/01/25  0939 03/31/25  1957   PH, ARTERIAL 7.390 7.068*   PCO2, ARTERIAL 33.2* 28.1*   PO2 ART 77.6* 101.0   O2 SATURATION ART 96.7 97.4   HCO3 ART 20.1 8.1*   BASE EXCESS ART -4.3* -20.7*   CARBOXYHEMOGLOBIN  --  2.4     Brief Urine Lab Results  (Last result in the past 365 days)        Color   Clarity   Blood   Leuk Est   Nitrite   Protein   CREAT   Urine HCG        03/31/25 1739 Dark Yellow   Cloudy   Negative   Trace   Negative   100 mg/dL (2+)                 Microbiology Results (last 10 days)       Procedure Component Value - Date/Time    Gastrointestinal Panel, PCR - Stool, Per Rectum [[id number]]  (Normal) Collected: 04/01/25 0736    Lab Status: Final result Specimen: Stool from Per Rectum Updated: 04/01/25 0912     Campylobacter Not Detected     Plesiomonas shigelloides Not Detected     Salmonella Not Detected     Vibrio Not Detected     Vibrio cholerae Not Detected     Yersinia enterocolitica Not Detected     Enteroaggregative E. coli (EAEC) Not Detected     Enteropathogenic E. coli (EPEC) Not Detected     Enterotoxigenic E. coli (ETEC) lt/st Not Detected     Shiga-like toxin-producing E. coli (STEC) stx1/stx2 Not Detected     Shigella/Enteroinvasive E. coli (EIEC) Not Detected      Cryptosporidium Not Detected     Cyclospora cayetanensis Not Detected     Entamoeba histolytica Not Detected     Giardia lamblia Not Detected     Adenovirus F40/41 Not Detected     Astrovirus Not Detected     Norovirus GI/GII Not Detected     Rotavirus A Not Detected     Sapovirus (I, II, IV or V) Not Detected    Clostridioides difficile Toxin - Stool, Per Rectum [[id number]]  (Normal) Collected: 04/01/25 0736    Lab Status: Final result Specimen: Stool from Per Rectum Updated: 04/01/25 0847    Narrative:      The following orders were created for panel order Clostridioides difficile Toxin - Stool, Per Rectum.  Procedure                               Abnormality         Status                     ---------                               -----------         ------                     Clostridioides difficile...[[id number]]  Normal              Final result                 Please view results for these tests on the individual orders.    Clostridioides difficile Toxin, PCR - Stool, Per Rectum [[id number]]  (Normal) Collected: 04/01/25 0736    Lab Status: Final result Specimen: Stool from Per Rectum Updated: 04/01/25 0847     Toxigenic C. difficile by PCR Negative    Narrative:      The result indicates the absence of toxigenic C. difficile from stool specimen.     Blood Culture - Blood, Hand, Left [[id number]]  (Normal) Collected: 03/31/25 2333    Lab Status: Preliminary result Specimen: Blood from Hand, Left Updated: 04/03/25 2345     Blood Culture No growth at 3 days    Blood Culture - Blood, Arm, Right [[id number]]  (Normal) Collected: 03/31/25 2330    Lab Status: Preliminary result Specimen: Blood from Arm, Right Updated: 04/03/25 2345     Blood Culture No growth at 3 days            Hospital Course:   54-year-old female patient past medical history type 2 diabetes, degenerative disc disease, fibromyalgia, neuropathy, hypertension, daily tobacco user, stage III kidney disease, nonalcoholic pancreatitis with a recent  admission for acute pancreatitis on March 18, 2025 along with acute on chronic kidney disease presents back to the ED tonight with general malaise and ongoing vomiting.  Labs reveal leukocytosis at 23, stable hemoglobin hematocrit 12.3 and 39.3 with platelets of 501.  Glucose 207.  BUN 78, creatinine 4.55 Baseline creatinine on discharge was 1.04 with BUN of 14.  Sodium 128, potassium 3.9, chloride 95, bicarb 10, calcium 8.5 alk phos 174, gap 23, GFR 11 on admission.     ABG revealed a pH of 7.068, pCO2 28 PaO2 101 bicarb 8.1.     Serum acetone negative.  Urinalysis dark cloudy however negative for glucose, trace of ketones negative for infection markers.  Protein greater than 100.  TSH 1.2, lipase 89.  Magnesium 1.9.  CK 30     Patient was administered 1 amp of sodium bicarb 50 mill equivalents 8.4% solution followed by 2 L of NS.     She received a CT of the abdomen and pelvis as she was recently admitted for pancreatitis.  She did not complain of any abdominal pain.  She does complain of vomiting however no diarrhea.  The CT of the abdomen pelvis without contrast in the setting of acute on chronic renal disease reveals increase in the volume of gas and fluid throughout the colon compatible with worsening of the diarrheal type illness.  No mechanical bowel obstruction is identified.  No evidence of any perforation or intra-abdominal abscess.  The mild changes of the acute pancreatitis seen adjacent to the pancreatic head before now appear resolved.  A few small calcifications are noted within the pancreatic head suggesting chronic pancreatitis.  Evidence of previous cholecystectomy and appendectomy again demonstrated is a gastric stimulator.  Stable low attenuation thickening of both adrenal glands may be related to adrenal hyperplasia or adenomas.  Likely benign.     Intensivist was consulted for metabolic acidosis in the setting of soft blood pressures and worsening kidney function.     I have seen and evaluated  "the patient in ER room 45.  She is alert to voice and will answer questions appropriately.  She did require some verbal stimulation.  She denies any diarrhea as I discussed the CT results.  No abdominal tenderness on my exam.  She endorses 4-5 bouts of daily vomiting over the last week or so actually she said since her discharge.     No fevers.  No chest pain or shortness of breath.  She reports her weakness worsen therefore she seeks medical attention in the ED tonight.     BP soft 90 systolic with a MAP of greater than 70.  We will plan for ICU/CCU admission under intensivist services.  She has received 1 out of the 2 L of NS that was ordered.  I feel in the setting of ongoing dehydration she will require more fluid resuscitation.  She will also require a bicarb drip in the setting of metabolic acidosis probably secondary to worsening renal function even consider ATN at this point.     Leukocytosis on admission.  We will add on a lactate and blood cultures and cover empirically with Rocephin until acute infection in the setting of hypotension can be ruled out.     CT of the abdomen and pelvis is concerning for diarrheal illness however patient adamantly denies any diarrhea.  She reports her last BM was yesterday and it was mostly formed.  Stool cultures have been ordered by ER provider therefore she has been placed in C. difficile precautions.    Patient was admitted, patient initially was in the ICU, patient was evaluated by gastroenterology and nephrology, patient was Reglan, IV fluids, electrolyte protocol, there was no plan for repeat EGD at the time, patient was started on soft diet was advanced, patient was discharged home today after PICC line placement, patient will need to be placed on IV fluids 3 times a week for 3-month        Physical Exam on Discharge:  /88 (BP Location: Right arm, Patient Position: Lying)   Pulse 109   Temp 98.3 °F (36.8 °C) (Oral)   Resp 18   Ht 154.9 cm (61\")   Wt 42.8 " kg (94 lb 5.7 oz)   SpO2 100%   BMI 17.83 kg/m²   Physical Exam  General: Patient is alert, awake, oriented x 3 in no apparent distress at time of examination.  HEENT: Normocephalic, atraumatic, pupils are equal reactive to light and accommodate  Neck: Supple, no JVD, no carotid bruits  CVS: S1, S2, regular rhythm and rate  Lungs: Clear to station bilaterally  Abdomen: Soft, nontender, nondistended bowel sounds are present  Extremities: No cyanosis, no clubbing, no edema pulses are intact  Neurologic: No focal deficits  Psychiatric: Normal affect    Condition on Discharge: Stable    Discharge Disposition:  Home or Self Care    Discharge Medications:     Discharge Medications        New Medications        Instructions Start Date   metoclopramide 10 MG/10ML solution solution  Commonly known as: REGLAN   5 mg, Oral, 4 Times Daily Before Meals & Nightly      tamsulosin 0.4 MG capsule 24 hr capsule  Commonly known as: FLOMAX   0.4 mg, Oral, Daily   Start Date: April 5, 2025            Continue These Medications        Instructions Start Date   albuterol sulfate  (90 Base) MCG/ACT inhaler  Commonly known as: PROVENTIL HFA;VENTOLIN HFA;PROAIR HFA   2 puffs, 3 Times Daily PRN      aspirin 81 MG EC tablet   81 mg, Oral, Daily      atorvastatin 80 MG tablet  Commonly known as: LIPITOR   80 mg, Nightly      Calcium Carbonate-Vitamin D 600-5 MG-MCG tablet   1 tablet, Oral, Daily      cetirizine 10 MG tablet  Commonly known as: zyrTEC   10 mg, Daily PRN      DULoxetine 60 MG capsule  Commonly known as: CYMBALTA   60 mg, Daily      linaclotide 290 MCG capsule capsule  Commonly known as: LINZESS   290 mcg, Every Morning Before Breakfast      nortriptyline 25 MG capsule  Commonly known as: PAMELOR   25 mg, Nightly      omeprazole 40 MG capsule  Commonly known as: priLOSEC   40 mg, Every Other Day      promethazine 25 MG tablet  Commonly known as: PHENERGAN   25 mg, Every 6 Hours PRN      rOPINIRole 1 MG tablet  Commonly  known as: REQUIP   1 mg, 3 Times Daily      sodium bicarbonate 650 MG tablet   650 mg, 2 Times Daily      sucralfate 1 g tablet  Commonly known as: CARAFATE   1 g, 4 Times Daily      triamcinolone 0.1 % cream  Commonly known as: KENALOG   1 Application, Daily                   Discharge Diet:     Activity at Discharge:   Activity Instructions       Activity as Tolerated              Follow-up Appointments:   No future appointments.    Test Results Pending at Discharge: none    Electronically signed by Ministerio Capone MD, 04/04/25, 15:06 CDT.    Time: 30 minutes.         Electronically signed by Ministerio Capone MD at 04/04/25 7309

## 2025-04-10 ENCOUNTER — READMISSION MANAGEMENT (OUTPATIENT)
Dept: CALL CENTER | Facility: HOSPITAL | Age: 55
End: 2025-04-10
Payer: COMMERCIAL

## 2025-04-10 NOTE — OUTREACH NOTE
Medical Week 1 Survey      Flowsheet Row Responses   [facility] patient discharged from? [place]   Does the patient have one of the following disease processes/diagnoses(primary or secondary)? Other   Week 1 attempt successful? Yes   Call start time 1508   Call end time 1512   Discharge diagnosis Dehydration, SUGAR o CKD   Meds reviewed with patient/caregiver? Yes   Is the patient having any side effects they believe may be caused by any medication additions or changes? No   Does the patient have all medications ordered at discharge? Yes   Is the patient taking all medications as directed (includes completed medication regime)? Yes   Does the patient have a primary care provider?  Yes   Does the patient have an appointment with their PCP within 7 days of discharge? Yes   Has the patient kept scheduled appointments due by today? Yes   What is the Home health agency?  [facility]   Has home health visited the patient within 72 hours of discharge? Yes   Psychosocial issues? No   Did the patient receive a copy of their discharge instructions? Yes   Nursing interventions Reviewed instructions with patient   What is the patient's perception of their health status since discharge? Improving   Is the patient/caregiver able to teach back signs and symptoms related to disease process for when to call PCP? Yes   Is the patient/caregiver able to teach back signs and symptoms related to disease process for when to call 911? Yes   Is the patient/caregiver able to teach back the hierarchy of who to call/visit for symptoms/problems? PCP, Specialist, Home health nurse, Urgent Care, ED, 911 Yes   If the patient is a current smoker, are they able to teach back resources for cessation? --  [down to 1PPD]   Week 1 call completed? Yes   Call end time 1512            Sharon Sawyer Registered Nurse

## 2025-04-14 RX ORDER — HEPARIN SODIUM (PORCINE) LOCK FLUSH IV SOLN 100 UNIT/ML 100 UNIT/ML
500 SOLUTION INTRAVENOUS AS NEEDED
OUTPATIENT
Start: 2025-04-14

## 2025-04-15 ENCOUNTER — TELEPHONE (OUTPATIENT)
Dept: ONCOLOGY | Facility: HOSPITAL | Age: 55
End: 2025-04-15
Payer: COMMERCIAL

## 2025-04-15 NOTE — TELEPHONE ENCOUNTER
We received an order for a dressing change for a PICC line, and the patient said she was getting this done at [facility].  We are over an hour drive for her and didn't want to change.  I notified Dr Bustos's office and s/w Candis, she is going to send the order to [facility].

## 2025-04-21 ENCOUNTER — READMISSION MANAGEMENT (OUTPATIENT)
Dept: CALL CENTER | Facility: HOSPITAL | Age: 55
End: 2025-04-21
Payer: COMMERCIAL

## 2025-04-21 NOTE — OUTREACH NOTE
Medical Week 2 Survey      Flowsheet Row Responses   [facility] patient discharged from? [place]   Does the patient have one of the following disease processes/diagnoses(primary or secondary)? Other   Week 2 attempt successful? No   Unsuccessful attempts Attempt 1   Revoke Gonzalez Sawyer Registered Nurse

## 2025-04-23 LAB
QT INTERVAL: 392 MS
QTC INTERVAL: 497 MS

## 2025-06-27 ENCOUNTER — HOSPITAL ENCOUNTER (INPATIENT)
Facility: HOSPITAL | Age: 55
LOS: 3 days | Discharge: HOME | End: 2025-06-30
Attending: STUDENT IN AN ORGANIZED HEALTH CARE EDUCATION/TRAINING PROGRAM | Admitting: FAMILY MEDICINE
Payer: COMMERCIAL

## 2025-06-27 DIAGNOSIS — N17.9 ACUTE RENAL FAILURE SUPERIMPOSED ON CHRONIC KIDNEY DISEASE, UNSPECIFIED ACUTE RENAL FAILURE TYPE, UNSPECIFIED CKD STAGE: ICD-10-CM

## 2025-06-27 DIAGNOSIS — R65.10 SIRS (SYSTEMIC INFLAMMATORY RESPONSE SYNDROME): ICD-10-CM

## 2025-06-27 DIAGNOSIS — M85.80 OSTEOPENIA, UNSPECIFIED LOCATION: ICD-10-CM

## 2025-06-27 DIAGNOSIS — E55.9 VITAMIN D DEFICIENCY: ICD-10-CM

## 2025-06-27 DIAGNOSIS — E43 SEVERE MALNUTRITION: ICD-10-CM

## 2025-06-27 DIAGNOSIS — K31.84 GASTROPARESIS: ICD-10-CM

## 2025-06-27 DIAGNOSIS — M81.0 OSTEOPOROSIS, UNSPECIFIED OSTEOPOROSIS TYPE, UNSPECIFIED PATHOLOGICAL FRACTURE PRESENCE: ICD-10-CM

## 2025-06-27 DIAGNOSIS — Z86.39 HX OF INSULIN DEPENDENT DIABETES MELLITUS: ICD-10-CM

## 2025-06-27 DIAGNOSIS — R78.81 POSITIVE BLOOD CULTURE: ICD-10-CM

## 2025-06-27 DIAGNOSIS — R19.7 DIARRHEA OF PRESUMED INFECTIOUS ORIGIN: ICD-10-CM

## 2025-06-27 DIAGNOSIS — T80.219D INFECTION OF PERIPHERALLY INSERTED CENTRAL CATHETER (PICC), SUBSEQUENT ENCOUNTER: ICD-10-CM

## 2025-06-27 DIAGNOSIS — Z46.59 ENCOUNTER FOR NASOGASTRIC (NG) TUBE PLACEMENT: ICD-10-CM

## 2025-06-27 DIAGNOSIS — F17.200 SMOKER: ICD-10-CM

## 2025-06-27 DIAGNOSIS — Z86.19: ICD-10-CM

## 2025-06-27 DIAGNOSIS — N18.9 ACUTE RENAL FAILURE SUPERIMPOSED ON CHRONIC KIDNEY DISEASE, UNSPECIFIED ACUTE RENAL FAILURE TYPE, UNSPECIFIED CKD STAGE: ICD-10-CM

## 2025-06-27 DIAGNOSIS — E13.10 DIABETIC KETOACIDOSIS WITHOUT COMA ASSOCIATED WITH OTHER SPECIFIED DIABETES MELLITUS: ICD-10-CM

## 2025-06-27 DIAGNOSIS — E83.42 HYPOMAGNESEMIA: ICD-10-CM

## 2025-06-27 DIAGNOSIS — E87.6 HYPOKALEMIA: ICD-10-CM

## 2025-06-27 DIAGNOSIS — E11.42 DIABETIC POLYNEUROPATHY ASSOCIATED WITH TYPE 2 DIABETES MELLITUS: ICD-10-CM

## 2025-06-27 DIAGNOSIS — K86.1 CHRONIC PANCREATITIS, UNSPECIFIED PANCREATITIS TYPE: ICD-10-CM

## 2025-06-27 DIAGNOSIS — K86.1 OTHER CHRONIC PANCREATITIS: ICD-10-CM

## 2025-06-27 DIAGNOSIS — N18.32 ACUTE KIDNEY INJURY SUPERIMPOSED ON STAGE 3B CHRONIC KIDNEY DISEASE: ICD-10-CM

## 2025-06-27 DIAGNOSIS — N39.0 ACUTE UTI (URINARY TRACT INFECTION): ICD-10-CM

## 2025-06-27 DIAGNOSIS — N18.32 STAGE 3B CHRONIC KIDNEY DISEASE: ICD-10-CM

## 2025-06-27 DIAGNOSIS — N17.9 AKI (ACUTE KIDNEY INJURY): Primary | ICD-10-CM

## 2025-06-27 DIAGNOSIS — E87.20 METABOLIC ACIDOSIS: ICD-10-CM

## 2025-06-27 DIAGNOSIS — M06.9 RHEUMATOID ARTHRITIS, INVOLVING UNSPECIFIED SITE, UNSPECIFIED WHETHER RHEUMATOID FACTOR PRESENT: Chronic | ICD-10-CM

## 2025-06-27 DIAGNOSIS — S82.002D CLOSED NONDISPLACED FRACTURE OF LEFT PATELLA WITH ROUTINE HEALING, UNSPECIFIED FRACTURE MORPHOLOGY, SUBSEQUENT ENCOUNTER: ICD-10-CM

## 2025-06-27 DIAGNOSIS — R94.31 QT PROLONGATION: ICD-10-CM

## 2025-06-27 DIAGNOSIS — N17.9 ACUTE KIDNEY INJURY SUPERIMPOSED ON STAGE 3B CHRONIC KIDNEY DISEASE: ICD-10-CM

## 2025-06-27 DIAGNOSIS — R63.4 LOSS OF WEIGHT: ICD-10-CM

## 2025-06-27 DIAGNOSIS — M79.7 FIBROMYALGIA: Chronic | ICD-10-CM

## 2025-06-27 DIAGNOSIS — E83.39 HYPOPHOSPHATEMIA: ICD-10-CM

## 2025-06-27 DIAGNOSIS — Z79.4 TYPE 2 DIABETES MELLITUS WITH OTHER SPECIFIED COMPLICATION, WITH LONG-TERM CURRENT USE OF INSULIN: ICD-10-CM

## 2025-06-27 DIAGNOSIS — R50.9 FEVER, UNSPECIFIED FEVER CAUSE: ICD-10-CM

## 2025-06-27 DIAGNOSIS — E87.1 HYPONATREMIA: ICD-10-CM

## 2025-06-27 DIAGNOSIS — R11.0 INTRACTABLE NAUSEA: ICD-10-CM

## 2025-06-27 DIAGNOSIS — E11.69 TYPE 2 DIABETES MELLITUS WITH OTHER SPECIFIED COMPLICATION, WITH LONG-TERM CURRENT USE OF INSULIN: ICD-10-CM

## 2025-06-27 DIAGNOSIS — D64.9 ANEMIA, UNSPECIFIED TYPE: ICD-10-CM

## 2025-06-27 DIAGNOSIS — I73.00 RAYNAUD'S DISEASE WITHOUT GANGRENE: ICD-10-CM

## 2025-06-27 DIAGNOSIS — K94.23 PEG TUBE MALFUNCTION: ICD-10-CM

## 2025-06-27 DIAGNOSIS — K85.90 ACUTE PANCREATITIS, UNSPECIFIED COMPLICATION STATUS, UNSPECIFIED PANCREATITIS TYPE: ICD-10-CM

## 2025-06-27 DIAGNOSIS — M54.59 POSTURAL LOW BACK PAIN: ICD-10-CM

## 2025-06-27 DIAGNOSIS — E86.0 DEHYDRATION: ICD-10-CM

## 2025-06-27 LAB
ALBUMIN SERPL-MCNC: 3.9 G/DL (ref 3.5–5.2)
ALBUMIN/GLOB SERPL: 1.1 G/DL
ALP SERPL-CCNC: 155 U/L (ref 39–117)
ALT SERPL W P-5'-P-CCNC: 12 U/L (ref 1–33)
ANION GAP SERPL CALCULATED.3IONS-SCNC: 19 MMOL/L (ref 5–15)
AST SERPL-CCNC: 15 U/L (ref 1–32)
BASOPHILS # BLD AUTO: 0.05 10*3/MM3 (ref 0–0.2)
BASOPHILS NFR BLD AUTO: 0.5 % (ref 0–1.5)
BILIRUB SERPL-MCNC: <0.2 MG/DL (ref 0–1.2)
BUN SERPL-MCNC: 56.8 MG/DL (ref 6–20)
BUN/CREAT SERPL: 17.9 (ref 7–25)
CALCIUM SPEC-SCNC: 8.5 MG/DL (ref 8.6–10.5)
CHLORIDE SERPL-SCNC: 103 MMOL/L (ref 98–107)
CO2 SERPL-SCNC: 14 MMOL/L (ref 22–29)
CREAT SERPL-MCNC: 3.18 MG/DL (ref 0.57–1)
DEPRECATED RDW RBC AUTO: 55.8 FL (ref 37–54)
EGFRCR SERPLBLD CKD-EPI 2021: 16.8 ML/MIN/1.73
EOSINOPHIL # BLD AUTO: 0.41 10*3/MM3 (ref 0–0.4)
EOSINOPHIL NFR BLD AUTO: 3.7 % (ref 0.3–6.2)
ERYTHROCYTE [DISTWIDTH] IN BLOOD BY AUTOMATED COUNT: 16.2 % (ref 12.3–15.4)
GLOBULIN UR ELPH-MCNC: 3.6 GM/DL
GLUCOSE SERPL-MCNC: 205 MG/DL (ref 65–99)
HCT VFR BLD AUTO: 36 % (ref 34–46.6)
HGB BLD-MCNC: 11.3 G/DL (ref 12–15.9)
HOLD SPECIMEN: NORMAL
HOLD SPECIMEN: NORMAL
IMM GRANULOCYTES # BLD AUTO: 0.04 10*3/MM3 (ref 0–0.05)
IMM GRANULOCYTES NFR BLD AUTO: 0.4 % (ref 0–0.5)
LYMPHOCYTES # BLD AUTO: 2.77 10*3/MM3 (ref 0.7–3.1)
LYMPHOCYTES NFR BLD AUTO: 25.1 % (ref 19.6–45.3)
MAGNESIUM SERPL-MCNC: 2.1 MG/DL (ref 1.6–2.6)
MCH RBC QN AUTO: 29.5 PG (ref 26.6–33)
MCHC RBC AUTO-ENTMCNC: 31.4 G/DL (ref 31.5–35.7)
MCV RBC AUTO: 94 FL (ref 79–97)
MONOCYTES # BLD AUTO: 0.58 10*3/MM3 (ref 0.1–0.9)
MONOCYTES NFR BLD AUTO: 5.2 % (ref 5–12)
NEUTROPHILS NFR BLD AUTO: 65.1 % (ref 42.7–76)
NEUTROPHILS NFR BLD AUTO: 7.2 10*3/MM3 (ref 1.7–7)
NRBC BLD AUTO-RTO: 0 /100 WBC (ref 0–0.2)
PLATELET # BLD AUTO: 360 10*3/MM3 (ref 140–450)
PMV BLD AUTO: 10.2 FL (ref 6–12)
POTASSIUM SERPL-SCNC: 3 MMOL/L (ref 3.5–5.2)
PROT SERPL-MCNC: 7.5 G/DL (ref 6–8.5)
QT INTERVAL: 396 MS
QTC INTERVAL: 467 MS
RBC # BLD AUTO: 3.83 10*6/MM3 (ref 3.77–5.28)
SODIUM SERPL-SCNC: 136 MMOL/L (ref 136–145)
WBC NRBC COR # BLD AUTO: 11.05 10*3/MM3 (ref 3.4–10.8)
WHOLE BLOOD HOLD COAG: NORMAL
WHOLE BLOOD HOLD SPECIMEN: NORMAL

## 2025-06-27 PROCEDURE — 83735 ASSAY OF MAGNESIUM: CPT | Performed by: STUDENT IN AN ORGANIZED HEALTH CARE EDUCATION/TRAINING PROGRAM

## 2025-06-27 PROCEDURE — 85025 COMPLETE CBC W/AUTO DIFF WBC: CPT | Performed by: STUDENT IN AN ORGANIZED HEALTH CARE EDUCATION/TRAINING PROGRAM

## 2025-06-27 PROCEDURE — 93005 ELECTROCARDIOGRAM TRACING: CPT | Performed by: STUDENT IN AN ORGANIZED HEALTH CARE EDUCATION/TRAINING PROGRAM

## 2025-06-27 PROCEDURE — 80053 COMPREHEN METABOLIC PANEL: CPT | Performed by: STUDENT IN AN ORGANIZED HEALTH CARE EDUCATION/TRAINING PROGRAM

## 2025-06-27 PROCEDURE — 83605 ASSAY OF LACTIC ACID: CPT

## 2025-06-27 PROCEDURE — 36415 COLL VENOUS BLD VENIPUNCTURE: CPT

## 2025-06-27 PROCEDURE — 25810000003 SODIUM CHLORIDE 0.9 % SOLUTION: Performed by: STUDENT IN AN ORGANIZED HEALTH CARE EDUCATION/TRAINING PROGRAM

## 2025-06-27 PROCEDURE — 51798 US URINE CAPACITY MEASURE: CPT

## 2025-06-27 PROCEDURE — 93010 ELECTROCARDIOGRAM REPORT: CPT | Performed by: INTERNAL MEDICINE

## 2025-06-27 PROCEDURE — 99285 EMERGENCY DEPT VISIT HI MDM: CPT | Performed by: STUDENT IN AN ORGANIZED HEALTH CARE EDUCATION/TRAINING PROGRAM

## 2025-06-27 RX ADMIN — SODIUM CHLORIDE 1000 ML: 9 INJECTION, SOLUTION INTRAVENOUS at 21:21

## 2025-06-28 ENCOUNTER — APPOINTMENT (OUTPATIENT)
Dept: ULTRASOUND IMAGING | Facility: HOSPITAL | Age: 55
End: 2025-06-28
Payer: COMMERCIAL

## 2025-06-28 ENCOUNTER — APPOINTMENT (OUTPATIENT)
Dept: GENERAL RADIOLOGY | Facility: HOSPITAL | Age: 55
End: 2025-06-28
Payer: COMMERCIAL

## 2025-06-28 LAB
ALBUMIN SERPL-MCNC: 3.3 G/DL (ref 3.5–5.2)
ALBUMIN/GLOB SERPL: 1.2 G/DL
ALP SERPL-CCNC: 122 U/L (ref 39–117)
ALT SERPL W P-5'-P-CCNC: 10 U/L (ref 1–33)
ANION GAP SERPL CALCULATED.3IONS-SCNC: 11 MMOL/L (ref 5–15)
ANION GAP SERPL CALCULATED.3IONS-SCNC: 12 MMOL/L (ref 5–15)
ANION GAP SERPL CALCULATED.3IONS-SCNC: 15 MMOL/L (ref 5–15)
ANION GAP SERPL CALCULATED.3IONS-SCNC: 15 MMOL/L (ref 5–15)
ANION GAP SERPL CALCULATED.3IONS-SCNC: 17 MMOL/L (ref 5–15)
AST SERPL-CCNC: 12 U/L (ref 1–32)
ATMOSPHERIC PRESS: 755 MMHG
BACTERIA UR QL AUTO: NORMAL /HPF
BASE EXCESS BLDV CALC-SCNC: -15.2 MMOL/L (ref 0–2)
BDY SITE: ABNORMAL
BILIRUB SERPL-MCNC: <0.2 MG/DL (ref 0–1.2)
BILIRUB UR QL STRIP: NEGATIVE
BODY TEMPERATURE: 37
BUN SERPL-MCNC: 47.2 MG/DL (ref 6–20)
BUN SERPL-MCNC: 47.6 MG/DL (ref 6–20)
BUN SERPL-MCNC: 51.3 MG/DL (ref 6–20)
BUN SERPL-MCNC: 54.1 MG/DL (ref 6–20)
BUN SERPL-MCNC: 55.7 MG/DL (ref 6–20)
BUN/CREAT SERPL: 21.6 (ref 7–25)
BUN/CREAT SERPL: 22.5 (ref 7–25)
BUN/CREAT SERPL: 22.9 (ref 7–25)
BUN/CREAT SERPL: 24.3 (ref 7–25)
BUN/CREAT SERPL: 25.6 (ref 7–25)
CALCIUM SPEC-SCNC: 6.9 MG/DL (ref 8.6–10.5)
CALCIUM SPEC-SCNC: 7.4 MG/DL (ref 8.6–10.5)
CALCIUM SPEC-SCNC: 7.6 MG/DL (ref 8.6–10.5)
CALCIUM SPEC-SCNC: 7.8 MG/DL (ref 8.6–10.5)
CALCIUM SPEC-SCNC: 7.8 MG/DL (ref 8.6–10.5)
CHLORIDE SERPL-SCNC: 110 MMOL/L (ref 98–107)
CHLORIDE SERPL-SCNC: 112 MMOL/L (ref 98–107)
CHLORIDE SERPL-SCNC: 114 MMOL/L (ref 98–107)
CHLORIDE SERPL-SCNC: 115 MMOL/L (ref 98–107)
CHLORIDE SERPL-SCNC: 117 MMOL/L (ref 98–107)
CLARITY UR: CLEAR
CO2 SERPL-SCNC: 11 MMOL/L (ref 22–29)
CO2 SERPL-SCNC: 14 MMOL/L (ref 22–29)
CO2 SERPL-SCNC: 17 MMOL/L (ref 22–29)
COLOR UR: YELLOW
CREAT SERPL-MCNC: 1.86 MG/DL (ref 0.57–1)
CREAT SERPL-MCNC: 2.06 MG/DL (ref 0.57–1)
CREAT SERPL-MCNC: 2.11 MG/DL (ref 0.57–1)
CREAT SERPL-MCNC: 2.4 MG/DL (ref 0.57–1)
CREAT SERPL-MCNC: 2.58 MG/DL (ref 0.57–1)
CREAT UR-MCNC: 71.5 MG/DL
D-LACTATE SERPL-SCNC: 1 MMOL/L (ref 0.5–2)
DEPRECATED RDW RBC AUTO: 56.4 FL (ref 37–54)
EGFRCR SERPLBLD CKD-EPI 2021: 21.6 ML/MIN/1.73
EGFRCR SERPLBLD CKD-EPI 2021: 23.6 ML/MIN/1.73
EGFRCR SERPLBLD CKD-EPI 2021: 27.6 ML/MIN/1.73
EGFRCR SERPLBLD CKD-EPI 2021: 28.4 ML/MIN/1.73
EGFRCR SERPLBLD CKD-EPI 2021: 32.1 ML/MIN/1.73
ERYTHROCYTE [DISTWIDTH] IN BLOOD BY AUTOMATED COUNT: 16.2 % (ref 12.3–15.4)
GLOBULIN UR ELPH-MCNC: 2.8 GM/DL
GLUCOSE BLDC GLUCOMTR-MCNC: 173 MG/DL (ref 70–130)
GLUCOSE BLDC GLUCOMTR-MCNC: 178 MG/DL (ref 70–130)
GLUCOSE BLDC GLUCOMTR-MCNC: 186 MG/DL (ref 70–130)
GLUCOSE BLDC GLUCOMTR-MCNC: 226 MG/DL (ref 70–130)
GLUCOSE SERPL-MCNC: 166 MG/DL (ref 65–99)
GLUCOSE SERPL-MCNC: 175 MG/DL (ref 65–99)
GLUCOSE SERPL-MCNC: 183 MG/DL (ref 65–99)
GLUCOSE SERPL-MCNC: 208 MG/DL (ref 65–99)
GLUCOSE SERPL-MCNC: 256 MG/DL (ref 65–99)
GLUCOSE UR STRIP-MCNC: NEGATIVE MG/DL
HCO3 BLDV-SCNC: 12.7 MMOL/L (ref 22–28)
HCT VFR BLD AUTO: 31.8 % (ref 34–46.6)
HGB BLD-MCNC: 10 G/DL (ref 12–15.9)
HGB UR QL STRIP.AUTO: NEGATIVE
HYALINE CASTS UR QL AUTO: NORMAL /LPF
KETONES UR QL STRIP: NEGATIVE
LEUKOCYTE ESTERASE UR QL STRIP.AUTO: ABNORMAL
Lab: ABNORMAL
Lab: ABNORMAL
MAGNESIUM SERPL-MCNC: 1.6 MG/DL (ref 1.6–2.6)
MAGNESIUM SERPL-MCNC: 1.7 MG/DL (ref 1.6–2.6)
MCH RBC QN AUTO: 29.7 PG (ref 26.6–33)
MCHC RBC AUTO-ENTMCNC: 31.4 G/DL (ref 31.5–35.7)
MCV RBC AUTO: 94.4 FL (ref 79–97)
MODALITY: ABNORMAL
NITRITE UR QL STRIP: NEGATIVE
NOTIFIED BY: ABNORMAL
NOTIFIED WHO: ABNORMAL
OSMOLALITY UR: 499 MOSM/KG (ref 50–1400)
PCO2 BLDV: 36.8 MM HG (ref 41–51)
PH BLDV: 7.14 PH UNITS (ref 7.32–7.42)
PH UR STRIP.AUTO: 5.5 [PH] (ref 5–8)
PHOSPHATE SERPL-MCNC: 5.8 MG/DL (ref 2.5–4.5)
PLATELET # BLD AUTO: 326 10*3/MM3 (ref 140–450)
PMV BLD AUTO: 10.5 FL (ref 6–12)
PO2 BLDV: 44.2 MM HG (ref 27–53)
POTASSIUM SERPL-SCNC: 2.9 MMOL/L (ref 3.5–5.2)
POTASSIUM SERPL-SCNC: 3.9 MMOL/L (ref 3.5–5.2)
POTASSIUM SERPL-SCNC: 4 MMOL/L (ref 3.5–5.2)
POTASSIUM SERPL-SCNC: 4.1 MMOL/L (ref 3.5–5.2)
POTASSIUM SERPL-SCNC: 4.2 MMOL/L (ref 3.5–5.2)
PROT ?TM UR-MCNC: 22.8 MG/DL
PROT SERPL-MCNC: 6.1 G/DL (ref 6–8.5)
PROT UR QL STRIP: ABNORMAL
RBC # BLD AUTO: 3.37 10*6/MM3 (ref 3.77–5.28)
RBC # UR STRIP: NORMAL /HPF
REF LAB TEST METHOD: NORMAL
SAO2 % BLDCOV: 70.6 % (ref 45–75)
SODIUM SERPL-SCNC: 140 MMOL/L (ref 136–145)
SODIUM SERPL-SCNC: 141 MMOL/L (ref 136–145)
SODIUM SERPL-SCNC: 141 MMOL/L (ref 136–145)
SODIUM SERPL-SCNC: 143 MMOL/L (ref 136–145)
SODIUM SERPL-SCNC: 143 MMOL/L (ref 136–145)
SODIUM UR-SCNC: 49 MMOL/L
SP GR UR STRIP: 1.01 (ref 1–1.03)
SQUAMOUS #/AREA URNS HPF: NORMAL /HPF
UROBILINOGEN UR QL STRIP: ABNORMAL
VENTILATOR MODE: ABNORMAL
WBC # UR STRIP: NORMAL /HPF
WBC NRBC COR # BLD AUTO: 10.92 10*3/MM3 (ref 3.4–10.8)

## 2025-06-28 PROCEDURE — 81001 URINALYSIS AUTO W/SCOPE: CPT

## 2025-06-28 PROCEDURE — 82805 BLOOD GASES W/O2 SATURATION: CPT

## 2025-06-28 PROCEDURE — 71045 X-RAY EXAM CHEST 1 VIEW: CPT

## 2025-06-28 PROCEDURE — 84300 ASSAY OF URINE SODIUM: CPT | Performed by: CLINICAL NURSE SPECIALIST

## 2025-06-28 PROCEDURE — 82948 REAGENT STRIP/BLOOD GLUCOSE: CPT

## 2025-06-28 PROCEDURE — 84156 ASSAY OF PROTEIN URINE: CPT | Performed by: CLINICAL NURSE SPECIALIST

## 2025-06-28 PROCEDURE — 25010000003 DEXTROSE 5 % SOLUTION 1,000 ML FLEX CONT: Performed by: INTERNAL MEDICINE

## 2025-06-28 PROCEDURE — 83735 ASSAY OF MAGNESIUM: CPT | Performed by: INTERNAL MEDICINE

## 2025-06-28 PROCEDURE — 85027 COMPLETE CBC AUTOMATED: CPT

## 2025-06-28 PROCEDURE — 83935 ASSAY OF URINE OSMOLALITY: CPT | Performed by: CLINICAL NURSE SPECIALIST

## 2025-06-28 PROCEDURE — 82570 ASSAY OF URINE CREATININE: CPT | Performed by: CLINICAL NURSE SPECIALIST

## 2025-06-28 PROCEDURE — 63710000001 ONDANSETRON ODT 4 MG TABLET DISPERSIBLE

## 2025-06-28 PROCEDURE — 25810000003 SODIUM CHLORIDE 0.9 % SOLUTION

## 2025-06-28 PROCEDURE — 25010000002 POTASSIUM CHLORIDE 10 MEQ/100ML SOLUTION

## 2025-06-28 PROCEDURE — 83735 ASSAY OF MAGNESIUM: CPT

## 2025-06-28 PROCEDURE — 76775 US EXAM ABDO BACK WALL LIM: CPT

## 2025-06-28 PROCEDURE — 87205 SMEAR GRAM STAIN: CPT | Performed by: CLINICAL NURSE SPECIALIST

## 2025-06-28 PROCEDURE — 80053 COMPREHEN METABOLIC PANEL: CPT

## 2025-06-28 PROCEDURE — 63710000001 INSULIN LISPRO (HUMAN) PER 5 UNITS

## 2025-06-28 PROCEDURE — 84100 ASSAY OF PHOSPHORUS: CPT

## 2025-06-28 RX ORDER — ALBUTEROL SULFATE 90 UG/1
2 INHALANT RESPIRATORY (INHALATION) EVERY 6 HOURS PRN
Status: DISCONTINUED | OUTPATIENT
Start: 2025-06-28 | End: 2025-06-28 | Stop reason: CLARIF

## 2025-06-28 RX ORDER — POTASSIUM CHLORIDE 7.45 MG/ML
10 INJECTION INTRAVENOUS
Status: COMPLETED | OUTPATIENT
Start: 2025-06-28 | End: 2025-06-28

## 2025-06-28 RX ORDER — METOCLOPRAMIDE HYDROCHLORIDE 5 MG/5ML
5 SOLUTION ORAL
Status: DISCONTINUED | OUTPATIENT
Start: 2025-06-28 | End: 2025-06-29 | Stop reason: SDUPTHER

## 2025-06-28 RX ORDER — ROPINIROLE 1 MG/1
1 TABLET, FILM COATED ORAL 3 TIMES DAILY
Status: DISCONTINUED | OUTPATIENT
Start: 2025-06-28 | End: 2025-06-30 | Stop reason: HOSPADM

## 2025-06-28 RX ORDER — SODIUM CHLORIDE 0.9 % (FLUSH) 0.9 %
10 SYRINGE (ML) INJECTION EVERY 12 HOURS SCHEDULED
Status: DISCONTINUED | OUTPATIENT
Start: 2025-06-28 | End: 2025-06-30 | Stop reason: HOSPADM

## 2025-06-28 RX ORDER — METOCLOPRAMIDE 5 MG/1
10 TABLET ORAL 3 TIMES DAILY PRN
Status: ON HOLD | COMMUNITY
End: 2025-07-23

## 2025-06-28 RX ORDER — NORTRIPTYLINE HYDROCHLORIDE 25 MG/1
25 CAPSULE ORAL NIGHTLY
Status: DISCONTINUED | OUTPATIENT
Start: 2025-06-28 | End: 2025-06-30 | Stop reason: HOSPADM

## 2025-06-28 RX ORDER — POTASSIUM CHLORIDE 1500 MG/1
40 TABLET, EXTENDED RELEASE ORAL ONCE
Status: COMPLETED | OUTPATIENT
Start: 2025-06-28 | End: 2025-06-28

## 2025-06-28 RX ORDER — DEXTROSE MONOHYDRATE 25 G/50ML
25 INJECTION, SOLUTION INTRAVENOUS
Status: DISCONTINUED | OUTPATIENT
Start: 2025-06-28 | End: 2025-06-30 | Stop reason: HOSPADM

## 2025-06-28 RX ORDER — ONDANSETRON 2 MG/ML
4 INJECTION INTRAMUSCULAR; INTRAVENOUS EVERY 6 HOURS PRN
Status: DISCONTINUED | OUTPATIENT
Start: 2025-06-28 | End: 2025-06-30 | Stop reason: HOSPADM

## 2025-06-28 RX ORDER — ASPIRIN 81 MG/1
81 TABLET ORAL DAILY
Status: DISCONTINUED | OUTPATIENT
Start: 2025-06-28 | End: 2025-06-30 | Stop reason: HOSPADM

## 2025-06-28 RX ORDER — SODIUM CHLORIDE 9 MG/ML
30 INJECTION, SOLUTION INTRAVENOUS 3 TIMES WEEKLY
COMMUNITY

## 2025-06-28 RX ORDER — NICOTINE POLACRILEX 4 MG
15 LOZENGE BUCCAL
Status: DISCONTINUED | OUTPATIENT
Start: 2025-06-28 | End: 2025-06-30 | Stop reason: HOSPADM

## 2025-06-28 RX ORDER — MAGNESIUM OXIDE 400 MG/1
400 TABLET ORAL 3 TIMES DAILY
COMMUNITY

## 2025-06-28 RX ORDER — PANTOPRAZOLE SODIUM 40 MG/1
40 TABLET, DELAYED RELEASE ORAL
Status: DISCONTINUED | OUTPATIENT
Start: 2025-06-28 | End: 2025-06-30 | Stop reason: HOSPADM

## 2025-06-28 RX ORDER — SODIUM CHLORIDE 9 MG/ML
40 INJECTION, SOLUTION INTRAVENOUS AS NEEDED
Status: DISCONTINUED | OUTPATIENT
Start: 2025-06-28 | End: 2025-06-30 | Stop reason: HOSPADM

## 2025-06-28 RX ORDER — NITROGLYCERIN 0.4 MG/1
0.4 TABLET SUBLINGUAL
Status: DISCONTINUED | OUTPATIENT
Start: 2025-06-28 | End: 2025-06-30 | Stop reason: HOSPADM

## 2025-06-28 RX ORDER — POTASSIUM CHLORIDE 1500 MG/1
20 TABLET, EXTENDED RELEASE ORAL 3 TIMES DAILY
Status: ON HOLD | COMMUNITY
End: 2025-06-28

## 2025-06-28 RX ORDER — SODIUM CHLORIDE 0.9 % (FLUSH) 0.9 %
10 SYRINGE (ML) INJECTION AS NEEDED
Status: DISCONTINUED | OUTPATIENT
Start: 2025-06-28 | End: 2025-06-30 | Stop reason: HOSPADM

## 2025-06-28 RX ORDER — ONDANSETRON 4 MG/1
4 TABLET, ORALLY DISINTEGRATING ORAL EVERY 6 HOURS PRN
Status: DISCONTINUED | OUTPATIENT
Start: 2025-06-28 | End: 2025-06-30 | Stop reason: HOSPADM

## 2025-06-28 RX ORDER — SODIUM BICARBONATE 650 MG/1
650 TABLET ORAL 2 TIMES DAILY
Status: DISCONTINUED | OUTPATIENT
Start: 2025-06-28 | End: 2025-06-30 | Stop reason: HOSPADM

## 2025-06-28 RX ORDER — POTASSIUM CHLORIDE 1500 MG/1
20 TABLET, EXTENDED RELEASE ORAL NIGHTLY
COMMUNITY
End: 2025-06-30 | Stop reason: HOSPADM

## 2025-06-28 RX ORDER — PREGABALIN 75 MG/1
75 CAPSULE ORAL 2 TIMES DAILY
Status: ON HOLD | COMMUNITY
End: 2025-06-30

## 2025-06-28 RX ORDER — ALBUTEROL SULFATE 0.83 MG/ML
2.5 SOLUTION RESPIRATORY (INHALATION) EVERY 6 HOURS PRN
Status: DISCONTINUED | OUTPATIENT
Start: 2025-06-28 | End: 2025-06-30 | Stop reason: HOSPADM

## 2025-06-28 RX ORDER — SODIUM CHLORIDE 9 MG/ML
100 INJECTION, SOLUTION INTRAVENOUS CONTINUOUS
Status: DISCONTINUED | OUTPATIENT
Start: 2025-06-28 | End: 2025-06-28

## 2025-06-28 RX ORDER — POTASSIUM CHLORIDE 1500 MG/1
40 TABLET, FILM COATED, EXTENDED RELEASE ORAL DAILY
COMMUNITY
End: 2025-06-30 | Stop reason: HOSPADM

## 2025-06-28 RX ORDER — INSULIN LISPRO 100 [IU]/ML
2-7 INJECTION, SOLUTION INTRAVENOUS; SUBCUTANEOUS
Status: DISCONTINUED | OUTPATIENT
Start: 2025-06-28 | End: 2025-06-30 | Stop reason: HOSPADM

## 2025-06-28 RX ORDER — POTASSIUM CHLORIDE 1.5 G/1.58G
40 POWDER, FOR SOLUTION ORAL ONCE
Status: DISCONTINUED | OUTPATIENT
Start: 2025-06-28 | End: 2025-06-28

## 2025-06-28 RX ADMIN — METOCLOPRAMIDE HYDROCHLORIDE 5 MG: 5 SOLUTION ORAL at 01:29

## 2025-06-28 RX ADMIN — ROPINIROLE HYDROCHLORIDE 1 MG: 1 TABLET, FILM COATED ORAL at 20:45

## 2025-06-28 RX ADMIN — INSULIN LISPRO 3 UNITS: 100 INJECTION, SOLUTION INTRAVENOUS; SUBCUTANEOUS at 20:45

## 2025-06-28 RX ADMIN — METOCLOPRAMIDE HYDROCHLORIDE 5 MG: 5 SOLUTION ORAL at 20:45

## 2025-06-28 RX ADMIN — ASPIRIN 81 MG: 81 TABLET, COATED ORAL at 08:02

## 2025-06-28 RX ADMIN — POTASSIUM CHLORIDE 10 MEQ: 7.46 INJECTION, SOLUTION INTRAVENOUS at 01:30

## 2025-06-28 RX ADMIN — INSULIN LISPRO 2 UNITS: 100 INJECTION, SOLUTION INTRAVENOUS; SUBCUTANEOUS at 16:43

## 2025-06-28 RX ADMIN — ROPINIROLE HYDROCHLORIDE 1 MG: 1 TABLET, FILM COATED ORAL at 15:08

## 2025-06-28 RX ADMIN — POTASSIUM CHLORIDE 10 MEQ: 7.46 INJECTION, SOLUTION INTRAVENOUS at 03:41

## 2025-06-28 RX ADMIN — SODIUM CHLORIDE 100 ML/HR: 9 INJECTION, SOLUTION INTRAVENOUS at 01:29

## 2025-06-28 RX ADMIN — METOCLOPRAMIDE HYDROCHLORIDE 5 MG: 5 SOLUTION ORAL at 11:38

## 2025-06-28 RX ADMIN — METOCLOPRAMIDE HYDROCHLORIDE 5 MG: 5 SOLUTION ORAL at 05:33

## 2025-06-28 RX ADMIN — POTASSIUM CHLORIDE 40 MEQ: 1500 TABLET, EXTENDED RELEASE ORAL at 01:58

## 2025-06-28 RX ADMIN — SODIUM BICARBONATE 650 MG: 650 TABLET ORAL at 20:45

## 2025-06-28 RX ADMIN — SODIUM BICARBONATE 150 MEQ: 84 INJECTION INTRAVENOUS at 14:10

## 2025-06-28 RX ADMIN — SODIUM BICARBONATE 650 MG: 650 TABLET ORAL at 08:01

## 2025-06-28 RX ADMIN — INSULIN LISPRO 2 UNITS: 100 INJECTION, SOLUTION INTRAVENOUS; SUBCUTANEOUS at 11:38

## 2025-06-28 RX ADMIN — INSULIN LISPRO 2 UNITS: 100 INJECTION, SOLUTION INTRAVENOUS; SUBCUTANEOUS at 07:43

## 2025-06-28 RX ADMIN — Medication 10 ML: at 20:46

## 2025-06-28 RX ADMIN — NORTRIPTYLINE HYDROCHLORIDE 25 MG: 25 CAPSULE ORAL at 20:46

## 2025-06-28 RX ADMIN — NORTRIPTYLINE HYDROCHLORIDE 25 MG: 25 CAPSULE ORAL at 01:29

## 2025-06-28 RX ADMIN — ROPINIROLE HYDROCHLORIDE 1 MG: 1 TABLET, FILM COATED ORAL at 08:02

## 2025-06-28 RX ADMIN — METOCLOPRAMIDE HYDROCHLORIDE 5 MG: 5 SOLUTION ORAL at 16:43

## 2025-06-28 RX ADMIN — PANTOPRAZOLE SODIUM 40 MG: 40 TABLET, DELAYED RELEASE ORAL at 05:33

## 2025-06-28 RX ADMIN — SODIUM BICARBONATE 650 MG: 650 TABLET ORAL at 01:29

## 2025-06-28 RX ADMIN — Medication 10 ML: at 01:30

## 2025-06-28 RX ADMIN — ONDANSETRON 4 MG: 4 TABLET, ORALLY DISINTEGRATING ORAL at 01:29

## 2025-06-28 RX ADMIN — SODIUM CHLORIDE 500 ML: 9 INJECTION, SOLUTION INTRAVENOUS at 01:30

## 2025-06-28 NOTE — PROGRESS NOTES
Patient Name: Tasha Perez  YOB: 1970  MRN: [id number]  Admission date: 6/27/2025  Reason for Encounter: Low BMI, MD Consult , MST 2-3 or Nursing Admission Screen, and Hx of [facility] Clinical Nutrition Assessment     Subjective    Subjective Information     6/28: NKFA. No changes to nutrition/diet, supplements. Visits [facility] every 3 months; notices improvement of GI/gastric pacer s/p visits but regresses after one month. Cannot [facility] more often than every 3 months. Overall feels that the stimulator has not significantly improved GI function. Last visit with nephrology; 1 month ago. Reports taking vitamin D and calcium supplement and sodium bicarb as ordered. PO4 elevated; reviewed high PO4 at this time. Ca+ corrected 7.96. CGM left upper arm; replaces every 10 days; last replaced 5 days ago. BM two days ago. GI panel ordered. NFPE complete. NFPE completed, consistent with nutrition diagnosis of malnutrition using AND/ASPEN criteria. See MSA below. Liberalized tp regular/house diet at this time. Improved nutrient and energy intake a greater benefit than carbohydrate, fat, sodium and sodium, potassium, phosphorus restriction this time d/t poor appetite; inadequate oral intake and malnutrition. Continues home hydration (no longer in outpatient setting) with daughter administering IVF; no longer with [facility] (unsure of home IVF provider). Will follow per protocol.     Assessment    H&P and Current Problems      H&P  Past Medical History:   Diagnosis Date    Arthritis     Contusion     Degenerative disc disease, cervical     Diabetes mellitus     Elevated cholesterol     Fibromyalgia     Neuropathy     Osteoporosis     Pancreatitis     Raynaud disease     Renal insufficiency     Rheumatoid arthritis     Smoker 02/08/2022    Vitamin D deficiency 04/26/2022      Past Surgical History:   Procedure Laterality Date    APPENDECTOMY      CHOLECYSTECTOMY      COLONOSCOPY      ENDOSCOPY  "N/A 10/08/2019    Procedure: ESOPHAGOGASTRODUODENOSCOPY WITH ANESTHESIA;  Surgeon: Aleks Vaughn DO;  Location: [facility] ENDOSCOPY;  Service: Gastroenterology    ENDOSCOPY N/A 11/12/2024    Procedure: ESOPHAGOGASTRODUODENOSCOPY WITH ANESTHESIA;  Surgeon: Tiffanie Saucedo MD;  Location: [facility] ENDOSCOPY;  Service: Gastroenterology;  Laterality: N/A;  pre op: Gastroparesis, Intractable nausea  post op: insertion of dobhoff   PCP: Brandon Murguia    ENDOSCOPY N/A 11/15/2024    Procedure: ESOPHAGOGASTRODUODENOSCOPY WITH ANESTHESIA WITH NG TUBE INSERTION;  Surgeon: Tiffanie Saucedo MD;  Location: [facility] ENDOSCOPY;  Service: Gastroenterology;  Laterality: N/A;  PRE OP: DOBHOFF  POST OP: DOBHOFF  PCP: TELLY PACK    ENDOSCOPY WITH GASTROSTOMY TUBE INSERTION N/A 6/15/2022    Procedure: ESOPHAGOGASTRODUODENOSCOPY WITH GASTROSTOMY TUBE INSERTION;  Surgeon: Jersey Lee MD;  Location: [facility] ENDOSCOPY;  Service: Gastroenterology;  Laterality: N/A;  pre peg placement  post peg placement  Dr. Murguia    ENTEROSCOPY SMALL BOWEL N/A 11/3/2022    Procedure: Esophagogastroduodenoscopy with Peg Removal;  Surgeon: Aleks Vaughn DO;  Location: [facility] ENDOSCOPY;  Service: Gastroenterology;  Laterality: N/A;  pre; peg removal  post; peg removal   KIRILL Murguia MD        HYSTERECTOMY        Current Problems   Admission Diagnosis:  SUGAR (acute kidney injury) [N17.9]    Problem List:    Severe malnutrition    Hypokalemia    Gastroparesis    Loss of weight    SUGAR (acute kidney injury)      Other Applicable Nutrition Information:   Most recent MSA 4/3/25     Anthropometrics      BMI, Height, Weight Estimated body mass index is 17.12 kg/m² as calculated from the following:    Height as of this encounter: 154.9 cm (61\").    Weight as of this encounter: 41.1 kg (90 lb 9.6 oz).    Weight Method: Standing scale       Trending Weight Changes 6/28 no significant changes       Weight History  Wt Readings from Last 20 Encounters: "   06/28/25 0018 41.1 kg (90 lb 9.6 oz)   06/27/25 2006 39.9 kg (88 lb)   04/02/25 0330 42.8 kg (94 lb 5.7 oz)   03/31/25 2115 40.3 kg (88 lb 13.5 oz)   03/31/25 1506 38.6 kg (85 lb)   03/18/25 1149 40.6 kg (89 lb 8 oz)   02/24/25 1315 38.7 kg (85 lb 6.4 oz)   01/27/25 1434 42.4 kg (93 lb 6.4 oz)   12/23/24 1629 41 kg (90 lb 6.4 oz)   12/08/24 1546 38.2 kg (84 lb 4 oz)   12/08/24 1232 37.6 kg (83 lb)   11/16/24 0600 44.5 kg (98 lb)   11/14/24 2218 40.8 kg (90 lb)   11/14/24 1849 40.8 kg (90 lb)   11/07/24 2204 39.2 kg (86 lb 8 oz)   11/07/24 1836 44.2 kg (97 lb 8 oz)   10/24/24 2138 41.7 kg (92 lb)   10/24/24 1912 38.6 kg (85 lb)   09/10/24 2100 41.5 kg (91 lb 7.9 oz)   09/10/24 1816 41.4 kg (91 lb 4.3 oz)   08/30/24 0444 41.4 kg (91 lb 4.3 oz)   08/29/24 1730 37.2 kg (82 lb)   08/27/24 2218 36 kg (79 lb 5.9 oz)   08/27/24 1848 35.4 kg (78 lb)   07/10/24 0253 37.6 kg (83 lb)   07/09/24 1748 37.6 kg (83 lb)   04/23/24 1824 41.3 kg (91 lb)   03/08/24 1400 43.9 kg (96 lb 12.8 oz)   03/07/24 0356 44.7 kg (98 lb 9.6 oz)   03/06/24 0500 42.7 kg (94 lb 2.2 oz)   03/05/24 0351 42.7 kg (94 lb 2.2 oz)   03/04/24 2032 42.2 kg (93 lb)   03/04/24 1340 41.7 kg (92 lb)   02/26/24 2150 41.3 kg (91 lb)   02/26/24 1616 39.9 kg (88 lb)   02/17/24 0025 40.4 kg (89 lb)   02/08/24 0500 39.8 kg (87 lb 11.2 oz)   02/07/24 0805 41.2 kg (90 lb 12.8 oz)   02/06/24 1550 39.9 kg (88 lb)            Labs      Comment: Reviewed with pt     Results from last 7 days   Lab Units 06/28/25  1221 06/28/25  0535 06/28/25  0243 06/27/25  2050   SODIUM mmol/L 140 141 141 136   POTASSIUM mmol/L 4.0 4.1 2.9* 3.0*   GLUCOSE mg/dL 175* 208* 256* 205*   BUN mg/dL 51.3* 54.1* 55.7* 56.8*   CREATININE mg/dL 2.11* 2.40* 2.58* 3.18*   CALCIUM mg/dL 7.8* 7.4* 7.8* 8.5*   PHOSPHORUS mg/dL  --  5.8*  --   --    MAGNESIUM mg/dL 1.7 1.6  --  2.1   ALBUMIN g/dL  --  3.3*  --  3.9   LACTATE mmol/L  --   --   --  1.0   BILIRUBIN mg/dL  --  <0.2  --  <0.2   ALK PHOS U/L  --   122*  --  155*   AST (SGOT) U/L  --  12  --  15   ALT (SGPT) U/L  --  10  --  12     Results from last 7 days   Lab Units 06/28/25  0243 06/27/25 2050   PLATELETS 10*3/mm3 326 360   HEMOGLOBIN g/dL 10.0* 11.3*   HEMATOCRIT % 31.8* 36.0     Lab Results   Component Value Date    HGBA1C 8.60 (H) 04/01/2025          Medications       Scheduled Medications aspirin, 81 mg, Oral, Daily  insulin lispro, 2-7 Units, Subcutaneous, 4x Daily AC & at Bedtime  metoclopramide, 5 mg, Oral, 4x Daily AC & at Bedtime  nortriptyline, 25 mg, Oral, Nightly  pantoprazole, 40 mg, Oral, Q AM  rOPINIRole, 1 mg, Oral, TID  sodium bicarbonate, 650 mg, Oral, BID  sodium chloride, 10 mL, Intravenous, Q12H        Infusions      PRN Medications   albuterol    dextrose    dextrose    Magnesium Standard Dose Replacement - Follow Nurse / BPA Driven Protocol    nitroglycerin    ondansetron ODT **OR** ondansetron    Potassium Replacement - Follow Nurse / BPA Driven Protocol    sodium chloride    sodium chloride     Physical Findings      Chewing/Swallowing  Teeth Status: Mouth/Teeth WDL: WDL    Chewing/Swallowing Issues: No issues identified at this time   Edema                            Bowel Function  Stool Output  Stool Unmeasured Occurrence: 1 (06/28/25 0718)  Bowel Incontinence: No (06/28/25 0718)  Stool Amount: Medium (06/28/25 0718)  Stool Consistency: soft (06/28/25 0800)  Perineal Care: perineum cleansed (06/28/25 0718)  Last Bowel Movement: 06/28/25   I/Os  Intake & Output (last 3 days)         06/25 0701 06/26 0700 06/26 0701 06/27 0700 06/27 0701  06/28 0700 06/28 0701  06/29 0700    P.O.   480 360    Total Intake(mL/kg)   480 (11.7) 360 (8.8)    Urine (mL/kg/hr)    700 (2.8)    Stool    0    Total Output    700    Net   +480 -340            Urine Unmeasured Occurrence    1 x    Stool Unmeasured Occurrence   1 x 1 x           Lines, Drains, Airways, & Wounds       Active LDAs       Name Placement date Placement time Site Days Last  dressing change    PICC Single Lumen 04/04/25 Right Basilic 04/04/25  1421  Basilic  84     Insulin Pump 11/19/23  2300  -- 586                       Nutrition Focused Physical Exam     Trending NFPE NFPE completed, consistent with nutrition diagnosis of malnutrition using AND/ASPEN criteria. See MSA below.        Malnutrition Severity Assessment      Patient meets criteria for : Severe Malnutrition  Malnutrition Type (Last 8 Hours)       Malnutrition Severity Assessment       Row Name 06/28/25 1259       Malnutrition Severity Assessment    Malnutrition Type Chronic Disease - Related Malnutrition      Row Name 06/28/25 1259       Insufficient Energy Intake     Insufficient Energy Intake Findings Severe    Insufficient Energy Intake  <50% of est. energy requirement for >or equal to 1 month      Row Name 06/28/25 1259       Unintentional Weight Loss     Unintentional Weight Loss Findings None      Row Name 06/28/25 1259       Muscle Loss    Loss of Muscle Mass Findings Severe    Gray Region Moderate - slight depression    Clavicle Bone Region Severe - protruding prominent bone    Acromion Bone Region Severe - squared shoulders, bones, and acromion process protrusion prominent    Scapular Bone Region Severe - prominent bones, depressions easily visible between ribs, scapula, spine, shoulders    Dorsal Hand Region Moderate - slight depression    Patellar Region Severe - prominent bone, square looking, very little muscle definition    Anterior Thigh Region Severe - line/depression along thigh, obviously thin    Posterior Calf Region Severe - thin with very little definition/firmness      Row Name 06/28/25 1259       Fat Loss    Subcutaneous Fat Loss Findings Severe    Orbital Region  Moderate -  somewhat hollowness, slightly dark circles    Upper Arm Region Severe - mostly skin, very little space between folds, fingers touch    Thoracic & Lumbar Region Severe - ribs visible with prominent depressions, iliac crest very  prominent      Row Name 06/28/25 9446       Criteria Met (Must meet criteria for severity in at least 2 of these categories: M Wasting, Fat Loss, Fluid, Secondary Signs, Wt. Status, Intake)    Patient meets criteria for  Severe Malnutrition                     Estimated Needs      Energy Requirements    Weight for Calculation 41.4kg   Method for Estimation  35-40 kcals/kg   Daily Needs (kcal/day) 3381-0411   Protein Requirements    Weight for Calculation 41.4kg   Method for Estimation 1.0 gm/kg   Daily Needs (g/day) 41   Fluid Requirements     Method for Estimation 30 mL/kg    Daily Needs (mL/day) 1242     Current Nutrition Orders & Evaluation of Intake      Oral Nutrition     Food Allergies  and Intolerances Pineapple, chocolate   Current PO Diet Diet: Regular/House; Fluid Consistency: Thin (IDDSI 0)   Oral Nutrition Supplement None     Trending % PO Intake      Enteral Nutrition     Current EN Order Patient isn't on Tube Feeding  Patient doesn't have any tube feeding modular orders     EN Route      EN Tolerance     EN Observation/Intake         Parenteral Nutrition     Current TPN Order    TPN Route    Lipids (mL/%/frequency)     Total # Days on TPN    TPN Observation/Intake       Assessment & Plan   Nutrition Diagnosis and Goals       Nutrition Diagnosis 1 Severe chronic disease or condition related malnutrition related to CKD, DM, gastroparesis, PMH C diff, chronic poor appetite as evidenced by muscle and fat loss per NFPE, BMI 17.12kg/m^2, UBW 86% of IBW.          Goal(s) Establish PO Intake, Tolerates PO Diet , Maintain Weight, and Goals of Care are Established     Nutrition Intervention and Prescription       Intervention  Liberalize diet      Diet Prescription Changed to regular    Supplement Prescription     Education Provided       Enteral Prescription        TPN Prescription      Monitoring/Evaluation       Monitor/Evaluation Per Protocol     RD Follow-Up Encounter 7 days      Electronically signed  by:  Mae Loredo RDN, ERNESTINA  06/28/25 12:59 CDT

## 2025-06-28 NOTE — CONSULTS
Received consult to verify PICC tip placement d/t line being pulled out 8-10 cm upon arrival this visit. CXR ordered placed to assess tip location.    1020: tip of PICC line is in the mid SVC and ok for use.

## 2025-06-28 NOTE — CONSULTS
Nephrology ([facility]) Consult Note      Patient:  Tasha Perez  YOB: 1970  Date of Service: 6/28/2025  MRN: [id number]   Acct: [id number]   Primary Care Physician: KIRILL Murguia MD  Advance Directive:   Code Status and Medical Interventions: CPR (Attempt to Resuscitate); Full Support   Ordered at: 06/28/25 0100     Code Status (Patient has no pulse and is not breathing):    CPR (Attempt to Resuscitate)     Medical Interventions (Patient has pulse or is breathing):    Full Support     Level Of Support Discussed With:    Patient     Admit Date: 6/27/2025       Hospital Day: 1  Referring Provider: No ref. provider found      Patient Seen, Chart, Consults, Notes, Labs, Radiology studies reviewed.      Subjective:  Tasha Perez is a 54 y.o. female  whom we were consulted for acute kidney injury and acidosis.  Patient has a history of type 2 diabetes and chronic kidney disease was fluctuating baseline kidney function.  He has severe gastroparesis and has had recurrent nausea vomiting and diarrhea.  She is status post gastric stimulator placement.  Patient continues to go to [facility] for adjustment in her stimulator.  She has had multiple episodes of admissions for dehydration and acidosis with kidney failure.  Patient had a PICC line in her right upper extremity and has been receiving IV hydration every other day.  Because of her family issues dealing with her grandkids and being unable to go to [facility] for GI on a regular basis, patient continues to have a suboptimal outcome with recurrent episode of severe dehydration and SUGAR.  Her labs showed his severe disturbances and she was instructed to be admitted.  She was started on bicarbonate based IV fluids and has since reported some improvement in her GI symptoms.  She denies a change to her urinary habits.  Her previous serum creatinine was 1.8-2.  And it was found to be 3.1 on admission.  Under IV fluids, her serum  creatinine has improved to 2.1.  Patient denies much leg edema.    Allergies:  Bee venom, Hydrocodone, Hydroxychloroquine, Ibuprofen, Pineapple, Pineapple extract, Wasp venom, Wasp venom protein, Hydrocodone-acetaminophen, Metformin, Penicillins, Sitagliptin, Chocolate, and Poison ivy extract    Home Meds:  Medications Prior to Admission   Medication Sig Dispense Refill Last Dose/Taking    albuterol sulfate  (90 Base) MCG/ACT inhaler Inhale 2 puffs 3 (Three) Times a Day As Needed for Wheezing.   Past Week    aspirin 81 MG EC tablet Take 1 tablet by mouth Daily.   6/27/2025 at  9:00 AM    atorvastatin (LIPITOR) 80 MG tablet Take 1 tablet by mouth Every Night.   6/27/2025 at  9:00 PM    Calcium Carbonate-Vitamin D 600-5 MG-MCG tablet Take 1 tablet by mouth Daily.   6/27/2025 at  9:00 AM    cetirizine (zyrTEC) 10 MG tablet Take 1 tablet by mouth Daily As Needed for Allergies.   Past Week at  9:00 AM    DULoxetine (CYMBALTA) 60 MG capsule Take 1 capsule by mouth Daily.   6/27/2025 at  9:00 AM    linaclotide (LINZESS) 290 MCG capsule capsule Take 1 capsule by mouth Every Morning Before Breakfast.   6/27/2025 at  9:00 AM    magnesium oxide (MAG-OX) 400 MG tablet Take 1 tablet by mouth 3 times a day.   Taking    metoclopramide (REGLAN) 5 MG tablet Take 2 tablets by mouth 3 (Three) Times a Day As Needed (nause and vomiting).   6/27/2025    nortriptyline (PAMELOR) 25 MG capsule Take 1 capsule by mouth Every Night.   6/27/2025 at  9:00 PM    omeprazole (priLOSEC) 40 MG capsule Take 1 capsule by mouth Daily.   6/27/2025 at  9:00 AM    potassium chloride (K-DUR,KLOR-CON) 20 MEQ tablet controlled-release ER tablet Take 2 tablets by mouth Daily.   Taking    pregabalin (LYRICA) 75 MG capsule Take 1 capsule by mouth 2 (Two) Times a Day.   Taking    rOPINIRole (REQUIP) 1 MG tablet Take 1 tablet by mouth 3 (Three) Times a Day.   6/27/2025 at  9:00 PM    sodium bicarbonate 650 MG tablet Take 1 tablet by mouth 2 (Two) Times a  Day.   6/27/2025 at  9:00 AM    sucralfate (CARAFATE) 1 g tablet Take 1 tablet by mouth 4 (Four) Times a Day.   6/27/2025 at  5:00 PM    triamcinolone (KENALOG) 0.1 % cream Apply 1 Application topically to the appropriate area as directed Daily As Needed (prior to dexcom application).   6/27/2025 Evening    potassium chloride (KLOR-CON M20) 20 MEQ CR tablet Take 1 tablet by mouth Every Night.       promethazine (PHENERGAN) 25 MG tablet Take 1 tablet by mouth Every 6 (Six) Hours As Needed for Nausea or Vomiting.       sodium chloride 0.9 % solution Infuse 30 mL/hr into a venous catheter 3 (Three) Times a Week. Monday, Wednesday, Friday          Medicines:  Current Facility-Administered Medications   Medication Dose Route Frequency Provider Last Rate Last Admin    albuterol (PROVENTIL) nebulizer solution 0.083% 2.5 mg/3mL  2.5 mg Nebulization Q6H PRN Elodia Phillips MD        aspirin EC tablet 81 mg  81 mg Oral Daily Elodia Phillips MD   81 mg at 06/28/25 0802    dextrose (D50W) (25 g/50 mL) IV injection 25 g  25 g Intravenous Q15 Min PRN Elodia Phillips MD        dextrose (GLUTOSE) oral gel 15 g  15 g Oral Q15 Min PRN Elodia Phillips MD        Insulin Lispro (humaLOG) injection 2-7 Units  2-7 Units Subcutaneous 4x Daily AC & at Bedtime Elodia Phillips MD   2 Units at 06/28/25 1138    Magnesium Standard Dose Replacement - Follow Nurse / BPA Driven Protocol   Not Applicable PRN Elodia Phillips MD        metoclopramide (REGLAN) solution 5 mg  5 mg Oral 4x Daily AC & at Bedtime Elodia Phillips MD   5 mg at 06/28/25 1138    nitroglycerin (NITROSTAT) SL tablet 0.4 mg  0.4 mg Sublingual Q5 Min PRN Elodia Phillips MD        nortriptyline (PAMELOR) capsule 25 mg  25 mg Oral Nightly Elodia Phillips MD   25 mg at 06/28/25 0129    ondansetron ODT (ZOFRAN-ODT) disintegrating tablet 4 mg  4 mg Oral Q6H PRN Elodia Phillips MD   4 mg at 06/28/25 0129    Or    ondansetron (ZOFRAN) injection 4 mg  4 mg Intravenous Q6H PRN Elodia Phillips  MD JAN        pantoprazole (PROTONIX) EC tablet 40 mg  40 mg Oral Q AM Elodia Phillips MD   40 mg at 06/28/25 0533    Potassium Replacement - Follow Nurse / BPA Driven Protocol   Not Applicable PRN Elodia Phillips MD        rOPINIRole (REQUIP) tablet 1 mg  1 mg Oral TID Elodia Phillips MD   1 mg at 06/28/25 0802    sodium bicarbonate 8.4 % 150 mEq in dextrose (D5W) 5 % 1,000 mL infusion (greater than 100 mEq)  150 mEq Intravenous Continuous Brandon Cline DO 75 mL/hr at 06/28/25 1410 150 mEq at 06/28/25 1410    sodium bicarbonate tablet 650 mg  650 mg Oral BID Elodia Phillips MD   650 mg at 06/28/25 0801    sodium chloride 0.9 % flush 10 mL  10 mL Intravenous Q12H Elodia Phillips MD   10 mL at 06/28/25 0130    sodium chloride 0.9 % flush 10 mL  10 mL Intravenous PRN Elodia Phillips MD        sodium chloride 0.9 % infusion 40 mL  40 mL Intravenous PRN Elodia Phillips MD           Past Medical History:  Past Medical History:   Diagnosis Date    Arthritis     Contusion     Degenerative disc disease, cervical     Diabetes mellitus     Elevated cholesterol     Fibromyalgia     Neuropathy     Osteoporosis     Pancreatitis     Raynaud disease     Renal insufficiency     Rheumatoid arthritis     Smoker 02/08/2022    Vitamin D deficiency 04/26/2022       Past Surgical History:  Past Surgical History:   Procedure Laterality Date    APPENDECTOMY      CHOLECYSTECTOMY      COLONOSCOPY      ENDOSCOPY N/A 10/08/2019    Procedure: ESOPHAGOGASTRODUODENOSCOPY WITH ANESTHESIA;  Surgeon: Aleks Vaughn DO;  Location: [facility] ENDOSCOPY;  Service: Gastroenterology    ENDOSCOPY N/A 11/12/2024    Procedure: ESOPHAGOGASTRODUODENOSCOPY WITH ANESTHESIA;  Surgeon: Tiffanie Saucedo MD;  Location: [facility] ENDOSCOPY;  Service: Gastroenterology;  Laterality: N/A;  pre op: Gastroparesis, Intractable nausea  post op: insertion of dobhoff   PCP: Brandon Murguia    ENDOSCOPY N/A 11/15/2024    Procedure: ESOPHAGOGASTRODUODENOSCOPY WITH ANESTHESIA  WITH NG TUBE INSERTION;  Surgeon: Tiffanie Saucedo MD;  Location:  [facility] ENDOSCOPY;  Service: Gastroenterology;  Laterality: N/A;  PRE OP: DOBHOFF  POST OP: DOBHOFF  PCP: TELLY PACK    ENDOSCOPY WITH GASTROSTOMY TUBE INSERTION N/A 6/15/2022    Procedure: ESOPHAGOGASTRODUODENOSCOPY WITH GASTROSTOMY TUBE INSERTION;  Surgeon: Jersey Lee MD;  Location:  [facility] ENDOSCOPY;  Service: Gastroenterology;  Laterality: N/A;  pre peg placement  post peg placement  Dr. Murguia    ENTEROSCOPY SMALL BOWEL N/A 11/3/2022    Procedure: Esophagogastroduodenoscopy with Peg Removal;  Surgeon: Aleks Vaughn DO;  Location:  [facility] ENDOSCOPY;  Service: Gastroenterology;  Laterality: N/A;  pre; peg removal  post; peg removal   KIRILL Murguia MD        HYSTERECTOMY         Family History  Family History   Adopted: Yes   Problem Relation Age of Onset    Colon polyps Neg Hx     Colon cancer Neg Hx        Social History  Social History     Socioeconomic History    Marital status:    Tobacco Use    Smoking status: Every Day     Current packs/day: 0.50     Average packs/day: 0.5 packs/day for 32.5 years (16.2 ttl pk-yrs)     Types: Cigarettes     Start date: 1993     Passive exposure: Current    Smokeless tobacco: Never   Vaping Use    Vaping status: Every Day    Substances: Nicotine    Devices: Disposable   Substance and Sexual Activity    Alcohol use: No    Drug use: No    Sexual activity: Defer         Review of Systems:  History obtained from chart review and the patient  General ROS: No fever or chills  Respiratory ROS: No cough, shortness of breath, wheezing  Cardiovascular ROS: no chest pain or dyspnea on exertion  Gastrointestinal ROS: No abdominal pain or melena  Genito-Urinary ROS: No dysuria or hematuria  14 point ROS reviewed with the patient and negative except as noted above and in the HPI unless unable to obtain.    Objective:  /78 (BP Location: Left arm, Patient Position: Lying)   Pulse 104   Temp 97.7  "°F (36.5 °C) (Oral)   Resp 18   Ht 154.9 cm (61\")   Wt 41.1 kg (90 lb 9.6 oz)   SpO2 100%   BMI 17.12 kg/m²     Intake/Output Summary (Last 24 hours) at 6/28/2025 1434  Last data filed at 6/28/2025 1217  Gross per 24 hour   Intake 840 ml   Output 700 ml   Net 140 ml     General: awake/alert   Head: Atraumatic, normocephalic  Neck; no masses, no JVD  Chest:  clear to auscultation bilaterally without respiratory distress  CVS: regular rate and rhythm  Abdominal: soft, nontender, normal bowel sounds  Extremities: no cyanosis or edema  Skin: warm and dry without rash  Neuro: No focal motor deficits  Musculoskeletal: No obvious joint effusions    Labs:  Lab Results (last 7 days)       Procedure Component Value Units Date/Time    Basic Metabolic Panel [[id number]]  (Abnormal) Collected: 06/28/25 1221    Specimen: Blood Updated: 06/28/25 1253     Glucose 175 mg/dL      BUN 51.3 mg/dL      Creatinine 2.11 mg/dL      Sodium 140 mmol/L      Potassium 4.0 mmol/L      Comment: Slight hemolysis detected by analyzer. Result may be falsely elevated.        Chloride 117 mmol/L      CO2 11.0 mmol/L      Calcium 7.8 mg/dL      BUN/Creatinine Ratio 24.3     Anion Gap 12.0 mmol/L      eGFR 27.6 mL/min/1.73     Narrative:      GFR Categories in Chronic Kidney Disease (CKD)              GFR Category          GFR (mL/min/1.73)    Interpretation  G1                    90 or greater        Normal or high (1)  G2                    60-89                Mild decrease (1)  G3a                   45-59                Mild to moderate decrease  G3b                   30-44                Moderate to severe decrease  G4                    15-29                Severe decrease  G5                    14 or less           Kidney failure    (1)In the absence of evidence of kidney disease, neither GFR category G1 or G2 fulfill the criteria for CKD.    eGFR calculation 2021 CKD-EPI creatinine equation, which does not include race as a factor    " Magnesium [[id number]]  (Normal) Collected: 06/28/25 1221    Specimen: Blood Updated: 06/28/25 1253     Magnesium 1.7 mg/dL     Osmolality, Urine - Urine, Clean Catch [[id number]]  (Normal) Collected: 06/28/25 1150    Specimen: Urine, Clean Catch Updated: 06/28/25 1246     Osmolality, Urine 499 mOsm/kg     Sodium, Urine, Random - Urine, Clean Catch [[id number]] Collected: 06/28/25 1150    Specimen: Urine, Clean Catch Updated: 06/28/25 1234     Sodium, Urine 49 mmol/L     Narrative:      Reference intervals for random urine have not been established.  Clinical usage is dependent upon physician's interpretation in combination with other laboratory tests.       Protein, Urine, Random - Urine, Clean Catch [[id number]] Collected: 06/28/25 1150    Specimen: Urine, Clean Catch Updated: 06/28/25 1233     Total Protein, Urine 22.8 mg/dL     Narrative:      Reference intervals for random urine have not been established.  Clinical usage is dependent upon physician's interpretation in combination with other laboratory tests.       Blood Gas, Venous - [[id number]]  (Abnormal) Collected: 06/28/25 1229    Specimen: Venous Blood Updated: 06/28/25 1230     Site OTHER     pH, Venous 7.145 pH Units      Comment: 85 Value below critical limit        pCO2, Venous 36.8 mm Hg      Comment: 84 Value below reference range        pO2, Venous 44.2 mm Hg      HCO3, Venous 12.7 mmol/L      Comment: 84 Value below reference range        Base Excess, Venous -15.2 mmol/L      Comment: 84 Value below reference range        O2 Saturation, Venous 70.6 %      Temperature 37.0     Barometric Pressure for Blood Gas 755 mmHg      Modality Room Air     Ventilator Mode NA     Notified Who DR OMALLEY     Notified By Latasha Quijano, RRT     Notified Time 06/28/2025 12:30     Collected by CJ     Comment: Meter: [id number]     :  Latasha Quijano, BRANDON       Urinalysis With Microscopic If Indicated (No Culture) - Urine, Clean Catch [[id number]]   (Abnormal) Collected: 06/28/25 1150    Specimen: Urine, Clean Catch Updated: 06/28/25 1207     Color, UA Yellow     Appearance, UA Clear     pH, UA 5.5     Specific Gravity, UA 1.015     Glucose, UA Negative     Ketones, UA Negative     Bilirubin, UA Negative     Blood, UA Negative     Protein, UA 30 mg/dL (1+)     Leuk Esterase, UA Trace     Nitrite, UA Negative     Urobilinogen, UA 0.2 E.U./dL    Urinalysis, Microscopic Only - Urine, Clean Catch [[id number]] Collected: 06/28/25 1150    Specimen: Urine, Clean Catch Updated: 06/28/25 1207     RBC, UA None Seen /HPF      WBC, UA 0-2 /HPF      Bacteria, UA None Seen /HPF      Squamous Epithelial Cells, UA 0-2 /HPF      Hyaline Casts, UA 3-6 /LPF      Methodology Automated Microscopy    Creatinine Urine Random (kidney function) GFR component - Urine, Clean Catch [[id number]] Collected: 06/28/25 1150    Specimen: Urine, Clean Catch Updated: 06/28/25 1205    Eosinophil Smear - Urine, Urine, Clean Catch [[id number]] Collected: 06/28/25 1150    Specimen: Urine, Clean Catch Updated: 06/28/25 1157    POC Glucose Once [[id number]]  (Abnormal) Collected: 06/28/25 1128    Specimen: Blood Updated: 06/28/25 1139     Glucose 178 mg/dL      Comment: : [id number] VIVAeter ID: [id number]       POC Glucose Once [[id number]]  (Abnormal) Collected: 06/28/25 0724    Specimen: Blood Updated: 06/28/25 0735     Glucose 173 mg/dL      Comment: : [id number] SCYFIX RayneMeter ID: [id number]       Magnesium [[id number]]  (Normal) Collected: 06/28/25 0535    Specimen: Blood Updated: 06/28/25 0627     Magnesium 1.6 mg/dL     Comprehensive Metabolic Panel [[id number]]  (Abnormal) Collected: 06/28/25 0535    Specimen: Blood Updated: 06/28/25 0627     Glucose 208 mg/dL      BUN 54.1 mg/dL      Creatinine 2.40 mg/dL      Sodium 141 mmol/L      Potassium 4.1 mmol/L      Comment: Slight hemolysis detected by analyzer. Result may be falsely elevated.        Chloride 112 mmol/L      CO2  14.0 mmol/L      Calcium 7.4 mg/dL      Total Protein 6.1 g/dL      Albumin 3.3 g/dL      ALT (SGPT) 10 U/L      AST (SGOT) 12 U/L      Alkaline Phosphatase 122 U/L      Total Bilirubin <0.2 mg/dL      Globulin 2.8 gm/dL      A/G Ratio 1.2 g/dL      BUN/Creatinine Ratio 22.5     Anion Gap 15.0 mmol/L      eGFR 23.6 mL/min/1.73     Narrative:      GFR Categories in Chronic Kidney Disease (CKD)              GFR Category          GFR (mL/min/1.73)    Interpretation  G1                    90 or greater        Normal or high (1)  G2                    60-89                Mild decrease (1)  G3a                   45-59                Mild to moderate decrease  G3b                   30-44                Moderate to severe decrease  G4                    15-29                Severe decrease  G5                    14 or less           Kidney failure    (1)In the absence of evidence of kidney disease, neither GFR category G1 or G2 fulfill the criteria for CKD.    eGFR calculation 2021 CKD-EPI creatinine equation, which does not include race as a factor    Phosphorus [[id number]]  (Abnormal) Collected: 06/28/25 0535    Specimen: Blood Updated: 06/28/25 0621     Phosphorus 5.8 mg/dL     Basic Metabolic Panel [[id number]]  (Abnormal) Collected: 06/28/25 0243    Specimen: Blood Updated: 06/28/25 0308     Glucose 256 mg/dL      BUN 55.7 mg/dL      Creatinine 2.58 mg/dL      Sodium 141 mmol/L      Potassium 2.9 mmol/L      Comment: Slight hemolysis detected by analyzer. Result may be falsely elevated.        Chloride 110 mmol/L      CO2 14.0 mmol/L      Calcium 7.8 mg/dL      BUN/Creatinine Ratio 21.6     Anion Gap 17.0 mmol/L      eGFR 21.6 mL/min/1.73     Narrative:      GFR Categories in Chronic Kidney Disease (CKD)              GFR Category          GFR (mL/min/1.73)    Interpretation  G1                    90 or greater        Normal or high (1)  G2                    60-89                Mild decrease (1)  G3a                    45-59                Mild to moderate decrease  G3b                   30-44                Moderate to severe decrease  G4                    15-29                Severe decrease  G5                    14 or less           Kidney failure    (1)In the absence of evidence of kidney disease, neither GFR category G1 or G2 fulfill the criteria for CKD.    eGFR calculation 2021 CKD-EPI creatinine equation, which does not include race as a factor    CBC (No Diff) [[id number]]  (Abnormal) Collected: 06/28/25 0243    Specimen: Blood Updated: 06/28/25 0251     WBC 10.92 10*3/mm3      RBC 3.37 10*6/mm3      Hemoglobin 10.0 g/dL      Hematocrit 31.8 %      MCV 94.4 fL      MCH 29.7 pg      MCHC 31.4 g/dL      RDW 16.2 %      RDW-SD 56.4 fl      MPV 10.5 fL      Platelets 326 10*3/mm3     Lactic Acid, Plasma [[id number]]  (Normal) Collected: 06/27/25 2050    Specimen: Blood Updated: 06/28/25 0230     Lactate 1.0 mmol/L     Magnesium [[id number]]  (Normal) Collected: 06/27/25 2050    Specimen: Blood Updated: 06/27/25 2359     Magnesium 2.1 mg/dL     Comprehensive Metabolic Panel [[id number]]  (Abnormal) Collected: 06/27/25 2050    Specimen: Blood Updated: 06/27/25 2122     Glucose 205 mg/dL      BUN 56.8 mg/dL      Creatinine 3.18 mg/dL      Sodium 136 mmol/L      Potassium 3.0 mmol/L      Chloride 103 mmol/L      CO2 14.0 mmol/L      Calcium 8.5 mg/dL      Total Protein 7.5 g/dL      Albumin 3.9 g/dL      ALT (SGPT) 12 U/L      AST (SGOT) 15 U/L      Alkaline Phosphatase 155 U/L      Total Bilirubin <0.2 mg/dL      Globulin 3.6 gm/dL      A/G Ratio 1.1 g/dL      BUN/Creatinine Ratio 17.9     Anion Gap 19.0 mmol/L      eGFR 16.8 mL/min/1.73     Narrative:      GFR Categories in Chronic Kidney Disease (CKD)              GFR Category          GFR (mL/min/1.73)    Interpretation  G1                    90 or greater        Normal or high (1)  G2                    60-89                Mild decrease (1)  G3a                   45-59                 Mild to moderate decrease  G3b                   30-44                Moderate to severe decrease  G4                    15-29                Severe decrease  G5                    14 or less           Kidney failure    (1)In the absence of evidence of kidney disease, neither GFR category G1 or G2 fulfill the criteria for CKD.    eGFR calculation 2021 CKD-EPI creatinine equation, which does not include race as a factor    [place] Draw [[id number]] Collected: 06/27/25 2050    Specimen: Blood Updated: 06/27/25 2106    Narrative:      The following orders were created for panel order [place] Draw.  Procedure                               Abnormality         Status                     ---------                               -----------         ------                     Green Top (Gel)[[id number]]                                  Final result               Lavender Top[[id number]]                                     Final result               Red Top[[id number]]                                                                     Britton Top[[id number]]                                         Final result               Light Blue Top[[id number]]                                   Final result                 Please view results for these tests on the individual orders.    CBC & Differential [[id number]]  (Abnormal) Collected: 06/27/25 2050    Specimen: Blood Updated: 06/27/25 2103    Narrative:      The following orders were created for panel order CBC & Differential.  Procedure                               Abnormality         Status                     ---------                               -----------         ------                     CBC Auto Differential[[id number]]        Abnormal            Final result                 Please view results for these tests on the individual orders.    CBC Auto Differential [[id number]]  (Abnormal) Collected: 06/27/25 2050    Specimen: Blood Updated: 06/27/25 2103     WBC 11.05 10*3/mm3       RBC 3.83 10*6/mm3      Hemoglobin 11.3 g/dL      Hematocrit 36.0 %      MCV 94.0 fL      MCH 29.5 pg      MCHC 31.4 g/dL      RDW 16.2 %      RDW-SD 55.8 fl      MPV 10.2 fL      Platelets 360 10*3/mm3      Neutrophil % 65.1 %      Lymphocyte % 25.1 %      Monocyte % 5.2 %      Eosinophil % 3.7 %      Basophil % 0.5 %      Immature Grans % 0.4 %      Neutrophils, Absolute 7.20 10*3/mm3      Lymphocytes, Absolute 2.77 10*3/mm3      Monocytes, Absolute 0.58 10*3/mm3      Eosinophils, Absolute 0.41 10*3/mm3      Basophils, Absolute 0.05 10*3/mm3      Immature Grans, Absolute 0.04 10*3/mm3      nRBC 0.0 /100 WBC     Green Top (Gel) [[id number]] Collected: 06/27/25 2050    Specimen: Blood Updated: 06/27/25 2101     Extra Tube Hold for add-ons.     Comment: Auto resulted.       Lavender Top [[id number]] Collected: 06/27/25 2050    Specimen: Blood Updated: 06/27/25 2101     Extra Tube hold for add-on     Comment: Auto resulted       Gray Top [[id number]] Collected: 06/27/25 2050    Specimen: Blood Updated: 06/27/25 2101     Extra Tube Hold for add-ons.     Comment: Auto resulted.       Light Blue Top [[id number]] Collected: 06/27/25 2050    Specimen: Blood Updated: 06/27/25 2101     Extra Tube Hold for add-ons.     Comment: Auto resulted                Radiology:   Imaging Results (Last 72 Hours)       Procedure Component Value Units Date/Time    US Renal Bilateral - In process [[id number]] Resulted: 06/28/25 1414     Updated: 06/28/25 1414    This result has not been signed. Information might be incomplete.      XR Chest 1 View [[id number]] Collected: 06/28/25 1052     Updated: 06/28/25 1056    Narrative:      EXAMINATION:  XR CHEST 1 VW-       HISTORY: PICC tip location, line has been pulled out; N17.9-Acute kidney  failure, unspecified.     COMPARISON: 3/31/2025.     TECHNIQUE: Single view AP image.     FINDINGS:  The lungs are expanded bilaterally and clear. The pleural  spaces are clear. There is a new right-sided PICC  "line with catheter tip  in good position within the superior vena cava at the T6 level. Heart  size is normal. No acute bony abnormality is seen.          Impression:      1. PICC line catheter in good position, as described.  2. No infiltrate or effusion.           This report was signed and finalized on 6/28/2025 10:53 AM by Dr. Darshan De Leon MD.               Culture:  No components found for: \"WOUNDCUL\", \"3\"  No components found for: \"CSFCUL\", \"3\"  No components found for: \"BC\", \"3\"  No components found for: \"URINECUL\", \"3\"      Assessment   - Acute kidney injury-prerenal azotemia versus ATN  - Chronic kidney disease stage IV  - Severe diabetic gastroparesis  - Metabolic acidosis  - Type 2 diabetes with renal disease  - Nausea and vomiting      Plan:  At this stage, we will continue with fluid therapy.  Patient was encouraged to report to [place] to see her GI and have her stimulator adjusted as needed.  I also encouraged her to administer her IV fluids on a daily manner.  Urine test was reviewed.  There is no indication for renal replacement therapy.      Thank you for the consult, we appreciate the opportunity to provide care to your patients.  Feel free to contact me if I can be of any further assistance.      Feliberto Barrow MD  6/28/2025  14:34 CDT  "

## 2025-06-28 NOTE — ED PROVIDER NOTES
"Subjective   History of Present Illness  The patient is a female with a history of chronic kidney disease who presents to the ED after her primary care physician at [facility] contacted her  regarding abnormal labs. The patient reports only urinating once per day, suggesting oliguria. She also reports having diarrhea since yesterday. Her medical history is significant for chronic kidney disease, hypertension, diabetes, gastroparesis (with a gastric stimulator placed in May), osteoporosis, and osteoarthritis. She denies a history of congestive heart failure. The patient has a PICC line in place for IV fluid administration. She is followed by a nephrologist, Dr. Chano reyna Fromberg, whom she last saw approximately one month ago.        Review of Systems   All other systems reviewed and are negative.      Past Medical History:   Diagnosis Date    Arthritis     Contusion     Degenerative disc disease, cervical     Diabetes mellitus     Elevated cholesterol     Fibromyalgia     Neuropathy     Osteoporosis     Pancreatitis     Raynaud disease     Renal insufficiency     Rheumatoid arthritis     Smoker 02/08/2022    Vitamin D deficiency 04/26/2022       Allergies   Allergen Reactions    Bee Venom Anaphylaxis    Hydrocodone Anaphylaxis     SOB    Hydroxychloroquine Other (See Comments)     Hair loss    Ibuprofen Other (See Comments)     \"SHUTS MY KIDNEYS DOWN\" PER PATIENT     Pineapple Anaphylaxis     Makes throat itch and \"tightens throat\"    Pineapple Extract Anaphylaxis     Makes throat itch and \"tightens throat\"    Wasp Venom Anaphylaxis    Wasp Venom Protein Anaphylaxis    Hydrocodone-Acetaminophen Nausea And Vomiting, Nausea Only and Dizziness    Metformin Other (See Comments)     Pt states it messes with her kidneys    Penicillins Nausea And Vomiting    Sitagliptin Other (See Comments)      Abdominal Pain      Chocolate Rash     Rash on face    Poison Ivy Extract Itching and Rash       Past Surgical History: "   Procedure Laterality Date    APPENDECTOMY      CHOLECYSTECTOMY      COLONOSCOPY      ENDOSCOPY N/A 10/08/2019    Procedure: ESOPHAGOGASTRODUODENOSCOPY WITH ANESTHESIA;  Surgeon: Aleks Vaughn DO;  Location: [facility] ENDOSCOPY;  Service: Gastroenterology    ENDOSCOPY N/A 11/12/2024    Procedure: ESOPHAGOGASTRODUODENOSCOPY WITH ANESTHESIA;  Surgeon: Tiffanie Saucedo MD;  Location: [facility] ENDOSCOPY;  Service: Gastroenterology;  Laterality: N/A;  pre op: Gastroparesis, Intractable nausea  post op: insertion of dobhoff   PCP: Brandon Murguia    ENDOSCOPY N/A 11/15/2024    Procedure: ESOPHAGOGASTRODUODENOSCOPY WITH ANESTHESIA WITH NG TUBE INSERTION;  Surgeon: Tiffanie Saucedo MD;  Location: [facility] ENDOSCOPY;  Service: Gastroenterology;  Laterality: N/A;  PRE OP: DOBHOFF  POST OP: DOBHOFF  PCP: TELLY PACK    ENDOSCOPY WITH GASTROSTOMY TUBE INSERTION N/A 6/15/2022    Procedure: ESOPHAGOGASTRODUODENOSCOPY WITH GASTROSTOMY TUBE INSERTION;  Surgeon: Jersey Lee MD;  Location: [facility] ENDOSCOPY;  Service: Gastroenterology;  Laterality: N/A;  pre peg placement  post peg placement  Dr. Murguia    ENTEROSCOPY SMALL BOWEL N/A 11/3/2022    Procedure: Esophagogastroduodenoscopy with Peg Removal;  Surgeon: Aleks Vaughn DO;  Location: [facility] ENDOSCOPY;  Service: Gastroenterology;  Laterality: N/A;  pre; peg removal  post; peg removal   KIRILL Murguia MD        HYSTERECTOMY         Family History   Adopted: Yes   Problem Relation Age of Onset    Colon polyps Neg Hx     Colon cancer Neg Hx        Social History     Socioeconomic History    Marital status:    Tobacco Use    Smoking status: Every Day     Current packs/day: 0.50     Average packs/day: 0.5 packs/day for 32.5 years (16.2 ttl pk-yrs)     Types: Cigarettes     Start date: 1993     Passive exposure: Current    Smokeless tobacco: Never   Vaping Use    Vaping status: Every Day    Substances: Nicotine    Devices: Disposable   Substance and Sexual Activity     Alcohol use: No    Drug use: No    Sexual activity: Defer           Objective   Physical Exam  Constitutional:  General: Patient is not in acute distress.  Appearance: Patient is not diaphoretic.    HENT:  Head: Normocephalic and atraumatic.  Right Ear: External ear normal.  Left Ear: External ear normal.  Nose: Nose normal.    Eyes:  General: No scleral icterus.  Conjunctiva/sclera: Conjunctivae normal.    Cardiovascular:  Rate and Rhythm: Normal rate and regular rhythm.  Heart sounds: No friction rub.    Pulmonary:  Effort: Pulmonary effort is normal. No respiratory distress.  Breath sounds: No stridor. No wheezing.    Abdominal:  Palpations: Abdomen is soft.  Tenderness: There is no abdominal tenderness. There is no guarding or rebound.    Musculoskeletal:  General: No deformity.    Skin:  General: Skin is warm and dry. No rashes present. Normal color. Normal turgor.    Neurological:  General: No focal deficit present.  Mental Status: Alert and oriented.    Extremities: **No edema noted in legs.**      Procedures           ED Course  ED Course as of 06/29/25 0721   Fri Jun 27, 2025   2223 Mild elevation of WBC.  There is no fever.  There is a mild anion gap could be due to uremia.  She has a worsening SUGAR from baseline.  Will admit for this.  I gave the patient IV fluids.  The patient is stable for admission.  Her bladder scan was negative for urinary retention [SG]   2349 The patient was accepted by  for worsening SUGAR [SG]      ED Course User Index  [SG] Marco Pedroza MD                                                       Medical Decision Making  Patient presents with worsening acute kidney injury on chronic kidney disease with oliguria and abnormal labs.    Laboratory studies: Labs were ordered including CBC and CMP to assess the patient's current renal function and electrolyte status. The patient's primary care physician had already noted abnormal labs which prompted this ED visit. Patient is  noted to be **slightly hypotensive** and has **worsening SUGAR on CKD**.    The patient reports oliguria, urinating only once daily, which is concerning for possible obstructive uropathy versus severe volume depletion. A bladder scan was ordered to rule out urinary obstruction.    EKG was ordered for evaluation of electrolyte abnormalities that may be present with acute kidney injury.    Treatment included administration of 1 liter of IV fluids to address possible volume depletion contributing to the SUGAR.    The patient's nephrologist is in [place], and her primary care physician is in [place]. Will consider contacting nephrology for recommendations on further management depending on lab results and response to initial fluid resuscitation.    Problems Addressed:  SUGAR (acute kidney injury): complicated acute illness or injury    Amount and/or Complexity of Data Reviewed  Labs: ordered.  ECG/medicine tests: ordered.    Risk  Decision regarding hospitalization.        Final diagnoses:   SUGAR (acute kidney injury)       ED Disposition  ED Disposition       ED Disposition   Decision to Admit    Condition   --    Comment   Level of Care: Med/Surg [1]   Diagnosis: SUGAR (acute kidney injury) [[id number]]   Admitting Physician: NADIRA POLLARD [[id number]]   Attending Physician: NADIRA POLLARD [[id number]]   Certification: I Certify That Inpatient Hospital Services Are Medically Necessary For Greater Than 2 Midnights                 No follow-up provider specified.       Medication List        ASK your doctor about these medications      metoclopramide 5 MG tablet  Commonly known as: REGLAN  Ask about: Which instructions should I use?     potassium chloride 20 MEQ CR tablet  Commonly known as: KLOR-CON M20  Ask about: Which instructions should I use?                 Marco Pedroza MD  06/29/25 9207

## 2025-06-28 NOTE — PLAN OF CARE
Goal Outcome Evaluation:      Pt admitted overnight for SUGAR.  Pt is oliguric.  Pt has ALEN PICC placed on 4/4/25.  Dressing changed per protocol.  Spoke to Nursing supervisor, cxr is not protocol to verify PICC placement.  Nursing supervisor suggested putting vascular access consult to verify placement.  PICC flushes and positive blood return.   Electrolytes replaced per protocol.  Jenise Maloney called and gave telephone orders, see orders. Primary nurse explained that patient is oliguric so obtaining urine samples will be difficult.  Pt is aware of pending samples.  Pt up/jessy  and given instructions to call nursing staff if she feels weak or lightheaded when she sits up.  Pt verbalized understanding of teaching.  All questions and concerns were addressed.

## 2025-06-28 NOTE — PROGRESS NOTES
[facility]  INPATIENT PROGRESS NOTE    Patient Name: Tasha Perez  Date of Admission: 6/27/2025  Today's Date: 06/28/25  Length of Stay: 1  Primary Care Physician: KIRILL Murguia MD    Subjective   Chief Complaint: f/u [place]    HPI   Patient seen resting in bed.  Alert interactive.  Feeling a little better.  Still does not have a good appetite.  Has been eating much.  No nausea or vomiting.  No chest pain.  No shortness of breath.        Review of Systems   All pertinent negatives and positives are as above. All other systems have been reviewed and are negative unless otherwise stated.     Objective    Temp:  [97.2 °F (36.2 °C)-97.9 °F (36.6 °C)] 97.7 °F (36.5 °C)  Heart Rate:  [] 104  Resp:  [16-18] 18  BP: ()/(53-80) 128/78  Physical Exam  GEN: Awake, alert, interactive, in NAD, thin/frail appearing  HEENT: PERRLA, EOMI, Anicteric, Trachea midline  Lungs: no wheezing/rales/rhonchi  Heart: RRR, +S1/s2, no rub  ABD: soft, nt/nd, +BS, no guarding/rebound  Extremities: atraumatic, no cyanosis, no edema  Skin: no rashes or lesions  Neuro: AAOx3, no focal deficits  Psych: normal mood & affect      Results Review:  I have reviewed the labs, radiology results, and diagnostic studies.    Laboratory Data:   Results from last 7 days   Lab Units 06/28/25  0243 06/27/25  2050   WBC 10*3/mm3 10.92* 11.05*   HEMOGLOBIN g/dL 10.0* 11.3*   HEMATOCRIT % 31.8* 36.0   PLATELETS 10*3/mm3 326 360        Results from last 7 days   Lab Units 06/28/25  0535 06/28/25 0243 06/27/25 2050   SODIUM mmol/L 141 141 136   POTASSIUM mmol/L 4.1 2.9* 3.0*   CHLORIDE mmol/L 112* 110* 103   CO2 mmol/L 14.0* 14.0* 14.0*   BUN mg/dL 54.1* 55.7* 56.8*   CREATININE mg/dL 2.40* 2.58* 3.18*   CALCIUM mg/dL 7.4* 7.8* 8.5*   BILIRUBIN mg/dL <0.2  --  <0.2   ALK PHOS U/L 122*  --  155*   ALT (SGPT) U/L 10  --  12   AST (SGOT) U/L 12  --  15   GLUCOSE mg/dL 208* 256* 205*       Culture  "Data:   No results found for: \"BLOODCX\", \"URINECX\", \"WOUNDCX\", \"MRSACX\", \"RESPCX\", \"STOOLCX\"    Radiology Data:   Imaging Results (Last 24 Hours)       Procedure Component Value Units Date/Time    XR Chest 1 View [[id number]] Collected: 06/28/25 1052     Updated: 06/28/25 1056    Narrative:      EXAMINATION:  XR CHEST 1 VW-       HISTORY: PICC tip location, line has been pulled out; N17.9-Acute kidney  failure, unspecified.     COMPARISON: 3/31/2025.     TECHNIQUE: Single view AP image.     FINDINGS:  The lungs are expanded bilaterally and clear. The pleural  spaces are clear. There is a new right-sided PICC line with catheter tip  in good position within the superior vena cava at the T6 level. Heart  size is normal. No acute bony abnormality is seen.          Impression:      1. PICC line catheter in good position, as described.  2. No infiltrate or effusion.           This report was signed and finalized on 6/28/2025 10:53 AM by Dr. Darshan De Leon MD.               I have reviewed the patient's current medications.     Assessment/Plan   Assessment  Active Hospital Problems    Diagnosis     SUGAR (acute kidney injury)     Loss of weight     Gastroparesis     Hypokalemia     Severe malnutrition        Treatment Plan  #1 SUGAR -patient has a history of hospitalizations for acute on chronic renal failure.  Has a PICC line and follows with nephrology for outpatient IV fluids to help prevent.  Has been doing fairly well.  Presenting in dry with poor p.o. intake.  Already improving overnight with IV normal saline.  That being said patient CO2 was 14 on chemistry this a.m.  Will stat a venous blood gas and repeat BMP and electrolytes.  Suspect will need bicarb fluids and close monitoring.    #2 hypokalemia -improved with replacement.  Will recheck on new labs.  Left monitor closely with bicarb and acid correction.    #3 metabolic acidosis -in the setting of #1 poor p.o. intake.  Check a venous blood gas.    #4 severe " malnutrition -chronic issue, maximize calories    #5 anemia of chronic disease -H&H stable.  No signs of bleeding.    #6 DM2 -currently on sliding scale insulin and hypoglycemia protocol.        Medical Decision Making  Number and Complexity of problems: 2 acute, 1 acute on chronic, multiple chronic  Differential Diagnosis: as above    Conditions and Status        New to me this stay, seems to be improving     MDM Data  External documents reviewed: none  Cardiac tracing (EKG, telemetry) interpretation: none  Radiology interpretation: reports reviewed  Labs reviewed: as above  Any tests that were considered but not ordered: none     Decision rules/scores evaluated (example [id number], Wells, etc): none     Discussed with: patient, nursing     Care Planning  Shared decision making: Patient apprised of current labs, vitals, imaging and treatment plan.  They are agreeable with proceeding with plans as discussed.    Code status and discussions: full code    Disposition  Social Determinants of Health that impact treatment or disposition: none  I expect the patient to be discharged to home in 2-3 days.         Electronically signed by Brandon Cline DO, 06/28/25, 12:01 CDT.

## 2025-06-28 NOTE — H&P
[facility] Medicine Services  HISTORY AND PHYSICAL    Date of Admission: 6/27/2025  Primary Care Physician: KIRILL Murguia MD    Subjective   Primary Historian: Patient    Chief Complaint: Abnormal labs    History of Present Illness  This is a 54-year-old female patient with a medical history of gastroparesis status post gastric stimulator, CKD, hypertension, diabetes mellitus, presenting to the ED for the evaluation of abnormal labs.  As reported, patient was being evaluated by her doctor where she had outpatient labs, that showed SUGAR.  She was advised to come to the ED for evaluation.  She reports that she is urinating less than before, and that she is having some diarrhea.  She denies having any fever, chills, abdominal pain, chest pain,  or shortness of breath.  She has a PICC line in her right upper extremity where she receives IV fluids every other day at home.?.        Review of Systems   Otherwise complete ROS reviewed and negative except as mentioned in the HPI.    Past Medical History:   Past Medical History:   Diagnosis Date    Arthritis     Contusion     Degenerative disc disease, cervical     Diabetes mellitus     Elevated cholesterol     Fibromyalgia     Neuropathy     Osteoporosis     Pancreatitis     Raynaud disease     Renal insufficiency     Rheumatoid arthritis     Smoker 02/08/2022    Vitamin D deficiency 04/26/2022     Past Surgical History:  Past Surgical History:   Procedure Laterality Date    APPENDECTOMY      CHOLECYSTECTOMY      COLONOSCOPY      ENDOSCOPY N/A 10/08/2019    Procedure: ESOPHAGOGASTRODUODENOSCOPY WITH ANESTHESIA;  Surgeon: Aleks Vaughn DO;  Location: [facility] ENDOSCOPY;  Service: Gastroenterology    ENDOSCOPY N/A 11/12/2024    Procedure: ESOPHAGOGASTRODUODENOSCOPY WITH ANESTHESIA;  Surgeon: Tiffanie Saucedo MD;  Location: [facility] ENDOSCOPY;  Service: Gastroenterology;  Laterality: N/A;  pre op: Gastroparesis, Intractable nausea  post  "op: insertion of dobhoff   PCP: Brandon Murguia    ENDOSCOPY N/A 11/15/2024    Procedure: ESOPHAGOGASTRODUODENOSCOPY WITH ANESTHESIA WITH NG TUBE INSERTION;  Surgeon: Tiffanie Saucedo MD;  Location: [facility] ENDOSCOPY;  Service: Gastroenterology;  Laterality: N/A;  PRE OP: DOBHOFF  POST OP: DOBHOFF  PCP: ETLLY PACK    ENDOSCOPY WITH GASTROSTOMY TUBE INSERTION N/A 6/15/2022    Procedure: ESOPHAGOGASTRODUODENOSCOPY WITH GASTROSTOMY TUBE INSERTION;  Surgeon: Jersey Lee MD;  Location: [facility] ENDOSCOPY;  Service: Gastroenterology;  Laterality: N/A;  pre peg placement  post peg placement  Dr. Murguia    ENTEROSCOPY SMALL BOWEL N/A 11/3/2022    Procedure: Esophagogastroduodenoscopy with Peg Removal;  Surgeon: Aleks Vaughn DO;  Location: [facility] ENDOSCOPY;  Service: Gastroenterology;  Laterality: N/A;  pre; peg removal  post; peg removal   KIRILL Murguia MD        HYSTERECTOMY       Social History:  reports that she has been smoking cigarettes. She started smoking about 32 years ago. She has a 16.2 pack-year smoking history. She has been exposed to tobacco smoke. She has never used smokeless tobacco. She reports that she does not drink alcohol and does not use drugs.    Family History: family history is not on file. She was adopted.       Allergies:  Allergies   Allergen Reactions    Bee Venom Anaphylaxis    Hydrocodone Anaphylaxis     SOB    Hydroxychloroquine Other (See Comments)     Hair loss    Ibuprofen Other (See Comments)     \"SHUTS MY KIDNEYS DOWN\" PER PATIENT     Pineapple Anaphylaxis     Makes throat itch and \"tightens throat\"    Pineapple Extract Anaphylaxis     Makes throat itch and \"tightens throat\"    Wasp Venom Anaphylaxis    Wasp Venom Protein Anaphylaxis    Hydrocodone-Acetaminophen Nausea And Vomiting, Nausea Only and Dizziness    Metformin Other (See Comments)     Pt states it messes with her kidneys    Penicillins Nausea And Vomiting    Sitagliptin Other (See Comments)      Abdominal Pain   "    Chocolate Rash     Rash on face    Poison Ivy Extract Itching and Rash       Medications:  Prior to Admission medications    Medication Sig Start Date End Date Taking? Authorizing Provider   albuterol sulfate  (90 Base) MCG/ACT inhaler Inhale 2 puffs 3 (Three) Times a Day As Needed for Wheezing.    Kevin Mejia MD   aspirin 81 MG EC tablet Take 1 tablet by mouth Daily.    Kevin Mejia MD   atorvastatin (LIPITOR) 80 MG tablet Take 1 tablet by mouth Every Night.    Kevin Mejia MD   Calcium Carbonate-Vitamin D 600-5 MG-MCG tablet Take 1 tablet by mouth Daily.    Kevin Mejia MD   cetirizine (zyrTEC) 10 MG tablet Take 1 tablet by mouth Daily As Needed for Allergies.    Kevin Mejia MD   DULoxetine (CYMBALTA) 60 MG capsule Take 1 capsule by mouth Daily.    Kevin Mejia MD   linaclotide (LINZESS) 290 MCG capsule capsule Take 1 capsule by mouth Every Morning Before Breakfast.    Kevin Mejia MD   metoclopramide (REGLAN) 10 MG/10ML solution solution Take 5 mL by mouth 4 (Four) Times a Day Before Meals & at Bedtime. 4/4/25   Ministerio Capone MD   nortriptyline (PAMELOR) 25 MG capsule Take 1 capsule by mouth Every Night.    Kevin Mejia MD   omeprazole (priLOSEC) 40 MG capsule Take 1 capsule by mouth Every Other Day.    Kevin Mejia MD   promethazine (PHENERGAN) 25 MG tablet Take 1 tablet by mouth Every 6 (Six) Hours As Needed for Nausea or Vomiting.    Kevin Mejia MD   rOPINIRole (REQUIP) 1 MG tablet Take 1 tablet by mouth 3 (Three) Times a Day.    Kevin Mejia MD   sodium bicarbonate 650 MG tablet Take 1 tablet by mouth 2 (Two) Times a Day. 9/23/24   Kevin Mejia MD   sucralfate (CARAFATE) 1 g tablet Take 1 tablet by mouth 4 (Four) Times a Day.    Kevin Mejia MD   triamcinolone (KENALOG) 0.1 % cream Apply 1 Application topically to the appropriate area as directed Daily. APPLY A THIN LAYER  "TOPICALLY TO THE AFFECTED AREAS BEFORE APPLYING DEXCOM    Provider, MD Kevin     I have utilized all available immediate resources to obtain, update, or review the patient's current medications (including all prescriptions, over-the-counter products, herbals, cannabis/cannabidiol products, and vitamin/mineral/dietary (nutritional) supplements).    Objective     Vital Signs: /80 (BP Location: Left arm, Patient Position: Sitting)   Pulse 87   Temp 97.5 °F (36.4 °C) (Oral)   Resp 16   Ht 154.9 cm (61\")   Wt 41.1 kg (90 lb 9.6 oz)   SpO2 100%   BMI 17.12 kg/m²   Physical Exam  Constitutional:       Appearance: She is ill-appearing.   Cardiovascular:      Rate and Rhythm: Normal rate and regular rhythm.      Pulses: Normal pulses.      Heart sounds: Normal heart sounds. No murmur heard.  Pulmonary:      Effort: Pulmonary effort is normal. No respiratory distress.      Breath sounds: Normal breath sounds. No wheezing or rales.   Abdominal:      General: Bowel sounds are normal. There is no distension.      Palpations: Abdomen is soft.      Tenderness: There is no abdominal tenderness. There is no guarding.   Musculoskeletal:      Right lower leg: No edema.      Left lower leg: No edema.   Skin:     General: Skin is warm.   Neurological:      Mental Status: She is alert and oriented to person, place, and time. Mental status is at baseline.              Results Reviewed:  Lab Results (last 24 hours)       Procedure Component Value Units Date/Time    Magnesium [[id number]]  (Normal) Collected: 06/27/25 2050    Specimen: Blood Updated: 06/27/25 2359     Magnesium 2.1 mg/dL     Comprehensive Metabolic Panel [[id number]]  (Abnormal) Collected: 06/27/25 2050    Specimen: Blood Updated: 06/27/25 2122     Glucose 205 mg/dL      BUN 56.8 mg/dL      Creatinine 3.18 mg/dL      Sodium 136 mmol/L      Potassium 3.0 mmol/L      Chloride 103 mmol/L      CO2 14.0 mmol/L      Calcium 8.5 mg/dL      Total Protein 7.5 g/dL  "     Albumin 3.9 g/dL      ALT (SGPT) 12 U/L      AST (SGOT) 15 U/L      Alkaline Phosphatase 155 U/L      Total Bilirubin <0.2 mg/dL      Globulin 3.6 gm/dL      A/G Ratio 1.1 g/dL      BUN/Creatinine Ratio 17.9     Anion Gap 19.0 mmol/L      eGFR 16.8 mL/min/1.73     Narrative:      GFR Categories in Chronic Kidney Disease (CKD)              GFR Category          GFR (mL/min/1.73)    Interpretation  G1                    90 or greater        Normal or high (1)  G2                    60-89                Mild decrease (1)  G3a                   45-59                Mild to moderate decrease  G3b                   30-44                Moderate to severe decrease  G4                    15-29                Severe decrease  G5                    14 or less           Kidney failure    (1)In the absence of evidence of kidney disease, neither GFR category G1 or G2 fulfill the criteria for CKD.    eGFR calculation 2021 CKD-EPI creatinine equation, which does not include race as a factor    [place] Draw [[id number]] Collected: 06/27/25 2050    Specimen: Blood Updated: 06/27/25 2106    Narrative:      The following orders were created for panel order [place] Draw.  Procedure                               Abnormality         Status                     ---------                               -----------         ------                     Green Top (Gel)[[id number]]                                  Final result               Lavender Top[[id number]]                                     Final result               Red Top[[id number]]                                                                     Britotn Top[[id number]]                                         Final result               Light Blue Top[[id number]]                                   Final result                 Please view results for these tests on the individual orders.    CBC & Differential [[id number]]  (Abnormal) Collected: 06/27/25 2050    Specimen: Blood Updated:  06/27/25 2103    Narrative:      The following orders were created for panel order CBC & Differential.  Procedure                               Abnormality         Status                     ---------                               -----------         ------                     CBC Auto Differential[[id number]]        Abnormal            Final result                 Please view results for these tests on the individual orders.    CBC Auto Differential [[id number]]  (Abnormal) Collected: 06/27/25 2050    Specimen: Blood Updated: 06/27/25 2103     WBC 11.05 10*3/mm3      RBC 3.83 10*6/mm3      Hemoglobin 11.3 g/dL      Hematocrit 36.0 %      MCV 94.0 fL      MCH 29.5 pg      MCHC 31.4 g/dL      RDW 16.2 %      RDW-SD 55.8 fl      MPV 10.2 fL      Platelets 360 10*3/mm3      Neutrophil % 65.1 %      Lymphocyte % 25.1 %      Monocyte % 5.2 %      Eosinophil % 3.7 %      Basophil % 0.5 %      Immature Grans % 0.4 %      Neutrophils, Absolute 7.20 10*3/mm3      Lymphocytes, Absolute 2.77 10*3/mm3      Monocytes, Absolute 0.58 10*3/mm3      Eosinophils, Absolute 0.41 10*3/mm3      Basophils, Absolute 0.05 10*3/mm3      Immature Grans, Absolute 0.04 10*3/mm3      nRBC 0.0 /100 WBC     Green Top (Gel) [[id number]] Collected: 06/27/25 2050    Specimen: Blood Updated: 06/27/25 2101     Extra Tube Hold for add-ons.     Comment: Auto resulted.       Lavender Top [[id number]] Collected: 06/27/25 2050    Specimen: Blood Updated: 06/27/25 2101     Extra Tube hold for add-on     Comment: Auto resulted       Gray Top [[id number]] Collected: 06/27/25 2050    Specimen: Blood Updated: 06/27/25 2101     Extra Tube Hold for add-ons.     Comment: Auto resulted.       Light Blue Top [[id number]] Collected: 06/27/25 2050    Specimen: Blood Updated: 06/27/25 2101     Extra Tube Hold for add-ons.     Comment: Auto resulted             Imaging Results (Last 24 Hours)       ** No results found for the last 24 hours. **          I have personally  reviewed and interpreted the radiology studies and ECG obtained at time of admission.     Assessment / Plan   Assessment:   Active Hospital Problems    Diagnosis     SUGAR (acute kidney injury)     Loss of weight     Gastroparesis     Severe malnutrition        Treatment Plan  The patient will be admitted to my service here at [facility].     Assessment and plan:  #SUGAR on CKD  #Hypokalemia.  Metabolic acidosis  #Decreased p.o. intake.  #Underweight.  #History of gastroparesis, status post stimulator.  #History of CKD, hypertension, diabetes mellitus.    - Admitting to floor.  - IV fluids and will recheck creatinine in the morning.  - Consulting with nephrology in AM.  - Replacing potassium and will recheck levels in the morning.  - Consulting with nutrition.  - Continue with Reglan for gastroparesis.  - DVT prophylaxis with SCDs for now.  - Continue with sodium bicarb for metabolic acidosis.  Adding lactic acid.  - Adding GI panel.  - Adding UA.    Medical Decision Making  Number and Complexity of problems: 2 acute and moderate  Differential Diagnosis: As above    Conditions and Status        Condition is worsening.     MDM Data  External documents reviewed: Yes  Cardiac tracing (EKG, telemetry) interpretation: Yes  Radiology interpretation: None  Labs reviewed: Yes  Any tests that were considered but not ordered: UA, GI panel, lactic acid.     Decision rules/scores evaluated (example [id number], Wells, etc): No     Discussed with: Patient and ED provider     Care Planning  Shared decision making: Discussed the plan with the patient and she was agreeable  Code status and discussions: Discussed the CODE STATUS with the patient and she wants to be full code    Disposition  Social Determinants of Health that impact treatment or disposition: Not at the moment  Estimated length of stay is 2 to 3 days.     I confirmed that the patient's advanced care plan is present, code status is documented, and a surrogate  decision maker is listed in the patient's medical record.       The patient was seen and examined by me on 6/27 at 11:45 PM.    Electronically signed by Elodia Phillips MD, 06/28/25, 02:20 CDT.

## 2025-06-28 NOTE — NURSING NOTE
Patient continues to have nausea; meds given per MAR.  Urine studies.  US kidneys.  IV fluids changed to Na+ and Bicarb @75.  Q6 BMP.

## 2025-06-29 LAB
ADV 40+41 DNA STL QL NAA+NON-PROBE: NOT DETECTED
ANION GAP SERPL CALCULATED.3IONS-SCNC: 11 MMOL/L (ref 5–15)
ASTRO TYP 1-8 RNA STL QL NAA+NON-PROBE: NOT DETECTED
BASOPHILS # BLD AUTO: 0.03 10*3/MM3 (ref 0–0.2)
BASOPHILS NFR BLD AUTO: 0.3 % (ref 0–1.5)
BUN SERPL-MCNC: 47.2 MG/DL (ref 6–20)
BUN/CREAT SERPL: 26.4 (ref 7–25)
C CAYETANENSIS DNA STL QL NAA+NON-PROBE: NOT DETECTED
C COLI+JEJ+UPSA DNA STL QL NAA+NON-PROBE: NOT DETECTED
CALCIUM SPEC-SCNC: 7.1 MG/DL (ref 8.6–10.5)
CHLORIDE SERPL-SCNC: 113 MMOL/L (ref 98–107)
CO2 SERPL-SCNC: 21 MMOL/L (ref 22–29)
CREAT SERPL-MCNC: 1.79 MG/DL (ref 0.57–1)
CRYPTOSP DNA STL QL NAA+NON-PROBE: NOT DETECTED
DEPRECATED RDW RBC AUTO: 54.9 FL (ref 37–54)
E HISTOLYT DNA STL QL NAA+NON-PROBE: NOT DETECTED
EAEC PAA PLAS AGGR+AATA ST NAA+NON-PRB: NOT DETECTED
EC STX1+STX2 GENES STL QL NAA+NON-PROBE: NOT DETECTED
EGFRCR SERPLBLD CKD-EPI 2021: 33.6 ML/MIN/1.73
EOSINOPHIL # BLD AUTO: 0.56 10*3/MM3 (ref 0–0.4)
EOSINOPHIL NFR BLD AUTO: 5.7 % (ref 0.3–6.2)
EOSINOPHIL SPEC QL MICRO: 0 % EOS/100 CELLS (ref 0–0)
EPEC EAE GENE STL QL NAA+NON-PROBE: NOT DETECTED
ERYTHROCYTE [DISTWIDTH] IN BLOOD BY AUTOMATED COUNT: 16.1 % (ref 12.3–15.4)
ETEC LTA+ST1A+ST1B TOX ST NAA+NON-PROBE: NOT DETECTED
G LAMBLIA DNA STL QL NAA+NON-PROBE: NOT DETECTED
GLUCOSE BLDC GLUCOMTR-MCNC: 151 MG/DL (ref 70–130)
GLUCOSE BLDC GLUCOMTR-MCNC: 179 MG/DL (ref 70–130)
GLUCOSE BLDC GLUCOMTR-MCNC: 245 MG/DL (ref 70–130)
GLUCOSE BLDC GLUCOMTR-MCNC: 314 MG/DL (ref 70–130)
GLUCOSE SERPL-MCNC: 220 MG/DL (ref 65–99)
HCT VFR BLD AUTO: 26 % (ref 34–46.6)
HGB BLD-MCNC: 8.1 G/DL (ref 12–15.9)
IMM GRANULOCYTES # BLD AUTO: 0.04 10*3/MM3 (ref 0–0.05)
IMM GRANULOCYTES NFR BLD AUTO: 0.4 % (ref 0–0.5)
LYMPHOCYTES # BLD AUTO: 2.74 10*3/MM3 (ref 0.7–3.1)
LYMPHOCYTES NFR BLD AUTO: 28 % (ref 19.6–45.3)
MAGNESIUM SERPL-MCNC: 1.4 MG/DL (ref 1.6–2.6)
MCH RBC QN AUTO: 28.8 PG (ref 26.6–33)
MCHC RBC AUTO-ENTMCNC: 31.2 G/DL (ref 31.5–35.7)
MCV RBC AUTO: 92.5 FL (ref 79–97)
MONOCYTES # BLD AUTO: 0.51 10*3/MM3 (ref 0.1–0.9)
MONOCYTES NFR BLD AUTO: 5.2 % (ref 5–12)
NEUTROPHILS NFR BLD AUTO: 5.9 10*3/MM3 (ref 1.7–7)
NEUTROPHILS NFR BLD AUTO: 60.4 % (ref 42.7–76)
NOROVIRUS GI+II RNA STL QL NAA+NON-PROBE: NOT DETECTED
NRBC BLD AUTO-RTO: 0 /100 WBC (ref 0–0.2)
P SHIGELLOIDES DNA STL QL NAA+NON-PROBE: NOT DETECTED
PHOSPHATE SERPL-MCNC: 2 MG/DL (ref 2.5–4.5)
PLATELET # BLD AUTO: 288 10*3/MM3 (ref 140–450)
PMV BLD AUTO: 10.9 FL (ref 6–12)
POTASSIUM SERPL-SCNC: 3.9 MMOL/L (ref 3.5–5.2)
RBC # BLD AUTO: 2.81 10*6/MM3 (ref 3.77–5.28)
RVA RNA STL QL NAA+NON-PROBE: NOT DETECTED
S ENT+BONG DNA STL QL NAA+NON-PROBE: NOT DETECTED
SAPO I+II+IV+V RNA STL QL NAA+NON-PROBE: NOT DETECTED
SHIGELLA SP+EIEC IPAH ST NAA+NON-PROBE: NOT DETECTED
SODIUM SERPL-SCNC: 145 MMOL/L (ref 136–145)
V CHOL+PARA+VUL DNA STL QL NAA+NON-PROBE: NOT DETECTED
V CHOLERAE DNA STL QL NAA+NON-PROBE: NOT DETECTED
WBC NRBC COR # BLD AUTO: 9.78 10*3/MM3 (ref 3.4–10.8)
Y ENTEROCOL DNA STL QL NAA+NON-PROBE: NOT DETECTED

## 2025-06-29 PROCEDURE — 87507 IADNA-DNA/RNA PROBE TQ 12-25: CPT

## 2025-06-29 PROCEDURE — 63710000001 INSULIN LISPRO (HUMAN) PER 5 UNITS

## 2025-06-29 PROCEDURE — 25010000002 MAGNESIUM SULFATE IN D5W 1G/100ML (PREMIX) 1-5 GM/100ML-% SOLUTION: Performed by: FAMILY MEDICINE

## 2025-06-29 PROCEDURE — 25010000003 DEXTROSE 5 % SOLUTION 1,000 ML FLEX CONT: Performed by: FAMILY MEDICINE

## 2025-06-29 PROCEDURE — 82948 REAGENT STRIP/BLOOD GLUCOSE: CPT

## 2025-06-29 PROCEDURE — 83735 ASSAY OF MAGNESIUM: CPT | Performed by: INTERNAL MEDICINE

## 2025-06-29 PROCEDURE — 80048 BASIC METABOLIC PNL TOTAL CA: CPT | Performed by: INTERNAL MEDICINE

## 2025-06-29 PROCEDURE — 84100 ASSAY OF PHOSPHORUS: CPT | Performed by: INTERNAL MEDICINE

## 2025-06-29 PROCEDURE — 85025 COMPLETE CBC W/AUTO DIFF WBC: CPT | Performed by: INTERNAL MEDICINE

## 2025-06-29 RX ORDER — METOCLOPRAMIDE 5 MG/1
5 TABLET ORAL
Status: DISCONTINUED | OUTPATIENT
Start: 2025-06-29 | End: 2025-06-30 | Stop reason: HOSPADM

## 2025-06-29 RX ORDER — MAGNESIUM SULFATE 1 G/100ML
1 INJECTION INTRAVENOUS
Status: COMPLETED | OUTPATIENT
Start: 2025-06-29 | End: 2025-06-29

## 2025-06-29 RX ADMIN — METOCLOPRAMIDE HYDROCHLORIDE 5 MG: 5 SOLUTION ORAL at 11:32

## 2025-06-29 RX ADMIN — SODIUM BICARBONATE 650 MG: 650 TABLET ORAL at 08:16

## 2025-06-29 RX ADMIN — ROPINIROLE HYDROCHLORIDE 1 MG: 1 TABLET, FILM COATED ORAL at 20:53

## 2025-06-29 RX ADMIN — METOCLOPRAMIDE 5 MG: 5 TABLET ORAL at 20:53

## 2025-06-29 RX ADMIN — INSULIN LISPRO 3 UNITS: 100 INJECTION, SOLUTION INTRAVENOUS; SUBCUTANEOUS at 12:16

## 2025-06-29 RX ADMIN — METOCLOPRAMIDE HYDROCHLORIDE 5 MG: 5 SOLUTION ORAL at 17:02

## 2025-06-29 RX ADMIN — ASPIRIN 81 MG: 81 TABLET, COATED ORAL at 08:16

## 2025-06-29 RX ADMIN — MAGNESIUM SULFATE IN DEXTROSE 1 G: 10 INJECTION, SOLUTION INTRAVENOUS at 10:09

## 2025-06-29 RX ADMIN — METOCLOPRAMIDE HYDROCHLORIDE 5 MG: 5 SOLUTION ORAL at 05:25

## 2025-06-29 RX ADMIN — DEXTROSE MONOHYDRATE 150 MEQ: 50 INJECTION, SOLUTION INTRAVENOUS at 17:47

## 2025-06-29 RX ADMIN — Medication 10 ML: at 20:53

## 2025-06-29 RX ADMIN — MAGNESIUM SULFATE IN DEXTROSE 1 G: 10 INJECTION, SOLUTION INTRAVENOUS at 08:00

## 2025-06-29 RX ADMIN — ROPINIROLE HYDROCHLORIDE 1 MG: 1 TABLET, FILM COATED ORAL at 08:16

## 2025-06-29 RX ADMIN — INSULIN LISPRO 2 UNITS: 100 INJECTION, SOLUTION INTRAVENOUS; SUBCUTANEOUS at 17:02

## 2025-06-29 RX ADMIN — ROPINIROLE HYDROCHLORIDE 1 MG: 1 TABLET, FILM COATED ORAL at 15:35

## 2025-06-29 RX ADMIN — MAGNESIUM SULFATE IN DEXTROSE 1 G: 10 INJECTION, SOLUTION INTRAVENOUS at 09:13

## 2025-06-29 RX ADMIN — INSULIN LISPRO 5 UNITS: 100 INJECTION, SOLUTION INTRAVENOUS; SUBCUTANEOUS at 20:52

## 2025-06-29 RX ADMIN — NORTRIPTYLINE HYDROCHLORIDE 25 MG: 25 CAPSULE ORAL at 20:53

## 2025-06-29 RX ADMIN — PANTOPRAZOLE SODIUM 40 MG: 40 TABLET, DELAYED RELEASE ORAL at 05:25

## 2025-06-29 NOTE — PLAN OF CARE
Goal Outcome Evaluation:         Pt is up ad jessy in room & able to care for herself. Ms. Perez does not feel she needs skilled eval at this time. DC OT orders. Please reconsult should pt have a fxl decline.       Desiree Gardiner, OTR/L 6/29/2025 09:06 CDT

## 2025-06-29 NOTE — PLAN OF CARE
Goal Outcome Evaluation:       PT order received and screening performed. Pt has been up ad jessy in room and reports no complaints at this time. PT to sign off. Please reconsult with change in status or need regarding this pt care.

## 2025-06-29 NOTE — PAYOR COMM NOTE
"ADMIT INPT 6-27-25   [phone]    Yash Perez (54 y.o. Female)       Date of Birth   1970    Social Security Number       Address   [address] [place] [id number]    Home Phone   [phone]    MRN   [id number]       Sikhism   Pentecostalism    Marital Status                               Admission Date   6/27/2025    Admission Type   Emergency    Admitting Provider   Rickey Tinajero MD    Attending Provider   Rickey Tinajero MD    Department, Room/Bed   [facility] 4C, 489/1       Discharge Date       Discharge Disposition       Discharge Destination                                 Attending Provider: Rickey Tinajero MD    Allergies: Bee Venom, Hydrocodone, Hydroxychloroquine, Ibuprofen, Pineapple, Pineapple Extract, Wasp Venom, Wasp Venom Protein, Hydrocodone-acetaminophen, Metformin, Penicillins, Sitagliptin, Chocolate, Poison Ivy Extract    Isolation: None   Infection: None   Code Status: CPR    Ht: 154.9 cm (61\")   Wt: 41.1 kg (90 lb 9.6 oz)    Admission Cmt: None   Principal Problem: None                  Active Insurance as of 6/27/2025       Primary Coverage       Payor Plan Insurance Group Employer/Plan Group    [facility] [employer] [facility] [employer] [facility]        Payor Plan Address Payor Plan Phone Number Payor Plan Fax Number Effective Dates    [address]   8/1/2019 - None Entered    [facility] [id number]         Subscriber Name Subscriber Birth Date Member ID       YASH PEREZ 1970 [id number]                     Emergency Contacts        (Rel.) Home Phone Work Phone Mobile Phone    JENNIFER PEREZ (Spouse) [phone] -- [phone]    LATRICIACASSIA VARGAS (Daughter) -- -- [phone]                 History & Physical        Elodia Phillips MD at 06/27/25 2345              [facility] Medicine Services  HISTORY AND PHYSICAL    Date of Admission: 6/27/2025  Primary Care Physician: KIRILL Murguia, " MD    Subjective   Primary Historian: Patient    Chief Complaint: Abnormal labs    History of Present Illness  This is a 54-year-old female patient with a medical history of gastroparesis status post gastric stimulator, CKD, hypertension, diabetes mellitus, presenting to the ED for the evaluation of abnormal labs.  As reported, patient was being evaluated by her doctor where she had outpatient labs, that showed SUGAR.  She was advised to come to the ED for evaluation.  She reports that she is urinating less than before, and that she is having some diarrhea.  She denies having any fever, chills, abdominal pain, chest pain,  or shortness of breath.  She has a PICC line in her right upper extremity where she receives IV fluids every other day at home.?.        Review of Systems   Otherwise complete ROS reviewed and negative except as mentioned in the HPI.    Past Medical History:   Past Medical History:   Diagnosis Date    Arthritis     Contusion     Degenerative disc disease, cervical     Diabetes mellitus     Elevated cholesterol     Fibromyalgia     Neuropathy     Osteoporosis     Pancreatitis     Raynaud disease     Renal insufficiency     Rheumatoid arthritis     Smoker 02/08/2022    Vitamin D deficiency 04/26/2022     Past Surgical History:  Past Surgical History:   Procedure Laterality Date    APPENDECTOMY      CHOLECYSTECTOMY      COLONOSCOPY      ENDOSCOPY N/A 10/08/2019    Procedure: ESOPHAGOGASTRODUODENOSCOPY WITH ANESTHESIA;  Surgeon: Aleks Vaughn DO;  Location: [facility] ENDOSCOPY;  Service: Gastroenterology    ENDOSCOPY N/A 11/12/2024    Procedure: ESOPHAGOGASTRODUODENOSCOPY WITH ANESTHESIA;  Surgeon: Tiffanie Saucedo MD;  Location: [facility] ENDOSCOPY;  Service: Gastroenterology;  Laterality: N/A;  pre op: Gastroparesis, Intractable nausea  post op: insertion of dobhoff   PCP: Brandon Murguia    ENDOSCOPY N/A 11/15/2024    Procedure: ESOPHAGOGASTRODUODENOSCOPY WITH ANESTHESIA WITH NG TUBE INSERTION;   "Surgeon: Tiffanie Saucedo MD;  Location:  [facility] ENDOSCOPY;  Service: Gastroenterology;  Laterality: N/A;  PRE OP: DOBHOFF  POST OP: DOBHOFF  PCP: TELLY PACK    ENDOSCOPY WITH GASTROSTOMY TUBE INSERTION N/A 6/15/2022    Procedure: ESOPHAGOGASTRODUODENOSCOPY WITH GASTROSTOMY TUBE INSERTION;  Surgeon: Jersey Lee MD;  Location:  [facility] ENDOSCOPY;  Service: Gastroenterology;  Laterality: N/A;  pre peg placement  post peg placement  Dr. Murguia    ENTEROSCOPY SMALL BOWEL N/A 11/3/2022    Procedure: Esophagogastroduodenoscopy with Peg Removal;  Surgeon: Aleks Vaughn DO;  Location:  [facility] ENDOSCOPY;  Service: Gastroenterology;  Laterality: N/A;  pre; peg removal  post; peg removal   KIRILL Murguia MD        HYSTERECTOMY       Social History:  reports that she has been smoking cigarettes. She started smoking about 32 years ago. She has a 16.2 pack-year smoking history. She has been exposed to tobacco smoke. She has never used smokeless tobacco. She reports that she does not drink alcohol and does not use drugs.    Family History: family history is not on file. She was adopted.       Allergies:  Allergies   Allergen Reactions    Bee Venom Anaphylaxis    Hydrocodone Anaphylaxis     SOB    Hydroxychloroquine Other (See Comments)     Hair loss    Ibuprofen Other (See Comments)     \"SHUTS MY KIDNEYS DOWN\" PER PATIENT     Pineapple Anaphylaxis     Makes throat itch and \"tightens throat\"    Pineapple Extract Anaphylaxis     Makes throat itch and \"tightens throat\"    Wasp Venom Anaphylaxis    Wasp Venom Protein Anaphylaxis    Hydrocodone-Acetaminophen Nausea And Vomiting, Nausea Only and Dizziness    Metformin Other (See Comments)     Pt states it messes with her kidneys    Penicillins Nausea And Vomiting    Sitagliptin Other (See Comments)      Abdominal Pain      Chocolate Rash     Rash on face    Poison Ivy Extract Itching and Rash       Medications:  Prior to Admission medications    Medication Sig Start Date End " Date Taking? Authorizing Provider   albuterol sulfate  (90 Base) MCG/ACT inhaler Inhale 2 puffs 3 (Three) Times a Day As Needed for Wheezing.    Kevin Mejia MD   aspirin 81 MG EC tablet Take 1 tablet by mouth Daily.    Kevin Mejia MD   atorvastatin (LIPITOR) 80 MG tablet Take 1 tablet by mouth Every Night.    Kevin Mejia MD   Calcium Carbonate-Vitamin D 600-5 MG-MCG tablet Take 1 tablet by mouth Daily.    Kevin Mejia MD   cetirizine (zyrTEC) 10 MG tablet Take 1 tablet by mouth Daily As Needed for Allergies.    Kevin Mejia MD   DULoxetine (CYMBALTA) 60 MG capsule Take 1 capsule by mouth Daily.    Kevin Mejia MD   linaclotide (LINZESS) 290 MCG capsule capsule Take 1 capsule by mouth Every Morning Before Breakfast.    Kevin Mejia MD   metoclopramide (REGLAN) 10 MG/10ML solution solution Take 5 mL by mouth 4 (Four) Times a Day Before Meals & at Bedtime. 4/4/25   Ministerio Capone MD   nortriptyline (PAMELOR) 25 MG capsule Take 1 capsule by mouth Every Night.    Kevin Mejia MD   omeprazole (priLOSEC) 40 MG capsule Take 1 capsule by mouth Every Other Day.    Kevin Mejia MD   promethazine (PHENERGAN) 25 MG tablet Take 1 tablet by mouth Every 6 (Six) Hours As Needed for Nausea or Vomiting.    Kevin Mejia MD   rOPINIRole (REQUIP) 1 MG tablet Take 1 tablet by mouth 3 (Three) Times a Day.    Kevin Mejia MD   sodium bicarbonate 650 MG tablet Take 1 tablet by mouth 2 (Two) Times a Day. 9/23/24   Kevin Mejia MD   sucralfate (CARAFATE) 1 g tablet Take 1 tablet by mouth 4 (Four) Times a Day.    Kevin Mejia MD   triamcinolone (KENALOG) 0.1 % cream Apply 1 Application topically to the appropriate area as directed Daily. APPLY A THIN LAYER TOPICALLY TO THE AFFECTED AREAS BEFORE APPLYING DEXCOM    Kevin Mejia MD     I have utilized all available immediate resources to obtain, update, or  "review the patient's current medications (including all prescriptions, over-the-counter products, herbals, cannabis/cannabidiol products, and vitamin/mineral/dietary (nutritional) supplements).    Objective     Vital Signs: /80 (BP Location: Left arm, Patient Position: Sitting)   Pulse 87   Temp 97.5 °F (36.4 °C) (Oral)   Resp 16   Ht 154.9 cm (61\")   Wt 41.1 kg (90 lb 9.6 oz)   SpO2 100%   BMI 17.12 kg/m²   Physical Exam  Constitutional:       Appearance: She is ill-appearing.   Cardiovascular:      Rate and Rhythm: Normal rate and regular rhythm.      Pulses: Normal pulses.      Heart sounds: Normal heart sounds. No murmur heard.  Pulmonary:      Effort: Pulmonary effort is normal. No respiratory distress.      Breath sounds: Normal breath sounds. No wheezing or rales.   Abdominal:      General: Bowel sounds are normal. There is no distension.      Palpations: Abdomen is soft.      Tenderness: There is no abdominal tenderness. There is no guarding.   Musculoskeletal:      Right lower leg: No edema.      Left lower leg: No edema.   Skin:     General: Skin is warm.   Neurological:      Mental Status: She is alert and oriented to person, place, and time. Mental status is at baseline.              Results Reviewed:  Lab Results (last 24 hours)       Procedure Component Value Units Date/Time    Magnesium [[id number]]  (Normal) Collected: 06/27/25 2050    Specimen: Blood Updated: 06/27/25 2359     Magnesium 2.1 mg/dL     Comprehensive Metabolic Panel [[id number]]  (Abnormal) Collected: 06/27/25 2050    Specimen: Blood Updated: 06/27/25 2122     Glucose 205 mg/dL      BUN 56.8 mg/dL      Creatinine 3.18 mg/dL      Sodium 136 mmol/L      Potassium 3.0 mmol/L      Chloride 103 mmol/L      CO2 14.0 mmol/L      Calcium 8.5 mg/dL      Total Protein 7.5 g/dL      Albumin 3.9 g/dL      ALT (SGPT) 12 U/L      AST (SGOT) 15 U/L      Alkaline Phosphatase 155 U/L      Total Bilirubin <0.2 mg/dL      Globulin 3.6 gm/dL  "     A/G Ratio 1.1 g/dL      BUN/Creatinine Ratio 17.9     Anion Gap 19.0 mmol/L      eGFR 16.8 mL/min/1.73     Narrative:      GFR Categories in Chronic Kidney Disease (CKD)              GFR Category          GFR (mL/min/1.73)    Interpretation  G1                    90 or greater        Normal or high (1)  G2                    60-89                Mild decrease (1)  G3a                   45-59                Mild to moderate decrease  G3b                   30-44                Moderate to severe decrease  G4                    15-29                Severe decrease  G5                    14 or less           Kidney failure    (1)In the absence of evidence of kidney disease, neither GFR category G1 or G2 fulfill the criteria for CKD.    eGFR calculation 2021 CKD-EPI creatinine equation, which does not include race as a factor    [place] Draw [[id number]] Collected: 06/27/25 2050    Specimen: Blood Updated: 06/27/25 2106    Narrative:      The following orders were created for panel order [place] Draw.  Procedure                               Abnormality         Status                     ---------                               -----------         ------                     Green Top (Gel)[[id number]]                                  Final result               Lavender Top[[id number]]                                     Final result               Red Top[[id number]]                                                                     Britton Top[[id number]]                                         Final result               Light Blue Top[[id number]]                                   Final result                 Please view results for these tests on the individual orders.    CBC & Differential [[id number]]  (Abnormal) Collected: 06/27/25 2050    Specimen: Blood Updated: 06/27/25 2103    Narrative:      The following orders were created for panel order CBC & Differential.  Procedure                               Abnormality          Status                     ---------                               -----------         ------                     CBC Auto Differential[[id number]]        Abnormal            Final result                 Please view results for these tests on the individual orders.    CBC Auto Differential [[id number]]  (Abnormal) Collected: 06/27/25 2050    Specimen: Blood Updated: 06/27/25 2103     WBC 11.05 10*3/mm3      RBC 3.83 10*6/mm3      Hemoglobin 11.3 g/dL      Hematocrit 36.0 %      MCV 94.0 fL      MCH 29.5 pg      MCHC 31.4 g/dL      RDW 16.2 %      RDW-SD 55.8 fl      MPV 10.2 fL      Platelets 360 10*3/mm3      Neutrophil % 65.1 %      Lymphocyte % 25.1 %      Monocyte % 5.2 %      Eosinophil % 3.7 %      Basophil % 0.5 %      Immature Grans % 0.4 %      Neutrophils, Absolute 7.20 10*3/mm3      Lymphocytes, Absolute 2.77 10*3/mm3      Monocytes, Absolute 0.58 10*3/mm3      Eosinophils, Absolute 0.41 10*3/mm3      Basophils, Absolute 0.05 10*3/mm3      Immature Grans, Absolute 0.04 10*3/mm3      nRBC 0.0 /100 WBC     Green Top (Gel) [[id number]] Collected: 06/27/25 2050    Specimen: Blood Updated: 06/27/25 2101     Extra Tube Hold for add-ons.     Comment: Auto resulted.       Lavender Top [[id number]] Collected: 06/27/25 2050    Specimen: Blood Updated: 06/27/25 2101     Extra Tube hold for add-on     Comment: Auto resulted       Gray Top [[id number]] Collected: 06/27/25 2050    Specimen: Blood Updated: 06/27/25 2101     Extra Tube Hold for add-ons.     Comment: Auto resulted.       Light Blue Top [[id number]] Collected: 06/27/25 2050    Specimen: Blood Updated: 06/27/25 2101     Extra Tube Hold for add-ons.     Comment: Auto resulted             Imaging Results (Last 24 Hours)       ** No results found for the last 24 hours. **          I have personally reviewed and interpreted the radiology studies and ECG obtained at time of admission.     Assessment / Plan   Assessment:   Active Hospital Problems    Diagnosis      SUGAR (acute kidney injury)     Loss of weight     Gastroparesis     Severe malnutrition        Treatment Plan  The patient will be admitted to my service here at [facility].     Assessment and plan:  #SUGAR on CKD  #Hypokalemia.  Metabolic acidosis  #Decreased p.o. intake.  #Underweight.  #History of gastroparesis, status post stimulator.  #History of CKD, hypertension, diabetes mellitus.    - Admitting to floor.  - IV fluids and will recheck creatinine in the morning.  - Consulting with nephrology in AM.  - Replacing potassium and will recheck levels in the morning.  - Consulting with nutrition.  - Continue with Reglan for gastroparesis.  - DVT prophylaxis with SCDs for now.  - Continue with sodium bicarb for metabolic acidosis.  Adding lactic acid.  - Adding GI panel.  - Adding UA.    Medical Decision Making  Number and Complexity of problems: 2 acute and moderate  Differential Diagnosis: As above    Conditions and Status        Condition is worsening.     MDM Data  External documents reviewed: Yes  Cardiac tracing (EKG, telemetry) interpretation: Yes  Radiology interpretation: None  Labs reviewed: Yes  Any tests that were considered but not ordered: UA, GI panel, lactic acid.     Decision rules/scores evaluated (example [id number], Wells, etc): No     Discussed with: Patient and ED provider     Care Planning  Shared decision making: Discussed the plan with the patient and she was agreeable  Code status and discussions: Discussed the CODE STATUS with the patient and she wants to be full code    Disposition  Social Determinants of Health that impact treatment or disposition: Not at the moment  Estimated length of stay is 2 to 3 days.     I confirmed that the patient's advanced care plan is present, code status is documented, and a surrogate decision maker is listed in the patient's medical record.       The patient was seen and examined by me on 6/27 at 11:45 PM.    Electronically signed by Elodia MONTOYA  "MD Jacqueline, 06/28/25, 02:20 CDT.              Electronically signed by Elodia Phillips MD at 06/28/25 0221          Emergency Department Notes        Marco Pedroza MD at 06/27/25 2105          Subjective   History of Present Illness  The patient is a female with a history of chronic kidney disease who presents to the ED after her primary care physician at [facility] contacted her  regarding abnormal labs. The patient reports only urinating once per day, suggesting oliguria. She also reports having diarrhea since yesterday. Her medical history is significant for chronic kidney disease, hypertension, diabetes, gastroparesis (with a gastric stimulator placed in May), osteoporosis, and osteoarthritis. She denies a history of congestive heart failure. The patient has a PICC line in place for IV fluid administration. She is followed by a nephrologist, Dr. Chano reyna Castleton, whom she last saw approximately one month ago.        Review of Systems   All other systems reviewed and are negative.      Past Medical History:   Diagnosis Date    Arthritis     Contusion     Degenerative disc disease, cervical     Diabetes mellitus     Elevated cholesterol     Fibromyalgia     Neuropathy     Osteoporosis     Pancreatitis     Raynaud disease     Renal insufficiency     Rheumatoid arthritis     Smoker 02/08/2022    Vitamin D deficiency 04/26/2022       Allergies   Allergen Reactions    Bee Venom Anaphylaxis    Hydrocodone Anaphylaxis     SOB    Hydroxychloroquine Other (See Comments)     Hair loss    Ibuprofen Other (See Comments)     \"SHUTS MY KIDNEYS DOWN\" PER PATIENT     Pineapple Anaphylaxis     Makes throat itch and \"tightens throat\"    Pineapple Extract Anaphylaxis     Makes throat itch and \"tightens throat\"    Wasp Venom Anaphylaxis    Wasp Venom Protein Anaphylaxis    Hydrocodone-Acetaminophen Nausea And Vomiting, Nausea Only and Dizziness    Metformin Other (See Comments)     Pt states it messes with her kidneys "    Penicillins Nausea And Vomiting    Sitagliptin Other (See Comments)      Abdominal Pain      Chocolate Rash     Rash on face    Poison Ivy Extract Itching and Rash       Past Surgical History:   Procedure Laterality Date    APPENDECTOMY      CHOLECYSTECTOMY      COLONOSCOPY      ENDOSCOPY N/A 10/08/2019    Procedure: ESOPHAGOGASTRODUODENOSCOPY WITH ANESTHESIA;  Surgeon: Aleks Vaughn DO;  Location: [facility] ENDOSCOPY;  Service: Gastroenterology    ENDOSCOPY N/A 11/12/2024    Procedure: ESOPHAGOGASTRODUODENOSCOPY WITH ANESTHESIA;  Surgeon: Tiffanie Saucedo MD;  Location: [facility] ENDOSCOPY;  Service: Gastroenterology;  Laterality: N/A;  pre op: Gastroparesis, Intractable nausea  post op: insertion of dobhoff   PCP: Brandon Murguia    ENDOSCOPY N/A 11/15/2024    Procedure: ESOPHAGOGASTRODUODENOSCOPY WITH ANESTHESIA WITH NG TUBE INSERTION;  Surgeon: Tiffanie Saucedo MD;  Location: [facility] ENDOSCOPY;  Service: Gastroenterology;  Laterality: N/A;  PRE OP: DOBHOFF  POST OP: DOBHOFF  PCP: TELLY PACK    ENDOSCOPY WITH GASTROSTOMY TUBE INSERTION N/A 6/15/2022    Procedure: ESOPHAGOGASTRODUODENOSCOPY WITH GASTROSTOMY TUBE INSERTION;  Surgeon: Jersey Lee MD;  Location: [facility] ENDOSCOPY;  Service: Gastroenterology;  Laterality: N/A;  pre peg placement  post peg placement  Dr. Murguia    ENTEROSCOPY SMALL BOWEL N/A 11/3/2022    Procedure: Esophagogastroduodenoscopy with Peg Removal;  Surgeon: Aleks Vaughn DO;  Location: [facility] ENDOSCOPY;  Service: Gastroenterology;  Laterality: N/A;  pre; peg removal  post; peg removal   KIRILL Murguia MD        HYSTERECTOMY         Family History   Adopted: Yes   Problem Relation Age of Onset    Colon polyps Neg Hx     Colon cancer Neg Hx        Social History     Socioeconomic History    Marital status:    Tobacco Use    Smoking status: Every Day     Current packs/day: 0.50     Average packs/day: 0.5 packs/day for 32.5 years (16.2 ttl pk-yrs)     Types: Cigarettes      Start date: 1993     Passive exposure: Current    Smokeless tobacco: Never   Vaping Use    Vaping status: Every Day    Substances: Nicotine    Devices: Disposable   Substance and Sexual Activity    Alcohol use: No    Drug use: No    Sexual activity: Defer           Objective   Physical Exam  Constitutional:  General: Patient is not in acute distress.  Appearance: Patient is not diaphoretic.    HENT:  Head: Normocephalic and atraumatic.  Right Ear: External ear normal.  Left Ear: External ear normal.  Nose: Nose normal.    Eyes:  General: No scleral icterus.  Conjunctiva/sclera: Conjunctivae normal.    Cardiovascular:  Rate and Rhythm: Normal rate and regular rhythm.  Heart sounds: No friction rub.    Pulmonary:  Effort: Pulmonary effort is normal. No respiratory distress.  Breath sounds: No stridor. No wheezing.    Abdominal:  Palpations: Abdomen is soft.  Tenderness: There is no abdominal tenderness. There is no guarding or rebound.    Musculoskeletal:  General: No deformity.    Skin:  General: Skin is warm and dry. No rashes present. Normal color. Normal turgor.    Neurological:  General: No focal deficit present.  Mental Status: Alert and oriented.    Extremities: **No edema noted in legs.**      Procedures          ED Course  ED Course as of 06/29/25 0721   Fri Jun 27, 2025   2223 Mild elevation of WBC.  There is no fever.  There is a mild anion gap could be due to uremia.  She has a worsening SUGAR from baseline.  Will admit for this.  I gave the patient IV fluids.  The patient is stable for admission.  Her bladder scan was negative for urinary retention [SG]   2349 The patient was accepted by  for worsening SUGAR [SG]      ED Course User Index  [SG] Marco Pedroza MD                                                       Medical Decision Making  Patient presents with worsening acute kidney injury on chronic kidney disease with oliguria and abnormal labs.    Laboratory studies: Labs were ordered  including CBC and CMP to assess the patient's current renal function and electrolyte status. The patient's primary care physician had already noted abnormal labs which prompted this ED visit. Patient is noted to be **slightly hypotensive** and has **worsening SUGAR on CKD**.    The patient reports oliguria, urinating only once daily, which is concerning for possible obstructive uropathy versus severe volume depletion. A bladder scan was ordered to rule out urinary obstruction.    EKG was ordered for evaluation of electrolyte abnormalities that may be present with acute kidney injury.    Treatment included administration of 1 liter of IV fluids to address possible volume depletion contributing to the SUGAR.    The patient's nephrologist is in [place], and her primary care physician is in [place]. Will consider contacting nephrology for recommendations on further management depending on lab results and response to initial fluid resuscitation.    Problems Addressed:  SUGAR (acute kidney injury): complicated acute illness or injury    Amount and/or Complexity of Data Reviewed  Labs: ordered.  ECG/medicine tests: ordered.    Risk  Decision regarding hospitalization.        Final diagnoses:   SUGAR (acute kidney injury)       ED Disposition  ED Disposition       ED Disposition   Decision to Admit    Condition   --    Comment   Level of Care: Med/Surg [1]   Diagnosis: SUGAR (acute kidney injury) [253052]   Admitting Physician: NADIRA POLLARD [[id number]]   Attending Physician: NADIRA POLLARD [[id number]]   Certification: I Certify That Inpatient Hospital Services Are Medically Necessary For Greater Than 2 Midnights                 No follow-up provider specified.       Medication List        ASK your doctor about these medications      metoclopramide 5 MG tablet  Commonly known as: REGLAN  Ask about: Which instructions should I use?     potassium chloride 20 MEQ CR tablet  Commonly known as: KLOR-CON M20  Ask about: Which instructions should  I use?                 Marco Pedroza MD  06/29/25 0721      Electronically signed by Marco Pedroza MD at 06/29/25 0721       Facility-Administered Medications as of 6/29/2025   Medication Dose Route Frequency Provider Last Rate Last Admin    albuterol (PROVENTIL) nebulizer solution 0.083% 2.5 mg/3mL  2.5 mg Nebulization Q6H PRN Elodia Phillips MD        aspirin EC tablet 81 mg  81 mg Oral Daily Elodia Phillips MD   81 mg at 06/29/25 0816    dextrose (D50W) (25 g/50 mL) IV injection 25 g  25 g Intravenous Q15 Min PRN Elodia Phillips MD        dextrose (GLUTOSE) oral gel 15 g  15 g Oral Q15 Min PRN Elodia Phillips MD        Insulin Lispro (humaLOG) injection 2-7 Units  2-7 Units Subcutaneous 4x Daily AC & at Bedtime Elodia Phillips MD   3 Units at 06/29/25 1216    Magnesium Standard Dose Replacement - Follow Nurse / BPA Driven Protocol   Not Applicable PRN Elodia Phillips MD        [COMPLETED] magnesium sulfate in D5W 1g/100mL (PREMIX)  1 g Intravenous Q1H Rickey Tinajero MD   1 g at 06/29/25 1009    metoclopramide (REGLAN) solution 5 mg  5 mg Oral 4x Daily AC & at Bedtime Elodia Phillips MD   5 mg at 06/29/25 1132    nitroglycerin (NITROSTAT) SL tablet 0.4 mg  0.4 mg Sublingual Q5 Min PRN Elodia Phillips MD        nortriptyline (PAMELOR) capsule 25 mg  25 mg Oral Nightly Elodia Phillips MD   25 mg at 06/28/25 2046    ondansetron ODT (ZOFRAN-ODT) disintegrating tablet 4 mg  4 mg Oral Q6H PRN Elodia Phillips MD   4 mg at 06/28/25 0129    Or    ondansetron (ZOFRAN) injection 4 mg  4 mg Intravenous Q6H PRN Elodia Phillips MD        pantoprazole (PROTONIX) EC tablet 40 mg  40 mg Oral Q AM Elodia Phillips MD   40 mg at 06/29/25 0525    [COMPLETED] potassium chloride (KLOR-CON M20) CR tablet 40 mEq  40 mEq Oral Once Elodia Phillips MD   40 mEq at 06/28/25 0158    [COMPLETED] potassium chloride 10 mEq in 100 mL IVPB  10 mEq Intravenous Q1H Elodia Phillips  mL/hr at 06/28/25 0341 10 mEq at 06/28/25 0341     Potassium Replacement - Follow Nurse / BPA Driven Protocol   Not Applicable PRN Elodia Phillips MD        rOPINIRole (REQUIP) tablet 1 mg  1 mg Oral TID Elodia Phillips MD   1 mg at 06/29/25 0816    sodium bicarbonate 8.4 % 150 mEq in dextrose (D5W) 5 % 1,000 mL infusion (greater than 100 mEq)  150 mEq Intravenous Continuous Brandon Cline DO 75 mL/hr at 06/28/25 1410 150 mEq at 06/28/25 1410    sodium bicarbonate tablet 650 mg  650 mg Oral BID Elodia Phillips MD   650 mg at 06/29/25 0816    [COMPLETED] sodium chloride 0.9 % bolus 1,000 mL  1,000 mL Intravenous Once Marco Pedroza MD 2,000 mL/hr at 06/27/25 2121 1,000 mL at 06/27/25 2121    [COMPLETED] sodium chloride 0.9 % bolus 500 mL  500 mL Intravenous Once Elodia Phillips  mL/hr at 06/28/25 0130 500 mL at 06/28/25 0130    sodium chloride 0.9 % flush 10 mL  10 mL Intravenous Q12H Elodia Phillips MD   10 mL at 06/28/25 2046    sodium chloride 0.9 % flush 10 mL  10 mL Intravenous PRN Elodia Phillips MD        sodium chloride 0.9 % infusion 40 mL  40 mL Intravenous PRN Elodia Phillips MD         Orders (all)        Start     Ordered    06/30/25 0600  Magnesium  Morning Draw         06/29/25 0617    06/30/25 0214  Auto Discontinue GI Panel in 48 Hours if not Collected  ONCE GI PANEL         06/28/25 0213    06/29/25 1205  POC Glucose Once  PROCEDURE ONCE        Comments: Complete no more than 45 minutes prior to patient eating      06/29/25 1153    06/29/25 0812  POC Glucose Once  PROCEDURE ONCE        Comments: Complete no more than 45 minutes prior to patient eating      06/29/25 0745    06/29/25 0715  magnesium sulfate in D5W 1g/100mL (PREMIX)  Every 1 Hour         06/29/25 0617    06/29/25 0600  CBC & Differential  Morning Draw         06/28/25 1304    06/29/25 0600  Magnesium  Morning Draw         06/28/25 1304    06/29/25 0600  Phosphorus  Morning Draw         06/28/25 1304    06/29/25 0600  CBC Auto Differential  PROCEDURE ONCE         06/28/25  2201    06/28/25 2025  POC Glucose Once  PROCEDURE ONCE        Comments: Complete no more than 45 minutes prior to patient eating      06/28/25 2013    06/28/25 1800  Basic Metabolic Panel  Every 6 Hours       06/28/25 1304    06/28/25 1617  POC Glucose Once  PROCEDURE ONCE        Comments: Complete no more than 45 minutes prior to patient eating      06/28/25 1605    06/28/25 1400  sodium bicarbonate 8.4 % 150 mEq in dextrose (D5W) 5 % 1,000 mL infusion (greater than 100 mEq)  Continuous         06/28/25 1304    06/28/25 1231  Blood Gas, Venous -  PROCEDURE ONCE         06/28/25 1229    06/28/25 1230  Diet: Regular/House; Fluid Consistency: Thin (IDDSI 0)  Diet Effective Now         06/28/25 1229    06/28/25 1203  Urinalysis, Microscopic Only - Urine, Clean Catch  Once         06/28/25 1202    06/28/25 1201  Magnesium  STAT         06/28/25 1200    06/28/25 1200  Basic Metabolic Panel  STAT         06/28/25 1200    06/28/25 1200  Blood Gas, Venous -  STAT         06/28/25 1200    06/28/25 1140  POC Glucose Once  PROCEDURE ONCE        Comments: Complete no more than 45 minutes prior to patient eating      06/28/25 1128    06/28/25 0900  aspirin EC tablet 81 mg  Daily         06/28/25 0059    06/28/25 0900  rOPINIRole (REQUIP) tablet 1 mg  3 Times Daily         06/28/25 0258    06/28/25 0849  XR Chest 1 View  1 Time Imaging         06/28/25 0848    06/28/25 0804  Protein, Urine, Random - Urine, Clean Catch  Once         06/28/25 0804    06/28/25 0804  Eosinophil Smear - Urine, Urine, Clean Catch  Once         06/28/25 0804    06/28/25 0803  Sodium, Urine, Random - Urine, Clean Catch  Once         06/28/25 0804    06/28/25 0803  Creatinine Urine Random (kidney function) GFR component - Urine, Clean Catch  Once         06/28/25 0804    06/28/25 0802  Duplex Renal Artery - Bilateral Complete CAR  Once,   Status:  Canceled         06/28/25 0804    06/28/25 0802  Osmolality, Urine - Urine, Clean Catch  Once          06/28/25 0804    06/28/25 0802  US Renal Bilateral  Once         06/28/25 0804    06/28/25 0800  Oral Care  2 Times Daily       06/28/25 0059    06/28/25 0735  POC Glucose Once  PROCEDURE ONCE        Comments: Complete no more than 45 minutes prior to patient eating      06/28/25 0724    06/28/25 0730  Insulin Lispro (humaLOG) injection 2-7 Units  4 Times Daily Before Meals & Nightly         06/28/25 0059    06/28/25 0702  Inpatient Nephrology Consult  IN AM        Specialty:  Nephrology  Provider:  Feliberto Barrow MD    06/28/25 0049    06/28/25 0700  POC Glucose 4x Daily Before Meals & at Bedtime  4 Times Daily Before Meals & at Bedtime      Comments: Complete no more than 45 minutes prior to patient eating      06/28/25 0059    06/28/25 0650  Vascular Access Consult  STAT        Provider:  (Not yet assigned)    06/28/25 0650    06/28/25 0600  CBC (No Diff)  Morning Draw         06/28/25 0049    06/28/25 0600  Comprehensive Metabolic Panel  Morning Draw         06/28/25 0049    06/28/25 0600  Magnesium  Morning Draw         06/28/25 0049    06/28/25 0600  Phosphorus  Morning Draw         06/28/25 0049    06/28/25 0600  pantoprazole (PROTONIX) EC tablet 40 mg  Every Early Morning         06/28/25 0059    06/28/25 0400  Vital Signs  Every 4 Hours       06/28/25 0059    06/28/25 0255  Basic Metabolic Panel  STAT         06/28/25 0254    06/28/25 0245  potassium chloride (KLOR-CON M20) CR tablet 40 mEq  Once         06/28/25 0151    06/28/25 0220  Basic Metabolic Panel  STAT,   Status:  Canceled         06/28/25 0219    06/28/25 0217  Lactic Acid, Plasma  STAT         06/28/25 0216    06/28/25 0214  Gastrointestinal Panel, PCR - Stool, Per Rectum  Once         06/28/25 0213    06/28/25 0145  sodium chloride 0.9 % infusion  Continuous,   Status:  Discontinued         06/28/25 0049    06/28/25 0145  potassium chloride (KLOR-CON) packet 40 mEq  Once,   Status:  Discontinued         06/28/25 0049    06/28/25 0145  " potassium chloride 10 mEq in 100 mL IVPB  Every 1 Hour         06/28/25 0049    06/28/25 0145  nortriptyline (PAMELOR) capsule 25 mg  Nightly         06/28/25 0059    06/28/25 0145  sodium chloride 0.9 % flush 10 mL  Every 12 Hours Scheduled         06/28/25 0059    06/28/25 0115  metoclopramide (REGLAN) solution 5 mg  4 Times Daily Before Meals & Nightly         06/28/25 0059    06/28/25 0115  sodium bicarbonate tablet 650 mg  2 Times Daily         06/28/25 0059    06/28/25 0107  albuterol (PROVENTIL) nebulizer solution 0.083% 2.5 mg/3mL  Every 6 Hours PRN         06/28/25 0108    06/28/25 0104  sodium chloride 0.9 % bolus 500 mL  Once         06/28/25 0048    06/28/25 0101  Inpatient Case Management  Consult  Once        Provider:  (Not yet assigned)    06/28/25 0101 06/28/25 0100  Diet: Cardiac, Diabetic, Renal; Healthy Heart (2-3 Na+); Consistent Carbohydrate; Low Sodium (2-3g), Low Potassium, Low Phosphorus; Fluid Consistency: Thin (IDDSI 0)  Diet Effective Now,   Status:  Canceled         06/28/25 0059    06/28/25 0100  Inpatient Nutrition Consult  Once        Provider:  (Not yet assigned)    06/28/25 0059    06/28/25 0100  Bowel Regimen Not Indicated  Once         06/28/25 0059    06/28/25 0100  PT Consult: Eval & Treat Functional Mobility Below Baseline  Once        Comments: Reason Why PT Needed: lose balance    06/28/25 0059    06/28/25 0100  OT Consult: Eval & Treat ADL Performance Below Baseline  Once        Comments: Reason Why ot Needed: hands twitch    06/28/25 0059    06/28/25 0059  ondansetron ODT (ZOFRAN-ODT) disintegrating tablet 4 mg  Every 6 Hours PRN        Placed in \"Or\" Linked Group    06/28/25 0059    06/28/25 0059  ondansetron (ZOFRAN) injection 4 mg  Every 6 Hours PRN        Placed in \"Or\" Linked Group    06/28/25 0059    06/28/25 0059  Potassium Replacement - Follow Nurse / BPA Driven Protocol  As Needed         06/28/25 0059    06/28/25 0059  Magnesium Standard Dose " Replacement - Follow Nurse / BPA Driven Protocol  As Needed         06/28/25 0059    06/28/25 0059  Intake & Output  Every Shift       06/28/25 0059    06/28/25 0059  Weigh Patient  Once         06/28/25 0059    06/28/25 0059  Insert Peripheral IV  Once         06/28/25 0059    06/28/25 0059  Saline Lock & Maintain IV Access  Continuous,   Status:  Canceled         06/28/25 0059    06/28/25 0059  Code Status and Medical Interventions: CPR (Attempt to Resuscitate); Full Support  Continuous         06/28/25 0059    06/28/25 0059  Place Sequential Compression Device  Once         06/28/25 0059    06/28/25 0059  Maintain Sequential Compression Device  Continuous         06/28/25 0059    06/28/25 0059  Continuous Cardiac Monitoring  Continuous        Comments: Follow Standing Orders As Outlined in Process Instructions (Open Order Report to View Full Instructions)    06/28/25 0059    06/28/25 0059  Maintain IV Access  Continuous         06/28/25 0059    06/28/25 0059  Telemetry - Place Orders & Notify Provider of Results When Patient Experiences Acute Chest Pain, Dysrhythmia or Respiratory Distress  Continuous        Comments: Open Order Report to View Parameters Requiring Provider Notification    06/28/25 0059    06/28/25 0059  May Be Off Telemetry for Tests  Continuous         06/28/25 0059    06/28/25 0059  Notify Provider (With Default Parameters)  Continuous        Comments: Open Order Report to View Parameters Requiring Provider Notification    06/28/25 0059    06/28/25 0058  nitroglycerin (NITROSTAT) SL tablet 0.4 mg  Every 5 Minutes PRN         06/28/25 0059    06/28/25 0058  dextrose (GLUTOSE) oral gel 15 g  Every 15 Minutes PRN         06/28/25 0059    06/28/25 0058  dextrose (D50W) (25 g/50 mL) IV injection 25 g  Every 15 Minutes PRN         06/28/25 0059    06/28/25 0058  sodium chloride 0.9 % flush 10 mL  As Needed         06/28/25 0059    06/28/25 0058  sodium chloride 0.9 % infusion 40 mL  As Needed          06/28/25 0059    06/28/25 0057  Urinalysis With Microscopic If Indicated (No Culture) - Urine, Clean Catch  STAT         06/28/25 0059    06/28/25 0056  albuterol sulfate HFA (PROVENTIL HFA;VENTOLIN HFA;PROAIR HFA) inhaler 2 puff  Every 6 Hours PRN,   Status:  Discontinued         06/28/25 0059    06/27/25 2350  Inpatient Admission  Once         06/27/25 2349    06/27/25 2350  Magnesium  Once         06/27/25 2349 06/27/25 2117  sodium chloride 0.9 % bolus 1,000 mL  Once         06/27/25 2101    06/27/25 2103  CBC Auto Differential  Once         06/27/25 2102    06/27/25 2102  CBC & Differential  Once         06/27/25 2101 06/27/25 2102  Comprehensive Metabolic Panel  Once         06/27/25 2101    06/27/25 2102  Bladder scan  Once         06/27/25 2101    06/27/25 2102  ECG 12 Lead Electrolyte Imbalance  STAT         06/27/25 2102 06/27/25 2044  [place] Draw  Once         06/27/25 2043    06/27/25 2044  Green Top (Gel)  PROCEDURE ONCE         06/27/25 2044    06/27/25 2044  Lavender Top  PROCEDURE ONCE         06/27/25 2044 06/27/25 2044  Red Top  PROCEDURE ONCE,   Status:  Canceled         06/27/25 2044 06/27/25 2044  Gray Top  PROCEDURE ONCE         06/27/25 2044    06/27/25 2044  Light Blue Top  PROCEDURE ONCE         06/27/25 2044    Unscheduled  Follow Hypoglycemia Standing Orders For Blood Glucose <70 & Notify Provider of Treatment  As Needed      Comments: Follow Hypoglycemia Orders As Outlined in Process Instructions (Open Order Report to View Full Instructions)  Notify Provider Any Time Hypoglycemia Treatment is Administered    06/28/25 0059    --  magnesium oxide (MAG-OX) 400 MG tablet  3 times daily         06/28/25 1045    --  potassium chloride (KLOR-CON M20) 20 MEQ CR tablet  3 Times Daily,   Status:  Discontinued         06/28/25 1045    --  pregabalin (LYRICA) 75 MG capsule  2 Times Daily         06/28/25 1045    --  potassium chloride (K-DUR,KLOR-CON) 20 MEQ tablet controlled-release  ER tablet  Daily         06/28/25 1052    --  sodium chloride 0.9 % solution  3 Times Weekly         06/28/25 1052    --  potassium chloride (KLOR-CON M20) 20 MEQ CR tablet  Nightly         06/28/25 1117    --  metoclopramide (REGLAN) 5 MG tablet  3 Times Daily PRN         06/28/25 1128                     Physician Progress Notes (last 48 hours)        Brandon Cline DO at 06/28/25 1201              [facility] Medicine Services  INPATIENT PROGRESS NOTE    Patient Name: Tasha Perez  Date of Admission: 6/27/2025  Today's Date: 06/28/25  Length of Stay: 1  Primary Care Physician: KIRILL Murguia MD    Subjective   Chief Complaint: f/u [place]    HPI   Patient seen resting in bed.  Alert interactive.  Feeling a little better.  Still does not have a good appetite.  Has been eating much.  No nausea or vomiting.  No chest pain.  No shortness of breath.        Review of Systems   All pertinent negatives and positives are as above. All other systems have been reviewed and are negative unless otherwise stated.     Objective    Temp:  [97.2 °F (36.2 °C)-97.9 °F (36.6 °C)] 97.7 °F (36.5 °C)  Heart Rate:  [] 104  Resp:  [16-18] 18  BP: ()/(53-80) 128/78  Physical Exam  GEN: Awake, alert, interactive, in NAD, thin/frail appearing  HEENT: PERRLA, EOMI, Anicteric, Trachea midline  Lungs: no wheezing/rales/rhonchi  Heart: RRR, +S1/s2, no rub  ABD: soft, nt/nd, +BS, no guarding/rebound  Extremities: atraumatic, no cyanosis, no edema  Skin: no rashes or lesions  Neuro: AAOx3, no focal deficits  Psych: normal mood & affect      Results Review:  I have reviewed the labs, radiology results, and diagnostic studies.    Laboratory Data:   Results from last 7 days   Lab Units 06/28/25  0243 06/27/25 2050   WBC 10*3/mm3 10.92* 11.05*   HEMOGLOBIN g/dL 10.0* 11.3*   HEMATOCRIT % 31.8* 36.0   PLATELETS 10*3/mm3 326 360        Results from last 7 days   Lab Units 06/28/25  0535 06/28/25  2943  "06/27/25 2050   SODIUM mmol/L 141 141 136   POTASSIUM mmol/L 4.1 2.9* 3.0*   CHLORIDE mmol/L 112* 110* 103   CO2 mmol/L 14.0* 14.0* 14.0*   BUN mg/dL 54.1* 55.7* 56.8*   CREATININE mg/dL 2.40* 2.58* 3.18*   CALCIUM mg/dL 7.4* 7.8* 8.5*   BILIRUBIN mg/dL <0.2  --  <0.2   ALK PHOS U/L 122*  --  155*   ALT (SGPT) U/L 10  --  12   AST (SGOT) U/L 12  --  15   GLUCOSE mg/dL 208* 256* 205*       Culture Data:   No results found for: \"BLOODCX\", \"URINECX\", \"WOUNDCX\", \"MRSACX\", \"RESPCX\", \"STOOLCX\"    Radiology Data:   Imaging Results (Last 24 Hours)       Procedure Component Value Units Date/Time    XR Chest 1 View [[id number]] Collected: 06/28/25 1052     Updated: 06/28/25 1056    Narrative:      EXAMINATION:  XR CHEST 1 VW-       HISTORY: PICC tip location, line has been pulled out; N17.9-Acute kidney  failure, unspecified.     COMPARISON: 3/31/2025.     TECHNIQUE: Single view AP image.     FINDINGS:  The lungs are expanded bilaterally and clear. The pleural  spaces are clear. There is a new right-sided PICC line with catheter tip  in good position within the superior vena cava at the T6 level. Heart  size is normal. No acute bony abnormality is seen.          Impression:      1. PICC line catheter in good position, as described.  2. No infiltrate or effusion.           This report was signed and finalized on 6/28/2025 10:53 AM by Dr. Darshan De Leon MD.               I have reviewed the patient's current medications.     Assessment/Plan   Assessment  Active Hospital Problems    Diagnosis     SUGAR (acute kidney injury)     Loss of weight     Gastroparesis     Hypokalemia     Severe malnutrition        Treatment Plan  #1 SUGAR -patient has a history of hospitalizations for acute on chronic renal failure.  Has a PICC line and follows with nephrology for outpatient IV fluids to help prevent.  Has been doing fairly well.  Presenting in dry with poor p.o. intake.  Already improving overnight with IV normal saline.  That being said " patient CO2 was 14 on chemistry this a.m.  Will stat a venous blood gas and repeat BMP and electrolytes.  Suspect will need bicarb fluids and close monitoring.    #2 hypokalemia -improved with replacement.  Will recheck on new labs.  Left monitor closely with bicarb and acid correction.    #3 metabolic acidosis -in the setting of #1 poor p.o. intake.  Check a venous blood gas.    #4 severe malnutrition -chronic issue, maximize calories    #5 anemia of chronic disease -H&H stable.  No signs of bleeding.    #6 DM2 -currently on sliding scale insulin and hypoglycemia protocol.        Medical Decision Making  Number and Complexity of problems: 2 acute, 1 acute on chronic, multiple chronic  Differential Diagnosis: as above    Conditions and Status        New to me this stay, seems to be improving     MDM Data  External documents reviewed: none  Cardiac tracing (EKG, telemetry) interpretation: none  Radiology interpretation: reports reviewed  Labs reviewed: as above  Any tests that were considered but not ordered: none     Decision rules/scores evaluated (example [id number], Wells, etc): none     Discussed with: patient, nursing     Care Planning  Shared decision making: Patient apprised of current labs, vitals, imaging and treatment plan.  They are agreeable with proceeding with plans as discussed.    Code status and discussions: full code    Disposition  Social Determinants of Health that impact treatment or disposition: none  I expect the patient to be discharged to home in 2-3 days.         Electronically signed by Brandon Cline DO, 06/28/25, 12:01 CDT.      Electronically signed by Brandon Cline DO at 06/28/25 1240          Consult Notes (last 48 hours)        Feliberto Barrow MD at 06/28/25 1433        Consult Orders    1. Inpatient Nephrology Consult [[id number]] ordered by Elodia Phillips MD at 06/28/25 0049                 Nephrology ([facility]) Consult Note      Patient:   Tasha Perez  YOB: 1970  Date of Service: 6/28/2025  MRN: [id number]   Acct: [id number]   Primary Care Physician: KIRILL Murguia MD  Advance Directive:   Code Status and Medical Interventions: CPR (Attempt to Resuscitate); Full Support   Ordered at: 06/28/25 0100     Code Status (Patient has no pulse and is not breathing):    CPR (Attempt to Resuscitate)     Medical Interventions (Patient has pulse or is breathing):    Full Support     Level Of Support Discussed With:    Patient     Admit Date: 6/27/2025       Hospital Day: 1  Referring Provider: No ref. provider found      Patient Seen, Chart, Consults, Notes, Labs, Radiology studies reviewed.      Subjective:  Tasha Perez is a 54 y.o. female  whom we were consulted for acute kidney injury and acidosis.  Patient has a history of type 2 diabetes and chronic kidney disease was fluctuating baseline kidney function.  He has severe gastroparesis and has had recurrent nausea vomiting and diarrhea.  She is status post gastric stimulator placement.  Patient continues to go to [place] for adjustment in her stimulator.  She has had multiple episodes of admissions for dehydration and acidosis with kidney failure.  Patient had a PICC line in her right upper extremity and has been receiving IV hydration every other day.  Because of her family issues dealing with her grandkids and being unable to go to [place] for GI on a regular basis, patient continues to have a suboptimal outcome with recurrent episode of severe dehydration and SUGAR.  Her labs showed his severe disturbances and she was instructed to be admitted.  She was started on bicarbonate based IV fluids and has since reported some improvement in her GI symptoms.  She denies a change to her urinary habits.  Her previous serum creatinine was 1.8-2.  And it was found to be 3.1 on admission.  Under IV fluids, her serum creatinine has improved to 2.1.  Patient denies much leg  edema.    Allergies:  Bee venom, Hydrocodone, Hydroxychloroquine, Ibuprofen, Pineapple, Pineapple extract, Wasp venom, Wasp venom protein, Hydrocodone-acetaminophen, Metformin, Penicillins, Sitagliptin, Chocolate, and Poison ivy extract    Home Meds:  Medications Prior to Admission   Medication Sig Dispense Refill Last Dose/Taking    albuterol sulfate  (90 Base) MCG/ACT inhaler Inhale 2 puffs 3 (Three) Times a Day As Needed for Wheezing.   Past Week    aspirin 81 MG EC tablet Take 1 tablet by mouth Daily.   6/27/2025 at  9:00 AM    atorvastatin (LIPITOR) 80 MG tablet Take 1 tablet by mouth Every Night.   6/27/2025 at  9:00 PM    Calcium Carbonate-Vitamin D 600-5 MG-MCG tablet Take 1 tablet by mouth Daily.   6/27/2025 at  9:00 AM    cetirizine (zyrTEC) 10 MG tablet Take 1 tablet by mouth Daily As Needed for Allergies.   Past Week at  9:00 AM    DULoxetine (CYMBALTA) 60 MG capsule Take 1 capsule by mouth Daily.   6/27/2025 at  9:00 AM    linaclotide (LINZESS) 290 MCG capsule capsule Take 1 capsule by mouth Every Morning Before Breakfast.   6/27/2025 at  9:00 AM    magnesium oxide (MAG-OX) 400 MG tablet Take 1 tablet by mouth 3 times a day.   Taking    metoclopramide (REGLAN) 5 MG tablet Take 2 tablets by mouth 3 (Three) Times a Day As Needed (nause and vomiting).   6/27/2025    nortriptyline (PAMELOR) 25 MG capsule Take 1 capsule by mouth Every Night.   6/27/2025 at  9:00 PM    omeprazole (priLOSEC) 40 MG capsule Take 1 capsule by mouth Daily.   6/27/2025 at  9:00 AM    potassium chloride (K-DUR,KLOR-CON) 20 MEQ tablet controlled-release ER tablet Take 2 tablets by mouth Daily.   Taking    pregabalin (LYRICA) 75 MG capsule Take 1 capsule by mouth 2 (Two) Times a Day.   Taking    rOPINIRole (REQUIP) 1 MG tablet Take 1 tablet by mouth 3 (Three) Times a Day.   6/27/2025 at  9:00 PM    sodium bicarbonate 650 MG tablet Take 1 tablet by mouth 2 (Two) Times a Day.   6/27/2025 at  9:00 AM    sucralfate (CARAFATE) 1 g  tablet Take 1 tablet by mouth 4 (Four) Times a Day.   6/27/2025 at  5:00 PM    triamcinolone (KENALOG) 0.1 % cream Apply 1 Application topically to the appropriate area as directed Daily As Needed (prior to dexcom application).   6/27/2025 Evening    potassium chloride (KLOR-CON M20) 20 MEQ CR tablet Take 1 tablet by mouth Every Night.       promethazine (PHENERGAN) 25 MG tablet Take 1 tablet by mouth Every 6 (Six) Hours As Needed for Nausea or Vomiting.       sodium chloride 0.9 % solution Infuse 30 mL/hr into a venous catheter 3 (Three) Times a Week. Monday, Wednesday, Friday          Medicines:  Current Facility-Administered Medications   Medication Dose Route Frequency Provider Last Rate Last Admin    albuterol (PROVENTIL) nebulizer solution 0.083% 2.5 mg/3mL  2.5 mg Nebulization Q6H PRN Elodia Phillips MD        aspirin EC tablet 81 mg  81 mg Oral Daily Elodia Phillips MD   81 mg at 06/28/25 0802    dextrose (D50W) (25 g/50 mL) IV injection 25 g  25 g Intravenous Q15 Min PRN Elodia Phillips MD        dextrose (GLUTOSE) oral gel 15 g  15 g Oral Q15 Min PRN Elodia Phillips MD        Insulin Lispro (humaLOG) injection 2-7 Units  2-7 Units Subcutaneous 4x Daily AC & at Bedtime Elodia Phillips MD   2 Units at 06/28/25 1138    Magnesium Standard Dose Replacement - Follow Nurse / BPA Driven Protocol   Not Applicable PRN Elodia Phillips MD        metoclopramide (REGLAN) solution 5 mg  5 mg Oral 4x Daily AC & at Bedtime Elodia Phillips MD   5 mg at 06/28/25 1138    nitroglycerin (NITROSTAT) SL tablet 0.4 mg  0.4 mg Sublingual Q5 Min PRN Elodia Phillips MD        nortriptyline (PAMELOR) capsule 25 mg  25 mg Oral Nightly Elodia Phillips MD   25 mg at 06/28/25 0129    ondansetron ODT (ZOFRAN-ODT) disintegrating tablet 4 mg  4 mg Oral Q6H PRN Elodia Phillips MD   4 mg at 06/28/25 0129    Or    ondansetron (ZOFRAN) injection 4 mg  4 mg Intravenous Q6H PRN Elodia Phillips MD        pantoprazole (PROTONIX) EC tablet 40 mg  40  mg Oral Q AM Elodia Phillips MD   40 mg at 06/28/25 0533    Potassium Replacement - Follow Nurse / BPA Driven Protocol   Not Applicable PRN Elodia Phillips MD        rOPINIRole (REQUIP) tablet 1 mg  1 mg Oral TID Elodia Phillips MD   1 mg at 06/28/25 0802    sodium bicarbonate 8.4 % 150 mEq in dextrose (D5W) 5 % 1,000 mL infusion (greater than 100 mEq)  150 mEq Intravenous Continuous Brandon Cline DO 75 mL/hr at 06/28/25 1410 150 mEq at 06/28/25 1410    sodium bicarbonate tablet 650 mg  650 mg Oral BID Elodia Phillips MD   650 mg at 06/28/25 0801    sodium chloride 0.9 % flush 10 mL  10 mL Intravenous Q12H Elodia Phillips MD   10 mL at 06/28/25 0130    sodium chloride 0.9 % flush 10 mL  10 mL Intravenous PRN Elodia Phillips MD        sodium chloride 0.9 % infusion 40 mL  40 mL Intravenous PRN Elodia Phillips MD           Past Medical History:  Past Medical History:   Diagnosis Date    Arthritis     Contusion     Degenerative disc disease, cervical     Diabetes mellitus     Elevated cholesterol     Fibromyalgia     Neuropathy     Osteoporosis     Pancreatitis     Raynaud disease     Renal insufficiency     Rheumatoid arthritis     Smoker 02/08/2022    Vitamin D deficiency 04/26/2022       Past Surgical History:  Past Surgical History:   Procedure Laterality Date    APPENDECTOMY      CHOLECYSTECTOMY      COLONOSCOPY      ENDOSCOPY N/A 10/08/2019    Procedure: ESOPHAGOGASTRODUODENOSCOPY WITH ANESTHESIA;  Surgeon: Aleks Vaughn DO;  Location: [facility] ENDOSCOPY;  Service: Gastroenterology    ENDOSCOPY N/A 11/12/2024    Procedure: ESOPHAGOGASTRODUODENOSCOPY WITH ANESTHESIA;  Surgeon: Tiffanie Saucedo MD;  Location: [facility] ENDOSCOPY;  Service: Gastroenterology;  Laterality: N/A;  pre op: Gastroparesis, Intractable nausea  post op: insertion of dobhoff   PCP: Brandon Murguia    ENDOSCOPY N/A 11/15/2024    Procedure: ESOPHAGOGASTRODUODENOSCOPY WITH ANESTHESIA WITH NG TUBE INSERTION;  Surgeon: Tiffanie Saucedo MD;   "Location:  [facility] ENDOSCOPY;  Service: Gastroenterology;  Laterality: N/A;  PRE OP: DOBHOFF  POST OP: DOBHOFF  PCP: TELLY PACK    ENDOSCOPY WITH GASTROSTOMY TUBE INSERTION N/A 6/15/2022    Procedure: ESOPHAGOGASTRODUODENOSCOPY WITH GASTROSTOMY TUBE INSERTION;  Surgeon: Jersey Lee MD;  Location:  [facility] ENDOSCOPY;  Service: Gastroenterology;  Laterality: N/A;  pre peg placement  post peg placement  Dr. Murguia    ENTEROSCOPY SMALL BOWEL N/A 11/3/2022    Procedure: Esophagogastroduodenoscopy with Peg Removal;  Surgeon: Aleks Vaughn DO;  Location:  [facility] ENDOSCOPY;  Service: Gastroenterology;  Laterality: N/A;  pre; peg removal  post; peg removal   KIRILL Murguia MD        HYSTERECTOMY         Family History  Family History   Adopted: Yes   Problem Relation Age of Onset    Colon polyps Neg Hx     Colon cancer Neg Hx        Social History  Social History     Socioeconomic History    Marital status:    Tobacco Use    Smoking status: Every Day     Current packs/day: 0.50     Average packs/day: 0.5 packs/day for 32.5 years (16.2 ttl pk-yrs)     Types: Cigarettes     Start date: 1993     Passive exposure: Current    Smokeless tobacco: Never   Vaping Use    Vaping status: Every Day    Substances: Nicotine    Devices: Disposable   Substance and Sexual Activity    Alcohol use: No    Drug use: No    Sexual activity: Defer         Review of Systems:  History obtained from chart review and the patient  General ROS: No fever or chills  Respiratory ROS: No cough, shortness of breath, wheezing  Cardiovascular ROS: no chest pain or dyspnea on exertion  Gastrointestinal ROS: No abdominal pain or melena  Genito-Urinary ROS: No dysuria or hematuria  14 point ROS reviewed with the patient and negative except as noted above and in the HPI unless unable to obtain.    Objective:  /78 (BP Location: Left arm, Patient Position: Lying)   Pulse 104   Temp 97.7 °F (36.5 °C) (Oral)   Resp 18   Ht 154.9 cm (61\")   Wt " 41.1 kg (90 lb 9.6 oz)   SpO2 100%   BMI 17.12 kg/m²     Intake/Output Summary (Last 24 hours) at 6/28/2025 1434  Last data filed at 6/28/2025 1217  Gross per 24 hour   Intake 840 ml   Output 700 ml   Net 140 ml     General: awake/alert   Head: Atraumatic, normocephalic  Neck; no masses, no JVD  Chest:  clear to auscultation bilaterally without respiratory distress  CVS: regular rate and rhythm  Abdominal: soft, nontender, normal bowel sounds  Extremities: no cyanosis or edema  Skin: warm and dry without rash  Neuro: No focal motor deficits  Musculoskeletal: No obvious joint effusions    Labs:  Lab Results (last 7 days)       Procedure Component Value Units Date/Time    Basic Metabolic Panel [[id number]]  (Abnormal) Collected: 06/28/25 1221    Specimen: Blood Updated: 06/28/25 1253     Glucose 175 mg/dL      BUN 51.3 mg/dL      Creatinine 2.11 mg/dL      Sodium 140 mmol/L      Potassium 4.0 mmol/L      Comment: Slight hemolysis detected by analyzer. Result may be falsely elevated.        Chloride 117 mmol/L      CO2 11.0 mmol/L      Calcium 7.8 mg/dL      BUN/Creatinine Ratio 24.3     Anion Gap 12.0 mmol/L      eGFR 27.6 mL/min/1.73     Narrative:      GFR Categories in Chronic Kidney Disease (CKD)              GFR Category          GFR (mL/min/1.73)    Interpretation  G1                    90 or greater        Normal or high (1)  G2                    60-89                Mild decrease (1)  G3a                   45-59                Mild to moderate decrease  G3b                   30-44                Moderate to severe decrease  G4                    15-29                Severe decrease  G5                    14 or less           Kidney failure    (1)In the absence of evidence of kidney disease, neither GFR category G1 or G2 fulfill the criteria for CKD.    eGFR calculation 2021 CKD-EPI creatinine equation, which does not include race as a factor    Magnesium [[id number]]  (Normal) Collected: 06/28/25 1221     Specimen: Blood Updated: 06/28/25 1253     Magnesium 1.7 mg/dL     Osmolality, Urine - Urine, Clean Catch [[id number]]  (Normal) Collected: 06/28/25 1150    Specimen: Urine, Clean Catch Updated: 06/28/25 1246     Osmolality, Urine 499 mOsm/kg     Sodium, Urine, Random - Urine, Clean Catch [[id number]] Collected: 06/28/25 1150    Specimen: Urine, Clean Catch Updated: 06/28/25 1234     Sodium, Urine 49 mmol/L     Narrative:      Reference intervals for random urine have not been established.  Clinical usage is dependent upon physician's interpretation in combination with other laboratory tests.       Protein, Urine, Random - Urine, Clean Catch [[id number]] Collected: 06/28/25 1150    Specimen: Urine, Clean Catch Updated: 06/28/25 1233     Total Protein, Urine 22.8 mg/dL     Narrative:      Reference intervals for random urine have not been established.  Clinical usage is dependent upon physician's interpretation in combination with other laboratory tests.       Blood Gas, Venous - [[id number]]  (Abnormal) Collected: 06/28/25 1229    Specimen: Venous Blood Updated: 06/28/25 1230     Site OTHER     pH, Venous 7.145 pH Units      Comment: 85 Value below critical limit        pCO2, Venous 36.8 mm Hg      Comment: 84 Value below reference range        pO2, Venous 44.2 mm Hg      HCO3, Venous 12.7 mmol/L      Comment: 84 Value below reference range        Base Excess, Venous -15.2 mmol/L      Comment: 84 Value below reference range        O2 Saturation, Venous 70.6 %      Temperature 37.0     Barometric Pressure for Blood Gas 755 mmHg      Modality Room Air     Ventilator Mode NA     Notified Who DR OMALLEY     Notified By Latasha Quijano, BRANDON     Notified Time 06/28/2025 12:30     Collected by CJ     Comment: Meter: [id number]     :  Latasha Quijano RRT       Urinalysis With Microscopic If Indicated (No Culture) - Urine, Clean Catch [[id number]]  (Abnormal) Collected: 06/28/25 1150    Specimen: Urine,  Clean Catch Updated: 06/28/25 1207     Color, UA Yellow     Appearance, UA Clear     pH, UA 5.5     Specific Gravity, UA 1.015     Glucose, UA Negative     Ketones, UA Negative     Bilirubin, UA Negative     Blood, UA Negative     Protein, UA 30 mg/dL (1+)     Leuk Esterase, UA Trace     Nitrite, UA Negative     Urobilinogen, UA 0.2 E.U./dL    Urinalysis, Microscopic Only - Urine, Clean Catch [[id number]] Collected: 06/28/25 1150    Specimen: Urine, Clean Catch Updated: 06/28/25 1207     RBC, UA None Seen /HPF      WBC, UA 0-2 /HPF      Bacteria, UA None Seen /HPF      Squamous Epithelial Cells, UA 0-2 /HPF      Hyaline Casts, UA 3-6 /LPF      Methodology Automated Microscopy    Creatinine Urine Random (kidney function) GFR component - Urine, Clean Catch [[id number]] Collected: 06/28/25 1150    Specimen: Urine, Clean Catch Updated: 06/28/25 1205    Eosinophil Smear - Urine, Urine, Clean Catch [[id number]] Collected: 06/28/25 1150    Specimen: Urine, Clean Catch Updated: 06/28/25 1157    POC Glucose Once [[id number]]  (Abnormal) Collected: 06/28/25 1128    Specimen: Blood Updated: 06/28/25 1139     Glucose 178 mg/dL      Comment: : [id number] [employer] RayneMeter ID: [id number]       POC Glucose Once [[id number]]  (Abnormal) Collected: 06/28/25 0724    Specimen: Blood Updated: 06/28/25 0735     Glucose 173 mg/dL      Comment: : [id number] [employer]ne[employer]eter ID: [id number]       Magnesium [[id number]]  (Normal) Collected: 06/28/25 0535    Specimen: Blood Updated: 06/28/25 0627     Magnesium 1.6 mg/dL     Comprehensive Metabolic Panel [[id number]]  (Abnormal) Collected: 06/28/25 0535    Specimen: Blood Updated: 06/28/25 0627     Glucose 208 mg/dL      BUN 54.1 mg/dL      Creatinine 2.40 mg/dL      Sodium 141 mmol/L      Potassium 4.1 mmol/L      Comment: Slight hemolysis detected by analyzer. Result may be falsely elevated.        Chloride 112 mmol/L      CO2 14.0 mmol/L      Calcium 7.4 mg/dL      Total Protein 6.1  g/dL      Albumin 3.3 g/dL      ALT (SGPT) 10 U/L      AST (SGOT) 12 U/L      Alkaline Phosphatase 122 U/L      Total Bilirubin <0.2 mg/dL      Globulin 2.8 gm/dL      A/G Ratio 1.2 g/dL      BUN/Creatinine Ratio 22.5     Anion Gap 15.0 mmol/L      eGFR 23.6 mL/min/1.73     Narrative:      GFR Categories in Chronic Kidney Disease (CKD)              GFR Category          GFR (mL/min/1.73)    Interpretation  G1                    90 or greater        Normal or high (1)  G2                    60-89                Mild decrease (1)  G3a                   45-59                Mild to moderate decrease  G3b                   30-44                Moderate to severe decrease  G4                    15-29                Severe decrease  G5                    14 or less           Kidney failure    (1)In the absence of evidence of kidney disease, neither GFR category G1 or G2 fulfill the criteria for CKD.    eGFR calculation 2021 CKD-EPI creatinine equation, which does not include race as a factor    Phosphorus [[id number]]  (Abnormal) Collected: 06/28/25 0535    Specimen: Blood Updated: 06/28/25 0621     Phosphorus 5.8 mg/dL     Basic Metabolic Panel [[id number]]  (Abnormal) Collected: 06/28/25 0243    Specimen: Blood Updated: 06/28/25 0308     Glucose 256 mg/dL      BUN 55.7 mg/dL      Creatinine 2.58 mg/dL      Sodium 141 mmol/L      Potassium 2.9 mmol/L      Comment: Slight hemolysis detected by analyzer. Result may be falsely elevated.        Chloride 110 mmol/L      CO2 14.0 mmol/L      Calcium 7.8 mg/dL      BUN/Creatinine Ratio 21.6     Anion Gap 17.0 mmol/L      eGFR 21.6 mL/min/1.73     Narrative:      GFR Categories in Chronic Kidney Disease (CKD)              GFR Category          GFR (mL/min/1.73)    Interpretation  G1                    90 or greater        Normal or high (1)  G2                    60-89                Mild decrease (1)  G3a                   45-59                Mild to moderate decrease  G3b                    30-44                Moderate to severe decrease  G4                    15-29                Severe decrease  G5                    14 or less           Kidney failure    (1)In the absence of evidence of kidney disease, neither GFR category G1 or G2 fulfill the criteria for CKD.    eGFR calculation 2021 CKD-EPI creatinine equation, which does not include race as a factor    CBC (No Diff) [[id number]]  (Abnormal) Collected: 06/28/25 0243    Specimen: Blood Updated: 06/28/25 0251     WBC 10.92 10*3/mm3      RBC 3.37 10*6/mm3      Hemoglobin 10.0 g/dL      Hematocrit 31.8 %      MCV 94.4 fL      MCH 29.7 pg      MCHC 31.4 g/dL      RDW 16.2 %      RDW-SD 56.4 fl      MPV 10.5 fL      Platelets 326 10*3/mm3     Lactic Acid, Plasma [[id number]]  (Normal) Collected: 06/27/25 2050    Specimen: Blood Updated: 06/28/25 0230     Lactate 1.0 mmol/L     Magnesium [[id number]]  (Normal) Collected: 06/27/25 2050    Specimen: Blood Updated: 06/27/25 2359     Magnesium 2.1 mg/dL     Comprehensive Metabolic Panel [[id number]]  (Abnormal) Collected: 06/27/25 2050    Specimen: Blood Updated: 06/27/25 2122     Glucose 205 mg/dL      BUN 56.8 mg/dL      Creatinine 3.18 mg/dL      Sodium 136 mmol/L      Potassium 3.0 mmol/L      Chloride 103 mmol/L      CO2 14.0 mmol/L      Calcium 8.5 mg/dL      Total Protein 7.5 g/dL      Albumin 3.9 g/dL      ALT (SGPT) 12 U/L      AST (SGOT) 15 U/L      Alkaline Phosphatase 155 U/L      Total Bilirubin <0.2 mg/dL      Globulin 3.6 gm/dL      A/G Ratio 1.1 g/dL      BUN/Creatinine Ratio 17.9     Anion Gap 19.0 mmol/L      eGFR 16.8 mL/min/1.73     Narrative:      GFR Categories in Chronic Kidney Disease (CKD)              GFR Category          GFR (mL/min/1.73)    Interpretation  G1                    90 or greater        Normal or high (1)  G2                    60-89                Mild decrease (1)  G3a                   45-59                Mild to moderate decrease  G3b                    30-44                Moderate to severe decrease  G4                    15-29                Severe decrease  G5                    14 or less           Kidney failure    (1)In the absence of evidence of kidney disease, neither GFR category G1 or G2 fulfill the criteria for CKD.    eGFR calculation 2021 CKD-EPI creatinine equation, which does not include race as a factor    [place] Draw [[id number]] Collected: 06/27/25 2050    Specimen: Blood Updated: 06/27/25 2106    Narrative:      The following orders were created for panel order [place] Draw.  Procedure                               Abnormality         Status                     ---------                               -----------         ------                     Green Top (Gel)[[id number]]                                  Final result               Lavender Top[[id number]]                                     Final result               Red Top[[id number]]                                                                     Britton Top[[id number]]                                         Final result               Light Blue Top[[id number]]                                   Final result                 Please view results for these tests on the individual orders.    CBC & Differential [[id number]]  (Abnormal) Collected: 06/27/25 2050    Specimen: Blood Updated: 06/27/25 2103    Narrative:      The following orders were created for panel order CBC & Differential.  Procedure                               Abnormality         Status                     ---------                               -----------         ------                     CBC Auto Differential[[id number]]        Abnormal            Final result                 Please view results for these tests on the individual orders.    CBC Auto Differential [[id number]]  (Abnormal) Collected: 06/27/25 2050    Specimen: Blood Updated: 06/27/25 2103     WBC 11.05 10*3/mm3      RBC 3.83 10*6/mm3      Hemoglobin 11.3 g/dL       Hematocrit 36.0 %      MCV 94.0 fL      MCH 29.5 pg      MCHC 31.4 g/dL      RDW 16.2 %      RDW-SD 55.8 fl      MPV 10.2 fL      Platelets 360 10*3/mm3      Neutrophil % 65.1 %      Lymphocyte % 25.1 %      Monocyte % 5.2 %      Eosinophil % 3.7 %      Basophil % 0.5 %      Immature Grans % 0.4 %      Neutrophils, Absolute 7.20 10*3/mm3      Lymphocytes, Absolute 2.77 10*3/mm3      Monocytes, Absolute 0.58 10*3/mm3      Eosinophils, Absolute 0.41 10*3/mm3      Basophils, Absolute 0.05 10*3/mm3      Immature Grans, Absolute 0.04 10*3/mm3      nRBC 0.0 /100 WBC     Green Top (Gel) [[id number]] Collected: 06/27/25 2050    Specimen: Blood Updated: 06/27/25 2101     Extra Tube Hold for add-ons.     Comment: Auto resulted.       Lavender Top [[id number]] Collected: 06/27/25 2050    Specimen: Blood Updated: 06/27/25 2101     Extra Tube hold for add-on     Comment: Auto resulted       Gray Top [[id number]] Collected: 06/27/25 2050    Specimen: Blood Updated: 06/27/25 2101     Extra Tube Hold for add-ons.     Comment: Auto resulted.       Light Blue Top [[id number]] Collected: 06/27/25 2050    Specimen: Blood Updated: 06/27/25 2101     Extra Tube Hold for add-ons.     Comment: Auto resulted                Radiology:   Imaging Results (Last 72 Hours)       Procedure Component Value Units Date/Time    US Renal Bilateral - In process [[id number]] Resulted: 06/28/25 1414     Updated: 06/28/25 1414    This result has not been signed. Information might be incomplete.      XR Chest 1 View [[id number]] Collected: 06/28/25 1052     Updated: 06/28/25 1056    Narrative:      EXAMINATION:  XR CHEST 1 VW-       HISTORY: PICC tip location, line has been pulled out; N17.9-Acute kidney  failure, unspecified.     COMPARISON: 3/31/2025.     TECHNIQUE: Single view AP image.     FINDINGS:  The lungs are expanded bilaterally and clear. The pleural  spaces are clear. There is a new right-sided PICC line with catheter tip  in good position within the  "superior vena cava at the T6 level. Heart  size is normal. No acute bony abnormality is seen.          Impression:      1. PICC line catheter in good position, as described.  2. No infiltrate or effusion.           This report was signed and finalized on 6/28/2025 10:53 AM by Dr. Darshan De Leon MD.               Culture:  No components found for: \"WOUNDCUL\", \"3\"  No components found for: \"CSFCUL\", \"3\"  No components found for: \"BC\", \"3\"  No components found for: \"URINECUL\", \"3\"      Assessment   - Acute kidney injury-prerenal azotemia versus ATN  - Chronic kidney disease stage IV  - Severe diabetic gastroparesis  - Metabolic acidosis  - Type 2 diabetes with renal disease  - Nausea and vomiting      Plan:  At this stage, we will continue with fluid therapy.  Patient was encouraged to report to [place] to see her GI and have her stimulator adjusted as needed.  I also encouraged her to administer her IV fluids on a daily manner.  Urine test was reviewed.  There is no indication for renal replacement therapy.      Thank you for the consult, we appreciate the opportunity to provide care to your patients.  Feel free to contact me if I can be of any further assistance.      Feliberto Barrow MD  6/28/2025  14:34 CDT    Electronically signed by Feliberto Barrow MD at 06/28/25 1439       "

## 2025-06-29 NOTE — PROGRESS NOTES
Nephrology ([facility]) Progress Note      Patient:  Tasha Perez  YOB: 1970  Date of Service: 6/29/2025  MRN: [id number]   Acct: [id number]   Primary Care Physician: KIRILL Murguia MD  Advance Directive:   Code Status and Medical Interventions: CPR (Attempt to Resuscitate); Full Support   Ordered at: 06/28/25 0100     Code Status (Patient has no pulse and is not breathing):    CPR (Attempt to Resuscitate)     Medical Interventions (Patient has pulse or is breathing):    Full Support     Level Of Support Discussed With:    Patient     Admit Date: 6/27/2025       Hospital Day: 2  Referring Provider: No ref. provider found      Patient Seen, Chart, Consults, Notes, Labs, Radiology studies reviewed.      Subjective:  Tasha Perez is a 54 y.o. female  whom we were consulted for acute kidney injury and acidosis.  Patient has a history of type 2 diabetes and chronic kidney disease was fluctuating baseline kidney function.  He has severe gastroparesis and has had recurrent nausea vomiting and diarrhea.  She is status post gastric stimulator placement.  Patient continues to go to [place] for adjustment in her stimulator.  She has had multiple episodes of admissions for dehydration and acidosis with kidney failure.  Patient had a PICC line in her right upper extremity and has been receiving IV hydration every other day.  Because of her family issues dealing with her grandkids and being unable to go to [place] for GI on a regular basis, patient continues to have a suboptimal outcome with recurrent episode of severe dehydration and SUGAR.  Her labs showed his severe disturbances and she was instructed to be admitted.  She was started on bicarbonate based IV fluids and has since reported some improvement in her GI symptoms.  She denies a change to her urinary habits.  Her previous serum creatinine was 1.8-2.  And it was found to be 3.1 on admission.  Under IV fluids, her serum  creatinine has improved to 2.1.  Patient denies much leg edema.  Today, patient is feeling better.  Her oral intake has improved.  Her labs have also improved.    Allergies:  Bee venom, Hydrocodone, Hydroxychloroquine, Ibuprofen, Pineapple, Pineapple extract, Wasp venom, Wasp venom protein, Hydrocodone-acetaminophen, Metformin, Penicillins, Sitagliptin, Chocolate, and Poison ivy extract    Home Meds:  Medications Prior to Admission   Medication Sig Dispense Refill Last Dose/Taking    albuterol sulfate  (90 Base) MCG/ACT inhaler Inhale 2 puffs 3 (Three) Times a Day As Needed for Wheezing.   Past Week    aspirin 81 MG EC tablet Take 1 tablet by mouth Daily.   6/27/2025 at  9:00 AM    atorvastatin (LIPITOR) 80 MG tablet Take 1 tablet by mouth Every Night.   6/27/2025 at  9:00 PM    Calcium Carbonate-Vitamin D 600-5 MG-MCG tablet Take 1 tablet by mouth Daily.   6/27/2025 at  9:00 AM    cetirizine (zyrTEC) 10 MG tablet Take 1 tablet by mouth Daily As Needed for Allergies.   Past Week at  9:00 AM    DULoxetine (CYMBALTA) 60 MG capsule Take 1 capsule by mouth Daily.   6/27/2025 at  9:00 AM    linaclotide (LINZESS) 290 MCG capsule capsule Take 1 capsule by mouth Every Morning Before Breakfast.   6/27/2025 at  9:00 AM    magnesium oxide (MAG-OX) 400 MG tablet Take 1 tablet by mouth 3 times a day.   Taking    metoclopramide (REGLAN) 5 MG tablet Take 2 tablets by mouth 3 (Three) Times a Day As Needed (nause and vomiting).   6/27/2025    nortriptyline (PAMELOR) 25 MG capsule Take 1 capsule by mouth Every Night.   6/27/2025 at  9:00 PM    omeprazole (priLOSEC) 40 MG capsule Take 1 capsule by mouth Daily.   6/27/2025 at  9:00 AM    potassium chloride (K-DUR,KLOR-CON) 20 MEQ tablet controlled-release ER tablet Take 2 tablets by mouth Daily.   Taking    pregabalin (LYRICA) 75 MG capsule Take 1 capsule by mouth 2 (Two) Times a Day.   Taking    rOPINIRole (REQUIP) 1 MG tablet Take 1 tablet by mouth 3 (Three) Times a Day.    6/27/2025 at  9:00 PM    sodium bicarbonate 650 MG tablet Take 1 tablet by mouth 2 (Two) Times a Day.   6/27/2025 at  9:00 AM    sucralfate (CARAFATE) 1 g tablet Take 1 tablet by mouth 4 (Four) Times a Day.   6/27/2025 at  5:00 PM    triamcinolone (KENALOG) 0.1 % cream Apply 1 Application topically to the appropriate area as directed Daily As Needed (prior to dexcom application).   6/27/2025 Evening    potassium chloride (KLOR-CON M20) 20 MEQ CR tablet Take 1 tablet by mouth Every Night.       promethazine (PHENERGAN) 25 MG tablet Take 1 tablet by mouth Every 6 (Six) Hours As Needed for Nausea or Vomiting.       sodium chloride 0.9 % solution Infuse 30 mL/hr into a venous catheter 3 (Three) Times a Week. Monday, Wednesday, Friday          Medicines:  Current Facility-Administered Medications   Medication Dose Route Frequency Provider Last Rate Last Admin    albuterol (PROVENTIL) nebulizer solution 0.083% 2.5 mg/3mL  2.5 mg Nebulization Q6H PRN Elodia Phillips MD        aspirin EC tablet 81 mg  81 mg Oral Daily Elodia Phillips MD   81 mg at 06/29/25 0816    dextrose (D50W) (25 g/50 mL) IV injection 25 g  25 g Intravenous Q15 Min PRN Elodia Phillips MD        dextrose (GLUTOSE) oral gel 15 g  15 g Oral Q15 Min PRN Elodia Phillips MD        Insulin Lispro (humaLOG) injection 2-7 Units  2-7 Units Subcutaneous 4x Daily AC & at Bedtime Elodia Phillips MD   3 Units at 06/29/25 1216    Magnesium Standard Dose Replacement - Follow Nurse / BPA Driven Protocol   Not Applicable PRN Elodia Phillips MD        metoclopramide (REGLAN) solution 5 mg  5 mg Oral 4x Daily AC & at Bedtime Elodia Phillips MD   5 mg at 06/29/25 1132    nitroglycerin (NITROSTAT) SL tablet 0.4 mg  0.4 mg Sublingual Q5 Min PRN Elodia Phillips MD        nortriptyline (PAMELOR) capsule 25 mg  25 mg Oral Nightly Elodia Phillips MD   25 mg at 06/28/25 2046    ondansetron ODT (ZOFRAN-ODT) disintegrating tablet 4 mg  4 mg Oral Q6H PRN Elodia Phillips MD   4 mg at  06/28/25 0129    Or    ondansetron (ZOFRAN) injection 4 mg  4 mg Intravenous Q6H PRN Elodia Phillips MD        pantoprazole (PROTONIX) EC tablet 40 mg  40 mg Oral Q AM Elodia Phillips MD   40 mg at 06/29/25 0525    Potassium Replacement - Follow Nurse / BPA Driven Protocol   Not Applicable PRN Elodia Phillips MD        rOPINIRole (REQUIP) tablet 1 mg  1 mg Oral TID Elodia Phillips MD   1 mg at 06/29/25 0816    sodium bicarbonate tablet 650 mg  650 mg Oral BID Elodia Phillips MD   650 mg at 06/29/25 0816    sodium chloride 0.9 % flush 10 mL  10 mL Intravenous Q12H Elodia Phillips MD   10 mL at 06/28/25 2046    sodium chloride 0.9 % flush 10 mL  10 mL Intravenous PRN Elodia Phillips MD        sodium chloride 0.9 % infusion 40 mL  40 mL Intravenous PRN Elodia Phillips MD           Past Medical History:  Past Medical History:   Diagnosis Date    Arthritis     Contusion     Degenerative disc disease, cervical     Diabetes mellitus     Elevated cholesterol     Fibromyalgia     Neuropathy     Osteoporosis     Pancreatitis     Raynaud disease     Renal insufficiency     Rheumatoid arthritis     Smoker 02/08/2022    Vitamin D deficiency 04/26/2022       Past Surgical History:  Past Surgical History:   Procedure Laterality Date    APPENDECTOMY      CHOLECYSTECTOMY      COLONOSCOPY      ENDOSCOPY N/A 10/08/2019    Procedure: ESOPHAGOGASTRODUODENOSCOPY WITH ANESTHESIA;  Surgeon: Aleks Vaughn DO;  Location: [facility] ENDOSCOPY;  Service: Gastroenterology    ENDOSCOPY N/A 11/12/2024    Procedure: ESOPHAGOGASTRODUODENOSCOPY WITH ANESTHESIA;  Surgeon: Tiffanie Saucedo MD;  Location: [facility] ENDOSCOPY;  Service: Gastroenterology;  Laterality: N/A;  pre op: Gastroparesis, Intractable nausea  post op: insertion of dobhoff   PCP: Brandon Murguia    ENDOSCOPY N/A 11/15/2024    Procedure: ESOPHAGOGASTRODUODENOSCOPY WITH ANESTHESIA WITH NG TUBE INSERTION;  Surgeon: Tiffanie Saucedo MD;  Location: [facility] ENDOSCOPY;  Service:  "Gastroenterology;  Laterality: N/A;  PRE OP: DOBHOFF  POST OP: DOBHOFF  PCP: TELLY PACK    ENDOSCOPY WITH GASTROSTOMY TUBE INSERTION N/A 6/15/2022    Procedure: ESOPHAGOGASTRODUODENOSCOPY WITH GASTROSTOMY TUBE INSERTION;  Surgeon: Jersey Lee MD;  Location:  [facility] ENDOSCOPY;  Service: Gastroenterology;  Laterality: N/A;  pre peg placement  post peg placement  Dr. Murguia    ENTEROSCOPY SMALL BOWEL N/A 11/3/2022    Procedure: Esophagogastroduodenoscopy with Peg Removal;  Surgeon: Aleks Vaughn DO;  Location:  [facility] ENDOSCOPY;  Service: Gastroenterology;  Laterality: N/A;  pre; peg removal  post; peg removal   KIRILL Murguia MD        HYSTERECTOMY         Family History  Family History   Adopted: Yes   Problem Relation Age of Onset    Colon polyps Neg Hx     Colon cancer Neg Hx        Social History  Social History     Socioeconomic History    Marital status:    Tobacco Use    Smoking status: Every Day     Current packs/day: 0.50     Average packs/day: 0.5 packs/day for 32.5 years (16.2 ttl pk-yrs)     Types: Cigarettes     Start date: 1993     Passive exposure: Current    Smokeless tobacco: Never   Vaping Use    Vaping status: Every Day    Substances: Nicotine    Devices: Disposable   Substance and Sexual Activity    Alcohol use: No    Drug use: No    Sexual activity: Defer         Review of Systems:  History obtained from chart review and the patient  General ROS: No fever or chills  Respiratory ROS: No cough, shortness of breath, wheezing  Cardiovascular ROS: no chest pain or dyspnea on exertion  Gastrointestinal ROS: No abdominal pain or melena  Genito-Urinary ROS: No dysuria or hematuria  14 point ROS reviewed with the patient and negative except as noted above and in the HPI unless unable to obtain.    Objective:  /84 (BP Location: Left arm, Patient Position: Sitting)   Pulse 92   Temp 97.6 °F (36.4 °C) (Oral)   Resp 16   Ht 154.9 cm (61\")   Wt 41.1 kg (90 lb 9.6 oz)   SpO2 100%  "  BMI 17.12 kg/m²     Intake/Output Summary (Last 24 hours) at 6/29/2025 1433  Last data filed at 6/28/2025 2011  Gross per 24 hour   Intake 600 ml   Output --   Net 600 ml     General: awake/alert   Head: Atraumatic, normocephalic  Neck; no masses, no JVD  Chest:  clear to auscultation bilaterally without respiratory distress  CVS: regular rate and rhythm  Abdominal: soft, nontender, normal bowel sounds  Extremities: no cyanosis or edema  Skin: warm and dry without rash  Neuro: No focal motor deficits  Musculoskeletal: No obvious joint effusions    Labs:  Lab Results (last 7 days)       Procedure Component Value Units Date/Time    Basic Metabolic Panel [[id number]]  (Abnormal) Collected: 06/28/25 1221    Specimen: Blood Updated: 06/28/25 1253     Glucose 175 mg/dL      BUN 51.3 mg/dL      Creatinine 2.11 mg/dL      Sodium 140 mmol/L      Potassium 4.0 mmol/L      Comment: Slight hemolysis detected by analyzer. Result may be falsely elevated.        Chloride 117 mmol/L      CO2 11.0 mmol/L      Calcium 7.8 mg/dL      BUN/Creatinine Ratio 24.3     Anion Gap 12.0 mmol/L      eGFR 27.6 mL/min/1.73     Narrative:      GFR Categories in Chronic Kidney Disease (CKD)              GFR Category          GFR (mL/min/1.73)    Interpretation  G1                    90 or greater        Normal or high (1)  G2                    60-89                Mild decrease (1)  G3a                   45-59                Mild to moderate decrease  G3b                   30-44                Moderate to severe decrease  G4                    15-29                Severe decrease  G5                    14 or less           Kidney failure    (1)In the absence of evidence of kidney disease, neither GFR category G1 or G2 fulfill the criteria for CKD.    eGFR calculation 2021 CKD-EPI creatinine equation, which does not include race as a factor    Magnesium [[id number]]  (Normal) Collected: 06/28/25 1221    Specimen: Blood Updated: 06/28/25 1253      Magnesium 1.7 mg/dL     Osmolality, Urine - Urine, Clean Catch [[id number]]  (Normal) Collected: 06/28/25 1150    Specimen: Urine, Clean Catch Updated: 06/28/25 1246     Osmolality, Urine 499 mOsm/kg     Sodium, Urine, Random - Urine, Clean Catch [[id number]] Collected: 06/28/25 1150    Specimen: Urine, Clean Catch Updated: 06/28/25 1234     Sodium, Urine 49 mmol/L     Narrative:      Reference intervals for random urine have not been established.  Clinical usage is dependent upon physician's interpretation in combination with other laboratory tests.       Protein, Urine, Random - Urine, Clean Catch [[id number]] Collected: 06/28/25 1150    Specimen: Urine, Clean Catch Updated: 06/28/25 1233     Total Protein, Urine 22.8 mg/dL     Narrative:      Reference intervals for random urine have not been established.  Clinical usage is dependent upon physician's interpretation in combination with other laboratory tests.       Blood Gas, Venous - [[id number]]  (Abnormal) Collected: 06/28/25 1229    Specimen: Venous Blood Updated: 06/28/25 1230     Site OTHER     pH, Venous 7.145 pH Units      Comment: 85 Value below critical limit        pCO2, Venous 36.8 mm Hg      Comment: 84 Value below reference range        pO2, Venous 44.2 mm Hg      HCO3, Venous 12.7 mmol/L      Comment: 84 Value below reference range        Base Excess, Venous -15.2 mmol/L      Comment: 84 Value below reference range        O2 Saturation, Venous 70.6 %      Temperature 37.0     Barometric Pressure for Blood Gas 755 mmHg      Modality Room Air     Ventilator Mode NA     Notified Who DR OMALLEY     Notified By Latasha Quijano, BRANDON     Notified Time 06/28/2025 12:30     Collected by CJ     Comment: Meter: [id number]     :  Latasha Quijano, BRANDON       Urinalysis With Microscopic If Indicated (No Culture) - Urine, Clean Catch [[id number]]  (Abnormal) Collected: 06/28/25 1150    Specimen: Urine, Clean Catch Updated: 06/28/25 1207     Color,  UA Yellow     Appearance, UA Clear     pH, UA 5.5     Specific Gravity, UA 1.015     Glucose, UA Negative     Ketones, UA Negative     Bilirubin, UA Negative     Blood, UA Negative     Protein, UA 30 mg/dL (1+)     Leuk Esterase, UA Trace     Nitrite, UA Negative     Urobilinogen, UA 0.2 E.U./dL    Urinalysis, Microscopic Only - Urine, Clean Catch [[id number]] Collected: 06/28/25 1150    Specimen: Urine, Clean Catch Updated: 06/28/25 1207     RBC, UA None Seen /HPF      WBC, UA 0-2 /HPF      Bacteria, UA None Seen /HPF      Squamous Epithelial Cells, UA 0-2 /HPF      Hyaline Casts, UA 3-6 /LPF      Methodology Automated Microscopy    Creatinine Urine Random (kidney function) GFR component - Urine, Clean Catch [[id number]] Collected: 06/28/25 1150    Specimen: Urine, Clean Catch Updated: 06/28/25 1205    Eosinophil Smear - Urine, Urine, Clean Catch [[id number]] Collected: 06/28/25 1150    Specimen: Urine, Clean Catch Updated: 06/28/25 1157    POC Glucose Once [[id number]]  (Abnormal) Collected: 06/28/25 1128    Specimen: Blood Updated: 06/28/25 1139     Glucose 178 mg/dL      Comment: : [id number] Sprooki RayneMeter ID: [id number]       POC Glucose Once [[id number]]  (Abnormal) Collected: 06/28/25 0724    Specimen: Blood Updated: 06/28/25 0735     Glucose 173 mg/dL      Comment: : [id number] Sprooki RayneMeter ID: [id number]       Magnesium [[id number]]  (Normal) Collected: 06/28/25 0535    Specimen: Blood Updated: 06/28/25 0627     Magnesium 1.6 mg/dL     Comprehensive Metabolic Panel [[id number]]  (Abnormal) Collected: 06/28/25 0535    Specimen: Blood Updated: 06/28/25 0627     Glucose 208 mg/dL      BUN 54.1 mg/dL      Creatinine 2.40 mg/dL      Sodium 141 mmol/L      Potassium 4.1 mmol/L      Comment: Slight hemolysis detected by analyzer. Result may be falsely elevated.        Chloride 112 mmol/L      CO2 14.0 mmol/L      Calcium 7.4 mg/dL      Total Protein 6.1 g/dL      Albumin 3.3 g/dL      ALT (SGPT) 10  U/L      AST (SGOT) 12 U/L      Alkaline Phosphatase 122 U/L      Total Bilirubin <0.2 mg/dL      Globulin 2.8 gm/dL      A/G Ratio 1.2 g/dL      BUN/Creatinine Ratio 22.5     Anion Gap 15.0 mmol/L      eGFR 23.6 mL/min/1.73     Narrative:      GFR Categories in Chronic Kidney Disease (CKD)              GFR Category          GFR (mL/min/1.73)    Interpretation  G1                    90 or greater        Normal or high (1)  G2                    60-89                Mild decrease (1)  G3a                   45-59                Mild to moderate decrease  G3b                   30-44                Moderate to severe decrease  G4                    15-29                Severe decrease  G5                    14 or less           Kidney failure    (1)In the absence of evidence of kidney disease, neither GFR category G1 or G2 fulfill the criteria for CKD.    eGFR calculation 2021 CKD-EPI creatinine equation, which does not include race as a factor    Phosphorus [[id number]]  (Abnormal) Collected: 06/28/25 0535    Specimen: Blood Updated: 06/28/25 0621     Phosphorus 5.8 mg/dL     Basic Metabolic Panel [[id number]]  (Abnormal) Collected: 06/28/25 0243    Specimen: Blood Updated: 06/28/25 0308     Glucose 256 mg/dL      BUN 55.7 mg/dL      Creatinine 2.58 mg/dL      Sodium 141 mmol/L      Potassium 2.9 mmol/L      Comment: Slight hemolysis detected by analyzer. Result may be falsely elevated.        Chloride 110 mmol/L      CO2 14.0 mmol/L      Calcium 7.8 mg/dL      BUN/Creatinine Ratio 21.6     Anion Gap 17.0 mmol/L      eGFR 21.6 mL/min/1.73     Narrative:      GFR Categories in Chronic Kidney Disease (CKD)              GFR Category          GFR (mL/min/1.73)    Interpretation  G1                    90 or greater        Normal or high (1)  G2                    60-89                Mild decrease (1)  G3a                   45-59                Mild to moderate decrease  G3b                   30-44                Moderate to  severe decrease  G4                    15-29                Severe decrease  G5                    14 or less           Kidney failure    (1)In the absence of evidence of kidney disease, neither GFR category G1 or G2 fulfill the criteria for CKD.    eGFR calculation 2021 CKD-EPI creatinine equation, which does not include race as a factor    CBC (No Diff) [[id number]]  (Abnormal) Collected: 06/28/25 0243    Specimen: Blood Updated: 06/28/25 0251     WBC 10.92 10*3/mm3      RBC 3.37 10*6/mm3      Hemoglobin 10.0 g/dL      Hematocrit 31.8 %      MCV 94.4 fL      MCH 29.7 pg      MCHC 31.4 g/dL      RDW 16.2 %      RDW-SD 56.4 fl      MPV 10.5 fL      Platelets 326 10*3/mm3     Lactic Acid, Plasma [[id number]]  (Normal) Collected: 06/27/25 2050    Specimen: Blood Updated: 06/28/25 0230     Lactate 1.0 mmol/L     Magnesium [[id number]]  (Normal) Collected: 06/27/25 2050    Specimen: Blood Updated: 06/27/25 2359     Magnesium 2.1 mg/dL     Comprehensive Metabolic Panel [[id number]]  (Abnormal) Collected: 06/27/25 2050    Specimen: Blood Updated: 06/27/25 2122     Glucose 205 mg/dL      BUN 56.8 mg/dL      Creatinine 3.18 mg/dL      Sodium 136 mmol/L      Potassium 3.0 mmol/L      Chloride 103 mmol/L      CO2 14.0 mmol/L      Calcium 8.5 mg/dL      Total Protein 7.5 g/dL      Albumin 3.9 g/dL      ALT (SGPT) 12 U/L      AST (SGOT) 15 U/L      Alkaline Phosphatase 155 U/L      Total Bilirubin <0.2 mg/dL      Globulin 3.6 gm/dL      A/G Ratio 1.1 g/dL      BUN/Creatinine Ratio 17.9     Anion Gap 19.0 mmol/L      eGFR 16.8 mL/min/1.73     Narrative:      GFR Categories in Chronic Kidney Disease (CKD)              GFR Category          GFR (mL/min/1.73)    Interpretation  G1                    90 or greater        Normal or high (1)  G2                    60-89                Mild decrease (1)  G3a                   45-59                Mild to moderate decrease  G3b                   30-44                Moderate to severe  decrease  G4                    15-29                Severe decrease  G5                    14 or less           Kidney failure    (1)In the absence of evidence of kidney disease, neither GFR category G1 or G2 fulfill the criteria for CKD.    eGFR calculation 2021 CKD-EPI creatinine equation, which does not include race as a factor    [place] Draw [[id number]] Collected: 06/27/25 2050    Specimen: Blood Updated: 06/27/25 2106    Narrative:      The following orders were created for panel order [place] Draw.  Procedure                               Abnormality         Status                     ---------                               -----------         ------                     Green Top (Gel)[[id number]]                                  Final result               Lavender Top[[id number]]                                     Final result               Red Top[[id number]]                                                                     Britton Top[[id number]]                                         Final result               Light Blue Top[[id number]]                                   Final result                 Please view results for these tests on the individual orders.    CBC & Differential [[id number]]  (Abnormal) Collected: 06/27/25 2050    Specimen: Blood Updated: 06/27/25 2103    Narrative:      The following orders were created for panel order CBC & Differential.  Procedure                               Abnormality         Status                     ---------                               -----------         ------                     CBC Auto Differential[[id number]]        Abnormal            Final result                 Please view results for these tests on the individual orders.    CBC Auto Differential [[id number]]  (Abnormal) Collected: 06/27/25 2050    Specimen: Blood Updated: 06/27/25 2103     WBC 11.05 10*3/mm3      RBC 3.83 10*6/mm3      Hemoglobin 11.3 g/dL      Hematocrit 36.0 %      MCV 94.0 fL       MCH 29.5 pg      MCHC 31.4 g/dL      RDW 16.2 %      RDW-SD 55.8 fl      MPV 10.2 fL      Platelets 360 10*3/mm3      Neutrophil % 65.1 %      Lymphocyte % 25.1 %      Monocyte % 5.2 %      Eosinophil % 3.7 %      Basophil % 0.5 %      Immature Grans % 0.4 %      Neutrophils, Absolute 7.20 10*3/mm3      Lymphocytes, Absolute 2.77 10*3/mm3      Monocytes, Absolute 0.58 10*3/mm3      Eosinophils, Absolute 0.41 10*3/mm3      Basophils, Absolute 0.05 10*3/mm3      Immature Grans, Absolute 0.04 10*3/mm3      nRBC 0.0 /100 WBC     Green Top (Gel) [[id number]] Collected: 06/27/25 2050    Specimen: Blood Updated: 06/27/25 2101     Extra Tube Hold for add-ons.     Comment: Auto resulted.       Lavender Top [[id number]] Collected: 06/27/25 2050    Specimen: Blood Updated: 06/27/25 2101     Extra Tube hold for add-on     Comment: Auto resulted       Gray Top [[id number]] Collected: 06/27/25 2050    Specimen: Blood Updated: 06/27/25 2101     Extra Tube Hold for add-ons.     Comment: Auto resulted.       Light Blue Top [[id number]] Collected: 06/27/25 2050    Specimen: Blood Updated: 06/27/25 2101     Extra Tube Hold for add-ons.     Comment: Auto resulted                Radiology:   Imaging Results (Last 72 Hours)       Procedure Component Value Units Date/Time    US Renal Bilateral [[id number]] Collected: 06/28/25 1502     Updated: 06/28/25 1507    Narrative:      RENAL ULTRASOUND COMPLETE     REASON FOR EXAM: N17.9-Acute kidney failure, unspecified.     COMPARISON: 2/17/2024.     TECHNIQUE: Multiple longitudinal and transverse realtime sonographic  images of the kidneys and urinary bladder are obtained.     FINDINGS:     RIGHT KIDNEY: The right kidney measures 8.6 cm in length. The cortical  thickness is normal. There may be very mild increased echogenicity of  the cortex. There are no solid or cystic masses. There is no  hydronephrosis. No nephrolithiasis or abnormal perinephric fluid  collections.     LEFT KIDNEY: The left  "kidney measures 9.4 cm in length. The cortical  thickness is normal. There may be very mild increased echogenicity of  the cortex. There are no solid or cystic masses. There is no  hydronephrosis. No nephrolithiasis or abnormal perinephric fluid  collections.     PELVIS: The bladder is not well distended. No focal abnormality is seen.  There is no free fluid in the pelvis.     ADDITIONAL FINDINGS: The visualized abdominal aorta is normal caliber.       Impression:      The renal size and cortical thickness are within normal  limits. There is mild increased cortical echogenicity which is  suggestive of medical renal disease. There is no hydronephrosis.           The images and report are stored on PAC's per institutional and state  guidelines.           This report was signed and finalized on 6/28/2025 3:04 PM by Dr. Darshan De Leon MD.       XR Chest 1 View [[id number]] Collected: 06/28/25 1052     Updated: 06/28/25 1056    Narrative:      EXAMINATION:  XR CHEST 1 VW-       HISTORY: PICC tip location, line has been pulled out; N17.9-Acute kidney  failure, unspecified.     COMPARISON: 3/31/2025.     TECHNIQUE: Single view AP image.     FINDINGS:  The lungs are expanded bilaterally and clear. The pleural  spaces are clear. There is a new right-sided PICC line with catheter tip  in good position within the superior vena cava at the T6 level. Heart  size is normal. No acute bony abnormality is seen.          Impression:      1. PICC line catheter in good position, as described.  2. No infiltrate or effusion.           This report was signed and finalized on 6/28/2025 10:53 AM by Dr. Darshan De Leon MD.               Culture:  No components found for: \"WOUNDCUL\", \"3\"  No components found for: \"CSFCUL\", \"3\"  No components found for: \"BC\", \"3\"  No components found for: \"URINECUL\", \"3\"      Assessment   - Acute kidney injury-prerenal azotemia versus ATN  - Chronic kidney disease stage IV  - Severe diabetic gastroparesis  - " Metabolic acidosis  - Type 2 diabetes with renal disease  - Nausea and vomiting      Plan:  Continue fluid therapy.  Patient was encouraged to report to [place] to see her GI and have her stimulator adjusted as needed.  I also encouraged her to administer her IV fluids on a daily basis.  Urine test was reviewed.       Feliberto Barrow MD  6/29/2025  14:33 CDT

## 2025-06-29 NOTE — CASE MANAGEMENT/SOCIAL WORK
Continued Stay Note   Nayla     Patient Name: Tasha Perez  MRN: [id number]  Today's Date: 6/29/2025    Admit Date: 6/27/2025    Plan: Home   Discharge Plan       Row Name 06/29/25 1039       Plan    Plan Home    Patient/Family in Agreement with Plan yes    Plan Comments Order:Pt has PICC line. Pt saying she gets IV fluids and aware how to contact company when more needed. She denies any home needs.                   Discharge Codes    No documentation.                       STEVEN HanW

## 2025-06-29 NOTE — PLAN OF CARE
Goal Outcome Evaluation:                 Pt had uneventful night.  Bicarb running at 75ml/hr.  Pt ambulated the hallways.  PICC dressing CDI. Positive blood return.

## 2025-06-29 NOTE — NURSING NOTE
Patient nausea improved.  PO sodium Bicarb stopped.  Pt ambulating in room.  Bicarb cont @ 75mL/hr.  All lines and bags hanging are dated and timed.  VSS.  Safety maintained.

## 2025-06-29 NOTE — PROGRESS NOTES
[facility] Medicine Services  INPATIENT PROGRESS NOTE    Patient Name: Tasha Perez  Date of Admission: 6/27/2025  Today's Date: 06/29/25  Length of Stay: 2  Primary Care Physician: KIRILL Murguia MD    Subjective   Chief Complaint: Acute on chronic kidney injury/metabolic acidosis  HPI   Blood pressure stable slightly tacky, afebrile.  Patient is feeling a lot better.  Metabolic acidosis improving.  Creatinine is also improving.  Leukocytosis resolved.  Hemoglobin decreased.  Patient is on room air.    Review of Systems   Constitutional:  Positive for activity change, appetite change and fatigue. Negative for chills and fever.   HENT:  Negative for hearing loss, nosebleeds, tinnitus and trouble swallowing.    Eyes:  Negative for visual disturbance.   Respiratory:  Negative for cough, chest tightness, shortness of breath and wheezing.    Cardiovascular:  Negative for chest pain, palpitations and leg swelling.   Gastrointestinal:  Negative for abdominal distention, abdominal pain, blood in stool, constipation, diarrhea, nausea and vomiting.   Endocrine: Negative for cold intolerance, heat intolerance, polydipsia, polyphagia and polyuria.   Genitourinary:  Negative for decreased urine volume, difficulty urinating, dysuria, flank pain, frequency and hematuria.   Musculoskeletal:  Positive for arthralgias, gait problem and myalgias. Negative for joint swelling.   Skin:  Negative for rash.   Allergic/Immunologic: Negative for immunocompromised state.   Neurological:  Positive for weakness. Negative for dizziness, syncope, light-headedness and headaches.   Hematological:  Negative for adenopathy. Does not bruise/bleed easily.   Psychiatric/Behavioral:  Negative for confusion and sleep disturbance. The patient is not nervous/anxious.         All pertinent negatives and positives are as above. All other systems have been reviewed and are negative unless otherwise stated.      Objective    Temp:  [97.6 °F (36.4 °C)-98.7 °F (37.1 °C)] 98.7 °F (37.1 °C)  Heart Rate:  [] 109  Resp:  [16-18] 16  BP: ()/(56-92) 116/92  Physical Exam  Vitals and nursing note reviewed.   Constitutional:       Comments: Cachectic.  Chronically ill.   HENT:      Head: Normocephalic and atraumatic.   Eyes:      Conjunctiva/sclera: Conjunctivae normal.      Pupils: Pupils are equal, round, and reactive to light.   Cardiovascular:      Rate and Rhythm: Normal rate and regular rhythm.      Heart sounds: Normal heart sounds.   Pulmonary:      Effort: Pulmonary effort is normal. No respiratory distress.      Breath sounds: Normal breath sounds.   Abdominal:      General: Bowel sounds are normal. There is no distension.      Palpations: Abdomen is soft.      Tenderness: There is no abdominal tenderness.   Musculoskeletal:         General: No swelling.      Cervical back: Neck supple.   Skin:     General: Skin is warm and dry.      Capillary Refill: Capillary refill takes 2 to 3 seconds.      Findings: No rash.   Neurological:      General: No focal deficit present.      Mental Status: She is alert and oriented to person, place, and time.      Motor: Weakness present.   Psychiatric:         Mood and Affect: Mood normal.         Behavior: Behavior normal.         Thought Content: Thought content normal.         Judgment: Judgment normal.             Results Review:  I have reviewed the labs, radiology results, and diagnostic studies.    Laboratory Data:   Results from last 7 days   Lab Units 06/29/25  0526 06/28/25  0243 06/27/25  2050   WBC 10*3/mm3 9.78 10.92* 11.05*   HEMOGLOBIN g/dL 8.1* 10.0* 11.3*   HEMATOCRIT % 26.0* 31.8* 36.0   PLATELETS 10*3/mm3 288 326 360        Results from last 7 days   Lab Units 06/29/25  0526 06/28/25  2321 06/28/25  1810 06/28/25  1221 06/28/25  0535 06/28/25  0243 06/27/25 2050   SODIUM mmol/L 145 143 143   < > 141   < > 136   POTASSIUM mmol/L 3.9 4.2 3.9   < > 4.1   < >  "3.0*   CHLORIDE mmol/L 113* 115* 114*   < > 112*   < > 103   CO2 mmol/L 21.0* 17.0* 14.0*   < > 14.0*   < > 14.0*   BUN mg/dL 47.2* 47.6* 47.2*   < > 54.1*   < > 56.8*   CREATININE mg/dL 1.79* 1.86* 2.06*   < > 2.40*   < > 3.18*   CALCIUM mg/dL 7.1* 6.9* 7.6*   < > 7.4*   < > 8.5*   BILIRUBIN mg/dL  --   --   --   --  <0.2  --  <0.2   ALK PHOS U/L  --   --   --   --  122*  --  155*   ALT (SGPT) U/L  --   --   --   --  10  --  12   AST (SGOT) U/L  --   --   --   --  12  --  15   GLUCOSE mg/dL 220* 166* 183*   < > 208*   < > 205*    < > = values in this interval not displayed.       Culture Data:   No results found for: \"BLOODCX\", \"URINECX\", \"WOUNDCX\", \"MRSACX\", \"RESPCX\", \"STOOLCX\"    Radiology Data:   Imaging Results (Last 24 Hours)       ** No results found for the last 24 hours. **            I have reviewed the patient's current medications.     Assessment/Plan   Assessment  Active Hospital Problems    Diagnosis     SUGAR (acute kidney injury)     Loss of weight     Gastroparesis     Acute on chronic renal failure     Osteopenia     Osteoporosis     Acute kidney injury superimposed on stage 3b chronic kidney disease     Hypokalemia     Diabetic polyneuropathy associated with type 2 diabetes mellitus     Severe malnutrition     Type 2 diabetes mellitus, with long-term current use of insulin      Treatment Plan  SUGAR/metabolic acidosis-patient has a history of hospitalizations for acute on chronic renal failure.  Has a PICC line and follows with nephrology for outpatient IV fluids to help prevent.  Has been doing fairly well.  Presenting in dry with poor p.o. intake.  Bicarb drip.  CO2 is improving.  Hold bicarb p.o . Creatinine is improving.  Ultrasound the kidney- renal size and cortical thickness are within normal limit-mild increased cortical echogenicity which is suggestive of medical renal disease - no hydronephrosis.     Hypokalemia -potassium replacement.  Will recheck on new labs.  Left monitor closely with " bicarb and acid correction.  Potassium is normal.    Leukocytosis.  Resolved.  Chest x-ray-PICC line catheter in good position- No infiltrate or effusion.    Anemia.  Decrease in hemoglobin.  No sign of acute bleed.    Hypomagnesia.  3 g magnesium . Magnesium in AM.     Severe malnutrition -chronic issue, maximize calories     Anemia of chronic disease -H&H stable.  No signs of bleeding.     DM2 -currently on sliding scale insulin and hypoglycemia protocol.    CAD.  Aspirin . Nitro as needed.    Gastroparesis . Reglan .    Depression/anxiety . Nortriptyline.    Reflux . Protonix.  Zofran as needed.    Restless leg . Requip nightly.    Nutrition . Regular diet    GI panel-negative.    Medical Decision Making  Number and Complexity of problems: Acute kidney injury/metabolic acidosis/hypokalemia/leukocytosis  Differential Diagnosis: None    Conditions and Status        Condition is unchanged.     [facility] Data  External documents reviewed: Previous note .  Cardiac tracing (EKG, telemetry) interpretation: Sinus .  Radiology interpretation: Ultrasound/x-ray  Labs reviewed: Laboratory  Any tests that were considered but not ordered: Laboratory in a.m.     Decision rules/scores evaluated (example [id number], Wells, etc): None     Discussed with: Patient     Care Planning  Shared decision making: Patient  Code status and discussions: Full code    Disposition  Social Determinants of Health that impact treatment or disposition: From home  1 to 2 days.        Electronically signed by Rickey Tinajero MD, 06/29/25, 16:40 CDT.

## 2025-06-30 ENCOUNTER — READMISSION MANAGEMENT (OUTPATIENT)
Dept: CALL CENTER | Facility: HOSPITAL | Age: 55
End: 2025-06-30
Payer: COMMERCIAL

## 2025-06-30 VITALS
TEMPERATURE: 98.4 F | DIASTOLIC BLOOD PRESSURE: 73 MMHG | HEIGHT: 61 IN | SYSTOLIC BLOOD PRESSURE: 122 MMHG | RESPIRATION RATE: 17 BRPM | HEART RATE: 111 BPM | OXYGEN SATURATION: 100 % | BODY MASS INDEX: 17.11 KG/M2 | WEIGHT: 90.6 LBS

## 2025-06-30 LAB
ALBUMIN SERPL-MCNC: 3.2 G/DL (ref 3.5–5.2)
ALBUMIN/GLOB SERPL: 1.1 G/DL
ALP SERPL-CCNC: 117 U/L (ref 39–117)
ALT SERPL W P-5'-P-CCNC: 14 U/L (ref 1–33)
ANION GAP SERPL CALCULATED.3IONS-SCNC: 11 MMOL/L (ref 5–15)
AST SERPL-CCNC: 19 U/L (ref 1–32)
BASOPHILS # BLD AUTO: 0.04 10*3/MM3 (ref 0–0.2)
BASOPHILS NFR BLD AUTO: 0.4 % (ref 0–1.5)
BILIRUB SERPL-MCNC: <0.2 MG/DL (ref 0–1.2)
BUN SERPL-MCNC: 32.5 MG/DL (ref 6–20)
BUN/CREAT SERPL: 23.7 (ref 7–25)
CALCIUM SPEC-SCNC: 7.6 MG/DL (ref 8.6–10.5)
CHLORIDE SERPL-SCNC: 101 MMOL/L (ref 98–107)
CHOLEST SERPL-MCNC: 195 MG/DL (ref 0–200)
CO2 SERPL-SCNC: 32 MMOL/L (ref 22–29)
CREAT SERPL-MCNC: 1.37 MG/DL (ref 0.57–1)
DEPRECATED RDW RBC AUTO: 55 FL (ref 37–54)
EGFRCR SERPLBLD CKD-EPI 2021: 46.3 ML/MIN/1.73
EOSINOPHIL # BLD AUTO: 0.99 10*3/MM3 (ref 0–0.4)
EOSINOPHIL NFR BLD AUTO: 8.7 % (ref 0.3–6.2)
ERYTHROCYTE [DISTWIDTH] IN BLOOD BY AUTOMATED COUNT: 16.3 % (ref 12.3–15.4)
GLOBULIN UR ELPH-MCNC: 2.8 GM/DL
GLUCOSE BLDC GLUCOMTR-MCNC: 181 MG/DL (ref 70–130)
GLUCOSE SERPL-MCNC: 184 MG/DL (ref 65–99)
HBA1C MFR BLD: 8.1 % (ref 4.8–5.6)
HCT VFR BLD AUTO: 29.1 % (ref 34–46.6)
HDLC SERPL-MCNC: 62 MG/DL (ref 40–60)
HGB BLD-MCNC: 9.2 G/DL (ref 12–15.9)
IMM GRANULOCYTES # BLD AUTO: 0.06 10*3/MM3 (ref 0–0.05)
IMM GRANULOCYTES NFR BLD AUTO: 0.5 % (ref 0–0.5)
LDLC SERPL CALC-MCNC: 111 MG/DL (ref 0–100)
LDLC/HDLC SERPL: 1.75 {RATIO}
LYMPHOCYTES # BLD AUTO: 2.79 10*3/MM3 (ref 0.7–3.1)
LYMPHOCYTES NFR BLD AUTO: 24.6 % (ref 19.6–45.3)
MAGNESIUM SERPL-MCNC: 1.9 MG/DL (ref 1.6–2.6)
MCH RBC QN AUTO: 29.3 PG (ref 26.6–33)
MCHC RBC AUTO-ENTMCNC: 31.6 G/DL (ref 31.5–35.7)
MCV RBC AUTO: 92.7 FL (ref 79–97)
MONOCYTES # BLD AUTO: 0.75 10*3/MM3 (ref 0.1–0.9)
MONOCYTES NFR BLD AUTO: 6.6 % (ref 5–12)
NEUTROPHILS NFR BLD AUTO: 59.2 % (ref 42.7–76)
NEUTROPHILS NFR BLD AUTO: 6.72 10*3/MM3 (ref 1.7–7)
NRBC BLD AUTO-RTO: 0 /100 WBC (ref 0–0.2)
PLATELET # BLD AUTO: 296 10*3/MM3 (ref 140–450)
PMV BLD AUTO: 10.9 FL (ref 6–12)
POTASSIUM SERPL-SCNC: 4.5 MMOL/L (ref 3.5–5.2)
PROT SERPL-MCNC: 6 G/DL (ref 6–8.5)
RBC # BLD AUTO: 3.14 10*6/MM3 (ref 3.77–5.28)
SODIUM SERPL-SCNC: 144 MMOL/L (ref 136–145)
TRIGL SERPL-MCNC: 122 MG/DL (ref 0–150)
TSH SERPL DL<=0.05 MIU/L-ACNC: 1.87 UIU/ML (ref 0.27–4.2)
VLDLC SERPL-MCNC: 22 MG/DL (ref 5–40)
WBC NRBC COR # BLD AUTO: 11.35 10*3/MM3 (ref 3.4–10.8)

## 2025-06-30 PROCEDURE — 82948 REAGENT STRIP/BLOOD GLUCOSE: CPT

## 2025-06-30 PROCEDURE — 80053 COMPREHEN METABOLIC PANEL: CPT | Performed by: FAMILY MEDICINE

## 2025-06-30 PROCEDURE — 63710000001 INSULIN LISPRO (HUMAN) PER 5 UNITS

## 2025-06-30 PROCEDURE — 80061 LIPID PANEL: CPT | Performed by: FAMILY MEDICINE

## 2025-06-30 PROCEDURE — 85025 COMPLETE CBC W/AUTO DIFF WBC: CPT | Performed by: FAMILY MEDICINE

## 2025-06-30 PROCEDURE — 83735 ASSAY OF MAGNESIUM: CPT | Performed by: FAMILY MEDICINE

## 2025-06-30 PROCEDURE — 83036 HEMOGLOBIN GLYCOSYLATED A1C: CPT | Performed by: FAMILY MEDICINE

## 2025-06-30 PROCEDURE — 84443 ASSAY THYROID STIM HORMONE: CPT | Performed by: FAMILY MEDICINE

## 2025-06-30 RX ORDER — PREGABALIN 75 MG/1
75 CAPSULE ORAL NIGHTLY
Status: ON HOLD
Start: 2025-06-30 | End: 2025-07-23

## 2025-06-30 RX ORDER — SODIUM BICARBONATE 650 MG/1
650 TABLET ORAL 2 TIMES DAILY
Start: 2025-07-02

## 2025-06-30 RX ORDER — DULOXETIN HYDROCHLORIDE 30 MG/1
30 CAPSULE, DELAYED RELEASE ORAL DAILY
Qty: 30 CAPSULE | Refills: 0 | Status: ON HOLD | OUTPATIENT
Start: 2025-06-30 | End: 2025-07-23

## 2025-06-30 RX ORDER — PROMETHAZINE HYDROCHLORIDE 25 MG/1
25 TABLET ORAL EVERY 8 HOURS PRN
Status: ON HOLD
Start: 2025-06-30 | End: 2025-07-23

## 2025-06-30 RX ORDER — ROPINIROLE 1 MG/1
1 TABLET, FILM COATED ORAL NIGHTLY
Status: ON HOLD
Start: 2025-06-30 | End: 2025-07-23

## 2025-06-30 RX ADMIN — ROPINIROLE HYDROCHLORIDE 1 MG: 1 TABLET, FILM COATED ORAL at 08:38

## 2025-06-30 RX ADMIN — PANTOPRAZOLE SODIUM 40 MG: 40 TABLET, DELAYED RELEASE ORAL at 06:02

## 2025-06-30 RX ADMIN — METOCLOPRAMIDE 5 MG: 5 TABLET ORAL at 08:38

## 2025-06-30 RX ADMIN — ASPIRIN 81 MG: 81 TABLET, COATED ORAL at 08:38

## 2025-06-30 RX ADMIN — INSULIN LISPRO 2 UNITS: 100 INJECTION, SOLUTION INTRAVENOUS; SUBCUTANEOUS at 08:38

## 2025-06-30 NOTE — DISCHARGE SUMMARY
[facility] Medicine Services  DISCHARGE SUMMARY       Date of Admission: 6/27/2025  Date of Discharge:  6/30/2025  Primary Care Physician: KIRILL Murguia MD    Presenting Problem/History of Present Illness:      Final Discharge Diagnoses:  Active Hospital Problems    Diagnosis     SUGAR (acute kidney injury)     Loss of weight     Gastroparesis     Acute on chronic renal failure     Osteopenia     Osteoporosis     Acute kidney injury superimposed on stage 3b chronic kidney disease     Hypokalemia     Diabetic polyneuropathy associated with type 2 diabetes mellitus     Severe malnutrition     Type 2 diabetes mellitus, with long-term current use of insulin        Consults: Nephrology      Pertinent Test Results:   Results for orders placed during the hospital encounter of 10/05/19    Adult Transthoracic Echo Complete W/ Cont if Necessary Per Protocol    Interpretation Summary  · Left ventricular systolic function is normal.    NORMAL STUDY      Imaging Results (All)       Procedure Component Value Units Date/Time    US Renal Bilateral [[id number]] Collected: 06/28/25 1502     Updated: 06/28/25 1507    Narrative:      RENAL ULTRASOUND COMPLETE     REASON FOR EXAM: N17.9-Acute kidney failure, unspecified.     COMPARISON: 2/17/2024.     TECHNIQUE: Multiple longitudinal and transverse realtime sonographic  images of the kidneys and urinary bladder are obtained.     FINDINGS:     RIGHT KIDNEY: The right kidney measures 8.6 cm in length. The cortical  thickness is normal. There may be very mild increased echogenicity of  the cortex. There are no solid or cystic masses. There is no  hydronephrosis. No nephrolithiasis or abnormal perinephric fluid  collections.     LEFT KIDNEY: The left kidney measures 9.4 cm in length. The cortical  thickness is normal. There may be very mild increased echogenicity of  the cortex. There are no solid or cystic masses. There is no  hydronephrosis. No  nephrolithiasis or abnormal perinephric fluid  collections.     PELVIS: The bladder is not well distended. No focal abnormality is seen.  There is no free fluid in the pelvis.     ADDITIONAL FINDINGS: The visualized abdominal aorta is normal caliber.       Impression:      The renal size and cortical thickness are within normal  limits. There is mild increased cortical echogenicity which is  suggestive of medical renal disease. There is no hydronephrosis.           The images and report are stored on PAC's per institutional and state  guidelines.           This report was signed and finalized on 6/28/2025 3:04 PM by Dr. Darshan De Leon MD.       XR Chest 1 View [[id number]] Collected: 06/28/25 1052     Updated: 06/28/25 1056    Narrative:      EXAMINATION:  XR CHEST 1 VW-       HISTORY: PICC tip location, line has been pulled out; N17.9-Acute kidney  failure, unspecified.     COMPARISON: 3/31/2025.     TECHNIQUE: Single view AP image.     FINDINGS:  The lungs are expanded bilaterally and clear. The pleural  spaces are clear. There is a new right-sided PICC line with catheter tip  in good position within the superior vena cava at the T6 level. Heart  size is normal. No acute bony abnormality is seen.          Impression:      1. PICC line catheter in good position, as described.  2. No infiltrate or effusion.           This report was signed and finalized on 6/28/2025 10:53 AM by Dr. Darshan De Leon MD.             LAB RESULTS:      Lab 06/30/25  0603 06/29/25 0526 06/28/25  0243 06/27/25 2050   WBC 11.35* 9.78 10.92* 11.05*   HEMOGLOBIN 9.2* 8.1* 10.0* 11.3*   HEMATOCRIT 29.1* 26.0* 31.8* 36.0   PLATELETS 296 288 326 360   NEUTROS ABS 6.72 5.90  --  7.20*   IMMATURE GRANS (ABS) 0.06* 0.04  --  0.04   LYMPHS ABS 2.79 2.74  --  2.77   MONOS ABS 0.75 0.51  --  0.58   EOS ABS 0.99* 0.56*  --  0.41*   MCV 92.7 92.5 94.4 94.0   LACTATE  --   --   --  1.0         Lab 06/30/25  0603 06/29/25  0526 06/28/25  9809  06/28/25  1810 06/28/25  1221 06/28/25  0535 06/28/25  0243 06/27/25 2050   SODIUM 144 145 143 143 140 141   < > 136   POTASSIUM 4.5 3.9 4.2 3.9 4.0 4.1   < > 3.0*   CHLORIDE 101 113* 115* 114* 117* 112*   < > 103   CO2 32.0* 21.0* 17.0* 14.0* 11.0* 14.0*   < > 14.0*   ANION GAP 11.0 11.0 11.0 15.0 12.0 15.0   < > 19.0*   BUN 32.5* 47.2* 47.6* 47.2* 51.3* 54.1*   < > 56.8*   CREATININE 1.37* 1.79* 1.86* 2.06* 2.11* 2.40*   < > 3.18*   EGFR 46.3* 33.6* 32.1* 28.4* 27.6* 23.6*   < > 16.8*   GLUCOSE 184* 220* 166* 183* 175* 208*   < > 205*   CALCIUM 7.6* 7.1* 6.9* 7.6* 7.8* 7.4*   < > 8.5*   MAGNESIUM 1.9 1.4*  --   --  1.7 1.6  --  2.1   PHOSPHORUS  --  2.0*  --   --   --  5.8*  --   --    HEMOGLOBIN A1C 8.10*  --   --   --   --   --   --   --    TSH 1.870  --   --   --   --   --   --   --     < > = values in this interval not displayed.         Lab 06/30/25  0603 06/28/25  0535 06/27/25 2050   TOTAL PROTEIN 6.0 6.1 7.5   ALBUMIN 3.2* 3.3* 3.9   GLOBULIN 2.8 2.8 3.6   ALT (SGPT) 14 10 12   AST (SGOT) 19 12 15   BILIRUBIN <0.2 <0.2 <0.2   ALK PHOS 117 122* 155*             Lab 06/30/25  0603   CHOLESTEROL 195   LDL CHOL 111*   HDL CHOL 62*   TRIGLYCERIDES 122             Brief Urine Lab Results  (Last result in the past 365 days)        Color   Clarity   Blood   Leuk Est   Nitrite   Protein   CREAT   Urine HCG        06/28/25 1150             71.5         06/28/25 1150 Yellow   Clear   Negative   Trace   Negative   30 mg/dL (1+)                 Microbiology Results (last 10 days)       Procedure Component Value - Date/Time    Gastrointestinal Panel, PCR - Stool, Per Rectum [[id number]]  (Normal) Collected: 06/29/25 0609    Lab Status: Final result Specimen: Stool from Per Rectum Updated: 06/29/25 0838     Campylobacter Not Detected     Plesiomonas shigelloides Not Detected     Salmonella Not Detected     Vibrio Not Detected     Vibrio cholerae Not Detected     Yersinia enterocolitica Not Detected     Enteroaggregative  E. coli (EAEC) Not Detected     Enteropathogenic E. coli (EPEC) Not Detected     Enterotoxigenic E. coli (ETEC) lt/st Not Detected     Shiga-like toxin-producing E. coli (STEC) stx1/stx2 Not Detected     Shigella/Enteroinvasive E. coli (EIEC) Not Detected     Cryptosporidium Not Detected     Cyclospora cayetanensis Not Detected     Entamoeba histolytica Not Detected     Giardia lamblia Not Detected     Adenovirus F40/41 Not Detected     Astrovirus Not Detected     Norovirus GI/GII Not Detected     Rotavirus A Not Detected     Sapovirus (I, II, IV or V) Not Detected    Eosinophil Smear - Urine, Urine, Clean Catch [[id number]]  (Normal) Collected: 06/28/25 1150    Lab Status: Final result Specimen: Urine, Clean Catch Updated: 06/29/25 0006     Eosinophil Smear 0 % EOS/100 Cells         History of Present Illness  Patient is 54-year-old female patient with a medical history of gastroparesis status post gastric stimulator, CKD, hypertension, diabetes mellitus, presenting to the ED for the evaluation of abnormal labs.  As reported, patient was being evaluated by her doctor where she had outpatient labs, that showed SUGAR.  She was advised to come to the ED for evaluation.  She reports that she is urinating less than before, and that she is having some diarrhea.  She denies having any fever, chills, abdominal pain, chest pain,  or shortness of breath.  She has a PICC line in her right upper extremity where she receives IV fluids every other day at home.?.    Hospital Course:   SUGAR/metabolic acidosis-patient has a history of hospitalizations for acute on chronic renal failure.  Has a PICC line and follows with nephrology for outpatient IV fluids to help prevent.  Has been doing fairly well.  Presenting in dry with poor p.o. intake.  Bicarb drip.  CO2 is improving.  Hold bicarb p.o . Creatinine is improving.  Ultrasound the kidney- renal size and cortical thickness are within normal limit-mild increased cortical echogenicity  "which is suggestive of medical renal disease - no hydronephrosis.     Hypokalemia -potassium replacement.  Will recheck on new labs.  Left monitor closely with bicarb and acid correction.  Potassium is normal.     Leukocytosis.  Resolved.  Chest x-ray-PICC line catheter in good position- No infiltrate or effusion.     Anemia.  Hemoglobin increased.  No sign of acute bleed.     Hypomagnesia.  Resolved.     Severe malnutrition -chronic issue, maximize calories     DM2 -currently on sliding scale insulin and hypoglycemia protocol.     CAD.  Aspirin . Nitro as needed.    Diabetes.  Patient is on no medication.  Hemoglobin A1C here is 8.1.  Discussed with patient, patient uses not taking it but she does absorb well per patient.  Patient is followed by her primary care physician.     Gastroparesis . Reglan .     Depression/anxiety . Nortriptyline.     Reflux . Protonix.  Zofran as needed.     Restless leg . Requip nightly.     Nutrition . Regular diet     GI panel-negative.    Vital signs stable, afebrile.  Patient wants to go home.  Plan to discharge patient home today.  Follow-up with PCP in 1 week.  Follow-up with nephrology outpatient.    Physical Exam on Discharge:  /73 (BP Location: Left arm, Patient Position: Sitting)   Pulse 111   Temp 98.4 °F (36.9 °C) (Oral)   Resp 17   Ht 154.9 cm (61\")   Wt 41.1 kg (90 lb 9.6 oz)   SpO2 100%   BMI 17.12 kg/m²   Physical Exam  Vitals and nursing note reviewed.   Constitutional:       Comments: Cachectic.  Chronically ill.   HENT:      Head: Normocephalic and atraumatic.   Eyes:      Conjunctiva/sclera: Conjunctivae normal.      Pupils: Pupils are equal, round, and reactive to light.   Cardiovascular:      Rate and Rhythm: Normal rate and regular rhythm.      Heart sounds: Normal heart sounds.   Pulmonary:      Effort: Pulmonary effort is normal. No respiratory distress.      Breath sounds: Normal breath sounds.   Abdominal:      General: Bowel sounds are normal. " There is no distension.      Palpations: Abdomen is soft.      Tenderness: There is no abdominal tenderness.   Musculoskeletal:         General: No swelling.      Cervical back: Neck supple.   Skin:     General: Skin is warm and dry.      Capillary Refill: Capillary refill takes 2 to 3 seconds.      Findings: No rash.   Neurological:      General: No focal deficit present.      Mental Status: She is alert and oriented to person, place, and time.   Psychiatric:         Mood and Affect: Mood normal.         Behavior: Behavior normal.         Thought Content: Thought content normal.         Judgment: Judgment normal.        Condition on Discharge: Stable.    Discharge Disposition: Discharge home with family.  Home or Self Care    Discharge Medications:     Discharge Medications        Changes to Medications        Instructions Start Date   DULoxetine 30 MG capsule  Commonly known as: CYMBALTA  What changed:   medication strength  how much to take   30 mg, Oral, Daily      pregabalin 75 MG capsule  Commonly known as: LYRICA  What changed: when to take this   75 mg, Oral, Nightly      promethazine 25 MG tablet  Commonly known as: PHENERGAN  What changed: when to take this   25 mg, Oral, Every 8 Hours PRN      rOPINIRole 1 MG tablet  Commonly known as: REQUIP  What changed: when to take this   1 mg, Oral, Nightly      sodium bicarbonate 650 MG tablet  What changed: These instructions start on July 2, 2025. If you are unsure what to do until then, ask your doctor or other care provider.   650 mg, Oral, 2 Times Daily   Start Date: July 2, 2025            Continue These Medications        Instructions Start Date   albuterol sulfate  (90 Base) MCG/ACT inhaler  Commonly known as: PROVENTIL HFA;VENTOLIN HFA;PROAIR HFA   2 puffs, Inhalation, 3 Times Daily PRN      aspirin 81 MG EC tablet   81 mg, Daily      atorvastatin 80 MG tablet  Commonly known as: LIPITOR   80 mg, Nightly      Calcium Carbonate-Vitamin D 600-5 MG-MCG  tablet   1 tablet, Daily      cetirizine 10 MG tablet  Commonly known as: zyrTEC   10 mg, Daily PRN      magnesium oxide 400 MG tablet  Commonly known as: MAG-OX   400 mg, 3 times daily      metoclopramide 5 MG tablet  Commonly known as: REGLAN   10 mg, 3 Times Daily PRN      nortriptyline 25 MG capsule  Commonly known as: PAMELOR   25 mg, Nightly      omeprazole 40 MG capsule  Commonly known as: priLOSEC   40 mg, Oral, Daily      sodium chloride 0.9 % solution   30 mL/hr, Intravenous, 3 Times Weekly, Monday, Wednesday, Friday      sucralfate 1 g tablet  Commonly known as: CARAFATE   1 g, 4 Times Daily      triamcinolone 0.1 % cream  Commonly known as: KENALOG   1 Application, Topical, Daily PRN             Stop These Medications      linaclotide 290 MCG capsule capsule  Commonly known as: LINZESS     potassium chloride 20 MEQ CR tablet  Commonly known as: KLOR-CON M20     potassium chloride 20 MEQ tablet controlled-release ER tablet  Commonly known as: K-DUR,KLOR-CON              Discharge Diet:   Diet Instructions       Advance Diet As Tolerated -Target Diet: Regular .      Target Diet: Regular .            Activity at Discharge:   Activity Instructions       Activity as Tolerated              Follow-up Appointments:   Follow-up with PCP in 1 week.  Follow with nephrology as soon as able    Electronically signed by Rickey Tinajero MD, 06/30/25, 08:15 CDT.    Time: Greater than 30 minutes.

## 2025-06-30 NOTE — OUTREACH NOTE
Prep Survey      Flowsheet Row Responses   [facility] facility patient discharged from? [facility]   Is LACE score < 7 ? No   Eligibility Readm Mgmt   Discharge diagnosis SUGAR   Does the patient have one of the following disease processes/diagnoses(primary or secondary)? Other   Does the patient have Home health ordered? No   Is there a DME ordered? No   Prep survey completed? Yes            Lizbeth COMBS - Registered Nurse

## 2025-07-01 NOTE — PAYOR COMM NOTE
"REF:  [id number]     [facility]  FAX  [phone]      Yash Perez (54 y.o. Female)       Date of Birth   1970    Social Security Number       Address   [address] [place] [id number]    Home Phone   [phone]    MRN   [id number]       Sikh   Tenriism    Marital Status                               Admission Date   6/27/2025    Admission Type   Emergency    Admitting Provider   Rickey Tinajero MD    Attending Provider       Department, Room/Bed   [facility] 4C, 489/1       Discharge Date   6/30/2025    Discharge Disposition   Home or Self Care    Discharge Destination                                 Attending Provider: (none)   Allergies: Bee Venom, Hydrocodone, Hydroxychloroquine, Ibuprofen, Pineapple, Pineapple Extract, Wasp Venom, Wasp Venom Protein, Hydrocodone-acetaminophen, Metformin, Penicillins, Sitagliptin, Chocolate, Poison Ivy Extract    Isolation: None   Infection: None   Code Status: Prior    Ht: 154.9 cm (61\")   Wt: 41.1 kg (90 lb 9.6 oz)    Admission Cmt: None   Principal Problem: None                  Active Insurance as of 6/27/2025       Primary Coverage       Payor Plan Insurance Group Employer/Plan Group    [facility] [facility] [employer] [place]        Payor Plan Address Payor Plan Phone Number Payor Plan Fax Number Effective Dates    [address]   8/1/2019 - None Entered    [facility] [id number]         Subscriber Name Subscriber Birth Date Member ID       YASH PEREZ 1970 [id number]                     Emergency Contacts        (Rel.) Home Phone Work Phone Mobile Phone    JENNIFER PEREZ (Spouse) [phone] -- [phone]    CASSIA PEREZ (Daughter) -- -- [phone]                 Discharge Summary        Rickey Tinajero MD at 06/30/25 0807                [facility] Medicine Services  DISCHARGE SUMMARY       Date of Admission: 6/27/2025  Date of Discharge:  " 6/30/2025  Primary Care Physician: KIRILL Murguia MD    Presenting Problem/History of Present Illness:      Final Discharge Diagnoses:  Active Hospital Problems    Diagnosis     SUGAR (acute kidney injury)     Loss of weight     Gastroparesis     Acute on chronic renal failure     Osteopenia     Osteoporosis     Acute kidney injury superimposed on stage 3b chronic kidney disease     Hypokalemia     Diabetic polyneuropathy associated with type 2 diabetes mellitus     Severe malnutrition     Type 2 diabetes mellitus, with long-term current use of insulin        Consults: Nephrology      Pertinent Test Results:   Results for orders placed during the hospital encounter of 10/05/19    Adult Transthoracic Echo Complete W/ Cont if Necessary Per Protocol    Interpretation Summary  · Left ventricular systolic function is normal.    NORMAL STUDY      Imaging Results (All)       Procedure Component Value Units Date/Time    US Renal Bilateral [[id number]] Collected: 06/28/25 1502     Updated: 06/28/25 1507    Narrative:      RENAL ULTRASOUND COMPLETE     REASON FOR EXAM: N17.9-Acute kidney failure, unspecified.     COMPARISON: 2/17/2024.     TECHNIQUE: Multiple longitudinal and transverse realtime sonographic  images of the kidneys and urinary bladder are obtained.     FINDINGS:     RIGHT KIDNEY: The right kidney measures 8.6 cm in length. The cortical  thickness is normal. There may be very mild increased echogenicity of  the cortex. There are no solid or cystic masses. There is no  hydronephrosis. No nephrolithiasis or abnormal perinephric fluid  collections.     LEFT KIDNEY: The left kidney measures 9.4 cm in length. The cortical  thickness is normal. There may be very mild increased echogenicity of  the cortex. There are no solid or cystic masses. There is no  hydronephrosis. No nephrolithiasis or abnormal perinephric fluid  collections.     PELVIS: The bladder is not well distended. No focal abnormality is seen.  There is  no free fluid in the pelvis.     ADDITIONAL FINDINGS: The visualized abdominal aorta is normal caliber.       Impression:      The renal size and cortical thickness are within normal  limits. There is mild increased cortical echogenicity which is  suggestive of medical renal disease. There is no hydronephrosis.           The images and report are stored on PAC's per institutional and state  guidelines.           This report was signed and finalized on 6/28/2025 3:04 PM by Dr. Darshan De Leon MD.       XR Chest 1 View [[id number]] Collected: 06/28/25 1052     Updated: 06/28/25 1056    Narrative:      EXAMINATION:  XR CHEST 1 VW-       HISTORY: PICC tip location, line has been pulled out; N17.9-Acute kidney  failure, unspecified.     COMPARISON: 3/31/2025.     TECHNIQUE: Single view AP image.     FINDINGS:  The lungs are expanded bilaterally and clear. The pleural  spaces are clear. There is a new right-sided PICC line with catheter tip  in good position within the superior vena cava at the T6 level. Heart  size is normal. No acute bony abnormality is seen.          Impression:      1. PICC line catheter in good position, as described.  2. No infiltrate or effusion.           This report was signed and finalized on 6/28/2025 10:53 AM by Dr. Darshan De Leon MD.             LAB RESULTS:      Lab 06/30/25  0603 06/29/25  0526 06/28/25  0243 06/27/25  2050   WBC 11.35* 9.78 10.92* 11.05*   HEMOGLOBIN 9.2* 8.1* 10.0* 11.3*   HEMATOCRIT 29.1* 26.0* 31.8* 36.0   PLATELETS 296 288 326 360   NEUTROS ABS 6.72 5.90  --  7.20*   IMMATURE GRANS (ABS) 0.06* 0.04  --  0.04   LYMPHS ABS 2.79 2.74  --  2.77   MONOS ABS 0.75 0.51  --  0.58   EOS ABS 0.99* 0.56*  --  0.41*   MCV 92.7 92.5 94.4 94.0   LACTATE  --   --   --  1.0         Lab 06/30/25  0603 06/29/25  0526 06/28/25  2321 06/28/25  1810 06/28/25  1221 06/28/25  0535 06/28/25  0243 06/27/25 2050   SODIUM 144 145 143 143 140 141   < > 136   POTASSIUM 4.5 3.9 4.2 3.9 4.0 4.1   <  > 3.0*   CHLORIDE 101 113* 115* 114* 117* 112*   < > 103   CO2 32.0* 21.0* 17.0* 14.0* 11.0* 14.0*   < > 14.0*   ANION GAP 11.0 11.0 11.0 15.0 12.0 15.0   < > 19.0*   BUN 32.5* 47.2* 47.6* 47.2* 51.3* 54.1*   < > 56.8*   CREATININE 1.37* 1.79* 1.86* 2.06* 2.11* 2.40*   < > 3.18*   EGFR 46.3* 33.6* 32.1* 28.4* 27.6* 23.6*   < > 16.8*   GLUCOSE 184* 220* 166* 183* 175* 208*   < > 205*   CALCIUM 7.6* 7.1* 6.9* 7.6* 7.8* 7.4*   < > 8.5*   MAGNESIUM 1.9 1.4*  --   --  1.7 1.6  --  2.1   PHOSPHORUS  --  2.0*  --   --   --  5.8*  --   --    HEMOGLOBIN A1C 8.10*  --   --   --   --   --   --   --    TSH 1.870  --   --   --   --   --   --   --     < > = values in this interval not displayed.         Lab 06/30/25  0603 06/28/25  0535 06/27/25  2050   TOTAL PROTEIN 6.0 6.1 7.5   ALBUMIN 3.2* 3.3* 3.9   GLOBULIN 2.8 2.8 3.6   ALT (SGPT) 14 10 12   AST (SGOT) 19 12 15   BILIRUBIN <0.2 <0.2 <0.2   ALK PHOS 117 122* 155*             Lab 06/30/25  0603   CHOLESTEROL 195   LDL CHOL 111*   HDL CHOL 62*   TRIGLYCERIDES 122             Brief Urine Lab Results  (Last result in the past 365 days)        Color   Clarity   Blood   Leuk Est   Nitrite   Protein   CREAT   Urine HCG        06/28/25 1150             71.5         06/28/25 1150 Yellow   Clear   Negative   Trace   Negative   30 mg/dL (1+)                 Microbiology Results (last 10 days)       Procedure Component Value - Date/Time    Gastrointestinal Panel, PCR - Stool, Per Rectum [[id number]]  (Normal) Collected: 06/29/25 0609    Lab Status: Final result Specimen: Stool from Per Rectum Updated: 06/29/25 0838     Campylobacter Not Detected     Plesiomonas shigelloides Not Detected     Salmonella Not Detected     Vibrio Not Detected     Vibrio cholerae Not Detected     Yersinia enterocolitica Not Detected     Enteroaggregative E. coli (EAEC) Not Detected     Enteropathogenic E. coli (EPEC) Not Detected     Enterotoxigenic E. coli (ETEC) lt/st Not Detected     Shiga-like  toxin-producing E. coli (STEC) stx1/stx2 Not Detected     Shigella/Enteroinvasive E. coli (EIEC) Not Detected     Cryptosporidium Not Detected     Cyclospora cayetanensis Not Detected     Entamoeba histolytica Not Detected     Giardia lamblia Not Detected     Adenovirus F40/41 Not Detected     Astrovirus Not Detected     Norovirus GI/GII Not Detected     Rotavirus A Not Detected     Sapovirus (I, II, IV or V) Not Detected    Eosinophil Smear - Urine, Urine, Clean Catch [[id number]]  (Normal) Collected: 06/28/25 1150    Lab Status: Final result Specimen: Urine, Clean Catch Updated: 06/29/25 0006     Eosinophil Smear 0 % EOS/100 Cells         History of Present Illness  Patient is 54-year-old female patient with a medical history of gastroparesis status post gastric stimulator, CKD, hypertension, diabetes mellitus, presenting to the ED for the evaluation of abnormal labs.  As reported, patient was being evaluated by her doctor where she had outpatient labs, that showed SUGAR.  She was advised to come to the ED for evaluation.  She reports that she is urinating less than before, and that she is having some diarrhea.  She denies having any fever, chills, abdominal pain, chest pain,  or shortness of breath.  She has a PICC line in her right upper extremity where she receives IV fluids every other day at home.?.    Hospital Course:   SUGAR/metabolic acidosis-patient has a history of hospitalizations for acute on chronic renal failure.  Has a PICC line and follows with nephrology for outpatient IV fluids to help prevent.  Has been doing fairly well.  Presenting in dry with poor p.o. intake.  Bicarb drip.  CO2 is improving.  Hold bicarb p.o . Creatinine is improving.  Ultrasound the kidney- renal size and cortical thickness are within normal limit-mild increased cortical echogenicity which is suggestive of medical renal disease - no hydronephrosis.     Hypokalemia -potassium replacement.  Will recheck on new labs.  Left monitor  "closely with bicarb and acid correction.  Potassium is normal.     Leukocytosis.  Resolved.  Chest x-ray-PICC line catheter in good position- No infiltrate or effusion.     Anemia.  Hemoglobin increased.  No sign of acute bleed.     Hypomagnesia.  Resolved.     Severe malnutrition -chronic issue, maximize calories     DM2 -currently on sliding scale insulin and hypoglycemia protocol.     CAD.  Aspirin . Nitro as needed.    Diabetes.  Patient is on no medication.  Hemoglobin A1C here is 8.1.  Discussed with patient, patient uses not taking it but she does absorb well per patient.  Patient is followed by her primary care physician.     Gastroparesis . Reglan .     Depression/anxiety . Nortriptyline.     Reflux . Protonix.  Zofran as needed.     Restless leg . Requip nightly.     Nutrition . Regular diet     GI panel-negative.    Vital signs stable, afebrile.  Patient wants to go home.  Plan to discharge patient home today.  Follow-up with PCP in 1 week.  Follow-up with nephrology outpatient.    Physical Exam on Discharge:  /73 (BP Location: Left arm, Patient Position: Sitting)   Pulse 111   Temp 98.4 °F (36.9 °C) (Oral)   Resp 17   Ht 154.9 cm (61\")   Wt 41.1 kg (90 lb 9.6 oz)   SpO2 100%   BMI 17.12 kg/m²   Physical Exam  Vitals and nursing note reviewed.   Constitutional:       Comments: Cachectic.  Chronically ill.   HENT:      Head: Normocephalic and atraumatic.   Eyes:      Conjunctiva/sclera: Conjunctivae normal.      Pupils: Pupils are equal, round, and reactive to light.   Cardiovascular:      Rate and Rhythm: Normal rate and regular rhythm.      Heart sounds: Normal heart sounds.   Pulmonary:      Effort: Pulmonary effort is normal. No respiratory distress.      Breath sounds: Normal breath sounds.   Abdominal:      General: Bowel sounds are normal. There is no distension.      Palpations: Abdomen is soft.      Tenderness: There is no abdominal tenderness.   Musculoskeletal:         General: No " swelling.      Cervical back: Neck supple.   Skin:     General: Skin is warm and dry.      Capillary Refill: Capillary refill takes 2 to 3 seconds.      Findings: No rash.   Neurological:      General: No focal deficit present.      Mental Status: She is alert and oriented to person, place, and time.   Psychiatric:         Mood and Affect: Mood normal.         Behavior: Behavior normal.         Thought Content: Thought content normal.         Judgment: Judgment normal.        Condition on Discharge: Stable.    Discharge Disposition: Discharge home with family.  Home or Self Care    Discharge Medications:     Discharge Medications        Changes to Medications        Instructions Start Date   DULoxetine 30 MG capsule  Commonly known as: CYMBALTA  What changed:   medication strength  how much to take   30 mg, Oral, Daily      pregabalin 75 MG capsule  Commonly known as: LYRICA  What changed: when to take this   75 mg, Oral, Nightly      promethazine 25 MG tablet  Commonly known as: PHENERGAN  What changed: when to take this   25 mg, Oral, Every 8 Hours PRN      rOPINIRole 1 MG tablet  Commonly known as: REQUIP  What changed: when to take this   1 mg, Oral, Nightly      sodium bicarbonate 650 MG tablet  What changed: These instructions start on July 2, 2025. If you are unsure what to do until then, ask your doctor or other care provider.   650 mg, Oral, 2 Times Daily   Start Date: July 2, 2025            Continue These Medications        Instructions Start Date   albuterol sulfate  (90 Base) MCG/ACT inhaler  Commonly known as: PROVENTIL HFA;VENTOLIN HFA;PROAIR HFA   2 puffs, Inhalation, 3 Times Daily PRN      aspirin 81 MG EC tablet   81 mg, Daily      atorvastatin 80 MG tablet  Commonly known as: LIPITOR   80 mg, Nightly      Calcium Carbonate-Vitamin D 600-5 MG-MCG tablet   1 tablet, Daily      cetirizine 10 MG tablet  Commonly known as: zyrTEC   10 mg, Daily PRN      magnesium oxide 400 MG tablet  Commonly  known as: MAG-OX   400 mg, 3 times daily      metoclopramide 5 MG tablet  Commonly known as: REGLAN   10 mg, 3 Times Daily PRN      nortriptyline 25 MG capsule  Commonly known as: PAMELOR   25 mg, Nightly      omeprazole 40 MG capsule  Commonly known as: priLOSEC   40 mg, Oral, Daily      sodium chloride 0.9 % solution   30 mL/hr, Intravenous, 3 Times Weekly, Monday, Wednesday, Friday      sucralfate 1 g tablet  Commonly known as: CARAFATE   1 g, 4 Times Daily      triamcinolone 0.1 % cream  Commonly known as: KENALOG   1 Application, Topical, Daily PRN             Stop These Medications      linaclotide 290 MCG capsule capsule  Commonly known as: LINZESS     potassium chloride 20 MEQ CR tablet  Commonly known as: KLOR-CON M20     potassium chloride 20 MEQ tablet controlled-release ER tablet  Commonly known as: K-DUR,KLOR-CON              Discharge Diet:   Diet Instructions       Advance Diet As Tolerated -Target Diet: Regular .      Target Diet: Regular .            Activity at Discharge:   Activity Instructions       Activity as Tolerated              Follow-up Appointments:   Follow-up with PCP in 1 week.  Follow with nephrology as soon as able    Electronically signed by Geronimo Forrester MD, 06/30/25, 08:15 CDT.    Time: Greater than 30 minutes.         Electronically signed by Geronimo Forrester MD at 06/30/25 0822       Discharge Order (From admission, onward)       Start     Ordered    06/30/25 0811  Discharge patient  Once        Expected Discharge Date: 06/30/25   Discharge Disposition: Home or Self Care   Physician of Record for Attribution - Please select from Treatment Team: GERONIMO FORRESTER [[id number]]   Review needed by CMO to determine Physician of Record: No      Question Answer Comment   Physician of Record for Attribution - Please select from Treatment Team GERONIMO FORRESTER    Review needed by CMO to determine Physician of Record No        06/30/25 2696

## 2025-07-08 ENCOUNTER — READMISSION MANAGEMENT (OUTPATIENT)
Dept: CALL CENTER | Facility: HOSPITAL | Age: 55
End: 2025-07-08
Payer: COMMERCIAL

## 2025-07-08 NOTE — OUTREACH NOTE
Medical Week 1 Survey      Flowsheet Row Responses   [facility] patient discharged from? [facility]   Does the patient have one of the following disease processes/diagnoses(primary or secondary)? Other   Week 1 attempt successful? Yes   Call start time 1623   Call end time 1625   Discharge diagnosis SUGAR   Meds reviewed with patient/caregiver? Yes   Is the patient having any side effects they believe may be caused by any medication additions or changes? No   Does the patient have all medications ordered at discharge? Yes   Is the patient taking all medications as directed (includes completed medication regime)? Yes   Does the patient have a primary care provider?  Yes   Comments regarding PCP Patient states she has seen PCP   Psychosocial issues? No   Did the patient receive a copy of their discharge instructions? Yes   Nursing interventions Reviewed instructions with patient   What is the patient's perception of their health status since discharge? Improving   Is the patient/caregiver able to teach back signs and symptoms related to disease process for when to call PCP? Yes   Is the patient/caregiver able to teach back signs and symptoms related to disease process for when to call 911? Yes   Is the patient/caregiver able to teach back the hierarchy of who to call/visit for symptoms/problems? PCP, Specialist, Home health nurse, Urgent Care, ED, 911 Yes   Week 1 call completed? Yes   Graduated Yes   Did the patient feel the follow up calls were helpful during their recovery period? Yes   Was the number of calls appropriate? Yes   Would this patient benefit from a Referral to Amb Social Work? No   Is the patient interested in additional calls from an ambulatory ? No   Wrap up additional comments Patient reports doing okay. No questions or concerns at this time.   Call end time 1625            CRISTINA MCCOLLUM - Registered Nurse

## 2025-07-09 LAB
QT INTERVAL: 396 MS
QTC INTERVAL: 467 MS

## 2025-07-22 ENCOUNTER — APPOINTMENT (OUTPATIENT)
Dept: GENERAL RADIOLOGY | Facility: HOSPITAL | Age: 55
DRG: 683 | End: 2025-07-22
Payer: COMMERCIAL

## 2025-07-22 ENCOUNTER — HOSPITAL ENCOUNTER (INPATIENT)
Facility: HOSPITAL | Age: 55
LOS: 4 days | Discharge: HOME | DRG: 683 | End: 2025-07-26
Attending: FAMILY MEDICINE | Admitting: INTERNAL MEDICINE
Payer: COMMERCIAL

## 2025-07-22 DIAGNOSIS — N17.9 ACUTE RENAL FAILURE SUPERIMPOSED ON CHRONIC KIDNEY DISEASE, UNSPECIFIED ACUTE RENAL FAILURE TYPE, UNSPECIFIED CKD STAGE: Primary | ICD-10-CM

## 2025-07-22 DIAGNOSIS — N18.9 ACUTE RENAL FAILURE SUPERIMPOSED ON CHRONIC KIDNEY DISEASE, UNSPECIFIED ACUTE RENAL FAILURE TYPE, UNSPECIFIED CKD STAGE: Primary | ICD-10-CM

## 2025-07-22 LAB
ANION GAP SERPL CALCULATED.3IONS-SCNC: 19 MMOL/L (ref 5–15)
B PARAPERT DNA SPEC QL NAA+PROBE: NOT DETECTED
B PERT DNA SPEC QL NAA+PROBE: NOT DETECTED
BASOPHILS # BLD AUTO: 0.03 10*3/MM3 (ref 0–0.2)
BASOPHILS NFR BLD AUTO: 0.2 % (ref 0–1.5)
BUN SERPL-MCNC: 63.4 MG/DL (ref 6–20)
BUN/CREAT SERPL: 13.7 (ref 7–25)
C PNEUM DNA NPH QL NAA+NON-PROBE: NOT DETECTED
CALCIUM SPEC-SCNC: 8.7 MG/DL (ref 8.6–10.5)
CHLORIDE SERPL-SCNC: 99 MMOL/L (ref 98–107)
CO2 SERPL-SCNC: 16 MMOL/L (ref 22–29)
CREAT SERPL-MCNC: 4.64 MG/DL (ref 0.57–1)
DEPRECATED RDW RBC AUTO: 49.6 FL (ref 37–54)
EGFRCR SERPLBLD CKD-EPI 2021: 10.7 ML/MIN/1.73
EOSINOPHIL # BLD AUTO: 0.24 10*3/MM3 (ref 0–0.4)
EOSINOPHIL NFR BLD AUTO: 1.8 % (ref 0.3–6.2)
ERYTHROCYTE [DISTWIDTH] IN BLOOD BY AUTOMATED COUNT: 15.2 % (ref 12.3–15.4)
FLUAV SUBTYP SPEC NAA+PROBE: NOT DETECTED
FLUBV RNA NPH QL NAA+NON-PROBE: NOT DETECTED
GLUCOSE SERPL-MCNC: 375 MG/DL (ref 65–99)
HADV DNA SPEC NAA+PROBE: NOT DETECTED
HCOV 229E RNA SPEC QL NAA+PROBE: NOT DETECTED
HCOV HKU1 RNA SPEC QL NAA+PROBE: NOT DETECTED
HCOV NL63 RNA SPEC QL NAA+PROBE: NOT DETECTED
HCOV OC43 RNA SPEC QL NAA+PROBE: NOT DETECTED
HCT VFR BLD AUTO: 33.6 % (ref 34–46.6)
HGB BLD-MCNC: 10.9 G/DL (ref 12–15.9)
HMPV RNA NPH QL NAA+NON-PROBE: NOT DETECTED
HPIV1 RNA ISLT QL NAA+PROBE: NOT DETECTED
HPIV2 RNA SPEC QL NAA+PROBE: NOT DETECTED
HPIV3 RNA NPH QL NAA+PROBE: NOT DETECTED
HPIV4 P GENE NPH QL NAA+PROBE: NOT DETECTED
IMM GRANULOCYTES # BLD AUTO: 0.14 10*3/MM3 (ref 0–0.05)
IMM GRANULOCYTES NFR BLD AUTO: 1 % (ref 0–0.5)
LYMPHOCYTES # BLD AUTO: 3.03 10*3/MM3 (ref 0.7–3.1)
LYMPHOCYTES NFR BLD AUTO: 22.5 % (ref 19.6–45.3)
M PNEUMO IGG SER IA-ACNC: NOT DETECTED
MAGNESIUM SERPL-MCNC: 2.1 MG/DL (ref 1.6–2.6)
MCH RBC QN AUTO: 28.9 PG (ref 26.6–33)
MCHC RBC AUTO-ENTMCNC: 32.4 G/DL (ref 31.5–35.7)
MCV RBC AUTO: 89.1 FL (ref 79–97)
MONOCYTES # BLD AUTO: 0.56 10*3/MM3 (ref 0.1–0.9)
MONOCYTES NFR BLD AUTO: 4.2 % (ref 5–12)
NEUTROPHILS NFR BLD AUTO: 70.3 % (ref 42.7–76)
NEUTROPHILS NFR BLD AUTO: 9.47 10*3/MM3 (ref 1.7–7)
NRBC BLD AUTO-RTO: 0 /100 WBC (ref 0–0.2)
PLATELET # BLD AUTO: 526 10*3/MM3 (ref 140–450)
PMV BLD AUTO: 9.4 FL (ref 6–12)
POTASSIUM SERPL-SCNC: 3.6 MMOL/L (ref 3.5–5.2)
RBC # BLD AUTO: 3.77 10*6/MM3 (ref 3.77–5.28)
RHINOVIRUS RNA SPEC NAA+PROBE: DETECTED
RSV RNA NPH QL NAA+NON-PROBE: NOT DETECTED
SARS-COV-2 RNA RESP QL NAA+PROBE: NOT DETECTED
SODIUM SERPL-SCNC: 134 MMOL/L (ref 136–145)
WBC NRBC COR # BLD AUTO: 13.47 10*3/MM3 (ref 3.4–10.8)

## 2025-07-22 PROCEDURE — 36415 COLL VENOUS BLD VENIPUNCTURE: CPT

## 2025-07-22 PROCEDURE — 25810000003 SODIUM CHLORIDE 0.9 % SOLUTION

## 2025-07-22 PROCEDURE — 80048 BASIC METABOLIC PNL TOTAL CA: CPT | Performed by: FAMILY MEDICINE

## 2025-07-22 PROCEDURE — 99285 EMERGENCY DEPT VISIT HI MDM: CPT | Performed by: FAMILY MEDICINE

## 2025-07-22 PROCEDURE — 83735 ASSAY OF MAGNESIUM: CPT | Performed by: FAMILY MEDICINE

## 2025-07-22 PROCEDURE — 25010000002 DEXAMETHASONE PER 1 MG

## 2025-07-22 PROCEDURE — 87040 BLOOD CULTURE FOR BACTERIA: CPT

## 2025-07-22 PROCEDURE — 85025 COMPLETE CBC W/AUTO DIFF WBC: CPT | Performed by: FAMILY MEDICINE

## 2025-07-22 PROCEDURE — 25810000003 SODIUM CHLORIDE 0.9 % SOLUTION: Performed by: FAMILY MEDICINE

## 2025-07-22 PROCEDURE — 94640 AIRWAY INHALATION TREATMENT: CPT

## 2025-07-22 PROCEDURE — 71045 X-RAY EXAM CHEST 1 VIEW: CPT

## 2025-07-22 PROCEDURE — 94799 UNLISTED PULMONARY SVC/PX: CPT

## 2025-07-22 PROCEDURE — 0202U NFCT DS 22 TRGT SARS-COV-2: CPT

## 2025-07-22 RX ORDER — ASPIRIN 81 MG/1
81 TABLET ORAL DAILY
Status: DISCONTINUED | OUTPATIENT
Start: 2025-07-23 | End: 2025-07-26 | Stop reason: HOSPADM

## 2025-07-22 RX ORDER — ONDANSETRON 4 MG/1
4 TABLET, ORALLY DISINTEGRATING ORAL EVERY 6 HOURS PRN
Status: DISCONTINUED | OUTPATIENT
Start: 2025-07-22 | End: 2025-07-26 | Stop reason: HOSPADM

## 2025-07-22 RX ORDER — SODIUM CHLORIDE 0.9 % (FLUSH) 0.9 %
10 SYRINGE (ML) INJECTION AS NEEDED
Status: DISCONTINUED | OUTPATIENT
Start: 2025-07-22 | End: 2025-07-26 | Stop reason: HOSPADM

## 2025-07-22 RX ORDER — IPRATROPIUM BROMIDE AND ALBUTEROL SULFATE 2.5; .5 MG/3ML; MG/3ML
3 SOLUTION RESPIRATORY (INHALATION)
Status: DISCONTINUED | OUTPATIENT
Start: 2025-07-22 | End: 2025-07-24

## 2025-07-22 RX ORDER — DEXAMETHASONE SODIUM PHOSPHATE 10 MG/ML
6 INJECTION, SOLUTION INTRA-ARTICULAR; INTRALESIONAL; INTRAMUSCULAR; INTRAVENOUS; SOFT TISSUE DAILY
Status: DISCONTINUED | OUTPATIENT
Start: 2025-07-22 | End: 2025-07-24

## 2025-07-22 RX ORDER — ONDANSETRON 2 MG/ML
4 INJECTION INTRAMUSCULAR; INTRAVENOUS EVERY 6 HOURS PRN
Status: DISCONTINUED | OUTPATIENT
Start: 2025-07-22 | End: 2025-07-26 | Stop reason: HOSPADM

## 2025-07-22 RX ORDER — SODIUM BICARBONATE 650 MG/1
650 TABLET ORAL 2 TIMES DAILY
Status: DISCONTINUED | OUTPATIENT
Start: 2025-07-22 | End: 2025-07-26 | Stop reason: HOSPADM

## 2025-07-22 RX ORDER — ROPINIROLE 1 MG/1
1 TABLET, FILM COATED ORAL NIGHTLY
Status: DISCONTINUED | OUTPATIENT
Start: 2025-07-22 | End: 2025-07-23

## 2025-07-22 RX ORDER — NITROGLYCERIN 0.4 MG/1
0.4 TABLET SUBLINGUAL
Status: DISCONTINUED | OUTPATIENT
Start: 2025-07-22 | End: 2025-07-26 | Stop reason: HOSPADM

## 2025-07-22 RX ORDER — GUAIFENESIN AND DEXTROMETHORPHAN HYDROBROMIDE 600; 30 MG/1; MG/1
1 TABLET, EXTENDED RELEASE ORAL 2 TIMES DAILY PRN
Status: DISCONTINUED | OUTPATIENT
Start: 2025-07-22 | End: 2025-07-26 | Stop reason: HOSPADM

## 2025-07-22 RX ORDER — SODIUM CHLORIDE 0.9 % (FLUSH) 0.9 %
10 SYRINGE (ML) INJECTION EVERY 12 HOURS SCHEDULED
Status: DISCONTINUED | OUTPATIENT
Start: 2025-07-22 | End: 2025-07-26 | Stop reason: HOSPADM

## 2025-07-22 RX ORDER — IPRATROPIUM BROMIDE AND ALBUTEROL SULFATE 2.5; .5 MG/3ML; MG/3ML
3 SOLUTION RESPIRATORY (INHALATION) EVERY 4 HOURS PRN
Status: DISCONTINUED | OUTPATIENT
Start: 2025-07-22 | End: 2025-07-26 | Stop reason: HOSPADM

## 2025-07-22 RX ORDER — PANTOPRAZOLE SODIUM 40 MG/1
40 TABLET, DELAYED RELEASE ORAL
Status: DISCONTINUED | OUTPATIENT
Start: 2025-07-23 | End: 2025-07-26 | Stop reason: HOSPADM

## 2025-07-22 RX ORDER — SODIUM CHLORIDE 9 MG/ML
40 INJECTION, SOLUTION INTRAVENOUS AS NEEDED
Status: DISCONTINUED | OUTPATIENT
Start: 2025-07-22 | End: 2025-07-26 | Stop reason: HOSPADM

## 2025-07-22 RX ORDER — SODIUM CHLORIDE 9 MG/ML
100 INJECTION, SOLUTION INTRAVENOUS CONTINUOUS
Status: DISPENSED | OUTPATIENT
Start: 2025-07-22 | End: 2025-07-23

## 2025-07-22 RX ADMIN — DEXAMETHASONE SODIUM PHOSPHATE 6 MG: 10 INJECTION INTRAMUSCULAR; INTRAVENOUS at 23:02

## 2025-07-22 RX ADMIN — SODIUM CHLORIDE 500 ML: 9 INJECTION, SOLUTION INTRAVENOUS at 23:03

## 2025-07-22 RX ADMIN — SODIUM CHLORIDE 100 ML/HR: 9 INJECTION, SOLUTION INTRAVENOUS at 23:03

## 2025-07-22 RX ADMIN — SODIUM BICARBONATE 650 MG: 650 TABLET ORAL at 23:02

## 2025-07-22 RX ADMIN — Medication 10 ML: at 23:02

## 2025-07-22 RX ADMIN — IPRATROPIUM BROMIDE AND ALBUTEROL SULFATE 3 ML: .5; 3 SOLUTION RESPIRATORY (INHALATION) at 23:16

## 2025-07-22 RX ADMIN — ROPINIROLE 1 MG: 1 TABLET, FILM COATED ORAL at 23:02

## 2025-07-22 RX ADMIN — SODIUM CHLORIDE 1000 ML: 9 INJECTION, SOLUTION INTRAVENOUS at 20:22

## 2025-07-23 LAB
ALBUMIN SERPL-MCNC: 3.3 G/DL (ref 3.5–5.2)
ALBUMIN UR-MCNC: <1.2 MG/DL
ALBUMIN/GLOB SERPL: 1 G/DL
ALP SERPL-CCNC: 134 U/L (ref 39–117)
ALT SERPL W P-5'-P-CCNC: 9 U/L (ref 1–33)
ANION GAP SERPL CALCULATED.3IONS-SCNC: 16 MMOL/L (ref 5–15)
AST SERPL-CCNC: 15 U/L (ref 1–32)
BILIRUB SERPL-MCNC: <0.2 MG/DL (ref 0–1.2)
BUN SERPL-MCNC: 60 MG/DL (ref 6–20)
BUN/CREAT SERPL: 15.8 (ref 7–25)
CALCIUM SPEC-SCNC: 7.9 MG/DL (ref 8.6–10.5)
CHLORIDE SERPL-SCNC: 106 MMOL/L (ref 98–107)
CO2 SERPL-SCNC: 12 MMOL/L (ref 22–29)
CREAT SERPL-MCNC: 3.8 MG/DL (ref 0.57–1)
CREAT UR-MCNC: 60.5 MG/DL
DEPRECATED RDW RBC AUTO: 51.2 FL (ref 37–54)
EGFRCR SERPLBLD CKD-EPI 2021: 13.6 ML/MIN/1.73
ERYTHROCYTE [DISTWIDTH] IN BLOOD BY AUTOMATED COUNT: 15.2 % (ref 12.3–15.4)
GLOBULIN UR ELPH-MCNC: 3.2 GM/DL
GLUCOSE BLDC GLUCOMTR-MCNC: 221 MG/DL (ref 70–130)
GLUCOSE BLDC GLUCOMTR-MCNC: 246 MG/DL (ref 70–130)
GLUCOSE BLDC GLUCOMTR-MCNC: 354 MG/DL (ref 70–130)
GLUCOSE BLDC GLUCOMTR-MCNC: 428 MG/DL (ref 70–130)
GLUCOSE BLDC GLUCOMTR-MCNC: 480 MG/DL (ref 70–130)
GLUCOSE BLDC GLUCOMTR-MCNC: 538 MG/DL (ref 70–130)
GLUCOSE BLDC GLUCOMTR-MCNC: 570 MG/DL (ref 70–130)
GLUCOSE BLDC GLUCOMTR-MCNC: 89 MG/DL (ref 70–130)
GLUCOSE SERPL-MCNC: 544 MG/DL (ref 65–99)
HCT VFR BLD AUTO: 29.3 % (ref 34–46.6)
HGB BLD-MCNC: 9.2 G/DL (ref 12–15.9)
IRON 24H UR-MRATE: 31 MCG/DL (ref 37–145)
IRON SATN MFR SERPL: 11 % (ref 20–50)
Lab: ABNORMAL
Lab: NORMAL
MAGNESIUM SERPL-MCNC: 1.9 MG/DL (ref 1.6–2.6)
MCH RBC QN AUTO: 28.8 PG (ref 26.6–33)
MCHC RBC AUTO-ENTMCNC: 31.4 G/DL (ref 31.5–35.7)
MCV RBC AUTO: 91.6 FL (ref 79–97)
MICROALBUMIN/CREAT UR: NORMAL MG/G{CREAT}
PHOSPHATE SERPL-MCNC: 4.5 MG/DL (ref 2.5–4.5)
PLATELET # BLD AUTO: 456 10*3/MM3 (ref 140–450)
PMV BLD AUTO: 9.7 FL (ref 6–12)
POTASSIUM SERPL-SCNC: 4.3 MMOL/L (ref 3.5–5.2)
PROT SERPL-MCNC: 6.5 G/DL (ref 6–8.5)
RBC # BLD AUTO: 3.2 10*6/MM3 (ref 3.77–5.28)
SODIUM SERPL-SCNC: 134 MMOL/L (ref 136–145)
SODIUM UR-SCNC: 77 MMOL/L
TIBC SERPL-MCNC: 277 MCG/DL (ref 298–536)
TRANSFERRIN SERPL-MCNC: 186 MG/DL (ref 200–360)
WBC NRBC COR # BLD AUTO: 15.31 10*3/MM3 (ref 3.4–10.8)

## 2025-07-23 PROCEDURE — 82948 REAGENT STRIP/BLOOD GLUCOSE: CPT

## 2025-07-23 PROCEDURE — 83735 ASSAY OF MAGNESIUM: CPT

## 2025-07-23 PROCEDURE — 94760 N-INVAS EAR/PLS OXIMETRY 1: CPT

## 2025-07-23 PROCEDURE — 82948 REAGENT STRIP/BLOOD GLUCOSE: CPT | Performed by: INTERNAL MEDICINE

## 2025-07-23 PROCEDURE — 80053 COMPREHEN METABOLIC PANEL: CPT

## 2025-07-23 PROCEDURE — 84466 ASSAY OF TRANSFERRIN: CPT | Performed by: INTERNAL MEDICINE

## 2025-07-23 PROCEDURE — 25810000003 SODIUM CHLORIDE 0.9 % SOLUTION: Performed by: NURSE PRACTITIONER

## 2025-07-23 PROCEDURE — 84300 ASSAY OF URINE SODIUM: CPT | Performed by: INTERNAL MEDICINE

## 2025-07-23 PROCEDURE — 82570 ASSAY OF URINE CREATININE: CPT | Performed by: INTERNAL MEDICINE

## 2025-07-23 PROCEDURE — 63710000001 INSULIN GLARGINE PER 5 UNITS: Performed by: INTERNAL MEDICINE

## 2025-07-23 PROCEDURE — 85027 COMPLETE CBC AUTOMATED: CPT

## 2025-07-23 PROCEDURE — 83540 ASSAY OF IRON: CPT | Performed by: INTERNAL MEDICINE

## 2025-07-23 PROCEDURE — 94799 UNLISTED PULMONARY SVC/PX: CPT

## 2025-07-23 PROCEDURE — 82043 UR ALBUMIN QUANTITATIVE: CPT | Performed by: INTERNAL MEDICINE

## 2025-07-23 PROCEDURE — 25010000002 HEPARIN (PORCINE) PER 1000 UNITS: Performed by: INTERNAL MEDICINE

## 2025-07-23 PROCEDURE — 63710000001 INSULIN LISPRO (HUMAN) PER 5 UNITS: Performed by: INTERNAL MEDICINE

## 2025-07-23 PROCEDURE — 63710000001 INSULIN LISPRO (HUMAN) PER 5 UNITS

## 2025-07-23 PROCEDURE — 84100 ASSAY OF PHOSPHORUS: CPT

## 2025-07-23 RX ORDER — DEXTROSE MONOHYDRATE 25 G/50ML
25 INJECTION, SOLUTION INTRAVENOUS
Status: DISCONTINUED | OUTPATIENT
Start: 2025-07-23 | End: 2025-07-23

## 2025-07-23 RX ORDER — HYDROXYCHLOROQUINE SULFATE 200 MG/1
400 TABLET, FILM COATED ORAL DAILY
COMMUNITY
End: 2025-08-16 | Stop reason: HOSPADM

## 2025-07-23 RX ORDER — ROPINIROLE 1 MG/1
1 TABLET, FILM COATED ORAL EVERY 8 HOURS SCHEDULED
Status: DISCONTINUED | OUTPATIENT
Start: 2025-07-23 | End: 2025-07-26 | Stop reason: HOSPADM

## 2025-07-23 RX ORDER — IBUPROFEN 600 MG/1
1 TABLET ORAL
Status: DISCONTINUED | OUTPATIENT
Start: 2025-07-23 | End: 2025-07-26 | Stop reason: HOSPADM

## 2025-07-23 RX ORDER — SODIUM CHLORIDE 9 MG/ML
100 INJECTION, SOLUTION INTRAVENOUS CONTINUOUS
Status: DISCONTINUED | OUTPATIENT
Start: 2025-07-23 | End: 2025-07-23

## 2025-07-23 RX ORDER — NORTRIPTYLINE HYDROCHLORIDE 25 MG/1
25 CAPSULE ORAL NIGHTLY
Status: DISCONTINUED | OUTPATIENT
Start: 2025-07-23 | End: 2025-07-26 | Stop reason: HOSPADM

## 2025-07-23 RX ORDER — DULOXETIN HYDROCHLORIDE 30 MG/1
60 CAPSULE, DELAYED RELEASE ORAL DAILY
Status: DISCONTINUED | OUTPATIENT
Start: 2025-07-24 | End: 2025-07-26 | Stop reason: HOSPADM

## 2025-07-23 RX ORDER — PREGABALIN 75 MG/1
75 CAPSULE ORAL NIGHTLY
Status: DISCONTINUED | OUTPATIENT
Start: 2025-07-23 | End: 2025-07-26 | Stop reason: HOSPADM

## 2025-07-23 RX ORDER — ROPINIROLE 1 MG/1
1 TABLET, FILM COATED ORAL 3 TIMES DAILY
Status: ON HOLD | COMMUNITY
End: 2025-08-16

## 2025-07-23 RX ORDER — CETIRIZINE HYDROCHLORIDE 10 MG/1
10 TABLET ORAL DAILY PRN
Status: DISCONTINUED | OUTPATIENT
Start: 2025-07-24 | End: 2025-07-26 | Stop reason: HOSPADM

## 2025-07-23 RX ORDER — DEXTROSE MONOHYDRATE 25 G/50ML
25 INJECTION, SOLUTION INTRAVENOUS
Status: DISCONTINUED | OUTPATIENT
Start: 2025-07-23 | End: 2025-07-26 | Stop reason: HOSPADM

## 2025-07-23 RX ORDER — HYDROXYCHLOROQUINE SULFATE 200 MG/1
400 TABLET, FILM COATED ORAL DAILY
Status: DISCONTINUED | OUTPATIENT
Start: 2025-07-24 | End: 2025-07-26 | Stop reason: HOSPADM

## 2025-07-23 RX ORDER — PREGABALIN 75 MG/1
75 CAPSULE ORAL 2 TIMES DAILY
Status: ON HOLD | COMMUNITY
End: 2025-08-16

## 2025-07-23 RX ORDER — NICOTINE POLACRILEX 4 MG
15 LOZENGE BUCCAL
Status: DISCONTINUED | OUTPATIENT
Start: 2025-07-23 | End: 2025-07-26 | Stop reason: HOSPADM

## 2025-07-23 RX ORDER — SUCRALFATE 1 G/1
1 TABLET ORAL
Status: DISCONTINUED | OUTPATIENT
Start: 2025-07-23 | End: 2025-07-26 | Stop reason: HOSPADM

## 2025-07-23 RX ORDER — INSULIN LISPRO 100 [IU]/ML
10 INJECTION, SOLUTION INTRAVENOUS; SUBCUTANEOUS ONCE
Status: COMPLETED | OUTPATIENT
Start: 2025-07-23 | End: 2025-07-23

## 2025-07-23 RX ORDER — INSULIN LISPRO 100 [IU]/ML
4-24 INJECTION, SOLUTION INTRAVENOUS; SUBCUTANEOUS
Status: DISCONTINUED | OUTPATIENT
Start: 2025-07-23 | End: 2025-07-26 | Stop reason: HOSPADM

## 2025-07-23 RX ORDER — DULOXETIN HYDROCHLORIDE 60 MG/1
60 CAPSULE, DELAYED RELEASE ORAL DAILY
COMMUNITY

## 2025-07-23 RX ORDER — NICOTINE POLACRILEX 4 MG
15 LOZENGE BUCCAL
Status: DISCONTINUED | OUTPATIENT
Start: 2025-07-23 | End: 2025-07-23

## 2025-07-23 RX ORDER — ROPINIROLE 1 MG/1
1 TABLET, FILM COATED ORAL EVERY 8 HOURS SCHEDULED
Status: DISCONTINUED | OUTPATIENT
Start: 2025-07-23 | End: 2025-07-23

## 2025-07-23 RX ORDER — INSULIN LISPRO 100 [IU]/ML
2-5 INJECTION, SOLUTION INTRAVENOUS; SUBCUTANEOUS
Status: DISCONTINUED | OUTPATIENT
Start: 2025-07-23 | End: 2025-07-23

## 2025-07-23 RX ORDER — ATORVASTATIN CALCIUM 40 MG/1
80 TABLET, FILM COATED ORAL NIGHTLY
Status: DISCONTINUED | OUTPATIENT
Start: 2025-07-23 | End: 2025-07-26 | Stop reason: HOSPADM

## 2025-07-23 RX ORDER — ROPINIROLE 1 MG/1
1 TABLET, FILM COATED ORAL NIGHTLY
Status: DISCONTINUED | OUTPATIENT
Start: 2025-07-23 | End: 2025-07-23

## 2025-07-23 RX ORDER — CALCIUM CARBONATE 500 MG/1
2 TABLET, CHEWABLE ORAL 4 TIMES DAILY PRN
Status: DISCONTINUED | OUTPATIENT
Start: 2025-07-23 | End: 2025-07-26 | Stop reason: HOSPADM

## 2025-07-23 RX ORDER — HEPARIN SODIUM 5000 [USP'U]/ML
5000 INJECTION, SOLUTION INTRAVENOUS; SUBCUTANEOUS EVERY 12 HOURS SCHEDULED
Status: DISCONTINUED | OUTPATIENT
Start: 2025-07-23 | End: 2025-07-26 | Stop reason: HOSPADM

## 2025-07-23 RX ADMIN — Medication 10 ML: at 21:15

## 2025-07-23 RX ADMIN — IPRATROPIUM BROMIDE AND ALBUTEROL SULFATE 3 ML: .5; 3 SOLUTION RESPIRATORY (INHALATION) at 06:19

## 2025-07-23 RX ADMIN — ATORVASTATIN CALCIUM 80 MG: 40 TABLET, FILM COATED ORAL at 21:14

## 2025-07-23 RX ADMIN — SUCRALFATE 1 G: 1 TABLET ORAL at 16:36

## 2025-07-23 RX ADMIN — PREGABALIN 75 MG: 75 CAPSULE ORAL at 21:15

## 2025-07-23 RX ADMIN — SUCRALFATE 1 G: 1 TABLET ORAL at 21:15

## 2025-07-23 RX ADMIN — SODIUM BICARBONATE 650 MG: 650 TABLET ORAL at 21:15

## 2025-07-23 RX ADMIN — HEPARIN SODIUM 5000 UNITS: 5000 INJECTION INTRAVENOUS; SUBCUTANEOUS at 21:15

## 2025-07-23 RX ADMIN — INSULIN LISPRO 8 UNITS: 100 INJECTION, SOLUTION INTRAVENOUS; SUBCUTANEOUS at 21:15

## 2025-07-23 RX ADMIN — SODIUM BICARBONATE 650 MG: 650 TABLET ORAL at 08:26

## 2025-07-23 RX ADMIN — Medication 10 ML: at 08:27

## 2025-07-23 RX ADMIN — INSULIN LISPRO 8 UNITS: 100 INJECTION, SOLUTION INTRAVENOUS; SUBCUTANEOUS at 11:44

## 2025-07-23 RX ADMIN — INSULIN GLARGINE 15 UNITS: 100 INJECTION, SOLUTION SUBCUTANEOUS at 09:54

## 2025-07-23 RX ADMIN — ASPIRIN 81 MG: 81 TABLET, COATED ORAL at 08:26

## 2025-07-23 RX ADMIN — SODIUM BICARBONATE INJECTION,: 84 SOLUTION INTRAVENOUS at 16:36

## 2025-07-23 RX ADMIN — INSULIN LISPRO 10 UNITS: 100 INJECTION, SOLUTION INTRAVENOUS; SUBCUTANEOUS at 05:49

## 2025-07-23 RX ADMIN — PANTOPRAZOLE SODIUM 40 MG: 40 TABLET, DELAYED RELEASE ORAL at 05:07

## 2025-07-23 RX ADMIN — MAGNESIUM OXIDE TAB 400 MG (240 MG ELEMENTAL MG) 400 MG: 400 (240 MG) TAB at 15:08

## 2025-07-23 RX ADMIN — IPRATROPIUM BROMIDE AND ALBUTEROL SULFATE 3 ML: .5; 3 SOLUTION RESPIRATORY (INHALATION) at 18:36

## 2025-07-23 RX ADMIN — MAGNESIUM OXIDE TAB 400 MG (240 MG ELEMENTAL MG) 400 MG: 400 (240 MG) TAB at 21:14

## 2025-07-23 RX ADMIN — INSULIN LISPRO 24 UNITS: 100 INJECTION, SOLUTION INTRAVENOUS; SUBCUTANEOUS at 08:26

## 2025-07-23 RX ADMIN — ANTACID TABLETS 2 TABLET: 500 TABLET, CHEWABLE ORAL at 16:36

## 2025-07-23 RX ADMIN — ROPINIROLE 1 MG: 1 TABLET, FILM COATED ORAL at 21:15

## 2025-07-23 RX ADMIN — ROPINIROLE 1 MG: 1 TABLET, FILM COATED ORAL at 08:52

## 2025-07-23 RX ADMIN — IPRATROPIUM BROMIDE AND ALBUTEROL SULFATE 3 ML: .5; 3 SOLUTION RESPIRATORY (INHALATION) at 09:46

## 2025-07-23 RX ADMIN — INSULIN GLARGINE 15 UNITS: 100 INJECTION, SOLUTION SUBCUTANEOUS at 21:15

## 2025-07-23 RX ADMIN — IPRATROPIUM BROMIDE AND ALBUTEROL SULFATE 3 ML: .5; 3 SOLUTION RESPIRATORY (INHALATION) at 14:13

## 2025-07-23 RX ADMIN — SODIUM CHLORIDE 100 ML/HR: 9 INJECTION, SOLUTION INTRAVENOUS at 09:54

## 2025-07-23 RX ADMIN — NORTRIPTYLINE HYDROCHLORIDE 25 MG: 25 CAPSULE ORAL at 21:15

## 2025-07-23 NOTE — PROGRESS NOTES
[facility] Medicine Services  INPATIENT PROGRESS NOTE    Patient Name: Tasha Perez  Date of Admission: 7/22/2025  Today's Date: 07/23/25  Length of Stay: 1  Primary Care Physician: KIRILL Murguia MD    Subjective   Chief Complaint: Abnormal lab    Patient has no new complaint this morning, feels a lot improved.  We discussed his medical condition especially renal status and plan of care.      Review of Systems   All pertinent negatives and positives are as above. All other systems have been reviewed and are negative unless otherwise stated.     Objective    Temp:  [97.4 °F (36.3 °C)-97.8 °F (36.6 °C)] 97.6 °F (36.4 °C)  Heart Rate:  [102-120] 120  Resp:  [16-20] 16  BP: ()/() 110/69  Physical Exam  Constitutional:       Appearance: She is ill-appearing.   Cardiovascular:      Rate and Rhythm: Normal rate and regular rhythm.      Pulses: Normal pulses.      Heart sounds: Normal heart sounds. No murmur heard.  Pulmonary:      Effort: Pulmonary effort is normal. No respiratory distress.      Breath sounds: Normal breath sounds. No wheezing or rales.   Abdominal:      General: Abdomen is flat. Bowel sounds are normal. There is no distension.      Palpations: Abdomen is soft.      Tenderness: There is no abdominal tenderness. There is no guarding.   Musculoskeletal:      Right lower leg: No edema.      Left lower leg: No edema.   Skin:     General: Skin is warm.   Neurological:      Mental Status: She is alert and oriented to person, place, and time. Mental status is at baseline.       Results Review:  I have reviewed the labs, radiology results, and diagnostic studies.    Laboratory Data:   Results from last 7 days   Lab Units 07/23/25  0428 07/22/25 1929   WBC 10*3/mm3 15.31* 13.47*   HEMOGLOBIN g/dL 9.2* 10.9*   HEMATOCRIT % 29.3* 33.6*   PLATELETS 10*3/mm3 456* 526*        Results from last 7 days   Lab Units 07/23/25  0428 07/22/25 1929   SODIUM mmol/L 134*  "134*   POTASSIUM mmol/L 4.3 3.6   CHLORIDE mmol/L 106 99   CO2 mmol/L 12.0* 16.0*   BUN mg/dL 60.0* 63.4*   CREATININE mg/dL 3.80* 4.64*   CALCIUM mg/dL 7.9* 8.7   BILIRUBIN mg/dL <0.2  --    ALK PHOS U/L 134*  --    ALT (SGPT) U/L 9  --    AST (SGOT) U/L 15  --    GLUCOSE mg/dL 544* 375*       Culture Data:   No results found for: \"BLOODCX\", \"URINECX\", \"WOUNDCX\", \"MRSACX\", \"RESPCX\", \"STOOLCX\"    Radiology Data:   Imaging Results (Last 24 Hours)       Procedure Component Value Units Date/Time    XR Chest 1 View [[id number]] Collected: 07/22/25 2144     Updated: 07/22/25 2148    Narrative:      EXAM: XR CHEST 1 VW-      HISTORY: cough; N17.9-Acute kidney failure, unspecified; N18.9-Chronic  kidney disease, unspecified       COMPARISON: 6/28/2025.     TECHNIQUE: Single frontal radiograph of the chest was obtained.     FINDINGS:     Support Devices: Right upper extremity PICC line tip overlies the lower  SVC.     Cardiac and Mediastinal Silhouettes: Normal.     Lungs/Pleura: No focal consolidation. No sizable pleural effusion. No  visible pneumothorax.     Osseous structures: No acute osseous finding.     Other: None.       Impression:         No acute cardiopulmonary abnormality.     Right upper extremity PICC line is in good position.           This report was signed and finalized on 7/22/2025 9:45 PM by Presley Nelson.               I have reviewed the patient's current medications.     Assessment/Plan   Assessment  Active Hospital Problems    Diagnosis     **Acute on chronic renal failure        Treatment Plan    -Acute viral syndrome secondary to Rhino and enteroviruses   Patient will be continued on supportive care with steroid, fluid and pain control as needed.    -Probable acute bronchitis from viral infection  She is clinically improved on steroids, will continue current management    - Acute kidney injury secondary to dehydration/acute illness  Nephrology was consulted, renal status is improving.  We will " continue current management and follow nephrologist recommendations    DVT prophylaxis-will start patient on heparin    Disposition-continue current management and reevaluate disposition in the morning.      Electronically signed by Poncho Jc MD, 07/23/25, 16:22 CDT.

## 2025-07-23 NOTE — PLAN OF CARE
Goal Outcome Evaluation:  Plan of Care Reviewed With: patient        Progress: improving     VSS. RA. Medicated for c/o heartburn. IV Bicarb as ordered. Up ad jessy. Voiding. SCDs. Sinus tach on tele. Droplet precautions for rhino virus. Accuchecks as ordered with sliding scale as needed. Safety maintained.

## 2025-07-23 NOTE — PROGRESS NOTES
Nutrition Services    Patient Name:  Tasha Perez  YOB: 1970  MRN: [id number]  Admit Date:  7/22/2025    Patient Name: Tasha Perez  YOB: 1970  MRN: [id number]  Admission date: 7/22/2025  Reason for Encounter: Low BMI and MST 2-3 or Nursing Admission Screen    [facility] Clinical Nutrition Assessment     Subjective    Subjective Information     7/23: Pt familiar to FNS. Multiple admissions due to acute and chronic renal failure/SUGAR and volume depletion. S/p gastric stimulator in February which she reports has not helped. Wt has fluctuated but has remained under 95 lb this year. Continues to show severe muscle and fat wasting. C/o N/V, abdominal pain, and weakness for a week. Unsure if pt is receiving her prescribed IV fluids at home. Educated multiple times on ways to manage gastroparesis. Dietitian encouraged pt to eat as tolerated and consume smaller, more frequent meals. Agreeable to try Boost Glucose Control Vanilla TID. Electrolytes stable -- recommended liberalized, regular/house diet at this time. Improved nutrient and energy intake deemed a greater benefit than sodium, potassium, or phosphorus restriction at this time d/t malnutrition. Will monitor.       Objective   H&P and Current Problems      H&P  Past Medical History:   Diagnosis Date    Arthritis     Contusion     Degenerative disc disease, cervical     Diabetes mellitus     Elevated cholesterol     Fibromyalgia     Neuropathy     Osteoporosis     Pancreatitis     Raynaud disease     Renal insufficiency     Rheumatoid arthritis     Smoker 02/08/2022    Vitamin D deficiency 04/26/2022      Past Surgical History:   Procedure Laterality Date    APPENDECTOMY      CHOLECYSTECTOMY      COLONOSCOPY      ENDOSCOPY N/A 10/08/2019    Procedure: ESOPHAGOGASTRODUODENOSCOPY WITH ANESTHESIA;  Surgeon: Aleks Vaughn DO;  Location: [facility] ENDOSCOPY;  Service: Gastroenterology    ENDOSCOPY N/A 11/12/2024    Procedure:  "ESOPHAGOGASTRODUODENOSCOPY WITH ANESTHESIA;  Surgeon: Tiffanie Saucedo MD;  Location: [facility] ENDOSCOPY;  Service: Gastroenterology;  Laterality: N/A;  pre op: Gastroparesis, Intractable nausea  post op: insertion of dobhoff   PCP: Brandon Murguia    ENDOSCOPY N/A 11/15/2024    Procedure: ESOPHAGOGASTRODUODENOSCOPY WITH ANESTHESIA WITH NG TUBE INSERTION;  Surgeon: Tiffanie Saucedo MD;  Location:  [facility] ENDOSCOPY;  Service: Gastroenterology;  Laterality: N/A;  PRE OP: DOBHOFF  POST OP: DOBHOFF  PCP: TELLY PACK    ENDOSCOPY WITH GASTROSTOMY TUBE INSERTION N/A 6/15/2022    Procedure: ESOPHAGOGASTRODUODENOSCOPY WITH GASTROSTOMY TUBE INSERTION;  Surgeon: Jersey Lee MD;  Location:  [facility] ENDOSCOPY;  Service: Gastroenterology;  Laterality: N/A;  pre peg placement  post peg placement  Dr. Murguia    ENTEROSCOPY SMALL BOWEL N/A 11/3/2022    Procedure: Esophagogastroduodenoscopy with Peg Removal;  Surgeon: Aleks Vaughn DO;  Location:  [facility] ENDOSCOPY;  Service: Gastroenterology;  Laterality: N/A;  pre; peg removal  post; peg removal   KIRILL Murguia MD        HYSTERECTOMY        Current Problems   Admission Diagnosis:  Acute on chronic renal failure [N17.9, N18.9]    Problem List:    Acute on chronic renal failure      Other Applicable Nutrition Information:   Chronic malnutrition, DM      Anthropometrics       Height: 154.9 cm (61\")  Weight: 40.1 kg (88 lb 8 oz) (07/22/25 1912)  Weight Method: Standing scale  BMI (Calculated): 16.7       Trending Weight Changes 07/23/25: No significant changes- wt fluctuate but continues to be under wt       Weight History  Wt Readings from Last 20 Encounters:   07/22/25 1912 40.1 kg (88 lb 8 oz)   06/28/25 0018 41.1 kg (90 lb 9.6 oz)   06/27/25 2006 39.9 kg (88 lb)   04/02/25 0330 42.8 kg (94 lb 5.7 oz)   03/31/25 2115 40.3 kg (88 lb 13.5 oz)   03/31/25 1506 38.6 kg (85 lb)   03/18/25 1149 40.6 kg (89 lb 8 oz)   02/24/25 1315 38.7 kg (85 lb 6.4 oz)   01/27/25 1434 42.4 kg " (93 lb 6.4 oz)   12/23/24 1629 41 kg (90 lb 6.4 oz)   12/08/24 1546 38.2 kg (84 lb 4 oz)   12/08/24 1232 37.6 kg (83 lb)   11/16/24 0600 44.5 kg (98 lb)   11/14/24 2218 40.8 kg (90 lb)   11/14/24 1849 40.8 kg (90 lb)   11/07/24 2204 39.2 kg (86 lb 8 oz)   11/07/24 1836 44.2 kg (97 lb 8 oz)   10/24/24 2138 41.7 kg (92 lb)   10/24/24 1912 38.6 kg (85 lb)   09/10/24 2100 41.5 kg (91 lb 7.9 oz)   09/10/24 1816 41.4 kg (91 lb 4.3 oz)   08/30/24 0444 41.4 kg (91 lb 4.3 oz)   08/29/24 1730 37.2 kg (82 lb)   08/27/24 2218 36 kg (79 lb 5.9 oz)   08/27/24 1848 35.4 kg (78 lb)   07/10/24 0253 37.6 kg (83 lb)   07/09/24 1748 37.6 kg (83 lb)   04/23/24 1824 41.3 kg (91 lb)   03/08/24 1400 43.9 kg (96 lb 12.8 oz)   03/07/24 0356 44.7 kg (98 lb 9.6 oz)   03/06/24 0500 42.7 kg (94 lb 2.2 oz)   03/05/24 0351 42.7 kg (94 lb 2.2 oz)   03/04/24 2032 42.2 kg (93 lb)   03/04/24 1340 41.7 kg (92 lb)   02/26/24 2150 41.3 kg (91 lb)   02/26/24 1616 39.9 kg (88 lb)   02/17/24 0025 40.4 kg (89 lb)            Labs      Comment: Electrolytes stable      Results from last 7 days   Lab Units 07/23/25  0428 07/22/25  1929   SODIUM mmol/L 134* 134*   POTASSIUM mmol/L 4.3 3.6   GLUCOSE mg/dL 544* 375*   BUN mg/dL 60.0* 63.4*   CREATININE mg/dL 3.80* 4.64*   CALCIUM mg/dL 7.9* 8.7   PHOSPHORUS mg/dL 4.5  --    MAGNESIUM mg/dL 1.9 2.1   ALBUMIN g/dL 3.3*  --    BILIRUBIN mg/dL <0.2  --    ALK PHOS U/L 134*  --    AST (SGOT) U/L 15  --    ALT (SGPT) U/L 9  --      Results from last 7 days   Lab Units 07/23/25  0428 07/22/25  1929   PLATELETS 10*3/mm3 456* 526*   HEMOGLOBIN g/dL 9.2* 10.9*   HEMATOCRIT % 29.3* 33.6*     Lab Results   Component Value Date    HGBA1C 8.10 (H) 06/30/2025          Medications       Scheduled Medications aspirin, 81 mg, Oral, Daily  dexAMETHasone, 6 mg, Intravenous, Daily  insulin glargine, 15 Units, Subcutaneous, Q12H  insulin lispro, 4-24 Units, Subcutaneous, 4x Daily AC & at Bedtime  ipratropium-albuterol, 3 mL,  Nebulization, 4x Daily - RT  pantoprazole, 40 mg, Oral, Q AM  rOPINIRole, 1 mg, Oral, Q8H  sodium bicarbonate, 650 mg, Oral, BID  sodium chloride, 10 mL, Intravenous, Q12H        Infusions sodium chloride, 100 mL/hr, Last Rate: 100 mL/hr (07/23/25 0954)        PRN Medications   dextrose    dextrose    glucagon (human recombinant)    guaifenesin-dextromethorphan 600-30 mg    ipratropium-albuterol    nitroglycerin    ondansetron ODT **OR** ondansetron    [COMPLETED] Insert Peripheral IV **AND** sodium chloride    sodium chloride    sodium chloride     Physical Findings      Chewing/Swallowing  Teeth Status: Mouth/Teeth WDL: WDL    Chewing/Swallowing Issues: No issues identified at this time   Edema                            Bowel Function     Last Bowel Movement: 07/18/25   I/Os  Intake & Output (last 3 days)         07/20 0701 07/21 0700 07/21 0701  07/22 0700 07/22 0701  07/23 0700 07/23 0701  07/24 0700    I.V. (mL/kg)    1946 (48.5)    Total Intake(mL/kg)    1946 (48.5)    Urine (mL/kg/hr)   1100     Total Output   1100     Net   -1100 +1946                   Lines, Drains, Airways, & Wounds       Active LDAs       Name Placement date Placement time Site Days Last dressing change    PICC Single Lumen 07/21/25 Right Brachial 07/21/25  --  Brachial  2     Insulin Pump 11/19/23  2300  -- 611                       Nutrition Focused Physical Exam     Trending NFPE 07/23/25: Chronic malnutrition      Malnutrition Severity Assessment      Patient meets criteria for : Severe Malnutrition  Malnutrition Type (Last 8 Hours)       Malnutrition Severity Assessment       Row Name 07/23/25 1106       Malnutrition Severity Assessment    Malnutrition Type Chronic Disease - Related Malnutrition      Row Name 07/23/25 1106       Insufficient Energy Intake     Insufficient Energy Intake Findings Severe    Insufficient Energy Intake  <75% of est. energy requirement for > or equal to 3 months      Row Name 07/23/25 1106       Muscle  Loss    Loss of Muscle Mass Findings Severe    [place] Region Severe - deep hollowing/scooping, lack of muscle to touch, facial bones well defined    Clavicle Bone Region Severe - protruding prominent bone    Dorsal Hand Region Severe - prominent depression    Patellar Region Severe - prominent bone, square looking, very little muscle definition    Anterior Thigh Region Severe - line/depression along thigh, obviously thin    Posterior Calf Region Severe - thin with very little definition/firmness      Row Name 07/23/25 1106       Fat Loss    Subcutaneous Fat Loss Findings Severe    Orbital Region  Severe - pronounced hollowness/depression, dark circles, loose saggy skin    Thoracic & Lumbar Region Severe - ribs visible with prominent depressions, iliac crest very prominent      Row Name 07/23/25 1106       Criteria Met (Must meet criteria for severity in at least 2 of these categories: M Wasting, Fat Loss, Fluid, Secondary Signs, Wt. Status, Intake)    Patient meets criteria for  Severe Malnutrition                     Estimated Needs      Energy Requirements    Weight for Calculation 40.1kg   Method for Estimation  35-40 kcals/kg   Daily Needs (kcal/day) 8766-1017   Protein Requirements    Weight for Calculation 40.1kg   Method for Estimation 1.2-1.5 gm/kg   Daily Needs (g/day) 48-60   Fluid Requirements     Method for Estimation 1 mL/kcal    Daily Needs (mL/day) 144-1604     Current Nutrition Orders & Evaluation of Intake      Oral Nutrition     Food Allergies  and Intolerances Allergic to pineapple and dark chocolate.    Current PO Diet Diet: Renal; Low Sodium (2-3g), Low Potassium, Low Phosphorus; Fluid Consistency: Thin (IDDSI 0)   Oral Nutrition Supplement Boost Original- Vanilla TID     Trending % PO Intake 07/23/25:Insufficient data; Admitted <24hr      Enteral Nutrition     Current EN Order Patient isn't on Tube Feeding  Patient doesn't have any tube feeding modular orders     EN Route      EN Tolerance      EN Observation/Intake         Parenteral Nutrition     Current TPN Order    TPN Route    Lipids (mL/%/frequency)     Total # Days on TPN    TPN Observation/Intake       Assessment & Plan   Nutrition Diagnosis and Goals       Nutrition Diagnosis 1 Severe chronic disease or condition related malnutrition related to CKD, DM, gastroparesis, chronic poor appetite as evidenced by muscle and fat loss per NFPE, BMI 16.7kg/m^2, %IBW 84    Nutrition Diagnosis 2 None         Goal(s) Meets Estimated Needs , Accepts Oral Nutrition Supplement, Tolerates PO Diet , Nutrition Related Labs Improve , and Weight goal of 17lb to reach IBW of 105lb     Nutrition Intervention and Prescription       Intervention  Liberalize diet, Start oral nutrition supplement, Continue to monitor for plan of care, and Completed NFPE      Diet Prescription     Supplement Prescription Boost Glucose Control BID (Provides 380 kcals, 32 g protein if consumed)      Education Provided       Enteral Prescription        TPN Prescription      Monitoring/Evaluation       Monitor/Evaluation Per Protocol     RD Follow-Up Encounter 3-5 days     Electronically signed by:  Nya Cox RDN, LD  07/23/25 11:07 CDT     Electronically signed by:  Nya Cox RDN, LD  07/23/25 11:07 CDT

## 2025-07-23 NOTE — ED PROVIDER NOTES
"Subjective   History of Present Illness    53-year-old female presents emergency room with abnormal labs.  She states that her kidney function is elevated.  She states that she has not been eating and drinking well.  She states that she been feeling fatigued.  Patient denies any fevers.  No chest pain or shortness of breath.  Patient denies any other symptoms at this time.        Review of Systems   All other systems reviewed and are negative.      Past Medical History:   Diagnosis Date    Arthritis     Contusion     Degenerative disc disease, cervical     Diabetes mellitus     Elevated cholesterol     Fibromyalgia     Neuropathy     Osteoporosis     Pancreatitis     Raynaud disease     Renal insufficiency     Rheumatoid arthritis     Smoker 02/08/2022    Vitamin D deficiency 04/26/2022       Allergies   Allergen Reactions    Bee Venom Anaphylaxis    Hydrocodone Anaphylaxis     SOB    Hydroxychloroquine Other (See Comments)     Hair loss    Ibuprofen Other (See Comments)     \"SHUTS MY KIDNEYS DOWN\" PER PATIENT     Pineapple Anaphylaxis     Makes throat itch and \"tightens throat\"    Pineapple Extract Anaphylaxis     Makes throat itch and \"tightens throat\"    Wasp Venom Anaphylaxis    Wasp Venom Protein Anaphylaxis    Hydrocodone-Acetaminophen Nausea And Vomiting, Nausea Only and Dizziness    Metformin Other (See Comments)     Pt states it messes with her kidneys    Penicillins Nausea And Vomiting    Sitagliptin Other (See Comments)      Abdominal Pain      Chocolate Rash     Rash on face    Poison Ivy Extract Itching and Rash       Past Surgical History:   Procedure Laterality Date    APPENDECTOMY      CHOLECYSTECTOMY      COLONOSCOPY      ENDOSCOPY N/A 10/08/2019    Procedure: ESOPHAGOGASTRODUODENOSCOPY WITH ANESTHESIA;  Surgeon: Aleks Vaughn DO;  Location: [facility] ENDOSCOPY;  Service: Gastroenterology    ENDOSCOPY N/A 11/12/2024    Procedure: ESOPHAGOGASTRODUODENOSCOPY WITH ANESTHESIA;  Surgeon: Sheryl" MD Tiffanie;  Location: [facility] ENDOSCOPY;  Service: Gastroenterology;  Laterality: N/A;  pre op: Gastroparesis, Intractable nausea  post op: insertion of dobhoff   PCP: Brandon Murguia    ENDOSCOPY N/A 11/15/2024    Procedure: ESOPHAGOGASTRODUODENOSCOPY WITH ANESTHESIA WITH NG TUBE INSERTION;  Surgeon: Tiffanie Saucedo MD;  Location:  [facility] ENDOSCOPY;  Service: Gastroenterology;  Laterality: N/A;  PRE OP: DOBHOFF  POST OP: DOBHOFF  PCP: TELLY PACK    ENDOSCOPY WITH GASTROSTOMY TUBE INSERTION N/A 6/15/2022    Procedure: ESOPHAGOGASTRODUODENOSCOPY WITH GASTROSTOMY TUBE INSERTION;  Surgeon: Jersey Lee MD;  Location:  [facility] ENDOSCOPY;  Service: Gastroenterology;  Laterality: N/A;  pre peg placement  post peg placement  Dr. Murguia    ENTEROSCOPY SMALL BOWEL N/A 11/3/2022    Procedure: Esophagogastroduodenoscopy with Peg Removal;  Surgeon: Aleks Vaughn DO;  Location:  [facility] ENDOSCOPY;  Service: Gastroenterology;  Laterality: N/A;  pre; peg removal  post; peg removal   KIRILL Murguia MD        HYSTERECTOMY         Family History   Adopted: Yes   Problem Relation Age of Onset    Colon polyps Neg Hx     Colon cancer Neg Hx        Social History     Socioeconomic History    Marital status:    Tobacco Use    Smoking status: Every Day     Current packs/day: 0.50     Average packs/day: 0.5 packs/day for 32.6 years (16.3 ttl pk-yrs)     Types: Cigarettes     Start date: 1993     Passive exposure: Current    Smokeless tobacco: Never   Vaping Use    Vaping status: Every Day    Substances: Nicotine    Devices: Disposable   Substance and Sexual Activity    Alcohol use: No    Drug use: No    Sexual activity: Defer           Objective   Physical Exam  Vitals and nursing note reviewed.   Constitutional:       Appearance: Normal appearance.   HENT:      Head: Normocephalic and atraumatic.      Mouth/Throat:      Mouth: Mucous membranes are moist.   Eyes:      Extraocular Movements: Extraocular movements intact.       Pupils: Pupils are equal, round, and reactive to light.   Cardiovascular:      Rate and Rhythm: Normal rate and regular rhythm.      Heart sounds: Normal heart sounds.   Pulmonary:      Effort: Pulmonary effort is normal.      Breath sounds: Normal breath sounds.   Abdominal:      General: Bowel sounds are normal.      Palpations: Abdomen is soft.      Tenderness: There is no abdominal tenderness.   Skin:     General: Skin is warm and dry.   Neurological:      General: No focal deficit present.      Mental Status: She is alert and oriented to person, place, and time.   Psychiatric:         Mood and Affect: Mood normal.         Behavior: Behavior normal.         Procedures           ED Course                                                     Lab Results (last 24 hours)       Procedure Component Value Units Date/Time    CBC & Differential [[id number]]  (Abnormal) Collected: 07/22/25 1929    Specimen: Blood Updated: 07/22/25 1947    Narrative:      The following orders were created for panel order CBC & Differential.  Procedure                               Abnormality         Status                     ---------                               -----------         ------                     CBC Auto Differential[[id number]]        Abnormal            Final result                 Please view results for these tests on the individual orders.    Basic Metabolic Panel [[id number]]  (Abnormal) Collected: 07/22/25 1929    Specimen: Blood Updated: 07/22/25 2006     Glucose 375 mg/dL      BUN 63.4 mg/dL      Creatinine 4.64 mg/dL      Sodium 134 mmol/L      Potassium 3.6 mmol/L      Chloride 99 mmol/L      CO2 16.0 mmol/L      Calcium 8.7 mg/dL      BUN/Creatinine Ratio 13.7     Anion Gap 19.0 mmol/L      eGFR 10.7 mL/min/1.73     Narrative:      GFR Categories in Chronic Kidney Disease (CKD)              GFR Category          GFR (mL/min/1.73)    Interpretation  G1                    90 or greater        Normal or high (1)  G2                     60-89                Mild decrease (1)  G3a                   45-59                Mild to moderate decrease  G3b                   30-44                Moderate to severe decrease  G4                    15-29                Severe decrease  G5                    14 or less           Kidney failure    (1)In the absence of evidence of kidney disease, neither GFR category G1 or G2 fulfill the criteria for CKD.    eGFR calculation 2021 CKD-EPI creatinine equation, which does not include race as a factor    Magnesium [[id number]]  (Normal) Collected: 07/22/25 1929    Specimen: Blood Updated: 07/22/25 2005     Magnesium 2.1 mg/dL     CBC Auto Differential [[id number]]  (Abnormal) Collected: 07/22/25 1929    Specimen: Blood Updated: 07/22/25 1947     WBC 13.47 10*3/mm3      RBC 3.77 10*6/mm3      Hemoglobin 10.9 g/dL      Hematocrit 33.6 %      MCV 89.1 fL      MCH 28.9 pg      MCHC 32.4 g/dL      RDW 15.2 %      RDW-SD 49.6 fl      MPV 9.4 fL      Platelets 526 10*3/mm3      Neutrophil % 70.3 %      Lymphocyte % 22.5 %      Monocyte % 4.2 %      Eosinophil % 1.8 %      Basophil % 0.2 %      Immature Grans % 1.0 %      Neutrophils, Absolute 9.47 10*3/mm3      Lymphocytes, Absolute 3.03 10*3/mm3      Monocytes, Absolute 0.56 10*3/mm3      Eosinophils, Absolute 0.24 10*3/mm3      Basophils, Absolute 0.03 10*3/mm3      Immature Grans, Absolute 0.14 10*3/mm3      nRBC 0.0 /100 WBC           No orders to display     Medications   sodium chloride 0.9 % flush 10 mL (has no administration in time range)   sodium chloride 0.9 % bolus 1,000 mL (1,000 mL Intravenous New Bag 7/22/25 2022)       Medical Decision Making  Dr. Phillips hospitalist on-call.  Case was discussed with him.  He has accepted the patient under his services.    Problems Addressed:  Acute renal failure superimposed on chronic kidney disease, unspecified acute renal failure type, unspecified CKD stage: complicated acute illness or injury    Amount  and/or Complexity of Data Reviewed  External Data Reviewed: labs and notes.  Labs: ordered. Decision-making details documented in ED Course.    Risk  Prescription drug management.  Decision regarding hospitalization.        Final diagnoses:   Acute renal failure superimposed on chronic kidney disease, unspecified acute renal failure type, unspecified CKD stage       ED Disposition  ED Disposition       ED Disposition   Decision to Admit    Condition   --    Comment   Level of Care: Remote Telemetry [26]   Diagnosis: Acute on chronic renal failure [350028]   Admitting Physician: NADIRA POLLARD [[id number]]   Attending Physician: NADIRA POLLARD [[id number]]   Certification: I Certify That Inpatient Hospital Services Are Medically Necessary For Greater Than 2 Midnights                 No follow-up provider specified.       Medication List      No changes were made to your prescriptions during this visit.            Faraz Mota MD  07/22/25 3192

## 2025-07-23 NOTE — PAYOR COMM NOTE
"Yash Perez (54 y.o. Female)  [id number]   Admit  7/22    New inpt Request   [facility] phone    fax            Date of Birth   1970    Social Security Number       Address   [address] [place], [place] [address]    Home Phone   [phone]    MRN   [id number]       Congregational   [employer]    Marital Status                               Admission Date   7/22/2025    Admission Type   Emergency    Admitting Provider   Poncho Jc MD    Attending Provider   Poncho Jc MD    Department, Room/Bed   [facility] 3C, 370/1       Discharge Date       Discharge Disposition       Discharge Destination                                 Attending Provider: Poncho Jc MD    Allergies: Bee Venom, Hydrocodone, Hydroxychloroquine, Ibuprofen, Pineapple, Pineapple Extract, Wasp Venom, Wasp Venom Protein, Hydrocodone-acetaminophen, Metformin, Penicillins, Sitagliptin, Chocolate, Poison Ivy Extract    Isolation: Droplet   Infection: Rhinovirus  (07/22/25)   Code Status: CPR    Ht: 154.9 cm (61\")   Wt: 40.1 kg (88 lb 8 oz)    Admission Cmt: None   Principal Problem: Acute on chronic renal failure [N17.9,N18.9]                   Active Insurance as of 7/22/2025       Primary Coverage       Payor Plan Insurance Group Employer/Plan Group    [facility] [place] [facility] [place]        Payor Plan Address Payor Plan Phone Number Payor Plan Fax Number Effective Dates    [address]   8/1/2019 - None Entered    [facility] [id number]         Subscriber Name Subscriber Birth Date Member ID       YASH PEREZ 1970 [id number]                     Emergency Contacts        (Rel.) Home Phone Work Phone Mobile Phone    JENNIFER PEREZ (Spouse) [phone] -- [phone]    CASSIA PEREZ (Daughter) -- -- [phone]                 History & Physical        Elodia Phillips MD at 07/22/25 2047              [facility]" [facility]  HISTORY AND PHYSICAL    Date of Admission: 7/22/2025  Primary Care Physician: KIRILL Murguia MD    Subjective   Primary Historian: Patient    Chief Complaint: Abnormal lab    History of Present Illness  This is a 54-year-old female patient with a medical history of CKD, diabetes, presenting to the ED for the evaluation of abnormal labs.  She was recently seen by her doctor and she did recent labs that showed SUGAR with elevated creatinine.  She states that she has not been eating or drinking well lately.  She feels generalized weakness and fatigue.  She is complaining of cough that been ongoing for 2 weeks.  She denies any fever, chills or shortness of breath or chest pain.        Review of Systems   Otherwise complete ROS reviewed and negative except as mentioned in the HPI.    Past Medical History:   Past Medical History:   Diagnosis Date    Arthritis     Contusion     Degenerative disc disease, cervical     Diabetes mellitus     Elevated cholesterol     Fibromyalgia     Neuropathy     Osteoporosis     Pancreatitis     Raynaud disease     Renal insufficiency     Rheumatoid arthritis     Smoker 02/08/2022    Vitamin D deficiency 04/26/2022     Past Surgical History:  Past Surgical History:   Procedure Laterality Date    APPENDECTOMY      CHOLECYSTECTOMY      COLONOSCOPY      ENDOSCOPY N/A 10/08/2019    Procedure: ESOPHAGOGASTRODUODENOSCOPY WITH ANESTHESIA;  Surgeon: Aleks Vaughn DO;  Location: [facility] ENDOSCOPY;  Service: Gastroenterology    ENDOSCOPY N/A 11/12/2024    Procedure: ESOPHAGOGASTRODUODENOSCOPY WITH ANESTHESIA;  Surgeon: Tiffanie Saucedo MD;  Location: [facility] ENDOSCOPY;  Service: Gastroenterology;  Laterality: N/A;  pre op: Gastroparesis, Intractable nausea  post op: insertion of dobhoff   PCP: Brandon Murguia    ENDOSCOPY N/A 11/15/2024    Procedure: ESOPHAGOGASTRODUODENOSCOPY WITH ANESTHESIA WITH NG TUBE INSERTION;  Surgeon: Sheryl  "MD Tiffanie;  Location: [facility] ENDOSCOPY;  Service: Gastroenterology;  Laterality: N/A;  PRE OP: DOBHOFF  POST OP: DOBHOFF  PCP: TELLY PACK    ENDOSCOPY WITH GASTROSTOMY TUBE INSERTION N/A 6/15/2022    Procedure: ESOPHAGOGASTRODUODENOSCOPY WITH GASTROSTOMY TUBE INSERTION;  Surgeon: Jersey Lee MD;  Location:  [facility] ENDOSCOPY;  Service: Gastroenterology;  Laterality: N/A;  pre peg placement  post peg placement  Dr. Murguia    ENTEROSCOPY SMALL BOWEL N/A 11/3/2022    Procedure: Esophagogastroduodenoscopy with Peg Removal;  Surgeon: Aleks Vaughn DO;  Location:  [facility] ENDOSCOPY;  Service: Gastroenterology;  Laterality: N/A;  pre; peg removal  post; peg removal   KIRILL Murguia MD        HYSTERECTOMY       Social History:  reports that she has been smoking cigarettes. She started smoking about 32 years ago. She has a 16.3 pack-year smoking history. She has been exposed to tobacco smoke. She has never used smokeless tobacco. She reports that she does not drink alcohol and does not use drugs.    Family History: family history is not on file. She was adopted.       Allergies:  Allergies   Allergen Reactions    Bee Venom Anaphylaxis    Hydrocodone Anaphylaxis     SOB    Hydroxychloroquine Other (See Comments)     Hair loss    Ibuprofen Other (See Comments)     \"SHUTS MY KIDNEYS DOWN\" PER PATIENT     Pineapple Anaphylaxis     Makes throat itch and \"tightens throat\"    Pineapple Extract Anaphylaxis     Makes throat itch and \"tightens throat\"    Wasp Venom Anaphylaxis    Wasp Venom Protein Anaphylaxis    Hydrocodone-Acetaminophen Nausea And Vomiting, Nausea Only and Dizziness    Metformin Other (See Comments)     Pt states it messes with her kidneys    Penicillins Nausea And Vomiting    Sitagliptin Other (See Comments)      Abdominal Pain      Chocolate Rash     Rash on face    Poison Ivy Extract Itching and Rash       Medications:  Prior to Admission medications    Medication Sig Start Date End Date Taking? " Authorizing Provider   albuterol sulfate  (90 Base) MCG/ACT inhaler Inhale 2 puffs 3 (Three) Times a Day As Needed for Wheezing.   Yes Kevin Mejia MD   aspirin 81 MG EC tablet Take 1 tablet by mouth Daily.   Yes Kevin Mejia MD   atorvastatin (LIPITOR) 80 MG tablet Take 1 tablet by mouth Every Night.   Yes Kevin Mejia MD   Calcium Carbonate-Vitamin D 600-5 MG-MCG tablet Take 1 tablet by mouth Daily.   Yes Kevin Mejia MD   cetirizine (zyrTEC) 10 MG tablet Take 1 tablet by mouth Daily As Needed for Allergies.   Yes Kevin Mejia MD   DULoxetine (CYMBALTA) 30 MG capsule Take 1 capsule by mouth Daily. 6/30/25  Yes Rickey Tinajero MD   magnesium oxide (MAG-OX) 400 MG tablet Take 1 tablet by mouth 3 times a day.   Yes Kevin Mejia MD   metoclopramide (REGLAN) 5 MG tablet Take 2 tablets by mouth 3 (Three) Times a Day As Needed (nause and vomiting).   Yes Kevin Mejia MD   nortriptyline (PAMELOR) 25 MG capsule Take 1 capsule by mouth Every Night.   Yes Kevin Mejia MD   omeprazole (priLOSEC) 40 MG capsule Take 1 capsule by mouth Daily.   Yes Kevin Mejia MD   pregabalin (LYRICA) 75 MG capsule Take 1 capsule by mouth Every Night. 6/30/25  Yes Rickey Tinajero MD   promethazine (PHENERGAN) 25 MG tablet Take 1 tablet by mouth Every 8 (Eight) Hours As Needed for Nausea or Vomiting. 6/30/25  Yes Rickey Tinajero MD   rOPINIRole (REQUIP) 1 MG tablet Take 1 tablet by mouth Every Night. 6/30/25  Yes Rickey Tinajero MD   sodium bicarbonate 650 MG tablet Take 1 tablet by mouth 2 (Two) Times a Day. 7/2/25  Yes Rickey Tinajero MD   sucralfate (CARAFATE) 1 g tablet Take 1 tablet by mouth 4 (Four) Times a Day.   Yes Kevin Mejia MD   sodium chloride 0.9 % solution Infuse 30 mL/hr into a venous catheter 3 (Three) Times a Week. Monday, Wednesday, Friday    Kevin Mejia MD   triamcinolone (KENALOG) 0.1 % cream Apply 1 Application  "topically to the appropriate area as directed Daily As Needed (prior to dexcom application).  7/22/25  Provider, MD JULIAN Brown have utilized all available immediate resources to obtain, update, or review the patient's current medications (including all prescriptions, over-the-counter products, herbals, cannabis/cannabidiol products, and vitamin/mineral/dietary (nutritional) supplements).    Objective     Vital Signs: /77 (BP Location: Left arm, Patient Position: Lying)   Pulse 110   Temp 97.6 °F (36.4 °C) (Oral)   Resp 20   Ht 154.9 cm (61\")   Wt 40.1 kg (88 lb 8 oz)   SpO2 100%   BMI 16.72 kg/m²   Physical Exam  Constitutional:       Appearance: She is ill-appearing.   Cardiovascular:      Rate and Rhythm: Normal rate and regular rhythm.      Pulses: Normal pulses.      Heart sounds: Normal heart sounds. No murmur heard.  Pulmonary:      Effort: Pulmonary effort is normal. No respiratory distress.      Breath sounds: Normal breath sounds. No wheezing or rales.   Abdominal:      General: Abdomen is flat. Bowel sounds are normal. There is no distension.      Palpations: Abdomen is soft.      Tenderness: There is no abdominal tenderness. There is no guarding.   Musculoskeletal:      Right lower leg: No edema.      Left lower leg: No edema.   Skin:     General: Skin is warm.   Neurological:      Mental Status: She is alert and oriented to person, place, and time. Mental status is at baseline.              Results Reviewed:  Lab Results (last 24 hours)       Procedure Component Value Units Date/Time    Respiratory Panel PCR w/COVID-19(SARS-CoV-2) [facility] In-House, NP Swab in UTM/VTM, 2 HR TAT - Swab, Nasopharynx [[id number]]  (Abnormal) Collected: 07/22/25 2054    Specimen: Swab from Nasopharynx Updated: 07/22/25 2151     ADENOVIRUS, PCR Not Detected     Coronavirus 229E Not Detected     Coronavirus HKU1 Not Detected     Coronavirus NL63 Not Detected     Coronavirus OC43 Not Detected "     COVID19 Not Detected     Human Metapneumovirus Not Detected     Human Rhinovirus/Enterovirus Detected     Influenza A PCR Not Detected     Influenza B PCR Not Detected     Parainfluenza Virus 1 Not Detected     Parainfluenza Virus 2 Not Detected     Parainfluenza Virus 3 Not Detected     Parainfluenza Virus 4 Not Detected     RSV, PCR Not Detected     Bordetella pertussis pcr Not Detected     Bordetella parapertussis PCR Not Detected     Chlamydophila pneumoniae PCR Not Detected     Mycoplasma pneumo by PCR Not Detected    Narrative:      In the setting of a positive respiratory panel with a viral infection PLUS a negative procalcitonin without other underlying concern for bacterial infection, consider observing off antibiotics or discontinuation of antibiotics and continue supportive care. If the respiratory panel is positive for atypical bacterial infection (Bordetella pertussis, Chlamydophila pneumoniae, or Mycoplasma pneumoniae), consider antibiotic de-escalation to target atypical bacterial infection.    Blood Culture - Blood, Hand, Left [[id number]] Collected: 07/22/25 2112    Specimen: Blood from Hand, Left Updated: 07/22/25 2116    Blood Culture - Blood, Blood, PICC Line [[id number]] Collected: 07/22/25 2103    Specimen: Blood, PICC Line Updated: 07/22/25 2116    Basic Metabolic Panel [[id number]]  (Abnormal) Collected: 07/22/25 1929    Specimen: Blood Updated: 07/22/25 2006     Glucose 375 mg/dL      BUN 63.4 mg/dL      Creatinine 4.64 mg/dL      Sodium 134 mmol/L      Potassium 3.6 mmol/L      Chloride 99 mmol/L      CO2 16.0 mmol/L      Calcium 8.7 mg/dL      BUN/Creatinine Ratio 13.7     Anion Gap 19.0 mmol/L      eGFR 10.7 mL/min/1.73     Narrative:      GFR Categories in Chronic Kidney Disease (CKD)              GFR Category          GFR (mL/min/1.73)    Interpretation  G1                    90 or greater        Normal or high (1)  G2                    60-89                Mild decrease (1)  G3a                    45-59                Mild to moderate decrease  G3b                   30-44                Moderate to severe decrease  G4                    15-29                Severe decrease  G5                    14 or less           Kidney failure    (1)In the absence of evidence of kidney disease, neither GFR category G1 or G2 fulfill the criteria for CKD.    eGFR calculation 2021 CKD-EPI creatinine equation, which does not include race as a factor    Magnesium [[id number]]  (Normal) Collected: 07/22/25 1929    Specimen: Blood Updated: 07/22/25 2005     Magnesium 2.1 mg/dL     CBC & Differential [[id number]]  (Abnormal) Collected: 07/22/25 1929    Specimen: Blood Updated: 07/22/25 1947    Narrative:      The following orders were created for panel order CBC & Differential.  Procedure                               Abnormality         Status                     ---------                               -----------         ------                     CBC Auto Differential[[id number]]        Abnormal            Final result                 Please view results for these tests on the individual orders.    CBC Auto Differential [[id number]]  (Abnormal) Collected: 07/22/25 1929    Specimen: Blood Updated: 07/22/25 1947     WBC 13.47 10*3/mm3      RBC 3.77 10*6/mm3      Hemoglobin 10.9 g/dL      Hematocrit 33.6 %      MCV 89.1 fL      MCH 28.9 pg      MCHC 32.4 g/dL      RDW 15.2 %      RDW-SD 49.6 fl      MPV 9.4 fL      Platelets 526 10*3/mm3      Neutrophil % 70.3 %      Lymphocyte % 22.5 %      Monocyte % 4.2 %      Eosinophil % 1.8 %      Basophil % 0.2 %      Immature Grans % 1.0 %      Neutrophils, Absolute 9.47 10*3/mm3      Lymphocytes, Absolute 3.03 10*3/mm3      Monocytes, Absolute 0.56 10*3/mm3      Eosinophils, Absolute 0.24 10*3/mm3      Basophils, Absolute 0.03 10*3/mm3      Immature Grans, Absolute 0.14 10*3/mm3      nRBC 0.0 /100 WBC           Imaging Results (Last 24 Hours)       Procedure Component Value  Units Date/Time    XR Chest 1 View [[id number]] Collected: 07/22/25 2144     Updated: 07/22/25 2148    Narrative:      EXAM: XR CHEST 1 VW-      HISTORY: cough; N17.9-Acute kidney failure, unspecified; N18.9-Chronic  kidney disease, unspecified       COMPARISON: 6/28/2025.     TECHNIQUE: Single frontal radiograph of the chest was obtained.     FINDINGS:     Support Devices: Right upper extremity PICC line tip overlies the lower  SVC.     Cardiac and Mediastinal Silhouettes: Normal.     Lungs/Pleura: No focal consolidation. No sizable pleural effusion. No  visible pneumothorax.     Osseous structures: No acute osseous finding.     Other: None.       Impression:         No acute cardiopulmonary abnormality.     Right upper extremity PICC line is in good position.           This report was signed and finalized on 7/22/2025 9:45 PM by Presley Nelson.             I have personally reviewed and interpreted the radiology studies and ECG obtained at time of admission.     Assessment / Plan   Assessment:   Active Hospital Problems    Diagnosis     **Acute on chronic renal failure        Treatment Plan  The patient will be admitted to my service here at [facility].     Assessment and plan:  #SUGAR.  #Rhinovirus infection.  #Acute bronchitis.    - IV fluids.  - Will recheck creatinine in the morning.  - Consulting with nephrology in AM.  - Droplet isolation  - Cough medications ordered, systemic steroids, and nebulizers.  - DVT prophylaxis with SCDs for now.    Medical Decision Making  Number and Complexity of problems: 3 acute and moderate  Differential Diagnosis: As above    Conditions and Status        Condition is worsening.     [facility] Data  External documents reviewed: Yes  Cardiac tracing (EKG, telemetry) interpretation: None  Radiology interpretation: Yes  Labs reviewed: Yes  Any tests that were considered but not ordered: No     Decision rules/scores evaluated (example [id number], Wells, etc): No     Discussed  with: Patient and ED provider     Care Planning  Shared decision making: Discussed the plan with the patient and she was agreeable  Code status and discussions: Full code    Disposition  Social Determinants of Health that impact treatment or disposition: Not at the moment  Estimated length of stay is 2 to 3 days.     I confirmed that the patient's advanced care plan is present, code status is documented, and a surrogate decision maker is listed in the patient's medical record.       The patient was seen and examined by me on 7/22 at 9 PM.    Electronically signed by Elodia Phillips MD, 07/23/25, 02:32 CDT.              Electronically signed by Elodia Phillips MD at 07/23/25 0233          Emergency Department Notes        Cristine Go, RN at 07/22/25 2100          XRAY @ bedside     Electronically signed by Cristine Go RN at 07/22/25 2100       Cristine Go RN at 07/22/25 1938          Pt states had PICC line changed yesterday lyudmila Ricketts    Electronically signed by Cristine Go RN at 07/22/25 1938       Faraz Mota MD at 07/22/25 1929          Subjective   History of Present Illness    53-year-old female presents emergency room with abnormal labs.  She states that her kidney function is elevated.  She states that she has not been eating and drinking well.  She states that she been feeling fatigued.  Patient denies any fevers.  No chest pain or shortness of breath.  Patient denies any other symptoms at this time.        Review of Systems   All other systems reviewed and are negative.      Past Medical History:   Diagnosis Date    Arthritis     Contusion     Degenerative disc disease, cervical     Diabetes mellitus     Elevated cholesterol     Fibromyalgia     Neuropathy     Osteoporosis     Pancreatitis     Raynaud disease     Renal insufficiency     Rheumatoid arthritis     Smoker 02/08/2022    Vitamin D deficiency 04/26/2022       Allergies   Allergen Reactions    Bee Venom Anaphylaxis     "Hydrocodone Anaphylaxis     SOB    Hydroxychloroquine Other (See Comments)     Hair loss    Ibuprofen Other (See Comments)     \"SHUTS MY KIDNEYS DOWN\" PER PATIENT     Pineapple Anaphylaxis     Makes throat itch and \"tightens throat\"    Pineapple Extract Anaphylaxis     Makes throat itch and \"tightens throat\"    Wasp Venom Anaphylaxis    Wasp Venom Protein Anaphylaxis    Hydrocodone-Acetaminophen Nausea And Vomiting, Nausea Only and Dizziness    Metformin Other (See Comments)     Pt states it messes with her kidneys    Penicillins Nausea And Vomiting    Sitagliptin Other (See Comments)      Abdominal Pain      Chocolate Rash     Rash on face    Poison Ivy Extract Itching and Rash       Past Surgical History:   Procedure Laterality Date    APPENDECTOMY      CHOLECYSTECTOMY      COLONOSCOPY      ENDOSCOPY N/A 10/08/2019    Procedure: ESOPHAGOGASTRODUODENOSCOPY WITH ANESTHESIA;  Surgeon: Aleks Vaughn DO;  Location: [facility] ENDOSCOPY;  Service: Gastroenterology    ENDOSCOPY N/A 11/12/2024    Procedure: ESOPHAGOGASTRODUODENOSCOPY WITH ANESTHESIA;  Surgeon: Tiffanie Saucedo MD;  Location: [facility] ENDOSCOPY;  Service: Gastroenterology;  Laterality: N/A;  pre op: Gastroparesis, Intractable nausea  post op: insertion of dobhoff   PCP: Brandon Murguia    ENDOSCOPY N/A 11/15/2024    Procedure: ESOPHAGOGASTRODUODENOSCOPY WITH ANESTHESIA WITH NG TUBE INSERTION;  Surgeon: Tiffanie Saucedo MD;  Location: [facility] ENDOSCOPY;  Service: Gastroenterology;  Laterality: N/A;  PRE OP: DOBHOFF  POST OP: DOBHOFF  PCP: TELLY PACK    ENDOSCOPY WITH GASTROSTOMY TUBE INSERTION N/A 6/15/2022    Procedure: ESOPHAGOGASTRODUODENOSCOPY WITH GASTROSTOMY TUBE INSERTION;  Surgeon: Jersey Lee MD;  Location: [facility] ENDOSCOPY;  Service: Gastroenterology;  Laterality: N/A;  pre peg placement  post peg placement  Dr. Murguia    ENTEROSCOPY SMALL BOWEL N/A 11/3/2022    Procedure: Esophagogastroduodenoscopy with Peg Removal;  Surgeon: Roderick" Aleks ANN DO;  Location: [facility] ENDOSCOPY;  Service: Gastroenterology;  Laterality: N/A;  pre; peg removal  post; peg removal   KIRILL Murguia MD        HYSTERECTOMY         Family History   Adopted: Yes   Problem Relation Age of Onset    Colon polyps Neg Hx     Colon cancer Neg Hx        Social History     Socioeconomic History    Marital status:    Tobacco Use    Smoking status: Every Day     Current packs/day: 0.50     Average packs/day: 0.5 packs/day for 32.6 years (16.3 ttl pk-yrs)     Types: Cigarettes     Start date: 1993     Passive exposure: Current    Smokeless tobacco: Never   Vaping Use    Vaping status: Every Day    Substances: Nicotine    Devices: Disposable   Substance and Sexual Activity    Alcohol use: No    Drug use: No    Sexual activity: Defer           Objective   Physical Exam  Vitals and nursing note reviewed.   Constitutional:       Appearance: Normal appearance.   HENT:      Head: Normocephalic and atraumatic.      Mouth/Throat:      Mouth: Mucous membranes are moist.   Eyes:      Extraocular Movements: Extraocular movements intact.      Pupils: Pupils are equal, round, and reactive to light.   Cardiovascular:      Rate and Rhythm: Normal rate and regular rhythm.      Heart sounds: Normal heart sounds.   Pulmonary:      Effort: Pulmonary effort is normal.      Breath sounds: Normal breath sounds.   Abdominal:      General: Bowel sounds are normal.      Palpations: Abdomen is soft.      Tenderness: There is no abdominal tenderness.   Skin:     General: Skin is warm and dry.   Neurological:      General: No focal deficit present.      Mental Status: She is alert and oriented to person, place, and time.   Psychiatric:         Mood and Affect: Mood normal.         Behavior: Behavior normal.         Procedures          ED Course                                                     Lab Results (last 24 hours)       Procedure Component Value Units Date/Time    CBC & Differential  [[id number]]  (Abnormal) Collected: 07/22/25 1929    Specimen: Blood Updated: 07/22/25 1947    Narrative:      The following orders were created for panel order CBC & Differential.  Procedure                               Abnormality         Status                     ---------                               -----------         ------                     CBC Auto Differential[[id number]]        Abnormal            Final result                 Please view results for these tests on the individual orders.    Basic Metabolic Panel [[id number]]  (Abnormal) Collected: 07/22/25 1929    Specimen: Blood Updated: 07/22/25 2006     Glucose 375 mg/dL      BUN 63.4 mg/dL      Creatinine 4.64 mg/dL      Sodium 134 mmol/L      Potassium 3.6 mmol/L      Chloride 99 mmol/L      CO2 16.0 mmol/L      Calcium 8.7 mg/dL      BUN/Creatinine Ratio 13.7     Anion Gap 19.0 mmol/L      eGFR 10.7 mL/min/1.73     Narrative:      GFR Categories in Chronic Kidney Disease (CKD)              GFR Category          GFR (mL/min/1.73)    Interpretation  G1                    90 or greater        Normal or high (1)  G2                    60-89                Mild decrease (1)  G3a                   45-59                Mild to moderate decrease  G3b                   30-44                Moderate to severe decrease  G4                    15-29                Severe decrease  G5                    14 or less           Kidney failure    (1)In the absence of evidence of kidney disease, neither GFR category G1 or G2 fulfill the criteria for CKD.    eGFR calculation 2021 CKD-EPI creatinine equation, which does not include race as a factor    Magnesium [[id number]]  (Normal) Collected: 07/22/25 1929    Specimen: Blood Updated: 07/22/25 2005     Magnesium 2.1 mg/dL     CBC Auto Differential [[id number]]  (Abnormal) Collected: 07/22/25 1929    Specimen: Blood Updated: 07/22/25 1947     WBC 13.47 10*3/mm3      RBC 3.77 10*6/mm3      Hemoglobin 10.9 g/dL       Hematocrit 33.6 %      MCV 89.1 fL      MCH 28.9 pg      MCHC 32.4 g/dL      RDW 15.2 %      RDW-SD 49.6 fl      MPV 9.4 fL      Platelets 526 10*3/mm3      Neutrophil % 70.3 %      Lymphocyte % 22.5 %      Monocyte % 4.2 %      Eosinophil % 1.8 %      Basophil % 0.2 %      Immature Grans % 1.0 %      Neutrophils, Absolute 9.47 10*3/mm3      Lymphocytes, Absolute 3.03 10*3/mm3      Monocytes, Absolute 0.56 10*3/mm3      Eosinophils, Absolute 0.24 10*3/mm3      Basophils, Absolute 0.03 10*3/mm3      Immature Grans, Absolute 0.14 10*3/mm3      nRBC 0.0 /100 WBC           No orders to display     Medications   sodium chloride 0.9 % flush 10 mL (has no administration in time range)   sodium chloride 0.9 % bolus 1,000 mL (1,000 mL Intravenous New Bag 7/22/25 2022)       Medical Decision Making  Dr. Phillips hospitalist on-call.  Case was discussed with him.  He has accepted the patient under his services.    Problems Addressed:  Acute renal failure superimposed on chronic kidney disease, unspecified acute renal failure type, unspecified CKD stage: complicated acute illness or injury    Amount and/or Complexity of Data Reviewed  External Data Reviewed: labs and notes.  Labs: ordered. Decision-making details documented in ED Course.    Risk  Prescription drug management.  Decision regarding hospitalization.        Final diagnoses:   Acute renal failure superimposed on chronic kidney disease, unspecified acute renal failure type, unspecified CKD stage       ED Disposition  ED Disposition       ED Disposition   Decision to Admit    Condition   --    Comment   Level of Care: Remote Telemetry [26]   Diagnosis: Acute on chronic renal failure [372294]   Admitting Physician: NADIRA PHILLIPS [[id number]]   Attending Physician: NADIRA PHILLIPS [[id number]]   Certification: I Certify That Inpatient Hospital Services Are Medically Necessary For Greater Than 2 Midnights                 No follow-up provider specified.       Medication List       No changes were made to your prescriptions during this visit.            Faraz Mota MD  07/22/25 2041      Electronically signed by Faraz Mota MD at 07/22/25 2041       Vital Signs (last day)       Date/Time Temp Temp src Pulse Resp BP Patient Position SpO2    07/23/25 0951 -- -- 109 16 -- -- --    07/23/25 0946 -- -- 113 16 -- -- 99    07/23/25 0724 97.4 (36.3) Oral 119 16 94/61 Lying 99    07/23/25 0623 -- -- 108 16 -- -- --    07/23/25 0619 -- -- 113 16 -- -- 98    07/23/25 0338 97.8 (36.6) Oral 111 16 98/60 Lying 98    07/22/25 2332 97.6 (36.4) Oral 110 20 123/77 Lying 100    07/22/25 2321 -- -- 112 20 -- -- 98    07/22/25 2316 -- -- 109 20 -- -- 99    07/22/25 2125 97.6 (36.4) Oral 111 20 112/76 Sitting 100    07/22/25 2114 97.6 (36.4) -- 106 20 120/68 -- 96    07/22/25 2031 -- -- 102 20 114/103 -- 100    07/22/25 19:42:49 -- -- 102 18 93/69 Sitting 97    07/22/25 1912 97.8 (36.6) Oral 116 20 90/68 Sitting 97          Current Facility-Administered Medications   Medication Dose Route Frequency Provider Last Rate Last Admin    aspirin EC tablet 81 mg  81 mg Oral Daily Elodia Phillips MD   81 mg at 07/23/25 0826    dexAMETHasone (DECADRON) injection 6 mg  6 mg Intravenous Daily Elodia Phillips MD   6 mg at 07/22/25 2302    dextrose (D50W) (25 g/50 mL) IV injection 25 g  25 g Intravenous Q15 Min PRPoncho Perkins MD        dextrose (GLUTOSE) oral gel 15 g  15 g Oral Q15 Min PRN Poncho Jc MD        glucagon (GLUCAGEN) injection 1 mg  1 mg Intramuscular Q15 Min PRN Poncho Jc MD        guaifenesin-dextromethorphan 600-30 mg (MUCINEX DM) 1 tablet  1 tablet Oral BID PRN Elodia Phillips MD        insulin glargine (LANTUS, SEMGLEE) injection 15 Units  15 Units Subcutaneous Q12H Poncho Jc MD   15 Units at 07/23/25 0954    Insulin Lispro (humaLOG) injection 4-24 Units  4-24 Units Subcutaneous 4x Daily AC & at Bedtime Poncho Jc MD   24 Units at 07/23/25 0826     ipratropium-albuterol (DUO-NEB) nebulizer solution 3 mL  3 mL Nebulization 4x Daily - RT Elodia Phillips MD   3 mL at 07/23/25 0946    ipratropium-albuterol (DUO-NEB) nebulizer solution 3 mL  3 mL Nebulization Q4H PRN Elodia Phillips MD        nitroglycerin (NITROSTAT) SL tablet 0.4 mg  0.4 mg Sublingual Q5 Min PRN Elodia Phillips MD        ondansetron ODT (ZOFRAN-ODT) disintegrating tablet 4 mg  4 mg Oral Q6H PRN Elodia Phillips MD        Or    ondansetron (ZOFRAN) injection 4 mg  4 mg Intravenous Q6H PRN Elodia Phillips MD        pantoprazole (PROTONIX) EC tablet 40 mg  40 mg Oral Q AM Elodia Phillips MD   40 mg at 07/23/25 0507    rOPINIRole (REQUIP) tablet 1 mg  1 mg Oral Q8H Poncho Jc MD   1 mg at 07/23/25 0852    sodium bicarbonate tablet 650 mg  650 mg Oral BID Elodia Phillips MD   650 mg at 07/23/25 0826    sodium chloride 0.9 % flush 10 mL  10 mL Intravenous PRN Faraz Mota MD        sodium chloride 0.9 % flush 10 mL  10 mL Intravenous Q12H Elodia Phillips MD   10 mL at 07/23/25 0827    sodium chloride 0.9 % flush 10 mL  10 mL Intravenous PRN Elodia Phillips MD        sodium chloride 0.9 % infusion 40 mL  40 mL Intravenous PRN Elodia Phillips MD        sodium chloride 0.9 % infusion  100 mL/hr Intravenous Continuous Ford Elena APRN 100 mL/hr at 07/23/25 0954 100 mL/hr at 07/23/25 0954        Consult Notes (last 24 hours)        Ford Elena APRN at 07/23/25 0858        Consult Orders    1. Inpatient Nephrology Consult [[id number]] ordered by Elodia Phillips MD at 07/22/25 2246                 Nephrology ([facility]) Consult Note      Patient:  Tasha Perez  YOB: 1970  Date of Service: 7/23/2025  MRN: [id number]   Acct: [id number]   Primary Care Physician: KIRILL Murguia MD  Advance Directive:   Code Status and Medical Interventions: CPR (Attempt to Resuscitate); Full Support   Ordered at: 07/22/25 2243     Code Status (Patient has no  pulse and is not breathing):    CPR (Attempt to Resuscitate)     Medical Interventions (Patient has pulse or is breathing):    Full Support     Admit Date: 7/22/2025       Hospital Day: 1  Referring Provider: Faraz Mota MD      Patient personally seen and examined.  Complete chart including Consults, Notes, Operative Reports, Labs, Cardiology, and Radiology studies reviewed as able.        Subjective:  Tasha Perez is a 54 y.o. female for whom we were consulted for evaluation and treatment of acute kidney injury. Baseline chronic kidney disease stage 3b. Follows with Dr Barrow in our office. History of many previous hospitalizations for SUGAR due to volume depletion. History of recurrent pancreatitis, type 2 diabetes, osteoarthritis, gastroparesis. S/p gastric stimulator placement in [place]. Has had feeding tubes and PICC lines many times in the past and she has also been prescribed outpatient IV fluids on a routine basis.  Most recent discharge from hospital was on 6/30.  Presents this time with acute decline in renal function. Complaint of not eating or drinking, fatigue, weakness. Labs in ER showed creatinine 4.64. Admitted to medical floor with IV fluids being administered. Currently is awake and alert. Complaint of non-productive cough. Blood glucose level has been significantly elevated overnight. Denies fever/chills. No edema or dyspnea. No n/v/d. Denies changes in urination. Denies dysuria or hematuria. Urine output nonoliguric    Allergies:  Bee venom, Hydrocodone, Hydroxychloroquine, Ibuprofen, Pineapple, Pineapple extract, Wasp venom, Wasp venom protein, Hydrocodone-acetaminophen, Metformin, Penicillins, Sitagliptin, Chocolate, and Poison ivy extract    Home Meds:  Medications Prior to Admission   Medication Sig Dispense Refill Last Dose/Taking    albuterol sulfate  (90 Base) MCG/ACT inhaler Inhale 2 puffs 3 (Three) Times a Day As Needed for Wheezing.   Taking As Needed    aspirin 81 MG EC  tablet Take 1 tablet by mouth Daily.   Taking    atorvastatin (LIPITOR) 80 MG tablet Take 1 tablet by mouth Every Night.   Taking    Calcium Carbonate-Vitamin D 600-5 MG-MCG tablet Take 1 tablet by mouth Daily.   Taking    cetirizine (zyrTEC) 10 MG tablet Take 1 tablet by mouth Daily As Needed for Allergies.   Taking As Needed    DULoxetine (CYMBALTA) 30 MG capsule Take 1 capsule by mouth Daily. 30 capsule 0 Taking    magnesium oxide (MAG-OX) 400 MG tablet Take 1 tablet by mouth 3 times a day.   Taking    metoclopramide (REGLAN) 5 MG tablet Take 2 tablets by mouth 3 (Three) Times a Day As Needed (nause and vomiting).   Taking As Needed    nortriptyline (PAMELOR) 25 MG capsule Take 1 capsule by mouth Every Night.   Taking    omeprazole (priLOSEC) 40 MG capsule Take 1 capsule by mouth Daily.   Taking    pregabalin (LYRICA) 75 MG capsule Take 1 capsule by mouth Every Night.   Taking    promethazine (PHENERGAN) 25 MG tablet Take 1 tablet by mouth Every 8 (Eight) Hours As Needed for Nausea or Vomiting.   Taking As Needed    rOPINIRole (REQUIP) 1 MG tablet Take 1 tablet by mouth Every Night.   Taking    sodium bicarbonate 650 MG tablet Take 1 tablet by mouth 2 (Two) Times a Day.   Taking    sucralfate (CARAFATE) 1 g tablet Take 1 tablet by mouth 4 (Four) Times a Day.   Taking    sodium chloride 0.9 % solution Infuse 30 mL/hr into a venous catheter 3 (Three) Times a Week. Monday, Wednesday, Friday          Medicines:  Current Facility-Administered Medications   Medication Dose Route Frequency Provider Last Rate Last Admin    aspirin EC tablet 81 mg  81 mg Oral Daily Elodia Phillips MD   81 mg at 07/23/25 0826    dexAMETHasone (DECADRON) injection 6 mg  6 mg Intravenous Daily Elodia Phillips MD   6 mg at 07/22/25 2302    dextrose (D50W) (25 g/50 mL) IV injection 25 g  25 g Intravenous Q15 Min PRN Poncho Jc MD        dextrose (GLUTOSE) oral gel 15 g  15 g Oral Q15 Min PRN Poncho Jc MD        glucagon  (GLUCAGEN) injection 1 mg  1 mg Intramuscular Q15 Min PRN Poncho Jc MD        guaifenesin-dextromethorphan 600-30 mg (MUCINEX DM) 1 tablet  1 tablet Oral BID PRN Elodia Phillips MD        Insulin Lispro (humaLOG) injection 4-24 Units  4-24 Units Subcutaneous 4x Daily AC & at Bedtime Poncho Jc MD   24 Units at 07/23/25 0826    ipratropium-albuterol (DUO-NEB) nebulizer solution 3 mL  3 mL Nebulization 4x Daily - RT Elodia Phillips MD   3 mL at 07/23/25 0619    ipratropium-albuterol (DUO-NEB) nebulizer solution 3 mL  3 mL Nebulization Q4H PRN Elodia Phillips MD        nitroglycerin (NITROSTAT) SL tablet 0.4 mg  0.4 mg Sublingual Q5 Min PRN Elodia Phillips MD        ondansetron ODT (ZOFRAN-ODT) disintegrating tablet 4 mg  4 mg Oral Q6H PRN Elodia Phillips MD        Or    ondansetron (ZOFRAN) injection 4 mg  4 mg Intravenous Q6H PRN Elodia Phillips MD        pantoprazole (PROTONIX) EC tablet 40 mg  40 mg Oral Q AM Elodia Phillips MD   40 mg at 07/23/25 0507    rOPINIRole (REQUIP) tablet 1 mg  1 mg Oral Q8H Poncho Jc MD   1 mg at 07/23/25 0852    sodium bicarbonate tablet 650 mg  650 mg Oral BID Elodia Phillips MD   650 mg at 07/23/25 0826    sodium chloride 0.9 % flush 10 mL  10 mL Intravenous PRN Faraz Mota MD        sodium chloride 0.9 % flush 10 mL  10 mL Intravenous Q12H Elodia Phillips MD   10 mL at 07/23/25 0827    sodium chloride 0.9 % flush 10 mL  10 mL Intravenous PRN Elodia Phillips MD        sodium chloride 0.9 % infusion 40 mL  40 mL Intravenous PRN Elodia Phillips MD        sodium chloride 0.9 % infusion  100 mL/hr Intravenous Continuous Elodia Phillips MD   Stopped at 07/23/25 0861       Past Medical History:  Past Medical History:   Diagnosis Date    Arthritis     Contusion     Degenerative disc disease, cervical     Diabetes mellitus     Elevated cholesterol     Fibromyalgia     Neuropathy     Osteoporosis     Pancreatitis     Raynaud disease     Renal insufficiency      Rheumatoid arthritis     Smoker 02/08/2022    Vitamin D deficiency 04/26/2022       Past Surgical History:  Past Surgical History:   Procedure Laterality Date    APPENDECTOMY      CHOLECYSTECTOMY      COLONOSCOPY      ENDOSCOPY N/A 10/08/2019    Procedure: ESOPHAGOGASTRODUODENOSCOPY WITH ANESTHESIA;  Surgeon: Aleks Vaughn DO;  Location: [facility] ENDOSCOPY;  Service: Gastroenterology    ENDOSCOPY N/A 11/12/2024    Procedure: ESOPHAGOGASTRODUODENOSCOPY WITH ANESTHESIA;  Surgeon: Tiffanie Saucedo MD;  Location: [facility] ENDOSCOPY;  Service: Gastroenterology;  Laterality: N/A;  pre op: Gastroparesis, Intractable nausea  post op: insertion of dobhoff   PCP: Brandon Murguia    ENDOSCOPY N/A 11/15/2024    Procedure: ESOPHAGOGASTRODUODENOSCOPY WITH ANESTHESIA WITH NG TUBE INSERTION;  Surgeon: Tiffanie Saucedo MD;  Location: [facility] ENDOSCOPY;  Service: Gastroenterology;  Laterality: N/A;  PRE OP: DOBHOFF  POST OP: DOBHOFF  PCP: TELLY PACK    ENDOSCOPY WITH GASTROSTOMY TUBE INSERTION N/A 6/15/2022    Procedure: ESOPHAGOGASTRODUODENOSCOPY WITH GASTROSTOMY TUBE INSERTION;  Surgeon: Jersey Lee MD;  Location: [facility] ENDOSCOPY;  Service: Gastroenterology;  Laterality: N/A;  pre peg placement  post peg placement  Dr. Murguia    ENTEROSCOPY SMALL BOWEL N/A 11/3/2022    Procedure: Esophagogastroduodenoscopy with Peg Removal;  Surgeon: Aleks Vaughn DO;  Location: [facility] ENDOSCOPY;  Service: Gastroenterology;  Laterality: N/A;  pre; peg removal  post; peg removal   KIRILL Murguia MD        HYSTERECTOMY         Family History  Family History   Adopted: Yes   Problem Relation Age of Onset    Colon polyps Neg Hx     Colon cancer Neg Hx        Social History  Social History     Socioeconomic History    Marital status:    Tobacco Use    Smoking status: Every Day     Current packs/day: 0.50     Average packs/day: 0.5 packs/day for 32.6 years (16.3 ttl pk-yrs)     Types: Cigarettes     Start date: 1993     Passive  "exposure: Current    Smokeless tobacco: Never   Vaping Use    Vaping status: Every Day    Substances: Nicotine    Devices: Disposable   Substance and Sexual Activity    Alcohol use: No    Drug use: No    Sexual activity: Defer         Review of Systems:  History obtained from chart review and the patient  General ROS: No fever or chills  Respiratory ROS: No cough, shortness of breath, wheezing  Cardiovascular ROS: No chest pain or palpitations  Gastrointestinal ROS: No abdominal pain or melena  Genito-Urinary ROS: No dysuria or hematuria  Psych ROS: No anxiety and depression  14 point ROS reviewed with the patient and negative except as noted above and in the HPI unless unable to obtain.      Objective:  Patient Vitals for the past 24 hrs:   BP Temp Temp src Pulse Resp SpO2 Height Weight   07/23/25 0724 94/61 97.4 °F (36.3 °C) Oral 119 16 99 % -- --   07/23/25 0623 -- -- -- 108 16 -- -- --   07/23/25 0619 -- -- -- 113 16 98 % -- --   07/23/25 0338 98/60 97.8 °F (36.6 °C) Oral 111 16 98 % -- --   07/22/25 2332 123/77 97.6 °F (36.4 °C) Oral 110 20 100 % -- --   07/22/25 2321 -- -- -- 112 20 98 % -- --   07/22/25 2316 -- -- -- 109 20 99 % -- --   07/22/25 2125 112/76 97.6 °F (36.4 °C) Oral 111 20 100 % -- --   07/22/25 2114 120/68 97.6 °F (36.4 °C) -- 106 20 96 % -- --   07/22/25 2031 (!) 114/103 -- -- 102 20 100 % -- --   07/22/25 1942 93/69 -- -- 102 18 97 % -- --   07/22/25 1912 90/68 97.8 °F (36.6 °C) Oral 116 20 97 % 154.9 cm (61\") 40.1 kg (88 lb 8 oz)       Intake/Output Summary (Last 24 hours) at 7/23/2025 0859  Last data filed at 7/23/2025 0816  Gross per 24 hour   Intake 1946 ml   Output 1100 ml   Net 846 ml     General: awake/alert   Chest:  clear to auscultation bilaterally without respiratory distress  CVS: regular rate and rhythm  Abdominal: soft, nontender, positive bowel sounds  Extremities: no cyanosis or edema  Skin: warm and dry without rash      Labs:  Results from last 7 days   Lab Units " "07/23/25 0428 07/22/25 1929   WBC 10*3/mm3 15.31* 13.47*   HEMOGLOBIN g/dL 9.2* 10.9*   HEMATOCRIT % 29.3* 33.6*   PLATELETS 10*3/mm3 456* 526*         Results from last 7 days   Lab Units 07/23/25 0428 07/22/25 1929   SODIUM mmol/L 134* 134*   POTASSIUM mmol/L 4.3 3.6   CHLORIDE mmol/L 106 99   CO2 mmol/L 12.0* 16.0*   BUN mg/dL 60.0* 63.4*   CREATININE mg/dL 3.80* 4.64*   CALCIUM mg/dL 7.9* 8.7   EGFR mL/min/1.73 13.6* 10.7*   BILIRUBIN mg/dL <0.2  --    ALK PHOS U/L 134*  --    ALT (SGPT) U/L 9  --    AST (SGOT) U/L 15  --    GLUCOSE mg/dL 544* 375*       Radiology:   Imaging Results (Last 72 Hours)       Procedure Component Value Units Date/Time    XR Chest 1 View [[id number]] Collected: 07/22/25 2144     Updated: 07/22/25 2148    Narrative:      EXAM: XR CHEST 1 VW-      HISTORY: cough; N17.9-Acute kidney failure, unspecified; N18.9-Chronic  kidney disease, unspecified       COMPARISON: 6/28/2025.     TECHNIQUE: Single frontal radiograph of the chest was obtained.     FINDINGS:     Support Devices: Right upper extremity PICC line tip overlies the lower  SVC.     Cardiac and Mediastinal Silhouettes: Normal.     Lungs/Pleura: No focal consolidation. No sizable pleural effusion. No  visible pneumothorax.     Osseous structures: No acute osseous finding.     Other: None.       Impression:         No acute cardiopulmonary abnormality.     Right upper extremity PICC line is in good position.           This report was signed and finalized on 7/22/2025 9:45 PM by Presley Nelson.               Culture:  No results found for: \"BLOODCX\", \"URINECX\", \"WOUNDCX\", \"MRSACX\", \"RESPCX\", \"STOOLCX\"      Assessment    Acute kidney injury, prerenal due to volume depletion--improving  Baseline chronic kidney disease stage 3b  Hyperglycemia  Type 2 diabetes  Chronic pancreatitis  Gastroparesis  Metabolic acidosis    Plan:  Continue IV fluids  Renal function starting to improve. Continue to monitor labs.  Further assessment and plan " pending Dr Harvey's evaluation of patient      Thank you for the consult, we appreciate the opportunity to provide care to your patients.  Feel free to contact me if I can be of any further assistance.      RAKESH Zee  7/23/2025  08:59 CDT    Electronically signed by Ford Elena APRN at 07/23/25 0907       Patient admitted last night for acute on chronic kidney disease. Received 1L bolus in ER and another 500mL bolus on the floor. NS @ 100. Positive for rhinovirus. PICC in place, dressing last changed 7/22. No c/o pain. Safety maintained.     Blood sugar 538 this morning. 10 units ordered. BG to be rechecked within 2 hours of insulin administration per orders from Dr. Phillips.

## 2025-07-23 NOTE — PLAN OF CARE
Goal Outcome Evaluation:              Outcome Evaluation: Patient admitted last night for acute on chronic kidney disease. Received 1L bolus in ER and another 500mL bolus on the floor. NS @ 100. Positive for rhinovirus. PICC in place, dressing last changed 7/22. No c/o pain. Safety maintained.       Blood sugar 538 this morning. 10 units ordered. BG to be rechecked within 2 hours of insulin administration per orders from Dr. Phillips.

## 2025-07-23 NOTE — H&P
[facility] Medicine Services  HISTORY AND PHYSICAL    Date of Admission: 7/22/2025  Primary Care Physician: KIRILL Murguia MD    Subjective   Primary Historian: Patient    Chief Complaint: Abnormal lab    History of Present Illness  This is a 54-year-old female patient with a medical history of CKD, diabetes, presenting to the ED for the evaluation of abnormal labs.  She was recently seen by her doctor and she did recent labs that showed SUGAR with elevated creatinine.  She states that she has not been eating or drinking well lately.  She feels generalized weakness and fatigue.  She is complaining of cough that been ongoing for 2 weeks.  She denies any fever, chills or shortness of breath or chest pain.        Review of Systems   Otherwise complete ROS reviewed and negative except as mentioned in the HPI.    Past Medical History:   Past Medical History:   Diagnosis Date    Arthritis     Contusion     Degenerative disc disease, cervical     Diabetes mellitus     Elevated cholesterol     Fibromyalgia     Neuropathy     Osteoporosis     Pancreatitis     Raynaud disease     Renal insufficiency     Rheumatoid arthritis     Smoker 02/08/2022    Vitamin D deficiency 04/26/2022     Past Surgical History:  Past Surgical History:   Procedure Laterality Date    APPENDECTOMY      CHOLECYSTECTOMY      COLONOSCOPY      ENDOSCOPY N/A 10/08/2019    Procedure: ESOPHAGOGASTRODUODENOSCOPY WITH ANESTHESIA;  Surgeon: Aleks Vaughn DO;  Location: [facility] ENDOSCOPY;  Service: Gastroenterology    ENDOSCOPY N/A 11/12/2024    Procedure: ESOPHAGOGASTRODUODENOSCOPY WITH ANESTHESIA;  Surgeon: Tiffanie Saucedo MD;  Location: [facility] ENDOSCOPY;  Service: Gastroenterology;  Laterality: N/A;  pre op: Gastroparesis, Intractable nausea  post op: insertion of dobhoff   PCP: Brandon Murguia    ENDOSCOPY N/A 11/15/2024    Procedure: ESOPHAGOGASTRODUODENOSCOPY WITH ANESTHESIA WITH NG TUBE INSERTION;  Surgeon:  "Tiffanie Saucedo MD;  Location: [facility] ENDOSCOPY;  Service: Gastroenterology;  Laterality: N/A;  PRE OP: DOBHOFF  POST OP: DOBHOFF  PCP: TELLY PACK    ENDOSCOPY WITH GASTROSTOMY TUBE INSERTION N/A 6/15/2022    Procedure: ESOPHAGOGASTRODUODENOSCOPY WITH GASTROSTOMY TUBE INSERTION;  Surgeon: Jersey Lee MD;  Location:  [facility] ENDOSCOPY;  Service: Gastroenterology;  Laterality: N/A;  pre peg placement  post peg placement  Dr. Murguia    ENTEROSCOPY SMALL BOWEL N/A 11/3/2022    Procedure: Esophagogastroduodenoscopy with Peg Removal;  Surgeon: Aleks Vaughn DO;  Location:  [facility] ENDOSCOPY;  Service: Gastroenterology;  Laterality: N/A;  pre; peg removal  post; peg removal   KIRILL Murguia MD        HYSTERECTOMY       Social History:  reports that she has been smoking cigarettes. She started smoking about 32 years ago. She has a 16.3 pack-year smoking history. She has been exposed to tobacco smoke. She has never used smokeless tobacco. She reports that she does not drink alcohol and does not use drugs.    Family History: family history is not on file. She was adopted.       Allergies:  Allergies   Allergen Reactions    Bee Venom Anaphylaxis    Hydrocodone Anaphylaxis     SOB    Hydroxychloroquine Other (See Comments)     Hair loss    Ibuprofen Other (See Comments)     \"SHUTS MY KIDNEYS DOWN\" PER PATIENT     Pineapple Anaphylaxis     Makes throat itch and \"tightens throat\"    Pineapple Extract Anaphylaxis     Makes throat itch and \"tightens throat\"    Wasp Venom Anaphylaxis    Wasp Venom Protein Anaphylaxis    Hydrocodone-Acetaminophen Nausea And Vomiting, Nausea Only and Dizziness    Metformin Other (See Comments)     Pt states it messes with her kidneys    Penicillins Nausea And Vomiting    Sitagliptin Other (See Comments)      Abdominal Pain      Chocolate Rash     Rash on face    Poison Ivy Extract Itching and Rash       Medications:  Prior to Admission medications    Medication Sig Start Date End Date " Taking? Authorizing Provider   albuterol sulfate  (90 Base) MCG/ACT inhaler Inhale 2 puffs 3 (Three) Times a Day As Needed for Wheezing.   Yes Kevin Mejia MD   aspirin 81 MG EC tablet Take 1 tablet by mouth Daily.   Yes Kevin Mejia MD   atorvastatin (LIPITOR) 80 MG tablet Take 1 tablet by mouth Every Night.   Yes Kevin Mejia MD   Calcium Carbonate-Vitamin D 600-5 MG-MCG tablet Take 1 tablet by mouth Daily.   Yes Kevin Mejia MD   cetirizine (zyrTEC) 10 MG tablet Take 1 tablet by mouth Daily As Needed for Allergies.   Yes Kevin Mejia MD   DULoxetine (CYMBALTA) 30 MG capsule Take 1 capsule by mouth Daily. 6/30/25  Yes Rickey Tinajero MD   magnesium oxide (MAG-OX) 400 MG tablet Take 1 tablet by mouth 3 times a day.   Yes Kevin Mejia MD   metoclopramide (REGLAN) 5 MG tablet Take 2 tablets by mouth 3 (Three) Times a Day As Needed (nause and vomiting).   Yes Kevin Mejia MD   nortriptyline (PAMELOR) 25 MG capsule Take 1 capsule by mouth Every Night.   Yes Kevin Mejia MD   omeprazole (priLOSEC) 40 MG capsule Take 1 capsule by mouth Daily.   Yes Kevin Mejia MD   pregabalin (LYRICA) 75 MG capsule Take 1 capsule by mouth Every Night. 6/30/25  Yes Rickey Tinajero MD   promethazine (PHENERGAN) 25 MG tablet Take 1 tablet by mouth Every 8 (Eight) Hours As Needed for Nausea or Vomiting. 6/30/25  Yes Rickey Tinajero MD   rOPINIRole (REQUIP) 1 MG tablet Take 1 tablet by mouth Every Night. 6/30/25  Yes Rickey Tinajero MD   sodium bicarbonate 650 MG tablet Take 1 tablet by mouth 2 (Two) Times a Day. 7/2/25  Yes Rickey Tinajero MD   sucralfate (CARAFATE) 1 g tablet Take 1 tablet by mouth 4 (Four) Times a Day.   Yes Kevin Mejia MD   sodium chloride 0.9 % solution Infuse 30 mL/hr into a venous catheter 3 (Three) Times a Week. Monday, Wednesday, Friday    Kevin Mejia MD   triamcinolone (KENALOG) 0.1 % cream Apply 1  "Application topically to the appropriate area as directed Daily As Needed (prior to dexcom application).  7/22/25  Provider, MD Kevin     I have utilized all available immediate resources to obtain, update, or review the patient's current medications (including all prescriptions, over-the-counter products, herbals, cannabis/cannabidiol products, and vitamin/mineral/dietary (nutritional) supplements).    Objective     Vital Signs: /77 (BP Location: Left arm, Patient Position: Lying)   Pulse 110   Temp 97.6 °F (36.4 °C) (Oral)   Resp 20   Ht 154.9 cm (61\")   Wt 40.1 kg (88 lb 8 oz)   SpO2 100%   BMI 16.72 kg/m²   Physical Exam  Constitutional:       Appearance: She is ill-appearing.   Cardiovascular:      Rate and Rhythm: Normal rate and regular rhythm.      Pulses: Normal pulses.      Heart sounds: Normal heart sounds. No murmur heard.  Pulmonary:      Effort: Pulmonary effort is normal. No respiratory distress.      Breath sounds: Normal breath sounds. No wheezing or rales.   Abdominal:      General: Abdomen is flat. Bowel sounds are normal. There is no distension.      Palpations: Abdomen is soft.      Tenderness: There is no abdominal tenderness. There is no guarding.   Musculoskeletal:      Right lower leg: No edema.      Left lower leg: No edema.   Skin:     General: Skin is warm.   Neurological:      Mental Status: She is alert and oriented to person, place, and time. Mental status is at baseline.              Results Reviewed:  Lab Results (last 24 hours)       Procedure Component Value Units Date/Time    Respiratory Panel PCR w/COVID-19(SARS-CoV-2) [facility] In-House, NP Swab in UTM/VTM, 2 HR TAT - Swab, Nasopharynx [[id number]]  (Abnormal) Collected: 07/22/25 2054    Specimen: Swab from Nasopharynx Updated: 07/22/25 2151     ADENOVIRUS, PCR Not Detected     Coronavirus 229E Not Detected     Coronavirus HKU1 Not Detected     Coronavirus NL63 Not Detected     Coronavirus OC43 " Not Detected     COVID19 Not Detected     Human Metapneumovirus Not Detected     Human Rhinovirus/Enterovirus Detected     Influenza A PCR Not Detected     Influenza B PCR Not Detected     Parainfluenza Virus 1 Not Detected     Parainfluenza Virus 2 Not Detected     Parainfluenza Virus 3 Not Detected     Parainfluenza Virus 4 Not Detected     RSV, PCR Not Detected     Bordetella pertussis pcr Not Detected     Bordetella parapertussis PCR Not Detected     Chlamydophila pneumoniae PCR Not Detected     Mycoplasma pneumo by PCR Not Detected    Narrative:      In the setting of a positive respiratory panel with a viral infection PLUS a negative procalcitonin without other underlying concern for bacterial infection, consider observing off antibiotics or discontinuation of antibiotics and continue supportive care. If the respiratory panel is positive for atypical bacterial infection (Bordetella pertussis, Chlamydophila pneumoniae, or Mycoplasma pneumoniae), consider antibiotic de-escalation to target atypical bacterial infection.    Blood Culture - Blood, Hand, Left [[id number]] Collected: 07/22/25 2112    Specimen: Blood from Hand, Left Updated: 07/22/25 2116    Blood Culture - Blood, Blood, PICC Line [[id number]] Collected: 07/22/25 2103    Specimen: Blood, PICC Line Updated: 07/22/25 2116    Basic Metabolic Panel [[id number]]  (Abnormal) Collected: 07/22/25 1929    Specimen: Blood Updated: 07/22/25 2006     Glucose 375 mg/dL      BUN 63.4 mg/dL      Creatinine 4.64 mg/dL      Sodium 134 mmol/L      Potassium 3.6 mmol/L      Chloride 99 mmol/L      CO2 16.0 mmol/L      Calcium 8.7 mg/dL      BUN/Creatinine Ratio 13.7     Anion Gap 19.0 mmol/L      eGFR 10.7 mL/min/1.73     Narrative:      GFR Categories in Chronic Kidney Disease (CKD)              GFR Category          GFR (mL/min/1.73)    Interpretation  G1                    90 or greater        Normal or high (1)  G2                    60-89                Mild decrease  (1)  G3a                   45-59                Mild to moderate decrease  G3b                   30-44                Moderate to severe decrease  G4                    15-29                Severe decrease  G5                    14 or less           Kidney failure    (1)In the absence of evidence of kidney disease, neither GFR category G1 or G2 fulfill the criteria for CKD.    eGFR calculation 2021 CKD-EPI creatinine equation, which does not include race as a factor    Magnesium [[id number]]  (Normal) Collected: 07/22/25 1929    Specimen: Blood Updated: 07/22/25 2005     Magnesium 2.1 mg/dL     CBC & Differential [[id number]]  (Abnormal) Collected: 07/22/25 1929    Specimen: Blood Updated: 07/22/25 1947    Narrative:      The following orders were created for panel order CBC & Differential.  Procedure                               Abnormality         Status                     ---------                               -----------         ------                     CBC Auto Differential[[id number]]        Abnormal            Final result                 Please view results for these tests on the individual orders.    CBC Auto Differential [[id number]]  (Abnormal) Collected: 07/22/25 1929    Specimen: Blood Updated: 07/22/25 1947     WBC 13.47 10*3/mm3      RBC 3.77 10*6/mm3      Hemoglobin 10.9 g/dL      Hematocrit 33.6 %      MCV 89.1 fL      MCH 28.9 pg      MCHC 32.4 g/dL      RDW 15.2 %      RDW-SD 49.6 fl      MPV 9.4 fL      Platelets 526 10*3/mm3      Neutrophil % 70.3 %      Lymphocyte % 22.5 %      Monocyte % 4.2 %      Eosinophil % 1.8 %      Basophil % 0.2 %      Immature Grans % 1.0 %      Neutrophils, Absolute 9.47 10*3/mm3      Lymphocytes, Absolute 3.03 10*3/mm3      Monocytes, Absolute 0.56 10*3/mm3      Eosinophils, Absolute 0.24 10*3/mm3      Basophils, Absolute 0.03 10*3/mm3      Immature Grans, Absolute 0.14 10*3/mm3      nRBC 0.0 /100 WBC           Imaging Results (Last 24 Hours)       Procedure  Component Value Units Date/Time    XR Chest 1 View [[id number]] Collected: 07/22/25 2144     Updated: 07/22/25 2148    Narrative:      EXAM: XR CHEST 1 VW-      HISTORY: cough; N17.9-Acute kidney failure, unspecified; N18.9-Chronic  kidney disease, unspecified       COMPARISON: 6/28/2025.     TECHNIQUE: Single frontal radiograph of the chest was obtained.     FINDINGS:     Support Devices: Right upper extremity PICC line tip overlies the lower  SVC.     Cardiac and Mediastinal Silhouettes: Normal.     Lungs/Pleura: No focal consolidation. No sizable pleural effusion. No  visible pneumothorax.     Osseous structures: No acute osseous finding.     Other: None.       Impression:         No acute cardiopulmonary abnormality.     Right upper extremity PICC line is in good position.           This report was signed and finalized on 7/22/2025 9:45 PM by Presley Nelson.             I have personally reviewed and interpreted the radiology studies and ECG obtained at time of admission.     Assessment / Plan   Assessment:   Active Hospital Problems    Diagnosis     **Acute on chronic renal failure        Treatment Plan  The patient will be admitted to my service here at [facility].     Assessment and plan:  #SUGAR.  #Rhinovirus infection.  #Acute bronchitis.    - IV fluids.  - Will recheck creatinine in the morning.  - Consulting with nephrology in AM.  - Droplet isolation  - Cough medications ordered, systemic steroids, and nebulizers.  - DVT prophylaxis with SCDs for now.    Medical Decision Making  Number and Complexity of problems: 3 acute and moderate  Differential Diagnosis: As above    Conditions and Status        Condition is worsening.     [facility] Data  External documents reviewed: Yes  Cardiac tracing (EKG, telemetry) interpretation: None  Radiology interpretation: Yes  Labs reviewed: Yes  Any tests that were considered but not ordered: No     Decision rules/scores evaluated (example [id number], Wells, etc):  No     Discussed with: Patient and ED provider     Care Planning  Shared decision making: Discussed the plan with the patient and she was agreeable  Code status and discussions: Full code    Disposition  Social Determinants of Health that impact treatment or disposition: Not at the moment  Estimated length of stay is 2 to 3 days.     I confirmed that the patient's advanced care plan is present, code status is documented, and a surrogate decision maker is listed in the patient's medical record.       The patient was seen and examined by me on 7/22 at 9 PM.    Electronically signed by Elodia Phillips MD, 07/23/25, 02:32 CDT.

## 2025-07-23 NOTE — CONSULTS
Nephrology ([facility]) Consult Note      Patient:  Tasha Perez  YOB: 1970  Date of Service: 7/23/2025  MRN: [id number]   Acct: [id number]   Primary Care Physician: KIRILL Murguia MD  Advance Directive:   Code Status and Medical Interventions: CPR (Attempt to Resuscitate); Full Support   Ordered at: 07/22/25 2243     Code Status (Patient has no pulse and is not breathing):    CPR (Attempt to Resuscitate)     Medical Interventions (Patient has pulse or is breathing):    Full Support     Admit Date: 7/22/2025       Hospital Day: 1  Referring Provider: Faraz Mota MD      Patient personally seen and examined.  Complete chart including Consults, Notes, Operative Reports, Labs, Cardiology, and Radiology studies reviewed as able.        Subjective:  Tasha Perez is a 54 y.o. female for whom we were consulted for evaluation and treatment of acute kidney injury. Baseline chronic kidney disease stage 3b. Follows with Dr Barrow in our office. History of many previous hospitalizations for SUGAR due to volume depletion. History of recurrent pancreatitis, type 2 diabetes, osteoarthritis, gastroparesis. S/p gastric stimulator placement in [place]. Has had feeding tubes and PICC lines many times in the past and she has also been prescribed outpatient IV fluids on a routine basis.  Most recent discharge from hospital was on 6/30.  Presents this time with acute decline in renal function. Complaint of not eating or drinking, fatigue, weakness. Labs in ER showed creatinine 4.64. Admitted to medical floor with IV fluids being administered. Currently is awake and alert. Complaint of non-productive cough. Blood glucose level has been significantly elevated overnight. Denies fever/chills. No edema or dyspnea. No n/v/d. Denies changes in urination. Denies dysuria or hematuria. Urine output nonoliguric    Allergies:  Bee venom, Hydrocodone, Hydroxychloroquine, Ibuprofen, Pineapple,  Pineapple extract, Wasp venom, Wasp venom protein, Hydrocodone-acetaminophen, Metformin, Penicillins, Sitagliptin, Chocolate, and Poison ivy extract    Home Meds:  Medications Prior to Admission   Medication Sig Dispense Refill Last Dose/Taking    albuterol sulfate  (90 Base) MCG/ACT inhaler Inhale 2 puffs 3 (Three) Times a Day As Needed for Wheezing.   Taking As Needed    aspirin 81 MG EC tablet Take 1 tablet by mouth Daily.   Taking    atorvastatin (LIPITOR) 80 MG tablet Take 1 tablet by mouth Every Night.   Taking    Calcium Carbonate-Vitamin D 600-5 MG-MCG tablet Take 1 tablet by mouth Daily.   Taking    cetirizine (zyrTEC) 10 MG tablet Take 1 tablet by mouth Daily As Needed for Allergies.   Taking As Needed    DULoxetine (CYMBALTA) 30 MG capsule Take 1 capsule by mouth Daily. 30 capsule 0 Taking    magnesium oxide (MAG-OX) 400 MG tablet Take 1 tablet by mouth 3 times a day.   Taking    metoclopramide (REGLAN) 5 MG tablet Take 2 tablets by mouth 3 (Three) Times a Day As Needed (nause and vomiting).   Taking As Needed    nortriptyline (PAMELOR) 25 MG capsule Take 1 capsule by mouth Every Night.   Taking    omeprazole (priLOSEC) 40 MG capsule Take 1 capsule by mouth Daily.   Taking    pregabalin (LYRICA) 75 MG capsule Take 1 capsule by mouth Every Night.   Taking    promethazine (PHENERGAN) 25 MG tablet Take 1 tablet by mouth Every 8 (Eight) Hours As Needed for Nausea or Vomiting.   Taking As Needed    rOPINIRole (REQUIP) 1 MG tablet Take 1 tablet by mouth Every Night.   Taking    sodium bicarbonate 650 MG tablet Take 1 tablet by mouth 2 (Two) Times a Day.   Taking    sucralfate (CARAFATE) 1 g tablet Take 1 tablet by mouth 4 (Four) Times a Day.   Taking    sodium chloride 0.9 % solution Infuse 30 mL/hr into a venous catheter 3 (Three) Times a Week. Monday, Wednesday, Friday          Medicines:  Current Facility-Administered Medications   Medication Dose Route Frequency Provider Last Rate Last Admin     aspirin EC tablet 81 mg  81 mg Oral Daily Eldoia Phillips MD   81 mg at 07/23/25 0826    dexAMETHasone (DECADRON) injection 6 mg  6 mg Intravenous Daily Elodia Phillips MD   6 mg at 07/22/25 2302    dextrose (D50W) (25 g/50 mL) IV injection 25 g  25 g Intravenous Q15 Min PRN Poncho Jc MD        dextrose (GLUTOSE) oral gel 15 g  15 g Oral Q15 Min PRN Poncho Jc MD        glucagon (GLUCAGEN) injection 1 mg  1 mg Intramuscular Q15 Min PRN Poncho Jc MD        guaifenesin-dextromethorphan 600-30 mg (MUCINEX DM) 1 tablet  1 tablet Oral BID PRN Elodia Phillips MD        Insulin Lispro (humaLOG) injection 4-24 Units  4-24 Units Subcutaneous 4x Daily AC & at Bedtime Poncho Jc MD   24 Units at 07/23/25 0826    ipratropium-albuterol (DUO-NEB) nebulizer solution 3 mL  3 mL Nebulization 4x Daily - RT Elodia Phillips MD   3 mL at 07/23/25 0619    ipratropium-albuterol (DUO-NEB) nebulizer solution 3 mL  3 mL Nebulization Q4H PRN Elodia Phillips MD        nitroglycerin (NITROSTAT) SL tablet 0.4 mg  0.4 mg Sublingual Q5 Min PRN Elodia Phillips MD        ondansetron ODT (ZOFRAN-ODT) disintegrating tablet 4 mg  4 mg Oral Q6H PRN Elodia Phillips MD        Or    ondansetron (ZOFRAN) injection 4 mg  4 mg Intravenous Q6H PRN Elodia Phillips MD        pantoprazole (PROTONIX) EC tablet 40 mg  40 mg Oral Q AM Elodia Phillips MD   40 mg at 07/23/25 0507    rOPINIRole (REQUIP) tablet 1 mg  1 mg Oral Q8H Poncho Jc MD   1 mg at 07/23/25 0852    sodium bicarbonate tablet 650 mg  650 mg Oral BID Elodia Phillips MD   650 mg at 07/23/25 0826    sodium chloride 0.9 % flush 10 mL  10 mL Intravenous PRN Faraz Mota MD        sodium chloride 0.9 % flush 10 mL  10 mL Intravenous Q12H Elodia Phillips MD   10 mL at 07/23/25 0827    sodium chloride 0.9 % flush 10 mL  10 mL Intravenous PRN Elodia Phillips MD        sodium chloride 0.9 % infusion 40 mL  40 mL Intravenous PRN Elodia Phillips MD        sodium  chloride 0.9 % infusion  100 mL/hr Intravenous Continuous Elodia Phillips MD   Stopped at 07/23/25 0816       Past Medical History:  Past Medical History:   Diagnosis Date    Arthritis     Contusion     Degenerative disc disease, cervical     Diabetes mellitus     Elevated cholesterol     Fibromyalgia     Neuropathy     Osteoporosis     Pancreatitis     Raynaud disease     Renal insufficiency     Rheumatoid arthritis     Smoker 02/08/2022    Vitamin D deficiency 04/26/2022       Past Surgical History:  Past Surgical History:   Procedure Laterality Date    APPENDECTOMY      CHOLECYSTECTOMY      COLONOSCOPY      ENDOSCOPY N/A 10/08/2019    Procedure: ESOPHAGOGASTRODUODENOSCOPY WITH ANESTHESIA;  Surgeon: Aleks Vaughn DO;  Location: [facility] ENDOSCOPY;  Service: Gastroenterology    ENDOSCOPY N/A 11/12/2024    Procedure: ESOPHAGOGASTRODUODENOSCOPY WITH ANESTHESIA;  Surgeon: Tiffanie Saucedo MD;  Location: [facility] ENDOSCOPY;  Service: Gastroenterology;  Laterality: N/A;  pre op: Gastroparesis, Intractable nausea  post op: insertion of dobhoff   PCP: Brandon Murguia    ENDOSCOPY N/A 11/15/2024    Procedure: ESOPHAGOGASTRODUODENOSCOPY WITH ANESTHESIA WITH NG TUBE INSERTION;  Surgeon: Tiffanie Saucedo MD;  Location: [facility] ENDOSCOPY;  Service: Gastroenterology;  Laterality: N/A;  PRE OP: DOBHOFF  POST OP: DOBHOFF  PCP: TELLY PACK    ENDOSCOPY WITH GASTROSTOMY TUBE INSERTION N/A 6/15/2022    Procedure: ESOPHAGOGASTRODUODENOSCOPY WITH GASTROSTOMY TUBE INSERTION;  Surgeon: Jersey Lee MD;  Location: [facility] ENDOSCOPY;  Service: Gastroenterology;  Laterality: N/A;  pre peg placement  post peg placement  Dr. Murguia    ENTEROSCOPY SMALL BOWEL N/A 11/3/2022    Procedure: Esophagogastroduodenoscopy with Peg Removal;  Surgeon: Aleks Vaughn DO;  Location: [facility] ENDOSCOPY;  Service: Gastroenterology;  Laterality: N/A;  pre; peg removal  post; peg removal   KIRILL Murguia MD        [facility]  "History  Family History   Adopted: Yes   Problem Relation Age of Onset    Colon polyps Neg Hx     Colon cancer Neg Hx        Social History  Social History     Socioeconomic History    Marital status:    Tobacco Use    Smoking status: Every Day     Current packs/day: 0.50     Average packs/day: 0.5 packs/day for 32.6 years (16.3 ttl pk-yrs)     Types: Cigarettes     Start date: 1993     Passive exposure: Current    Smokeless tobacco: Never   Vaping Use    Vaping status: Every Day    Substances: Nicotine    Devices: Disposable   Substance and Sexual Activity    Alcohol use: No    Drug use: No    Sexual activity: Defer         Review of Systems:  History obtained from chart review and the patient  General ROS: No fever or chills  Respiratory ROS: No cough, shortness of breath, wheezing  Cardiovascular ROS: No chest pain or palpitations  Gastrointestinal ROS: No abdominal pain or melena  Genito-Urinary ROS: No dysuria or hematuria  Psych ROS: No anxiety and depression  14 point ROS reviewed with the patient and negative except as noted above and in the HPI unless unable to obtain.      Objective:  Patient Vitals for the past 24 hrs:   BP Temp Temp src Pulse Resp SpO2 Height Weight   07/23/25 0724 94/61 97.4 °F (36.3 °C) Oral 119 16 99 % -- --   07/23/25 0623 -- -- -- 108 16 -- -- --   07/23/25 0619 -- -- -- 113 16 98 % -- --   07/23/25 0338 98/60 97.8 °F (36.6 °C) Oral 111 16 98 % -- --   07/22/25 2332 123/77 97.6 °F (36.4 °C) Oral 110 20 100 % -- --   07/22/25 2321 -- -- -- 112 20 98 % -- --   07/22/25 2316 -- -- -- 109 20 99 % -- --   07/22/25 2125 112/76 97.6 °F (36.4 °C) Oral 111 20 100 % -- --   07/22/25 2114 120/68 97.6 °F (36.4 °C) -- 106 20 96 % -- --   07/22/25 2031 (!) 114/103 -- -- 102 20 100 % -- --   07/22/25 1942 93/69 -- -- 102 18 97 % -- --   07/22/25 1912 90/68 97.8 °F (36.6 °C) Oral 116 20 97 % 154.9 cm (61\") 40.1 kg (88 lb 8 oz)       Intake/Output Summary (Last 24 hours) at 7/23/2025 " 0859  Last data filed at 7/23/2025 0816  Gross per 24 hour   Intake 1946 ml   Output 1100 ml   Net 846 ml     General: awake/alert   Chest:  clear to auscultation bilaterally without respiratory distress  CVS: regular rate and rhythm  Abdominal: soft, nontender, positive bowel sounds  Extremities: no cyanosis or edema  Skin: warm and dry without rash      Labs:  Results from last 7 days   Lab Units 07/23/25  0428 07/22/25 1929   WBC 10*3/mm3 15.31* 13.47*   HEMOGLOBIN g/dL 9.2* 10.9*   HEMATOCRIT % 29.3* 33.6*   PLATELETS 10*3/mm3 456* 526*         Results from last 7 days   Lab Units 07/23/25  0428 07/22/25 1929   SODIUM mmol/L 134* 134*   POTASSIUM mmol/L 4.3 3.6   CHLORIDE mmol/L 106 99   CO2 mmol/L 12.0* 16.0*   BUN mg/dL 60.0* 63.4*   CREATININE mg/dL 3.80* 4.64*   CALCIUM mg/dL 7.9* 8.7   EGFR mL/min/1.73 13.6* 10.7*   BILIRUBIN mg/dL <0.2  --    ALK PHOS U/L 134*  --    ALT (SGPT) U/L 9  --    AST (SGOT) U/L 15  --    GLUCOSE mg/dL 544* 375*       Radiology:   Imaging Results (Last 72 Hours)       Procedure Component Value Units Date/Time    XR Chest 1 View [[id number]] Collected: 07/22/25 2144     Updated: 07/22/25 2148    Narrative:      EXAM: XR CHEST 1 VW-      HISTORY: cough; N17.9-Acute kidney failure, unspecified; N18.9-Chronic  kidney disease, unspecified       COMPARISON: 6/28/2025.     TECHNIQUE: Single frontal radiograph of the chest was obtained.     FINDINGS:     Support Devices: Right upper extremity PICC line tip overlies the lower  SVC.     Cardiac and Mediastinal Silhouettes: Normal.     Lungs/Pleura: No focal consolidation. No sizable pleural effusion. No  visible pneumothorax.     Osseous structures: No acute osseous finding.     Other: None.       Impression:         No acute cardiopulmonary abnormality.     Right upper extremity PICC line is in good position.           This report was signed and finalized on 7/22/2025 9:45 PM by Presley Nelson.               Culture:  No results found  "for: \"BLOODCX\", \"URINECX\", \"WOUNDCX\", \"MRSACX\", \"RESPCX\", \"STOOLCX\"      Assessment    Acute kidney injury, prerenal due to volume depletion--improving  Baseline chronic kidney disease stage 3b  Hyperglycemia  Type 2 diabetes  Chronic pancreatitis  Gastroparesis  Metabolic acidosis    Plan:  Continue IV fluids  Renal function starting to improve. Continue to monitor labs.  Further assessment and plan pending Dr Harvey's evaluation of patient      Thank you for the consult, we appreciate the opportunity to provide care to your patients.  Feel free to contact me if I can be of any further assistance.      Ford Elena, APRN  7/23/2025  08:59 CDT  "

## 2025-07-24 LAB
ALBUMIN SERPL-MCNC: 3 G/DL (ref 3.5–5.2)
ALBUMIN/GLOB SERPL: 1.1 G/DL
ALP SERPL-CCNC: 100 U/L (ref 39–117)
ALT SERPL W P-5'-P-CCNC: 7 U/L (ref 1–33)
ANION GAP SERPL CALCULATED.3IONS-SCNC: 13 MMOL/L (ref 5–15)
AST SERPL-CCNC: 13 U/L (ref 1–32)
BASOPHILS # BLD AUTO: 0.04 10*3/MM3 (ref 0–0.2)
BASOPHILS NFR BLD AUTO: 0.2 % (ref 0–1.5)
BILIRUB SERPL-MCNC: <0.2 MG/DL (ref 0–1.2)
BUN SERPL-MCNC: 54.4 MG/DL (ref 6–20)
BUN/CREAT SERPL: 20.1 (ref 7–25)
CALCIUM SPEC-SCNC: 7.8 MG/DL (ref 8.6–10.5)
CHLORIDE SERPL-SCNC: 112 MMOL/L (ref 98–107)
CO2 SERPL-SCNC: 16 MMOL/L (ref 22–29)
CREAT SERPL-MCNC: 2.71 MG/DL (ref 0.57–1)
DEPRECATED RDW RBC AUTO: 51.3 FL (ref 37–54)
EGFRCR SERPLBLD CKD-EPI 2021: 20.4 ML/MIN/1.73
EOSINOPHIL # BLD AUTO: 0.12 10*3/MM3 (ref 0–0.4)
EOSINOPHIL NFR BLD AUTO: 0.6 % (ref 0.3–6.2)
ERYTHROCYTE [DISTWIDTH] IN BLOOD BY AUTOMATED COUNT: 15.7 % (ref 12.3–15.4)
GLOBULIN UR ELPH-MCNC: 2.7 GM/DL
GLUCOSE BLDC GLUCOMTR-MCNC: 167 MG/DL (ref 70–130)
GLUCOSE BLDC GLUCOMTR-MCNC: 255 MG/DL (ref 70–130)
GLUCOSE BLDC GLUCOMTR-MCNC: 361 MG/DL (ref 70–130)
GLUCOSE BLDC GLUCOMTR-MCNC: 375 MG/DL (ref 70–130)
GLUCOSE SERPL-MCNC: 82 MG/DL (ref 65–99)
HCT VFR BLD AUTO: 24.4 % (ref 34–46.6)
HGB BLD-MCNC: 7.8 G/DL (ref 12–15.9)
IMM GRANULOCYTES # BLD AUTO: 0.16 10*3/MM3 (ref 0–0.05)
IMM GRANULOCYTES NFR BLD AUTO: 0.8 % (ref 0–0.5)
LYMPHOCYTES # BLD AUTO: 2.04 10*3/MM3 (ref 0.7–3.1)
LYMPHOCYTES NFR BLD AUTO: 10.2 % (ref 19.6–45.3)
MAGNESIUM SERPL-MCNC: 1.5 MG/DL (ref 1.6–2.6)
MCH RBC QN AUTO: 28.8 PG (ref 26.6–33)
MCHC RBC AUTO-ENTMCNC: 32 G/DL (ref 31.5–35.7)
MCV RBC AUTO: 90 FL (ref 79–97)
MONOCYTES # BLD AUTO: 0.85 10*3/MM3 (ref 0.1–0.9)
MONOCYTES NFR BLD AUTO: 4.3 % (ref 5–12)
NEUTROPHILS NFR BLD AUTO: 16.76 10*3/MM3 (ref 1.7–7)
NEUTROPHILS NFR BLD AUTO: 83.9 % (ref 42.7–76)
NRBC BLD AUTO-RTO: 0 /100 WBC (ref 0–0.2)
PLATELET # BLD AUTO: 401 10*3/MM3 (ref 140–450)
PMV BLD AUTO: 9.7 FL (ref 6–12)
POTASSIUM SERPL-SCNC: 2.8 MMOL/L (ref 3.5–5.2)
POTASSIUM SERPL-SCNC: 4.4 MMOL/L (ref 3.5–5.2)
PROT SERPL-MCNC: 5.7 G/DL (ref 6–8.5)
RBC # BLD AUTO: 2.71 10*6/MM3 (ref 3.77–5.28)
SODIUM SERPL-SCNC: 141 MMOL/L (ref 136–145)
WBC NRBC COR # BLD AUTO: 19.97 10*3/MM3 (ref 3.4–10.8)

## 2025-07-24 PROCEDURE — 94799 UNLISTED PULMONARY SVC/PX: CPT

## 2025-07-24 PROCEDURE — 82948 REAGENT STRIP/BLOOD GLUCOSE: CPT | Performed by: INTERNAL MEDICINE

## 2025-07-24 PROCEDURE — 25010000002 DEXAMETHASONE PER 1 MG

## 2025-07-24 PROCEDURE — 25010000002 HEPARIN (PORCINE) PER 1000 UNITS: Performed by: INTERNAL MEDICINE

## 2025-07-24 PROCEDURE — 63710000001 INSULIN LISPRO (HUMAN) PER 5 UNITS: Performed by: INTERNAL MEDICINE

## 2025-07-24 PROCEDURE — 85025 COMPLETE CBC W/AUTO DIFF WBC: CPT | Performed by: INTERNAL MEDICINE

## 2025-07-24 PROCEDURE — 84132 ASSAY OF SERUM POTASSIUM: CPT | Performed by: INTERNAL MEDICINE

## 2025-07-24 PROCEDURE — 25010000002 NA FERRIC GLUC CPLX PER 12.5 MG: Performed by: INTERNAL MEDICINE

## 2025-07-24 PROCEDURE — 63710000001 INSULIN GLARGINE PER 5 UNITS: Performed by: INTERNAL MEDICINE

## 2025-07-24 PROCEDURE — 25010000002 MAGNESIUM SULFATE 2 GM/50ML SOLUTION: Performed by: NURSE PRACTITIONER

## 2025-07-24 PROCEDURE — 25810000003 SODIUM CHLORIDE 0.9 % SOLUTION: Performed by: INTERNAL MEDICINE

## 2025-07-24 PROCEDURE — 83735 ASSAY OF MAGNESIUM: CPT | Performed by: NURSE PRACTITIONER

## 2025-07-24 PROCEDURE — 80053 COMPREHEN METABOLIC PANEL: CPT | Performed by: INTERNAL MEDICINE

## 2025-07-24 PROCEDURE — 82948 REAGENT STRIP/BLOOD GLUCOSE: CPT

## 2025-07-24 RX ORDER — POTASSIUM CHLORIDE 1500 MG/1
40 TABLET, EXTENDED RELEASE ORAL 2 TIMES DAILY WITH MEALS
Status: COMPLETED | OUTPATIENT
Start: 2025-07-24 | End: 2025-07-24

## 2025-07-24 RX ORDER — ACETAMINOPHEN 325 MG/1
650 TABLET ORAL EVERY 6 HOURS PRN
Status: DISCONTINUED | OUTPATIENT
Start: 2025-07-24 | End: 2025-07-26 | Stop reason: HOSPADM

## 2025-07-24 RX ORDER — POTASSIUM CHLORIDE 1500 MG/1
40 TABLET, EXTENDED RELEASE ORAL ONCE
Status: DISCONTINUED | OUTPATIENT
Start: 2025-07-24 | End: 2025-07-25

## 2025-07-24 RX ORDER — MAGNESIUM SULFATE HEPTAHYDRATE 40 MG/ML
2 INJECTION, SOLUTION INTRAVENOUS ONCE
Status: COMPLETED | OUTPATIENT
Start: 2025-07-24 | End: 2025-07-24

## 2025-07-24 RX ADMIN — ACETAMINOPHEN 650 MG: 325 TABLET ORAL at 20:08

## 2025-07-24 RX ADMIN — ACETAMINOPHEN 650 MG: 325 TABLET ORAL at 06:32

## 2025-07-24 RX ADMIN — INSULIN GLARGINE 15 UNITS: 100 INJECTION, SOLUTION SUBCUTANEOUS at 20:08

## 2025-07-24 RX ADMIN — ASPIRIN 81 MG: 81 TABLET, COATED ORAL at 10:23

## 2025-07-24 RX ADMIN — ROPINIROLE 1 MG: 1 TABLET, FILM COATED ORAL at 06:13

## 2025-07-24 RX ADMIN — SODIUM BICARBONATE INJECTION,: 84 SOLUTION INTRAVENOUS at 21:38

## 2025-07-24 RX ADMIN — PREGABALIN 75 MG: 75 CAPSULE ORAL at 20:09

## 2025-07-24 RX ADMIN — HEPARIN SODIUM 5000 UNITS: 5000 INJECTION INTRAVENOUS; SUBCUTANEOUS at 10:23

## 2025-07-24 RX ADMIN — ROPINIROLE 1 MG: 1 TABLET, FILM COATED ORAL at 22:13

## 2025-07-24 RX ADMIN — SUCRALFATE 1 G: 1 TABLET ORAL at 20:09

## 2025-07-24 RX ADMIN — SODIUM BICARBONATE INJECTION,: 84 SOLUTION INTRAVENOUS at 13:24

## 2025-07-24 RX ADMIN — INSULIN LISPRO 20 UNITS: 100 INJECTION, SOLUTION INTRAVENOUS; SUBCUTANEOUS at 17:47

## 2025-07-24 RX ADMIN — INSULIN LISPRO 12 UNITS: 100 INJECTION, SOLUTION INTRAVENOUS; SUBCUTANEOUS at 13:24

## 2025-07-24 RX ADMIN — IPRATROPIUM BROMIDE AND ALBUTEROL SULFATE 3 ML: .5; 3 SOLUTION RESPIRATORY (INHALATION) at 06:22

## 2025-07-24 RX ADMIN — MAGNESIUM OXIDE TAB 400 MG (240 MG ELEMENTAL MG) 400 MG: 400 (240 MG) TAB at 15:32

## 2025-07-24 RX ADMIN — DULOXETINE 60 MG: 30 CAPSULE, DELAYED RELEASE ORAL at 10:24

## 2025-07-24 RX ADMIN — MAGNESIUM SULFATE HEPTAHYDRATE 2 G: 40 INJECTION, SOLUTION INTRAVENOUS at 13:24

## 2025-07-24 RX ADMIN — POTASSIUM CHLORIDE 40 MEQ: 1500 TABLET, EXTENDED RELEASE ORAL at 17:47

## 2025-07-24 RX ADMIN — SUCRALFATE 1 G: 1 TABLET ORAL at 17:47

## 2025-07-24 RX ADMIN — POTASSIUM CHLORIDE 40 MEQ: 1500 TABLET, EXTENDED RELEASE ORAL at 10:23

## 2025-07-24 RX ADMIN — MAGNESIUM OXIDE TAB 400 MG (240 MG ELEMENTAL MG) 400 MG: 400 (240 MG) TAB at 20:09

## 2025-07-24 RX ADMIN — SODIUM BICARBONATE INJECTION,: 84 SOLUTION INTRAVENOUS at 01:09

## 2025-07-24 RX ADMIN — Medication 10 ML: at 10:24

## 2025-07-24 RX ADMIN — IPRATROPIUM BROMIDE AND ALBUTEROL SULFATE 3 ML: .5; 3 SOLUTION RESPIRATORY (INHALATION) at 09:54

## 2025-07-24 RX ADMIN — SUCRALFATE 1 G: 1 TABLET ORAL at 10:23

## 2025-07-24 RX ADMIN — INSULIN GLARGINE 15 UNITS: 100 INJECTION, SOLUTION SUBCUTANEOUS at 10:24

## 2025-07-24 RX ADMIN — HEPARIN SODIUM 5000 UNITS: 5000 INJECTION INTRAVENOUS; SUBCUTANEOUS at 20:09

## 2025-07-24 RX ADMIN — SODIUM BICARBONATE 650 MG: 650 TABLET ORAL at 10:27

## 2025-07-24 RX ADMIN — SODIUM BICARBONATE 650 MG: 650 TABLET ORAL at 20:10

## 2025-07-24 RX ADMIN — ATORVASTATIN CALCIUM 80 MG: 40 TABLET, FILM COATED ORAL at 20:09

## 2025-07-24 RX ADMIN — MAGNESIUM OXIDE TAB 400 MG (240 MG ELEMENTAL MG) 400 MG: 400 (240 MG) TAB at 10:23

## 2025-07-24 RX ADMIN — DEXAMETHASONE SODIUM PHOSPHATE 6 MG: 10 INJECTION INTRAMUSCULAR; INTRAVENOUS at 10:23

## 2025-07-24 RX ADMIN — INSULIN LISPRO 4 UNITS: 100 INJECTION, SOLUTION INTRAVENOUS; SUBCUTANEOUS at 09:30

## 2025-07-24 RX ADMIN — INSULIN LISPRO 20 UNITS: 100 INJECTION, SOLUTION INTRAVENOUS; SUBCUTANEOUS at 20:08

## 2025-07-24 RX ADMIN — SODIUM CHLORIDE 250 MG: 9 INJECTION, SOLUTION INTRAVENOUS at 15:31

## 2025-07-24 RX ADMIN — PANTOPRAZOLE SODIUM 40 MG: 40 TABLET, DELAYED RELEASE ORAL at 06:13

## 2025-07-24 RX ADMIN — ROPINIROLE 1 MG: 1 TABLET, FILM COATED ORAL at 15:00

## 2025-07-24 RX ADMIN — HYDROXYCHLOROQUINE SULFATE 400 MG: 200 TABLET ORAL at 10:23

## 2025-07-24 RX ADMIN — NORTRIPTYLINE HYDROCHLORIDE 25 MG: 25 CAPSULE ORAL at 20:09

## 2025-07-24 NOTE — PROGRESS NOTES
Nephrology ([facility]) Progress Note      Patient:  Tasha Perez  YOB: 1970  Date of Service: 7/24/2025  MRN: [id number]   Acct: [id number]   Primary Care Physician: KIRILL Murguia MD  Advance Directive:   Code Status and Medical Interventions: CPR (Attempt to Resuscitate); Full Support   Ordered at: 07/22/25 2243     Code Status (Patient has no pulse and is not breathing):    CPR (Attempt to Resuscitate)     Medical Interventions (Patient has pulse or is breathing):    Full Support     Admit Date: 7/22/2025       Hospital Day: 2  Referring Provider: Faraz Mota MD      Patient personally seen and examined.  Complete chart including Consults, Notes, Operative Reports, Labs, Cardiology, and Radiology studies reviewed as able.        Subjective:  Tasha Perez is a 54 y.o. female for whom we were consulted for evaluation and treatment of acute kidney injury. Baseline chronic kidney disease stage 3b. Follows with Dr Barrow in our office. History of many previous hospitalizations for SUGAR due to volume depletion. History of recurrent pancreatitis, type 2 diabetes, osteoarthritis, gastroparesis. S/p gastric stimulator placement in [place]. Has had feeding tubes and PICC lines many times in the past and she has also been prescribed outpatient IV fluids on a routine basis.  Most recent discharge from hospital was on 6/30.  Presents this time with acute decline in renal function. Complaint of not eating or drinking, fatigue, weakness. Labs in ER showed creatinine 4.64. Admitted to medical floor with IV fluids being administered.  Complaint of non-productive cough. Blood glucose level was significantly elevated the first night of her admission. Denied fever/chills. No edema or dyspnea. No n/v/d. Denied changes in urination. Denied dysuria or hematuria.     Today is awake and alert. No new complaints. Renal function improving with IV fluids. Urine output nonoliguric      Allergies:  Bee venom, Hydrocodone, Hydroxychloroquine, Ibuprofen, Pineapple, Pineapple extract, Wasp venom, Wasp venom protein, Hydrocodone-acetaminophen, Metformin, Penicillins, Sitagliptin, Chocolate, and Poison ivy extract    Home Meds:  Medications Prior to Admission   Medication Sig Dispense Refill Last Dose/Taking    albuterol sulfate  (90 Base) MCG/ACT inhaler Inhale 2 puffs 3 (Three) Times a Day As Needed for Wheezing.   Past Week    aspirin 81 MG EC tablet Take 1 tablet by mouth Daily.   Past Week    atorvastatin (LIPITOR) 80 MG tablet Take 1 tablet by mouth Every Night.   Past Week    Calcium Carbonate-Vitamin D 600-5 MG-MCG tablet Take 1 tablet by mouth Daily.   Past Week    cetirizine (zyrTEC) 10 MG tablet Take 1 tablet by mouth Daily As Needed for Allergies.   Past Month    DULoxetine (CYMBALTA) 60 MG capsule Take 1 capsule by mouth Daily.   Past Week    hydroxychloroquine (PLAQUENIL) 200 MG tablet Take 2 tablets by mouth Daily.   Past Week    magnesium oxide (MAG-OX) 400 MG tablet Take 1 tablet by mouth 3 times a day.   Past Week    nortriptyline (PAMELOR) 25 MG capsule Take 1 capsule by mouth Every Night.   Past Week    omeprazole (priLOSEC) 40 MG capsule Take 1 capsule by mouth Daily.   Past Week    pregabalin (LYRICA) 75 MG capsule Take 1 capsule by mouth 2 (Two) Times a Day. (Patient taking differently: Take 1 capsule by mouth Every Night.)   Patient Taking Differently    rOPINIRole (REQUIP) 1 MG tablet Take 1 tablet by mouth 3 (Three) Times a Day. Take 1 hour before bedtime. (Patient taking differently: Take 1 tablet by mouth Every Night. Take 1 hour before bedtime.)   Patient Taking Differently    sodium bicarbonate 650 MG tablet Take 1 tablet by mouth 2 (Two) Times a Day.   Past Week    sodium chloride 0.9 % solution Infuse 30 mL/hr into a venous catheter 3 (Three) Times a Week. Monday, Wednesday, Friday   Past Week    sucralfate (CARAFATE) 1 g tablet Take 1 tablet by mouth 4 (Four)  Times a Day Before Meals & at Bedtime.   Past Week       Medicines:  Current Facility-Administered Medications   Medication Dose Route Frequency Provider Last Rate Last Admin    acetaminophen (TYLENOL) tablet 650 mg  650 mg Oral Q6H PRN Elodia Phillips MD   650 mg at 07/24/25 0632    aspirin EC tablet 81 mg  81 mg Oral Daily Elodia Phillips MD   81 mg at 07/23/25 0826    atorvastatin (LIPITOR) tablet 80 mg  80 mg Oral Nightly Poncho Jc MD   80 mg at 07/23/25 2114    calcium carbonate (TUMS) chewable tablet 500 mg (200 mg elemental)  2 tablet Oral 4x Daily PRN Poncho Jc MD   2 tablet at 07/23/25 1636    cetirizine (zyrTEC) tablet 10 mg  10 mg Oral Daily PRN Poncho Jc MD        dexAMETHasone (DECADRON) injection 6 mg  6 mg Intravenous Daily Elodia Phillips MD   6 mg at 07/22/25 2302    dextrose (D50W) (25 g/50 mL) IV injection 25 g  25 g Intravenous Q15 Min PRN Poncho Jc MD        dextrose (GLUTOSE) oral gel 15 g  15 g Oral Q15 Min PRN Poncho Jc MD        DULoxetine (CYMBALTA) DR capsule 60 mg  60 mg Oral Daily Poncho Jc MD        glucagon (GLUCAGEN) injection 1 mg  1 mg Intramuscular Q15 Min PRN Poncho Jc MD        guaifenesin-dextromethorphan 600-30 mg (MUCINEX DM) 1 tablet  1 tablet Oral BID PRN Elodia Phillips MD        heparin (porcine) 5000 UNIT/ML injection 5,000 Units  5,000 Units Subcutaneous Q12H Poncho Jc MD   5,000 Units at 07/23/25 2115    hydroxychloroquine (PLAQUENIL) tablet 400 mg  400 mg Oral Daily Poncho Jc MD        insulin glargine (LANTUS, SEMGLEE) injection 15 Units  15 Units Subcutaneous Q12H Poncho Jc MD   15 Units at 07/23/25 2115    Insulin Lispro (humaLOG) injection 4-24 Units  4-24 Units Subcutaneous 4x Daily AC & at Bedtime Poncho Jc MD   8 Units at 07/23/25 2115    ipratropium-albuterol (DUO-NEB) nebulizer solution 3 mL  3 mL Nebulization 4x Daily - RT Elodia Phillips MD   3 mL at  07/24/25 0622    ipratropium-albuterol (DUO-NEB) nebulizer solution 3 mL  3 mL Nebulization Q4H PRN Elodia Phillips MD        Magnesium Low Dose Replacement - Follow Nurse / BPA Driven Protocol   Not Applicable PRN Poncho Jc MD        magnesium oxide (MAG-OX) tablet 400 mg  400 mg Oral TID Poncho Jc MD   400 mg at 07/23/25 2114    magnesium sulfate 2g/50 mL (PREMIX) infusion  2 g Intravenous Once Ford Elena, RAKESH        nitroglycerin (NITROSTAT) SL tablet 0.4 mg  0.4 mg Sublingual Q5 Min PRN Elodia Phillips MD        nortriptyline (PAMELOR) capsule 25 mg  25 mg Oral Nightly Poncho Jc MD   25 mg at 07/23/25 2115    ondansetron ODT (ZOFRAN-ODT) disintegrating tablet 4 mg  4 mg Oral Q6H PRN Elodia Phillips MD        Or    ondansetron (ZOFRAN) injection 4 mg  4 mg Intravenous Q6H PRN Elodia Phillips MD        pantoprazole (PROTONIX) EC tablet 40 mg  40 mg Oral Q AM Elodia Phillips MD   40 mg at 07/24/25 0613    Phosphorus Replacement - Follow Nurse / BPA Driven Protocol   Not Applicable PRN Poncho Jc MD        potassium chloride (KLOR-CON M20) CR tablet 40 mEq  40 mEq Oral BID With Meals Ford Elena APRN        Potassium Replacement - Follow Nurse / BPA Driven Protocol   Not Applicable PRN Poncho Jc MD        pregabalin (LYRICA) capsule 75 mg  75 mg Oral Nightly Poncho Jc MD   75 mg at 07/23/25 2115    rOPINIRole (REQUIP) tablet 1 mg  1 mg Oral Q8H Poncho Jc MD   1 mg at 07/24/25 0613    sodium bicarbonate tablet 650 mg  650 mg Oral BID Elodia Phillips MD   650 mg at 07/23/25 2115    sodium chloride 0.45 % 925 mL with sodium bicarbonate 8.4 % 75 mEq infusion   Intravenous Janak Monk  mL/hr at 07/24/25 0109 New Bag at 07/24/25 0109    sodium chloride 0.9 % flush 10 mL  10 mL Intravenous PRN Faraz oMta MD        sodium chloride 0.9 % flush 10 mL  10 mL Intravenous Q12H Elodia Phillips MD   10 mL at 07/23/25 7068     sodium chloride 0.9 % flush 10 mL  10 mL Intravenous PRN Elodia Phillips MD        sodium chloride 0.9 % infusion 40 mL  40 mL Intravenous PRN Elodia Phillips MD        sucralfate (CARAFATE) tablet 1 g  1 g Oral 4x Daily AC & at Bedtime Poncho Jc MD   1 g at 07/23/25 2115       Past Medical History:  Past Medical History:   Diagnosis Date    Arthritis     Contusion     Degenerative disc disease, cervical     Diabetes mellitus     Elevated cholesterol     Fibromyalgia     Neuropathy     Osteoporosis     Pancreatitis     Raynaud disease     Renal insufficiency     Rheumatoid arthritis     Smoker 02/08/2022    Vitamin D deficiency 04/26/2022       Past Surgical History:  Past Surgical History:   Procedure Laterality Date    APPENDECTOMY      CHOLECYSTECTOMY      COLONOSCOPY      ENDOSCOPY N/A 10/08/2019    Procedure: ESOPHAGOGASTRODUODENOSCOPY WITH ANESTHESIA;  Surgeon: Aleks Vaughn DO;  Location: [facility] ENDOSCOPY;  Service: Gastroenterology    ENDOSCOPY N/A 11/12/2024    Procedure: ESOPHAGOGASTRODUODENOSCOPY WITH ANESTHESIA;  Surgeon: Tiffanie Saucedo MD;  Location: [facility] ENDOSCOPY;  Service: Gastroenterology;  Laterality: N/A;  pre op: Gastroparesis, Intractable nausea  post op: insertion of dobhoff   PCP: Brandon Murguia    ENDOSCOPY N/A 11/15/2024    Procedure: ESOPHAGOGASTRODUODENOSCOPY WITH ANESTHESIA WITH NG TUBE INSERTION;  Surgeon: Tiffanie Saucedo MD;  Location: [facility] ENDOSCOPY;  Service: Gastroenterology;  Laterality: N/A;  PRE OP: DOBHOFF  POST OP: DOBHOFF  PCP: TELLY PACK    ENDOSCOPY WITH GASTROSTOMY TUBE INSERTION N/A 6/15/2022    Procedure: ESOPHAGOGASTRODUODENOSCOPY WITH GASTROSTOMY TUBE INSERTION;  Surgeon: Jersey Lee MD;  Location: [facility] ENDOSCOPY;  Service: Gastroenterology;  Laterality: N/A;  pre peg placement  post peg placement  Dr. Murguia    ENTEROSCOPY SMALL BOWEL N/A 11/3/2022    Procedure: Esophagogastroduodenoscopy with Peg Removal;  Surgeon: Aleks Vaughn,  DO;  Location: [facility] ENDOSCOPY;  Service: Gastroenterology;  Laterality: N/A;  pre; peg removal  post; peg removal   KIRILL Murguia MD        HYSTERECTOMY         Family History  Family History   Adopted: Yes   Problem Relation Age of Onset    Colon polyps Neg Hx     Colon cancer Neg Hx        Social History  Social History     Socioeconomic History    Marital status:    Tobacco Use    Smoking status: Every Day     Current packs/day: 0.50     Average packs/day: 0.5 packs/day for 32.6 years (16.3 ttl pk-yrs)     Types: Cigarettes     Start date: 1993     Passive exposure: Current    Smokeless tobacco: Never   Vaping Use    Vaping status: Every Day    Substances: Nicotine    Devices: Disposable   Substance and Sexual Activity    Alcohol use: No    Drug use: No    Sexual activity: Defer       Review of Systems:  History obtained from chart review and the patient  General ROS: No fever or chills  Respiratory ROS: No cough, shortness of breath, wheezing  Cardiovascular ROS: No chest pain or palpitations  Gastrointestinal ROS: No abdominal pain or melena  Genito-Urinary ROS: No dysuria or hematuria  Psych ROS: No anxiety and depression  14 point ROS reviewed with the patient and negative except as noted above and in the HPI unless unable to obtain.    Objective:  Patient Vitals for the past 24 hrs:   BP Temp Temp src Pulse Resp SpO2   07/24/25 0844 130/74 98.4 °F (36.9 °C) Oral 118 16 100 %   07/24/25 0628 -- -- -- 109 16 100 %   07/24/25 0622 -- -- -- 109 16 98 %   07/24/25 0309 92/50 97.8 °F (36.6 °C) Oral 103 14 98 %   07/23/25 2358 119/65 97.6 °F (36.4 °C) Oral 120 16 98 %   07/23/25 1840 -- -- -- 116 16 100 %   07/23/25 1836 -- -- -- 115 16 100 %   07/23/25 1756 123/74 97.5 °F (36.4 °C) Oral -- 17 96 %   07/23/25 1642 117/65 97.4 °F (36.3 °C) Oral 114 16 99 %   07/23/25 1413 -- -- -- -- 16 99 %   07/23/25 1156 110/69 97.6 °F (36.4 °C) Oral 120 16 100 %   07/23/25 0951 -- -- -- 109 16 --       Intake/Output  Summary (Last 24 hours) at 7/24/2025 0946  Last data filed at 7/24/2025 0614  Gross per 24 hour   Intake 1831.61 ml   Output 1400 ml   Net 431.61 ml     General: awake/alert   Chest:  clear to auscultation bilaterally without respiratory distress  CVS: regular rate and rhythm  Abdominal: soft, nontender, positive bowel sounds  Extremities: no cyanosis or edema  Skin: warm and dry without rash      Labs:  Results from last 7 days   Lab Units 07/24/25  0243 07/23/25  0428 07/22/25 1929   WBC 10*3/mm3 19.97* 15.31* 13.47*   HEMOGLOBIN g/dL 7.8* 9.2* 10.9*   HEMATOCRIT % 24.4* 29.3* 33.6*   PLATELETS 10*3/mm3 401 456* 526*         Results from last 7 days   Lab Units 07/24/25  0243 07/23/25  0428 07/22/25 1929   SODIUM mmol/L 141 134* 134*   POTASSIUM mmol/L 2.8* 4.3 3.6   CHLORIDE mmol/L 112* 106 99   CO2 mmol/L 16.0* 12.0* 16.0*   BUN mg/dL 54.4* 60.0* 63.4*   CREATININE mg/dL 2.71* 3.80* 4.64*   CALCIUM mg/dL 7.8* 7.9* 8.7   EGFR mL/min/1.73 20.4* 13.6* 10.7*   BILIRUBIN mg/dL <0.2 <0.2  --    ALK PHOS U/L 100 134*  --    ALT (SGPT) U/L 7 9  --    AST (SGOT) U/L 13 15  --    GLUCOSE mg/dL 82 544* 375*       Radiology:   Imaging Results (Last 72 Hours)       Procedure Component Value Units Date/Time    XR Chest 1 View [[id number]] Collected: 07/22/25 2144     Updated: 07/22/25 2148    Narrative:      EXAM: XR CHEST 1 VW-      HISTORY: cough; N17.9-Acute kidney failure, unspecified; N18.9-Chronic  kidney disease, unspecified       COMPARISON: 6/28/2025.     TECHNIQUE: Single frontal radiograph of the chest was obtained.     FINDINGS:     Support Devices: Right upper extremity PICC line tip overlies the lower  SVC.     Cardiac and Mediastinal Silhouettes: Normal.     Lungs/Pleura: No focal consolidation. No sizable pleural effusion. No  visible pneumothorax.     Osseous structures: No acute osseous finding.     Other: None.       Impression:         No acute cardiopulmonary abnormality.     Right upper extremity PICC  line is in good position.           This report was signed and finalized on 7/22/2025 9:45 PM by Presley Nelson.               Culture:  Blood Culture   Date Value Ref Range Status   07/22/2025 No growth at 24 hours  Preliminary   07/22/2025 No growth at 24 hours  Preliminary         Assessment    Acute kidney injury, prerenal due to volume depletion--improving  Baseline chronic kidney disease stage 3b  Hyperglycemia  Type 2 diabetes  Chronic pancreatitis  Gastroparesis  Metabolic acidosis  Hypokalemia  Hypomagnesemia     Plan:   Continue IV fluids  Replace potassium, magnesium  Monitor labs      Ford Elena, RAKESH  7/24/2025  09:46 CDT

## 2025-07-24 NOTE — PROGRESS NOTES
[facility] Clinical Pharmacy Services: IV to PO Interchange    Relevant clinical data and objective history reviewed:  Diet Order   Procedures    Diet: Regular/House; Fluid Consistency: Thin (IDDSI 0)       I/O last 3 completed shifts:  In: 3777.6 [P.O.:240; I.V.:3537.6]  Out: 2500 [Urine:2500]    The patient meets the following criteria to be switched from IV administration to PO including:  Functioning GI tract  Hemodynamically stable  Showing signs of clinical improvement  Tolerating other oral medications or enteral diet    Assessment/Plan:  Dexamethasone 6 mg has been changed from IV to PO formulation based on our [facility] P&T approved Adult IV to PO Switching policy    Jin Lin, PharmD  7/24/2025  15:11 CDT

## 2025-07-24 NOTE — PLAN OF CARE
Goal Outcome Evaluation:  Plan of Care Reviewed With: patient        Progress: improving        Patient with minimal complaints of pain so far during shift. Bicarb gtt infusing through PICC line per order. Up ad jessy. Voiding. Droplet precautions maintained for Rhinovirus. VSS. Tele on. Safety maintained.

## 2025-07-24 NOTE — PROGRESS NOTES
[facility] Medicine Services  INPATIENT PROGRESS NOTE    Patient Name: Tasha Perez  Date of Admission: 7/22/2025  Today's Date: 07/24/25  Length of Stay: 2  Primary Care Physician: KIRILL Murguia MD    Subjective   Chief Complaint: Abnormal lab    Patient has no new complaint this morning, feels a lot improved.  We discussed his medical condition especially renal status and plan of care/possible discharge tomorrow if renal status continue to improve.      Review of Systems   All pertinent negatives and positives are as above. All other systems have been reviewed and are negative unless otherwise stated.     Objective    Temp:  [97.4 °F (36.3 °C)-98.4 °F (36.9 °C)] 97.7 °F (36.5 °C)  Heart Rate:  [103-120] 110  Resp:  [14-17] 16  BP: ()/(50-74) 108/73  Physical Exam  Constitutional:       Appearance: She is ill-appearing.   Cardiovascular:      Rate and Rhythm: Normal rate and regular rhythm.      Pulses: Normal pulses.      Heart sounds: Normal heart sounds. No murmur heard.  Pulmonary:      Effort: Pulmonary effort is normal. No respiratory distress.      Breath sounds: Normal breath sounds. No wheezing or rales.   Abdominal:      General: Abdomen is flat. Bowel sounds are normal. There is no distension.      Palpations: Abdomen is soft.      Tenderness: There is no abdominal tenderness. There is no guarding.   Musculoskeletal:      Right lower leg: No edema.      Left lower leg: No edema.   Skin:     General: Skin is warm.   Neurological:      Mental Status: She is alert and oriented to person, place, and time. Mental status is at baseline.       Results Review:  I have reviewed the labs, radiology results, and diagnostic studies.    Laboratory Data:   Results from last 7 days   Lab Units 07/24/25  0243 07/23/25  0428 07/22/25  1929   WBC 10*3/mm3 19.97* 15.31* 13.47*   HEMOGLOBIN g/dL 7.8* 9.2* 10.9*   HEMATOCRIT % 24.4* 29.3* 33.6*   PLATELETS 10*3/mm3 401 456*  "526*        Results from last 7 days   Lab Units 07/24/25  0243 07/23/25  0428 07/22/25  1929   SODIUM mmol/L 141 134* 134*   POTASSIUM mmol/L 2.8* 4.3 3.6   CHLORIDE mmol/L 112* 106 99   CO2 mmol/L 16.0* 12.0* 16.0*   BUN mg/dL 54.4* 60.0* 63.4*   CREATININE mg/dL 2.71* 3.80* 4.64*   CALCIUM mg/dL 7.8* 7.9* 8.7   BILIRUBIN mg/dL <0.2 <0.2  --    ALK PHOS U/L 100 134*  --    ALT (SGPT) U/L 7 9  --    AST (SGOT) U/L 13 15  --    GLUCOSE mg/dL 82 544* 375*       Culture Data:   No results found for: \"BLOODCX\", \"URINECX\", \"WOUNDCX\", \"MRSACX\", \"RESPCX\", \"STOOLCX\"    Radiology Data:   Imaging Results (Last 24 Hours)       ** No results found for the last 24 hours. **            I have reviewed the patient's current medications.     Assessment/Plan   Assessment  Active Hospital Problems    Diagnosis     **Acute on chronic renal failure        Treatment Plan    -Acute viral syndrome secondary to Rhino and enteroviruses   Patient will be continued on supportive care with steroid, fluid and pain control as needed.    -Probable acute bronchitis from viral infection  She is clinically improved on steroids, will continue current management    - Acute kidney injury secondary to dehydration/acute illness  Nephrology was consulted, renal status is improving.  We will continue current management and follow nephrologist recommendations    DVT prophylaxis-heparin    Disposition-continue current management/disposition planning for tomorrow.      Electronically signed by Poncho Jc MD, 07/24/25, 16:08 CDT.   "

## 2025-07-25 LAB
ALBUMIN SERPL-MCNC: 3 G/DL (ref 3.5–5.2)
ALBUMIN/GLOB SERPL: 1.1 G/DL
ALP SERPL-CCNC: 95 U/L (ref 39–117)
ALT SERPL W P-5'-P-CCNC: 9 U/L (ref 1–33)
ANION GAP SERPL CALCULATED.3IONS-SCNC: 12 MMOL/L (ref 5–15)
AST SERPL-CCNC: 17 U/L (ref 1–32)
BASOPHILS # BLD AUTO: 0.03 10*3/MM3 (ref 0–0.2)
BASOPHILS NFR BLD AUTO: 0.2 % (ref 0–1.5)
BILIRUB SERPL-MCNC: <0.2 MG/DL (ref 0–1.2)
BUN SERPL-MCNC: 48 MG/DL (ref 6–20)
BUN/CREAT SERPL: 20.1 (ref 7–25)
CALCIUM SPEC-SCNC: 7.9 MG/DL (ref 8.6–10.5)
CHLORIDE SERPL-SCNC: 110 MMOL/L (ref 98–107)
CO2 SERPL-SCNC: 23 MMOL/L (ref 22–29)
CREAT SERPL-MCNC: 2.39 MG/DL (ref 0.57–1)
DEPRECATED RDW RBC AUTO: 51.4 FL (ref 37–54)
EGFRCR SERPLBLD CKD-EPI 2021: 23.7 ML/MIN/1.73
EOSINOPHIL # BLD AUTO: 0.05 10*3/MM3 (ref 0–0.4)
EOSINOPHIL NFR BLD AUTO: 0.3 % (ref 0.3–6.2)
ERYTHROCYTE [DISTWIDTH] IN BLOOD BY AUTOMATED COUNT: 15.8 % (ref 12.3–15.4)
GLOBULIN UR ELPH-MCNC: 2.8 GM/DL
GLUCOSE BLDC GLUCOMTR-MCNC: 136 MG/DL (ref 70–130)
GLUCOSE BLDC GLUCOMTR-MCNC: 401 MG/DL (ref 70–130)
GLUCOSE BLDC GLUCOMTR-MCNC: 426 MG/DL (ref 70–130)
GLUCOSE BLDC GLUCOMTR-MCNC: 75 MG/DL (ref 70–130)
GLUCOSE SERPL-MCNC: 82 MG/DL (ref 65–99)
HCT VFR BLD AUTO: 23.8 % (ref 34–46.6)
HGB BLD-MCNC: 7.5 G/DL (ref 12–15.9)
IMM GRANULOCYTES # BLD AUTO: 0.3 10*3/MM3 (ref 0–0.05)
IMM GRANULOCYTES NFR BLD AUTO: 1.9 % (ref 0–0.5)
LYMPHOCYTES # BLD AUTO: 1.61 10*3/MM3 (ref 0.7–3.1)
LYMPHOCYTES NFR BLD AUTO: 10 % (ref 19.6–45.3)
MAGNESIUM SERPL-MCNC: 2.2 MG/DL (ref 1.6–2.6)
MCH RBC QN AUTO: 28.2 PG (ref 26.6–33)
MCHC RBC AUTO-ENTMCNC: 31.5 G/DL (ref 31.5–35.7)
MCV RBC AUTO: 89.5 FL (ref 79–97)
MONOCYTES # BLD AUTO: 0.87 10*3/MM3 (ref 0.1–0.9)
MONOCYTES NFR BLD AUTO: 5.4 % (ref 5–12)
NEUTROPHILS NFR BLD AUTO: 13.21 10*3/MM3 (ref 1.7–7)
NEUTROPHILS NFR BLD AUTO: 82.2 % (ref 42.7–76)
NRBC BLD AUTO-RTO: 0 /100 WBC (ref 0–0.2)
PLATELET # BLD AUTO: 376 10*3/MM3 (ref 140–450)
PMV BLD AUTO: 9.7 FL (ref 6–12)
POTASSIUM SERPL-SCNC: 3.9 MMOL/L (ref 3.5–5.2)
PROT SERPL-MCNC: 5.8 G/DL (ref 6–8.5)
RBC # BLD AUTO: 2.66 10*6/MM3 (ref 3.77–5.28)
SODIUM SERPL-SCNC: 145 MMOL/L (ref 136–145)
WBC NRBC COR # BLD AUTO: 16.07 10*3/MM3 (ref 3.4–10.8)

## 2025-07-25 PROCEDURE — 82948 REAGENT STRIP/BLOOD GLUCOSE: CPT

## 2025-07-25 PROCEDURE — 25810000003 SODIUM CHLORIDE 0.9 % SOLUTION: Performed by: NURSE PRACTITIONER

## 2025-07-25 PROCEDURE — 63710000001 INSULIN LISPRO (HUMAN) PER 5 UNITS: Performed by: INTERNAL MEDICINE

## 2025-07-25 PROCEDURE — 25010000002 HEPARIN (PORCINE) PER 1000 UNITS: Performed by: INTERNAL MEDICINE

## 2025-07-25 PROCEDURE — 25810000003 SODIUM CHLORIDE 0.9 % SOLUTION: Performed by: INTERNAL MEDICINE

## 2025-07-25 PROCEDURE — 63710000001 DEXAMETHASONE PER 0.25 MG: Performed by: INTERNAL MEDICINE

## 2025-07-25 PROCEDURE — 63710000001 INSULIN GLARGINE PER 5 UNITS: Performed by: INTERNAL MEDICINE

## 2025-07-25 PROCEDURE — 80053 COMPREHEN METABOLIC PANEL: CPT | Performed by: INTERNAL MEDICINE

## 2025-07-25 PROCEDURE — 82948 REAGENT STRIP/BLOOD GLUCOSE: CPT | Performed by: INTERNAL MEDICINE

## 2025-07-25 PROCEDURE — 83735 ASSAY OF MAGNESIUM: CPT | Performed by: NURSE PRACTITIONER

## 2025-07-25 PROCEDURE — 85025 COMPLETE CBC W/AUTO DIFF WBC: CPT | Performed by: INTERNAL MEDICINE

## 2025-07-25 PROCEDURE — 25010000002 NA FERRIC GLUC CPLX PER 12.5 MG: Performed by: INTERNAL MEDICINE

## 2025-07-25 RX ORDER — SODIUM CHLORIDE 9 MG/ML
75 INJECTION, SOLUTION INTRAVENOUS CONTINUOUS
Status: DISPENSED | OUTPATIENT
Start: 2025-07-25 | End: 2025-07-26

## 2025-07-25 RX ADMIN — SUCRALFATE 1 G: 1 TABLET ORAL at 16:44

## 2025-07-25 RX ADMIN — ASPIRIN 81 MG: 81 TABLET, COATED ORAL at 08:12

## 2025-07-25 RX ADMIN — MAGNESIUM OXIDE TAB 400 MG (240 MG ELEMENTAL MG) 400 MG: 400 (240 MG) TAB at 08:11

## 2025-07-25 RX ADMIN — DEXAMETHASONE 6 MG: 2 TABLET ORAL at 08:11

## 2025-07-25 RX ADMIN — SUCRALFATE 1 G: 1 TABLET ORAL at 08:11

## 2025-07-25 RX ADMIN — INSULIN GLARGINE 15 UNITS: 100 INJECTION, SOLUTION SUBCUTANEOUS at 20:05

## 2025-07-25 RX ADMIN — HEPARIN SODIUM 5000 UNITS: 5000 INJECTION INTRAVENOUS; SUBCUTANEOUS at 20:05

## 2025-07-25 RX ADMIN — ACETAMINOPHEN 650 MG: 325 TABLET ORAL at 20:05

## 2025-07-25 RX ADMIN — ATORVASTATIN CALCIUM 80 MG: 40 TABLET, FILM COATED ORAL at 20:05

## 2025-07-25 RX ADMIN — SODIUM CHLORIDE 75 ML/HR: 9 INJECTION, SOLUTION INTRAVENOUS at 09:49

## 2025-07-25 RX ADMIN — ROPINIROLE 1 MG: 1 TABLET, FILM COATED ORAL at 21:59

## 2025-07-25 RX ADMIN — SODIUM BICARBONATE 650 MG: 650 TABLET ORAL at 08:12

## 2025-07-25 RX ADMIN — HYDROXYCHLOROQUINE SULFATE 400 MG: 200 TABLET ORAL at 08:12

## 2025-07-25 RX ADMIN — Medication 10 ML: at 08:13

## 2025-07-25 RX ADMIN — ROPINIROLE 1 MG: 1 TABLET, FILM COATED ORAL at 06:02

## 2025-07-25 RX ADMIN — SUCRALFATE 1 G: 1 TABLET ORAL at 20:05

## 2025-07-25 RX ADMIN — ROPINIROLE 1 MG: 1 TABLET, FILM COATED ORAL at 13:33

## 2025-07-25 RX ADMIN — INSULIN GLARGINE 15 UNITS: 100 INJECTION, SOLUTION SUBCUTANEOUS at 08:12

## 2025-07-25 RX ADMIN — MAGNESIUM OXIDE TAB 400 MG (240 MG ELEMENTAL MG) 400 MG: 400 (240 MG) TAB at 14:32

## 2025-07-25 RX ADMIN — SUCRALFATE 1 G: 1 TABLET ORAL at 11:12

## 2025-07-25 RX ADMIN — PREGABALIN 75 MG: 75 CAPSULE ORAL at 20:05

## 2025-07-25 RX ADMIN — MAGNESIUM OXIDE TAB 400 MG (240 MG ELEMENTAL MG) 400 MG: 400 (240 MG) TAB at 20:05

## 2025-07-25 RX ADMIN — SODIUM CHLORIDE 250 MG: 9 INJECTION, SOLUTION INTRAVENOUS at 14:32

## 2025-07-25 RX ADMIN — NORTRIPTYLINE HYDROCHLORIDE 25 MG: 25 CAPSULE ORAL at 20:05

## 2025-07-25 RX ADMIN — PANTOPRAZOLE SODIUM 40 MG: 40 TABLET, DELAYED RELEASE ORAL at 06:02

## 2025-07-25 RX ADMIN — INSULIN LISPRO 20 UNITS: 100 INJECTION, SOLUTION INTRAVENOUS; SUBCUTANEOUS at 16:44

## 2025-07-25 RX ADMIN — SODIUM BICARBONATE 650 MG: 650 TABLET ORAL at 20:05

## 2025-07-25 RX ADMIN — DULOXETINE 60 MG: 30 CAPSULE, DELAYED RELEASE ORAL at 08:11

## 2025-07-25 RX ADMIN — HEPARIN SODIUM 5000 UNITS: 5000 INJECTION INTRAVENOUS; SUBCUTANEOUS at 08:12

## 2025-07-25 RX ADMIN — SODIUM BICARBONATE INJECTION,: 84 SOLUTION INTRAVENOUS at 06:26

## 2025-07-25 NOTE — PLAN OF CARE
Goal Outcome Evaluation:  Plan of Care Reviewed With: patient        Progress: improving  Outcome Evaluation: Pt remains alert and oriented. kidney function labs are some better today but still elevated. fluids changed to ns at 75cc/hr. Pt was concerned of fluid retention and this was passed along to her physicians with no change in orders. has mild edema to ankles and hands. glucose levels have been within normal range today. remains in droplet precautions. safety maintained

## 2025-07-25 NOTE — PLAN OF CARE
Goal Outcome Evaluation:  Plan of Care Reviewed With: patient        Progress: improving     Patient with minimal complaints of pain so far during shift; see MAR. Bicarb gtt infusing through PICC line per order. Up ad jessy. Voiding; patient took specipan out of toilet, so did not get accurate measurements. Droplet precautions maintained. VSS. Safety maintained.

## 2025-07-25 NOTE — PROGRESS NOTES
Nephrology ([facility]) Progress Note      Patient:  Tasha Perez  YOB: 1970  Date of Service: 7/25/2025  MRN: [id number]   Acct: [id number]   Primary Care Physician: KIRILL Murguia MD  Advance Directive:   Code Status and Medical Interventions: CPR (Attempt to Resuscitate); Full Support   Ordered at: 07/22/25 2243     Code Status (Patient has no pulse and is not breathing):    CPR (Attempt to Resuscitate)     Medical Interventions (Patient has pulse or is breathing):    Full Support     Admit Date: 7/22/2025       Hospital Day: 3  Referring Provider: Faraz Mota MD      Patient personally seen and examined.  Complete chart including Consults, Notes, Operative Reports, Labs, Cardiology, and Radiology studies reviewed as able.        Subjective:  Tasha Perez is a 54 y.o. female for whom we were consulted for evaluation and treatment of acute kidney injury. Baseline chronic kidney disease stage 3b. Follows with Dr Barrow in our office. History of many previous hospitalizations for SUGAR due to volume depletion. History of recurrent pancreatitis, type 2 diabetes, osteoarthritis, gastroparesis. S/p gastric stimulator placement in [place]. Has had feeding tubes and PICC lines many times in the past and she has also been prescribed outpatient IV fluids on a routine basis.  Most recent discharge from hospital was on 6/30.  Presents this time with acute decline in renal function. Complaint of not eating or drinking, fatigue, weakness. Labs in ER showed creatinine 4.64. Admitted to medical floor with IV fluids being administered.  Complaint of non-productive cough. Blood glucose level was significantly elevated the first night of her admission. Denied fever/chills. No edema or dyspnea. No n/v/d. Denied changes in urination. Denied dysuria or hematuria.     Today is awake and alert. No new complaints. Renal function continues to improve with IV fluids. Urine output  nonoliguric     Allergies:  Bee venom, Hydrocodone, Hydroxychloroquine, Ibuprofen, Pineapple, Pineapple extract, Wasp venom, Wasp venom protein, Hydrocodone-acetaminophen, Metformin, Penicillins, Sitagliptin, Chocolate, and Poison ivy extract    Home Meds:  Medications Prior to Admission   Medication Sig Dispense Refill Last Dose/Taking    albuterol sulfate  (90 Base) MCG/ACT inhaler Inhale 2 puffs 3 (Three) Times a Day As Needed for Wheezing.   Past Week    aspirin 81 MG EC tablet Take 1 tablet by mouth Daily.   Past Week    atorvastatin (LIPITOR) 80 MG tablet Take 1 tablet by mouth Every Night.   Past Week    Calcium Carbonate-Vitamin D 600-5 MG-MCG tablet Take 1 tablet by mouth Daily.   Past Week    cetirizine (zyrTEC) 10 MG tablet Take 1 tablet by mouth Daily As Needed for Allergies.   Past Month    DULoxetine (CYMBALTA) 60 MG capsule Take 1 capsule by mouth Daily.   Past Week    hydroxychloroquine (PLAQUENIL) 200 MG tablet Take 2 tablets by mouth Daily.   Past Week    magnesium oxide (MAG-OX) 400 MG tablet Take 1 tablet by mouth 3 times a day.   Past Week    nortriptyline (PAMELOR) 25 MG capsule Take 1 capsule by mouth Every Night.   Past Week    omeprazole (priLOSEC) 40 MG capsule Take 1 capsule by mouth Daily.   Past Week    pregabalin (LYRICA) 75 MG capsule Take 1 capsule by mouth 2 (Two) Times a Day. (Patient taking differently: Take 1 capsule by mouth Every Night.)   Patient Taking Differently    rOPINIRole (REQUIP) 1 MG tablet Take 1 tablet by mouth 3 (Three) Times a Day. Take 1 hour before bedtime. (Patient taking differently: Take 1 tablet by mouth Every Night. Take 1 hour before bedtime.)   Patient Taking Differently    sodium bicarbonate 650 MG tablet Take 1 tablet by mouth 2 (Two) Times a Day.   Past Week    sodium chloride 0.9 % solution Infuse 30 mL/hr into a venous catheter 3 (Three) Times a Week. Monday, Wednesday, Friday   Past Week    sucralfate (CARAFATE) 1 g tablet Take 1 tablet by  mouth 4 (Four) Times a Day Before Meals & at Bedtime.   Past Week       Medicines:  Current Facility-Administered Medications   Medication Dose Route Frequency Provider Last Rate Last Admin    acetaminophen (TYLENOL) tablet 650 mg  650 mg Oral Q6H PRN Elodia Phillips MD   650 mg at 07/24/25 2008    aspirin EC tablet 81 mg  81 mg Oral Daily Elodia Phillips MD   81 mg at 07/25/25 0812    atorvastatin (LIPITOR) tablet 80 mg  80 mg Oral Nightly Poncho Jc MD   80 mg at 07/24/25 2009    calcium carbonate (TUMS) chewable tablet 500 mg (200 mg elemental)  2 tablet Oral 4x Daily PRN Poncho Jc MD   2 tablet at 07/23/25 1636    cetirizine (zyrTEC) tablet 10 mg  10 mg Oral Daily PRN Poncho Jc MD        dexAMETHasone (DECADRON) tablet 6 mg  6 mg Oral Daily With Breakfast Poncho Jc MD   6 mg at 07/25/25 0811    dextrose (D50W) (25 g/50 mL) IV injection 25 g  25 g Intravenous Q15 Min PRN Poncho Jc MD        dextrose (GLUTOSE) oral gel 15 g  15 g Oral Q15 Min PRN Poncho Jc MD        DULoxetine (CYMBALTA) DR capsule 60 mg  60 mg Oral Daily Poncho Jc MD   60 mg at 07/25/25 0811    ferric gluconate (FERRLECIT) 250 MG in sodium chloride 0.9% 250 mL IVPB  250 mg Intravenous Daily Janak Harvey  mL/hr at 07/24/25 1531 250 mg at 07/24/25 1531    glucagon (GLUCAGEN) injection 1 mg  1 mg Intramuscular Q15 Min PRN Poncho Jc MD        guaifenesin-dextromethorphan 600-30 mg (MUCINEX DM) 1 tablet  1 tablet Oral BID PRN Elodia Phillips MD        heparin (porcine) 5000 UNIT/ML injection 5,000 Units  5,000 Units Subcutaneous Q12H Poncho Jc MD   5,000 Units at 07/25/25 0812    hydroxychloroquine (PLAQUENIL) tablet 400 mg  400 mg Oral Daily Poncho Jc MD   400 mg at 07/25/25 0812    insulin glargine (LANTUS, SEMGLEE) injection 15 Units  15 Units Subcutaneous Q12H Poncho Jc MD   15 Units at 07/25/25 0812    Insulin Lispro (humaLOG)  injection 4-24 Units  4-24 Units Subcutaneous 4x Daily AC & at Bedtime Poncho Jc MD   20 Units at 07/24/25 2008    ipratropium-albuterol (DUO-NEB) nebulizer solution 3 mL  3 mL Nebulization Q4H PRN Elodia Phillips MD        Magnesium Low Dose Replacement - Follow Nurse / BPA Driven Protocol   Not Applicable PRN Poncho Jc MD        magnesium oxide (MAG-OX) tablet 400 mg  400 mg Oral TID Poncho Jc MD   400 mg at 07/25/25 0811    nitroglycerin (NITROSTAT) SL tablet 0.4 mg  0.4 mg Sublingual Q5 Min PRN Elodia Phillips MD        nortriptyline (PAMELOR) capsule 25 mg  25 mg Oral Nightly Poncho Jc MD   25 mg at 07/24/25 2009    ondansetron ODT (ZOFRAN-ODT) disintegrating tablet 4 mg  4 mg Oral Q6H PRN Elodia Phillips MD        Or    ondansetron (ZOFRAN) injection 4 mg  4 mg Intravenous Q6H PRN Elodia Phillips MD        pantoprazole (PROTONIX) EC tablet 40 mg  40 mg Oral Q AM Elodia Phillips MD   40 mg at 07/25/25 0602    Phosphorus Replacement - Follow Nurse / BPA Driven Protocol   Not Applicable PRN Poncho Jc MD        potassium chloride (KLOR-CON M20) CR tablet 40 mEq  40 mEq Oral Once Poncho Jc MD        Potassium Replacement - Follow Nurse / BPA Driven Protocol   Not Applicable PRN Poncho Jc MD        pregabalin (LYRICA) capsule 75 mg  75 mg Oral Nightly Poncho Jc MD   75 mg at 07/24/25 2009    rOPINIRole (REQUIP) tablet 1 mg  1 mg Oral Q8H Poncho Jc MD   1 mg at 07/25/25 0602    sodium bicarbonate tablet 650 mg  650 mg Oral BID Elodia Phillips MD   650 mg at 07/25/25 0812    sodium chloride 0.45 % 925 mL with sodium bicarbonate 8.4 % 75 mEq infusion   Intravenous Janak Monk  mL/hr at 07/25/25 0626 New Bag at 07/25/25 0626    sodium chloride 0.9 % flush 10 mL  10 mL Intravenous PRN Faraz Mota MD        sodium chloride 0.9 % flush 10 mL  10 mL Intravenous Q12H Elodia Phillips MD   10 mL at 07/25/25 0813     sodium chloride 0.9 % flush 10 mL  10 mL Intravenous PRN Elodia Phillips MD        sodium chloride 0.9 % infusion 40 mL  40 mL Intravenous PRN Elodia Phillips MD        sucralfate (CARAFATE) tablet 1 g  1 g Oral 4x Daily AC & at Bedtime Poncho Jc MD   1 g at 07/25/25 0811       Past Medical History:  Past Medical History:   Diagnosis Date    Arthritis     Contusion     Degenerative disc disease, cervical     Diabetes mellitus     Elevated cholesterol     Fibromyalgia     Neuropathy     Osteoporosis     Pancreatitis     Raynaud disease     Renal insufficiency     Rheumatoid arthritis     Smoker 02/08/2022    Vitamin D deficiency 04/26/2022       Past Surgical History:  Past Surgical History:   Procedure Laterality Date    APPENDECTOMY      CHOLECYSTECTOMY      COLONOSCOPY      ENDOSCOPY N/A 10/08/2019    Procedure: ESOPHAGOGASTRODUODENOSCOPY WITH ANESTHESIA;  Surgeon: Aleks Vaughn DO;  Location: [facility] ENDOSCOPY;  Service: Gastroenterology    ENDOSCOPY N/A 11/12/2024    Procedure: ESOPHAGOGASTRODUODENOSCOPY WITH ANESTHESIA;  Surgeon: Tiffanie Saucedo MD;  Location: [facility] ENDOSCOPY;  Service: Gastroenterology;  Laterality: N/A;  pre op: Gastroparesis, Intractable nausea  post op: insertion of dobhoff   PCP: Brandon Murguia    ENDOSCOPY N/A 11/15/2024    Procedure: ESOPHAGOGASTRODUODENOSCOPY WITH ANESTHESIA WITH NG TUBE INSERTION;  Surgeon: Tiffanie Saucedo MD;  Location: [facility] ENDOSCOPY;  Service: Gastroenterology;  Laterality: N/A;  PRE OP: DOBHOFF  POST OP: DOBHOFF  PCP: TELLY PACK    ENDOSCOPY WITH GASTROSTOMY TUBE INSERTION N/A 6/15/2022    Procedure: ESOPHAGOGASTRODUODENOSCOPY WITH GASTROSTOMY TUBE INSERTION;  Surgeon: Jersey Lee MD;  Location: [facility] ENDOSCOPY;  Service: Gastroenterology;  Laterality: N/A;  pre peg placement  post peg placement  Dr. Murguia    ENTEROSCOPY SMALL BOWEL N/A 11/3/2022    Procedure: Esophagogastroduodenoscopy with Peg Removal;  Surgeon: Aleks Vaughn,  DO;  Location: [facility] ENDOSCOPY;  Service: Gastroenterology;  Laterality: N/A;  pre; peg removal  post; peg removal   KIRILL Murguia MD        HYSTERECTOMY         Family History  Family History   Adopted: Yes   Problem Relation Age of Onset    Colon polyps Neg Hx     Colon cancer Neg Hx        Social History  Social History     Socioeconomic History    Marital status:    Tobacco Use    Smoking status: Every Day     Current packs/day: 0.50     Average packs/day: 0.5 packs/day for 32.6 years (16.3 ttl pk-yrs)     Types: Cigarettes     Start date: 1993     Passive exposure: Current    Smokeless tobacco: Never   Vaping Use    Vaping status: Every Day    Substances: Nicotine    Devices: Disposable   Substance and Sexual Activity    Alcohol use: No    Drug use: No    Sexual activity: Defer       Review of Systems:  History obtained from chart review and the patient  General ROS: No fever or chills  Respiratory ROS: No cough, shortness of breath, wheezing  Cardiovascular ROS: No chest pain or palpitations  Gastrointestinal ROS: No abdominal pain or melena  Genito-Urinary ROS: No dysuria or hematuria  Psych ROS: No anxiety and depression  14 point ROS reviewed with the patient and negative except as noted above and in the HPI unless unable to obtain.    Objective:  Patient Vitals for the past 24 hrs:   BP Temp Temp src Pulse Resp SpO2   07/25/25 0741 139/90 98 °F (36.7 °C) Oral -- 16 100 %   07/25/25 0354 103/67 98.6 °F (37 °C) Oral -- 16 99 %   07/24/25 2255 135/88 97.8 °F (36.6 °C) Oral -- 16 100 %   07/24/25 1825 131/81 98.7 °F (37.1 °C) Oral -- 16 100 %   07/24/25 1521 108/73 97.7 °F (36.5 °C) Oral -- 16 99 %   07/24/25 1213 123/74 97.9 °F (36.6 °C) Oral -- 16 100 %   07/24/25 1000 -- -- -- 110 16 100 %   07/24/25 0954 -- -- -- 114 16 100 %       Intake/Output Summary (Last 24 hours) at 7/25/2025 0853  Last data filed at 7/25/2025 0626  Gross per 24 hour   Intake 3054.1 ml   Output --   Net 3054.1 ml      General: awake/alert   Chest:  clear to auscultation bilaterally without respiratory distress  CVS: regular rate and rhythm  Abdominal: soft, nontender, positive bowel sounds  Extremities: no cyanosis or edema  Skin: warm and dry without rash      Labs:  Results from last 7 days   Lab Units 07/25/25  0322 07/24/25  0243 07/23/25  0428   WBC 10*3/mm3 16.07* 19.97* 15.31*   HEMOGLOBIN g/dL 7.5* 7.8* 9.2*   HEMATOCRIT % 23.8* 24.4* 29.3*   PLATELETS 10*3/mm3 376 401 456*         Results from last 7 days   Lab Units 07/25/25  0322 07/24/25  1629 07/24/25  0243 07/23/25  0428   SODIUM mmol/L 145  --  141 134*   POTASSIUM mmol/L 3.9 4.4 2.8* 4.3   CHLORIDE mmol/L 110*  --  112* 106   CO2 mmol/L 23.0  --  16.0* 12.0*   BUN mg/dL 48.0*  --  54.4* 60.0*   CREATININE mg/dL 2.39*  --  2.71* 3.80*   CALCIUM mg/dL 7.9*  --  7.8* 7.9*   EGFR mL/min/1.73 23.7*  --  20.4* 13.6*   BILIRUBIN mg/dL <0.2  --  <0.2 <0.2   ALK PHOS U/L 95  --  100 134*   ALT (SGPT) U/L 9  --  7 9   AST (SGOT) U/L 17  --  13 15   GLUCOSE mg/dL 82  --  82 544*       Radiology:   Imaging Results (Last 72 Hours)       Procedure Component Value Units Date/Time    XR Chest 1 View [[id number]] Collected: 07/22/25 2144     Updated: 07/22/25 2148    Narrative:      EXAM: XR CHEST 1 VW-      HISTORY: cough; N17.9-Acute kidney failure, unspecified; N18.9-Chronic  kidney disease, unspecified       COMPARISON: 6/28/2025.     TECHNIQUE: Single frontal radiograph of the chest was obtained.     FINDINGS:     Support Devices: Right upper extremity PICC line tip overlies the lower  SVC.     Cardiac and Mediastinal Silhouettes: Normal.     Lungs/Pleura: No focal consolidation. No sizable pleural effusion. No  visible pneumothorax.     Osseous structures: No acute osseous finding.     Other: None.       Impression:         No acute cardiopulmonary abnormality.     Right upper extremity PICC line is in good position.           This report was signed and finalized on  7/22/2025 9:45 PM by Presley Nelson.               Culture:  Blood Culture   Date Value Ref Range Status   07/22/2025 No growth at 24 hours  Preliminary   07/22/2025 No growth at 24 hours  Preliminary         Assessment    Acute kidney injury, prerenal due to volume depletion--improving  Baseline chronic kidney disease stage 3b  Hyperglycemia  Type 2 diabetes  Chronic pancreatitis  Gastroparesis  Metabolic acidosis--improved  Hypokalemia--improved  Hypomagnesemia--improved    Plan:   Continue IV fluids, change to normal saline at 75 ml/hour  Monitor labs      Ford Elena, RAKESH  7/25/2025  08:53 CDT

## 2025-07-25 NOTE — PROGRESS NOTES
[facility]  INPATIENT PROGRESS NOTE    Patient Name: Tasha Perez  Date of Admission: 7/22/2025  Today's Date: 07/25/25  Length of Stay: 3  Primary Care Physician: KIRILL Murguia MD    Subjective   Chief Complaint: Abnormal lab    Patient has no new complaint this morning, feels a lot improved.  Discussed discharge planning for tomorrow if renal status continues to improve.    Review of Systems   All pertinent negatives and positives are as above. All other systems have been reviewed and are negative unless otherwise stated.     Objective    Temp:  [97.8 °F (36.6 °C)-98.7 °F (37.1 °C)] 98.3 °F (36.8 °C)  Resp:  [16] 16  BP: (103-139)/(67-90) 114/70  Physical Exam  Constitutional:       Appearance: She is ill-appearing.   Cardiovascular:      Rate and Rhythm: Normal rate and regular rhythm.      Pulses: Normal pulses.      Heart sounds: Normal heart sounds. No murmur heard.  Pulmonary:      Effort: Pulmonary effort is normal. No respiratory distress.      Breath sounds: Normal breath sounds. No wheezing or rales.   Abdominal:      General: Abdomen is flat. Bowel sounds are normal. There is no distension.      Palpations: Abdomen is soft.      Tenderness: There is no abdominal tenderness. There is no guarding.   Musculoskeletal:      Right lower leg: No edema.      Left lower leg: No edema.   Skin:     General: Skin is warm.   Neurological:      Mental Status: She is alert and oriented to person, place, and time. Mental status is at baseline.       Results Review:  I have reviewed the labs, radiology results, and diagnostic studies.    Laboratory Data:   Results from last 7 days   Lab Units 07/25/25  0322 07/24/25  0243 07/23/25  0428   WBC 10*3/mm3 16.07* 19.97* 15.31*   HEMOGLOBIN g/dL 7.5* 7.8* 9.2*   HEMATOCRIT % 23.8* 24.4* 29.3*   PLATELETS 10*3/mm3 376 401 456*        Results from last 7 days   Lab Units 07/25/25  0322 07/24/25  1629 07/24/25  0243  "07/23/25  0428   SODIUM mmol/L 145  --  141 134*   POTASSIUM mmol/L 3.9 4.4 2.8* 4.3   CHLORIDE mmol/L 110*  --  112* 106   CO2 mmol/L 23.0  --  16.0* 12.0*   BUN mg/dL 48.0*  --  54.4* 60.0*   CREATININE mg/dL 2.39*  --  2.71* 3.80*   CALCIUM mg/dL 7.9*  --  7.8* 7.9*   BILIRUBIN mg/dL <0.2  --  <0.2 <0.2   ALK PHOS U/L 95  --  100 134*   ALT (SGPT) U/L 9  --  7 9   AST (SGOT) U/L 17  --  13 15   GLUCOSE mg/dL 82  --  82 544*       Culture Data:   No results found for: \"BLOODCX\", \"URINECX\", \"WOUNDCX\", \"MRSACX\", \"RESPCX\", \"STOOLCX\"    Radiology Data:   Imaging Results (Last 24 Hours)       ** No results found for the last 24 hours. **            I have reviewed the patient's current medications.     Assessment/Plan   Assessment  Active Hospital Problems    Diagnosis     **Acute on chronic renal failure        Treatment Plan    -Acute viral syndrome secondary to Rhino and enteroviruses   Patient will be continued on supportive care with steroid, fluid and pain control as needed.    -Probable acute bronchitis from viral infection  She is clinically improved on steroids, will continue current management    - Acute kidney injury secondary to dehydration/acute illness  Nephrology was consulted, renal status is improving.  We will continue current management and follow nephrologist recommendations    DVT prophylaxis-heparin    Disposition-continue current management/disposition planning for tomorrow.      Electronically signed by Poncho Jc MD, 07/25/25, 17:11 CDT.   "

## 2025-07-26 VITALS
HEIGHT: 61 IN | HEART RATE: 107 BPM | SYSTOLIC BLOOD PRESSURE: 125 MMHG | OXYGEN SATURATION: 97 % | RESPIRATION RATE: 16 BRPM | DIASTOLIC BLOOD PRESSURE: 72 MMHG | BODY MASS INDEX: 16.71 KG/M2 | WEIGHT: 88.5 LBS | TEMPERATURE: 98 F

## 2025-07-26 LAB
ANION GAP SERPL CALCULATED.3IONS-SCNC: 13 MMOL/L (ref 5–15)
BUN SERPL-MCNC: 33.9 MG/DL (ref 6–20)
BUN/CREAT SERPL: 16.8 (ref 7–25)
CALCIUM SPEC-SCNC: 8.6 MG/DL (ref 8.6–10.5)
CHLORIDE SERPL-SCNC: 107 MMOL/L (ref 98–107)
CO2 SERPL-SCNC: 21 MMOL/L (ref 22–29)
CREAT SERPL-MCNC: 2.02 MG/DL (ref 0.57–1)
EGFRCR SERPLBLD CKD-EPI 2021: 29 ML/MIN/1.73
GLUCOSE BLDC GLUCOMTR-MCNC: 134 MG/DL (ref 70–130)
GLUCOSE BLDC GLUCOMTR-MCNC: 176 MG/DL (ref 70–130)
GLUCOSE BLDC GLUCOMTR-MCNC: 78 MG/DL (ref 70–130)
GLUCOSE SERPL-MCNC: 61 MG/DL (ref 65–99)
POTASSIUM SERPL-SCNC: 3.6 MMOL/L (ref 3.5–5.2)
SODIUM SERPL-SCNC: 141 MMOL/L (ref 136–145)

## 2025-07-26 PROCEDURE — 63710000001 DEXAMETHASONE PER 0.25 MG: Performed by: INTERNAL MEDICINE

## 2025-07-26 PROCEDURE — 25010000002 ONDANSETRON PER 1 MG

## 2025-07-26 PROCEDURE — 82948 REAGENT STRIP/BLOOD GLUCOSE: CPT | Performed by: INTERNAL MEDICINE

## 2025-07-26 PROCEDURE — 82948 REAGENT STRIP/BLOOD GLUCOSE: CPT

## 2025-07-26 PROCEDURE — 25810000003 SODIUM CHLORIDE 0.9 % SOLUTION: Performed by: NURSE PRACTITIONER

## 2025-07-26 PROCEDURE — 25010000002 HEPARIN (PORCINE) PER 1000 UNITS: Performed by: INTERNAL MEDICINE

## 2025-07-26 PROCEDURE — 80048 BASIC METABOLIC PNL TOTAL CA: CPT | Performed by: NURSE PRACTITIONER

## 2025-07-26 RX ORDER — POTASSIUM CHLORIDE 1500 MG/1
20 TABLET, EXTENDED RELEASE ORAL ONCE
Status: COMPLETED | OUTPATIENT
Start: 2025-07-26 | End: 2025-07-26

## 2025-07-26 RX ADMIN — POTASSIUM CHLORIDE 20 MEQ: 1500 TABLET, EXTENDED RELEASE ORAL at 09:38

## 2025-07-26 RX ADMIN — DEXAMETHASONE 6 MG: 2 TABLET ORAL at 09:38

## 2025-07-26 RX ADMIN — SUCRALFATE 1 G: 1 TABLET ORAL at 09:38

## 2025-07-26 RX ADMIN — ONDANSETRON 4 MG: 2 INJECTION, SOLUTION INTRAMUSCULAR; INTRAVENOUS at 05:57

## 2025-07-26 RX ADMIN — ASPIRIN 81 MG: 81 TABLET, COATED ORAL at 09:38

## 2025-07-26 RX ADMIN — Medication 10 ML: at 09:39

## 2025-07-26 RX ADMIN — PANTOPRAZOLE SODIUM 40 MG: 40 TABLET, DELAYED RELEASE ORAL at 05:58

## 2025-07-26 RX ADMIN — DULOXETINE 60 MG: 30 CAPSULE, DELAYED RELEASE ORAL at 09:38

## 2025-07-26 RX ADMIN — MAGNESIUM OXIDE TAB 400 MG (240 MG ELEMENTAL MG) 400 MG: 400 (240 MG) TAB at 09:38

## 2025-07-26 RX ADMIN — SODIUM BICARBONATE 650 MG: 650 TABLET ORAL at 09:38

## 2025-07-26 RX ADMIN — HYDROXYCHLOROQUINE SULFATE 400 MG: 200 TABLET ORAL at 09:38

## 2025-07-26 RX ADMIN — ROPINIROLE 1 MG: 1 TABLET, FILM COATED ORAL at 05:58

## 2025-07-26 RX ADMIN — HEPARIN SODIUM 5000 UNITS: 5000 INJECTION INTRAVENOUS; SUBCUTANEOUS at 09:38

## 2025-07-26 RX ADMIN — SODIUM CHLORIDE 75 ML/HR: 9 INJECTION, SOLUTION INTRAVENOUS at 00:07

## 2025-07-26 NOTE — PLAN OF CARE
Goal Outcome Evaluation:  Plan of Care Reviewed With: patient        Progress: improving          Patient with minimal complaints of pain so far during shift; see MAR. IVF infusing through PICC line per order. Up ad jessy. Voiding. Nonproductive cough. VSS. Safety maintained. Droplet precautions maintained.

## 2025-07-26 NOTE — PROGRESS NOTES
Nephrology ([facility]) Progress Note      Patient:  Tasha Perez  YOB: 1970  Date of Service: 7/26/2025  MRN: [id number]   Acct: [id number]   Primary Care Physician: KIRILL Murguia MD  Advance Directive:   Code Status and Medical Interventions: CPR (Attempt to Resuscitate); Full Support   Ordered at: 07/22/25 2243     Code Status (Patient has no pulse and is not breathing):    CPR (Attempt to Resuscitate)     Medical Interventions (Patient has pulse or is breathing):    Full Support     Admit Date: 7/22/2025       Hospital Day: 4  Referring Provider: Faraz Mota MD      Patient personally seen and examined.  Complete chart including Consults, Notes, Operative Reports, Labs, Cardiology, and Radiology studies reviewed as able.        Subjective:  Tasha Perez is a 54 y.o. female for whom we were consulted for evaluation and treatment of acute kidney injury. Baseline chronic kidney disease stage 3b. Follows with Dr Barrow in our office. History of many previous hospitalizations for SUGAR due to volume depletion. History of recurrent pancreatitis, type 2 diabetes, osteoarthritis, gastroparesis. S/p gastric stimulator placement in [place]. Has had feeding tubes and PICC lines many times in the past and she has also been prescribed outpatient IV fluids on a routine basis.  Most recent discharge from hospital was on 6/30.  Presents this time with acute decline in renal function. Complaint of not eating or drinking, fatigue, weakness. Labs in ER showed creatinine 4.64. Admitted to medical floor with IV fluids being administered.  Complaint of non-productive cough. Blood glucose level was significantly elevated the first night of her admission. Denied fever/chills. No edema or dyspnea. No n/v/d. Denied changes in urination. Denied dysuria or hematuria.     Today is awake and alert. No new complaints. Renal function continues to improve with IV fluids. Urine output  nonoliguric.  She had 1 episode of nausea earlier.  She is being discharged home today.    Allergies:  Bee venom, Hydrocodone, Hydroxychloroquine, Ibuprofen, Pineapple, Pineapple extract, Wasp venom, Wasp venom protein, Hydrocodone-acetaminophen, Metformin, Penicillins, Sitagliptin, Chocolate, and Poison ivy extract    Home Meds:  Medications Prior to Admission   Medication Sig Dispense Refill Last Dose/Taking    albuterol sulfate  (90 Base) MCG/ACT inhaler Inhale 2 puffs 3 (Three) Times a Day As Needed for Wheezing.   Past Week    aspirin 81 MG EC tablet Take 1 tablet by mouth Daily.   Past Week    atorvastatin (LIPITOR) 80 MG tablet Take 1 tablet by mouth Every Night.   Past Week    Calcium Carbonate-Vitamin D 600-5 MG-MCG tablet Take 1 tablet by mouth Daily.   Past Week    cetirizine (zyrTEC) 10 MG tablet Take 1 tablet by mouth Daily As Needed for Allergies.   Past Month    DULoxetine (CYMBALTA) 60 MG capsule Take 1 capsule by mouth Daily.   Past Week    hydroxychloroquine (PLAQUENIL) 200 MG tablet Take 2 tablets by mouth Daily.   Past Week    magnesium oxide (MAG-OX) 400 MG tablet Take 1 tablet by mouth 3 times a day.   Past Week    nortriptyline (PAMELOR) 25 MG capsule Take 1 capsule by mouth Every Night.   Past Week    omeprazole (priLOSEC) 40 MG capsule Take 1 capsule by mouth Daily.   Past Week    pregabalin (LYRICA) 75 MG capsule Take 1 capsule by mouth 2 (Two) Times a Day. (Patient taking differently: Take 1 capsule by mouth Every Night.)   Patient Taking Differently    rOPINIRole (REQUIP) 1 MG tablet Take 1 tablet by mouth 3 (Three) Times a Day. Take 1 hour before bedtime. (Patient taking differently: Take 1 tablet by mouth Every Night. Take 1 hour before bedtime.)   Patient Taking Differently    sodium bicarbonate 650 MG tablet Take 1 tablet by mouth 2 (Two) Times a Day.   Past Week    sodium chloride 0.9 % solution Infuse 30 mL/hr into a venous catheter 3 (Three) Times a Week. Monday, Wednesday,  Friday   Past Week    sucralfate (CARAFATE) 1 g tablet Take 1 tablet by mouth 4 (Four) Times a Day Before Meals & at Bedtime.   Past Week       Medicines:  Current Facility-Administered Medications   Medication Dose Route Frequency Provider Last Rate Last Admin    acetaminophen (TYLENOL) tablet 650 mg  650 mg Oral Q6H PRN Elodia Phillips MD   650 mg at 07/25/25 2005    aspirin EC tablet 81 mg  81 mg Oral Daily Elodia Phillips MD   81 mg at 07/26/25 0938    atorvastatin (LIPITOR) tablet 80 mg  80 mg Oral Nightly Poncho Jc MD   80 mg at 07/25/25 2005    calcium carbonate (TUMS) chewable tablet 500 mg (200 mg elemental)  2 tablet Oral 4x Daily PRN Poncho Jc MD   2 tablet at 07/23/25 1636    cetirizine (zyrTEC) tablet 10 mg  10 mg Oral Daily PRN Poncho Jc MD        dexAMETHasone (DECADRON) tablet 6 mg  6 mg Oral Daily With Breakfast Poncho Jc MD   6 mg at 07/26/25 0938    dextrose (D50W) (25 g/50 mL) IV injection 25 g  25 g Intravenous Q15 Min PRN Poncho Jc MD        dextrose (GLUTOSE) oral gel 15 g  15 g Oral Q15 Min PRN Poncho Jc MD        DULoxetine (CYMBALTA) DR capsule 60 mg  60 mg Oral Daily Poncho Jc MD   60 mg at 07/26/25 0938    ferric gluconate (FERRLECIT) 250 MG in sodium chloride 0.9% 250 mL IVPB  250 mg Intravenous Daily Janak Harvey  mL/hr at 07/25/25 1432 250 mg at 07/25/25 1432    glucagon (GLUCAGEN) injection 1 mg  1 mg Intramuscular Q15 Min PRN Poncho Jc MD        guaifenesin-dextromethorphan 600-30 mg (MUCINEX DM) 1 tablet  1 tablet Oral BID PRN Elodia Phillips MD        heparin (porcine) 5000 UNIT/ML injection 5,000 Units  5,000 Units Subcutaneous Q12H Poncho Jc MD   5,000 Units at 07/26/25 0938    hydroxychloroquine (PLAQUENIL) tablet 400 mg  400 mg Oral Daily Poncho Jc MD   400 mg at 07/26/25 0938    insulin glargine (LANTUS, SEMGLEE) injection 15 Units  15 Units Subcutaneous Q12H Babita  Poncho ERNST MD   15 Units at 07/25/25 2005    Insulin Lispro (humaLOG) injection 4-24 Units  4-24 Units Subcutaneous 4x Daily AC & at Bedtime Poncho Jc MD   20 Units at 07/25/25 1644    ipratropium-albuterol (DUO-NEB) nebulizer solution 3 mL  3 mL Nebulization Q4H PRN Elodia Phillips MD        Magnesium Low Dose Replacement - Follow Nurse / BPA Driven Protocol   Not Applicable PRN Poncho Jc MD        magnesium oxide (MAG-OX) tablet 400 mg  400 mg Oral TID Poncho Jc MD   400 mg at 07/26/25 0938    nitroglycerin (NITROSTAT) SL tablet 0.4 mg  0.4 mg Sublingual Q5 Min PRN Elodia Phillips MD        nortriptyline (PAMELOR) capsule 25 mg  25 mg Oral Nightly Poncho Jc MD   25 mg at 07/25/25 2005    ondansetron ODT (ZOFRAN-ODT) disintegrating tablet 4 mg  4 mg Oral Q6H PRN Elodia Phillips MD        Or    ondansetron (ZOFRAN) injection 4 mg  4 mg Intravenous Q6H PRN Elodia Phillips MD   4 mg at 07/26/25 0557    pantoprazole (PROTONIX) EC tablet 40 mg  40 mg Oral Q AM Elodia Phillips MD   40 mg at 07/26/25 0558    Phosphorus Replacement - Follow Nurse / BPA Driven Protocol   Not Applicable PRN Poncho Jc MD        Potassium Replacement - Follow Nurse / BPA Driven Protocol   Not Applicable PRN Poncho Jc MD        pregabalin (LYRICA) capsule 75 mg  75 mg Oral Nightly Poncho Jc MD   75 mg at 07/25/25 2005    rOPINIRole (REQUIP) tablet 1 mg  1 mg Oral Q8H Poncho Jc MD   1 mg at 07/26/25 0558    sodium bicarbonate tablet 650 mg  650 mg Oral BID Elodia Phillips MD   650 mg at 07/26/25 0938    sodium chloride 0.9 % flush 10 mL  10 mL Intravenous PRN Faraz Mota MD        sodium chloride 0.9 % flush 10 mL  10 mL Intravenous Q12H Elodia Phillips MD   10 mL at 07/26/25 0939    sodium chloride 0.9 % flush 10 mL  10 mL Intravenous PRN Elodia Phillips MD        sodium chloride 0.9 % infusion 40 mL  40 mL Intravenous PRN Elodia Phillips MD        sucralfate  (CARAFATE) tablet 1 g  1 g Oral 4x Daily AC & at Bedtime Poncho Jc MD   1 g at 07/26/25 0938       Past Medical History:  Past Medical History:   Diagnosis Date    Arthritis     Contusion     Degenerative disc disease, cervical     Diabetes mellitus     Elevated cholesterol     Fibromyalgia     Neuropathy     Osteoporosis     Pancreatitis     Raynaud disease     Renal insufficiency     Rheumatoid arthritis     Smoker 02/08/2022    Vitamin D deficiency 04/26/2022       Past Surgical History:  Past Surgical History:   Procedure Laterality Date    APPENDECTOMY      CHOLECYSTECTOMY      COLONOSCOPY      ENDOSCOPY N/A 10/08/2019    Procedure: ESOPHAGOGASTRODUODENOSCOPY WITH ANESTHESIA;  Surgeon: Aleks Vaughn DO;  Location: [facility] ENDOSCOPY;  Service: Gastroenterology    ENDOSCOPY N/A 11/12/2024    Procedure: ESOPHAGOGASTRODUODENOSCOPY WITH ANESTHESIA;  Surgeon: Tiffanie Saucedo MD;  Location: [facility] ENDOSCOPY;  Service: Gastroenterology;  Laterality: N/A;  pre op: Gastroparesis, Intractable nausea  post op: insertion of dobhoff   PCP: Brandon Murguia    ENDOSCOPY N/A 11/15/2024    Procedure: ESOPHAGOGASTRODUODENOSCOPY WITH ANESTHESIA WITH NG TUBE INSERTION;  Surgeon: Tiffanie Saucedo MD;  Location: [facility] ENDOSCOPY;  Service: Gastroenterology;  Laterality: N/A;  PRE OP: DOBHOFF  POST OP: DOBHOFF  PCP: TELLY PACK    ENDOSCOPY WITH GASTROSTOMY TUBE INSERTION N/A 6/15/2022    Procedure: ESOPHAGOGASTRODUODENOSCOPY WITH GASTROSTOMY TUBE INSERTION;  Surgeon: Jersey Lee MD;  Location: [facility] ENDOSCOPY;  Service: Gastroenterology;  Laterality: N/A;  pre peg placement  post peg placement  Dr. Murguia    ENTEROSCOPY SMALL BOWEL N/A 11/3/2022    Procedure: Esophagogastroduodenoscopy with Peg Removal;  Surgeon: Aleks Vaughn DO;  Location: [facility] ENDOSCOPY;  Service: Gastroenterology;  Laterality: N/A;  pre; peg removal  post; peg removal   KIRILL Murguia MD        [facility]  History  Family History   Adopted: Yes   Problem Relation Age of Onset    Colon polyps Neg Hx     Colon cancer Neg Hx        Social History  Social History     Socioeconomic History    Marital status:    Tobacco Use    Smoking status: Every Day     Current packs/day: 0.50     Average packs/day: 0.5 packs/day for 32.6 years (16.3 ttl pk-yrs)     Types: Cigarettes     Start date: 1993     Passive exposure: Current    Smokeless tobacco: Never   Vaping Use    Vaping status: Every Day    Substances: Nicotine    Devices: Disposable   Substance and Sexual Activity    Alcohol use: No    Drug use: No    Sexual activity: Defer       Review of Systems:  History obtained from chart review and the patient  General ROS: No fever or chills  Respiratory ROS: No cough, shortness of breath, wheezing  Cardiovascular ROS: No chest pain or palpitations  Gastrointestinal ROS: No abdominal pain or melena  Genito-Urinary ROS: No dysuria or hematuria  Psych ROS: No anxiety and depression  14 point ROS reviewed with the patient and negative except as noted above and in the HPI unless unable to obtain.    Objective:  Patient Vitals for the past 24 hrs:   BP Temp Temp src Pulse Resp SpO2   07/26/25 0615 125/72 98 °F (36.7 °C) Oral 107 16 97 %   07/26/25 0506 130/78 98.1 °F (36.7 °C) Oral 120 16 98 %   07/25/25 2329 116/69 98 °F (36.7 °C) Oral 100 16 98 %   07/25/25 1845 123/75 -- Oral 104 16 99 %   07/25/25 1620 114/70 98.3 °F (36.8 °C) Oral -- 16 95 %       Intake/Output Summary (Last 24 hours) at 7/26/2025 1357  Last data filed at 7/26/2025 0900  Gross per 24 hour   Intake 2052.12 ml   Output --   Net 2052.12 ml     General: awake/alert   Chest:  clear to auscultation bilaterally without respiratory distress  CVS: regular rate and rhythm  Abdominal: soft, nontender, positive bowel sounds  Extremities: no cyanosis or edema  Skin: warm and dry without rash      Labs:  Results from last 7 days   Lab Units 07/25/25  0322 07/24/25  0243  07/23/25  0428   WBC 10*3/mm3 16.07* 19.97* 15.31*   HEMOGLOBIN g/dL 7.5* 7.8* 9.2*   HEMATOCRIT % 23.8* 24.4* 29.3*   PLATELETS 10*3/mm3 376 401 456*         Results from last 7 days   Lab Units 07/26/25  0817 07/25/25  0322 07/24/25  1629 07/24/25  0243 07/23/25  0428   SODIUM mmol/L 141 145  --  141 134*   POTASSIUM mmol/L 3.6 3.9 4.4 2.8* 4.3   CHLORIDE mmol/L 107 110*  --  112* 106   CO2 mmol/L 21.0* 23.0  --  16.0* 12.0*   BUN mg/dL 33.9* 48.0*  --  54.4* 60.0*   CREATININE mg/dL 2.02* 2.39*  --  2.71* 3.80*   CALCIUM mg/dL 8.6 7.9*  --  7.8* 7.9*   EGFR mL/min/1.73 29.0* 23.7*  --  20.4* 13.6*   BILIRUBIN mg/dL  --  <0.2  --  <0.2 <0.2   ALK PHOS U/L  --  95  --  100 134*   ALT (SGPT) U/L  --  9  --  7 9   AST (SGOT) U/L  --  17  --  13 15   GLUCOSE mg/dL 61* 82  --  82 544*       Radiology:   Imaging Results (Last 72 Hours)       ** No results found for the last 72 hours. **            Culture:  Blood Culture   Date Value Ref Range Status   07/22/2025 No growth at 24 hours  Preliminary   07/22/2025 No growth at 24 hours  Preliminary         Assessment    Acute kidney injury, prerenal due to volume depletion--improving  Baseline chronic kidney disease stage 3b  Hyperglycemia  Type 2 diabetes  Chronic pancreatitis  Gastroparesis  Metabolic acidosis--improved  Hypokalemia--improved  Hypomagnesemia--improved    Plan:  Okay to switch to oral hydration.  Monitor labs  Agree with discharge  Patient is to continue home IV infusions as instructed.  Will have her follow-up with GI in [place] concerning her gastric stimulator.      Feliberto Barrow MD  7/26/2025  13:57 CDT

## 2025-07-26 NOTE — DISCHARGE SUMMARY
[facility] Medicine Services  DISCHARGE SUMMARY       Date of Admission: 7/22/2025  Date of Discharge:  7/26/2025  Primary Care Physician: KIRILL Murguia MD    Presenting Problem/History of Present Illness:  Abnormal lab    Final Discharge Diagnoses:  Active Hospital Problems    Diagnosis     **Acute on chronic renal failure        Consults: Nephrology    Procedures Performed: None    Pertinent Test Results:   Results for orders placed during the hospital encounter of 10/05/19    Adult Transthoracic Echo Complete W/ Cont if Necessary Per Protocol    Interpretation Summary  · Left ventricular systolic function is normal.    NORMAL STUDY      Imaging Results (All)       Procedure Component Value Units Date/Time    XR Chest 1 View [[id number]] Collected: 07/22/25 2144     Updated: 07/22/25 2148    Narrative:      EXAM: XR CHEST 1 VW-      HISTORY: cough; N17.9-Acute kidney failure, unspecified; N18.9-Chronic  kidney disease, unspecified       COMPARISON: 6/28/2025.     TECHNIQUE: Single frontal radiograph of the chest was obtained.     FINDINGS:     Support Devices: Right upper extremity PICC line tip overlies the lower  SVC.     Cardiac and Mediastinal Silhouettes: Normal.     Lungs/Pleura: No focal consolidation. No sizable pleural effusion. No  visible pneumothorax.     Osseous structures: No acute osseous finding.     Other: None.       Impression:         No acute cardiopulmonary abnormality.     Right upper extremity PICC line is in good position.           This report was signed and finalized on 7/22/2025 9:45 PM by Presley Nelson.             LAB RESULTS:      Lab 07/25/25  0322 07/24/25  0243 07/23/25  0428 07/22/25  1929   WBC 16.07* 19.97* 15.31* 13.47*   HEMOGLOBIN 7.5* 7.8* 9.2* 10.9*   HEMATOCRIT 23.8* 24.4* 29.3* 33.6*   PLATELETS 376 401 456* 526*   NEUTROS ABS 13.21* 16.76*  --  9.47*   IMMATURE GRANS (ABS) 0.30* 0.16*  --  0.14*   LYMPHS ABS 1.61 2.04  --   3.03   MONOS ABS 0.87 0.85  --  0.56   EOS ABS 0.05 0.12  --  0.24   MCV 89.5 90.0 91.6 89.1         Lab 07/26/25  0817 07/25/25  0322 07/24/25  1629 07/24/25  0243 07/23/25  0428 07/22/25  1929   SODIUM 141 145  --  141 134* 134*   POTASSIUM 3.6 3.9 4.4 2.8* 4.3 3.6   CHLORIDE 107 110*  --  112* 106 99   CO2 21.0* 23.0  --  16.0* 12.0* 16.0*   ANION GAP 13.0 12.0  --  13.0 16.0* 19.0*   BUN 33.9* 48.0*  --  54.4* 60.0* 63.4*   CREATININE 2.02* 2.39*  --  2.71* 3.80* 4.64*   EGFR 29.0* 23.7*  --  20.4* 13.6* 10.7*   GLUCOSE 61* 82  --  82 544* 375*   CALCIUM 8.6 7.9*  --  7.8* 7.9* 8.7   MAGNESIUM  --  2.2  --  1.5* 1.9 2.1   PHOSPHORUS  --   --   --   --  4.5  --          Lab 07/25/25  0322 07/24/25  0243 07/23/25  0428   TOTAL PROTEIN 5.8* 5.7* 6.5   ALBUMIN 3.0* 3.0* 3.3*   GLOBULIN 2.8 2.7 3.2   ALT (SGPT) 9 7 9   AST (SGOT) 17 13 15   BILIRUBIN <0.2 <0.2 <0.2   ALK PHOS 95 100 134*                 Lab 07/23/25  1623   IRON 31*   IRON SATURATION (TSAT) 11*   TIBC 277*   TRANSFERRIN 186*         Brief Urine Lab Results  (Last result in the past 365 days)        Color   Clarity   Blood   Leuk Est   Nitrite   Protein   CREAT   Urine HCG        07/23/25 1523             60.5               Microbiology Results (last 10 days)       Procedure Component Value - Date/Time    Blood Culture - Blood, Hand, Left [[id number]]  (Normal) Collected: 07/22/25 2112    Lab Status: Preliminary result Specimen: Blood from Hand, Left Updated: 07/25/25 2131     Blood Culture No growth at 3 days    Blood Culture - Blood, Blood, PICC Line [[id number]]  (Normal) Collected: 07/22/25 2103    Lab Status: Preliminary result Specimen: Blood, PICC Line Updated: 07/25/25 2131     Blood Culture No growth at 3 days    Respiratory Panel PCR w/COVID-19(SARS-CoV-2) DYLAN/ROSALBA/KHOA/PAD/COR/LISA In-House, NP Swab in UTM/VTM, 2 HR TAT - Swab, Nasopharynx [[id number]]  (Abnormal) Collected: 07/22/25 2054    Lab Status: Final result Specimen: Swab from  Nasopharynx Updated: 07/22/25 2151     ADENOVIRUS, PCR Not Detected     Coronavirus 229E Not Detected     Coronavirus HKU1 Not Detected     Coronavirus NL63 Not Detected     Coronavirus OC43 Not Detected     COVID19 Not Detected     Human Metapneumovirus Not Detected     Human Rhinovirus/Enterovirus Detected     Influenza A PCR Not Detected     Influenza B PCR Not Detected     Parainfluenza Virus 1 Not Detected     Parainfluenza Virus 2 Not Detected     Parainfluenza Virus 3 Not Detected     Parainfluenza Virus 4 Not Detected     RSV, PCR Not Detected     Bordetella pertussis pcr Not Detected     Bordetella parapertussis PCR Not Detected     Chlamydophila pneumoniae PCR Not Detected     Mycoplasma pneumo by PCR Not Detected    Narrative:      In the setting of a positive respiratory panel with a viral infection PLUS a negative procalcitonin without other underlying concern for bacterial infection, consider observing off antibiotics or discontinuation of antibiotics and continue supportive care. If the respiratory panel is positive for atypical bacterial infection (Bordetella pertussis, Chlamydophila pneumoniae, or Mycoplasma pneumoniae), consider antibiotic de-escalation to target atypical bacterial infection.            Hospital Course:     -Acute viral syndrome secondary to Rhino and enteroviruses   She was treated in the hospital symptomatically and has improved to baseline.  We will discontinue steroids.     -Probable acute bronchitis from viral infection  She is clinically improved on steroids, will DC on discharge.     - Acute kidney injury secondary to dehydration/acute illness  Patient claimed her baseline creatinine level is between 1.5 and 2.5, nephrology was consulted and help with management.  She was managed in hospital with fluid resuscitation with improvement on creatinine to 2 today.  She will be discharged to follow-up with her PCP within 5 to 7 days and we will recommend immediate outpatient  "nephrology follow-up [patient said she already has a nephrologist outpatient].      Physical Exam on Discharge:  /72 (BP Location: Left arm, Patient Position: Sitting)   Pulse 107   Temp 98 °F (36.7 °C) (Oral)   Resp 16   Ht 154.9 cm (61\")   Wt 40.1 kg (88 lb 8 oz)   SpO2 97%   BMI 16.72 kg/m²   Physical Exam  Constitutional:       Appearance: She is alert and awake, well-oriented to time, place and person.  Cardiovascular:      Rate and Rhythm: Normal rate and regular rhythm.      Pulses: Normal pulses.      Heart sounds: Normal heart sounds. No murmur heard.  Pulmonary:      Effort: Pulmonary effort is normal. No respiratory distress.      Breath sounds: Normal breath sounds. No wheezing or rales.   Abdominal:      General: Abdomen is flat. Bowel sounds are normal. There is no distension.      Palpations: Abdomen is soft.      Tenderness: There is no abdominal tenderness. There is no guarding.   Musculoskeletal:      Right lower leg: No edema.      Left lower leg: No edema.   Skin:     General: Skin is warm.   Neurological:      Mental Status: She is alert and oriented to person, place, and time. Mental status is at baseline.     Condition on Discharge: Improved    Discharge Disposition:  Home or Self Care    Discharge Medications:     Discharge Medications        Continue These Medications        Instructions Start Date   albuterol sulfate  (90 Base) MCG/ACT inhaler  Commonly known as: PROVENTIL HFA;VENTOLIN HFA;PROAIR HFA   2 puffs, 3 Times Daily PRN      aspirin 81 MG EC tablet   81 mg, Daily      atorvastatin 80 MG tablet  Commonly known as: LIPITOR   80 mg, Nightly      Calcium Carbonate-Vitamin D 600-5 MG-MCG tablet   1 tablet, Daily      cetirizine 10 MG tablet  Commonly known as: zyrTEC   10 mg, Daily PRN      DULoxetine 60 MG capsule  Commonly known as: CYMBALTA   60 mg, Daily      hydroxychloroquine 200 MG tablet  Commonly known as: PLAQUENIL   400 mg, Daily      magnesium oxide 400 " MG tablet  Commonly known as: MAG-OX   400 mg, Oral, 3 times daily      nortriptyline 25 MG capsule  Commonly known as: PAMELOR   25 mg, Nightly      omeprazole 40 MG capsule  Commonly known as: priLOSEC   40 mg, Daily      pregabalin 75 MG capsule  Commonly known as: LYRICA   75 mg, 2 Times Daily      rOPINIRole 1 MG tablet  Commonly known as: REQUIP   1 mg, 3 Times Daily      sodium bicarbonate 650 MG tablet   650 mg, Oral, 2 Times Daily      sodium chloride 0.9 % solution   30 mL/hr, Intravenous, 3 Times Weekly, Monday, Wednesday, Friday      sucralfate 1 g tablet  Commonly known as: CARAFATE   1 g, Oral, 4 Times Daily Before Meals & Nightly             Discharge Diet: Renal    Activity at Discharge: As tolerated    Follow-up Appointments:   PCP within 1 week  Immediate outpatient nephrology follow-up    Test Results Pending at Discharge: None    Electronically signed by Poncho Jc MD, 07/26/25, 13:01 CDT.    Time: 40 minutes.

## 2025-07-27 LAB
BACTERIA SPEC AEROBE CULT: NORMAL
BACTERIA SPEC AEROBE CULT: NORMAL

## 2025-07-27 NOTE — PAYOR COMM NOTE
"[place] [facility] 7-26-25    Yash Perez (54 y.o. Female)       Date of Birth   1970    Social Security Number       Address   [address] [place] [address]    Home Phone   [phone]    MRN   [id number]       Tenriism   Evangel[employer]    Marital Status                               Admission Date   7/22/2025    Admission Type   Emergency    Admitting Provider   Poncho Jc MD    Attending Provider       Department, Room/Bed   [facility] 3C, 370/1       Discharge Date   7/26/2025    Discharge Disposition   Home or Self Care    Discharge Destination                                 Attending Provider: (none)   Allergies: Bee Venom, Hydrocodone, Hydroxychloroquine, Ibuprofen, Pineapple, Pineapple Extract, Wasp Venom, Wasp Venom Protein, Hydrocodone-acetaminophen, Metformin, Penicillins, Sitagliptin, Chocolate, Poison Ivy Extract    Isolation: None   Infection: Rhinovirus  (07/22/25)   Code Status: Prior    Ht: 154.9 cm (61\")   Wt: 40.1 kg (88 lb 8 oz)    Admission Cmt: None   Principal Problem: Acute on chronic renal failure [N17.9,N18.9]                   Active Insurance as of 7/22/2025       Primary Coverage       Payor Plan Insurance Group Employer/Plan Group    [employer] [place] [employer] [employer] [place]        Payor Plan Address Payor Plan Phone Number Payor Plan Fax Number Effective Dates    [address]   8/1/2019 - None Entered    [facility] [id number]         Subscriber Name Subscriber Birth Date Member ID       YASH PEREZ 1970 [id number]                     Emergency Contacts        (Rel.) Home Phone Work Phone Mobile Phone    JENNIFER PEREZ (Spouse) [phone] -- [phone]    KYLECASSIA LONGORIA (Daughter) -- -- [phone]                 Discharge Summary        Poncho Jc MD at 07/26/25 1259                [facility]  DISCHARGE SUMMARY       Date of Admission: 7/22/2025  Date of " Discharge:  7/26/2025  Primary Care Physician: KIRILL Murguia MD    Presenting Problem/History of Present Illness:  Abnormal lab    Final Discharge Diagnoses:  Active Hospital Problems    Diagnosis     **Acute on chronic renal failure        Consults: Nephrology    Procedures Performed: None    Pertinent Test Results:   Results for orders placed during the hospital encounter of 10/05/19    Adult Transthoracic Echo Complete W/ Cont if Necessary Per Protocol    Interpretation Summary  · Left ventricular systolic function is normal.    NORMAL STUDY      Imaging Results (All)       Procedure Component Value Units Date/Time    XR Chest 1 View [[id number]] Collected: 07/22/25 2144     Updated: 07/22/25 2148    Narrative:      EXAM: XR CHEST 1 VW-      HISTORY: cough; N17.9-Acute kidney failure, unspecified; N18.9-Chronic  kidney disease, unspecified       COMPARISON: 6/28/2025.     TECHNIQUE: Single frontal radiograph of the chest was obtained.     FINDINGS:     Support Devices: Right upper extremity PICC line tip overlies the lower  SVC.     Cardiac and Mediastinal Silhouettes: Normal.     Lungs/Pleura: No focal consolidation. No sizable pleural effusion. No  visible pneumothorax.     Osseous structures: No acute osseous finding.     Other: None.       Impression:         No acute cardiopulmonary abnormality.     Right upper extremity PICC line is in good position.           This report was signed and finalized on 7/22/2025 9:45 PM by Presley Nelson.             LAB RESULTS:      Lab 07/25/25  0322 07/24/25  0243 07/23/25  0428 07/22/25  1929   WBC 16.07* 19.97* 15.31* 13.47*   HEMOGLOBIN 7.5* 7.8* 9.2* 10.9*   HEMATOCRIT 23.8* 24.4* 29.3* 33.6*   PLATELETS 376 401 456* 526*   NEUTROS ABS 13.21* 16.76*  --  9.47*   IMMATURE GRANS (ABS) 0.30* 0.16*  --  0.14*   LYMPHS ABS 1.61 2.04  --  3.03   MONOS ABS 0.87 0.85  --  0.56   EOS ABS 0.05 0.12  --  0.24   MCV 89.5 90.0 91.6 89.1         Lab 07/26/25  0817 07/25/25 0322  07/24/25  1629 07/24/25  0243 07/23/25  0428 07/22/25  1929   SODIUM 141 145  --  141 134* 134*   POTASSIUM 3.6 3.9 4.4 2.8* 4.3 3.6   CHLORIDE 107 110*  --  112* 106 99   CO2 21.0* 23.0  --  16.0* 12.0* 16.0*   ANION GAP 13.0 12.0  --  13.0 16.0* 19.0*   BUN 33.9* 48.0*  --  54.4* 60.0* 63.4*   CREATININE 2.02* 2.39*  --  2.71* 3.80* 4.64*   EGFR 29.0* 23.7*  --  20.4* 13.6* 10.7*   GLUCOSE 61* 82  --  82 544* 375*   CALCIUM 8.6 7.9*  --  7.8* 7.9* 8.7   MAGNESIUM  --  2.2  --  1.5* 1.9 2.1   PHOSPHORUS  --   --   --   --  4.5  --          Lab 07/25/25  0322 07/24/25  0243 07/23/25  0428   TOTAL PROTEIN 5.8* 5.7* 6.5   ALBUMIN 3.0* 3.0* 3.3*   GLOBULIN 2.8 2.7 3.2   ALT (SGPT) 9 7 9   AST (SGOT) 17 13 15   BILIRUBIN <0.2 <0.2 <0.2   ALK PHOS 95 100 134*                 Lab 07/23/25  1623   IRON 31*   IRON SATURATION (TSAT) 11*   TIBC 277*   TRANSFERRIN 186*         Brief Urine Lab Results  (Last result in the past 365 days)        Color   Clarity   Blood   Leuk Est   Nitrite   Protein   CREAT   Urine HCG        07/23/25 1523             60.5               Microbiology Results (last 10 days)       Procedure Component Value - Date/Time    Blood Culture - Blood, Hand, Left [[id number]]  (Normal) Collected: 07/22/25 2112    Lab Status: Preliminary result Specimen: Blood from Hand, Left Updated: 07/25/25 2131     Blood Culture No growth at 3 days    Blood Culture - Blood, Blood, PICC Line [[id number]]  (Normal) Collected: 07/22/25 2103    Lab Status: Preliminary result Specimen: Blood, PICC Line Updated: 07/25/25 2131     Blood Culture No growth at 3 days    Respiratory Panel PCR w/COVID-19(SARS-CoV-2) DYLAN/ROSALBA/KHOA/PAD/COR/LISA In-House, NP Swab in UTM/VTM, 2 HR TAT - Swab, Nasopharynx [[id number]]  (Abnormal) Collected: 07/22/25 2054    Lab Status: Final result Specimen: Swab from Nasopharynx Updated: 07/22/25 2151     ADENOVIRUS, PCR Not Detected     Coronavirus 229E Not Detected     Coronavirus HKU1 Not Detected      Coronavirus NL63 Not Detected     Coronavirus OC43 Not Detected     COVID19 Not Detected     Human Metapneumovirus Not Detected     Human Rhinovirus/Enterovirus Detected     Influenza A PCR Not Detected     Influenza B PCR Not Detected     Parainfluenza Virus 1 Not Detected     Parainfluenza Virus 2 Not Detected     Parainfluenza Virus 3 Not Detected     Parainfluenza Virus 4 Not Detected     RSV, PCR Not Detected     Bordetella pertussis pcr Not Detected     Bordetella parapertussis PCR Not Detected     Chlamydophila pneumoniae PCR Not Detected     Mycoplasma pneumo by PCR Not Detected    Narrative:      In the setting of a positive respiratory panel with a viral infection PLUS a negative procalcitonin without other underlying concern for bacterial infection, consider observing off antibiotics or discontinuation of antibiotics and continue supportive care. If the respiratory panel is positive for atypical bacterial infection (Bordetella pertussis, Chlamydophila pneumoniae, or Mycoplasma pneumoniae), consider antibiotic de-escalation to target atypical bacterial infection.            Hospital Course:     -Acute viral syndrome secondary to Rhino and enteroviruses   She was treated in the hospital symptomatically and has improved to baseline.  We will discontinue steroids.     -Probable acute bronchitis from viral infection  She is clinically improved on steroids, will DC on discharge.     - Acute kidney injury secondary to dehydration/acute illness  Patient claimed her baseline creatinine level is between 1.5 and 2.5, nephrology was consulted and help with management.  She was managed in hospital with fluid resuscitation with improvement on creatinine to 2 today.  She will be discharged to follow-up with her PCP within 5 to 7 days and we will recommend immediate outpatient nephrology follow-up [patient said she already has a nephrologist outpatient].      Physical Exam on Discharge:  /72 (BP Location: Left arm,  "Patient Position: Sitting)   Pulse 107   Temp 98 °F (36.7 °C) (Oral)   Resp 16   Ht 154.9 cm (61\")   Wt 40.1 kg (88 lb 8 oz)   SpO2 97%   BMI 16.72 kg/m²   Physical Exam  Constitutional:       Appearance: She is alert and awake, well-oriented to time, place and person.  Cardiovascular:      Rate and Rhythm: Normal rate and regular rhythm.      Pulses: Normal pulses.      Heart sounds: Normal heart sounds. No murmur heard.  Pulmonary:      Effort: Pulmonary effort is normal. No respiratory distress.      Breath sounds: Normal breath sounds. No wheezing or rales.   Abdominal:      General: Abdomen is flat. Bowel sounds are normal. There is no distension.      Palpations: Abdomen is soft.      Tenderness: There is no abdominal tenderness. There is no guarding.   Musculoskeletal:      Right lower leg: No edema.      Left lower leg: No edema.   Skin:     General: Skin is warm.   Neurological:      Mental Status: She is alert and oriented to person, place, and time. Mental status is at baseline.     Condition on Discharge: Improved    Discharge Disposition:  Home or Self Care    Discharge Medications:     Discharge Medications        Continue These Medications        Instructions Start Date   albuterol sulfate  (90 Base) MCG/ACT inhaler  Commonly known as: PROVENTIL HFA;VENTOLIN HFA;PROAIR HFA   2 puffs, 3 Times Daily PRN      aspirin 81 MG EC tablet   81 mg, Daily      atorvastatin 80 MG tablet  Commonly known as: LIPITOR   80 mg, Nightly      Calcium Carbonate-Vitamin D 600-5 MG-MCG tablet   1 tablet, Daily      cetirizine 10 MG tablet  Commonly known as: zyrTEC   10 mg, Daily PRN      DULoxetine 60 MG capsule  Commonly known as: CYMBALTA   60 mg, Daily      hydroxychloroquine 200 MG tablet  Commonly known as: PLAQUENIL   400 mg, Daily      magnesium oxide 400 MG tablet  Commonly known as: MAG-OX   400 mg, Oral, 3 times daily      nortriptyline 25 MG capsule  Commonly known as: PAMELOR   25 mg, Nightly    "   omeprazole 40 MG capsule  Commonly known as: priLOSEC   40 mg, Daily      pregabalin 75 MG capsule  Commonly known as: LYRICA   75 mg, 2 Times Daily      rOPINIRole 1 MG tablet  Commonly known as: REQUIP   1 mg, 3 Times Daily      sodium bicarbonate 650 MG tablet   650 mg, Oral, 2 Times Daily      sodium chloride 0.9 % solution   30 mL/hr, Intravenous, 3 Times Weekly, Monday, Wednesday, Friday      sucralfate 1 g tablet  Commonly known as: CARAFATE   1 g, Oral, 4 Times Daily Before Meals & Nightly             Discharge Diet: Renal    Activity at Discharge: As tolerated    Follow-up Appointments:   PCP within 1 week  Immediate outpatient nephrology follow-up    Test Results Pending at Discharge: None    Electronically signed by Poncho Jc MD, 07/26/25, 13:01 CDT.    Time: 40 minutes.          Electronically signed by Poncho Jc MD at 07/26/25 3561

## 2025-07-28 ENCOUNTER — READMISSION MANAGEMENT (OUTPATIENT)
Dept: CALL CENTER | Facility: HOSPITAL | Age: 55
End: 2025-07-28
Payer: COMMERCIAL

## 2025-07-28 NOTE — OUTREACH NOTE
Prep Survey      Flowsheet Row Responses   [facility] facility patient discharged from? [facility]   Is LACE score < 7 ? No   Eligibility Readm Mgmt   Discharge diagnosis Acute on chronic renal failure   Does the patient have one of the following disease processes/diagnoses(primary or secondary)? Other   Does the patient have Home health ordered? No   Is there a DME ordered? No   Prep survey completed? Yes            CRISTINA MCCOLLUM - Registered Nurse

## 2025-08-11 ENCOUNTER — HOSPITAL ENCOUNTER (OUTPATIENT)
Facility: HOSPITAL | Age: 55
Setting detail: OBSERVATION
LOS: 1 days | Discharge: HOME | End: 2025-08-16
Attending: FAMILY MEDICINE | Admitting: FAMILY MEDICINE
Payer: COMMERCIAL

## 2025-08-11 DIAGNOSIS — Z86.39 HX OF INSULIN DEPENDENT DIABETES MELLITUS: ICD-10-CM

## 2025-08-11 DIAGNOSIS — S82.002S CLOSED NONDISPLACED FRACTURE OF LEFT PATELLA, UNSPECIFIED FRACTURE MORPHOLOGY, SEQUELA: ICD-10-CM

## 2025-08-11 DIAGNOSIS — N17.9 AKI (ACUTE KIDNEY INJURY): ICD-10-CM

## 2025-08-11 DIAGNOSIS — R19.7 DIARRHEA OF PRESUMED INFECTIOUS ORIGIN: ICD-10-CM

## 2025-08-11 DIAGNOSIS — R78.81 POSITIVE BLOOD CULTURE: ICD-10-CM

## 2025-08-11 DIAGNOSIS — N39.0 ACUTE UTI (URINARY TRACT INFECTION): ICD-10-CM

## 2025-08-11 DIAGNOSIS — N18.32 ACUTE KIDNEY INJURY SUPERIMPOSED ON STAGE 3B CHRONIC KIDNEY DISEASE: ICD-10-CM

## 2025-08-11 DIAGNOSIS — E13.10 DIABETIC KETOACIDOSIS WITHOUT COMA ASSOCIATED WITH OTHER SPECIFIED DIABETES MELLITUS: ICD-10-CM

## 2025-08-11 DIAGNOSIS — K31.84 GASTROPARESIS: ICD-10-CM

## 2025-08-11 DIAGNOSIS — K85.90 ACUTE PANCREATITIS, UNSPECIFIED COMPLICATION STATUS, UNSPECIFIED PANCREATITIS TYPE: ICD-10-CM

## 2025-08-11 DIAGNOSIS — E55.9 VITAMIN D DEFICIENCY: ICD-10-CM

## 2025-08-11 DIAGNOSIS — N17.9 ACUTE RENAL FAILURE, UNSPECIFIED ACUTE RENAL FAILURE TYPE: ICD-10-CM

## 2025-08-11 DIAGNOSIS — D64.9 ANEMIA, UNSPECIFIED TYPE: ICD-10-CM

## 2025-08-11 DIAGNOSIS — M54.59 POSTURAL LOW BACK PAIN: ICD-10-CM

## 2025-08-11 DIAGNOSIS — N18.32 ACUTE RENAL FAILURE SUPERIMPOSED ON STAGE 3B CHRONIC KIDNEY DISEASE, UNSPECIFIED ACUTE RENAL FAILURE TYPE: Primary | ICD-10-CM

## 2025-08-11 DIAGNOSIS — K86.1 OTHER CHRONIC PANCREATITIS: ICD-10-CM

## 2025-08-11 DIAGNOSIS — E87.20 METABOLIC ACIDOSIS: ICD-10-CM

## 2025-08-11 DIAGNOSIS — M81.0 OSTEOPOROSIS, UNSPECIFIED OSTEOPOROSIS TYPE, UNSPECIFIED PATHOLOGICAL FRACTURE PRESENCE: ICD-10-CM

## 2025-08-11 DIAGNOSIS — N18.9 ACUTE RENAL FAILURE SUPERIMPOSED ON CHRONIC KIDNEY DISEASE, UNSPECIFIED ACUTE RENAL FAILURE TYPE, UNSPECIFIED CKD STAGE: ICD-10-CM

## 2025-08-11 DIAGNOSIS — E87.1 HYPONATREMIA: ICD-10-CM

## 2025-08-11 DIAGNOSIS — E86.0 DEHYDRATION: ICD-10-CM

## 2025-08-11 DIAGNOSIS — M79.7 FIBROMYALGIA: Chronic | ICD-10-CM

## 2025-08-11 DIAGNOSIS — E83.42 HYPOMAGNESEMIA: ICD-10-CM

## 2025-08-11 DIAGNOSIS — R65.10 SIRS (SYSTEMIC INFLAMMATORY RESPONSE SYNDROME): ICD-10-CM

## 2025-08-11 DIAGNOSIS — Z46.59 ENCOUNTER FOR NASOGASTRIC (NG) TUBE PLACEMENT: ICD-10-CM

## 2025-08-11 DIAGNOSIS — N17.9 ACUTE RENAL FAILURE SUPERIMPOSED ON STAGE 3B CHRONIC KIDNEY DISEASE, UNSPECIFIED ACUTE RENAL FAILURE TYPE: Primary | ICD-10-CM

## 2025-08-11 DIAGNOSIS — N17.9 ACUTE KIDNEY INJURY SUPERIMPOSED ON STAGE 3B CHRONIC KIDNEY DISEASE: ICD-10-CM

## 2025-08-11 DIAGNOSIS — M85.80 OSTEOPENIA, UNSPECIFIED LOCATION: ICD-10-CM

## 2025-08-11 DIAGNOSIS — Z86.19: ICD-10-CM

## 2025-08-11 DIAGNOSIS — R50.9 FEVER, UNSPECIFIED FEVER CAUSE: ICD-10-CM

## 2025-08-11 DIAGNOSIS — M06.9 RHEUMATOID ARTHRITIS, INVOLVING UNSPECIFIED SITE, UNSPECIFIED WHETHER RHEUMATOID FACTOR PRESENT: Chronic | ICD-10-CM

## 2025-08-11 DIAGNOSIS — K94.23 PEG TUBE MALFUNCTION: ICD-10-CM

## 2025-08-11 DIAGNOSIS — E11.42 DIABETIC POLYNEUROPATHY ASSOCIATED WITH TYPE 2 DIABETES MELLITUS: ICD-10-CM

## 2025-08-11 DIAGNOSIS — E83.39 HYPOPHOSPHATEMIA: ICD-10-CM

## 2025-08-11 DIAGNOSIS — R63.4 LOSS OF WEIGHT: ICD-10-CM

## 2025-08-11 DIAGNOSIS — E43 SEVERE MALNUTRITION: ICD-10-CM

## 2025-08-11 DIAGNOSIS — T80.219D INFECTION OF PERIPHERALLY INSERTED CENTRAL CATHETER (PICC), SUBSEQUENT ENCOUNTER: ICD-10-CM

## 2025-08-11 DIAGNOSIS — R11.0 INTRACTABLE NAUSEA: ICD-10-CM

## 2025-08-11 DIAGNOSIS — R94.31 QT PROLONGATION: ICD-10-CM

## 2025-08-11 DIAGNOSIS — N18.32 STAGE 3B CHRONIC KIDNEY DISEASE: ICD-10-CM

## 2025-08-11 DIAGNOSIS — E87.6 HYPOKALEMIA: ICD-10-CM

## 2025-08-11 DIAGNOSIS — E11.69 TYPE 2 DIABETES MELLITUS WITH OTHER SPECIFIED COMPLICATION, WITH LONG-TERM CURRENT USE OF INSULIN: ICD-10-CM

## 2025-08-11 DIAGNOSIS — N17.9 ACUTE RENAL FAILURE SUPERIMPOSED ON CHRONIC KIDNEY DISEASE, UNSPECIFIED ACUTE RENAL FAILURE TYPE, UNSPECIFIED CKD STAGE: ICD-10-CM

## 2025-08-11 DIAGNOSIS — I73.00 RAYNAUD'S DISEASE WITHOUT GANGRENE: ICD-10-CM

## 2025-08-11 DIAGNOSIS — Z79.4 TYPE 2 DIABETES MELLITUS WITH OTHER SPECIFIED COMPLICATION, WITH LONG-TERM CURRENT USE OF INSULIN: ICD-10-CM

## 2025-08-11 DIAGNOSIS — F17.200 SMOKER: ICD-10-CM

## 2025-08-11 LAB
ALBUMIN SERPL-MCNC: 4 G/DL (ref 3.5–5.2)
ALBUMIN/GLOB SERPL: 1.2 G/DL
ALP SERPL-CCNC: 175 U/L (ref 39–117)
ALT SERPL W P-5'-P-CCNC: 9 U/L (ref 1–33)
ANION GAP SERPL CALCULATED.3IONS-SCNC: 15 MMOL/L (ref 5–15)
AST SERPL-CCNC: 13 U/L (ref 1–32)
BASOPHILS # BLD AUTO: 0.05 10*3/MM3 (ref 0–0.2)
BASOPHILS NFR BLD AUTO: 0.5 % (ref 0–1.5)
BILIRUB SERPL-MCNC: 0.2 MG/DL (ref 0–1.2)
BUN SERPL-MCNC: 25.4 MG/DL (ref 6–20)
BUN/CREAT SERPL: 8.5 (ref 7–25)
CALCIUM SPEC-SCNC: 9.2 MG/DL (ref 8.6–10.5)
CHLORIDE SERPL-SCNC: 107 MMOL/L (ref 98–107)
CK SERPL-CCNC: 29 U/L (ref 20–180)
CO2 SERPL-SCNC: 16 MMOL/L (ref 22–29)
CREAT SERPL-MCNC: 3 MG/DL (ref 0.57–1)
D-LACTATE SERPL-SCNC: 1.4 MMOL/L (ref 0.5–2)
DEPRECATED RDW RBC AUTO: 58.7 FL (ref 37–54)
EGFRCR SERPLBLD CKD-EPI 2021: 18.1 ML/MIN/1.73
EOSINOPHIL # BLD AUTO: 0.31 10*3/MM3 (ref 0–0.4)
EOSINOPHIL NFR BLD AUTO: 3.2 % (ref 0.3–6.2)
ERYTHROCYTE [DISTWIDTH] IN BLOOD BY AUTOMATED COUNT: 17.1 % (ref 12.3–15.4)
GLOBULIN UR ELPH-MCNC: 3.3 GM/DL
GLUCOSE SERPL-MCNC: 233 MG/DL (ref 65–99)
HCT VFR BLD AUTO: 35.9 % (ref 34–46.6)
HGB BLD-MCNC: 11.3 G/DL (ref 12–15.9)
IMM GRANULOCYTES # BLD AUTO: 0.03 10*3/MM3 (ref 0–0.05)
IMM GRANULOCYTES NFR BLD AUTO: 0.3 % (ref 0–0.5)
LYMPHOCYTES # BLD AUTO: 2.86 10*3/MM3 (ref 0.7–3.1)
LYMPHOCYTES NFR BLD AUTO: 29.1 % (ref 19.6–45.3)
MAGNESIUM SERPL-MCNC: 1.9 MG/DL (ref 1.6–2.6)
MCH RBC QN AUTO: 29.4 PG (ref 26.6–33)
MCHC RBC AUTO-ENTMCNC: 31.5 G/DL (ref 31.5–35.7)
MCV RBC AUTO: 93.2 FL (ref 79–97)
MONOCYTES # BLD AUTO: 0.44 10*3/MM3 (ref 0.1–0.9)
MONOCYTES NFR BLD AUTO: 4.5 % (ref 5–12)
NEUTROPHILS NFR BLD AUTO: 6.14 10*3/MM3 (ref 1.7–7)
NEUTROPHILS NFR BLD AUTO: 62.4 % (ref 42.7–76)
NRBC BLD AUTO-RTO: 0 /100 WBC (ref 0–0.2)
PLATELET # BLD AUTO: 358 10*3/MM3 (ref 140–450)
PMV BLD AUTO: 9.7 FL (ref 6–12)
POTASSIUM SERPL-SCNC: 3.3 MMOL/L (ref 3.5–5.2)
PROT SERPL-MCNC: 7.3 G/DL (ref 6–8.5)
RBC # BLD AUTO: 3.85 10*6/MM3 (ref 3.77–5.28)
SODIUM SERPL-SCNC: 138 MMOL/L (ref 136–145)
WBC NRBC COR # BLD AUTO: 9.83 10*3/MM3 (ref 3.4–10.8)

## 2025-08-11 PROCEDURE — G0378 HOSPITAL OBSERVATION PER HR: HCPCS

## 2025-08-11 PROCEDURE — 85025 COMPLETE CBC W/AUTO DIFF WBC: CPT | Performed by: FAMILY MEDICINE

## 2025-08-11 PROCEDURE — 96375 TX/PRO/DX INJ NEW DRUG ADDON: CPT

## 2025-08-11 PROCEDURE — 80053 COMPREHEN METABOLIC PANEL: CPT | Performed by: FAMILY MEDICINE

## 2025-08-11 PROCEDURE — 25010000002 ONDANSETRON PER 1 MG: Performed by: FAMILY MEDICINE

## 2025-08-11 PROCEDURE — 83605 ASSAY OF LACTIC ACID: CPT | Performed by: FAMILY MEDICINE

## 2025-08-11 PROCEDURE — 25010000002 FAMOTIDINE 10 MG/ML SOLUTION: Performed by: FAMILY MEDICINE

## 2025-08-11 PROCEDURE — 82550 ASSAY OF CK (CPK): CPT | Performed by: FAMILY MEDICINE

## 2025-08-11 PROCEDURE — 25810000003 SODIUM CHLORIDE 0.9 % SOLUTION: Performed by: FAMILY MEDICINE

## 2025-08-11 PROCEDURE — 83735 ASSAY OF MAGNESIUM: CPT | Performed by: FAMILY MEDICINE

## 2025-08-11 PROCEDURE — 99285 EMERGENCY DEPT VISIT HI MDM: CPT | Performed by: FAMILY MEDICINE

## 2025-08-11 PROCEDURE — 36415 COLL VENOUS BLD VENIPUNCTURE: CPT

## 2025-08-11 RX ORDER — NITROGLYCERIN 0.4 MG/1
0.4 TABLET SUBLINGUAL
Status: DISCONTINUED | OUTPATIENT
Start: 2025-08-11 | End: 2025-08-16 | Stop reason: HOSPADM

## 2025-08-11 RX ORDER — ONDANSETRON 2 MG/ML
4 INJECTION INTRAMUSCULAR; INTRAVENOUS ONCE
Status: COMPLETED | OUTPATIENT
Start: 2025-08-11 | End: 2025-08-11

## 2025-08-11 RX ORDER — NICOTINE POLACRILEX 4 MG
15 LOZENGE BUCCAL
Status: DISCONTINUED | OUTPATIENT
Start: 2025-08-11 | End: 2025-08-16 | Stop reason: HOSPADM

## 2025-08-11 RX ORDER — POTASSIUM CHLORIDE 1500 MG/1
20 TABLET, FILM COATED, EXTENDED RELEASE ORAL ONCE
Status: COMPLETED | OUTPATIENT
Start: 2025-08-12 | End: 2025-08-12

## 2025-08-11 RX ORDER — ASPIRIN 81 MG/1
81 TABLET ORAL DAILY
Status: DISCONTINUED | OUTPATIENT
Start: 2025-08-12 | End: 2025-08-16 | Stop reason: HOSPADM

## 2025-08-11 RX ORDER — SODIUM CHLORIDE 9 MG/ML
100 INJECTION, SOLUTION INTRAVENOUS CONTINUOUS
Status: DISCONTINUED | OUTPATIENT
Start: 2025-08-12 | End: 2025-08-12

## 2025-08-11 RX ORDER — INSULIN LISPRO 100 [IU]/ML
2-7 INJECTION, SOLUTION INTRAVENOUS; SUBCUTANEOUS
Status: DISCONTINUED | OUTPATIENT
Start: 2025-08-11 | End: 2025-08-16 | Stop reason: HOSPADM

## 2025-08-11 RX ORDER — FAMOTIDINE 10 MG/ML
20 INJECTION, SOLUTION INTRAVENOUS ONCE
Status: COMPLETED | OUTPATIENT
Start: 2025-08-11 | End: 2025-08-11

## 2025-08-11 RX ORDER — SODIUM BICARBONATE 650 MG/1
650 TABLET ORAL 2 TIMES DAILY
Status: DISCONTINUED | OUTPATIENT
Start: 2025-08-12 | End: 2025-08-12

## 2025-08-11 RX ORDER — SODIUM CHLORIDE 9 MG/ML
40 INJECTION, SOLUTION INTRAVENOUS AS NEEDED
Status: DISCONTINUED | OUTPATIENT
Start: 2025-08-11 | End: 2025-08-16 | Stop reason: HOSPADM

## 2025-08-11 RX ORDER — DEXTROSE MONOHYDRATE 25 G/50ML
25 INJECTION, SOLUTION INTRAVENOUS
Status: DISCONTINUED | OUTPATIENT
Start: 2025-08-11 | End: 2025-08-16 | Stop reason: HOSPADM

## 2025-08-11 RX ORDER — SODIUM CHLORIDE 0.9 % (FLUSH) 0.9 %
10 SYRINGE (ML) INJECTION EVERY 12 HOURS SCHEDULED
Status: DISCONTINUED | OUTPATIENT
Start: 2025-08-12 | End: 2025-08-16 | Stop reason: HOSPADM

## 2025-08-11 RX ORDER — IPRATROPIUM BROMIDE AND ALBUTEROL SULFATE 2.5; .5 MG/3ML; MG/3ML
3 SOLUTION RESPIRATORY (INHALATION) EVERY 4 HOURS PRN
Status: DISCONTINUED | OUTPATIENT
Start: 2025-08-11 | End: 2025-08-16 | Stop reason: HOSPADM

## 2025-08-11 RX ORDER — SODIUM CHLORIDE 0.9 % (FLUSH) 0.9 %
10 SYRINGE (ML) INJECTION AS NEEDED
Status: DISCONTINUED | OUTPATIENT
Start: 2025-08-11 | End: 2025-08-16 | Stop reason: HOSPADM

## 2025-08-11 RX ORDER — PANTOPRAZOLE SODIUM 40 MG/1
40 TABLET, DELAYED RELEASE ORAL
Status: DISCONTINUED | OUTPATIENT
Start: 2025-08-12 | End: 2025-08-12

## 2025-08-11 RX ORDER — SUCRALFATE 1 G/1
1 TABLET ORAL
Status: DISCONTINUED | OUTPATIENT
Start: 2025-08-11 | End: 2025-08-12

## 2025-08-11 RX ORDER — PREGABALIN 75 MG/1
75 CAPSULE ORAL NIGHTLY
Status: DISCONTINUED | OUTPATIENT
Start: 2025-08-12 | End: 2025-08-16 | Stop reason: HOSPADM

## 2025-08-11 RX ADMIN — SODIUM CHLORIDE 1512 ML: 9 INJECTION, SOLUTION INTRAVENOUS at 22:08

## 2025-08-11 RX ADMIN — FAMOTIDINE 20 MG: 10 INJECTION INTRAVENOUS at 22:09

## 2025-08-11 RX ADMIN — ONDANSETRON 4 MG: 2 INJECTION INTRAMUSCULAR; INTRAVENOUS at 22:09

## 2025-08-12 ENCOUNTER — READMISSION MANAGEMENT (OUTPATIENT)
Dept: CALL CENTER | Facility: HOSPITAL | Age: 55
End: 2025-08-12
Payer: COMMERCIAL

## 2025-08-12 PROBLEM — N17.9 RENAL FAILURE, ACUTE: Status: ACTIVE | Noted: 2025-08-12

## 2025-08-12 LAB
ANION GAP SERPL CALCULATED.3IONS-SCNC: 12 MMOL/L (ref 5–15)
ATMOSPHERIC PRESS: 752 MMHG
BACTERIA UR QL AUTO: NORMAL /HPF
BASE EXCESS BLDV CALC-SCNC: -12.2 MMOL/L (ref 0–2)
BDY SITE: ABNORMAL
BILIRUB UR QL STRIP: NEGATIVE
BODY TEMPERATURE: 37
BUN SERPL-MCNC: 30 MG/DL (ref 6–20)
BUN/CREAT SERPL: 10.9 (ref 7–25)
CALCIUM SPEC-SCNC: 7.8 MG/DL (ref 8.6–10.5)
CHLORIDE SERPL-SCNC: 115 MMOL/L (ref 98–107)
CLARITY UR: CLEAR
CO2 SERPL-SCNC: 12 MMOL/L (ref 22–29)
COLOR UR: YELLOW
CREAT SERPL-MCNC: 2.76 MG/DL (ref 0.57–1)
DEPRECATED RDW RBC AUTO: 60.6 FL (ref 37–54)
EGFRCR SERPLBLD CKD-EPI 2021: 20 ML/MIN/1.73
ERYTHROCYTE [DISTWIDTH] IN BLOOD BY AUTOMATED COUNT: 17.2 % (ref 12.3–15.4)
GLUCOSE BLDC GLUCOMTR-MCNC: 136 MG/DL (ref 70–130)
GLUCOSE BLDC GLUCOMTR-MCNC: 151 MG/DL (ref 70–130)
GLUCOSE BLDC GLUCOMTR-MCNC: 157 MG/DL (ref 70–130)
GLUCOSE BLDC GLUCOMTR-MCNC: 159 MG/DL (ref 70–130)
GLUCOSE BLDC GLUCOMTR-MCNC: 163 MG/DL (ref 70–130)
GLUCOSE SERPL-MCNC: 153 MG/DL (ref 65–99)
GLUCOSE UR STRIP-MCNC: NEGATIVE MG/DL
HCO3 BLDV-SCNC: 14.1 MMOL/L (ref 22–28)
HCT VFR BLD AUTO: 31.2 % (ref 34–46.6)
HGB BLD-MCNC: 9.5 G/DL (ref 12–15.9)
HGB UR QL STRIP.AUTO: NEGATIVE
HYALINE CASTS UR QL AUTO: NORMAL /LPF
KETONES UR QL STRIP: NEGATIVE
LEUKOCYTE ESTERASE UR QL STRIP.AUTO: NEGATIVE
Lab: ABNORMAL
Lab: ABNORMAL
MAGNESIUM SERPL-MCNC: 1.9 MG/DL (ref 1.6–2.6)
MCH RBC QN AUTO: 29.3 PG (ref 26.6–33)
MCHC RBC AUTO-ENTMCNC: 30.4 G/DL (ref 31.5–35.7)
MCV RBC AUTO: 96.3 FL (ref 79–97)
MODALITY: ABNORMAL
NITRITE UR QL STRIP: NEGATIVE
NOTIFIED BY: ABNORMAL
NOTIFIED WHO: ABNORMAL
PCO2 BLDV: 32.4 MM HG (ref 41–51)
PH BLDV: 7.25 PH UNITS (ref 7.32–7.42)
PH UR STRIP.AUTO: 5.5 [PH] (ref 5–8)
PHOSPHATE SERPL-MCNC: 5 MG/DL (ref 2.5–4.5)
PLATELET # BLD AUTO: 325 10*3/MM3 (ref 140–450)
PMV BLD AUTO: 9.9 FL (ref 6–12)
PO2 BLDV: 102 MM HG (ref 27–53)
POTASSIUM SERPL-SCNC: 3.7 MMOL/L (ref 3.5–5.2)
PROT UR QL STRIP: ABNORMAL
RBC # BLD AUTO: 3.24 10*6/MM3 (ref 3.77–5.28)
RBC # UR STRIP: NORMAL /HPF
REF LAB TEST METHOD: NORMAL
SAO2 % BLDCOV: 98.3 % (ref 45–75)
SODIUM SERPL-SCNC: 139 MMOL/L (ref 136–145)
SP GR UR STRIP: 1.02 (ref 1–1.03)
SQUAMOUS #/AREA URNS HPF: NORMAL /HPF
UROBILINOGEN UR QL STRIP: ABNORMAL
VENTILATOR MODE: ABNORMAL
WBC # UR STRIP: NORMAL /HPF
WBC NRBC COR # BLD AUTO: 13.3 10*3/MM3 (ref 3.4–10.8)

## 2025-08-12 PROCEDURE — 81001 URINALYSIS AUTO W/SCOPE: CPT | Performed by: FAMILY MEDICINE

## 2025-08-12 PROCEDURE — 82948 REAGENT STRIP/BLOOD GLUCOSE: CPT

## 2025-08-12 PROCEDURE — 96361 HYDRATE IV INFUSION ADD-ON: CPT

## 2025-08-12 PROCEDURE — 85027 COMPLETE CBC AUTOMATED: CPT

## 2025-08-12 PROCEDURE — 82805 BLOOD GASES W/O2 SATURATION: CPT

## 2025-08-12 PROCEDURE — 25010000003 DEXTROSE 5 % SOLUTION 1,000 ML FLEX CONT: Performed by: FAMILY MEDICINE

## 2025-08-12 PROCEDURE — 25810000003 SODIUM CHLORIDE 0.9 % SOLUTION

## 2025-08-12 PROCEDURE — 80048 BASIC METABOLIC PNL TOTAL CA: CPT

## 2025-08-12 PROCEDURE — 63710000001 INSULIN LISPRO (HUMAN) PER 5 UNITS

## 2025-08-12 PROCEDURE — G0378 HOSPITAL OBSERVATION PER HR: HCPCS

## 2025-08-12 PROCEDURE — 84100 ASSAY OF PHOSPHORUS: CPT

## 2025-08-12 PROCEDURE — 83735 ASSAY OF MAGNESIUM: CPT

## 2025-08-12 RX ORDER — ROPINIROLE 1 MG/1
1 TABLET, FILM COATED ORAL NIGHTLY
Status: DISCONTINUED | OUTPATIENT
Start: 2025-08-12 | End: 2025-08-16 | Stop reason: HOSPADM

## 2025-08-12 RX ORDER — FAMOTIDINE 10 MG/ML
20 INJECTION, SOLUTION INTRAVENOUS 2 TIMES DAILY
Status: DISCONTINUED | OUTPATIENT
Start: 2025-08-12 | End: 2025-08-12

## 2025-08-12 RX ORDER — ATORVASTATIN CALCIUM 40 MG/1
80 TABLET, FILM COATED ORAL NIGHTLY
Status: DISCONTINUED | OUTPATIENT
Start: 2025-08-12 | End: 2025-08-16 | Stop reason: HOSPADM

## 2025-08-12 RX ORDER — METOCLOPRAMIDE 5 MG/1
10 TABLET ORAL
COMMUNITY

## 2025-08-12 RX ORDER — PANTOPRAZOLE SODIUM 40 MG/1
40 TABLET, DELAYED RELEASE ORAL
Status: DISCONTINUED | OUTPATIENT
Start: 2025-08-13 | End: 2025-08-16 | Stop reason: HOSPADM

## 2025-08-12 RX ORDER — ALUMINA, MAGNESIA, AND SIMETHICONE 2400; 2400; 240 MG/30ML; MG/30ML; MG/30ML
15 SUSPENSION ORAL 3 TIMES DAILY
Status: DISCONTINUED | OUTPATIENT
Start: 2025-08-12 | End: 2025-08-16 | Stop reason: HOSPADM

## 2025-08-12 RX ORDER — BUMETANIDE 1 MG/1
1 TABLET ORAL DAILY
Status: ON HOLD | COMMUNITY
End: 2025-08-16

## 2025-08-12 RX ADMIN — SODIUM CHLORIDE 100 ML/HR: 9 INJECTION, SOLUTION INTRAVENOUS at 00:43

## 2025-08-12 RX ADMIN — Medication 10 ML: at 00:44

## 2025-08-12 RX ADMIN — PANTOPRAZOLE SODIUM 40 MG: 40 TABLET, DELAYED RELEASE ORAL at 05:38

## 2025-08-12 RX ADMIN — SUCRALFATE 1 G: 1 TABLET ORAL at 00:17

## 2025-08-12 RX ADMIN — SODIUM BICARBONATE INJECTION, 100 MEQ: 84 SOLUTION INTRAVENOUS at 08:49

## 2025-08-12 RX ADMIN — SODIUM BICARBONATE 650 MG: 650 TABLET ORAL at 00:17

## 2025-08-12 RX ADMIN — ROPINIROLE 1 MG: 1 TABLET, FILM COATED ORAL at 20:28

## 2025-08-12 RX ADMIN — ALUMINUM HYDROXIDE, MAGNESIUM HYDROXIDE, AND DIMETHICONE 15 ML: 400; 400; 40 SUSPENSION ORAL at 17:04

## 2025-08-12 RX ADMIN — ASPIRIN 81 MG: 81 TABLET, COATED ORAL at 08:49

## 2025-08-12 RX ADMIN — Medication 10 ML: at 20:21

## 2025-08-12 RX ADMIN — POTASSIUM CHLORIDE 20 MEQ: 1500 TABLET, EXTENDED RELEASE ORAL at 00:17

## 2025-08-12 RX ADMIN — SUCRALFATE 1 G: 1 TABLET ORAL at 12:50

## 2025-08-12 RX ADMIN — PREGABALIN 75 MG: 75 CAPSULE ORAL at 00:17

## 2025-08-12 RX ADMIN — SODIUM BICARBONATE INJECTION, 100 MEQ: 84 SOLUTION INTRAVENOUS at 20:23

## 2025-08-12 RX ADMIN — INSULIN LISPRO 2 UNITS: 100 INJECTION, SOLUTION INTRAVENOUS; SUBCUTANEOUS at 12:50

## 2025-08-12 RX ADMIN — INSULIN LISPRO 2 UNITS: 100 INJECTION, SOLUTION INTRAVENOUS; SUBCUTANEOUS at 17:43

## 2025-08-12 RX ADMIN — ALUMINUM HYDROXIDE, MAGNESIUM HYDROXIDE, AND DIMETHICONE 15 ML: 400; 400; 40 SUSPENSION ORAL at 20:28

## 2025-08-12 RX ADMIN — PREGABALIN 75 MG: 75 CAPSULE ORAL at 20:43

## 2025-08-12 RX ADMIN — ATORVASTATIN CALCIUM 80 MG: 40 TABLET, FILM COATED ORAL at 20:28

## 2025-08-12 RX ADMIN — INSULIN LISPRO 2 UNITS: 100 INJECTION, SOLUTION INTRAVENOUS; SUBCUTANEOUS at 08:49

## 2025-08-12 RX ADMIN — INSULIN LISPRO 2 UNITS: 100 INJECTION, SOLUTION INTRAVENOUS; SUBCUTANEOUS at 00:17

## 2025-08-12 RX ADMIN — SUCRALFATE 1 G: 1 TABLET ORAL at 05:38

## 2025-08-13 LAB
ALBUMIN SERPL-MCNC: 3.2 G/DL (ref 3.5–5.2)
ALBUMIN/GLOB SERPL: 1.4 G/DL
ALP SERPL-CCNC: 132 U/L (ref 39–117)
ALT SERPL W P-5'-P-CCNC: 6 U/L (ref 1–33)
ANION GAP SERPL CALCULATED.3IONS-SCNC: 11 MMOL/L (ref 5–15)
AST SERPL-CCNC: 11 U/L (ref 1–32)
BASOPHILS # BLD AUTO: 0.04 10*3/MM3 (ref 0–0.2)
BASOPHILS NFR BLD AUTO: 0.3 % (ref 0–1.5)
BILIRUB SERPL-MCNC: <0.2 MG/DL (ref 0–1.2)
BUN SERPL-MCNC: 35.4 MG/DL (ref 6–20)
BUN/CREAT SERPL: 14.6 (ref 7–25)
CALCIUM SPEC-SCNC: 8.2 MG/DL (ref 8.6–10.5)
CHLORIDE SERPL-SCNC: 105 MMOL/L (ref 98–107)
CHOLEST SERPL-MCNC: 203 MG/DL (ref 0–200)
CO2 SERPL-SCNC: 25 MMOL/L (ref 22–29)
CREAT SERPL-MCNC: 2.43 MG/DL (ref 0.57–1)
DEPRECATED RDW RBC AUTO: 57.7 FL (ref 37–54)
EGFRCR SERPLBLD CKD-EPI 2021: 23.3 ML/MIN/1.73
EOSINOPHIL # BLD AUTO: 0.3 10*3/MM3 (ref 0–0.4)
EOSINOPHIL NFR BLD AUTO: 2.6 % (ref 0.3–6.2)
ERYTHROCYTE [DISTWIDTH] IN BLOOD BY AUTOMATED COUNT: 16.7 % (ref 12.3–15.4)
GLOBULIN UR ELPH-MCNC: 2.3 GM/DL
GLUCOSE BLDC GLUCOMTR-MCNC: 155 MG/DL (ref 70–130)
GLUCOSE BLDC GLUCOMTR-MCNC: 157 MG/DL (ref 70–130)
GLUCOSE BLDC GLUCOMTR-MCNC: 202 MG/DL (ref 70–130)
GLUCOSE BLDC GLUCOMTR-MCNC: 209 MG/DL (ref 70–130)
GLUCOSE SERPL-MCNC: 188 MG/DL (ref 65–99)
HBA1C MFR BLD: 8.7 % (ref 4.8–5.6)
HCT VFR BLD AUTO: 30 % (ref 34–46.6)
HDLC SERPL-MCNC: 55 MG/DL (ref 40–60)
HGB BLD-MCNC: 9.4 G/DL (ref 12–15.9)
IMM GRANULOCYTES # BLD AUTO: 0.05 10*3/MM3 (ref 0–0.05)
IMM GRANULOCYTES NFR BLD AUTO: 0.4 % (ref 0–0.5)
LDLC SERPL CALC-MCNC: 106 MG/DL (ref 0–100)
LDLC/HDLC SERPL: 1.8 {RATIO}
LYMPHOCYTES # BLD AUTO: 2.96 10*3/MM3 (ref 0.7–3.1)
LYMPHOCYTES NFR BLD AUTO: 25.2 % (ref 19.6–45.3)
Lab: ABNORMAL
MCH RBC QN AUTO: 29.3 PG (ref 26.6–33)
MCHC RBC AUTO-ENTMCNC: 31.3 G/DL (ref 31.5–35.7)
MCV RBC AUTO: 93.5 FL (ref 79–97)
MONOCYTES # BLD AUTO: 0.6 10*3/MM3 (ref 0.1–0.9)
MONOCYTES NFR BLD AUTO: 5.1 % (ref 5–12)
NEUTROPHILS NFR BLD AUTO: 66.4 % (ref 42.7–76)
NEUTROPHILS NFR BLD AUTO: 7.78 10*3/MM3 (ref 1.7–7)
NRBC BLD AUTO-RTO: 0 /100 WBC (ref 0–0.2)
PLATELET # BLD AUTO: 315 10*3/MM3 (ref 140–450)
PMV BLD AUTO: 10.1 FL (ref 6–12)
POTASSIUM SERPL-SCNC: 3.5 MMOL/L (ref 3.5–5.2)
PROT SERPL-MCNC: 5.5 G/DL (ref 6–8.5)
RBC # BLD AUTO: 3.21 10*6/MM3 (ref 3.77–5.28)
SODIUM SERPL-SCNC: 141 MMOL/L (ref 136–145)
TRIGL SERPL-MCNC: 244 MG/DL (ref 0–150)
TSH SERPL DL<=0.05 MIU/L-ACNC: 0.53 UIU/ML (ref 0.27–4.2)
VLDLC SERPL-MCNC: 42 MG/DL (ref 5–40)
WBC NRBC COR # BLD AUTO: 11.73 10*3/MM3 (ref 3.4–10.8)

## 2025-08-13 PROCEDURE — 80053 COMPREHEN METABOLIC PANEL: CPT | Performed by: FAMILY MEDICINE

## 2025-08-13 PROCEDURE — 63710000001 INSULIN GLARGINE PER 5 UNITS: Performed by: FAMILY MEDICINE

## 2025-08-13 PROCEDURE — 84443 ASSAY THYROID STIM HORMONE: CPT | Performed by: FAMILY MEDICINE

## 2025-08-13 PROCEDURE — G0378 HOSPITAL OBSERVATION PER HR: HCPCS

## 2025-08-13 PROCEDURE — 25010000003 DEXTROSE 5 % SOLUTION 1,000 ML FLEX CONT: Performed by: FAMILY MEDICINE

## 2025-08-13 PROCEDURE — 82948 REAGENT STRIP/BLOOD GLUCOSE: CPT

## 2025-08-13 PROCEDURE — 63710000001 INSULIN LISPRO (HUMAN) PER 5 UNITS

## 2025-08-13 PROCEDURE — 85025 COMPLETE CBC W/AUTO DIFF WBC: CPT | Performed by: FAMILY MEDICINE

## 2025-08-13 PROCEDURE — 96361 HYDRATE IV INFUSION ADD-ON: CPT

## 2025-08-13 PROCEDURE — 83036 HEMOGLOBIN GLYCOSYLATED A1C: CPT | Performed by: FAMILY MEDICINE

## 2025-08-13 PROCEDURE — 25810000003 SODIUM CHLORIDE 0.9 % SOLUTION: Performed by: NURSE PRACTITIONER

## 2025-08-13 PROCEDURE — 80061 LIPID PANEL: CPT | Performed by: FAMILY MEDICINE

## 2025-08-13 RX ORDER — BISACODYL 10 MG
10 SUPPOSITORY, RECTAL RECTAL DAILY PRN
Status: DISCONTINUED | OUTPATIENT
Start: 2025-08-13 | End: 2025-08-14

## 2025-08-13 RX ORDER — SODIUM CHLORIDE 9 MG/ML
75 INJECTION, SOLUTION INTRAVENOUS CONTINUOUS
Status: DISPENSED | OUTPATIENT
Start: 2025-08-13 | End: 2025-08-14

## 2025-08-13 RX ORDER — POLYETHYLENE GLYCOL 3350 17 G/17G
17 POWDER, FOR SOLUTION ORAL DAILY PRN
Status: DISCONTINUED | OUTPATIENT
Start: 2025-08-13 | End: 2025-08-14

## 2025-08-13 RX ORDER — AMOXICILLIN 250 MG
2 CAPSULE ORAL NIGHTLY PRN
Status: DISCONTINUED | OUTPATIENT
Start: 2025-08-13 | End: 2025-08-14

## 2025-08-13 RX ADMIN — BISACODYL 10 MG: 10 SUPPOSITORY RECTAL at 22:51

## 2025-08-13 RX ADMIN — ALUMINUM HYDROXIDE, MAGNESIUM HYDROXIDE, AND DIMETHICONE 15 ML: 400; 400; 40 SUSPENSION ORAL at 08:13

## 2025-08-13 RX ADMIN — SODIUM BICARBONATE INJECTION, 100 MEQ: 84 SOLUTION INTRAVENOUS at 06:11

## 2025-08-13 RX ADMIN — ALUMINUM HYDROXIDE, MAGNESIUM HYDROXIDE, AND DIMETHICONE 15 ML: 400; 400; 40 SUSPENSION ORAL at 22:23

## 2025-08-13 RX ADMIN — Medication 10 ML: at 22:22

## 2025-08-13 RX ADMIN — ASPIRIN 81 MG: 81 TABLET, COATED ORAL at 08:13

## 2025-08-13 RX ADMIN — INSULIN LISPRO 2 UNITS: 100 INJECTION, SOLUTION INTRAVENOUS; SUBCUTANEOUS at 17:31

## 2025-08-13 RX ADMIN — PANTOPRAZOLE SODIUM 40 MG: 40 TABLET, DELAYED RELEASE ORAL at 05:30

## 2025-08-13 RX ADMIN — INSULIN LISPRO 3 UNITS: 100 INJECTION, SOLUTION INTRAVENOUS; SUBCUTANEOUS at 12:23

## 2025-08-13 RX ADMIN — INSULIN LISPRO 2 UNITS: 100 INJECTION, SOLUTION INTRAVENOUS; SUBCUTANEOUS at 22:44

## 2025-08-13 RX ADMIN — ALUMINUM HYDROXIDE, MAGNESIUM HYDROXIDE, AND DIMETHICONE 15 ML: 400; 400; 40 SUSPENSION ORAL at 15:46

## 2025-08-13 RX ADMIN — ROPINIROLE 1 MG: 1 TABLET, FILM COATED ORAL at 22:22

## 2025-08-13 RX ADMIN — SENNOSIDES, DOCUSATE SODIUM 2 TABLET: 50; 8.6 TABLET, FILM COATED ORAL at 15:46

## 2025-08-13 RX ADMIN — INSULIN LISPRO 3 UNITS: 100 INJECTION, SOLUTION INTRAVENOUS; SUBCUTANEOUS at 08:13

## 2025-08-13 RX ADMIN — SODIUM CHLORIDE 75 ML/HR: 9 INJECTION, SOLUTION INTRAVENOUS at 11:34

## 2025-08-13 RX ADMIN — POLYETHYLENE GLYCOL 3350 17 G: 17 POWDER, FOR SOLUTION ORAL at 12:23

## 2025-08-13 RX ADMIN — INSULIN GLARGINE 5 UNITS: 100 INJECTION, SOLUTION SUBCUTANEOUS at 22:45

## 2025-08-13 RX ADMIN — ATORVASTATIN CALCIUM 80 MG: 40 TABLET, FILM COATED ORAL at 22:22

## 2025-08-13 RX ADMIN — PREGABALIN 75 MG: 75 CAPSULE ORAL at 22:22

## 2025-08-14 LAB
ALBUMIN SERPL-MCNC: 2.6 G/DL (ref 3.5–5.2)
ALBUMIN/GLOB SERPL: 1 G/DL
ALP SERPL-CCNC: 114 U/L (ref 39–117)
ALT SERPL W P-5'-P-CCNC: 6 U/L (ref 1–33)
ANION GAP SERPL CALCULATED.3IONS-SCNC: 11 MMOL/L (ref 5–15)
AST SERPL-CCNC: 14 U/L (ref 1–32)
BASOPHILS # BLD AUTO: 0.02 10*3/MM3 (ref 0–0.2)
BASOPHILS NFR BLD AUTO: 0.2 % (ref 0–1.5)
BILIRUB SERPL-MCNC: <0.2 MG/DL (ref 0–1.2)
BUN SERPL-MCNC: 40.1 MG/DL (ref 6–20)
BUN/CREAT SERPL: 15.3 (ref 7–25)
CALCIUM SPEC-SCNC: 7.8 MG/DL (ref 8.6–10.5)
CHLORIDE SERPL-SCNC: 105 MMOL/L (ref 98–107)
CO2 SERPL-SCNC: 23 MMOL/L (ref 22–29)
CREAT SERPL-MCNC: 2.62 MG/DL (ref 0.57–1)
DEPRECATED RDW RBC AUTO: 59.9 FL (ref 37–54)
EGFRCR SERPLBLD CKD-EPI 2021: 21.3 ML/MIN/1.73
EOSINOPHIL # BLD AUTO: 0.67 10*3/MM3 (ref 0–0.4)
EOSINOPHIL NFR BLD AUTO: 6.5 % (ref 0.3–6.2)
ERYTHROCYTE [DISTWIDTH] IN BLOOD BY AUTOMATED COUNT: 16.7 % (ref 12.3–15.4)
GLOBULIN UR ELPH-MCNC: 2.7 GM/DL
GLUCOSE BLDC GLUCOMTR-MCNC: 155 MG/DL (ref 70–130)
GLUCOSE BLDC GLUCOMTR-MCNC: 188 MG/DL (ref 70–130)
GLUCOSE BLDC GLUCOMTR-MCNC: 194 MG/DL (ref 70–130)
GLUCOSE BLDC GLUCOMTR-MCNC: 202 MG/DL (ref 70–130)
GLUCOSE SERPL-MCNC: 149 MG/DL (ref 65–99)
HCT VFR BLD AUTO: 32.8 % (ref 34–46.6)
HGB BLD-MCNC: 10 G/DL (ref 12–15.9)
IMM GRANULOCYTES # BLD AUTO: 0.02 10*3/MM3 (ref 0–0.05)
IMM GRANULOCYTES NFR BLD AUTO: 0.2 % (ref 0–0.5)
LYMPHOCYTES # BLD AUTO: 2.53 10*3/MM3 (ref 0.7–3.1)
LYMPHOCYTES NFR BLD AUTO: 24.4 % (ref 19.6–45.3)
Lab: ABNORMAL
MCH RBC QN AUTO: 29.6 PG (ref 26.6–33)
MCHC RBC AUTO-ENTMCNC: 30.5 G/DL (ref 31.5–35.7)
MCV RBC AUTO: 97 FL (ref 79–97)
MONOCYTES # BLD AUTO: 0.59 10*3/MM3 (ref 0.1–0.9)
MONOCYTES NFR BLD AUTO: 5.7 % (ref 5–12)
NEUTROPHILS NFR BLD AUTO: 6.55 10*3/MM3 (ref 1.7–7)
NEUTROPHILS NFR BLD AUTO: 63 % (ref 42.7–76)
NRBC BLD AUTO-RTO: 0 /100 WBC (ref 0–0.2)
PLATELET # BLD AUTO: 328 10*3/MM3 (ref 140–450)
PMV BLD AUTO: 9.9 FL (ref 6–12)
POTASSIUM SERPL-SCNC: 4.9 MMOL/L (ref 3.5–5.2)
PROT SERPL-MCNC: 5.3 G/DL (ref 6–8.5)
RBC # BLD AUTO: 3.38 10*6/MM3 (ref 3.77–5.28)
SODIUM SERPL-SCNC: 139 MMOL/L (ref 136–145)
WBC NRBC COR # BLD AUTO: 10.38 10*3/MM3 (ref 3.4–10.8)

## 2025-08-14 PROCEDURE — G0378 HOSPITAL OBSERVATION PER HR: HCPCS

## 2025-08-14 PROCEDURE — 96361 HYDRATE IV INFUSION ADD-ON: CPT

## 2025-08-14 PROCEDURE — 25810000003 SODIUM CHLORIDE 0.9 % SOLUTION: Performed by: NURSE PRACTITIONER

## 2025-08-14 PROCEDURE — 63710000001 INSULIN GLARGINE PER 5 UNITS: Performed by: FAMILY MEDICINE

## 2025-08-14 PROCEDURE — 80053 COMPREHEN METABOLIC PANEL: CPT | Performed by: INTERNAL MEDICINE

## 2025-08-14 PROCEDURE — 82948 REAGENT STRIP/BLOOD GLUCOSE: CPT

## 2025-08-14 PROCEDURE — 63710000001 INSULIN LISPRO (HUMAN) PER 5 UNITS

## 2025-08-14 PROCEDURE — 85025 COMPLETE CBC W/AUTO DIFF WBC: CPT | Performed by: INTERNAL MEDICINE

## 2025-08-14 RX ORDER — POLYETHYLENE GLYCOL 3350 17 G/17G
17 POWDER, FOR SOLUTION ORAL DAILY PRN
Status: DISCONTINUED | OUTPATIENT
Start: 2025-08-14 | End: 2025-08-15

## 2025-08-14 RX ORDER — AMOXICILLIN 250 MG
2 CAPSULE ORAL 2 TIMES DAILY
Status: DISCONTINUED | OUTPATIENT
Start: 2025-08-14 | End: 2025-08-15

## 2025-08-14 RX ORDER — FUROSEMIDE 40 MG/1
40 TABLET ORAL ONCE
Status: COMPLETED | OUTPATIENT
Start: 2025-08-14 | End: 2025-08-14

## 2025-08-14 RX ORDER — BISACODYL 10 MG
10 SUPPOSITORY, RECTAL RECTAL 2 TIMES DAILY
Status: DISCONTINUED | OUTPATIENT
Start: 2025-08-14 | End: 2025-08-15

## 2025-08-14 RX ADMIN — SODIUM CHLORIDE 75 ML/HR: 9 INJECTION, SOLUTION INTRAVENOUS at 00:33

## 2025-08-14 RX ADMIN — INSULIN LISPRO 2 UNITS: 100 INJECTION, SOLUTION INTRAVENOUS; SUBCUTANEOUS at 12:36

## 2025-08-14 RX ADMIN — SENNOSIDES, DOCUSATE SODIUM 2 TABLET: 50; 8.6 TABLET, FILM COATED ORAL at 11:38

## 2025-08-14 RX ADMIN — ROPINIROLE 1 MG: 1 TABLET, FILM COATED ORAL at 21:22

## 2025-08-14 RX ADMIN — ALUMINUM HYDROXIDE, MAGNESIUM HYDROXIDE, AND DIMETHICONE 15 ML: 400; 400; 40 SUSPENSION ORAL at 18:01

## 2025-08-14 RX ADMIN — PANTOPRAZOLE SODIUM 40 MG: 40 TABLET, DELAYED RELEASE ORAL at 05:41

## 2025-08-14 RX ADMIN — ALUMINUM HYDROXIDE, MAGNESIUM HYDROXIDE, AND DIMETHICONE 15 ML: 400; 400; 40 SUSPENSION ORAL at 08:27

## 2025-08-14 RX ADMIN — BISACODYL 10 MG: 10 SUPPOSITORY RECTAL at 11:38

## 2025-08-14 RX ADMIN — INSULIN LISPRO 2 UNITS: 100 INJECTION, SOLUTION INTRAVENOUS; SUBCUTANEOUS at 18:01

## 2025-08-14 RX ADMIN — ASPIRIN 81 MG: 81 TABLET, COATED ORAL at 08:27

## 2025-08-14 RX ADMIN — ALUMINUM HYDROXIDE, MAGNESIUM HYDROXIDE, AND DIMETHICONE 15 ML: 400; 400; 40 SUSPENSION ORAL at 21:21

## 2025-08-14 RX ADMIN — ATORVASTATIN CALCIUM 80 MG: 40 TABLET, FILM COATED ORAL at 21:22

## 2025-08-14 RX ADMIN — POLYETHYLENE GLYCOL 3350 17 G: 17 POWDER, FOR SOLUTION ORAL at 06:58

## 2025-08-14 RX ADMIN — FUROSEMIDE 40 MG: 40 TABLET ORAL at 11:38

## 2025-08-14 RX ADMIN — Medication 10 ML: at 21:23

## 2025-08-14 RX ADMIN — INSULIN GLARGINE 5 UNITS: 100 INJECTION, SOLUTION SUBCUTANEOUS at 21:21

## 2025-08-14 RX ADMIN — PREGABALIN 75 MG: 75 CAPSULE ORAL at 21:22

## 2025-08-14 RX ADMIN — SENNOSIDES, DOCUSATE SODIUM 2 TABLET: 50; 8.6 TABLET, FILM COATED ORAL at 21:22

## 2025-08-14 RX ADMIN — INSULIN LISPRO 3 UNITS: 100 INJECTION, SOLUTION INTRAVENOUS; SUBCUTANEOUS at 08:26

## 2025-08-14 RX ADMIN — BISACODYL 10 MG: 10 SUPPOSITORY RECTAL at 21:23

## 2025-08-14 RX ADMIN — INSULIN LISPRO 2 UNITS: 100 INJECTION, SOLUTION INTRAVENOUS; SUBCUTANEOUS at 21:21

## 2025-08-15 LAB
ALBUMIN SERPL-MCNC: 3.2 G/DL (ref 3.5–5.2)
ALBUMIN UR-MCNC: 2.5 MG/DL
ALBUMIN/GLOB SERPL: 1.3 G/DL
ALP SERPL-CCNC: 119 U/L (ref 39–117)
ALT SERPL W P-5'-P-CCNC: 8 U/L (ref 1–33)
ANION GAP SERPL CALCULATED.3IONS-SCNC: 10 MMOL/L (ref 5–15)
AST SERPL-CCNC: 14 U/L (ref 1–32)
BASOPHILS # BLD AUTO: 0.03 10*3/MM3 (ref 0–0.2)
BASOPHILS NFR BLD AUTO: 0.3 % (ref 0–1.5)
BILIRUB SERPL-MCNC: <0.2 MG/DL (ref 0–1.2)
BUN SERPL-MCNC: 45.5 MG/DL (ref 6–20)
BUN/CREAT SERPL: 15.6 (ref 7–25)
CALCIUM SPEC-SCNC: 8.5 MG/DL (ref 8.6–10.5)
CHLORIDE SERPL-SCNC: 101 MMOL/L (ref 98–107)
CO2 SERPL-SCNC: 27 MMOL/L (ref 22–29)
CREAT SERPL-MCNC: 2.92 MG/DL (ref 0.57–1)
CREAT UR-MCNC: 48.4 MG/DL
DEPRECATED RDW RBC AUTO: 58.2 FL (ref 37–54)
EGFRCR SERPLBLD CKD-EPI 2021: 18.7 ML/MIN/1.73
EOSINOPHIL # BLD AUTO: 1.04 10*3/MM3 (ref 0–0.4)
EOSINOPHIL NFR BLD AUTO: 8.9 % (ref 0.3–6.2)
ERYTHROCYTE [DISTWIDTH] IN BLOOD BY AUTOMATED COUNT: 16.7 % (ref 12.3–15.4)
GLOBULIN UR ELPH-MCNC: 2.4 GM/DL
GLUCOSE BLDC GLUCOMTR-MCNC: 176 MG/DL (ref 70–130)
GLUCOSE BLDC GLUCOMTR-MCNC: 177 MG/DL (ref 70–130)
GLUCOSE BLDC GLUCOMTR-MCNC: 218 MG/DL (ref 70–130)
GLUCOSE BLDC GLUCOMTR-MCNC: 231 MG/DL (ref 70–130)
GLUCOSE SERPL-MCNC: 183 MG/DL (ref 65–99)
HCT VFR BLD AUTO: 29.2 % (ref 34–46.6)
HGB BLD-MCNC: 9.1 G/DL (ref 12–15.9)
IMM GRANULOCYTES # BLD AUTO: 0.05 10*3/MM3 (ref 0–0.05)
IMM GRANULOCYTES NFR BLD AUTO: 0.4 % (ref 0–0.5)
IRON 24H UR-MRATE: 39 MCG/DL (ref 37–145)
IRON SATN MFR SERPL: 14 % (ref 20–50)
LYMPHOCYTES # BLD AUTO: 2.97 10*3/MM3 (ref 0.7–3.1)
LYMPHOCYTES NFR BLD AUTO: 25.4 % (ref 19.6–45.3)
MCH RBC QN AUTO: 29.5 PG (ref 26.6–33)
MCHC RBC AUTO-ENTMCNC: 31.2 G/DL (ref 31.5–35.7)
MCV RBC AUTO: 94.8 FL (ref 79–97)
MICROALBUMIN/CREAT UR: 51.7 MG/G (ref 0–29)
MONOCYTES # BLD AUTO: 0.71 10*3/MM3 (ref 0.1–0.9)
MONOCYTES NFR BLD AUTO: 6.1 % (ref 5–12)
NEUTROPHILS NFR BLD AUTO: 58.9 % (ref 42.7–76)
NEUTROPHILS NFR BLD AUTO: 6.87 10*3/MM3 (ref 1.7–7)
NRBC BLD AUTO-RTO: 0 /100 WBC (ref 0–0.2)
PLATELET # BLD AUTO: 312 10*3/MM3 (ref 140–450)
PMV BLD AUTO: 9.9 FL (ref 6–12)
POTASSIUM SERPL-SCNC: 4.6 MMOL/L (ref 3.5–5.2)
PROT SERPL-MCNC: 5.6 G/DL (ref 6–8.5)
RBC # BLD AUTO: 3.08 10*6/MM3 (ref 3.77–5.28)
SODIUM SERPL-SCNC: 138 MMOL/L (ref 136–145)
SODIUM UR-SCNC: 113 MMOL/L
TIBC SERPL-MCNC: 273 MCG/DL (ref 298–536)
TRANSFERRIN SERPL-MCNC: 183 MG/DL (ref 200–360)
URATE SERPL-MCNC: 5.3 MG/DL (ref 2.4–5.7)
WBC NRBC COR # BLD AUTO: 11.67 10*3/MM3 (ref 3.4–10.8)

## 2025-08-15 PROCEDURE — 84550 ASSAY OF BLOOD/URIC ACID: CPT | Performed by: INTERNAL MEDICINE

## 2025-08-15 PROCEDURE — 80053 COMPREHEN METABOLIC PANEL: CPT | Performed by: INTERNAL MEDICINE

## 2025-08-15 PROCEDURE — 82948 REAGENT STRIP/BLOOD GLUCOSE: CPT

## 2025-08-15 PROCEDURE — 85025 COMPLETE CBC W/AUTO DIFF WBC: CPT | Performed by: INTERNAL MEDICINE

## 2025-08-15 PROCEDURE — 84466 ASSAY OF TRANSFERRIN: CPT | Performed by: INTERNAL MEDICINE

## 2025-08-15 PROCEDURE — 84300 ASSAY OF URINE SODIUM: CPT | Performed by: INTERNAL MEDICINE

## 2025-08-15 PROCEDURE — 82043 UR ALBUMIN QUANTITATIVE: CPT | Performed by: INTERNAL MEDICINE

## 2025-08-15 PROCEDURE — 82570 ASSAY OF URINE CREATININE: CPT | Performed by: INTERNAL MEDICINE

## 2025-08-15 PROCEDURE — 83540 ASSAY OF IRON: CPT | Performed by: INTERNAL MEDICINE

## 2025-08-15 PROCEDURE — 63710000001 INSULIN GLARGINE PER 5 UNITS: Performed by: FAMILY MEDICINE

## 2025-08-15 PROCEDURE — G0378 HOSPITAL OBSERVATION PER HR: HCPCS

## 2025-08-15 PROCEDURE — 63710000001 INSULIN LISPRO (HUMAN) PER 5 UNITS

## 2025-08-15 RX ORDER — ACETAMINOPHEN 325 MG/1
650 TABLET ORAL EVERY 6 HOURS PRN
Status: DISCONTINUED | OUTPATIENT
Start: 2025-08-15 | End: 2025-08-16 | Stop reason: HOSPADM

## 2025-08-15 RX ORDER — FAMOTIDINE 20 MG/1
20 TABLET, FILM COATED ORAL 2 TIMES DAILY
COMMUNITY
End: 2025-08-16 | Stop reason: HOSPADM

## 2025-08-15 RX ORDER — POTASSIUM CHLORIDE 1500 MG/1
20 TABLET, EXTENDED RELEASE ORAL NIGHTLY
COMMUNITY
End: 2025-08-16 | Stop reason: HOSPADM

## 2025-08-15 RX ORDER — ONDANSETRON 4 MG/1
8 TABLET, ORALLY DISINTEGRATING ORAL 2 TIMES DAILY
COMMUNITY

## 2025-08-15 RX ORDER — DOCUSATE SODIUM 100 MG/1
1500 CAPSULE, LIQUID FILLED ORAL NIGHTLY
COMMUNITY

## 2025-08-15 RX ORDER — BISACODYL 5 MG/1
10 TABLET, DELAYED RELEASE ORAL 2 TIMES DAILY
Status: DISCONTINUED | OUTPATIENT
Start: 2025-08-15 | End: 2025-08-16 | Stop reason: HOSPADM

## 2025-08-15 RX ORDER — LUBIPROSTONE 8 UG/1
8 CAPSULE ORAL 2 TIMES DAILY
Status: DISCONTINUED | OUTPATIENT
Start: 2025-08-15 | End: 2025-08-16 | Stop reason: HOSPADM

## 2025-08-15 RX ORDER — BISACODYL 5 MG/1
5 TABLET, DELAYED RELEASE ORAL 2 TIMES DAILY
Status: DISCONTINUED | OUTPATIENT
Start: 2025-08-15 | End: 2025-08-15

## 2025-08-15 RX ORDER — POTASSIUM CHLORIDE 1500 MG/1
40 TABLET, EXTENDED RELEASE ORAL 2 TIMES DAILY
COMMUNITY
End: 2025-08-16 | Stop reason: HOSPADM

## 2025-08-15 RX ADMIN — POLYETHYLENE GLYCOL 3350 17 G: 17 POWDER, FOR SOLUTION ORAL at 05:59

## 2025-08-15 RX ADMIN — ACETAMINOPHEN 650 MG: 325 TABLET ORAL at 21:33

## 2025-08-15 RX ADMIN — BISACODYL 10 MG: 5 TABLET, COATED ORAL at 10:36

## 2025-08-15 RX ADMIN — INSULIN LISPRO 3 UNITS: 100 INJECTION, SOLUTION INTRAVENOUS; SUBCUTANEOUS at 08:23

## 2025-08-15 RX ADMIN — INSULIN LISPRO 2 UNITS: 100 INJECTION, SOLUTION INTRAVENOUS; SUBCUTANEOUS at 21:33

## 2025-08-15 RX ADMIN — PANTOPRAZOLE SODIUM 40 MG: 40 TABLET, DELAYED RELEASE ORAL at 05:54

## 2025-08-15 RX ADMIN — ASPIRIN 81 MG: 81 TABLET, COATED ORAL at 08:23

## 2025-08-15 RX ADMIN — Medication 10 ML: at 08:23

## 2025-08-15 RX ADMIN — ATORVASTATIN CALCIUM 80 MG: 40 TABLET, FILM COATED ORAL at 20:56

## 2025-08-15 RX ADMIN — PREGABALIN 75 MG: 75 CAPSULE ORAL at 20:56

## 2025-08-15 RX ADMIN — INSULIN GLARGINE 5 UNITS: 100 INJECTION, SOLUTION SUBCUTANEOUS at 21:33

## 2025-08-15 RX ADMIN — ALUMINUM HYDROXIDE, MAGNESIUM HYDROXIDE, AND DIMETHICONE 15 ML: 400; 400; 40 SUSPENSION ORAL at 20:56

## 2025-08-15 RX ADMIN — ALUMINUM HYDROXIDE, MAGNESIUM HYDROXIDE, AND DIMETHICONE 15 ML: 400; 400; 40 SUSPENSION ORAL at 10:36

## 2025-08-15 RX ADMIN — BISACODYL 10 MG: 5 TABLET, COATED ORAL at 20:56

## 2025-08-15 RX ADMIN — SENNOSIDES, DOCUSATE SODIUM 2 TABLET: 50; 8.6 TABLET, FILM COATED ORAL at 08:23

## 2025-08-15 RX ADMIN — LUBIPROSTONE 8 MCG: 8 CAPSULE, GELATIN COATED ORAL at 10:36

## 2025-08-15 RX ADMIN — Medication 10 ML: at 20:58

## 2025-08-15 RX ADMIN — ALUMINUM HYDROXIDE, MAGNESIUM HYDROXIDE, AND DIMETHICONE 15 ML: 400; 400; 40 SUSPENSION ORAL at 18:10

## 2025-08-15 RX ADMIN — INSULIN LISPRO 2 UNITS: 100 INJECTION, SOLUTION INTRAVENOUS; SUBCUTANEOUS at 17:40

## 2025-08-15 RX ADMIN — ROPINIROLE 1 MG: 1 TABLET, FILM COATED ORAL at 20:56

## 2025-08-15 RX ADMIN — INSULIN LISPRO 3 UNITS: 100 INJECTION, SOLUTION INTRAVENOUS; SUBCUTANEOUS at 11:54

## 2025-08-15 RX ADMIN — LUBIPROSTONE 8 MCG: 8 CAPSULE, GELATIN COATED ORAL at 20:56

## 2025-08-16 ENCOUNTER — APPOINTMENT (OUTPATIENT)
Dept: ULTRASOUND IMAGING | Facility: HOSPITAL | Age: 55
End: 2025-08-16
Payer: COMMERCIAL

## 2025-08-16 VITALS
RESPIRATION RATE: 16 BRPM | SYSTOLIC BLOOD PRESSURE: 117 MMHG | DIASTOLIC BLOOD PRESSURE: 79 MMHG | TEMPERATURE: 98.2 F | HEIGHT: 61 IN | BODY MASS INDEX: 19.11 KG/M2 | OXYGEN SATURATION: 99 % | WEIGHT: 101.2 LBS | HEART RATE: 108 BPM

## 2025-08-16 LAB
ALBUMIN SERPL-MCNC: 3.7 G/DL (ref 3.5–5.2)
ALBUMIN/GLOB SERPL: 1.3 G/DL
ALP SERPL-CCNC: 133 U/L (ref 39–117)
ALT SERPL W P-5'-P-CCNC: 10 U/L (ref 1–33)
ANION GAP SERPL CALCULATED.3IONS-SCNC: 11 MMOL/L (ref 5–15)
AST SERPL-CCNC: 19 U/L (ref 1–32)
BASOPHILS # BLD AUTO: 0.04 10*3/MM3 (ref 0–0.2)
BASOPHILS NFR BLD AUTO: 0.3 % (ref 0–1.5)
BILIRUB SERPL-MCNC: 0.2 MG/DL (ref 0–1.2)
BUN SERPL-MCNC: 45.4 MG/DL (ref 6–20)
BUN/CREAT SERPL: 22.1 (ref 7–25)
CALCIUM SPEC-SCNC: 9.6 MG/DL (ref 8.6–10.5)
CHLORIDE SERPL-SCNC: 100 MMOL/L (ref 98–107)
CO2 SERPL-SCNC: 27 MMOL/L (ref 22–29)
CREAT SERPL-MCNC: 2.05 MG/DL (ref 0.57–1)
DEPRECATED RDW RBC AUTO: 59.4 FL (ref 37–54)
EGFRCR SERPLBLD CKD-EPI 2021: 28.5 ML/MIN/1.73
EOSINOPHIL # BLD AUTO: 1.29 10*3/MM3 (ref 0–0.4)
EOSINOPHIL NFR BLD AUTO: 10.6 % (ref 0.3–6.2)
ERYTHROCYTE [DISTWIDTH] IN BLOOD BY AUTOMATED COUNT: 16.8 % (ref 12.3–15.4)
GLOBULIN UR ELPH-MCNC: 2.9 GM/DL
GLUCOSE BLDC GLUCOMTR-MCNC: 156 MG/DL (ref 70–130)
GLUCOSE SERPL-MCNC: 138 MG/DL (ref 65–99)
HCT VFR BLD AUTO: 32.8 % (ref 34–46.6)
HGB BLD-MCNC: 10.2 G/DL (ref 12–15.9)
IMM GRANULOCYTES # BLD AUTO: 0.05 10*3/MM3 (ref 0–0.05)
IMM GRANULOCYTES NFR BLD AUTO: 0.4 % (ref 0–0.5)
LYMPHOCYTES # BLD AUTO: 3.62 10*3/MM3 (ref 0.7–3.1)
LYMPHOCYTES NFR BLD AUTO: 29.7 % (ref 19.6–45.3)
MCH RBC QN AUTO: 30.1 PG (ref 26.6–33)
MCHC RBC AUTO-ENTMCNC: 31.1 G/DL (ref 31.5–35.7)
MCV RBC AUTO: 96.8 FL (ref 79–97)
MONOCYTES # BLD AUTO: 0.63 10*3/MM3 (ref 0.1–0.9)
MONOCYTES NFR BLD AUTO: 5.2 % (ref 5–12)
NEUTROPHILS NFR BLD AUTO: 53.8 % (ref 42.7–76)
NEUTROPHILS NFR BLD AUTO: 6.54 10*3/MM3 (ref 1.7–7)
NRBC BLD AUTO-RTO: 0 /100 WBC (ref 0–0.2)
PLATELET # BLD AUTO: 334 10*3/MM3 (ref 140–450)
PMV BLD AUTO: 10.2 FL (ref 6–12)
POTASSIUM SERPL-SCNC: 4.1 MMOL/L (ref 3.5–5.2)
PROT SERPL-MCNC: 6.6 G/DL (ref 6–8.5)
RBC # BLD AUTO: 3.39 10*6/MM3 (ref 3.77–5.28)
SODIUM SERPL-SCNC: 138 MMOL/L (ref 136–145)
WBC NRBC COR # BLD AUTO: 12.17 10*3/MM3 (ref 3.4–10.8)

## 2025-08-16 PROCEDURE — 80053 COMPREHEN METABOLIC PANEL: CPT | Performed by: INTERNAL MEDICINE

## 2025-08-16 PROCEDURE — 85025 COMPLETE CBC W/AUTO DIFF WBC: CPT | Performed by: INTERNAL MEDICINE

## 2025-08-16 PROCEDURE — G0378 HOSPITAL OBSERVATION PER HR: HCPCS

## 2025-08-16 PROCEDURE — 76775 US EXAM ABDO BACK WALL LIM: CPT

## 2025-08-16 PROCEDURE — 82948 REAGENT STRIP/BLOOD GLUCOSE: CPT

## 2025-08-16 PROCEDURE — 63710000001 INSULIN LISPRO (HUMAN) PER 5 UNITS

## 2025-08-16 PROCEDURE — 63710000001 PROMETHAZINE PER 25 MG: Performed by: FAMILY MEDICINE

## 2025-08-16 RX ORDER — ALUMINA, MAGNESIA, AND SIMETHICONE 2400; 2400; 240 MG/30ML; MG/30ML; MG/30ML
15 SUSPENSION ORAL 3 TIMES DAILY
Qty: 769 ML | Refills: 0 | Status: SHIPPED | OUTPATIENT
Start: 2025-08-16

## 2025-08-16 RX ORDER — NORTRIPTYLINE HYDROCHLORIDE 25 MG/1
25 CAPSULE ORAL NIGHTLY
Status: DISCONTINUED | OUTPATIENT
Start: 2025-08-16 | End: 2025-08-16 | Stop reason: HOSPADM

## 2025-08-16 RX ORDER — PREGABALIN 75 MG/1
75 CAPSULE ORAL NIGHTLY
Start: 2025-08-16

## 2025-08-16 RX ORDER — ROPINIROLE 1 MG/1
1 TABLET, FILM COATED ORAL NIGHTLY
Start: 2025-08-16

## 2025-08-16 RX ORDER — HYDROXYCHLOROQUINE SULFATE 200 MG/1
200 TABLET, FILM COATED ORAL
Status: DISCONTINUED | OUTPATIENT
Start: 2025-08-16 | End: 2025-08-16

## 2025-08-16 RX ORDER — BUMETANIDE 1 MG/1
1 TABLET ORAL DAILY PRN
Start: 2025-08-16

## 2025-08-16 RX ORDER — PROMETHAZINE HYDROCHLORIDE 25 MG/1
12.5 TABLET ORAL ONCE
Status: COMPLETED | OUTPATIENT
Start: 2025-08-16 | End: 2025-08-16

## 2025-08-16 RX ADMIN — ALUMINUM HYDROXIDE, MAGNESIUM HYDROXIDE, AND DIMETHICONE 15 ML: 400; 400; 40 SUSPENSION ORAL at 10:40

## 2025-08-16 RX ADMIN — INSULIN LISPRO 2 UNITS: 100 INJECTION, SOLUTION INTRAVENOUS; SUBCUTANEOUS at 10:56

## 2025-08-16 RX ADMIN — PROMETHAZINE HYDROCHLORIDE 12.5 MG: 25 TABLET ORAL at 05:02

## 2025-08-16 RX ADMIN — LUBIPROSTONE 8 MCG: 8 CAPSULE, GELATIN COATED ORAL at 10:40

## 2025-08-16 RX ADMIN — ACETAMINOPHEN 650 MG: 325 TABLET ORAL at 05:02

## 2025-08-16 RX ADMIN — PANTOPRAZOLE SODIUM 40 MG: 40 TABLET, DELAYED RELEASE ORAL at 05:02

## 2025-08-16 RX ADMIN — ASPIRIN 81 MG: 81 TABLET, COATED ORAL at 10:40

## 2025-08-16 RX ADMIN — BISACODYL 10 MG: 5 TABLET, COATED ORAL at 10:40

## 2025-08-18 ENCOUNTER — READMISSION MANAGEMENT (OUTPATIENT)
Dept: CALL CENTER | Facility: HOSPITAL | Age: 55
End: 2025-08-18
Payer: COMMERCIAL

## 2025-08-25 ENCOUNTER — READMISSION MANAGEMENT (OUTPATIENT)
Dept: CALL CENTER | Facility: HOSPITAL | Age: 55
End: 2025-08-25
Payer: COMMERCIAL

## (undated) DEVICE — YANKAUER,BULB TIP WITH VENT: Brand: ARGYLE

## (undated) DEVICE — CONMED SCOPE SAVER BITE BLOCK, 20X27 MM: Brand: SCOPE SAVER

## (undated) DEVICE — SENSR O2 OXIMAX FNGR A/ 18IN NONSTR

## (undated) DEVICE — Device: Brand: DEFENDO AIR/WATER/SUCTION AND BIOPSY VALVE

## (undated) DEVICE — TBG SMPL FLTR LINE NASL 02/C02 A/ BX/100

## (undated) DEVICE — THE TALON GRASPING DEVICE IS USED TO RETRIEVE OF FOREIGN BODIES, TISSUE SPECIMENS, STONES OR CALCULI IN ENDOSCOPIC PROCEDURES OF THE GASTROINTESTINAL TRACT.: Brand: TALON

## (undated) DEVICE — THE CHANNEL CLEANING BRUSH IS A NYLON FLEXI BRUSH ATTACHED TO A FLEXIBLE PLASTIC SHEATH DESIGNED TO SAFELY REMOVE DEBRIS FROM FLEXIBLE ENDOSCOPES.

## (undated) DEVICE — CUFF,BP,DISP,1 TUBE,ADULT,HP: Brand: MEDLINE

## (undated) DEVICE — SYR LUERLOK 5CC

## (undated) DEVICE — NDL HYPO ECLPS SFTY 25G 1 1/2IN

## (undated) DEVICE — FRCP BX RADJAW4 NDL 2.8 240 STD OG

## (undated) DEVICE — KT PEG ENDOVIVE ENFIT SFTY PULL 20F 1P/U

## (undated) DEVICE — TBG FEED PULL FLOW20 NO/DRUG 20F 4.47MM 150CM

## (undated) DEVICE — ENDOGATOR AUXILIARY WATER JET CONNECTOR: Brand: ENDOGATOR

## (undated) DEVICE — FRCP BX RADJAW4 GASTRO PED NDL 1.8X160X2